# Patient Record
Sex: MALE | Race: BLACK OR AFRICAN AMERICAN | NOT HISPANIC OR LATINO | Employment: OTHER | URBAN - METROPOLITAN AREA
[De-identification: names, ages, dates, MRNs, and addresses within clinical notes are randomized per-mention and may not be internally consistent; named-entity substitution may affect disease eponyms.]

---

## 2017-01-03 DIAGNOSIS — I10 ESSENTIAL HYPERTENSION: ICD-10-CM

## 2017-01-03 RX ORDER — LISINOPRIL AND HYDROCHLOROTHIAZIDE 12.5; 2 MG/1; MG/1
1 TABLET ORAL DAILY
Qty: 90 TABLET | Refills: 1 | Status: SHIPPED | OUTPATIENT
Start: 2017-01-03 | End: 2017-07-08 | Stop reason: SDUPTHER

## 2017-01-03 NOTE — TELEPHONE ENCOUNTER
----- Message from Hayley Baudilio sent at 1/3/2017  9:24 AM CST -----  Contact: self [phone]  He is requesting a refill of lisinopril be sent to:    [id number] - [place] [address] AT [facility]  [address]  [place] [address]  Phone: [phone] Fax: [phone]    Thank you!

## 2017-01-31 DIAGNOSIS — R01.1 HEART MURMUR: ICD-10-CM

## 2017-01-31 DIAGNOSIS — I10 ESSENTIAL HYPERTENSION: ICD-10-CM

## 2017-01-31 RX ORDER — PROPRANOLOL HYDROCHLORIDE 40 MG/1
40 TABLET ORAL DAILY
Qty: 90 TABLET | Refills: 4 | Status: SHIPPED | OUTPATIENT
Start: 2017-01-31 | End: 2017-11-24 | Stop reason: ALTCHOICE

## 2017-01-31 NOTE — TELEPHONE ENCOUNTER
----- Message from Mojgan Beard sent at 1/31/2017 11:18 AM CST -----  propranolol (INDERAL) 40 MG tablet refill    [id number] - [place] - [address] AT [facility] & [facility]  [address]  [place] [address]  Phone: [phone] Fax: [phone]

## 2017-04-20 ENCOUNTER — HISTORICAL (OUTPATIENT)
Dept: ADMINISTRATIVE | Facility: HOSPITAL | Age: 72
End: 2017-04-20

## 2017-04-20 LAB
ALBUMIN SERPL-MCNC: 4.2 G/DL (ref 3.1–4.7)
ALP SERPL-CCNC: 57 IU/L (ref 40–104)
ALT (SGPT): 18 IU/L (ref 3–33)
AMYLASE SERPL-CCNC: 142 U/L (ref 28–100)
AST SERPL-CCNC: 20 IU/L (ref 10–40)
BASOPHILS NFR BLD: 0 K/UL (ref 0–0.2)
BASOPHILS NFR BLD: 0.1 %
BILIRUB SERPL-MCNC: 0.7 MG/DL (ref 0.3–1)
BUN SERPL-MCNC: 37 MG/DL (ref 8–20)
CALCIUM SERPL-MCNC: 11.2 MG/DL (ref 7.7–10.4)
CHLORIDE: 107 MMOL/L (ref 98–110)
CK SERPL-CCNC: 48 IU/L (ref 18–170)
CO2 SERPL-SCNC: 22.5 MMOL/L (ref 22.8–31.6)
CREATININE: 1.58 MG/DL (ref 0.6–1.4)
CRP SERPL-MCNC: 0.29 MG/DL (ref 0–1.4)
EOSINOPHIL NFR BLD: 0.1 K/UL (ref 0–0.7)
EOSINOPHIL NFR BLD: 0.9 %
ERYTHROCYTE [DISTWIDTH] IN BLOOD BY AUTOMATED COUNT: 17.1 % (ref 11.7–14.9)
GLUCOSE: 119 MG/DL (ref 70–99)
GRAN #: 6 K/UL (ref 1.4–6.5)
GRAN%: 68.7 %
HCT VFR BLD AUTO: 40.7 % (ref 39–55)
HGB BLD-MCNC: 12.7 G/DL (ref 14–16)
IMMATURE GRANS (ABS): 0 K/UL (ref 0–1)
IMMATURE GRANULOCYTES: 0.5 %
LYMPH #: 2 K/UL (ref 1.2–3.4)
LYMPH%: 23 %
MCH RBC QN AUTO: 27.4 PG (ref 25–35)
MCHC RBC AUTO-ENTMCNC: 31.2 G/DL (ref 31–36)
MCV RBC AUTO: 87.9 FL (ref 80–100)
MONO #: 0.6 K/UL (ref 0.1–0.6)
MONO%: 6.8 %
NUCLEATED RBCS: 0 %
PLATELET # BLD AUTO: 455 K/UL (ref 140–440)
PMV BLD AUTO: 10.6 FL (ref 8.8–12.7)
POTASSIUM SERPL-SCNC: 4.3 MMOL/L (ref 3.5–5)
PROT SERPL-MCNC: 8 G/DL (ref 6–8.2)
RBC # BLD AUTO: 4.63 M/UL (ref 4.3–5.9)
SODIUM: 142 MMOL/L (ref 134–144)
WBC # BLD AUTO: 8.7 K/UL (ref 5–10)

## 2017-04-21 LAB — RHEUMATOID FACT SERPL-ACNC: 633.9 IU/ML (ref 0–13.9)

## 2017-06-19 DIAGNOSIS — R52 PAIN: Primary | ICD-10-CM

## 2017-06-19 RX ORDER — TRAMADOL HYDROCHLORIDE 50 MG/1
TABLET ORAL
Qty: 120 TABLET | Refills: 5 | Status: SHIPPED | OUTPATIENT
Start: 2017-06-19 | End: 2018-01-26 | Stop reason: SDUPTHER

## 2017-07-07 ENCOUNTER — TELEPHONE (OUTPATIENT)
Dept: ENDOCRINOLOGY | Facility: CLINIC | Age: 72
End: 2017-07-07

## 2017-07-07 NOTE — TELEPHONE ENCOUNTER
Spoke with patient cancelled appointment for Dr Sampson.. He advised he would like to be seen because he has not been seen in awhile. I advised I could schedule with family practice as there is no order endocrine providers. Verbalized understanding

## 2017-07-08 DIAGNOSIS — I10 ESSENTIAL HYPERTENSION: ICD-10-CM

## 2017-07-10 RX ORDER — LISINOPRIL AND HYDROCHLOROTHIAZIDE 12.5; 2 MG/1; MG/1
TABLET ORAL
Qty: 90 TABLET | Refills: 0 | Status: SHIPPED | OUTPATIENT
Start: 2017-07-10 | End: 2017-11-24 | Stop reason: SDUPTHER

## 2017-07-14 ENCOUNTER — TELEPHONE (OUTPATIENT)
Dept: ENDOCRINOLOGY | Facility: CLINIC | Age: 72
End: 2017-07-14

## 2017-07-14 NOTE — TELEPHONE ENCOUNTER
----- Message from Idania Cerrato sent at 7/14/2017 10:50 AM CDT -----  Contact: pt  Pt received letter regarding needing appt in Aug, I was unable to schedule, due to system  Call pt back to schedule     Call back on # [phone]  thanks

## 2017-07-18 DIAGNOSIS — M19.90 CHRONIC INFLAMMATORY ARTHRITIS: Primary | ICD-10-CM

## 2017-07-18 RX ORDER — PREDNISONE 2.5 MG/1
TABLET ORAL
Qty: 180 TABLET | Refills: 1 | Status: SHIPPED | OUTPATIENT
Start: 2017-07-18 | End: 2018-02-21 | Stop reason: SDUPTHER

## 2017-07-19 ENCOUNTER — DOCUMENTATION ONLY (OUTPATIENT)
Dept: FAMILY MEDICINE | Facility: CLINIC | Age: 72
End: 2017-07-19

## 2017-07-19 NOTE — PROGRESS NOTES
Pre-Visit Chart Review  For Appointment Scheduled on 7/25/17    Health Maintenance Due   Topic Date Due    TETANUS VACCINE  08/28/1965    Zoster Vaccine  08/28/2007    Colonoscopy  11/03/2016

## 2017-07-25 ENCOUNTER — OFFICE VISIT (OUTPATIENT)
Dept: FAMILY MEDICINE | Facility: CLINIC | Age: 72
End: 2017-07-25
Payer: MEDICARE

## 2017-07-25 VITALS
OXYGEN SATURATION: 97 % | RESPIRATION RATE: 12 BRPM | SYSTOLIC BLOOD PRESSURE: 148 MMHG | HEART RATE: 86 BPM | WEIGHT: 141.13 LBS | BODY MASS INDEX: 21.39 KG/M2 | HEIGHT: 68 IN | DIASTOLIC BLOOD PRESSURE: 73 MMHG

## 2017-07-25 DIAGNOSIS — M06.9 RHEUMATOID ARTHRITIS INVOLVING MULTIPLE SITES, UNSPECIFIED RHEUMATOID FACTOR PRESENCE: ICD-10-CM

## 2017-07-25 DIAGNOSIS — M05.10 RHEUMATOID LUNG: ICD-10-CM

## 2017-07-25 DIAGNOSIS — F32.A DEPRESSION, UNSPECIFIED DEPRESSION TYPE: ICD-10-CM

## 2017-07-25 DIAGNOSIS — F41.9 ANXIETY: ICD-10-CM

## 2017-07-25 DIAGNOSIS — E07.9 THYROID DISEASE: ICD-10-CM

## 2017-07-25 DIAGNOSIS — I10 ESSENTIAL HYPERTENSION: Primary | ICD-10-CM

## 2017-07-25 DIAGNOSIS — E29.1 HYPOGONADISM IN MALE: ICD-10-CM

## 2017-07-25 DIAGNOSIS — E04.9 GOITER: ICD-10-CM

## 2017-07-25 PROCEDURE — 99499 UNLISTED E&M SERVICE: CPT | Mod: S$PBB,,, | Performed by: PHYSICIAN ASSISTANT

## 2017-07-25 PROCEDURE — 1159F MED LIST DOCD IN RCRD: CPT | Mod: S$GLB,,, | Performed by: PHYSICIAN ASSISTANT

## 2017-07-25 PROCEDURE — 99999 PR PBB SHADOW E&M-EST. PATIENT-LVL IV: CPT | Mod: PBBFAC,,, | Performed by: PHYSICIAN ASSISTANT

## 2017-07-25 PROCEDURE — 99214 OFFICE O/P EST MOD 30 MIN: CPT | Mod: S$GLB,,, | Performed by: PHYSICIAN ASSISTANT

## 2017-07-25 PROCEDURE — 1125F AMNT PAIN NOTED PAIN PRSNT: CPT | Mod: S$GLB,,, | Performed by: PHYSICIAN ASSISTANT

## 2017-07-25 RX ORDER — ESCITALOPRAM OXALATE 5 MG/1
5 TABLET ORAL DAILY
Qty: 30 TABLET | Refills: 11 | Status: SHIPPED | OUTPATIENT
Start: 2017-07-25 | End: 2017-08-28 | Stop reason: DRUGHIGH

## 2017-07-25 RX ORDER — DULOXETIN HYDROCHLORIDE 30 MG/1
30 CAPSULE, DELAYED RELEASE ORAL DAILY
Qty: 30 CAPSULE | Refills: 11 | Status: CANCELLED | OUTPATIENT
Start: 2017-07-25 | End: 2018-07-25

## 2017-07-25 NOTE — PROGRESS NOTES
Subjective:       Patient ID: Rajendra Castle is a 71 y.o. male.    Chief Complaint: Follow-up    Mr. Castle comes to clinic today for follow up. He reports he is concerned about recent increase in depression and anxiety symptoms. He reports that he has had 2 romantic relationships end in the last 4 years. He also reports he is thinking about the end of his life and trying to make preparations for this so his family does not have to worry about it. He denies SI or HI.  The patient is followed by Dr. Garcia for RA. He is followed by Dr. Song for rheumatoid lung. He is also followed by Dr. Sampson for thyroid disease.       Review of Systems   Constitutional: Positive for activity change. Negative for appetite change and fever.   HENT: Negative for postnasal drip, rhinorrhea and sinus pressure.    Eyes: Negative for visual disturbance.   Respiratory: Negative for cough and shortness of breath.    Cardiovascular: Negative for chest pain.   Gastrointestinal: Negative for abdominal distention and abdominal pain.   Genitourinary: Negative for difficulty urinating and dysuria.   Musculoskeletal: Negative for arthralgias and myalgias.   Neurological: Negative for headaches.   Hematological: Negative for adenopathy.   Psychiatric/Behavioral: Positive for dysphoric mood. The patient is nervous/anxious.        Objective:      Physical Exam   Constitutional: He is oriented to person, place, and time.   HENT:   Mouth/Throat: Oropharynx is clear and moist. No oropharyngeal exudate.   Eyes: Conjunctivae are normal. Pupils are equal, round, and reactive to light.   Cardiovascular: Normal rate and regular rhythm.    Pulmonary/Chest: Effort normal and breath sounds normal. He has no wheezes.   Abdominal: Soft. Bowel sounds are normal. He exhibits no distension. There is no tenderness.   Musculoskeletal: He exhibits no edema.   Lymphadenopathy:     He has no cervical adenopathy.   Neurological: He is alert and oriented to person, place,  and time.   Skin: No erythema.   Psychiatric: His behavior is normal.       Assessment:       1. Essential hypertension    2. Rheumatoid arthritis involving multiple sites, unspecified rheumatoid factor presence    3. Thyroid disease    4. Goiter    5. Hypogonadism in male    6. Rheumatoid lung    7. Anxiety    8. Depression, unspecified depression type        Plan:       Rajendra was seen today for follow-up.    Diagnoses and all orders for this visit:    Essential hypertension  Slightly elevated  Monitor and record  Follow up in 4 weeks or sooner if needed  Rheumatoid arthritis involving multiple sites, unspecified rheumatoid factor presence  Continue follow up with Dr. Garcia  Thyroid disease  -     US Soft Tissue Head Neck Thyroid; Future    Goiter  -     US Soft Tissue Head Neck Thyroid; Future    Hypogonadism in male  Follow up with Dr. Sampson  Rheumatoid lung  Continue follow up with Dr. Song  Anxiety  -     escitalopram oxalate (LEXAPRO) 5 MG Tab; Take 1 tablet (5 mg total) by mouth once daily.  Follow up in 4 weeks or sooner if needed  Patient given information on a counselor  Depression, unspecified depression type  -     escitalopram oxalate (LEXAPRO) 5 MG Tab; Take 1 tablet (5 mg total) by mouth once daily.  Follow up in 4 weeks or sooner if needed  Other orders  -     Cancel: duloxetine (CYMBALTA) 30 MG capsule; Take 1 capsule (30 mg total) by mouth once daily.    Patient readiness: acceptance and barriers:none    During the course of the visit the patient was educated and counseled about the following:     Hypertension:   Medication: no change.  Dietary sodium restriction.  Regular aerobic exercise.  Follow up: 4 weeks and as needed.    Goals: Hypertension: Reduce Blood Pressure    Did patient meet goals/outcomes: No    The following self management tools provided: blood pressure log    Patient Instructions (the written plan) was given to the patient/family.     Time spent with patient: 30  minutes

## 2017-08-22 ENCOUNTER — DOCUMENTATION ONLY (OUTPATIENT)
Dept: FAMILY MEDICINE | Facility: CLINIC | Age: 72
End: 2017-08-22

## 2017-08-22 NOTE — PROGRESS NOTES
Pre-Visit Chart Review  For Appointment Scheduled on 8/28/17    Health Maintenance Due   Topic Date Due    TETANUS VACCINE  08/28/1965    Zoster Vaccine  08/28/2007    Colonoscopy  11/03/2016    Influenza Vaccine  08/01/2017

## 2017-08-23 ENCOUNTER — OFFICE VISIT (OUTPATIENT)
Dept: RHEUMATOLOGY | Facility: CLINIC | Age: 72
End: 2017-08-23
Payer: MEDICARE

## 2017-08-23 ENCOUNTER — TELEPHONE (OUTPATIENT)
Dept: FAMILY MEDICINE | Facility: CLINIC | Age: 72
End: 2017-08-23

## 2017-08-23 VITALS
SYSTOLIC BLOOD PRESSURE: 120 MMHG | WEIGHT: 138.19 LBS | HEIGHT: 68 IN | DIASTOLIC BLOOD PRESSURE: 60 MMHG | BODY MASS INDEX: 20.94 KG/M2

## 2017-08-23 DIAGNOSIS — Z79.899 ENCOUNTER FOR LONG-TERM (CURRENT) USE OF OTHER MEDICATIONS: ICD-10-CM

## 2017-08-23 DIAGNOSIS — M19.90 CHRONIC INFLAMMATORY ARTHRITIS: Primary | ICD-10-CM

## 2017-08-23 DIAGNOSIS — N18.30 STAGE 3 CHRONIC KIDNEY DISEASE: ICD-10-CM

## 2017-08-23 PROBLEM — N18.9 CKD (CHRONIC KIDNEY DISEASE): Status: ACTIVE | Noted: 2017-08-23

## 2017-08-23 PROCEDURE — 3078F DIAST BP <80 MM HG: CPT | Mod: ,,, | Performed by: INTERNAL MEDICINE

## 2017-08-23 PROCEDURE — 3074F SYST BP LT 130 MM HG: CPT | Mod: ,,, | Performed by: INTERNAL MEDICINE

## 2017-08-23 PROCEDURE — 99213 OFFICE O/P EST LOW 20 MIN: CPT | Mod: ,,, | Performed by: INTERNAL MEDICINE

## 2017-08-23 PROCEDURE — 3008F BODY MASS INDEX DOCD: CPT | Mod: ,,, | Performed by: INTERNAL MEDICINE

## 2017-08-23 PROCEDURE — 1126F AMNT PAIN NOTED NONE PRSNT: CPT | Mod: ,,, | Performed by: INTERNAL MEDICINE

## 2017-08-23 PROCEDURE — 1159F MED LIST DOCD IN RCRD: CPT | Mod: ,,, | Performed by: INTERNAL MEDICINE

## 2017-08-23 NOTE — TELEPHONE ENCOUNTER
Received faxed request from [facility] requesting H&P and medication list. Also received signed release of information form. Requested information faxed to [phone].

## 2017-08-23 NOTE — PROGRESS NOTES
[facility] RHEUMATOLOGY           Follow-up visit      Subjective:       Patient ID:   NAME: Rajendra Castle : 1945     71 y.o. male    Referring Doc: No ref. provider found  Other Physicians:    Chief Complaint:  Rheumatoid Arthritis      HPI/Interval History:      The patient is doing just great. He has no joint pain, no joint swelling, no redness and no stiffness. He states he feels better now then he can remember feeling in many years.        ROS:   GEN: no fever, night sweats or weight loss  SKIN:  no rashes , erythema, bruising, or swelling, no Raynauds, no photosensitivity  HEENT: no HAs, no changes in vision, no mouth ulcers, no sicca symptoms, no scalp tenderness, jaw claudication.  CV: no CP, SOB, PND, DEY or orthopnea,no palpitations  PULM: no SOB, cough, hemoptysis, sputum or pleuritic pain  GI: no abdominal pain, nausea, vomiting, constipation, diarrhea, melanotic stools, BRBPR, or hematemesis.no dysphagia  : no hematuria, dysuria  NEURO:no paresthesias, headaches, visual disturbances, muscle weakness  MUSCULOSKELETAL:  No complaints of joint pain or swelling  PSYCH: No insomnia, no significant depression, no anxiety    Medications:    Current Outpatient Prescriptions:     alendronate (FOSAMAX) 70 MG tablet, Take 1 tablet (70 mg total) by mouth every 7 days., Disp: 90 tablet, Rfl: 3    aspirin (ECOTRIN) 81 MG EC tablet, Take 81 mg by mouth once daily.  , Disp: , Rfl:     escitalopram oxalate (LEXAPRO) 5 MG Tab, Take 1 tablet (5 mg total) by mouth once daily., Disp: 30 tablet, Rfl: 11    folic acid (FOLVITE) 1 MG tablet, Take 5 mg by mouth once daily., Disp: , Rfl:     isosorbide dinitrate (ISORDIL) 10 MG tablet, Take 1 tablet (10 mg total) by mouth 2 (two) times daily., Disp: 60 tablet, Rfl: 11    lisinopril-hydrochlorothiazide (PRINZIDE,ZESTORETIC) 20-12.5 mg per tablet, TAKE 1 TABLET BY MOUTH EVERY DAY, Disp: 90 tablet, Rfl: 0    methotrexate 2.5 MG Tab, Take 6 tablets by mouth every 7  "days., Disp: , Rfl:     predniSONE (DELTASONE) 2.5 MG tablet, TAKE 1 TABLET BY MOUTH TWICE DAILY, Disp: 180 tablet, Rfl: 1    tramadol (ULTRAM) 50 mg tablet, TAKE 2 TABLETS BY MOUTH TWICE DAILY AS NEEDED FOR PAIN, Disp: 120 tablet, Rfl: 5    pantoprazole (PROTONIX) 40 MG tablet, Take 1 tablet (40 mg total) by mouth once daily., Disp: 90 tablet, Rfl: 3    propranolol (INDERAL) 40 MG tablet, Take 1 tablet (40 mg total) by mouth once daily., Disp: 90 tablet, Rfl: 4    testosterone (ANDROGEL) 1 % (25 mg/2.5gram) GlPk, Place 25 mg onto the skin once daily., Disp: 90 packet, Rfl: 3    umeclidinium-vilanterol (ANORO ELLIPTA) 62.5-25 mcg/actuation DsDv, Inhale 1 puff into the lungs once daily., Disp: 1 each, Rfl: 11  FAMILY HISTORY: negative for Connective Tissue Disease        Review of patient's allergies indicates:  No Known Allergies          Objective:     Vitals:  Blood pressure 120/60, height 5' 8" (1.727 m), weight 62.7 kg (138 lb 3.2 oz).    Physical Examination:   GEN: wn/wd male in no apparent distress; AAOx3  SKIN: no rashes, no lesions, no sclerodactyly, no Raynaud's, no periungual erythema  HEAD: no alopecia, no scalp tenderness, no temporal artery tenderness or induration.  EYES: no pallor, no icterus, PERRLA  ENT:  no thrush, no mucosal dryness or ulcerations, adequate oral hygiene & dentition.  NECK: supple x 6, no masses, no thyromegaly, no lymphadenopathy.  CV:   S1 and S2 regular, no murmurs, gallop or rubs  CHEST: Normal respiratory effort;  normal breath sounds/no adventitious sounds. No signs of consolidation.  ABD: non-tender and non-distended; soft; normal bowel sounds; no rebound/guarding or tenderness. No hepatosplenomegaly.  Musculoskeletal:  No evidence of any active synovitis and no evidence of any deformity  EXTREM: no clubbing, cyanosis or edema. normal pulses.  NEURO: grossly intact; motor/sensory WNL; AAOx3; no tremors  PSYCH: normal mood, affect and behavior            Labs:   Lab " Results   Component Value Date    WBC 8.7 04/20/2017    HGB 12.7 (L) 04/20/2017    HCT 40.7 04/20/2017    MCV 87.9 04/20/2017     (H) 04/20/2017   CMP@  Sodium   Date Value Ref Range Status   04/20/2017 142 134 - 144 mmol/L      Potassium   Date Value Ref Range Status   04/20/2017 4.3 3.5 - 5.0 mmol/L      Chloride   Date Value Ref Range Status   04/20/2017 107 98 - 110 mmol/L      CO2   Date Value Ref Range Status   04/20/2017 22.5 (L) 22.8 - 31.6 mmol/L      Glucose   Date Value Ref Range Status   04/20/2017 119 (H) 70 - 99 mg/dL      BUN, Bld   Date Value Ref Range Status   04/20/2017 37 (H) 8 - 20 mg/dL      Creatinine   Date Value Ref Range Status   04/20/2017 1.58 (H) 0.60 - 1.40 mg/dL      Calcium   Date Value Ref Range Status   04/20/2017 11.2 (H) 7.7 - 10.4 mg/dL      Total Protein   Date Value Ref Range Status   04/20/2017 8.0 6.0 - 8.2 g/dL      Albumin   Date Value Ref Range Status   04/20/2017 4.2 3.1 - 4.7 g/dL      Total Bilirubin   Date Value Ref Range Status   04/20/2017 0.7 0.3 - 1.0 mg/dL      Alkaline Phosphatase   Date Value Ref Range Status   04/20/2017 57 40 - 104 IU/L      AST   Date Value Ref Range Status   04/20/2017 20 10 - 40 IU/L      ALT   Date Value Ref Range Status   09/27/2016 18 10 - 44 U/L Final     CPK   Date Value Ref Range Status   04/20/2017 48 18 - 170 IU/L      CRP   Date Value Ref Range Status   04/20/2017 0.29 0.00 - 1.40 mg/dL      Rheumatoid Factor   Date Value Ref Range Status   04/20/2017 633.9 (H) 0.0 - 13.9 IU/mL      Comment:     Performed at: [facility], [facility] [id number] [place], [place], [id number] Euceda MD, Phone:  [phone]     Uric Acid   Date Value Ref Range Status   09/20/2016 8.6 (H) 3.4 - 7.0 mg/dL Final         Radiology/Diagnostic Studies:    o x-rays are available    Assessment/Discussion/Plan:   71 y.o. male with serial positive rheumatoid arthritis-very well controlled with current regimen.  (2) CKD- being managed by his  primary care doctor, Dr. Aceves.        PLAN:  I will continue his medications without change. Blood testing was ordered and will be obtained at an outside facility. A copy of my note today will be sent to his primary provider.      RTC:  I will see the patient back in 4 months.      Electronically signed by Uriel Garcia MD

## 2017-08-25 LAB
ALBUMIN SERPL-MCNC: 3.8 G/DL (ref 3.6–5.1)
ALBUMIN/GLOB SERPL: 1.3 (CALC) (ref 1–2.5)
ALP SERPL-CCNC: 49 U/L (ref 40–115)
ALT SERPL-CCNC: 15 U/L (ref 9–46)
AST SERPL-CCNC: 19 U/L (ref 10–35)
BASOPHILS # BLD AUTO: 9 CELLS/UL (ref 0–200)
BASOPHILS NFR BLD AUTO: 0.1 %
BILIRUB SERPL-MCNC: 0.3 MG/DL (ref 0.2–1.2)
BUN SERPL-MCNC: 26 MG/DL (ref 7–25)
BUN/CREAT SERPL: 15 (CALC) (ref 6–22)
CALCIUM SERPL-MCNC: 10.2 MG/DL (ref 8.6–10.3)
CCP IGG SERPL-ACNC: >250 UNITS
CHLORIDE SERPL-SCNC: 108 MMOL/L (ref 98–110)
CO2 SERPL-SCNC: 22 MMOL/L (ref 20–31)
CREAT SERPL-MCNC: 1.69 MG/DL (ref 0.7–1.25)
CRP SERPL-MCNC: <0.1 MG/DL
EOSINOPHIL # BLD AUTO: 122 CELLS/UL (ref 15–500)
EOSINOPHIL NFR BLD AUTO: 1.4 %
ERYTHROCYTE [DISTWIDTH] IN BLOOD BY AUTOMATED COUNT: 15.8 % (ref 11–15)
GFR SERPL CREATININE-BSD FRML MDRD: 41 ML/MIN/1.73M2
GLOBULIN SER CALC-MCNC: 3 G/DL (CALC) (ref 1.9–3.7)
GLUCOSE SERPL-MCNC: 78 MG/DL (ref 65–99)
HCT VFR BLD AUTO: 37.7 % (ref 38.5–50)
HGB BLD-MCNC: 12.1 G/DL (ref 13.2–17.1)
LYMPHOCYTES # BLD AUTO: 2636 CELLS/UL (ref 850–3900)
LYMPHOCYTES NFR BLD AUTO: 30.3 %
MCH RBC QN AUTO: 27.8 PG (ref 27–33)
MCHC RBC AUTO-ENTMCNC: 32.1 G/DL (ref 32–36)
MCV RBC AUTO: 86.5 FL (ref 80–100)
MONOCYTES # BLD AUTO: 905 CELLS/UL (ref 200–950)
MONOCYTES NFR BLD AUTO: 10.4 %
NEUTROPHILS # BLD AUTO: 5029 CELLS/UL (ref 1500–7800)
NEUTROPHILS NFR BLD AUTO: 57.8 %
PLATELET # BLD AUTO: 385 THOUSAND/UL (ref 140–400)
PMV BLD REES-ECKER: 11.6 FL (ref 7.5–12.5)
POTASSIUM SERPL-SCNC: 5.2 MMOL/L (ref 3.5–5.3)
PROT SERPL-MCNC: 6.8 G/DL (ref 6.1–8.1)
RBC # BLD AUTO: 4.36 MILLION/UL (ref 4.2–5.8)
SODIUM SERPL-SCNC: 138 MMOL/L (ref 135–146)
WBC # BLD AUTO: 8.7 THOUSAND/UL (ref 3.8–10.8)

## 2017-08-28 ENCOUNTER — OFFICE VISIT (OUTPATIENT)
Dept: FAMILY MEDICINE | Facility: CLINIC | Age: 72
End: 2017-08-28
Payer: MEDICARE

## 2017-08-28 VITALS
HEIGHT: 68 IN | HEART RATE: 100 BPM | RESPIRATION RATE: 16 BRPM | WEIGHT: 140.44 LBS | BODY MASS INDEX: 21.28 KG/M2 | SYSTOLIC BLOOD PRESSURE: 140 MMHG | DIASTOLIC BLOOD PRESSURE: 68 MMHG

## 2017-08-28 DIAGNOSIS — Z12.11 COLON CANCER SCREENING: ICD-10-CM

## 2017-08-28 DIAGNOSIS — M06.9 RHEUMATOID ARTHRITIS INVOLVING MULTIPLE SITES, UNSPECIFIED RHEUMATOID FACTOR PRESENCE: ICD-10-CM

## 2017-08-28 DIAGNOSIS — I10 ESSENTIAL HYPERTENSION: Primary | ICD-10-CM

## 2017-08-28 DIAGNOSIS — K21.9 GASTROESOPHAGEAL REFLUX DISEASE WITHOUT ESOPHAGITIS: ICD-10-CM

## 2017-08-28 DIAGNOSIS — F32.A DEPRESSION, UNSPECIFIED DEPRESSION TYPE: ICD-10-CM

## 2017-08-28 PROCEDURE — 3008F BODY MASS INDEX DOCD: CPT | Mod: S$GLB,,, | Performed by: PHYSICIAN ASSISTANT

## 2017-08-28 PROCEDURE — 1126F AMNT PAIN NOTED NONE PRSNT: CPT | Mod: S$GLB,,, | Performed by: PHYSICIAN ASSISTANT

## 2017-08-28 PROCEDURE — 3078F DIAST BP <80 MM HG: CPT | Mod: S$GLB,,, | Performed by: PHYSICIAN ASSISTANT

## 2017-08-28 PROCEDURE — 99999 PR PBB SHADOW E&M-EST. PATIENT-LVL IV: CPT | Mod: PBBFAC,,, | Performed by: PHYSICIAN ASSISTANT

## 2017-08-28 PROCEDURE — 1159F MED LIST DOCD IN RCRD: CPT | Mod: S$GLB,,, | Performed by: PHYSICIAN ASSISTANT

## 2017-08-28 PROCEDURE — 99214 OFFICE O/P EST MOD 30 MIN: CPT | Mod: S$GLB,,, | Performed by: PHYSICIAN ASSISTANT

## 2017-08-28 PROCEDURE — 3077F SYST BP >= 140 MM HG: CPT | Mod: S$GLB,,, | Performed by: PHYSICIAN ASSISTANT

## 2017-08-28 PROCEDURE — 99499 UNLISTED E&M SERVICE: CPT | Mod: S$PBB,,, | Performed by: PHYSICIAN ASSISTANT

## 2017-08-28 RX ORDER — ESCITALOPRAM OXALATE 10 MG/1
10 TABLET ORAL DAILY
COMMUNITY
End: 2021-02-17 | Stop reason: DRUGHIGH

## 2017-08-28 NOTE — PROGRESS NOTES
Subjective:       Patient ID: Rajendra Castle is a 72 y.o. male.    Chief Complaint: Follow-up (4wk f/u)    Mr. Castle comes to clinic today for 1 month follow up. He reports that the lexapro is helping his depression and anxiety. The patient reports that he is seeing a psychiatrist through [facility] who has increased his lexparo to 10mg. The patient is also attending group counseling. He reports overall he is feeling much better. The patient is due for a colonoscopy; he refuses this at this time. He does continue to follow up with Dr. Garcia for rheumatology.       Review of Systems   Constitutional: Negative for activity change, appetite change and fever.   HENT: Negative for postnasal drip, rhinorrhea and sinus pressure.    Eyes: Negative for visual disturbance.   Respiratory: Negative for cough and shortness of breath.    Cardiovascular: Negative for chest pain.   Gastrointestinal: Negative for abdominal distention and abdominal pain.   Genitourinary: Negative for difficulty urinating and dysuria.   Musculoskeletal: Negative for arthralgias and myalgias.   Neurological: Negative for headaches.   Hematological: Negative for adenopathy.   Psychiatric/Behavioral: The patient is not nervous/anxious.        Objective:      Physical Exam   Constitutional: He is oriented to person, place, and time.   HENT:   Mouth/Throat: Oropharynx is clear and moist. No oropharyngeal exudate.   Eyes: Conjunctivae are normal. Pupils are equal, round, and reactive to light.   Cardiovascular: Normal rate and regular rhythm.    Pulmonary/Chest: Effort normal and breath sounds normal. He has no wheezes.   Abdominal: Soft. Bowel sounds are normal. He exhibits no distension. There is no tenderness.   Musculoskeletal: He exhibits no edema.   Lymphadenopathy:     He has no cervical adenopathy.   Neurological: He is alert and oriented to person, place, and time.   Skin: No erythema.   Psychiatric: His behavior is normal.       Assessment:       1.  Essential hypertension    2. Rheumatoid arthritis involving multiple sites, unspecified rheumatoid factor presence    3. Gastroesophageal reflux disease without esophagitis    4. Depression, unspecified depression type    5. Colon cancer screening        Plan:   Rajendra was seen today for follow-up.    Diagnoses and all orders for this visit:    Essential hypertension  Slightly elevated  Low salt diet  Continue current medication  Monitor and record blood pressure at home  Rheumatoid arthritis involving multiple sites, unspecified rheumatoid factor presence  Continue follow up with Dr. Garcia  Continue current medication  Gastroesophageal reflux disease without esophagitis  Avoid trigger foods  Stop eating 2-3 hours before bed  Depression, unspecified depression type  Continue lexapro  Continue counseling through [facility]  Colon cancer screening  -     Ambulatory referral to Gastroenterology

## 2017-09-11 ENCOUNTER — LAB VISIT (OUTPATIENT)
Dept: LAB | Facility: HOSPITAL | Age: 72
End: 2017-09-11
Attending: INTERNAL MEDICINE
Payer: MEDICARE

## 2017-09-11 ENCOUNTER — OFFICE VISIT (OUTPATIENT)
Dept: ENDOCRINOLOGY | Facility: CLINIC | Age: 72
End: 2017-09-11
Payer: MEDICARE

## 2017-09-11 VITALS
HEIGHT: 68 IN | BODY MASS INDEX: 20.87 KG/M2 | SYSTOLIC BLOOD PRESSURE: 112 MMHG | DIASTOLIC BLOOD PRESSURE: 60 MMHG | WEIGHT: 137.69 LBS | HEART RATE: 70 BPM

## 2017-09-11 DIAGNOSIS — M81.0 OSTEOPOROSIS, UNSPECIFIED OSTEOPOROSIS TYPE, UNSPECIFIED PATHOLOGICAL FRACTURE PRESENCE: ICD-10-CM

## 2017-09-11 DIAGNOSIS — E55.9 VITAMIN D INSUFFICIENCY: ICD-10-CM

## 2017-09-11 DIAGNOSIS — E04.2 NONTOXIC MULTINODULAR GOITER: ICD-10-CM

## 2017-09-11 DIAGNOSIS — E21.3 HYPERPARATHYROIDISM: ICD-10-CM

## 2017-09-11 DIAGNOSIS — E04.2 NONTOXIC MULTINODULAR GOITER: Primary | ICD-10-CM

## 2017-09-11 DIAGNOSIS — I10 ESSENTIAL HYPERTENSION: ICD-10-CM

## 2017-09-11 DIAGNOSIS — E83.52 HYPERCALCEMIA: ICD-10-CM

## 2017-09-11 LAB
25(OH)D3+25(OH)D2 SERPL-MCNC: 33 NG/ML
TSH SERPL DL<=0.005 MIU/L-ACNC: 0.93 UIU/ML

## 2017-09-11 PROCEDURE — 1126F AMNT PAIN NOTED NONE PRSNT: CPT | Mod: S$GLB,,, | Performed by: INTERNAL MEDICINE

## 2017-09-11 PROCEDURE — 3074F SYST BP LT 130 MM HG: CPT | Mod: S$GLB,,, | Performed by: INTERNAL MEDICINE

## 2017-09-11 PROCEDURE — 3078F DIAST BP <80 MM HG: CPT | Mod: S$GLB,,, | Performed by: INTERNAL MEDICINE

## 2017-09-11 PROCEDURE — 82306 VITAMIN D 25 HYDROXY: CPT

## 2017-09-11 PROCEDURE — 84443 ASSAY THYROID STIM HORMONE: CPT

## 2017-09-11 PROCEDURE — 99214 OFFICE O/P EST MOD 30 MIN: CPT | Mod: S$GLB,,, | Performed by: INTERNAL MEDICINE

## 2017-09-11 PROCEDURE — 99499 UNLISTED E&M SERVICE: CPT | Mod: S$PBB,,, | Performed by: INTERNAL MEDICINE

## 2017-09-11 PROCEDURE — 99999 PR PBB SHADOW E&M-EST. PATIENT-LVL III: CPT | Mod: PBBFAC,,, | Performed by: INTERNAL MEDICINE

## 2017-09-11 PROCEDURE — 3008F BODY MASS INDEX DOCD: CPT | Mod: S$GLB,,, | Performed by: INTERNAL MEDICINE

## 2017-09-11 PROCEDURE — 1159F MED LIST DOCD IN RCRD: CPT | Mod: S$GLB,,, | Performed by: INTERNAL MEDICINE

## 2017-09-11 PROCEDURE — 36415 COLL VENOUS BLD VENIPUNCTURE: CPT | Mod: PO

## 2017-09-11 RX ORDER — ERGOCALCIFEROL 1.25 MG/1
50000 CAPSULE ORAL
COMMUNITY
End: 2017-11-15 | Stop reason: SDUPTHER

## 2017-09-11 NOTE — PATIENT INSTRUCTIONS
Hypothyroidism       You have hypothyroidism. This means your thyroid gland is not making enough thyroid hormone. This hormone is vital to body growth and metabolism. If you dont make enough, many body processes slow down. This can cause symptoms throughout the body. Hypothyroidism can range from mild to severe. The most severe form is called myxedema.  There are a number of causes of hypothyroidism. A common cause is Hashimotos disease. This disease causes the bodys own immune system to attack the thyroid gland. When you have certain treatments, such as surgery to remove the thyroid gland, this can also cause hypothyroidism.  Symptoms of hypothyroidism can include:  · Fatigue  · Trouble concentrating or thinking clearly; forgetfulness  · Dry skin  · Hair loss  · Weight gain  · Low tolerance to cold  · Constipation  · Depression  · Personality changes  · Tingling or prickling of the hands or feet  · Heavy, absent, or irregular periods (women only)  Older adults may sometimes have other symptoms. These can include:  · Muscle aches and weakness  · Confusion  · Incontinence (unable to control urine or stool)  · Trouble moving around  · Falling  Treatment for hypothyroidism involves taking thyroid hormone pills daily. These pills replace the hormone your thyroid doesnt make. You will likely need to take a daily pill for the rest of your life. Tips for taking this medicine are given below.  Home care  Tips for taking your medicine  · Take your thyroid hormone pills as prescribed by your healthcare provider. This is most often 1 pill a day on an empty stomach. Use a pillbox labeled with the days of the week. This will help you remember to take your pill each day.  · Dont take products that contain iron and calcium or antacids within 4 hours of taking your thyroid hormone pills.  · Dont take other medicines with your thyroid hormone pill without checking with your provider first.  · Tell your provider if you have  any side effects from your medicines that bother you.  · Never change the dosage or stop taking your thyroid pills without talking to your provider first.  General care  · Always talk with your provider before trying other medicines or treatments for your thyroid problem.  · If you see other healthcare providers, be sure to let them know about your thyroid problem.  Follow-up care  See your healthcare provider for checkups as advised. You may need regular tests to check the level of thyroid hormone in your blood.  When to seek medical advice  Call your healthcare provider right away if any of these occur:  · New symptoms develop  · Symptoms return, continue, or worsen even after treatment  · Extreme fatigue  · Puffy hands, face, or feet  · Fast or irregular heartbeat  · Confusion  Call 911  Call 911 right away if any of these occur:  · Fainting  · Chest pain  · Shortness of breath or trouble breathing  Date Last Reviewed: 8/24/2015  © 2686-4720 [employer]. [address] [place] [address]. All rights reserved. This information is not intended as a substitute for professional medical care. Always follow your healthcare professional's instructions.        Common Thyroid Problems    The thyroid is a gland in the neck. It makes thyroid hormone. A hormone is a chemical messenger for the body. If there is a problem with the thyroid, the level of thyroid hormone may change. This can lead to symptoms.  Hypothyroidism  This is when the thyroid gland doesnt make enough hormone. The most common cause of hypothyroidism is Hashimoto thyroiditis. This occurs when the bodys immune system attacks the thyroid gland. Hypothyroidism may also occur if theres a severe deficiency of iodine in the body. The thyroid needs iodine to make hormone. Problems with the pituitary gland can lead to not enough production of thyroid hormone. Hypothyroidism will also occur if the thyroid gland is removed during  surgery.  Common symptoms include:  · Low energy and tiredness  · Depression  · Feeling cold  · Muscle pain  · Slowed thinking      · Constipation  · Heavier menstrual periods with prolonged bleeding   · Weight gain  · Dry and brittle skin, hair, nails  · Excessive sleepiness  Hyperthyroidism  This is when the thyroid gland makes too much hormone. The most common cause is Graves disease. This is due to the bodys immune system telling the thyroid to make too much hormone. Another cause is a small bump (nodule) in the thyroid gland. This can cause hyperthyroidism if the cells in the nodule make too much thyroid hormone and stop hormone production in the rest of the thyroid gland.  Common symptoms include:  · Shaking, nervousness, irritability  · Feeling hot  · A rapid, irregular heartbeat  · Muscle weakness, fatigue  · More frequent bowel movements  · [place], lighter menstrual periods  · Weight loss  · Hair loss  · Bulging eyes  Nodules  Nodules are lumps of tissue in the thyroid gland. Most often, the cause of nodules isnt known. But they may be more common in people whove had medical radiation to the head or neck. Sometimes nodules can be felt on the outside of the neck. Most of the time, cause no symptoms and dont affect the amount of thyroid hormone. Most nodules are not cancer. But sometimes a nodule may be cancer.  What is a goiter?  A goiter is the enlargement of the thyroid gland. When the gland enlarges, you may see or feel a swelling on your neck. A goiter can develop in someone with a normal thyroid, overactive thyroid, or underactive thyroid.   Date Last Reviewed: 11/1/2016  © 7391-6594 [employer]. [address] [place] [address], [place] [address]. All rights reserved. This information is not intended as a substitute for professional medical care. Always follow your healthcare professional's instructions.        Understanding the Parathyroid Glands  The parathyroid glands are 4 pea-sized glands  in the neck. They sit behind the thyroid gland. Their main job is to keep the level of calcium in the blood in a healthy range. This helps muscles and nerves work properly. And it also keeps bones strong. When there is a problem with the parathyroid glands, the blood calcium level may get too high. This has effects around the body.      How the parathyroid glands work  The parathyroid glands control the level of calcium and phosphorus in the blood by making parathyroid hormone (PTH). This is a chemical messenger that tells the body how to control calcium.  When blood calcium is low  When the blood calcium level is low, the glands make more PTH. This tells the body to increase the amount of calcium in the blood. To increase the blood calcium level, the body may absorb more calcium from food in the intestines. It may also take calcium from the bones.  When blood calcium is high  When the blood calcium level is high, the glands make less PTH. This tells the body to lower the amount of calcium in the blood. To lower the blood calcium level, less calcium is absorbed by the gut. Less calcium is taken from the bones. And more calcium is filtered out of the blood by the kidneys.  Date Last Reviewed: 11/1/2016  © 2209-1465 [employer]. [address] [place] [address], [place] [address]. All rights reserved. This information is not intended as a substitute for professional medical care. Always follow your healthcare professional's instructions.

## 2017-09-11 NOTE — PROGRESS NOTES
Subjective:      Patient ID: Rajendra Castle is a 72 y.o. male.    Chief Complaint:  Thyroid nodule    History of Present Illness    F/u of    New patient to me  Patient went to see his PCP's office ( saw PA) and found in paperwork that he has thyroid disease ( he was not sure what kind ) so he researched and bought a book - he thinks he has hyperthyroidism and hypothyroidism.    I had a long discussion with him and after our discussion I believe most of his symptoms are due to depression , he follows with a psychiatrist . HAs an appointment with him this month       Osteoporosis diagnosed in 2016 by    Was started in fosamax which he took until couple of months ago   On chronic prednisone for RA  No fractures   No nephrolithiasis   Never been treated with lithium or vit D            Review of Systems            Objective:   Physical Exam   Constitutional: No distress.   HENT:   Right Ear: External ear normal.   Left Ear: External ear normal.   Eyes: EOM are normal.   Neck: No thyromegaly present.   Skin: He is not diaphoretic.       Lab Review:     DXA scanning was performed over the left hip and lumbar spine.  Review of the images confirms satisfactory positioning and technique.    The L1 to L4 vertebral bone mineral density is equal to 1.222 g/cm squared with a T score of -0.8 and a Z score of 0.2.    The left femoral neck bone mineral density is equal to 0.610 g/cm squared with a T score of -3.0 and a Z score of -1.5.   Impression      Osteoporosis -- there is a 16% risk of a major osteoporotic fracture and a 5.9% risk of hip fracture in the next 10 years (FRAX).    Consider FDA approved medical therapies in postmenopausal women age 50 years and older, based on the following:    -- A hip or vertebral (clinical or morphometric) fracture  -- T score less than or equal to -2.5 at the femoral neck or spine after appropriate evaluation to exclude secondary causes.  -- Low bone mass -- also known as  osteopenia (T score between -1.0 and -2.5 at the femoral neck or spine) and a 10 year probability of hip fracture greater than or equal to 3% or a 10 year probability of major osteoporosis-related fracture greater than 20% based on the [place] adaptive WHO algorithm.  -- Clinician's judgment and/or patient preference is may indicate treatment for people with 10 year fracture probability is above or below these levels.  ______________________________________     Electronically signed by: RADHA GILLILAND MD  Date: 09/23/16  Time: 10:45        Sonographic evaluation of the thyroid gland was performed and compared to 05/20/16    Findings: The right thyroid lobe measures 5.1 x 2.2 x 2.0 cm.  The left measures 5.1 x 2.6 x 1.9 cm.  The thyroid isthmus measures 5 mm thickness.  Total thyroid volume is 20 mL.    A predominantly cystic nodule is again seen within the upper to mid pole of the right thyroid lobe measuring 1.6 x 0.8 x 0.7 cm showing no significant interval change.  A 5 mm solid-appearing nodule is seen within the lower pole of the right thyroid lobe.  2 mm cystic nodules noted within the midpole of the left lower lobe.   Impression      Upper normal size thyroid containing a few nodules none of which meet criteria for fine needle aspiration.  The largest shows no significant interval change.      Electronically signed by: Carlos Irwin MD  Date: 11/16/16  Time: 09:33          Assessment:     1. Nontoxic multinodular goiter  TSH    Vitamin D    CALCIUM, TIMED URINE    Creatinine, urine, timed   2. Osteoporosis, unspecified osteoporosis type, unspecified pathological fracture presence  TSH    Vitamin D    CALCIUM, TIMED URINE    Creatinine, urine, timed   3. Hypercalcemia  TSH    Vitamin D    CALCIUM, TIMED URINE    Creatinine, urine, timed   4. Hyperparathyroidism  TSH    Vitamin D    CALCIUM, TIMED URINE    Creatinine, urine, timed   5. Essential hypertension     6. Vitamin D insufficiency  TSH    Vitamin D     CALCIUM, TIMED URINE    Creatinine, urine, timed        # NTMG     I reviewed images independently right thyroid cyst could be parathyroid - unable to differentiate due to images limitations.   Check TSH today     # Hyperparathyroidism with hypercalcemia but most recent calcium is normal - he does have CKD but stable and also low phos   - check Vit D today   - discussed indications of surgery   - we will also get 24 hour urine calcium     # osteoporosis [multifactorial hyperparathyroidism , steroid use )    - discussed restarting fosamax vs started prolia   - we will restart fosamax    Plan:     Follow up:  - vit D , TSH  -  24 hour urine calcium

## 2017-09-13 ENCOUNTER — LAB VISIT (OUTPATIENT)
Dept: LAB | Facility: HOSPITAL | Age: 72
End: 2017-09-13
Attending: INTERNAL MEDICINE
Payer: MEDICARE

## 2017-09-13 DIAGNOSIS — E21.3 HYPERPARATHYROIDISM: ICD-10-CM

## 2017-09-13 DIAGNOSIS — E55.9 VITAMIN D INSUFFICIENCY: ICD-10-CM

## 2017-09-13 DIAGNOSIS — M81.0 OSTEOPOROSIS, UNSPECIFIED OSTEOPOROSIS TYPE, UNSPECIFIED PATHOLOGICAL FRACTURE PRESENCE: ICD-10-CM

## 2017-09-13 DIAGNOSIS — E83.52 HYPERCALCEMIA: ICD-10-CM

## 2017-09-13 DIAGNOSIS — E04.2 NONTOXIC MULTINODULAR GOITER: ICD-10-CM

## 2017-09-13 PROCEDURE — 82570 ASSAY OF URINE CREATININE: CPT

## 2017-09-13 PROCEDURE — 82340 ASSAY OF CALCIUM IN URINE: CPT

## 2017-09-14 LAB
CALCIUM 24H UR-MRATE: 1 MG/HR
CALCIUM UR-MCNC: 3.9 MG/DL
CALCIUM URINE (MG/SPEC): 29 MG/SPEC
CREAT 24H UR-MRATE: 47.5 MG/HR
CREAT UR-MCNC: 152 MG/DL
CREATININE, URINE (MG/SPEC): 1140 MG/SPEC
URINE COLLECTION DURATION: 24 HR
URINE COLLECTION DURATION: 24 HR
URINE VOLUME: 750 ML
URINE VOLUME: 750 ML

## 2017-09-23 RX ORDER — ISOSORBIDE DINITRATE 10 MG/1
TABLET ORAL
Qty: 60 TABLET | Refills: 0 | Status: SHIPPED | OUTPATIENT
Start: 2017-09-23 | End: 2017-10-28 | Stop reason: SDUPTHER

## 2017-10-04 ENCOUNTER — OFFICE VISIT (OUTPATIENT)
Dept: ENDOCRINOLOGY | Facility: CLINIC | Age: 72
End: 2017-10-04
Payer: MEDICARE

## 2017-10-04 VITALS
HEART RATE: 99 BPM | DIASTOLIC BLOOD PRESSURE: 78 MMHG | HEIGHT: 68 IN | SYSTOLIC BLOOD PRESSURE: 158 MMHG | WEIGHT: 140.44 LBS | BODY MASS INDEX: 21.28 KG/M2

## 2017-10-04 DIAGNOSIS — I10 ESSENTIAL HYPERTENSION: ICD-10-CM

## 2017-10-04 DIAGNOSIS — E21.3 HYPERPARATHYROIDISM: Primary | ICD-10-CM

## 2017-10-04 DIAGNOSIS — E55.9 VITAMIN D INSUFFICIENCY: ICD-10-CM

## 2017-10-04 DIAGNOSIS — M81.0 OSTEOPOROSIS, UNSPECIFIED OSTEOPOROSIS TYPE, UNSPECIFIED PATHOLOGICAL FRACTURE PRESENCE: ICD-10-CM

## 2017-10-04 DIAGNOSIS — E04.1 NODULAR THYROID DISEASE: ICD-10-CM

## 2017-10-04 PROCEDURE — 99999 PR PBB SHADOW E&M-EST. PATIENT-LVL IV: CPT | Mod: PBBFAC,,, | Performed by: INTERNAL MEDICINE

## 2017-10-04 PROCEDURE — 99214 OFFICE O/P EST MOD 30 MIN: CPT | Mod: S$GLB,,, | Performed by: INTERNAL MEDICINE

## 2017-10-04 PROCEDURE — 99499 UNLISTED E&M SERVICE: CPT | Mod: S$PBB,,, | Performed by: INTERNAL MEDICINE

## 2017-10-04 NOTE — PROGRESS NOTES
Subjective:      Patient ID: Rajendra Castle is a 72 y.o. male.    Chief Complaint:  Thyroid nodule    History of Present Illness    Comes for f/u   previously seen by        Thyroid cyst       Osteoporosis diagnosed in 2016 by    Was started in fosamax which he took until couple of months ago   On chronic prednisone for RA  No fractures   No nephrolithiasis   Never been treated with lithium or vit D    On HCTZ              Review of Systems   Psychiatric/Behavioral: Positive for dysphoric mood (follows with a psychiatrist).               Objective:   Physical Exam   Constitutional: No distress.   HENT:   Right Ear: External ear normal.   Left Ear: External ear normal.   Eyes: EOM are normal.   Neck: No thyromegaly present.   Skin: He is not diaphoretic.       Lab Review:     DXA scanning was performed over the left hip and lumbar spine.  Review of the images confirms satisfactory positioning and technique.    The L1 to L4 vertebral bone mineral density is equal to 1.222 g/cm squared with a T score of -0.8 and a Z score of 0.2.    The left femoral neck bone mineral density is equal to 0.610 g/cm squared with a T score of -3.0 and a Z score of -1.5.   Impression      Osteoporosis -- there is a 16% risk of a major osteoporotic fracture and a 5.9% risk of hip fracture in the next 10 years (FRAX).    Consider FDA approved medical therapies in postmenopausal women age 50 years and older, based on the following:    -- A hip or vertebral (clinical or morphometric) fracture  -- T score less than or equal to -2.5 at the femoral neck or spine after appropriate evaluation to exclude secondary causes.  -- Low bone mass -- also known as osteopenia (T score between -1.0 and -2.5 at the femoral neck or spine) and a 10 year probability of hip fracture greater than or equal to 3% or a 10 year probability of major osteoporosis-related fracture greater than 20% based on the [place] adaptive WHO algorithm.  -- Clinician's  judgment and/or patient preference is may indicate treatment for people with 10 year fracture probability is above or below these levels.  ______________________________________     Electronically signed by: RADHA GILLILAND MD  Date: 09/23/16  Time: 10:45        Sonographic evaluation of the thyroid gland was performed and compared to 05/20/16    Findings: The right thyroid lobe measures 5.1 x 2.2 x 2.0 cm.  The left measures 5.1 x 2.6 x 1.9 cm.  The thyroid isthmus measures 5 mm thickness.  Total thyroid volume is 20 mL.    A predominantly cystic nodule is again seen within the upper to mid pole of the right thyroid lobe measuring 1.6 x 0.8 x 0.7 cm showing no significant interval change.  A 5 mm solid-appearing nodule is seen within the lower pole of the right thyroid lobe.  2 mm cystic nodules noted within the midpole of the left lower lobe.   Impression      Upper normal size thyroid containing a few nodules none of which meet criteria for fine needle aspiration.  The largest shows no significant interval change.      Electronically signed by: Carlos Irwin MD  Date: 11/16/16  Time: 09:33          Assessment:     1. Hyperparathyroidism  Comprehensive metabolic panel    Vitamin D    TSH    US Soft Tissue Head Neck Thyroid    CANCELED: US Soft Tissue Head Neck Thyroid   2. Essential hypertension  Comprehensive metabolic panel    Vitamin D    TSH    US Soft Tissue Head Neck Thyroid    CANCELED: US Soft Tissue Head Neck Thyroid   3. Osteoporosis, unspecified osteoporosis type, unspecified pathological fracture presence  Comprehensive metabolic panel    Vitamin D    TSH    US Soft Tissue Head Neck Thyroid    CANCELED: US Soft Tissue Head Neck Thyroid   4. Vitamin D insufficiency  Comprehensive metabolic panel    Vitamin D    TSH    US Soft Tissue Head Neck Thyroid    CANCELED: US Soft Tissue Head Neck Thyroid   5. Nodular thyroid disease  Comprehensive metabolic panel    Vitamin D    TSH    US Soft Tissue Head Neck  Thyroid    CANCELED: US Soft Tissue Head Neck Thyroid        # NTMG     I reviewed images independently right thyroid cyst could be parathyroid - unable to differentiate due to images limitations.   TSH normal    # Hyperparathyroidism with hypercalcemia but most recent calcium is normal - he does have CKD but stable and also low phos   - vitamin D is appropriate   No nephrolithiasis   - if worsening of OP we will pursue parathyroidectomy after work up  Work up would be to stop HCTZ for 12 weeks then repeat work up    # osteoporosis [multifactorial hyperparathyroidism , steroid use )    - continue fosamax   - if worsening of OP we will pursue parathyroidectomy after work up  Work up would be to stop HCTZ for 12 weeks then repeat work up    # HTN controlled     Plan:     Follow up:    Labs and US thyroid before next visit   RTC in 6 months

## 2017-10-13 DIAGNOSIS — M81.0 OSTEOPOROSIS: ICD-10-CM

## 2017-10-13 RX ORDER — ALENDRONATE SODIUM 70 MG/1
TABLET ORAL
Qty: 12 TABLET | Refills: 3 | Status: SHIPPED | OUTPATIENT
Start: 2017-10-13 | End: 2017-11-24 | Stop reason: SDUPTHER

## 2017-10-17 DIAGNOSIS — M81.0 OSTEOPOROSIS: ICD-10-CM

## 2017-10-18 RX ORDER — ALENDRONATE SODIUM 70 MG/1
TABLET ORAL
Qty: 12 TABLET | Refills: 3 | Status: SHIPPED | OUTPATIENT
Start: 2017-10-18 | End: 2019-06-03

## 2017-10-28 RX ORDER — ISOSORBIDE DINITRATE 10 MG/1
TABLET ORAL
Qty: 60 TABLET | Refills: 0 | Status: SHIPPED | OUTPATIENT
Start: 2017-10-28 | End: 2017-12-13 | Stop reason: SDUPTHER

## 2017-10-29 RX ORDER — METHOTREXATE 2.5 MG/1
TABLET ORAL
Qty: 72 TABLET | Refills: 0 | Status: SHIPPED | OUTPATIENT
Start: 2017-10-29 | End: 2018-02-01 | Stop reason: SDUPTHER

## 2017-11-15 RX ORDER — ERGOCALCIFEROL 1.25 MG/1
CAPSULE ORAL
Qty: 12 CAPSULE | Refills: 0 | Status: SHIPPED | OUTPATIENT
Start: 2017-11-15 | End: 2018-03-05 | Stop reason: SDUPTHER

## 2017-11-24 ENCOUNTER — HOSPITAL ENCOUNTER (OUTPATIENT)
Dept: RESPIRATORY THERAPY | Facility: HOSPITAL | Age: 72
Discharge: HOME | End: 2017-11-24
Attending: FAMILY MEDICINE
Payer: MEDICARE

## 2017-11-24 ENCOUNTER — DOCUMENTATION ONLY (OUTPATIENT)
Dept: FAMILY MEDICINE | Facility: CLINIC | Age: 72
End: 2017-11-24

## 2017-11-24 ENCOUNTER — HOSPITAL ENCOUNTER (OUTPATIENT)
Dept: RADIOLOGY | Facility: HOSPITAL | Age: 72
Discharge: HOME | End: 2017-11-24
Attending: FAMILY MEDICINE
Payer: MEDICARE

## 2017-11-24 ENCOUNTER — OFFICE VISIT (OUTPATIENT)
Dept: FAMILY MEDICINE | Facility: CLINIC | Age: 72
End: 2017-11-24
Payer: MEDICARE

## 2017-11-24 VITALS
HEIGHT: 68 IN | TEMPERATURE: 98 F | WEIGHT: 141.13 LBS | BODY MASS INDEX: 21.39 KG/M2 | SYSTOLIC BLOOD PRESSURE: 130 MMHG | DIASTOLIC BLOOD PRESSURE: 60 MMHG | HEART RATE: 115 BPM

## 2017-11-24 DIAGNOSIS — R06.09 DYSPNEA ON EXERTION: Primary | Chronic | ICD-10-CM

## 2017-11-24 DIAGNOSIS — R06.09 DYSPNEA ON EXERTION: Chronic | ICD-10-CM

## 2017-11-24 DIAGNOSIS — M05.10 RHEUMATOID LUNG: ICD-10-CM

## 2017-11-24 DIAGNOSIS — D50.9 IRON DEFICIENCY ANEMIA, UNSPECIFIED IRON DEFICIENCY ANEMIA TYPE: ICD-10-CM

## 2017-11-24 DIAGNOSIS — Z12.11 COLON CANCER SCREENING: ICD-10-CM

## 2017-11-24 DIAGNOSIS — I10 ESSENTIAL HYPERTENSION: ICD-10-CM

## 2017-11-24 PROCEDURE — 94010 BREATHING CAPACITY TEST: CPT | Mod: 26,,, | Performed by: INTERNAL MEDICINE

## 2017-11-24 PROCEDURE — 99999 PR PBB SHADOW E&M-EST. PATIENT-LVL IV: CPT | Mod: PBBFAC,,, | Performed by: FAMILY MEDICINE

## 2017-11-24 PROCEDURE — 71250 CT THORAX DX C-: CPT | Mod: 26,,, | Performed by: RADIOLOGY

## 2017-11-24 PROCEDURE — 94729 DIFFUSING CAPACITY: CPT

## 2017-11-24 PROCEDURE — 94727 GAS DIL/WSHOT DETER LNG VOL: CPT | Mod: 26,,, | Performed by: INTERNAL MEDICINE

## 2017-11-24 PROCEDURE — 94729 DIFFUSING CAPACITY: CPT | Mod: 26,,, | Performed by: INTERNAL MEDICINE

## 2017-11-24 PROCEDURE — 94727 GAS DIL/WSHOT DETER LNG VOL: CPT

## 2017-11-24 PROCEDURE — 99499 UNLISTED E&M SERVICE: CPT | Mod: S$PBB,,, | Performed by: FAMILY MEDICINE

## 2017-11-24 PROCEDURE — 99214 OFFICE O/P EST MOD 30 MIN: CPT | Mod: S$GLB,,, | Performed by: FAMILY MEDICINE

## 2017-11-24 PROCEDURE — 71250 CT THORAX DX C-: CPT | Mod: TC

## 2017-11-24 RX ORDER — LISINOPRIL AND HYDROCHLOROTHIAZIDE 12.5; 2 MG/1; MG/1
1 TABLET ORAL DAILY
Qty: 90 TABLET | Refills: 4 | Status: SHIPPED | OUTPATIENT
Start: 2017-11-24 | End: 2018-01-12 | Stop reason: ALTCHOICE

## 2017-11-24 RX ORDER — CARVEDILOL 12.5 MG/1
12.5 TABLET ORAL 2 TIMES DAILY WITH MEALS
Qty: 60 TABLET | Refills: 11 | Status: SHIPPED | OUTPATIENT
Start: 2017-11-24 | End: 2018-03-16 | Stop reason: DRUGHIGH

## 2017-11-24 NOTE — PATIENT INSTRUCTIONS
Established High Blood Pressure    High blood pressure (hypertension) is a chronic disease. Often, healthcare providers dont know what causes it. But it can be caused by certain health conditions and medicines.  If you have high blood pressure, you may not have any symptoms. If you do have symptoms, they may include headache, dizziness, changes in your vision, chest pain, and shortness of breath. But even without symptoms, high blood pressure thats not treated raises your risk for heart attack and stroke. High blood pressure is a serious health risk and shouldnt be ignored.  A blood pressure reading is made up of two numbers: a higher number over a lower number. The top number is the systolic pressure. The bottom number is the diastolic pressure. A normal blood pressure is a systolic pressure of  less than 120 over a diastolic pressure of less than 80. You will see your blood pressure readings written together. For example, a person with a systolic pressure of 188 and a diastolic pressure of 78 will have 118/78 written in the medical record.  High blood pressure is when either the top number is 140 or higher, or the bottom number is 90 or higher. This must be the result when taking your blood pressure a number of times. The blood pressures between normal and high are called prehypertension.  Home care  If you have high blood pressure, you should do what is listed below to lower your blood pressure. If you are taking medicines for high blood pressure, these methods may reduce or end your need for medicines in the future.  · Begin a weight-loss program if you are overweight.  · Cut back on how much salt you get in your diet. Heres how to do this:  ¨ Dont eat foods that have a lot of salt. These include olives, pickles, smoked meats, and salted potato chips.  ¨ Dont add salt to your food at the table.  ¨ Use only small amounts of salt when cooking.  · Start an exercise program. Talk with your healthcare  provider about the type of exercise program that would be best for you. It doesn't have to be hard. Even brisk walking for 20 minutes 3 times a week is a good form of exercise.  · Dont take medicines that stimulate the heart. This includes many over-the-counter cold and sinus decongestant pills and sprays, as well as diet pills. Check the warnings about hypertension on the label. Before buying any over-the-counter medicines or supplements, always ask the pharmacist about the product's potential interaction with your high blood pressure and your high blood pressure medicines.  · Stimulants such as amphetamine or cocaine could be deadly for someone with high blood pressure. Never take these.  · Limit how much caffeine you get in your diet. Switch to caffeine-free products.  · Stop smoking. If you are a long-time smoker, this can be hard. Talk to your healthcare provider about medicines and nicotine replacement options to help you. Also, enroll in a stop-smoking program to make it more likely that you will quit for good.  · Learn how to handle stress. This is an important part of any program to lower blood pressure. Learn about relaxation methods like meditation, yoga, or biofeedback.  · If your provider prescribed medicines, take them exactly as directed. Missing doses may cause your blood pressure get out of control.  · If you miss a dose or doses, check with your healthcare provider or pharmacist about what to do.  · Consider buying an automatic blood pressure machine. Ask your provider for a recommendation. You can get one of these at most pharmacies.     The American Heart Association recommends the following guidelines for home blood pressure monitoring:  · Don't smoke or drink coffee for 30 minutes before taking your blood pressure.  · Go to the bathroom before the test.  · Relax for 5 minutes before taking the measurement.  · Sit with your back supported (don't sit on a couch or soft chair); keep your feet on  the floor uncrossed. Place your arm on a solid flat surface (like a table) with the upper part of the arm at heart level. Place the middle of the cuff directly above the eye of the elbow. Check the monitor's instruction manual for an illustration.  · Take multiple readings. When you measure, take 2 to 3 readings one minute apart and record all of the results.  · Take your blood pressure at the same time every day, or as your healthcare provider recommends.  · Record the date, time, and blood pressure reading.  · Take the record with you to your next medical appointment. If your blood pressure monitor has a built-in memory, simply take the monitor with you to your next appointment.  · Call your provider if you have several high readings. Don't be frightened by a single high blood pressure reading, but if you get several high readings, check in with your healthcare provider.  · Note: When blood pressure reaches a systolic (top number) of 180 or higher OR diastolic (bottom number) of 110 or higher, seek emergency medical treatment.  Follow-up care  You will need to see your healthcare provider regularly. This is to check your blood pressure and to make changes to your medicines. Make a follow-up appointment as directed. Bring the record of your home blood pressure readings to the appointment.  When to seek medical advice  Call your healthcare provider right away if any of these occur:  · Blood pressure reaches a systolic (upper number) of 180 or higher OR a diastolic (bottom number) of 110 or higher  · Chest pain or shortness of breath  · Severe headache  · Throbbing or rushing sound in the ears  · Nosebleed  · Sudden severe pain in your belly (abdomen)  · Extreme drowsiness, confusion, or fainting  · Dizziness or spinning sensation (vertigo)  · Weakness of an arm or leg or one side of the face  · You have problems speaking or seeing   Date Last Reviewed: 12/1/2016  © 1828-9238 [employer]. [address] [place] [address]  [place] [address]. All rights reserved. This information is not intended as a substitute for professional medical care. Always follow your healthcare professional's instructions.

## 2017-11-25 NOTE — PROGRESS NOTES
Subjective:       Patient ID: Rajendra Castle is a 72 y.o. male.    Chief Complaint: Hypertension    Hypertension   This is a chronic problem. The problem is unchanged. Associated symptoms include anxiety, chest pain, malaise/fatigue, neck pain and shortness of breath. Pertinent negatives include no headaches or palpitations.   Chest Pain    This is a chronic problem. The current episode started more than 1 year ago. The onset quality is undetermined. The problem occurs constantly. The problem has been waxing and waning. The pain is present in the substernal region. The pain is at a severity of 5/10. The pain is moderate. The quality of the pain is described as crushing, pressure, sharp and tightness. The pain radiates to the left neck and precordial region. Associated symptoms include exertional chest pressure, malaise/fatigue and shortness of breath. Pertinent negatives include no abdominal pain, back pain, claudication, cough, dizziness, headaches, hemoptysis, irregular heartbeat, leg pain or palpitations. The pain is aggravated by deep breathing. The treatment provided mild relief. Risk factors include sedentary lifestyle and male gender (chronic emphysema).   His past medical history is significant for hypertension.   His family medical history is significant for heart disease and hypertension. Prior diagnostic workup includes exercise treadmill test, echocardiogram and chest x-ray.     Review of Systems   Constitutional: Positive for malaise/fatigue. Negative for fatigue and unexpected weight change.   Respiratory: Positive for shortness of breath. Negative for cough, hemoptysis and chest tightness.    Cardiovascular: Positive for chest pain. Negative for palpitations, claudication and leg swelling.   Gastrointestinal: Negative for abdominal pain.   Musculoskeletal: Positive for neck pain. Negative for arthralgias and back pain.   Neurological: Negative for dizziness, syncope, light-headedness and headaches.        Patient Active Problem List   Diagnosis    Heart murmur    Hypertension    Arthritis    Rotator cuff tendinitis    Dyspnea on exertion    DDD (degenerative disc disease), cervical    Anemia, iron deficiency    Chronic GI bleeding    Arthritis of wrist, right    Trigger thumb of left hand    Arthritis of right wrist    Chest pain    Essential hypertension    Rheumatoid arthritis involving multiple sites    Marijuana smoker    Elevated troponin    Nausea and vomiting    Nontoxic multinodular goiter    Gastroesophageal reflux disease without esophagitis    Nodular thyroid disease    Prediabetes    Osteoporosis    Hypogonadism in male    Current chronic use of systemic steroids    Hypercalcemia    Vitamin D insufficiency    Hyperparathyroidism    Pulmonary emphysema    Rheumatoid lung    Lung nodule    CKD (chronic kidney disease)       Objective:      Physical Exam   Constitutional: He is oriented to person, place, and time. He appears well-developed and well-nourished. He appears cachectic. He appears ill.   Cardiovascular: Normal rate, regular rhythm and normal heart sounds.    Pulmonary/Chest: Effort normal. He has decreased breath sounds in the right lower field and the left lower field.   Musculoskeletal: He exhibits no edema.   Neurological: He is alert and oriented to person, place, and time.   Skin: Skin is warm and dry.   Psychiatric: He has a normal mood and affect.   Nursing note and vitals reviewed.      Lab Results   Component Value Date    WBC 8.7 08/23/2017    HGB 12.1 (L) 08/23/2017    HCT 37.7 (L) 08/23/2017     08/23/2017    CHOL 156 09/20/2016    TRIG 124 09/20/2016    HDL 39 (L) 09/20/2016    ALT 15 08/23/2017    AST 19 08/23/2017     08/23/2017    K 5.2 08/23/2017     08/23/2017    CREATININE 1.69 (H) 08/23/2017    BUN 26 (H) 08/23/2017    CO2 22 08/23/2017    TSH 0.930 09/11/2017    PSA 2.1 10/03/2013    INR 1.0 08/18/2016    HGBA1C 5.9  09/20/2016     The 10-year ASCVD risk score (Jenniferkiko FREEMAN Jr., et al., 2013) is: 19.5%    Values used to calculate the score:      Age: 72 years      Sex: Male      Is Non- : Yes      Diabetic: No      Tobacco smoker: No      Systolic Blood Pressure: 130 mmHg      Is BP treated: Yes      HDL Cholesterol: 39 mg/dL      Total Cholesterol: 156 mg/dL    Assessment:       1. Dyspnea on exertion    2. Rheumatoid lung    3. Iron deficiency anemia, unspecified iron deficiency anemia type    4. Essential hypertension    5. Colon cancer screening        Plan:       Dyspnea on exertion  -     CT Chest Without Contrast; Future; Expected date: 11/24/2017  -     Complete PFT with bronchodilator; Future  -     PULSE OXIMETRY WITH REST - PULM; Future  -     Ambulatory referral to Pulmonology  -     lisinopril-hydrochlorothiazide (PRINZIDE,ZESTORETIC) 20-12.5 mg per tablet; Take 1 tablet by mouth once daily.  Dispense: 90 tablet; Refill: 4  -     Complete PFT with bronchodilator; Future  -     PULSE OXIMETRY WITH REST - PULM; Future    Rheumatoid lung    Iron deficiency anemia, unspecified iron deficiency anemia type    Essential hypertension  -     lisinopril-hydrochlorothiazide (PRINZIDE,ZESTORETIC) 20-12.5 mg per tablet; Take 1 tablet by mouth once daily.  Dispense: 90 tablet; Refill: 4  -     carvedilol (COREG) 12.5 MG tablet; Take 1 tablet (12.5 mg total) by mouth 2 (two) times daily with meals.  Dispense: 60 tablet; Refill: 11    Colon cancer screening  -     Fecal Immunochemical Test (iFOBT); Future; Expected date: 11/24/2017      Patient readiness: eager and barriers:social stressors    During the course of the visit the patient was educated and counseled about the following:     Hypertension:   Regular aerobic exercise.  Check blood pressures daily and record.    Goals: Hypertension: Reduce Blood Pressure and Underweight: Increase calorie intake and BMI      Did patient meet goals/outcomes: Yes    The  following self management tools provided: blood pressure log  excercise log    Patient Instructions (the written plan) was given to the patient/family.     Time spent with patient: 30 minutes

## 2017-11-27 ENCOUNTER — LAB VISIT (OUTPATIENT)
Dept: LAB | Facility: HOSPITAL | Age: 72
End: 2017-11-27
Attending: FAMILY MEDICINE
Payer: MEDICARE

## 2017-11-27 DIAGNOSIS — Z12.11 COLON CANCER SCREENING: ICD-10-CM

## 2017-11-27 PROCEDURE — 82274 ASSAY TEST FOR BLOOD FECAL: CPT

## 2017-11-28 LAB — HEMOCCULT STL QL IA: NEGATIVE

## 2017-11-29 NOTE — PROCEDURES
Pulmonary Functions, including spirometry and lung volumes and diffusion, study was done nov 24, 2017.  Spirometry shows no obstruction and normal vital capacity.   FEV1 is 112% or 2.82 liters.  Lung volumes show  normal TLC.  Diffusion shows reduced to 57%  uncorrected for anemia if present..    Pulmonary functions show low dlco and rest wnl. Clinical correlation recommended.     Jonathan Song M.D.

## 2017-12-05 ENCOUNTER — TELEPHONE (OUTPATIENT)
Dept: FAMILY MEDICINE | Facility: CLINIC | Age: 72
End: 2017-12-05

## 2017-12-05 NOTE — TELEPHONE ENCOUNTER
----- Message from Irvin Aceves MD sent at 11/25/2017  4:07 PM CST -----  Chronic emphysema, no changes from previous Ct. Repeat Chest CT in 18 months due to 6 mm node.

## 2017-12-05 NOTE — TELEPHONE ENCOUNTER
Patient notified. Verbalized understanding. Patient requested to confirm date of next appointment with Dr. Aceves. Advised patient next appointment noted on 1-12-18.

## 2017-12-14 ENCOUNTER — OFFICE VISIT (OUTPATIENT)
Dept: RHEUMATOLOGY | Facility: CLINIC | Age: 72
End: 2017-12-14
Payer: MEDICARE

## 2017-12-14 VITALS
SYSTOLIC BLOOD PRESSURE: 124 MMHG | BODY MASS INDEX: 21.07 KG/M2 | WEIGHT: 139 LBS | DIASTOLIC BLOOD PRESSURE: 48 MMHG | HEIGHT: 68 IN

## 2017-12-14 DIAGNOSIS — D50.9 IRON DEFICIENCY ANEMIA, UNSPECIFIED IRON DEFICIENCY ANEMIA TYPE: ICD-10-CM

## 2017-12-14 DIAGNOSIS — M05.79 RHEUMATOID ARTHRITIS INVOLVING MULTIPLE SITES WITH POSITIVE RHEUMATOID FACTOR: Primary | ICD-10-CM

## 2017-12-14 DIAGNOSIS — N18.30 STAGE 3 CHRONIC KIDNEY DISEASE: ICD-10-CM

## 2017-12-14 DIAGNOSIS — Z79.899 ENCOUNTER FOR LONG-TERM (CURRENT) DRUG USE: ICD-10-CM

## 2017-12-14 DIAGNOSIS — R06.09 DYSPNEA ON EXERTION: Chronic | ICD-10-CM

## 2017-12-14 PROCEDURE — 99213 OFFICE O/P EST LOW 20 MIN: CPT | Mod: ,,, | Performed by: INTERNAL MEDICINE

## 2017-12-14 NOTE — PROGRESS NOTES
[facility] RHEUMATOLOGY           Follow-up visit      Subjective:       Patient ID:   NAME: Rajendra Castle : 1945     72 y.o. male    Referring Doc: No ref. provider found  Other Physicians:    Chief Complaint:  No chief complaint on file.      HPI/Interval History:   The patient is doing well. He has no complaints of joint pain or swelling.  He continues to have some shortness of breath. He was evaluated by Dr. Aceves and subsequently by Cardiology. That testing appears to have been normal. More recently  he has had pulmonary function tests in preparation for an evaluation by Pulmonary.      ROS:   GEN:    no fever, night sweats or weight loss  SKIN:   no rashes , erythema, bruising, or swelling, no Raynauds, no photosensitivity  HEENT: no HAs, no changes in vision, no mouth ulcers, no sicca symptoms, no scalp tenderness, jaw claudication.  CV:       no CP, + SOB/DEY no orthopnea, no palpitations  PULM:  no cough, hemoptysis, sputum or pleuritic pain  GI:      no abdominal pain, nausea, vomiting, constipation, diarrhea, melanotic stools, BRBPR, or hematemesis, no dysphagia, no GERD  :     no hematuria, dysuria  NEURO: no paresthesias, headaches, visual disturbances  MUSCULOSKELETAL:  No muscle or joint complaints  PSYCH:   No insomnia, no significant depression, no anxiety    Medications:    Current Outpatient Prescriptions:     alendronate (FOSAMAX) 70 MG tablet, TAKE 1 TABLET BY MOUTH EVERY 7 DAYS, Disp: 12 tablet, Rfl: 3    aspirin (ECOTRIN) 81 MG EC tablet, Take 81 mg by mouth once daily.  , Disp: , Rfl:     carvedilol (COREG) 12.5 MG tablet, Take 1 tablet (12.5 mg total) by mouth 2 (two) times daily with meals., Disp: 60 tablet, Rfl: 11    ergocalciferol (ERGOCALCIFEROL) 50,000 unit Cap, TAKE 1 TABLET BY MOUTH EVERY WEEK, Disp: 12 capsule, Rfl: 0    escitalopram oxalate (LEXAPRO) 10 MG tablet, Take 10 mg by mouth once daily., Disp: , Rfl:     folic acid (FOLVITE) 1 MG tablet, Take 5 mg by mouth  "once daily., Disp: , Rfl:     isosorbide dinitrate (ISORDIL) 10 MG tablet, TAKE 1 TABLET BY MOUTH TWICE DAILY, Disp: 60 tablet, Rfl: 0    lisinopril-hydrochlorothiazide (PRINZIDE,ZESTORETIC) 20-12.5 mg per tablet, Take 1 tablet by mouth once daily., Disp: 90 tablet, Rfl: 4    methotrexate 2.5 MG Tab, TAKE 6 TABLETS BY MOUTH EVERY WEEK, Disp: 72 tablet, Rfl: 0    predniSONE (DELTASONE) 2.5 MG tablet, TAKE 1 TABLET BY MOUTH TWICE DAILY, Disp: 180 tablet, Rfl: 1    tramadol (ULTRAM) 50 mg tablet, TAKE 2 TABLETS BY MOUTH TWICE DAILY AS NEEDED FOR PAIN, Disp: 120 tablet, Rfl: 5      FAMILY HISTORY: negative for Connective Tissue Disease        Review of patient's allergies indicates:  No Known Allergies          Objective:     Vitals:  Blood pressure (!) 124/48, height 5' 8" (1.727 m), weight 63 kg (139 lb).  RR=16  Physical Examination:   GEN: wn/wd male in no apparent distress  SKIN: no rashes, no lesions, no sclerodactyly, no Raynaud's, no periungual erythema  HEAD: no alopecia, no scalp tenderness, no temporal artery tenderness or induration.  EYES: no pallor, no icterus, PERRLA  ENT:  no thrush, no mucosal dryness or ulcerations, adequate oral hygiene & dentition.  NECK: supple x 6, no masses, no thyromegaly, no lymphadenopathy.  CV:   S1 and S2 regular, no murmurs, gallop or rubs  CHEST: Normal respiratory effort;  normal breath sounds/no adventitious sounds. No signs of consolidation.  ABD: non-tender and non-distended; soft; normal bowel sounds; no rebound/guarding or tenderness. No hepatosplenomegaly.  Musculoskeletal:  No evidence of active inflammatory arthritis or a progressive deformity  EXTREM: no clubbing, cyanosis or edema. normal pulses.  NEURO: grossly intact; motor/sensory WNL; AAOx3; no tremors  PSYCH:  normal mood, affect and behavior            Labs:   Lab Results   Component Value Date    WBC 8.7 08/23/2017    HGB 12.1 (L) 08/23/2017    HCT 37.7 (L) 08/23/2017    MCV 86.5 08/23/2017     " 08/23/2017   CMP@  Sodium   Date Value Ref Range Status   08/23/2017 138 135 - 146 mmol/L Final   04/20/2017 142 134 - 144 mmol/L      Potassium   Date Value Ref Range Status   08/23/2017 5.2 3.5 - 5.3 mmol/L Final     Chloride   Date Value Ref Range Status   08/23/2017 108 98 - 110 mmol/L Final   04/20/2017 107 98 - 110 mmol/L      CO2   Date Value Ref Range Status   08/23/2017 22 20 - 31 mmol/L Final     Glucose   Date Value Ref Range Status   08/23/2017 78 65 - 99 mg/dL Final     Comment:                   Fasting reference interval        04/20/2017 119 (H) 70 - 99 mg/dL      BUN, Bld   Date Value Ref Range Status   08/23/2017 26 (H) 7 - 25 mg/dL Final     Creatinine   Date Value Ref Range Status   08/23/2017 1.69 (H) 0.70 - 1.25 mg/dL Final     Comment:     For patients >49 years of age, the reference limit  for Creatinine is approximately 13% higher for people  identified as -American.        04/20/2017 1.58 (H) 0.60 - 1.40 mg/dL      Calcium   Date Value Ref Range Status   08/23/2017 10.2 8.6 - 10.3 mg/dL Final     Total Protein   Date Value Ref Range Status   08/23/2017 6.8 6.1 - 8.1 g/dL Final     Albumin   Date Value Ref Range Status   08/23/2017 3.8 3.6 - 5.1 g/dL Final   04/20/2017 4.2 3.1 - 4.7 g/dL      Total Bilirubin   Date Value Ref Range Status   08/23/2017 0.3 0.2 - 1.2 mg/dL Final     Alkaline Phosphatase   Date Value Ref Range Status   08/23/2017 49 40 - 115 U/L Final     AST   Date Value Ref Range Status   08/23/2017 19 10 - 35 U/L Final     ALT   Date Value Ref Range Status   08/23/2017 15 9 - 46 U/L Final     CPK   Date Value Ref Range Status   04/20/2017 48 18 - 170 IU/L      CRP   Date Value Ref Range Status   08/23/2017 <0.10 <0.80 mg/dL Final     Comment:     Please be advised that patients taking Carboxypenicillins  may exhibit falsely decreased C-Reactive Protein levels  due to an analytical interference in this assay.       Rheumatoid Factor   Date Value Ref Range Status    04/20/2017 633.9 (H) 0.0 - 13.9 IU/mL      Comment:     Performed at: [facility], [facility] [id number] [facility], [place], [place], [id number] Euceda MD, Phone:  [phone]     Uric Acid   Date Value Ref Range Status   09/20/2016 8.6 (H) 3.4 - 7.0 mg/dL Final         Radiology/Diagnostic Studies:    A CT scan performed last month shows chronic changes of emphysema this disease without significant interval change and without any suggestion of rheumatoid lung.    Assessment/Discussion/Plan:   72 y.o. male with rheumatoid arthritis-stable on current medication      PLAN:  I will continue his medications without change. Routine blood testing was obtained and will be shared with Dr. Aceves. I discussed the additional diagnosis of chronic kidney disease with the patient. Though I had mentioned this to him previously, he did not recall it. I will send a copy of this note to his primary provider and thereby request that he be evaluated by nephrology.      RTC:  I will see him back in 4 months.      Electronically signed by Uriel Garcia MD

## 2017-12-14 NOTE — PATIENT INSTRUCTIONS
Iron-Deficiency Anemia (Adult)  Red blood cells carry oxygen to the tissues of your body. Anemia is a condition in which you have too few red blood cells. You need iron to make red cells. Anemia makes you feel tired and run down. When anemia becomes severe, your skin becomes pale. You may feel short of breath after physical activity. Other symptoms include:  · Headaches  · Dizziness  · Leg cramps with physical activity  · Drowsiness  · Restless legs  Your anemia is caused by not having enough iron in your body. This may be because of:  · Loss of blood. This can be caused by heavy menstrual periods. It can also be caused by bleeding from the stomach or intestines.  · Poor diet. You may not be eating enough foods that contain iron.  · Inability to absorb iron from the foods you eat  · Pregnancy  If your blood count is low enough, your healthcare provider may prescribe an iron supplement. It usually takes about 2 to 3 months of treatment with iron supplements to correct anemia. Severe cases of anemia need a blood transfusion to quickly ease symptoms and deliver more oxygen to the cells.  Home care  Follow these guidelines when caring for yourself at home:  · Eat foods high in iron. This will boost the amount of iron stored in your body. It is a natural way to build up the number of blood cells. Good sources of iron include beef, liver, spinach and other dark green leafy vegetables, whole grains, beans, and nuts.  · Do not overexert yourself.  · Talk with your healthcare provider before traveling by air or traveling to high altitudes.  Follow-up care  Follow up with your healthcare provider in 2 months, or as advised. This is to have another red blood cell count to be sure your anemia has been fixed.  When to seek medical advice  Call your healthcare provider right away if any of these occur:  · Shortness of breath or chest pain  · Dizziness or fainting  · Vomiting blood or passing red or black-colored stool   Date  Last Reviewed: 2/25/2016  © 4867-9815 [employer]. [address] [place] [address], [place] [address]. All rights reserved. This information is not intended as a substitute for professional medical care. Always follow your healthcare professional's instructions.

## 2017-12-15 LAB
ALBUMIN SERPL-MCNC: 4.1 G/DL (ref 3.6–5.1)
ALBUMIN/GLOB SERPL: 1.3 (CALC) (ref 1–2.5)
ALP SERPL-CCNC: 50 U/L (ref 40–115)
ALT SERPL-CCNC: 15 U/L (ref 9–46)
AST SERPL-CCNC: 15 U/L (ref 10–35)
BASOPHILS # BLD AUTO: 33 CELLS/UL (ref 0–200)
BASOPHILS NFR BLD AUTO: 0.4 %
BILIRUB SERPL-MCNC: 0.5 MG/DL (ref 0.2–1.2)
BUN SERPL-MCNC: 34 MG/DL (ref 7–25)
BUN/CREAT SERPL: 22 (CALC) (ref 6–22)
CALCIUM SERPL-MCNC: 11.1 MG/DL (ref 8.6–10.3)
CHLORIDE SERPL-SCNC: 108 MMOL/L (ref 98–110)
CO2 SERPL-SCNC: 23 MMOL/L (ref 20–31)
CREAT SERPL-MCNC: 1.54 MG/DL (ref 0.7–1.18)
CRP SERPL-MCNC: 1.4 MG/L
EOSINOPHIL # BLD AUTO: 100 CELLS/UL (ref 15–500)
EOSINOPHIL NFR BLD AUTO: 1.2 %
ERYTHROCYTE [DISTWIDTH] IN BLOOD BY AUTOMATED COUNT: 13.6 % (ref 11–15)
GFR SERPL CREATININE-BSD FRML MDRD: 45 ML/MIN/1.73M2
GLOBULIN SER CALC-MCNC: 3.1 G/DL (CALC) (ref 1.9–3.7)
GLUCOSE SERPL-MCNC: 90 MG/DL (ref 65–99)
HCT VFR BLD AUTO: 38.7 % (ref 38.5–50)
HGB BLD-MCNC: 12.3 G/DL (ref 13.2–17.1)
LYMPHOCYTES # BLD AUTO: 1378 CELLS/UL (ref 850–3900)
LYMPHOCYTES NFR BLD AUTO: 16.6 %
MCH RBC QN AUTO: 28.4 PG (ref 27–33)
MCHC RBC AUTO-ENTMCNC: 31.8 G/DL (ref 32–36)
MCV RBC AUTO: 89.4 FL (ref 80–100)
MONOCYTES # BLD AUTO: 747 CELLS/UL (ref 200–950)
MONOCYTES NFR BLD AUTO: 9 %
NEUTROPHILS # BLD AUTO: 6042 CELLS/UL (ref 1500–7800)
NEUTROPHILS NFR BLD AUTO: 72.8 %
PLATELET # BLD AUTO: 392 THOUSAND/UL (ref 140–400)
PMV BLD REES-ECKER: 11 FL (ref 7.5–12.5)
POTASSIUM SERPL-SCNC: 4.5 MMOL/L (ref 3.5–5.3)
PROT SERPL-MCNC: 7.2 G/DL (ref 6.1–8.1)
RBC # BLD AUTO: 4.33 MILLION/UL (ref 4.2–5.8)
SODIUM SERPL-SCNC: 139 MMOL/L (ref 135–146)
WBC # BLD AUTO: 8.3 THOUSAND/UL (ref 3.8–10.8)

## 2017-12-15 RX ORDER — ISOSORBIDE DINITRATE 10 MG/1
TABLET ORAL
Qty: 60 TABLET | Refills: 6 | Status: SHIPPED | OUTPATIENT
Start: 2017-12-15 | End: 2018-03-16 | Stop reason: ALTCHOICE

## 2018-01-12 ENCOUNTER — OFFICE VISIT (OUTPATIENT)
Dept: FAMILY MEDICINE | Facility: CLINIC | Age: 73
End: 2018-01-12
Payer: MEDICARE

## 2018-01-12 ENCOUNTER — DOCUMENTATION ONLY (OUTPATIENT)
Dept: FAMILY MEDICINE | Facility: CLINIC | Age: 73
End: 2018-01-12

## 2018-01-12 VITALS
TEMPERATURE: 98 F | HEART RATE: 72 BPM | BODY MASS INDEX: 22.05 KG/M2 | WEIGHT: 145.5 LBS | DIASTOLIC BLOOD PRESSURE: 56 MMHG | HEIGHT: 68 IN | SYSTOLIC BLOOD PRESSURE: 101 MMHG

## 2018-01-12 DIAGNOSIS — R06.09 DYSPNEA ON EXERTION: Chronic | ICD-10-CM

## 2018-01-12 DIAGNOSIS — I10 ESSENTIAL HYPERTENSION: ICD-10-CM

## 2018-01-12 DIAGNOSIS — D50.1 IRON DEFICIENCY ANEMIA DUE TO SIDEROPENIC DYSPHAGIA: ICD-10-CM

## 2018-01-12 DIAGNOSIS — N18.2 STAGE 2 CHRONIC KIDNEY DISEASE: Primary | ICD-10-CM

## 2018-01-12 PROCEDURE — 99214 OFFICE O/P EST MOD 30 MIN: CPT | Mod: S$GLB,,, | Performed by: FAMILY MEDICINE

## 2018-01-12 PROCEDURE — 99999 PR PBB SHADOW E&M-EST. PATIENT-LVL IV: CPT | Mod: PBBFAC,,, | Performed by: FAMILY MEDICINE

## 2018-01-12 RX ORDER — LISINOPRIL 10 MG/1
10 TABLET ORAL DAILY
Qty: 90 TABLET | Refills: 3 | Status: SHIPPED | OUTPATIENT
Start: 2018-01-12 | End: 2018-03-16 | Stop reason: ALTCHOICE

## 2018-01-12 NOTE — PATIENT INSTRUCTIONS
Established High Blood Pressure    High blood pressure (hypertension) is a chronic disease. Often, healthcare providers dont know what causes it. But it can be caused by certain health conditions and medicines.  If you have high blood pressure, you may not have any symptoms. If you do have symptoms, they may include headache, dizziness, changes in your vision, chest pain, and shortness of breath. But even without symptoms, high blood pressure thats not treated raises your risk for heart attack and stroke. High blood pressure is a serious health risk and shouldnt be ignored.  A blood pressure reading is made up of two numbers: a higher number over a lower number. The top number is the systolic pressure. The bottom number is the diastolic pressure. A normal blood pressure is a systolic pressure of  less than 120 over a diastolic pressure of less than 80. You will see your blood pressure readings written together. For example, a person with a systolic pressure of 188 and a diastolic pressure of 78 will have 118/78 written in the medical record.  High blood pressure is when either the top number is 140 or higher, or the bottom number is 90 or higher. This must be the result when taking your blood pressure a number of times. The blood pressures between normal and high are called prehypertension.  Home care  If you have high blood pressure, you should do what is listed below to lower your blood pressure. If you are taking medicines for high blood pressure, these methods may reduce or end your need for medicines in the future.  · Begin a weight-loss program if you are overweight.  · Cut back on how much salt you get in your diet. Heres how to do this:  ¨ Dont eat foods that have a lot of salt. These include olives, pickles, smoked meats, and salted potato chips.  ¨ Dont add salt to your food at the table.  ¨ Use only small amounts of salt when cooking.  · Start an exercise program. Talk with your healthcare  provider about the type of exercise program that would be best for you. It doesn't have to be hard. Even brisk walking for 20 minutes 3 times a week is a good form of exercise.  · Dont take medicines that stimulate the heart. This includes many over-the-counter cold and sinus decongestant pills and sprays, as well as diet pills. Check the warnings about hypertension on the label. Before buying any over-the-counter medicines or supplements, always ask the pharmacist about the product's potential interaction with your high blood pressure and your high blood pressure medicines.  · Stimulants such as amphetamine or cocaine could be deadly for someone with high blood pressure. Never take these.  · Limit how much caffeine you get in your diet. Switch to caffeine-free products.  · Stop smoking. If you are a long-time smoker, this can be hard. Talk to your healthcare provider about medicines and nicotine replacement options to help you. Also, enroll in a stop-smoking program to make it more likely that you will quit for good.  · Learn how to handle stress. This is an important part of any program to lower blood pressure. Learn about relaxation methods like meditation, yoga, or biofeedback.  · If your provider prescribed medicines, take them exactly as directed. Missing doses may cause your blood pressure get out of control.  · If you miss a dose or doses, check with your healthcare provider or pharmacist about what to do.  · Consider buying an automatic blood pressure machine. Ask your provider for a recommendation. You can get one of these at most pharmacies.     The American Heart Association recommends the following guidelines for home blood pressure monitoring:  · Don't smoke or drink coffee for 30 minutes before taking your blood pressure.  · Go to the bathroom before the test.  · Relax for 5 minutes before taking the measurement.  · Sit with your back supported (don't sit on a couch or soft chair); keep your feet on  the floor uncrossed. Place your arm on a solid flat surface (like a table) with the upper part of the arm at heart level. Place the middle of the cuff directly above the eye of the elbow. Check the monitor's instruction manual for an illustration.  · Take multiple readings. When you measure, take 2 to 3 readings one minute apart and record all of the results.  · Take your blood pressure at the same time every day, or as your healthcare provider recommends.  · Record the date, time, and blood pressure reading.  · Take the record with you to your next medical appointment. If your blood pressure monitor has a built-in memory, simply take the monitor with you to your next appointment.  · Call your provider if you have several high readings. Don't be frightened by a single high blood pressure reading, but if you get several high readings, check in with your healthcare provider.  · Note: When blood pressure reaches a systolic (top number) of 180 or higher OR diastolic (bottom number) of 110 or higher, seek emergency medical treatment.  Follow-up care  You will need to see your healthcare provider regularly. This is to check your blood pressure and to make changes to your medicines. Make a follow-up appointment as directed. Bring the record of your home blood pressure readings to the appointment.  When to seek medical advice  Call your healthcare provider right away if any of these occur:  · Blood pressure reaches a systolic (upper number) of 180 or higher OR a diastolic (bottom number) of 110 or higher  · Chest pain or shortness of breath  · Severe headache  · Throbbing or rushing sound in the ears  · Nosebleed  · Sudden severe pain in your belly (abdomen)  · Extreme drowsiness, confusion, or fainting  · Dizziness or spinning sensation (vertigo)  · Weakness of an arm or leg or one side of the face  · You have problems speaking or seeing   Date Last Reviewed: 12/1/2016  © 3842-5149 [employer]. [address] [place] [address]  [place] [address]. All rights reserved. This information is not intended as a substitute for professional medical care. Always follow your healthcare professional's instructions.

## 2018-01-12 NOTE — PROGRESS NOTES
Pre-Visit Chart Review  For Appointment Scheduled on 01/12/2018    Health Maintenance Due   Topic Date Due    TETANUS VACCINE  08/28/1965    Zoster Vaccine  08/28/2007

## 2018-01-12 NOTE — PROGRESS NOTES
Subjective:       Patient ID: Rajendra Castle is a 72 y.o. male.    Chief Complaint: Hypertension and discuss weight    Asthma   He complains of cough and shortness of breath. There is no sputum production or wheezing. This is a chronic problem. The problem has been waxing and waning. Associated symptoms include dyspnea on exertion. Pertinent negatives include no appetite change, ear congestion, ear pain, fever, headaches, heartburn, malaise/fatigue, myalgias, nasal congestion, orthopnea, PND, sore throat, sweats or trouble swallowing. His past medical history is significant for asthma.     Review of Systems   Constitutional: Negative for appetite change, fever and malaise/fatigue.   HENT: Negative for ear pain, sore throat and trouble swallowing.    Respiratory: Positive for cough and shortness of breath. Negative for sputum production and wheezing.    Cardiovascular: Positive for dyspnea on exertion. Negative for PND.   Gastrointestinal: Negative for heartburn.   Musculoskeletal: Positive for arthralgias and back pain. Negative for myalgias.   Neurological: Negative for headaches.       Patient Active Problem List   Diagnosis    Heart murmur    Hypertension    Arthritis    Rotator cuff tendinitis    Dyspnea on exertion    DDD (degenerative disc disease), cervical    Anemia, iron deficiency    Chronic GI bleeding    Arthritis of wrist, right    Trigger thumb of left hand    Arthritis of right wrist    Chest pain    Essential hypertension    Rheumatoid arthritis involving multiple sites    Marijuana smoker    Elevated troponin    Nausea and vomiting    Nontoxic multinodular goiter    Gastroesophageal reflux disease without esophagitis    Nodular thyroid disease    Prediabetes    Osteoporosis    Hypogonadism in male    Current chronic use of systemic steroids    Hypercalcemia    Vitamin D insufficiency    Hyperparathyroidism    Pulmonary emphysema    Rheumatoid lung    Lung nodule    CKD  (chronic kidney disease)       Objective:      Physical Exam   Constitutional: Vital signs are normal. He appears well-developed and well-nourished. He appears ill.   HENT:   Right Ear: Tympanic membrane and ear canal normal.   Left Ear: Tympanic membrane and ear canal normal.   Nose: Mucosal edema and rhinorrhea present. Right sinus exhibits no maxillary sinus tenderness and no frontal sinus tenderness. Left sinus exhibits no maxillary sinus tenderness and no frontal sinus tenderness.   Mouth/Throat: Posterior oropharyngeal erythema present. No oropharyngeal exudate, posterior oropharyngeal edema or tonsillar abscesses.   Cardiovascular: Normal rate, regular rhythm and normal heart sounds.    Pulmonary/Chest: Effort normal. He has decreased breath sounds in the right lower field and the left lower field. He has no wheezes. He has no rhonchi.   Skin: Skin is warm and dry.   Psychiatric: He has a normal mood and affect.   Nursing note and vitals reviewed.      Lab Results   Component Value Date    WBC 8.7 08/23/2017    HGB 12.1 (L) 08/23/2017    HCT 37.7 (L) 08/23/2017     08/23/2017    CHOL 156 09/20/2016    TRIG 124 09/20/2016    HDL 39 (L) 09/20/2016    ALT 15 08/23/2017    AST 19 08/23/2017     08/23/2017    K 5.2 08/23/2017     08/23/2017    CREATININE 1.69 (H) 08/23/2017    BUN 26 (H) 08/23/2017    CO2 22 08/23/2017    TSH 0.930 09/11/2017    PSA 2.1 10/03/2013    INR 1.0 08/18/2016    HGBA1C 5.9 09/20/2016   He has a decreased kidney function, and mild hyperkalemia.  The 10-year ASCVD risk score (Jennifer PETE Jr., et al., 2013) is: 12.5%    Values used to calculate the score:      Age: 72 years      Sex: Male      Is Non- : Yes      Diabetic: No      Tobacco smoker: No      Systolic Blood Pressure: 101 mmHg      Is BP treated: Yes      HDL Cholesterol: 39 mg/dL      Total Cholesterol: 156 mg/dL    Assessment:       1. Stage 2 chronic kidney disease    2. Essential  hypertension    3. 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 Dyspnea on exertion        Plan:       Stage 2 chronic kidney disease  -     URINALYSIS; Future; Expected date: 01/12/2018  -     US Retroperitoneal Complete; Future; Expected date: 01/12/2018  -     lisinopril 10 MG tablet; Take 1 tablet (10 mg total) by mouth once daily.  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    Goals: Hypertension: Reduce Blood Pressure    Did patient meet goals/outcomes: Yes    The following self management tools provided: blood pressure log    Patient Instructions (the written plan) was given to the patient/family.     Time spent with patient: 30 minutes    Barriers to medications present (no )    Adverse reactions to current medications (yes)    Over the counter medications reviewed (Yes)        30 minutes spent counseling patient on diet, exercise, and weight loss.  This has been fully explained to the patient, who indicates understanding.

## 2018-01-16 ENCOUNTER — LAB VISIT (OUTPATIENT)
Dept: LAB | Facility: HOSPITAL | Age: 73
End: 2018-01-16
Attending: FAMILY MEDICINE
Payer: MEDICARE

## 2018-01-16 DIAGNOSIS — N18.2 STAGE 2 CHRONIC KIDNEY DISEASE: ICD-10-CM

## 2018-01-16 LAB
BILIRUB UR QL STRIP: NEGATIVE
CLARITY UR REFRACT.AUTO: CLEAR
COLOR UR AUTO: NORMAL
GLUCOSE UR QL STRIP: NEGATIVE
HGB UR QL STRIP: NEGATIVE
KETONES UR QL STRIP: NEGATIVE
LEUKOCYTE ESTERASE UR QL STRIP: NEGATIVE
NITRITE UR QL STRIP: NEGATIVE
PH UR STRIP: 5 [PH] (ref 5–8)
PROT UR QL STRIP: NEGATIVE
SP GR UR STRIP: 1.01 (ref 1–1.03)
URN SPEC COLLECT METH UR: NORMAL
UROBILINOGEN UR STRIP-ACNC: NEGATIVE EU/DL

## 2018-01-16 PROCEDURE — 81003 URINALYSIS AUTO W/O SCOPE: CPT

## 2018-01-18 ENCOUNTER — OFFICE VISIT (OUTPATIENT)
Dept: PULMONOLOGY | Facility: CLINIC | Age: 73
End: 2018-01-18
Payer: MEDICARE

## 2018-01-18 ENCOUNTER — HOSPITAL ENCOUNTER (OUTPATIENT)
Dept: RADIOLOGY | Facility: CLINIC | Age: 73
Discharge: HOME | End: 2018-01-18
Attending: FAMILY MEDICINE
Payer: MEDICARE

## 2018-01-18 VITALS
WEIGHT: 147.94 LBS | OXYGEN SATURATION: 97 % | HEIGHT: 68 IN | SYSTOLIC BLOOD PRESSURE: 139 MMHG | DIASTOLIC BLOOD PRESSURE: 65 MMHG | BODY MASS INDEX: 22.42 KG/M2 | HEART RATE: 83 BPM

## 2018-01-18 DIAGNOSIS — R91.1 LUNG NODULE: ICD-10-CM

## 2018-01-18 DIAGNOSIS — J43.8 OTHER EMPHYSEMA: Primary | ICD-10-CM

## 2018-01-18 DIAGNOSIS — R07.9 CHEST PAIN, UNSPECIFIED TYPE: ICD-10-CM

## 2018-01-18 DIAGNOSIS — N18.2 STAGE 2 CHRONIC KIDNEY DISEASE: ICD-10-CM

## 2018-01-18 PROCEDURE — 99214 OFFICE O/P EST MOD 30 MIN: CPT | Mod: S$GLB,,, | Performed by: INTERNAL MEDICINE

## 2018-01-18 PROCEDURE — 76770 US EXAM ABDO BACK WALL COMP: CPT | Mod: TC,PO

## 2018-01-18 PROCEDURE — 76770 US EXAM ABDO BACK WALL COMP: CPT | Mod: 26,,, | Performed by: RADIOLOGY

## 2018-01-18 PROCEDURE — 99499 UNLISTED E&M SERVICE: CPT | Mod: S$GLB,,, | Performed by: INTERNAL MEDICINE

## 2018-01-18 PROCEDURE — 99999 PR PBB SHADOW E&M-EST. PATIENT-LVL III: CPT | Mod: PBBFAC,,, | Performed by: INTERNAL MEDICINE

## 2018-01-18 NOTE — LETTER
January 18, 2018      Irvin Aceves MD  [address]  [place] [address]           [facility]  [address]  [place] [address]  Phone: [phone]  Fax: [phone]          Patient: Rajendra Castle   MR Number: [id number]   YOB: 1945   Date of Visit: 1/18/2018       Dear Dr. Irvin Aceves:    Thank you for referring Rajendra Castle to me for evaluation. Attached you will find relevant portions of my assessment and plan of care.    If you have questions, please do not hesitate to call me. I look forward to following Rajendra Castle along with you.    Sincerely,    Jonathan Song MD    Enclosure  CC:  No Recipients    If you would like to receive this communication electronically, please contact [email or web address] or [phone] to request more information on [facility] Link access.    For providers and/or their staff who would like to refer a patient to [facility], please contact us through our one-stop-shop provider referral line, [facility], at [phone].    If you feel you have received this communication in error or would no longer like to receive these types of communications, please e-mail [email or web address]

## 2018-01-18 NOTE — PATIENT INSTRUCTIONS
Lung nodules reviewed and nodules are not worse - looking better. No follow up needed.    Breathing good and breathing test very good.    Chest pain likely arthritic in nature.      You see endocrine doctor for thyroid abnormality -       Lungs are very good, follow up if new symptoms.  Cause chest pain not clear but ct chest looks fine.      Fatigue most common symptom of rheumatoid.

## 2018-01-18 NOTE — PROGRESS NOTES
"1/18/2018    Rajendra Castle  New Patient Consult    Chief Complaint   Patient presents with    Chest Pain       HPI:   1/18/2018- having intermittent non pleuritic chest pain lasting 3-4 hrs ongoing last 3-4 yrs daily post eval for heart, had ct chest Nov 2017 c/w 2016 - no impressive,  Has RA and with tramadol pain controlled - pain to 8-10 but with tramadol 4/10.   No bar - save when pt has pain. Gets more fatigue.  Sees Dr Garcia. No inhaler use.     10/16/2016 pt worked  and also as std  in [place],  Never smoker.  Developed ra 3 yrs ago and on mtx./prednisone 2.5 twice daily.  Having freq fatigue and diffuse pain, chr poor weight.      Has had heart checks as some of pain -all neg.  Sees Dr Garcia for rheum rx.      No clear tb, but may have had exposure.  Prior tb test was neg both skin and blood per pt.      No breathing problems but just pain and poor wt and fatigue.        The chief compliant  problem is new to me", rheum bad last yr  PFSH:  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    RA (rheumatoid arthritis)     Vitamin D insufficiency 9/30/2016         History reviewed. No pertinent surgical history.  Social History   Substance Use Topics    Smoking status: Former Smoker     Packs/day: 0.25     Years: 10.00     Quit date: 8/6/2001    Smokeless tobacco: Never Used    Alcohol use Yes      Comment: seldom     Family History   Problem Relation Age of Onset    Heart disease Mother     Stroke Father     Drug abuse Sister     Diabetes Maternal Uncle      No Known Allergies    Performance Status:The patient's activity level is housebound activities.  Lives alone.    Review of Systems:  a review of eleven systems covering constitutional, Eye, HEENT, Psych, Respiratory, Cardiac, GI, , Musculoskeletal, Endocrine, " "Dermatologic was negative except for pertinent findings as listed ABOVE and below:      Exam:Comprehensive exam done.   /65   Pulse 83   Ht 5' 8" (1.727 m)   Wt 67.1 kg (147 lb 14.9 oz)   SpO2 97%   BMI 22.49 kg/m²   Exam included Vitals as listed, and patient's appearance and affect and alertness and mood, oral exam for yeast and hygiene and pharynx lesions and Mallapatti (M) score, neck with inspection for jvd and masses and thyroid abnormalities and lymph nodes (supraclavicular and infraclavicular nodes and axillary also examined and noted if abn), chest exam included symmetry and effort and fremitus and percussion and auscultation, cardiac exam included rhythm and gallops and murmur and rubs and jvd and edema, abdominal exam for mass and hepatosplenomegaly and tenderness and hernias and bowel sounds, Musculoskeletal exam with muscle tone and posture and mobility/gait and  strength, and skin for rashes and cyanosis and pallor and turgor, extremity for clubbing.  Findings were normal except for pertinent findings listed below:  M4, no h/o severe snoring, no rales, no edema, good exam.  Rib joints good    Radiographs (ct chest and cxr) reviewed: view by direct vision   Ct 2017 November looks good (resolution not high) ---form aug 2016 better resolution Mild sub plerual reticular change, bronchiectasis, bilat nodules, copd -- report under states ild changes>      1.  No evidence for pulmonary embolism or other acute CT findings to explain this patient's symptoms.  2.  Moderate emphysema.  3.  Subcentimeter noncalcified pulmonary nodules in each lung measuring up to 5 mm in the left lower lobe.  Recommend followup CT examinations in 6-12 months and 18-24 months to confirm stability.    Epic notification system activated.  ______________________________________     Electronically signed by: Shreya Lane MD  Date: 08/25/16  Time: 18:04   Impression         Emphysematous changes and mild interstitial " changes of the chronic and old granulomatous disease.  Couple small pulmonary nodules largest measuring 6 mm.  Findings are not significant a change compared to prior study of 8/25/2016      Electronically signed by: RADHA GILLILAND MD  Date: 11/24/17  Time: 13:10          Labs reviewed from last CBC,CMP,ABG,Micro last 3 entries  on EPIC result review        PFT   Pulmonary Functions, including spirometry and lung volumes and diffusion, study was done nov 24, 2017.  Spirometry shows no obstruction and normal vital capacity.   FEV1 is 112% or 2.82 liters.  Lung volumes show  normal TLC.  Diffusion shows reduced to 57%  uncorrected for anemia if present..     Pulmonary functions show low dlco and rest wnl. Clinical correlation recommended.     Jonathan Song M.D.      Plan:  Clinical impression is resonably certain and repeated evaluation prn +/- follow up will be needed as below.    Rajendra was seen today for chest pain.    Diagnoses and all orders for this visit:    Other emphysema    Lung nodule    Chest pain, unspecified type        Follow-up if symptoms worsen or fail to improve.    Discussed with patient above for education the following:      Lung nodules reviewed and nodules are not worse - looking better. No follow up needed.    Breathing good and breathing test very good.    Chest pain likely arthritic in nature.      You see endocrine doctor for thyroid abnormality -       Lungs are very good, follow up if new symptoms.  Cause chest pain not clear but ct chest looks fine.      Fatigue most common symptom of rheumatoid.

## 2018-01-24 ENCOUNTER — OFFICE VISIT (OUTPATIENT)
Dept: NEPHROLOGY | Facility: CLINIC | Age: 73
End: 2018-01-24
Payer: MEDICARE

## 2018-01-24 VITALS
HEIGHT: 68 IN | SYSTOLIC BLOOD PRESSURE: 136 MMHG | HEART RATE: 72 BPM | WEIGHT: 148.56 LBS | DIASTOLIC BLOOD PRESSURE: 58 MMHG | BODY MASS INDEX: 22.52 KG/M2

## 2018-01-24 DIAGNOSIS — E21.3 HYPERPARATHYROIDISM: ICD-10-CM

## 2018-01-24 DIAGNOSIS — N18.30 CHRONIC KIDNEY DISEASE, STAGE III (MODERATE): Primary | ICD-10-CM

## 2018-01-24 DIAGNOSIS — I10 ESSENTIAL HYPERTENSION: ICD-10-CM

## 2018-01-24 PROCEDURE — 99999 PR PBB SHADOW E&M-EST. PATIENT-LVL II: CPT | Mod: PBBFAC,,, | Performed by: INTERNAL MEDICINE

## 2018-01-24 PROCEDURE — 99204 OFFICE O/P NEW MOD 45 MIN: CPT | Mod: S$GLB,,, | Performed by: INTERNAL MEDICINE

## 2018-01-24 NOTE — LETTER
January 24, 2018      Irvin Aceves MD  [address] [facility] [address]           [facility] [address] [facility] [address] [place] [address]  Phone: [phone]          Patient: Rajendra Castle   MR Number: [id number]   YOB: 1945   Date of Visit: 1/24/2018       Dear Dr. Irvin Aceves:    Thank you for referring Rajendra Castle to me for evaluation. Attached you will find relevant portions of my assessment and plan of care.    If you have questions, please do not hesitate to call me. I look forward to following Rajendra Castle along with you.    Sincerely,    Alfredito Ballard MD    Enclosure  CC:  No Recipients    If you would like to receive this communication electronically, please contact [email or web address] or [phone] to request more information on [employer] Link access.    For providers and/or their staff who would like to refer a patient to [facility], please contact us through our one-stop-shop provider referral line, Beatrice Plascencia, at [phone].    If you feel you have received this communication in error or would no longer like to receive these types of communications, please e-mail [email or web address]

## 2018-01-24 NOTE — PROGRESS NOTES
Subjective:       Patient ID: Rajendra Castle is a 72 y.o. Black or  male who presents for new patient evaluation for chronic renal failure.    Rajendra Castle is referred by Irvin Aceves MD to be evaluated for chronic renal failure.  He has a baseline of 1.4-1.6 sine 2006.  He is followed by Rheumatology for RA and takes tramadol for intermittent chest pain.  He had a negative cardiac evaluation two years ago.  He also takes methotrexate for his RA.  He had a cardiac echo in 2016 which showed diastolic dysfunction.  There have been no recent illnesses, hospitalizations or procedures.        Review of Systems   Constitutional: Negative for appetite change, chills and fever.   Eyes: Negative for visual disturbance.   Respiratory: Negative for cough and shortness of breath.    Cardiovascular: Positive for chest pain (intermittent). Negative for leg swelling.   Gastrointestinal: Negative for diarrhea, nausea and vomiting.   Genitourinary: Positive for frequency. Negative for difficulty urinating, dysuria and hematuria.   Musculoskeletal: Negative for myalgias.   Skin: Negative for rash.   Neurological: Negative for headaches.   Psychiatric/Behavioral: Negative for sleep disturbance.       The past medical, family and social histories were reviewed for this encounter.     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    RA (rheumatoid arthritis)     Vitamin D insufficiency 9/30/2016     No past surgical history on file.  Social History     Social History    Marital status: Single     Spouse name: N/A    Number of children: N/A    Years of education: N/A     Occupational History    Not on file.     Social History Main Topics    Smoking status: Former Smoker     Packs/day: 0.25     Years: 10.00     Quit date: 8/6/2001    Smokeless tobacco:  "Never Used    Alcohol use Yes      Comment: seldom    Drug use: Yes     Frequency: 8.0 times per week     Types: Marijuana    Sexual activity: Yes     Partners: Female     Other Topics Concern    Not on file     Social History Narrative    No narrative on file     Current Outpatient Prescriptions   Medication Sig    alendronate (FOSAMAX) 70 MG tablet TAKE 1 TABLET BY MOUTH EVERY 7 DAYS    aspirin (ECOTRIN) 81 MG EC tablet Take 81 mg by mouth once daily.      carvedilol (COREG) 12.5 MG tablet Take 1 tablet (12.5 mg total) by mouth 2 (two) times daily with meals.    ergocalciferol (ERGOCALCIFEROL) 50,000 unit Cap TAKE 1 TABLET BY MOUTH EVERY WEEK    escitalopram oxalate (LEXAPRO) 10 MG tablet Take 10 mg by mouth once daily.    folic acid (FOLVITE) 1 MG tablet Take 5 mg by mouth once daily.    isosorbide dinitrate (ISORDIL) 10 MG tablet TAKE 1 TABLET BY MOUTH TWICE DAILY    lisinopril 10 MG tablet Take 1 tablet (10 mg total) by mouth once daily.    methotrexate 2.5 MG Tab TAKE 6 TABLETS BY MOUTH EVERY WEEK    predniSONE (DELTASONE) 2.5 MG tablet TAKE 1 TABLET BY MOUTH TWICE DAILY    tramadol (ULTRAM) 50 mg tablet TAKE 2 TABLETS BY MOUTH TWICE DAILY AS NEEDED FOR PAIN     No current facility-administered medications for this visit.        BP (!) 136/58   Ht 5' 8" (1.727 m)   Wt 67.4 kg (148 lb 9.4 oz)   BMI 22.59 kg/m²     Objective:      Physical Exam   Constitutional: He is oriented to person, place, and time. He appears well-developed and well-nourished. No distress.   HENT:   Head: Normocephalic and atraumatic.   Eyes: Conjunctivae are normal.   Neck: Neck supple. No JVD present.   Cardiovascular: Normal rate, regular rhythm and normal heart sounds.  Exam reveals no gallop and no friction rub.    No murmur heard.  Pulmonary/Chest: Effort normal and breath sounds normal. No respiratory distress. He has no wheezes. He has no rales.   Abdominal: Soft. Bowel sounds are normal. He exhibits no " distension. There is no tenderness.   Musculoskeletal: He exhibits no edema.   Neurological: He is alert and oriented to person, place, and time.   Skin: Skin is warm and dry. No rash noted.   Psychiatric: He has a normal mood and affect.   Vitals reviewed.      Assessment:       1. Chronic kidney disease, stage III (moderate)    2. Essential hypertension    3. Hyperparathyroidism        Plan:   Return to clinic in 4 months.  Labs for next visit include rp, pth, ua, upc.  Baseline creatinine is 1.4-1.6 since 2006.  Renal US shows R 9.3 cm, L 9.4 cm.  Ideally, I would simplify his medication regimen by increasing the coreg to 25 mg BID and stopping the lisinopril and isordil.  He apparently does not have systolic heart failure.  I will follow his function.  I must conclude that his mildly small kidneys are due to hypertensive nephrosclerosis.  I will send this note to Dr. Aceves to get his opinion.

## 2018-01-24 NOTE — Clinical Note
Ideally, I would simplify his medication regimen by increasing the coreg to 25 mg BID and stopping the lisinopril and isordil.  He apparently does not have systolic heart failure.  I will follow his function.  I must conclude that his mildly small kidneys are due to hypertensive nephrosclerosis.

## 2018-01-26 DIAGNOSIS — R52 PAIN: ICD-10-CM

## 2018-01-29 RX ORDER — TRAMADOL HYDROCHLORIDE 50 MG/1
TABLET ORAL
Qty: 120 TABLET | Refills: 5 | Status: SHIPPED | OUTPATIENT
Start: 2018-01-29 | End: 2018-08-22 | Stop reason: SDUPTHER

## 2018-02-01 DIAGNOSIS — M19.90 CHRONIC INFLAMMATORY ARTHRITIS: Primary | ICD-10-CM

## 2018-02-01 RX ORDER — METHOTREXATE 2.5 MG/1
TABLET ORAL
Qty: 72 TABLET | Refills: 1 | Status: SHIPPED | OUTPATIENT
Start: 2018-02-01 | End: 2018-08-06 | Stop reason: SDUPTHER

## 2018-02-21 DIAGNOSIS — M19.90 CHRONIC INFLAMMATORY ARTHRITIS: ICD-10-CM

## 2018-02-21 RX ORDER — PREDNISONE 2.5 MG/1
TABLET ORAL
Qty: 180 TABLET | Refills: 0 | Status: SHIPPED | OUTPATIENT
Start: 2018-02-21 | End: 2018-06-02 | Stop reason: SDUPTHER

## 2018-03-05 RX ORDER — ERGOCALCIFEROL 1.25 MG/1
CAPSULE ORAL
Qty: 12 CAPSULE | Refills: 0 | Status: SHIPPED | OUTPATIENT
Start: 2018-03-05 | End: 2018-05-25 | Stop reason: SDUPTHER

## 2018-03-14 ENCOUNTER — HOSPITAL ENCOUNTER (OUTPATIENT)
Dept: RADIOLOGY | Facility: CLINIC | Age: 73
Discharge: HOME | End: 2018-03-14
Attending: INTERNAL MEDICINE
Payer: MEDICARE

## 2018-03-14 DIAGNOSIS — I10 ESSENTIAL HYPERTENSION: ICD-10-CM

## 2018-03-14 DIAGNOSIS — E55.9 VITAMIN D INSUFFICIENCY: ICD-10-CM

## 2018-03-14 DIAGNOSIS — E21.3 HYPERPARATHYROIDISM: ICD-10-CM

## 2018-03-14 DIAGNOSIS — E04.1 NODULAR THYROID DISEASE: ICD-10-CM

## 2018-03-14 DIAGNOSIS — M81.0 OSTEOPOROSIS, UNSPECIFIED OSTEOPOROSIS TYPE, UNSPECIFIED PATHOLOGICAL FRACTURE PRESENCE: ICD-10-CM

## 2018-03-14 PROCEDURE — 76536 US EXAM OF HEAD AND NECK: CPT | Mod: TC,PO

## 2018-03-14 PROCEDURE — 76536 US EXAM OF HEAD AND NECK: CPT | Mod: 26,,, | Performed by: RADIOLOGY

## 2018-03-16 ENCOUNTER — LAB VISIT (OUTPATIENT)
Dept: LAB | Facility: HOSPITAL | Age: 73
End: 2018-03-16
Attending: FAMILY MEDICINE
Payer: MEDICARE

## 2018-03-16 ENCOUNTER — OFFICE VISIT (OUTPATIENT)
Dept: FAMILY MEDICINE | Facility: CLINIC | Age: 73
End: 2018-03-16
Payer: MEDICARE

## 2018-03-16 VITALS
DIASTOLIC BLOOD PRESSURE: 70 MMHG | WEIGHT: 149.5 LBS | RESPIRATION RATE: 12 BRPM | HEIGHT: 68 IN | OXYGEN SATURATION: 95 % | SYSTOLIC BLOOD PRESSURE: 148 MMHG | BODY MASS INDEX: 22.66 KG/M2 | HEART RATE: 76 BPM

## 2018-03-16 DIAGNOSIS — R35.1 NOCTURIA: ICD-10-CM

## 2018-03-16 DIAGNOSIS — I10 ESSENTIAL HYPERTENSION: Primary | ICD-10-CM

## 2018-03-16 DIAGNOSIS — R35.1 BENIGN PROSTATIC HYPERPLASIA WITH NOCTURIA: ICD-10-CM

## 2018-03-16 DIAGNOSIS — M06.9 RHEUMATOID ARTHRITIS INVOLVING MULTIPLE SITES, UNSPECIFIED RHEUMATOID FACTOR PRESENCE: ICD-10-CM

## 2018-03-16 DIAGNOSIS — N40.1 BENIGN PROSTATIC HYPERPLASIA WITH NOCTURIA: ICD-10-CM

## 2018-03-16 DIAGNOSIS — N18.30 CKD (CHRONIC KIDNEY DISEASE) STAGE 3, GFR 30-59 ML/MIN: ICD-10-CM

## 2018-03-16 LAB
BILIRUB UR QL STRIP: NEGATIVE
CLARITY UR REFRACT.AUTO: CLEAR
COLOR UR AUTO: YELLOW
GLUCOSE UR QL STRIP: NEGATIVE
HGB UR QL STRIP: NEGATIVE
KETONES UR QL STRIP: NEGATIVE
LEUKOCYTE ESTERASE UR QL STRIP: NEGATIVE
MICROSCOPIC COMMENT: NORMAL
NITRITE UR QL STRIP: NEGATIVE
PH UR STRIP: 5 [PH] (ref 5–8)
PROT UR QL STRIP: NEGATIVE
RBC #/AREA URNS AUTO: 1 /HPF (ref 0–4)
SP GR UR STRIP: 1.02 (ref 1–1.03)
SQUAMOUS #/AREA URNS AUTO: 0 /HPF
URN SPEC COLLECT METH UR: NORMAL
UROBILINOGEN UR STRIP-ACNC: NEGATIVE EU/DL
WBC #/AREA URNS AUTO: 1 /HPF (ref 0–5)

## 2018-03-16 PROCEDURE — 3078F DIAST BP <80 MM HG: CPT | Mod: CPTII,S$GLB,, | Performed by: PHYSICIAN ASSISTANT

## 2018-03-16 PROCEDURE — 3077F SYST BP >= 140 MM HG: CPT | Mod: CPTII,S$GLB,, | Performed by: PHYSICIAN ASSISTANT

## 2018-03-16 PROCEDURE — 99999 PR PBB SHADOW E&M-EST. PATIENT-LVL V: CPT | Mod: PBBFAC,,, | Performed by: PHYSICIAN ASSISTANT

## 2018-03-16 PROCEDURE — 99214 OFFICE O/P EST MOD 30 MIN: CPT | Mod: S$GLB,,, | Performed by: PHYSICIAN ASSISTANT

## 2018-03-16 PROCEDURE — 81001 URINALYSIS AUTO W/SCOPE: CPT

## 2018-03-16 RX ORDER — TAMSULOSIN HYDROCHLORIDE 0.4 MG/1
0.4 CAPSULE ORAL DAILY
Qty: 30 CAPSULE | Refills: 11 | Status: SHIPPED | OUTPATIENT
Start: 2018-03-16 | End: 2018-04-02 | Stop reason: ALTCHOICE

## 2018-03-16 RX ORDER — CARVEDILOL 25 MG/1
25 TABLET ORAL 2 TIMES DAILY WITH MEALS
Qty: 60 TABLET | Refills: 11 | Status: SHIPPED | OUTPATIENT
Start: 2018-03-16 | End: 2019-04-02 | Stop reason: SDUPTHER

## 2018-03-16 NOTE — PROGRESS NOTES
Subjective:       Patient ID: Rajendra Castle is a 72 y.o. male.    Chief Complaint: Follow-up (2mth f/u)    Mr. Castle comes to clinic today for follow up. He is followed by Dr. Ballard, nephrologist, for CKD. He made recommendations on changing on medications. The patient stopped the lisinopril and isosorbide but he did not increase the coreg. The patient reports that the lexapro continues to work well for anxiety and depression. He reports that he recently joined a [employer] and has made friends there. The patient is also followed by Dr Garcia for RA. The patient does complain of nighttime urination. The patient reports that he urinates 3 times during the night. This has been going on for the last year.       Review of Systems   Constitutional: Negative for activity change, appetite change and fever.   HENT: Negative for postnasal drip, rhinorrhea and sinus pressure.    Eyes: Negative for visual disturbance.   Respiratory: Negative for cough and shortness of breath.    Cardiovascular: Negative for chest pain.   Gastrointestinal: Negative for abdominal distention and abdominal pain.   Genitourinary: Negative for difficulty urinating and dysuria.   Musculoskeletal: Positive for arthralgias and myalgias.   Neurological: Negative for headaches.   Hematological: Negative for adenopathy.   Psychiatric/Behavioral: The patient is not nervous/anxious.        Objective:      Physical Exam   Constitutional: He is oriented to person, place, and time.   HENT:   Mouth/Throat: Oropharynx is clear and moist. No oropharyngeal exudate.   Eyes: Conjunctivae are normal. Pupils are equal, round, and reactive to light.   Cardiovascular: Normal rate and regular rhythm.    Pulmonary/Chest: Effort normal and breath sounds normal. He has no wheezes.   Abdominal: Soft. Bowel sounds are normal. He exhibits no distension. There is no tenderness.   Musculoskeletal: He exhibits no edema.   Lymphadenopathy:     He has no cervical adenopathy.    Neurological: He is alert and oriented to person, place, and time.   Skin: No erythema.   Psychiatric: His behavior is normal.       Assessment:       1. Essential hypertension    2. Rheumatoid arthritis involving multiple sites, unspecified rheumatoid factor presence    3. Nocturia    4. Benign prostatic hyperplasia with nocturia    5. CKD (chronic kidney disease) stage 3, GFR 30-59 ml/min        Plan:     Rajendra was seen today for follow-up.    Diagnoses and all orders for this visit:    Essential hypertension  -     carvedilol (COREG) 25 MG tablet; Take 1 tablet (25 mg total) by mouth 2 (two) times daily with meals.  -     Basic metabolic panel; Future  Med list updated to reflect discontinued lisinopril and isosorbide  Coreg refilled for 25mg BID dose  Follow up in 4 weeks for BP check  Low salt diet  Rheumatoid arthritis involving multiple sites, unspecified rheumatoid factor presence  Continue follow up with Dr. Garcia  Nocturia  -     tamsulosin (FLOMAX) 0.4 mg Cp24; Take 1 capsule (0.4 mg total) by mouth once daily.  -     Ambulatory referral to Urology  -     Urinalysis; Future  -     PSA, Screening; Future  -     Basic metabolic panel; Future    Benign prostatic hyperplasia with nocturia  -     Ambulatory referral to Urology  -     Urinalysis; Future  -     PSA, Screening; Future  -     Basic metabolic panel; Future      CKD (chronic kidney disease) stage 3, GFR 30-59 ml/min  Continue follow up with Dr. Ballard      Patient readiness: acceptance and barriers:none    During the course of the visit the patient was educated and counseled about the following:     Hypertension:   Medication: see changes above.  Dietary sodium restriction.  Regular aerobic exercise.    Goals: Hypertension: Reduce Blood Pressure    Did patient meet goals/outcomes: No    The following self management tools provided: declined    Patient Instructions (the written plan) was given to the patient/family.     Time spent with patient:  30 minutes    Barriers to medications present (no )    Adverse reactions to current medications (no)    Over the counter medications reviewed (Yes)

## 2018-03-16 NOTE — PATIENT INSTRUCTIONS
Established High Blood Pressure    High blood pressure (hypertension) is a chronic disease. Often, healthcare providers dont know what causes it. But it can be caused by certain health conditions and medicines.  If you have high blood pressure, you may not have any symptoms. If you do have symptoms, they may include headache, dizziness, changes in your vision, chest pain, and shortness of breath. But even without symptoms, high blood pressure thats not treated raises your risk for heart attack and stroke. High blood pressure is a serious health risk and shouldnt be ignored.  A blood pressure reading is made up of two numbers: a higher number over a lower number. The top number is the systolic pressure. The bottom number is the diastolic pressure. A normal blood pressure is a systolic pressure of  less than 120 over a diastolic pressure of less than 80. You will see your blood pressure readings written together. For example, a person with a systolic pressure of 188 and a diastolic pressure of 78 will have 118/78 written in the medical record.  High blood pressure is when either the top number is 140 or higher, or the bottom number is 90 or higher. This must be the result when taking your blood pressure a number of times. The blood pressures between normal and high are called prehypertension.  Home care  If you have high blood pressure, you should do what is listed below to lower your blood pressure. If you are taking medicines for high blood pressure, these methods may reduce or end your need for medicines in the future.  · Begin a weight-loss program if you are overweight.  · Cut back on how much salt you get in your diet. Heres how to do this:  ¨ Dont eat foods that have a lot of salt. These include olives, pickles, smoked meats, and salted potato chips.  ¨ Dont add salt to your food at the table.  ¨ Use only small amounts of salt when cooking.  · Start an exercise program. Talk with your healthcare  provider about the type of exercise program that would be best for you. It doesn't have to be hard. Even brisk walking for 20 minutes 3 times a week is a good form of exercise.  · Dont take medicines that stimulate the heart. This includes many over-the-counter cold and sinus decongestant pills and sprays, as well as diet pills. Check the warnings about hypertension on the label. Before buying any over-the-counter medicines or supplements, always ask the pharmacist about the product's potential interaction with your high blood pressure and your high blood pressure medicines.  · Stimulants such as amphetamine or cocaine could be deadly for someone with high blood pressure. Never take these.  · Limit how much caffeine you get in your diet. Switch to caffeine-free products.  · Stop smoking. If you are a long-time smoker, this can be hard. Talk to your healthcare provider about medicines and nicotine replacement options to help you. Also, enroll in a stop-smoking program to make it more likely that you will quit for good.  · Learn how to handle stress. This is an important part of any program to lower blood pressure. Learn about relaxation methods like meditation, yoga, or biofeedback.  · If your provider prescribed medicines, take them exactly as directed. Missing doses may cause your blood pressure get out of control.  · If you miss a dose or doses, check with your healthcare provider or pharmacist about what to do.  · Consider buying an automatic blood pressure machine. Ask your provider for a recommendation. You can get one of these at most pharmacies.     The American Heart Association recommends the following guidelines for home blood pressure monitoring:  · Don't smoke or drink coffee for 30 minutes before taking your blood pressure.  · Go to the bathroom before the test.  · Relax for 5 minutes before taking the measurement.  · Sit with your back supported (don't sit on a couch or soft chair); keep your feet on  the floor uncrossed. Place your arm on a solid flat surface (like a table) with the upper part of the arm at heart level. Place the middle of the cuff directly above the eye of the elbow. Check the monitor's instruction manual for an illustration.  · Take multiple readings. When you measure, take 2 to 3 readings one minute apart and record all of the results.  · Take your blood pressure at the same time every day, or as your healthcare provider recommends.  · Record the date, time, and blood pressure reading.  · Take the record with you to your next medical appointment. If your blood pressure monitor has a built-in memory, simply take the monitor with you to your next appointment.  · Call your provider if you have several high readings. Don't be frightened by a single high blood pressure reading, but if you get several high readings, check in with your healthcare provider.  · Note: When blood pressure reaches a systolic (top number) of 180 or higher OR diastolic (bottom number) of 110 or higher, seek emergency medical treatment.  Follow-up care  You will need to see your healthcare provider regularly. This is to check your blood pressure and to make changes to your medicines. Make a follow-up appointment as directed. Bring the record of your home blood pressure readings to the appointment.  When to seek medical advice  Call your healthcare provider right away if any of these occur:  · Blood pressure reaches a systolic (upper number) of 180 or higher OR a diastolic (bottom number) of 110 or higher  · Chest pain or shortness of breath  · Severe headache  · Throbbing or rushing sound in the ears  · Nosebleed  · Sudden severe pain in your belly (abdomen)  · Extreme drowsiness, confusion, or fainting  · Dizziness or spinning sensation (vertigo)  · Weakness of an arm or leg or one side of the face  · You have problems speaking or seeing   Date Last Reviewed: 12/1/2016  © 5372-4038 [employer]. [address] [place] [address]  [place] [address]. All rights reserved. This information is not intended as a substitute for professional medical care. Always follow your healthcare professional's instructions.

## 2018-03-19 ENCOUNTER — TELEPHONE (OUTPATIENT)
Dept: ENDOCRINOLOGY | Facility: CLINIC | Age: 73
End: 2018-03-19

## 2018-03-19 DIAGNOSIS — E83.52 HYPERCALCEMIA: ICD-10-CM

## 2018-03-19 DIAGNOSIS — E21.3 HYPERPARATHYROIDISM: Primary | ICD-10-CM

## 2018-03-19 NOTE — TELEPHONE ENCOUNTER
Pt's CMP and TSH was hemolyzed before it got to the lab for results  Pt has cancelled follow up and does not have another one scheduled, Do you want to add this to his May labs or sooner? Please advise

## 2018-03-19 NOTE — TELEPHONE ENCOUNTER
----- Message from Morgan Watt sent at 3/19/2018  8:13 AM CDT -----  Regarding: Lab Client Services  Contact: [phone]  Hi my name is Morgan I work in the lab Client Services. We had a problem with some lab work on this patient. If someone from your office could call us at [phone] or ext [phone] that would be great. Anyone in my department can help. Thank You

## 2018-03-26 ENCOUNTER — LAB VISIT (OUTPATIENT)
Dept: LAB | Facility: HOSPITAL | Age: 73
End: 2018-03-26
Attending: PHYSICIAN ASSISTANT
Payer: MEDICARE

## 2018-03-26 ENCOUNTER — TELEPHONE (OUTPATIENT)
Dept: FAMILY MEDICINE | Facility: CLINIC | Age: 73
End: 2018-03-26

## 2018-03-26 DIAGNOSIS — E83.52 HYPERCALCEMIA: ICD-10-CM

## 2018-03-26 DIAGNOSIS — E21.3 HYPERPARATHYROIDISM: ICD-10-CM

## 2018-03-26 LAB
25(OH)D3+25(OH)D2 SERPL-MCNC: 38 NG/ML
ALBUMIN SERPL BCP-MCNC: 3.3 G/DL
ALP SERPL-CCNC: 48 U/L
ALT SERPL W/O P-5'-P-CCNC: 13 U/L
ANION GAP SERPL CALC-SCNC: 7 MMOL/L
AST SERPL-CCNC: 12 U/L
BILIRUB SERPL-MCNC: 0.3 MG/DL
BUN SERPL-MCNC: 16 MG/DL
CALCIUM SERPL-MCNC: 10.4 MG/DL
CALCIUM SERPL-MCNC: 10.4 MG/DL
CHLORIDE SERPL-SCNC: 107 MMOL/L
CO2 SERPL-SCNC: 24 MMOL/L
CREAT SERPL-MCNC: 1.4 MG/DL
EST. GFR  (AFRICAN AMERICAN): 58.4 ML/MIN/1.73 M^2
EST. GFR  (NON AFRICAN AMERICAN): 50.5 ML/MIN/1.73 M^2
GLUCOSE SERPL-MCNC: 128 MG/DL
POTASSIUM SERPL-SCNC: 4.8 MMOL/L
PROT SERPL-MCNC: 6.8 G/DL
PTH-INTACT SERPL-MCNC: 207 PG/ML
SODIUM SERPL-SCNC: 138 MMOL/L
TSH SERPL DL<=0.005 MIU/L-ACNC: 1.1 UIU/ML

## 2018-03-26 PROCEDURE — 82306 VITAMIN D 25 HYDROXY: CPT

## 2018-03-26 PROCEDURE — 83970 ASSAY OF PARATHORMONE: CPT

## 2018-03-26 PROCEDURE — 84443 ASSAY THYROID STIM HORMONE: CPT

## 2018-03-26 PROCEDURE — 80053 COMPREHEN METABOLIC PANEL: CPT

## 2018-03-26 PROCEDURE — 36415 COLL VENOUS BLD VENIPUNCTURE: CPT | Mod: PO

## 2018-03-26 NOTE — TELEPHONE ENCOUNTER
Called patient regarding appointment on Friday 4/13/18 no answer left detail voice message for patient to call back to rescheduled appointment.

## 2018-04-02 ENCOUNTER — OFFICE VISIT (OUTPATIENT)
Dept: UROLOGY | Facility: CLINIC | Age: 73
End: 2018-04-02
Payer: MEDICARE

## 2018-04-02 ENCOUNTER — APPOINTMENT (OUTPATIENT)
Dept: LAB | Facility: HOSPITAL | Age: 73
End: 2018-04-02
Attending: NURSE PRACTITIONER
Payer: MEDICARE

## 2018-04-02 VITALS
HEIGHT: 68 IN | WEIGHT: 151 LBS | DIASTOLIC BLOOD PRESSURE: 73 MMHG | HEART RATE: 109 BPM | TEMPERATURE: 98 F | SYSTOLIC BLOOD PRESSURE: 151 MMHG | RESPIRATION RATE: 18 BRPM | BODY MASS INDEX: 22.88 KG/M2

## 2018-04-02 DIAGNOSIS — R35.1 NOCTURIA: Primary | ICD-10-CM

## 2018-04-02 DIAGNOSIS — R35.1 BENIGN PROSTATIC HYPERPLASIA WITH NOCTURIA: ICD-10-CM

## 2018-04-02 DIAGNOSIS — N40.1 BENIGN PROSTATIC HYPERPLASIA WITH NOCTURIA: ICD-10-CM

## 2018-04-02 DIAGNOSIS — R97.20 ELEVATED PSA, LESS THAN 10 NG/ML: ICD-10-CM

## 2018-04-02 LAB
BACTERIA #/AREA URNS HPF: NORMAL /HPF
BILIRUB SERPL-MCNC: ABNORMAL MG/DL
BLOOD URINE, POC: ABNORMAL
COLOR, POC UA: YELLOW
GLUCOSE UR QL STRIP: ABNORMAL
HYALINE CASTS #/AREA URNS LPF: 1 /LPF
KETONES UR QL STRIP: ABNORMAL
LEUKOCYTE ESTERASE URINE, POC: ABNORMAL
MICROSCOPIC COMMENT: NORMAL
NITRITE, POC UA: ABNORMAL
PH, POC UA: 5
POC RESIDUAL URINE VOLUME: 21 ML (ref 0–100)
PROTEIN, POC: ABNORMAL
RBC #/AREA URNS HPF: 1 /HPF (ref 0–4)
SPECIFIC GRAVITY, POC UA: 1.02
SQUAMOUS #/AREA URNS HPF: 3 /HPF
UROBILINOGEN, POC UA: ABNORMAL
WBC #/AREA URNS HPF: 3 /HPF (ref 0–5)

## 2018-04-02 PROCEDURE — 51798 US URINE CAPACITY MEASURE: CPT | Mod: S$GLB,,, | Performed by: NURSE PRACTITIONER

## 2018-04-02 PROCEDURE — 3078F DIAST BP <80 MM HG: CPT | Mod: CPTII,S$GLB,, | Performed by: NURSE PRACTITIONER

## 2018-04-02 PROCEDURE — 81000 URINALYSIS NONAUTO W/SCOPE: CPT

## 2018-04-02 PROCEDURE — 99999 PR PBB SHADOW E&M-EST. PATIENT-LVL IV: CPT | Mod: PBBFAC,,, | Performed by: NURSE PRACTITIONER

## 2018-04-02 PROCEDURE — 3077F SYST BP >= 140 MM HG: CPT | Mod: CPTII,S$GLB,, | Performed by: NURSE PRACTITIONER

## 2018-04-02 PROCEDURE — 81001 URINALYSIS AUTO W/SCOPE: CPT | Mod: S$GLB,,, | Performed by: NURSE PRACTITIONER

## 2018-04-02 PROCEDURE — 99204 OFFICE O/P NEW MOD 45 MIN: CPT | Mod: 25,S$GLB,, | Performed by: NURSE PRACTITIONER

## 2018-04-02 RX ORDER — ALFUZOSIN HYDROCHLORIDE 10 MG/1
10 TABLET, EXTENDED RELEASE ORAL
Qty: 30 TABLET | Refills: 12 | Status: SHIPPED | OUTPATIENT
Start: 2018-04-02 | End: 2018-05-16 | Stop reason: SDUPTHER

## 2018-04-02 RX ORDER — ISOSORBIDE DINITRATE 10 MG/1
TABLET ORAL
Refills: 6 | COMMUNITY
Start: 2018-03-23 | End: 2018-04-10

## 2018-04-02 NOTE — PROGRESS NOTES
"[facility] Note  Staff: EDEL Mccarthy    Referring provider and please cc: HENNY Harrison  PCP: Irvin Aceves M.D.    Chief Complaint: BPH with nocturia    Subjective:        HPI: Rajendra Castle is a 72 y.o. male NEW PATIENT to Urology clinic today presents with increased nocturia and urinary hesitancy which has worsened within the last six months or greater.    Pt was initially seen by his PCP office for same issues and was recently started on Tamsulosin 0.4 mg one tablet daily by PCP on 03/16/2018.  Pt states today overall improvement in his LUTS since beginning Tamsulosin is only 20% at this time.  Nocturia is still 3x nightly.    MEDICAL HISTORY INCLUDES:  Rheumatoid arthritis-sees Dr. Garcia   Chronic kidney disease, Stage 3-monitored by Dr. Ballard (Nephrologist)  Anxiety, Depression  Hypertension    Urology Questions addressed to pt during ov today:  Urgency: None, urge incontinence? None  Pt feels that he empties his bladder with no problems.  NTF: 3x night, DTF: None  Dysuria: No  Gross Hematuria:No  Straining:No, Hesistancy:Yes - "sometimes", Intermittency:No, Weak stream:Yes - has improved on Flomax, otherwise the stream "varies" in weakness.  STDs in past: Yes - as a teenager, unknown name of STD  Vasectomy: None    Constipation issues: None  Caffeine Intake: cup of coffee daily, rest of day he drinks a lot of milk and juices.    Last PSA Screening:   Lab Results   Component Value Date    PSA 4.8 (H) 03/16/2018    PSA 2.1 10/03/2013    PSA 2.73 08/06/2012    PSADIAG 3.4 10/14/2015    PSADIAG 4.1 (H) 04/06/2015     REVIEW OF SYSTEMS:  Review of Systems   Constitutional: Negative for chills, diaphoresis, fever and weight loss.   HENT: Negative for congestion, hearing loss, nosebleeds and sore throat.    Eyes: Negative for blurred vision and pain.   Respiratory: Negative for cough and wheezing.    Cardiovascular: Negative for chest pain, palpitations and leg swelling.       "  HTN   Gastrointestinal: Negative for abdominal pain, heartburn, nausea and vomiting.   Genitourinary: Negative for dysuria, flank pain, frequency, hematuria and urgency.        Nocturia  +CKD, Stage 3   Musculoskeletal: Positive for myalgias. Negative for back pain, joint pain and neck pain.        +Rheumatoid Arthritis   Skin: Negative for itching and rash.   Neurological: Negative for dizziness, tremors, sensory change, seizures, loss of consciousness, weakness and headaches.   Endo/Heme/Allergies: Does not bruise/bleed easily.   Psychiatric/Behavioral: Positive for depression. Negative for suicidal ideas. The patient is nervous/anxious.      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 None    PSHx:  History reviewed. No pertinent surgical history.    Stents/Valves/Foreign Bodies: No  Cardiac Evaluation: Yes, for routine check on [facility] staff.    Screening Studies  Colonoscopy: Last test was ten years ago and showed normal findings per pt.    Fam Hx:   malignancies: No    kidney stones: No     Soc Hx:  No tobacco use  Lives in [facility]    Allergies:  Patient has no known allergies.    Medications: reviewed   Anticoagulation: Yes - Ecotrin 81 mg one tablet daily    Objective:     Vitals:    04/02/18 1345   BP: (!) 151/73   Pulse: 109   Resp: 18   Temp: 98.3 °F (36.8 °C)     General:WDWN in NAD  Eyes: PERRLA, normal conjunctiva  Respiratory: no increased work on breathing, clear to auscultation  Cardiovascular: regular rate and rhythm. No obvious extremity edema.  GI: palpation of masses. No tenderness. No hepatosplenomegaly to palpation.  Musculoskeletal: normal range of motion of bilateral upper extremities. Normal  muscle strength and tone.  Skin: no obvious rashes or lesions. No tightening of skin noted.  Neurologic: CN grossly normal. Normal sensation.   Psychiatric: awake, alert and oriented x 3. Mood and affect normal. Cooperative.    :  Inspection of anus and perineum normal  No scrotal rashes, cysts or lesions  Epididymis normal in size, no tenderness  Testes normal and size, equal size bilaterally, no masses  Urethral meatus normal without discharge  Penis is not circumcised, able to retract foreskin with no problems   SHIRLEY: +Enlarged prostate gland without masses, tenderness. SV not palpable. Normal sphincter tone. +Outer hemhorroids.  No bilateral inguinal hernias noted     PVR by bladder scan performed by me today: 21 mL*    LABS REVIEW:  UA today:  In reviewing past UA's/Micro he has hx of recurrent blood within his urine.  Color:Clear, Yellow  Spec. Grav.  1.020  PH  5.0  Trace of Blood    Cr:   Lab Results   Component Value Date    CREATININE 1.4 03/26/2018     Assessment:       1. Nocturia    2. Elevated PSA, less than 10 ng/ml    3. Benign prostatic hyperplasia with nocturia          Plan:   BPH with LUTS, micro hematuria:    1.  Discontinue the Flomax today.  2.  Uroxatral 10 mg one tablet daily prescribed to pt.  3.  UA Microscopic to be performed.  If shows 3 or more RBCs will do workup with CT Urogram and further testing if necessary.    F/u with me in 3-4 weeks for recheck of meds and repeat PSA level-total and free.    [facility]: Declined    Kaur López, FNP-C

## 2018-04-02 NOTE — PATIENT INSTRUCTIONS
BPH (Enlarged Prostate)  The prostate is a gland at the base of the bladder. As some men get older, the prostate may get bigger in size. This problem is called benign prostatic hyperplasia (BPH). BPH puts pressure on the urethra. This is the tube that carries urine from the bladder to the penis. It may interfere with the flow of urine. It may also keep the bladder from emptying fully.    Symptoms of BPH include trouble starting urination and feeling as though the bladder isnt emptying all the way. It also includes a weak urine stream, dribbling and leaking of urine, and frequent and urgent urination (especially at night). BPH can increase the risk of urinary infections. It can also block off urine flow completely. If this occurs, a thin tube (catheter) may be passed into the bladder to help drain urine.  If symptoms are mild, no treatment may be needed right now. If symptoms are more severe, treatment is likely needed. The goal of treatment is to improve urine flow and reduce symptoms. Treatments can include medicine and procedures. Your healthcare provider will discuss treatment options with you as needed.  Home care  The following guidelines will help you care for yourself at home:  · Urinate as soon as you feel the urge. Don't try to hold your urine.  · Don't limit your fluid intake during the day. Drink 6 to 8 glasses of water or liquids a day. This prevents bacteria from building up in the bladder.  · Avoid drinking fluids after dinner to help reduce urination during the night.  · Avoid medicines that can worsen your symptoms. These include certain cold and allergy medicines and antidepressants. Diuretics used for high blood pressure can also worsen symptoms. Talk to your doctor about the medicines you take. Other choices may work better for you.  Prostate cancer screening  BPH does not increase the risk of prostate cancer. But because prostate cancer is a common cancer in men, screening is sometimes  recommended. This may help detect the cancer in its early stages when treatment is most effective. Factors that can increase the risk of prostate cancer include being -American or having a father or brother who had prostate cancer. A high-fat diet may also increase the risk of prostate cancer. Talk to your healthcare provider to see whether you should be screened for prostate cancer.  Follow-up care  Follow up with your healthcare provider, or as advised  To learn more, go to:  · National Kidney & Urologic Diseases Information Clearinghouse  kidney.niddk.nih.gov, 460.753.2556  When to seek medical advice  Call your healthcare provider right away if any of these occur:  · Fever of 100.4°F (38.0°C) or higher, or as advised  · Unable to pass urine for 8 hours  · Increasing pressure or pain in your bladder (lower abdomen)  · Blood in the urine  · Increasing low back pain, not related to injury  · Symptoms of urinary infection (increased urge to urinate, burning when passing urine, foul-smelling urine)  Date Last Reviewed: 7/1/2016 © 2000-2017 [employer]. [address] [place] [address]. All rights reserved. This information is not intended as a substitute for professional medical care. Always follow your healthcare professional's instructions.        Prostate-Specific Antigen (PSA)  Does this test have other names?  PSA  What is this test?  This test measures the level of prostate-specific antigen (PSA) in your blood.  The cells of the prostate gland make the protein called PSA. Men normally have low levels of PSA. If your PSA levels start to rise, it could mean you have prostate cancer, benign prostate conditions, inflammation, or an infection.  PSA testing is controversial because the U.S. Preventative Services Task Force discourages men who don't have any symptoms of prostate cancer from being screened. The task force says that PSA test results can lead to treating small cancers that  would never become life-threatening.  But the American Cancer Society believes men should be told the risks and benefits of PSA testing and allowed to make their own decision about if and when to be screened.  The American Urologic Society says that PSA screening is most important between the ages of 55 and 69. If you have a brother or father with prostate cancer or you are , you should start testing at age 40.   Why do I need this test?  You may have this test if you are 50 or older and your healthcare provider wants to screen you for prostate cancer. Some providers recommend screening at age 40 or 45 if you have a family history of prostate cancer or other risk factors.  You may also have this test if you have already been diagnosed with prostate cancer, so that your healthcare provider can monitor your treatment and see whether your cancer has come back.  What other tests might I have along with this test?  Your healthcare provider may also do a digital rectal exam (SHIRLEY). A SHIRLEY is a physical exam of the prostate, not a lab test. For the exam, your provider will place a gloved finger in your rectum and feel the prostate to check for any bumps or abnormal areas. The FDA recommends that a SHIRLEY be done along with a PSA test.  What do my test results mean?  Many things may affect your lab test results. These include the method each lab uses to do the test. Even if your test results are different from the normal value, you may not have a problem. To learn what the results mean for you, talk with your healthcare provider.  Results are given in nanograms per milliliter ng/mL. Normal results are below 4.0 ng/mL. A rising PSA may mean that you have cancer. But the PSA results alone won't tell your healthcare provider whether it's cancer or a benign prostate condition. If your provider suspects cancer, he or she will likely suggest that you have a biopsy of the prostate to make the diagnosis.  How is this  test done?  The test requires a blood sample, which is drawn through a needle from a vein in your arm.  Does this test pose any risks?  Taking a blood sample with a needle carries risks that include bleeding, infection, bruising, or feeling dizzy. When the needle pricks your arm, you may feel a slight stinging sensation or pain. Afterward, the site may be slightly sore.  What might affect my test results?  An infection can affect your results, as can certain medicines. Riding a bicycle and ejaculation may also affect your results.  How do I get ready for this test?  You may need to abstain from sex and not ride a bicycle within 1 to 2 days of the test. In addition, be sure your healthcare provider knows about all medicines, herbs, vitamins, and supplements you are taking. This includes medicines that don't need a prescription and any illicit drugs you may use.     © 2900-5811 [employer]. [address] [place] [address], [place] [address]. All rights reserved. This information is not intended as a substitute for professional medical care. Always follow your healthcare professional's instructions.

## 2018-04-02 NOTE — LETTER
April 2, 2018      Opal Lynn PA-C  [address]  [facility] [address]           [place] [facility]  [address] [place] [address] [address]  [place] [address]  Phone: [phone]  Fax: [phone]          Patient: Rajendra Castle   MR Number: [id number]   YOB: 1945   Date of Visit: 4/2/2018       Dear Opal Lynn:    Thank you for referring Rajendra Castle to me for evaluation. Attached you will find relevant portions of my assessment and plan of care.    If you have questions, please do not hesitate to call me. I look forward to following Rajendra Castle along with you.    Sincerely,    Kaur López, [facility]    Enclosure  CC:  No Recipients    If you would like to receive this communication electronically, please contact [email or web address] or [phone] to request more information on [employer] Link access.    For providers and/or their staff who would like to refer a patient to [facility], please contact us through our one-stop-shop provider referral line, [facility] , at [phone].    If you feel you have received this communication in error or would no longer like to receive these types of communications, please e-mail [email or web address]

## 2018-04-10 ENCOUNTER — OFFICE VISIT (OUTPATIENT)
Dept: FAMILY MEDICINE | Facility: CLINIC | Age: 73
End: 2018-04-10
Payer: MEDICARE

## 2018-04-10 ENCOUNTER — TELEPHONE (OUTPATIENT)
Dept: ENDOCRINOLOGY | Facility: CLINIC | Age: 73
End: 2018-04-10

## 2018-04-10 VITALS
OXYGEN SATURATION: 95 % | BODY MASS INDEX: 23.08 KG/M2 | DIASTOLIC BLOOD PRESSURE: 60 MMHG | WEIGHT: 152.31 LBS | SYSTOLIC BLOOD PRESSURE: 140 MMHG | HEIGHT: 68 IN | HEART RATE: 98 BPM | RESPIRATION RATE: 14 BRPM

## 2018-04-10 DIAGNOSIS — M06.9 RHEUMATOID ARTHRITIS INVOLVING MULTIPLE SITES, UNSPECIFIED RHEUMATOID FACTOR PRESENCE: ICD-10-CM

## 2018-04-10 DIAGNOSIS — I10 ESSENTIAL HYPERTENSION: ICD-10-CM

## 2018-04-10 DIAGNOSIS — E21.3 HYPERPARATHYROIDISM: Primary | ICD-10-CM

## 2018-04-10 PROCEDURE — 99999 PR PBB SHADOW E&M-EST. PATIENT-LVL V: CPT | Mod: PBBFAC,,, | Performed by: PHYSICIAN ASSISTANT

## 2018-04-10 PROCEDURE — 3078F DIAST BP <80 MM HG: CPT | Mod: CPTII,S$GLB,, | Performed by: PHYSICIAN ASSISTANT

## 2018-04-10 PROCEDURE — 99214 OFFICE O/P EST MOD 30 MIN: CPT | Mod: S$GLB,,, | Performed by: PHYSICIAN ASSISTANT

## 2018-04-10 PROCEDURE — 3077F SYST BP >= 140 MM HG: CPT | Mod: CPTII,S$GLB,, | Performed by: PHYSICIAN ASSISTANT

## 2018-04-10 RX ORDER — AMLODIPINE BESYLATE 2.5 MG/1
2.5 TABLET ORAL DAILY
Qty: 90 TABLET | Refills: 0 | Status: SHIPPED | OUTPATIENT
Start: 2018-04-10 | End: 2018-07-11 | Stop reason: SDUPTHER

## 2018-04-10 NOTE — TELEPHONE ENCOUNTER
Dr. Lopez, pt had labs and US done in March but did not schedule f/u w/ you.  He is scheduled to see Dr. Murphy in July.  Please review results and advise.

## 2018-04-10 NOTE — TELEPHONE ENCOUNTER
----- Message from Virgie Ballard sent at 4/10/2018  3:33 PM CDT -----  Pt needs an appt for Hyperparathyroidism  Pt has seen the dr before but I could not book an appt thru epic  Please call him @ [phone]  Thanks !

## 2018-04-10 NOTE — PATIENT INSTRUCTIONS
Established High Blood Pressure    High blood pressure (hypertension) is a chronic disease. Often, healthcare providers dont know what causes it. But it can be caused by certain health conditions and medicines.  If you have high blood pressure, you may not have any symptoms. If you do have symptoms, they may include headache, dizziness, changes in your vision, chest pain, and shortness of breath. But even without symptoms, high blood pressure thats not treated raises your risk for heart attack and stroke. High blood pressure is a serious health risk and shouldnt be ignored.  A blood pressure reading is made up of two numbers: a higher number over a lower number. The top number is the systolic pressure. The bottom number is the diastolic pressure. A normal blood pressure is a systolic pressure of  less than 120 over a diastolic pressure of less than 80. You will see your blood pressure readings written together. For example, a person with a systolic pressure of 188 and a diastolic pressure of 78 will have 118/78 written in the medical record.  High blood pressure is when either the top number is 140 or higher, or the bottom number is 90 or higher. This must be the result when taking your blood pressure a number of times. The blood pressures between normal and high are called prehypertension.  Home care  If you have high blood pressure, you should do what is listed below to lower your blood pressure. If you are taking medicines for high blood pressure, these methods may reduce or end your need for medicines in the future.  · Begin a weight-loss program if you are overweight.  · Cut back on how much salt you get in your diet. Heres how to do this:  ¨ Dont eat foods that have a lot of salt. These include olives, pickles, smoked meats, and salted potato chips.  ¨ Dont add salt to your food at the table.  ¨ Use only small amounts of salt when cooking.  · Start an exercise program. Talk with your healthcare  provider about the type of exercise program that would be best for you. It doesn't have to be hard. Even brisk walking for 20 minutes 3 times a week is a good form of exercise.  · Dont take medicines that stimulate the heart. This includes many over-the-counter cold and sinus decongestant pills and sprays, as well as diet pills. Check the warnings about hypertension on the label. Before buying any over-the-counter medicines or supplements, always ask the pharmacist about the product's potential interaction with your high blood pressure and your high blood pressure medicines.  · Stimulants such as amphetamine or cocaine could be deadly for someone with high blood pressure. Never take these.  · Limit how much caffeine you get in your diet. Switch to caffeine-free products.  · Stop smoking. If you are a long-time smoker, this can be hard. Talk to your healthcare provider about medicines and nicotine replacement options to help you. Also, enroll in a stop-smoking program to make it more likely that you will quit for good.  · Learn how to handle stress. This is an important part of any program to lower blood pressure. Learn about relaxation methods like meditation, yoga, or biofeedback.  · If your provider prescribed medicines, take them exactly as directed. Missing doses may cause your blood pressure get out of control.  · If you miss a dose or doses, check with your healthcare provider or pharmacist about what to do.  · Consider buying an automatic blood pressure machine. Ask your provider for a recommendation. You can get one of these at most pharmacies.     The American Heart Association recommends the following guidelines for home blood pressure monitoring:  · Don't smoke or drink coffee for 30 minutes before taking your blood pressure.  · Go to the bathroom before the test.  · Relax for 5 minutes before taking the measurement.  · Sit with your back supported (don't sit on a couch or soft chair); keep your feet on  the floor uncrossed. Place your arm on a solid flat surface (like a table) with the upper part of the arm at heart level. Place the middle of the cuff directly above the eye of the elbow. Check the monitor's instruction manual for an illustration.  · Take multiple readings. When you measure, take 2 to 3 readings one minute apart and record all of the results.  · Take your blood pressure at the same time every day, or as your healthcare provider recommends.  · Record the date, time, and blood pressure reading.  · Take the record with you to your next medical appointment. If your blood pressure monitor has a built-in memory, simply take the monitor with you to your next appointment.  · Call your provider if you have several high readings. Don't be frightened by a single high blood pressure reading, but if you get several high readings, check in with your healthcare provider.  · Note: When blood pressure reaches a systolic (top number) of 180 or higher OR diastolic (bottom number) of 110 or higher, seek emergency medical treatment.  Follow-up care  You will need to see your healthcare provider regularly. This is to check your blood pressure and to make changes to your medicines. Make a follow-up appointment as directed. Bring the record of your home blood pressure readings to the appointment.  When to seek medical advice  Call your healthcare provider right away if any of these occur:  · Blood pressure reaches a systolic (upper number) of 180 or higher OR a diastolic (bottom number) of 110 or higher  · Chest pain or shortness of breath  · Severe headache  · Throbbing or rushing sound in the ears  · Nosebleed  · Sudden severe pain in your belly (abdomen)  · Extreme drowsiness, confusion, or fainting  · Dizziness or spinning sensation (vertigo)  · Weakness of an arm or leg or one side of the face  · You have problems speaking or seeing   Date Last Reviewed: 12/1/2016  © 9719-2433 [employer]. [address] [place] [address]  [place] [address]. All rights reserved. This information is not intended as a substitute for professional medical care. Always follow your healthcare professional's instructions.

## 2018-04-10 NOTE — PROGRESS NOTES
Subjective:       Patient ID: Rajendra Castle is a 72 y.o. male.    Chief Complaint: Follow-up (4wk f/u)    Mr. Castle comes to clinic today for follow up of HTN. The patient is taking coreg 25mg twice daily. The patient had labs completed that revealed a mildly elevated PSA; the patient is symptomatic of BPH. He has followed up with the urology NP. He has started uroxatral; he reports he thinks his symptoms have improved slightly. The patient also had elevated calcium and PTH in recent labs. The patient is followed by Dr. Lopez for hyperthyroidism.  The patient has no complaints today and reports overall he is feeling well.  The patient continues to follow up with Dr. Garcia, rheumatologist, and Dr. Ballard, nephrology.       Review of Systems   Constitutional: Negative for activity change, appetite change and fever.   HENT: Negative for postnasal drip, rhinorrhea and sinus pressure.    Eyes: Negative for visual disturbance.   Respiratory: Negative for cough and shortness of breath.    Cardiovascular: Negative for chest pain.   Gastrointestinal: Negative for abdominal distention and abdominal pain.   Genitourinary: Negative for difficulty urinating and dysuria.   Musculoskeletal: Negative for arthralgias and myalgias.   Neurological: Negative for headaches.   Hematological: Negative for adenopathy.   Psychiatric/Behavioral: The patient is not nervous/anxious.        Objective:      Physical Exam   Constitutional: He is oriented to person, place, and time.   HENT:   Mouth/Throat: Oropharynx is clear and moist. No oropharyngeal exudate.   Eyes: Conjunctivae are normal. Pupils are equal, round, and reactive to light.   Cardiovascular: Normal rate and regular rhythm.    Pulmonary/Chest: Effort normal and breath sounds normal. He has no wheezes.   Abdominal: Soft. Bowel sounds are normal. There is no tenderness.   Musculoskeletal: He exhibits no edema.   Lymphadenopathy:     He has no cervical adenopathy.   Neurological:  He is alert and oriented to person, place, and time.   Skin: No erythema.   Psychiatric: His behavior is normal.       Assessment:       1. Hyperparathyroidism    2. Rheumatoid arthritis involving multiple sites, unspecified rheumatoid factor presence    3. Essential hypertension        Plan:   Rajendra was seen today for follow-up.    Diagnoses and all orders for this visit:    Hyperparathyroidism  -     Ambulatory referral to Endocrinology    Rheumatoid arthritis involving multiple sites, unspecified rheumatoid factor presence  Continue follow up with Dr. Garcia  Essential hypertension  -     amLODIPine (NORVASC) 2.5 MG tablet; Take 1 tablet (2.5 mg total) by mouth once daily.  Continue coreg  Follow up as scheduled with Dr. Aceves for recheck of blood pressure.

## 2018-04-11 NOTE — TELEPHONE ENCOUNTER
I sent him letter   Please resend them if he did not get them     All the work up looks unchanged     Advise to continue current management with monitoring

## 2018-04-19 ENCOUNTER — OFFICE VISIT (OUTPATIENT)
Dept: RHEUMATOLOGY | Facility: CLINIC | Age: 73
End: 2018-04-19
Payer: MEDICARE

## 2018-04-19 VITALS
SYSTOLIC BLOOD PRESSURE: 144 MMHG | BODY MASS INDEX: 22.99 KG/M2 | WEIGHT: 151.19 LBS | DIASTOLIC BLOOD PRESSURE: 68 MMHG

## 2018-04-19 DIAGNOSIS — M05.79 RHEUMATOID ARTHRITIS INVOLVING MULTIPLE SITES WITH POSITIVE RHEUMATOID FACTOR: Primary | ICD-10-CM

## 2018-04-19 DIAGNOSIS — N18.30 STAGE 3 CHRONIC KIDNEY DISEASE: ICD-10-CM

## 2018-04-19 DIAGNOSIS — R06.09 DYSPNEA ON EXERTION: ICD-10-CM

## 2018-04-19 DIAGNOSIS — Z79.899 ENCOUNTER FOR LONG-TERM (CURRENT) DRUG USE: ICD-10-CM

## 2018-04-19 PROCEDURE — 99213 OFFICE O/P EST LOW 20 MIN: CPT | Mod: ,,, | Performed by: INTERNAL MEDICINE

## 2018-04-19 PROCEDURE — 3077F SYST BP >= 140 MM HG: CPT | Mod: ,,, | Performed by: INTERNAL MEDICINE

## 2018-04-19 PROCEDURE — 3078F DIAST BP <80 MM HG: CPT | Mod: ,,, | Performed by: INTERNAL MEDICINE

## 2018-04-19 RX ORDER — CARVEDILOL 12.5 MG/1
TABLET ORAL
Refills: 11 | COMMUNITY
Start: 2018-04-08 | End: 2018-05-30

## 2018-04-19 RX ORDER — ZOSTER VACCINE RECOMBINANT, ADJUVANTED 50 MCG/0.5
KIT INTRAMUSCULAR
Refills: 0 | COMMUNITY
Start: 2018-04-17 | End: 2018-07-06 | Stop reason: ALTCHOICE

## 2018-04-19 NOTE — PROGRESS NOTES
[facility] RHEUMATOLOGY           Follow-up visit      Subjective:       Patient ID:   NAME: Rajendra Castle : 1945     72 y.o. male    Referring Doc: No ref. provider found  Other Physicians:    Chief Complaint:  Rheumatoid Arthritis (Takes Vitamin D 50,000IU weekly, Methotrexate 2.5mg 6 Tabs Weekly, Prednisone 2.5mg BID, Tramadol 50mg 2 tabs BID PRN)      HPI/Interval History:   The patient is doing very well. He has no complaint of red, hot, and/or swollen joints.          ROS:   GEN:    no fever, night sweats or weight loss  SKIN:   no rashes , erythema, bruising, or swelling, no Raynauds, no photosensitivity  HEENT: no HAs, no changes in vision, no mouth ulcers, no sicca symptoms, no scalp tenderness, jaw claudication.  CV:      no CP, SOB, PND, DEY or orthopnea,no palpitations  PULM: no SOB, cough, hemoptysis, sputum or pleuritic pain  GI:      no abdominal pain, nausea, vomiting, constipation, diarrhea, melanotic stools, BRBPR, or hematemesis, no dysphagia, no GERD  :     no hematuria, dysuria  NEURO: no paresthesias, headaches, visual disturbances  MUSCULOSKELETAL:  No complaints of muscle or joint pain  PSYCH:   No insomnia, no significant depression, no anxiety    Medications:    Current Outpatient Prescriptions:     alendronate (FOSAMAX) 70 MG tablet, TAKE 1 TABLET BY MOUTH EVERY 7 DAYS, Disp: 12 tablet, Rfl: 3    alfuzosin (UROXATRAL) 10 mg Tb24, Take 1 tablet (10 mg total) by mouth after dinner., Disp: 30 tablet, Rfl: 12    amLODIPine (NORVASC) 2.5 MG tablet, Take 1 tablet (2.5 mg total) by mouth once daily., Disp: 90 tablet, Rfl: 0    aspirin (ECOTRIN) 81 MG EC tablet, Take 81 mg by mouth once daily.  , Disp: , Rfl:     carvedilol (COREG) 12.5 MG tablet, , Disp: , Rfl: 11    carvedilol (COREG) 25 MG tablet, Take 1 tablet (25 mg total) by mouth 2 (two) times daily with meals., Disp: 60 tablet, Rfl: 11    ergocalciferol (ERGOCALCIFEROL) 50,000 unit Cap, TAKE 1 TABLET BY MOUTH EVERY WEEK,  Disp: 12 capsule, Rfl: 0    escitalopram oxalate (LEXAPRO) 10 MG tablet, Take 10 mg by mouth once daily., Disp: , Rfl:     folic acid (FOLVITE) 1 MG tablet, Take 5 mg by mouth once daily., Disp: , Rfl:     methotrexate 2.5 MG Tab, TAKE 6 TABLETS BY MOUTH EVERY WEEK, Disp: 72 tablet, Rfl: 1    predniSONE (DELTASONE) 2.5 MG tablet, TAKE 1 TABLET BY MOUTH TWICE DAILY, Disp: 180 tablet, Rfl: 0    SHINGRIX, PF, 50 mcg/0.5 mL injection, INJECT UTD, Disp: , Rfl: 0    traMADol (ULTRAM) 50 mg tablet, TAKE 2 TABLETS BY MOUTH TWICE DAILY AS NEEDED FOR PAIN, Disp: 120 tablet, Rfl: 5      FAMILY HISTORY: negative for Connective Tissue Disease        Review of patient's allergies indicates:  No Known Allergies          Objective:     Vitals:  Blood pressure (!) 144/68, weight 68.6 kg (151 lb 3.2 oz).    Physical Examination:   GEN: wn/wd male in no apparent distress  SKIN: no rashes, no lesions, no sclerodactyly, no Raynaud's, no periungual erythema  HEAD: no alopecia, no scalp tenderness, no temporal artery tenderness or induration.  EYES: no pallor, no icterus, PERRLA  ENT:  no thrush, no mucosal dryness or ulcerations, adequate oral hygiene & dentition.  NECK: supple x 6, no masses, no thyromegaly, no lymphadenopathy.  CV:   S1 and S2 regular, no murmurs, gallop or rubs  CHEST: Normal respiratory effort;  normal breath sounds/no adventitious sounds. No signs of consolidation.  ABD: non-tender and non-distended; soft; normal bowel sounds; no rebound/guarding or tenderness. No hepatosplenomegaly.  Musculoskeletal:  No evidence of active inflammatory arthritis or of any progressive deformity  EXTREM: no clubbing, cyanosis or edema. normal pulses.  NEURO: grossly intact; motor/sensory WNL; AAOx3; no tremors  PSYCH:  normal mood, affect and behavior            Labs:   Lab Results   Component Value Date    WBC 8.3 12/14/2017    HGB 12.3 (L) 12/14/2017    HCT 38.7 12/14/2017    MCV 89.4 12/14/2017     12/14/2017    CMP@  Sodium   Date Value Ref Range Status   03/26/2018 138 136 - 145 mmol/L Final   04/20/2017 142 134 - 144 mmol/L      Potassium   Date Value Ref Range Status   03/26/2018 4.8 3.5 - 5.1 mmol/L Final     Chloride   Date Value Ref Range Status   03/26/2018 107 95 - 110 mmol/L Final   04/20/2017 107 98 - 110 mmol/L      CO2   Date Value Ref Range Status   03/26/2018 24 23 - 29 mmol/L Final     Glucose   Date Value Ref Range Status   03/26/2018 128 (H) 70 - 110 mg/dL Final   04/20/2017 119 (H) 70 - 99 mg/dL      BUN, Bld   Date Value Ref Range Status   03/26/2018 16 8 - 23 mg/dL Final     Creatinine   Date Value Ref Range Status   03/26/2018 1.4 0.5 - 1.4 mg/dL Final   04/20/2017 1.58 (H) 0.60 - 1.40 mg/dL      Calcium   Date Value Ref Range Status   03/26/2018 10.4 8.7 - 10.5 mg/dL Final   03/26/2018 10.4 8.7 - 10.5 mg/dL Final     Total Protein   Date Value Ref Range Status   03/26/2018 6.8 6.0 - 8.4 g/dL Final     Albumin   Date Value Ref Range Status   03/26/2018 3.3 (L) 3.5 - 5.2 g/dL Final   04/20/2017 4.2 3.1 - 4.7 g/dL      Total Bilirubin   Date Value Ref Range Status   03/26/2018 0.3 0.1 - 1.0 mg/dL Final     Comment:     For infants and newborns, interpretation of results should be based  on gestational age, weight and in agreement with clinical  observations.  Premature Infant recommended reference ranges:  Up to 24 hours.............<8.0 mg/dL  Up to 48 hours............<12.0 mg/dL  3-5 days..................<15.0 mg/dL  6-29 days.................<15.0 mg/dL       Alkaline Phosphatase   Date Value Ref Range Status   03/26/2018 48 (L) 55 - 135 U/L Final     AST   Date Value Ref Range Status   03/26/2018 12 10 - 40 U/L Final     ALT   Date Value Ref Range Status   03/26/2018 13 10 - 44 U/L Final     CPK   Date Value Ref Range Status   04/20/2017 48 18 - 170 IU/L      CRP   Date Value Ref Range Status   12/14/2017 1.4 <8.0 mg/L Final     Rheumatoid Factor   Date Value Ref Range Status   04/20/2017 633.9  (H) 0.0 - 13.9 IU/mL      Comment:     Performed at: [facility], [facility] [id number] [facility], [place], [place], [id number] Euceda MD, Phone:  [phone]     Uric Acid   Date Value Ref Range Status   09/20/2016 8.6 (H) 3.4 - 7.0 mg/dL Final         Radiology/Diagnostic Studies:    none    Assessment/Discussion/Plan:   72 y.o. male with seropositive rheumatoid arthritis-good control on methotrexate 15 mg weekly plus prednisone 2.5 mg twice daily      PLAN:  I will continue his medication without change. Routine blood testing was ordered. I have reminded him that he must not take any kind of NSAIDs, over-the-counter or otherwise, with the exception of his 81 mg aspirin.      RTC:  I will see him back in 4 months.      Electronically signed by Uriel Garcia MD

## 2018-04-25 ENCOUNTER — OFFICE VISIT (OUTPATIENT)
Dept: UROLOGY | Facility: CLINIC | Age: 73
End: 2018-04-25
Payer: MEDICARE

## 2018-04-25 ENCOUNTER — LAB VISIT (OUTPATIENT)
Dept: LAB | Facility: HOSPITAL | Age: 73
End: 2018-04-25
Attending: NURSE PRACTITIONER
Payer: MEDICARE

## 2018-04-25 VITALS
SYSTOLIC BLOOD PRESSURE: 156 MMHG | HEIGHT: 68 IN | TEMPERATURE: 98 F | WEIGHT: 152.13 LBS | HEART RATE: 87 BPM | DIASTOLIC BLOOD PRESSURE: 76 MMHG | BODY MASS INDEX: 23.05 KG/M2

## 2018-04-25 DIAGNOSIS — N40.1 BENIGN PROSTATIC HYPERPLASIA WITH NOCTURIA: Primary | ICD-10-CM

## 2018-04-25 DIAGNOSIS — N40.0 BPH WITH ELEVATED PSA: ICD-10-CM

## 2018-04-25 DIAGNOSIS — R35.1 BENIGN PROSTATIC HYPERPLASIA WITH NOCTURIA: Primary | ICD-10-CM

## 2018-04-25 DIAGNOSIS — R97.20 BPH WITH ELEVATED PSA: ICD-10-CM

## 2018-04-25 DIAGNOSIS — R35.1 BENIGN PROSTATIC HYPERPLASIA WITH NOCTURIA: ICD-10-CM

## 2018-04-25 DIAGNOSIS — N40.1 BENIGN PROSTATIC HYPERPLASIA WITH NOCTURIA: ICD-10-CM

## 2018-04-25 DIAGNOSIS — R39.12 WEAK URINE STREAM: ICD-10-CM

## 2018-04-25 LAB
BACTERIA #/AREA URNS HPF: NORMAL /HPF
BILIRUB SERPL-MCNC: NEGATIVE MG/DL
BLOOD URINE, POC: NORMAL
COLOR, POC UA: NORMAL
GLUCOSE UR QL STRIP: NORMAL
HYALINE CASTS #/AREA URNS LPF: 0 /LPF
KETONES UR QL STRIP: NEGATIVE
LEUKOCYTE ESTERASE URINE, POC: NEGATIVE
MICROSCOPIC COMMENT: NORMAL
NITRITE, POC UA: NEGATIVE
PH, POC UA: 6
POC RESIDUAL URINE VOLUME: 15 ML (ref 0–100)
PROTEIN, POC: NEGATIVE
RBC #/AREA URNS HPF: 0 /HPF (ref 0–4)
SPECIFIC GRAVITY, POC UA: 1.01
SQUAMOUS #/AREA URNS HPF: 0 /HPF
UROBILINOGEN, POC UA: NORMAL
WBC #/AREA URNS HPF: 1 /HPF (ref 0–5)

## 2018-04-25 PROCEDURE — 87086 URINE CULTURE/COLONY COUNT: CPT

## 2018-04-25 PROCEDURE — 3078F DIAST BP <80 MM HG: CPT | Mod: CPTII,S$GLB,, | Performed by: NURSE PRACTITIONER

## 2018-04-25 PROCEDURE — 81001 URINALYSIS AUTO W/SCOPE: CPT | Mod: S$GLB,,, | Performed by: NURSE PRACTITIONER

## 2018-04-25 PROCEDURE — 3077F SYST BP >= 140 MM HG: CPT | Mod: CPTII,S$GLB,, | Performed by: NURSE PRACTITIONER

## 2018-04-25 PROCEDURE — 51798 US URINE CAPACITY MEASURE: CPT | Mod: S$GLB,,, | Performed by: NURSE PRACTITIONER

## 2018-04-25 PROCEDURE — 36415 COLL VENOUS BLD VENIPUNCTURE: CPT | Mod: PO

## 2018-04-25 PROCEDURE — 81000 URINALYSIS NONAUTO W/SCOPE: CPT

## 2018-04-25 PROCEDURE — 99213 OFFICE O/P EST LOW 20 MIN: CPT | Mod: 25,S$GLB,, | Performed by: NURSE PRACTITIONER

## 2018-04-25 PROCEDURE — 84154 ASSAY OF PSA FREE: CPT

## 2018-04-25 PROCEDURE — 99999 PR PBB SHADOW E&M-EST. PATIENT-LVL V: CPT | Mod: PBBFAC,,, | Performed by: NURSE PRACTITIONER

## 2018-04-25 RX ORDER — TAMSULOSIN HYDROCHLORIDE 0.4 MG/1
CAPSULE ORAL
Refills: 11 | COMMUNITY
Start: 2018-04-20 | End: 2018-04-25 | Stop reason: ALTCHOICE

## 2018-04-25 NOTE — PATIENT INSTRUCTIONS
BPH (Enlarged Prostate)  The prostate is a gland at the base of the bladder. As some men get older, the prostate may get bigger in size. This problem is called benign prostatic hyperplasia (BPH). BPH puts pressure on the urethra. This is the tube that carries urine from the bladder to the penis. It may interfere with the flow of urine. It may also keep the bladder from emptying fully.    Symptoms of BPH include trouble starting urination and feeling as though the bladder isnt emptying all the way. It also includes a weak urine stream, dribbling and leaking of urine, and frequent and urgent urination (especially at night). BPH can increase the risk of urinary infections. It can also block off urine flow completely. If this occurs, a thin tube (catheter) may be passed into the bladder to help drain urine.  If symptoms are mild, no treatment may be needed right now. If symptoms are more severe, treatment is likely needed. The goal of treatment is to improve urine flow and reduce symptoms. Treatments can include medicine and procedures. Your healthcare provider will discuss treatment options with you as needed.  Home care  The following guidelines will help you care for yourself at home:  · Urinate as soon as you feel the urge. Don't try to hold your urine.  · Don't limit your fluid intake during the day. Drink 6 to 8 glasses of water or liquids a day. This prevents bacteria from building up in the bladder.  · Avoid drinking fluids after dinner to help reduce urination during the night.  · Avoid medicines that can worsen your symptoms. These include certain cold and allergy medicines and antidepressants. Diuretics used for high blood pressure can also worsen symptoms. Talk to your doctor about the medicines you take. Other choices may work better for you.  Prostate cancer screening  BPH does not increase the risk of prostate cancer. But because prostate cancer is a common cancer in men, screening is sometimes  recommended. This may help detect the cancer in its early stages when treatment is most effective. Factors that can increase the risk of prostate cancer include being -American or having a father or brother who had prostate cancer. A high-fat diet may also increase the risk of prostate cancer. Talk to your healthcare provider to see whether you should be screened for prostate cancer.  Follow-up care  Follow up with your healthcare provider, or as advised  To learn more, go to:  · National Kidney & Urologic Diseases Information Clearinghouse  kidney.niddk.nih.gov, 868.805.5034  When to seek medical advice  Call your healthcare provider right away if any of these occur:  · Fever of 100.4°F (38.0°C) or higher, or as advised  · Unable to pass urine for 8 hours  · Increasing pressure or pain in your bladder (lower abdomen)  · Blood in the urine  · Increasing low back pain, not related to injury  · Symptoms of urinary infection (increased urge to urinate, burning when passing urine, foul-smelling urine)  Date Last Reviewed: 7/1/2016  © 1450-3780 [employer]. [address] [place] [address]. All rights reserved. This information is not intended as a substitute for professional medical care. Always follow your healthcare professional's instructions.        Prostate Problems and Related Urinary Symptoms    Many men have problems with the prostate at some time in their lives. The prostate gland is part of the male reproductive system. Its located just below the bladder. The prostate surrounds the urethra (the tube that carries urine and semen out of the body). The main function of the prostate gland is to add fluid to the semen. When problems occur in the prostate, the bladder and urethra are often affected as well. The most common prostate problems are described below.  BPH  BPH (benign prostatic hyperplasia) develops when changing hormone levels cause the prostate to grow larger. This often  starts around age 50. Excess tissue can block the urethra, making it harder for urine to flow. The enlarged prostate can also press on the bladder, so you may need to urinate more often. Other symptoms include straining during urination, a weak urine stream, urinating more at night, incontinence, dribbling at the end of urination, and feeling that the bladder isnt emptying all the way. Note that BPH is not cancer and does not cause cancer.  How BPH affects the bladder  Pushing to urinate through a narrowed urethra can cause the bladder walls to thicken or stretch out of shape. A stretched bladder may have problems emptying all the way. If urine stays in the bladder longer than it should, you may develop an infection or bladder stones. Also, the kidneys cant drain properly into a bladder that doesnt empty completely. This can lead to kidney failure. Pressure from urine buildup can also cause leaking of urine (called overflow incontinence).  Other prostate problems  · Prostatitis is an infection or inflammation that causes the prostate to become painful and swollen. The swelling narrows the urethra and can block the bladder neck. Prostatitis can cause a burning sensation during urination. You may also feel pressure or pain in the genital area. In some cases, prostatitis can cause fever and chills, and can make you very sick.  · Cancer occurs when abnormal cells form a tumor (a lump of cells that grow uncontrolled). Some tumors can be felt during a physical exam, others cant. Prostate cancer often causes no symptoms at all, especially in its early stages. Prostate symptoms are more likely to be caused by a problem that is NOT cancer.   Date Last Reviewed: 1/1/2017 © 2000-2017 [employer]. [address] [place] [address], [place] [address]. All rights reserved. This information is not intended as a substitute for professional medical care. Always follow your healthcare professional's  instructions.        Alfuzosin extended-release tablets  What is this medicine?  ALFUZOSIN (al FYOO breana sin) is used to treat benign prostatic hyperplasia (BPH) in men. This is a condition that causes you to have an enlarged prostate. This medicine is not for use in women.  How should I use this medicine?  Take this medicine by mouth with a glass of water. Follow the directions on the prescription label. Take this medicine after the same meal every day. This medicine should be taken just after eating food. Do not take on an empty stomach. Swallow whole. Do not cut, crush or chew this medicine. Take your doses at regular intervals. Do not take your medicine more often than directed. Do not stop taking except on the advice of your doctor or health care professional.  Talk to your pediatrician regarding the use of this medicine in children. Special care may be needed.  What side effects may I notice from receiving this medicine?  Side effects that you should report to your doctor or health care professional as soon as possible:  · allergic reactions like skin rash, itching or hives, swelling of the face, lips, or tongue  · breathing problems  · fast, irregular heartbeat  · feeling faint or lightheaded, falls  · prolonged or painful erection  · swelling of ankles or legs  · unusually weak or tired  · yellowing of eyes or skin  Side effects that usually do not require medical attention (report to your doctor or health care professional if they continue or are bothersome):  · constipation or diarrhea  · difficulty sleeping  · headache  · nausea or upset stomach  What may interact with this medicine?  Do not take this medicine with any of the following medications:  · certain medicines for fungal infections like fluconazole, itraconazole, ketoconazole, posaconazole, voriconazole  · cisapride  · dofetilide  · dronedarone  · droperidol  · levomethadyl  · other medicines for prostate  problems  · pimozide  · ritonavir  · thioridazine  · ziprasidone  This medicine may also interact with the following medications:  · cimetidine  · certain medicines for chest pain or blood pressure  · other medicines that prolong the QT interval (cause an abnormal heart rhythm)  · sildenafil  · tadalafil  · vardenafil  What if I miss a dose?  If you miss a dose, take it as soon as you can. If it is almost time for your next dose, take only that dose. Do not take double or extra doses.  Where should I keep my medicine?  Keep out of the reach of children.  Store at room temperature between 15 and 30 degrees C (59 and 86 degrees F). Protect from light and moisture. Throw away any unused medicine after the expiration date.  What should I tell my health care provider before I take this medicine?  They need to know if you have any of the following conditions:  · kidney or liver disease  · low blood pressure  · an unusual or allergic reaction to alfuzosin, other medicines, foods, dyes, or preservatives  What should I watch for while using this medicine?  Visit your doctor or health care professional for regular checks on your progress. Check your blood pressure regularly. Ask your doctor or health care professional what your blood pressure should be and when you should contact him or her.  Drowsiness and dizziness are more likely to occur after the first dose, after an increase in dose, or during hot weather or exercise. These effects can decrease once your body adjusts to this medicine. Do not drive, use machinery, or do anything that needs mental alertness until you know how this drug affects you. Do not stand or sit up quickly, especially if you are an older patient. This reduces the risk of dizzy or fainting spells. Alcohol can make you more drowsy and dizzy. Avoid alcoholic drinks.  Contact your doctor or health care professional right away if you have an erection that lasts longer than 4 hours or if it becomes  painful. This may be a sign of a serious problem and must be treated right away to prevent permanent damage.  NOTE:This sheet is a summary. It may not cover all possible information. If you have questions about this medicine, talk to your doctor, pharmacist, or health care provider. Copyright© 2017 [employer]

## 2018-04-25 NOTE — PROGRESS NOTES
"[facility] Note  Staff: NOEL MccarthyC    PCP: Dr. Irvin Aceves    Chief Complaint: Follow-up: BPH with LUTS, elevated PSA level.    Subjective:        HPI: Rajendra Castle is a 72 y.o. male presents today for routine recheck.  Pt was seen by me as a new patient on 04/02/18 for increased nocturia symptoms and urinary hesitancy for the past six months.  During last ov, we discontinued his Flomax, (was recently started on Tamsulosin 0.4 mg one tablet daily by PCP on 03/16/2018 but the Flomax was not improving his LUTS)  and prescribed the pt Uroxatral 10 mg one tablet daily to see if he would have any improvement in his symptoms.      TODAY, pt states that he is unsure whether he stopped the Tamsulosin and started the Uroxatral or is currently taking both at this time.  Pt does state he believes he did get the Uroxatral filled after last office visit.  Pt is still having same symptoms as last ov, so highly unlikely if he even started the new medication-Uroxatral!    MEDICAL HISTORY INCLUDES:  Rheumatoid arthritis-sees Dr. Garcia   *Chronic kidney disease, Stage 3-monitored by Dr. Ballard (Nephrologist)  Anxiety, Depression  Hypertension     Urology Questions addressed to pt during FIRST OV (04/02/18):  Urgency: None, urge incontinence? None  Pt feels that he empties his bladder with no problems.  NTF: 3x night while on Flomax daily w/ no improvements,   DTF: None  Dysuria: No  Gross Hematuria:No  Straining:No, Hesistancy:Yes - "sometimes", Intermittency:No, Weak stream:Yes - has improved on Flomax, otherwise the stream "varies" in weakness.  STDs in past: Yes - as a teenager, unknown name of STD  Vasectomy: None     Constipation issues: None  Caffeine Intake: cup of coffee daily, rest of day he drinks a lot of milk and juices.    Last PSA Screening:   Lab Results   Component Value Date    PSA 4.8 (H) 03/16/2018    PSA 2.1 10/03/2013    PSA 2.73 08/06/2012    PSADIAG 3.4 10/14/2015    " PSADIAG 4.1 (H) 04/06/2015     REVIEW OF SYSTEMS:  Review of Systems   Constitutional: Negative for chills, diaphoresis, fever and weight loss.   HENT: Negative for congestion, hearing loss, nosebleeds and sore throat.    Eyes: Negative for blurred vision and pain.   Respiratory: Negative for cough and wheezing.    Cardiovascular: Negative for chest pain, palpitations and leg swelling.        Hypertension   Gastrointestinal: Negative for abdominal pain, heartburn, nausea and vomiting.   Genitourinary: Positive for frequency and urgency. Negative for dysuria, flank pain and hematuria.        Nocturia 3 or greater nightly.  Weak urine stream.     Musculoskeletal: Negative for back pain, joint pain, myalgias and neck pain.   Skin: Negative for itching and rash.   Neurological: Negative for dizziness, tremors, sensory change, seizures, loss of consciousness, weakness and headaches.   Endo/Heme/Allergies: Does not bruise/bleed easily.   Psychiatric/Behavioral: Negative for depression and suicidal ideas. The patient is not nervous/anxious.      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 None    PSHx:  History reviewed. No pertinent surgical history.    Fam Hx:   malignancies: No    kidney stones: No     Allergies:  Patient has no known allergies.    Medications: reviewed   Anticoagulation: Yes - ASA 81 mg one tablet daily    Objective:     Vitals:    04/25/18 1357   BP: (!) 156/76   Pulse: 87   Temp: 98.4 °F (36.9 °C)     General:WDWN in NAD  Eyes: PERRLA, normal conjunctiva  Respiratory: no increased work on breathing, clear to auscultation  Cardiovascular: regular rate and rhythm. No obvious extremity edema.  GI: palpation of  "masses. No tenderness. No hepatosplenomegaly to palpation.  Musculoskeletal: normal range of motion of bilateral upper extremities. Normal muscle strength and tone.  Skin: no obvious rashes or lesions. No tightening of skin noted.  Neurologic: CN grossly normal. Normal sensation.   Psychiatric: awake, alert and oriented x 3. Mood and affect normal. Cooperative.     Exam: PERFORMED AT INITIAL OV WITH ME 04/02/18.  PVR by bladder scan performed by MA today: 15 mL*    LABS REVIEW:  UA today:  Trace of Blood in urine dip today.    Cr:   Lab Results   Component Value Date    CREATININE 1.4 03/26/2018       Assessment:       1. Benign prostatic hyperplasia with nocturia    2. BPH with elevated PSA    3. Weak urine stream          Plan:   BPH with LUTS and elevated PSA level:    1.  Micro UA and culture to be performed.  2.  PSA level total and free to be drawn.    *I have spent at least 30-45 minutes with this patient today and more than half of that time was spent on educating pt on medication compliance, knowing what meds he is currently taking and what he is taking them for.  AVS was given to pt after visit today along with WRITTEN INSTRUCTIONS by me on what medications to discontinue and what meds to keep taking on a daily basis that are UROLOGY related.  Pt verbalized understanding after review in office.  Pt will go home and review all of his current medications for accuracy and efficacy.    Information and brochures sent home with the patient on BPH, PSA lab tests, reasoning behind testing and evaluating for enlarged prostate and prostate cancer.  Prostate Ultrasound/biopies, cystoscopy pamphlets given to pt today with all questions answered.    F/u with Urologist for further evaluation of symptoms once we review repeat PSA level with therapeutic failure of "2" medications at this time if they have been taken as previously instructed.  Pt may eventually need TRUS/CYSTO if symptoms do not improve.  Pt verbalized " understanding.    [facility]: Sonal López, MARILEE-C

## 2018-04-26 LAB
BACTERIA UR CULT: NO GROWTH
PROSTATE SPECIFIC ANTIGEN, TOTAL: 2.9 NG/ML
PSA FREE MFR SERPL: 11.72 %
PSA FREE SERPL-MCNC: 0.34 NG/ML

## 2018-05-16 ENCOUNTER — OFFICE VISIT (OUTPATIENT)
Dept: UROLOGY | Facility: CLINIC | Age: 73
End: 2018-05-16
Payer: MEDICARE

## 2018-05-16 ENCOUNTER — OFFICE VISIT (OUTPATIENT)
Dept: FAMILY MEDICINE | Facility: CLINIC | Age: 73
End: 2018-05-16
Payer: MEDICARE

## 2018-05-16 VITALS
SYSTOLIC BLOOD PRESSURE: 146 MMHG | RESPIRATION RATE: 18 BRPM | BODY MASS INDEX: 23.05 KG/M2 | HEART RATE: 80 BPM | HEIGHT: 68 IN | WEIGHT: 152.13 LBS | DIASTOLIC BLOOD PRESSURE: 72 MMHG

## 2018-05-16 VITALS
BODY MASS INDEX: 23.05 KG/M2 | HEIGHT: 68 IN | OXYGEN SATURATION: 96 % | DIASTOLIC BLOOD PRESSURE: 71 MMHG | HEART RATE: 77 BPM | RESPIRATION RATE: 12 BRPM | SYSTOLIC BLOOD PRESSURE: 127 MMHG | TEMPERATURE: 98 F | WEIGHT: 152.13 LBS

## 2018-05-16 DIAGNOSIS — N40.1 BPH ASSOCIATED WITH NOCTURIA: Primary | ICD-10-CM

## 2018-05-16 DIAGNOSIS — N18.30 STAGE 3 CHRONIC KIDNEY DISEASE: ICD-10-CM

## 2018-05-16 DIAGNOSIS — M06.9 RHEUMATOID ARTHRITIS INVOLVING MULTIPLE SITES, UNSPECIFIED RHEUMATOID FACTOR PRESENCE: ICD-10-CM

## 2018-05-16 DIAGNOSIS — R35.1 BPH ASSOCIATED WITH NOCTURIA: Primary | ICD-10-CM

## 2018-05-16 DIAGNOSIS — R97.20 ELEVATED PSA: ICD-10-CM

## 2018-05-16 DIAGNOSIS — E21.3 HYPERPARATHYROIDISM: ICD-10-CM

## 2018-05-16 DIAGNOSIS — I10 ESSENTIAL HYPERTENSION: Primary | ICD-10-CM

## 2018-05-16 PROCEDURE — 3078F DIAST BP <80 MM HG: CPT | Mod: CPTII,S$GLB,, | Performed by: UROLOGY

## 2018-05-16 PROCEDURE — 99214 OFFICE O/P EST MOD 30 MIN: CPT | Mod: S$GLB,,, | Performed by: UROLOGY

## 2018-05-16 PROCEDURE — 99999 PR PBB SHADOW E&M-EST. PATIENT-LVL IV: CPT | Mod: PBBFAC,,, | Performed by: PHYSICIAN ASSISTANT

## 2018-05-16 PROCEDURE — 99999 PR PBB SHADOW E&M-EST. PATIENT-LVL III: CPT | Mod: PBBFAC,,, | Performed by: UROLOGY

## 2018-05-16 PROCEDURE — 99213 OFFICE O/P EST LOW 20 MIN: CPT | Mod: S$GLB,,, | Performed by: PHYSICIAN ASSISTANT

## 2018-05-16 PROCEDURE — 3078F DIAST BP <80 MM HG: CPT | Mod: CPTII,S$GLB,, | Performed by: PHYSICIAN ASSISTANT

## 2018-05-16 PROCEDURE — 3074F SYST BP LT 130 MM HG: CPT | Mod: CPTII,S$GLB,, | Performed by: UROLOGY

## 2018-05-16 PROCEDURE — 3074F SYST BP LT 130 MM HG: CPT | Mod: CPTII,S$GLB,, | Performed by: PHYSICIAN ASSISTANT

## 2018-05-16 RX ORDER — ALFUZOSIN HYDROCHLORIDE 10 MG/1
10 TABLET, EXTENDED RELEASE ORAL
Qty: 30 TABLET | Refills: 11 | Status: SHIPPED | OUTPATIENT
Start: 2018-05-16 | End: 2019-08-20 | Stop reason: SDUPTHER

## 2018-05-16 NOTE — PROGRESS NOTES
"[facility] Urology Progress Note    Rajendra Castle is a 72 y.o. male who presents for evaluation of LUTS and elevated psa    He was initially seen by NP Maribel on 4/2/18 for increased nocturia x3 and urinary hesitancy for six months, not improved by flomax which he had been started on by PCP 3/16/2018, so it was discontinued and he was started on uroxatral 10mg daily.   Last seen by YAZ López on 4/25/18 and was unsure whether he stopped the Tamsulosin and started the Uroxatral or was taking both. Still having same symptoms. PVR 15cc. Trc blood Udip with 0 rbc/hpf on micro.    PSA history as follows:  1.8 on 10/3/06  2.73 on 08/06/2012  2.1 on 10/03/2013  4.1 on 04/06/2015  3.4 on 10/14/2015  4.8 on 03/16/2018  2.9 (11.7% free) on 04/25/2018    He has been compliant with his IV Zosyn and his nocturia has decreased to 1 time per night on average.  He did keep a voiding diary for the last 3 days in for example woke up on Monday and voided without any urgency and felt MT. He drinks 1-2 glasses of water each morning.  He then voided again at 10:15 a.m., 5:40 p.m. with mild urgency, 10:15 p.m. without any urgency.  He did drink water at night before bed.    No family history of prostate cancer or genitourinary malignancy    Urinalysis dipstick is negative  PVR is 18 cc  AUA symptom score:  5/2 (1:  Frequency, intermittency, urgency, weak stream, sleeping) medications helped 5/10      ROS: A comprehensive 10 system review was performed and is negative except as noted above in HPI    PHYSICAL EXAM:    Vitals:    05/16/18 0949   BP: (!) 146/72   Pulse: 80   Resp: 18     Body mass index is 23.13 kg/m². Weight: 69 kg (152 lb 1.9 oz) Height: 5' 8" (172.7 cm)       General: Alert, cooperative, no distress, appears stated age   Head: Normocephalic, without obvious abnormality, atraumatic   Eyes: PERRL, conjunctiva/corneas clear   Lungs: Respirations unlabored   Heart: Warm and well perfused   Abdomen: soft nt nd  : normal phallus " without plaque or lesion, bilaterally descended normal testes, SHIRLEY:  30-35 g smooth benign  Extremities: Extremities normal, atraumatic, no cyanosis or edema   Skin: Skin color, texture, turgor normal, no rashes or lesions   Psych: Appropriate   Neurologic: Non-focal     ASSESSMENT   1. BPH associated with nocturia  alfuzosin (UROXATRAL) 10 mg Tb24   2. Elevated PSA  PSA, total and free       Plan    Currently his lower urinary tract symptoms are well controlled on alfuzosin.  He does have slight residual nocturia, now on average 1 time per night.  He does recount on his voiding diary, multiple glasses of water or beverages in the evening before bedtime.    I did  him on discontinuing fluids 2 hr before bedtime to help control or eliminate his nocturia in addition to his medical management.  In regards to his PSA, he has had recent fluctuation within age adjusted normal range.  His most recent PSA is quite normal for his age, though the free PSA percentage is low.  I had a long discussion with the patient regarding the natural history of cancer in men as well as when diagnostics are indicated. We also discussed differential for elevated psa which also includes benign enlargement and prostatitis.  We did discuss that an elevated PSA is considered a PSA greater than 4 because statistically 20% of people in this value range are found to have prostate cancer, however we also discussed a bit about PSA velocity and trends and age specific psa elevations. Given his PSA trend above and age 72, I did advise at this time continuing to monitor and repeating a free and total PSA in 6 months.  He can return at that time as well to re-evaluate lower urinary tract symptoms and perform a uroflow/PVR to assess for any obstructive urination pattern  Patient agreeable treatment plan, all questions answered.

## 2018-05-16 NOTE — PROGRESS NOTES
Subjective:       Patient ID: Rajendra Castle is a 72 y.o. male.    Chief Complaint: Follow-up (1mth f/u)    Mr. Castle comes to clinic today for follow up of HTN. His blood pressure is well controlled. He is feeling well. He recently had an appointment with Dr. Castellano; no changes to his medications were made. The patient is also followed by Dr. Ballard, nephrologist, and Dr. Murphy, endocrinologist.  He is also followed by rheumatologist, Dr. Garcia.       Review of Systems   Constitutional: Negative for activity change, appetite change and fever.   HENT: Negative for postnasal drip, rhinorrhea and sinus pressure.    Eyes: Negative for visual disturbance.   Respiratory: Negative for cough and shortness of breath.    Cardiovascular: Negative for chest pain.   Gastrointestinal: Negative for abdominal distention and abdominal pain.   Genitourinary: Negative for difficulty urinating and dysuria.   Musculoskeletal: Negative for arthralgias and myalgias.   Neurological: Negative for headaches.   Hematological: Negative for adenopathy.   Psychiatric/Behavioral: The patient is not nervous/anxious.        Objective:      Physical Exam   Constitutional: He is oriented to person, place, and time.   HENT:   Mouth/Throat: Oropharynx is clear and moist. No oropharyngeal exudate.   Eyes: Conjunctivae are normal. Pupils are equal, round, and reactive to light.   Cardiovascular: Normal rate and regular rhythm.    Pulmonary/Chest: Effort normal and breath sounds normal. He has no wheezes.   Abdominal: Soft. Bowel sounds are normal. There is no tenderness.   Musculoskeletal: He exhibits no edema.   Lymphadenopathy:     He has no cervical adenopathy.   Neurological: He is alert and oriented to person, place, and time.   Skin: No erythema.   Psychiatric: His behavior is normal.       Assessment:       1. Essential hypertension    2. Stage 3 chronic kidney disease    3. Rheumatoid arthritis involving multiple sites, unspecified rheumatoid  factor presence    4. Hyperparathyroidism        Plan:   Rajendra was seen today for follow-up.    Diagnoses and all orders for this visit:    Essential hypertension  Controlled  Low salt diet  Continue current medication  Stage 3 chronic kidney disease  Stable  Follow up with Dr Ballard  Rheumatoid arthritis involving multiple sites, unspecified rheumatoid factor presence  Stable  Follow up with Dr. Garcia  Hyperparathyroidism  Follow up with Dr. Murphy.

## 2018-05-22 DIAGNOSIS — M19.90 CHRONIC INFLAMMATORY ARTHRITIS: ICD-10-CM

## 2018-05-22 RX ORDER — FOLIC ACID 1 MG/1
1 TABLET ORAL DAILY
Qty: 90 TABLET | Refills: 3 | Status: SHIPPED | OUTPATIENT
Start: 2018-05-22 | End: 2019-05-18 | Stop reason: SDUPTHER

## 2018-05-23 ENCOUNTER — LAB VISIT (OUTPATIENT)
Dept: LAB | Facility: HOSPITAL | Age: 73
End: 2018-05-23
Attending: INTERNAL MEDICINE
Payer: MEDICARE

## 2018-05-23 DIAGNOSIS — N18.30 CHRONIC KIDNEY DISEASE, STAGE III (MODERATE): ICD-10-CM

## 2018-05-23 DIAGNOSIS — E21.3 HYPERPARATHYROIDISM: ICD-10-CM

## 2018-05-23 LAB
ALBUMIN SERPL BCP-MCNC: 3.4 G/DL
ANION GAP SERPL CALC-SCNC: 5 MMOL/L
BUN SERPL-MCNC: 17 MG/DL
CALCIUM SERPL-MCNC: 10.5 MG/DL
CHLORIDE SERPL-SCNC: 111 MMOL/L
CO2 SERPL-SCNC: 24 MMOL/L
CREAT SERPL-MCNC: 1.3 MG/DL
EST. GFR  (AFRICAN AMERICAN): >60 ML/MIN/1.73 M^2
EST. GFR  (NON AFRICAN AMERICAN): 55.3 ML/MIN/1.73 M^2
GLUCOSE SERPL-MCNC: 91 MG/DL
PHOSPHATE SERPL-MCNC: 2.3 MG/DL
POTASSIUM SERPL-SCNC: 4.5 MMOL/L
PTH-INTACT SERPL-MCNC: 337 PG/ML
SODIUM SERPL-SCNC: 140 MMOL/L

## 2018-05-23 PROCEDURE — 80069 RENAL FUNCTION PANEL: CPT

## 2018-05-23 PROCEDURE — 83970 ASSAY OF PARATHORMONE: CPT

## 2018-05-23 PROCEDURE — 36415 COLL VENOUS BLD VENIPUNCTURE: CPT | Mod: PO

## 2018-05-25 RX ORDER — ERGOCALCIFEROL 1.25 MG/1
CAPSULE ORAL
Qty: 12 CAPSULE | Refills: 0 | Status: SHIPPED | OUTPATIENT
Start: 2018-05-25 | End: 2018-08-15 | Stop reason: SDUPTHER

## 2018-05-30 ENCOUNTER — OFFICE VISIT (OUTPATIENT)
Dept: NEPHROLOGY | Facility: CLINIC | Age: 73
End: 2018-05-30
Payer: MEDICARE

## 2018-05-30 VITALS
DIASTOLIC BLOOD PRESSURE: 50 MMHG | BODY MASS INDEX: 23.52 KG/M2 | SYSTOLIC BLOOD PRESSURE: 138 MMHG | HEART RATE: 76 BPM | OXYGEN SATURATION: 98 % | WEIGHT: 154.69 LBS

## 2018-05-30 DIAGNOSIS — N18.30 CHRONIC KIDNEY DISEASE, STAGE III (MODERATE): Primary | ICD-10-CM

## 2018-05-30 DIAGNOSIS — I10 ESSENTIAL HYPERTENSION: ICD-10-CM

## 2018-05-30 DIAGNOSIS — N25.81 SECONDARY RENAL HYPERPARATHYROIDISM: ICD-10-CM

## 2018-05-30 PROCEDURE — 99499 UNLISTED E&M SERVICE: CPT | Mod: S$GLB,,, | Performed by: INTERNAL MEDICINE

## 2018-05-30 PROCEDURE — 99999 PR PBB SHADOW E&M-EST. PATIENT-LVL II: CPT | Mod: PBBFAC,,, | Performed by: INTERNAL MEDICINE

## 2018-05-30 PROCEDURE — 99214 OFFICE O/P EST MOD 30 MIN: CPT | Mod: S$GLB,,, | Performed by: INTERNAL MEDICINE

## 2018-05-30 PROCEDURE — 3078F DIAST BP <80 MM HG: CPT | Mod: CPTII,S$GLB,, | Performed by: INTERNAL MEDICINE

## 2018-05-30 PROCEDURE — 3075F SYST BP GE 130 - 139MM HG: CPT | Mod: CPTII,S$GLB,, | Performed by: INTERNAL MEDICINE

## 2018-05-30 NOTE — PROGRESS NOTES
Subjective:       Patient ID: Rajendra Castle is a 72 y.o. Black or  male who presents for return patient evaluation for chronic renal failure.    He has no uremic or urinary symptoms and is in his usual state of health.  There have been no recent illnesses, hospitalizations or procedures.        Review of Systems   Constitutional: Negative for appetite change, chills and fever.   Eyes: Negative for visual disturbance.   Respiratory: Negative for cough and shortness of breath.    Cardiovascular: Negative for chest pain and leg swelling.   Gastrointestinal: Negative for diarrhea, nausea and vomiting.   Genitourinary: Negative for difficulty urinating, dysuria, frequency and hematuria.   Musculoskeletal: Negative for myalgias.   Skin: Negative for rash.   Neurological: Negative for headaches.   Psychiatric/Behavioral: Negative for sleep disturbance.       The past medical, family and social histories were reviewed for this encounter.     BP (!) 138/50   Pulse 76   Wt 70.2 kg (154 lb 11.2 oz)   SpO2 98%   BMI 23.52 kg/m²     Objective:      Physical Exam   Constitutional: He is oriented to person, place, and time. He appears well-developed and well-nourished. No distress.   HENT:   Head: Normocephalic and atraumatic.   Eyes: Conjunctivae are normal.   Neck: Neck supple. No JVD present.   Cardiovascular: Normal rate, regular rhythm and normal heart sounds.  Exam reveals no gallop and no friction rub.    No murmur heard.  Pulmonary/Chest: Effort normal and breath sounds normal. No respiratory distress. He has no wheezes. He has no rales.   Abdominal: Soft. Bowel sounds are normal. He exhibits no distension. There is no tenderness.   Musculoskeletal: He exhibits no edema.   Neurological: He is alert and oriented to person, place, and time.   Skin: Skin is warm and dry. No rash noted.   Psychiatric: He has a normal mood and affect.   Vitals reviewed.      Assessment:       1. Chronic kidney disease, stage  III (moderate)    2. Essential hypertension    3. Secondary renal hyperparathyroidism        Plan:   Return to clinic in 6 months.  Labs for next visit include rp, pth.  Baseline creatinine is 1.4-1.6 since 2006.  PTH is 337 with a calcium of 10.5.  This prevents us from using vitamin D analogs.  Renal US shows R 9.3 cm, L 9.4 cm.  Ideally, I would simplify his medication regimen by increasing the coreg to 25 mg BID and stopping the lisinopril and isordil.  He apparently does not have systolic heart failure.  I will follow his function.  I must conclude that his mildly small kidneys are due to hypertensive nephrosclerosis.  I will send this note to Dr. Aceves to get his opinion.

## 2018-06-01 DIAGNOSIS — M19.90 CHRONIC INFLAMMATORY ARTHRITIS: ICD-10-CM

## 2018-06-02 RX ORDER — PREDNISONE 2.5 MG/1
TABLET ORAL
Qty: 180 TABLET | Refills: 0 | Status: SHIPPED | OUTPATIENT
Start: 2018-06-02 | End: 2018-08-14 | Stop reason: SDUPTHER

## 2018-07-06 ENCOUNTER — OFFICE VISIT (OUTPATIENT)
Dept: ENDOCRINOLOGY | Facility: CLINIC | Age: 73
End: 2018-07-06
Payer: MEDICARE

## 2018-07-06 VITALS
WEIGHT: 152.75 LBS | HEART RATE: 91 BPM | SYSTOLIC BLOOD PRESSURE: 150 MMHG | HEIGHT: 68 IN | DIASTOLIC BLOOD PRESSURE: 64 MMHG | BODY MASS INDEX: 23.15 KG/M2

## 2018-07-06 DIAGNOSIS — M81.0 OSTEOPOROSIS, UNSPECIFIED OSTEOPOROSIS TYPE, UNSPECIFIED PATHOLOGICAL FRACTURE PRESENCE: Primary | ICD-10-CM

## 2018-07-06 DIAGNOSIS — E83.52 HYPERCALCEMIA: ICD-10-CM

## 2018-07-06 DIAGNOSIS — R79.89 ELEVATED PARATHYROID HORMONE: ICD-10-CM

## 2018-07-06 DIAGNOSIS — E04.2 MULTINODULAR GOITER: ICD-10-CM

## 2018-07-06 PROCEDURE — 99214 OFFICE O/P EST MOD 30 MIN: CPT | Mod: S$GLB,,, | Performed by: INTERNAL MEDICINE

## 2018-07-06 PROCEDURE — 99999 PR PBB SHADOW E&M-EST. PATIENT-LVL IV: CPT | Mod: PBBFAC,,, | Performed by: INTERNAL MEDICINE

## 2018-07-06 PROCEDURE — 3077F SYST BP >= 140 MM HG: CPT | Mod: CPTII,S$GLB,, | Performed by: INTERNAL MEDICINE

## 2018-07-06 PROCEDURE — 3078F DIAST BP <80 MM HG: CPT | Mod: CPTII,S$GLB,, | Performed by: INTERNAL MEDICINE

## 2018-07-06 NOTE — LETTER
July 6, 2018      Opal Lynn PA-C  [address] [facility] [address]           [facility]  [address] [facility] [address] [place] [address]  Phone: [phone]  Fax: [phone]          Patient: Rajendra Castle   MR Number: [id number]   YOB: 1945   Date of Visit: 7/6/2018       Dear Opal Lynn:    Thank you for referring Rajendra Castle to me for evaluation. Attached you will find relevant portions of my assessment and plan of care.    If you have questions, please do not hesitate to call me. I look forward to following Rajendra Castle along with you.    Sincerely,    Simon Murphy,     Enclosure  CC:  No Recipients    If you would like to receive this communication electronically, please contact [email or web address] or [phone] to request more information on [employer] access.    For providers and/or their staff who would like to refer a patient to [facility], please contact us through our one-stop-shop provider referral line, Beatrice Plascencia, at [phone].    If you feel you have received this communication in error or would no longer like to receive these types of communications, please e-mail [email or web address]

## 2018-07-11 DIAGNOSIS — I10 ESSENTIAL HYPERTENSION: ICD-10-CM

## 2018-07-11 RX ORDER — AMLODIPINE BESYLATE 2.5 MG/1
TABLET ORAL
Qty: 90 TABLET | Refills: 0 | Status: SHIPPED | OUTPATIENT
Start: 2018-07-11 | End: 2018-10-07 | Stop reason: SDUPTHER

## 2018-08-06 DIAGNOSIS — M19.90 CHRONIC INFLAMMATORY ARTHRITIS: ICD-10-CM

## 2018-08-06 RX ORDER — METHOTREXATE 2.5 MG/1
TABLET ORAL
Qty: 72 TABLET | Refills: 0 | Status: SHIPPED | OUTPATIENT
Start: 2018-08-06 | End: 2018-10-29 | Stop reason: SDUPTHER

## 2018-08-10 ENCOUNTER — OFFICE VISIT (OUTPATIENT)
Dept: FAMILY MEDICINE | Facility: CLINIC | Age: 73
End: 2018-08-10
Payer: MEDICARE

## 2018-08-10 VITALS
SYSTOLIC BLOOD PRESSURE: 128 MMHG | HEART RATE: 69 BPM | BODY MASS INDEX: 23.39 KG/M2 | HEIGHT: 68 IN | DIASTOLIC BLOOD PRESSURE: 70 MMHG | WEIGHT: 154.31 LBS | TEMPERATURE: 98 F

## 2018-08-10 DIAGNOSIS — D49.1 NEOPLASM OF LUNG: ICD-10-CM

## 2018-08-10 DIAGNOSIS — E29.1 HYPOGONADISM IN MALE: ICD-10-CM

## 2018-08-10 DIAGNOSIS — N18.30 CKD (CHRONIC KIDNEY DISEASE) STAGE 3, GFR 30-59 ML/MIN: ICD-10-CM

## 2018-08-10 DIAGNOSIS — I10 ESSENTIAL HYPERTENSION: ICD-10-CM

## 2018-08-10 DIAGNOSIS — N40.0 BENIGN PROSTATIC HYPERPLASIA, UNSPECIFIED WHETHER LOWER URINARY TRACT SYMPTOMS PRESENT: ICD-10-CM

## 2018-08-10 DIAGNOSIS — Z12.9 SCREENING FOR CANCER: ICD-10-CM

## 2018-08-10 DIAGNOSIS — R91.1 LUNG NODULE: ICD-10-CM

## 2018-08-10 DIAGNOSIS — M81.0 OSTEOPOROSIS, UNSPECIFIED OSTEOPOROSIS TYPE, UNSPECIFIED PATHOLOGICAL FRACTURE PRESENCE: Primary | ICD-10-CM

## 2018-08-10 PROCEDURE — 3074F SYST BP LT 130 MM HG: CPT | Mod: CPTII,S$GLB,, | Performed by: FAMILY MEDICINE

## 2018-08-10 PROCEDURE — 99999 PR PBB SHADOW E&M-EST. PATIENT-LVL III: CPT | Mod: PBBFAC,,, | Performed by: FAMILY MEDICINE

## 2018-08-10 PROCEDURE — 99214 OFFICE O/P EST MOD 30 MIN: CPT | Mod: S$GLB,,, | Performed by: FAMILY MEDICINE

## 2018-08-10 PROCEDURE — 3078F DIAST BP <80 MM HG: CPT | Mod: CPTII,S$GLB,, | Performed by: FAMILY MEDICINE

## 2018-08-10 RX ORDER — FINASTERIDE 5 MG/1
5 TABLET, FILM COATED ORAL DAILY
Qty: 30 TABLET | Refills: 11 | Status: SHIPPED | OUTPATIENT
Start: 2018-08-10 | End: 2019-02-06 | Stop reason: RX

## 2018-08-10 NOTE — PATIENT INSTRUCTIONS
Finasteride (Proscar) tablets  What is this medicine?  FINASTERIDE (fi RICCI christiano aisha) is used to treat benign prostatic hyperplasia (BPH) in men. This is a condition that causes you to have an enlarged prostate. This medicine helps to control your symptoms, decrease urinary retention, and reduces your risk of needing surgery. When used in combination with certain other medicines, this drug can slow down the progression of your disease.  How should I use this medicine?  Take this medicine by mouth with a glass of water. Follow the directions on the prescription label. You can take this medicine with or without food. Take your doses at regular intervals. Do not take your medicine more often than directed. Do not stop taking except on the advice of your doctor or health care professional.  Talk to your pediatrician regarding the use of this medicine in children. Special care may be needed.  What side effects may I notice from receiving this medicine?  Side effects that you should report to your doctor or health care professional as soon as possible:  · any signs of an allergic reaction like rash, itching, hives or swelling of the lips or face  · changes in breast like lumps, pain or fluids leaking from the nipple  · pain in the testicles  Side effects that usually do not require medical attention (report to your doctor or health care professional if they continue or are bothersome):  · sexual difficulties like decreased sexual desire or ability to get an erection  · small amount of semen released during sex  What may interact with this medicine?  · saw palmetto or other dietary supplements  What if I miss a dose?  If you miss a dose, take it as soon as you can. If it is almost time for your next dose, take only that dose. Do not take double or extra doses.  Where should I keep my medicine?  Keep out of the reach of children.  Store at room temperature below 30 degrees C (86 degrees F). Protect from light. Keep  container tightly closed. Throw away any unused medicine after the expiration date.  What should I tell my health care provider before I take this medicine?  They need to know if you have any of these conditions:  · liver disease  · an unusual or allergic reaction to finasteride, other medicines, foods, dyes, or preservatives  · pregnant or trying to get pregnant  · breast-feeding  What should I watch for while using this medicine?  Do not donate blood while you are taking this medicine. This will prevent giving this medicine to a pregnant female through a blood transfusion. Ask your doctor or health care professional when it is safe to donate blood after you stop taking this medicine.  Women who are pregnant or may get pregnant must not handle broken or crushed finasteride tablets. The active ingredient could harm the unborn baby. If a pregnant woman comes into contact with broken or crushed tablets she should check with her doctor or health care professional. Exposure to whole tablets is not expected to cause harm as long as they are not swallowed.  Contact your doctor or health care professional if your symptoms do not start to get better. You may need to take this medicine for 6 to 12 months to get the best results.  This medicine can interfere with PSA laboratory tests for prostate cancer. If you are scheduled to have a lab test for prostate cancer, tell your doctor or health care professional that you are taking this medicine.  This medicine may increase your risk of getting some cancers, like breast cancer. Talk with your doctor.  NOTE:This sheet is a summary. It may not cover all possible information. If you have questions about this medicine, talk to your doctor, pharmacist, or health care provider. Copyright© 2017 [employer]

## 2018-08-14 DIAGNOSIS — M19.90 CHRONIC INFLAMMATORY ARTHRITIS: ICD-10-CM

## 2018-08-14 RX ORDER — PREDNISONE 2.5 MG/1
TABLET ORAL
Qty: 180 TABLET | Refills: 0 | Status: SHIPPED | OUTPATIENT
Start: 2018-08-14 | End: 2018-12-06 | Stop reason: SDUPTHER

## 2018-08-15 RX ORDER — ERGOCALCIFEROL 1.25 MG/1
CAPSULE ORAL
Qty: 12 CAPSULE | Refills: 0 | Status: SHIPPED | OUTPATIENT
Start: 2018-08-15 | End: 2018-11-07 | Stop reason: SDUPTHER

## 2018-08-22 DIAGNOSIS — R52 PAIN: ICD-10-CM

## 2018-08-22 RX ORDER — TRAMADOL HYDROCHLORIDE 50 MG/1
TABLET ORAL
Qty: 120 TABLET | Refills: 5 | Status: SHIPPED | OUTPATIENT
Start: 2018-08-22 | End: 2019-03-05 | Stop reason: SDUPTHER

## 2018-08-23 ENCOUNTER — OFFICE VISIT (OUTPATIENT)
Dept: RHEUMATOLOGY | Facility: CLINIC | Age: 73
End: 2018-08-23
Payer: MEDICARE

## 2018-08-23 VITALS
DIASTOLIC BLOOD PRESSURE: 72 MMHG | SYSTOLIC BLOOD PRESSURE: 128 MMHG | WEIGHT: 157.13 LBS | BODY MASS INDEX: 23.89 KG/M2

## 2018-08-23 DIAGNOSIS — M05.79 RHEUMATOID ARTHRITIS INVOLVING MULTIPLE SITES WITH POSITIVE RHEUMATOID FACTOR: Primary | ICD-10-CM

## 2018-08-23 DIAGNOSIS — N18.30 STAGE 3 CHRONIC KIDNEY DISEASE: ICD-10-CM

## 2018-08-23 DIAGNOSIS — Z79.899 ENCOUNTER FOR LONG-TERM (CURRENT) DRUG USE: ICD-10-CM

## 2018-08-23 PROCEDURE — 3074F SYST BP LT 130 MM HG: CPT | Mod: ,,, | Performed by: INTERNAL MEDICINE

## 2018-08-23 PROCEDURE — 99213 OFFICE O/P EST LOW 20 MIN: CPT | Mod: ,,, | Performed by: INTERNAL MEDICINE

## 2018-08-23 PROCEDURE — 3078F DIAST BP <80 MM HG: CPT | Mod: ,,, | Performed by: INTERNAL MEDICINE

## 2018-08-23 NOTE — PROGRESS NOTES
[facility] RHEUMATOLOGY           Follow-up visit    Notes dictated via Dragon to EPIC. Please forgive any unintentional errors.  Subjective:       Patient ID:   NAME: Rajendra Castle : 1945     72 y.o. male    Referring Doc: No ref. provider found  Other Physicians:    Chief Complaint:  Rheumatoid Arthritis (Takes Vitamin D 50,000 units weekly, Folic Acid 1mg QD, Methotrexate 2.5mg 6 tablets weekly, Prednisone 2.5mg BID, Tramadol 50mg BID PRN)      HPI/Interval History:   The patient is doing extremely well. He has no complaints of any red, hot, and/or swollen joints.          ROS:   GEN:    no fever, night sweats or weight loss  SKIN:   no rashes , erythema, bruising, or swelling, no Raynauds, no photosensitivity  HEENT: no changes in vision, no mouth ulcers, no sicca symptoms, no scalp tenderness, no jaw claudication.  CV:      no CP, PND, DEY or orthopnea, no palpitations  PULM: no SOB, cough, hemoptysis, sputum or pleuritic pain  GI:       no abdominal pain, nausea, vomiting, constipation, diarrhea, melanotic stools, BRBPR, or hematemesis, no dysphagia, no GERD  :     no hematuria, dysuria  NEURO: no paresthesias, headaches, acute visual disturbances  MUSCULOSKELETAL:  No muscle or joint complaints  PSYCH:   No insomnia, no significant anxiety or depression    Medications:    Current Outpatient Medications:     alendronate (FOSAMAX) 70 MG tablet, TAKE 1 TABLET BY MOUTH EVERY 7 DAYS, Disp: 12 tablet, Rfl: 3    alfuzosin (UROXATRAL) 10 mg Tb24, Take 1 tablet (10 mg total) by mouth after dinner., Disp: 30 tablet, Rfl: 11    amLODIPine (NORVASC) 2.5 MG tablet, TAKE 1 TABLET(2.5 MG) BY MOUTH EVERY DAY, Disp: 90 tablet, Rfl: 0    aspirin (ECOTRIN) 81 MG EC tablet, Take 81 mg by mouth once daily.  , Disp: , Rfl:     carvedilol (COREG) 25 MG tablet, Take 1 tablet (25 mg total) by mouth 2 (two) times daily with meals., Disp: 60 tablet, Rfl: 11    ergocalciferol (ERGOCALCIFEROL) 50,000 unit Cap, TAKE 1  TABLET BY MOUTH EVERY WEEK, Disp: 12 capsule, Rfl: 0    escitalopram oxalate (LEXAPRO) 10 MG tablet, Take 10 mg by mouth once daily., Disp: , Rfl:     finasteride (PROSCAR) 5 mg tablet, Take 1 tablet (5 mg total) by mouth once daily., Disp: 30 tablet, Rfl: 11    folic acid (FOLVITE) 1 MG tablet, Take 1 tablet (1 mg total) by mouth once daily., Disp: 90 tablet, Rfl: 3    methotrexate 2.5 MG Tab, TAKE 6 TABLETS BY MOUTH EVERY WEEK, Disp: 72 tablet, Rfl: 0    predniSONE (DELTASONE) 2.5 MG tablet, TAKE 1 TABLET BY MOUTH TWICE DAILY, Disp: 180 tablet, Rfl: 0    traMADol (ULTRAM) 50 mg tablet, TAKE 2 TABLETS BY MOUTH TWICE DAILY AS NEEDED FOR PAIN, Disp: 120 tablet, Rfl: 5      FAMILY HISTORY: negative for Connective Tissue Disease        Review of patient's allergies indicates:  No Known Allergies          Objective:     Vitals:  Blood pressure 128/72, weight 71.3 kg (157 lb 1.6 oz).    Physical Examination:   GEN: wn/wd male in no apparent distress  SKIN: no rashes, no lesions, no sclerodactyly, no Raynaud's, no periungual erythema  HEAD: no alopecia, no scalp tenderness, no temporal artery tenderness or induration.  EYES: no pallor, no icterus, PERRLA  ENT:  no thrush, no mucosal dryness or ulcerations, adequate oral hygiene & dentition.  NECK: supple x 6, no masses, no thyromegaly, no lymphadenopathy.  CV:   S1 and S2 regular, no murmurs, gallop or rubs  CHEST: Normal respiratory effort;  normal breath sounds/no adventitious sounds. No signs of consolidation.  ABD: non-tender and non-distended; soft; normal bowel sounds; no rebound/guarding or tenderness. No hepatosplenomegaly.  Musculoskeletal:  No evidence of active inflammatory disease or of any progressive deformity  EXTREM: no clubbing, cyanosis or edema. normal pulses.  NEURO: grossly intact; motor/sensory WNL; no tremors  PSYCH:  normal mood, affect and behavior            Labs:   Lab Results   Component Value Date    WBC 8.3 12/14/2017    HGB 12.3 (L)  12/14/2017    HCT 38.7 12/14/2017    MCV 89.4 12/14/2017     12/14/2017   CMP@  Sodium   Date Value Ref Range Status   05/23/2018 140 136 - 145 mmol/L Final   04/20/2017 142 134 - 144 mmol/L      Potassium   Date Value Ref Range Status   05/23/2018 4.5 3.5 - 5.1 mmol/L Final     Chloride   Date Value Ref Range Status   05/23/2018 111 (H) 95 - 110 mmol/L Final   04/20/2017 107 98 - 110 mmol/L      CO2   Date Value Ref Range Status   05/23/2018 24 23 - 29 mmol/L Final     Glucose   Date Value Ref Range Status   05/23/2018 91 70 - 110 mg/dL Final   04/20/2017 119 (H) 70 - 99 mg/dL      BUN, Bld   Date Value Ref Range Status   05/23/2018 17 8 - 23 mg/dL Final     Creatinine   Date Value Ref Range Status   05/23/2018 1.3 0.5 - 1.4 mg/dL Final   04/20/2017 1.58 (H) 0.60 - 1.40 mg/dL      Calcium   Date Value Ref Range Status   05/23/2018 10.5 8.7 - 10.5 mg/dL Final     Total Protein   Date Value Ref Range Status   03/26/2018 6.8 6.0 - 8.4 g/dL Final     Albumin   Date Value Ref Range Status   05/23/2018 3.4 (L) 3.5 - 5.2 g/dL Final   04/20/2017 4.2 3.1 - 4.7 g/dL      Total Bilirubin   Date Value Ref Range Status   03/26/2018 0.3 0.1 - 1.0 mg/dL Final     Comment:     For infants and newborns, interpretation of results should be based  on gestational age, weight and in agreement with clinical  observations.  Premature Infant recommended reference ranges:  Up to 24 hours.............<8.0 mg/dL  Up to 48 hours............<12.0 mg/dL  3-5 days..................<15.0 mg/dL  6-29 days.................<15.0 mg/dL       Alkaline Phosphatase   Date Value Ref Range Status   03/26/2018 48 (L) 55 - 135 U/L Final     AST   Date Value Ref Range Status   03/26/2018 12 10 - 40 U/L Final     ALT   Date Value Ref Range Status   03/26/2018 13 10 - 44 U/L Final     CPK   Date Value Ref Range Status   04/20/2017 48 18 - 170 IU/L      CRP   Date Value Ref Range Status   12/14/2017 1.4 <8.0 mg/L Final     Rheumatoid Factor   Date Value  Ref Range Status   04/20/2017 633.9 (H) 0.0 - 13.9 IU/mL      Comment:     Performed at: [facility], [facility] [address], [place], [place], [id number] Euceda MD, Phone:  [phone]     Uric Acid   Date Value Ref Range Status   09/20/2016 8.6 (H) 3.4 - 7.0 mg/dL Final         Radiology/Diagnostic Studies:    none    Assessment/Discussion/Plan:   72 y.o. male with rheumatoid arthritis-stable on methotrexate 15 mg weekly and prednisone 2.5 mg twice daily      PLAN:  I will continue his medication without change. He is following up with his primary provider, Dr. Aceves, concerning his chronic renal insufficiency.      RTC:  I will see him back in 4 months.      Electronically signed by Uriel Garcia MD

## 2018-08-27 NOTE — PROGRESS NOTES
Subjective:       Patient ID: Rajendra Castle is a 72 y.o. male.    Chief Complaint: Hypertension    Hypertension for more than 10 years.  His hypertension has been controlled..  He has history of chronic smoker.  Has limited exercise capacity but denies dyspnea upon exertion orthopnea no hemoptysis no ankle swelling nor heart murmurs.  Ex-smoker patient has being abusing tobacco since age of 19.  He has seasonal wheezing changes.  Osteoporosis under vitamin-D supplementation.  Yes for activity no weight-bearing exercises.  He has been taking Fosamax for more than 3 years.  He has chronic depression partial results with Lexapro.  He denies delusions nor hallucinations nor suicidal ideation.  Benign prostate hypertrophy for over 5 years partially treated with Proscar and Uroxatral.       Review of Systems   Constitutional: Negative for fatigue and unexpected weight change.   Respiratory: Negative for chest tightness and shortness of breath.    Cardiovascular: Negative for chest pain, palpitations and leg swelling.   Gastrointestinal: Negative for abdominal pain.   Musculoskeletal: Negative for arthralgias.   Neurological: Negative for dizziness, syncope, light-headedness and headaches.       Patient Active Problem List   Diagnosis    Heart murmur    Hypertension    Arthritis    Rotator cuff tendinitis    Dyspnea on exertion    DDD (degenerative disc disease), cervical    Anemia, iron deficiency    Chronic GI bleeding    Arthritis of wrist, right    Trigger thumb of left hand    Arthritis of right wrist    Chest pain    Essential hypertension    Rheumatoid arthritis involving multiple sites    Elevated troponin    Nausea and vomiting    Nontoxic multinodular goiter    Gastroesophageal reflux disease without esophagitis    Nodular thyroid disease    Prediabetes    Osteoporosis    Hypogonadism in male    Current chronic use of systemic steroids    Vitamin D insufficiency    Hyperparathyroidism     Pulmonary emphysema    Rheumatoid lung    Lung nodule    CKD (chronic kidney disease)       Objective:      Physical Exam   Constitutional: He is oriented to person, place, and time. He appears well-developed and well-nourished.   Cardiovascular: Normal rate, regular rhythm and normal heart sounds.   Pulmonary/Chest: Effort normal and breath sounds normal.   Musculoskeletal: He exhibits no edema.   Neurological: He is alert and oriented to person, place, and time.   Skin: Skin is warm and dry.   Psychiatric: He has a normal mood and affect.   Nursing note and vitals reviewed.      Lab Results   Component Value Date    WBC 8.3 12/14/2017    HGB 12.3 (L) 12/14/2017    HCT 38.7 12/14/2017     12/14/2017    CHOL 156 09/20/2016    TRIG 124 09/20/2016    HDL 39 (L) 09/20/2016    ALT 13 03/26/2018    AST 12 03/26/2018     05/23/2018    K 4.5 05/23/2018     (H) 05/23/2018    CREATININE 1.3 05/23/2018    BUN 17 05/23/2018    CO2 24 05/23/2018    TSH 1.098 03/26/2018    PSA 4.8 (H) 03/16/2018    INR 1.0 08/18/2016    HGBA1C 5.9 09/20/2016     The 10-year ASCVD risk score (Jenniferkiko FREEMAN Jr., et al., 2013) is: 19%    Values used to calculate the score:      Age: 72 years      Sex: Male      Is Non- : Yes      Diabetic: No      Tobacco smoker: No      Systolic Blood Pressure: 128 mmHg      Is BP treated: Yes      HDL Cholesterol: 39 mg/dL      Total Cholesterol: 156 mg/dL    Assessment:       1. Osteoporosis, unspecified osteoporosis type, unspecified pathological fracture presence    2. Benign prostatic hyperplasia, unspecified whether lower urinary tract symptoms present    3. CKD (chronic kidney disease) stage 3, GFR 30-59 ml/min    4. Screening for cancer    5. Neoplasm of lung    6. Lung nodule    7. Essential hypertension    8. Hypogonadism in male        Plan:       Osteoporosis, unspecified osteoporosis type, unspecified pathological fracture presence    Benign prostatic  hyperplasia, unspecified whether lower urinary tract symptoms present  -     finasteride (PROSCAR) 5 mg tablet; Take 1 tablet (5 mg total) by mouth once daily.  Dispense: 30 tablet; Refill: 11    CKD (chronic kidney disease) stage 3, GFR 30-59 ml/min    Screening for cancer  -     CT Chest Without Contrast; Future; Expected date: 08/10/2018    Neoplasm of lung  -     CT Chest Without Contrast; Future; Expected date: 08/10/2018    Lung nodule    Essential hypertension    Hypogonadism in male          30 -minute visit. 20 minutes spent counseling patient on diet, exercise, and weight loss.  This has been fully explained to the patient, who indicates understanding.

## 2018-10-07 DIAGNOSIS — I10 ESSENTIAL HYPERTENSION: ICD-10-CM

## 2018-10-08 RX ORDER — AMLODIPINE BESYLATE 2.5 MG/1
TABLET ORAL
Qty: 90 TABLET | Refills: 0 | Status: SHIPPED | OUTPATIENT
Start: 2018-10-08 | End: 2019-01-01 | Stop reason: SDUPTHER

## 2018-10-29 DIAGNOSIS — M19.90 CHRONIC INFLAMMATORY ARTHRITIS: ICD-10-CM

## 2018-10-29 RX ORDER — METHOTREXATE 2.5 MG/1
TABLET ORAL
Qty: 72 TABLET | Refills: 1 | Status: SHIPPED | OUTPATIENT
Start: 2018-10-29 | End: 2019-04-25 | Stop reason: SDUPTHER

## 2018-11-07 RX ORDER — ERGOCALCIFEROL 1.25 MG/1
CAPSULE ORAL
Qty: 12 CAPSULE | Refills: 1 | Status: SHIPPED | OUTPATIENT
Start: 2018-11-07 | End: 2018-12-03 | Stop reason: SINTOL

## 2018-11-09 ENCOUNTER — LAB VISIT (OUTPATIENT)
Dept: LAB | Facility: HOSPITAL | Age: 73
End: 2018-11-09
Attending: UROLOGY
Payer: MEDICARE

## 2018-11-09 DIAGNOSIS — R97.20 ELEVATED PSA: ICD-10-CM

## 2018-11-09 LAB
PROSTATE SPECIFIC ANTIGEN, TOTAL: 2.4 NG/ML
PSA FREE MFR SERPL: 9.17 %
PSA FREE SERPL-MCNC: 0.22 NG/ML

## 2018-11-09 PROCEDURE — 84154 ASSAY OF PSA FREE: CPT

## 2018-11-09 PROCEDURE — 36415 COLL VENOUS BLD VENIPUNCTURE: CPT

## 2018-11-16 ENCOUNTER — OFFICE VISIT (OUTPATIENT)
Dept: UROLOGY | Facility: CLINIC | Age: 73
End: 2018-11-16
Payer: MEDICARE

## 2018-11-16 VITALS
HEIGHT: 68 IN | DIASTOLIC BLOOD PRESSURE: 72 MMHG | SYSTOLIC BLOOD PRESSURE: 141 MMHG | BODY MASS INDEX: 24.43 KG/M2 | RESPIRATION RATE: 18 BRPM | HEART RATE: 82 BPM | WEIGHT: 161.19 LBS | TEMPERATURE: 99 F

## 2018-11-16 DIAGNOSIS — N40.1 BPH WITH OBSTRUCTION/LOWER URINARY TRACT SYMPTOMS: ICD-10-CM

## 2018-11-16 DIAGNOSIS — N13.8 BPH WITH OBSTRUCTION/LOWER URINARY TRACT SYMPTOMS: ICD-10-CM

## 2018-11-16 DIAGNOSIS — R97.20 ELEVATED PSA: Primary | ICD-10-CM

## 2018-11-16 LAB — POC RESIDUAL URINE VOLUME: 53 ML (ref 0–100)

## 2018-11-16 PROCEDURE — 99214 OFFICE O/P EST MOD 30 MIN: CPT | Mod: 25,S$GLB,, | Performed by: UROLOGY

## 2018-11-16 PROCEDURE — 1101F PT FALLS ASSESS-DOCD LE1/YR: CPT | Mod: CPTII,S$GLB,, | Performed by: UROLOGY

## 2018-11-16 PROCEDURE — 99999 PR PBB SHADOW E&M-EST. PATIENT-LVL IV: CPT | Mod: PBBFAC,,, | Performed by: UROLOGY

## 2018-11-16 PROCEDURE — 51798 US URINE CAPACITY MEASURE: CPT | Mod: S$GLB,,, | Performed by: UROLOGY

## 2018-11-16 PROCEDURE — 3078F DIAST BP <80 MM HG: CPT | Mod: CPTII,S$GLB,, | Performed by: UROLOGY

## 2018-11-16 PROCEDURE — 3077F SYST BP >= 140 MM HG: CPT | Mod: CPTII,S$GLB,, | Performed by: UROLOGY

## 2018-11-16 RX ORDER — GENTAMICIN SULFATE 40 MG/ML
80 INJECTION, SOLUTION INTRAMUSCULAR; INTRAVENOUS ONCE
Status: CANCELLED | OUTPATIENT
Start: 2018-12-18

## 2018-11-16 RX ORDER — LIDOCAINE HYDROCHLORIDE 10 MG/ML
10 INJECTION, SOLUTION EPIDURAL; INFILTRATION; INTRACAUDAL; PERINEURAL ONCE
Status: CANCELLED | OUTPATIENT
Start: 2018-11-16 | End: 2018-11-16

## 2018-11-16 RX ORDER — CIPROFLOXACIN 500 MG/1
500 TABLET ORAL 2 TIMES DAILY
Qty: 6 TABLET | Refills: 0 | Status: SHIPPED | OUTPATIENT
Start: 2018-11-16 | End: 2019-01-25

## 2018-11-16 RX ORDER — LIDOCAINE HYDROCHLORIDE 20 MG/ML
JELLY TOPICAL ONCE
Status: CANCELLED | OUTPATIENT
Start: 2018-11-16 | End: 2018-11-16

## 2018-11-16 NOTE — PROGRESS NOTES
"[facility] Urology Progress Note    Rajendra Castle is a 73 y.o. male who presents for evaluation of LUTS and elevated psa     He was initially seen by YAZ López on April 2018 for increased nocturia x3 and urinary hesitancy for six months, not improved by flomax which he had been started on by PCP 3/16/2018, so it was discontinued and he was started on uroxatral 10mg daily which decd his NTF to 1x on average. Was drinking up until bedtime and had occasional urgency.   No famHx CaP. PVR 15-18cc. AUA SS: 5/2 (1:  Frequency, intermittency, urgency, weak stream, sleeping) medications helped 5/10  SHIRLEY:  30-35 g smooth benign    PSA history:  1.8 on 10/3/06;   2.73 on 08/06/2012;   2.1 on 10/03/2013;   4.1 on 04/06/2015;   3.4 on 10/14/2015;   4.8 on 03/16/2018;   2.9 (11.7% free) on 04/25/2018     He returns today noting  Dr Aceves added finasteride 8/10/18.   PSA 11/9/18 is 2.4 (9.17% free)  He did not come with a full bladder for uroflow.  Voided prior to arrival.  Bladder scan demonstrated 53 cc  AUA symptom score:  11/4 mostly dissatisfied - for:  Frequency; 3:  Emptying; 2:  Sleeping; 1:  Intermittency, urgency  Medications helped 3-4/10  No hematuria or dysuria      ROS: A comprehensive 10 system review was performed and is negative except as noted above in HPI    PHYSICAL EXAM:    Vitals:    11/16/18 1120   BP: (!) 141/72   Pulse: 82   Resp: 18   Temp: 98.6 °F (37 °C)     Body mass index is 24.5 kg/m². Weight: 73.1 kg (161 lb 2.5 oz) Height: 5' 8" (172.7 cm)       General: Alert, cooperative, no distress, appears stated age   Head: Normocephalic, without obvious abnormality, atraumatic   Eyes: PERRL, conjunctiva/corneas clear   Lungs: Respirations unlabored   Heart: Warm and well perfused   Abdomen: soft NT ND  Extremities: Extremities normal, atraumatic, no cyanosis or edema   Skin: Skin color, texture, turgor normal, no rashes or lesions   Psych: Appropriate   Neurologic: Non-focal       Recent Results (from the past 336 " hour(s))   PSA, total and free    Collection Time: 11/09/18  9:00 AM   Result Value Ref Range    PSA Total 2.4 0.00 - 4.00 ng/mL    PSA, Free 0.22 0.01 - 1.50 ng/mL    PSA, Free Pct 9.17 Not established %   POCT Bladder Scan    Collection Time: 11/16/18 11:51 AM   Result Value Ref Range    POC Residual Urine Volume 53 0 - 100 mL       ASSESSMENT   1. Elevated PSA  Case Request Operating Room: BIOPSY, PROSTATE, RECTAL APPROACH, WITH US GUIDANCE, CYSTOSCOPY    Place in Outpatient   2. BPH associated with nocturia  POCT Bladder Scan       Plan    PSA on finasteride is equivalent to 4.8, consistent with elevation seen earlier this year, and free psa remains low now <10%. Concerning for underlying prostate cancer. As well has peristent LUTS on dual medical therapy.  I had a long discussion with the patient regarding the natural history of cancer in men as well as when diagnostics are indicated. We also discussed differential for elevated psa which also includes benign enlargement and prostatitis.  We did discuss that an elevated PSA is considered a PSA greater than 4 because statistically 20% of people in this value range are found to have prostate cancer, however we also discussed a bit about PSA velocity and trends and age specific psa elevations, as well as significance of free psa.   Given continued psa elevation with low free psa, as well as consideration of bph interventions in future, I therefore recommended prostate biopsy to evaluate for underlying malignancy.  We discussed biopsy and in detail, including 1% risk of infectious complications including sepsis but that it is an otherwise safe diagnostic procedure with expected hematuria hematospermia after.      I went over the details of a transrectal ultrasound-guided biopsy of the prostate, and described the technique in detail.   The patient will be given local injection anesthetic to block the prostate so as to minimize any pain. 12-14 biopsy specimens will be  taken. These will be sent for histopathology analysis.   Complications include bleeding, fever and chills. He was also instructed to watch for any signs of fever. If he does have any fever or chills after, he was advised to come to the emergency room right away for intravenous antibiotics and possible admission to the hospital. He is to refrain from any strenuous activity including sexual activity for the next 72 hours after biopsy. He was also advised that he may have blood while urinating, during bowel movements as well as during ejaculations. He was given a prebiopsy/postbiopsy instruction sheet was reminding him to avoid aspirin and blood thinners for 7 days prior, take the Rxed antibiotics the day before, day of, day after biopsy, and perform a fleet enema at home morning of biopsy. All questions he had were answered in detail.     Given his refractory LUTS on dual medical therapy, also recommended concurrent cystoscopic evaluation.  Diagnostic flexible cystourethroscopy with local instillation of lidocaine jelly per urethra was described in detail.  Discuss that this will allow us to delineate level of obstruction and further anatomy of prostate, especially should his biopsy be negative and can discuss further options for BPH interventions which he is interested in.     Cysto/TRUS/bx scheduled at [facility] on 12/18/18

## 2018-11-23 ENCOUNTER — LAB VISIT (OUTPATIENT)
Dept: LAB | Facility: HOSPITAL | Age: 73
End: 2018-11-23
Attending: INTERNAL MEDICINE
Payer: MEDICARE

## 2018-11-23 DIAGNOSIS — N18.30 CHRONIC KIDNEY DISEASE, STAGE III (MODERATE): ICD-10-CM

## 2018-11-23 DIAGNOSIS — N25.81 SECONDARY RENAL HYPERPARATHYROIDISM: ICD-10-CM

## 2018-11-23 LAB
ALBUMIN SERPL BCP-MCNC: 3.4 G/DL
ANION GAP SERPL CALC-SCNC: 10 MMOL/L
BUN SERPL-MCNC: 24 MG/DL
CALCIUM SERPL-MCNC: 11 MG/DL
CHLORIDE SERPL-SCNC: 111 MMOL/L
CO2 SERPL-SCNC: 18 MMOL/L
CREAT SERPL-MCNC: 1.6 MG/DL
EST. GFR  (AFRICAN AMERICAN): 49.4 ML/MIN/1.73 M^2
EST. GFR  (NON AFRICAN AMERICAN): 42.7 ML/MIN/1.73 M^2
GLUCOSE SERPL-MCNC: 154 MG/DL
PHOSPHATE SERPL-MCNC: 2.2 MG/DL
POTASSIUM SERPL-SCNC: 4.1 MMOL/L
PTH-INTACT SERPL-MCNC: 196 PG/ML
SODIUM SERPL-SCNC: 139 MMOL/L

## 2018-11-23 PROCEDURE — 36415 COLL VENOUS BLD VENIPUNCTURE: CPT | Mod: PO

## 2018-11-23 PROCEDURE — 83970 ASSAY OF PARATHORMONE: CPT

## 2018-11-23 PROCEDURE — 80069 RENAL FUNCTION PANEL: CPT

## 2018-12-03 ENCOUNTER — OFFICE VISIT (OUTPATIENT)
Dept: NEPHROLOGY | Facility: CLINIC | Age: 73
End: 2018-12-03
Payer: MEDICARE

## 2018-12-03 VITALS
WEIGHT: 168.19 LBS | SYSTOLIC BLOOD PRESSURE: 146 MMHG | DIASTOLIC BLOOD PRESSURE: 64 MMHG | HEIGHT: 68 IN | HEART RATE: 76 BPM | BODY MASS INDEX: 25.49 KG/M2 | OXYGEN SATURATION: 96 %

## 2018-12-03 DIAGNOSIS — N18.30 CHRONIC KIDNEY DISEASE, STAGE III (MODERATE): Primary | ICD-10-CM

## 2018-12-03 DIAGNOSIS — N25.81 SECONDARY RENAL HYPERPARATHYROIDISM: ICD-10-CM

## 2018-12-03 DIAGNOSIS — I10 ESSENTIAL HYPERTENSION: ICD-10-CM

## 2018-12-03 PROCEDURE — 3077F SYST BP >= 140 MM HG: CPT | Mod: CPTII,S$GLB,, | Performed by: INTERNAL MEDICINE

## 2018-12-03 PROCEDURE — 99499 UNLISTED E&M SERVICE: CPT | Mod: S$GLB,,, | Performed by: INTERNAL MEDICINE

## 2018-12-03 PROCEDURE — 1101F PT FALLS ASSESS-DOCD LE1/YR: CPT | Mod: CPTII,S$GLB,, | Performed by: INTERNAL MEDICINE

## 2018-12-03 PROCEDURE — 99214 OFFICE O/P EST MOD 30 MIN: CPT | Mod: S$GLB,,, | Performed by: INTERNAL MEDICINE

## 2018-12-03 PROCEDURE — 3078F DIAST BP <80 MM HG: CPT | Mod: CPTII,S$GLB,, | Performed by: INTERNAL MEDICINE

## 2018-12-03 PROCEDURE — 99999 PR PBB SHADOW E&M-EST. PATIENT-LVL III: CPT | Mod: PBBFAC,,, | Performed by: INTERNAL MEDICINE

## 2018-12-03 NOTE — PROGRESS NOTES
"Subjective:       Patient ID: Rajendra Castle is a 73 y.o. Black or  male who presents for return patient evaluation for chronic renal failure.    He has no uremic symptoms and is in his usual state of health.  There have been no recent illnesses, hospitalizations or procedures.  He has frequency and is scheduled to have a procedure with Dr. Pina soon.      Review of Systems   Constitutional: Negative for appetite change, chills and fever.   Eyes: Negative for visual disturbance.   Respiratory: Negative for cough and shortness of breath.    Cardiovascular: Negative for chest pain and leg swelling.   Gastrointestinal: Negative for diarrhea, nausea and vomiting.   Genitourinary: Positive for frequency (2-3 nocturia). Negative for difficulty urinating, dysuria and hematuria.   Musculoskeletal: Negative for myalgias.   Skin: Negative for rash.   Neurological: Negative for headaches.   Psychiatric/Behavioral: Negative for sleep disturbance.       The past medical, family and social histories were reviewed for this encounter.     BP (!) 146/64 (BP Location: Left arm, Patient Position: Sitting)   Pulse 76   Ht 5' 8" (1.727 m)   Wt 76.3 kg (168 lb 3.4 oz)   SpO2 96%   BMI 25.58 kg/m²     Objective:      Physical Exam   Constitutional: He is oriented to person, place, and time. He appears well-developed and well-nourished. No distress.   HENT:   Head: Normocephalic and atraumatic.   Eyes: Conjunctivae are normal.   Neck: Neck supple. No JVD present.   Cardiovascular: Normal rate, regular rhythm and normal heart sounds. Exam reveals no gallop and no friction rub.   No murmur heard.  Pulmonary/Chest: Effort normal and breath sounds normal. No respiratory distress. He has no wheezes. He has no rales.   Abdominal: Soft. Bowel sounds are normal. He exhibits no distension. There is no tenderness.   Musculoskeletal: He exhibits no edema.   Neurological: He is alert and oriented to person, place, and time. "   Skin: Skin is warm and dry. No rash noted.   Psychiatric: He has a normal mood and affect.   Vitals reviewed.      Assessment:       1. Chronic kidney disease, stage III (moderate)    2. Essential hypertension    3. Secondary renal hyperparathyroidism        Plan:   Return to clinic in 6 months.  Labs for next visit include rp, pth.  Baseline creatinine is 1.4-1.6 since 2006.  PTH is 196 with a calcium of 11.0.  This prevents us from using vitamin D analogs.  Renal US shows R 9.3 cm, L 9.4 cm.  Stop ergocalciferol.  He sees Dr. Murphy as well.

## 2018-12-06 DIAGNOSIS — M19.90 CHRONIC INFLAMMATORY ARTHRITIS: ICD-10-CM

## 2018-12-06 RX ORDER — PREDNISONE 2.5 MG/1
TABLET ORAL
Qty: 180 TABLET | Refills: 1 | Status: SHIPPED | OUTPATIENT
Start: 2018-12-06 | End: 2019-06-06 | Stop reason: SDUPTHER

## 2018-12-12 ENCOUNTER — OFFICE VISIT (OUTPATIENT)
Dept: RHEUMATOLOGY | Facility: CLINIC | Age: 73
End: 2018-12-12
Payer: MEDICARE

## 2018-12-12 VITALS — WEIGHT: 163 LBS | SYSTOLIC BLOOD PRESSURE: 109 MMHG | DIASTOLIC BLOOD PRESSURE: 71 MMHG | BODY MASS INDEX: 24.78 KG/M2

## 2018-12-12 DIAGNOSIS — Z79.899 ENCOUNTER FOR LONG-TERM (CURRENT) DRUG USE: ICD-10-CM

## 2018-12-12 DIAGNOSIS — M05.79 RHEUMATOID ARTHRITIS INVOLVING MULTIPLE SITES WITH POSITIVE RHEUMATOID FACTOR: Primary | ICD-10-CM

## 2018-12-12 DIAGNOSIS — N18.30 STAGE 3 CHRONIC KIDNEY DISEASE: ICD-10-CM

## 2018-12-12 PROCEDURE — 3074F SYST BP LT 130 MM HG: CPT | Mod: ,,, | Performed by: INTERNAL MEDICINE

## 2018-12-12 PROCEDURE — 3078F DIAST BP <80 MM HG: CPT | Mod: ,,, | Performed by: INTERNAL MEDICINE

## 2018-12-12 PROCEDURE — 99213 OFFICE O/P EST LOW 20 MIN: CPT | Mod: ,,, | Performed by: INTERNAL MEDICINE

## 2018-12-12 PROCEDURE — 1101F PT FALLS ASSESS-DOCD LE1/YR: CPT | Mod: ,,, | Performed by: INTERNAL MEDICINE

## 2018-12-12 NOTE — PROGRESS NOTES
[facility] RHEUMATOLOGY           Follow-up visit    Notes dictated via Dragon to EPIC. Please forgive any unintentional errors.  Subjective:       Patient ID:   NAME: Rajendra Castle : 1945     73 y.o. male    Referring Doc: No ref. provider found  Other Physicians:    Chief Complaint:  Rheumatoid Arthritis      HPI/Interval History:   The patient is doing well. Has no complaints of any red, hot, and/or swollen joints. He has no significant morning stiffness. His energy level is excellent.          ROS:   GEN:    no fever, night sweats or weight loss  SKIN:   no rashes , erythema, bruising, or swelling, no Raynauds, no photosensitivity  HEENT: no changes in vision, no mouth ulcers, no sicca symptoms, no scalp tenderness, no jaw claudication.  CV:      no CP, PND, DEY or orthopnea, no palpitations  PULM: no SOB, cough, hemoptysis, sputum or pleuritic pain  GI:       no abdominal pain, nausea, vomiting, constipation, diarrhea, melanotic stools, BRBPR, or hematemesis, no dysphagia, no GERD  :     no hematuria, dysuria  NEURO: no paresthesias, headaches, acute visual disturbances  MUSCULOSKELETAL:  No muscle or joint complaints  PSYCH:   No insomnia, no significant anxiety or depression    Medications:    Current Outpatient Medications:     alendronate (FOSAMAX) 70 MG tablet, TAKE 1 TABLET BY MOUTH EVERY 7 DAYS, Disp: 12 tablet, Rfl: 3    alfuzosin (UROXATRAL) 10 mg Tb24, Take 1 tablet (10 mg total) by mouth after dinner., Disp: 30 tablet, Rfl: 11    amLODIPine (NORVASC) 2.5 MG tablet, TAKE 1 TABLET(2.5 MG) BY MOUTH EVERY DAY, Disp: 90 tablet, Rfl: 0    aspirin (ECOTRIN) 81 MG EC tablet, Take 81 mg by mouth once daily.  , Disp: , Rfl:     carvedilol (COREG) 25 MG tablet, Take 1 tablet (25 mg total) by mouth 2 (two) times daily with meals., Disp: 60 tablet, Rfl: 11    escitalopram oxalate (LEXAPRO) 10 MG tablet, Take 10 mg by mouth once daily., Disp: , Rfl:     finasteride (PROSCAR) 5 mg tablet, Take 1  tablet (5 mg total) by mouth once daily., Disp: 30 tablet, Rfl: 11    folic acid (FOLVITE) 1 MG tablet, Take 1 tablet (1 mg total) by mouth once daily., Disp: 90 tablet, Rfl: 3    methotrexate 2.5 MG Tab, TAKE 6 TABLETS BY MOUTH EVERY WEEK, Disp: 72 tablet, Rfl: 1    predniSONE (DELTASONE) 2.5 MG tablet, TAKE 1 TABLET BY MOUTH TWICE DAILY, Disp: 180 tablet, Rfl: 1    traMADol (ULTRAM) 50 mg tablet, TAKE 2 TABLETS BY MOUTH TWICE DAILY AS NEEDED FOR PAIN, Disp: 120 tablet, Rfl: 5    ciprofloxacin HCl (CIPRO) 500 MG tablet, Take 1 tablet (500 mg total) by mouth 2 (two) times daily. (Patient taking differently: Take 500 mg by mouth 2 (two) times daily. Will take for Prostate Procedure), Disp: 6 tablet, Rfl: 0      FAMILY HISTORY: negative for Connective Tissue Disease        Review of patient's allergies indicates:  No Known Allergies          Objective:     Vitals:  Blood pressure 109/71, weight 73.9 kg (163 lb).    Physical Examination:   GEN: wn/wd male in no apparent distress  SKIN: no rashes, no lesions, no sclerodactyly, no Raynaud's, no periungual erythema  HEAD: no alopecia, no scalp tenderness, no temporal artery tenderness or induration.  EYES: no pallor, no icterus, PERRLA  ENT:  no thrush, no mucosal dryness or ulcerations, adequate oral hygiene & dentition.  NECK: supple x 6, no masses, no thyromegaly, no lymphadenopathy.  CV:   S1 and S2 regular, no murmurs, gallop or rubs  CHEST: Normal respiratory effort;  normal breath sounds/no adventitious sounds. No signs of consolidation.  ABD: non-tender and non-distended; soft; normal bowel sounds; no rebound/guarding or tenderness. No hepatosplenomegaly.  Musculoskeletal:  No evidence of active inflammatory arthritis or any progressive deformity  EXTREM: no clubbing, cyanosis or edema. normal pulses.  NEURO: grossly intact; motor/sensory WNL; no tremors  PSYCH:  normal mood, affect and behavior            Labs:   Lab Results   Component Value Date    WBC 8.3  12/14/2017    HGB 12.3 (L) 12/14/2017    HCT 38.7 12/14/2017    MCV 89.4 12/14/2017     12/14/2017   CMP@  Sodium   Date Value Ref Range Status   11/23/2018 139 136 - 145 mmol/L Final   04/20/2017 142 134 - 144 mmol/L      Potassium   Date Value Ref Range Status   11/23/2018 4.1 3.5 - 5.1 mmol/L Final     Chloride   Date Value Ref Range Status   11/23/2018 111 (H) 95 - 110 mmol/L Final   04/20/2017 107 98 - 110 mmol/L      CO2   Date Value Ref Range Status   11/23/2018 18 (L) 23 - 29 mmol/L Final     Glucose   Date Value Ref Range Status   11/23/2018 154 (H) 70 - 110 mg/dL Final   04/20/2017 119 (H) 70 - 99 mg/dL      BUN, Bld   Date Value Ref Range Status   11/23/2018 24 (H) 8 - 23 mg/dL Final     Creatinine   Date Value Ref Range Status   11/23/2018 1.6 (H) 0.5 - 1.4 mg/dL Final   04/20/2017 1.58 (H) 0.60 - 1.40 mg/dL      Calcium   Date Value Ref Range Status   11/23/2018 11.0 (H) 8.7 - 10.5 mg/dL Final     Total Protein   Date Value Ref Range Status   03/26/2018 6.8 6.0 - 8.4 g/dL Final     Albumin   Date Value Ref Range Status   11/23/2018 3.4 (L) 3.5 - 5.2 g/dL Final   04/20/2017 4.2 3.1 - 4.7 g/dL      Total Bilirubin   Date Value Ref Range Status   03/26/2018 0.3 0.1 - 1.0 mg/dL Final     Comment:     For infants and newborns, interpretation of results should be based  on gestational age, weight and in agreement with clinical  observations.  Premature Infant recommended reference ranges:  Up to 24 hours.............<8.0 mg/dL  Up to 48 hours............<12.0 mg/dL  3-5 days..................<15.0 mg/dL  6-29 days.................<15.0 mg/dL       Alkaline Phosphatase   Date Value Ref Range Status   03/26/2018 48 (L) 55 - 135 U/L Final     AST   Date Value Ref Range Status   03/26/2018 12 10 - 40 U/L Final     ALT   Date Value Ref Range Status   03/26/2018 13 10 - 44 U/L Final     CPK   Date Value Ref Range Status   04/20/2017 48 18 - 170 IU/L      CRP   Date Value Ref Range Status   12/14/2017 1.4  <8.0 mg/L Final     Rheumatoid Factor   Date Value Ref Range Status   04/20/2017 633.9 (H) 0.0 - 13.9 IU/mL      Comment:     Performed at: [facility], [facility] [id number] [place], [place], [id number] Euceda MD, Phone:  [phone]     Uric Acid   Date Value Ref Range Status   09/20/2016 8.6 (H) 3.4 - 7.0 mg/dL Final         Radiology/Diagnostic Studies:    none    Assessment/Discussion/Plan:   73 y.o. male with seropositive rheumatoid arthritis-good response to methotrexate 15 mg weekly plus prednisone 2.5 mg twice daily      PLAN:  I will continue his medication without change. Routine blood testing was ordered. It will be obtained at an outpatient facility.      RTC: I will see him back in 4 months.      Electronically signed by Uriel Garcia MD

## 2018-12-13 NOTE — DISCHARGE INSTRUCTIONS
After your prostate biopsy    Avoid sexual activity,lifting, strenuous physical activity or exertion for 3 days     No riding mowers, tractors, bicycles, motorcycles for 2-3 weeks    You may experience blood in your urine or stool for up to 2 weeks and in your semen for up to 6 weeks.  This is a normal side effect of the procedure and will resolve.    Drink plenty of water    Take antibiotics as prescribed    If you experience any of the following conditions, please return immediately to the clinic (during office hrs) or the Emergency Room if after hours:       Fever       Inabiltiy to urinate       Severe bleeding    You may resume aspirin, anti inflammatory and blood thinners in 3 days    Results take at minimum 10 business days.  A 2 week follow up will be scheduled to review pathology reports in the clinic    During office hours, please call  and ask to speak with the nurse if you have any questions.  If after hours, call the [facility] On Call # to be connectied t o the doctor on call         After the procedure    · Drink plenty of fluids.  · You may have burning or light bleeding when you urinate--this is normal.  · Medications may be prescribed to ease any discomfort or prevent infection. Take these as directed.  · Call your doctor if you have heavy bleeding or blood clots, burning that lasts more than a day, a fever over 100°F  (38° C), or trouble urinating.

## 2018-12-18 ENCOUNTER — HOSPITAL ENCOUNTER (OUTPATIENT)
Facility: AMBULARY SURGERY CENTER | Age: 73
Discharge: HOME | End: 2018-12-18
Attending: UROLOGY | Admitting: UROLOGY
Payer: MEDICARE

## 2018-12-18 DIAGNOSIS — R97.20 ELEVATED PSA: ICD-10-CM

## 2018-12-18 PROCEDURE — 76872 US TRANSRECTAL: CPT | Mod: 26,,, | Performed by: UROLOGY

## 2018-12-18 PROCEDURE — 55700 PR BIOPSY OF PROSTATE,NEEDLE/PUNCH: CPT | Mod: ,,, | Performed by: UROLOGY

## 2018-12-18 PROCEDURE — 52000 CYSTOURETHROSCOPY: CPT | Mod: 59,,, | Performed by: UROLOGY

## 2018-12-18 PROCEDURE — 88305 TISSUE EXAM BY PATHOLOGIST: CPT | Mod: 26,,, | Performed by: PATHOLOGY

## 2018-12-18 PROCEDURE — 76872 US TRANSRECTAL: CPT | Performed by: UROLOGY

## 2018-12-18 PROCEDURE — 88305 TISSUE EXAM BY PATHOLOGIST: CPT | Performed by: PATHOLOGY

## 2018-12-18 PROCEDURE — 55700 HC PROSTATE NEEDLE BIOPSY: CPT | Performed by: UROLOGY

## 2018-12-18 PROCEDURE — 52000 CYSTOURETHROSCOPY: CPT | Performed by: UROLOGY

## 2018-12-18 PROCEDURE — 76942 ECHO GUIDE FOR BIOPSY: CPT | Mod: 26,59,, | Performed by: UROLOGY

## 2018-12-18 RX ORDER — GENTAMICIN SULFATE 40 MG/ML
INJECTION, SOLUTION INTRAMUSCULAR; INTRAVENOUS
Status: COMPLETED
Start: 2018-12-18 | End: 2018-12-18

## 2018-12-18 RX ORDER — WATER 1 ML/ML
IRRIGANT IRRIGATION
Status: DISCONTINUED | OUTPATIENT
Start: 2018-12-18 | End: 2018-12-18 | Stop reason: HOSPADM

## 2018-12-18 RX ORDER — LIDOCAINE HYDROCHLORIDE 20 MG/ML
JELLY TOPICAL ONCE
Status: DISCONTINUED | OUTPATIENT
Start: 2018-12-18 | End: 2018-12-18 | Stop reason: HOSPADM

## 2018-12-18 RX ORDER — GENTAMICIN SULFATE 40 MG/ML
80 INJECTION, SOLUTION INTRAMUSCULAR; INTRAVENOUS ONCE
Status: COMPLETED | OUTPATIENT
Start: 2018-12-18 | End: 2018-12-18

## 2018-12-18 RX ORDER — LIDOCAINE HYDROCHLORIDE 10 MG/ML
10 INJECTION, SOLUTION EPIDURAL; INFILTRATION; INTRACAUDAL; PERINEURAL ONCE
Status: COMPLETED | OUTPATIENT
Start: 2018-12-18 | End: 2018-12-18

## 2018-12-18 RX ADMIN — GENTAMICIN SULFATE 80 MG: 40 INJECTION, SOLUTION INTRAMUSCULAR; INTRAVENOUS at 09:12

## 2018-12-18 NOTE — H&P
"[facility] Urology Progress Note     Rajendra Castle is a 73 y.o. male who presents for evaluation of LUTS and elevated psa     He was initially seen by YAZ López on April 2018 for increased nocturia x3 and urinary hesitancy for six months, not improved by flomax which he had been started on by PCP 3/16/2018, so it was discontinued and he was started on uroxatral 10mg daily which decd his NTF to 1x on average. Was drinking up until bedtime and had occasional urgency.   No famHx CaP. PVR 15-18cc. AUA SS: 5/2 (1:  Frequency, intermittency, urgency, weak stream, sleeping) medications helped 5/10  SHIRLEY:  30-35 g smooth benign     PSA history:  1.8 on 10/3/06;   2.73 on 08/06/2012;   2.1 on 10/03/2013;   4.1 on 04/06/2015;   3.4 on 10/14/2015;   4.8 on 03/16/2018;   2.9 (11.7% free) on 04/25/2018     He returns today noting  Dr Aceves added finasteride 8/10/18.   PSA 11/9/18 is 2.4 (9.17% free)  He did not come with a full bladder for uroflow.  Voided prior to arrival.  Bladder scan demonstrated 53 cc  AUA symptom score:  11/4 mostly dissatisfied - for:  Frequency; 3:  Emptying; 2:  Sleeping; 1:  Intermittency, urgency  Medications helped 3-4/10  No hematuria or dysuria        ROS: A comprehensive 10 system review was performed and is negative except as noted above in HPI     PHYSICAL EXAM:         Vitals:     11/16/18 1120   BP: (!) 141/72   Pulse: 82   Resp: 18   Temp: 98.6 °F (37 °C)      Body mass index is 24.5 kg/m². Weight: 73.1 kg (161 lb 2.5 oz) Height: 5' 8" (172.7 cm)         General: Alert, cooperative, no distress, appears stated age   Head: Normocephalic, without obvious abnormality, atraumatic   Eyes: PERRL, conjunctiva/corneas clear   Lungs: Respirations unlabored   Heart: Warm and well perfused   Abdomen: soft NT ND  Extremities: Extremities normal, atraumatic, no cyanosis or edema   Skin: Skin color, texture, turgor normal, no rashes or lesions   Psych: Appropriate   Neurologic: Non-focal         Recent Results     "     Recent Results (from the past 336 hour(s))   PSA, total and free     Collection Time: 11/09/18  9:00 AM   Result Value Ref Range     PSA Total 2.4 0.00 - 4.00 ng/mL     PSA, Free 0.22 0.01 - 1.50 ng/mL     PSA, Free Pct 9.17 Not established %   POCT Bladder Scan     Collection Time: 11/16/18 11:51 AM   Result Value Ref Range     POC Residual Urine Volume 53 0 - 100 mL            ASSESSMENT   1. Elevated PSA  Case Request Operating Room: BIOPSY, PROSTATE, RECTAL APPROACH, WITH US GUIDANCE, CYSTOSCOPY     Place in Outpatient   2. BPH associated with nocturia  POCT Bladder Scan         Plan    PSA on finasteride is equivalent to 4.8, consistent with elevation seen earlier this year, and free psa remains low now <10%. Concerning for underlying prostate cancer. As well has peristent LUTS on dual medical therapy.  I had a long discussion with the patient regarding the natural history of cancer in men as well as when diagnostics are indicated. We also discussed differential for elevated psa which also includes benign enlargement and prostatitis.  We did discuss that an elevated PSA is considered a PSA greater than 4 because statistically 20% of people in this value range are found to have prostate cancer, however we also discussed a bit about PSA velocity and trends and age specific psa elevations, as well as significance of free psa.   Given continued psa elevation with low free psa, as well as consideration of bph interventions in future, I therefore recommended prostate biopsy to evaluate for underlying malignancy.  We discussed biopsy and in detail, including 1% risk of infectious complications including sepsis but that it is an otherwise safe diagnostic procedure with expected hematuria hematospermia after.      I went over the details of a transrectal ultrasound-guided biopsy of the prostate, and described the technique in detail.   The patient will be given local injection anesthetic to block the prostate so as  to minimize any pain. 12-14 biopsy specimens will be taken. These will be sent for histopathology analysis.   Complications include bleeding, fever and chills. He was also instructed to watch for any signs of fever. If he does have any fever or chills after, he was advised to come to the emergency room right away for intravenous antibiotics and possible admission to the hospital. He is to refrain from any strenuous activity including sexual activity for the next 72 hours after biopsy. He was also advised that he may have blood while urinating, during bowel movements as well as during ejaculations. He was given a prebiopsy/postbiopsy instruction sheet was reminding him to avoid aspirin and blood thinners for 7 days prior, take the Rxed antibiotics the day before, day of, day after biopsy, and perform a fleet enema at home morning of biopsy. All questions he had were answered in detail.      Given his refractory LUTS on dual medical therapy, also recommended concurrent cystoscopic evaluation.  Diagnostic flexible cystourethroscopy with local instillation of lidocaine jelly per urethra was described in detail.  Discuss that this will allow us to delineate level of obstruction and further anatomy of prostate, especially should his biopsy be negative and can discuss further options for BPH interventions which he is interested in.     Cysto/TRUS/bx scheduled at [facility] on 12/18/18      Pt is acceptable candidate for procedure at [facility]

## 2018-12-18 NOTE — OP NOTE
[facility] Urology Operative/Brief Discharge Note     Date: 12/18/18     Staff Surgeon: Garrett Pina MD     Pre-Op Diagnosis:   1. Elevated psa  2. BPH with obstruction/LUTS     Post-Op Diagnosis: same     Procedure(s) Performed:   1. Transrectal ultrasound guided prostate needle biopsy  2. Cystoscopy (flexible)     INDICATION FOR PROCEDURE:   Elevated/rising psa when adjust for finasteride use with low free psa, and moderate LUTS with AUA SS 11/4 despite dual medical therapy with Flomax and finasteride     ANESTHESIA: Local periprostatic block; 10 cc 1% lidocaine, and urojet 2% xylocaine per urethra     PSA: 2.4 (9% free) on finasteride (equivalent to 4.8)     TRUS VOLUME:  20.4cm3  (W 40.7mm, H 22.6mm, L 42.4mm)     EBL: Minimal     SPECIMEN:   14 Prostate Biopsy Cores: right and left base, apex, and mid - medial and lateral of each - with addition of bilateral transition zones.     ULTRASOUND FINDINGS: no distinct abnormalities, small scattered hypoechoities, normal SVs, no median lobe     CYSTO FINDINGS: significant bilateral lateral lobe hypertrophy with severe obstruction with central near kissing lobes, no median lobe, mild early trabeculations, normal bladder    CONFIRMED PATIENT TOOK ANTIBIOTICS: Yes     CONFIRMED PATIENT NOT TAKING ASPIRIN OR ANTICOAGULANTS: Yes     CONFIRMED PATIENT USED ENEMA: Yes     PROCEDURE IN DETAIL:  After informed consent, the patient was prepped and drapped in standard  cystoscopic fashion and 2% xylocaine jelly was instilled into the urethra. 80mg gentamicin injected IM.     First, a flexible cystoscope was passed into the bladder via the urethra.   Anterior urethra appeared normal without any lesions or narrowings. The prostatic urethra demonstrated signs of lateral lobe obstruction as above     The bladder was then systematically inspected. The ureteral orifices were in the orthotopic position on the trigone with clear efflux bilaterally. The bladder mucosa, including the  lateral walls, posterior wall, and dome were normal in appearance and free of any lesions or tumors. Did have mild early trabeculations.  On retroflexion, no median lobe and mild erythema near bladder neck.  Flexible cystoscope then removed.      Patient voided into urinal, and was then turned to the left lateral position and TRUS probe inserted into rectum. Ultrasound measurements taken as above and ultrasound of prostate performed with findings as above. Volume 20.4cc. Transverse and Saggital image captured. Approximately 10cc of 1% lidocaine injected bilaterally in antonio-prostatic block fashion, as well as at the apex.   14 core biopies taken in a sextant fashion standard 12 core template, with additional core of right and left transition zone. Patient tolerated well without complication.     CONDITION: Stable     DISHARGE:  Status post uncomplicated outpatient procedure as above.   Disposition: Home.  He will follow up in 2 weeks for biopsy results.    Resume regular diet  FU 2 weeks for biopsy results  Return to ER if temp >101, uncontrollable urethral or rectal bleeding, or inability to urinate/urinary retention  No sex/ejaculation x3 days, no riding mowers/tractors/bikes x2-4 weeks  Drink plenty of water may see blood in urine     Will await biopsy pathology, which if benign could consider minimally invasive BPH interventions

## 2018-12-19 VITALS
DIASTOLIC BLOOD PRESSURE: 97 MMHG | BODY MASS INDEX: 24.71 KG/M2 | TEMPERATURE: 98 F | WEIGHT: 163 LBS | HEART RATE: 86 BPM | RESPIRATION RATE: 20 BRPM | OXYGEN SATURATION: 96 % | SYSTOLIC BLOOD PRESSURE: 149 MMHG | HEIGHT: 68 IN

## 2018-12-28 LAB
ALBUMIN SERPL-MCNC: 3.7 G/DL (ref 3.6–5.1)
ALBUMIN/GLOB SERPL: 1.1 (CALC) (ref 1–2.5)
ALP SERPL-CCNC: 66 U/L (ref 40–115)
ALT SERPL-CCNC: 12 U/L (ref 9–46)
AST SERPL-CCNC: 13 U/L (ref 10–35)
BASOPHILS # BLD AUTO: 28 CELLS/UL (ref 0–200)
BASOPHILS NFR BLD AUTO: 0.4 %
BILIRUB SERPL-MCNC: 0.6 MG/DL (ref 0.2–1.2)
BUN SERPL-MCNC: 17 MG/DL (ref 7–25)
BUN/CREAT SERPL: 12 (CALC) (ref 6–22)
CALCIUM SERPL-MCNC: 10.6 MG/DL (ref 8.6–10.3)
CHLORIDE SERPL-SCNC: 110 MMOL/L (ref 98–110)
CO2 SERPL-SCNC: 23 MMOL/L (ref 20–32)
CREAT SERPL-MCNC: 1.39 MG/DL (ref 0.7–1.18)
CRP SERPL-MCNC: 0.9 MG/L
EOSINOPHIL # BLD AUTO: 71 CELLS/UL (ref 15–500)
EOSINOPHIL NFR BLD AUTO: 1 %
ERYTHROCYTE [DISTWIDTH] IN BLOOD BY AUTOMATED COUNT: 13.9 % (ref 11–15)
GFR SERPL CREATININE-BSD FRML MDRD: 51 ML/MIN/1.73M2
GLOBULIN SER CALC-MCNC: 3.3 G/DL (CALC) (ref 1.9–3.7)
GLUCOSE SERPL-MCNC: 91 MG/DL (ref 65–99)
HCT VFR BLD AUTO: 40.3 % (ref 38.5–50)
HGB BLD-MCNC: 13.1 G/DL (ref 13.2–17.1)
LYMPHOCYTES # BLD AUTO: 1924 CELLS/UL (ref 850–3900)
LYMPHOCYTES NFR BLD AUTO: 27.1 %
MCH RBC QN AUTO: 28.9 PG (ref 27–33)
MCHC RBC AUTO-ENTMCNC: 32.5 G/DL (ref 32–36)
MCV RBC AUTO: 89 FL (ref 80–100)
MONOCYTES # BLD AUTO: 589 CELLS/UL (ref 200–950)
MONOCYTES NFR BLD AUTO: 8.3 %
NEUTROPHILS # BLD AUTO: 4487 CELLS/UL (ref 1500–7800)
NEUTROPHILS NFR BLD AUTO: 63.2 %
PLATELET # BLD AUTO: 394 THOUSAND/UL (ref 140–400)
PMV BLD REES-ECKER: 11.2 FL (ref 7.5–12.5)
POTASSIUM SERPL-SCNC: 4.5 MMOL/L (ref 3.5–5.3)
PROT SERPL-MCNC: 7 G/DL (ref 6.1–8.1)
RBC # BLD AUTO: 4.53 MILLION/UL (ref 4.2–5.8)
SODIUM SERPL-SCNC: 139 MMOL/L (ref 135–146)
WBC # BLD AUTO: 7.1 THOUSAND/UL (ref 3.8–10.8)

## 2018-12-28 NOTE — PROGRESS NOTES
[facility] Urology Progress Note    Rajendra Castle is a 73 y.o. male who presents for follow up of elevated psa s/p prostate biopsy 12/18/18    He was initially seen by YAZ López on April 2018 for increased nocturia x3 and urinary hesitancy for six months, not improved by flomax which he had been started on by PCP 3/16/2018, so it was discontinued and he was started on uroxatral 10mg daily which decd his NTF to 1x on average. Was drinking up until bedtime and had occasional urgency.   No famHx CaP. PVR 15-18cc. AUA SS: 5/2 (1:  Frequency, intermittency, urgency, weak stream, sleeping) medications helped 5/10  SHIRLEY:  30-35 g smooth benign  PSA history:  1.8 on 10/3/06;   2.73 on 08/06/2012;   2.1 on 10/03/2013;   4.1 on 04/06/2015;   3.4 on 10/14/2015;   4.8 on 03/16/2018;   2.9 (11.7% free) on 04/25/2018     Dr Aceves added finasteride 8/10/18. PSA 11/9/18 is 2.4 (9.17% free) on finasteride - so equivalent of 4.8.   Bladder scan demonstrated 53 cc. AUA symptom score:  11/4 mostly dissatisfied - (4:  Frequency; 3:  Emptying; 2:  Sleeping; 1:  Intermittency, urgency) Medications helped 3-4/10    For elevated psa and low free psa, and persistent moderate LUTS on medical management, performed prostate biopsy and cystoscopy on 12/18/18  PSA: 2.4 (9% free) on finasteride (equivalent to 4.8)  SHIRLEY: 30-35 g smooth benign  TRUS VOLUME:  20.4cm3 (W 40.7mm, H 22.6mm, L 42.4mm)  SPECIMEN: 14 Prostate Biopsy Cores: right and left base, apex, and mid - medial and lateral of each - with addition of bilateral transition zones.  ULTRASOUND FINDINGS: no distinct abnormalities, small scattered hypoechoities, normal SVs, no median lobe  CYSTO FINDINGS: significant bilateral lateral lobe hypertrophy with severe obstruction with central near kissing lobes, no median lobe, mild early trabeculations, normal bladder  PATHOLOGY: 4 cores L sided G3+4=7 prostate cancer  BIOPSIES OF 8. LEFT BASE LATERAL, 9. LEFT BASE MEDIAL, 11. LEFT MID MEDIAL, AND 12.  "LEFT APEX LATERAL: ADENOCARCINOMA TOTAL GLOBAL CAROLINA SCORE 7 (3+ 4)  Microscopic Examination  8. LB lat: 25% involvement by carcinoma. Predominantly [place] grade 3 with 10% grade 4.  9. LB med: 5% involvement. Carolina grade 3 predominant with 20% grade 4.  11. LM med: Under 5% involvement. Carolina grade 3.  12. LA lat: 10% involvement. Carolina grade 3.    He did well after the biopsy without any significant bleeding or discomfort.  No fevers chills  He has only periodically erections.  If the opportunity presents itself he may have a decent erection.  He does not seek sex.  He would rather give up erections than have cancer.  LUTS as above  No establish cardiology care though has had past workups during hospital admissions with a stress and echo in 2014 and a repeat workup in 2016 for chest pain admit.  He    ROS: A comprehensive 10 system review was performed and is negative except as noted above in HPI    PHYSICAL EXAM:    Vitals:    12/31/18 1232   BP: 129/75   Pulse: 90   Resp: 18   Temp: 99.1 °F (37.3 °C)     Body mass index is 24.77 kg/m². Weight: 73.9 kg (162 lb 14.7 oz) Height: 5' 8" (172.7 cm)       General: Alert, cooperative, no distress, appears stated age   Head: Normocephalic, without obvious abnormality, atraumatic   Eyes: PERRL, conjunctiva/corneas clear   Lungs: Respirations unlabored   Heart: Warm and well perfused   Abdomen: soft NT ND  Extremities: Extremities normal, atraumatic, no cyanosis or edema   Skin: Skin color, texture, turgor normal, no rashes or lesions   Psych: Appropriate   Neurologic: Non-focal         ASSESSMENT   1. Prostate cancer  Diet NPO    Case Request Operating Room: ROBOTIC PROSTATECTOMY, LYMPHADENECTOMY, pelvic    Admit to Inpatient    Reason for No Pharmacological VTE Prophylaxis    Place KRISTY hose    Place sequential compression device    Basic metabolic panel    CBC auto differential    Type And Screen Preop    EKG 12-lead    X-Ray Chest PA And Lateral     Protime-INR "     Plan    I sat with the patient and explained in detail his diagnosis of prostate cancer, including explanation of robbie grading system, and went over all the treatment options for management of his prostate cancer. The treatment options include detailed discussions of surveillance, radiation treatment including external beam as well as brachytherapy, and surgical extirpative therapy namely robotic prostatectomy. We did discuss that given his pathology of intermediate risk robbie 7 CaP that he is not a candidate for active surveillance. Therefore risks and benefits of radiation and surgery were discussed in detail and I answered many questions about various aspects of both options, of which I answered radiation related questions to the best of my ability.     We discussed radical prostatectomy. The procedure in general including hospital stay and postoperative process were discussed in detail. Risks of surgery were discussed including but not limited to up-front urinary incontinence and erectile dysfunction which we will work to overcome with kegel excercises and any number of ED therapies, versus side effects of radiation which are often minimal and irritative upfront with more troubling complications such as stricture and radiation cystitis occurring late. We also briefly discussed psa monitoring post-treatment and had brief discussion about psa recurrence, noting radiation could be used as salvage therapy after surgery but not often vice versa. We discussed concurrent pelvic lymphadenectomy with surgery, and that sometimes lymph nodes are included in radiation fields dependent on risk. Also discussed concurrent use of ADT with radiation and its side effects such as fatigue, hot flashes, ED.      I did use the MSKCC nomogram, noting it was based on surgical outcomes and final pathology review, but used to assess risk and profile his disease process. Demonstrated 99% disease specific survival and 89% 5-year /  81% 10-year progression-free survival after prostatectomy, with 48% risk of JULIEN with 2% risk of LN involvement, 2% risk of SVI. He and his wife had several good questions which were all answered in detail. I also provided a copy of the [facility] patient guide to Localized Prostate Cancer as well as NCCN prostate cancer patient guideline booklet. At this time I encouraged he to read through the materials provided and make notes with any questions. I offered to make a referral to a radiation-oncologist to discuss radiation option further, and he deferred at this time. I did elaborate on the urologist's involvement in radiation treatment such as starting ADT and placing fiducial markers/Space[employer] about 6 weeks later before starting XRT. The need for monitoring his PSA postoperatively was also discussed with the patient regardless of treatment modality selected.      We did briefly discuss minimally invasive/robotic prostatectomy, and a pamphlet on robotic prostatectomy was provided. Specifics to surgical procedure, hospital stay, and recovery were discussed. I encouraged him to read through all provided materials and make a list of questions.  After extensive discussion he did indicate that he is most likely inclined to proceed with surgical management and would like to tentatively plan this.      Robotic prostatectomy scheduled in the OR for 2/20/19  I will see him back on 2/11/19 at 1145am to preop, discuss in further detail, answer questions, sign consents. He will have PAT appointment to follow our visit that day.  Given history of chest pain despite reported negative workups, and age >70, I advised of need to establish with cardiologist for preop eval/workup/clearance and referral sent to [facility] Cardiology group.  Will also CC PCP Dr Aceves for medical preop optimization of chronic medical problems and medical clearance for surgery.     All questions he had were answered.   45 minutes spent in face to  face encounter with patient and wife, over half in counseling

## 2018-12-31 ENCOUNTER — OFFICE VISIT (OUTPATIENT)
Dept: UROLOGY | Facility: CLINIC | Age: 73
End: 2018-12-31
Payer: MEDICARE

## 2018-12-31 VITALS
WEIGHT: 162.94 LBS | TEMPERATURE: 99 F | DIASTOLIC BLOOD PRESSURE: 75 MMHG | SYSTOLIC BLOOD PRESSURE: 129 MMHG | BODY MASS INDEX: 24.7 KG/M2 | RESPIRATION RATE: 18 BRPM | HEIGHT: 68 IN | HEART RATE: 90 BPM

## 2018-12-31 DIAGNOSIS — R58 BLEEDING: ICD-10-CM

## 2018-12-31 DIAGNOSIS — C61 PROSTATE CANCER: Primary | ICD-10-CM

## 2018-12-31 PROCEDURE — 3078F PR MOST RECENT DIASTOLIC BLOOD PRESSURE < 80 MM HG: ICD-10-PCS | Mod: CPTII,S$GLB,, | Performed by: UROLOGY

## 2018-12-31 PROCEDURE — 99215 PR OFFICE/OUTPT VISIT, EST, LEVL V, 40-54 MIN: ICD-10-PCS | Mod: S$GLB,,, | Performed by: UROLOGY

## 2018-12-31 PROCEDURE — 99215 OFFICE O/P EST HI 40 MIN: CPT | Mod: S$GLB,,, | Performed by: UROLOGY

## 2018-12-31 PROCEDURE — 3074F PR MOST RECENT SYSTOLIC BLOOD PRESSURE < 130 MM HG: ICD-10-PCS | Mod: CPTII,S$GLB,, | Performed by: UROLOGY

## 2018-12-31 PROCEDURE — 99999 PR PBB SHADOW E&M-EST. PATIENT-LVL V: ICD-10-PCS | Mod: PBBFAC,,, | Performed by: UROLOGY

## 2018-12-31 PROCEDURE — 1101F PT FALLS ASSESS-DOCD LE1/YR: CPT | Mod: CPTII,S$GLB,, | Performed by: UROLOGY

## 2018-12-31 PROCEDURE — 1101F PR PT FALLS ASSESS DOC 0-1 FALLS W/OUT INJ PAST YR: ICD-10-PCS | Mod: CPTII,S$GLB,, | Performed by: UROLOGY

## 2018-12-31 PROCEDURE — 3074F SYST BP LT 130 MM HG: CPT | Mod: CPTII,S$GLB,, | Performed by: UROLOGY

## 2018-12-31 PROCEDURE — 99999 PR PBB SHADOW E&M-EST. PATIENT-LVL V: CPT | Mod: PBBFAC,,, | Performed by: UROLOGY

## 2018-12-31 PROCEDURE — 3078F DIAST BP <80 MM HG: CPT | Mod: CPTII,S$GLB,, | Performed by: UROLOGY

## 2018-12-31 PROCEDURE — 99499 RISK ADDL DX/OHS AUDIT: ICD-10-PCS | Mod: S$GLB,,, | Performed by: UROLOGY

## 2018-12-31 PROCEDURE — 99499 UNLISTED E&M SERVICE: CPT | Mod: S$GLB,,, | Performed by: UROLOGY

## 2018-12-31 NOTE — Clinical Note
"Please print note and send to [facility] cardiology group noting:Attached are clinical notes and copies of previous cardiac testing as context for preop clearance for which you already should have received referral to establish care and provide cardiac preop eval/clearance for robotic prostatectomy on 2/20/19."** as well as note and coversheet above, send facesheet and:- 8/18/16 echo and ekg, 8/19/16 stress test and 2/21/14 stress echo (make sure ekg is both report and physical ekg tracing)"

## 2019-01-01 DIAGNOSIS — I10 ESSENTIAL HYPERTENSION: ICD-10-CM

## 2019-01-02 RX ORDER — AMLODIPINE BESYLATE 2.5 MG/1
TABLET ORAL
Qty: 90 TABLET | Refills: 0 | Status: SHIPPED | OUTPATIENT
Start: 2019-01-02 | End: 2019-04-02 | Stop reason: SDUPTHER

## 2019-01-11 ENCOUNTER — TELEPHONE (OUTPATIENT)
Dept: UROLOGY | Facility: CLINIC | Age: 74
End: 2019-01-11

## 2019-01-11 NOTE — TELEPHONE ENCOUNTER
Kvng Anton at Dr Estevez(cardio) office regarding f/u fax on pt she states they received packet and pt is scheduled for 2/4. Hasnt been able to contact pt to inform him of the appt.

## 2019-01-18 ENCOUNTER — HOSPITAL ENCOUNTER (OUTPATIENT)
Dept: RADIOLOGY | Facility: CLINIC | Age: 74
Discharge: HOME | End: 2019-01-18
Attending: INTERNAL MEDICINE
Payer: MEDICARE

## 2019-01-18 DIAGNOSIS — E04.2 MULTINODULAR GOITER: ICD-10-CM

## 2019-01-18 DIAGNOSIS — R79.89 ELEVATED PARATHYROID HORMONE: ICD-10-CM

## 2019-01-18 DIAGNOSIS — M81.0 OSTEOPOROSIS, UNSPECIFIED OSTEOPOROSIS TYPE, UNSPECIFIED PATHOLOGICAL FRACTURE PRESENCE: ICD-10-CM

## 2019-01-18 PROCEDURE — 76536 US EXAM OF HEAD AND NECK: CPT | Mod: 26,,, | Performed by: RADIOLOGY

## 2019-01-18 PROCEDURE — 76536 US EXAM OF HEAD AND NECK: CPT | Mod: TC,PO

## 2019-01-18 PROCEDURE — 76536 US SOFT TISSUE HEAD NECK THYROID: ICD-10-PCS | Mod: 26,,, | Performed by: RADIOLOGY

## 2019-01-18 PROCEDURE — 77080 DXA BONE DENSITY AXIAL: CPT | Mod: TC,PO

## 2019-01-18 PROCEDURE — 77080 DEXA BONE DENSITY SPINE HIP: ICD-10-PCS | Mod: 26,,, | Performed by: RADIOLOGY

## 2019-01-18 PROCEDURE — 77080 DXA BONE DENSITY AXIAL: CPT | Mod: 26,,, | Performed by: RADIOLOGY

## 2019-01-23 ENCOUNTER — PATIENT OUTREACH (OUTPATIENT)
Dept: ADMINISTRATIVE | Facility: HOSPITAL | Age: 74
End: 2019-01-23

## 2019-01-23 DIAGNOSIS — Z12.11 COLON CANCER SCREENING: Primary | ICD-10-CM

## 2019-01-23 NOTE — LETTER
"January 23, 2019    Rajendra Castle  [address] [facility] [address]             [facility]  [address]  [facility] [address]  Phone: [phone] Dear Calvin [facility] is committed to your overall health and would like to ensure that you are up to date on your recommended test and/or procedures.   Irvin Aceves MD  has found that your chart shows you may be due for the following:    COLORECTAL CANCER SCREENING    If you have already had your screening, or you have made an appointment for your screening, congratulations!  You're on the road to good health. If you haven't signed up for a colorectal screening please accept this invitation to be screened.      According to the American Cancer Society, colon cancer is the third most common cancer for people in the [place].  A simple screening test "Fit Kit" - done in your own home - can help find colon cancer at an early stage when it can be treated, even before any signs or symptoms develop.THIS IS A YEARLY TEST    Testing for blood in your stool (feces or bowel movement) is the first step. If you have an upcoming visit with your Primary Care Physician you can  a Fit Kit during your visit or you can  a Fit Kit at your Primary Care Clinic prior to your visit.    The Fit Kit includes:    · Instructions on how to perform the test  · (1) Sheet of tissue paper  · (1) Small Absorption pad  · (1) Bottle to hold the sample and a small probe to help you take the sample  · (1) Small plastic bio-hazard bag  · (1) Postage-paid return envelope    Please do your test (the instructions will tell you how) and then return your sample in the postage-paid return envelope within 24 hours of collection.     If your test results are negative, you won't need testing again for another year.  If results show you need more testing, we will call you with next steps.    Regular colorectal cancer screening is one of the most powerful weapons for preventing " "colon cancer.  The website https://www.cancer.org/cancer/colon-rectal-cancer.html can answer many of your questions about this cancer and its prevention.  Just search for "colorectal cancer".  If you have any questions please call [facility] [phone] for assistance.    If you have had any of the above done at another facility, please let us know so that we may obtain copies from that facility.  If you have a copy of these records, please provide a copy for us to scan into your chart.  You are welcome to request that the report be faxed to us at  ([phone]).     Otherwise, please schedule these appointments at your earliest convenience by calling [phone] or going to [email or web address].    If you have an upcoming scheduled appointment for the item above, please disregard this letter.    Sincerely,  Your [facility]  MD Michelle Briscoe LPN Clinical Care Coordinator  [facility] [address] [place] [address]  Phone [phone]  Fax [phone]         "

## 2019-01-25 ENCOUNTER — OFFICE VISIT (OUTPATIENT)
Dept: ENDOCRINOLOGY | Facility: CLINIC | Age: 74
End: 2019-01-25
Payer: MEDICARE

## 2019-01-25 VITALS
WEIGHT: 166 LBS | DIASTOLIC BLOOD PRESSURE: 66 MMHG | SYSTOLIC BLOOD PRESSURE: 140 MMHG | BODY MASS INDEX: 25.16 KG/M2 | HEIGHT: 68 IN | HEART RATE: 79 BPM

## 2019-01-25 DIAGNOSIS — E04.2 MULTINODULAR GOITER: ICD-10-CM

## 2019-01-25 DIAGNOSIS — M81.0 OSTEOPOROSIS, UNSPECIFIED OSTEOPOROSIS TYPE, UNSPECIFIED PATHOLOGICAL FRACTURE PRESENCE: ICD-10-CM

## 2019-01-25 DIAGNOSIS — E21.0 PRIMARY HYPERPARATHYROIDISM: Primary | ICD-10-CM

## 2019-01-25 PROCEDURE — 1101F PT FALLS ASSESS-DOCD LE1/YR: CPT | Mod: CPTII,S$GLB,, | Performed by: INTERNAL MEDICINE

## 2019-01-25 PROCEDURE — 99499 RISK ADDL DX/OHS AUDIT: ICD-10-PCS | Mod: S$GLB,,, | Performed by: INTERNAL MEDICINE

## 2019-01-25 PROCEDURE — 99999 PR PBB SHADOW E&M-EST. PATIENT-LVL III: CPT | Mod: PBBFAC,,, | Performed by: INTERNAL MEDICINE

## 2019-01-25 PROCEDURE — 3077F PR MOST RECENT SYSTOLIC BLOOD PRESSURE >= 140 MM HG: ICD-10-PCS | Mod: CPTII,S$GLB,, | Performed by: INTERNAL MEDICINE

## 2019-01-25 PROCEDURE — 99214 PR OFFICE/OUTPT VISIT, EST, LEVL IV, 30-39 MIN: ICD-10-PCS | Mod: S$GLB,,, | Performed by: INTERNAL MEDICINE

## 2019-01-25 PROCEDURE — 3078F DIAST BP <80 MM HG: CPT | Mod: CPTII,S$GLB,, | Performed by: INTERNAL MEDICINE

## 2019-01-25 PROCEDURE — 99499 UNLISTED E&M SERVICE: CPT | Mod: S$GLB,,, | Performed by: INTERNAL MEDICINE

## 2019-01-25 PROCEDURE — 1101F PR PT FALLS ASSESS DOC 0-1 FALLS W/OUT INJ PAST YR: ICD-10-PCS | Mod: CPTII,S$GLB,, | Performed by: INTERNAL MEDICINE

## 2019-01-25 PROCEDURE — 3078F PR MOST RECENT DIASTOLIC BLOOD PRESSURE < 80 MM HG: ICD-10-PCS | Mod: CPTII,S$GLB,, | Performed by: INTERNAL MEDICINE

## 2019-01-25 PROCEDURE — 99999 PR PBB SHADOW E&M-EST. PATIENT-LVL III: ICD-10-PCS | Mod: PBBFAC,,, | Performed by: INTERNAL MEDICINE

## 2019-01-25 PROCEDURE — 99214 OFFICE O/P EST MOD 30 MIN: CPT | Mod: S$GLB,,, | Performed by: INTERNAL MEDICINE

## 2019-01-25 PROCEDURE — 3077F SYST BP >= 140 MM HG: CPT | Mod: CPTII,S$GLB,, | Performed by: INTERNAL MEDICINE

## 2019-01-25 RX ORDER — CINACALCET 30 MG/1
30 TABLET, FILM COATED ORAL
Qty: 30 TABLET | Refills: 11 | Status: SHIPPED | OUTPATIENT
Start: 2019-01-25 | End: 2019-02-06

## 2019-01-25 NOTE — PROGRESS NOTES
CHIEF COMPLAINT: Elevated PTH/Osteoporosis/thyroid nodules.   73 year old being seen as a f/u. Was seeing Dr. Lopez and Dr. Sampson before that. On Fosamax. No fractures. No kidney stones. No HCTZ. Tolerating fosamax. No N/V or abd discomfort. Has not missed any doses of fosamax. No fractures. No kidney stones.         PAST MEDICAL HISTORY/PAST SURGICAL HISTORY:  Reviewed in [facility]    SOCIAL HISTORY: No T/A    FAMILY HISTORY:  No osteoporosis. No Hip fractures. + CHF. + Dm 2.     MEDICATIONS/ALLERGIES: The patient's MedCard has been updated and reviewed.      ROS:   Constitutional: No recent significant weight change  Eyes: No recent visual changes  ENT: No dysphagia  Cardiovascular: Denies current anginal symptoms  Respiratory: Denies current respiratory difficulty  Gastrointestinal: Denies recent bowel disturbances  GenitoUrinary - No polyuria.   Skin: No new skin rash  Neurologic: No focal neurologic complaints  Remainder ROS negative        PE:    GENERAL: Well developed, well nourished.  NECK: Supple, trachea midline, right nodule palpable.   CHEST: Resp even and unlabored, CTA bilateral.  CARDIAC: RRR, S1, S2 heard, no murmurs, rubs, S3, or S4      Results for JONATHAN BHAKTA (MRN [id number]) as of 1/25/2019 15:36   Ref. Range 1/18/2019 10:15   Sodium Latest Ref Range: 136 - 145 mmol/L 141   Potassium Latest Ref Range: 3.5 - 5.1 mmol/L 4.4   Chloride Latest Ref Range: 95 - 110 mmol/L 113 (H)   CO2 Latest Ref Range: 23 - 29 mmol/L 20 (L)   Anion Gap Latest Ref Range: 8 - 16 mmol/L 8   BUN, Bld Latest Ref Range: 8 - 23 mg/dL 15   Creatinine Latest Ref Range: 0.5 - 1.4 mg/dL 1.4   eGFR if non African American Latest Ref Range: >60 mL/min/1.73 m^2 50.2 (A)   eGFR if African American Latest Ref Range: >60 mL/min/1.73 m^2 58.0 (A)   Glucose Latest Ref Range: 70 - 110 mg/dL 94   Calcium Latest Ref Range: 8.7 - 10.5 mg/dL 11.0 (H)   Phosphorus Latest Ref Range: 2.7 - 4.5 mg/dL 2.4 (L)   Alkaline Phosphatase Latest Ref Range:  Problem: SLP Goal  Goal: SLP Goal  Goals expected to be met by 1/7/19  1. Pt will tolerate puree diet with no overt s/s of aspiration.  2. Pt will participate in advanced diet trials with no overt s/s of aspiration to determine safest/least restrictive PO diet           Patient seen for continued swallow assessment.     Shaun Reyna, CF-SLP  Speech-Language Pathology  Pager: [phone]          55 - 135 U/L 63   Total Protein Latest Ref Range: 6.0 - 8.4 g/dL 6.8   Albumin Latest Ref Range: 3.5 - 5.2 g/dL 3.2 (L)   Total Bilirubin Latest Ref Range: 0.1 - 1.0 mg/dL 0.5   AST Latest Ref Range: 10 - 40 U/L 14   ALT Latest Ref Range: 10 - 44 U/L 13   TSH Latest Ref Range: 0.400 - 4.000 uIU/mL 1.037   PTH Latest Ref Range: 9.0 - 77.0 pg/mL 368.0 (H)     Corrected Ca 11.6    DEXA BONE DENSITY SPINE HIP    CLINICAL HISTORY:  Age-related osteoporosis without current pathological fracture    TECHNIQUE:  DXA scanning was performed over the left hip and lumbar spine.  Review of the images confirms satisfactory positioning and technique.    COMPARISON:  9/23/2016    FINDINGS:  The L1 to L4 vertebral bone mineral density is equal to 1.043 g/cm squared with a T score of -1.4.  There has been 5% decrease relative to the prior study but with not similar scan types or analysis methods.    The left femoral neck bone mineral density is equal to 0.584 g/cm squared with a T score of -3.1.  There has been  4.3% decrease relative to the prior study but with not similar scan types or analysis methods.    The total hip bone mineral density is equal to 0.833 g/cm squared with a T score of -2.0.    There is a 5.8% risk of a major osteoporotic fracture and a 2.2% risk of hip fracture in the next 10 years (FRAX).      Impression       Osteoporosis     US SOFT TISSUE HEAD NECK THYROID    CLINICAL HISTORY:  Age-related osteoporosis without current pathological fracture    TECHNIQUE:  Ultrasound of the thyroid and cervical lymph nodes was performed.    COMPARISON:  Thyroid ultrasound dated 03/14/2018, 11/16/2016, 05/20/2016    FINDINGS:  The thyroid gland remains enlarged.  The right lobe measures 4.7 x 2.9 x 2.3 cm with an estimated volume of 16.2 mL.  The thyroid isthmus measures 5 mm in thickness.  The left lobe measures 5.0 x 1.9 x 2.2 cm with an estimated volume of 11.4 mL.  Total thyroid volume is 27.6 mL with no significant  change.    There is a stable 6 mm solid nodule in the lower pole of the right lobe.  There is a 1.9 x 1.1 x 0.9 cm hypoechoic nodule posteriorly in the medial mid to upper pole of the right lobe.  This nodule was present previously.  On the prior studies, this was mostly cystic but there are internal echoes suggesting possible proteinaceous fluid or hypoechoic solid nodule.  This is not significantly changed in size but echogenicity has slightly changed.  There are no calcifications.    There is a stable small 2 mm cystic nodule in the midpole of the left lobe.      Impression       1. The thyroid gland remains enlarged.  There is a 1.9 x 1.1 x 0.9 cm hypoechoic nodule in the posterior aspect of the medial upper to midpole of the right lobe.  This was present previously.  On earlier studies, this nodule appeared to represent a cystic nodule.  Currently, this represents a hypoechoic nodule which could represent proteinaceous fluid within a cyst or possible solid nodule without definite change in size and without calcifications.  This nodule does meet criteria for FNA or biopsy based on size criteria but it is stable.  This report was flagged in Epic as abnormal.           ASSESSMENT/PLAN:  1. Elevated PTH- Most likely primary hyperparathyroidism. Will repeat 24 hr urine Ca/Cr as last Ca was low. However less likely to be FHH as he has had normal calciums in the past. Discussed surgical indications. Does not want surgery and would prefer to treat medically. Will start sensipar 30 mg daily, but will need to do urine 1st. Discussed s/e.     2. Osteoporosis- on fosomax and tolerating well. He did have some loss but will monitor for now.     3. Multinodular Goiter- [facility] US does not show significant change will repeat in 1 yr      FOLLOWUP  24 hour urine Ca/Cr  4 weeks- CMP, PTH, Phos  F/U 6 months- CMP, PTH, Phos. Vit D

## 2019-01-28 ENCOUNTER — DOCUMENTATION ONLY (OUTPATIENT)
Dept: ADMINISTRATIVE | Facility: HOSPITAL | Age: 74
End: 2019-01-28

## 2019-01-28 ENCOUNTER — LAB VISIT (OUTPATIENT)
Dept: LAB | Facility: HOSPITAL | Age: 74
End: 2019-01-28
Attending: INTERNAL MEDICINE
Payer: MEDICARE

## 2019-01-28 DIAGNOSIS — E21.0 PRIMARY HYPERPARATHYROIDISM: ICD-10-CM

## 2019-01-28 PROCEDURE — 82340 ASSAY OF CALCIUM IN URINE: CPT

## 2019-01-28 PROCEDURE — 82570 ASSAY OF URINE CREATININE: CPT

## 2019-01-29 LAB
CALCIUM 24H UR-MRATE: 5 MG/HR
CALCIUM UR-MCNC: 10.4 MG/DL
CALCIUM URINE (MG/SPEC): 125 MG/SPEC
CREAT 24H UR-MRATE: 51 MG/HR
CREAT UR-MCNC: 102 MG/DL
CREATININE, URINE (MG/SPEC): 1224 MG/SPEC
URINE COLLECTION DURATION: 24 HR
URINE COLLECTION DURATION: 24 HR
URINE VOLUME: 1200 ML
URINE VOLUME: 1200 ML

## 2019-01-30 ENCOUNTER — HOSPITAL ENCOUNTER (OUTPATIENT)
Dept: RADIOLOGY | Facility: HOSPITAL | Age: 74
Discharge: HOME | End: 2019-01-30
Attending: FAMILY MEDICINE
Payer: MEDICARE

## 2019-01-30 ENCOUNTER — LAB VISIT (OUTPATIENT)
Dept: LAB | Facility: HOSPITAL | Age: 74
End: 2019-01-30
Attending: FAMILY MEDICINE
Payer: MEDICARE

## 2019-01-30 DIAGNOSIS — D49.1 NEOPLASM OF LUNG: ICD-10-CM

## 2019-01-30 DIAGNOSIS — Z12.9 SCREENING FOR CANCER: ICD-10-CM

## 2019-01-30 DIAGNOSIS — Z12.11 COLON CANCER SCREENING: ICD-10-CM

## 2019-01-30 PROCEDURE — 71250 CT THORAX DX C-: CPT | Mod: TC

## 2019-01-30 PROCEDURE — 71250 CT CHEST WITHOUT CONTRAST: ICD-10-PCS | Mod: 26,,, | Performed by: RADIOLOGY

## 2019-01-30 PROCEDURE — 82274 ASSAY TEST FOR BLOOD FECAL: CPT

## 2019-01-30 PROCEDURE — 71250 CT THORAX DX C-: CPT | Mod: 26,,, | Performed by: RADIOLOGY

## 2019-01-31 ENCOUNTER — TELEPHONE (OUTPATIENT)
Dept: FAMILY MEDICINE | Facility: CLINIC | Age: 74
End: 2019-01-31

## 2019-01-31 LAB — HEMOCCULT STL QL IA: NEGATIVE

## 2019-01-31 NOTE — TELEPHONE ENCOUNTER
Called Pt regarding results, understanding verbalized, future appt  Confirmed, no further questions/concerns

## 2019-02-01 DIAGNOSIS — M81.0 OSTEOPOROSIS, UNSPECIFIED OSTEOPOROSIS TYPE, UNSPECIFIED PATHOLOGICAL FRACTURE PRESENCE: Primary | ICD-10-CM

## 2019-02-01 DIAGNOSIS — R79.89 ELEVATED PTHRP LEVEL: ICD-10-CM

## 2019-02-06 ENCOUNTER — TELEPHONE (OUTPATIENT)
Dept: UROLOGY | Facility: CLINIC | Age: 74
End: 2019-02-06

## 2019-02-06 ENCOUNTER — OFFICE VISIT (OUTPATIENT)
Dept: FAMILY MEDICINE | Facility: CLINIC | Age: 74
End: 2019-02-06
Payer: MEDICARE

## 2019-02-06 ENCOUNTER — TELEPHONE (OUTPATIENT)
Dept: ENDOCRINOLOGY | Facility: CLINIC | Age: 74
End: 2019-02-06

## 2019-02-06 VITALS
HEART RATE: 75 BPM | SYSTOLIC BLOOD PRESSURE: 127 MMHG | DIASTOLIC BLOOD PRESSURE: 72 MMHG | WEIGHT: 162 LBS | TEMPERATURE: 98 F | HEIGHT: 68 IN | BODY MASS INDEX: 24.55 KG/M2

## 2019-02-06 DIAGNOSIS — E21.3 HYPERPARATHYROIDISM: Primary | ICD-10-CM

## 2019-02-06 DIAGNOSIS — M05.79 RHEUMATOID ARTHRITIS INVOLVING MULTIPLE SITES WITH POSITIVE RHEUMATOID FACTOR: ICD-10-CM

## 2019-02-06 DIAGNOSIS — C61 PROSTATE CANCER: ICD-10-CM

## 2019-02-06 DIAGNOSIS — N40.0 BENIGN PROSTATIC HYPERPLASIA, UNSPECIFIED WHETHER LOWER URINARY TRACT SYMPTOMS PRESENT: Primary | ICD-10-CM

## 2019-02-06 DIAGNOSIS — E21.3 HYPERPARATHYROIDISM: ICD-10-CM

## 2019-02-06 DIAGNOSIS — Z79.52 CURRENT CHRONIC USE OF SYSTEMIC STEROIDS: ICD-10-CM

## 2019-02-06 DIAGNOSIS — J43.8 OTHER EMPHYSEMA: ICD-10-CM

## 2019-02-06 DIAGNOSIS — R06.09 DYSPNEA ON EXERTION: Chronic | ICD-10-CM

## 2019-02-06 DIAGNOSIS — F12.20 CANNABIS DEPENDENCE, UNCOMPLICATED: ICD-10-CM

## 2019-02-06 DIAGNOSIS — I10 ESSENTIAL HYPERTENSION: ICD-10-CM

## 2019-02-06 DIAGNOSIS — M05.10 RHEUMATOID LUNG: ICD-10-CM

## 2019-02-06 DIAGNOSIS — M50.30 DDD (DEGENERATIVE DISC DISEASE), CERVICAL: ICD-10-CM

## 2019-02-06 PROCEDURE — 1101F PR PT FALLS ASSESS DOC 0-1 FALLS W/OUT INJ PAST YR: ICD-10-PCS | Mod: CPTII,S$GLB,, | Performed by: FAMILY MEDICINE

## 2019-02-06 PROCEDURE — 99999 PR PBB SHADOW E&M-EST. PATIENT-LVL III: CPT | Mod: PBBFAC,,, | Performed by: FAMILY MEDICINE

## 2019-02-06 PROCEDURE — 3074F SYST BP LT 130 MM HG: CPT | Mod: CPTII,S$GLB,, | Performed by: FAMILY MEDICINE

## 2019-02-06 PROCEDURE — 1101F PT FALLS ASSESS-DOCD LE1/YR: CPT | Mod: CPTII,S$GLB,, | Performed by: FAMILY MEDICINE

## 2019-02-06 PROCEDURE — 99214 OFFICE O/P EST MOD 30 MIN: CPT | Mod: S$GLB,,, | Performed by: FAMILY MEDICINE

## 2019-02-06 PROCEDURE — 99999 PR PBB SHADOW E&M-EST. PATIENT-LVL III: ICD-10-PCS | Mod: PBBFAC,,, | Performed by: FAMILY MEDICINE

## 2019-02-06 PROCEDURE — 3078F PR MOST RECENT DIASTOLIC BLOOD PRESSURE < 80 MM HG: ICD-10-PCS | Mod: CPTII,S$GLB,, | Performed by: FAMILY MEDICINE

## 2019-02-06 PROCEDURE — 99214 PR OFFICE/OUTPT VISIT, EST, LEVL IV, 30-39 MIN: ICD-10-PCS | Mod: S$GLB,,, | Performed by: FAMILY MEDICINE

## 2019-02-06 PROCEDURE — 3074F PR MOST RECENT SYSTOLIC BLOOD PRESSURE < 130 MM HG: ICD-10-PCS | Mod: CPTII,S$GLB,, | Performed by: FAMILY MEDICINE

## 2019-02-06 PROCEDURE — 99499 RISK ADDL DX/OHS AUDIT: ICD-10-PCS | Mod: S$GLB,,, | Performed by: FAMILY MEDICINE

## 2019-02-06 PROCEDURE — 99499 UNLISTED E&M SERVICE: CPT | Mod: S$GLB,,, | Performed by: FAMILY MEDICINE

## 2019-02-06 PROCEDURE — 3078F DIAST BP <80 MM HG: CPT | Mod: CPTII,S$GLB,, | Performed by: FAMILY MEDICINE

## 2019-02-06 RX ORDER — DUTASTERIDE 0.5 MG/1
0.5 CAPSULE, LIQUID FILLED ORAL DAILY
Qty: 30 CAPSULE | Refills: 11 | Status: SHIPPED | OUTPATIENT
Start: 2019-02-06 | End: 2019-06-03

## 2019-02-06 NOTE — TELEPHONE ENCOUNTER
Patient is having second thoughts about having the prostatectomy.  He is now considering radiation.  Will keep appt 2/11 to discuss.

## 2019-02-06 NOTE — TELEPHONE ENCOUNTER
Due to cost, or s/e? Does he want to come in to discuss. That is the only thing that we have to lower calcium outside of surgery

## 2019-02-06 NOTE — TELEPHONE ENCOUNTER
----- Message from Rivera Rey sent at 2/6/2019 10:54 AM CST -----  Contact: Patient  Type: Needs Medical Advice    Who Called:  Patient  Best Call Back Number: [phone]  Additional Information: Patient has questions and concerns about procedure scheduled on 2/20/19. Robotically assisted prostatectomy. Please advise

## 2019-02-06 NOTE — PROGRESS NOTES
Subjective:       Patient ID: Rajendra Castle is a 73 y.o. male.    Chief Complaint: Annual Exam and Hypertension  HT with controlled BP. He has CKD III, Hyperparathyroid, LISA, and prostate cancer [place] 7. No edema, good apetite, good compliance. He has been taking Vit D even though he was instructed to stop it due to Ca higher than 11.No heart palpitations.  HPI  Review of Systems   Constitutional: Negative for fatigue and unexpected weight change.   Respiratory: Positive for shortness of breath. Negative for chest tightness.    Cardiovascular: Negative for chest pain, palpitations and leg swelling.   Gastrointestinal: Negative for abdominal pain.   Musculoskeletal: Positive for arthralgias, back pain, neck pain and neck stiffness.   Neurological: Negative for dizziness, syncope, light-headedness and headaches.       Patient Active Problem List   Diagnosis    Heart murmur    Hypertension    Arthritis    Rotator cuff tendinitis    Dyspnea on exertion    DDD (degenerative disc disease), cervical    Anemia, iron deficiency    Chronic GI bleeding    Arthritis of wrist, right    Trigger thumb of left hand    Arthritis of right wrist    Chest pain    Essential hypertension    Rheumatoid arthritis involving multiple sites    Elevated troponin    Nausea and vomiting    Nontoxic multinodular goiter    Gastroesophageal reflux disease without esophagitis    Nodular thyroid disease    Prediabetes    Osteoporosis    Hypogonadism in male    Current chronic use of systemic steroids    Vitamin D insufficiency    Hyperparathyroidism    Pulmonary emphysema    Rheumatoid lung    Lung nodule    CKD (chronic kidney disease)    Elevated PSA       Objective:      Physical Exam   Constitutional: He is oriented to person, place, and time. He appears well-developed.   Neck: Trachea normal. Normal carotid pulses and no hepatojugular reflux present. Muscular tenderness present. Decreased range of motion present.  No thyroid mass present.   Cardiovascular: Normal rate, regular rhythm and normal heart sounds.   Pulmonary/Chest: Effort normal. He has decreased breath sounds in the right lower field and the left lower field.   Musculoskeletal: He exhibits no edema.   Neurological: He is alert and oriented to person, place, and time.   Skin: Skin is warm and dry.   Psychiatric: He has a normal mood and affect.   Nursing note and vitals reviewed.      Lab Results   Component Value Date    WBC 7.1 12/27/2018    HGB 13.1 (L) 12/27/2018    HCT 40.3 12/27/2018     12/27/2018    CHOL 156 09/20/2016    TRIG 124 09/20/2016    HDL 39 (L) 09/20/2016    ALT 13 01/18/2019    AST 14 01/18/2019     01/18/2019    K 4.4 01/18/2019     (H) 01/18/2019    CREATININE 1.4 01/18/2019    BUN 15 01/18/2019    CO2 20 (L) 01/18/2019    TSH 1.037 01/18/2019    PSA 4.8 (H) 03/16/2018    INR 1.0 08/18/2016    HGBA1C 5.9 09/20/2016     The 10-year ASCVD risk score (Morse PETE Jr., et al., 2013) is: 19.4%    Values used to calculate the score:      Age: 73 years      Sex: Male      Is Non- : Yes      Diabetic: No      Tobacco smoker: No      Systolic Blood Pressure: 127 mmHg      Is BP treated: Yes      HDL Cholesterol: 39 mg/dL      Total Cholesterol: 156 mg/dL  Undecided about therapy for prostate cancer.   Assessment:       1. Benign prostatic hyperplasia, unspecified whether lower urinary tract symptoms present    2. Prostate cancer    3. Rheumatoid arthritis involving multiple sites with positive rheumatoid factor    4. tobaco dependence, uncomplicated    5. Other emphysema    6. Hyperparathyroidism    7. Dyspnea on exertion    8. Current chronic use of systemic steroids    9. Rheumatoid lung    10. Essential hypertension    11. DDD (degenerative disc disease), cervical        Plan:       Benign prostatic hyperplasia, unspecified whether lower urinary tract symptoms present  -     dutasteride (AVODART) 0.5 mg  capsule; Take 1 capsule (0.5 mg total) by mouth once daily.  Dispense: 30 capsule; Refill: 11    He lost his refill for Finasteride.  Patient readiness: acceptance and barriers:readiness and social stressors    During the course of the visit the patient was educated and counseled about the following:   He has a mild risk for cardiac event. He has emphysema but good exercise tolerance. He has stable CKD but should have his calcium level below 10 before surgery. He needs further counseling on prostate surgery. [employer] information.  Hypertension:   Screening for causes of secondary hypertension: GFR (chronic kidney disease) and PTH (parathyroid disease).  Dietary sodium restriction.  Regular aerobic exercise.  Check blood pressures daily and record.  Follow up: 4 months and as needed.    Goals: Hypertension: Reduce Blood Pressure    Did patient meet goals/outcomes: Yes    The following self management tools provided: blood pressure log  excercise log    Patient Instructions (the written plan) was given to the patient/family.     Time spent with patient: 30 minutes    Barriers to medications present (no )    Adverse reactions to current medications yes    Over the counter medications reviewed (Yes)         40 minutes spent counseling patient on diet, exercise, and weight loss.  This has been fully explained to the patient, who indicates understanding.

## 2019-02-06 NOTE — TELEPHONE ENCOUNTER
Spoke w/ pt.  States he never filled the sensipar because he did not want to start it yet.  States Dr. Aceves instructed him today to stop the weekly Vitamin D because it could be causing the abnormal Ca level.  Please advise.

## 2019-02-06 NOTE — TELEPHONE ENCOUNTER
----- Message from Ruby Briscoe sent at 2/6/2019  3:54 PM CST -----  Contact: Patient   Type:  Patient Returning Call    Who Called:  Patient  Who Left Message for Patient:  Nurse  Does the patient know what this is regarding?:  no  Best Call Back Number:    Additional Information:  Call to pod. No answer. Please advise.

## 2019-02-06 NOTE — Clinical Note
Please reschedule surgery after calcium is less than 10. Also patient needs further counseling regarding surgery.

## 2019-02-06 NOTE — PATIENT INSTRUCTIONS

## 2019-02-09 NOTE — PROGRESS NOTES
[facility] Urology Progress Note    Rajendra Castle is a 73 y.o. male who presents For prostate cancer follow up    He was initially seen by YAZ López on April 2018 for increased nocturia x3 and urinary hesitancy for six months, not improved by flomax which he had been started on by PCP 3/16/2018, so it was discontinued and he was started on uroxatral 10mg daily which decd his NTF to 1x on average. Was drinking up until bedtime and had occasional urgency.   No famHx CaP. PVR 15-18cc. AUA SS: 5/2 (1:  Frequency, intermittency, urgency, weak stream, sleeping) medications helped 5/10. SHIRLEY:  30-35 g smooth benign  PSA history: 1.8 on 10/3/06;   2.73 on 08/06/2012;   2.1 on 10/03/2013;   4.1 on 04/06/2015;   3.4 on 10/14/2015;   4.8 on 03/16/2018;   2.9 (11.7% free) on 04/25/2018  Dr Aceves added finasteride 8/10/18. PSA 11/9/18 is 2.4 (9.17% free) on finasteride - so equivalent of 4.8.   Bladder scan demonstrated 53 cc. AUA symptom score:  11/4 mostly dissatisfied - (4:  Frequency; 3:  Emptying; 2:  Sleeping; 1:  Intermittency, urgency) Medications helped 3-4/10     For elevated psa and low free psa, and persistent moderate LUTS on medical management, performed prostate biopsy and cystoscopy on 12/18/18  PSA: 2.4 (9% free) on finasteride (equivalent to 4.8)  SHIRLEY: 30-35 g smooth benign  TRUS VOLUME:  20.4cm3 (W 40.7mm, H 22.6mm, L 42.4mm)  SPECIMEN: 14 Prostate Biopsy Cores: right and left base, apex, and mid - medial and lateral of each - with addition of bilateral transition zones.  ULTRASOUND FINDINGS: no distinct abnormalities, small scattered hypoechoities, normal SVs, no median lobe  CYSTO FINDINGS: significant bilateral lateral lobe hypertrophy with severe obstruction with central near kissing lobes, no median lobe, mild early trabeculations, normal bladder  PATHOLOGY: 4 cores L sided G3+4=7 prostate cancer  LB lat 25% (10% pattern 4); LB med 5% (20% pattern 4); LM med <5%, LA lat 10%  He only periodically has erections.   "If the opportunity presents itself he may have a decent erection.  He does not seek sex.  He would rather give up erections than have cancer.  LUTS as above. No established cardiology care though has had past workups during hospital admissions with a stress and echo in 2014 and a repeat workup in 2016 for chest pain admit.     I last saw him 12/31/18 and discussed treatment options in detail and he deferred radon referral in favor of moving forward with prostatectomy, Referral placed to [facility] cardiology and he was sent for medical clearance as well with Dr Aceves.    2/6/19 Dr Aceves:  He has CKD III, Hyperparathyroid, LISA, and prostate cancer [place] 7. No edema, good apetite, good compliance. He has been taking Vit D even though he was instructed to stop it due to Ca higher than 11. Has been off his sensipar Rxed by endocrinology. SOB, arthralgias. Finasteride refilled.  He has emphysema but good exercise tolerance. He has stable CKD but should have his calcium level below 10 before surgery. He needs further counseling on prostate surgery.    Visit today was intended to be preop visit for prostatectomy. He does present today to reconsider prostatectomy and would like to discuss radiation.  Uroxatral has helped LUTS some, no hesitancy or intermittency, and now feels like he empties his bladder. Does have to urinate if 2-3 hours have gone by.  AUA SS largely unchanged: 12/4 (3: freq urg sleep; 2 weak stream; 1 intermittency) - meds helped 3/10  Saw cardiology at [facility] - Echo tomorrow, stress test thursday  No hematuria, dysuria      ROS: A comprehensive 10 system review was performed and is negative except as noted above in HPI    PHYSICAL EXAM:    Vitals:    02/11/19 1141   BP: (!) 146/78   Pulse: 81   Resp: 18     Body mass index is 24.14 kg/m². Weight: 72 kg (158 lb 11.7 oz) Height: 5' 8" (172.7 cm)       General: Alert, cooperative, no distress, appears stated age   Head: Normocephalic, without obvious abnormality, " atraumatic   Eyes: PERRL, conjunctiva/corneas clear   Lungs: Respirations unlabored   Heart: Warm and well perfused   Abdomen: soft nt nd   Extremities: Extremities normal, atraumatic, no cyanosis or edema   Skin: Skin color, texture, turgor normal, no rashes or lesions   Psych: Appropriate   Neurologic: Non-focal       Recent Results (from the past 336 hour(s))   POCT URINE DIPSTICK WITHOUT MICROSCOPE    Collection Time: 02/11/19 12:01 PM   Result Value Ref Range    Color, UA yellow     Spec Grav UA 1.025     pH, UA 5     WBC, UA neg     Nitrite, UA neg     Protein neg     Glucose, UA neg     Ketones, UA neg     Urobilinogen, UA neg     Bilirubin neg     Blood, UA trace        ASSESSMENT   1. Prostate cancer  POCT URINE DIPSTICK WITHOUT MICROSCOPE       Plan    Did have another extensive risk benefit discussion about surgery versus radiation.  At this time he is not cleared to proceed with surgery given his significant hypercalcemia.  He does seem to remain noncompliant with recommendations and has continued his vitamin D despite recommendations to discontinue.  He did recently agree to start Sensipar as per Dr. Murphy's recommendations.  At this time, his pending prostatectomy will be canceled, and I advised that he have consultation with Radiation Oncology and referral was placed in appointment was set up so that he can discuss proceeding with radiotherapy further.    We reviewed that to proceed with radiotherapy, he will need concurrent androgen deprivation therapy, for at least 6-12 months, but more standard up to 2 years.    We did discuss initiating ADT first with 2 weeks oral casodex, followed by Trelstar 22.5mg injection which is a 6 month depot injection.  Reviewed common side effects.  Six weeks after initiating ADT, he will have fiducial markers placed. We did discuss transrectal ultrasound guided marker placement, vs transperineal placement which could be done in conjunction with transperineal placement  of SpaceOar hydrogel into the periprostatic space between prostate base and rectum to decrease side effects/increase QOL from radiotherapy.    As well, given his moderate LUTS despite meds and long history of BPH before prostate cancer diagnosis, and cystoscopic evidence of severe lateral lobe obstruction without median lobe, also discussed the potential to use Urolift to both relieve obstruction and serve as fiducial markers. Could add SpaceOAR once healed from Urolift.  He has had mild improvement in his lower urinary tract symptoms and starting alpha-blocker, though still has persistent moderate symptoms, including urgency and frequency, and these irritative symptoms are likely to be exacerbated by radiotherapy.  Discussed conservative measures to control urgency and frequency including avoiding bladder irritants, timed voiding, not postponing voiding, and bowel regimen (as distended bowel has extrinsic compressive effect on bladder. Discussed bladder irritants include coffe (even decaf), tea, alcohol, soda, spicy foods, acidic juices (orange, tomato), vinegar, and artificial sweeteners.    Referral placed to radiation oncology, and he will discuss all the above with them, and make interdisciplinary treatment plan to proceed with XRT+ADT.  Will follow up with him after his appointment, especially given his LUTS which will play into treatment decision.    Also advised further discussion with Dr Murphy and Dr Aceves about management of his HPTH and Hypercalcemia

## 2019-02-11 ENCOUNTER — OFFICE VISIT (OUTPATIENT)
Dept: UROLOGY | Facility: CLINIC | Age: 74
End: 2019-02-11
Payer: MEDICARE

## 2019-02-11 VITALS
BODY MASS INDEX: 24.06 KG/M2 | DIASTOLIC BLOOD PRESSURE: 78 MMHG | HEART RATE: 81 BPM | WEIGHT: 158.75 LBS | RESPIRATION RATE: 18 BRPM | HEIGHT: 68 IN | SYSTOLIC BLOOD PRESSURE: 146 MMHG

## 2019-02-11 DIAGNOSIS — C61 PROSTATE CANCER: Primary | ICD-10-CM

## 2019-02-11 LAB
BILIRUB SERPL-MCNC: ABNORMAL MG/DL
BLOOD URINE, POC: ABNORMAL
COLOR, POC UA: YELLOW
GLUCOSE UR QL STRIP: ABNORMAL
KETONES UR QL STRIP: ABNORMAL
LEUKOCYTE ESTERASE URINE, POC: ABNORMAL
NITRITE, POC UA: ABNORMAL
PH, POC UA: 5
PROTEIN, POC: ABNORMAL
SPECIFIC GRAVITY, POC UA: 1.02
UROBILINOGEN, POC UA: ABNORMAL

## 2019-02-11 PROCEDURE — 99999 PR PBB SHADOW E&M-EST. PATIENT-LVL III: ICD-10-PCS | Mod: PBBFAC,,, | Performed by: UROLOGY

## 2019-02-11 PROCEDURE — 99499 RISK ADDL DX/OHS AUDIT: ICD-10-PCS | Mod: S$GLB,,, | Performed by: UROLOGY

## 2019-02-11 PROCEDURE — 99499 UNLISTED E&M SERVICE: CPT | Mod: S$GLB,,, | Performed by: UROLOGY

## 2019-02-11 PROCEDURE — 99214 OFFICE O/P EST MOD 30 MIN: CPT | Mod: 25,S$GLB,, | Performed by: UROLOGY

## 2019-02-11 PROCEDURE — 3078F PR MOST RECENT DIASTOLIC BLOOD PRESSURE < 80 MM HG: ICD-10-PCS | Mod: CPTII,S$GLB,, | Performed by: UROLOGY

## 2019-02-11 PROCEDURE — 81002 URINALYSIS NONAUTO W/O SCOPE: CPT | Mod: S$GLB,,, | Performed by: UROLOGY

## 2019-02-11 PROCEDURE — 3078F DIAST BP <80 MM HG: CPT | Mod: CPTII,S$GLB,, | Performed by: UROLOGY

## 2019-02-11 PROCEDURE — 99214 PR OFFICE/OUTPT VISIT, EST, LEVL IV, 30-39 MIN: ICD-10-PCS | Mod: 25,S$GLB,, | Performed by: UROLOGY

## 2019-02-11 PROCEDURE — 1101F PR PT FALLS ASSESS DOC 0-1 FALLS W/OUT INJ PAST YR: ICD-10-PCS | Mod: CPTII,S$GLB,, | Performed by: UROLOGY

## 2019-02-11 PROCEDURE — 99999 PR PBB SHADOW E&M-EST. PATIENT-LVL III: CPT | Mod: PBBFAC,,, | Performed by: UROLOGY

## 2019-02-11 PROCEDURE — 81002 POCT URINE DIPSTICK WITHOUT MICROSCOPE: ICD-10-PCS | Mod: S$GLB,,, | Performed by: UROLOGY

## 2019-02-11 PROCEDURE — 3077F SYST BP >= 140 MM HG: CPT | Mod: CPTII,S$GLB,, | Performed by: UROLOGY

## 2019-02-11 PROCEDURE — 3077F PR MOST RECENT SYSTOLIC BLOOD PRESSURE >= 140 MM HG: ICD-10-PCS | Mod: CPTII,S$GLB,, | Performed by: UROLOGY

## 2019-02-11 PROCEDURE — 1101F PT FALLS ASSESS-DOCD LE1/YR: CPT | Mod: CPTII,S$GLB,, | Performed by: UROLOGY

## 2019-02-11 RX ORDER — CINACALCET 30 MG/1
30 TABLET, FILM COATED ORAL
Qty: 30 TABLET | Refills: 3 | Status: SHIPPED | OUTPATIENT
Start: 2019-02-11 | End: 2019-06-03

## 2019-02-11 NOTE — TELEPHONE ENCOUNTER
Spoke w/ pt, he states  told him if'd he stop the Vit D, his Ca would come down.  He does not want to take the sensipar.  States he wants  to speak with  regarding his recommendations.  Will route message to .

## 2019-02-20 ENCOUNTER — OFFICE VISIT (OUTPATIENT)
Dept: RADIATION ONCOLOGY | Facility: CLINIC | Age: 74
End: 2019-02-20
Payer: MEDICARE

## 2019-02-20 ENCOUNTER — DOCUMENTATION ONLY (OUTPATIENT)
Dept: RADIATION ONCOLOGY | Facility: CLINIC | Age: 74
End: 2019-02-20

## 2019-02-20 VITALS
DIASTOLIC BLOOD PRESSURE: 67 MMHG | TEMPERATURE: 99 F | HEART RATE: 91 BPM | HEIGHT: 68 IN | SYSTOLIC BLOOD PRESSURE: 144 MMHG | BODY MASS INDEX: 25.33 KG/M2 | WEIGHT: 167.13 LBS

## 2019-02-20 DIAGNOSIS — C61 PROSTATE CANCER: Primary | ICD-10-CM

## 2019-02-20 PROCEDURE — 3078F DIAST BP <80 MM HG: CPT | Mod: ,,, | Performed by: RADIOLOGY

## 2019-02-20 PROCEDURE — 1101F PR PT FALLS ASSESS DOC 0-1 FALLS W/OUT INJ PAST YR: ICD-10-PCS | Mod: ,,, | Performed by: RADIOLOGY

## 2019-02-20 PROCEDURE — 3077F SYST BP >= 140 MM HG: CPT | Mod: ,,, | Performed by: RADIOLOGY

## 2019-02-20 PROCEDURE — 99205 OFFICE O/P NEW HI 60 MIN: CPT | Mod: ,,, | Performed by: RADIOLOGY

## 2019-02-20 PROCEDURE — 1101F PT FALLS ASSESS-DOCD LE1/YR: CPT | Mod: ,,, | Performed by: RADIOLOGY

## 2019-02-20 PROCEDURE — 3078F PR MOST RECENT DIASTOLIC BLOOD PRESSURE < 80 MM HG: ICD-10-PCS | Mod: ,,, | Performed by: RADIOLOGY

## 2019-02-20 PROCEDURE — 99205 PR OFFICE/OUTPT VISIT, NEW, LEVL V, 60-74 MIN: ICD-10-PCS | Mod: ,,, | Performed by: RADIOLOGY

## 2019-02-20 PROCEDURE — 3077F PR MOST RECENT SYSTOLIC BLOOD PRESSURE >= 140 MM HG: ICD-10-PCS | Mod: ,,, | Performed by: RADIOLOGY

## 2019-02-20 NOTE — PROGRESS NOTES
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 Dr. Pina performed TRUS bx revealing GS 3+4 @ L base lat, 25%; L base medial, 5%; L mid medial, <5%; L apex lat, 10%. 4/14 cores +. Initially considered for RP but unable to get surgical clearance 2/2 hyperCa; PMHx: CKD III. Takes MTX for RA.    Presents to discuss RT. Dr. Pina recommended concurrent ADT; clear SHIRLEY noted.    PSA  10/06 1.8  8/12 2.7  10/13 2.1  4/15 4.1  10/15 3.4  3/18 4.8  4/18 2.9  11/18 2.4 (on finasteride; 4.8)    AUA 15  QOL 5  IIEF N/a    Patient denies dysuria, hematuria, diarrhea, bright red blood per rectum. He does endorse weak urinary stream and nocturia ×4. He is on Uroxatral and Avodart. He denies recent rheumatoid flares, or painful disfigured joints.    Review of Systems   Constitutional: Negative for appetite change, chills, fever and unexpected weight change.   HENT:   Negative for lump/mass, mouth sores, sore throat, trouble swallowing and voice change.    Eyes: Negative for eye problems and icterus.   Respiratory: Negative for chest tightness, cough, hemoptysis and shortness of breath.    Cardiovascular: Negative for chest pain and leg swelling.   Gastrointestinal: Negative for abdominal distention, abdominal pain, blood in stool, constipation, diarrhea, nausea and vomiting.   Genitourinary: Positive for difficulty urinating, frequency and nocturia. Negative for bladder incontinence, dysuria and hematuria.    Musculoskeletal: Negative for back pain, gait problem, neck pain and neck stiffness.   Neurological: Negative for extremity weakness, gait problem, headaches, numbness and seizures.   Hematological: Negative for adenopathy.     Past  Medical History:   Diagnosis Date    Arthritis     DDD (degenerative disc disease), cervical     Degenerative disc disease     Heart murmur     Hypertension     Nontoxic multinodular goiter 2016    RA (rheumatoid arthritis)     Vitamin D insufficiency 2016     Past Surgical History:   Procedure Laterality Date    BIOPSY, PROSTATE, RECTAL APPROACH, WITH US GUIDANCE N/A 2018    Performed by [facility] at [facility] OR    CYSTOSCOPY N/A 2018    Performed by Garrett Pina MD at [facility] OR     Social History     Socioeconomic History    Marital status: Single     Spouse name: None    Number of children: None    Years of education: None    Highest education level: None   Social Needs    Financial resource strain: None    Food insecurity - worry: None    Food insecurity - inability: None    Transportation needs - medical: None    Transportation needs - non-medical: None   Occupational History    None   Tobacco Use    Smoking status: Former Smoker     Packs/day: 0.25     Years: 10.00     Pack years: 2.50     Last attempt to quit: 2001     Years since quittin.5    Smokeless tobacco: Never Used   Substance and Sexual Activity    Alcohol use: Yes     Comment: seldom    Drug use: Yes     Frequency: 8.0 times per week     Types: Marijuana    Sexual activity: Yes     Partners: Female   Other Topics Concern    None   Social History Narrative    None     Family History   Problem Relation Age of Onset    Heart disease Mother     Stroke Father     Drug abuse Sister     Diabetes Maternal Uncle        PRIOR HISTORY OF CHEMOTHERAPY OR RADIOTHERAPY: Please see HPI for patients prior oncologic history.    Medication List with Changes/Refills   Current Medications    ALENDRONATE (FOSAMAX) 70 MG TABLET    TAKE 1 TABLET BY MOUTH EVERY 7 DAYS    ALFUZOSIN (UROXATRAL) 10 MG TB24    Take 1 tablet (10 mg total) by mouth after dinner.    AMLODIPINE (NORVASC) 2.5 MG TABLET     "TAKE 1 TABLET(2.5 MG) BY MOUTH EVERY DAY    ASPIRIN (ECOTRIN) 81 MG EC TABLET    Take 81 mg by mouth once daily.      CARVEDILOL (COREG) 25 MG TABLET    Take 1 tablet (25 mg total) by mouth 2 (two) times daily with meals.    CINACALCET (SENSIPAR) 30 MG TAB    Take 1 tablet (30 mg total) by mouth daily with breakfast.    DUTASTERIDE (AVODART) 0.5 MG CAPSULE    Take 1 capsule (0.5 mg total) by mouth once daily.    ESCITALOPRAM OXALATE (LEXAPRO) 10 MG TABLET    Take 10 mg by mouth once daily.    FOLIC ACID (FOLVITE) 1 MG TABLET    Take 1 tablet (1 mg total) by mouth once daily.    METHOTREXATE 2.5 MG TAB    TAKE 6 TABLETS BY MOUTH EVERY WEEK    PREDNISONE (DELTASONE) 2.5 MG TABLET    TAKE 1 TABLET BY MOUTH TWICE DAILY    TRAMADOL (ULTRAM) 50 MG TABLET    TAKE 2 TABLETS BY MOUTH TWICE DAILY AS NEEDED FOR PAIN     Review of patient's allergies indicates:  No Known Allergies    QUALITY OF LIFE: 90%- Able to Carry on Normal Activity: Minor Symptoms of Disease    Vitals:    02/20/19 1448   BP: (!) 144/67   Pulse: 91   Temp: 98.9 °F (37.2 °C)   TempSrc: Oral   Weight: 75.8 kg (167 lb 1.6 oz)   Height: 5' 8" (1.727 m)   PainSc: 0-No pain     Body mass index is 25.41 kg/m².    PHYSICAL EXAM:   GENERAL: alert; in no apparent distress.   HEAD: normocephalic, atraumatic.  EYES: pupils are equal, round, reactive to light and accommodation. Sclera anicteric. Conjunctiva not injected.   NOSE/THROAT: no nasal erythema or rhinorrhea. Oropharynx pink, without erythema, ulcerations or thrush.   NECK: no cervical motion rigidity; supple with no masses.  CHEST: Patient is speaking comfortably on room air with normal work of breathing without using accessory muscles of respiration.  ABDOMEN: soft, nontender, nondistended. Bowel sounds present.   MUSCULOSKELETAL: no tenderness to palpation along the spine or scapulae. Normal range of motion. No obvious signs of RA at bilateral upper extremities  NEUROLOGIC: cranial nerves II-XII intact " bilaterally. Strength 5/5 in bilateral upper and lower extremities. No sensory deficits appreciated. Normal gait.  EXTREMITIES: no clubbing, cyanosis, edema.  SKIN: no erythema, rashes, ulcerations noted.   SHIRLEY: deferred    REVIEW OF IMAGING/PATHOLOGY/LABS: Please see HPI. All images reviewed personally by dictating physician.     ASSESSMENT: 73 y.o. male with intermediate risk prostate adenocarcinoma, [id number] GS 3+4 iPSA 2.4 (on finasteride; 4.8 actual).    PLAN:  Rajendra Castle presented with elevated PSA as well as lower urinary tract symptoms with most recent PSA 2.4 on finasteride, adjusted to 4.8 which led him to transrectal ultrasound and biopsy that revealed low volume Uriah 3+4 disease confined to the left gland with no comment of extraprostatic extension or perineural invasion. He was planned for radical prostatectomy but has had difficulty obtaining surgical clearance secondary to hypercalcemia and CKD. Patient reports he is no longer interested in proceeding with surgery and wishes to discuss definitive radiotherapy. To further clarify his intermediate risk status I employed the [facility] nomogram with results as below:    15yr PCSS 99%  10yr bPFS 81%  OC  51%  EPE  48%  LN+  2%  SVI  2%    The predictive model above bears out his intermediate risk status with low likelihood of lymphatic or seminal vesicle involvement. Today I explained that given his Carolina 7 disease I would recommend proceeding with definitive therapy and that prostatectomy and radiotherapy with short-term concurrent hormone deprivation have demonstrated similar efficacy. I also compared and contrasted the process of surgery versus daily radiation treatments with hormone deprivation, explaining that radiotherapy can be used to salvage surgery whereas vice versa is difficult. Patient also expressed concern regarding his poor urinary function and I clearly stated that radiotherapy is unlikely to correct this and he will  discuss further with Dr. Pina regarding surgical procedures such as Urolift. I recommended definitive radiotherapy to the prostate and proximal SV using IMRT and image guidance following gold fiducial and SpaceOar placement, to a dose of 7740 cGy. I explained that we do not provide interstitial brachytherapy at this institution and also discussed the immature data regarding stereotactic body radiation therapy. The patient asked several informed questions which I did my best to address and he would like to proceed with treatment.     I will carbon copy Dr. Pina to begin short-term androgen deprivation therapy and coordinate placement of fiducials.     I will also carbon copy his rheumatologist Dr. Garcia and request that methotrexate be held during radiotherapy treatment as it is a radiation sensitizer. I explained this requirement to the patient today who expressed understanding and agreed.     I carefully explained the process of simulation and treatment delivery with weekly physician visits.     We discussed the risks and benefits of the above treatment and have gone over in detail the acute and late toxicities of radiation therapy to the prostate. The patient expressed  understanding and has signed a consent form which is included in the patients chart.     The patient has our contact information and understands that they are free to contact us at any time with questions or concerns regarding radiation therapy.    DISPOSITION: RTC FOR CT SIM after fiducials    I have personally seen and evaluated this patient. Greater than 50% of this time was spent discussing coordination of care and/or counseling.    PHYSICIAN: Alec Eng Jr, MD    Thank you for the opportunity to meet and consult with Rajendra Castle.   Please feel free to contact me to discuss the above recommendation further.

## 2019-02-20 NOTE — PROGRESS NOTES
Rajendra Castle  [id number]  1945 2/20/2019  No referring provider defined for this encounter.    DIAGNOSIS: Cancer Staging  Prostate cancer  Staging form: Prostate, AJCC 8th Edition  - Clinical: Stage IIB (cT1c, cN0, cM0, PSA: 2.4, Grade Group: 2) - Signed by Alec Eng Jr., MD on 2/20/2019      TREATMENT SITE(S): prostate/prox SV    INTENT: CURATIVE    TREATMENT SETTING: RT ALONE+ADT     MODALITY: PHOTON    TECHNIQUE:  INTENSITY MODULATED RADIOTHERAPY (IMRT)    IMRT MEDICAL NECESSITY: Target volume irregular shape/proximate to critical structures, Narrow margins needed to protect adjacent structures, High precision necessary, Conventional planning maneuvers insufficient, Concave target volume with critical tissues in concavity and Dose escalation exceeds conventional treatment planning    I have personally performed treatment planning for the patient, reviewing relevant history/physical and imaging. I have defined GTV, CTV, PTV and organs at risk.     In order to accomplish this plan, I am ordering:  SIMULATION: CT SIM FOR PLACEMENT OF TREATMENT FIELDS    CONTRAST: none    TO ACCOMPLISH REPRODUCIBLE POSITION: VACLOC and FULL BLADDER    DEVICES FOR BEAM SHAPING: CUSTOMIZED MLC    CUSTOMIZED BOLUS: none    IMAGING: daily CBCT (refusing fiducials)    I have ordered a weekly physics check.    SPECIAL PHYSICS CONSULT: NO  REASON: N/A    SPECIAL TREATMENT CIRCUMSTANCE: YES (on methotrexate)  Concurrent or recent administration of chemotherapeutic agents which are known potent radiosensitizers and thus will require vigilant monitoring for exaggerated radiation toxicities.    LABS: PSA LAST WEEK OF RT    ANTICIPATED PRESCRIPTION TO ISOCENTER: 7020cGy/39frx to prostate/proxSV + 720cGy/4frx to prostate (total 7740cGY/43frx)    ENERGY: 6X    TREATMENT: DAILY    PHYSICIAN: Alec Eng Jr, MD

## 2019-02-21 ENCOUNTER — SOCIAL WORK (OUTPATIENT)
Dept: HEMATOLOGY/ONCOLOGY | Facility: CLINIC | Age: 74
End: 2019-02-21

## 2019-02-21 NOTE — PROGRESS NOTES
On 2/20/19, I met with patient to complete new patient orientation and his distress screening.  He indicated a distress rating of 6.  He reported that he is being treated by his PCP for depression and anxiety.  I offered counseling services at which time he declined.  I provided him with my contact information in the event he changes his mind.  I provided him with a community events flyer encouraged him to attend a mindful meditation and yoga class provided by [facility] as it has been shown to reduce anxiety and depression.

## 2019-02-22 ENCOUNTER — LAB VISIT (OUTPATIENT)
Dept: LAB | Facility: HOSPITAL | Age: 74
End: 2019-02-22
Attending: INTERNAL MEDICINE
Payer: MEDICARE

## 2019-02-22 DIAGNOSIS — E21.0 PRIMARY HYPERPARATHYROIDISM: ICD-10-CM

## 2019-02-22 LAB
ALBUMIN SERPL BCP-MCNC: 3.2 G/DL
ALP SERPL-CCNC: 63 U/L
ALT SERPL W/O P-5'-P-CCNC: 15 U/L
ANION GAP SERPL CALC-SCNC: 6 MMOL/L
AST SERPL-CCNC: 14 U/L
BILIRUB SERPL-MCNC: 0.5 MG/DL
BUN SERPL-MCNC: 19 MG/DL
CALCIUM SERPL-MCNC: 9.9 MG/DL
CHLORIDE SERPL-SCNC: 112 MMOL/L
CO2 SERPL-SCNC: 22 MMOL/L
CREAT SERPL-MCNC: 1.3 MG/DL
EST. GFR  (AFRICAN AMERICAN): >60 ML/MIN/1.73 M^2
EST. GFR  (NON AFRICAN AMERICAN): 54.9 ML/MIN/1.73 M^2
GLUCOSE SERPL-MCNC: 82 MG/DL
PHOSPHATE SERPL-MCNC: 2.5 MG/DL
POTASSIUM SERPL-SCNC: 4 MMOL/L
PROT SERPL-MCNC: 6.6 G/DL
PTH-INTACT SERPL-MCNC: 355 PG/ML
SODIUM SERPL-SCNC: 140 MMOL/L

## 2019-02-22 PROCEDURE — 80053 COMPREHEN METABOLIC PANEL: CPT

## 2019-02-22 PROCEDURE — 83970 ASSAY OF PARATHORMONE: CPT

## 2019-02-22 PROCEDURE — 84100 ASSAY OF PHOSPHORUS: CPT

## 2019-02-22 PROCEDURE — 36415 COLL VENOUS BLD VENIPUNCTURE: CPT | Mod: PO

## 2019-02-22 NOTE — PATIENT INSTRUCTIONS
Radiation to the male pelvis and skin care instruction sheet given along with POORNIMA Booklet.  Bowel and bladder prep instructions given. Patient verbalized understanding.

## 2019-02-26 ENCOUNTER — TELEPHONE (OUTPATIENT)
Dept: ENDOCRINOLOGY | Facility: CLINIC | Age: 74
End: 2019-02-26

## 2019-02-28 ENCOUNTER — TELEPHONE (OUTPATIENT)
Dept: UROLOGY | Facility: CLINIC | Age: 74
End: 2019-02-28

## 2019-02-28 DIAGNOSIS — C61 PROSTATE CANCER: Primary | ICD-10-CM

## 2019-02-28 RX ORDER — BICALUTAMIDE 50 MG/1
50 TABLET, FILM COATED ORAL DAILY
Qty: 14 TABLET | Refills: 0 | Status: SHIPPED | OUTPATIENT
Start: 2019-02-28 | End: 2019-03-19 | Stop reason: ALTCHOICE

## 2019-03-01 NOTE — TELEPHONE ENCOUNTER
Received note from Dr Eng that patient would like to proceed with radiation for his prostate ca.    Will need 2 weeks of casodex, followed by visit for Trelstar at which time can discuss/plan fiducial marker/spaceoar placement.    Casodex ordered and sent to pharmacy.  Please book in, and get trelstar vs lupron authd for visit.

## 2019-03-04 DIAGNOSIS — C61 PROSTATE CANCER: Primary | ICD-10-CM

## 2019-03-04 NOTE — TELEPHONE ENCOUNTER
No problem.  Please contact his rheumatologist directly for recommendations and planning, specifically regarding need to hold methotrxate using LHRH agents for androgen deprivation therapy for prostate cancer during radiotherapy.  As well, if there would be other rheumatologic options for treatment which would allow him to proceed safely.  Would also want to know any recommendations regarding such agents and the need to hold or risks on healing should he proceed with surgical management

## 2019-03-05 DIAGNOSIS — R52 PAIN: ICD-10-CM

## 2019-03-06 RX ORDER — TRAMADOL HYDROCHLORIDE 50 MG/1
TABLET ORAL
Qty: 120 TABLET | Refills: 5 | Status: SHIPPED | OUTPATIENT
Start: 2019-03-06 | End: 2019-04-25 | Stop reason: SDUPTHER

## 2019-03-07 NOTE — TELEPHONE ENCOUNTER
Patient notified that he should stop methotrexate now. Patient has questions regarding stopping MTX and his symptom control. Patient scheduled for 3/11/19 at 2:00 with Dr. Garcia.

## 2019-03-07 NOTE — TELEPHONE ENCOUNTER
Darlene,  Notify the patient that he should stop methotrexate now. If he has questions, let him schedule an appointment to see me next week.   Thanks

## 2019-03-11 ENCOUNTER — OFFICE VISIT (OUTPATIENT)
Dept: RHEUMATOLOGY | Facility: CLINIC | Age: 74
End: 2019-03-11
Payer: MEDICARE

## 2019-03-11 VITALS — BODY MASS INDEX: 25 KG/M2 | SYSTOLIC BLOOD PRESSURE: 150 MMHG | DIASTOLIC BLOOD PRESSURE: 80 MMHG | WEIGHT: 164.38 LBS

## 2019-03-11 DIAGNOSIS — C61 PROSTATE CANCER: ICD-10-CM

## 2019-03-11 DIAGNOSIS — M06.9 RHEUMATOID ARTHRITIS INVOLVING MULTIPLE SITES, UNSPECIFIED RHEUMATOID FACTOR PRESENCE: Primary | ICD-10-CM

## 2019-03-11 PROCEDURE — 99213 PR OFFICE/OUTPT VISIT, EST, LEVL III, 20-29 MIN: ICD-10-PCS | Mod: ,,, | Performed by: INTERNAL MEDICINE

## 2019-03-11 PROCEDURE — 3079F PR MOST RECENT DIASTOLIC BLOOD PRESSURE 80-89 MM HG: ICD-10-PCS | Mod: ,,, | Performed by: INTERNAL MEDICINE

## 2019-03-11 PROCEDURE — 3079F DIAST BP 80-89 MM HG: CPT | Mod: ,,, | Performed by: INTERNAL MEDICINE

## 2019-03-11 PROCEDURE — 1101F PR PT FALLS ASSESS DOC 0-1 FALLS W/OUT INJ PAST YR: ICD-10-PCS | Mod: ,,, | Performed by: INTERNAL MEDICINE

## 2019-03-11 PROCEDURE — 3077F SYST BP >= 140 MM HG: CPT | Mod: ,,, | Performed by: INTERNAL MEDICINE

## 2019-03-11 PROCEDURE — 1101F PT FALLS ASSESS-DOCD LE1/YR: CPT | Mod: ,,, | Performed by: INTERNAL MEDICINE

## 2019-03-11 PROCEDURE — 99213 OFFICE O/P EST LOW 20 MIN: CPT | Mod: ,,, | Performed by: INTERNAL MEDICINE

## 2019-03-11 PROCEDURE — 3077F PR MOST RECENT SYSTOLIC BLOOD PRESSURE >= 140 MM HG: ICD-10-PCS | Mod: ,,, | Performed by: INTERNAL MEDICINE

## 2019-03-11 NOTE — PROGRESS NOTES
"    The patient is here today to discuss his upcoming treatment for prostate carcinoma.     He has been told by his urologist that his treatment plan will most likely require radiation therapy. It is the custom that most radiation oncologists to avoid treating patients on drugs such as methotrexate due to more unpredictable outcomes in both damage and healing. I have informed the patient that I am in complete agreement with withdrawing methotrexate at least until he has finished radiation therapy.     The patient made it clear that he does not wish to stop methotrexate regardless of potential outcomes including not being treated as aggressively as judged ideal for the prostate cancer. He tells me that treatment is not required because he has "been told that he is healed." Confirmation of that prior conviction came to him yesterday via a televangelist. He believes it is wise to monitor the situation but not to get too aggressive with a disease he believes it is always slow to progress.    I informed him that all such decisions were his to make and I will stand by him regardless of the decision. He clearly understands that I do not share his marisabel and that my recommendation is we temporarily suspend methotrexate and years use prednisone to control the activity of his rheumatoid during that period.     I will send a copy of this note to his urologist. I have provided him with some printed literature on prostate cancer and its treatment.    I will see the patient back for his routine appointment.        "

## 2019-03-11 NOTE — PATIENT INSTRUCTIONS
What Is Prostate Cancer?    Cancer is when cells in the body change and grow out of control. Cancer cells can form lumps of tissue called tumors. Cancer that starts in the prostate is called prostate cancer. It can grow and spread beyond the prostate. Cancer that spreads is harder to treat.  Understanding the prostate  The prostate is a gland in men about the size and shape of a walnut. It surrounds the upper part of the urethra. This is the tube that carries urine from the bladder. The prostate makes some of the fluid thats part of semen. During orgasm, semen leaves the body through the urethra.  When prostate cancer forms  As a man ages, the cells of his prostate may change to form tumors or other growths. The types of growths include:  · Noncancerous growths. As a man ages, the prostate may grow larger. This is called benign prostatic hyperplasia (BPH). With BPH, extra prostate tissue often squeezes the urethra, causing symptoms such as trouble urinating. But BPH is not cancer and does not lead to cancer.  · Atypical cells. Sometimes prostate cells dont look like normal (typical) prostate cells. One type of abnormal growth is called prostatic intraepithelial neoplasia or PIN. Although PIN cells are not cancer cells, they may be a sign that cancer is likely to form.  · Cancer. When abnormal prostate cells grow out of control and start to invade other tissues, they are called cancer cells. These cells may or may not lead to symptoms. Some tumors can be felt during a physical exam, and some cant. Prostate cancer may grow into nearby organs or spread to nearby lymph nodes. Lymph nodes are small organs around the body that are part of the immune system. In some cases, the cancer spreads to bones or organs in distant parts of the body. This is called metastasis.  Diagnosing prostate cancer  Prostate cancer may not cause symptoms at first. Urinary problems are often not a sign of cancer, but of another condition,  such as BPH. To find out if you have prostate cancer, your healthcare provider must examine you and order tests. The tests help confirm a diagnosis of cancer. They also help give more information about a cancerous tumor. Tests might include:  · Prostate specific antigen (PSA) testing. PSA is a chemical made by prostate tissue. The amount of PSA in the blood (PSA level) is tested to check a mans risk for prostate cancer. In general, a high or rising PSA level may mean an increased cancer risk. A PSA test by itself cannot show if a man has prostate cancer. PSA testing is also used to check the success of cancer treatments.  · Core needle biopsy. This test is done to determine if a man has prostate cancer. A hollow needle is used to take small pieces of tissue from the prostate. This helps give more information about the cells. Before the test, pain medicine may be given to prevent pain. During the test, a small probe is inserted into the rectum. The probe sends an image of the prostate to a video monitor. With this image as a guide, the healthcare provider uses a thin, hollow needle to remove tiny tissue samples from the prostate. These are sent to a lab where they are looked at for cancer cells.  Date Last Reviewed: 5/1/2017  © 8740-3135 [employer]. [address] [place] [address], [place] [address]. All rights reserved. This information is not intended as a substitute for professional medical care. Always follow your healthcare professional's instructions.

## 2019-03-12 ENCOUNTER — LAB VISIT (OUTPATIENT)
Dept: LAB | Facility: HOSPITAL | Age: 74
End: 2019-03-12
Attending: UROLOGY
Payer: MEDICARE

## 2019-03-12 DIAGNOSIS — R58 BLEEDING: ICD-10-CM

## 2019-03-12 DIAGNOSIS — C61 PROSTATE CANCER: ICD-10-CM

## 2019-03-12 LAB
ANION GAP SERPL CALC-SCNC: 8 MMOL/L
BASOPHILS # BLD AUTO: 0.02 K/UL
BASOPHILS NFR BLD: 0.3 %
BUN SERPL-MCNC: 12 MG/DL
CALCIUM SERPL-MCNC: 10.8 MG/DL
CHLORIDE SERPL-SCNC: 110 MMOL/L
CO2 SERPL-SCNC: 22 MMOL/L
CREAT SERPL-MCNC: 1.4 MG/DL
DIFFERENTIAL METHOD: ABNORMAL
EOSINOPHIL # BLD AUTO: 0.1 K/UL
EOSINOPHIL NFR BLD: 1.4 %
ERYTHROCYTE [DISTWIDTH] IN BLOOD BY AUTOMATED COUNT: 15.9 %
EST. GFR  (AFRICAN AMERICAN): 58 ML/MIN/1.73 M^2
EST. GFR  (NON AFRICAN AMERICAN): 50.2 ML/MIN/1.73 M^2
GLUCOSE SERPL-MCNC: 95 MG/DL
HCT VFR BLD AUTO: 43.3 %
HGB BLD-MCNC: 13 G/DL
IMM GRANULOCYTES # BLD AUTO: 0.05 K/UL
IMM GRANULOCYTES NFR BLD AUTO: 0.8 %
INR PPP: 1
LYMPHOCYTES # BLD AUTO: 2.1 K/UL
LYMPHOCYTES NFR BLD: 32.5 %
MCH RBC QN AUTO: 28.6 PG
MCHC RBC AUTO-ENTMCNC: 30 G/DL
MCV RBC AUTO: 95 FL
MONOCYTES # BLD AUTO: 0.6 K/UL
MONOCYTES NFR BLD: 8.7 %
NEUTROPHILS # BLD AUTO: 3.6 K/UL
NEUTROPHILS NFR BLD: 56.3 %
NRBC BLD-RTO: 0 /100 WBC
PLATELET # BLD AUTO: 343 K/UL
PMV BLD AUTO: 11.5 FL
POTASSIUM SERPL-SCNC: 4.7 MMOL/L
PROTHROMBIN TIME: 10.2 SEC
RBC # BLD AUTO: 4.55 M/UL
SODIUM SERPL-SCNC: 140 MMOL/L
WBC # BLD AUTO: 6.43 K/UL

## 2019-03-12 PROCEDURE — 85025 COMPLETE CBC W/AUTO DIFF WBC: CPT

## 2019-03-12 PROCEDURE — 80048 BASIC METABOLIC PNL TOTAL CA: CPT

## 2019-03-12 PROCEDURE — 85610 PROTHROMBIN TIME: CPT

## 2019-03-12 PROCEDURE — 36415 COLL VENOUS BLD VENIPUNCTURE: CPT | Mod: PO

## 2019-03-13 ENCOUNTER — TELEPHONE (OUTPATIENT)
Dept: UROLOGY | Facility: CLINIC | Age: 74
End: 2019-03-13

## 2019-03-13 NOTE — TELEPHONE ENCOUNTER
"----- Message from Garrett Pina MD sent at 3/11/2019  8:18 PM CDT -----      ----- Message -----  From: Uriel Garcia MD  Sent: 3/11/2019   3:47 PM  To: Garrett Pina MD    Progress Notes     Uriel Garcia MD at 3/11/2019  2:00 PM     Status: Signed            The patient is here today to discuss his upcoming treatment for prostate carcinoma.      He has been told by his urologist that his treatment plan will most likely require radiation therapy. It is the custom that most radiation oncologists to avoid treating patients on drugs such as methotrexate due to more unpredictable outcomes in both damage and healing. I have informed the patient that I am in complete agreement with withdrawing methotrexate at least until he has finished radiation therapy.      The patient made it clear that he does not wish to stop methotrexate regardless of potential outcomes including not being treated as aggressively as judged ideal for the prostate cancer. He tells me that treatment is not required because he has "been told that he is healed." Confirmation of that prior conviction came to him yesterday via a televangelist. He believes it is wise to monitor the situation but not to get too aggressive with a disease he believes it is always slow to progress.     I informed him that all such decisions were his to make and I will stand by him regardless of the decision. He clearly understands that I do not share his marisabel and that my recommendation is we temporarily suspend methotrexate and years use prednisone to control the activity of his rheumatoid during that period.      I will send a copy of this note to his urologist. I have provided him with some printed literature on prostate cancer and its treatment.     I will see the patient back for his routine appointment.              Vital Signs - Last Recorded     BP   150/80 Abnormal       Wt   74.6 kg (164 lb 6.4 oz)    BMI   25.00 kg/m²        Medications at End " of Encounter     alendronate (FOSAMAX) 70 MG tablet  (Taking) TAKE 1 TABLET BY MOUTH EVERY 7 DAYS   Number of times this order has been changed since signin    Order Audit [place]    alfuzosin (UROXATRAL) 10 mg Tb24  (Taking) Take 1 tablet (10 mg total) by mouth after dinner.   Number of times this order has been changed since signing: 10    Order Audit [place]    amLODIPine (NORVASC) 2.5 MG tablet  (Taking) TAKE 1 TABLET(2.5 MG) BY MOUTH EVERY DAY   Number of times this order has been changed since signin    Order Audit [place]    aspirin (ECOTRIN) 81 MG EC tablet  (Taking) Take 81 mg by mouth once daily.     bicalutamide (CASODEX) 50 MG Tab  (Taking) Take 1 tablet (50 mg total) by mouth once daily.   Number of times this order has been changed since signin    Order Audit [place]    carvedilol (COREG) 25 MG tablet  (Taking) Take 1 tablet (25 mg total) by mouth 2 (two) times daily with meals.   Number of times this order has been changed since signin    Order Audit [place]    cinacalcet (SENSIPAR) 30 MG Tab  (Taking) Take 1 tablet (30 mg total) by mouth daily with breakfast.   Number of times this order has been changed since signin    Order Audit [place]    dutasteride (AVODART) 0.5 mg capsule  (Taking) Take 1 capsule (0.5 mg total) by mouth once daily.   Number of times this order has been changed since signin    Order Audit [place]    escitalopram oxalate (LEXAPRO) 10 MG tablet  (Taking) Take 10 mg by mouth once daily.   folic acid (FOLVITE) 1 MG tablet  (Taking) Take 1 tablet (1 mg total) by mouth once daily.   Number of times this order has been changed since signin    Order Audit [place]    methotrexate 2.5 MG Tab  (Taking) TAKE 6 TABLETS BY MOUTH EVERY WEEK   Number of times this order has been changed since signin    Order Audit [place]    predniSONE (DELTASONE) 2.5 MG tablet  (Taking) TAKE 1 TABLET BY MOUTH TWICE DAILY   Number of times this order has been changed since signin    Order  Audit [place]    traMADol (ULTRAM) 50 mg tablet  (Taking) TAKE 2 TABLETS BY MOUTH TWICE DAILY AS NEEDED FOR PAIN   Number of times this order has been changed since signin    Order Audit [place]        Visit Diagnoses and Associated Orders     Rheumatoid arthritis involving multiple sites, unspecified rheumatoid factor presence  - Primary   ICD-10-CM: M06.9  ICD-9-CM: 714.0       Prostate cancer    ICD-10-CM: C61  ICD-9-CM: 185              Problem List   as of 3/11/2019      Noted - Resolved   Anemia, iron deficiency 10/3/2013 - Present   Arthritis Unknown - Present   Arthritis of right wrist 2015 - Present   Arthritis of wrist, right 10/1/2014 - Present   Chronic GI bleeding 10/3/2013 - Present   CKD (chronic kidney disease) 2017 - Present   Current chronic use of systemic steroids 2016 - Present   DDD (degenerative disc disease), cervical Unknown - Present   Dyspnea on exertion 2012 - Present   Elevated PSA 2018 - Present   Elevated troponin 2016 - Present   Essential hypertension 2016 - Present   Gastroesophageal reflux disease without esophagitis 2016 - Present   Heart murmur Unknown - Present   Hyperparathyroidism 2016 - Present   Hypertension Unknown - Present   Hypogonadism in male 2016 - Present   Lung nodule 10/24/2016 - Present   Nausea and vomiting 2016 - Present   Nodular thyroid disease 2016 - Present   Nontoxic multinodular goiter 2016 - Present   Osteoporosis 2016 - Present   Prediabetes 2016 - Present   Prostate cancer 2019 - Present   Pulmonary emphysema 10/24/2016 - Present   Rheumatoid arthritis involving multiple sites 2016 - Present   Rheumatoid lung 10/24/2016 - Present   Rotator cuff tendinitis 8/10/2012 - Present   Trigger thumb of left hand 2014 - Present   Vitamin D insufficiency 2016 - Present

## 2019-03-19 ENCOUNTER — OFFICE VISIT (OUTPATIENT)
Dept: UROLOGY | Facility: CLINIC | Age: 74
End: 2019-03-19
Payer: MEDICARE

## 2019-03-19 VITALS
BODY MASS INDEX: 24.92 KG/M2 | SYSTOLIC BLOOD PRESSURE: 141 MMHG | HEART RATE: 87 BPM | WEIGHT: 164.44 LBS | DIASTOLIC BLOOD PRESSURE: 85 MMHG | HEIGHT: 68 IN | RESPIRATION RATE: 18 BRPM

## 2019-03-19 DIAGNOSIS — C61 PROSTATE CANCER: Primary | ICD-10-CM

## 2019-03-19 LAB
BILIRUB SERPL-MCNC: ABNORMAL MG/DL
BLOOD URINE, POC: ABNORMAL
COLOR, POC UA: YELLOW
GLUCOSE UR QL STRIP: ABNORMAL
KETONES UR QL STRIP: ABNORMAL
LEUKOCYTE ESTERASE URINE, POC: ABNORMAL
NITRITE, POC UA: ABNORMAL
PH, POC UA: 6.5
PROTEIN, POC: ABNORMAL
SPECIFIC GRAVITY, POC UA: 1.02
UROBILINOGEN, POC UA: 0.2

## 2019-03-19 PROCEDURE — 3079F DIAST BP 80-89 MM HG: CPT | Mod: CPTII,S$GLB,, | Performed by: UROLOGY

## 2019-03-19 PROCEDURE — 99999 PR PBB SHADOW E&M-EST. PATIENT-LVL IV: CPT | Mod: PBBFAC,,, | Performed by: UROLOGY

## 2019-03-19 PROCEDURE — 99999 PR PBB SHADOW E&M-EST. PATIENT-LVL IV: ICD-10-PCS | Mod: PBBFAC,,, | Performed by: UROLOGY

## 2019-03-19 PROCEDURE — 3079F PR MOST RECENT DIASTOLIC BLOOD PRESSURE 80-89 MM HG: ICD-10-PCS | Mod: CPTII,S$GLB,, | Performed by: UROLOGY

## 2019-03-19 PROCEDURE — 99214 PR OFFICE/OUTPT VISIT, EST, LEVL IV, 30-39 MIN: ICD-10-PCS | Mod: 25,S$GLB,, | Performed by: UROLOGY

## 2019-03-19 PROCEDURE — 96402 CHEMO HORMON ANTINEOPL SQ/IM: CPT | Mod: 59,S$GLB,, | Performed by: UROLOGY

## 2019-03-19 PROCEDURE — 81002 POCT URINE DIPSTICK WITHOUT MICROSCOPE: ICD-10-PCS | Mod: S$GLB,,, | Performed by: UROLOGY

## 2019-03-19 PROCEDURE — 1101F PR PT FALLS ASSESS DOC 0-1 FALLS W/OUT INJ PAST YR: ICD-10-PCS | Mod: CPTII,S$GLB,, | Performed by: UROLOGY

## 2019-03-19 PROCEDURE — 3077F SYST BP >= 140 MM HG: CPT | Mod: CPTII,S$GLB,, | Performed by: UROLOGY

## 2019-03-19 PROCEDURE — 3077F PR MOST RECENT SYSTOLIC BLOOD PRESSURE >= 140 MM HG: ICD-10-PCS | Mod: CPTII,S$GLB,, | Performed by: UROLOGY

## 2019-03-19 PROCEDURE — 1101F PT FALLS ASSESS-DOCD LE1/YR: CPT | Mod: CPTII,S$GLB,, | Performed by: UROLOGY

## 2019-03-19 PROCEDURE — 81002 URINALYSIS NONAUTO W/O SCOPE: CPT | Mod: S$GLB,,, | Performed by: UROLOGY

## 2019-03-19 PROCEDURE — 96402 PR CHEMOTHER HORMON ANTINEOPL SUB-Q/IM: ICD-10-PCS | Mod: 59,S$GLB,, | Performed by: UROLOGY

## 2019-03-19 PROCEDURE — 99214 OFFICE O/P EST MOD 30 MIN: CPT | Mod: 25,S$GLB,, | Performed by: UROLOGY

## 2019-03-19 RX ORDER — CIPROFLOXACIN 500 MG/1
500 TABLET ORAL 2 TIMES DAILY
Qty: 6 TABLET | Refills: 0 | Status: SHIPPED | OUTPATIENT
Start: 2019-03-19 | End: 2019-06-03 | Stop reason: ALTCHOICE

## 2019-03-19 NOTE — Clinical Note
Please see note - will proceed with adt+xrtGot Teresita 5/9Needs further rheum medical mgmt. He is now open to MTX alternatives

## 2019-03-20 ENCOUNTER — TELEPHONE (OUTPATIENT)
Dept: RHEUMATOLOGY | Facility: CLINIC | Age: 74
End: 2019-03-20

## 2019-03-20 NOTE — TELEPHONE ENCOUNTER
Mr. Castle asks if there is another medication he can take to replace the MTX while he is having radiation treatments for prostate cancer?

## 2019-03-20 NOTE — TELEPHONE ENCOUNTER
I would use prednisone until he is able to restart methotrexate.   Any other medication than I might otherwise want to use would take too long to become effective.

## 2019-03-31 NOTE — PROGRESS NOTES
[facility] Urology Progress Note    Rajendra Castle is a 73 y.o. male who presents for prostate cancer follow up    He was initially seen by YAZ López on April 2018 for increased nocturia x3 and urinary hesitancy for six months, not improved by flomax which he had been started on by PCP 3/16/2018, so it was discontinued and he was started on uroxatral 10mg daily which decd his NTF to 1x on average. Was drinking up until bedtime and had occasional urgency.   No famHx CaP. PVR 15-18cc. AUA SS: 5/2 (1:  Frequency, intermittency, urgency, weak stream, sleeping) medications helped 5/10. SHIRLEY:  30-35 g smooth benign  PSA history: 1.8 on 10/3/06;   2.73 on 08/06/2012;   2.1 on 10/03/2013;   4.1 on 04/06/2015;   3.4 on 10/14/2015;   4.8 on 03/16/2018;   2.9 (11.7% free) on 04/25/2018  Dr Aceves added finasteride 8/10/18. PSA 11/9/18 is 2.4 (9.17% free) on finasteride - so equivalent of 4.8.   Bladder scan demonstrated 53 cc. AUA symptom score:  11/4 mostly dissatisfied - (4:  Frequency; 3:  Emptying; 2:  Sleeping; 1:  Intermittency, urgency) Medications helped 3-4/10     For elevated psa and low free psa, and persistent moderate LUTS on medical management, performed prostate biopsy and cystoscopy on 12/18/18  PSA: 2.4 (9% free) on finasteride (equivalent to 4.8). SHIRLEY: 30-35 g smooth benign  TRUS VOLUME:  20.4cm3 (W 40.7mm, H 22.6mm, L 42.4mm)  CYSTO FINDINGS: significant bilateral lateral lobe hypertrophy with severe obstruction with central near kissing lobes, no median lobe, mild early trabeculations, normal bladder  PATHOLOGY: 4 cores L sided G3+4=7 prostate cancer: LB lat 25% (10% pattern 4); LB med 5% (20% pattern 4); LM med <5%, LA lat 10%  He only periodically has erections.  If the opportunity presents itself he may have a decent erection.  He does not seek sex.  He would rather give up erections than have cancer.  LUTS as above. No established cardiology care though has had past workups during hospital admissions with a  stress and echo in 2014 and a repeat workup in 2016 for chest pain admit.     Initially planning for robotic prostatectomy as per his choice after discussing options, then he reconsidered for radiation. Also noted in preop workup to have a elevated calcium and not cleared to proceed until that was resolved.He has CKD III, Hyperparathyroid, LISA. No edema, good apetite, good compliance. He has been taking Vit D even though he was instructed to stop it due to Ca higher than 11. Has been off his sensipar Rxed by endocrinology. SOB, arthralgias. He has emphysema but good exercise tolerance. He has stable CKD but should have his calcium level below 10 before surgery. He needs further counseling on prostate surgery.     At last visit 2/11/19, he discussed proceeding with radiotherapy instead of surgery.  He had seen cardiology at [facility] and had an echo and stress test pending  Uroxatral has helped LUTS some, no hesitancy or intermittency, and now feels like he empties his bladder. Does have to urinate if 2-3 hours have gone by.  AUA SS largely unchanged: 12/4 (3: freq urg sleep; 2 weak stream; 1 intermittency) - meds helped 3/10  Discussed protocols involved with radiation and urologist involvement.  As well given his significant LUTS also discuss the possibility of using Urolift to both relieve obstruction and service markers for his radiotherapy.  In the interim he has seen Radiation Oncology who suggested that to proceed with androgen deprivation therapy and radiation therapy that he should discontinue his methotrexate, and when doctor Jose advised him to do so he refused and stated that his prostate cancer was under control from raghav intervention by televangelist.    A he does however returns today for further discussion noting that he took his 2 weeks of Casodex.  He is still compliant with Sensipar to decrease his calcium.  He did stop aspirin.  Urinalysis dipstick is negative except for trace protein.  " Endorses similar urinary symptoms to above with mild relief with Uroxatral.  No hematuria or dysuria      ROS: A comprehensive 10 system review was performed and is negative except as noted above in HPI    PHYSICAL EXAM:    Vitals:    03/19/19 0915   BP: (!) 141/85   Pulse: 87   Resp: 18     Body mass index is 25.01 kg/m². Weight: 74.6 kg (164 lb 7.4 oz) Height: 5' 8" (172.7 cm)       General: Alert, cooperative, no distress, appears stated age   Head: Normocephalic, without obvious abnormality, atraumatic   Eyes: PERRL, conjunctiva/corneas clear   Lungs: Respirations unlabored   Heart: Warm and well perfused   Abdomen:soft NT ND  Extremities: Extremities normal, atraumatic, no cyanosis or edema   Skin: Skin color, texture, turgor normal, no rashes or lesions   Psych: Appropriate   Neurologic: Non-focal       Recent Results (from the past 336 hour(s))   POCT URINE DIPSTICK WITHOUT MICROSCOPE    Collection Time: 03/19/19  9:23 AM   Result Value Ref Range    Color, UA yellow     Spec Grav UA 1.020     pH, UA 6.5     WBC, UA neg     Nitrite, UA neg     Protein trace     Glucose, UA neg     Ketones, UA neg     Urobilinogen, UA 0.2     Bilirubin neg     Blood, UA neg        ASSESSMENT   1. Prostate cancer  POCT URINE DIPSTICK WITHOUT MICROSCOPE    Case Request Operating Room: ULTRASOUND, PROSTATE, RECTAL APPROACH, WITH TRANSPERINEAL GOLD FIDUCIAL MARKER INSERTION AND TRANSPERINEAL PLACEMENT OF PERIPROSTATIC SPACING GEL (SpaceOar)       Plan    A extensive visit and answered many questions and had significant discussions about proceeding or not proceeding with therapy.  Given his high volume intermediate risk pathology again discussed recommendations for intervention for his prostate cancer.  Given his rheumatologic issues and medications which potentially be a healing problem, as well as his other chronic medical problems and hypercalcemia, and his preference, no longer interested in radical prostatectomy.  He did " complete his Casodex at that he could start ADT for radiation which he is still unsure of.  We again reviewed risks and benefits of radiation and the urologist involvement.  Also had extensive discussion about proceeding with intervention for his lower urinary tract symptoms from obstructing prostate prior to radiotherapy such as using Urolift in advance which could also service markers, versus proceeding with standard fiducial marker and spaceoar placement, and managing medically and we visiting post radiation of which Urolift could also be used in that setting.    After much discussion he elected to proceed with ADT plus radiotherapy.  He is still concerned with his rheumatologic management and medical management.  We did discuss he has a few weeks to figure this out before starting radiotherapy and I will cc Dr. Garcia again in encouraged him to follow up with him and have further discussions.    He has completed 2 weeks of Casodex and therefore Lupron 45 mg intramuscular injection was prepared Mix and injected by me today into his right gluteus muscle to initiate ADT.    He would like to proceed with fiducial marker placement in conjunction with transperineal placement of antonio prostatic spacing gel, SpaceOar, and all risks and benefits of these procedures were discussed in detail with the patient and appropriate informed consent was obtained.  Markers/spaceOar in OR on 5/9/19  PAT 2 weeks prior.  Cardiac clearance needs to be completed - still has stress test pending  Patient was given biopsy prep sheet as prep instructions will be same for space oar with cipro (Rxed), enema, hold asa etc  After markers/spaceOar, will have CT simulation for radiation 1 week later.  He will follow up with me a few months after radiation is complete, approximately 6 months from today, with PSA and testosterone just prior.  He will be due for next ADT injection of Lupron at that time

## 2019-04-01 ENCOUNTER — TELEPHONE (OUTPATIENT)
Dept: RHEUMATOLOGY | Facility: CLINIC | Age: 74
End: 2019-04-01

## 2019-04-01 NOTE — TELEPHONE ENCOUNTER
----- Message from Uriel Garcia MD sent at 4/1/2019  1:08 PM CDT -----  I will instruct the patient to stop methotrexate today. I will restarted 3 weeks after radiation is finished.  Thanks  ----- Message -----  From: Garrett Pina MD  Sent: 4/1/2019  12:33 PM  To: Uriel Garcia MD, Jose Can MD, #    Thank you  Radiation would begin about 3 weeks after that scheduled appt, so just depends on how long you thinks its going to take to change the plan  But fine by me.  CCed phu munoz and dominick on this so they're in the loop as well.  To me, sounds like as long as he is not on it during radiation will be ok, but ill let them weigh in on timeline    ----- Message -----  From: Uriel Garcia MD  Sent: 4/1/2019   9:27 AM  To: Garrett Pina MD    Right, I'm going to manage him with prednisone alone unless the disease becomes too active for comfort. In that case, I will add a mild DMARD such as hydroxychloroquine. Let me know if you need me to intervene sooner than his pending appointment.   Thanks  ----- Message -----  From: Garrett Pina MD  Sent: 3/31/2019   5:51 PM  To: Uriel Garcia MD    Please see note - will proceed with adt+xrt  Got lupron  Markers 5/9  Needs further rheum medical mgmt. He is now open to MTX alternatives

## 2019-04-01 NOTE — TELEPHONE ENCOUNTER
Instructed Mr. Castle to stop methotrexate today. Can restart methotrexate 3 weeks after radiation is finished.  Patient verbalizes understanding of instructions. He states he stopped his methotrexate 2 weeks ago.

## 2019-04-02 DIAGNOSIS — I10 ESSENTIAL HYPERTENSION: ICD-10-CM

## 2019-04-02 RX ORDER — CARVEDILOL 25 MG/1
TABLET ORAL
Qty: 60 TABLET | Refills: 0 | Status: SHIPPED | OUTPATIENT
Start: 2019-04-02 | End: 2019-05-21 | Stop reason: SDUPTHER

## 2019-04-02 RX ORDER — AMLODIPINE BESYLATE 2.5 MG/1
TABLET ORAL
Qty: 90 TABLET | Refills: 0 | Status: SHIPPED | OUTPATIENT
Start: 2019-04-02 | End: 2019-06-27 | Stop reason: SDUPTHER

## 2019-04-22 ENCOUNTER — TELEPHONE (OUTPATIENT)
Dept: UROLOGY | Facility: CLINIC | Age: 74
End: 2019-04-22

## 2019-04-22 NOTE — TELEPHONE ENCOUNTER
Canceled with OR    Have extensively counseled patient on treatment options  He has been back and forth about all    Will CC rad/onc and Dr Garcia.    So he is not lost to follow up, at least schedule him for office visit with lupron 45mg injection and get auth for it 6 mos from his last office visit to keep his ADT going.    If in interim calls back to reschedule, will do so, but will still need ADT renewed at 6 months

## 2019-04-22 NOTE — TELEPHONE ENCOUNTER
Returned call and spoke with patient. He states that he is crippled and is having lots of pain form his rheumatoid pain from his neck and now radiating to his knees. He has cancelled his pre-admit appt and now wants to cancel the procedure on 5/9/19. He needs to get better control of his pain and then he will contact us to follow up. Message to be given to the MD, patient verbally understood.

## 2019-04-22 NOTE — TELEPHONE ENCOUNTER
----- Message from Sydnee Faria sent at 4/22/2019 10:44 AM CDT -----  Contact: pt  Calling in regards to to please give him a call back about preop and procedure for radiation and please advise . [phone] (home)

## 2019-04-23 DIAGNOSIS — C61 PROSTATE CANCER: Primary | ICD-10-CM

## 2019-04-25 ENCOUNTER — OFFICE VISIT (OUTPATIENT)
Dept: RHEUMATOLOGY | Facility: CLINIC | Age: 74
End: 2019-04-25
Payer: MEDICARE

## 2019-04-25 VITALS
SYSTOLIC BLOOD PRESSURE: 121 MMHG | BODY MASS INDEX: 25.42 KG/M2 | DIASTOLIC BLOOD PRESSURE: 72 MMHG | WEIGHT: 167.19 LBS

## 2019-04-25 DIAGNOSIS — M05.79 RHEUMATOID ARTHRITIS INVOLVING MULTIPLE SITES WITH POSITIVE RHEUMATOID FACTOR: Primary | ICD-10-CM

## 2019-04-25 DIAGNOSIS — R52 PAIN: ICD-10-CM

## 2019-04-25 DIAGNOSIS — M19.90 CHRONIC INFLAMMATORY ARTHRITIS: ICD-10-CM

## 2019-04-25 DIAGNOSIS — N18.30 STAGE 3 CHRONIC KIDNEY DISEASE: ICD-10-CM

## 2019-04-25 DIAGNOSIS — Z79.899 ENCOUNTER FOR LONG-TERM (CURRENT) USE OF MEDICATIONS: ICD-10-CM

## 2019-04-25 DIAGNOSIS — C61 PROSTATE CANCER: Primary | ICD-10-CM

## 2019-04-25 LAB
ALBUMIN SERPL-MCNC: 3.3 G/DL (ref 3.1–4.7)
ALP SERPL-CCNC: 63 IU/L (ref 40–104)
ALT (SGPT): 16 IU/L (ref 3–33)
AST SERPL-CCNC: 20 IU/L (ref 10–40)
BASOPHILS NFR BLD: 0 K/UL (ref 0–0.2)
BASOPHILS NFR BLD: 0.3 %
BILIRUB SERPL-MCNC: 0.8 MG/DL (ref 0.3–1)
BUN SERPL-MCNC: 19 MG/DL (ref 8–20)
CALCIUM SERPL-MCNC: 10 MG/DL (ref 7.7–10.4)
CHLORIDE: 110 MMOL/L (ref 98–110)
CO2 SERPL-SCNC: 23.2 MMOL/L (ref 22.8–31.6)
CREATININE: 1.38 MG/DL (ref 0.6–1.4)
CRP SERPL-MCNC: 0.62 MG/DL (ref 0–1.4)
EOSINOPHIL NFR BLD: 0.1 K/UL (ref 0–0.7)
EOSINOPHIL NFR BLD: 1.6 %
ERYTHROCYTE [DISTWIDTH] IN BLOOD BY AUTOMATED COUNT: 14.2 % (ref 11.7–14.9)
GLUCOSE: 95 MG/DL (ref 70–99)
GRAN #: 4.6 K/UL (ref 1.4–6.5)
GRAN%: 63.2 %
HCT VFR BLD AUTO: 41.9 % (ref 39–55)
HGB BLD-MCNC: 12.7 G/DL (ref 14–16)
IMMATURE GRANS (ABS): 0.1 K/UL (ref 0–1)
IMMATURE GRANULOCYTES: 0.7 %
LYMPH #: 2 K/UL (ref 1.2–3.4)
LYMPH%: 26.7 %
MCH RBC QN AUTO: 28.3 PG (ref 25–35)
MCHC RBC AUTO-ENTMCNC: 30.3 G/DL (ref 31–36)
MCV RBC AUTO: 93.5 FL (ref 80–100)
MONO #: 0.6 K/UL (ref 0.1–0.6)
MONO%: 7.5 %
NUCLEATED RBCS: 0 %
PLATELET # BLD AUTO: 355 K/UL (ref 140–440)
PMV BLD AUTO: 10.9 FL (ref 8.8–12.7)
POTASSIUM SERPL-SCNC: 4.1 MMOL/L (ref 3.5–5)
PROT SERPL-MCNC: 7 G/DL (ref 6–8.2)
RBC # BLD AUTO: 4.48 M/UL (ref 4.3–5.9)
SODIUM: 141 MMOL/L (ref 134–144)
WBC # BLD AUTO: 7.3 K/UL (ref 5–10)

## 2019-04-25 PROCEDURE — 99213 PR OFFICE/OUTPT VISIT, EST, LEVL III, 20-29 MIN: ICD-10-PCS | Mod: ,,, | Performed by: INTERNAL MEDICINE

## 2019-04-25 PROCEDURE — 3074F PR MOST RECENT SYSTOLIC BLOOD PRESSURE < 130 MM HG: ICD-10-PCS | Mod: ,,, | Performed by: INTERNAL MEDICINE

## 2019-04-25 PROCEDURE — 1101F PR PT FALLS ASSESS DOC 0-1 FALLS W/OUT INJ PAST YR: ICD-10-PCS | Mod: ,,, | Performed by: INTERNAL MEDICINE

## 2019-04-25 PROCEDURE — 3074F SYST BP LT 130 MM HG: CPT | Mod: ,,, | Performed by: INTERNAL MEDICINE

## 2019-04-25 PROCEDURE — 3078F DIAST BP <80 MM HG: CPT | Mod: ,,, | Performed by: INTERNAL MEDICINE

## 2019-04-25 PROCEDURE — 1101F PT FALLS ASSESS-DOCD LE1/YR: CPT | Mod: ,,, | Performed by: INTERNAL MEDICINE

## 2019-04-25 PROCEDURE — 99213 OFFICE O/P EST LOW 20 MIN: CPT | Mod: ,,, | Performed by: INTERNAL MEDICINE

## 2019-04-25 PROCEDURE — 3078F PR MOST RECENT DIASTOLIC BLOOD PRESSURE < 80 MM HG: ICD-10-PCS | Mod: ,,, | Performed by: INTERNAL MEDICINE

## 2019-04-25 RX ORDER — TRAMADOL HYDROCHLORIDE 50 MG/1
TABLET ORAL
Qty: 120 TABLET | Refills: 5 | Status: SHIPPED | OUTPATIENT
Start: 2019-04-25 | End: 2020-03-31

## 2019-04-25 RX ORDER — METHOTREXATE 2.5 MG/1
TABLET ORAL
Qty: 72 TABLET | Refills: 1 | Status: SHIPPED | OUTPATIENT
Start: 2019-04-25 | End: 2019-10-17 | Stop reason: SDUPTHER

## 2019-04-25 NOTE — PROGRESS NOTES
[facility] RHEUMATOLOGY           Follow-up visit    Notes dictated via Dragon to EPIC. Please forgive any unintended errors.  Subjective:       Patient ID:   NAME: Rajendra Castle : 1945     73 y.o. male    Referring Doc: No ref. provider found  Other Physicians:    Chief Complaint:  Rheumatoid Arthritis (needs refill MTX and tramadol)      HPI/Interval History:   The patient has restarted methotrexate. He is no 3 weeks into it and feels dramatically better. The stiffness and swelling in the hands has disappeared.  He has decided not to pursue urologic surgery for the prostate carcinoma.          ROS:   GEN:    no fever, night sweats or weight loss  SKIN:   no rashes , erythema, bruising, or swelling, no Raynauds, no photosensitivity  HEENT: no changes in vision, no mouth ulcers, no sicca symptoms, no scalp tenderness, no jaw claudication.  CV:      no CP, PND, DEY or orthopnea, no palpitations  PULM: no SOB, cough, hemoptysis, sputum or pleuritic pain  GI:       no GERD, no dysphagia, no abdominal pain, nausea, vomiting, constipation, diarrhea, melanotic stools, BRBPR, or hematemesis  :      no hematuria, dysuria  NEURO: no paresthesias, headaches, acute visual disturbances  MUSCULOSKELETAL:  No muscle or joint complaints  PSYCH:   No insomnia, no increased anxiety or depression    Medications:    Current Outpatient Medications:     alendronate (FOSAMAX) 70 MG tablet, TAKE 1 TABLET BY MOUTH EVERY 7 DAYS, Disp: 12 tablet, Rfl: 3    amLODIPine (NORVASC) 2.5 MG tablet, TAKE 1 TABLET(2.5 MG) BY MOUTH EVERY DAY, Disp: 90 tablet, Rfl: 0    aspirin (ECOTRIN) 81 MG EC tablet, Take 81 mg by mouth once daily.  , Disp: , Rfl:     carvedilol (COREG) 25 MG tablet, TAKE 1 TABLET(25 MG) BY MOUTH TWICE DAILY WITH MEALS, Disp: 60 tablet, Rfl: 0    cinacalcet (SENSIPAR) 30 MG Tab, Take 1 tablet (30 mg total) by mouth daily with breakfast., Disp: 30 tablet, Rfl: 3    ciprofloxacin HCl (CIPRO) 500 MG tablet, Take 1  tablet (500 mg total) by mouth 2 (two) times daily., Disp: 6 tablet, Rfl: 0    dutasteride (AVODART) 0.5 mg capsule, Take 1 capsule (0.5 mg total) by mouth once daily., Disp: 30 capsule, Rfl: 11    escitalopram oxalate (LEXAPRO) 10 MG tablet, Take 10 mg by mouth once daily., Disp: , Rfl:     folic acid (FOLVITE) 1 MG tablet, Take 1 tablet (1 mg total) by mouth once daily., Disp: 90 tablet, Rfl: 3    methotrexate 2.5 MG Tab, TAKE 6 TABLETS BY MOUTH EVERY WEEK, Disp: 72 tablet, Rfl: 1    predniSONE (DELTASONE) 2.5 MG tablet, TAKE 1 TABLET BY MOUTH TWICE DAILY, Disp: 180 tablet, Rfl: 1    traMADol (ULTRAM) 50 mg tablet, TAKE 2 TABLETS BY MOUTH TWICE DAILY AS NEEDED FOR PAIN, Disp: 120 tablet, Rfl: 5    alfuzosin (UROXATRAL) 10 mg Tb24, Take 1 tablet (10 mg total) by mouth after dinner., Disp: 30 tablet, Rfl: 11      FAMILY HISTORY: negative for Connective Tissue Disease        Review of patient's allergies indicates:  No Known Allergies          Objective:     Vitals:  Blood pressure 121/72, weight 75.8 kg (167 lb 3.2 oz).    Physical Examination:   GEN: wn/wd male in no apparent distress  SKIN: no rashes, no sclerodactyly, no Raynaud's, no periungual erythema, no digital tip ulcerations, no nailbed pitting  HEAD: no alopecia, no scalp tenderness, no temporal artery tenderness or induration.  EYES: no pallor, no icterus, PERRLA  ENT:  no thrush, no mucosal dryness or ulcerations, adequate oral hygiene & dentition.  NECK: supple x 6, no masses, no thyromegaly, no lymphadenopathy.  CV:   S1 and S2 regular, no murmurs, gallop or rubs  CHEST: Normal respiratory effort;  normal breath sounds/no adventitious sounds. No signs of consolidation.  ABD: non-tender and non-distended; soft; normal bowel sounds; no rebound/guarding or tenderness. No hepatosplenomegaly.  Musculoskeletal:  No evidence of any active inflammatory arthritis. No progressive deformity  EXTREM: no clubbing, cyanosis or edema. normal pulses.  NEURO:   grossly intact; motor/sensory WNL; no tremors  PSYCH:  normal mood, affect and behavior            Labs:   Lab Results   Component Value Date    WBC 7.3 04/25/2019    HGB 12.7 (L) 04/25/2019    HCT 41.9 04/25/2019    MCV 93.5 04/25/2019     04/25/2019   CMP@  Sodium   Date Value Ref Range Status   04/25/2019 141 134 - 144 mmol/L      Potassium   Date Value Ref Range Status   04/25/2019 4.1 3.5 - 5.0 mmol/L      Chloride   Date Value Ref Range Status   04/25/2019 110 98 - 110 mmol/L      CO2   Date Value Ref Range Status   04/25/2019 23.2 22.8 - 31.6 mmol/L      Glucose   Date Value Ref Range Status   04/25/2019 95 70 - 99 mg/dL      BUN, Bld   Date Value Ref Range Status   04/25/2019 19 8 - 20 mg/dL      Creatinine   Date Value Ref Range Status   04/25/2019 1.38 0.60 - 1.40 mg/dL      Calcium   Date Value Ref Range Status   04/25/2019 10.0 7.7 - 10.4 mg/dL      Total Protein   Date Value Ref Range Status   04/25/2019 7.0 6.0 - 8.2 g/dL      Albumin   Date Value Ref Range Status   04/25/2019 3.3 3.1 - 4.7 g/dL      Total Bilirubin   Date Value Ref Range Status   04/25/2019 0.8 0.3 - 1.0 mg/dL      Alkaline Phosphatase   Date Value Ref Range Status   04/25/2019 63 40 - 104 IU/L      AST   Date Value Ref Range Status   04/25/2019 20 10 - 40 IU/L      ALT   Date Value Ref Range Status   02/22/2019 15 10 - 44 U/L Final     CPK   Date Value Ref Range Status   04/20/2017 48 18 - 170 IU/L      CRP   Date Value Ref Range Status   04/25/2019 0.62 0.00 - 1.40 mg/dL      Rheumatoid Factor   Date Value Ref Range Status   04/20/2017 633.9 (H) 0.0 - 13.9 IU/mL      Comment:     Performed at: [facility], [facility] [address], [id number] [place]uceda MD, Phone:  [phone]     Uric Acid   Date Value Ref Range Status   09/20/2016 8.6 (H) 3.4 - 7.0 mg/dL Final         Radiology/Diagnostic Studies:    None    Assessment/Discussion/Plan:   73 y.o. male with seropositive rheumatoid arthritis-back under  control on methotrexate 15 mg weekly and prednisone 2.5 mg twice daily  2) PROSTATE CANCER- surgical intervention declined by patient    PLAN:  I will continue his medications unchanged. I have ordered appropriate blood testing.  I have reminded him that his decision to forego surgery is something that he may wish to reconsider and that should he change his mind, he should not hesitate in the least to call his urologist and/or me.    RTC:  I will see him back in 3-4 months      Electronically signed by Uriel Garcia MD

## 2019-05-18 RX ORDER — FOLIC ACID 1 MG/1
TABLET ORAL
Qty: 90 TABLET | Refills: 3 | Status: SHIPPED | OUTPATIENT
Start: 2019-05-18 | End: 2020-05-18

## 2019-05-21 DIAGNOSIS — I10 ESSENTIAL HYPERTENSION: ICD-10-CM

## 2019-05-21 RX ORDER — CARVEDILOL 25 MG/1
TABLET ORAL
Qty: 60 TABLET | Refills: 0 | Status: SHIPPED | OUTPATIENT
Start: 2019-05-21 | End: 2019-06-23 | Stop reason: SDUPTHER

## 2019-05-22 ENCOUNTER — OFFICE VISIT (OUTPATIENT)
Dept: RADIATION ONCOLOGY | Facility: CLINIC | Age: 74
End: 2019-05-22
Payer: MEDICARE

## 2019-05-22 DIAGNOSIS — C61 PROSTATE CANCER: Primary | ICD-10-CM

## 2019-05-22 PROCEDURE — 99215 OFFICE O/P EST HI 40 MIN: CPT | Mod: ,,, | Performed by: RADIOLOGY

## 2019-05-22 PROCEDURE — 99215 PR OFFICE/OUTPT VISIT, EST, LEVL V, 40-54 MIN: ICD-10-PCS | Mod: ,,, | Performed by: RADIOLOGY

## 2019-05-22 NOTE — PROGRESS NOTES
Rajendra Castle  [id number]  1945 5/22/2019  [facility]ina [facility]  [address] [place] [address] [address]  [place] [address]    DIAGNOSIS: Cancer Staging  Prostate cancer  Staging form: Prostate, AJCC 8th Edition  - Clinical: Stage IIB (cT1c, cN0, cM0, PSA: 2.4, Grade Group: 2) - Signed by Alec Eng Jr., MD on 2/20/2019    REASON FOR VISIT: Routine scheduled follow-up.    HISTORY OF PRESENT ILLNESS:   73M p/w elevated PSA as below; + LUTS. Dr. Pina performed TRUS bx revealing GS 3+4 @ L base lat, 25%; L base medial, 5%; L mid medial, <5%; L apex lat, 10%. 4/14 cores +. Initially considered for RP but unable to get surgical clearance 2/2 hyperCa; PMHx: CKD III. Takes MTX for RA.    PSA  10/06 1.8  8/12 2.7  10/13 2.1  4/15 4.1  10/15 3.4  3/18 4.8  4/18 2.9  11/18 2.4 (on finasteride; 4.8)    INTERVAL HISTORY:   Dr. Pina recommended concurrent ST-ADT; clear SHIRLEY noted. Met with the patient in February 2019 and discussed options of surgery versus radiotherapy with concurrent short-term hormone deprivation. I informed patient that taking methotrexate during treatment would exacerbate his side effects that radiotherapy was unlikely to improve his urinary function. He elected to discuss further with Dr. Pina and ultimately decided to forego surgery but did receive ADT. He has resumed methotrexate. His brother is currently receiving radiotherapy for prostate cancer    AUA 12  QOL 4  IIEF 2    Review of Systems   Constitutional: Negative for appetite change, chills, fever and unexpected weight change.   HENT:   Negative for lump/mass, mouth sores, sore throat, trouble swallowing and voice change.    Eyes: Negative for eye problems and icterus.   Respiratory: Negative for chest tightness, cough, hemoptysis and shortness of breath.    Cardiovascular: Negative for chest pain and leg swelling.   Gastrointestinal: Negative for abdominal distention, abdominal pain, blood in stool, constipation, diarrhea, nausea and  vomiting.   Genitourinary: Positive for nocturia. Negative for bladder incontinence, difficulty urinating, dysuria, frequency and hematuria.    Musculoskeletal: Positive for arthralgias. Negative for back pain, gait problem, neck pain and neck stiffness.   Neurological: Negative for extremity weakness, gait problem, headaches, numbness and seizures.   Hematological: Negative for adenopathy.     Past Medical History:   Diagnosis Date    Arthritis     DDD (degenerative disc disease), cervical     Degenerative disc disease     Heart murmur     Hypertension     Nontoxic multinodular goiter 2016    RA (rheumatoid arthritis)     Vitamin D insufficiency 2016     Past Surgical History:   Procedure Laterality Date    BIOPSY, PROSTATE, RECTAL APPROACH, WITH US GUIDANCE N/A 2018    [facility]    CYSTOSCOPY N/A 2018    Performed by [facility] at [facility] OR     Social History     Socioeconomic History    Marital status: Single     Spouse name: Not on file    Number of children: Not on file    Years of education: Not on file    Highest education level: Not on file   Occupational History    Not on file   Social Needs    Financial resource strain: Not on file    Food insecurity:     Worry: Not on file     Inability: Not on file    Transportation needs:     Medical: Not on file     Non-medical: Not on file   Tobacco Use    Smoking status: Former Smoker     Packs/day: 0.25     Years: 10.00     Pack years: 2.50     Last attempt to quit: 2001     Years since quittin.8    Smokeless tobacco: Never Used   Substance and Sexual Activity    Alcohol use: Yes     Comment: seldom    Drug use: Yes     Frequency: 8.0 times per week     Types: Marijuana    Sexual activity: Yes     Partners: Female   Lifestyle    Physical activity:     Days per week: Not on file     Minutes per session: Not on file    Stress: Not on file   Relationships    Social  connections:     Talks on phone: Not on file     Gets together: Not on file     Attends Yarsanism service: Not on file     Active member of club or organization: Not on file     Attends meetings of clubs or organizations: Not on file     Relationship status: Not on file   Other Topics Concern    Not on file   Social History Narrative    Not on file     Family History   Problem Relation Age of Onset    Heart disease Mother     Stroke Father     Drug abuse Sister     Diabetes Maternal Uncle      Medication List with Changes/Refills   Current Medications    ALENDRONATE (FOSAMAX) 70 MG TABLET    TAKE 1 TABLET BY MOUTH EVERY 7 DAYS    ALFUZOSIN (UROXATRAL) 10 MG TB24    Take 1 tablet (10 mg total) by mouth after dinner.    AMLODIPINE (NORVASC) 2.5 MG TABLET    TAKE 1 TABLET(2.5 MG) BY MOUTH EVERY DAY    ASPIRIN (ECOTRIN) 81 MG EC TABLET    Take 81 mg by mouth once daily.      CARVEDILOL (COREG) 25 MG TABLET    TAKE 1 TABLET(25 MG) BY MOUTH TWICE DAILY WITH MEALS    CINACALCET (SENSIPAR) 30 MG TAB    Take 1 tablet (30 mg total) by mouth daily with breakfast.    CIPROFLOXACIN HCL (CIPRO) 500 MG TABLET    Take 1 tablet (500 mg total) by mouth 2 (two) times daily.    DUTASTERIDE (AVODART) 0.5 MG CAPSULE    Take 1 capsule (0.5 mg total) by mouth once daily.    ESCITALOPRAM OXALATE (LEXAPRO) 10 MG TABLET    Take 10 mg by mouth once daily.    FOLIC ACID (FOLVITE) 1 MG TABLET    TAKE 1 TABLET(1 MG) BY MOUTH EVERY DAY    METHOTREXATE 2.5 MG TAB    TAKE 6 TABLETS BY MOUTH EVERY WEEK    PREDNISONE (DELTASONE) 2.5 MG TABLET    TAKE 1 TABLET BY MOUTH TWICE DAILY    TRAMADOL (ULTRAM) 50 MG TABLET    TAKE 2 TABLETS BY MOUTH TWICE DAILY AS NEEDED FOR PAIN     Review of patient's allergies indicates:  No Known Allergies    QUALITY OF LIFE: 90%- Able to Carry on Normal Activity: Minor Symptoms of Disease    There were no vitals filed for this visit.  There is no height or weight on file to calculate BMI.    PHYSICAL EXAM:   GENERAL:  alert; in no apparent distress.   HEAD: normocephalic, atraumatic.  EYES: pupils are equal, round, reactive to light and accommodation. Sclera anicteric. Conjunctiva not injected.   NOSE/THROAT: no nasal erythema or rhinorrhea. Oropharynx pink, without erythema, ulcerations or thrush.   NECK: no cervical motion rigidity; supple with no masses.  CHEST: Patient is speaking comfortably on room air with normal work of breathing without using accessory muscles of respiration.  ABDOMEN: soft, nontender, nondistended. Bowel sounds present.   MUSCULOSKELETAL: no tenderness to palpation along the spine or scapulae. Normal range of motion.  NEUROLOGIC: cranial nerves II-XII intact bilaterally. Strength 5/5 in bilateral upper and lower extremities. No sensory deficits appreciated.  Normal gait.  EXTREMITIES: no clubbing, cyanosis, edema.  SKIN: no erythema, rashes, ulcerations noted.     ANCILLARY DATA: as above    ASSESSMENT: 73 y.o. male with stage Intermediate risk prostate adenocarcinoma, [id number] GS 3+4 iPSA 2.4 (on finasteride; 4.8 actual); refusing surgery, fiducial/SpaceOar placement and wishing to continue methotrexate.  PLAN:   Rajendra Castle initially presented with intermediate risk disease and we discussed prostatectomy versus radiotherapy at which time I informed him he would likely see worsening side effect profile on concurrent methotrexate. He explored surgery with Dr. Pina but ultimately declined. He has resumed methotrexate after temporarily discontinuing this which resulted in significant arthralgias hindering his quality of life. He presents again to discuss radiotherapy. Today I informed him that I am willing to proceed with definitive radiotherapy reiterated that he is likely to see worsening of side effect profile due to concurrent methotrexate. He expressed understanding. He reports that he has received and androgen deprivation depot from Dr. Pina. I therefore recommending 7740 cGy using IMRT with  daily bowel and bladder preparation which were discussed today. Of note patient is refusing fiducials/SpaceOar placement and therefore we will image with daily cone beam CT. After an extensive discussion today the patient would like to proceed with treatment.    I carefully explained the process of simulation and treatment delivery with weekly physician visits.     We discussed the risks and benefits of the above treatment and have gone over in detail the acute and late toxicities of radiation therapy to the prostate. The patient expressed  understanding and has signed a consent form which is included in the patients chart.     The patient has our contact information and understands that they are free to contact us at any time with questions or concerns regarding radiation therapy.    DISPOSITION: RTC FOR CT SIM    All questions answered and contact information provided. Patient understands free to call us anytime with any questions or concerns regarding radiation therapy.    I have personally seen and evaluated this patient. Greater than 50% of this time was spent discussing coordination of care and/or counseling.    PHYSICIAN: Alec Eng Jr, MD

## 2019-05-27 ENCOUNTER — LAB VISIT (OUTPATIENT)
Dept: LAB | Facility: HOSPITAL | Age: 74
End: 2019-05-27
Attending: INTERNAL MEDICINE
Payer: MEDICARE

## 2019-05-27 DIAGNOSIS — N18.30 CHRONIC KIDNEY DISEASE, STAGE III (MODERATE): ICD-10-CM

## 2019-05-27 LAB
ALBUMIN SERPL BCP-MCNC: 3.4 G/DL (ref 3.5–5.2)
ANION GAP SERPL CALC-SCNC: 9 MMOL/L (ref 8–16)
BUN SERPL-MCNC: 12 MG/DL (ref 8–23)
CALCIUM SERPL-MCNC: 11 MG/DL (ref 8.7–10.5)
CHLORIDE SERPL-SCNC: 111 MMOL/L (ref 95–110)
CO2 SERPL-SCNC: 20 MMOL/L (ref 23–29)
CREAT SERPL-MCNC: 1.4 MG/DL (ref 0.5–1.4)
EST. GFR  (AFRICAN AMERICAN): 58 ML/MIN/1.73 M^2
EST. GFR  (NON AFRICAN AMERICAN): 50.2 ML/MIN/1.73 M^2
GLUCOSE SERPL-MCNC: 78 MG/DL (ref 70–110)
PHOSPHATE SERPL-MCNC: 2.3 MG/DL (ref 2.7–4.5)
POTASSIUM SERPL-SCNC: 4.4 MMOL/L (ref 3.5–5.1)
PTH-INTACT SERPL-MCNC: 321 PG/ML (ref 9–77)
SODIUM SERPL-SCNC: 140 MMOL/L (ref 136–145)

## 2019-05-27 PROCEDURE — 80069 RENAL FUNCTION PANEL: CPT

## 2019-05-27 PROCEDURE — 36415 COLL VENOUS BLD VENIPUNCTURE: CPT | Mod: PO

## 2019-05-27 PROCEDURE — 83970 ASSAY OF PARATHORMONE: CPT

## 2019-06-03 ENCOUNTER — OFFICE VISIT (OUTPATIENT)
Dept: NEPHROLOGY | Facility: CLINIC | Age: 74
End: 2019-06-03
Payer: MEDICARE

## 2019-06-03 VITALS
HEART RATE: 81 BPM | BODY MASS INDEX: 24.63 KG/M2 | SYSTOLIC BLOOD PRESSURE: 136 MMHG | OXYGEN SATURATION: 98 % | DIASTOLIC BLOOD PRESSURE: 68 MMHG | HEIGHT: 68 IN | WEIGHT: 162.5 LBS

## 2019-06-03 DIAGNOSIS — N18.30 CHRONIC KIDNEY DISEASE, STAGE III (MODERATE): Primary | ICD-10-CM

## 2019-06-03 DIAGNOSIS — N25.81 SECONDARY RENAL HYPERPARATHYROIDISM: ICD-10-CM

## 2019-06-03 DIAGNOSIS — I10 ESSENTIAL HYPERTENSION: ICD-10-CM

## 2019-06-03 PROCEDURE — 3078F PR MOST RECENT DIASTOLIC BLOOD PRESSURE < 80 MM HG: ICD-10-PCS | Mod: CPTII,S$GLB,, | Performed by: INTERNAL MEDICINE

## 2019-06-03 PROCEDURE — 99499 UNLISTED E&M SERVICE: CPT | Mod: S$GLB,,, | Performed by: INTERNAL MEDICINE

## 2019-06-03 PROCEDURE — 99214 OFFICE O/P EST MOD 30 MIN: CPT | Mod: S$GLB,,, | Performed by: INTERNAL MEDICINE

## 2019-06-03 PROCEDURE — 3075F SYST BP GE 130 - 139MM HG: CPT | Mod: CPTII,S$GLB,, | Performed by: INTERNAL MEDICINE

## 2019-06-03 PROCEDURE — 99214 PR OFFICE/OUTPT VISIT, EST, LEVL IV, 30-39 MIN: ICD-10-PCS | Mod: S$GLB,,, | Performed by: INTERNAL MEDICINE

## 2019-06-03 PROCEDURE — 99499 RISK ADDL DX/OHS AUDIT: ICD-10-PCS | Mod: S$GLB,,, | Performed by: INTERNAL MEDICINE

## 2019-06-03 PROCEDURE — 1101F PR PT FALLS ASSESS DOC 0-1 FALLS W/OUT INJ PAST YR: ICD-10-PCS | Mod: CPTII,S$GLB,, | Performed by: INTERNAL MEDICINE

## 2019-06-03 PROCEDURE — 1101F PT FALLS ASSESS-DOCD LE1/YR: CPT | Mod: CPTII,S$GLB,, | Performed by: INTERNAL MEDICINE

## 2019-06-03 PROCEDURE — 3078F DIAST BP <80 MM HG: CPT | Mod: CPTII,S$GLB,, | Performed by: INTERNAL MEDICINE

## 2019-06-03 PROCEDURE — 99999 PR PBB SHADOW E&M-EST. PATIENT-LVL III: CPT | Mod: PBBFAC,,, | Performed by: INTERNAL MEDICINE

## 2019-06-03 PROCEDURE — 3075F PR MOST RECENT SYSTOLIC BLOOD PRESS GE 130-139MM HG: ICD-10-PCS | Mod: CPTII,S$GLB,, | Performed by: INTERNAL MEDICINE

## 2019-06-03 PROCEDURE — 99999 PR PBB SHADOW E&M-EST. PATIENT-LVL III: ICD-10-PCS | Mod: PBBFAC,,, | Performed by: INTERNAL MEDICINE

## 2019-06-03 NOTE — PROGRESS NOTES
"Subjective:       Patient ID: Rajendra Castle is a 73 y.o. Black or  male who presents for return patient evaluation for chronic renal failure.    He has no uremic symptoms and is in his usual state of health.  There have been no recent illnesses, hospitalizations or procedures.  He is getting ready to start XRT for his prostate cancer.      Review of Systems   Constitutional: Positive for fatigue (with exertion). Negative for appetite change, chills and fever.   Eyes: Negative for visual disturbance.   Respiratory: Negative for cough and shortness of breath.    Cardiovascular: Negative for chest pain and leg swelling.   Gastrointestinal: Negative for diarrhea, nausea and vomiting.   Genitourinary: Negative for difficulty urinating, dysuria and hematuria.   Musculoskeletal: Negative for myalgias.   Skin: Negative for rash.   Neurological: Negative for headaches.   Psychiatric/Behavioral: Negative for sleep disturbance.       The past medical, family and social histories were reviewed for this encounter.     /68   Pulse 81   Ht 5' 8" (1.727 m)   Wt 73.7 kg (162 lb 7.7 oz)   SpO2 98%   BMI 24.70 kg/m²     Objective:      Physical Exam   Constitutional: He is oriented to person, place, and time. He appears well-developed and well-nourished. No distress.   HENT:   Head: Normocephalic and atraumatic.   Eyes: Conjunctivae are normal.   Neck: Neck supple. No JVD present.   Cardiovascular: Normal rate, regular rhythm and normal heart sounds. Exam reveals no gallop and no friction rub.   No murmur heard.  Pulmonary/Chest: Effort normal and breath sounds normal. No respiratory distress. He has no wheezes. He has no rales.   Abdominal: Soft. Bowel sounds are normal. He exhibits no distension. There is no tenderness.   Musculoskeletal: He exhibits no edema.   Neurological: He is alert and oriented to person, place, and time.   Skin: Skin is warm and dry. No rash noted.   Psychiatric: He has a normal mood " and affect.   Vitals reviewed.      Assessment:       1. Chronic kidney disease, stage III (moderate)    2. Essential hypertension    3. Secondary renal hyperparathyroidism        Plan:   Return to clinic in 6 months.  Labs for next visit include rp, pth.  Baseline creatinine is 1.4-1.6 since 2006.  PTH is 321with a calcium of 11.0.  This prevents us from using vitamin D analogs.  Renal US shows R 9.3 cm, L 9.4 cm.

## 2019-06-06 DIAGNOSIS — M19.90 CHRONIC INFLAMMATORY ARTHRITIS: ICD-10-CM

## 2019-06-06 RX ORDER — PREDNISONE 2.5 MG/1
TABLET ORAL
Qty: 180 TABLET | Refills: 1 | Status: SHIPPED | OUTPATIENT
Start: 2019-06-06 | End: 2019-12-16 | Stop reason: SDUPTHER

## 2019-06-20 ENCOUNTER — OFFICE VISIT (OUTPATIENT)
Dept: FAMILY MEDICINE | Facility: CLINIC | Age: 74
End: 2019-06-20
Payer: MEDICARE

## 2019-06-20 VITALS
HEIGHT: 68 IN | HEART RATE: 80 BPM | TEMPERATURE: 99 F | BODY MASS INDEX: 25.09 KG/M2 | DIASTOLIC BLOOD PRESSURE: 82 MMHG | SYSTOLIC BLOOD PRESSURE: 130 MMHG | WEIGHT: 165.56 LBS | OXYGEN SATURATION: 95 %

## 2019-06-20 DIAGNOSIS — I10 ESSENTIAL HYPERTENSION: Primary | ICD-10-CM

## 2019-06-20 DIAGNOSIS — N18.2 STAGE 2 CHRONIC KIDNEY DISEASE: ICD-10-CM

## 2019-06-20 DIAGNOSIS — M05.79 RHEUMATOID ARTHRITIS INVOLVING MULTIPLE SITES WITH POSITIVE RHEUMATOID FACTOR: ICD-10-CM

## 2019-06-20 DIAGNOSIS — E66.3 OVERWEIGHT (BMI 25.0-29.9): ICD-10-CM

## 2019-06-20 DIAGNOSIS — E07.9 THYROID DISEASE: ICD-10-CM

## 2019-06-20 DIAGNOSIS — C61 PROSTATE CANCER: ICD-10-CM

## 2019-06-20 PROCEDURE — 3075F SYST BP GE 130 - 139MM HG: CPT | Mod: CPTII,S$GLB,, | Performed by: NURSE PRACTITIONER

## 2019-06-20 PROCEDURE — 99214 OFFICE O/P EST MOD 30 MIN: CPT | Mod: S$GLB,,, | Performed by: NURSE PRACTITIONER

## 2019-06-20 PROCEDURE — 3075F PR MOST RECENT SYSTOLIC BLOOD PRESS GE 130-139MM HG: ICD-10-PCS | Mod: CPTII,S$GLB,, | Performed by: NURSE PRACTITIONER

## 2019-06-20 PROCEDURE — 99499 UNLISTED E&M SERVICE: CPT | Mod: S$GLB,,, | Performed by: NURSE PRACTITIONER

## 2019-06-20 PROCEDURE — 1101F PR PT FALLS ASSESS DOC 0-1 FALLS W/OUT INJ PAST YR: ICD-10-PCS | Mod: CPTII,S$GLB,, | Performed by: NURSE PRACTITIONER

## 2019-06-20 PROCEDURE — 3079F DIAST BP 80-89 MM HG: CPT | Mod: CPTII,S$GLB,, | Performed by: NURSE PRACTITIONER

## 2019-06-20 PROCEDURE — 99999 PR PBB SHADOW E&M-EST. PATIENT-LVL IV: CPT | Mod: PBBFAC,,, | Performed by: NURSE PRACTITIONER

## 2019-06-20 PROCEDURE — 99499 RISK ADDL DX/OHS AUDIT: ICD-10-PCS | Mod: S$GLB,,, | Performed by: NURSE PRACTITIONER

## 2019-06-20 PROCEDURE — 99214 PR OFFICE/OUTPT VISIT, EST, LEVL IV, 30-39 MIN: ICD-10-PCS | Mod: S$GLB,,, | Performed by: NURSE PRACTITIONER

## 2019-06-20 PROCEDURE — 1101F PT FALLS ASSESS-DOCD LE1/YR: CPT | Mod: CPTII,S$GLB,, | Performed by: NURSE PRACTITIONER

## 2019-06-20 PROCEDURE — 3079F PR MOST RECENT DIASTOLIC BLOOD PRESSURE 80-89 MM HG: ICD-10-PCS | Mod: CPTII,S$GLB,, | Performed by: NURSE PRACTITIONER

## 2019-06-20 PROCEDURE — 99999 PR PBB SHADOW E&M-EST. PATIENT-LVL IV: ICD-10-PCS | Mod: PBBFAC,,, | Performed by: NURSE PRACTITIONER

## 2019-06-20 NOTE — PROGRESS NOTES
Subjective:       Patient ID: Rajendra Castle is a 73 y.o. male.    Chief Complaint: follow up hypertension    Patient presents today for six month follow up on chronic disease. He has HTN, CKD III, Hyperparathyroid, LISA, and prostate cancer [place] 7. Patient has radiation M-F , he has had 10 treatments has 29 more to go.  Patient has no complaints of distress or discomfort.    Hypertension   This is a chronic problem. The current episode started more than 1 year ago. The problem is controlled. Pertinent negatives include no anxiety, chest pain, headaches, palpitations, peripheral edema or shortness of breath. There are no associated agents to hypertension.       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    RA (rheumatoid arthritis)     Vitamin D insufficiency 9/30/2016       Review of patient's allergies indicates:  No Known Allergies      Current Outpatient Medications:     alfuzosin (UROXATRAL) 10 mg Tb24, Take 1 tablet (10 mg total) by mouth after dinner., Disp: 30 tablet, Rfl: 11    amLODIPine (NORVASC) 2.5 MG tablet, TAKE 1 TABLET(2.5 MG) BY MOUTH EVERY DAY, Disp: 90 tablet, Rfl: 0    aspirin (ECOTRIN) 81 MG EC tablet, Take 81 mg by mouth once daily.  , Disp: , Rfl:     carvedilol (COREG) 25 MG tablet, TAKE 1 TABLET(25 MG) BY MOUTH TWICE DAILY WITH MEALS, Disp: 60 tablet, Rfl: 0    escitalopram oxalate (LEXAPRO) 10 MG tablet, Take 10 mg by mouth once daily., Disp: , Rfl:     folic acid (FOLVITE) 1 MG tablet, TAKE 1 TABLET(1 MG) BY MOUTH EVERY DAY, Disp: 90 tablet, Rfl: 3    methotrexate 2.5 MG Tab, TAKE 6 TABLETS BY MOUTH EVERY WEEK, Disp: 72 tablet, Rfl: 1    predniSONE (DELTASONE) 2.5 MG tablet, TAKE 1 TABLET BY MOUTH TWICE DAILY, Disp: 180 tablet, Rfl: 1    traMADol (ULTRAM) 50 mg tablet, TAKE 2 TABLETS BY MOUTH TWICE DAILY AS  "NEEDED FOR PAIN, Disp: 120 tablet, Rfl: 5    Review of Systems   Constitutional: Negative for unexpected weight change.   HENT: Negative for trouble swallowing.    Eyes: Negative for visual disturbance.   Respiratory: Negative for shortness of breath.    Cardiovascular: Negative for chest pain, palpitations and leg swelling.   Gastrointestinal: Negative for blood in stool.   Genitourinary: Negative for hematuria.   Skin: Negative for rash.   Allergic/Immunologic: Negative for immunocompromised state.   Neurological: Negative for headaches.   Hematological: Does not bruise/bleed easily.   Psychiatric/Behavioral: Negative for agitation. The patient is not nervous/anxious.        Objective:      /82 (BP Location: Right arm, Patient Position: Sitting, BP Method: Medium (Manual))   Pulse 80   Temp 98.6 °F (37 °C) (Oral)   Ht 5' 8" (1.727 m)   Wt 75.1 kg (165 lb 9.1 oz)   SpO2 95%   BMI 25.17 kg/m²   Physical Exam   Constitutional: He is oriented to person, place, and time. He appears well-developed and well-nourished.   Eyes: Pupils are equal, round, and reactive to light. Conjunctivae and EOM are normal.   Neck: Normal range of motion. Neck supple.   Cardiovascular: Normal rate, regular rhythm, normal heart sounds and intact distal pulses.   Pulmonary/Chest: Effort normal and breath sounds normal.   Abdominal: Soft. Bowel sounds are normal.   Musculoskeletal: Normal range of motion.   Neurological: He is alert and oriented to person, place, and time.   Skin: Skin is warm and dry.   Psychiatric: He has a normal mood and affect. His behavior is normal. Judgment and thought content normal.       Assessment:       1. Essential hypertension    2. Rheumatoid arthritis involving multiple sites with positive rheumatoid factor    3. Thyroid disease    4. Stage 2 chronic kidney disease    5. Prostate cancer    6. Overweight (BMI 25.0-29.9)        Plan:       Essential hypertension  Controlled, continue medication  BP " "Readings from Last 3 Encounters:   06/20/19 130/82   06/03/19 136/68   04/25/19 121/72     Rheumatoid arthritis involving multiple sites with positive rheumatoid factor   Stable,followed by Rheumatology.Garyrish  Thyroid disease  Stable, Followed by Endrocrine-Dr. Murphy  Prostate cancer   Stable, Followed, Urology, Rad Onc-  Stage 2 chronic kidney disease  Stable, Followed by nephrology-  Overweight (BMI 25.0-29.9)  Counseled patient on his ideal body weight, health consequences of being obese and current recommendations including weekly exercise and a heart healthy diet.  Current BMI is:Estimated body mass index is 25.17 kg/m² as calculated from the following:    Height as of this encounter: 5' 8" (1.727 m).    Weight as of this encounter: 75.1 kg (165 lb 9.1 oz)..  Patient is aware that ideal BMI < 25 or Weight in (lb) to have BMI = 25: 164.1.            Patient readiness: acceptance and barriers:none    During the course of the visit the patient was educated and counseled about the following:     Hypertension:   Dietary sodium restriction.  Regular aerobic exercise.  Obesity:   General weight loss/lifestyle modification strategies discussed (elicit support from others; identify saboteurs; non-food rewards, etc).  Regular aerobic exercise program discussed.  2  Goals: Hypertension: Reduce Blood Pressure and Obesity: Reduce calorie intake and BMI    Did patient meet goals/outcomes: No    The following self management tools provided: declined    Patient Instructions (the written plan) was given to the patient/family.     Time spent with patient: 30 minutes    Barriers to medications present (no )    Adverse reactions to current medications (no)    Over the counter medications reviewed (Yes)        "

## 2019-06-23 DIAGNOSIS — I10 ESSENTIAL HYPERTENSION: ICD-10-CM

## 2019-06-24 RX ORDER — CARVEDILOL 25 MG/1
TABLET ORAL
Qty: 60 TABLET | Refills: 0 | Status: SHIPPED | OUTPATIENT
Start: 2019-06-24 | End: 2019-07-24 | Stop reason: SDUPTHER

## 2019-06-27 DIAGNOSIS — I10 ESSENTIAL HYPERTENSION: ICD-10-CM

## 2019-06-27 RX ORDER — AMLODIPINE BESYLATE 2.5 MG/1
TABLET ORAL
Qty: 90 TABLET | Refills: 0 | Status: SHIPPED | OUTPATIENT
Start: 2019-06-27 | End: 2019-09-23 | Stop reason: SDUPTHER

## 2019-07-24 DIAGNOSIS — I10 ESSENTIAL HYPERTENSION: ICD-10-CM

## 2019-07-24 RX ORDER — CARVEDILOL 25 MG/1
TABLET ORAL
Qty: 60 TABLET | Refills: 0 | Status: SHIPPED | OUTPATIENT
Start: 2019-07-24 | End: 2019-07-24 | Stop reason: SDUPTHER

## 2019-07-25 RX ORDER — CARVEDILOL 25 MG/1
TABLET ORAL
Qty: 180 TABLET | Refills: 0 | Status: SHIPPED | OUTPATIENT
Start: 2019-07-25 | End: 2020-11-11 | Stop reason: SDUPTHER

## 2019-07-29 ENCOUNTER — LAB VISIT (OUTPATIENT)
Dept: LAB | Facility: HOSPITAL | Age: 74
End: 2019-07-29
Attending: INTERNAL MEDICINE
Payer: MEDICARE

## 2019-07-29 DIAGNOSIS — R79.89 ELEVATED PTHRP LEVEL: ICD-10-CM

## 2019-07-29 DIAGNOSIS — M81.0 OSTEOPOROSIS, UNSPECIFIED OSTEOPOROSIS TYPE, UNSPECIFIED PATHOLOGICAL FRACTURE PRESENCE: ICD-10-CM

## 2019-07-29 LAB
ALBUMIN SERPL BCP-MCNC: 3.3 G/DL (ref 3.5–5.2)
ALP SERPL-CCNC: 82 U/L (ref 55–135)
ALT SERPL W/O P-5'-P-CCNC: 20 U/L (ref 10–44)
ANION GAP SERPL CALC-SCNC: 10 MMOL/L (ref 8–16)
AST SERPL-CCNC: 16 U/L (ref 10–40)
BILIRUB SERPL-MCNC: 0.3 MG/DL (ref 0.1–1)
BUN SERPL-MCNC: 19 MG/DL (ref 8–23)
CALCIUM SERPL-MCNC: 10.8 MG/DL (ref 8.7–10.5)
CHLORIDE SERPL-SCNC: 109 MMOL/L (ref 95–110)
CO2 SERPL-SCNC: 21 MMOL/L (ref 23–29)
CREAT SERPL-MCNC: 1.4 MG/DL (ref 0.5–1.4)
EST. GFR  (AFRICAN AMERICAN): 58 ML/MIN/1.73 M^2
EST. GFR  (NON AFRICAN AMERICAN): 50.2 ML/MIN/1.73 M^2
GLUCOSE SERPL-MCNC: 88 MG/DL (ref 70–110)
PHOSPHATE SERPL-MCNC: 2.8 MG/DL (ref 2.7–4.5)
POTASSIUM SERPL-SCNC: 4.8 MMOL/L (ref 3.5–5.1)
PROT SERPL-MCNC: 7.2 G/DL (ref 6–8.4)
PTH-INTACT SERPL-MCNC: 268 PG/ML (ref 9–77)
SODIUM SERPL-SCNC: 140 MMOL/L (ref 136–145)

## 2019-07-29 PROCEDURE — 80053 COMPREHEN METABOLIC PANEL: CPT

## 2019-07-29 PROCEDURE — 83970 ASSAY OF PARATHORMONE: CPT

## 2019-07-29 PROCEDURE — 84100 ASSAY OF PHOSPHORUS: CPT

## 2019-07-29 PROCEDURE — 36415 COLL VENOUS BLD VENIPUNCTURE: CPT | Mod: PO

## 2019-08-05 DIAGNOSIS — C61 MALIGNANT NEOPLASM OF PROSTATE: Primary | ICD-10-CM

## 2019-08-12 ENCOUNTER — OFFICE VISIT (OUTPATIENT)
Dept: RHEUMATOLOGY | Facility: CLINIC | Age: 74
End: 2019-08-12
Payer: MEDICARE

## 2019-08-12 ENCOUNTER — LAB VISIT (OUTPATIENT)
Dept: LAB | Facility: HOSPITAL | Age: 74
End: 2019-08-12
Attending: INTERNAL MEDICINE
Payer: MEDICARE

## 2019-08-12 VITALS
WEIGHT: 159.69 LBS | SYSTOLIC BLOOD PRESSURE: 125 MMHG | BODY MASS INDEX: 24.28 KG/M2 | DIASTOLIC BLOOD PRESSURE: 74 MMHG

## 2019-08-12 DIAGNOSIS — Z79.899 ENCOUNTER FOR LONG-TERM (CURRENT) USE OF MEDICATIONS: ICD-10-CM

## 2019-08-12 DIAGNOSIS — M19.90 CHRONIC INFLAMMATORY ARTHRITIS: ICD-10-CM

## 2019-08-12 DIAGNOSIS — N18.30 STAGE 3 CHRONIC KIDNEY DISEASE: ICD-10-CM

## 2019-08-12 DIAGNOSIS — C61 PROSTATE CANCER: ICD-10-CM

## 2019-08-12 DIAGNOSIS — M19.90 CHRONIC INFLAMMATORY ARTHRITIS: Primary | ICD-10-CM

## 2019-08-12 DIAGNOSIS — C61 MALIGNANT NEOPLASM OF PROSTATE: ICD-10-CM

## 2019-08-12 LAB
BASOPHILS # BLD AUTO: 0.02 K/UL (ref 0–0.2)
BASOPHILS NFR BLD: 0.3 % (ref 0–1.9)
DIFFERENTIAL METHOD: ABNORMAL
EOSINOPHIL # BLD AUTO: 0.1 K/UL (ref 0–0.5)
EOSINOPHIL NFR BLD: 0.8 % (ref 0–8)
ERYTHROCYTE [DISTWIDTH] IN BLOOD BY AUTOMATED COUNT: 15.8 % (ref 11.5–14.5)
HCT VFR BLD AUTO: 42.9 % (ref 40–54)
HGB BLD-MCNC: 12.7 G/DL (ref 14–18)
IMM GRANULOCYTES # BLD AUTO: 0.02 K/UL (ref 0–0.04)
IMM GRANULOCYTES NFR BLD AUTO: 0.3 % (ref 0–0.5)
LYMPHOCYTES # BLD AUTO: 0.8 K/UL (ref 1–4.8)
LYMPHOCYTES NFR BLD: 13.7 % (ref 18–48)
MCH RBC QN AUTO: 28.9 PG (ref 27–31)
MCHC RBC AUTO-ENTMCNC: 29.6 G/DL (ref 32–36)
MCV RBC AUTO: 98 FL (ref 82–98)
MONOCYTES # BLD AUTO: 0.5 K/UL (ref 0.3–1)
MONOCYTES NFR BLD: 7.5 % (ref 4–15)
NEUTROPHILS # BLD AUTO: 4.7 K/UL (ref 1.8–7.7)
NEUTROPHILS NFR BLD: 77.4 % (ref 38–73)
NRBC BLD-RTO: 0 /100 WBC
PLATELET # BLD AUTO: 349 K/UL (ref 150–350)
PMV BLD AUTO: 11.3 FL (ref 9.2–12.9)
RBC # BLD AUTO: 4.4 M/UL (ref 4.6–6.2)
WBC # BLD AUTO: 6.04 K/UL (ref 3.9–12.7)

## 2019-08-12 PROCEDURE — 1101F PT FALLS ASSESS-DOCD LE1/YR: CPT | Mod: S$GLB,,, | Performed by: INTERNAL MEDICINE

## 2019-08-12 PROCEDURE — 3078F DIAST BP <80 MM HG: CPT | Mod: S$GLB,,, | Performed by: INTERNAL MEDICINE

## 2019-08-12 PROCEDURE — 36415 COLL VENOUS BLD VENIPUNCTURE: CPT | Mod: PO

## 2019-08-12 PROCEDURE — 3074F SYST BP LT 130 MM HG: CPT | Mod: S$GLB,,, | Performed by: INTERNAL MEDICINE

## 2019-08-12 PROCEDURE — 3074F PR MOST RECENT SYSTOLIC BLOOD PRESSURE < 130 MM HG: ICD-10-PCS | Mod: S$GLB,,, | Performed by: INTERNAL MEDICINE

## 2019-08-12 PROCEDURE — 3078F PR MOST RECENT DIASTOLIC BLOOD PRESSURE < 80 MM HG: ICD-10-PCS | Mod: S$GLB,,, | Performed by: INTERNAL MEDICINE

## 2019-08-12 PROCEDURE — 85025 COMPLETE CBC W/AUTO DIFF WBC: CPT

## 2019-08-12 PROCEDURE — 99213 OFFICE O/P EST LOW 20 MIN: CPT | Mod: S$GLB,,, | Performed by: INTERNAL MEDICINE

## 2019-08-12 PROCEDURE — 1101F PR PT FALLS ASSESS DOC 0-1 FALLS W/OUT INJ PAST YR: ICD-10-PCS | Mod: S$GLB,,, | Performed by: INTERNAL MEDICINE

## 2019-08-12 PROCEDURE — 80053 COMPREHEN METABOLIC PANEL: CPT

## 2019-08-12 PROCEDURE — 99213 PR OFFICE/OUTPT VISIT, EST, LEVL III, 20-29 MIN: ICD-10-PCS | Mod: S$GLB,,, | Performed by: INTERNAL MEDICINE

## 2019-08-12 PROCEDURE — 84153 ASSAY OF PSA TOTAL: CPT

## 2019-08-12 PROCEDURE — 86140 C-REACTIVE PROTEIN: CPT

## 2019-08-12 RX ORDER — DICLOFENAC SODIUM 30 MG/G
GEL TOPICAL 3 TIMES DAILY
Qty: 100 G | Refills: 5 | Status: SHIPPED | OUTPATIENT
Start: 2019-08-12 | End: 2019-09-17

## 2019-08-12 NOTE — PROGRESS NOTES
[facility] RHEUMATOLOGY           Follow-up visit    Notes dictated via Dragon to EPIC. Please forgive any unintended errors.  Subjective:       Patient ID:   NAME: Rajendra Castle : 1945     73 y.o. male    Referring Doc: No ref. provider found  Other Physicians:    Chief Complaint:  Rheumatoid Arthritis      HPI/Interval History:   Patient has been feeling well. His arthritis has not been all bothersome. He has just finished his chemotherapy for his prostate cancer diagnosis.    ROS:   GEN:    no fever, night sweats or weight loss  SKIN:   no rashes, erythema, bruising, or swelling, no Raynauds, no photosensitivity  HEENT: no changes in vision, no mouth ulcers, no sicca symptoms, no scalp tenderness, no jaw claudication.  CV:      no CP, PND, DEY, orthopnea, no palpitations  PULM: no SOB, cough, hemoptysis, sputum or pleuritic pain  GI:       no GERD, no dysphagia, no hematemesis, no abdominal pain, nausea, vomiting, constipation, diarrhea, melanotic stools, BRBPR  :      no hematuria, dysuria  NEURO: no paresthesias, headaches, acute visual disturbances  MUSCULOSKELETAL:  No complaints of muscle or joint pain  PSYCH:   No insomnia, no increased anxiety, no increased depression    Past Medical/Surgical History:  Past Medical History:   Diagnosis Date    Arthritis     DDD (degenerative disc disease), cervical     Degenerative disc disease     Heart murmur     Hypertension     Nontoxic multinodular goiter 2016    RA (rheumatoid arthritis)     Vitamin D insufficiency 2016     Past Surgical History:   Procedure Laterality Date    BIOPSY, PROSTATE, RECTAL APPROACH, WITH US GUIDANCE N/A 2018    Per[facility] by [facility] at [facility]    CYSTOSCOPY N/A 2018    Performed by Garrett Pina MD at [facility]       Allergies:  Review of patient's allergies indicates:  No Known Allergies    Social/Family History:  Social History     Socioeconomic History    Marital status: Single      Spouse name: Not on file    Number of children: Not on file    Years of education: Not on file    Highest education level: Not on file   Occupational History    Not on file   Social Needs    Financial resource strain: Not on file    Food insecurity:     Worry: Not on file     Inability: Not on file    Transportation needs:     Medical: Not on file     Non-medical: Not on file   Tobacco Use    Smoking status: Former Smoker     Packs/day: 0.25     Years: 10.00     Pack years: 2.50     Last attempt to quit: 2001     Years since quittin.0    Smokeless tobacco: Never Used   Substance and Sexual Activity    Alcohol use: Yes     Comment: seldom    Drug use: Yes     Frequency: 8.0 times per week     Types: Marijuana    Sexual activity: Yes     Partners: Female   Lifestyle    Physical activity:     Days per week: Not on file     Minutes per session: Not on file    Stress: Not on file   Relationships    Social connections:     Talks on phone: Not on file     Gets together: Not on file     Attends Holiness service: Not on file     Active member of club or organization: Not on file     Attends meetings of clubs or organizations: Not on file     Relationship status: Not on file   Other Topics Concern    Not on file   Social History Narrative    Not on file     Family History   Problem Relation Age of Onset    Heart disease Mother     Stroke Father     Drug abuse Sister     Diabetes Maternal Uncle      FAMILY HISTORY: negative for Connective Tissue Disease      Medications:    Current Outpatient Medications:     alfuzosin (UROXATRAL) 10 mg Tb24, Take 1 tablet (10 mg total) by mouth after dinner., Disp: 30 tablet, Rfl: 11    amLODIPine (NORVASC) 2.5 MG tablet, TAKE 1 TABLET(2.5 MG) BY MOUTH EVERY DAY, Disp: 90 tablet, Rfl: 0    aspirin (ECOTRIN) 81 MG EC tablet, Take 81 mg by mouth once daily.  , Disp: , Rfl:     carvedilol (COREG) 25 MG tablet, TAKE 1 TABLET(25 MG) BY MOUTH TWICE DAILY WITH  MEALS, Disp: 180 tablet, Rfl: 0    escitalopram oxalate (LEXAPRO) 10 MG tablet, Take 10 mg by mouth once daily., Disp: , Rfl:     folic acid (FOLVITE) 1 MG tablet, TAKE 1 TABLET(1 MG) BY MOUTH EVERY DAY, Disp: 90 tablet, Rfl: 3    methotrexate 2.5 MG Tab, TAKE 6 TABLETS BY MOUTH EVERY WEEK, Disp: 72 tablet, Rfl: 1    predniSONE (DELTASONE) 2.5 MG tablet, TAKE 1 TABLET BY MOUTH TWICE DAILY, Disp: 180 tablet, Rfl: 1    traMADol (ULTRAM) 50 mg tablet, TAKE 2 TABLETS BY MOUTH TWICE DAILY AS NEEDED FOR PAIN, Disp: 120 tablet, Rfl: 5    diclofenac sodium (SOLARAZE) 3 % gel, Apply topically 3 (three) times daily., Disp: 100 g, Rfl: 5      Objective:     Vitals:  Blood pressure 125/74, weight 72.4 kg (159 lb 11.2 oz).    Physical Examination:   GEN: wn/wd male in no apparent distress  SKIN: no rashes, no sclerodactyly, no Raynaud's, no periungual erythema, no digital tip ulcerations, no nailbed pitting  HEAD: no alopecia, no scalp tenderness, no temporal artery tenderness or induration.  EYES: no pallor, no icterus, PERRLA  ENT:  no thrush, no mucosal dryness or ulcerations, adequate oral hygiene & dentition.  NECK: supple x 6, no masses, no thyromegaly, no lymphadenopathy.  CV:   S1 and S2 regular, no murmurs, gallop or rubs  CHEST: Normal respiratory effort;  normal breath sounds/no adventitious sounds. No signs of consolidation.  ABD: non-tender and non-distended; soft; normal bowel sounds; no rebound/guarding or tenderness. No hepatosplenomegaly.  Musculoskeletal:  No evidence of active inflammatory arthritis. No progressive deformity  EXTREM: no clubbing, cyanosis or edema. normal pulses.  NEURO:  grossly intact; motor/sensory WNL; no tremors  PSYCH:  normal mood, affect and behavior    Labs:   Lab Results   Component Value Date    WBC 7.3 04/25/2019    HGB 12.7 (L) 04/25/2019    HCT 41.9 04/25/2019    MCV 93.5 04/25/2019     04/25/2019   CMP@  Sodium   Date Value Ref Range Status   07/29/2019 140 136 -  145 mmol/L Final   04/25/2019 141 134 - 144 mmol/L      Potassium   Date Value Ref Range Status   07/29/2019 4.8 3.5 - 5.1 mmol/L Final     Chloride   Date Value Ref Range Status   07/29/2019 109 95 - 110 mmol/L Final   04/25/2019 110 98 - 110 mmol/L      CO2   Date Value Ref Range Status   07/29/2019 21 (L) 23 - 29 mmol/L Final     Glucose   Date Value Ref Range Status   07/29/2019 88 70 - 110 mg/dL Final   04/25/2019 95 70 - 99 mg/dL      BUN, Bld   Date Value Ref Range Status   07/29/2019 19 8 - 23 mg/dL Final     Creatinine   Date Value Ref Range Status   07/29/2019 1.4 0.5 - 1.4 mg/dL Final   04/25/2019 1.38 0.60 - 1.40 mg/dL      Calcium   Date Value Ref Range Status   07/29/2019 10.8 (H) 8.7 - 10.5 mg/dL Final     Total Protein   Date Value Ref Range Status   07/29/2019 7.2 6.0 - 8.4 g/dL Final     Albumin   Date Value Ref Range Status   07/29/2019 3.3 (L) 3.5 - 5.2 g/dL Final   04/25/2019 3.3 3.1 - 4.7 g/dL      Total Bilirubin   Date Value Ref Range Status   07/29/2019 0.3 0.1 - 1.0 mg/dL Final     Comment:     For infants and newborns, interpretation of results should be based  on gestational age, weight and in agreement with clinical  observations.  Premature Infant recommended reference ranges:  Up to 24 hours.............<8.0 mg/dL  Up to 48 hours............<12.0 mg/dL  3-5 days..................<15.0 mg/dL  6-29 days.................<15.0 mg/dL       Alkaline Phosphatase   Date Value Ref Range Status   07/29/2019 82 55 - 135 U/L Final     AST   Date Value Ref Range Status   07/29/2019 16 10 - 40 U/L Final     ALT   Date Value Ref Range Status   07/29/2019 20 10 - 44 U/L Final     CPK   Date Value Ref Range Status   04/20/2017 48 18 - 170 IU/L      CRP   Date Value Ref Range Status   04/25/2019 0.62 0.00 - 1.40 mg/dL      Rheumatoid Factor   Date Value Ref Range Status   04/20/2017 633.9 (H) 0.0 - 13.9 IU/mL      Comment:     Performed at: [facility], [facility] [id number] [facility], [place],  [id number] Euceda MD, Phone:  [phone]     Uric Acid   Date Value Ref Range Status   09/20/2016 8.6 (H) 3.4 - 7.0 mg/dL Final       Radiology/Diagnostic Studies:    None    Assessment/Discussion/Plan:   73 y.o. male with seropositive rheumatoid arthritis-good control on methotrexate 15 mg weekly and prednisone 2.5 mg twice daily    PLAN:  I will continue his medication without change. Routine blood testing was ordered.    RTC:  I will see him back in 4 months.    Electronically signed by Uriel Garcia MD

## 2019-08-13 LAB
ALBUMIN SERPL BCP-MCNC: 3.6 G/DL (ref 3.5–5.2)
ALP SERPL-CCNC: 85 U/L (ref 55–135)
ALT SERPL W/O P-5'-P-CCNC: 23 U/L (ref 10–44)
ANION GAP SERPL CALC-SCNC: 12 MMOL/L (ref 8–16)
AST SERPL-CCNC: 23 U/L (ref 10–40)
BILIRUB SERPL-MCNC: 0.5 MG/DL (ref 0.1–1)
BUN SERPL-MCNC: 13 MG/DL (ref 8–23)
CALCIUM SERPL-MCNC: 12 MG/DL (ref 8.7–10.5)
CHLORIDE SERPL-SCNC: 110 MMOL/L (ref 95–110)
CO2 SERPL-SCNC: 18 MMOL/L (ref 23–29)
COMPLEXED PSA SERPL-MCNC: 0.86 NG/ML (ref 0–4)
CREAT SERPL-MCNC: 1.4 MG/DL (ref 0.5–1.4)
CRP SERPL-MCNC: 4.5 MG/L (ref 0–8.2)
EST. GFR  (AFRICAN AMERICAN): 58 ML/MIN/1.73 M^2
EST. GFR  (NON AFRICAN AMERICAN): 50.2 ML/MIN/1.73 M^2
GLUCOSE SERPL-MCNC: 86 MG/DL (ref 70–110)
POTASSIUM SERPL-SCNC: 4.8 MMOL/L (ref 3.5–5.1)
PROT SERPL-MCNC: 7.8 G/DL (ref 6–8.4)
SODIUM SERPL-SCNC: 140 MMOL/L (ref 136–145)

## 2019-08-15 ENCOUNTER — TELEPHONE (OUTPATIENT)
Dept: ENDOCRINOLOGY | Facility: CLINIC | Age: 74
End: 2019-08-15

## 2019-08-15 NOTE — TELEPHONE ENCOUNTER
----- Message from Ariela Lizama sent at 8/15/2019 10:47 AM CDT -----  Contact: Rajendra jalloh  Type:  Sooner Apoointment Request    Caller is requesting a sooner appointment.  Caller declined first available appointment listed below.  Caller will not accept being placed on the waitlist and is requesting a message be sent to doctor.    Name of Caller:  Rajendra  When is the first available appointment?  N/a computer will not allow me to schedule  Symptoms:  Check up  Best Call Back Number:  [phone]  Additional Information:  Pls call pt regarding an appt. He was a no show at last appt.

## 2019-08-15 NOTE — TELEPHONE ENCOUNTER
Spoke with pt, advised Dr Murphy was booked through February, advised to call back September 1 for an appt in March, voiced understanding

## 2019-08-20 DIAGNOSIS — N40.1 BPH ASSOCIATED WITH NOCTURIA: ICD-10-CM

## 2019-08-20 DIAGNOSIS — R35.1 BPH ASSOCIATED WITH NOCTURIA: ICD-10-CM

## 2019-08-20 RX ORDER — CINACALCET 30 MG/1
30 TABLET, FILM COATED ORAL
Qty: 30 TABLET | Refills: 6 | Status: SHIPPED | OUTPATIENT
Start: 2019-08-20 | End: 2020-02-12

## 2019-08-20 NOTE — TELEPHONE ENCOUNTER
Please refill sensipar.  Allergies and pharmacy verified.     Also I need to send patient education to patient.

## 2019-08-21 ENCOUNTER — TELEPHONE (OUTPATIENT)
Dept: RHEUMATOLOGY | Facility: CLINIC | Age: 74
End: 2019-08-21

## 2019-08-26 ENCOUNTER — TELEPHONE (OUTPATIENT)
Dept: NEPHROLOGY | Facility: CLINIC | Age: 74
End: 2019-08-26

## 2019-08-26 RX ORDER — ALFUZOSIN HYDROCHLORIDE 10 MG/1
TABLET, EXTENDED RELEASE ORAL
Qty: 30 TABLET | Refills: 0 | Status: SHIPPED | OUTPATIENT
Start: 2019-08-26 | End: 2019-09-23 | Stop reason: SDUPTHER

## 2019-08-26 NOTE — TELEPHONE ENCOUNTER
Pt informed. Pt states is he currently taking any medications that could be making his calcium higher.

## 2019-08-26 NOTE — TELEPHONE ENCOUNTER
His calcium was high thus we will not give him vitamin D at this time as it will make his calcium go higher.

## 2019-08-26 NOTE — TELEPHONE ENCOUNTER
Pt states he got some CKD readings in the mail. Pt states he didn't know he had CKD also pt wants to know if he should start back taking vitamin D. He states on of his doctors took him off.

## 2019-09-11 ENCOUNTER — OFFICE VISIT (OUTPATIENT)
Dept: RADIATION ONCOLOGY | Facility: CLINIC | Age: 74
End: 2019-09-11
Payer: MEDICARE

## 2019-09-11 VITALS — WEIGHT: 160.19 LBS | BODY MASS INDEX: 24.36 KG/M2

## 2019-09-11 DIAGNOSIS — C61 PROSTATE CANCER: Primary | ICD-10-CM

## 2019-09-11 NOTE — PROGRESS NOTES
Rajendra Castle  [id number]  1945 9/11/2019  Garrett Pina Md  [address] [place] [address] [address]  [place] [address]    DIAGNOSIS: Cancer Staging  Prostate cancer  Staging form: Prostate, AJCC 8th Edition  - Clinical: Stage IIB (cT1c, cN0, cM0, PSA: 2.4, Grade Group: 2) - Signed by Alec Eng Jr., MD on 2/20/2019    REASON FOR VISIT: Routine scheduled follow-up.    HISTORY OF PRESENT ILLNESS:   73M p/w elevated PSA as below; + LUTS. Dr. Pina performed TRUS bx revealing GS 3+4 @ L base lat, 25%; L base medial, 5%; L mid medial, <5%; L apex lat, 10%. 4/14 cores +. Initially considered for RP but unable to get surgical clearance 2/2 hyperCa; PMHx: CKD III. Takes MTX for RA.    PSA  10/06 1.8  8/12 2.7  10/13 2.1  4/15 4.1  10/15 3.4  3/18 4.8  4/18 2.9  11/18 2.4 (on finasteride; 4.8)    Dr. Pina recommended concurrent ST-ADT; clear SHIRLEY noted. Met with the patient in February 2019 and discussed options of surgery versus radiotherapy with concurrent short-term hormone deprivation. I informed patient that taking methotrexate during treatment would exacerbate his side effects that radiotherapy was unlikely to improve his urinary function. He elected to discuss further with Dr. Pina and ultimately decided to forego surgery but did receive ADT.     He chose to resume methotrexate and did complete radiotherapy to 7740 cGy ending August 2019.  Treatment was well tolerated.    INTERVAL HISTORY:   Patient reports continuing Methotrexate and that arthralgias are well controlled.  He denies hot flashes the did receive hormone deprivation and has updated appointment Dr. Pina next week.    Today he denies dysuria, hematuria, diarrhea or bright red blood per rectum.  He has very mild residual fatigue and does have nocturia x2.    AUA 11 (12)  QOL 4 (4)  IIEF 1 (2)    Review of Systems   Constitutional: Positive for fatigue. Negative for appetite change, chills, fever and unexpected weight change.   HENT:   Negative  for lump/mass, mouth sores, sore throat, trouble swallowing and voice change.    Eyes: Negative for eye problems and icterus.   Respiratory: Negative for chest tightness, cough, hemoptysis and shortness of breath.    Cardiovascular: Negative for chest pain and leg swelling.   Gastrointestinal: Negative for abdominal distention, abdominal pain, blood in stool, constipation, diarrhea, nausea and vomiting.   Genitourinary: Positive for frequency and nocturia. Negative for bladder incontinence, difficulty urinating, dysuria and hematuria.    Musculoskeletal: Negative for back pain, gait problem, neck pain and neck stiffness.   Neurological: Negative for extremity weakness, gait problem, headaches, numbness and seizures.   Hematological: Negative for adenopathy.     Past Medical History:   Diagnosis Date    Arthritis     DDD (degenerative disc disease), cervical     Degenerative disc disease     Heart murmur     Hypertension     Nontoxic multinodular goiter 2016    RA (rheumatoid arthritis)     Vitamin D insufficiency 2016     Past Surgical History:   Procedure Laterality Date    BIOPSY, PROSTATE, RECTAL APPROACH, WITH US GUIDANCE N/A 2018    [facility]    CYSTOSCOPY N/A 2018    Performed by [facility] Pina [facility] at [facility]     Social History     Socioeconomic History    Marital status: Single     Spouse name: Not on file    Number of children: Not on file    Years of education: Not on file    Highest education level: Not on file   Occupational History    Not on file   Social Needs    Financial resource strain: Not on file    Food insecurity:     Worry: Not on file     Inability: Not on file    Transportation needs:     Medical: Not on file     Non-medical: Not on file   Tobacco Use    Smoking status: Former Smoker     Packs/day: 0.25     Years: 10.00     Pack years: 2.50     Last attempt to quit: 2001     Years since quittin.1     Smokeless tobacco: Never Used   Substance and Sexual Activity    Alcohol use: Yes     Comment: seldom    Drug use: Yes     Frequency: 8.0 times per week     Types: Marijuana    Sexual activity: Yes     Partners: Female   Lifestyle    Physical activity:     Days per week: Not on file     Minutes per session: Not on file    Stress: Not on file   Relationships    Social connections:     Talks on phone: Not on file     Gets together: Not on file     Attends Yarsani service: Not on file     Active member of club or organization: Not on file     Attends meetings of clubs or organizations: Not on file     Relationship status: Not on file   Other Topics Concern    Not on file   Social History Narrative    Not on file     Family History   Problem Relation Age of Onset    Heart disease Mother     Stroke Father     Drug abuse Sister     Diabetes Maternal Uncle      Medication List with Changes/Refills   Current Medications    ALFUZOSIN (UROXATRAL) 10 MG TB24    TAKE 1 TABLET(10 MG) BY MOUTH AFTER DINNER    AMLODIPINE (NORVASC) 2.5 MG TABLET    TAKE 1 TABLET(2.5 MG) BY MOUTH EVERY DAY    ASPIRIN (ECOTRIN) 81 MG EC TABLET    Take 81 mg by mouth once daily.      CARVEDILOL (COREG) 25 MG TABLET    TAKE 1 TABLET(25 MG) BY MOUTH TWICE DAILY WITH MEALS    CINACALCET (SENSIPAR) 30 MG TAB    Take 1 tablet (30 mg total) by mouth daily with breakfast.    DICLOFENAC SODIUM (SOLARAZE) 3 % GEL    Apply topically 3 (three) times daily.    ESCITALOPRAM OXALATE (LEXAPRO) 10 MG TABLET    Take 10 mg by mouth once daily.    FOLIC ACID (FOLVITE) 1 MG TABLET    TAKE 1 TABLET(1 MG) BY MOUTH EVERY DAY    METHOTREXATE 2.5 MG TAB    TAKE 6 TABLETS BY MOUTH EVERY WEEK    PREDNISONE (DELTASONE) 2.5 MG TABLET    TAKE 1 TABLET BY MOUTH TWICE DAILY    TRAMADOL (ULTRAM) 50 MG TABLET    TAKE 2 TABLETS BY MOUTH TWICE DAILY AS NEEDED FOR PAIN     Review of patient's allergies indicates:  No Known Allergies    QUALITY OF LIFE: 90%- Able to Carry on  Normal Activity: Minor Symptoms of Disease    Vitals:    09/11/19 1030   Weight: 72.7 kg (160 lb 3.2 oz)     Body mass index is 24.36 kg/m².    PHYSICAL EXAM:   GENERAL: alert; in no apparent distress.   HEAD: normocephalic, atraumatic.  EYES: pupils are equal, round, reactive to light and accommodation. Sclera anicteric. Conjunctiva not injected.   NOSE/THROAT: no nasal erythema or rhinorrhea. Oropharynx pink, without erythema, ulcerations or thrush.   NECK: no cervical motion rigidity; supple with no masses.  CHEST: Patient is speaking comfortably on room air with normal work of breathing without using accessory muscles of respiration.  ABDOMEN: soft, nontender, nondistended. Bowel sounds present.   MUSCULOSKELETAL: no tenderness to palpation along the spine or scapulae. Normal range of motion.  NEUROLOGIC: cranial nerves II-XII intact bilaterally. Strength 5/5 in bilateral upper and lower extremities. No sensory deficits appreciated. Normal gait.  EXTREMITIES: no clubbing, cyanosis, edema.  SKIN: no erythema, rashes, ulcerations noted.     ANCILLARY DATA:   PSA  8/19  0.86    ASSESSMENT: 74 y.o. 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  PLAN:   Rajendra Castle did well throughout radiotherapy and per AUA symptomatology scores above has returned to baseline.  I assured him that fatigue and nocturia would likely improve in the coming weeks.  He demonstrated no exaggerated side effects from concurrent Methotrexate treatment which he takes rheumatoid arthritis and refused to hold during radiation.  I am very pleased with this tolerance and PSA response I have recommended he continue to follow Dr. Pina for consideration of further ADT and uroflift.  Recommend Q 3-6 month PSAs. Return to clinic in 6 months    All questions answered and contact information  provided. Patient understands free to call us anytime with any questions or concerns regarding radiation therapy.    I have personally seen and evaluated this patient. Greater than 50% of this time was spent discussing coordination of care and/or counseling.    PHYSICIAN: Alec Eng Jr, MD

## 2019-09-13 ENCOUNTER — TELEPHONE (OUTPATIENT)
Dept: ENDOCRINOLOGY | Facility: CLINIC | Age: 74
End: 2019-09-13

## 2019-09-13 NOTE — TELEPHONE ENCOUNTER
Attempted to contact pt. LVM informing Dr. Murphy is booked through March and to call October 1st for an April appt. Also advised that once scheduled for an April appt can be added to the wait list if anything sooner arises. Advised to call the clinic back with any questions or concerns.

## 2019-09-13 NOTE — TELEPHONE ENCOUNTER
----- Message from Abbi Santoro sent at 9/13/2019  9:53 AM CDT -----  Type: Needs appointment    Who Called:  Patient  Best Call Back Number: [phone]  Additional Information: Patient calling to schedule follow up appointment with doctor/please call patient back to schedule or advise.

## 2019-09-17 DIAGNOSIS — M05.79 SEROPOSITIVE RHEUMATOID ARTHRITIS OF MULTIPLE JOINTS: Primary | ICD-10-CM

## 2019-09-17 RX ORDER — DICLOFENAC SODIUM 10 MG/G
4 GEL TOPICAL 3 TIMES DAILY
COMMUNITY
End: 2019-09-17

## 2019-09-17 RX ORDER — DICLOFENAC SODIUM 10 MG/G
4 GEL TOPICAL 3 TIMES DAILY
Qty: 100 G | Refills: 5 | Status: SHIPPED | OUTPATIENT
Start: 2019-09-17 | End: 2020-04-08

## 2019-09-20 ENCOUNTER — OFFICE VISIT (OUTPATIENT)
Dept: UROLOGY | Facility: CLINIC | Age: 74
End: 2019-09-20
Payer: MEDICARE

## 2019-09-20 VITALS
HEIGHT: 68 IN | DIASTOLIC BLOOD PRESSURE: 83 MMHG | SYSTOLIC BLOOD PRESSURE: 166 MMHG | HEART RATE: 81 BPM | WEIGHT: 168.75 LBS | BODY MASS INDEX: 25.57 KG/M2 | RESPIRATION RATE: 18 BRPM

## 2019-09-20 DIAGNOSIS — C61 PROSTATE CANCER: Primary | ICD-10-CM

## 2019-09-20 LAB
BILIRUB SERPL-MCNC: NORMAL MG/DL
BLOOD URINE, POC: NORMAL
COLOR, POC UA: YELLOW
GLUCOSE UR QL STRIP: NORMAL
KETONES UR QL STRIP: NORMAL
LEUKOCYTE ESTERASE URINE, POC: NORMAL
NITRITE, POC UA: NORMAL
PH, POC UA: 6
PROTEIN, POC: NORMAL
SPECIFIC GRAVITY, POC UA: 1.02
UROBILINOGEN, POC UA: 0.2

## 2019-09-20 PROCEDURE — 99214 OFFICE O/P EST MOD 30 MIN: CPT | Mod: 25,S$GLB,, | Performed by: UROLOGY

## 2019-09-20 PROCEDURE — 3077F SYST BP >= 140 MM HG: CPT | Mod: CPTII,S$GLB,, | Performed by: UROLOGY

## 2019-09-20 PROCEDURE — 99499 UNLISTED E&M SERVICE: CPT | Mod: S$GLB,,, | Performed by: UROLOGY

## 2019-09-20 PROCEDURE — 96402 CHEMO HORMON ANTINEOPL SQ/IM: CPT | Mod: 59,S$GLB,, | Performed by: UROLOGY

## 2019-09-20 PROCEDURE — 3077F PR MOST RECENT SYSTOLIC BLOOD PRESSURE >= 140 MM HG: ICD-10-PCS | Mod: CPTII,S$GLB,, | Performed by: UROLOGY

## 2019-09-20 PROCEDURE — 99499 RISK ADDL DX/OHS AUDIT: ICD-10-PCS | Mod: S$GLB,,, | Performed by: UROLOGY

## 2019-09-20 PROCEDURE — 99999 PR PBB SHADOW E&M-EST. PATIENT-LVL III: CPT | Mod: PBBFAC,,, | Performed by: UROLOGY

## 2019-09-20 PROCEDURE — 1101F PR PT FALLS ASSESS DOC 0-1 FALLS W/OUT INJ PAST YR: ICD-10-PCS | Mod: CPTII,S$GLB,, | Performed by: UROLOGY

## 2019-09-20 PROCEDURE — 81002 URINALYSIS NONAUTO W/O SCOPE: CPT | Mod: S$GLB,,, | Performed by: UROLOGY

## 2019-09-20 PROCEDURE — 99999 PR PBB SHADOW E&M-EST. PATIENT-LVL III: ICD-10-PCS | Mod: PBBFAC,,, | Performed by: UROLOGY

## 2019-09-20 PROCEDURE — 81002 POCT URINE DIPSTICK WITHOUT MICROSCOPE: ICD-10-PCS | Mod: S$GLB,,, | Performed by: UROLOGY

## 2019-09-20 PROCEDURE — 3079F PR MOST RECENT DIASTOLIC BLOOD PRESSURE 80-89 MM HG: ICD-10-PCS | Mod: CPTII,S$GLB,, | Performed by: UROLOGY

## 2019-09-20 PROCEDURE — 96402 PR CHEMOTHER HORMON ANTINEOPL SUB-Q/IM: ICD-10-PCS | Mod: 59,S$GLB,, | Performed by: UROLOGY

## 2019-09-20 PROCEDURE — 99214 PR OFFICE/OUTPT VISIT, EST, LEVL IV, 30-39 MIN: ICD-10-PCS | Mod: 25,S$GLB,, | Performed by: UROLOGY

## 2019-09-20 PROCEDURE — 1101F PT FALLS ASSESS-DOCD LE1/YR: CPT | Mod: CPTII,S$GLB,, | Performed by: UROLOGY

## 2019-09-20 PROCEDURE — 3079F DIAST BP 80-89 MM HG: CPT | Mod: CPTII,S$GLB,, | Performed by: UROLOGY

## 2019-09-23 DIAGNOSIS — R35.1 BPH ASSOCIATED WITH NOCTURIA: ICD-10-CM

## 2019-09-23 DIAGNOSIS — I10 ESSENTIAL HYPERTENSION: ICD-10-CM

## 2019-09-23 DIAGNOSIS — N40.1 BPH ASSOCIATED WITH NOCTURIA: ICD-10-CM

## 2019-09-24 RX ORDER — ALFUZOSIN HYDROCHLORIDE 10 MG/1
TABLET, EXTENDED RELEASE ORAL
Qty: 30 TABLET | Refills: 0 | Status: SHIPPED | OUTPATIENT
Start: 2019-09-24 | End: 2019-10-20 | Stop reason: SDUPTHER

## 2019-09-25 RX ORDER — AMLODIPINE BESYLATE 2.5 MG/1
TABLET ORAL
Qty: 90 TABLET | Refills: 0 | Status: SHIPPED | OUTPATIENT
Start: 2019-09-25 | End: 2020-01-22 | Stop reason: SDUPTHER

## 2019-10-17 DIAGNOSIS — M19.90 CHRONIC INFLAMMATORY ARTHRITIS: ICD-10-CM

## 2019-10-17 RX ORDER — METHOTREXATE 2.5 MG/1
TABLET ORAL
Qty: 72 TABLET | Refills: 0 | Status: SHIPPED | OUTPATIENT
Start: 2019-10-17 | End: 2020-01-06

## 2019-10-20 DIAGNOSIS — R35.1 BPH ASSOCIATED WITH NOCTURIA: ICD-10-CM

## 2019-10-20 DIAGNOSIS — N40.1 BPH ASSOCIATED WITH NOCTURIA: ICD-10-CM

## 2019-10-20 RX ORDER — ALFUZOSIN HYDROCHLORIDE 10 MG/1
TABLET, EXTENDED RELEASE ORAL
Qty: 30 TABLET | Refills: 0 | Status: SHIPPED | OUTPATIENT
Start: 2019-10-20 | End: 2019-12-09 | Stop reason: SDUPTHER

## 2019-10-24 ENCOUNTER — OFFICE VISIT (OUTPATIENT)
Dept: ENDOCRINOLOGY | Facility: CLINIC | Age: 74
End: 2019-10-24
Payer: MEDICARE

## 2019-10-24 VITALS
HEIGHT: 68 IN | SYSTOLIC BLOOD PRESSURE: 160 MMHG | HEART RATE: 101 BPM | DIASTOLIC BLOOD PRESSURE: 60 MMHG | WEIGHT: 165.88 LBS | BODY MASS INDEX: 25.14 KG/M2 | TEMPERATURE: 98 F

## 2019-10-24 DIAGNOSIS — E04.2 MULTINODULAR GOITER: ICD-10-CM

## 2019-10-24 DIAGNOSIS — I10 ESSENTIAL HYPERTENSION: ICD-10-CM

## 2019-10-24 DIAGNOSIS — M81.0 OSTEOPOROSIS, UNSPECIFIED OSTEOPOROSIS TYPE, UNSPECIFIED PATHOLOGICAL FRACTURE PRESENCE: Primary | ICD-10-CM

## 2019-10-24 DIAGNOSIS — E04.1 NODULAR THYROID DISEASE: ICD-10-CM

## 2019-10-24 DIAGNOSIS — E21.3 HYPERPARATHYROIDISM: ICD-10-CM

## 2019-10-24 PROCEDURE — 99999 PR PBB SHADOW E&M-EST. PATIENT-LVL III: CPT | Mod: PBBFAC,,, | Performed by: INTERNAL MEDICINE

## 2019-10-24 PROCEDURE — 99214 OFFICE O/P EST MOD 30 MIN: CPT | Mod: S$GLB,,, | Performed by: INTERNAL MEDICINE

## 2019-10-24 PROCEDURE — 99999 PR PBB SHADOW E&M-EST. PATIENT-LVL III: ICD-10-PCS | Mod: PBBFAC,,, | Performed by: INTERNAL MEDICINE

## 2019-10-24 PROCEDURE — 99214 PR OFFICE/OUTPT VISIT, EST, LEVL IV, 30-39 MIN: ICD-10-PCS | Mod: S$GLB,,, | Performed by: INTERNAL MEDICINE

## 2019-10-24 RX ORDER — ALENDRONATE SODIUM 70 MG/1
70 TABLET ORAL
Qty: 12 TABLET | Refills: 3 | Status: SHIPPED | OUTPATIENT
Start: 2019-10-24 | End: 2020-09-28 | Stop reason: SINTOL

## 2019-10-24 NOTE — PROGRESS NOTES
Subjective:      Chief Complaint: Hyperparathyroidism    HPI: Rajendra Castle is a 74 y.o. male who is here for a follow-up evaluation for osteoporosis, hyperparathyroidism, and thyroid nodules. Last seen 1/2019, though this is his first visit with me.    Osteoporosis:   Last DXA 1/2019.    was on Fosamax, stopped 6/2019. In epic medication history, started around 2016. So would have had 3 years worth of treatment.   No falls/fractures.    Not smoking.    Is on prednisone 2.5 mg BID (also methotrexate), does have hx RA.    Hyperparathyroidism:   most likely primary. Didn't want surgery.   Was on sensipar with improvement in calcium, stopped, last Ca and PTH elevated. Restarted sensipar lately. Denies most symptoms.    Thyroid nodules:   Last US 1/2019. Nodules haven't changed in size though a cystic nodule started to appear more hypoechoic. No compressive symptoms. Last TSH normal 1/2019. No FH thyroid disease.    HTN: BP elevated today, reports that last week BP was normal. At home, BP in 130s.    Today, pt reports feeling okay overall. Recent treatment for prostate cancer (radiation). No acute complaints today.    Reviewed past medical, family, social history and updated as appropriate.    Review of Systems   Constitutional: Negative for unexpected weight change.   HENT: Negative for trouble swallowing.    Eyes: Negative for visual disturbance.   Respiratory: Negative for shortness of breath.    Cardiovascular: Negative for palpitations.   Gastrointestinal: Negative for constipation and diarrhea.   Endocrine: Negative for cold intolerance and heat intolerance.   Genitourinary: Negative for frequency.   Musculoskeletal: Positive for neck pain (shoulder/neck).   Neurological: Positive for light-headedness (sometimes when getting up in the morning).     Objective:     Vitals:    10/24/19 1323   BP: (!) 160/60   Pulse: 101   Temp: 97.6 °F (36.4 °C)     BP Readings from Last 5 Encounters:   10/24/19 (!) 160/60   09/20/19  (!) 166/83   08/12/19 125/74   06/20/19 130/82   06/03/19 136/68     Physical Exam   Constitutional: No distress.   Neck: No thyromegaly (nodular thyroid) present.   Cardiovascular: Normal heart sounds.   Pulmonary/Chest: Effort normal.   Musculoskeletal: He exhibits no edema.   Vitals reviewed.      Wt Readings from Last 10 Encounters:   10/24/19 1323 75.3 kg (165 lb 14.3 oz)   09/20/19 0936 76.5 kg (168 lb 12.2 oz)   09/11/19 1030 72.7 kg (160 lb 3.2 oz)   08/12/19 1119 72.4 kg (159 lb 11.2 oz)   06/20/19 1028 75.1 kg (165 lb 9.1 oz)   06/03/19 1054 73.7 kg (162 lb 7.7 oz)   04/25/19 1122 75.8 kg (167 lb 3.2 oz)   03/19/19 0915 74.6 kg (164 lb 7.4 oz)   03/11/19 1342 74.6 kg (164 lb 6.4 oz)   02/20/19 1448 75.8 kg (167 lb 1.6 oz)       Lab Results   Component Value Date    HGBA1C 5.9 09/20/2016     Lab Results   Component Value Date    CHOL 156 09/20/2016    HDL 39 (L) 09/20/2016    LDLCALC 92.2 09/20/2016    TRIG 124 09/20/2016    CHOLHDL 25.0 09/20/2016     Lab Results   Component Value Date     08/12/2019    K 4.8 08/12/2019     08/12/2019    CO2 18 (L) 08/12/2019    GLU 86 08/12/2019    BUN 13 08/12/2019    CREATININE 1.4 08/12/2019    CALCIUM 12.0 (H) 08/12/2019    PROT 7.8 08/12/2019    ALBUMIN 3.6 08/12/2019    BILITOT 0.5 08/12/2019    ALKPHOS 85 08/12/2019    AST 23 08/12/2019    ALT 23 08/12/2019    ANIONGAP 12 08/12/2019    ESTGFRAFRICA 58.0 (A) 08/12/2019    EGFRNONAA 50.2 (A) 08/12/2019    TSH 1.037 01/18/2019      Lab Results   Component Value Date    MICALBCREAT 4.7 09/20/2016       Assessment/Plan:     Hyperparathyroidism  Suspect primary hyperparathyroidism   - PTH elevation with high calcium   - though does have CKD which could contribute somewhat, GFR isn't low enough to cause the entire PTH increase   - has osteoporosis already   - discussed with him the recommendation for surgery given osteoporosis, CKD, and sometimes hypercalcemia is high enough to qualify   - pt not sure about  surgery. Recommend he think about it. Discussed risks vs benefits, how he may be more of a surgical candidate now than he would be in 5-10+ years or so   - For now, avoid dehydration, excessive calcium supplementation and meds (HCTZ) that can worsen hypercalcemia. Continue sensipar      Osteoporosis  Has been on alendronate for a few years (looks like since 2016). But stopped earlier this year, unclear reason (no mention found in notes around the time it was stopped, could've just been prescription ran out of refills/, pt doesn't remember)   - restart. Complete 5 years of therapy then stop   - encourage pt to consider surgery for hyperparathyroidism as above   - encourage pt to try not to fall    Hypertension  bp elevated today   - pt reports last week, was around 130, and at home when he checks his BP it's in the 130s and not elevated like today   - encourage pt to continue to take his HTN meds regularly, monitor BP at home from time to time, and f/u with PCP. If still elevated BP, would consider dose increases    Nodular thyroid disease  Multiple thyroid nodules on last US   - was stable from prior   - some mostly cystic in the posterior part of the thyroid. Wonder if they might be parathyroid adenoma (for one or more)   - repeat US around 2020, also request evaluation for parathyroid adenoma in the order   - consider FNA if needed/changing nodules        Follow up in about 6 months (around 2020).

## 2019-10-24 NOTE — ASSESSMENT & PLAN NOTE
Has been on alendronate for a few years (looks like since 2016). But stopped earlier this year, unclear reason (no mention found in notes around the time it was stopped, could've just been prescription ran out of refills/, pt doesn't remember)   - restart. Complete 5 years of therapy then stop   - encourage pt to consider surgery for hyperparathyroidism as above   - encourage pt to try not to fall

## 2019-10-24 NOTE — ASSESSMENT & PLAN NOTE
Suspect primary hyperparathyroidism   - PTH elevation with high calcium   - though does have CKD which could contribute somewhat, GFR isn't low enough to cause the entire PTH increase   - has osteoporosis already   - discussed with him the recommendation for surgery given osteoporosis, CKD, and sometimes hypercalcemia is high enough to qualify   - pt not sure about surgery. Recommend he think about it. Discussed risks vs benefits, how he may be more of a surgical candidate now than he would be in 5-10+ years or so   - For now, avoid dehydration, excessive calcium supplementation and meds (HCTZ) that can worsen hypercalcemia. Continue sensipar

## 2019-10-24 NOTE — ASSESSMENT & PLAN NOTE
Multiple thyroid nodules on last US   - was stable from prior   - some mostly cystic in the posterior part of the thyroid. Wonder if they might be parathyroid adenoma (for one or more)   - repeat US around 1/2020, also request evaluation for parathyroid adenoma in the order   - consider FNA if needed/changing nodules

## 2019-10-24 NOTE — ASSESSMENT & PLAN NOTE
bp elevated today   - pt reports last week, was around 130, and at home when he checks his BP it's in the 130s and not elevated like today   - encourage pt to continue to take his HTN meds regularly, monitor BP at home from time to time, and f/u with PCP. If still elevated BP, would consider dose increases

## 2019-10-24 NOTE — PATIENT INSTRUCTIONS
For the osteoporosis, the bones, restart fosamax (alendronate) once a week. Best taken in the morning on an empty stomach with a large glass of water. Don't lay down for half an hour after taking it.    For the thyroid, repeat ultrasound around Jan 2020 to ensure nodules are stable. Also will get new labs around then.    For the parathyroid, continue sensipar (cinacalcet). Think about parathyroid surgery, would help to treat this problem and benefit the calcium as well as the bones.      Understanding Primary Hyperparathyroidism    The parathyroid glands are 4 tiny glands in the neck. They make parathyroid hormone (PTH). PTH controls the amount of calcium and phosphorus in your blood. Primary hyperparathyroidism is when there is too much PTH in your blood. It occurs when one or more of the glands are too active.  The job of PTH is to tell the body how to control calcium. Too much PTH means the body increases the amount of calcium in the blood. This leads to a problem called hypercalcemia. This is when the amount of calcium in the blood is too high. Hypercalcemia can cause serious health problems.  Causes  Hyperparathyroidism can occur when a parathyroid gland becomes enlarged. It can also occur as a complication of another health conditions, such as kidney failure or rickets. In these conditions, calcium is usually not high. This is called secondary hyperparathyroidism.  Whos at risk  The risk factors for this condition include:  · Being a woman (its less common in men)  · Being older (its more likely to occur with age)  · Having parents or siblings with the condition or other endocrine tumors  · Having certain kidney problems  · Taking certain medicines  · Having had radiation treatment in the head or neck  Symptoms  Symptoms of the condition can include:  · Muscle weakness  · Depression  · Tiredness  · Confusion and memory loss  · Poor memory  · Nausea and vomiting  · Pain in the stomach area (abdomen)  · Hard  stools (constipation)  · Stomach ulcers  · Need to urinate often  · Kidney stones  · Joint or bone pain  · Bone disease (osteopenia or osteoporosis), an increase in bone fractures  · High blood pressure  · Increased thirst  Treatment  If primary hyperparathyroidism is not treated, it can get worse over time. Treatments include:  · Surgery. This may be done to remove any enlarged parathyroid glands. This lets the amount of calcium in the blood go back to normal. You may need to take vitamin D and calcium supplements before the surgery. This will reduce the risk of low calcium after the surgery.   · Medicine. This lowers the amount of parathyroid hormone made by the overactive glands.   You and your healthcare provider can discuss your treatment options. Make sure to ask any questions you have.  Date Last Reviewed: 11/1/2016  © 7249-2418 [employer]. [address] [place] [address], [place] [address]. All rights reserved. This information is not intended as a substitute for professional medical care. Always follow your healthcare professional's instructions.

## 2019-11-29 ENCOUNTER — LAB VISIT (OUTPATIENT)
Dept: LAB | Facility: HOSPITAL | Age: 74
End: 2019-11-29
Attending: INTERNAL MEDICINE
Payer: MEDICARE

## 2019-11-29 DIAGNOSIS — N25.81 SECONDARY RENAL HYPERPARATHYROIDISM: ICD-10-CM

## 2019-11-29 DIAGNOSIS — N18.30 CHRONIC KIDNEY DISEASE, STAGE III (MODERATE): ICD-10-CM

## 2019-11-29 LAB
ALBUMIN SERPL BCP-MCNC: 3.3 G/DL (ref 3.5–5.2)
ANION GAP SERPL CALC-SCNC: 6 MMOL/L (ref 8–16)
BUN SERPL-MCNC: 24 MG/DL (ref 8–23)
CALCIUM SERPL-MCNC: 10.8 MG/DL (ref 8.7–10.5)
CHLORIDE SERPL-SCNC: 116 MMOL/L (ref 95–110)
CO2 SERPL-SCNC: 21 MMOL/L (ref 23–29)
CREAT SERPL-MCNC: 1.4 MG/DL (ref 0.5–1.4)
EST. GFR  (AFRICAN AMERICAN): 57.6 ML/MIN/1.73 M^2
EST. GFR  (NON AFRICAN AMERICAN): 49.8 ML/MIN/1.73 M^2
GLUCOSE SERPL-MCNC: 97 MG/DL (ref 70–110)
PHOSPHATE SERPL-MCNC: 2.8 MG/DL (ref 2.7–4.5)
POTASSIUM SERPL-SCNC: 5.1 MMOL/L (ref 3.5–5.1)
PTH-INTACT SERPL-MCNC: 426 PG/ML (ref 9–77)
SODIUM SERPL-SCNC: 143 MMOL/L (ref 136–145)

## 2019-11-29 PROCEDURE — 36415 COLL VENOUS BLD VENIPUNCTURE: CPT | Mod: PO

## 2019-11-29 PROCEDURE — 80069 RENAL FUNCTION PANEL: CPT

## 2019-11-29 PROCEDURE — 83970 ASSAY OF PARATHORMONE: CPT

## 2019-12-02 ENCOUNTER — TELEPHONE (OUTPATIENT)
Dept: PHARMACY | Facility: AMBULARY SURGERY CENTER | Age: 74
End: 2019-12-02

## 2019-12-02 ENCOUNTER — OFFICE VISIT (OUTPATIENT)
Dept: NEPHROLOGY | Facility: CLINIC | Age: 74
End: 2019-12-02
Payer: MEDICARE

## 2019-12-02 VITALS
HEART RATE: 112 BPM | SYSTOLIC BLOOD PRESSURE: 138 MMHG | HEIGHT: 68 IN | WEIGHT: 169.31 LBS | OXYGEN SATURATION: 94 % | DIASTOLIC BLOOD PRESSURE: 64 MMHG | BODY MASS INDEX: 25.66 KG/M2

## 2019-12-02 DIAGNOSIS — N18.30 CHRONIC KIDNEY DISEASE, STAGE III (MODERATE): Primary | ICD-10-CM

## 2019-12-02 DIAGNOSIS — I10 ESSENTIAL HYPERTENSION: ICD-10-CM

## 2019-12-02 DIAGNOSIS — N25.81 SECONDARY RENAL HYPERPARATHYROIDISM: ICD-10-CM

## 2019-12-02 DIAGNOSIS — E21.3 HYPERPARATHYROIDISM: ICD-10-CM

## 2019-12-02 PROCEDURE — 3075F SYST BP GE 130 - 139MM HG: CPT | Mod: CPTII,S$GLB,, | Performed by: INTERNAL MEDICINE

## 2019-12-02 PROCEDURE — 99499 UNLISTED E&M SERVICE: CPT | Mod: S$GLB,,, | Performed by: INTERNAL MEDICINE

## 2019-12-02 PROCEDURE — 1159F MED LIST DOCD IN RCRD: CPT | Mod: S$GLB,,, | Performed by: INTERNAL MEDICINE

## 2019-12-02 PROCEDURE — 1159F PR MEDICATION LIST DOCUMENTED IN MEDICAL RECORD: ICD-10-PCS | Mod: S$GLB,,, | Performed by: INTERNAL MEDICINE

## 2019-12-02 PROCEDURE — 3078F PR MOST RECENT DIASTOLIC BLOOD PRESSURE < 80 MM HG: ICD-10-PCS | Mod: CPTII,S$GLB,, | Performed by: INTERNAL MEDICINE

## 2019-12-02 PROCEDURE — 1101F PR PT FALLS ASSESS DOC 0-1 FALLS W/OUT INJ PAST YR: ICD-10-PCS | Mod: CPTII,S$GLB,, | Performed by: INTERNAL MEDICINE

## 2019-12-02 PROCEDURE — 1101F PT FALLS ASSESS-DOCD LE1/YR: CPT | Mod: CPTII,S$GLB,, | Performed by: INTERNAL MEDICINE

## 2019-12-02 PROCEDURE — 3075F PR MOST RECENT SYSTOLIC BLOOD PRESS GE 130-139MM HG: ICD-10-PCS | Mod: CPTII,S$GLB,, | Performed by: INTERNAL MEDICINE

## 2019-12-02 PROCEDURE — 99214 PR OFFICE/OUTPT VISIT, EST, LEVL IV, 30-39 MIN: ICD-10-PCS | Mod: S$GLB,,, | Performed by: INTERNAL MEDICINE

## 2019-12-02 PROCEDURE — 3078F DIAST BP <80 MM HG: CPT | Mod: CPTII,S$GLB,, | Performed by: INTERNAL MEDICINE

## 2019-12-02 PROCEDURE — 99499 RISK ADDL DX/OHS AUDIT: ICD-10-PCS | Mod: S$GLB,,, | Performed by: INTERNAL MEDICINE

## 2019-12-02 PROCEDURE — 99999 PR PBB SHADOW E&M-EST. PATIENT-LVL III: CPT | Mod: PBBFAC,,, | Performed by: INTERNAL MEDICINE

## 2019-12-02 PROCEDURE — 99214 OFFICE O/P EST MOD 30 MIN: CPT | Mod: S$GLB,,, | Performed by: INTERNAL MEDICINE

## 2019-12-02 PROCEDURE — 99999 PR PBB SHADOW E&M-EST. PATIENT-LVL III: ICD-10-PCS | Mod: PBBFAC,,, | Performed by: INTERNAL MEDICINE

## 2019-12-02 NOTE — PROGRESS NOTES
"Subjective:       Patient ID: Rajendra Castle is a 74 y.o. Black or  male who presents for return patient evaluation for chronic renal failure.    He has no uremic symptoms and is in his usual state of health.  There have been no recent illnesses, hospitalizations or procedures.  He has completed his XRT for his prostate cancer.      Review of Systems   Constitutional: Positive for fatigue (with exertion). Negative for appetite change, chills and fever.   Eyes: Negative for visual disturbance.   Respiratory: Negative for cough and shortness of breath.    Cardiovascular: Negative for chest pain and leg swelling.   Gastrointestinal: Negative for diarrhea, nausea and vomiting.   Genitourinary: Negative for difficulty urinating, dysuria and hematuria.   Musculoskeletal: Negative for myalgias.   Skin: Negative for rash.   Neurological: Positive for dizziness (mild in am). Negative for headaches.   Psychiatric/Behavioral: Negative for sleep disturbance.       The past medical, family and social histories were reviewed for this encounter.     /64 (BP Location: Left arm, Patient Position: Sitting)   Pulse (!) 112   Ht 5' 8" (1.727 m)   Wt 76.8 kg (169 lb 5 oz)   SpO2 (!) 94%   BMI 25.74 kg/m²     Objective:      Physical Exam   Constitutional: He is oriented to person, place, and time. He appears well-developed and well-nourished. No distress.   HENT:   Head: Normocephalic and atraumatic.   Eyes: Conjunctivae are normal.   Neck: Neck supple. No JVD present.   Cardiovascular: Normal rate, regular rhythm and normal heart sounds. Exam reveals no gallop and no friction rub.   No murmur heard.  Pulmonary/Chest: Effort normal and breath sounds normal. No respiratory distress. He has no wheezes. He has no rales.   Abdominal: Soft. Bowel sounds are normal. He exhibits no distension. There is no tenderness.   Musculoskeletal: He exhibits no edema.   Neurological: He is alert and oriented to person, place, and " time.   Skin: Skin is warm and dry. No rash noted.   Psychiatric: He has a normal mood and affect.   Vitals reviewed.      Assessment:       1. Chronic kidney disease, stage III (moderate)    2. Essential hypertension    3. Secondary renal hyperparathyroidism    4. Hyperparathyroidism        Plan:   Return to clinic in 6 months.  Labs for next visit include rp, pth, upc.  Baseline creatinine is 1.4-1.6 since 2006.  PTH is 426 with a calcium of 10.8.  He is on sensipar and is followed by Dr. Murphy for this.  Renal US shows R 9.3 cm, L 9.4 cm.  See if we can get him to see Cherelle for patient assistance for his sensipar.

## 2019-12-09 DIAGNOSIS — N40.1 BPH ASSOCIATED WITH NOCTURIA: ICD-10-CM

## 2019-12-09 DIAGNOSIS — R35.1 BPH ASSOCIATED WITH NOCTURIA: ICD-10-CM

## 2019-12-09 RX ORDER — ALFUZOSIN HYDROCHLORIDE 10 MG/1
TABLET, EXTENDED RELEASE ORAL
Qty: 30 TABLET | Refills: 0 | Status: SHIPPED | OUTPATIENT
Start: 2019-12-09 | End: 2020-01-06

## 2019-12-12 ENCOUNTER — OFFICE VISIT (OUTPATIENT)
Dept: RHEUMATOLOGY | Facility: CLINIC | Age: 74
End: 2019-12-12
Payer: MEDICARE

## 2019-12-12 VITALS
SYSTOLIC BLOOD PRESSURE: 142 MMHG | DIASTOLIC BLOOD PRESSURE: 79 MMHG | BODY MASS INDEX: 25.65 KG/M2 | WEIGHT: 168.69 LBS

## 2019-12-12 DIAGNOSIS — Z79.899 ENCOUNTER FOR LONG-TERM (CURRENT) USE OF MEDICATIONS: ICD-10-CM

## 2019-12-12 DIAGNOSIS — M05.79 RHEUMATOID ARTHRITIS INVOLVING MULTIPLE SITES WITH POSITIVE RHEUMATOID FACTOR: Primary | ICD-10-CM

## 2019-12-12 DIAGNOSIS — C61 PROSTATE CANCER: ICD-10-CM

## 2019-12-12 PROCEDURE — 99213 OFFICE O/P EST LOW 20 MIN: CPT | Mod: S$GLB,,, | Performed by: INTERNAL MEDICINE

## 2019-12-12 PROCEDURE — 3077F PR MOST RECENT SYSTOLIC BLOOD PRESSURE >= 140 MM HG: ICD-10-PCS | Mod: S$GLB,,, | Performed by: INTERNAL MEDICINE

## 2019-12-12 PROCEDURE — 1159F PR MEDICATION LIST DOCUMENTED IN MEDICAL RECORD: ICD-10-PCS | Mod: S$GLB,,, | Performed by: INTERNAL MEDICINE

## 2019-12-12 PROCEDURE — 1101F PT FALLS ASSESS-DOCD LE1/YR: CPT | Mod: S$GLB,,, | Performed by: INTERNAL MEDICINE

## 2019-12-12 PROCEDURE — 1101F PR PT FALLS ASSESS DOC 0-1 FALLS W/OUT INJ PAST YR: ICD-10-PCS | Mod: S$GLB,,, | Performed by: INTERNAL MEDICINE

## 2019-12-12 PROCEDURE — 3078F PR MOST RECENT DIASTOLIC BLOOD PRESSURE < 80 MM HG: ICD-10-PCS | Mod: S$GLB,,, | Performed by: INTERNAL MEDICINE

## 2019-12-12 PROCEDURE — 3078F DIAST BP <80 MM HG: CPT | Mod: S$GLB,,, | Performed by: INTERNAL MEDICINE

## 2019-12-12 PROCEDURE — 99213 PR OFFICE/OUTPT VISIT, EST, LEVL III, 20-29 MIN: ICD-10-PCS | Mod: S$GLB,,, | Performed by: INTERNAL MEDICINE

## 2019-12-12 PROCEDURE — 1159F MED LIST DOCD IN RCRD: CPT | Mod: S$GLB,,, | Performed by: INTERNAL MEDICINE

## 2019-12-12 PROCEDURE — 3077F SYST BP >= 140 MM HG: CPT | Mod: S$GLB,,, | Performed by: INTERNAL MEDICINE

## 2019-12-12 NOTE — PROGRESS NOTES
[facility] RHEUMATOLOGY           Follow-up visit    Notes dictated to M*Modal. Please forgive any unintended errors.  Subjective:       Patient ID:   NAME: Rajendra Castle : 1945     74 y.o. male    Referring Doc: No ref. provider found  Other Physicians:    Chief Complaint:  Rheumatoid Arthritis      HPI/Interval History:  The patient is doing well.  He has no complaints of muscle or joint pain.    ROS:   GEN:    no fever, night sweats or weight loss  SKIN:   no rashes, erythema, bruising, or swelling, no Raynauds, no photosensitivity  HEENT: no changes in vision, no mouth ulcers, no sicca symptoms, no scalp tenderness, no jaw claudication.  CV:      no CP, PND, DEY, orthopnea, no palpitations  PULM: no SOB, cough, hemoptysis, sputum or pleuritic pain  GI:       no GERD, no dysphagia, no hematemesis, no abdominal pain, nausea, vomiting, constipation, diarrhea, melanotic stools, BRBPR  :      no hematuria, dysuria  NEURO: no paresthesias, headaches, acute visual disturbances  MUSCULOSKELETAL:  No red, hot, and/or swollen joints  PSYCH:   No increased insomnia, no increased anxiety, no increased depression    Past Medical/Surgical History:  Past Medical History:   Diagnosis Date    Arthritis     DDD (degenerative disc disease), cervical     Degenerative disc disease     Heart murmur     Hypertension     Nontoxic multinodular goiter 2016    RA (rheumatoid arthritis)     Vitamin D insufficiency 2016     Past Surgical History:   Procedure Laterality Date    CYSTOSCOPY N/A 2018    Procedure: CYSTOSCOPY;  Surgeon: Garrett Pina MD;  Location: [facility] OR;  Service: Urology;  Laterality: N/A;    TRANSRECTAL BIOPSY OF PROSTATE WITH ULTRASOUND GUIDANCE N/A 2018    Procedure: BIOPSY, PROSTATE, RECTAL APPROACH, WITH US GUIDANCE;  Surgeon: Garrett Pina MD;  Location: [facility] OR;  Service: Urology;  Laterality: N/A;       Allergies:  Review of patient's allergies indicates:  No Known  Allergies    Social/Family History:  Social History     Socioeconomic History    Marital status: Single     Spouse name: Not on file    Number of children: Not on file    Years of education: Not on file    Highest education level: Not on file   Occupational History    Not on file   Social Needs    Financial resource strain: Not on file    Food insecurity:     Worry: Not on file     Inability: Not on file    Transportation needs:     Medical: Not on file     Non-medical: Not on file   Tobacco Use    Smoking status: Former Smoker     Packs/day: 0.25     Years: 10.00     Pack years: 2.50     Last attempt to quit: 2001     Years since quittin.3    Smokeless tobacco: Never Used   Substance and Sexual Activity    Alcohol use: Yes     Comment: seldom    Drug use: Yes     Frequency: 8.0 times per week     Types: Marijuana    Sexual activity: Yes     Partners: Female   Lifestyle    Physical activity:     Days per week: Not on file     Minutes per session: Not on file    Stress: Not on file   Relationships    Social connections:     Talks on phone: Not on file     Gets together: Not on file     Attends [employer] service: Not on file     Active member of club or organization: Not on file     Attends meetings of clubs or organizations: Not on file     Relationship status: Not on file   Other Topics Concern    Not on file   Social History Narrative    Not on file     Family History   Problem Relation Age of Onset    Heart disease Mother     Stroke Father     Drug abuse Sister     Diabetes Maternal Uncle      FAMILY HISTORY: negative for Connective Tissue Disease      Medications:    Current Outpatient Medications:     alendronate (FOSAMAX) 70 MG tablet, Take 1 tablet (70 mg total) by mouth every 7 days. Take with a full glass of water & remain upright for at least 30 minutes, Disp: 12 tablet, Rfl: 3    alfuzosin (UROXATRAL) 10 mg Tb24, TAKE 1 TABLET(10 MG) BY MOUTH AFTER DINNER, Disp: 30 tablet,  Rfl: 0    amLODIPine (NORVASC) 2.5 MG tablet, TAKE 1 TABLET(2.5 MG) BY MOUTH EVERY DAY, Disp: 90 tablet, Rfl: 0    aspirin (ECOTRIN) 81 MG EC tablet, Take 81 mg by mouth once daily.  , Disp: , Rfl:     carvedilol (COREG) 25 MG tablet, TAKE 1 TABLET(25 MG) BY MOUTH TWICE DAILY WITH MEALS, Disp: 180 tablet, Rfl: 0    cinacalcet (SENSIPAR) 30 MG Tab, Take 1 tablet (30 mg total) by mouth daily with breakfast., Disp: 30 tablet, Rfl: 6    diclofenac sodium (VOLTAREN) 1 % Gel, Apply 4 g topically 3 (three) times daily., Disp: 100 g, Rfl: 5    escitalopram oxalate (LEXAPRO) 10 MG tablet, Take 10 mg by mouth once daily., Disp: , Rfl:     folic acid (FOLVITE) 1 MG tablet, TAKE 1 TABLET(1 MG) BY MOUTH EVERY DAY, Disp: 90 tablet, Rfl: 3    methotrexate 2.5 MG Tab, TAKE 6 TABLETS BY MOUTH EVERY WEEK, Disp: 72 tablet, Rfl: 0    predniSONE (DELTASONE) 2.5 MG tablet, TAKE 1 TABLET BY MOUTH TWICE DAILY, Disp: 180 tablet, Rfl: 1    traMADol (ULTRAM) 50 mg tablet, TAKE 2 TABLETS BY MOUTH TWICE DAILY AS NEEDED FOR PAIN, Disp: 120 tablet, Rfl: 5      Objective:     Vitals:  Blood pressure (!) 142/79, weight 76.5 kg (168 lb 11.2 oz).    Physical Examination:   GEN: wn/wd male in no apparent distress  SKIN: no rashes, no sclerodactyly, no Raynaud's, no periungual erythema, no digital tip ulcerations, no nailbed pitting  HEAD: no alopecia, no scalp tenderness, no temporal artery tenderness or induration.  EYES: no pallor, no icterus, PERRLA  ENT:  no thrush, no mucosal dryness or ulcerations, adequate oral hygiene & dentition.  NECK: supple x 6, no masses, no thyromegaly, no lymphadenopathy.  CV:   S1 and S2 regular, no murmurs, gallop or rubs  CHEST: Normal respiratory effort;  normal breath sounds/no adventitious sounds. No signs of consolidation.  ABD: non-tender and non-distended; soft; normal bowel sounds; no rebound/guarding or tenderness. No hepatosplenomegaly.  Musculoskeletal:  No evidence of active arthritis.  No  progressive deformity  EXTREM: no clubbing, cyanosis or edema. normal pulses.  NEURO:  grossly intact; motor/sensory WNL; no tremors  PSYCH:  normal mood, affect and behavior    Labs:   Lab Results   Component Value Date    WBC 6.04 08/12/2019    HGB 12.7 (L) 08/12/2019    HCT 42.9 08/12/2019    MCV 98 08/12/2019     08/12/2019   CMP@  Sodium   Date Value Ref Range Status   11/29/2019 143 136 - 145 mmol/L Final   04/25/2019 141 134 - 144 mmol/L      Potassium   Date Value Ref Range Status   11/29/2019 5.1 3.5 - 5.1 mmol/L Final     Chloride   Date Value Ref Range Status   11/29/2019 116 (H) 95 - 110 mmol/L Final   04/25/2019 110 98 - 110 mmol/L      CO2   Date Value Ref Range Status   11/29/2019 21 (L) 23 - 29 mmol/L Final     Glucose   Date Value Ref Range Status   11/29/2019 97 70 - 110 mg/dL Final   04/25/2019 95 70 - 99 mg/dL      BUN, Bld   Date Value Ref Range Status   11/29/2019 24 (H) 8 - 23 mg/dL Final     Creatinine   Date Value Ref Range Status   11/29/2019 1.4 0.5 - 1.4 mg/dL Final   04/25/2019 1.38 0.60 - 1.40 mg/dL      Calcium   Date Value Ref Range Status   11/29/2019 10.8 (H) 8.7 - 10.5 mg/dL Final     Total Protein   Date Value Ref Range Status   08/12/2019 7.8 6.0 - 8.4 g/dL Final     Albumin   Date Value Ref Range Status   11/29/2019 3.3 (L) 3.5 - 5.2 g/dL Final   04/25/2019 3.3 3.1 - 4.7 g/dL      Total Bilirubin   Date Value Ref Range Status   08/12/2019 0.5 0.1 - 1.0 mg/dL Final     Comment:     For infants and newborns, interpretation of results should be based  on gestational age, weight and in agreement with clinical  observations.  Premature Infant recommended reference ranges:  Up to 24 hours.............<8.0 mg/dL  Up to 48 hours............<12.0 mg/dL  3-5 days..................<15.0 mg/dL  6-29 days.................<15.0 mg/dL       Alkaline Phosphatase   Date Value Ref Range Status   08/12/2019 85 55 - 135 U/L Final     AST   Date Value Ref Range Status   08/12/2019 23 10 - 40  U/L Final     ALT   Date Value Ref Range Status   08/12/2019 23 10 - 44 U/L Final     CPK   Date Value Ref Range Status   04/20/2017 48 18 - 170 IU/L      CRP   Date Value Ref Range Status   08/12/2019 4.5 0.0 - 8.2 mg/L Final     Rheumatoid Factor   Date Value Ref Range Status   04/20/2017 633.9 (H) 0.0 - 13.9 IU/mL      Comment:     Performed at: [facility], [facility] [address], [id number] Euceda MD, Phone:  [phone]     Uric Acid   Date Value Ref Range Status   09/20/2016 8.6 (H) 3.4 - 7.0 mg/dL Final       Radiology/Diagnostic Studies:    None    Assessment/Discussion/Plan:   74 y.o. male with seropositive rheumatoid arthritis-well controlled on methotrexate 15 mg weekly and prednisone 2.5 mg twice daily  2) prostate carcinoma, apparently responding extremely well to treatment by Urology    PLAN:  I will continue his medications without change.  Blood testing was ordered and will be performed with his next regular blood draw.    RTC:  I will see him back in 4 months    Electronically signed by Uriel Garcia MD

## 2019-12-16 DIAGNOSIS — M19.90 CHRONIC INFLAMMATORY ARTHRITIS: ICD-10-CM

## 2019-12-16 RX ORDER — PREDNISONE 2.5 MG/1
TABLET ORAL
Qty: 180 TABLET | Refills: 1 | Status: SHIPPED | OUTPATIENT
Start: 2019-12-16 | End: 2020-07-10 | Stop reason: SDUPTHER

## 2019-12-18 ENCOUNTER — OFFICE VISIT (OUTPATIENT)
Dept: FAMILY MEDICINE | Facility: CLINIC | Age: 74
End: 2019-12-18
Payer: MEDICARE

## 2019-12-18 VITALS
SYSTOLIC BLOOD PRESSURE: 130 MMHG | TEMPERATURE: 98 F | DIASTOLIC BLOOD PRESSURE: 60 MMHG | OXYGEN SATURATION: 95 % | BODY MASS INDEX: 25.39 KG/M2 | WEIGHT: 167.56 LBS | HEART RATE: 106 BPM | HEIGHT: 68 IN

## 2019-12-18 DIAGNOSIS — E66.3 OVERWEIGHT (BMI 25.0-29.9): ICD-10-CM

## 2019-12-18 DIAGNOSIS — E07.9 THYROID DISEASE: ICD-10-CM

## 2019-12-18 DIAGNOSIS — M05.79 RHEUMATOID ARTHRITIS INVOLVING MULTIPLE SITES WITH POSITIVE RHEUMATOID FACTOR: ICD-10-CM

## 2019-12-18 DIAGNOSIS — N18.30 CKD (CHRONIC KIDNEY DISEASE) STAGE 3, GFR 30-59 ML/MIN: ICD-10-CM

## 2019-12-18 DIAGNOSIS — N40.0 BENIGN PROSTATIC HYPERPLASIA, UNSPECIFIED WHETHER LOWER URINARY TRACT SYMPTOMS PRESENT: ICD-10-CM

## 2019-12-18 DIAGNOSIS — I10 ESSENTIAL HYPERTENSION: Primary | ICD-10-CM

## 2019-12-18 PROCEDURE — 99999 PR PBB SHADOW E&M-EST. PATIENT-LVL III: CPT | Mod: PBBFAC,,, | Performed by: NURSE PRACTITIONER

## 2019-12-18 PROCEDURE — 99999 PR PBB SHADOW E&M-EST. PATIENT-LVL III: ICD-10-PCS | Mod: PBBFAC,,, | Performed by: NURSE PRACTITIONER

## 2019-12-18 PROCEDURE — 99214 OFFICE O/P EST MOD 30 MIN: CPT | Mod: S$GLB,,, | Performed by: NURSE PRACTITIONER

## 2019-12-18 PROCEDURE — 99214 PR OFFICE/OUTPT VISIT, EST, LEVL IV, 30-39 MIN: ICD-10-PCS | Mod: S$GLB,,, | Performed by: NURSE PRACTITIONER

## 2019-12-18 NOTE — PATIENT INSTRUCTIONS
Established High Blood Pressure    High blood pressure (hypertension) is a chronic disease. Often, healthcare providers dont know what causes it. But it can be caused by certain health conditions and medicines.  If you have high blood pressure, you may not have any symptoms. If you do have symptoms, they may include headache, dizziness, changes in your vision, chest pain, and shortness of breath. But even without symptoms, high blood pressure thats not treated raises your risk for heart attack and stroke. High blood pressure is a serious health risk and shouldnt be ignored.  A blood pressure reading is made up of two numbers: a higher number over a lower number. The top number is the systolic pressure. The bottom number is the diastolic pressure. A normal blood pressure is a systolic pressure of  less than 120 over a diastolic pressure of less than 80. You will see your blood pressure readings written together. For example, a person with a systolic pressure of 188 and a diastolic pressure of 78 will have 118/78 written in the medical record.  High blood pressure is when either the top number is 140 or higher, or the bottom number is 90 or higher. This must be the result when taking your blood pressure a number of times. The blood pressures between normal and high are called prehypertension.  Home care  If you have high blood pressure, you should do what is listed below to lower your blood pressure. If you are taking medicines for high blood pressure, these methods may reduce or end your need for medicines in the future.  · Begin a weight-loss program if you are overweight.  · Cut back on how much salt you get in your diet. Heres how to do this:  ¨ Dont eat foods that have a lot of salt. These include olives, pickles, smoked meats, and salted potato chips.  ¨ Dont add salt to your food at the table.  ¨ Use only small amounts of salt when cooking.  · Start an exercise program. Talk with your healthcare  provider about the type of exercise program that would be best for you. It doesn't have to be hard. Even brisk walking for 20 minutes 3 times a week is a good form of exercise.  · Dont take medicines that stimulate the heart. This includes many over-the-counter cold and sinus decongestant pills and sprays, as well as diet pills. Check the warnings about hypertension on the label. Before buying any over-the-counter medicines or supplements, always ask the pharmacist about the product's potential interaction with your high blood pressure and your high blood pressure medicines.  · Stimulants such as amphetamine or cocaine could be deadly for someone with high blood pressure. Never take these.  · Limit how much caffeine you get in your diet. Switch to caffeine-free products.  · Stop smoking. If you are a long-time smoker, this can be hard. Talk to your healthcare provider about medicines and nicotine replacement options to help you. Also, enroll in a stop-smoking program to make it more likely that you will quit for good.  · Learn how to handle stress. This is an important part of any program to lower blood pressure. Learn about relaxation methods like meditation, yoga, or biofeedback.  · If your provider prescribed medicines, take them exactly as directed. Missing doses may cause your blood pressure get out of control.  · If you miss a dose or doses, check with your healthcare provider or pharmacist about what to do.  · Consider buying an automatic blood pressure machine. Ask your provider for a recommendation. You can get one of these at most pharmacies.     The American Heart Association recommends the following guidelines for home blood pressure monitoring:  · Don't smoke or drink coffee for 30 minutes before taking your blood pressure.  · Go to the bathroom before the test.  · Relax for 5 minutes before taking the measurement.  · Sit with your back supported (don't sit on a couch or soft chair); keep your feet on  the floor uncrossed. Place your arm on a solid flat surface (like a table) with the upper part of the arm at heart level. Place the middle of the cuff directly above the eye of the elbow. Check the monitor's instruction manual for an illustration.  · Take multiple readings. When you measure, take 2 to 3 readings one minute apart and record all of the results.  · Take your blood pressure at the same time every day, or as your healthcare provider recommends.  · Record the date, time, and blood pressure reading.  · Take the record with you to your next medical appointment. If your blood pressure monitor has a built-in memory, simply take the monitor with you to your next appointment.  · Call your provider if you have several high readings. Don't be frightened by a single high blood pressure reading, but if you get several high readings, check in with your healthcare provider.  · Note: When blood pressure reaches a systolic (top number) of 180 or higher OR diastolic (bottom number) of 110 or higher, seek emergency medical treatment.  Follow-up care  You will need to see your healthcare provider regularly. This is to check your blood pressure and to make changes to your medicines. Make a follow-up appointment as directed. Bring the record of your home blood pressure readings to the appointment.  When to seek medical advice  Call your healthcare provider right away if any of these occur:  · Blood pressure reaches a systolic (upper number) of 180 or higher OR a diastolic (bottom number) of 110 or higher  · Chest pain or shortness of breath  · Severe headache  · Throbbing or rushing sound in the ears  · Nosebleed  · Sudden severe pain in your belly (abdomen)  · Extreme drowsiness, confusion, or fainting  · Dizziness or spinning sensation (vertigo)  · Weakness of an arm or leg or one side of the face  · You have problems speaking or seeing   Date Last Reviewed: 12/1/2016  © 0035-6389 [employer]. [address] [place] [address]  [place] [address]. All rights reserved. This information is not intended as a substitute for professional medical care. Always follow your healthcare professional's instructions.

## 2019-12-18 NOTE — PROGRESS NOTES
Subjective:       Patient ID: Rajendra Castle is a 74 y.o. male.    Chief Complaint: Hypertension    Hypertension   This is a chronic problem. The current episode started more than 1 year ago. The problem is unchanged. Pertinent negatives include no blurred vision, chest pain, headaches, palpitations, peripheral edema or shortness of breath. There are no associated agents to hypertension. Risk factors for coronary artery disease include male gender. Past treatments include ACE inhibitors. The current treatment provides moderate improvement. There are no compliance problems.        Patient complains of dizzness only in the mornings. I informed him to record blood pressure with episodes and seen readings to me.    The ASCVD Risk score ([place] PETE Spence., et al., 2013) failed to calculate for the following reasons:    Cannot find a previous HDL lab    Cannot find a previous total cholesterol lab  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    RA (rheumatoid arthritis)     Vitamin D insufficiency 9/30/2016       Review of patient's allergies indicates:  No Known Allergies      Current Outpatient Medications:     alendronate (FOSAMAX) 70 MG tablet, Take 1 tablet (70 mg total) by mouth every 7 days. Take with a full glass of water & remain upright for at least 30 minutes, Disp: 12 tablet, Rfl: 3    alfuzosin (UROXATRAL) 10 mg Tb24, TAKE 1 TABLET(10 MG) BY MOUTH AFTER DINNER, Disp: 30 tablet, Rfl: 0    amLODIPine (NORVASC) 2.5 MG tablet, TAKE 1 TABLET(2.5 MG) BY MOUTH EVERY DAY, Disp: 90 tablet, Rfl: 0    aspirin (ECOTRIN) 81 MG EC tablet, Take 81 mg by mouth once daily.  , Disp: , Rfl:     carvedilol (COREG) 25 MG tablet, TAKE 1 TABLET(25 MG) BY MOUTH TWICE DAILY WITH MEALS, Disp: 180 tablet, Rfl: 0    cinacalcet (SENSIPAR) 30 MG Tab, Take 1 tablet (30 mg  "total) by mouth daily with breakfast., Disp: 30 tablet, Rfl: 6    diclofenac sodium (VOLTAREN) 1 % Gel, Apply 4 g topically 3 (three) times daily., Disp: 100 g, Rfl: 5    escitalopram oxalate (LEXAPRO) 10 MG tablet, Take 10 mg by mouth once daily., Disp: , Rfl:     folic acid (FOLVITE) 1 MG tablet, TAKE 1 TABLET(1 MG) BY MOUTH EVERY DAY, Disp: 90 tablet, Rfl: 3    methotrexate 2.5 MG Tab, TAKE 6 TABLETS BY MOUTH EVERY WEEK, Disp: 72 tablet, Rfl: 0    predniSONE (DELTASONE) 2.5 MG tablet, TAKE 1 TABLET BY MOUTH TWICE DAILY, Disp: 180 tablet, Rfl: 1    traMADol (ULTRAM) 50 mg tablet, TAKE 2 TABLETS BY MOUTH TWICE DAILY AS NEEDED FOR PAIN, Disp: 120 tablet, Rfl: 5    Review of Systems   Constitutional: Negative for unexpected weight change.   HENT: Negative for trouble swallowing.    Eyes: Negative for blurred vision and visual disturbance.   Respiratory: Negative for shortness of breath.    Cardiovascular: Negative for chest pain, palpitations and leg swelling.   Gastrointestinal: Negative for blood in stool.   Genitourinary: Negative for hematuria.   Skin: Negative for rash.   Allergic/Immunologic: Negative for immunocompromised state.   Neurological: Negative for headaches.   Hematological: Does not bruise/bleed easily.   Psychiatric/Behavioral: Negative for agitation. The patient is not nervous/anxious.        Objective:      /60 (BP Location: Left arm, Patient Position: Sitting, BP Method: Large (Manual))   Pulse 106   Temp 98.4 °F (36.9 °C) (Oral)   Ht 5' 8" (1.727 m)   Wt 76 kg (167 lb 8.8 oz)   SpO2 95%   BMI 25.48 kg/m²   Physical Exam   Constitutional: He is oriented to person, place, and time. He appears well-developed and well-nourished.   HENT:   Left Ear: A middle ear effusion is present.   Eyes: Pupils are equal, round, and reactive to light. Conjunctivae and EOM are normal.   Neck: Normal range of motion. Neck supple.   Cardiovascular: Normal rate, regular rhythm, normal heart sounds " "and intact distal pulses.   Pulmonary/Chest: Effort normal and breath sounds normal.   Abdominal: Soft. Bowel sounds are normal.   Musculoskeletal: Normal range of motion.   Neurological: He is alert and oriented to person, place, and time.   Skin: Skin is warm and dry.   Psychiatric: He has a normal mood and affect. His behavior is normal. Judgment and thought content normal.       Assessment:       1. Essential hypertension    2. Rheumatoid arthritis involving multiple sites with positive rheumatoid factor    3. Thyroid disease    4. CKD (chronic kidney disease) stage 3, GFR 30-59 ml/min    5. Benign prostatic hyperplasia, unspecified whether lower urinary tract symptoms present        Plan:       Essential hypertension  Controlled, continue medication  Well controlled.  Patient to continue to adhere to current home medication regimen and plan of care. Encouraged patient to increase his efforts to decrease his BMI to goal of <25.  Must try to exercise minimum of 150 minutes per week.  Adhere to low carb, potassium rich, low concentrated sweet diet    BP Readings from Last 3 Encounters:   12/18/19 130/60   12/12/19 (!) 142/79   12/02/19 138/64     Rheumatoid arthritis involving multiple sites with positive rheumatoid factor  Stable followed by Rheumatology  Thyroid disease  Stable, followed by Endocrine-  CKD (chronic kidney disease) stage 3, GFR 30-59 ml/min  Stable, followed by Dr. Ballard  Benign prostatic hyperplasia, unspecified whether lower urinary tract symptoms present  Stable, followed by Urology-      Overweight (BMI 25.0-29.9)  Counseled patient on his ideal body weight, health consequences of being obese and current recommendations including weekly exercise and a heart healthy diet.  Current BMI is:Estimated body mass index is 25.48 kg/m² as calculated from the following:    Height as of this encounter: 5' 8" (1.727 m).    Weight as of this encounter: 76 kg (167 lb 8.8 oz)..  Patient " is aware that ideal BMI < 25 or Weight in (lb) to have BMI = 25: 164.1.        Patient readiness: acceptance and barriers:none    During the course of the visit the patient was educated and counseled about the following:     Hypertension:   Dietary sodium restriction.  Regular aerobic exercise.    Goals: Hypertension: Reduce Blood Pressure    Did patient meet goals/outcomes: No    The following self management tools provided: declined    Patient Instructions (the written plan) was given to the patient/family.     Time spent with patient: 30 minutes    Barriers to medications present (no )    Adverse reactions to current medications (no)    Over the counter medications reviewed (Yes)

## 2019-12-20 ENCOUNTER — LAB VISIT (OUTPATIENT)
Dept: LAB | Facility: HOSPITAL | Age: 74
End: 2019-12-20
Attending: UROLOGY
Payer: MEDICARE

## 2019-12-20 DIAGNOSIS — C61 PROSTATE CANCER: ICD-10-CM

## 2019-12-20 LAB — COMPLEXED PSA SERPL-MCNC: 0.3 NG/ML (ref 0–4)

## 2019-12-20 PROCEDURE — 36415 COLL VENOUS BLD VENIPUNCTURE: CPT

## 2019-12-20 PROCEDURE — 84153 ASSAY OF PSA TOTAL: CPT

## 2020-01-06 DIAGNOSIS — M19.90 CHRONIC INFLAMMATORY ARTHRITIS: ICD-10-CM

## 2020-01-06 DIAGNOSIS — N40.1 BPH ASSOCIATED WITH NOCTURIA: ICD-10-CM

## 2020-01-06 DIAGNOSIS — R35.1 BPH ASSOCIATED WITH NOCTURIA: ICD-10-CM

## 2020-01-06 RX ORDER — METHOTREXATE 2.5 MG/1
TABLET ORAL
Qty: 72 TABLET | Refills: 1 | Status: SHIPPED | OUTPATIENT
Start: 2020-01-06 | End: 2020-07-10 | Stop reason: SDUPTHER

## 2020-01-06 RX ORDER — ALFUZOSIN HYDROCHLORIDE 10 MG/1
TABLET, EXTENDED RELEASE ORAL
Qty: 30 TABLET | Refills: 0 | Status: SHIPPED | OUTPATIENT
Start: 2020-01-06 | End: 2020-02-10 | Stop reason: SDUPTHER

## 2020-01-18 NOTE — TELEPHONE ENCOUNTER
He said the Rad/Onc doctor is supposed to talk to his Rheumatologist about the Methotrexate.  He was told he would have to stop that medication.  He is concerned that his RA symptoms will return.  He says those symptoms are crippling.  He says he heard of another type of treatment for PCA that he would like to discuss with MD.  He does not plan to start any medication until the plan is worked out with his RA medication as well.   He will come in 3/19 for appt to discuss plan.       Clear

## 2020-01-22 DIAGNOSIS — I10 ESSENTIAL HYPERTENSION: ICD-10-CM

## 2020-01-29 DIAGNOSIS — I10 ESSENTIAL HYPERTENSION: ICD-10-CM

## 2020-01-29 RX ORDER — AMLODIPINE BESYLATE 2.5 MG/1
TABLET ORAL
Qty: 90 TABLET | Refills: 0 | OUTPATIENT
Start: 2020-01-29

## 2020-01-29 RX ORDER — AMLODIPINE BESYLATE 2.5 MG/1
TABLET ORAL
Qty: 90 TABLET | Refills: 3 | Status: SHIPPED | OUTPATIENT
Start: 2020-01-29 | End: 2020-10-07

## 2020-01-29 NOTE — TELEPHONE ENCOUNTER
----- Message from Hayley Trammellza sent at 1/29/2020  9:34 AM CST -----  Type:  RX Refill Request    Who Called:  Rajendra  Refill or New Rx:  refill  RX Name and Strength:  amLODIPine (NORVASC) 2.5 MG tablet  How is the patient currently taking it? (ex. 1XDay):  One daily  Is this a 30 day or 90 day RX:  90  Preferred Pharmacy with phone number:    [id number] - [place], [place] - [address] AT [facility] & [facility]  [address]  [place] [address]  Phone: [phone] Fax: [phone]  Local or Mail Order:  local  Ordering Provider:  Dr Yola Rojas Call Back Number:  [phone]  Additional Information:  Thank you!

## 2020-02-03 ENCOUNTER — OFFICE VISIT (OUTPATIENT)
Dept: FAMILY MEDICINE | Facility: CLINIC | Age: 75
End: 2020-02-03
Payer: MEDICARE

## 2020-02-03 VITALS
BODY MASS INDEX: 25.39 KG/M2 | DIASTOLIC BLOOD PRESSURE: 70 MMHG | HEART RATE: 89 BPM | WEIGHT: 167.56 LBS | OXYGEN SATURATION: 96 % | SYSTOLIC BLOOD PRESSURE: 120 MMHG | HEIGHT: 68 IN | TEMPERATURE: 99 F

## 2020-02-03 DIAGNOSIS — N18.30 CKD (CHRONIC KIDNEY DISEASE) STAGE 3, GFR 30-59 ML/MIN: Primary | ICD-10-CM

## 2020-02-03 DIAGNOSIS — R93.3 ABNORMAL FINDINGS ON DIAGNOSTIC IMAGING OF OTHER PARTS OF DIGESTIVE TRACT: ICD-10-CM

## 2020-02-03 DIAGNOSIS — E21.3 HYPERPARATHYROIDISM: ICD-10-CM

## 2020-02-03 PROCEDURE — 99499 RISK ADDL DX/OHS AUDIT: ICD-10-PCS | Mod: S$GLB,,, | Performed by: FAMILY MEDICINE

## 2020-02-03 PROCEDURE — 3078F DIAST BP <80 MM HG: CPT | Mod: CPTII,S$GLB,, | Performed by: FAMILY MEDICINE

## 2020-02-03 PROCEDURE — 3074F SYST BP LT 130 MM HG: CPT | Mod: CPTII,S$GLB,, | Performed by: FAMILY MEDICINE

## 2020-02-03 PROCEDURE — 1126F AMNT PAIN NOTED NONE PRSNT: CPT | Mod: S$GLB,,, | Performed by: FAMILY MEDICINE

## 2020-02-03 PROCEDURE — 1159F PR MEDICATION LIST DOCUMENTED IN MEDICAL RECORD: ICD-10-PCS | Mod: S$GLB,,, | Performed by: FAMILY MEDICINE

## 2020-02-03 PROCEDURE — 3074F PR MOST RECENT SYSTOLIC BLOOD PRESSURE < 130 MM HG: ICD-10-PCS | Mod: CPTII,S$GLB,, | Performed by: FAMILY MEDICINE

## 2020-02-03 PROCEDURE — 1159F MED LIST DOCD IN RCRD: CPT | Mod: S$GLB,,, | Performed by: FAMILY MEDICINE

## 2020-02-03 PROCEDURE — 1101F PT FALLS ASSESS-DOCD LE1/YR: CPT | Mod: CPTII,S$GLB,, | Performed by: FAMILY MEDICINE

## 2020-02-03 PROCEDURE — 99999 PR PBB SHADOW E&M-EST. PATIENT-LVL III: CPT | Mod: PBBFAC,,, | Performed by: FAMILY MEDICINE

## 2020-02-03 PROCEDURE — 99213 OFFICE O/P EST LOW 20 MIN: CPT | Mod: S$GLB,,, | Performed by: FAMILY MEDICINE

## 2020-02-03 PROCEDURE — 3078F PR MOST RECENT DIASTOLIC BLOOD PRESSURE < 80 MM HG: ICD-10-PCS | Mod: CPTII,S$GLB,, | Performed by: FAMILY MEDICINE

## 2020-02-03 PROCEDURE — 99999 PR PBB SHADOW E&M-EST. PATIENT-LVL III: ICD-10-PCS | Mod: PBBFAC,,, | Performed by: FAMILY MEDICINE

## 2020-02-03 PROCEDURE — 1101F PR PT FALLS ASSESS DOC 0-1 FALLS W/OUT INJ PAST YR: ICD-10-PCS | Mod: CPTII,S$GLB,, | Performed by: FAMILY MEDICINE

## 2020-02-03 PROCEDURE — 1126F PR PAIN SEVERITY QUANTIFIED, NO PAIN PRESENT: ICD-10-PCS | Mod: S$GLB,,, | Performed by: FAMILY MEDICINE

## 2020-02-03 PROCEDURE — 99213 PR OFFICE/OUTPT VISIT, EST, LEVL III, 20-29 MIN: ICD-10-PCS | Mod: S$GLB,,, | Performed by: FAMILY MEDICINE

## 2020-02-03 PROCEDURE — 99499 UNLISTED E&M SERVICE: CPT | Mod: S$GLB,,, | Performed by: FAMILY MEDICINE

## 2020-02-03 NOTE — PATIENT INSTRUCTIONS

## 2020-02-05 ENCOUNTER — HOSPITAL ENCOUNTER (OUTPATIENT)
Dept: RADIOLOGY | Facility: CLINIC | Age: 75
Discharge: HOME | End: 2020-02-05
Attending: INTERNAL MEDICINE
Payer: MEDICARE

## 2020-02-05 DIAGNOSIS — E04.2 MULTINODULAR GOITER: ICD-10-CM

## 2020-02-05 DIAGNOSIS — E21.3 HYPERPARATHYROIDISM: ICD-10-CM

## 2020-02-05 PROCEDURE — 76536 US EXAM OF HEAD AND NECK: CPT | Mod: TC,PO

## 2020-02-05 PROCEDURE — 76536 US EXAM OF HEAD AND NECK: CPT | Mod: 26,,, | Performed by: RADIOLOGY

## 2020-02-05 PROCEDURE — 76536 US SOFT TISSUE HEAD NECK THYROID: ICD-10-PCS | Mod: 26,,, | Performed by: RADIOLOGY

## 2020-02-07 NOTE — PROGRESS NOTES
Subjective:       Patient ID: Rajendra Castle is a 74 y.o. male.    Chief Complaint: No chief complaint on file.    Hyperparathyroidism: Patient presents with hyperparathyroidism.  The patient has had complaints of elevation of the serum calcium and parathormone and depression. Onset of symptoms was several years ago, gradually improving since that time. He has not had previous history of head or neck radiation. He does not have familial history of endocrine malignancies.      Depression   Visit Type: follow-up  Patient presents with the following symptoms: anhedonia, decreased concentration, dry mouth, fatigue, malaise, muscle tension and nausea.  Patient is not experiencing: choking sensation, compulsions, feelings of worthlessness, hypersomnia, hyperventilation, insomnia, obsessions, palpitations, psychomotor agitation and shortness of breath.  Frequency of symptoms: occasionally   Severity: moderate   Sleep quality: fair  Side effects:  Insomnia    Review of Systems   Constitutional: Negative for fatigue and unexpected weight change.   Respiratory: Negative for choking, chest tightness and shortness of breath.    Cardiovascular: Negative for palpitations and leg swelling.   Musculoskeletal: Negative for arthralgias.   Neurological: Negative for dizziness, syncope and light-headedness.   Psychiatric/Behavioral: Positive for decreased concentration and depression. The patient does not have insomnia.        Patient Active Problem List   Diagnosis    Heart murmur    Hypertension    Arthritis    Rotator cuff tendinitis    Dyspnea on exertion    DDD (degenerative disc disease), cervical    Anemia, iron deficiency    Chronic GI bleeding    Arthritis of wrist, right    Trigger thumb of left hand    Arthritis of right wrist    Essential hypertension    Rheumatoid arthritis involving multiple sites    Elevated troponin    Nausea and vomiting    Nontoxic multinodular goiter    Gastroesophageal reflux disease  without esophagitis    Nodular thyroid disease    Prediabetes    Osteoporosis    Hypogonadism in male    Current chronic use of systemic steroids    Vitamin D insufficiency    Hyperparathyroidism    Pulmonary emphysema    Rheumatoid lung    Lung nodule    CKD (chronic kidney disease)    Elevated PSA    Prostate cancer       Objective:      Physical Exam   Constitutional: He is oriented to person, place, and time. He appears well-developed. No distress.   HENT:   Head: Normocephalic and atraumatic.   Right Ear: External ear normal.   Left Ear: External ear normal.   Nose: Nose normal.   Mouth/Throat: Oropharynx is clear and moist. No oropharyngeal exudate.   Eyes: Pupils are equal, round, and reactive to light. Conjunctivae and EOM are normal. Right eye exhibits no discharge. Left eye exhibits no discharge. No scleral icterus.   Neck: Normal range of motion. Neck supple. No JVD present. No tracheal deviation present. No thyromegaly present.   Cardiovascular: Normal rate, normal heart sounds and intact distal pulses.   No murmur heard.  Pulmonary/Chest: Effort normal and breath sounds normal. No respiratory distress. He has no wheezes. He has no rales.   Abdominal: Soft. Bowel sounds are normal. He exhibits no distension and no mass. There is no tenderness. There is no rebound and no guarding.   Musculoskeletal: He exhibits no edema or tenderness.   Lymphadenopathy:     He has no cervical adenopathy.   Neurological: He is alert and oriented to person, place, and time. No cranial nerve deficit. Coordination normal.   Skin: Skin is warm and dry. No rash noted. He is not diaphoretic. No erythema. No pallor.   Psychiatric: He has a normal mood and affect. His behavior is normal. Judgment and thought content normal.   Vitals reviewed.      Lab Results   Component Value Date    WBC 6.04 08/12/2019    HGB 12.7 (L) 08/12/2019    HCT 42.9 08/12/2019     08/12/2019    CHOL 157 02/05/2020    TRIG 65  02/05/2020    HDL 44 02/05/2020    ALT 23 02/05/2020    AST 19 02/05/2020     02/05/2020    K 4.6 02/05/2020     (H) 02/05/2020    CREATININE 1.4 02/05/2020    BUN 17 02/05/2020    CO2 21 (L) 02/05/2020    TSH 1.098 02/05/2020    PSA 4.8 (H) 03/16/2018    INR 1.0 03/12/2019    HGBA1C 5.9 09/20/2016     The 10-year ASCVD risk score (Jennifer FREEMAN Jr., et al., 2013) is: 17.5%    Values used to calculate the score:      Age: 74 years      Sex: Male      Is Non- : Yes      Diabetic: No      Tobacco smoker: No      Systolic Blood Pressure: 120 mmHg      Is BP treated: Yes      HDL Cholesterol: 44 mg/dL      Total Cholesterol: 157 mg/dL  Chronic hypercalcemia , no heart palpitations, controlled metabolic acidosis, no uricemia . CKD 3.   Assessment:       1. CKD (chronic kidney disease) stage 3, GFR 30-59 ml/min    2. Hyperparathyroidism    3. BMI 28.0-28.9,adult    4. Abnormal findings on diagnostic imaging of other parts of digestive tract         Plan:       CKD (chronic kidney disease) stage 3, GFR 30-59 ml/min    Hyperparathyroidism    BMI 28.0-28.9,adult  -     Lipid panel; Future; Expected date: 02/03/2020    Abnormal findings on diagnostic imaging of other parts of digestive tract   -     Lipid panel; Future; Expected date: 02/03/2020    Continue follow up w/endocrinology.  Patient readiness: eager and barriers:readiness    During the course of the visit the patient was educated and counseled about the following:     Hypertension:   Dietary sodium restriction.    Goals: Hypertension: Reduce Blood Pressure    Did patient meet goals/outcomes: Yes    The following self management tools provided: blood pressure log  excercise log    Patient Instructions (the written plan) was given to the patient/family.     Time spent with patient: 30 minutes    Barriers to medications present (no )    Adverse reactions to current medications (yes)    Over the counter medications reviewed  (Yes)        30-minute visit. 20 minutes spent counseling patient on diet, exercise, and weight loss.  This has been fully explained to the patient, who indicates understanding.    Discussed health maintenance guidelines appropriate for age.

## 2020-02-10 ENCOUNTER — TELEPHONE (OUTPATIENT)
Dept: FAMILY MEDICINE | Facility: CLINIC | Age: 75
End: 2020-02-10

## 2020-02-10 DIAGNOSIS — R35.1 BPH ASSOCIATED WITH NOCTURIA: ICD-10-CM

## 2020-02-10 DIAGNOSIS — N40.1 BPH ASSOCIATED WITH NOCTURIA: ICD-10-CM

## 2020-02-10 RX ORDER — ALFUZOSIN HYDROCHLORIDE 10 MG/1
TABLET, EXTENDED RELEASE ORAL
Qty: 30 TABLET | Refills: 6 | Status: SHIPPED | OUTPATIENT
Start: 2020-02-10 | End: 2021-11-29 | Stop reason: SDUPTHER

## 2020-02-10 NOTE — TELEPHONE ENCOUNTER
----- Message from Julisa Barrera sent at 2/10/2020 10:39 AM CST -----  Contact: self  Type:  Patient Returning Call    Who Called:  Patient   Who Left Message for Patient:  Justina   Does the patient know what this is regarding?:  results  Best Call Back Number:  [phone] (home)   Additional Information:  na

## 2020-02-12 ENCOUNTER — TELEPHONE (OUTPATIENT)
Dept: ENDOCRINOLOGY | Facility: CLINIC | Age: 75
End: 2020-02-12

## 2020-02-12 DIAGNOSIS — E21.3 HYPERPARATHYROIDISM: ICD-10-CM

## 2020-02-12 DIAGNOSIS — E04.2 MULTINODULAR GOITER: Primary | ICD-10-CM

## 2020-02-12 RX ORDER — CINACALCET 30 MG/1
30 TABLET, FILM COATED ORAL 2 TIMES DAILY WITH MEALS
Qty: 60 TABLET | Refills: 11 | Status: SHIPPED | OUTPATIENT
Start: 2020-02-12 | End: 2020-12-07

## 2020-02-12 NOTE — LETTER
February 12, 2020    Rajendra Castle  [address] [place] [id number]             [place] - Endo/Diabetes  Endocrinology  [address]  [facility] [address]  Phone: [phone]   Dear Mr. Rajendra Castle:    Below are the results from your recent visit:    Resulted Orders   PTH, intact   Result Value Ref Range    PTH, Intact 222.0 (H) 9.0 - 77.0 pg/mL   TSH   Result Value Ref Range    TSH 1.098 0.400 - 4.000 uIU/mL   Comprehensive metabolic panel   Result Value Ref Range    Sodium 143 136 - 145 mmol/L    Potassium 4.6 3.5 - 5.1 mmol/L    Chloride 113 (H) 95 - 110 mmol/L    CO2 21 (L) 23 - 29 mmol/L    Glucose 86 70 - 110 mg/dL    BUN, Bld 17 8 - 23 mg/dL    Creatinine 1.4 0.5 - 1.4 mg/dL    Calcium 10.8 (H) 8.7 - 10.5 mg/dL    Total Protein 7.4 6.0 - 8.4 g/dL    Albumin 3.4 (L) 3.5 - 5.2 g/dL    Total Bilirubin 0.3 0.1 - 1.0 mg/dL      Comment:      For infants and newborns, interpretation of results should be based  on gestational age, weight and in agreement with clinical  observations.  Premature Infant recommended reference ranges:  Up to 24 hours.............<8.0 mg/dL  Up to 48 hours............<12.0 mg/dL  3-5 days..................<15.0 mg/dL  6-29 days.................<15.0 mg/dL      Alkaline Phosphatase 81 55 - 135 U/L    AST 19 10 - 40 U/L    ALT 23 10 - 44 U/L    Anion Gap 9 8 - 16 mmol/L    eGFR if African American 57.6 (A) >60 mL/min/1.73 m^2    eGFR if non  49.8 (A) >60 mL/min/1.73 m^2      Comment:      Calculation used to obtain the estimated glomerular filtration  rate (eGFR) is the CKD-EPI equation.        Thyroid ultrasound:   Right sided hypoechoic nodule 2.2x1.4x1.0 cm in size, increased from prior ultrasound. Other nodules less than 1 cm noted.    Interpretations:  1. PTH is high but improving, calcium is still high. Recommend going up to 60 mg of Sensipar (can take 30 mg twice a day). Recheck blood test after 4 weeks. Still think about parathyroid surgery which is the main  treatment/cure for hyperparathyroidism (the PTH and calcium).  2. Your thyroid nodule is getting larger, needs a biopsy (fine needle aspiration). Somebody should be in touch to help set that up when it works for you.  3. Thyroid function (TSH) is normal. Kidney function (Creatinine, GFR) is stable. Liver function (AST, ALT) is normal. So that's good.    Feel free to contact my office and let me know if you have any questions or concerns.    Sincerely,        Jean Carlos Hoffman MD

## 2020-02-12 NOTE — TELEPHONE ENCOUNTER
Labs reviewed:    TSH normal.    PTH high but improving. calcium high.   10.8 Ca, alb 3.4, so true calcium is higher (11.28)  On Sensipar 30 mg. Recommended pt consider surgery, he didn't want it at this time.  Cr stable at 1.4, GFR in upper 50s still.   -> Will increase Sensipar to 30 mg twice a day.    Repeat labs in 4 weeks.      US 2/2020:   Right side hypoechoic nodule 2.2x1.4x1 cm, 3.08 cm^3 (increase from 1.9x1.1x0.9, 1.881 cm^3). 63% increase in volume. As well, was changing in characteristics from cystic a few years ago to now looking hypoechoic. Recommend FNA.  Hypoechoic on right 0.5x0.4x0.4  Left 0.2 cm cyst   No mention of parathyroid adenoma.    DXA with osteoporosis since 1/2019.   -> already on fosamax. Will continue.    Sending letter with results, plan, etc.    If patient calls/is wondering, please tell him the plan from the letter:  Interpretations:  1. PTH is high but improving, calcium is still high. Recommend going up to 60 mg of Sensipar (can take 30 mg twice a day). Recheck blood test after 4 weeks. Still think about parathyroid surgery which is the main treatment/cure for hyperparathyroidism (the PTH and calcium).  2. Your thyroid nodule is getting larger, needs a biopsy (fine needle aspiration). Somebody should be in touch to help set that up when it works for you.  3. Thyroid function (TSH) is normal. Kidney function (Creatinine, GFR) is stable. Liver function (AST, ALT) is normal. So that's good.

## 2020-02-14 ENCOUNTER — TELEPHONE (OUTPATIENT)
Dept: ENDOCRINOLOGY | Facility: CLINIC | Age: 75
End: 2020-02-14

## 2020-02-14 ENCOUNTER — TELEPHONE (OUTPATIENT)
Dept: RADIOLOGY | Facility: HOSPITAL | Age: 75
End: 2020-02-14

## 2020-02-14 NOTE — TELEPHONE ENCOUNTER
Spoke with patient reviewed results.. Verbalized understanding.. Patient advised he would call back to set up FNA with hospital

## 2020-02-14 NOTE — TELEPHONE ENCOUNTER
----- Message from Amna Richards sent at 2/14/2020  9:14 AM CST -----  Contact: Patient  Type: Needs Medical Advice    Who Called:  patient  Best Call Back Number: [phone] (home)   Additional Information: the patient is returning call to nurse and he would like a  Call back today please

## 2020-02-14 NOTE — TELEPHONE ENCOUNTER
Left message to call office. Biopsy was sent to the work Q and patient was called before office could reach patient.

## 2020-02-14 NOTE — TELEPHONE ENCOUNTER
----- Message from Domi Pond MA sent at 2/13/2020  4:34 PM CST -----  Contact: Pt      ----- Message -----  From: Greta Carter  Sent: 2/13/2020  11:31 AM CST  To: Yasmin GUERRERO Staff    Type: Needs Medical Advice    Who Called:  Pt  Best Call Back Number: [phone]  Additional Information:Pt is requesting a call from a Nurse. Pt states he received a call from [place] in scheduling regarding biopsy scheduled for a nodule on thyroid and would like advise on why he never heard this from office. Please call pt [phone] and advise.

## 2020-02-24 ENCOUNTER — HOSPITAL ENCOUNTER (OUTPATIENT)
Dept: RADIOLOGY | Facility: HOSPITAL | Age: 75
Discharge: HOME | End: 2020-02-24
Attending: INTERNAL MEDICINE
Payer: MEDICARE

## 2020-02-24 DIAGNOSIS — E04.2 MULTINODULAR GOITER: ICD-10-CM

## 2020-02-24 PROCEDURE — 88173 PR  INTERPRETATION OF FNA SMEAR: ICD-10-PCS | Mod: 26,,, | Performed by: PATHOLOGY

## 2020-02-24 PROCEDURE — 10005 US FINE NEEDLE ASPIRATION THYROID, FIRST LESION: ICD-10-PCS | Mod: ,,, | Performed by: RADIOLOGY

## 2020-02-24 PROCEDURE — 10005 FNA BX W/US GDN 1ST LES: CPT

## 2020-02-24 PROCEDURE — 25000003 PHARM REV CODE 250: Performed by: RADIOLOGY

## 2020-02-24 PROCEDURE — 88173 CYTOPATH EVAL FNA REPORT: CPT | Performed by: PATHOLOGY

## 2020-02-24 PROCEDURE — 10005 FNA BX W/US GDN 1ST LES: CPT | Mod: ,,, | Performed by: RADIOLOGY

## 2020-02-24 PROCEDURE — 88173 CYTOPATH EVAL FNA REPORT: CPT | Mod: 26,,, | Performed by: PATHOLOGY

## 2020-02-24 RX ORDER — LIDOCAINE HYDROCHLORIDE 10 MG/ML
30 INJECTION, SOLUTION EPIDURAL; INFILTRATION; INTRACAUDAL; PERINEURAL ONCE
Status: COMPLETED | OUTPATIENT
Start: 2020-02-24 | End: 2020-02-24

## 2020-02-24 RX ADMIN — LIDOCAINE HYDROCHLORIDE 10 MG: 10 INJECTION, SOLUTION EPIDURAL; INFILTRATION; INTRACAUDAL; PERINEURAL at 11:02

## 2020-02-27 LAB — FINAL PATHOLOGIC DIAGNOSIS: ABNORMAL

## 2020-03-06 ENCOUNTER — TELEPHONE (OUTPATIENT)
Dept: ENDOCRINOLOGY | Facility: CLINIC | Age: 75
End: 2020-03-06

## 2020-03-06 NOTE — TELEPHONE ENCOUNTER
FNA unsatisfactory.    2.2 cm in size, hypoechoic. Grew since last US.   Other nodules small, <1cm.    Options include repeat FNA after a few months, US in 6 months, lobectomy.    Recommend repeat FNA in 3 months due to hypoechoic, fairly large.    Called pt, no answer. Will send letter. At worse, due for f/u next month so can discuss then (since wouldn't need FNA repeat or US until later this year).    Would also see if they do any molecular markers there.

## 2020-03-06 NOTE — LETTER
March 6, 2020    Rajendra Castle  [address] [place] [address]  [place] [address]           [place] - Endo/Diabetes  Endocrinology  [address]  [place] [address]  Phone: [phone]   Dear Mr. Rajendra Castle:    Below are the results from your recent visit:    Resulted Orders   Cytology-FNA Non-Radiology Clinician Performed w/o on site   Result Value Ref Range    Final Pathologic Diagnosis (A)      RIGHT UPPER THYROID, FINE NEEDLE ASPIRATE:       Diagnostic category ([facility]):  Unsatisfactory; markedly  hypocellular specimen with only rare poorly preserved epithelial cells and  blood.  The specimen is insufficient for evaluation.        Comment:      Interpreted by: Ernestine Brito MD, Signed on 02/27/2020 at 10:11       They weren't able to get enough cells on the thyroid biopsy. So they didn't see any cancer/bad cells, but not enough normal cells to be sure what's going on. The options for cases like this are:   1. Repeat biopsy in a few months. 2. Repeat ultrasound after 6 months instead of the usual 12 months. 3. Remove that part of the thyroid.  Due to the size, how the nodule looks, I recommend a repeat biopsy attempt after a few months (usually wait about 3 months). The odds of getting non-diagnostic results twice in a row are very low.   Let me know if you have any questions. We can also further discuss at your appointment next month.    Sincerely,      Jean Carlos Hoffman MD     
No

## 2020-03-11 ENCOUNTER — OFFICE VISIT (OUTPATIENT)
Dept: RADIATION ONCOLOGY | Facility: CLINIC | Age: 75
End: 2020-03-11
Payer: MEDICARE

## 2020-03-11 VITALS
WEIGHT: 166.31 LBS | TEMPERATURE: 99 F | HEART RATE: 77 BPM | BODY MASS INDEX: 25.29 KG/M2 | OXYGEN SATURATION: 97 % | DIASTOLIC BLOOD PRESSURE: 70 MMHG | SYSTOLIC BLOOD PRESSURE: 138 MMHG | RESPIRATION RATE: 18 BRPM

## 2020-03-11 DIAGNOSIS — C61 PROSTATE CANCER: Primary | ICD-10-CM

## 2020-03-11 PROCEDURE — 99213 PR OFFICE/OUTPT VISIT, EST, LEVL III, 20-29 MIN: ICD-10-PCS | Mod: S$GLB,,, | Performed by: RADIOLOGY

## 2020-03-11 PROCEDURE — 1159F PR MEDICATION LIST DOCUMENTED IN MEDICAL RECORD: ICD-10-PCS | Mod: S$GLB,,, | Performed by: RADIOLOGY

## 2020-03-11 PROCEDURE — 1101F PT FALLS ASSESS-DOCD LE1/YR: CPT | Mod: S$GLB,,, | Performed by: RADIOLOGY

## 2020-03-11 PROCEDURE — 1159F MED LIST DOCD IN RCRD: CPT | Mod: S$GLB,,, | Performed by: RADIOLOGY

## 2020-03-11 PROCEDURE — 3078F DIAST BP <80 MM HG: CPT | Mod: S$GLB,,, | Performed by: RADIOLOGY

## 2020-03-11 PROCEDURE — 3078F PR MOST RECENT DIASTOLIC BLOOD PRESSURE < 80 MM HG: ICD-10-PCS | Mod: S$GLB,,, | Performed by: RADIOLOGY

## 2020-03-11 PROCEDURE — 1126F PR PAIN SEVERITY QUANTIFIED, NO PAIN PRESENT: ICD-10-PCS | Mod: S$GLB,,, | Performed by: RADIOLOGY

## 2020-03-11 PROCEDURE — 3075F PR MOST RECENT SYSTOLIC BLOOD PRESS GE 130-139MM HG: ICD-10-PCS | Mod: S$GLB,,, | Performed by: RADIOLOGY

## 2020-03-11 PROCEDURE — 3075F SYST BP GE 130 - 139MM HG: CPT | Mod: S$GLB,,, | Performed by: RADIOLOGY

## 2020-03-11 PROCEDURE — 1101F PR PT FALLS ASSESS DOC 0-1 FALLS W/OUT INJ PAST YR: ICD-10-PCS | Mod: S$GLB,,, | Performed by: RADIOLOGY

## 2020-03-11 PROCEDURE — 1126F AMNT PAIN NOTED NONE PRSNT: CPT | Mod: S$GLB,,, | Performed by: RADIOLOGY

## 2020-03-11 PROCEDURE — 99213 OFFICE O/P EST LOW 20 MIN: CPT | Mod: S$GLB,,, | Performed by: RADIOLOGY

## 2020-03-11 NOTE — PROGRESS NOTES
Rajendra Castle  [id number]  1945  3/11/2020  No referring provider defined for this encounter.    DIAGNOSIS: Cancer Staging  Prostate cancer  Staging form: Prostate, AJCC 8th Edition  - Clinical: Stage IIB (cT1c, cN0, cM0, PSA: 2.4, Grade Group: 2) - Signed by Alec Eng Jr., MD on 2/20/2019    REASON FOR VISIT: Routine scheduled follow-up.    HISTORY OF PRESENT ILLNESS:   73M p/w elevated PSA as below; + LUTS. Dr. Pina performed TRUS bx revealing GS 3+4 @ L base lat, 25%; L base medial, 5%; L mid medial, <5%; L apex lat, 10%. 4/14 cores +. Initially considered for RP but unable to get surgical clearance 2/2 hyperCa; PMHx: CKD III. Takes MTX for RA.    PSA  10/06 1.8  8/12 2.7  10/13 2.1  4/15 4.1  10/15 3.4  3/18 4.8  4/18 2.9  11/18 2.4 (on finasteride; 4.8)    Dr. Pina recommended concurrent ST-ADT; clear SHIRLEY noted. Met with the patient in February 2019 and discussed options of surgery versus radiotherapy with concurrent short-term hormone deprivation. I informed patient that taking methotrexate during treatment would exacerbate his side effects that radiotherapy was unlikely to improve his urinary function. He elected to discuss further with Dr. Pina and ultimately decided to forego surgery but did receive ADT in March 2019.     He chose to resume methotrexate and did complete radiotherapy to 7740 cGy ending August 2019.  He refused fiducial or spaceoar placement.  Treatment was well tolerated.    INTERVAL HISTORY:   Patient reports continuing Methotrexate and that arthralgias are well controlled.      Today he denies dysuria, hematuria, diarrhea or bright red blood per rectum.  His main complaint is nocturia times 3-4.  He is willing to discuss Urolift with Dr. Pina that may correct this.    AUA 11 (12)  QOL 5 (4)  IIEF 1 (2)    Review of Systems   Constitutional: Negative for appetite change, chills, fever and unexpected weight change.   HENT:   Negative for lump/mass, mouth sores, sore throat,  trouble swallowing and voice change.    Eyes: Negative for eye problems and icterus.   Respiratory: Negative for chest tightness, cough, hemoptysis and shortness of breath.    Cardiovascular: Negative for chest pain and leg swelling.   Gastrointestinal: Negative for abdominal distention, abdominal pain, blood in stool, constipation, diarrhea, nausea and vomiting.   Genitourinary: Positive for nocturia. Negative for bladder incontinence, difficulty urinating, dysuria and hematuria.    Musculoskeletal: Negative for back pain, gait problem, neck pain and neck stiffness.   Neurological: Negative for extremity weakness, gait problem, headaches, numbness and seizures.   Hematological: Negative for adenopathy.     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    RA (rheumatoid arthritis)     Vitamin D insufficiency 9/30/2016     Past Surgical History:   Procedure Laterality Date    CYSTOSCOPY N/A 12/18/2018    Procedure: CYSTOSCOPY;  Surgeon: Garrett Pina MD;  Location: [facility] OR;  Service: Urology;  Laterality: N/A;    TRANSRECTAL BIOPSY OF PROSTATE WITH ULTRASOUND GUIDANCE N/A 12/18/2018    Procedure: BIOPSY, PROSTATE, RECTAL APPROACH, WITH US GUIDANCE;  Surgeon: Garrett Pina MD;  Location: [facility] OR;  Service: Urology;  Laterality: N/A;     Social History     Socioeconomic History    Marital status: Single     Spouse name: Not on file    Number of children: Not on file    Years of education: Not on file    Highest education level: Not on file   Occupational History    Not on file   Social Needs    Financial resource strain: Not on file    Food insecurity:     Worry: Not on file     Inability: Not on file    Transportation needs:     Medical: Not on file     Non-medical: Not on file   Tobacco Use    Smoking status: Former Smoker      Packs/day: 0.25     Years: 10.00     Pack years: 2.50     Last attempt to quit: 2001     Years since quittin.6    Smokeless tobacco: Never Used   Substance and Sexual Activity    Alcohol use: Yes     Comment: seldom    Drug use: Yes     Frequency: 8.0 times per week     Types: Marijuana    Sexual activity: Yes     Partners: Female   Lifestyle    Physical activity:     Days per week: Not on file     Minutes per session: Not on file    Stress: Not on file   Relationships    Social connections:     Talks on phone: Not on file     Gets together: Not on file     Attends Orthodoxy service: Not on file     Active member of club or organization: Not on file     Attends meetings of clubs or organizations: Not on file     Relationship status: Not on file   Other Topics Concern    Not on file   Social History Narrative    Not on file     Family History   Problem Relation Age of Onset    Heart disease Mother     Stroke Father     Drug abuse Sister     Diabetes Maternal Uncle      Medication List with Changes/Refills   Current Medications    ALENDRONATE (FOSAMAX) 70 MG TABLET    Take 1 tablet (70 mg total) by mouth every 7 days. Take with a full glass of water & remain upright for at least 30 minutes    ALFUZOSIN (UROXATRAL) 10 MG TB24    TAKE 1 TABLET(10 MG) BY MOUTH AFTER DINNER    AMLODIPINE (NORVASC) 2.5 MG TABLET    TAKE 1 TABLET(2.5 MG) BY MOUTH EVERY DAY    ASPIRIN (ECOTRIN) 81 MG EC TABLET    Take 81 mg by mouth once daily.      CARVEDILOL (COREG) 25 MG TABLET    TAKE 1 TABLET(25 MG) BY MOUTH TWICE DAILY WITH MEALS    CINACALCET (SENSIPAR) 30 MG TAB    Take 1 tablet (30 mg total) by mouth 2 (two) times daily with meals.    DICLOFENAC SODIUM (VOLTAREN) 1 % GEL    Apply 4 g topically 3 (three) times daily.    ESCITALOPRAM OXALATE (LEXAPRO) 10 MG TABLET    Take 10 mg by mouth once daily.    FOLIC ACID (FOLVITE) 1 MG TABLET    TAKE 1 TABLET(1 MG) BY MOUTH EVERY DAY    METHOTREXATE 2.5 MG TAB    TAKE 6  TABLETS BY MOUTH EVERY WEEK    PREDNISONE (DELTASONE) 2.5 MG TABLET    TAKE 1 TABLET BY MOUTH TWICE DAILY    TRAMADOL (ULTRAM) 50 MG TABLET    TAKE 2 TABLETS BY MOUTH TWICE DAILY AS NEEDED FOR PAIN     Review of patient's allergies indicates:  No Known Allergies    QUALITY OF LIFE: 90%- Able to Carry on Normal Activity: Minor Symptoms of Disease    Vitals:    03/11/20 1058   BP: 138/70   Pulse: 77   Resp: 18   Temp: 98.5 °F (36.9 °C)   TempSrc: Oral   SpO2: 97%   Weight: 75.4 kg (166 lb 4.8 oz)   PainSc: 0-No pain     Body mass index is 25.29 kg/m².    PHYSICAL EXAM:   GENERAL: alert; in no apparent distress.   HEAD: normocephalic, atraumatic.  EYES: pupils are equal, round, reactive to light and accommodation. Sclera anicteric. Conjunctiva not injected.   NOSE/THROAT: no nasal erythema or rhinorrhea. Oropharynx pink, without erythema, ulcerations or thrush.   NECK: no cervical motion rigidity; supple with no masses.  CHEST: Patient is speaking comfortably on room air with normal work of breathing without using accessory muscles of respiration.  ABDOMEN: soft, nontender, nondistended. Bowel sounds present.   MUSCULOSKELETAL: no tenderness to palpation along the spine or scapulae. Normal range of motion.  NEUROLOGIC: cranial nerves II-XII intact bilaterally. Strength 5/5 in bilateral upper and lower extremities. No sensory deficits appreciated. Normal gait.  EXTREMITIES: no clubbing, cyanosis, edema.  SKIN: no erythema, rashes, ulcerations noted.     ANCILLARY DATA:   PSA  12/19 0.30  8/19  0.86    ASSESSMENT: 74 y.o. 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  PLAN:   Rajendra Castle did well throughout radiotherapy and per AUA symptomatology scores above has returned to baseline.  He demonstrated no exaggerated side effects from concurrent Methotrexate treatment  which he takes rheumatoid arthritis and refused to hold during radiation.  I am very pleased with this tolerance and PSA response; I have recommended he continue to follow Dr. Pina for consideration of uroflift.  Current alexander 0.3 setting [place] definition of failure at 2.3.  Recommend Q 3-6 month PSAs. Return to clinic in 6 months.     All questions answered and contact information provided. Patient understands free to call us anytime with any questions or concerns regarding radiation therapy.    I have personally seen and evaluated this patient. Greater than 50% of this time was spent discussing coordination of care and/or counseling.    PHYSICIAN: Alec Eng Jr, MD

## 2020-03-24 ENCOUNTER — CLINICAL SUPPORT (OUTPATIENT)
Dept: UROLOGY | Facility: CLINIC | Age: 75
End: 2020-03-24
Payer: MEDICARE

## 2020-03-24 VITALS — HEART RATE: 75 BPM | DIASTOLIC BLOOD PRESSURE: 61 MMHG | TEMPERATURE: 97 F | SYSTOLIC BLOOD PRESSURE: 117 MMHG

## 2020-03-24 DIAGNOSIS — C61 PROSTATE CANCER: Primary | ICD-10-CM

## 2020-03-24 DIAGNOSIS — C61 PROSTATE CANCER: ICD-10-CM

## 2020-03-24 PROCEDURE — 99499 NO LOS: ICD-10-PCS | Mod: S$GLB,,, | Performed by: UROLOGY

## 2020-03-24 PROCEDURE — 99499 UNLISTED E&M SERVICE: CPT | Mod: S$GLB,,, | Performed by: UROLOGY

## 2020-03-24 PROCEDURE — 96402 CHEMO HORMON ANTINEOPL SQ/IM: CPT | Mod: S$GLB,,, | Performed by: UROLOGY

## 2020-03-24 PROCEDURE — 99999 PR PBB SHADOW E&M-EST. PATIENT-LVL III: CPT | Mod: PBBFAC,,,

## 2020-03-24 PROCEDURE — 99999 PR PBB SHADOW E&M-EST. PATIENT-LVL III: ICD-10-PCS | Mod: PBBFAC,,,

## 2020-03-24 PROCEDURE — 96402 PR CHEMOTHER HORMON ANTINEOPL SUB-Q/IM: ICD-10-PCS | Mod: S$GLB,,, | Performed by: UROLOGY

## 2020-03-24 NOTE — PROGRESS NOTES
I prepared mixed and injected 6 months depot injection of Lupron 45mg, IM into the right upper quadrant. I aspirated to ensure I was not in a blood vessel before injecting. The patient tolerated the injection well.

## 2020-03-27 ENCOUNTER — TELEPHONE (OUTPATIENT)
Dept: UROLOGY | Facility: CLINIC | Age: 75
End: 2020-03-27

## 2020-03-27 NOTE — TELEPHONE ENCOUNTER
Spoke w pt informed office visits are being converted over to Virtual visits and offered to get him set up for [employer] after a lengthy converstaion with help logging on pt founf out that he he did not know his apple Id password to download the appt pt was instructed on how to reset password but voiced he will call back after reset.for scheduling.

## 2020-03-31 DIAGNOSIS — R52 PAIN: ICD-10-CM

## 2020-03-31 RX ORDER — TRAMADOL HYDROCHLORIDE 50 MG/1
TABLET ORAL
Qty: 120 TABLET | Refills: 5 | Status: SHIPPED | OUTPATIENT
Start: 2020-03-31 | End: 2020-10-05 | Stop reason: SDUPTHER

## 2020-04-07 ENCOUNTER — PATIENT OUTREACH (OUTPATIENT)
Dept: ADMINISTRATIVE | Facility: OTHER | Age: 75
End: 2020-04-07

## 2020-04-07 DIAGNOSIS — Z12.11 ENCOUNTER FOR FIT (FECAL IMMUNOCHEMICAL TEST) SCREENING: Primary | ICD-10-CM

## 2020-04-08 ENCOUNTER — TELEPHONE (OUTPATIENT)
Dept: UROLOGY | Facility: CLINIC | Age: 75
End: 2020-04-08

## 2020-04-09 ENCOUNTER — OFFICE VISIT (OUTPATIENT)
Dept: UROLOGY | Facility: CLINIC | Age: 75
End: 2020-04-09
Payer: MEDICARE

## 2020-04-09 ENCOUNTER — OFFICE VISIT (OUTPATIENT)
Dept: RHEUMATOLOGY | Facility: CLINIC | Age: 75
End: 2020-04-09
Payer: MEDICARE

## 2020-04-09 DIAGNOSIS — C61 PROSTATE CANCER: Primary | ICD-10-CM

## 2020-04-09 DIAGNOSIS — N13.8 BPH WITH OBSTRUCTION/LOWER URINARY TRACT SYMPTOMS: ICD-10-CM

## 2020-04-09 DIAGNOSIS — N40.1 BPH WITH OBSTRUCTION/LOWER URINARY TRACT SYMPTOMS: ICD-10-CM

## 2020-04-09 DIAGNOSIS — M05.79 RHEUMATOID ARTHRITIS INVOLVING MULTIPLE SITES WITH POSITIVE RHEUMATOID FACTOR: Primary | ICD-10-CM

## 2020-04-09 DIAGNOSIS — Z79.899 ENCOUNTER FOR LONG-TERM (CURRENT) USE OF OTHER MEDICATIONS: ICD-10-CM

## 2020-04-09 PROCEDURE — 1159F PR MEDICATION LIST DOCUMENTED IN MEDICAL RECORD: ICD-10-PCS | Mod: 95,,, | Performed by: INTERNAL MEDICINE

## 2020-04-09 PROCEDURE — 99443 PR PHYSICIAN TELEPHONE EVALUATION 21-30 MIN: ICD-10-PCS | Mod: 95,,, | Performed by: UROLOGY

## 2020-04-09 PROCEDURE — 1159F MED LIST DOCD IN RCRD: CPT | Mod: 95,,, | Performed by: INTERNAL MEDICINE

## 2020-04-09 PROCEDURE — 99441 PR PHYSICIAN TELEPHONE EVALUATION 5-10 MIN: ICD-10-PCS | Mod: 95,,, | Performed by: INTERNAL MEDICINE

## 2020-04-09 PROCEDURE — 99441 PR PHYSICIAN TELEPHONE EVALUATION 5-10 MIN: CPT | Mod: 95,,, | Performed by: INTERNAL MEDICINE

## 2020-04-09 PROCEDURE — 99443 PR PHYSICIAN TELEPHONE EVALUATION 21-30 MIN: CPT | Mod: 95,,, | Performed by: UROLOGY

## 2020-04-09 PROCEDURE — 1101F PT FALLS ASSESS-DOCD LE1/YR: CPT | Mod: 95,,, | Performed by: INTERNAL MEDICINE

## 2020-04-09 PROCEDURE — 1101F PR PT FALLS ASSESS DOC 0-1 FALLS W/OUT INJ PAST YR: ICD-10-PCS | Mod: 95,,, | Performed by: INTERNAL MEDICINE

## 2020-04-09 NOTE — PROGRESS NOTES
Subjective:        The chief complaint leading to consultation is: RA  The patient location is:  Home  Visit type: Virtual visit with synchronous audio/video or audio only  This was a phone conversation in lieu of in-person visit due to the coronavirus emergency. Patient acknowledged and agreed to the telephone encounter.     HPI    Past Surgical History:   Procedure Laterality Date    CYSTOSCOPY N/A 12/18/2018    Procedure: CYSTOSCOPY;  Surgeon: Garrett Pina MD;  Location: [facility] OR;  Service: Urology;  Laterality: N/A;    TRANSRECTAL BIOPSY OF PROSTATE WITH ULTRASOUND GUIDANCE N/A 12/18/2018    Procedure: BIOPSY, PROSTATE, RECTAL APPROACH, WITH US GUIDANCE;  Surgeon: Garrett Pina MD;  Location: [facility] OR;  Service: Urology;  Laterality: N/A;     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    RA (rheumatoid arthritis)     Vitamin D insufficiency 9/30/2016     Family History   Problem Relation Age of Onset    Heart disease Mother     Stroke Father     Drug abuse Sister     Diabetes Maternal Uncle           Social History:   Marital Status: Single  Alcohol History:  reports that he drinks alcohol.  Tobacco History:  reports that he quit smoking about 18 years ago. He has a 2.50 pack-year smoking history. He has never used smokeless tobacco.  Drug History:  reports that he has current or past drug history. Drug: Marijuana. Frequency: 8.00 times per week.    Review of patient's allergies indicates:  No Known Allergies      Current Outpatient Medications   Medication Sig Dispense Refill    alendronate (FOSAMAX) 70 MG tablet Take 1 tablet (70 mg total) by mouth every 7 days. Take with a full glass of water & remain upright for at least 30 minutes 12 tablet 3    alfuzosin (UROXATRAL) 10 mg Tb24 TAKE 1 TABLET(10 MG) BY MOUTH AFTER DINNER  30 tablet 6    amLODIPine (NORVASC) 2.5 MG tablet TAKE 1 TABLET(2.5 MG) BY MOUTH EVERY DAY 90 tablet 3    aspirin (ECOTRIN) 81 MG EC tablet Take 81 mg by mouth once daily.        carvedilol (COREG) 25 MG tablet TAKE 1 TABLET(25 MG) BY MOUTH TWICE DAILY WITH MEALS 180 tablet 0    cinacalcet (SENSIPAR) 30 MG Tab Take 1 tablet (30 mg total) by mouth 2 (two) times daily with meals. 60 tablet 11    escitalopram oxalate (LEXAPRO) 10 MG tablet Take 10 mg by mouth once daily.      folic acid (FOLVITE) 1 MG tablet TAKE 1 TABLET(1 MG) BY MOUTH EVERY DAY 90 tablet 3    methotrexate 2.5 MG Tab TAKE 6 TABLETS BY MOUTH EVERY WEEK 72 tablet 1    predniSONE (DELTASONE) 2.5 MG tablet TAKE 1 TABLET BY MOUTH TWICE DAILY 180 tablet 1    traMADoL (ULTRAM) 50 mg tablet TAKE 2 TABLETS BY MOUTH TWICE DAILY AS NEEDED FOR PAIN 120 tablet 5     No current facility-administered medications for this visit.        Review of Systems          Objective:        Physical Exam:   Physical Exam           Assessment:         No diagnosis found.  Plan:   There are no diagnoses linked to this encounter.  No follow-ups on file.    Total time spent with patient: 10 minutes     Each patient to whom he or she provides medical services by telemedicine is:  (1) informed of the relationship between the physician and patient and the respective role of any other health care provider with respect to management of the patient; and (2) notified that he or she may decline to receive medical services by telemedicine and may withdraw from such care at any time.    This note was created using [employer] voice recognition software that occasionally misinterprets phrases or words.

## 2020-04-09 NOTE — PROGRESS NOTES
The patient was reached at his home by telephone. This was necessitated by the current COVID-19 pandemic.    The patient states he is feeling well. He has had no fever, chills, cough, or shortness of breath. He is observing the quarantine. He has no friends or acquaintances who have been infected.    He states further that he has absolutely no problems with arthritis. He denies any red, heart, and/or swollen joints. He has no morning stiffness.    I will continue his medications: methotrexate 15 mg weekly and prednisone 5 mg daily. Blood tests will be ordered and he will have them done as soon as the quarantine is lifted. I will see him back in four months.    Uriel Garcia M.D.

## 2020-04-09 NOTE — PROGRESS NOTES
[facility] Urology Progress Note (virtual audio only visit)    Rajendra Castle is a 74 y.o. male who presents for prostate cancer and bph/LUTS follow up    He was initially seen by YAZ López on April 2018 for bothersome LUTS and was started on Uroxatral which decd NTF 3 to 1x  PSA history: 1.8 on 10/3/06;   2.73 on 08/06/2012;   2.1 on 10/03/2013;   4.1 on 04/06/2015;   3.4 on 10/14/2015;   4.8 on 03/16/2018;   2.9 (11.7% free) on 04/25/2018  Dr Aceves added finasteride 8/10/18. PSA 11/9/18 is 2.4 (9.17% free) on finasteride - so equivalent of 4.8.   AUA symptom score:  11/4 mostly dissatisfied - (4:  Frequency; 3:  Emptying; 2:  Sleeping; 1:  Intermittency, urgency) Medications helped 3-4/10    Prostate biopsy and cystoscopy on 12/18/18. SHIRLEY: 30-35 g smooth benign. TRUS VOLUME:  20.4cm3  CYSTO FINDINGS: significant bilateral lateral lobe hypertrophy with severe obstruction with central near kissing lobes, no median lobe, mild early trabeculations, normal bladder  PATHOLOGY: 4 cores L sided G3+4=7 prostate cancer: LB lat 25% (10% pattern 4); LB med 5% (20% pattern 4); LM med <5%, LA lat 10%  He only periodically has erections.  If the opportunity presents itself he may have a decent erection.  He does not seek sex.    He has CKD III, Hyperparathyroid, RA (on MTX), hypercalcemia, emphysema  On Uroxatral AUA SS largely unchanged: 12/4 (3: freq urg sleep; 2 weak stream; 1 intermittency) - meds helped 3/10  Initially considered surgery then elected XRT. Discussed urolift as fiducial markers and for LUTS resolution. Advised to DC methotrexate during XRT, but initially and stated that his prostate cancer was under control from raghav intervention by televangelist.     He later returned 3/19/19 noting compliance with sensipar for his hyperCa, and elected to proceed with ADT/XRT, noting improvement on uroxatral.  Lupron 45mg administered. He did decline fiducial markers/spaceoar  He chose to resume/continue methotrexate and did  complete radiotherapy to 7740 cGy ending August 2019.  Treatment was well tolerated and no incd side effect with concurrent MTX    9/11/19 fu with Dr Eng: arthralgias are well controlled.  He denies hot flashes. LUTS at baseline aua ss 11/4. PSA 8/12/19 0.86  9/20/19: AUA SS: 14/4 mostly dissat (4: frequency; 3: sleeping; 2: urgency/weak stream; 1: emptying, intermittency, straining) - meds helping 3/10  Stopping fluid intake in evening, still getting up a few times at night. This is most bothersome. Rare ntfx1 this is unusual. On meds, stream and emptying better but still bothered  Lupron 45mg administered    3/24/20 - lupron 45 mg administered (injection 3 of 4)  He did see Dr Eng on 3/11/20 noting AUA SS 11/5, unhappy including bothersome nocturia and he recommended proceeding with intervention/urolift  PSA 12/20/19 is 0.3 (new alexander)  No repeat labs since  Still gets up at least 3x at night  Solid stream during day and largely feels empty  Water, OJ, milk day and night  Not always consistent with stopping fluid intake before bed  Got raffi for his sensipar so has been compliant and Ca in feb 2020 was 10.8, stable, down from 12 previously  No hematuria/dysuria. No new back or bone pain    ROS: A comprehensive 10 system review was performed and is negative except as noted above in HPI    ASSESSMENT   1. Prostate cancer     2. BPH with obstruction/lower urinary tract symptoms         Plan    He is doing well with multimodal treatment of his prostate cancer with androgen deprivation therapy and external beam radiation.  He tolerated external beam radiation well and the side effects are continuing to wear off.  He is overall doing well on androgen deprivation therapy with mild side effects to be expected. Has had good psa response, with new alexander on record.  Lupron administered last week, and has last injection due Sept 2020.     He was known to have BPH with obstruction prior to CaP treament with  cystoscopically visualized obstructing lateral lobes prior to his radiation therapy.  He has been maintained on Uroxatral which has helped his urinary symptoms some but he still has moderate lower urinary tract symptoms which persist and bothersome nocturia.  He deferred pre-XRT urolift for dual relief of obstruction and fiducial markers (and then deferred fiducial markers/spaceoar given noncompliance with sensipar and hypercalcemia precluding operative intervention, now resolved). He has been followed on medical therapy and remains frustrated and would like to proceed with Urolift for relief of obstruction.  All risks and benefits of the procedure, as well as procedure in detail, and postprocedural expectations and management including overnight [place], were discussed with the patient in a manner of verbal informed consent.  Will obtain written consent day of procedure.  We did discuss the hold on elective procedures at this time given the corona virus limitations, and he will be placed on a pending list for this.  In the interim, he will continue his Uroxatral and be more regimented about discontinuing fluid intake 2 hr before bed.    Urolift T BD pending scheduling when elective surgery resumes.  In the interim, will continue to follow PSA closely incomplete his androgen deprivation.  He will get PSA diagnostic in 3 months, then PSA diagnostic and testosterone value at 6 months prior to his last Lupron injection.    All questions patient had were answered in detail and he is agreeable to treatment plan.    The patient location is: [place] 2/2 covid outbreak  The chief complaint leading to consultation is: prostate cancer f/u  Visit type: Virtual visit Audio Only  Total time spent with patient: 25 mins  Each patient to whom he or she provides medical services by telemedicine is:  (1) informed of the relationship between the physician and patient and the respective role of any other health care provider with respect to  management of the patient; and (2) notified that he or she may decline to receive medical services by telemedicine and may withdraw from such care at any time.     Patient verbally consented to treatment via phone, according to the clinic consent to treat policies outlined by the registrar

## 2020-05-05 ENCOUNTER — PATIENT MESSAGE (OUTPATIENT)
Dept: ADMINISTRATIVE | Facility: HOSPITAL | Age: 75
End: 2020-05-05

## 2020-05-18 RX ORDER — FOLIC ACID 1 MG/1
TABLET ORAL
Qty: 90 TABLET | Refills: 3 | Status: SHIPPED | OUTPATIENT
Start: 2020-05-18 | End: 2022-03-07

## 2020-05-25 ENCOUNTER — LAB VISIT (OUTPATIENT)
Dept: LAB | Facility: HOSPITAL | Age: 75
End: 2020-05-25
Attending: INTERNAL MEDICINE
Payer: MEDICARE

## 2020-05-25 DIAGNOSIS — N18.30 CHRONIC KIDNEY DISEASE, STAGE III (MODERATE): ICD-10-CM

## 2020-05-25 DIAGNOSIS — N25.81 SECONDARY RENAL HYPERPARATHYROIDISM: ICD-10-CM

## 2020-05-25 DIAGNOSIS — E21.3 HYPERPARATHYROIDISM: ICD-10-CM

## 2020-05-25 LAB
ALBUMIN SERPL BCP-MCNC: 3.1 G/DL (ref 3.5–5.2)
ANION GAP SERPL CALC-SCNC: 7 MMOL/L (ref 8–16)
BUN SERPL-MCNC: 19 MG/DL (ref 8–23)
CALCIUM SERPL-MCNC: 9.4 MG/DL (ref 8.7–10.5)
CHLORIDE SERPL-SCNC: 116 MMOL/L (ref 95–110)
CO2 SERPL-SCNC: 23 MMOL/L (ref 23–29)
CREAT SERPL-MCNC: 1.4 MG/DL (ref 0.5–1.4)
EST. GFR  (AFRICAN AMERICAN): 57.6 ML/MIN/1.73 M^2
EST. GFR  (NON AFRICAN AMERICAN): 49.8 ML/MIN/1.73 M^2
GLUCOSE SERPL-MCNC: 102 MG/DL (ref 70–110)
PHOSPHATE SERPL-MCNC: 2.6 MG/DL (ref 2.7–4.5)
POTASSIUM SERPL-SCNC: 4.6 MMOL/L (ref 3.5–5.1)
PTH-INTACT SERPL-MCNC: 503 PG/ML (ref 9–77)
SODIUM SERPL-SCNC: 146 MMOL/L (ref 136–145)

## 2020-05-25 PROCEDURE — 80069 RENAL FUNCTION PANEL: CPT

## 2020-05-25 PROCEDURE — 83970 ASSAY OF PARATHORMONE: CPT

## 2020-05-25 PROCEDURE — 36415 COLL VENOUS BLD VENIPUNCTURE: CPT | Mod: PO

## 2020-05-27 ENCOUNTER — PATIENT OUTREACH (OUTPATIENT)
Dept: ADMINISTRATIVE | Facility: OTHER | Age: 75
End: 2020-05-27

## 2020-06-01 ENCOUNTER — OFFICE VISIT (OUTPATIENT)
Dept: NEPHROLOGY | Facility: CLINIC | Age: 75
End: 2020-06-01
Payer: MEDICARE

## 2020-06-01 VITALS
DIASTOLIC BLOOD PRESSURE: 70 MMHG | HEART RATE: 79 BPM | HEIGHT: 68 IN | BODY MASS INDEX: 24.89 KG/M2 | WEIGHT: 164.25 LBS | SYSTOLIC BLOOD PRESSURE: 150 MMHG | OXYGEN SATURATION: 97 %

## 2020-06-01 DIAGNOSIS — I10 ESSENTIAL HYPERTENSION: ICD-10-CM

## 2020-06-01 DIAGNOSIS — N18.30 CHRONIC KIDNEY DISEASE, STAGE III (MODERATE): Primary | ICD-10-CM

## 2020-06-01 DIAGNOSIS — N25.81 SECONDARY RENAL HYPERPARATHYROIDISM: ICD-10-CM

## 2020-06-01 PROCEDURE — 99499 UNLISTED E&M SERVICE: CPT | Mod: S$GLB,,, | Performed by: INTERNAL MEDICINE

## 2020-06-01 PROCEDURE — 99499 RISK ADDL DX/OHS AUDIT: ICD-10-PCS | Mod: S$GLB,,, | Performed by: INTERNAL MEDICINE

## 2020-06-01 PROCEDURE — 3078F PR MOST RECENT DIASTOLIC BLOOD PRESSURE < 80 MM HG: ICD-10-PCS | Mod: CPTII,S$GLB,, | Performed by: INTERNAL MEDICINE

## 2020-06-01 PROCEDURE — 1101F PT FALLS ASSESS-DOCD LE1/YR: CPT | Mod: CPTII,S$GLB,, | Performed by: INTERNAL MEDICINE

## 2020-06-01 PROCEDURE — 3077F PR MOST RECENT SYSTOLIC BLOOD PRESSURE >= 140 MM HG: ICD-10-PCS | Mod: CPTII,S$GLB,, | Performed by: INTERNAL MEDICINE

## 2020-06-01 PROCEDURE — 1101F PR PT FALLS ASSESS DOC 0-1 FALLS W/OUT INJ PAST YR: ICD-10-PCS | Mod: CPTII,S$GLB,, | Performed by: INTERNAL MEDICINE

## 2020-06-01 PROCEDURE — 3077F SYST BP >= 140 MM HG: CPT | Mod: CPTII,S$GLB,, | Performed by: INTERNAL MEDICINE

## 2020-06-01 PROCEDURE — 99214 OFFICE O/P EST MOD 30 MIN: CPT | Mod: S$GLB,,, | Performed by: INTERNAL MEDICINE

## 2020-06-01 PROCEDURE — 99999 PR PBB SHADOW E&M-EST. PATIENT-LVL III: CPT | Mod: PBBFAC,,, | Performed by: INTERNAL MEDICINE

## 2020-06-01 PROCEDURE — 99214 PR OFFICE/OUTPT VISIT, EST, LEVL IV, 30-39 MIN: ICD-10-PCS | Mod: S$GLB,,, | Performed by: INTERNAL MEDICINE

## 2020-06-01 PROCEDURE — 1159F PR MEDICATION LIST DOCUMENTED IN MEDICAL RECORD: ICD-10-PCS | Mod: S$GLB,,, | Performed by: INTERNAL MEDICINE

## 2020-06-01 PROCEDURE — 99999 PR PBB SHADOW E&M-EST. PATIENT-LVL III: ICD-10-PCS | Mod: PBBFAC,,, | Performed by: INTERNAL MEDICINE

## 2020-06-01 PROCEDURE — 3078F DIAST BP <80 MM HG: CPT | Mod: CPTII,S$GLB,, | Performed by: INTERNAL MEDICINE

## 2020-06-01 PROCEDURE — 1159F MED LIST DOCD IN RCRD: CPT | Mod: S$GLB,,, | Performed by: INTERNAL MEDICINE

## 2020-06-01 NOTE — PROGRESS NOTES
"Subjective:       Patient ID: Rajendra Castle is a 74 y.o. Black or  male who presents for return patient evaluation for chronic renal failure.    He has no uremic symptoms and is in his usual state of health.  There have been no recent illnesses, hospitalizations or procedures.  He has completed his XRT for his prostate cancer.  He has frequency at night.      Review of Systems   Constitutional: Negative for appetite change, chills, fatigue and fever.   Eyes: Negative for visual disturbance.   Respiratory: Negative for cough and shortness of breath.    Cardiovascular: Negative for chest pain and leg swelling.   Gastrointestinal: Negative for diarrhea, nausea and vomiting.   Genitourinary: Positive for frequency. Negative for difficulty urinating, dysuria and hematuria.   Musculoskeletal: Negative for myalgias.   Skin: Negative for rash.   Neurological: Negative for dizziness and headaches.   Psychiatric/Behavioral: Negative for sleep disturbance.       The past medical, family and social histories were reviewed for this encounter.     BP (!) 150/70 (BP Location: Right arm, Patient Position: Sitting)   Pulse 79   Ht 5' 8" (1.727 m)   Wt 74.5 kg (164 lb 3.9 oz)   SpO2 97%   BMI 24.97 kg/m²     Objective:      Physical Exam   Constitutional: He is oriented to person, place, and time. He appears well-developed and well-nourished. No distress.   HENT:   Head: Normocephalic and atraumatic.   Eyes: Conjunctivae are normal.   Neck: Neck supple. No JVD present.   Cardiovascular: Normal rate, regular rhythm and normal heart sounds. Exam reveals no gallop and no friction rub.   No murmur heard.  Pulmonary/Chest: Effort normal and breath sounds normal. No respiratory distress. He has no wheezes. He has no rales.   Abdominal: Soft. Bowel sounds are normal. He exhibits no distension. There is no tenderness.   Musculoskeletal: He exhibits no edema.   Neurological: He is alert and oriented to person, place, and " time.   Skin: Skin is warm and dry. No rash noted.   Psychiatric: He has a normal mood and affect.   Vitals reviewed.      Assessment:       1. Chronic kidney disease, stage III (moderate)    2. Essential hypertension    3. Secondary renal hyperparathyroidism        Plan:   Return to clinic in 7 months.  Labs for next visit include rp, pth, upc.  Baseline creatinine is 1.4-1.6 since 2006.  PTH is 503 with a calcium of 9.4.  He is on sensipar and is followed by Dr. Murphy for this.  Renal US shows R 9.3 cm, L 9.4 cm.  Continue current medications as prescribed and reviewed.   Blood pressure is controlled on the current regimen.

## 2020-06-24 ENCOUNTER — LAB VISIT (OUTPATIENT)
Dept: LAB | Facility: HOSPITAL | Age: 75
End: 2020-06-24
Attending: UROLOGY
Payer: MEDICARE

## 2020-06-24 DIAGNOSIS — C61 PROSTATE CANCER: ICD-10-CM

## 2020-06-24 PROCEDURE — 36415 COLL VENOUS BLD VENIPUNCTURE: CPT | Mod: PO

## 2020-06-24 PROCEDURE — 84154 ASSAY OF PSA FREE: CPT

## 2020-06-25 ENCOUNTER — PATIENT OUTREACH (OUTPATIENT)
Dept: ADMINISTRATIVE | Facility: OTHER | Age: 75
End: 2020-06-25

## 2020-06-25 LAB
PROSTATE SPECIFIC ANTIGEN, TOTAL: 0.11 NG/ML (ref 0–4)
PSA FREE MFR SERPL: 9.09 %
PSA FREE SERPL-MCNC: 0.01 NG/ML (ref 0.01–1.5)

## 2020-06-25 NOTE — PROGRESS NOTES
Requested updates within Care Everywhere.  Patient's chart was reviewed for overdue JIA topics.

## 2020-07-23 ENCOUNTER — PATIENT OUTREACH (OUTPATIENT)
Dept: ADMINISTRATIVE | Facility: HOSPITAL | Age: 75
End: 2020-07-23

## 2020-07-23 NOTE — LETTER
"July 23, 2020    Rajnedra Castle  [address] [place] [id number]             [facility]  [address]  [facility] [address]  Phone: [phone] [facility] is committed to your overall health.  To help you get the most out of each of your visits, we will review your information to make sure you are up to date on all of your recommended tests and/or procedures.      Dr. Irvin Aceves MD has found that your chart shows you may be due for a:    COLORECTAL CANCER SCREENING    Our records show you are due for colon cancer screening.  If you have already had your screening, or you have made an appointment for your screening, congratulations!  You're on the road to good health. If you haven't signed up for a colorectal screening please accept this invitation to be screened.      According to the American Cancer Society, colon cancer is the third most common cancer for people in the [place].  A simple screening test "Fit Kit" - done in your own home - can help find colon cancer at an early stage when it can be treated, even before any signs or symptoms develop. THIS IS A YEARLY TEST.    Testing for blood in your stool (feces or bowel movement) is the first step. If you have an upcoming visit with your Primary Care Physician you can  a Fit Kit during your visit or you can  a Fit Kit at your Primary Care Clinic prior to your visit.    The Fit Kit includes:    · Instructions on how to perform the test  · (1) Sheet of tissue paper  · (1) Small Absorption pad  · (1) Bottle to hold the sample and a small probe to help you take the sample  · (1) Small plastic bio-hazard bag  · (1) Postage-paid return envelope    Please do your test (the instructions will tell you how) and then return your sample in the postage-paid return envelope within 24 hours of collection.     If your test results are negative, you won't need testing again for another year.  If results show you need more testing, we will " "call you with next steps.    Regular colorectal cancer screening is one of the most powerful weapons for preventing colon cancer.  The website https://www.cancer.org/cancer/colon-rectal-cancer.html can answer many of your questions about this cancer and its prevention.  Just search for "colorectal cancer".         If you have had any of the above done at another facility, please bring the records or information with you so that your record at [facility] will be complete.  If you would like to schedule any of these, please contact the clinic at [phone].    If you are currently taking medication, please bring it with you to your appointment for review.    Also, if you have any type of Advanced Directives, please bring them with you to your office visit so we may scan them into your chart.    Thank You,    Your [facility],  MD Kaur Scott LPN Clinical Care Coordinator  [facility]  [address] [place] [address] [place] [address]  Phone [phone]  Fax [phone]       "

## 2020-07-23 NOTE — PROGRESS NOTES
Chart review completed 2020.  Care Everywhere updates requested and reviewed.  Immunizations reconciled. Media reports reviewed.  Duplicate HM overrides and  orders removed.  Overdue HM topic chart audit and/or requested.  Overdue lab testing linked to upcoming lab appointments if applies.    LETTER MAILED    Health Maintenance Due   Topic Date Due    Colorectal Cancer Screening  2019

## 2020-07-28 ENCOUNTER — PATIENT OUTREACH (OUTPATIENT)
Dept: ADMINISTRATIVE | Facility: OTHER | Age: 75
End: 2020-07-28

## 2020-07-28 NOTE — PROGRESS NOTES
Chart was reviewed for overdue Proactive [facility]counters ([facility])  topics  Updates were requested from care everywhere

## 2020-08-03 DIAGNOSIS — C61 PROSTATE CANCER: Primary | ICD-10-CM

## 2020-08-19 ENCOUNTER — OFFICE VISIT (OUTPATIENT)
Dept: RHEUMATOLOGY | Facility: CLINIC | Age: 75
End: 2020-08-19
Payer: MEDICARE

## 2020-08-19 VITALS
DIASTOLIC BLOOD PRESSURE: 86 MMHG | WEIGHT: 145.88 LBS | TEMPERATURE: 98 F | BODY MASS INDEX: 22.18 KG/M2 | SYSTOLIC BLOOD PRESSURE: 164 MMHG

## 2020-08-19 DIAGNOSIS — M19.90 CHRONIC INFLAMMATORY ARTHRITIS: Primary | ICD-10-CM

## 2020-08-19 DIAGNOSIS — Z79.899 ENCOUNTER FOR LONG-TERM (CURRENT) USE OF OTHER MEDICATIONS: ICD-10-CM

## 2020-08-19 PROCEDURE — 1125F AMNT PAIN NOTED PAIN PRSNT: CPT | Mod: S$GLB,,, | Performed by: INTERNAL MEDICINE

## 2020-08-19 PROCEDURE — 1159F PR MEDICATION LIST DOCUMENTED IN MEDICAL RECORD: ICD-10-PCS | Mod: S$GLB,,, | Performed by: INTERNAL MEDICINE

## 2020-08-19 PROCEDURE — 3008F BODY MASS INDEX DOCD: CPT | Mod: S$GLB,,, | Performed by: INTERNAL MEDICINE

## 2020-08-19 PROCEDURE — 1159F MED LIST DOCD IN RCRD: CPT | Mod: S$GLB,,, | Performed by: INTERNAL MEDICINE

## 2020-08-19 PROCEDURE — 99213 PR OFFICE/OUTPT VISIT, EST, LEVL III, 20-29 MIN: ICD-10-PCS | Mod: S$GLB,,, | Performed by: INTERNAL MEDICINE

## 2020-08-19 PROCEDURE — 3077F SYST BP >= 140 MM HG: CPT | Mod: S$GLB,,, | Performed by: INTERNAL MEDICINE

## 2020-08-19 PROCEDURE — 1125F PR PAIN SEVERITY QUANTIFIED, PAIN PRESENT: ICD-10-PCS | Mod: S$GLB,,, | Performed by: INTERNAL MEDICINE

## 2020-08-19 PROCEDURE — 3079F PR MOST RECENT DIASTOLIC BLOOD PRESSURE 80-89 MM HG: ICD-10-PCS | Mod: S$GLB,,, | Performed by: INTERNAL MEDICINE

## 2020-08-19 PROCEDURE — 1101F PR PT FALLS ASSESS DOC 0-1 FALLS W/OUT INJ PAST YR: ICD-10-PCS | Mod: S$GLB,,, | Performed by: INTERNAL MEDICINE

## 2020-08-19 PROCEDURE — 99213 OFFICE O/P EST LOW 20 MIN: CPT | Mod: S$GLB,,, | Performed by: INTERNAL MEDICINE

## 2020-08-19 PROCEDURE — 3008F PR BODY MASS INDEX (BMI) DOCUMENTED: ICD-10-PCS | Mod: S$GLB,,, | Performed by: INTERNAL MEDICINE

## 2020-08-19 PROCEDURE — 1101F PT FALLS ASSESS-DOCD LE1/YR: CPT | Mod: S$GLB,,, | Performed by: INTERNAL MEDICINE

## 2020-08-19 PROCEDURE — 3079F DIAST BP 80-89 MM HG: CPT | Mod: S$GLB,,, | Performed by: INTERNAL MEDICINE

## 2020-08-19 PROCEDURE — 3077F PR MOST RECENT SYSTOLIC BLOOD PRESSURE >= 140 MM HG: ICD-10-PCS | Mod: S$GLB,,, | Performed by: INTERNAL MEDICINE

## 2020-08-19 RX ORDER — PREDNISONE 2.5 MG/1
2.5 TABLET ORAL 2 TIMES DAILY
Qty: 180 TABLET | Refills: 1 | Status: SHIPPED | OUTPATIENT
Start: 2020-08-19 | End: 2021-12-03

## 2020-08-19 NOTE — PROGRESS NOTES
[facility] RHEUMATOLOGY           Follow-up visit    Notes dictated to M*Modal. Please forgive any unintended errors.  Subjective:       Patient ID:   NAME: Rajendra Castle : 1945     74 y.o. male    Referring Doc: No ref. provider found  Other Physicians:    Chief Complaint:  Rheumatoid Arthritis (Needs refill on Prednisone) and Chronic inflammatory arthritis      HPI/Interval History:  The patient is doing well.  Has had no red, hot, and/or swollen joints.  He has minimal gelling but no true morning stiffness.    ROS:   GEN:    no fever, night sweats or weight loss  SKIN:   no rashes, bruising, or swelling, no Raynauds, no photosensitivity  HEENT: no changes in vision, no mouth ulcers, no sicca symptoms, no scalp tenderness, no jaw claudication.  CV:      no CP, PND, DEY, orthopnea, no palpitations  PULM: no SOB, cough, hemoptysis, sputum or pleuritic pain  GI:        no dysphagia, no GERD, no hematemesis, no abdominal pain, nausea, vomiting, constipation, diarrhea, melanotic stools, BRBPR  :      no hematuria, dysuria  NEURO: no paresthesias, headaches, acute visual disturbances  MUSCULOSKELETAL:  As above   PSYCH:   No increased insomnia, no increased anxiety, no increased depression    Past Medical/Surgical History:  Past Medical History:   Diagnosis Date    Arthritis     DDD (degenerative disc disease), cervical     Degenerative disc disease     Heart murmur     Hypertension     Nontoxic multinodular goiter 2016    RA (rheumatoid arthritis)     Vitamin D insufficiency 2016     Past Surgical History:   Procedure Laterality Date    CYSTOSCOPY N/A 2018    Procedure: CYSTOSCOPY;  Surgeon: Garrett Pina MD;  Location: [facility] OR;  Service: Urology;  Laterality: N/A;    TRANSRECTAL BIOPSY OF PROSTATE WITH ULTRASOUND GUIDANCE N/A 2018    Procedure: BIOPSY, PROSTATE, RECTAL APPROACH, WITH US GUIDANCE;  Surgeon: Garrett Pina MD;  Location: [facility] OR;  Service: Urology;   Laterality: N/A;       Allergies:  Review of patient's allergies indicates:  No Known Allergies    Social/Family History:  Social History     Socioeconomic History    Marital status: Single     Spouse name: Not on file    Number of children: Not on file    Years of education: Not on file    Highest education level: Not on file   Occupational History    Not on file   Social Needs    Financial resource strain: Not on file    Food insecurity     Worry: Not on file     Inability: Not on file    Transportation needs     Medical: Not on file     Non-medical: Not on file   Tobacco Use    Smoking status: Former Smoker     Packs/day: 0.25     Years: 10.00     Pack years: 2.50     Quit date: 2001     Years since quittin.0    Smokeless tobacco: Never Used   Substance and Sexual Activity    Alcohol use: Yes     Comment: seldom    Drug use: Yes     Frequency: 8.0 times per week     Types: Marijuana    Sexual activity: Yes     Partners: Female   Lifestyle    Physical activity     Days per week: Not on file     Minutes per session: Not on file    Stress: Not on file   Relationships    Social connections     Talks on phone: Not on file     Gets together: Not on file     Attends Buddhism service: Not on file     Active member of club or organization: Not on file     Attends meetings of clubs or organizations: Not on file     Relationship status: Not on file   Other Topics Concern    Not on file   Social History Narrative    Not on file     Family History   Problem Relation Age of Onset    Heart disease Mother     Stroke Father     Drug abuse Sister     Diabetes Maternal Uncle      FAMILY HISTORY: negative for Connective Tissue Disease      Medications:    Current Outpatient Medications:     alendronate (FOSAMAX) 70 MG tablet, Take 1 tablet (70 mg total) by mouth every 7 days. Take with a full glass of water & remain upright for at least 30 minutes, Disp: 12 tablet, Rfl: 3    alfuzosin (UROXATRAL) 10  mg Tb24, TAKE 1 TABLET(10 MG) BY MOUTH AFTER DINNER, Disp: 30 tablet, Rfl: 6    amLODIPine (NORVASC) 2.5 MG tablet, TAKE 1 TABLET(2.5 MG) BY MOUTH EVERY DAY, Disp: 90 tablet, Rfl: 3    aspirin (ECOTRIN) 81 MG EC tablet, Take 81 mg by mouth once daily.  , Disp: , Rfl:     carvedilol (COREG) 25 MG tablet, TAKE 1 TABLET(25 MG) BY MOUTH TWICE DAILY WITH MEALS, Disp: 180 tablet, Rfl: 0    cinacalcet (SENSIPAR) 30 MG Tab, Take 1 tablet (30 mg total) by mouth 2 (two) times daily with meals., Disp: 60 tablet, Rfl: 11    escitalopram oxalate (LEXAPRO) 10 MG tablet, Take 10 mg by mouth once daily., Disp: , Rfl:     folic acid (FOLVITE) 1 MG tablet, TAKE 1 TABLET(1 MG) BY MOUTH EVERY DAY, Disp: 90 tablet, Rfl: 3    methotrexate 2.5 MG Tab, TAKE 6 TABLETS BY MOUTH EVERY WEEK, Disp: 72 tablet, Rfl: 1    predniSONE (DELTASONE) 2.5 MG tablet, Take 1 tablet (2.5 mg total) by mouth 2 (two) times daily., Disp: 180 tablet, Rfl: 1    traMADoL (ULTRAM) 50 mg tablet, TAKE 2 TABLETS BY MOUTH TWICE DAILY AS NEEDED FOR PAIN, Disp: 120 tablet, Rfl: 5    Current Facility-Administered Medications:     [START ON 9/25/2020] leuprolide (6 month) injection 45 mg, 45 mg, Intramuscular, 1 time in Clinic/HOD, Garrett Pina MD      Objective:     Vitals:  Blood pressure (!) 164/86, temperature 98.3 °F (36.8 °C), weight 66.2 kg (145 lb 14.4 oz).    Physical Examination:   GEN: wn/wd male in no apparent distress  SKIN: no rashes, no sclerodactyly, no Raynaud's, no periungual erythema, no digital tip ulcerations, no nailbed pitting  HEAD: no alopecia, no scalp tenderness, no temporal artery tenderness or induration.  EYES: no pallor, no icterus, PERRLA  ENT:  no thrush, no mucosal dryness or ulcerations, adequate oral hygiene & dentition.  NECK: supple x 6, no masses, no thyromegaly, no lymphadenopathy.  CV:   S1 and S2 regular, no murmurs, gallop or rubs  CHEST: Normal respiratory effort;  normal breath sounds/no adventitious sounds. No  signs of consolidation.  ABD: non-tender and non-distended; soft; normal bowel sounds; no rebound/guarding or tenderness. No hepatosplenomegaly.  Musculoskeletal:  No evidence of active synovitis.  No progressive deformity  EXTREM: no clubbing, cyanosis or edema. normal pulses.  NEURO:  grossly intact; motor/sensory WNL; no tremors  PSYCH:  normal mood, affect and behavior    Labs:   Lab Results   Component Value Date    WBC 6.04 08/12/2019    HGB 12.7 (L) 08/12/2019    HCT 42.9 08/12/2019    MCV 98 08/12/2019     08/12/2019   CMP@  Sodium   Date Value Ref Range Status   05/25/2020 146 (H) 136 - 145 mmol/L Final   04/25/2019 141 134 - 144 mmol/L      Potassium   Date Value Ref Range Status   05/25/2020 4.6 3.5 - 5.1 mmol/L Final     Chloride   Date Value Ref Range Status   05/25/2020 116 (H) 95 - 110 mmol/L Final   04/25/2019 110 98 - 110 mmol/L      CO2   Date Value Ref Range Status   05/25/2020 23 23 - 29 mmol/L Final     Glucose   Date Value Ref Range Status   05/25/2020 102 70 - 110 mg/dL Final   04/25/2019 95 70 - 99 mg/dL      BUN, Bld   Date Value Ref Range Status   05/25/2020 19 8 - 23 mg/dL Final     Creatinine   Date Value Ref Range Status   05/25/2020 1.4 0.5 - 1.4 mg/dL Final   04/25/2019 1.38 0.60 - 1.40 mg/dL      Calcium   Date Value Ref Range Status   05/25/2020 9.4 8.7 - 10.5 mg/dL Final     Total Protein   Date Value Ref Range Status   02/05/2020 7.4 6.0 - 8.4 g/dL Final     Albumin   Date Value Ref Range Status   05/25/2020 3.1 (L) 3.5 - 5.2 g/dL Final   04/25/2019 3.3 3.1 - 4.7 g/dL      Total Bilirubin   Date Value Ref Range Status   02/05/2020 0.3 0.1 - 1.0 mg/dL Final     Comment:     For infants and newborns, interpretation of results should be based  on gestational age, weight and in agreement with clinical  observations.  Premature Infant recommended reference ranges:  Up to 24 hours.............<8.0 mg/dL  Up to 48 hours............<12.0 mg/dL  3-5 days..................<15.0  mg/dL  6-29 days.................<15.0 mg/dL       Alkaline Phosphatase   Date Value Ref Range Status   02/05/2020 81 55 - 135 U/L Final     AST   Date Value Ref Range Status   02/05/2020 19 10 - 40 U/L Final     ALT   Date Value Ref Range Status   02/05/2020 23 10 - 44 U/L Final     CPK   Date Value Ref Range Status   04/20/2017 48 18 - 170 IU/L      CRP   Date Value Ref Range Status   08/12/2019 4.5 0.0 - 8.2 mg/L Final     Rheumatoid Factor   Date Value Ref Range Status   04/20/2017 633.9 (H) 0.0 - 13.9 IU/mL      Comment:     Performed at: [facility], [facility] [address], [id number] Euceda MD, Phone:  [phone]     Uric Acid   Date Value Ref Range Status   09/20/2016 8.6 (H) 3.4 - 7.0 mg/dL Final       Radiology/Diagnostic Studies:    None    Assessment/Discussion/Plan:   74 y.o. male with seropositive rheumatoid arthritis-good control on methotrexate 15 mg weekly and prednisone 2.5 mg twice daily    PLAN:  I will continue his medication unchanged.  Routine blood testing was ordered.    RTC:  I will see him back in 4-6 months.    Electronically signed by Uriel Garcia MD

## 2020-09-02 ENCOUNTER — HOSPITAL ENCOUNTER (EMERGENCY)
Facility: HOSPITAL | Age: 75
Discharge: HOME | End: 2020-09-02
Attending: EMERGENCY MEDICINE
Payer: MEDICARE

## 2020-09-02 VITALS
OXYGEN SATURATION: 98 % | DIASTOLIC BLOOD PRESSURE: 70 MMHG | RESPIRATION RATE: 20 BRPM | BODY MASS INDEX: 21.86 KG/M2 | HEART RATE: 86 BPM | TEMPERATURE: 99 F | WEIGHT: 143.75 LBS | SYSTOLIC BLOOD PRESSURE: 162 MMHG

## 2020-09-02 DIAGNOSIS — R11.2 NAUSEA & VOMITING: ICD-10-CM

## 2020-09-02 DIAGNOSIS — E83.52 HYPERCALCEMIA: Primary | ICD-10-CM

## 2020-09-02 DIAGNOSIS — R53.1 WEAKNESS: ICD-10-CM

## 2020-09-02 DIAGNOSIS — E21.3 HYPERPARATHYROIDISM: ICD-10-CM

## 2020-09-02 LAB
ALBUMIN SERPL BCP-MCNC: 3.3 G/DL (ref 3.5–5.2)
ALP SERPL-CCNC: 71 U/L (ref 55–135)
ALT SERPL W/O P-5'-P-CCNC: 15 U/L (ref 10–44)
ANION GAP SERPL CALC-SCNC: 12 MMOL/L (ref 8–16)
AST SERPL-CCNC: 15 U/L (ref 10–40)
BASOPHILS # BLD AUTO: 0.01 K/UL (ref 0–0.2)
BASOPHILS NFR BLD: 0.1 % (ref 0–1.9)
BILIRUB SERPL-MCNC: 0.3 MG/DL (ref 0.1–1)
BILIRUB UR QL STRIP: NEGATIVE
BUN SERPL-MCNC: 19 MG/DL (ref 8–23)
CALCIUM SERPL-MCNC: 11.8 MG/DL (ref 8.7–10.5)
CHLORIDE SERPL-SCNC: 110 MMOL/L (ref 95–110)
CLARITY UR: CLEAR
CO2 SERPL-SCNC: 21 MMOL/L (ref 23–29)
COLOR UR: YELLOW
CREAT SERPL-MCNC: 1.3 MG/DL (ref 0.5–1.4)
DIFFERENTIAL METHOD: ABNORMAL
EOSINOPHIL # BLD AUTO: 0 K/UL (ref 0–0.5)
EOSINOPHIL NFR BLD: 0.3 % (ref 0–8)
ERYTHROCYTE [DISTWIDTH] IN BLOOD BY AUTOMATED COUNT: 14.4 % (ref 11.5–14.5)
EST. GFR  (AFRICAN AMERICAN): >60 ML/MIN/1.73 M^2
EST. GFR  (NON AFRICAN AMERICAN): 54 ML/MIN/1.73 M^2
GLUCOSE SERPL-MCNC: 130 MG/DL (ref 70–110)
GLUCOSE UR QL STRIP: NEGATIVE
HCT VFR BLD AUTO: 40.1 % (ref 40–54)
HGB BLD-MCNC: 12.1 G/DL (ref 14–18)
HGB UR QL STRIP: NEGATIVE
IMM GRANULOCYTES # BLD AUTO: 0.03 K/UL (ref 0–0.04)
IMM GRANULOCYTES NFR BLD AUTO: 0.4 % (ref 0–0.5)
KETONES UR QL STRIP: NEGATIVE
LEUKOCYTE ESTERASE UR QL STRIP: NEGATIVE
LYMPHOCYTES # BLD AUTO: 0.5 K/UL (ref 1–4.8)
LYMPHOCYTES NFR BLD: 6.5 % (ref 18–48)
MCH RBC QN AUTO: 27.9 PG (ref 27–31)
MCHC RBC AUTO-ENTMCNC: 30.2 G/DL (ref 32–36)
MCV RBC AUTO: 92 FL (ref 82–98)
MONOCYTES # BLD AUTO: 0.5 K/UL (ref 0.3–1)
MONOCYTES NFR BLD: 6.5 % (ref 4–15)
NEUTROPHILS # BLD AUTO: 6.4 K/UL (ref 1.8–7.7)
NEUTROPHILS NFR BLD: 86.2 % (ref 38–73)
NITRITE UR QL STRIP: NEGATIVE
NRBC BLD-RTO: 0 /100 WBC
PH UR STRIP: 6 [PH] (ref 5–8)
PLATELET # BLD AUTO: 363 K/UL (ref 150–350)
PMV BLD AUTO: 10.8 FL (ref 9.2–12.9)
POTASSIUM SERPL-SCNC: 4.9 MMOL/L (ref 3.5–5.1)
PROT SERPL-MCNC: 7.1 G/DL (ref 6–8.4)
PROT UR QL STRIP: NEGATIVE
PTH-INTACT SERPL-MCNC: 257.7 PG/ML (ref 9–77)
RBC # BLD AUTO: 4.34 M/UL (ref 4.6–6.2)
SARS-COV-2 RDRP RESP QL NAA+PROBE: NEGATIVE
SODIUM SERPL-SCNC: 143 MMOL/L (ref 136–145)
SP GR UR STRIP: 1.01 (ref 1–1.03)
TSH SERPL DL<=0.005 MIU/L-ACNC: 0.42 UIU/ML (ref 0.4–4)
URN SPEC COLLECT METH UR: NORMAL
UROBILINOGEN UR STRIP-ACNC: NEGATIVE EU/DL
WBC # BLD AUTO: 7.38 K/UL (ref 3.9–12.7)

## 2020-09-02 PROCEDURE — 99285 EMERGENCY DEPT VISIT HI MDM: CPT | Mod: 25

## 2020-09-02 PROCEDURE — 36415 COLL VENOUS BLD VENIPUNCTURE: CPT

## 2020-09-02 PROCEDURE — 80053 COMPREHEN METABOLIC PANEL: CPT

## 2020-09-02 PROCEDURE — 93010 EKG 12-LEAD: ICD-10-PCS | Mod: ,,, | Performed by: INTERNAL MEDICINE

## 2020-09-02 PROCEDURE — 93010 ELECTROCARDIOGRAM REPORT: CPT | Mod: ,,, | Performed by: INTERNAL MEDICINE

## 2020-09-02 PROCEDURE — 93005 ELECTROCARDIOGRAM TRACING: CPT

## 2020-09-02 PROCEDURE — 84443 ASSAY THYROID STIM HORMONE: CPT

## 2020-09-02 PROCEDURE — 81003 URINALYSIS AUTO W/O SCOPE: CPT

## 2020-09-02 PROCEDURE — 83970 ASSAY OF PARATHORMONE: CPT

## 2020-09-02 PROCEDURE — 85025 COMPLETE CBC W/AUTO DIFF WBC: CPT

## 2020-09-02 PROCEDURE — U0002 COVID-19 LAB TEST NON-CDC: HCPCS

## 2020-09-02 RX ORDER — ONDANSETRON HYDROCHLORIDE 8 MG/1
8 TABLET, FILM COATED ORAL EVERY 12 HOURS PRN
Qty: 8 TABLET | Refills: 1 | Status: SHIPPED | OUTPATIENT
Start: 2020-09-02 | End: 2020-10-08 | Stop reason: SDUPTHER

## 2020-09-02 RX ORDER — PANTOPRAZOLE SODIUM 40 MG/1
40 TABLET, DELAYED RELEASE ORAL DAILY
Qty: 30 TABLET | Refills: 3 | Status: SHIPPED | OUTPATIENT
Start: 2020-09-02 | End: 2020-11-10

## 2020-09-02 NOTE — ED NOTES
Constitutional: He appears well-developed and well-nourished.   HENT:   Head: Normocephalic and atraumatic.   Eyes: Conjunctivae are normal.   Neck: Normal range of motion. Neck supple.   Cardiovascular: Regular rhythm. Tachycardia present.  Exam reveals no gallop and no friction rub.    Murmur heard.   Systolic murmur is present with a grade of 1/6.  Pulmonary/Chest: Breath sounds normal. No respiratory distress. He has no wheezes. He has no rhonchi. He has no rales.   Abdominal: Soft. He exhibits no distension. There is no hepatosplenomegaly or hepatomegaly. There is no abdominal tenderness.   Musculoskeletal: Normal range of motion.   Lymphadenopathy:   No thyroid megaly.   Neurological: He is alert and oriented to person, place, and time.   Skin: Skin is warm and dry.

## 2020-09-02 NOTE — ED PROVIDER NOTES
"Encounter Date: 9/2/2020    SCRIBE #1 NOTE: I, April Mohan, am scribing for, and in the presence of, Gene Ortiz MD.       History     Chief Complaint   Patient presents with    Weakness     worsening symptoms over last two weeks. Cant eat. states "Im having rapid weight loss"    Vomiting     Time seen by provider: 10:56 AM on 09/02/2020    Chief Complaint: Weakness and Vomiting    Rajendra Castle is a 75 y.o. male who presents to the ED with an onset of weakness and vomiting. The patient complains of decrease appetite, nausea, vomiting, and weakness. The patient reports having decrease appetite and when he does eat, he vomits. Patient reports recent weight loss of 24 pounds in the last 2 weeks. He states that all he wants to do lately is sleep. He also states he fell ~6-7 days ago and landed on his head and back. He denies any associated pain or dizziness with the fall. The patient denies smoking or alcohol use. The patient denies fever, blood in stool, diarrhea or any other symptoms at this time. PMHx of heart murmur, CKD, prostate cancer, and HTN. PSHx of transrectal biopsy of prostate with US guidance and cytoscopy.    The history is provided by the patient.     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    RA (rheumatoid arthritis)     Vitamin D insufficiency 9/30/2016     Past Surgical History:   Procedure Laterality Date    CYSTOSCOPY N/A 12/18/2018    Procedure: CYSTOSCOPY;  Surgeon: Garrett Pina MD;  [facility];  Service: Urology;  Laterality: N/A;    TRANSRECTAL BIOPSY OF PROSTATE WITH ULTRASOUND GUIDANCE N/A 12/18/2018    Procedure: BIOPSY, PROSTATE, RECTAL APPROACH, WITH US GUIDANCE;  Surgeon: Garrett Pina MD;  [facility];  Service: Urology;  " Laterality: N/A;     Family History   Problem Relation Age of Onset    Heart disease Mother     Stroke Father     Drug abuse Sister     Diabetes Maternal Uncle      Social History     Tobacco Use    Smoking status: Former Smoker     Packs/day: 0.25     Years: 10.00     Pack years: 2.50     Quit date: 2001     Years since quittin.0    Smokeless tobacco: Never Used   Substance Use Topics    Alcohol use: Yes     Comment: seldom    Drug use: Yes     Frequency: 8.0 times per week     Types: Marijuana     Review of Systems   Constitutional: Positive for appetite change and unexpected weight change. Negative for activity change, chills, fatigue and fever.   Eyes: Negative for visual disturbance.   Respiratory: Negative for apnea and shortness of breath.    Cardiovascular: Negative for chest pain and palpitations.   Gastrointestinal: Positive for nausea and vomiting. Negative for abdominal distention, abdominal pain, blood in stool, constipation and diarrhea.   Genitourinary: Negative for difficulty urinating.   Musculoskeletal: Negative for back pain and neck pain.   Skin: Negative for pallor and rash.   Neurological: Positive for weakness. Negative for dizziness, syncope, light-headedness and headaches.   Hematological: Does not bruise/bleed easily.   Psychiatric/Behavioral: Negative for agitation.       Physical Exam     Initial Vitals [20 1046]   BP Pulse Resp Temp SpO2   (!) 165/69 102 20 98.8 °F (37.1 °C) 96 %      MAP       --         Physical Exam    Nursing note and vitals reviewed.  Constitutional: He appears well-developed and well-nourished.   HENT:   Head: Normocephalic and atraumatic.   Eyes: Conjunctivae are normal.   Neck: Normal range of motion. Neck supple.   Cardiovascular: Regular rhythm. Tachycardia present.  Exam reveals no gallop and no friction rub.    Murmur heard.   Systolic murmur is present with a grade of 1/6.  Pulmonary/Chest: Breath sounds normal. No respiratory  distress. He has no wheezes. He has no rhonchi. He has no rales.   Abdominal: Soft. He exhibits no distension. There is no hepatosplenomegaly or hepatomegaly. There is no abdominal tenderness.   Musculoskeletal: Normal range of motion.   Lymphadenopathy:   No thyroid megaly.   Neurological: He is alert and oriented to person, place, and time.   Skin: Skin is warm and dry.   Psychiatric: He has a normal mood and affect.         ED Course   Procedures  Labs Reviewed   CBC W/ AUTO DIFFERENTIAL   COMPREHENSIVE METABOLIC PANEL   SARS-COV-2 RNA AMPLIFICATION, QUAL   TSH   URINALYSIS, REFLEX TO URINE CULTURE     EKG Readings: (Independently Interpreted)   Initial Reading: No STEMI. Heart Rate: 96 bpm. Conduction: 1st Degree AV Block. Other Findings: Prolonged QT Interval. Clinical Impression: AV Block - 1st Degree   Sinus rhythm with 1st degree AV block. Left ventricular hypertrophy with repolarization abnormality. Prolonged QT. Anterior lateral T wave inversion concerning for ischemia.       Imaging Results          CT Chest Abdomen Pelvis Without Contrast (XPD) (Final result)  Result time 09/02/20 14:13:58   Procedure changed from CT Abdomen Pelvis  Without Contrast     Final result by Ras Llanos MD (09/02/20 14:13:58)                 Impression:      1. No acute findings in the abdomen.  2. Unchanged pulmonary nodules favored benign given length of stability.  COPD.  3. Unchanged small pericardial effusion.  Coronary artery disease.  4. Mild severity T6 compression fracture, age indeterminate but new since prior.  5. Left adrenal adenoma.      Electronically signed by: Ras Llanos  Date:    09/02/2020  Time:    14:13             Narrative:    EXAMINATION:  CT CHEST ABDOMEN PELVIS WITHOUT CONTRAST(XPD)    CLINICAL HISTORY:  Nausea/vomiting; Nausea with vomiting, unspecified    TECHNIQUE:  Low dose axial images, sagittal and coronal reformations were obtained from the thoracic inlet to the pubic symphysis .   Oral contrast was not administered.    COMPARISON:  01/30/2019    FINDINGS:  Several right lung pulmonary nodules with reference 3 mm in the upper lobe series 4, image 141 and reference in the left lower lobe 5 mm series 4, image 341.  Centrilobular emphysema.  Minimal scarring in both lung bases.  No filling defect in the central airways.  No pleural effusion or pneumothorax. Heart size normal.  Small volume pericardial fluid.  Coronary artery disease.  No mediastinal adenopathy.  Decompressed gallbladder.    Prior splenic granulomatous disease.  Mild pancreas atrophy.  1.3 cm left adrenal nodule Hounsfield unit 2.  Remaining solid abdominal organs are grossly unremarkable on this noncontrast exam.    There is no enteric contrast which limits bowel assessment.  No dilated bowel loops.  Normal appendix.  Mild degree of stool in the right colon.    Atherosclerosis.  Prostate size within normal limits.  Unremarkable urinary bladder.  Bilateral fat containing inguinal hernias.    There is a superior endplate T6 compression fracture with 20% height loss.  Degenerative disc disease mild at L4-5 and moderate at L5-S1.  Mild degenerative change of both hip joints.  Chondrocalcinosis.                               X-Ray Chest AP Portable (Final result)  Result time 09/02/20 11:33:45    Final result by Ras Llanos MD (09/02/20 11:33:45)                 Impression:      No acute cardiopulmonary abnormality.  COPD.      Electronically signed by: Ras Llanos  Date:    09/02/2020  Time:    11:33             Narrative:    EXAMINATION:  XR CHEST AP PORTABLE    CLINICAL HISTORY:  Weakness    TECHNIQUE:  Single frontal view of the chest was performed.    COMPARISON:  08/18/2016    FINDINGS:  Increased lucency in the lung apices.  No airspace disease.Normal cardiomediastinal silhouette.Normal pulmonary vascular distribution.No pleural effusion or pneumothorax.No acute osseous abnormality.                                  Medical Decision Making:   History:   Old Medical Records: I decided to obtain old medical records.  Independently Interpreted Test(s):   I have ordered and independently interpreted EKG Reading(s) - see prior notes  Clinical Tests:   Lab Tests: Ordered and Reviewed  Radiological Study: Ordered and Reviewed  Medical Tests: Ordered and Reviewed  ED Management:  75-year-old male presents with a 2 week history of anorexia, weight loss with associated nausea vomiting.  He has known hyperparathyroidism and is found to have a calcium of 11.8.  Hypercalcemia is the most likely etiology; however, he will be treated empirically for gastritis.  CT of the chest and abdomen demonstrates stable findings in the chest with no acute abdominal findings except for an adenoma.  He is referred to surgery for consideration of a parathyroidectomy.  He is also encouraged to follow up with primary care.       APC / Resident Notes:   I, Dr. Gene Ortiz III, personally performed the services described in this documentation. All medical record entries made by the scribe were at my direction and in my presence.  I have reviewed the chart and agree that the record reflects my personal performance and is accurate and complete       Scribe Attestation:   Scribe #1: I performed the above scribed service and the documentation accurately describes the services I performed. I attest to the accuracy of the note.                          Clinical Impression:       ICD-10-CM ICD-9-CM   1. Weakness  R53.1 780.79   2. Weakness  R53.1 780.79                                Gene Ortiz III, MD  09/02/20 [id number]

## 2020-09-05 ENCOUNTER — NURSE TRIAGE (OUTPATIENT)
Dept: ADMINISTRATIVE | Facility: CLINIC | Age: 75
End: 2020-09-05

## 2020-09-05 NOTE — TELEPHONE ENCOUNTER
Reason for Disposition   [1] Caller requesting NON-URGENT health information AND [2] PCP's office is the best resource    Additional Information   Negative: [1] Caller is not with the adult (patient) AND [2] reporting urgent symptoms   Negative: Lab result questions   Negative: Medication questions   Negative: Caller can't be reached by phone   Negative: Caller has already spoken to PCP or another triager   Negative: RN needs further essential information from caller in order to complete triage   Negative: Requesting regular office appointment    Protocols used: INFORMATION ONLY CALL - NO TRIAGE-A-    Patient called he was seen in the ED on 9/2 for weakness, vomiting and weight loss.  He was told that for his problem there is either medication treatment or surgery. Patient feels the medication he has been taking since 8/30 is not working and wishes to start the process for the surgery.  Please call him on Tuesday.

## 2020-09-08 ENCOUNTER — OFFICE VISIT (OUTPATIENT)
Dept: SURGERY | Facility: CLINIC | Age: 75
End: 2020-09-08
Payer: MEDICARE

## 2020-09-08 ENCOUNTER — PATIENT OUTREACH (OUTPATIENT)
Dept: ADMINISTRATIVE | Facility: OTHER | Age: 75
End: 2020-09-08

## 2020-09-08 ENCOUNTER — TELEPHONE (OUTPATIENT)
Dept: UROLOGY | Facility: CLINIC | Age: 75
End: 2020-09-08

## 2020-09-08 VITALS
BODY MASS INDEX: 21.62 KG/M2 | DIASTOLIC BLOOD PRESSURE: 65 MMHG | HEART RATE: 77 BPM | WEIGHT: 142.19 LBS | SYSTOLIC BLOOD PRESSURE: 142 MMHG

## 2020-09-08 DIAGNOSIS — E21.5 PARATHYROID DISEASE: Primary | ICD-10-CM

## 2020-09-08 PROCEDURE — 99499 RISK ADDL DX/OHS AUDIT: ICD-10-PCS | Mod: S$GLB,,, | Performed by: SURGERY

## 2020-09-08 PROCEDURE — 99203 PR OFFICE/OUTPT VISIT, NEW, LEVL III, 30-44 MIN: ICD-10-PCS | Mod: S$GLB,,, | Performed by: SURGERY

## 2020-09-08 PROCEDURE — 3008F PR BODY MASS INDEX (BMI) DOCUMENTED: ICD-10-PCS | Mod: CPTII,S$GLB,, | Performed by: SURGERY

## 2020-09-08 PROCEDURE — 1101F PT FALLS ASSESS-DOCD LE1/YR: CPT | Mod: CPTII,S$GLB,, | Performed by: SURGERY

## 2020-09-08 PROCEDURE — 1101F PR PT FALLS ASSESS DOC 0-1 FALLS W/OUT INJ PAST YR: ICD-10-PCS | Mod: CPTII,S$GLB,, | Performed by: SURGERY

## 2020-09-08 PROCEDURE — 99999 PR PBB SHADOW E&M-EST. PATIENT-LVL IV: CPT | Mod: PBBFAC,,, | Performed by: SURGERY

## 2020-09-08 PROCEDURE — 1159F PR MEDICATION LIST DOCUMENTED IN MEDICAL RECORD: ICD-10-PCS | Mod: S$GLB,,, | Performed by: SURGERY

## 2020-09-08 PROCEDURE — 3078F PR MOST RECENT DIASTOLIC BLOOD PRESSURE < 80 MM HG: ICD-10-PCS | Mod: CPTII,S$GLB,, | Performed by: SURGERY

## 2020-09-08 PROCEDURE — 1126F PR PAIN SEVERITY QUANTIFIED, NO PAIN PRESENT: ICD-10-PCS | Mod: S$GLB,,, | Performed by: SURGERY

## 2020-09-08 PROCEDURE — 99999 PR PBB SHADOW E&M-EST. PATIENT-LVL IV: ICD-10-PCS | Mod: PBBFAC,,, | Performed by: SURGERY

## 2020-09-08 PROCEDURE — 3078F DIAST BP <80 MM HG: CPT | Mod: CPTII,S$GLB,, | Performed by: SURGERY

## 2020-09-08 PROCEDURE — 3077F SYST BP >= 140 MM HG: CPT | Mod: CPTII,S$GLB,, | Performed by: SURGERY

## 2020-09-08 PROCEDURE — 3077F PR MOST RECENT SYSTOLIC BLOOD PRESSURE >= 140 MM HG: ICD-10-PCS | Mod: CPTII,S$GLB,, | Performed by: SURGERY

## 2020-09-08 PROCEDURE — 99203 OFFICE O/P NEW LOW 30 MIN: CPT | Mod: S$GLB,,, | Performed by: SURGERY

## 2020-09-08 PROCEDURE — 1126F AMNT PAIN NOTED NONE PRSNT: CPT | Mod: S$GLB,,, | Performed by: SURGERY

## 2020-09-08 PROCEDURE — 99499 UNLISTED E&M SERVICE: CPT | Mod: S$GLB,,, | Performed by: SURGERY

## 2020-09-08 PROCEDURE — 1159F MED LIST DOCD IN RCRD: CPT | Mod: S$GLB,,, | Performed by: SURGERY

## 2020-09-08 PROCEDURE — 3008F BODY MASS INDEX DOCD: CPT | Mod: CPTII,S$GLB,, | Performed by: SURGERY

## 2020-09-08 NOTE — TELEPHONE ENCOUNTER
----- Message from Rosa Seaman RN sent at 9/5/2020  1:08 PM CDT -----  Regarding: New appointment slip  Patient called regarding surgery for another doctor but he had a question about his appt for Lupron and mentioned the date of 9/25.  I informed the patient,that appt was cancelled and rescheduled for 10/2.  Please call patient confirming the correct date for him and he would like a new appt slipped mailed out to him.     Thank you,  Rosa

## 2020-09-08 NOTE — PROGRESS NOTES
Chart was reviewed for overdue Proactive [facility] Encounters ([facility])  topics  Updates were requested from care everywhere  Health Maintenance was unable to be updated  LINKS immunization registry triggered

## 2020-09-08 NOTE — TELEPHONE ENCOUNTER
Mailed appt slip.  Called to confirm appt with Dr. Pina for OV and Lupron injection.  Patient reports that he is having trouble eating.  Has diarrhea and nausea.  Was seen in ED 9/2 and was referred to surgery for parathyroid.  Has not been scheduled yet.  Asking me to get him scheduled soon.    First available appt is today.   Scheduled.

## 2020-09-16 ENCOUNTER — TELEPHONE (OUTPATIENT)
Dept: SURGERY | Facility: CLINIC | Age: 75
End: 2020-09-16

## 2020-09-16 NOTE — TELEPHONE ENCOUNTER
----- Message from Jon Rendon sent at 9/16/2020  9:55 AM CDT -----  Regarding: Procedure Appt  Contact: patient  Patient called in and stated he would like a call back to see about scheduling his procedure with Dr. Law.    Patient call back number is [phone]

## 2020-09-16 NOTE — TELEPHONE ENCOUNTER
Advised pt that referral was placed to ent and transferred pt to scheduling desk to schedule appointment

## 2020-09-17 ENCOUNTER — OFFICE VISIT (OUTPATIENT)
Dept: OTOLARYNGOLOGY | Facility: CLINIC | Age: 75
End: 2020-09-17
Payer: MEDICARE

## 2020-09-17 VITALS
DIASTOLIC BLOOD PRESSURE: 76 MMHG | OXYGEN SATURATION: 95 % | WEIGHT: 140 LBS | HEART RATE: 108 BPM | SYSTOLIC BLOOD PRESSURE: 145 MMHG | BODY MASS INDEX: 21.22 KG/M2 | HEIGHT: 68 IN

## 2020-09-17 DIAGNOSIS — E04.2 MULTIPLE THYROID NODULES: ICD-10-CM

## 2020-09-17 DIAGNOSIS — E21.3 HYPERPARATHYROIDISM: Primary | ICD-10-CM

## 2020-09-17 DIAGNOSIS — R63.4 UNINTENTIONAL WEIGHT LOSS: ICD-10-CM

## 2020-09-17 PROCEDURE — 3078F PR MOST RECENT DIASTOLIC BLOOD PRESSURE < 80 MM HG: ICD-10-PCS | Mod: CPTII,S$GLB,, | Performed by: OTOLARYNGOLOGY

## 2020-09-17 PROCEDURE — 1159F MED LIST DOCD IN RCRD: CPT | Mod: S$GLB,,, | Performed by: OTOLARYNGOLOGY

## 2020-09-17 PROCEDURE — 3008F BODY MASS INDEX DOCD: CPT | Mod: CPTII,S$GLB,, | Performed by: OTOLARYNGOLOGY

## 2020-09-17 PROCEDURE — 1100F PTFALLS ASSESS-DOCD GE2>/YR: CPT | Mod: CPTII,S$GLB,, | Performed by: OTOLARYNGOLOGY

## 2020-09-17 PROCEDURE — 3008F PR BODY MASS INDEX (BMI) DOCUMENTED: ICD-10-PCS | Mod: CPTII,S$GLB,, | Performed by: OTOLARYNGOLOGY

## 2020-09-17 PROCEDURE — 3078F DIAST BP <80 MM HG: CPT | Mod: CPTII,S$GLB,, | Performed by: OTOLARYNGOLOGY

## 2020-09-17 PROCEDURE — 99204 PR OFFICE/OUTPT VISIT, NEW, LEVL IV, 45-59 MIN: ICD-10-PCS | Mod: S$GLB,,, | Performed by: OTOLARYNGOLOGY

## 2020-09-17 PROCEDURE — 3288F PR FALLS RISK ASSESSMENT DOCUMENTED: ICD-10-PCS | Mod: CPTII,S$GLB,, | Performed by: OTOLARYNGOLOGY

## 2020-09-17 PROCEDURE — 3288F FALL RISK ASSESSMENT DOCD: CPT | Mod: CPTII,S$GLB,, | Performed by: OTOLARYNGOLOGY

## 2020-09-17 PROCEDURE — 1100F PR PT FALLS ASSESS DOC 2+ FALLS/FALL W/INJURY/YR: ICD-10-PCS | Mod: CPTII,S$GLB,, | Performed by: OTOLARYNGOLOGY

## 2020-09-17 PROCEDURE — 1159F PR MEDICATION LIST DOCUMENTED IN MEDICAL RECORD: ICD-10-PCS | Mod: S$GLB,,, | Performed by: OTOLARYNGOLOGY

## 2020-09-17 PROCEDURE — 99204 OFFICE O/P NEW MOD 45 MIN: CPT | Mod: S$GLB,,, | Performed by: OTOLARYNGOLOGY

## 2020-09-17 PROCEDURE — 3077F SYST BP >= 140 MM HG: CPT | Mod: CPTII,S$GLB,, | Performed by: OTOLARYNGOLOGY

## 2020-09-17 PROCEDURE — 3077F PR MOST RECENT SYSTOLIC BLOOD PRESSURE >= 140 MM HG: ICD-10-PCS | Mod: CPTII,S$GLB,, | Performed by: OTOLARYNGOLOGY

## 2020-09-17 PROCEDURE — 1125F PR PAIN SEVERITY QUANTIFIED, PAIN PRESENT: ICD-10-PCS | Mod: S$GLB,,, | Performed by: OTOLARYNGOLOGY

## 2020-09-17 PROCEDURE — 1125F AMNT PAIN NOTED PAIN PRSNT: CPT | Mod: S$GLB,,, | Performed by: OTOLARYNGOLOGY

## 2020-09-17 NOTE — LETTER
September 17, 2020      Carlos Law MD  [address] [facility]  [address]  [place] [address]           [place] [facility]  [address] [facility], [address]  [place] [address]  Phone: [phone]  Fax: [phone]          Patient: Rajendra Castle   MR Number: [id number]   YOB: 1945   Date of Visit: 9/17/2020       Dear Dr. Carlos Law:    Thank you for referring Rajendra Castle to me for evaluation. Attached you will find relevant portions of my assessment and plan of care.    If you have questions, please do not hesitate to call me. I look forward to following Rajendra Castle along with you.    Sincerely,    Latonia Clayton MD    Enclosure  CC:  No Recipients    If you would like to receive this communication electronically, please contact [email or web address] or [phone] to request more information on [employer] Link access.    For providers and/or their staff who would like to refer a patient to [facility], please contact us through our one-stop-shop provider referral line, [facility], at [phone].    If you feel you have received this communication in error or would no longer like to receive these types of communications, please e-mail [email or web address]

## 2020-09-17 NOTE — PROGRESS NOTES
Subjective:       Patient ID: Rajendra Castle is a 75 y.o. male.    Chief Complaint: Establish Care (parathyroid disease)    HPI   Rajendra Castle is a 74 yo man presenting for evaluation of hyperparathyroidism and hypercalcemia. Most recent labs (9/2): .7, calcium 11.8. He has had hyperparathyroidism for at least 5 years. He had elected to treat this medically with sensipar until now. He has osteoporosis based on bone scan done 7/2018. Was on fosamax. He complains of chronic fatigue. He also has a history of multiple thyroid nodules. His last US of the thyroid was 10/2019. A right thyroid nodule had grown and was FNA'd in 2/2020 - FNA was non-diagnostic due to inadequate specimen. Unfortunately the patient has not followed up with his endocrinologist, Dr. Hoffman, in almost 1 year.  The patient also reports a 20 lb weight loss in the last 6 weeks. He reports that he does not have an appetite, but is trying to force himself to eat. Denies dysphagia  He has a history of prostate cancer, chronic kidney disease, HTN and RA - he is on methotrexate and prednisone for this.    Review of Systems   Constitutional: Positive for activity change, appetite change and unexpected weight change. Negative for fatigue and fever.   HENT: Negative for nasal congestion, dental problem, ear discharge, ear pain, facial swelling, hearing loss, nosebleeds, postnasal drip, rhinorrhea, sinus pressure/congestion, sneezing, sore throat, trouble swallowing and voice change.    Eyes: Negative for photophobia, pain and itching.   Respiratory: Negative for apnea, cough, shortness of breath, wheezing and stridor.    Cardiovascular: Negative for chest pain and palpitations.   Gastrointestinal: Negative for abdominal pain, diarrhea, nausea and vomiting.   Endocrine: Negative for cold intolerance and heat intolerance.   Genitourinary: Negative for bladder incontinence and dysuria.   Musculoskeletal: Negative for arthralgias, myalgias, neck pain and  neck stiffness.   Integumentary:  Negative for pallor, rash and mole/lesion.   Allergic/Immunologic: Negative for food allergies.   Neurological: Negative for dizziness, vertigo, seizures, syncope and headaches.   Psychiatric/Behavioral: Negative for behavioral problems and confusion.         Objective:      Physical Exam  Constitutional:       General: He is awake.      Appearance: Normal appearance. He is well-developed and underweight. He is ill-appearing.   HENT:      Head: Normocephalic and atraumatic.      Right Ear: Tympanic membrane, ear canal and external ear normal.      Left Ear: Tympanic membrane, ear canal and external ear normal.      Nose: Nose normal. No septal deviation.      Mouth/Throat:      Pharynx: Uvula midline.   Eyes:      General: Lids are normal. Vision grossly intact.      Conjunctiva/sclera: Conjunctivae normal.   Neck:      Musculoskeletal: Full passive range of motion without pain, normal range of motion and neck supple.      Thyroid: No thyroid mass or thyromegaly.      Trachea: Trachea normal.   Pulmonary:      Effort: Pulmonary effort is normal. No tachypnea or respiratory distress.   Lymphadenopathy:      Cervical: No cervical adenopathy.   Skin:     General: Skin is warm and dry.   Neurological:      Mental Status: He is alert and oriented to person, place, and time.   Psychiatric:         Mood and Affect: Mood and affect normal.         Assessment:       1. Hyperparathyroidism    2. Multiple thyroid nodules        Plan:       74 yo man presenting with hyperparathyroidism and thyroid nodules.    -sestamibi parathyroid scan to localize if adenoma  -repeat thyroid ultrasound - may need repeat FNA  -recommend follow up with Endocrinologist Dr. Hoffman  -recommend follow up with PCP in regards to sudden weight loss

## 2020-09-17 NOTE — Clinical Note
Hello, this patient came to see me today in regards to his hyperparathyroidism. I noticed that he hasn't followed up with endocrinology in almost 1 year - I've ordered a US of his thyroid to follow up the nodules as well as a sestamibi scan. I recommended that he follow up with you, Dr. Hoffman, sooner rather than later (before we plan for surgery).  Also, he reports a 20 lb weight loss in the last 6 weeks. I verified this via his chart - he weight 164 lb June 1, and today was 139. This is definitely concerning to me especially if we are considering surgical intervention. For this I recommended he follow up with Dr. Aceves, PCP. He has follow up scheduled with me in 3 weeks.

## 2020-09-18 ENCOUNTER — TELEPHONE (OUTPATIENT)
Dept: ENDOCRINOLOGY | Facility: CLINIC | Age: 75
End: 2020-09-18

## 2020-09-18 NOTE — PROGRESS NOTES
Subjective:       Patient ID: Rajendra Castle is a 75 y.o. male.    Chief Complaint: No chief complaint on file.      HPI 75-year-old male presents with a complaint of hyperparathyroidism.  He has been followed by Endocrine in the past.  This was managed medically.  He has an elevated PTH and calcium level.  He has bilateral thyroid nodules.  FNA was performed on the right thyroid nodule which was unsatisfactory.    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    RA (rheumatoid arthritis)     Vitamin D insufficiency 9/30/2016     Past Surgical History:   Procedure Laterality Date    CYSTOSCOPY N/A 12/18/2018    Procedure: CYSTOSCOPY;  Surgeon: Garrett Pina MD;  Location: [facility] OR;  Service: Urology;  Laterality: N/A;    TRANSRECTAL BIOPSY OF PROSTATE WITH ULTRASOUND GUIDANCE N/A 12/18/2018    Procedure: BIOPSY, PROSTATE, RECTAL APPROACH, WITH US GUIDANCE;  Surgeon: Garrett Pina MD;  Location: [facility] OR;  Service: Urology;  Laterality: N/A;         Current Outpatient Medications:     alendronate (FOSAMAX) 70 MG tablet, Take 1 tablet (70 mg total) by mouth every 7 days. Take with a full glass of water & remain upright for at least 30 minutes, Disp: 12 tablet, Rfl: 3    alfuzosin (UROXATRAL) 10 mg Tb24, TAKE 1 TABLET(10 MG) BY MOUTH AFTER DINNER, Disp: 30 tablet, Rfl: 6    amLODIPine (NORVASC) 2.5 MG tablet, TAKE 1 TABLET(2.5 MG) BY MOUTH EVERY DAY, Disp: 90 tablet, Rfl: 3    aspirin (ECOTRIN) 81 MG EC tablet, Take 81 mg by mouth once daily.  , Disp: , Rfl:     carvedilol (COREG) 25 MG tablet, TAKE 1 TABLET(25 MG) BY MOUTH TWICE DAILY WITH MEALS, Disp: 180 tablet, Rfl: 0    cinacalcet (SENSIPAR) 30 MG Tab, Take 1 tablet (30 mg total) by mouth 2 (two) times daily with meals., Disp: 60 tablet, Rfl: 11    escitalopram oxalate (LEXAPRO) 10 MG  tablet, Take 10 mg by mouth once daily., Disp: , Rfl:     folic acid (FOLVITE) 1 MG tablet, TAKE 1 TABLET(1 MG) BY MOUTH EVERY DAY, Disp: 90 tablet, Rfl: 3    methotrexate 2.5 MG Tab, TAKE 6 TABLETS BY MOUTH EVERY WEEK, Disp: 72 tablet, Rfl: 1    ondansetron (ZOFRAN) 8 MG tablet, Take 1 tablet (8 mg total) by mouth every 12 (twelve) hours as needed for Nausea., Disp: 8 tablet, Rfl: 1    pantoprazole (PROTONIX) 40 MG tablet, Take 1 tablet (40 mg total) by mouth once daily., Disp: 30 tablet, Rfl: 3    predniSONE (DELTASONE) 2.5 MG tablet, Take 1 tablet (2.5 mg total) by mouth 2 (two) times daily., Disp: 180 tablet, Rfl: 1    traMADoL (ULTRAM) 50 mg tablet, TAKE 2 TABLETS BY MOUTH TWICE DAILY AS NEEDED FOR PAIN, Disp: 120 tablet, Rfl: 5    Current Facility-Administered Medications:     [START ON 2020] leuprolide (6 month) injection 45 mg, 45 mg, Intramuscular, 1 time in Clinic/HOD, Garrett Pina MD    Review of patient's allergies indicates:  No Known Allergies    Family History   Problem Relation Age of Onset    Heart disease Mother     Stroke Father     Drug abuse Sister     Diabetes Maternal Uncle      Social History     Socioeconomic History    Marital status: Single     Spouse name: Not on file    Number of children: Not on file    Years of education: Not on file    Highest education level: Not on file   Occupational History    Not on file   Social Needs    Financial resource strain: Not on file    Food insecurity     Worry: Not on file     Inability: Not on file    Transportation needs     Medical: Not on file     Non-medical: Not on file   Tobacco Use    Smoking status: Former Smoker     Packs/day: 0.25     Years: 10.00     Pack years: 2.50     Quit date: 2001     Years since quittin.1    Smokeless tobacco: Never Used   Substance and Sexual Activity    Alcohol use: Yes     Comment: seldom    Drug use: Yes     Frequency: 8.0 times per week     Types: Marijuana    Sexual  activity: Yes     Partners: Female   Lifestyle    Physical activity     Days per week: Not on file     Minutes per session: Not on file    Stress: Not on file   Relationships    Social connections     Talks on phone: Not on file     Gets together: Not on file     Attends Christian service: Not on file     Active member of club or organization: Not on file     Attends meetings of clubs or organizations: Not on file     Relationship status: Not on file   Other Topics Concern    Not on file   Social History Narrative    Not on file       Review of Systems   Constitutional: Positive for unexpected weight change.   Respiratory: Negative.    Cardiovascular: Negative.    Gastrointestinal: Negative.      Objective:     Physical Exam  HENT:      Head:      Comments: No palpable dominant masses.  Cardiovascular:      Rate and Rhythm: Normal rate and regular rhythm.   Pulmonary:      Effort: Pulmonary effort is normal.      Breath sounds: Normal breath sounds.       Assessment:     Encounter Diagnosis   Name Primary?    Parathyroid disease Yes       Plan:      1.  Hyperparathyroidism.  2.  Referred to ENT for evaluation.

## 2020-09-18 NOTE — TELEPHONE ENCOUNTER
----- Message from Lauro Becker MA sent at 9/18/2020  9:38 AM CDT -----  Regarding: Appointment  This patient needs a appointment with Dr. Simon.

## 2020-09-21 ENCOUNTER — HOSPITAL ENCOUNTER (OUTPATIENT)
Dept: RADIOLOGY | Facility: HOSPITAL | Age: 75
Discharge: HOME | End: 2020-09-21
Attending: OTOLARYNGOLOGY
Payer: MEDICARE

## 2020-09-21 DIAGNOSIS — E21.3 HYPERPARATHYROIDISM: ICD-10-CM

## 2020-09-21 PROCEDURE — 78071 PARATHYRD PLANAR W/WO SUBTRJ: CPT | Mod: 26,,, | Performed by: RADIOLOGY

## 2020-09-21 PROCEDURE — 78071 NM PARATHYROID SCAN WITH SPECT ROUTINE: ICD-10-PCS | Mod: 26,,, | Performed by: RADIOLOGY

## 2020-09-21 PROCEDURE — A9500 TC99M SESTAMIBI: HCPCS

## 2020-09-23 ENCOUNTER — HOSPITAL ENCOUNTER (OUTPATIENT)
Dept: RADIOLOGY | Facility: HOSPITAL | Age: 75
Discharge: HOME | End: 2020-09-23
Attending: OTOLARYNGOLOGY
Payer: MEDICARE

## 2020-09-23 DIAGNOSIS — E04.2 MULTIPLE THYROID NODULES: ICD-10-CM

## 2020-09-23 PROCEDURE — 76536 US EXAM OF HEAD AND NECK: CPT | Mod: TC,PO

## 2020-09-28 ENCOUNTER — HOSPITAL ENCOUNTER (OUTPATIENT)
Facility: HOSPITAL | Age: 75
Discharge: HOME | End: 2020-09-30
Attending: EMERGENCY MEDICINE | Admitting: HOSPITALIST
Payer: MEDICARE

## 2020-09-28 ENCOUNTER — NURSE TRIAGE (OUTPATIENT)
Dept: ADMINISTRATIVE | Facility: CLINIC | Age: 75
End: 2020-09-28

## 2020-09-28 DIAGNOSIS — I25.10 ASCVD (ARTERIOSCLEROTIC CARDIOVASCULAR DISEASE): ICD-10-CM

## 2020-09-28 DIAGNOSIS — R79.89 ELEVATED TROPONIN: ICD-10-CM

## 2020-09-28 DIAGNOSIS — R07.9 CHEST PAIN: ICD-10-CM

## 2020-09-28 DIAGNOSIS — R13.19 ESOPHAGEAL DYSPHAGIA: Primary | ICD-10-CM

## 2020-09-28 DIAGNOSIS — K22.2 ESOPHAGEAL STRICTURE: Primary | ICD-10-CM

## 2020-09-28 DIAGNOSIS — E83.52 HYPERCALCEMIA: ICD-10-CM

## 2020-09-28 DIAGNOSIS — R07.9 CHEST PAIN IN ADULT: ICD-10-CM

## 2020-09-28 LAB
ALBUMIN SERPL BCP-MCNC: 3.3 G/DL (ref 3.5–5.2)
ALP SERPL-CCNC: 115 U/L (ref 55–135)
ALT SERPL W/O P-5'-P-CCNC: 16 U/L (ref 10–44)
ANION GAP SERPL CALC-SCNC: 9 MMOL/L (ref 8–16)
AST SERPL-CCNC: 17 U/L (ref 10–40)
BASOPHILS # BLD AUTO: 0.01 K/UL (ref 0–0.2)
BASOPHILS NFR BLD: 0.2 % (ref 0–1.9)
BILIRUB SERPL-MCNC: 0.3 MG/DL (ref 0.1–1)
BUN SERPL-MCNC: 13 MG/DL (ref 8–23)
CALCIUM SERPL-MCNC: 11.6 MG/DL (ref 8.7–10.5)
CHLORIDE SERPL-SCNC: 112 MMOL/L (ref 95–110)
CO2 SERPL-SCNC: 20 MMOL/L (ref 23–29)
CREAT SERPL-MCNC: 1.2 MG/DL (ref 0.5–1.4)
DIFFERENTIAL METHOD: ABNORMAL
EOSINOPHIL # BLD AUTO: 0 K/UL (ref 0–0.5)
EOSINOPHIL NFR BLD: 0.5 % (ref 0–8)
ERYTHROCYTE [DISTWIDTH] IN BLOOD BY AUTOMATED COUNT: 14.9 % (ref 11.5–14.5)
EST. GFR  (AFRICAN AMERICAN): >60 ML/MIN/1.73 M^2
EST. GFR  (NON AFRICAN AMERICAN): 60 ML/MIN/1.73 M^2
GLUCOSE SERPL-MCNC: 96 MG/DL (ref 70–110)
HCT VFR BLD AUTO: 37.4 % (ref 40–54)
HGB BLD-MCNC: 11.5 G/DL (ref 14–18)
IMM GRANULOCYTES # BLD AUTO: 0.03 K/UL (ref 0–0.04)
IMM GRANULOCYTES NFR BLD AUTO: 0.5 % (ref 0–0.5)
LYMPHOCYTES # BLD AUTO: 1 K/UL (ref 1–4.8)
LYMPHOCYTES NFR BLD: 17.6 % (ref 18–48)
MCH RBC QN AUTO: 27.9 PG (ref 27–31)
MCHC RBC AUTO-ENTMCNC: 30.7 G/DL (ref 32–36)
MCV RBC AUTO: 91 FL (ref 82–98)
MONOCYTES # BLD AUTO: 0.8 K/UL (ref 0.3–1)
MONOCYTES NFR BLD: 13.6 % (ref 4–15)
NEUTROPHILS # BLD AUTO: 3.7 K/UL (ref 1.8–7.7)
NEUTROPHILS NFR BLD: 67.6 % (ref 38–73)
NRBC BLD-RTO: 0 /100 WBC
PLATELET # BLD AUTO: 271 K/UL (ref 150–350)
PMV BLD AUTO: 10.7 FL (ref 9.2–12.9)
POTASSIUM SERPL-SCNC: 4.5 MMOL/L (ref 3.5–5.1)
PROT SERPL-MCNC: 6.8 G/DL (ref 6–8.4)
RBC # BLD AUTO: 4.12 M/UL (ref 4.6–6.2)
SARS-COV-2 RDRP RESP QL NAA+PROBE: NEGATIVE
SODIUM SERPL-SCNC: 141 MMOL/L (ref 136–145)
TROPONIN I SERPL DL<=0.01 NG/ML-MCNC: 0.18 NG/ML (ref 0–0.03)
WBC # BLD AUTO: 5.51 K/UL (ref 3.9–12.7)

## 2020-09-28 PROCEDURE — 93010 EKG 12-LEAD: ICD-10-PCS | Mod: ,,, | Performed by: SPECIALIST

## 2020-09-28 PROCEDURE — 25000003 PHARM REV CODE 250: Performed by: EMERGENCY MEDICINE

## 2020-09-28 PROCEDURE — 93005 ELECTROCARDIOGRAM TRACING: CPT

## 2020-09-28 PROCEDURE — 85025 COMPLETE CBC W/AUTO DIFF WBC: CPT

## 2020-09-28 PROCEDURE — 36415 COLL VENOUS BLD VENIPUNCTURE: CPT

## 2020-09-28 PROCEDURE — 93010 ELECTROCARDIOGRAM REPORT: CPT | Mod: ,,, | Performed by: SPECIALIST

## 2020-09-28 PROCEDURE — 99285 EMERGENCY DEPT VISIT HI MDM: CPT | Mod: 25

## 2020-09-28 PROCEDURE — 84484 ASSAY OF TROPONIN QUANT: CPT

## 2020-09-28 PROCEDURE — G0378 HOSPITAL OBSERVATION PER HR: HCPCS

## 2020-09-28 PROCEDURE — U0002 COVID-19 LAB TEST NON-CDC: HCPCS

## 2020-09-28 PROCEDURE — 80053 COMPREHEN METABOLIC PANEL: CPT

## 2020-09-28 RX ORDER — ASPIRIN 325 MG
325 TABLET ORAL
Status: COMPLETED | OUTPATIENT
Start: 2020-09-28 | End: 2020-09-28

## 2020-09-28 RX ADMIN — ASPIRIN 325 MG ORAL TABLET 325 MG: 325 PILL ORAL at 11:09

## 2020-09-28 NOTE — TELEPHONE ENCOUNTER
Patient w/ dysphagia since at least 2 weeks. Severe weight loss and solid dysphagia and poor energy,and fatigue. He needs endoscopy ASAP and dilation. He has a high risk of aspiration.Mr Castle has chronic starvation.  This has been fully explained to the patient, who indicates understanding.

## 2020-09-28 NOTE — TELEPHONE ENCOUNTER
Pt stated he has not been able to eat for a month. Stated he has sob, and chest pain. Pt advised to call 911 for EMS. Pt stated he is able to call and will call now.  Reason for Disposition   Chest pain   Nursing judgment, per information in Reference    Additional Information   Negative: [1] Severe difficulty swallowing (e.g., drooling or spitting) AND [2] started suddenly after taking a medicine or allergic food   Negative: Wheezing, stridor, hoarseness, or difficulty breathing   Negative: [1] Swollen tongue AND [2] sudden onset   Negative: Sounds like a life-threatening emergency to the triager   Negative: [1] Breathing stopped AND [2] hasn't returned   Negative: Choking on something   Negative: Severe difficulty breathing (e.g., struggling for each breath, speaks in single words)   Negative: Bluish (or gray) lips or face now   Negative: Difficult to awaken or acting confused (e.g., disoriented, slurred speech)   Negative: Passed out (i.e., lost consciousness, collapsed and was not responding)   Negative: Wheezing started suddenly after medicine, an allergic food or bee sting   Negative: Stridor   Negative: Slow, shallow and weak breathing   Negative: Sounds like a life-threatening emergency to the triager   Negative: Severe difficulty breathing (e.g., struggling for each breath, speaks in single words)   Negative: Difficult to awaken or acting confused (e.g., disoriented, slurred speech)    Protocols used: BREATHING DIFFICULTY-A-AH, ST NO GUIDELINE AVAILABLE - SICK ADULT CALL-A-AH, SWALLOWING DIFFICULTY-A-AH, CHEST PAIN-A-AH

## 2020-09-28 NOTE — TELEPHONE ENCOUNTER
Per , patient hung up. Pt called c/o vomiting, inability to swallow, frequent falls and weakness. Attempted to call patient x2. Voice message left.    Reason for Disposition   No answer.  First attempt to contact caller.  Follow-up call scheduled within 15 minutes.   Message left on unidentified voice mail. Phone number verified.    Protocols used: NO CONTACT OR DUPLICATE CONTACT CALL-A-OH

## 2020-09-29 ENCOUNTER — ANESTHESIA EVENT (OUTPATIENT)
Dept: ENDOSCOPY | Facility: HOSPITAL | Age: 75
End: 2020-09-29
Payer: MEDICARE

## 2020-09-29 ENCOUNTER — ANESTHESIA (OUTPATIENT)
Dept: ENDOSCOPY | Facility: HOSPITAL | Age: 75
End: 2020-09-29
Payer: MEDICARE

## 2020-09-29 PROBLEM — E44.0 MALNUTRITION OF MODERATE DEGREE: Status: ACTIVE | Noted: 2020-09-29

## 2020-09-29 PROBLEM — E43 SEVERE MALNUTRITION: Status: ACTIVE | Noted: 2020-09-29

## 2020-09-29 PROBLEM — R13.10 DYSPHAGIA: Status: ACTIVE | Noted: 2020-09-29

## 2020-09-29 LAB
ANION GAP SERPL CALC-SCNC: 10 MMOL/L (ref 8–16)
BASOPHILS # BLD AUTO: 0.01 K/UL (ref 0–0.2)
BASOPHILS NFR BLD: 0.2 % (ref 0–1.9)
BILIRUB UR QL STRIP: NEGATIVE
BUN SERPL-MCNC: 11 MG/DL (ref 8–23)
CALCIUM SERPL-MCNC: 11 MG/DL (ref 8.7–10.5)
CHLORIDE SERPL-SCNC: 112 MMOL/L (ref 95–110)
CLARITY UR: CLEAR
CO2 SERPL-SCNC: 18 MMOL/L (ref 23–29)
COLOR UR: YELLOW
CREAT SERPL-MCNC: 1.1 MG/DL (ref 0.5–1.4)
DIFFERENTIAL METHOD: ABNORMAL
EOSINOPHIL # BLD AUTO: 0.1 K/UL (ref 0–0.5)
EOSINOPHIL NFR BLD: 0.9 % (ref 0–8)
ERYTHROCYTE [DISTWIDTH] IN BLOOD BY AUTOMATED COUNT: 15 % (ref 11.5–14.5)
EST. GFR  (AFRICAN AMERICAN): >60 ML/MIN/1.73 M^2
EST. GFR  (NON AFRICAN AMERICAN): >60 ML/MIN/1.73 M^2
FERRITIN SERPL-MCNC: 184 NG/ML (ref 20–300)
FOLATE SERPL-MCNC: 15.8 NG/ML (ref 4–24)
GLUCOSE SERPL-MCNC: 84 MG/DL (ref 70–110)
GLUCOSE UR QL STRIP: NEGATIVE
HCT VFR BLD AUTO: 35.5 % (ref 40–54)
HGB BLD-MCNC: 11 G/DL (ref 14–18)
HGB UR QL STRIP: NEGATIVE
IMM GRANULOCYTES # BLD AUTO: 0.04 K/UL (ref 0–0.04)
IMM GRANULOCYTES NFR BLD AUTO: 0.8 % (ref 0–0.5)
KETONES UR QL STRIP: NEGATIVE
LEUKOCYTE ESTERASE UR QL STRIP: NEGATIVE
LYMPHOCYTES # BLD AUTO: 0.9 K/UL (ref 1–4.8)
LYMPHOCYTES NFR BLD: 17.6 % (ref 18–48)
MAGNESIUM SERPL-MCNC: 2.3 MG/DL (ref 1.6–2.6)
MCH RBC QN AUTO: 27.9 PG (ref 27–31)
MCHC RBC AUTO-ENTMCNC: 31 G/DL (ref 32–36)
MCV RBC AUTO: 90 FL (ref 82–98)
MONOCYTES # BLD AUTO: 0.8 K/UL (ref 0.3–1)
MONOCYTES NFR BLD: 14.8 % (ref 4–15)
NEUTROPHILS # BLD AUTO: 3.5 K/UL (ref 1.8–7.7)
NEUTROPHILS NFR BLD: 65.7 % (ref 38–73)
NITRITE UR QL STRIP: NEGATIVE
NRBC BLD-RTO: 0 /100 WBC
PH UR STRIP: 6 [PH] (ref 5–8)
PHOSPHATE SERPL-MCNC: 2 MG/DL (ref 2.7–4.5)
PLATELET # BLD AUTO: 267 K/UL (ref 150–350)
PMV BLD AUTO: 11.2 FL (ref 9.2–12.9)
POCT GLUCOSE: 89 MG/DL (ref 70–110)
POTASSIUM SERPL-SCNC: 4 MMOL/L (ref 3.5–5.1)
PROT UR QL STRIP: NEGATIVE
RBC # BLD AUTO: 3.94 M/UL (ref 4.6–6.2)
SODIUM SERPL-SCNC: 140 MMOL/L (ref 136–145)
SP GR UR STRIP: 1.02 (ref 1–1.03)
TROPONIN I SERPL DL<=0.01 NG/ML-MCNC: 0.13 NG/ML (ref 0–0.03)
TROPONIN I SERPL DL<=0.01 NG/ML-MCNC: 0.18 NG/ML (ref 0–0.03)
TSH SERPL DL<=0.005 MIU/L-ACNC: 0.69 UIU/ML (ref 0.4–4)
URN SPEC COLLECT METH UR: NORMAL
UROBILINOGEN UR STRIP-ACNC: NEGATIVE EU/DL
VIT B12 SERPL-MCNC: 295 PG/ML (ref 210–950)
WBC # BLD AUTO: 5.27 K/UL (ref 3.9–12.7)

## 2020-09-29 PROCEDURE — 36415 COLL VENOUS BLD VENIPUNCTURE: CPT

## 2020-09-29 PROCEDURE — 99900035 HC TECH TIME PER 15 MIN (STAT)

## 2020-09-29 PROCEDURE — 63600175 PHARM REV CODE 636 W HCPCS: Performed by: NURSE PRACTITIONER

## 2020-09-29 PROCEDURE — D9220A PRA ANESTHESIA: Mod: ANES,,, | Performed by: ANESTHESIOLOGY

## 2020-09-29 PROCEDURE — 99204 OFFICE O/P NEW MOD 45 MIN: CPT | Mod: 25,,, | Performed by: INTERNAL MEDICINE

## 2020-09-29 PROCEDURE — 84100 ASSAY OF PHOSPHORUS: CPT

## 2020-09-29 PROCEDURE — 82746 ASSAY OF FOLIC ACID SERUM: CPT

## 2020-09-29 PROCEDURE — 43239 PR EGD, FLEX, W/BIOPSY, SGL/MULTI: ICD-10-PCS | Mod: 59,,, | Performed by: INTERNAL MEDICINE

## 2020-09-29 PROCEDURE — 88305 TISSUE EXAM BY PATHOLOGIST: CPT | Mod: 26,,, | Performed by: PATHOLOGY

## 2020-09-29 PROCEDURE — 82607 VITAMIN B-12: CPT

## 2020-09-29 PROCEDURE — 43248 EGD GUIDE WIRE INSERTION: CPT | Mod: ,,, | Performed by: INTERNAL MEDICINE

## 2020-09-29 PROCEDURE — G0378 HOSPITAL OBSERVATION PER HR: HCPCS

## 2020-09-29 PROCEDURE — 25000003 PHARM REV CODE 250: Performed by: INTERNAL MEDICINE

## 2020-09-29 PROCEDURE — 84484 ASSAY OF TROPONIN QUANT: CPT | Mod: 91

## 2020-09-29 PROCEDURE — D9220A PRA ANESTHESIA: ICD-10-PCS | Mod: ANES,,, | Performed by: ANESTHESIOLOGY

## 2020-09-29 PROCEDURE — C1769 GUIDE WIRE: HCPCS | Performed by: INTERNAL MEDICINE

## 2020-09-29 PROCEDURE — 83540 ASSAY OF IRON: CPT

## 2020-09-29 PROCEDURE — 96374 THER/PROPH/DIAG INJ IV PUSH: CPT

## 2020-09-29 PROCEDURE — 37000008 HC ANESTHESIA 1ST 15 MINUTES: Performed by: INTERNAL MEDICINE

## 2020-09-29 PROCEDURE — 43248 PR EGD, FLEX, W/DILATION OVER GUIDEWIRE: ICD-10-PCS | Mod: ,,, | Performed by: INTERNAL MEDICINE

## 2020-09-29 PROCEDURE — 27201012 HC FORCEPS, HOT/COLD, DISP: Performed by: INTERNAL MEDICINE

## 2020-09-29 PROCEDURE — 81003 URINALYSIS AUTO W/O SCOPE: CPT

## 2020-09-29 PROCEDURE — 37000009 HC ANESTHESIA EA ADD 15 MINS: Performed by: INTERNAL MEDICINE

## 2020-09-29 PROCEDURE — 88305 TISSUE EXAM BY PATHOLOGIST: ICD-10-PCS | Mod: 26,,, | Performed by: PATHOLOGY

## 2020-09-29 PROCEDURE — 43239 EGD BIOPSY SINGLE/MULTIPLE: CPT | Mod: 59,,, | Performed by: INTERNAL MEDICINE

## 2020-09-29 PROCEDURE — 85025 COMPLETE CBC W/AUTO DIFF WBC: CPT

## 2020-09-29 PROCEDURE — D9220A PRA ANESTHESIA: ICD-10-PCS | Mod: CRNA,,, | Performed by: NURSE ANESTHETIST, CERTIFIED REGISTERED

## 2020-09-29 PROCEDURE — 82728 ASSAY OF FERRITIN: CPT

## 2020-09-29 PROCEDURE — 96372 THER/PROPH/DIAG INJ SC/IM: CPT | Mod: 59

## 2020-09-29 PROCEDURE — 25000003 PHARM REV CODE 250: Performed by: NURSE PRACTITIONER

## 2020-09-29 PROCEDURE — 97802 MEDICAL NUTRITION INDIV IN: CPT

## 2020-09-29 PROCEDURE — 43239 EGD BIOPSY SINGLE/MULTIPLE: CPT | Performed by: INTERNAL MEDICINE

## 2020-09-29 PROCEDURE — 43248 EGD GUIDE WIRE INSERTION: CPT | Performed by: INTERNAL MEDICINE

## 2020-09-29 PROCEDURE — 84443 ASSAY THYROID STIM HORMONE: CPT

## 2020-09-29 PROCEDURE — 88305 TISSUE EXAM BY PATHOLOGIST: CPT | Performed by: PATHOLOGY

## 2020-09-29 PROCEDURE — 99204 PR OFFICE/OUTPT VISIT, NEW, LEVL IV, 45-59 MIN: ICD-10-PCS | Mod: 25,,, | Performed by: INTERNAL MEDICINE

## 2020-09-29 PROCEDURE — 94761 N-INVAS EAR/PLS OXIMETRY MLT: CPT

## 2020-09-29 PROCEDURE — 63600175 PHARM REV CODE 636 W HCPCS: Performed by: NURSE ANESTHETIST, CERTIFIED REGISTERED

## 2020-09-29 PROCEDURE — 25000003 PHARM REV CODE 250: Performed by: NURSE ANESTHETIST, CERTIFIED REGISTERED

## 2020-09-29 PROCEDURE — D9220A PRA ANESTHESIA: Mod: CRNA,,, | Performed by: NURSE ANESTHETIST, CERTIFIED REGISTERED

## 2020-09-29 PROCEDURE — 80048 BASIC METABOLIC PNL TOTAL CA: CPT

## 2020-09-29 PROCEDURE — 83735 ASSAY OF MAGNESIUM: CPT

## 2020-09-29 PROCEDURE — 25000003 PHARM REV CODE 250: Performed by: SPECIALIST

## 2020-09-29 RX ORDER — ASPIRIN 81 MG/1
81 TABLET ORAL DAILY
Status: DISCONTINUED | OUTPATIENT
Start: 2020-09-29 | End: 2020-09-30 | Stop reason: HOSPADM

## 2020-09-29 RX ORDER — SODIUM,POTASSIUM PHOSPHATES 280-250MG
2 POWDER IN PACKET (EA) ORAL
Status: DISCONTINUED | OUTPATIENT
Start: 2020-09-29 | End: 2020-09-30 | Stop reason: HOSPADM

## 2020-09-29 RX ORDER — IPRATROPIUM BROMIDE AND ALBUTEROL SULFATE 2.5; .5 MG/3ML; MG/3ML
3 SOLUTION RESPIRATORY (INHALATION) EVERY 6 HOURS PRN
Status: DISCONTINUED | OUTPATIENT
Start: 2020-09-29 | End: 2020-09-30 | Stop reason: HOSPADM

## 2020-09-29 RX ORDER — AMOXICILLIN 250 MG
1 CAPSULE ORAL 2 TIMES DAILY
Status: DISCONTINUED | OUTPATIENT
Start: 2020-09-29 | End: 2020-09-30 | Stop reason: HOSPADM

## 2020-09-29 RX ORDER — ACETAMINOPHEN 325 MG/1
650 TABLET ORAL EVERY 4 HOURS PRN
Status: DISCONTINUED | OUTPATIENT
Start: 2020-09-29 | End: 2020-09-30 | Stop reason: HOSPADM

## 2020-09-29 RX ORDER — CARVEDILOL 25 MG/1
25 TABLET ORAL 2 TIMES DAILY WITH MEALS
Status: DISCONTINUED | OUTPATIENT
Start: 2020-09-29 | End: 2020-09-30 | Stop reason: HOSPADM

## 2020-09-29 RX ORDER — AMLODIPINE BESYLATE 2.5 MG/1
2.5 TABLET ORAL DAILY
Status: DISCONTINUED | OUTPATIENT
Start: 2020-09-29 | End: 2020-09-30 | Stop reason: HOSPADM

## 2020-09-29 RX ORDER — LANOLIN ALCOHOL/MO/W.PET/CERES
800 CREAM (GRAM) TOPICAL
Status: DISCONTINUED | OUTPATIENT
Start: 2020-09-29 | End: 2020-09-30 | Stop reason: HOSPADM

## 2020-09-29 RX ORDER — CLONIDINE HYDROCHLORIDE 0.1 MG/1
0.1 TABLET ORAL 2 TIMES DAILY
Status: DISCONTINUED | OUTPATIENT
Start: 2020-09-29 | End: 2020-09-30 | Stop reason: HOSPADM

## 2020-09-29 RX ORDER — IBUPROFEN 200 MG
24 TABLET ORAL
Status: DISCONTINUED | OUTPATIENT
Start: 2020-09-29 | End: 2020-09-30 | Stop reason: HOSPADM

## 2020-09-29 RX ORDER — SODIUM CHLORIDE 9 MG/ML
INJECTION, SOLUTION INTRAVENOUS CONTINUOUS
Status: DISCONTINUED | OUTPATIENT
Start: 2020-09-29 | End: 2020-09-30 | Stop reason: HOSPADM

## 2020-09-29 RX ORDER — SODIUM CHLORIDE 0.9 % (FLUSH) 0.9 %
10 SYRINGE (ML) INJECTION
Status: DISCONTINUED | OUTPATIENT
Start: 2020-09-29 | End: 2020-09-30 | Stop reason: HOSPADM

## 2020-09-29 RX ORDER — LIDOCAINE HCL/PF 100 MG/5ML
SYRINGE (ML) INTRAVENOUS
Status: DISCONTINUED | OUTPATIENT
Start: 2020-09-29 | End: 2020-09-29

## 2020-09-29 RX ORDER — POTASSIUM CHLORIDE 1.5 G/1.58G
60 POWDER, FOR SOLUTION ORAL
Status: DISCONTINUED | OUTPATIENT
Start: 2020-09-29 | End: 2020-09-30 | Stop reason: HOSPADM

## 2020-09-29 RX ORDER — PROPOFOL 10 MG/ML
VIAL (ML) INTRAVENOUS
Status: DISCONTINUED | OUTPATIENT
Start: 2020-09-29 | End: 2020-09-29

## 2020-09-29 RX ORDER — FOLIC ACID 1 MG/1
1 TABLET ORAL DAILY
Status: DISCONTINUED | OUTPATIENT
Start: 2020-09-29 | End: 2020-09-30 | Stop reason: HOSPADM

## 2020-09-29 RX ORDER — PANTOPRAZOLE SODIUM 40 MG/1
40 TABLET, DELAYED RELEASE ORAL DAILY
Status: DISCONTINUED | OUTPATIENT
Start: 2020-09-29 | End: 2020-09-30 | Stop reason: HOSPADM

## 2020-09-29 RX ORDER — ONDANSETRON 2 MG/ML
4 INJECTION INTRAMUSCULAR; INTRAVENOUS EVERY 6 HOURS PRN
Status: DISCONTINUED | OUTPATIENT
Start: 2020-09-29 | End: 2020-09-30 | Stop reason: HOSPADM

## 2020-09-29 RX ORDER — IBUPROFEN 200 MG
16 TABLET ORAL
Status: DISCONTINUED | OUTPATIENT
Start: 2020-09-29 | End: 2020-09-30 | Stop reason: HOSPADM

## 2020-09-29 RX ORDER — POTASSIUM CHLORIDE 1.5 G/1.58G
40 POWDER, FOR SOLUTION ORAL
Status: DISCONTINUED | OUTPATIENT
Start: 2020-09-29 | End: 2020-09-30 | Stop reason: HOSPADM

## 2020-09-29 RX ORDER — GLUCAGON 1 MG
1 KIT INJECTION
Status: DISCONTINUED | OUTPATIENT
Start: 2020-09-29 | End: 2020-09-30 | Stop reason: HOSPADM

## 2020-09-29 RX ORDER — CINACALCET 30 MG/1
30 TABLET, FILM COATED ORAL 2 TIMES DAILY WITH MEALS
Status: DISCONTINUED | OUTPATIENT
Start: 2020-09-29 | End: 2020-09-30 | Stop reason: HOSPADM

## 2020-09-29 RX ORDER — PREDNISONE 2.5 MG/1
2.5 TABLET ORAL 2 TIMES DAILY
Status: DISCONTINUED | OUTPATIENT
Start: 2020-09-29 | End: 2020-09-30 | Stop reason: HOSPADM

## 2020-09-29 RX ORDER — ENOXAPARIN SODIUM 100 MG/ML
40 INJECTION SUBCUTANEOUS EVERY 24 HOURS
Status: DISCONTINUED | OUTPATIENT
Start: 2020-09-29 | End: 2020-09-30 | Stop reason: HOSPADM

## 2020-09-29 RX ORDER — TALC
6 POWDER (GRAM) TOPICAL NIGHTLY PRN
Status: DISCONTINUED | OUTPATIENT
Start: 2020-09-29 | End: 2020-09-30 | Stop reason: HOSPADM

## 2020-09-29 RX ORDER — ALFUZOSIN HYDROCHLORIDE 10 MG/1
10 TABLET, EXTENDED RELEASE ORAL
Status: DISCONTINUED | OUTPATIENT
Start: 2020-09-29 | End: 2020-09-30 | Stop reason: HOSPADM

## 2020-09-29 RX ORDER — ESCITALOPRAM OXALATE 10 MG/1
10 TABLET ORAL DAILY
Status: DISCONTINUED | OUTPATIENT
Start: 2020-09-29 | End: 2020-09-30 | Stop reason: HOSPADM

## 2020-09-29 RX ADMIN — CARVEDILOL 25 MG: 25 TABLET, FILM COATED ORAL at 05:09

## 2020-09-29 RX ADMIN — ENOXAPARIN SODIUM 40 MG: 40 INJECTION SUBCUTANEOUS at 05:09

## 2020-09-29 RX ADMIN — CLONIDINE HYDROCHLORIDE 0.1 MG: 0.1 TABLET ORAL at 05:09

## 2020-09-29 RX ADMIN — PROPOFOL 40 MG: 10 INJECTION, EMULSION INTRAVENOUS at 11:09

## 2020-09-29 RX ADMIN — CINACALCET 30 MG: 30 TABLET, FILM COATED ORAL at 05:09

## 2020-09-29 RX ADMIN — LIDOCAINE HYDROCHLORIDE 75 MG: 20 INJECTION INTRAVENOUS at 11:09

## 2020-09-29 RX ADMIN — PROPOFOL 80 MG: 10 INJECTION, EMULSION INTRAVENOUS at 11:09

## 2020-09-29 RX ADMIN — SODIUM CHLORIDE: 0.9 INJECTION, SOLUTION INTRAVENOUS at 10:09

## 2020-09-29 RX ADMIN — CLONIDINE HYDROCHLORIDE 0.1 MG: 0.1 TABLET ORAL at 08:09

## 2020-09-29 RX ADMIN — ONDANSETRON 4 MG: 2 INJECTION INTRAMUSCULAR; INTRAVENOUS at 08:09

## 2020-09-29 NOTE — NURSING
Instructed nothing to eat or drink due to possible testing to be ordered. Patient verbalized understanding.

## 2020-09-29 NOTE — PLAN OF CARE
Intervention: collaboration with care providers     Recommendation:   1) SLP conult   2) Advance PO diet to regular diet, texture per SLP to promote PO intakes  -no salt packets on trays  3) Add boost plus BID ( or boost pudding if on thickened liquids)   4) Weigh pt weekly, NFPE at f/u    Goals: 1) PO diet advanced in < 3 days  Nutrition Goal Status: new  Communication of RD Recs: reviewed with physician(POC, sticky note)

## 2020-09-29 NOTE — PLAN OF CARE
"Alert, oriented, repeats same thing over and over states"I believe ya'll just gonna let me die" patient assured we are trying to help him  "

## 2020-09-29 NOTE — CHAPLAIN
"Spent about 10 min w/pt, he was resting; SW suggested he could use a visit; he c/o being hungry, thirsty - "haven't eaten in 3 days"; concerned about his niece,nephew and brother "when he passes"; thinks he's going to die soon- "I'm 75 and my body is breaking down"; confirmed he's [employer], prayed with him and he thanked me for prayer and the visit; nurse waned to come in to attach nodes; told him my name again and said to let nurse know if/when he'd like me to come back.Lord, in your mercy.  "

## 2020-09-29 NOTE — ASSESSMENT & PLAN NOTE
Contributing Nutrition Diagnosis  Severe chronic condition related malnutrition    Related to (etiology):   Swallowing difficulty    Signs and Symptoms (as evidenced by):   1) > 10% wt loss x 6 months ( the last few weeks per pt, not supported by chart review  2) PO intakes < 75% x 3-4 weeks    Interventions:  Above    Nutrition Diagnosis Status:   new

## 2020-09-29 NOTE — PROGRESS NOTES
Report from ICU nurse Herb RODRIGUEZ RN.  Patient now in room 224.  Updated patient on plan on care.

## 2020-09-29 NOTE — ED NOTES
Assumed care:  Rajendra Castle is awake, alert and oriented x 3, skin warm and dry, in NAD.  Patient CO upper abd pain with N&V.  States that he has not kept anything down in 3 days.    Patient identifiers for Rajendra Castle checked and correct.  LOC:  Rajendra Castle is awake, alert, and aware of environment with an appropriate affect. He is oriented x 3 and speaking appropriately.  APPEARANCE:  He is resting comfortably and in no acute distress. He is clean and well groomed, patient's clothing is properly fastened.  SKIN:  The skin is warm and dry. He has normal skin turgor and moist mucus membranes. Skin is intact; no bruising or breakdown noted.  MUSCULOSKELETAL:  He is moving all extremities well, no obvious deformities noted. Pulses intact.   RESPIRATORY:  Airway is open and patent. Respirations are spontaneous and non-labored with normal effort and rate.  CARDIAC:  He has a normal rate and rhythm. No peripheral edema noted. Capillary refill < 3 seconds.  ABDOMEN:  No distention noted.  Soft and non-tender upon palpation.  abd pain, N&V  NEUROLOGICAL:  PERRL. Facial expression is symmetrical. Hand grasps are equal bilaterally. Normal sensation in all extremities when touched with finger.  Allergies reported:  Review of patient's allergies indicates:  No Known Allergies  OTHER NOTES:

## 2020-09-29 NOTE — SUBJECTIVE & OBJECTIVE
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    RA (rheumatoid arthritis)     Vitamin D insufficiency 9/30/2016       Past Surgical History:   Procedure Laterality Date    CYSTOSCOPY N/A 12/18/2018    Procedure: CYSTOSCOPY;  Surgeon: Garrett Pina MD;  Location: [facility] OR;  Service: Urology;  Laterality: N/A;    TRANSRECTAL BIOPSY OF PROSTATE WITH ULTRASOUND GUIDANCE N/A 12/18/2018    Procedure: BIOPSY, PROSTATE, RECTAL APPROACH, WITH US GUIDANCE;  Surgeon: Garrett Pina MD;  Location: [facility] OR;  Service: Urology;  Laterality: N/A;       Review of patient's allergies indicates:  No Known Allergies    No current facility-administered medications on file prior to encounter.      Current Outpatient Medications on File Prior to Encounter   Medication Sig    alfuzosin (UROXATRAL) 10 mg Tb24 TAKE 1 TABLET(10 MG) BY MOUTH AFTER DINNER    amLODIPine (NORVASC) 2.5 MG tablet TAKE 1 TABLET(2.5 MG) BY MOUTH EVERY DAY    aspirin (ECOTRIN) 81 MG EC tablet Take 81 mg by mouth once daily.      carvedilol (COREG) 25 MG tablet TAKE 1 TABLET(25 MG) BY MOUTH TWICE DAILY WITH MEALS    cinacalcet (SENSIPAR) 30 MG Tab Take 1 tablet (30 mg total) by mouth 2 (two) times daily with meals.    escitalopram oxalate (LEXAPRO) 10 MG tablet Take 10 mg by mouth once daily.    folic acid (FOLVITE) 1 MG tablet TAKE 1 TABLET(1 MG) BY MOUTH EVERY DAY    methotrexate 2.5 MG Tab TAKE 6 TABLETS BY MOUTH EVERY WEEK    ondansetron (ZOFRAN) 8 MG tablet Take 1 tablet (8 mg total) by mouth every 12 (twelve) hours as needed for Nausea.    pantoprazole (PROTONIX) 40 MG tablet Take 1 tablet (40 mg total) by mouth once daily.    predniSONE (DELTASONE) 2.5 MG tablet Take 1 tablet (2.5 mg total) by mouth 2 (two) times daily.    traMADoL (ULTRAM) 50 mg tablet TAKE 2 TABLETS BY  MOUTH TWICE DAILY AS NEEDED FOR PAIN    [DISCONTINUED] methotrexate 2.5 MG Tab TAKE 6 TABLETS BY MOUTH EVERY WEEK    [DISCONTINUED] predniSONE (DELTASONE) 2.5 MG tablet TAKE 1 TABLET BY MOUTH TWICE DAILY     Family History     Problem Relation (Age of Onset)    Diabetes Maternal Uncle    Drug abuse Sister    Heart disease Mother    Stroke Father        Tobacco Use    Smoking status: Former Smoker     Packs/day: 0.25     Years: 10.00     Pack years: 2.50     Quit date: 2001     Years since quittin.1    Smokeless tobacco: Never Used   Substance and Sexual Activity    Alcohol use: Yes     Comment: seldom    Drug use: Yes     Frequency: 8.0 times per week     Types: Marijuana    Sexual activity: Yes     Partners: Female     Review of Systems   Constitutional: Positive for appetite change, fatigue and unexpected weight change. Negative for chills and fever.   HENT: Negative for congestion and sore throat.    Eyes: Negative for photophobia and visual disturbance.   Respiratory: Negative for cough and shortness of breath.    Cardiovascular: Positive for chest pain. Negative for palpitations.   Gastrointestinal: Negative for abdominal pain, diarrhea, nausea and vomiting.        Difficulty swallowing   Genitourinary: Negative for dysuria and frequency.   Musculoskeletal: Positive for arthralgias and myalgias.   Skin: Negative for rash and wound.   Neurological: Positive for weakness (Generalized). Negative for speech difficulty.   Hematological: Negative for adenopathy.   Psychiatric/Behavioral: Negative for agitation and confusion.   All other systems reviewed and are negative.    Objective:     Vital Signs (Most Recent):  Temp: 99.4 °F (37.4 °C) (20)  Pulse: 79 (20)  Resp: 17 (20 1839)  BP: (!) 167/75 (20)  SpO2: 100 % (20) Vital Signs (24h Range):  Temp:  [98 °F (36.7 °C)-99.4 °F (37.4 °C)] 99.4 °F (37.4 °C)  Pulse:  [] 79  Resp:  [17] 17  SpO2:  [95  %-100 %] 100 %  BP: (133-184)/(56-84) 167/75     Weight: 62.9 kg (138 lb 10.7 oz)  Body mass index is 21.08 kg/m².    Physical Exam  Constitutional:       Comments: Frail-appearing   HENT:      Head: Normocephalic and atraumatic.      Mouth/Throat:      Mouth: Mucous membranes are dry.   Eyes:      Conjunctiva/sclera: Conjunctivae normal.      Pupils: Pupils are equal, round, and reactive to light.   Cardiovascular:      Rate and Rhythm: Normal rate and regular rhythm.      Pulses: Normal pulses.      Heart sounds: Murmur present.   Pulmonary:      Effort: Pulmonary effort is normal.      Breath sounds: Normal breath sounds.   Abdominal:      General: There is no distension.      Palpations: Abdomen is soft.   Genitourinary:     Comments: Deferred  Musculoskeletal: Normal range of motion.         General: Tenderness (Multiple joints.) present.   Skin:     General: Skin is warm and dry.   Neurological:      General: No focal deficit present.      Mental Status: He is alert and oriented to person, place, and time.   Psychiatric:         Mood and Affect: Mood is depressed.         Behavior: Behavior normal.         Thought Content: Thought content normal.         Judgment: Judgment normal.           CRANIAL NERVES     CN III, IV, VI   Pupils are equal, round, and reactive to light.       Significant Labs:   CBC:   Recent Labs   Lab 09/28/20 2038 09/29/20  0359   WBC 5.51 5.27   HGB 11.5* 11.0*   HCT 37.4* 35.5*    267     CMP:   Recent Labs   Lab 09/28/20 2038 09/29/20 0359    140   K 4.5 4.0   * 112*   CO2 20* 18*   GLU 96 84   BUN 13 11   CREATININE 1.2 1.1   CALCIUM 11.6* 11.0*   PROT 6.8  --    ALBUMIN 3.3*  --    BILITOT 0.3  --    ALKPHOS 115  --    AST 17  --    ALT 16  --    ANIONGAP 9 10   EGFRNONAA 60 >60     Troponin:   Recent Labs   Lab 09/28/20 2038   TROPONINI 0.182*       Significant Imaging: CXR:  No radiographic evidence of acute cardiopulmonary disease.

## 2020-09-29 NOTE — PLAN OF CARE
"SW completed assessment w/ pt at bedside.  Pt lives alone, reports his niece Collette checks on him daily. Pt asked SW to contact Collette as his phone does not have battery charge and he cannot access her phone #.  Pt denies home health or use of DME, is able to transport himself to appointments.  10:21  JUAN call to niece Collette Martel--she reports she has been the only one to provide support for pt including bringing meals to pt daily, securing housecleaning to come every two weeks.  She desribed the house as "deplorable", smells bad.  Pt has an adult daughter, Madhavi Castle who lives in [place] [phone].  Niece requesting for PHN to be contacted for a home NP assessment, if possible.  She reports pt is not taking his meds as prescribed, unsure if pt is confused, describes pt as being "emotional".  She has tried to have pt sign documents in order to have niece as an authorized person to assist w/ scheduling and following up with medical appointments and obtain info regarding his care.    Pt would benefit from home health at d/c.     09/29/20 1019   Discharge Assessment   Assessment Type Discharge Planning Assessment   Confirmed/corrected address and phone number on facesheet? Yes   Assessment information obtained from? Patient   Prior to hospitilization cognitive status: Alert/Oriented   Prior to hospitalization functional status: Independent   Current cognitive status: Alert/Oriented   Current Functional Status: Independent   Facility Arrived From: home   Lives With alone   Able to Return to Prior Arrangements yes   Is patient able to care for self after discharge? Yes   Who are your caregiver(s) and their phone number(s)? niece:  Collette Martel [phone]   Patient's perception of discharge disposition home or selfcare   Readmission Within the Last 30 Days no previous admission in last 30 days   Patient currently being followed by outpatient case management? No   Patient currently receives any other " outside agency services? No   Equipment Currently Used at Home none   Part D Coverage Yes   Do you have any problems affording any of your prescribed medications? No   Is the patient taking medications as prescribed? yes   Does the patient have transportation home? Yes   Transportation Anticipated family or friend will provide   Dialysis Name and Scheduled days NA   Does the patient receive services at the [facility]mad[facility]? No   Discharge Plan A Home   Discharge Plan B Home   DME Needed Upon Discharge  none   Patient/Family in Agreement with Plan yes   Does the patient have transportation to healthcare appointments? Yes

## 2020-09-29 NOTE — H&P
[facility] Hospital Medicine  History & Physical    Patient Name: Rajendra Castle  MRN: [id number]  Admission Date: 9/28/2020  Attending Physician: Rosalie Bolden MD   Primary Care Provider: Irvin Aceves MD         Patient information was obtained from patient and ER records.     Subjective:     Principal Problem:Chest pain    Chief Complaint:   Chief Complaint   Patient presents with    Chest Pain     with SOB and generalized weakness for 2 weeks        HPI: Rajendra Castle is a 75-year-old male with a past medical history significant for Rheumatoid arthritis, hypertension, and degenerative disc disease who presented to the emergency department with complaints of  generalized body aches, multiple joint pains, chest pain, and difficulty swallowing.  Patient states that he has lost 20-30 lb over the past 6 weeks.  He has a poor appetite and when he does eat he has difficulty swallowing.  Denies fever, shortness of breath, diaphoresis, abdominal pain, nausea, vomiting, or diarrhea.  ED workup is significant for elevated troponin with no ischemic changes noted on his EKG.  Patient currently denies chest pain.  He will be placed in observation for continued monitoring and treatment.    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    RA (rheumatoid arthritis)     Vitamin D insufficiency 9/30/2016       Past Surgical History:   Procedure Laterality Date    CYSTOSCOPY N/A 12/18/2018    Procedure: CYSTOSCOPY;  Surgeon: Garrett Pina MD;  Location: [facility] OR;  Service: Urology;  Laterality: N/A;    TRANSRECTAL BIOPSY OF PROSTATE WITH ULTRASOUND GUIDANCE N/A 12/18/2018    Procedure: BIOPSY, PROSTATE, RECTAL APPROACH, WITH US GUIDANCE;  Surgeon: Garrett Pina MD;  Location: [facility] OR;  Service: Urology;  Laterality: N/A;       Review of  patient's allergies indicates:  No Known Allergies    No current facility-administered medications on file prior to encounter.      Current Outpatient Medications on File Prior to Encounter   Medication Sig    alfuzosin (UROXATRAL) 10 mg Tb24 TAKE 1 TABLET(10 MG) BY MOUTH AFTER DINNER    amLODIPine (NORVASC) 2.5 MG tablet TAKE 1 TABLET(2.5 MG) BY MOUTH EVERY DAY    aspirin (ECOTRIN) 81 MG EC tablet Take 81 mg by mouth once daily.      carvedilol (COREG) 25 MG tablet TAKE 1 TABLET(25 MG) BY MOUTH TWICE DAILY WITH MEALS    cinacalcet (SENSIPAR) 30 MG Tab Take 1 tablet (30 mg total) by mouth 2 (two) times daily with meals.    escitalopram oxalate (LEXAPRO) 10 MG tablet Take 10 mg by mouth once daily.    folic acid (FOLVITE) 1 MG tablet TAKE 1 TABLET(1 MG) BY MOUTH EVERY DAY    methotrexate 2.5 MG Tab TAKE 6 TABLETS BY MOUTH EVERY WEEK    ondansetron (ZOFRAN) 8 MG tablet Take 1 tablet (8 mg total) by mouth every 12 (twelve) hours as needed for Nausea.    pantoprazole (PROTONIX) 40 MG tablet Take 1 tablet (40 mg total) by mouth once daily.    predniSONE (DELTASONE) 2.5 MG tablet Take 1 tablet (2.5 mg total) by mouth 2 (two) times daily.    traMADoL (ULTRAM) 50 mg tablet TAKE 2 TABLETS BY MOUTH TWICE DAILY AS NEEDED FOR PAIN    [DISCONTINUED] methotrexate 2.5 MG Tab TAKE 6 TABLETS BY MOUTH EVERY WEEK    [DISCONTINUED] predniSONE (DELTASONE) 2.5 MG tablet TAKE 1 TABLET BY MOUTH TWICE DAILY     Family History     Problem Relation (Age of Onset)    Diabetes Maternal Uncle    Drug abuse Sister    Heart disease Mother    Stroke Father        Tobacco Use    Smoking status: Former Smoker     Packs/day: 0.25     Years: 10.00     Pack years: 2.50     Quit date: 2001     Years since quittin.1    Smokeless tobacco: Never Used   Substance and Sexual Activity    Alcohol use: Yes     Comment: seldom    Drug use: Yes     Frequency: 8.0 times per week     Types: Marijuana    Sexual activity: Yes     Partners:  Female     Review of Systems   Constitutional: Positive for appetite change, fatigue and unexpected weight change. Negative for chills and fever.   HENT: Negative for congestion and sore throat.    Eyes: Negative for photophobia and visual disturbance.   Respiratory: Negative for cough and shortness of breath.    Cardiovascular: Positive for chest pain. Negative for palpitations.   Gastrointestinal: Negative for abdominal pain, diarrhea, nausea and vomiting.        Difficulty swallowing   Genitourinary: Negative for dysuria and frequency.   Musculoskeletal: Positive for arthralgias and myalgias.   Skin: Negative for rash and wound.   Neurological: Positive for weakness (Generalized). Negative for speech difficulty.   Hematological: Negative for adenopathy.   Psychiatric/Behavioral: Negative for agitation and confusion.   All other systems reviewed and are negative.    Objective:     Vital Signs (Most Recent):  Temp: 99.4 °F (37.4 °C) (09/29/20 0242)  Pulse: 79 (09/29/20 0242)  Resp: 17 (09/28/20 1839)  BP: (!) 167/75 (09/29/20 0242)  SpO2: 100 % (09/29/20 0242) Vital Signs (24h Range):  Temp:  [98 °F (36.7 °C)-99.4 °F (37.4 °C)] 99.4 °F (37.4 °C)  Pulse:  [] 79  Resp:  [17] 17  SpO2:  [95 %-100 %] 100 %  BP: (133-184)/(56-84) 167/75     Weight: 62.9 kg (138 lb 10.7 oz)  Body mass index is 21.08 kg/m².    Physical Exam  Constitutional:       Comments: Frail-appearing   HENT:      Head: Normocephalic and atraumatic.      Mouth/Throat:      Mouth: Mucous membranes are dry.   Eyes:      Conjunctiva/sclera: Conjunctivae normal.      Pupils: Pupils are equal, round, and reactive to light.   Cardiovascular:      Rate and Rhythm: Normal rate and regular rhythm.      Pulses: Normal pulses.      Heart sounds: Murmur present.   Pulmonary:      Effort: Pulmonary effort is normal.      Breath sounds: Normal breath sounds.   Abdominal:      General: There is no distension.      Palpations: Abdomen is soft.    Genitourinary:     Comments: Deferred  Musculoskeletal: Normal range of motion.         General: Tenderness (Multiple joints.) present.   Skin:     General: Skin is warm and dry.   Neurological:      General: No focal deficit present.      Mental Status: He is alert and oriented to person, place, and time.   Psychiatric:         Mood and Affect: Mood is depressed.         Behavior: Behavior normal.         Thought Content: Thought content normal.         Judgment: Judgment normal.           CRANIAL NERVES     CN III, IV, VI   Pupils are equal, round, and reactive to light.       Significant Labs:   CBC:   Recent Labs   Lab 09/28/20 2038 09/29/20 0359   WBC 5.51 5.27   HGB 11.5* 11.0*   HCT 37.4* 35.5*    267     CMP:   Recent Labs   Lab 09/28/20 2038 09/29/20 0359    140   K 4.5 4.0   * 112*   CO2 20* 18*   GLU 96 84   BUN 13 11   CREATININE 1.2 1.1   CALCIUM 11.6* 11.0*   PROT 6.8  --    ALBUMIN 3.3*  --    BILITOT 0.3  --    ALKPHOS 115  --    AST 17  --    ALT 16  --    ANIONGAP 9 10   EGFRNONAA 60 >60     Troponin:   Recent Labs   Lab 09/28/20 2038   TROPONINI 0.182*       Significant Imaging: CXR:  No radiographic evidence of acute cardiopulmonary disease.    Assessment/Plan:     * Chest pain  EKG prn Chest Pain  Troponin x 3-initial troponin 0.182  CBC, CMP, Mg, Phos daily  Fasting Lipid panel in am  TSH level  CXR-WNL  DELMY score-3  Consult Cardiology   Supplemental O2 via nasal cannula to keep O2 Sats >92%  Continue ASA       Dysphagia  Gi consult      Malnutrition of moderate degree  Patient with recent unexpected weight loss of 20-30 lb.  Consult Nutrition.  Body mass index is 21.08 kg/m².        CKD (chronic kidney disease)  Creatine stable for now. BMP reviewed- noted Estimated Creatinine Clearance: 53.2 mL/min (based on SCr of 1.1 mg/dL). according to latest data. Monitor UOP and serial BMP and adjust therapy as needed. Renally dose  meds.            Hyperparathyroidism  Chronic; continue chronic cinacalcet      Gastroesophageal reflux disease without esophagitis  Chronic; continue chronic PPI.      Rheumatoid arthritis involving multiple sites  Chronic; continue chronic medications.      Essential hypertension  Chronic, stable.  Latest blood pressure and vitals reviewed-   Temp:  [98 °F (36.7 °C)-99.4 °F (37.4 °C)]   Pulse:  []   Resp:  [17]   BP: (133-184)/(56-84)   SpO2:  [95 %-100 %] .   Home meds for hypertension were reviewed and noted below. Hospital anti-hypertensive changes were made as shown below.  Hypertension Medications             amLODIPine (NORVASC) 2.5 MG tablet TAKE 1 TABLET(2.5 MG) BY MOUTH EVERY DAY    carvedilol (COREG) 25 MG tablet TAKE 1 TABLET(25 MG) BY MOUTH TWICE DAILY WITH MEALS      Hospital Medications             amLODIPine tablet 2.5 mg 2.5 mg, Oral, Daily    carvediloL tablet 25 mg 25 mg, Oral, 2 times daily with meals        Will utilize p.r.n. blood pressure medication only if patient's blood pressure greater than  180/110 and he develops symptoms such as worsening chest pain or shortness of breath.          VTE Risk Mitigation (From admission, onward)         Ordered     enoxaparin injection 40 mg  Every 24 hours      09/29/20 0238     IP VTE HIGH RISK PATIENT  Once      09/29/20 0238     Place sequential compression device  Until discontinued      09/29/20 0238                   MARILEE Lazar  Department of Hospital Medicine   [facility]

## 2020-09-29 NOTE — ASSESSMENT & PLAN NOTE
EKG prn Chest Pain  Troponin x 3-initial troponin 0.182  CBC, CMP, Mg, Phos daily  Fasting Lipid panel in am  TSH level  CXR-WNL  DELMY score-3  Consult Cardiology   Supplemental O2 via nasal cannula to keep O2 Sats >92%  Continue ASA

## 2020-09-29 NOTE — ASSESSMENT & PLAN NOTE
Chronic, stable.  Latest blood pressure and vitals reviewed-   Temp:  [98 °F (36.7 °C)-99.4 °F (37.4 °C)]   Pulse:  []   Resp:  [17]   BP: (133-184)/(56-84)   SpO2:  [95 %-100 %] .   Home meds for hypertension were reviewed and noted below. Hospital anti-hypertensive changes were made as shown below.  Hypertension Medications             amLODIPine (NORVASC) 2.5 MG tablet TAKE 1 TABLET(2.5 MG) BY MOUTH EVERY DAY    carvedilol (COREG) 25 MG tablet TAKE 1 TABLET(25 MG) BY MOUTH TWICE DAILY WITH MEALS      Hospital Medications             amLODIPine tablet 2.5 mg 2.5 mg, Oral, Daily    carvediloL tablet 25 mg 25 mg, Oral, 2 times daily with meals        Will utilize p.r.n. blood pressure medication only if patient's blood pressure greater than  180/110 and he develops symptoms such as worsening chest pain or shortness of breath.

## 2020-09-29 NOTE — PLAN OF CARE
EGD:  -Normal esophagus; biopsied to evaluate for EoE  -Dilated with 54 [place] Savary  -Atrophic gastric mucosa and erythematous antral mucosa; biopsied for h.pylori  -Hematin throughout stomach  -Normal examined duodenum     Recommendations:  -Advance diet as tolerates  -Await pathology results  -Follow up in clinic to discuss scheduling outpatient colonoscopy  -Will discuss barium esophagram at that time  -GI to sign off, please call if any questions    Gunner Espinosa MD  [facility]  [address] [place] [address]  Office: [phone]  Fax: [phone]

## 2020-09-29 NOTE — PLAN OF CARE
Vss, awake and alert, denies pain, ambulates easily. IV saline locked. Transferred back to floor via wheelchair.

## 2020-09-29 NOTE — HPI
Rajendra Castle is a 75-year-old male with a past medical history significant for Rheumatoid arthritis, hypertension, and degenerative disc disease who presented to the emergency department with complaints of  generalized body aches, multiple joint pains, chest pain, and difficulty swallowing.  Patient states that he has lost 20-30 lb over the past 6 weeks.  He has a poor appetite and when he does eat he has difficulty swallowing.  Denies fever, shortness of breath, diaphoresis, abdominal pain, nausea, vomiting, or diarrhea.  ED workup is significant for elevated troponin with no ischemic changes noted on his EKG.  Patient currently denies chest pain.  He will be placed in observation for continued monitoring and treatment.

## 2020-09-29 NOTE — ANESTHESIA POSTPROCEDURE EVALUATION
Anesthesia Post Evaluation    Patient: Rajendra Castle    Procedure(s) Performed: Procedure(s) (LRB):  EGD (ESOPHAGOGASTRODUODENOSCOPY) (N/A)    Final Anesthesia Type: general    Patient location during evaluation: PACU  Patient participation: Yes- Able to Participate  Level of consciousness: awake and alert and oriented  Post-procedure vital signs: reviewed and stable  Pain management: adequate  Airway patency: patent    PONV status at discharge: No PONV  Anesthetic complications: no      Cardiovascular status: blood pressure returned to baseline and stable  Respiratory status: unassisted and spontaneous ventilation  Hydration status: euvolemic  Follow-up not needed.          Vitals Value Taken Time   /86 09/29/20 1200   Temp 36.5 °C (97.7 °F) 09/29/20 1129   Pulse 88 09/29/20 1200   Resp 16 09/29/20 1200   SpO2 97 % 09/29/20 1200         Event Time   Out of Recovery 09/29/2020 12:06:48         Pain/Afua Score: Afua Score: 10 (9/29/2020 12:00 PM)

## 2020-09-29 NOTE — ASSESSMENT & PLAN NOTE
Creatine stable for now. BMP reviewed- noted Estimated Creatinine Clearance: 53.2 mL/min (based on SCr of 1.1 mg/dL). according to latest data. Monitor UOP and serial BMP and adjust therapy as needed. Renally dose meds.

## 2020-09-29 NOTE — CONSULTS
"  [facility]  Adult Nutrition  Consult Note    SUMMARY   Intervention: collaboration with care providers     Recommendation:   1) SLP conult   2) Advance PO diet to regular diet, texture per SLP to promote PO intakes  -no salt packets on trays  3) Add boost plus BID ( or boost pudding if on thickened liquids)   4) Weigh pt weekly, NFPE at f/u    Goals: 1) PO diet advanced in < 3 days  Nutrition Goal Status: new  Communication of RD Recs: reviewed with physician(POC, sticky note)    Reason for Assessment    Reason For Assessment: consult  Diagnosis: (chest pain)  Relevant Medical History: RA, HTN, difficulty swallowing ( last few years), CKD, GERD, VitamiN D deficiency  Interdisciplinary Rounds: attended    General Information Comments: 76 y/o male admitted with chest pain as well as poor appetite r/t trouble swallowing and 20-30 lb wt loss in the last few weeks. Pt down for EGD, NFPE to be done at f/u. NPO x 2 days, complaining that he is hungry per RN.    Nutrition Discharge Planning: To be determined- Cardiac, texture per SLP + boost plus as needed    Nutrition Risk Screen    Nutrition Risk Screen: dysphagia or difficulty swallowing    Nutrition/Diet History    Patient Reported Diet/Restrictions/Preferences: soft  Spiritual, Cultural Beliefs, Baptism Practices, Values that Affect Care: no  Food Allergies: NKFA  Factors Affecting Nutritional Intake: difficulty/impaired swallowing, NPO    Anthropometrics    Temp: 97.7 °F (36.5 °C)  Height Method: Measured(office 20)  Height: 5' 8"  Height (inches): 68 in  Weight Method: Bed Scale  Weight: 62.6 kg (138 lb 0.1 oz)  Weight (lb): 138.01 lb  Ideal Body Weight (IBW), Male: 154 lb  % Ideal Body Weight, Male (lb): 89.62 %  BMI (Calculated): 21  BMI Grade: 18.5-24.9 - normal(underweight for age)  Usual Body Weight (UBW), k.4 kg(3/11/20)  % Usual Body Weight: 83.2  % Weight Change From Usual Weight: -16.98 %   "     Lab/Procedures/Meds    Pertinent Labs Reviewed: reviewed  BMP  Lab Results   Component Value Date     09/29/2020    K 4.0 09/29/2020     (H) 09/29/2020    CO2 18 (L) 09/29/2020    BUN 11 09/29/2020    CREATININE 1.1 09/29/2020    CALCIUM 11.0 (H) 09/29/2020    ANIONGAP 10 09/29/2020    ESTGFRAFRICA >60 09/29/2020    EGFRNONAA >60 09/29/2020     Lab Results   Component Value Date    ALBUMIN 3.3 (L) 09/28/2020     Lab Results   Component Value Date    CALCIUM 11.0 (H) 09/29/2020    PHOS 2.0 (L) 09/29/2020     Lab Results   Component Value Date    IRON 43 (L) 10/14/2015    TIBC 311 10/14/2015       Pertinent Medications Reviewed: reviewed  Pertinent Medications Comments: cinacalcet, folic acid, prednisone, senna, zofran, KCl, KNaPhos    Estimated/Assessed Needs    Weight Used For Calorie Calculations: 62.6 kg (138 lb 0.1 oz)  Energy Calorie Requirements (kcal): 30-35 kcal/kg ( CKD/ wt gain) = 7796-5703 kcal  Energy Need Method: Kcal/kg  Protein Requirements: 1.0-1.2 g protein/kg ( CKD vs. age) = 62-75 g protein  Weight Used For Protein Calculations: 62.6 kg (138 lb 0.1 oz)  Fluid Requirements (mL): 1875 ml or per MD  Estimated Fluid Requirement Method: RDA Method  CHO Requirement: N/A      Nutrition Prescription Ordered    Current Diet Order: NPO x 2 days    Evaluation of Received Nutrient/Fluid Intake    Energy Calories Required: not meeting needs  Protein Required: not meeting needs  Fluid Required: not meeting needs  Tolerance: other (see comments)(NPO)  % Intake of Estimated Energy Needs: 0%  % Meal Intake: 0%    Nutrition Risk    Level of Risk/Frequency of Follow-up: moderate 2 x weekly    Assessment and Plan  Suspect  Severe malnutrition  Contributing Nutrition Diagnosis  Severe chronic condition related malnutrition    Related to (etiology):   Swallowing difficulty    Signs and Symptoms (as evidenced by):   1) > 10% wt loss x 6 months ( the last few weeks per pt, not supported by chart review  2)  PO intakes < 75% x 3-4 weeks    Interventions:  Above    Nutrition Diagnosis Status:   new           Monitor and Evaluation    Food and Nutrient Intake: energy intake, food and beverage intake  Food and Nutrient Adminstration: diet order  Anthropometric Measurements: weight  Biochemical Data, Medical Tests and Procedures: electrolyte and renal panel, gastrointestinal profile  Nutrition-Focused Physical Findings: overall appearance, extremities, muscles and bones     Malnutrition Assessment     Skin (Micronutrient): (Navin = 19)  Teeth (Micronutrient): (decay)   Micronutrient Evaluation: suspected deficiency  Micronutrient Evaluation Comments: check vitamin D, Phos, iron   Weight Loss (Malnutrition): greater than 10% in 6 months  Energy Intake (Malnutrition): less than 75% for greater than 7 days           Edema (Fluid Accumulation): 0-->no edema present             Nutrition Follow-Up    RD Follow-up?: Yes

## 2020-09-29 NOTE — ANESTHESIA PREPROCEDURE EVALUATION
09/29/2020  Rajendra Castle is a 75 y.o., male.    Anesthesia Evaluation    I have reviewed the Patient Summary Reports.    I have reviewed the Nursing Notes.    I have reviewed the Medications.     Review of Systems  Anesthesia Hx:  No problems with previous Anesthesia    Social:  Former Smoker    Hematology/Oncology:         -- Anemia: --  Cancer in past history (prostate CA):    Cardiovascular:   Hypertension, well controlled Valvular problems/Murmurs (murmur)    Pulmonary:   COPD (Emphysema), moderate Shortness of breath Rheumatoid Lung  Lung Nodule   Renal/:   Chronic Renal Disease, CRI    Hepatic/GI:   GERD    Musculoskeletal:   Arthritis (RA)     Neurological:  Neurology Normal    Endocrine:  Endocrine Normal        Physical Exam  General:  Well nourished    Airway/Jaw/Neck:  Airway Findings: Mouth Opening: Normal Tongue: Normal  General Airway Assessment: Adult  Oropharynx Findings:  Mallampati: II  Jaw/Neck Findings:  Neck ROM: Normal ROM     Eyes/Ears/Nose:  Eyes/Ears/Nose Findings:    Dental:  Dental Findings:   Chest/Lungs:  Chest/Lungs Findings: Normal Respiratory Rate     Heart/Vascular:  Heart Findings: Rate: Normal  Rhythm: Regular Rhythm        Mental Status:  Mental Status Findings:  Cooperative, Alert and Oriented         Anesthesia Plan  Type of Anesthesia, risks & benefits discussed:  Anesthesia Type:  general  Patient's Preference:   Intra-op Monitoring Plan: standard ASA monitors  Intra-op Monitoring Plan Comments:   Post Op Pain Control Plan: multimodal analgesia  Post Op Pain Control Plan Comments:   Induction:   IV  Beta Blocker:  Patient is on a Beta-Blocker and has received one dose within the past 24 hours (No further documentation required).       Informed Consent: Patient understands risks and agrees with Anesthesia plan.  Questions answered. Anesthesia consent signed with  patient.  ASA Score: 3     Day of Surgery Review of History & Physical:            Ready For Surgery From Anesthesia Perspective.

## 2020-09-29 NOTE — NURSING
Patient attempted to take one bite of chicken and coughed and choked and spit up the bite. patient reports he has been eating and drinking only soups and liquids at home. Cannot swallow solids

## 2020-09-29 NOTE — ED PROVIDER NOTES
Encounter Date: 9/28/2020       History     Chief Complaint   Patient presents with    Chest Pain     with SOB and generalized weakness for 2 weeks     76 yo man with a past medical history of hyperparathyroidism and hypercalcemia. Most recent labs (9/2): .7, calcium 11.8. He has had hyperparathyroidism for at least 5 years. He had elected to treat this medically with sensipar until now.  Patient called his primary care physician today stating that he was having difficulty eating very poor appetite any reports he has had a 20-30 lb weight loss over the past 6-8 weeks.  He reports that he does not have an appetite, but is trying to force himself to eat.  He has to significantly chew his food in order to swallow.  His primary care physician tried to get in touch with the patient today to get him settled for endoscopy and dilatation.  Patient denies any chest pain or shortness of breath to me.  He has a history of prostate cancer, chronic kidney disease, HTN and RA - he is on methotrexate and prednisone for this.        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    RA (rheumatoid arthritis)     Vitamin D insufficiency 9/30/2016     Past Surgical History:   Procedure Laterality Date    CYSTOSCOPY N/A 12/18/2018    Procedure: CYSTOSCOPY;  Surgeon: Garrett Pina MD;  Location: [facility] OR;  Service: Urology;  Laterality: N/A;    TRANSRECTAL BIOPSY OF PROSTATE WITH ULTRASOUND GUIDANCE N/A 12/18/2018    Procedure: BIOPSY, PROSTATE, RECTAL APPROACH, WITH US GUIDANCE;  Surgeon: Garrett Pina MD;  Location: [facility] OR;  Service: Urology;  Laterality: N/A;     Family History   Problem Relation Age of Onset    Heart disease Mother     Stroke Father     Drug abuse Sister     Diabetes  Maternal Uncle      Social History     Tobacco Use    Smoking status: Former Smoker     Packs/day: 0.25     Years: 10.00     Pack years: 2.50     Quit date: 2001     Years since quittin.1    Smokeless tobacco: Never Used   Substance Use Topics    Alcohol use: Yes     Comment: seldom    Drug use: Yes     Frequency: 8.0 times per week     Types: Marijuana     Review of Systems   Constitutional: Positive for appetite change, fatigue and unexpected weight change. Negative for fever.   HENT: Negative for congestion, ear pain, rhinorrhea and sore throat.    Eyes: Negative for pain and visual disturbance.   Respiratory: Negative for cough, choking and shortness of breath.    Cardiovascular: Negative for chest pain.   Gastrointestinal: Negative for abdominal pain, diarrhea, nausea and vomiting.   Genitourinary: Negative for difficulty urinating, flank pain and frequency.   Musculoskeletal: Positive for arthralgias and myalgias. Negative for back pain.   Skin: Negative for color change and rash.   Neurological: Positive for weakness (Generalized). Negative for seizures, syncope, light-headedness, numbness and headaches.   Psychiatric/Behavioral: Negative for agitation and confusion.   All other systems reviewed and are negative.      Physical Exam     Initial Vitals   BP Pulse Resp Temp SpO2   20 1953 20 1839 20 1839 20 1839 20 183   (!) 184/82 99 17 98 °F (36.7 °C) 100 %      MAP       --                Physical Exam    Nursing note and vitals reviewed.  Constitutional: He appears well-developed. No distress.   Pleasant, slightly foul-smelling, slightly unkempt elderly male in no acute distress   HENT:   Head: Normocephalic and atraumatic.   Mouth/Throat: Oropharynx is clear and moist.   Dry mucous membranes   Eyes: EOM are normal. Pupils are equal, round, and reactive to light.   Neck: Normal range of motion. Neck supple. Thyromegaly present.   Cardiovascular: Normal rate,  regular rhythm, normal heart sounds and intact distal pulses.   Pulmonary/Chest: Breath sounds normal. He has no wheezes. He has no rhonchi. He has no rales.   Abdominal: Soft. Bowel sounds are normal. There is no abdominal tenderness.   Musculoskeletal: Normal range of motion.   Neurological: He is alert and oriented to person, place, and time. He has normal strength. No sensory deficit. GCS score is 15. GCS eye subscore is 4. GCS verbal subscore is 5. GCS motor subscore is 6.   Skin: Skin is warm and dry. Capillary refill takes less than 2 seconds.   Psychiatric: He has a normal mood and affect.         ED Course   Procedures  Labs Reviewed   CBC W/ AUTO DIFFERENTIAL - Abnormal; Notable for the following components:       Result Value    RBC 4.12 (*)     Hemoglobin 11.5 (*)     Hematocrit 37.4 (*)     Mean Corpuscular Hemoglobin Conc 30.7 (*)     RDW 14.9 (*)     Lymph% 17.6 (*)     All other components within normal limits   COMPREHENSIVE METABOLIC PANEL - Abnormal; Notable for the following components:    Chloride 112 (*)     CO2 20 (*)     Calcium 11.6 (*)     Albumin 3.3 (*)     All other components within normal limits   TROPONIN I - Abnormal; Notable for the following components:    Troponin I 0.182 (*)     All other components within normal limits   SARS-COV-2 RNA AMPLIFICATION, QUAL          Imaging Results          X-Ray Chest AP Portable (Final result)  Result time 09/28/20 20:29:02    Final result by Sp Sanchez MD (09/28/20 20:29:02)                 Impression:      No radiographic evidence of acute cardiopulmonary disease.      Electronically signed by: Sp Sanchez  Date:    09/28/2020  Time:    20:29             Narrative:    EXAMINATION:  XR CHEST AP PORTABLE    CLINICAL HISTORY:  Chest Pain;    TECHNIQUE:  Single frontal view of the chest was performed.    COMPARISON:  Chest radiograph performed 09/02/2020    FINDINGS:  EKG leads overlie the chest.  The cardiomediastinal silhouette  appears to be within normal limits.  Minor platelike opacities the lung bases favored to represent atelectasis and/or scar.  No focal airspace consolidation is suggested.  No definite pneumothorax or pleural effusion.  No acute findings are suggested in the visualized upper abdomen.  Osseous and soft tissue structures without acute change.                                 Medical Decision Making:   ED Management:  Patient has known hyperparathyroidism and hypercalcemia likely causing most of his symptoms however he also had chest pain 3 days ago and now has an elevated troponin and needs further evaluation.  He has no current symptoms of chest patent.  He will need to be admitted for further evaluation by Cardiology.  He has no significant hypoxia dyspnea to suggest pulmonary embolism.  I doubt this is a dissection or aneurysm.  Will give fluids for his hypercalcemia.                             Clinical Impression:       ICD-10-CM ICD-9-CM   1. Elevated troponin  R79.89 790.6   2. Chest pain  R07.9 786.50   3. Hypercalcemia  E83.52 275.42                          ED Disposition Condition    Observation                             Joe Pringle MD  09/29/20 0432

## 2020-09-29 NOTE — NURSING
To room 224, in WC, RA, Telemetry unit on, placed in room, nurse in room 224, in bed, call light in reach

## 2020-09-29 NOTE — PLAN OF CARE
09/29/20 1010   HERRERA Message   Medicare Outpatient and Observation Notification regarding financial responsibility Given to patient/caregiver;Explained to patient/caregiver;Signed/date by patient/caregiver   Date HERRERA was signed 09/29/20   Time HERRERA was signed 1010

## 2020-09-29 NOTE — TRANSFER OF CARE
"Anesthesia Transfer of Care Note    Patient: Rajendra Castle    Procedure(s) Performed: Procedure(s) (LRB):  EGD (ESOPHAGOGASTRODUODENOSCOPY) (N/A)    Patient location: PACU    Anesthesia Type: general    Transport from OR: Transported from OR on room air with adequate spontaneous ventilation    Post pain: adequate analgesia    Post assessment: no apparent anesthetic complications and tolerated procedure well    Post vital signs: stable    Level of consciousness: awake, alert and oriented    Nausea/Vomiting: no nausea/vomiting    Complications: none    Transfer of care protocol was followed      Last vitals:   Visit Vitals  BP (!) 164/75   Pulse 82   Temp 36.5 °C (97.7 °F)   Resp 16   Ht 5' 8" (1.727 m)   Wt 62.6 kg (138 lb)   SpO2 100%   BMI 20.98 kg/m²     "

## 2020-09-29 NOTE — RESPIRATORY THERAPY
09/29/20 0917   Patient Assessment/Suction   Level of Consciousness (AVPU) alert   Respiratory Effort Normal;Unlabored   Expansion/Accessory Muscles/Retractions expansion symmetric   All Lung Fields Breath Sounds clear   Rhythm/Pattern, Respiratory no shortness of breath reported;pattern regular   PRE-TX-O2   O2 Device (Oxygen Therapy) room air   SpO2 96 %   Pulse Oximetry Type Intermittent   $ Pulse Oximetry - Multiple Charge Pulse Oximetry - Multiple   Pulse 84   Resp 18   Aerosol Therapy   $ Aerosol Therapy Charges PRN treatment not required   Respiratory Treatment Status (SVN) PRN treatment not required

## 2020-09-29 NOTE — CONSULTS
CC: dysphagia    HPI 75 y.o. male who has had several years of intermittent solid food dysphagia that is associated with food getting stuck but not associated with regurgitation or vomiting. He has reflux as well. He reports a weight loss of 20-30 lbs over several weeks. He does report poor appetite. When he eats food he has difficulty swallowing. He had mildly elevated troponin without ischemic changes noted on EKG. No current chest pain. No nausea or vomiting. He was last seen in clinic by Dr. Ralph in . No procedures documented prior. Old records states he had a colonoscopy around  and did not want repeat.     Medical records reviewed. Additional history supplemented by nursing.     Past Medical History:   Diagnosis Date    Arthritis     DDD (degenerative disc disease), cervical     Degenerative disc disease     Heart murmur     Hypertension     Nontoxic multinodular goiter 2016    RA (rheumatoid arthritis)     Vitamin D insufficiency 2016     Past Surgical History:   Procedure Laterality Date    CYSTOSCOPY N/A 2018    Procedure: CYSTOSCOPY;  Surgeon: Garrett Pina MD;  Location: [facility] OR;  Service: Urology;  Laterality: N/A;    TRANSRECTAL BIOPSY OF PROSTATE WITH ULTRASOUND GUIDANCE N/A 2018    Procedure: BIOPSY, PROSTATE, RECTAL APPROACH, WITH US GUIDANCE;  Surgeon: Garrett Pina MD;  Location: [facility] OR;  Service: Urology;  Laterality: N/A;       Social History  Social History     Tobacco Use    Smoking status: Former Smoker     Packs/day: 0.25     Years: 10.00     Pack years: 2.50     Quit date: 2001     Years since quittin.1    Smokeless tobacco: Never Used   Substance Use Topics    Alcohol use: Yes     Comment: seldom    Drug use: Yes     Frequency: 8.0 times per week     Types: Marijuana         Family History   Problem Relation Age of Onset    Heart disease Mother     Stroke Father     Drug abuse Sister     Diabetes Maternal Uncle   "    Review of Systems  General ROS: negative for chills, fever or weight loss  Psychological ROS: negative for hallucination, depression or suicidal ideation  Ophthalmic ROS: negative for blurry vision, photophobia or eye pain  ENT ROS: negative for epistaxis, sore throat or rhinorrhea  Respiratory ROS: no cough, shortness of breath, or wheezing  Cardiovascular ROS: intermittent chest pain, no dyspnea on exertion  Gastrointestinal ROS: +dysphagia, no abdominal pain, change in bowel habits, or black/ bloody stools  Genito-Urinary ROS: no dysuria, trouble voiding, or hematuria  Musculoskeletal ROS: negative for gait disturbance or muscular weakness  Neurological ROS: no syncope or seizures; no ataxia  Dermatological ROS: negative for pruritis, rash and jaundice    Physical Examination  BP (!) 185/86   Pulse 88   Temp 97.7 °F (36.5 °C)   Resp 16   Ht 5' 8" (1.727 m)   Wt 62.6 kg (138 lb)   SpO2 97%   BMI 20.98 kg/m²   General appearance: alert, cooperative, no distress  HENT: Normocephalic, atraumatic, neck symmetrical, no nasal discharge   Eyes: conjunctivae/corneas clear, PERRL, EOM's intact  Lungs: clear to auscultation bilaterally, symmetric chest wall expansion bilaterally  Heart: regular rate and rhythm without rub; no displacement of the PMI   Abdomen: soft, non-tender; bowel sounds normoactive; no organomegaly  Extremities: extremities symmetric; no clubbing, cyanosis, or edema  Integument: Skin color, texture, turgor normal; no rashes; hair distrubution normal  Neurologic: Alert and oriented X 3, normal strength, normal coordination and gait  Psychiatric: no pressured speech; normal affect; no evidence of impaired cognition     Labs:  Lab Results   Component Value Date    WBC 5.27 09/29/2020    HGB 11.0 (L) 09/29/2020    HCT 35.5 (L) 09/29/2020    MCV 90 09/29/2020     09/29/2020     CMP  Sodium   Date Value Ref Range Status   09/29/2020 140 136 - 145 mmol/L Final   04/25/2019 141 134 - 144 " mmol/L      Potassium   Date Value Ref Range Status   09/29/2020 4.0 3.5 - 5.1 mmol/L Final     Chloride   Date Value Ref Range Status   09/29/2020 112 (H) 95 - 110 mmol/L Final   04/25/2019 110 98 - 110 mmol/L      CO2   Date Value Ref Range Status   09/29/2020 18 (L) 23 - 29 mmol/L Final     Glucose   Date Value Ref Range Status   09/29/2020 84 70 - 110 mg/dL Final   04/25/2019 95 70 - 99 mg/dL      BUN, Bld   Date Value Ref Range Status   09/29/2020 11 8 - 23 mg/dL Final     Creatinine   Date Value Ref Range Status   09/29/2020 1.1 0.5 - 1.4 mg/dL Final   04/25/2019 1.38 0.60 - 1.40 mg/dL      Calcium   Date Value Ref Range Status   09/29/2020 11.0 (H) 8.7 - 10.5 mg/dL Final     Total Protein   Date Value Ref Range Status   09/28/2020 6.8 6.0 - 8.4 g/dL Final     Albumin   Date Value Ref Range Status   09/28/2020 3.3 (L) 3.5 - 5.2 g/dL Final   04/25/2019 3.3 3.1 - 4.7 g/dL      Total Bilirubin   Date Value Ref Range Status   09/28/2020 0.3 0.1 - 1.0 mg/dL Final     Comment:     For infants and newborns, interpretation of results should be based  on gestational age, weight and in agreement with clinical  observations.  Premature Infant recommended reference ranges:  Up to 24 hours.............<8.0 mg/dL  Up to 48 hours............<12.0 mg/dL  3-5 days..................<15.0 mg/dL  6-29 days.................<15.0 mg/dL       Alkaline Phosphatase   Date Value Ref Range Status   09/28/2020 115 55 - 135 U/L Final     AST   Date Value Ref Range Status   09/28/2020 17 10 - 40 U/L Final     ALT   Date Value Ref Range Status   09/28/2020 16 10 - 44 U/L Final     Anion Gap   Date Value Ref Range Status   09/29/2020 10 8 - 16 mmol/L Final     eGFR if    Date Value Ref Range Status   09/29/2020 >60 >60 mL/min/1.73 m^2 Final     eGFR if non    Date Value Ref Range Status   09/29/2020 >60 >60 mL/min/1.73 m^2 Final     Comment:     Calculation used to obtain the estimated glomerular  filtration  rate (eGFR) is the CKD-EPI equation.          Imaging:  CXR:No radiographic evidence of acute cardiopulmonary disease.     Independently reviewed    Assessment:   1. Solid food dysphagia  2. Normocytic anemia    Plan:   -EGD today  -NPO, IVFs  -Protonix 40 mg IV BID  -Further recommendations to follow endoscopy  -Will need to be scheduled for outpatient colonoscopy due to history of anemia   -Would check iron studies, ferritin, vitamin b12 for evaluation of anemia     Gunner Espinosa MD  [facility]  [address] [place] [address]  Office: [phone]  Fax: [phone]

## 2020-09-29 NOTE — H&P
History & Physical - Short Stay  Gastroenterology    SUBJECTIVE:     Procedure: EGD    Chief Complaint/Indication for Procedure: Dysphagia    Facility-Administered Medications Prior to Admission   Medication    leuprolide (6 month) injection 45 mg     PTA Medications   Medication Sig    alfuzosin (UROXATRAL) 10 mg Tb24 TAKE 1 TABLET(10 MG) BY MOUTH AFTER DINNER    amLODIPine (NORVASC) 2.5 MG tablet TAKE 1 TABLET(2.5 MG) BY MOUTH EVERY DAY    aspirin (ECOTRIN) 81 MG EC tablet Take 81 mg by mouth once daily.      carvedilol (COREG) 25 MG tablet TAKE 1 TABLET(25 MG) BY MOUTH TWICE DAILY WITH MEALS    cinacalcet (SENSIPAR) 30 MG Tab Take 1 tablet (30 mg total) by mouth 2 (two) times daily with meals.    escitalopram oxalate (LEXAPRO) 10 MG tablet Take 10 mg by mouth once daily.    folic acid (FOLVITE) 1 MG tablet TAKE 1 TABLET(1 MG) BY MOUTH EVERY DAY    methotrexate 2.5 MG Tab TAKE 6 TABLETS BY MOUTH EVERY WEEK    ondansetron (ZOFRAN) 8 MG tablet Take 1 tablet (8 mg total) by mouth every 12 (twelve) hours as needed for Nausea.    pantoprazole (PROTONIX) 40 MG tablet Take 1 tablet (40 mg total) by mouth once daily.    predniSONE (DELTASONE) 2.5 MG tablet Take 1 tablet (2.5 mg total) by mouth 2 (two) times daily.    traMADoL (ULTRAM) 50 mg tablet TAKE 2 TABLETS BY MOUTH TWICE DAILY AS NEEDED FOR PAIN       Review of patient's allergies indicates:  No Known Allergies     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    RA (rheumatoid arthritis)     Vitamin D insufficiency 9/30/2016     Past Surgical History:   Procedure Laterality Date    CYSTOSCOPY N/A 12/18/2018    Procedure: CYSTOSCOPY;  Surgeon: Garrett Pina MD;  Location: [facility] OR;  Service: Urology;  Laterality: N/A;    TRANSRECTAL BIOPSY OF PROSTATE WITH ULTRASOUND GUIDANCE N/A  2018    Procedure: BIOPSY, PROSTATE, RECTAL APPROACH, WITH US GUIDANCE;  Surgeon: Garrett Pina MD;  Location: [facility] OR;  Service: Urology;  Laterality: N/A;     Family History   Problem Relation Age of Onset    Heart disease Mother     Stroke Father     Drug abuse Sister     Diabetes Maternal Uncle      Social History     Tobacco Use    Smoking status: Former Smoker     Packs/day: 0.25     Years: 10.00     Pack years: 2.50     Quit date: 2001     Years since quittin.1    Smokeless tobacco: Never Used   Substance Use Topics    Alcohol use: Yes     Comment: seldom    Drug use: Yes     Frequency: 8.0 times per week     Types: Marijuana         OBJECTIVE:     Vital Signs (Most Recent)  Temp: 97.7 °F (36.5 °C) (20 1048)  Pulse: 82 (20 1048)  Resp: 16 (20 1048)  BP: (!) 164/75 (20 1048)  SpO2: 100 % (20 1048)    Physical Exam:                                                       GENERAL:  Comfortable, in no acute distress.                                 HEENT EXAM:  Nonicteric.  No adenopathy.  Oropharynx is clear.               NECK:  Supple.                                                               LUNGS:  Clear.                                                               CARDIAC:  Regular rate and rhythm.  S1, S2.  No murmur.                      ABDOMEN:  Soft, positive bowel sounds, nontender.  No hepatosplenomegaly or masses.  No rebound or guarding.                                             EXTREMITIES:  No edema.     MENTAL STATUS:  Normal, alert and oriented.      ASSESSMENT/PLAN:     Assessment: Dysphagia    Plan: EGD    Anesthesia Plan: General    ASA ndGndrndanddndend:nd nd2nd MALLAMPATI SCORE:  II (hard and soft palate, upper portion of tonsils anduvula visible)

## 2020-09-29 NOTE — ASSESSMENT & PLAN NOTE
Patient with recent unexpected weight loss of 20-30 lb.  Consult Nutrition.  Body mass index is 21.08 kg/m².

## 2020-09-30 ENCOUNTER — TELEPHONE (OUTPATIENT)
Dept: FAMILY MEDICINE | Facility: CLINIC | Age: 75
End: 2020-09-30

## 2020-09-30 VITALS
HEIGHT: 68 IN | DIASTOLIC BLOOD PRESSURE: 76 MMHG | TEMPERATURE: 99 F | SYSTOLIC BLOOD PRESSURE: 130 MMHG | BODY MASS INDEX: 20.92 KG/M2 | OXYGEN SATURATION: 97 % | HEART RATE: 60 BPM | RESPIRATION RATE: 20 BRPM | WEIGHT: 138 LBS

## 2020-09-30 LAB
ANION GAP SERPL CALC-SCNC: 8 MMOL/L (ref 8–16)
AORTIC ROOT ANNULUS: 2.95 CM
AORTIC VALVE CUSP SEPERATION: 2.04 CM
AV INDEX (PROSTH): 0.99
AV MEAN GRADIENT: 4 MMHG
AV PEAK GRADIENT: 10 MMHG
AV VALVE AREA: 3.23 CM2
AV VELOCITY RATIO: 0.98
BASOPHILS # BLD AUTO: 0.01 K/UL (ref 0–0.2)
BASOPHILS NFR BLD: 0.1 % (ref 0–1.9)
BSA FOR ECHO PROCEDURE: 1.73 M2
BUN SERPL-MCNC: 9 MG/DL (ref 8–23)
CALCIUM SERPL-MCNC: 10.4 MG/DL (ref 8.7–10.5)
CHLORIDE SERPL-SCNC: 111 MMOL/L (ref 95–110)
CHOLEST SERPL-MCNC: 125 MG/DL (ref 120–199)
CHOLEST/HDLC SERPL: 4 {RATIO} (ref 2–5)
CO2 SERPL-SCNC: 19 MMOL/L (ref 23–29)
CREAT SERPL-MCNC: 1.2 MG/DL (ref 0.5–1.4)
CV ECHO LV RWT: 0.53 CM
CV STRESS BASE HR: 71 BPM
DIASTOLIC BLOOD PRESSURE: 68 MMHG
DIFFERENTIAL METHOD: ABNORMAL
DOP CALC AO PEAK VEL: 1.58 M/S
DOP CALC AO VTI: 26.14 CM
DOP CALC LVOT AREA: 3.3 CM2
DOP CALC LVOT DIAMETER: 2.04 CM
DOP CALC LVOT PEAK VEL: 1.55 M/S
DOP CALC LVOT STROKE VOLUME: 84.35 CM3
DOP CALCLVOT PEAK VEL VTI: 25.82 CM
E WAVE DECELERATION TIME: 204.19 MSEC
E/A RATIO: 0.71
E/E' RATIO: 11.67 M/S
ECHO LV POSTERIOR WALL: 1.19 CM (ref 0.6–1.1)
EOSINOPHIL # BLD AUTO: 0 K/UL (ref 0–0.5)
EOSINOPHIL NFR BLD: 0.4 % (ref 0–8)
ERYTHROCYTE [DISTWIDTH] IN BLOOD BY AUTOMATED COUNT: 15.2 % (ref 11.5–14.5)
EST. GFR  (AFRICAN AMERICAN): >60 ML/MIN/1.73 M^2
EST. GFR  (NON AFRICAN AMERICAN): 60 ML/MIN/1.73 M^2
FRACTIONAL SHORTENING: 26 % (ref 28–44)
GLUCOSE SERPL-MCNC: 92 MG/DL (ref 70–110)
HCT VFR BLD AUTO: 33.9 % (ref 40–54)
HDLC SERPL-MCNC: 31 MG/DL (ref 40–75)
HDLC SERPL: 24.8 % (ref 20–50)
HGB BLD-MCNC: 10.5 G/DL (ref 14–18)
IMM GRANULOCYTES # BLD AUTO: 0.05 K/UL (ref 0–0.04)
IMM GRANULOCYTES NFR BLD AUTO: 0.6 % (ref 0–0.5)
INTERVENTRICULAR SEPTUM: 1.16 CM (ref 0.6–1.1)
IRON SERPL-MCNC: 28 UG/DL (ref 45–160)
LDLC SERPL CALC-MCNC: 76.8 MG/DL (ref 63–159)
LEFT ATRIUM SIZE: 2.83 CM
LEFT INTERNAL DIMENSION IN SYSTOLE: 3.33 CM (ref 2.1–4)
LEFT VENTRICLE DIASTOLIC VOLUME INDEX: 52.68 ML/M2
LEFT VENTRICLE DIASTOLIC VOLUME: 91.97 ML
LEFT VENTRICLE MASS INDEX: 110 G/M2
LEFT VENTRICLE SYSTOLIC VOLUME INDEX: 25.8 ML/M2
LEFT VENTRICLE SYSTOLIC VOLUME: 45.1 ML
LEFT VENTRICULAR INTERNAL DIMENSION IN DIASTOLE: 4.49 CM (ref 3.5–6)
LEFT VENTRICULAR MASS: 191.54 G
LV LATERAL E/E' RATIO: 8.75 M/S
LV SEPTAL E/E' RATIO: 17.5 M/S
LYMPHOCYTES # BLD AUTO: 1 K/UL (ref 1–4.8)
LYMPHOCYTES NFR BLD: 13.2 % (ref 18–48)
MAGNESIUM SERPL-MCNC: 2.2 MG/DL (ref 1.6–2.6)
MCH RBC QN AUTO: 27.9 PG (ref 27–31)
MCHC RBC AUTO-ENTMCNC: 31 G/DL (ref 32–36)
MCV RBC AUTO: 90 FL (ref 82–98)
MONOCYTES # BLD AUTO: 0.8 K/UL (ref 0.3–1)
MONOCYTES NFR BLD: 10.1 % (ref 4–15)
MV A" WAVE DURATION": 123 MSEC
MV PEAK A VEL: 0.98 M/S
MV PEAK E VEL: 0.7 M/S
MV STENOSIS PRESSURE HALF TIME: 53.93 MS
MV VALVE AREA P 1/2 METHOD: 4.08 CM2
NEUTROPHILS # BLD AUTO: 5.8 K/UL (ref 1.8–7.7)
NEUTROPHILS NFR BLD: 75.6 % (ref 38–73)
NONHDLC SERPL-MCNC: 94 MG/DL
NRBC BLD-RTO: 0 /100 WBC
OHS CV CPX 85 PERCENT MAX PREDICTED HEART RATE MALE: 125
OHS CV CPX MAX PREDICTED HEART RATE: 147
OHS CV CPX PATIENT IS FEMALE: 0
OHS CV CPX PATIENT IS MALE: 1
OHS CV CPX PEAK DIASTOLIC BLOOD PRESSURE: 46 MMHG
OHS CV CPX PEAK HEAR RATE: 90 BPM
OHS CV CPX PEAK RATE PRESSURE PRODUCT: NORMAL
OHS CV CPX PEAK SYSTOLIC BLOOD PRESSURE: 148 MMHG
OHS CV CPX PERCENT MAX PREDICTED HEART RATE ACHIEVED: 61
OHS CV CPX RATE PRESSURE PRODUCT PRESENTING: 9798
PHOSPHATE SERPL-MCNC: 2.6 MG/DL (ref 2.7–4.5)
PISA MRMAX VEL: 0.06 M/S
PISA RADIUS: 0.44 CM
PISA TR MAX VEL: 2.56 M/S
PISA TR VN NYQUIST: 0.01 M/S
PLATELET # BLD AUTO: 249 K/UL (ref 150–350)
PMV BLD AUTO: 10.6 FL (ref 9.2–12.9)
POTASSIUM SERPL-SCNC: 3.9 MMOL/L (ref 3.5–5.1)
RA PRESSURE: 3 MMHG
RBC # BLD AUTO: 3.76 M/UL (ref 4.6–6.2)
SATURATED IRON: 11 % (ref 20–50)
SODIUM SERPL-SCNC: 138 MMOL/L (ref 136–145)
SYSTOLIC BLOOD PRESSURE: 138 MMHG
TDI LATERAL: 0.08 M/S
TDI SEPTAL: 0.04 M/S
TDI: 0.06 M/S
TOTAL IRON BINDING CAPACITY: 249 UG/DL (ref 250–450)
TR MAX PG: 26 MMHG
TRANSFERRIN SERPL-MCNC: 168 MG/DL (ref 200–375)
TRIGL SERPL-MCNC: 86 MG/DL (ref 30–150)
TV REST PULMONARY ARTERY PRESSURE: 29 MMHG
WBC # BLD AUTO: 7.7 K/UL (ref 3.9–12.7)

## 2020-09-30 PROCEDURE — 99219 PR INITIAL OBSERVATION CARE,LEVL II: CPT | Mod: 25,,, | Performed by: SPECIALIST

## 2020-09-30 PROCEDURE — 63600175 PHARM REV CODE 636 W HCPCS: Performed by: NURSE PRACTITIONER

## 2020-09-30 PROCEDURE — 36415 COLL VENOUS BLD VENIPUNCTURE: CPT

## 2020-09-30 PROCEDURE — 63600175 PHARM REV CODE 636 W HCPCS: Performed by: SPECIALIST

## 2020-09-30 PROCEDURE — 25000003 PHARM REV CODE 250: Performed by: SPECIALIST

## 2020-09-30 PROCEDURE — 99219 PR INITIAL OBSERVATION CARE,LEVL II: ICD-10-PCS | Mod: 25,,, | Performed by: SPECIALIST

## 2020-09-30 PROCEDURE — 80048 BASIC METABOLIC PNL TOTAL CA: CPT

## 2020-09-30 PROCEDURE — 99900035 HC TECH TIME PER 15 MIN (STAT)

## 2020-09-30 PROCEDURE — 84100 ASSAY OF PHOSPHORUS: CPT

## 2020-09-30 PROCEDURE — 83735 ASSAY OF MAGNESIUM: CPT

## 2020-09-30 PROCEDURE — 85025 COMPLETE CBC W/AUTO DIFF WBC: CPT

## 2020-09-30 PROCEDURE — 92610 EVALUATE SWALLOWING FUNCTION: CPT

## 2020-09-30 PROCEDURE — 25000003 PHARM REV CODE 250: Performed by: NURSE PRACTITIONER

## 2020-09-30 PROCEDURE — 94761 N-INVAS EAR/PLS OXIMETRY MLT: CPT

## 2020-09-30 PROCEDURE — G0378 HOSPITAL OBSERVATION PER HR: HCPCS

## 2020-09-30 PROCEDURE — 80061 LIPID PANEL: CPT

## 2020-09-30 RX ORDER — REGADENOSON 0.08 MG/ML
0.4 INJECTION, SOLUTION INTRAVENOUS ONCE
Status: COMPLETED | OUTPATIENT
Start: 2020-09-30 | End: 2020-09-30

## 2020-09-30 RX ADMIN — CARVEDILOL 25 MG: 25 TABLET, FILM COATED ORAL at 10:09

## 2020-09-30 RX ADMIN — DOCUSATE SODIUM - SENNOSIDES 1 TABLET: 50; 8.6 TABLET, FILM COATED ORAL at 10:09

## 2020-09-30 RX ADMIN — REGADENOSON 0.4 MG: 0.08 INJECTION, SOLUTION INTRAVENOUS at 09:09

## 2020-09-30 RX ADMIN — CLONIDINE HYDROCHLORIDE 0.1 MG: 0.1 TABLET ORAL at 10:09

## 2020-09-30 RX ADMIN — PANTOPRAZOLE SODIUM 40 MG: 40 TABLET, DELAYED RELEASE ORAL at 10:09

## 2020-09-30 RX ADMIN — AMLODIPINE BESYLATE 2.5 MG: 2.5 TABLET ORAL at 10:09

## 2020-09-30 RX ADMIN — PREDNISONE 2.5 MG: 2.5 TABLET ORAL at 10:09

## 2020-09-30 RX ADMIN — ASPIRIN 81 MG: 81 TABLET, COATED ORAL at 10:09

## 2020-09-30 RX ADMIN — ESCITALOPRAM OXALATE 10 MG: 10 TABLET ORAL at 10:09

## 2020-09-30 RX ADMIN — FOLIC ACID 1 MG: 1 TABLET ORAL at 10:09

## 2020-09-30 NOTE — TELEPHONE ENCOUNTER
----- Message from Beverley Fahad sent at 9/30/2020 10:34 AM CDT -----  Regarding: Sooner Appt  Contact: opal  Type:  Sooner Apoointment Request    Caller is requesting a sooner appointment.  Caller declined first available appointment listed below.  Caller will not accept being placed on the waitlist and is requesting a message be sent to doctor.    Name of Caller: Emily with [facility]  When is the first available appointment?  10/21  Best Call Back Number:  [phone]  Additional Information: please contact pt for appt follow up needs to be 1 week

## 2020-09-30 NOTE — PLAN OF CARE
Problem: SLP Goal  Goal: SLP Goal  Description:   1) Pt will consume dental soft textures (IDDSI 6) and thin liquids with functional oral phase and no overt s/s penetration/aspiration  Outcome: Ongoing, Progressing    Clinical swallow evaluation completed. Pt consumed all PO trials with no overt s/s penetration/aspiration. Prolonged/excessive mastication noted. REC mechanical soft textures (IDDSI 5) with thin liquids. Will follow to monitor tolerance and advance as tolerated.      [Follow-Up: _____] : a [unfilled] follow-up visit [Patient] : patient [Mother] : mother

## 2020-09-30 NOTE — CONSULTS
[facility]  Cardiology  Consult Note    Patient Name: Rajendra Castle  MRN: [id number]  Admission Date: 9/28/2020  Hospital Length of Stay: 0 days  Code Status: Full Code   Attending Provider: Rosalie Bolden MD   Consulting Provider: Garrett Caldwell MD  Primary Care Physician: Irvin Aceves MD  Principal Problem:Chest pain    Patient information was obtained from patient, past medical records and ER records.     Consults  Subjective:     Chief Complaint:  Vomiting and 20 lb weight loss     HPI:  Patient states he has been having weight loss for the past several weeks of up to 20 lb and that he has been vomiting  Denies any diarrhea  Has a history of rheumatoid arthritis and is on prednisone and methotrexate chronically  He also is on Leuprolide androgen antagonist presumably for prostate cancer    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    RA (rheumatoid arthritis)     Vitamin D insufficiency 9/30/2016       Past Surgical History:   Procedure Laterality Date    CYSTOSCOPY N/A 12/18/2018    Procedure: CYSTOSCOPY;  Surgeon: Garrett Pina MD;  Location: [facility] OR;  Service: Urology;  Laterality: N/A;    TRANSRECTAL BIOPSY OF PROSTATE WITH ULTRASOUND GUIDANCE N/A 12/18/2018    Procedure: BIOPSY, PROSTATE, RECTAL APPROACH, WITH US GUIDANCE;  Surgeon: Garrett Pina MD;  Location: [facility] OR;  Service: Urology;  Laterality: N/A;       Review of patient's allergies indicates:  No Known Allergies    No current facility-administered medications on file prior to encounter.      Current Outpatient Medications on File Prior to Encounter   Medication Sig    alfuzosin (UROXATRAL) 10 mg Tb24 TAKE 1 TABLET(10 MG) BY MOUTH AFTER DINNER    amLODIPine (NORVASC) 2.5 MG tablet TAKE 1 TABLET(2.5 MG) BY MOUTH EVERY DAY    aspirin (ECOTRIN) 81  MG EC tablet Take 81 mg by mouth once daily.      carvedilol (COREG) 25 MG tablet TAKE 1 TABLET(25 MG) BY MOUTH TWICE DAILY WITH MEALS    cinacalcet (SENSIPAR) 30 MG Tab Take 1 tablet (30 mg total) by mouth 2 (two) times daily with meals.    escitalopram oxalate (LEXAPRO) 10 MG tablet Take 10 mg by mouth once daily.    folic acid (FOLVITE) 1 MG tablet TAKE 1 TABLET(1 MG) BY MOUTH EVERY DAY    methotrexate 2.5 MG Tab TAKE 6 TABLETS BY MOUTH EVERY WEEK    ondansetron (ZOFRAN) 8 MG tablet Take 1 tablet (8 mg total) by mouth every 12 (twelve) hours as needed for Nausea.    pantoprazole (PROTONIX) 40 MG tablet Take 1 tablet (40 mg total) by mouth once daily.    predniSONE (DELTASONE) 2.5 MG tablet Take 1 tablet (2.5 mg total) by mouth 2 (two) times daily.    traMADoL (ULTRAM) 50 mg tablet TAKE 2 TABLETS BY MOUTH TWICE DAILY AS NEEDED FOR PAIN    [DISCONTINUED] methotrexate 2.5 MG Tab TAKE 6 TABLETS BY MOUTH EVERY WEEK    [DISCONTINUED] predniSONE (DELTASONE) 2.5 MG tablet TAKE 1 TABLET BY MOUTH TWICE DAILY     Family History     Problem Relation (Age of Onset)    Diabetes Maternal Uncle    Drug abuse Sister    Heart disease Mother    Stroke Father        Tobacco Use    Smoking status: Former Smoker     Packs/day: 0.25     Years: 10.00     Pack years: 2.50     Quit date: 2001     Years since quittin.1    Smokeless tobacco: Never Used   Substance and Sexual Activity    Alcohol use: Yes     Comment: seldom    Drug use: Yes     Frequency: 8.0 times per week     Types: Marijuana    Sexual activity: Yes     Partners: Female     Review of Systems   Constitution: Positive for malaise/fatigue and weight loss.   HENT: Negative.    Eyes: Negative.    Cardiovascular:        Has cp associated with pain all over    Respiratory: Positive for shortness of breath.         Smoke thc  Denies other drugs    Musculoskeletal: Positive for arthritis, muscle weakness and myalgias.        Rhematoid    Gastrointestinal:  Positive for abdominal pain, heartburn, nausea and vomiting.   Genitourinary: Negative.    Neurological: Negative.    Psychiatric/Behavioral: Positive for altered mental status.     Objective:     Vital Signs (Most Recent):  Temp: 97.6 °F (36.4 °C) (09/30/20 0740)  Pulse: 77 (09/30/20 0740)  Resp: 16 (09/30/20 0740)  BP: 121/63 (09/30/20 0740)  SpO2: 97 % (09/30/20 0740) Vital Signs (24h Range):  Temp:  [97.6 °F (36.4 °C)-99.4 °F (37.4 °C)] 97.6 °F (36.4 °C)  Pulse:  [71-89] 77  Resp:  [16-18] 16  SpO2:  [93 %-100 %] 97 %  BP: (120-185)/(55-86) 121/63     Weight: 62.6 kg (138 lb 0.1 oz)  Body mass index is 20.98 kg/m².    SpO2: 97 %  O2 Device (Oxygen Therapy): room air      Intake/Output Summary (Last 24 hours) at 9/30/2020 0911  Last data filed at 9/30/2020 0000  Gross per 24 hour   Intake 370 ml   Output --   Net 370 ml       Lines/Drains/Airways     Peripheral Intravenous Line                 Peripheral IV - Single Lumen 09/29/20 20 G Left Antecubital 1 day                Physical Exam 130/70    p 80  herent no  Bruits   Lungs clear cor reg  No m  avd soft   Extremities  No edema good fem pulses     Significant Labs:   CMP   Recent Labs   Lab 09/28/20 2038 09/29/20  0359 09/30/20  0610    140 138   K 4.5 4.0 3.9   * 112* 111*   CO2 20* 18* 19*   GLU 96 84 92   BUN 13 11 9   CREATININE 1.2 1.1 1.2   CALCIUM 11.6* 11.0* 10.4   PROT 6.8  --   --    ALBUMIN 3.3*  --   --    BILITOT 0.3  --   --    ALKPHOS 115  --   --    AST 17  --   --    ALT 16  --   --    ANIONGAP 9 10 8   ESTGFRAFRICA >60 >60 >60   EGFRNONAA 60 >60 60   , CBC   Recent Labs   Lab 09/28/20 2038 09/29/20  0359 09/30/20  0611   WBC 5.51 5.27 7.70   HGB 11.5* 11.0* 10.5*   HCT 37.4* 35.5* 33.9*    267 249    and Troponin   Recent Labs   Lab 09/28/20 2038 09/29/20  0359 09/29/20  1405   TROPONINI 0.182* 0.179* 0.126*       Significant Imaging:ecg  lvh     Assessment and Plan:   1) lvh witout severe as or hi bp - r/o  hypertrohic  cm  And cad-  Echo + est   2) vomiting wt loss with pt on MTX + steroids + antiandrogen-   Gi jo done   ? Drugs  And compliance with meds or me3d side effects   Active Diagnoses:    Diagnosis Date Noted POA    PRINCIPAL PROBLEM:  Chest pain [R07.9] 09/28/2020 Yes    Dysphagia [R13.10] 09/29/2020 Yes    Severe malnutrition [E43] 09/29/2020 Yes    CKD (chronic kidney disease) [N18.9] 08/23/2017 Yes    Hyperparathyroidism [E21.3] 09/30/2016 Yes    Gastroesophageal reflux disease without esophagitis [K21.9] 09/20/2016 Yes    Rheumatoid arthritis involving multiple sites [M06.9] 08/18/2016 Yes    Essential hypertension [I10] 08/18/2016 Yes      Problems Resolved During this Admission:     I personally reviewed chart and imaging studies , performed a detailed review of systems ,examined patient, and discussed with the patient her illness and treatment including diet . This consult was prolonged and required approximately 50% time for review and 50% time examining patient and writing /dictating notes and orders     VTE Risk Mitigation (From admission, onward)         Ordered     enoxaparin injection 40 mg  Every 24 hours      09/29/20 0238     IP VTE HIGH RISK PATIENT  Once      09/29/20 0238     Place sequential compression device  Until discontinued      09/29/20 0238                Thank you for your consult.     Garrett Caldwell MD  Cardiology   [facility]

## 2020-09-30 NOTE — NURSING
"Attempting to provide dc instructions.  Pt refusing to go home- stating "I am sick as hell"   Dr. Bolden notified- in to speak with patient.  "

## 2020-09-30 NOTE — NURSING
Discharge instructions given to patient.  Instructed on med regimen and f/u appoints.  He v/u.  Iv removed per policy, cath intact.  Discharging to home.  Pt has a ride lined up and will call when they arrive.

## 2020-09-30 NOTE — PT/OT/SLP EVAL
Speech Language Pathology Evaluation  Bedside Swallow    Patient Name:  Rajendra Castle   MRN:  [id number]  Admitting Diagnosis: Chest pain    Recommendations:                 General Recommendations:  Monitor tolerance and upgrade as tolerated. GI following  Diet recommendations:  Mechanical soft(IDDSI 5), Thin   Aspiration Precautions: Alternating bites/sips, Small bites/sips and Standard aspiration precautions   General Precautions: Standard, aspiration, fall  Communication strategies:  none    History:     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    RA (rheumatoid arthritis)     Vitamin D insufficiency 9/30/2016       Past Surgical History:   Procedure Laterality Date    CYSTOSCOPY N/A 12/18/2018    Procedure: CYSTOSCOPY;  Surgeon: Garrett Pina MD;  Location: [facility];  Service: Urology;  Laterality: N/A;    ESOPHAGOGASTRODUODENOSCOPY N/A 9/29/2020    Procedure: EGD (ESOPHAGOGASTRODUODENOSCOPY);  Surgeon: Gunner Espinosa MD;  Location: [facility];  Service: Endoscopy;  Laterality: N/A;    TRANSRECTAL BIOPSY OF PROSTATE WITH ULTRASOUND GUIDANCE N/A 12/18/2018    Procedure: BIOPSY, PROSTATE, RECTAL APPROACH, WITH US GUIDANCE;  Surgeon: Garrett Pina MD;  Location: [facility];  Service: Urology;  Laterality: N/A;       Social History: Patient lives alone.    Prior Intubation HX:  None this admit    Modified Barium Swallow: None in Epic    Imaging:  NM Myocardial Perfusion Spect Multi Pharmacologic   Final Result      1.  Scintigraphically negative for ischemia or infarct.   2. Normal left ventricular systolic function.         Electronically signed by: Carlos Irwin MD   Date:    09/30/2020   Time:    10:33      X-Ray Chest AP Portable   Final Result      No radiographic evidence of acute cardiopulmonary disease.         Electronically  "signed by: Sp Sanchez   Date:    09/28/2020   Time:    20:29      NM Myocardial Perfusion Spect Multi Pharmacologic    (Results Pending)        Prior diet: Pt reports liquid diet x1 month 2' solid food dysphagia.    Occupation/hobbies/homemaking: Masters degree. Retired teacher.    Subjective     "I'm hungry, I'm thirsty." (Untouched lunch at bedside)  "Anything solid I have trouble swallowing. Feels like my throat is constricting."    Objective:   Pt seen for clinical swallow evaluation. He is awake, ornery and at times uncooperative. C/o solid food dysphagia x1 month with 25# weight loss. S/P EGD yesterday, 9/29/2020, which revealed "No endoscopic abnormality was evident in the esophagus to explain the patient's complaint of dysphagia. It was decided, however, to proceed with dilation of the entire esophagus."    Oral Musculature Evaluation  · Oral Musculature: WFL  · Dentition: scattered dentition  · Secretion Management: adequate  · Mucosal Quality: dry  · Mandibular Strength and Mobility: WFL  · Oral Labial Strength and Mobility: WFL  · Lingual Strength and Mobility: WFL  · Buccal Strength and Mobility: WFL  · Volitional Cough: adequate  · Volitional Swallow: able to palpate laryngeal rise  · Voice Prior to PO Intake: clear    Bedside Swallow Eval:   Consistencies Assessed:  · Thin liquids --via cup, straw, rapid continuous straw and cup sips  · Puree --applesauce  · Mixed consistencies --diced peaches  · Soft solids --noodles with ground turkey/gravy  · Solids --salad, garlic bread    Oral Phase:   · Excessive mastication with noodles/ground turkey   · Pt reports he has to excessively chew or else he will vomit    Pharyngeal Phase:   · coughing/choking   · C/o globus sensation (sternum) with bread    Compensatory Strategies  · Encouraged more timely oral phase without excessive mastication. Pt consumed salad and diced peaches without difficulty. No vomiting noted.     Assessment:   Clinical swallow " evaluation completed. S/p EGD with dilation 9/29/2020. Pt consumed all PO trials with no overt s/s penetration/aspiration. Prolonged/excessive mastication noted. No vomiting noted despite pt reporting he vomits after eating solid foods. REC mechanical soft textures (IDDSI 5) with thin liquids. Will follow to monitor tolerance and advance as tolerated.      Goals:   Multidisciplinary Problems     SLP Goals        Problem: SLP Goal    Goal Priority Disciplines Outcome   SLP Goal     SLP Ongoing, Progressing   Description:   1) Pt will consume dental soft textures (IDDSI 6) and thin liquids with functional oral phase and no overt s/s penetration/aspiration                   Plan:     · Patient to be seen:  3 x/week   · Plan of Care expires:  10/07/20  · Plan of Care reviewed with:  patient   · SLP Follow-Up:  Yes       Discharge recommendations:      Barriers to Discharge:  None    Time Tracking:     SLP Treatment Date:   09/30/20  Speech Start Time:  1330  Speech Stop Time:  1401     Speech Total Time (min):  31 min    Billable Minutes: Eval Swallow and Oral Function 31 and Total Time 31    Ivett Sanon CCC-SLP  09/30/2020

## 2020-09-30 NOTE — PLAN OF CARE
Per Tom with [facility]-the pt is assigned to [facility]  . I sent to [facility] via [facility] and booked the discharge destination . AVS updated. Opal Rothman, LORENEW     09/30/20 1145   Post-Acute Status   Post-Acute Authorization Home Health   Home Health Status Set-up Complete

## 2020-09-30 NOTE — DISCHARGE INSTRUCTIONS
Thank you for choosing [facility] for your medical care. The primary doctor who is taking care of you at the time of your discharge is Rosalie Bolden MD.     You were admitted to the hospital with Chest pain.     Please note your discharge instructions, including diet/activity restrictions, follow-up appointments, and medication changes.  If you have any questions about your medical issues, prescriptions, or any other questions, please feel free to contact the [facility] Medicine Dept at [phone] and we will help.    If you are previously with Home health, outpatient PT/OT or under a therapy program, you are cleared to return to those programs.    Please direct all long term medication refills and follow up to your primary care provider, Irvin Aceves MD. Thank you again for letting us take care of your health care needs.    Please note the following discharge instructions per your discharging physician-  Follow up with your PCP and GI  Home health aide, nurse, and speech therapy has been ordered

## 2020-09-30 NOTE — RESPIRATORY THERAPY
09/30/20 1000   Patient Assessment/Suction   Level of Consciousness (AVPU) alert   Respiratory Effort Normal;Unlabored   Expansion/Accessory Muscles/Retractions expansion symmetric   All Lung Fields Breath Sounds clear   Cough Frequency no cough   PRE-TX-O2   O2 Device (Oxygen Therapy) room air   SpO2 98 %   Pulse Oximetry Type Intermittent   $ Pulse Oximetry - Multiple Charge Pulse Oximetry - Multiple   Pulse 76   Resp 16   Aerosol Therapy   $ Aerosol Therapy Charges PRN treatment not required   Respiratory Treatment Status (SVN) PRN treatment not required

## 2020-09-30 NOTE — PLAN OF CARE
The pt has f/u appts made and is discharging with [facility] services. The pt is clear for discharge from case management. Opal Rothman, DIOGO     09/30/20 1146   Final Note   Assessment Type Final Discharge Note   Anticipated Discharge Disposition Home-Health

## 2020-09-30 NOTE — PROGRESS NOTES
Patient requested something to eat.  I assisted patient to sit up straight in the bed.  Observed patient eating 1 vanilla pudding cup, slowly & small bites.  Occasional sips of water between bites.  No complaints of N/V or discomfort.  Patient requested 2 vanilla pudding.  Patient tolerated 1/2 of cup and said he was done.

## 2020-09-30 NOTE — PLAN OF CARE
I sent the pts HH orders and HH packet to [facility] to assign a HH provider. I called [facility] to make the pt a follow up appt with Dr. Aceves- message sent to providers office to call pt with appt date and time  . I made the pt an appt with Dr. Espinosa for 10/19/2020 at 1:30 pm. AVS updated. CM following. Opal Rothman, LORENE    Date Status/Notes Created By   · 9/30/2020 10:14:41 AM New: The pt is discharging home today and needs HH assigned. Thanks !  Opal Rothman  ·  · 09/30/20 1014   · Post-Acute Status   · Post-Acute Authorization · Home Health   · Home Health Status · Referrals Sent   ·      09/30/20 1014   Post-Acute Status   Post-Acute Authorization Home Health   Home Health Status Referrals Sent

## 2020-10-01 ENCOUNTER — TELEPHONE (OUTPATIENT)
Dept: GASTROENTEROLOGY | Facility: CLINIC | Age: 75
End: 2020-10-01

## 2020-10-01 ENCOUNTER — NURSE TRIAGE (OUTPATIENT)
Dept: ADMINISTRATIVE | Facility: CLINIC | Age: 75
End: 2020-10-01

## 2020-10-01 NOTE — HOSPITAL COURSE
"Patient was observed by the hospital medicine service for difficulty swallowing and chest pain.  For his difficulty swallowing GI was consulted and patient underwent endoscopy with GI which showed: "Normal esophagus; biopsied to evaluate for EoE, Dilated with 54 [place], Atrophic gastric mucosa and erythematous antral mucosa; biopsied for h.pylori, Hematin throughout stomach, Normal examined duodenum".  Patient was cleared for discharge a GI standpoint and is to follow with include discuss scheduling outpatient colonoscopy and barium esophagram as well as discuss pathology results.  Speech language pathology was consulted and evaluated swallow and recommended mechanical soft diet.  Home health SLP was ordered on discharge.  In regards to his chest pain has troponins were trended.  Seen by Cardiology and stress test and TTE were ordered.  Results below.  He was cleared for discharge by Cardiology.  Patient denies chest pain at time of discharge.  He did express frustration with his discharge as he expressed he was still "sick and dying".  I explained to him the reasons he was admitted to the hospital and the workup we had done and the follow-up plan for the problems.  He expressed understanding but remained frustrated.  "

## 2020-10-01 NOTE — TELEPHONE ENCOUNTER
Spoke w/ pt to verify pt was still coming to appt. Pt states he was in the car on the way and should arrive before 2:45pm.

## 2020-10-01 NOTE — DISCHARGE SUMMARY
"[facility]  Discharge Summary      Patient Name: Rajendra Castle  MRN: [id number]  Admission Date: 9/28/2020  Hospital Length of Stay: 0 days  Discharge Date and Time: 9/30/2020  6:20 PM  Attending Physician: No att. providers found   Discharging Provider: Rosalie Bolden MD  Primary Care Provider: Irvin Aceves MD      HPI:   Rajendra Castle is a 75-year-old male with a past medical history significant for Rheumatoid arthritis, hypertension, and degenerative disc disease who presented to the emergency department with complaints of  generalized body aches, multiple joint pains, chest pain, and difficulty swallowing.  Patient states that he has lost 20-30 lb over the past 6 weeks.  He has a poor appetite and when he does eat he has difficulty swallowing.  Denies fever, shortness of breath, diaphoresis, abdominal pain, nausea, vomiting, or diarrhea.  ED workup is significant for elevated troponin with no ischemic changes noted on his EKG.  Patient currently denies chest pain.  He will be placed in observation for continued monitoring and treatment.    Procedure(s) (LRB):  EGD (ESOPHAGOGASTRODUODENOSCOPY) (N/A)      Hospital Course:   Patient was observed by the hospital medicine service for difficulty swallowing and chest pain.  For his difficulty swallowing GI was consulted and patient underwent endoscopy with GI which showed: "Normal esophagus; biopsied to evaluate for EoE, Dilated with 54 Cypriot Savary, Atrophic gastric mucosa and erythematous antral mucosa; biopsied for h.pylori, Hematin throughout stomach, Normal examined duodenum".  Patient was cleared for discharge a GI standpoint and is to follow with include discuss scheduling outpatient colonoscopy and barium esophagram as well as discuss pathology results.  Speech language pathology was consulted and evaluated swallow and recommended mechanical soft diet.  Home health SLP was ordered on discharge.  In regards to his chest pain " "has troponins were trended.  Seen by Cardiology and stress test and TTE were ordered.  Results below.  He was cleared for discharge by Cardiology.  Patient denies chest pain at time of discharge.  He did express frustration with his discharge as he expressed he was still "sick and dying".  I explained to him the reasons he was admitted to the hospital and the workup we had done and the follow-up plan for the problems.  He expressed understanding but remained frustrated.     Consults:   Consults (From admission, onward)        Status Ordering Provider     Inpatient consult to Gastroenterology  Once     Provider:  (Not yet assigned)    Completed CORA GAMBOA     Inpatient consult to Registered Dietitian/Nutritionist  Once     Provider:  (Not yet assigned)    Completed CORA GAMBOA          No new Assessment & Plan notes have been filed under this hospital service since the last note was generated.  Service: Hospital Medicine    Final Active Diagnoses:    Diagnosis Date Noted POA    PRINCIPAL PROBLEM:  Chest pain [R07.9] 09/28/2020 Yes    Dysphagia [R13.10] 09/29/2020 Yes    Severe malnutrition [E43] 09/29/2020 Yes    CKD (chronic kidney disease) [N18.9] 08/23/2017 Yes    Hyperparathyroidism [E21.3] 09/30/2016 Yes    Gastroesophageal reflux disease without esophagitis [K21.9] 09/20/2016 Yes    Rheumatoid arthritis involving multiple sites [M06.9] 08/18/2016 Yes    Essential hypertension [I10] 08/18/2016 Yes      Problems Resolved During this Admission:       Discharged Condition: good    Disposition: Home-Health Care [facility]    Follow Up:  Follow-up Information     Irvin Aceves MD In 1 week.    Specialty: Family Medicine  Why: Sent message to nurse to call pt with appt date and time   Contact information:  [address] [facility]  [place] [address]  [phone]                 Patient Instructions:      Ambulatory referral/consult to Home Health   Standing Status: Future   Referral Priority: Routine " Referral Type: Home Health   Referral Reason: Specialty Services Required   Requested Specialty: Home Health Services   Number of Visits Requested: 1     Notify your health care provider if you experience any of the following:  persistent nausea and vomiting or diarrhea     Notify your health care provider if you experience any of the following:  severe uncontrolled pain     Notify your health care provider if you experience any of the following:  difficulty breathing or increased cough     Notify your health care provider if you experience any of the following:  persistent dizziness, light-headedness, or visual disturbances     Notify your health care provider if you experience any of the following:  increased confusion or weakness     Activity as tolerated       Significant Diagnostic Studies: Labs:   BMP:   Recent Labs   Lab 09/30/20  0610   GLU 92      K 3.9   *   CO2 19*   BUN 9   CREATININE 1.2   CALCIUM 10.4   MG 2.2   , CBC   Recent Labs   Lab 09/30/20  0611   WBC 7.70   HGB 10.5*   HCT 33.9*       and Troponin   Recent Labs   Lab 09/28/20  2038 09/29/20  0359 09/29/20  1405   TROPONINI 0.182* 0.179* 0.126*     Radiology Results (last 7 days)    Procedure Component Value Units Date/Time   NM Myocardial Perfusion Spect Multi Pharmacologic [[id number]] Resulted: 09/30/20 1033   Order Status: Completed Updated: 09/30/20 1036   Narrative:     EXAMINATION:   NM MYOCARDIAL PERFUSION SPECT MULTI PHARM     CLINICAL HISTORY:   Chest pain/anginal equiv, 10yr CHD risk > 20%, EST candidate;     TECHNIQUE:   SPECT images in short, vertical and horizontal long axis were acquired 30 minutes after the injection of 12.1 mCi of Tc-99m tetrofosmin at rest and 31.1 mCi during a Lexiscan cardiac stress.  The clinical stress and ECG portion of the study is to be read separately.     COMPARISON:   None.     FINDINGS:   The quality of the study is good.     Stress SPECT images demonstrate homogenous distribution  of the tracer throughout the left ventricle. On the resting images, there is matched homogenous distribution of the tracer throughout the left ventricle.     The gated post-stress images reveal normal wall motion and normal systolic wall thickening with an estimated LVEF of 60 %. The LV cavity is not dilated with an end-diastolic volume of 67 ml and an end-systolic volume of 27 ml.    Impression:       1.  Scintigraphically negative for ischemia or infarct.   2. Normal left ventricular systolic function.       Electronically signed by: Carlos Irwin MD   Date: 09/30/2020   Time: 10:33   X-Ray Chest AP Portable [[id number]] Resulted: 09/28/20 2029   Order Status: Completed Updated: 09/28/20 2031   Narrative:     EXAMINATION:   XR CHEST AP PORTABLE     CLINICAL HISTORY:   Chest Pain;     TECHNIQUE:   Single frontal view of the chest was performed.     COMPARISON:   Chest radiograph performed 09/02/2020     FINDINGS:   EKG leads overlie the chest.  The cardiomediastinal silhouette appears to be within normal limits.  Minor platelike opacities the lung bases favored to represent atelectasis and/or scar.  No focal airspace consolidation is suggested.  No definite pneumothorax or pleural effusion.  No acute findings are suggested in the visualized upper abdomen.  Osseous and soft tissue structures without acute change.    Impression:       No radiographic evidence of acute cardiopulmonary disease.       Electronically signed by: Sp Sanchez   Date: 09/28/2020   Time: 20:29     Upper GI endoscopy  Order: [id number]  Status:  Final result   Visible to patient:  Yes (Patient Portal) Next appt:  10/02/2020 at 09:30 AM in Urology (Garrett Pina MD)     Narrative  Performed by: [facility]   Patient Name: Rajendra Castle   Procedure Date: 9/29/2020 11:13 AM   MRN: [id number]   Account Number: [id number]   YOB: 1945   Admit Type: Outpatient   Age: 75   Gender: Male   Attending MD: Gunner  MAYURI Espinosa MD   Procedure:           Upper GI endoscopy   Indications:         Dysphagia   Providers:           Gunner Espinosa MD, Tawnya Morales RN, Swati Kenny, Technician (Technician)   Referring MD:        Gunner Espinosa MD (Referring MD)   Complications:       No immediate complications.   Medicines:           General Anesthesia   Procedure:           Pre-Anesthesia Assessment:                        - Prior to the procedure, a History and Physical was                        performed, and patient medications, allergies and                        sensitivities were reviewed. The patient's tolerance                        of previous anesthesia was reviewed.                        - The risks and benefits of the procedure and the                        sedation options and risks were discussed with the                        patient. All questions were answered and informed                        consent was obtained.                        - Patient identification and proposed procedure were                        verified prior to the procedure by the physician.                        The procedure was verified in the pre-procedure area.                        - Pre-procedure physical examination revealed no                        contraindications to sedation.                        - ASA Grade Assessment: III - A patient with severe                        systemic disease.                        - After reviewing the risks and benefits, the                        patient was deemed in satisfactory condition to                        undergo the procedure.                        - General anesthesia under the supervision of a CRNA                        was determined to be medically necessary for this                        procedure based on review of the patient's medical                        history, medications, and prior anesthesia history.                         After obtaining informed consent, the endoscope was                        passed under direct vision. Throughout the                        procedure, the patient's blood pressure, pulse, and                        oxygen saturations were monitored continuously. The                        Olympus scope GIF- ([id number]) was introduced                        through the mouth, and advanced to the second part                        of duodenum. The upper GI endoscopy was accomplished                        without difficulty. The patient tolerated the                        procedure well.   Findings:        No endoscopic abnormality was evident in the esophagus to explain        the patient's complaint of dysphagia. It was decided, however, to        proceed with dilation of the entire esophagus. A guidewire was        placed and the scope was withdrawn. Dilation was performed with a        Savary dilator with no resistance at 54 Fr. Biopsies were obtained        from the proximal and middle esophagus with cold forceps for        histology of suspected eosinophilic esophagitis.        Localized moderately erythematous mucosa was found in the gastric        antrum. Biopsies were taken with a cold forceps for Helicobacter        pylori testing. Verification of patient identification for the        specimen was done. Estimated blood loss was minimal.        Diffuse atrophic mucosa was found in the gastric body.        Hematin (altered blood/coffee-ground-like material) was found in the        entire examined stomach.        Several non-bleeding superficial duodenal ulcers with no stigmata of        bleeding were found in the duodenal bulb. The largest lesion was 3        mm in largest dimension.   Impression:          - No endoscopic esophageal abnormality to explain                        patient's dysphagia. Esophagus dilated with 54                        Macedonian savary and biopsied to evaluate for EoE.                         - Erythematous mucosa in the antrum. Biopsied.                        - Gastric mucosal atrophy.                        - Hematin (altered blood/coffee-ground-like                        material) in the entire stomach.                        - Normal duodenal bulb, first portion of the                        duodenum and second portion of the duodenum.   Recommendation:      - Advance diet as tolerated.                        - Continue present medications.                        - Await pathology results.                        - Return patient to hospital lam for ongoing care.                        - Consider barium esophagram as next step in                        evaluation of dysphagia, which can be performed as                        outpatient                        - Needs colonoscopy scheduled as an outpatient for                        screening purposes   Procedure Code(s):        --- Professional ---        12979, Esophagogastroduodenoscopy, flexible, transoral; with        insertion of guide wire followed by passage of dilator(s) through        esophagus over guide wire        49077, 59, Esophagogastroduodenoscopy, flexible, transoral; with        biopsy, single or multiple   Diagnosis Code(s):        --- Professional ---        R13.10, Dysphagia, unspecified        K31.89, Other diseases of stomach and duodenum        K92.2, Gastrointestinal hemorrhage, unspecified   CPT copyright 2019 [employer]. All rights reserved.   The codes documented in this report are preliminary and upon  review   may be revised to meet current compliance requirements.   Gunner Espinosa MD   9/29/2020 11:38:40 AM   This report has been verified and signed electronically.   Number of Addenda: 0   Note Initiated On: 9/29/2020 11:13 AM   Estimated Blood Loss:        Estimated blood loss: none.        This report has been verified and signed electronically.      Specimen Collected:  09/29/20 11:13 Last Resulted: 09/29/20 11:39            TTE 9/30       · The left ventricle is normal in size with normal systolic function. The estimated ejection fraction is 65%.  · There is moderate left ventricular concentric hypertrophy.  · Normal left ventricular diastolic function.  · Normal right ventricular systolic function.  · Moderate aortic regurgitation.  · Moderate mitral regurgitation.  · No pulmonary hypertension  · Mild tricuspid regurgitation.  · Normal central venous pressure (3 mmHg).  · The estimated PA systolic pressure is 29 mmHg.  · Trivial posterior pericardial effusion. Effusion is fluid.     Left ventricle hypertrophy  Moderate aortic regurgitation mitral regurgitant  Mean left atrial pressure is normal  No pulmonary hypertension       Nuclear Stress Test 9/30  Conclusion         The EKG portion of this study is abnormal but not diagnostic.    The patient reported no chest pain during the stress test.    There were no arrhythmias during stress.    ECG Stress Nuclear portion of this study will be reported separately.            Pending Diagnostic Studies:     Procedure Component Value Units Date/Time    Specimen to Pathology, Surgery Gastrointestinal tract [[id number]] Collected: 09/29/20 1128    Order Status: Sent Lab Status: In process Updated: 09/29/20 1222         Medications:  Reconciled Home Medications:      Medication List      CONTINUE taking these medications    alfuzosin 10 mg Tb24  Commonly known as: UROXATRAL  TAKE 1 TABLET(10 MG) BY MOUTH AFTER DINNER     amLODIPine 2.5 MG tablet  Commonly known as: NORVASC  TAKE 1 TABLET(2.5 MG) BY MOUTH EVERY DAY     aspirin 81 MG EC tablet  Commonly known as: ECOTRIN  Take 81 mg by mouth once daily.     carvediloL 25 MG tablet  Commonly known as: COREG  TAKE 1 TABLET(25 MG) BY MOUTH TWICE DAILY WITH MEALS     cinacalcet 30 MG Tab  Commonly known as: SENSIPAR  Take 1 tablet (30 mg total) by mouth 2 (two) times daily with meals.      escitalopram oxalate 10 MG tablet  Commonly known as: LEXAPRO  Take 10 mg by mouth once daily.     folic acid 1 MG tablet  Commonly known as: FOLVITE  TAKE 1 TABLET(1 MG) BY MOUTH EVERY DAY     methotrexate 2.5 MG Tab  TAKE 6 TABLETS BY MOUTH EVERY WEEK     ondansetron 8 MG tablet  Commonly known as: ZOFRAN  Take 1 tablet (8 mg total) by mouth every 12 (twelve) hours as needed for Nausea.     pantoprazole 40 MG tablet  Commonly known as: PROTONIX  Take 1 tablet (40 mg total) by mouth once daily.     predniSONE 2.5 MG tablet  Commonly known as: DELTASONE  Take 1 tablet (2.5 mg total) by mouth 2 (two) times daily.     traMADoL 50 mg tablet  Commonly known as: ULTRAM  TAKE 2 TABLETS BY MOUTH TWICE DAILY AS NEEDED FOR PAIN            Indwelling Lines/Drains at time of discharge:   Lines/Drains/Airways     None                 Time spent on the discharge of patient: 50 minutes  Patient was seen and examined on the date of discharge and determined to be suitable for discharge.    Plan of care discussed with Cardiology on day of discharge       Rosalie Bolden MD  Department of Hospital Medicine  [facility]

## 2020-10-01 NOTE — TELEPHONE ENCOUNTER
----- Message from Princess BRAN Ortiz sent at 10/1/2020  2:15 PM CDT -----  Contact: pt  Type: Needs Medical Advice  Who Called pt  Best Call Back Number: [phone] (home)     Additional Information: patient is not feeling well, hes feeling weak but is on the way to his appt due to being in the ED

## 2020-10-01 NOTE — TELEPHONE ENCOUNTER
"Patient reports he is unable to eat solid foods for the last two weeks. Patient reports he is having chest pains. The patient was advised per protocol and patient wants an earlier appt with g/i specialist. Patient was transferred to scheduling.     Reason for Disposition   [1] SEVERE vomiting (e.g., 6 or more times/day) AND [2] present > 8 hours   [1] Chest pain(s) lasting a few seconds AND [2] persists > 3 days   [1] MILD vomiting with diarrhea AND [2] present > 5 days    Additional Information   Negative: [1] Vomiting AND [2] contains red blood or black ("coffee ground") material  (Exception: few red streaks in vomit that only happened once)   Negative: Severe pain in one eye   Negative: Recent head injury (within last 3 days)   Negative: Recent abdominal injury (within last 3 days)   Negative: [1] Insulin-dependent diabetes (Type I) AND [2] glucose > 400 mg/dl (22 mmol/l)   Negative: Vomiting occurs only while coughing   Negative: [1] Pregnant < 20 Weeks AND [2] nausea/vomiting began in early pregnancy (i.e., 4-8 weeks pregnant)   Negative: Chest pain   Negative: [1] SEVERE headache AND [2] similar to prior migraines   Negative: Shock suspected (e.g., cold/pale/clammy skin, too weak to stand, low BP, rapid pulse)   Negative: Difficult to awaken or acting confused  (e.g., disoriented, slurred speech)   Negative: Sounds like a life-threatening emergency to the triager   Negative: Severe difficulty breathing (e.g., struggling for each breath, speaks in single words)   Negative: Difficult to awaken or acting confused (e.g., disoriented, slurred speech)   Negative: Shock suspected (e.g., cold/pale/clammy skin, too weak to stand, low BP, rapid pulse)   Negative: [1] Chest pain lasts > 5 minutes AND [2] age > 45   Negative: [1] Chest pain lasts > 5 minutes AND [2] age > 30 AND [3] one or more cardiac risk factors (e.g., diabetes, high blood pressure, high cholesterol, smoker, or strong family history " "of heart disease)   Negative: [1] Chest pain lasts > 5 minutes AND [2] history of heart disease (i.e., angina, heart attack, heart failure, bypass surgery, takes nitroglycerin)   Negative: [1] Chest pain lasts > 5 minutes AND [2] described as crushing, pressure-like, or heavy   Negative: Passed out (i.e., lost consciousness, collapsed and was not responding)   Negative: Heart beating < 50 beats per minute OR > 140 beats per minute   Negative: Visible sweat on face or sweat dripping down face   Negative: Sounds like a life-threatening emergency to the triager   Negative: Followed a chest injury   Negative: SEVERE chest pain   Negative: [1] Chest pain (or "angina") comes and goes AND [2] is happening more often (increasing in frequency) or getting worse (increasing in severity)   Negative: Pain also in shoulder(s) or arm(s) or jaw   Negative: Difficulty breathing   Negative: Dizziness or lightheadedness   Negative: Coughing up blood   Negative: Cocaine use within last 3 days   Negative: Major surgery in the past month   Negative: Hip or leg fracture (broken bone) in past month (or had cast on leg or ankle in past month)   Negative: Illness requiring prolonged bedrest in past month (e.g., immobilization, long hospital stay)   Negative: Long-distance travel in past month (e.g., car, bus, train, plane; with trip lasting 6 or more hours)   Negative: History of prior "blood clot" in leg or lungs (i.e., deep vein thrombosis, pulmonary embolism)   Negative: History of inherited increased risk of blood clots (e.g., Factor 5 Leiden, Anti-thrombin 3, Protein C or Protein S deficiency, Prothrombin mutation)   Negative: [1] Chest pain lasts > 5 minutes AND [2] occurred in past 3 days (72 hours)   Negative: Taking a deep breath makes pain worse   Negative: Patient sounds very sick or weak to the triager   Negative: Cancer treatment in the past two months (or has cancer now)   Negative: [1] Chest pain lasts " "> 5 minutes AND [2] occurred > 3 days ago (72 hours) AND [3] NO chest pain or cardiac symptoms now   Negative: [1] Chest pain lasting < 5 minutes AND [2] NO chest pain or cardiac symptoms (e.g., breathing difficulty, sweating) now   Negative: Fever > 100.4 F (38.0 C)   Negative: Rash in same area as pain (may be described as "small blisters")   Negative: [1] Patient says chest pain feels exactly the same as previously diagnosed "heartburn" AND [2] describes burning in chest AND [3] accompanying sour taste in mouth   Negative: [1] Chest pain(s) lasting a few seconds from coughing AND [2] persists > 3 days   Negative: [1] Chest pain from known angina comes and goes AND [2] is NOT happening more often (increasing in frequency) or getting worse (increasing in severity)   Negative: [1] Chest pain lasting < 5 minutes AND [2] completely gone after taking nitroglycerin   Negative: [1] Chest pain lasting < 5 minutes AND [2] has not taken prescribed nitroglycerin   Negative: Shock suspected (e.g., cold/pale/clammy skin, too weak to stand, low BP, rapid pulse)   Negative: Difficult to awaken or acting confused (e.g., disoriented, slurred speech)   Negative: Sounds like a life-threatening emergency to the triager   Negative: Vomiting occurs only while coughing   Negative: [1] Pregnant < 20 Weeks AND [2] nausea/vomiting began in early pregnancy (i.e., 4-8 weeks pregnant)   Negative: Chest pain   Negative: Headache is main symptom   Negative: Vomiting (or Nausea) in a cancer patient who is currently (or recently) receiving chemotherapy or radiation therapy, or cancer patient who has metastatic or end-stage cancer and is receiving palliative care   Negative: [1] Vomiting AND [2] contains red blood or black ("coffee ground") material  (Exception: few red streaks in vomit that only happened once)   Negative: Severe pain in one eye   Negative: Recent head injury (within last 3 days)   Negative: Recent abdominal " injury (within last 3 days)   Negative: [1] Insulin-dependent diabetes (Type I) AND [2] glucose > 400 mg/dl (22 mmol/l)   Negative: [1] SEVERE vomiting (e.g., 6 or more times/day) AND [2] present > 8 hours   Negative: [1] MODERATE vomiting (e.g., 3 - 5 times/day) AND [2] age > 60   Negative: Severe headache (e.g., excruciating) (Exception: similar to previous migraines)   Negative: High-risk adult (e.g., diabetes mellitus, brain tumor, V-P shunt, hernia)   Negative: [1] Drinking very little AND [2] dehydration suspected (e.g., no urine > 12 hours, very dry mouth, very lightheaded)   Negative: Patient sounds very sick or weak to the triager   Negative: [1] Vomiting AND [2] abdomen looks much more swollen than usual   Negative: [1] Vomiting AND [2] contains bile (green color)   Negative: [1] Constant abdominal pain AND [2] present > 2 hours   Negative: [1] Fever > 103 F (39.4 C) AND [2] not able to get the fever down using Fever Care Advice   Negative: [1] Fever > 101 F (38.3 C) AND [2] age > 60   Negative: [1] Fever > 100.0 F (37.8 C) AND [2] bedridden (e.g., nursing home patient, CVA, chronic illness, recovering from surgery)   Negative: [1] Fever > 100.0 F (37.8 C) AND [2] weak immune system (e.g., HIV positive, cancer chemo, splenectomy, organ transplant, chronic steroids)   Negative: Taking any of the following medications: digoxin (Lanoxin), lithium, theophylline, phenytoin (Dilantin)   Negative: [1] MILD or MODERATE vomiting AND [2] present > 48 hours (2 days) (Exception: mild vomiting with associated diarrhea)   Negative: Fever present > 3 days (72 hours)   Negative: Vomiting a prescription medication    Protocols used: ST VOMITING-A-AH, CHEST PAIN-A-AH, VOMITING-A-AH

## 2020-10-02 ENCOUNTER — LAB VISIT (OUTPATIENT)
Dept: LAB | Facility: HOSPITAL | Age: 75
End: 2020-10-02
Attending: UROLOGY
Payer: MEDICARE

## 2020-10-02 ENCOUNTER — TELEPHONE (OUTPATIENT)
Dept: MEDSURG UNIT | Facility: HOSPITAL | Age: 75
End: 2020-10-02

## 2020-10-02 ENCOUNTER — OFFICE VISIT (OUTPATIENT)
Dept: UROLOGY | Facility: CLINIC | Age: 75
End: 2020-10-02
Payer: MEDICARE

## 2020-10-02 VITALS
WEIGHT: 138.88 LBS | BODY MASS INDEX: 21.05 KG/M2 | HEART RATE: 78 BPM | HEIGHT: 68 IN | DIASTOLIC BLOOD PRESSURE: 73 MMHG | SYSTOLIC BLOOD PRESSURE: 124 MMHG

## 2020-10-02 DIAGNOSIS — R31.29 MICROHEMATURIA: ICD-10-CM

## 2020-10-02 DIAGNOSIS — C61 PROSTATE CANCER: ICD-10-CM

## 2020-10-02 DIAGNOSIS — C61 PROSTATE CANCER: Primary | ICD-10-CM

## 2020-10-02 LAB
BACTERIA #/AREA URNS HPF: ABNORMAL /HPF
BILIRUB SERPL-MCNC: ABNORMAL MG/DL
BLOOD URINE, POC: ABNORMAL
CAOX CRY URNS QL MICRO: ABNORMAL
CLARITY, POC UA: CLEAR
COLOR, POC UA: YELLOW
COMPLEXED PSA SERPL-MCNC: 0.07 NG/ML (ref 0–4)
FINAL PATHOLOGIC DIAGNOSIS: NORMAL
GLUCOSE UR QL STRIP: ABNORMAL
GROSS: NORMAL
KETONES UR QL STRIP: ABNORMAL
LEUKOCYTE ESTERASE URINE, POC: ABNORMAL
Lab: NORMAL
MICROSCOPIC COMMENT: ABNORMAL
NITRITE, POC UA: ABNORMAL
PH, POC UA: 6
POC RESIDUAL URINE VOLUME: 0 ML (ref 0–100)
PROTEIN, POC: ABNORMAL
RBC #/AREA URNS HPF: 3 /HPF (ref 0–4)
SPECIFIC GRAVITY, POC UA: 1.03
SQUAMOUS #/AREA URNS HPF: 2 /HPF
UROBILINOGEN, POC UA: 0.2
WBC #/AREA URNS HPF: 6 /HPF (ref 0–5)

## 2020-10-02 PROCEDURE — 99999 PR PBB SHADOW E&M-EST. PATIENT-LVL IV: ICD-10-PCS | Mod: PBBFAC,,, | Performed by: UROLOGY

## 2020-10-02 PROCEDURE — 81002 URINALYSIS NONAUTO W/O SCOPE: CPT | Mod: S$GLB,,, | Performed by: UROLOGY

## 2020-10-02 PROCEDURE — 3008F PR BODY MASS INDEX (BMI) DOCUMENTED: ICD-10-PCS | Mod: CPTII,S$GLB,, | Performed by: UROLOGY

## 2020-10-02 PROCEDURE — 99214 PR OFFICE/OUTPT VISIT, EST, LEVL IV, 30-39 MIN: ICD-10-PCS | Mod: 25,S$GLB,, | Performed by: UROLOGY

## 2020-10-02 PROCEDURE — 99499 UNLISTED E&M SERVICE: CPT | Mod: S$GLB,,, | Performed by: UROLOGY

## 2020-10-02 PROCEDURE — 96402 CHEMO HORMON ANTINEOPL SQ/IM: CPT | Mod: S$GLB,,, | Performed by: UROLOGY

## 2020-10-02 PROCEDURE — 87086 URINE CULTURE/COLONY COUNT: CPT

## 2020-10-02 PROCEDURE — 99214 OFFICE O/P EST MOD 30 MIN: CPT | Mod: 25,S$GLB,, | Performed by: UROLOGY

## 2020-10-02 PROCEDURE — 1126F PR PAIN SEVERITY QUANTIFIED, NO PAIN PRESENT: ICD-10-PCS | Mod: S$GLB,,, | Performed by: UROLOGY

## 2020-10-02 PROCEDURE — 99999 PR PBB SHADOW E&M-EST. PATIENT-LVL IV: CPT | Mod: PBBFAC,,, | Performed by: UROLOGY

## 2020-10-02 PROCEDURE — 36415 COLL VENOUS BLD VENIPUNCTURE: CPT

## 2020-10-02 PROCEDURE — 88112 CYTOPATH CELL ENHANCE TECH: CPT | Performed by: PATHOLOGY

## 2020-10-02 PROCEDURE — 1126F AMNT PAIN NOTED NONE PRSNT: CPT | Mod: S$GLB,,, | Performed by: UROLOGY

## 2020-10-02 PROCEDURE — 3074F PR MOST RECENT SYSTOLIC BLOOD PRESSURE < 130 MM HG: ICD-10-PCS | Mod: CPTII,S$GLB,, | Performed by: UROLOGY

## 2020-10-02 PROCEDURE — 81002 POCT URINE DIPSTICK WITHOUT MICROSCOPE: ICD-10-PCS | Mod: S$GLB,,, | Performed by: UROLOGY

## 2020-10-02 PROCEDURE — 88112 CYTOPATH CELL ENHANCE TECH: CPT | Mod: 26,,, | Performed by: PATHOLOGY

## 2020-10-02 PROCEDURE — 3074F SYST BP LT 130 MM HG: CPT | Mod: CPTII,S$GLB,, | Performed by: UROLOGY

## 2020-10-02 PROCEDURE — 96402 PR CHEMOTHER HORMON ANTINEOPL SUB-Q/IM: ICD-10-PCS | Mod: S$GLB,,, | Performed by: UROLOGY

## 2020-10-02 PROCEDURE — 51798 POCT BLADDER SCAN: ICD-10-PCS | Mod: S$GLB,,, | Performed by: UROLOGY

## 2020-10-02 PROCEDURE — 84153 ASSAY OF PSA TOTAL: CPT

## 2020-10-02 PROCEDURE — 3078F DIAST BP <80 MM HG: CPT | Mod: CPTII,S$GLB,, | Performed by: UROLOGY

## 2020-10-02 PROCEDURE — 3008F BODY MASS INDEX DOCD: CPT | Mod: CPTII,S$GLB,, | Performed by: UROLOGY

## 2020-10-02 PROCEDURE — 81000 URINALYSIS NONAUTO W/SCOPE: CPT

## 2020-10-02 PROCEDURE — 88112 PR  CYTOPATH, CELL ENHANCE TECH: ICD-10-PCS | Mod: 26,,, | Performed by: PATHOLOGY

## 2020-10-02 PROCEDURE — 1159F PR MEDICATION LIST DOCUMENTED IN MEDICAL RECORD: ICD-10-PCS | Mod: S$GLB,,, | Performed by: UROLOGY

## 2020-10-02 PROCEDURE — 1101F PT FALLS ASSESS-DOCD LE1/YR: CPT | Mod: CPTII,S$GLB,, | Performed by: UROLOGY

## 2020-10-02 PROCEDURE — 51798 US URINE CAPACITY MEASURE: CPT | Mod: S$GLB,,, | Performed by: UROLOGY

## 2020-10-02 PROCEDURE — 1159F MED LIST DOCD IN RCRD: CPT | Mod: S$GLB,,, | Performed by: UROLOGY

## 2020-10-02 PROCEDURE — 99499 RISK ADDL DX/OHS AUDIT: ICD-10-PCS | Mod: S$GLB,,, | Performed by: UROLOGY

## 2020-10-02 PROCEDURE — 1101F PR PT FALLS ASSESS DOC 0-1 FALLS W/OUT INJ PAST YR: ICD-10-PCS | Mod: CPTII,S$GLB,, | Performed by: UROLOGY

## 2020-10-02 PROCEDURE — 3078F PR MOST RECENT DIASTOLIC BLOOD PRESSURE < 80 MM HG: ICD-10-PCS | Mod: CPTII,S$GLB,, | Performed by: UROLOGY

## 2020-10-02 NOTE — Clinical Note
"Wrap up was as follow: "Psa today.Psa and T in 6 mos 1 week prior to nurse visit for lupron again at that time"  This was my mistake, I meant after his 6 month labs he is to return to clinic.   Advise pt no more lupron needed, and cancel NV and replace with 6 month MD visit. Can also review plan/discussion as given his clinical status, will follow up on resolution of his issues with gi/ent/primary care before considering urolift. Can discuss again at next visit (or soooner if he has recovered)  "

## 2020-10-02 NOTE — PROGRESS NOTES
[facility]rology Progress Note    Rajendra Castle is a 75 y.o. male who presents for prostate cancer follow up    He was initially seen by YAZ López on April 2018 for bothersome LUTS and was started on Uroxatral which decd NTF 3 to 1x.   PSA trends reviewed and noted to be rising with jumped from 2-4 from 2013 to 2015, and then up to 4.8 in 2018, and though did come down later that year his free PSA percentage was low, and finasteride was added by primary care in August of 2018, with PSA in November 2018 of 2.4 finasteride, equivalent to 4.8, with less than 10% free PSA.  He had an AUA symptom score of 11/4 at that time, and underwent cystoscopy and prostate biopsy in December 2018 with 20.4 g prostate with significant bilateral lateral lobe hypertrophy with severe obstruction with central near kissing lobes, no median lobe, mild early trabeculations, normal bladder  PATHOLOGY: 4 cores L sided G3+4=7 prostate cancer: LB lat 25% (10% pattern 4); LB med 5% (20% pattern 4); LM med <5%, LA lat 10%    He only periodically had erections, had CKD III, Hyperparathyroid, RA (on MTX), hypercalcemia, emphysema. LUTS largely unchanged on uroxatral AUA SS 12/4.   Initially considered surgery then elected XRT. Discussed urolift as fiducial markers and for LUTS resolution.   Advised to DC methotrexate during XRT, but initially and stated that his prostate cancer was under control from raghav intervention by televangelist.  He later returned 3/19/19 noting compliance with sensipar for his hyperCa, and elected to proceed with ADT/XRT, noting improvement on uroxatral.  Lupron 45mg administered. He did decline fiducial markers/spaceoar  He chose to resume/continue methotrexate and did complete radiotherapy to 7740 cGy ending August 2019.  T    9/20/19: Arthralgias well controlled. No hot flashes. LUTS at baseline aua ss 11/4. PSA 8/12/19 0.86. Lupron renewed  3/24/20 - lupron 45 mg administered (injection 3 of 4).   VV April 2020: Still  bothered by LUTS with AUA SS 11/5 and discussed urolift, though on hold due to covid. PSA 12/20/19 is 0.3  Still gets up at least 3x at night, Solid stream during day and largely feels empty, Water, OJ, milk day and night  Not always consistent with stopping fluid intake before bed. Got raffi for his sensipar so has been compliant and Ca in feb 2020 was 10.8, stable, down from 12 previously  No hematuria/dysuria. No new back or bone pain.    He returns today for routine follow-up, and planned final ADT Lupron injection, 4/4, and to discuss further management of his lower tract  AUA symptom score:  12/5, and happy (5:  Nocturia; for:  Emptying; 3:  Frequency).  He give a 0 for intermittency, urgency, weak stream, straining.  Postvoid residual by bladder scan is 0 cc.  Urinalysis dipstick is negative except for trace blood, trace protein.  He felt very ill and noted discharge from hospital yesterday.  He reports he cannot eat has lost 30-40 lb.  He denies any new back or bone pain or gross hematuria.  He is frustrated that he has no answers as to why he feels so bad and can not eat and continues to lose weight and feels weak.    Chart review indicates he presented to the ER earlier this week complaining of generalized body aches, multiple joint pains, chest pain, and difficulty swallowing.  Patient states that he has lost 20-30 lb over the past 6 weeks.  He has a poor appetite and when he does eat he has difficulty swallowing.  He also had abnormal troponin in the emergency department with no EKG changes.  He had extensive workup by Cardiology and GI including upper endoscopy with esophageal dilation and biopsies, as well as cardiac workup which was negative.  He has pending follow-up with primary care provider next week.    PSA 0.11 in June 2020    ROS: A comprehensive 10 system review was performed and is negative except as noted above in HPI    PHYSICAL EXAM:    Vitals:    10/02/20 0932   BP: 124/73   Pulse: 78  "    Body mass index is 21.12 kg/m². Weight: 63 kg (138 lb 14.2 oz) Height: 5' 8" (172.7 cm)       General: Alert, cooperative, appears frail, disheviled   Head: Normocephalic, without obvious abnormality, atraumatic   Eyes: PERRL, conjunctiva/corneas clear   Lungs: Respirations unlabored   Heart: Warm and well perfused   Abdomen: soft NT ND  Extremities: Extremities normal, atraumatic, no cyanosis or edema   Skin: Skin color, texture, turgor normal, no rashes or lesions   Psych: Appropriate   Neurologic: Non-focal       POCT URINE DIPSTICK WITHOUT MICROSCOPE    Collection Time: 10/02/20 10:10 AM   Result Value Ref Range    Color, UA Yellow     Spec Grav UA 1.030     pH, UA 6     WBC, UA tr     Nitrite, UA neg     Protein neg     Glucose, UA neg     Ketones, UA neg     Urobilinogen, UA 0.2     Bilirubin neg     Blood, UA tr     Clarity, UA Clear    POCT Bladder Scan    Collection Time: 10/02/20 10:10 AM   Result Value Ref Range    POC Residual Urine Volume 0 0 - 100 mL       ASSESSMENT   1. Prostate cancer  POCT URINE DIPSTICK WITHOUT MICROSCOPE    POCT Bladder Scan    Prostate Specific Antigen, Diagnostic    Prostate Specific Antigen, Diagnostic    Testosterone    Prior authorization Order   2. Microhematuria  Cytology, Urine    Urine culture    Urinalysis Microscopic       Plan    As far as his prostate cancer, he has responded well to treatment with androgen deprivation therapy in combination with external beam radiation therapy  Lupron 45 mg administered IM today, see attached nursing note, and this will complete 2 years of ADT.  His PSA continue to trend down and was 0.1 as of June 2020 and he has not had repeat labs.  He unfortunately is experiencing other significant health problems such as his inability to eat or swallow and weight loss, of which GI workup was started in the hospital, there were also some concerns of cardiac issues.      He has follow-up with primary care, and GI next week, and ENT the " following week.  We did discuss at this time he focus on these issues before considering any urologic intervention or procedures, as he still is interested in Urolift for relief of his prostatic obstruction.  Right now he is largely not complaining of the obstructive symptoms except for the nocturia and a sensation of incomplete emptying though he was found to empty well on bladder scan today so will keep him on the pending list for Urolift as he focuses on his other health issues currently.      He is due for PSA, and advised that he get this checked today before further androgen deprivation effect sets in to assess and make sure his PSA is continuing to respond.  We discussed the very low likelihood is he has been on active treatment plan, that any prostate cancer progression could be at play with his symptomatology, and as long as PSA continues to trend down or remained stable, this would verify this.  To workup any other urologic source, especially in the setting of post radiation and trace blood in his urine dipstick, I will send a urine cytology to rule out any concerns for urothelial malignancy and also send a urinalysis microscopic exam.  If greater than 5 red blood cells per high-power field are present, can further workup with imaging and cystoscopy.    At this time will plan repeat PSA and testosterone approximately 6 months and follow-up at that time.  In the interim, will follow-up the workup and management of his other medical comorbidities, and if it is reasonable to proceed with urologic intervention of lower tract obstruction before his follow-up, can plan to do so.  Otherwise can see back at that time and make plans.    25 min spent in encounter, over half in counseling.

## 2020-10-02 NOTE — PROGRESS NOTES
Lupron injection administer to right upper outer glut. Tolerated well. No bleeding or bruising noted to injection site.

## 2020-10-03 PROCEDURE — G0180 PR HOME HEALTH MD CERTIFICATION: ICD-10-PCS | Mod: ,,, | Performed by: FAMILY MEDICINE

## 2020-10-03 PROCEDURE — G0180 MD CERTIFICATION HHA PATIENT: HCPCS | Mod: ,,, | Performed by: FAMILY MEDICINE

## 2020-10-04 LAB
BACTERIA UR CULT: NORMAL
BACTERIA UR CULT: NORMAL

## 2020-10-05 ENCOUNTER — TELEPHONE (OUTPATIENT)
Dept: OTOLARYNGOLOGY | Facility: CLINIC | Age: 75
End: 2020-10-05

## 2020-10-05 ENCOUNTER — TELEPHONE (OUTPATIENT)
Dept: FAMILY MEDICINE | Facility: CLINIC | Age: 75
End: 2020-10-05

## 2020-10-05 ENCOUNTER — NURSE TRIAGE (OUTPATIENT)
Dept: ADMINISTRATIVE | Facility: CLINIC | Age: 75
End: 2020-10-05

## 2020-10-05 ENCOUNTER — TELEPHONE (OUTPATIENT)
Dept: UROLOGY | Facility: CLINIC | Age: 75
End: 2020-10-05

## 2020-10-05 DIAGNOSIS — R52 PAIN: ICD-10-CM

## 2020-10-05 DIAGNOSIS — M05.79 RHEUMATOID ARTHRITIS INVOLVING MULTIPLE SITES WITH POSITIVE RHEUMATOID FACTOR: Primary | ICD-10-CM

## 2020-10-05 RX ORDER — TRAMADOL HYDROCHLORIDE 50 MG/1
TABLET ORAL
Qty: 120 TABLET | Refills: 5 | Status: SHIPPED | OUTPATIENT
Start: 2020-10-05 | End: 2021-04-06 | Stop reason: SDUPTHER

## 2020-10-05 NOTE — TELEPHONE ENCOUNTER
Patient was erroneously scheduled for another parathyroid nuclear medicine scan yesterday. This was already performed 9/21, and thus the second scan was cancelled. The patient called obviously very upset. He had no recollection of having either the first scan, or ever coming to my clinic 9/17. The patient, very upset, demanded that he be seen sooner than his scheduled follow up on 10/15 so that we can schedule his surgery. I moved appointment to 10/12 and not sooner, as I want to make sure he sees his primary care physician and GI doctor this week after recent hospital admission. I am concerned with both his mental and physical state in regards to surgery. He has had significant weight loss in the past two to three months. His memory loss is concerning. I repeated to him that this surgery is an elective one and not an emergency. I hope to coordinate his care with his PCP, Dr. Aceves, and involve either family or friends to help facilitate getting his surgery done both safely and effectively.    ----- Message from Lauro Becker MA sent at 10/5/2020  9:53 AM CDT -----  Regarding: FW: requesting call back  Contact: patient  Patient does not remember getting scan or even coming to this office for a appointment. Also wants results if he had it done already.  ----- Message -----  From: Cassie Amanda  Sent: 10/5/2020   9:42 AM CDT  To: [facility] Staff  Subject: requesting call back                             Type:     Who Called: Patient    Best Call Back Number: [phone]    Additional Information:   Patient requesting call back in regards to parathyroid scan being cancelled & other issues with appt confusion.

## 2020-10-05 NOTE — TELEPHONE ENCOUNTER
Call placed to Mr. Castle in regards to being seen sooner.  Wants to see him on Monday the 12th and not any sooner. L/M

## 2020-10-05 NOTE — TELEPHONE ENCOUNTER
Call placed to [facility] in regards to Mr. Castle not needing scan. No further issues. They will contact Mr. Castle to cancel.

## 2020-10-05 NOTE — TELEPHONE ENCOUNTER
Does he needs  counseling due to over whelmed, stress out, burn out, and extreme weight loss. The GI scope shows duodenal stress ulcers that could have been due to severe stress. He will needs supplements his meals, and increase protein weight before any surgery.  I will suggest counseling first.A high fiber diet with plenty of fluids (up to 8 glasses of water daily) is suggested to relieve these symptoms.  Metamucil, 1 tablespoon once or twice daily can be used to keep bowels regular if needed. Am not able to speed any surgery. If he has persistent vomiting, nausea, and unable to keep hiss nutrition,then the surgery will be delayed.

## 2020-10-05 NOTE — TELEPHONE ENCOUNTER
Spoke w pt regarding f/u of appt from 10/2 pt qwas informed no longer need Lupron inject nurse visit will be canceled in 6 mths and will be scheduled as a f/u with Dr Pina to be sure labs are done prior pt voiced he is feeling a little bit better but they found the source of his problem and voiced the he will have his parathyroid removed.

## 2020-10-05 NOTE — TELEPHONE ENCOUNTER
----- Message from Jon BRIAN Frifabian sent at 10/5/2020 10:25 AM CDT -----  Contact: patient  Patient called in and stated he needs Dr. Aceves to help expedite his parathyroid surgery?  Patient stated he has an appointment on Monday 10/12/20 with Dr. Latonia Clayton.    Patient call back number is [phone]

## 2020-10-05 NOTE — TELEPHONE ENCOUNTER
"Pt contacted for SX tracker escalation - Pt is very angry c/o trying to schedule a surgery and having difficulty with doctors approving his surgery. Allowed time for pt to voice all complaints and frustrations and offered empathetic Queues of understanding frustrations as pt needed. Pt request to speak with "department in charge of all doctors." NT nurse was able to deescalate pt and offer assistance, Patient relations [phone] number given and readily accepted. Pt does  Deny any new or worsening of symptoms such as fever or SOB, pt able to speak in full sentences without noted SOB or cough. Info only  protocol used and encounter closed.    Reason for Disposition   [1] Follow-up call to recent contact AND [2] information only call, no triage required    Protocols used: INFORMATION ONLY CALL - NO TRIAGE-A-      "

## 2020-10-06 ENCOUNTER — TELEPHONE (OUTPATIENT)
Dept: OTOLARYNGOLOGY | Facility: CLINIC | Age: 75
End: 2020-10-06

## 2020-10-06 ENCOUNTER — TELEPHONE (OUTPATIENT)
Dept: GASTROENTEROLOGY | Facility: CLINIC | Age: 75
End: 2020-10-06

## 2020-10-06 LAB — FINAL PATHOLOGIC DIAGNOSIS: NORMAL

## 2020-10-06 NOTE — TELEPHONE ENCOUNTER
Call placed to patient Mr. Castle in regards to surgery. Patient will be schedule in the next surgery appointment per Isidro. Explained to patient she does surgery on Tuesday's so his appointment on Monday will not delay the next available surgery appointment. No further issues noted.

## 2020-10-06 NOTE — TELEPHONE ENCOUNTER
Spoke w/ pt to clarify appointment notes and symptoms. Pt states he thought this appointment was to get his thyroid removed at this visit. Pt decided to cancel appointment.

## 2020-10-06 NOTE — TELEPHONE ENCOUNTER
----- Message from Ivette Stover sent at 10/5/2020 11:17 AM CDT -----  Pt is calling to speak. with the nurse and would like the nurse to give him a call back about rescheduling his appt

## 2020-10-07 ENCOUNTER — TELEPHONE (OUTPATIENT)
Dept: UROLOGY | Facility: CLINIC | Age: 75
End: 2020-10-07

## 2020-10-07 ENCOUNTER — TELEPHONE (OUTPATIENT)
Dept: OTOLARYNGOLOGY | Facility: CLINIC | Age: 75
End: 2020-10-07

## 2020-10-07 ENCOUNTER — OFFICE VISIT (OUTPATIENT)
Dept: FAMILY MEDICINE | Facility: CLINIC | Age: 75
End: 2020-10-07
Payer: MEDICARE

## 2020-10-07 ENCOUNTER — TELEPHONE (OUTPATIENT)
Dept: GASTROENTEROLOGY | Facility: CLINIC | Age: 75
End: 2020-10-07

## 2020-10-07 VITALS
OXYGEN SATURATION: 96 % | HEART RATE: 87 BPM | RESPIRATION RATE: 18 BRPM | DIASTOLIC BLOOD PRESSURE: 64 MMHG | HEIGHT: 68 IN | TEMPERATURE: 98 F | WEIGHT: 142.19 LBS | BODY MASS INDEX: 21.55 KG/M2 | SYSTOLIC BLOOD PRESSURE: 150 MMHG

## 2020-10-07 DIAGNOSIS — R63.4 ABNORMAL WEIGHT LOSS: Primary | ICD-10-CM

## 2020-10-07 DIAGNOSIS — E04.2 MULTIPLE THYROID NODULES: Primary | ICD-10-CM

## 2020-10-07 DIAGNOSIS — D63.8 ANEMIA, CHRONIC DISEASE: ICD-10-CM

## 2020-10-07 PROCEDURE — 3008F PR BODY MASS INDEX (BMI) DOCUMENTED: ICD-10-PCS | Mod: CPTII,S$GLB,, | Performed by: FAMILY MEDICINE

## 2020-10-07 PROCEDURE — 1125F PR PAIN SEVERITY QUANTIFIED, PAIN PRESENT: ICD-10-PCS | Mod: S$GLB,,, | Performed by: FAMILY MEDICINE

## 2020-10-07 PROCEDURE — 1159F MED LIST DOCD IN RCRD: CPT | Mod: S$GLB,,, | Performed by: FAMILY MEDICINE

## 2020-10-07 PROCEDURE — 3077F PR MOST RECENT SYSTOLIC BLOOD PRESSURE >= 140 MM HG: ICD-10-PCS | Mod: CPTII,S$GLB,, | Performed by: FAMILY MEDICINE

## 2020-10-07 PROCEDURE — 1125F AMNT PAIN NOTED PAIN PRSNT: CPT | Mod: S$GLB,,, | Performed by: FAMILY MEDICINE

## 2020-10-07 PROCEDURE — 1101F PT FALLS ASSESS-DOCD LE1/YR: CPT | Mod: CPTII,S$GLB,, | Performed by: FAMILY MEDICINE

## 2020-10-07 PROCEDURE — 99999 PR PBB SHADOW E&M-EST. PATIENT-LVL V: ICD-10-PCS | Mod: PBBFAC,,, | Performed by: FAMILY MEDICINE

## 2020-10-07 PROCEDURE — 3077F SYST BP >= 140 MM HG: CPT | Mod: CPTII,S$GLB,, | Performed by: FAMILY MEDICINE

## 2020-10-07 PROCEDURE — 3078F DIAST BP <80 MM HG: CPT | Mod: CPTII,S$GLB,, | Performed by: FAMILY MEDICINE

## 2020-10-07 PROCEDURE — 1101F PR PT FALLS ASSESS DOC 0-1 FALLS W/OUT INJ PAST YR: ICD-10-PCS | Mod: CPTII,S$GLB,, | Performed by: FAMILY MEDICINE

## 2020-10-07 PROCEDURE — 1159F PR MEDICATION LIST DOCUMENTED IN MEDICAL RECORD: ICD-10-PCS | Mod: S$GLB,,, | Performed by: FAMILY MEDICINE

## 2020-10-07 PROCEDURE — 99999 PR PBB SHADOW E&M-EST. PATIENT-LVL V: CPT | Mod: PBBFAC,,, | Performed by: FAMILY MEDICINE

## 2020-10-07 PROCEDURE — 3008F BODY MASS INDEX DOCD: CPT | Mod: CPTII,S$GLB,, | Performed by: FAMILY MEDICINE

## 2020-10-07 PROCEDURE — 99214 PR OFFICE/OUTPT VISIT, EST, LEVL IV, 30-39 MIN: ICD-10-PCS | Mod: S$GLB,,, | Performed by: FAMILY MEDICINE

## 2020-10-07 PROCEDURE — 99214 OFFICE O/P EST MOD 30 MIN: CPT | Mod: S$GLB,,, | Performed by: FAMILY MEDICINE

## 2020-10-07 PROCEDURE — 3078F PR MOST RECENT DIASTOLIC BLOOD PRESSURE < 80 MM HG: ICD-10-PCS | Mod: CPTII,S$GLB,, | Performed by: FAMILY MEDICINE

## 2020-10-07 RX ORDER — IRON POLYSACCHARIDE COMPLEX 150 MG
150 CAPSULE ORAL
Qty: 60 CAPSULE | Refills: 3 | Status: SHIPPED | OUTPATIENT
Start: 2020-10-07 | End: 2021-09-28

## 2020-10-07 RX ORDER — AMLODIPINE BESYLATE 5 MG/1
5 TABLET ORAL DAILY
Qty: 30 TABLET | Refills: 11 | Status: SHIPPED | OUTPATIENT
Start: 2020-10-07 | End: 2021-11-29 | Stop reason: ALTCHOICE

## 2020-10-07 RX ORDER — CYPROHEPTADINE HYDROCHLORIDE 4 MG/1
4 TABLET ORAL 3 TIMES DAILY PRN
Qty: 45 TABLET | Refills: 3 | Status: SHIPPED | OUTPATIENT
Start: 2020-10-07 | End: 2020-12-07

## 2020-10-07 NOTE — PROGRESS NOTES
Subjective:   Rajendra Castle is a 75 y.o. male with hypertension.  Current Outpatient Medications   Medication Sig Dispense Refill    alfuzosin (UROXATRAL) 10 mg Tb24 TAKE 1 TABLET(10 MG) BY MOUTH AFTER DINNER 30 tablet 6    aspirin (ECOTRIN) 81 MG EC tablet Take 81 mg by mouth once daily.        carvedilol (COREG) 25 MG tablet TAKE 1 TABLET(25 MG) BY MOUTH TWICE DAILY WITH MEALS 180 tablet 0    cinacalcet (SENSIPAR) 30 MG Tab Take 1 tablet (30 mg total) by mouth 2 (two) times daily with meals. 60 tablet 11    escitalopram oxalate (LEXAPRO) 10 MG tablet Take 10 mg by mouth once daily.      folic acid (FOLVITE) 1 MG tablet TAKE 1 TABLET(1 MG) BY MOUTH EVERY DAY 90 tablet 3    methotrexate 2.5 MG Tab TAKE 6 TABLETS BY MOUTH EVERY WEEK (Patient taking differently: Take 15 mg by mouth every 7 days. TAKE 6 TABLETS BY MOUTH EVERY WEEK - Monday) 72 tablet 1    pantoprazole (PROTONIX) 40 MG tablet Take 1 tablet (40 mg total) by mouth once daily. 30 tablet 3    predniSONE (DELTASONE) 2.5 MG tablet Take 1 tablet (2.5 mg total) by mouth 2 (two) times daily. 180 tablet 1    traMADoL (ULTRAM) 50 mg tablet Take 1-2 tablets twice daily as needed for pain 120 tablet 5    alendronate (FOSAMAX) 70 MG tablet Take 70 mg by mouth every 7 days. Mon      amLODIPine (NORVASC) 5 MG tablet Take 1 tablet (5 mg total) by mouth once daily. 30 tablet 11    cyproheptadine (PERIACTIN) 4 mg tablet Take 1 tablet (4 mg total) by mouth 3 (three) times daily as needed. 45 tablet 3    iron polysaccharides (NIFEREX) 150 mg iron Cap Take 1 capsule (150 mg total) by mouth 2 times daily 2 hours after meal. 60 capsule 3     Current Facility-Administered Medications   Medication Dose Route Frequency Provider Last Rate Last Dose    sodium chloride 0.9% flush 10 mL  10 mL Intravenous PRN Latonia Clayton MD          Hypertension ROS: not taking medications regularly as instructed, no medication side effects noted, patient does not perform  "home BP monitoring, no TIA's, no chest pain on exertion, notes stable dyspnea on exertion, no change and no swelling of ankles.   New concerns:   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    RA (rheumatoid arthritis)     Vitamin D insufficiency 9/30/2016     The ASCVD Risk score (Jennifer FREEMAN Jr., et al., 2013) failed to calculate for the following reasons:    The valid total cholesterol range is 130 to 320 mg/dL  .     Objective:   BP (!) 150/64 (BP Location: Right arm, Patient Position: Sitting, BP Method: Large (Manual))   Pulse 87   Temp 97.6 °F (36.4 °C) (Temporal)   Resp 18   Ht 5' 8" (1.727 m)   Wt 64.5 kg (142 lb 3.2 oz)   SpO2 96%   BMI 21.62 kg/m²    Appearance alert, well appearing, and in no distress, oriented to person, place, and time, thin, anxious and in mild to moderate distress.  General exam BP noted to be moderately elevated today in office, S1, S2 normal, no gallop, no murmur, chest clear, no JVD, no HSM, no edema, fundi - poorly visualized, CVS exam  - normal rate, regular rhythm, normal S1, S2, no murmurs, rubs, clicks or gallops, normal rate and regular rhythm, S1 and S2 normal, no murmurs noted, no gallops noted, .   Lab review: no lab studies available for review at time of visit.     Assessment:    Hypertension poorly controlled and needs improvement.   Abnormal weight loss  -     cyproheptadine (PERIACTIN) 4 mg tablet; Take 1 tablet (4 mg total) by mouth 3 (three) times daily as needed.  Dispense: 45 tablet; Refill: 3  -     amLODIPine (NORVASC) 5 MG tablet; Take 1 tablet (5 mg total) by mouth once daily.  Dispense: 30 tablet; Refill: 11  -     CBC auto differential; Future; Expected date: 10/07/2020  -     Iron and TIBC; Future; Expected date: 10/07/2020  -     Ferritin; Future; Expected date: 10/07/2020  -     Basic " Metabolic Panel; Future; Expected date: 10/07/2020    Anemia, chronic disease  -     CBC auto differential; Future; Expected date: 10/07/2020  -     Iron and TIBC; Future; Expected date: 10/07/2020  -     Ferritin; Future; Expected date: 10/07/2020  -     Basic Metabolic Panel; Future; Expected date: 10/07/2020  -     iron polysaccharides (NIFEREX) 150 mg iron Cap; Take 1 capsule (150 mg total) by mouth 2 times daily 2 hours after meal.  Dispense: 60 capsule; Refill: 3        Plan:   Lab results and schedule of future lab studies reviewed with patient.  Reviewed diet, exercise and weight control.  Cardiovascular risk and specific lipid/LDL goals reviewed.  Reviewed potential future medication changes and side effects.  Use of aspirin to prevent MI and TIA's discussed.  Herbal and alternative therapies discussed with patient.  Follow up: 4 months and as needed..

## 2020-10-07 NOTE — TELEPHONE ENCOUNTER
Spoke with patient and he states he is concerned about the nodule on his parathyroid being cancerous at this time. He is scheduling with Dr Paul White for surgery to remove the nodule.

## 2020-10-07 NOTE — TELEPHONE ENCOUNTER
----- Message from Kaitlynn Lazar sent at 10/7/2020 12:20 PM CDT -----  Pt called to speak with the office stating that it's very urgent pt didn't tell me what it was will discuss when call is returned [phone]

## 2020-10-07 NOTE — PATIENT INSTRUCTIONS

## 2020-10-07 NOTE — TELEPHONE ENCOUNTER
Contacted this patient this morning pertaining to verbal requests given by Dr. Clayton--patient has had recent weight loss, which could be caused from an overactive gland & increase of calcium--Dr. Clayton would like for patient to keep his appt today 10/7 with Dr. Aceves, keep his upcoming appt with Dr. Hoffman on 10/15, & get a nodule biopsy as soon as possible--if all these things are completed, Dr. Clayton at that point will be able to do his Thyroid surgery as her next available date for surgery is 10/27--patient was emotional & sts that he was told that his condition was life threatening & he needed to get the surgery immediately--after speaking again with Dr. Clayton, we are unsure as to who told the patient this but this is absolutely not the case--I assured patient that Dr. Clayton and staff have the patient's best interest at heart & that he needs not worry but we will take good care of him--patient voiced that he would like to get in with Dr. Hoffman sooner than next Thursday--I told patient that I will contact that office to see if we can move the appt up--patient sts that he is continuously loosing weight & he has not appetite--I asked patient if he was taking an appetite enhancer & he said no but wanted to know if he could take that--I stated that I would speak with Dr. Clayton regarding that & get back with him--after again speaking with Dr. Clayton, she would like for his PCP to Rx that if possible but if he doesn't, she will as she feels that he needs it to increase appetite--I have contacted Lisbeth Vargas MA to discuss this with her prior to patient appt, Lisbeth took down information & said she would discuss with Dr. Aceves, both of us voiced understanding--I have reached out to Dr. Hoffman's office & messaged Kristine Foreman MA but haven't gotten a response yet--regarding patient's appetite, I asked him what he had to eat today & he said he has only had a Premier Protein (Chocolate)  & Water, patient sts he has to force himself to eat solid foods--In regards to patient's biopsy, I have reached out to Kristine Orellana at [facility] Scheduling & she has scheduled his Biopsy for 10/22 @ 2pm, Kristine reached out to the patient as well to confirm this--patient voiced understanding & appt for Biopsy was scheduled--I will contact patient again to let him know all this has been done & I am still working on getting him a sooner appt with Dr. Hoffman--patient voiced understanding & was very appreciative of our efforts in a timely manner--no further action needed at this time

## 2020-10-07 NOTE — TELEPHONE ENCOUNTER
Spoke w pt wanted to know if there was a medication that can be prescribed for weight gain or if Dr Pina can prescribe a medication. I informed pt that any medication prescribed for weight gain will have to be prescribed by his primary care pt voiced he sees Dr Aceves today I encouraged pt that he will need to discuss that at his visit today at 3 pm with Dr Aceves pt voiced ok and understanding.

## 2020-10-08 ENCOUNTER — TELEPHONE (OUTPATIENT)
Dept: OTOLARYNGOLOGY | Facility: CLINIC | Age: 75
End: 2020-10-08

## 2020-10-08 ENCOUNTER — LAB VISIT (OUTPATIENT)
Dept: LAB | Facility: HOSPITAL | Age: 75
End: 2020-10-08
Attending: FAMILY MEDICINE
Payer: MEDICARE

## 2020-10-08 ENCOUNTER — HOSPITAL ENCOUNTER (EMERGENCY)
Facility: HOSPITAL | Age: 75
Discharge: HOME | End: 2020-10-08
Attending: EMERGENCY MEDICINE
Payer: MEDICARE

## 2020-10-08 VITALS
WEIGHT: 142 LBS | BODY MASS INDEX: 21.59 KG/M2 | RESPIRATION RATE: 20 BRPM | DIASTOLIC BLOOD PRESSURE: 64 MMHG | TEMPERATURE: 99 F | SYSTOLIC BLOOD PRESSURE: 133 MMHG | OXYGEN SATURATION: 95 % | HEART RATE: 101 BPM

## 2020-10-08 DIAGNOSIS — R53.1 WEAKNESS: Primary | ICD-10-CM

## 2020-10-08 DIAGNOSIS — R63.0 DECREASED APPETITE: ICD-10-CM

## 2020-10-08 DIAGNOSIS — R63.4 ABNORMAL WEIGHT LOSS: ICD-10-CM

## 2020-10-08 DIAGNOSIS — D63.8 ANEMIA, CHRONIC DISEASE: ICD-10-CM

## 2020-10-08 LAB
ALBUMIN SERPL BCP-MCNC: 3.6 G/DL (ref 3.5–5.2)
ALP SERPL-CCNC: 104 U/L (ref 55–135)
ALT SERPL W/O P-5'-P-CCNC: 19 U/L (ref 10–44)
ANION GAP SERPL CALC-SCNC: 12 MMOL/L (ref 8–16)
ANION GAP SERPL CALC-SCNC: 9 MMOL/L (ref 8–16)
AST SERPL-CCNC: 23 U/L (ref 10–40)
BASOPHILS # BLD AUTO: 0.01 K/UL (ref 0–0.2)
BASOPHILS # BLD AUTO: 0.02 K/UL (ref 0–0.2)
BASOPHILS NFR BLD: 0.1 % (ref 0–1.9)
BASOPHILS NFR BLD: 0.2 % (ref 0–1.9)
BILIRUB SERPL-MCNC: 0.3 MG/DL (ref 0.1–1)
BILIRUB UR QL STRIP: NEGATIVE
BNP SERPL-MCNC: 352 PG/ML (ref 0–99)
BUN SERPL-MCNC: 20 MG/DL (ref 8–23)
BUN SERPL-MCNC: 21 MG/DL (ref 8–23)
CALCIUM SERPL-MCNC: 11 MG/DL (ref 8.7–10.5)
CALCIUM SERPL-MCNC: 11 MG/DL (ref 8.7–10.5)
CHLORIDE SERPL-SCNC: 106 MMOL/L (ref 95–110)
CHLORIDE SERPL-SCNC: 106 MMOL/L (ref 95–110)
CLARITY UR: CLEAR
CO2 SERPL-SCNC: 20 MMOL/L (ref 23–29)
CO2 SERPL-SCNC: 21 MMOL/L (ref 23–29)
COLOR UR: YELLOW
CREAT SERPL-MCNC: 1.2 MG/DL (ref 0.5–1.4)
CREAT SERPL-MCNC: 1.4 MG/DL (ref 0.5–1.4)
DIFFERENTIAL METHOD: ABNORMAL
DIFFERENTIAL METHOD: ABNORMAL
EOSINOPHIL # BLD AUTO: 0 K/UL (ref 0–0.5)
EOSINOPHIL # BLD AUTO: 0 K/UL (ref 0–0.5)
EOSINOPHIL NFR BLD: 0.2 % (ref 0–8)
EOSINOPHIL NFR BLD: 0.6 % (ref 0–8)
ERYTHROCYTE [DISTWIDTH] IN BLOOD BY AUTOMATED COUNT: 15.4 % (ref 11.5–14.5)
ERYTHROCYTE [DISTWIDTH] IN BLOOD BY AUTOMATED COUNT: 16.1 % (ref 11.5–14.5)
EST. GFR  (AFRICAN AMERICAN): 57.2 ML/MIN/1.73 M^2
EST. GFR  (AFRICAN AMERICAN): >60 ML/MIN/1.73 M^2
EST. GFR  (NON AFRICAN AMERICAN): 49.5 ML/MIN/1.73 M^2
EST. GFR  (NON AFRICAN AMERICAN): 60 ML/MIN/1.73 M^2
FERRITIN SERPL-MCNC: 214 NG/ML (ref 20–300)
GLUCOSE SERPL-MCNC: 159 MG/DL (ref 70–110)
GLUCOSE SERPL-MCNC: 96 MG/DL (ref 70–110)
GLUCOSE UR QL STRIP: NEGATIVE
HCT VFR BLD AUTO: 39.2 % (ref 40–54)
HCT VFR BLD AUTO: 41.5 % (ref 40–54)
HGB BLD-MCNC: 12.5 G/DL (ref 14–18)
HGB BLD-MCNC: 12.7 G/DL (ref 14–18)
HGB UR QL STRIP: NEGATIVE
IMM GRANULOCYTES # BLD AUTO: 0.04 K/UL (ref 0–0.04)
IMM GRANULOCYTES # BLD AUTO: 0.04 K/UL (ref 0–0.04)
IMM GRANULOCYTES NFR BLD AUTO: 0.4 % (ref 0–0.5)
IMM GRANULOCYTES NFR BLD AUTO: 0.6 % (ref 0–0.5)
IRON SERPL-MCNC: 51 UG/DL (ref 45–160)
KETONES UR QL STRIP: NEGATIVE
LEUKOCYTE ESTERASE UR QL STRIP: NEGATIVE
LYMPHOCYTES # BLD AUTO: 0.5 K/UL (ref 1–4.8)
LYMPHOCYTES # BLD AUTO: 0.7 K/UL (ref 1–4.8)
LYMPHOCYTES NFR BLD: 10.3 % (ref 18–48)
LYMPHOCYTES NFR BLD: 5.1 % (ref 18–48)
MAGNESIUM SERPL-MCNC: 2.2 MG/DL (ref 1.6–2.6)
MCH RBC QN AUTO: 27.8 PG (ref 27–31)
MCH RBC QN AUTO: 28.8 PG (ref 27–31)
MCHC RBC AUTO-ENTMCNC: 30.1 G/DL (ref 32–36)
MCHC RBC AUTO-ENTMCNC: 32.4 G/DL (ref 32–36)
MCV RBC AUTO: 89 FL (ref 82–98)
MCV RBC AUTO: 92 FL (ref 82–98)
MONOCYTES # BLD AUTO: 0.5 K/UL (ref 0.3–1)
MONOCYTES # BLD AUTO: 0.9 K/UL (ref 0.3–1)
MONOCYTES NFR BLD: 7.6 % (ref 4–15)
MONOCYTES NFR BLD: 9 % (ref 4–15)
NEUTROPHILS # BLD AUTO: 5.8 K/UL (ref 1.8–7.7)
NEUTROPHILS # BLD AUTO: 8.2 K/UL (ref 1.8–7.7)
NEUTROPHILS NFR BLD: 80.8 % (ref 38–73)
NEUTROPHILS NFR BLD: 85.1 % (ref 38–73)
NITRITE UR QL STRIP: NEGATIVE
NRBC BLD-RTO: 0 /100 WBC
NRBC BLD-RTO: 0 /100 WBC
PH UR STRIP: 6 [PH] (ref 5–8)
PLATELET # BLD AUTO: 432 K/UL (ref 150–350)
PLATELET # BLD AUTO: 509 K/UL (ref 150–350)
PMV BLD AUTO: 10.4 FL (ref 9.2–12.9)
PMV BLD AUTO: 11.7 FL (ref 9.2–12.9)
POTASSIUM SERPL-SCNC: 4.1 MMOL/L (ref 3.5–5.1)
POTASSIUM SERPL-SCNC: 4.5 MMOL/L (ref 3.5–5.1)
PROT SERPL-MCNC: 7.5 G/DL (ref 6–8.4)
PROT UR QL STRIP: NEGATIVE
RBC # BLD AUTO: 4.41 M/UL (ref 4.6–6.2)
RBC # BLD AUTO: 4.49 M/UL (ref 4.6–6.2)
SATURATED IRON: 20 % (ref 20–50)
SODIUM SERPL-SCNC: 135 MMOL/L (ref 136–145)
SODIUM SERPL-SCNC: 139 MMOL/L (ref 136–145)
SP GR UR STRIP: 1.02 (ref 1–1.03)
TOTAL IRON BINDING CAPACITY: 250 UG/DL (ref 250–450)
TRANSFERRIN SERPL-MCNC: 169 MG/DL (ref 200–375)
TROPONIN I SERPL DL<=0.01 NG/ML-MCNC: 0.02 NG/ML (ref 0–0.03)
URN SPEC COLLECT METH UR: NORMAL
UROBILINOGEN UR STRIP-ACNC: NEGATIVE EU/DL
WBC # BLD AUTO: 7.12 K/UL (ref 3.9–12.7)
WBC # BLD AUTO: 9.68 K/UL (ref 3.9–12.7)

## 2020-10-08 PROCEDURE — 93005 ELECTROCARDIOGRAM TRACING: CPT

## 2020-10-08 PROCEDURE — 25000003 PHARM REV CODE 250: Performed by: EMERGENCY MEDICINE

## 2020-10-08 PROCEDURE — 82728 ASSAY OF FERRITIN: CPT

## 2020-10-08 PROCEDURE — 36415 COLL VENOUS BLD VENIPUNCTURE: CPT | Mod: PO

## 2020-10-08 PROCEDURE — 96360 HYDRATION IV INFUSION INIT: CPT

## 2020-10-08 PROCEDURE — 83540 ASSAY OF IRON: CPT

## 2020-10-08 PROCEDURE — 93010 EKG 12-LEAD: ICD-10-PCS | Mod: ,,, | Performed by: INTERNAL MEDICINE

## 2020-10-08 PROCEDURE — 84484 ASSAY OF TROPONIN QUANT: CPT

## 2020-10-08 PROCEDURE — 83735 ASSAY OF MAGNESIUM: CPT

## 2020-10-08 PROCEDURE — 83880 ASSAY OF NATRIURETIC PEPTIDE: CPT

## 2020-10-08 PROCEDURE — 85025 COMPLETE CBC W/AUTO DIFF WBC: CPT | Mod: 91

## 2020-10-08 PROCEDURE — 81003 URINALYSIS AUTO W/O SCOPE: CPT

## 2020-10-08 PROCEDURE — 36415 COLL VENOUS BLD VENIPUNCTURE: CPT

## 2020-10-08 PROCEDURE — 80048 BASIC METABOLIC PNL TOTAL CA: CPT

## 2020-10-08 PROCEDURE — 93010 ELECTROCARDIOGRAM REPORT: CPT | Mod: ,,, | Performed by: INTERNAL MEDICINE

## 2020-10-08 PROCEDURE — 99285 EMERGENCY DEPT VISIT HI MDM: CPT | Mod: 25

## 2020-10-08 PROCEDURE — 25500020 PHARM REV CODE 255

## 2020-10-08 PROCEDURE — 85025 COMPLETE CBC W/AUTO DIFF WBC: CPT

## 2020-10-08 PROCEDURE — 96361 HYDRATE IV INFUSION ADD-ON: CPT

## 2020-10-08 PROCEDURE — 80053 COMPREHEN METABOLIC PANEL: CPT

## 2020-10-08 RX ORDER — ONDANSETRON HYDROCHLORIDE 8 MG/1
8 TABLET, FILM COATED ORAL EVERY 12 HOURS PRN
Qty: 8 TABLET | Refills: 1 | Status: SHIPPED | OUTPATIENT
Start: 2020-10-08 | End: 2020-10-15

## 2020-10-08 RX ORDER — ALENDRONATE SODIUM 70 MG/1
70 TABLET ORAL
COMMUNITY
End: 2021-12-09 | Stop reason: ALTCHOICE

## 2020-10-08 RX ADMIN — IOHEXOL 75 ML: 350 INJECTION, SOLUTION INTRAVENOUS at 04:10

## 2020-10-08 RX ADMIN — SODIUM CHLORIDE 1000 ML: 0.9 INJECTION, SOLUTION INTRAVENOUS at 02:10

## 2020-10-08 NOTE — TELEPHONE ENCOUNTER
Contacted patient for follow-up of our discussion yesterday--patient saw Dr. Aceves yesterday & started taking the Periactin to enhance his appetite--patient sts he still feels fatigued but he is going for labs & an appt today with Dr. Hoffman--patient sts he will be here on Monday to see Dr. Clayton & he was very appreciative of everything thus far that we have done to help him--patient's biopsy has been tentatively scheduled for 10/2 @2P--let him know that we would schedule his surgery with Dr. Clayton on Monday when he comes for his appt--patient voiced understanding & had no further questions or concerns at this time

## 2020-10-09 ENCOUNTER — TELEPHONE (OUTPATIENT)
Dept: ENDOCRINOLOGY | Facility: CLINIC | Age: 75
End: 2020-10-09

## 2020-10-09 ENCOUNTER — TELEPHONE (OUTPATIENT)
Dept: FAMILY MEDICINE | Facility: CLINIC | Age: 75
End: 2020-10-09

## 2020-10-09 ENCOUNTER — PES CALL (OUTPATIENT)
Dept: ADMINISTRATIVE | Facility: CLINIC | Age: 75
End: 2020-10-09

## 2020-10-09 ENCOUNTER — TELEPHONE (OUTPATIENT)
Dept: OTOLARYNGOLOGY | Facility: CLINIC | Age: 75
End: 2020-10-09

## 2020-10-09 NOTE — ED PROVIDER NOTES
Encounter Date: 10/8/2020       History     Chief Complaint   Patient presents with    Weakness     30# weight loss on a month. Pt reports parathyroid issue     This patient is a 75-year-old male with a past history of arthritis, degenerative disc disease, heart murmur, hypertension, nontoxic multinodular goiter, RA, vitamin-D insufficiency presenting with complaints of general weakness and weight loss over 20 pounds over the past few months.  He reports he does not have an appetite and nothing tastes good to me.  He reports feeling nauseous upon eating.  He reports he was recently admitted and received IV hydration, additional workup related to heart problems.  He reports he was assessed by Gastroenterology who did do an EGD but he is unsure of the results.  He currently denies chest pain, fever, cough, focal abdominal pain, change in urination, diarrhea.  He reports it is easier for him to drink liquids and eat soft foods and states that more solid foods are less palatable.  He reports he was started on medication to improve his appetite yesterday by his primary care doctor but reports he has only taken a few tablets without significant improvement within the last 24 hr.        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    RA (rheumatoid arthritis)     Vitamin D insufficiency 9/30/2016     Past Surgical History:   Procedure Laterality Date    CYSTOSCOPY N/A 12/18/2018    Procedure: CYSTOSCOPY;  Surgeon: Garrett Pina MD;  Location: [facility];  Service: Urology;  Laterality: N/A;    ESOPHAGOGASTRODUODENOSCOPY N/A 9/29/2020    Dr. Espinosa; empiric dilation; erythematous mucosa in antrum; gastric mucosal atrophy; hematin in entire stomach; bx results pending    TRANSRECTAL  BIOPSY OF PROSTATE WITH ULTRASOUND GUIDANCE N/A 2018    Procedure: BIOPSY, PROSTATE, RECTAL APPROACH, WITH US GUIDANCE;  Surgeon: Garrett Pina MD;  Location: [facility] OR;  Service: Urology;  Laterality: N/A;     Family History   Problem Relation Age of Onset    Heart disease Mother     Stroke Father     Drug abuse Sister     Diabetes Maternal Uncle      Social History     Tobacco Use    Smoking status: Former Smoker     Packs/day: 0.25     Years: 10.00     Pack years: 2.50     Quit date: 2001     Years since quittin.1    Smokeless tobacco: Never Used   Substance Use Topics    Alcohol use: Yes     Comment: seldom    Drug use: Yes     Frequency: 8.0 times per week     Types: Marijuana     Review of Systems   Constitutional: Negative for fever.   HENT: Negative for congestion.    Respiratory: Negative for shortness of breath.    Cardiovascular: Negative for chest pain.   Gastrointestinal: Positive for nausea.   Endocrine: Negative for polyuria.   Genitourinary: Negative for dysuria.   Musculoskeletal: Negative for back pain.   Skin: Negative for rash.   Neurological: Negative for headaches.   Psychiatric/Behavioral: Negative for confusion.       Physical Exam     Initial Vitals [10/08/20 1243]   BP Pulse Resp Temp SpO2   133/64 (!) 123 20 99.2 °F (37.3 °C) (!) 94 %      MAP       --         Physical Exam    Nursing note and vitals reviewed.  Constitutional: He is not diaphoretic. No distress.   HENT:   Head: Normocephalic and atraumatic.   Eyes: Conjunctivae and EOM are normal.   Neck: Normal range of motion. Neck supple.   Cardiovascular: Intact distal pulses.   Tachycardic and regular   Pulmonary/Chest: Breath sounds normal. No respiratory distress. He has no wheezes.   Abdominal: Bowel sounds are normal. He exhibits no distension. There is no abdominal tenderness.   Musculoskeletal: Normal range of motion. No edema.   Neurological: He is alert and oriented to person, place, and time. He has  normal strength. No sensory deficit. GCS score is 15. GCS eye subscore is 4. GCS verbal subscore is 5. GCS motor subscore is 6.   Skin: Skin is warm and dry. Capillary refill takes less than 2 seconds.   Psychiatric: He has a normal mood and affect. His behavior is normal. Thought content normal.         ED Course   Procedures  Labs Reviewed   CBC W/ AUTO DIFFERENTIAL - Abnormal; Notable for the following components:       Result Value    RBC 4.41 (*)     Hemoglobin 12.7 (*)     Hematocrit 39.2 (*)     RDW 15.4 (*)     Platelets 432 (*)     Gran # (ANC) 8.2 (*)     Lymph # 0.5 (*)     Gran% 85.1 (*)     Lymph% 5.1 (*)     All other components within normal limits   COMPREHENSIVE METABOLIC PANEL - Abnormal; Notable for the following components:    Sodium 135 (*)     CO2 20 (*)     Calcium 11.0 (*)     All other components within normal limits   B-TYPE NATRIURETIC PEPTIDE - Abnormal; Notable for the following components:     (*)     All other components within normal limits   URINALYSIS, REFLEX TO URINE CULTURE    Narrative:     Specimen Source->Urine   MAGNESIUM   TROPONIN I        ECG Results          EKG 12-lead (In process)  Result time 10/09/20 07:51:24    In process by Interface, Lab In [facility] (10/09/20 07:51:24)                 Narrative:    Test Reason : R53.1,    Vent. Rate : 107 BPM     Atrial Rate : 107 BPM     P-R Int : 226 ms          QRS Dur : 086 ms      QT Int : 316 ms       P-R-T Axes : 066 047 195 degrees     QTc Int : 421 ms    Sinus tachycardia with 1st degree A-V block  LVH with repolarization abnormality  Abnormal ECG  When compared with ECG of 28-SEP-2020 19:25,  ST now depressed in Anterior leads  QT has shortened    Referred By: [email or web address]   SELF           Confirmed By:                             Imaging Results          CTA Chest Non-Coronary (PE Study) (Final result)  Result time 10/08/20 17:01:11    Final result by Kianna Junior MD (10/08/20 17:01:11)                 Impression:       There is no evidence pulmonary embolus.  There is a compression fracture of the T6 vertebral body more pronounced than what was seen on September 2, 2020.      Electronically signed by: Kianna Junior MD  Date:    10/08/2020  Time:    17:01             Narrative:    EXAMINATION:  CTA CHEST NON CORONARY    CLINICAL HISTORY:  PE suspected, high pretest prob;    TECHNIQUE:  Low dose axial images, sagittal and coronal reformations were obtained from the thoracic inlet to the lung bases following the IV administration of 100 mL of Omnipaque 350.  Contrast timing was optimized to evaluate the pulmonary arteries.  MIP images were performed.    COMPARISON:  None    FINDINGS:  There is pulmonary emphysema.  The the there is scarring versus atelectasis at bilateral lung bases.  There is no evidence pulmonary mass lesion.  There is no evidence axillary lymphadenopathy.  There is a small pericardial effusion.  There is no evidence of pulmonary arterial filling defect.  There is compression deformity of the T6 vertebral body more pronounced from what was seen on September 2, 2008.  There is a presumed bone island at the superior aspect of the T8 vertebral body.                                 Medical Decision Making:   ED Management:  This patient was emergently received and assessed upon arrival.  Initial vital signs significant for tachycardia with significant improvement with IV hydration here.  Screening labs and imaging, including troponin and CT of the chest are found to be largely unremarkable for acute life-threatening pathology.  A review of the patient's recent inpatient admission and Gastroenterology consultation indicates he does have evidence of ongoing gastritis and was placed on Protonix approximately 1 month ago.  I spoke with his pharmacist to indicates that prescription was filled in that he does have an additional 2 months.  The patient is unsure if he is taking the Protonix daily.  He is strongly  encouraged to take it daily.  He is also encouraged to take the appetite inducing medication by his primary 3 times a day as recommended as well.  I do not think the symptoms are associated with evidence of end-organ thyroid dysfunction.  He is strongly encouraged to slowly advance bland soft diet, small amounts frequently through the day.  He is asked further follow-up with his gastroenterologist regarding further recommendations, as well as primary care doctor.  He is asked return to the ER immediately for any new, concerning, or worsening symptoms.  Patient was agreeable with this plan for follow-up and was discharged in stable condition.                             Clinical Impression:       ICD-10-CM ICD-9-CM   1. Weakness  R53.1 780.79   2. Decreased appetite  R63.0 783.0                      Disposition:   Disposition: Discharged  Condition: Stable     ED Disposition Condition    Discharge Stable        ED Prescriptions     Medication Sig Dispense Start Date End Date Auth. Provider    ondansetron (ZOFRAN) 8 MG tablet Take 1 tablet (8 mg total) by mouth every 12 (twelve) hours as needed for Nausea. 8 tablet 10/8/2020 10/15/2020 Jordan Sutton MD        Follow-up Information     Follow up With Specialties Details Why Contact Info    Irvin Aceves MD Family Medicine Schedule an appointment as soon as possible for a visit   [address] [facility] [address]  [phone]                                         Jordan Sutton MD  10/09/20 9144

## 2020-10-09 NOTE — TELEPHONE ENCOUNTER
----- Message from Nano Cerrato sent at 10/9/2020 11:36 AM CDT -----  Regarding: advise  Contact: JONATHAN BHAKTA [[id number]]  Patient is requesting a call back from the nurse stated he missed 10/8/2020 appt, due to being at ER. Patient requesting a call.    Please call the patient upon request at phone number [phone].

## 2020-10-09 NOTE — TELEPHONE ENCOUNTER
Spoke with Tamiko with Dr. Miller/ ENT , she will reach out to patient and confirm appointment with Dr. Hoffman for 10/13/2020

## 2020-10-09 NOTE — TELEPHONE ENCOUNTER
Contacted patient regarding his visit to ED yesterday per Dr. Clayton--patient sts that he is still weak, fatigued, & has no appetite. Patient sts that he is taking Periactin 3 times a day but that he still doesn't feel like his appetite has improved--I confirmed upcoming appts he has with Dr. Clayton on 10/12, Dr. Hoffman on 10/13, & biopsy on 10/22--patient knows he needs surgery but is fearful that he will be too weak for the procedure & he won't be able to have it done--advised patient that he was able to bring a family member to his appt with Dr. Clayton as they will be discussing the upcoming biopsy & surgery with Dr. Clayton--patient voiced understanding but sts that he is frustrated because he feels that no one wants or is willing to help him--I assured patient that we would take care of him & that I understand his current frustration--no further action is needed at this time

## 2020-10-09 NOTE — TELEPHONE ENCOUNTER
----- Message from Nano Cerrato sent at 10/9/2020 11:32 AM CDT -----  Regarding: Sooner Appointment Request  Contact: JONATHAN BHAKTA [[id number]]  Type:  Sooner Appointment Request    Caller is requesting a sooner appointment.  Caller  is requesting a message be sent to doctor.    Name of Caller:JONATHAN BHAKTA [[id number]]  When is the first available appointment?  03/22/2021  Symptoms: er follow up, went to er 10/8/2020  Would the patient rather a call back or a response via [facility]? Call back   Best Call Back Number: [phone]  Additional Information: prefer to see only Dr. Aceves

## 2020-10-12 ENCOUNTER — OFFICE VISIT (OUTPATIENT)
Dept: OTOLARYNGOLOGY | Facility: CLINIC | Age: 75
End: 2020-10-12
Payer: MEDICARE

## 2020-10-12 VITALS
SYSTOLIC BLOOD PRESSURE: 144 MMHG | WEIGHT: 139.31 LBS | HEART RATE: 114 BPM | DIASTOLIC BLOOD PRESSURE: 70 MMHG | BODY MASS INDEX: 21.11 KG/M2 | HEIGHT: 68 IN | OXYGEN SATURATION: 92 %

## 2020-10-12 DIAGNOSIS — E21.3 HYPERPARATHYROIDISM: Primary | ICD-10-CM

## 2020-10-12 DIAGNOSIS — D35.1 PARATHYROID ADENOMA: ICD-10-CM

## 2020-10-12 DIAGNOSIS — Z03.818 ENCNTR FOR OBS FOR SUSP EXPSR TO OTH BIOLG AGENTS RULED OUT: ICD-10-CM

## 2020-10-12 PROCEDURE — 1159F MED LIST DOCD IN RCRD: CPT | Mod: S$GLB,,, | Performed by: OTOLARYNGOLOGY

## 2020-10-12 PROCEDURE — 99214 OFFICE O/P EST MOD 30 MIN: CPT | Mod: 57,S$GLB,, | Performed by: OTOLARYNGOLOGY

## 2020-10-12 PROCEDURE — 3077F SYST BP >= 140 MM HG: CPT | Mod: CPTII,S$GLB,, | Performed by: OTOLARYNGOLOGY

## 2020-10-12 PROCEDURE — 3078F PR MOST RECENT DIASTOLIC BLOOD PRESSURE < 80 MM HG: ICD-10-PCS | Mod: CPTII,S$GLB,, | Performed by: OTOLARYNGOLOGY

## 2020-10-12 PROCEDURE — 99214 PR OFFICE/OUTPT VISIT, EST, LEVL IV, 30-39 MIN: ICD-10-PCS | Mod: 57,S$GLB,, | Performed by: OTOLARYNGOLOGY

## 2020-10-12 PROCEDURE — 3077F PR MOST RECENT SYSTOLIC BLOOD PRESSURE >= 140 MM HG: ICD-10-PCS | Mod: CPTII,S$GLB,, | Performed by: OTOLARYNGOLOGY

## 2020-10-12 PROCEDURE — 3008F PR BODY MASS INDEX (BMI) DOCUMENTED: ICD-10-PCS | Mod: CPTII,S$GLB,, | Performed by: OTOLARYNGOLOGY

## 2020-10-12 PROCEDURE — 3078F DIAST BP <80 MM HG: CPT | Mod: CPTII,S$GLB,, | Performed by: OTOLARYNGOLOGY

## 2020-10-12 PROCEDURE — 1159F PR MEDICATION LIST DOCUMENTED IN MEDICAL RECORD: ICD-10-PCS | Mod: S$GLB,,, | Performed by: OTOLARYNGOLOGY

## 2020-10-12 PROCEDURE — 1101F PR PT FALLS ASSESS DOC 0-1 FALLS W/OUT INJ PAST YR: ICD-10-PCS | Mod: CPTII,S$GLB,, | Performed by: OTOLARYNGOLOGY

## 2020-10-12 PROCEDURE — 1126F AMNT PAIN NOTED NONE PRSNT: CPT | Mod: S$GLB,,, | Performed by: OTOLARYNGOLOGY

## 2020-10-12 PROCEDURE — 1126F PR PAIN SEVERITY QUANTIFIED, NO PAIN PRESENT: ICD-10-PCS | Mod: S$GLB,,, | Performed by: OTOLARYNGOLOGY

## 2020-10-12 PROCEDURE — 3008F BODY MASS INDEX DOCD: CPT | Mod: CPTII,S$GLB,, | Performed by: OTOLARYNGOLOGY

## 2020-10-12 PROCEDURE — 1101F PT FALLS ASSESS-DOCD LE1/YR: CPT | Mod: CPTII,S$GLB,, | Performed by: OTOLARYNGOLOGY

## 2020-10-12 NOTE — PROGRESS NOTES
Subjective:       Patient ID: Rajendra Castle is a 75 y.o. male.    Chief Complaint: Follow-up (parathyroid)    Follow-up  Pertinent negatives include no abdominal pain, arthralgias, chest pain, congestion, coughing, fatigue, fever, headaches, myalgias, nausea, neck pain, rash, sore throat, vertigo or vomiting.      Rajendra Castle is a 74 yo man presenting for evaluation of hyperparathyroidism and hypercalcemia. Most recent labs (9/2): .7, calcium 11.8. He has had hyperparathyroidism for at least 5 years. He had elected to treat this medically with sensipar until now. He has osteoporosis based on bone scan done 7/2018. Was on fosamax. He complains of chronic fatigue. He also has a history of multiple thyroid nodules. His last US of the thyroid was 10/2019. A right thyroid nodule had grown and was FNA'd in 2/2020 - FNA was non-diagnostic due to inadequate specimen. Unfortunately the patient has not followed up with his endocrinologist, Dr. Hoffman, in almost 1 year.  The patient also reports a 20 lb weight loss in the last 6 weeks. He reports that he does not have an appetite, but is trying to force himself to eat. Denies dysphagia  He has a history of prostate cancer, chronic kidney disease, HTN and RA - he is on methotrexate and prednisone for this.    10/12/20: Mr. Castle presents today for follow up. Since our last visit he has had a sestamibi scan demonstrating likely right parathyroid adenoma. He also had an ultrasound of the thyroid gland, right thyroid nodule more prominent than at last ultrasound. Patient was admitted to the hospital for abdominal pain, nausea/vomiting, anorexia, and elevated troponins. EGD was performed with no evidence of esophageal stricture but did have evidence of gastritis. Patient called the office very agitated at one point, had no recollection of coming to this clinic or having Sestamibi scan done. He continues to feel tired and have limited appetite. He was prescribed periactin by  his PCP, whom he saw last week. Has appointment with Dr. Ruelas, endocrinologist, tomorrow. He is very eager to have parathyroidectomy    Review of Systems   Constitutional: Positive for activity change, appetite change and unexpected weight change. Negative for fatigue and fever.   HENT: Negative for nasal congestion, dental problem, ear discharge, ear pain, facial swelling, hearing loss, nosebleeds, postnasal drip, rhinorrhea, sinus pressure/congestion, sneezing, sore throat, trouble swallowing and voice change.    Eyes: Negative for photophobia, pain and itching.   Respiratory: Negative for apnea, cough, shortness of breath, wheezing and stridor.    Cardiovascular: Negative for chest pain and palpitations.   Gastrointestinal: Negative for abdominal pain, diarrhea, nausea and vomiting.   Endocrine: Negative for cold intolerance and heat intolerance.   Genitourinary: Negative for bladder incontinence and dysuria.   Musculoskeletal: Negative for arthralgias, myalgias, neck pain and neck stiffness.   Integumentary:  Negative for pallor, rash and mole/lesion.   Allergic/Immunologic: Negative for food allergies.   Neurological: Negative for dizziness, vertigo, seizures, syncope and headaches.   Psychiatric/Behavioral: Negative for behavioral problems and confusion.         Objective:      Physical Exam  Constitutional:       General: He is awake.      Appearance: Normal appearance. He is well-developed and underweight. He is ill-appearing.   HENT:      Head: Normocephalic and atraumatic.      Right Ear: Tympanic membrane, ear canal and external ear normal.      Left Ear: Tympanic membrane, ear canal and external ear normal.      Nose: Nose normal. No septal deviation.      Mouth/Throat:      Pharynx: Uvula midline.   Eyes:      General: Lids are normal. Vision grossly intact.      Conjunctiva/sclera: Conjunctivae normal.   Neck:      Musculoskeletal: Full passive range of motion without pain, normal range of motion and  neck supple.      Thyroid: No thyroid mass or thyromegaly.      Trachea: Trachea normal.   Pulmonary:      Effort: Pulmonary effort is normal. No tachypnea or respiratory distress.   Lymphadenopathy:      Cervical: No cervical adenopathy.   Skin:     General: Skin is warm and dry.   Neurological:      Mental Status: He is alert and oriented to person, place, and time.   Psychiatric:         Mood and Affect: Mood and affect normal.         Assessment:       1. Hyperparathyroidism    2. Encntr for obs for susp expsr to oth biolg agents ruled out    3. Parathyroid adenoma        Plan:       76 yo man presenting with hyperparathyroidism and thyroid nodules. US-guided FNA of thyroid nodule is schedule for 10/22.    I had a very long discussion with the patient in regards to his condition and the treatment. I explained to him that he likely has overgrowth of one of the parathyroid glands and it is overproducing parathyroid hormone, which elevates the body's calcium. Hypercalcemia can cause mental fogginess, fatigue, anorexia, depression, kidney stones, osteopenia/osteoporosis. I explained to him that according to his chart he has had evidence of elevated calcium and parathyroid hormone levels for 5 years. However, I believe that removal of the parathyroid adenoma is the best treatment at this point. I explained to him the risks of surgery, including but not limited to scarring, bleeding, infection, damage to the recurrent laryngeal nerve resulting in hoarseness (temporary or permanent), damage to the trachea or esophagus, hematoma, seroma, inability to locate parathyroid adenoma, temporary hypocalcemia after surgery requiring calcium replacement. I explained to him that I cannot guarantee that his symptoms of fatigue and anorexia will completely resolve with this surgery, though I am hopeful that it will help.   I also discussed with the patient the need to involve a friend or family member in his care since his memory  lapses have proven inhibitory to his care. He said that we may contact his niece to discuss his care.  We will tentatively plan for surgery on 10/27. This will either be at [facility] or [facility] - will place orders in the next day or two.   --preop covid test ordered  --hold ASA 81 mg 5 days prior to surgery

## 2020-10-13 ENCOUNTER — PATIENT OUTREACH (OUTPATIENT)
Dept: ADMINISTRATIVE | Facility: OTHER | Age: 75
End: 2020-10-13

## 2020-10-13 ENCOUNTER — OFFICE VISIT (OUTPATIENT)
Dept: ENDOCRINOLOGY | Facility: CLINIC | Age: 75
End: 2020-10-13
Payer: MEDICARE

## 2020-10-13 VITALS
SYSTOLIC BLOOD PRESSURE: 100 MMHG | BODY MASS INDEX: 21.31 KG/M2 | HEIGHT: 68 IN | TEMPERATURE: 98 F | HEART RATE: 132 BPM | DIASTOLIC BLOOD PRESSURE: 54 MMHG | WEIGHT: 140.63 LBS

## 2020-10-13 DIAGNOSIS — E21.3 HYPERPARATHYROIDISM: ICD-10-CM

## 2020-10-13 DIAGNOSIS — R00.0 TACHYCARDIA: ICD-10-CM

## 2020-10-13 DIAGNOSIS — I10 ESSENTIAL HYPERTENSION: ICD-10-CM

## 2020-10-13 DIAGNOSIS — E04.1 NODULAR THYROID DISEASE: ICD-10-CM

## 2020-10-13 DIAGNOSIS — M81.8 OTHER OSTEOPOROSIS WITHOUT CURRENT PATHOLOGICAL FRACTURE: ICD-10-CM

## 2020-10-13 DIAGNOSIS — E04.2 MULTINODULAR GOITER: Primary | ICD-10-CM

## 2020-10-13 PROCEDURE — 99999 PR PBB SHADOW E&M-EST. PATIENT-LVL V: CPT | Mod: PBBFAC,,, | Performed by: INTERNAL MEDICINE

## 2020-10-13 PROCEDURE — 99999 PR PBB SHADOW E&M-EST. PATIENT-LVL V: ICD-10-PCS | Mod: PBBFAC,,, | Performed by: INTERNAL MEDICINE

## 2020-10-13 PROCEDURE — 99214 PR OFFICE/OUTPT VISIT, EST, LEVL IV, 30-39 MIN: ICD-10-PCS | Mod: S$GLB,,, | Performed by: INTERNAL MEDICINE

## 2020-10-13 PROCEDURE — 99214 OFFICE O/P EST MOD 30 MIN: CPT | Mod: S$GLB,,, | Performed by: INTERNAL MEDICINE

## 2020-10-13 RX ORDER — SODIUM CHLORIDE 0.9 % (FLUSH) 0.9 %
10 SYRINGE (ML) INJECTION
Status: DISCONTINUED | OUTPATIENT
Start: 2020-10-13 | End: 2020-11-10

## 2020-10-13 RX ORDER — LIDOCAINE HYDROCHLORIDE 10 MG/ML
1 INJECTION, SOLUTION EPIDURAL; INFILTRATION; INTRACAUDAL; PERINEURAL ONCE
Status: CANCELLED | OUTPATIENT
Start: 2020-10-13 | End: 2020-10-13

## 2020-10-13 NOTE — ASSESSMENT & PLAN NOTE
Pt HR elevated today   - reports anxiety   - regular. No CP   - encourage pt to f/u with PCP   - discussed ER precautions. ER for chest pain, SOB, fevers, etc. Pt expressed understanding.

## 2020-10-13 NOTE — PROGRESS NOTES
Subjective:      Chief Complaint: Hyperparathyroidism    HPI: Rajendra Castle is a 75 y.o. male who is here for a follow-up evaluation for osteoporosis, hyperparathyroidism, thyroid nodules, HTN.   Last seen 10/24/2019.    Osteoporosis:   Last DXA 1/2019. -3.1 left fem neck, -1.4 spine.   was on Fosamax, stopped 6/2019. In [facility] medication history, started around 2016. So would have had 3 years worth of treatment.   Restarted 10/2019.   No falls/fractures.    Not smoking.    Is on prednisone 2.5 mg BID (also methotrexate), does have hx RA.     Hyperparathyroidism:   most likely primary. No kidney stones. Has osteoporosis. Calcium elevation into 12, but more often 10s-11s. Recommended surgery, pt didn't want surgery. Now pt interested in surgery, seeing ENT.   Was on sensipar with improvement in calcium, stopped, Calcium increased and restarted. Currently still on sensipar.     NM scan 9/2020: Potential right sided parathyroid adenoma.    Last labs:    Lab Results   Component Value Date    CALCIUM 11.0 (H) 10/08/2020    ALBUMIN 3.6 10/08/2020    CREATININE 1.2 10/08/2020    [id number] 33 01/28/2019    .7 (H) 09/02/2020     Thyroid nodules:    Last US 9/2020. 2.2 cm right side nodule. Other small nodule.  No FH thyroid disease.    Had recommended FNA, 2/2020 was non-diagnostic so recommended repeat. not done (delayed with COVID).    HTN: BP normal/lower side today.     Today, pt reports feeling poorly. Fatigue, low exercise tolerance. Worse over the last few weeks. Low appetite, diarrhea, weight loss. Temperature problems.    Reviewed past medical, family, social history and updated as appropriate.    Review of Systems   Constitutional: Positive for appetite change and fatigue. Negative for unexpected weight change.   HENT: Negative for trouble swallowing.    Eyes: Negative for visual disturbance.   Respiratory: Negative for shortness of breath.    Cardiovascular: Positive for palpitations.   Gastrointestinal:  Positive for diarrhea. Negative for constipation.   Endocrine: Negative for cold intolerance and heat intolerance.        Hot and cold   Genitourinary: Negative for frequency.   Musculoskeletal: Positive for neck pain (shoulder/neck).   Neurological: Positive for light-headedness (sometimes when getting up in the morning).   Psychiatric/Behavioral:        Anxiety     Objective:     Vitals:    10/13/20 1306   BP: (!) 100/54   Pulse: (!) 132   Temp: 98 °F (36.7 °C)     BP Readings from Last 5 Encounters:   10/13/20 (!) 100/54   10/12/20 (!) 144/70   10/08/20 133/64   10/07/20 (!) 150/64   10/02/20 124/73     Physical Exam  Vitals signs reviewed.   Constitutional:       General: He is not in acute distress.  Neck:      Thyroid: Thyromegaly (nodule) present.   Cardiovascular:      Rate and Rhythm: Tachycardia present.      Heart sounds: Normal heart sounds.   Pulmonary:      Effort: Pulmonary effort is normal.         Wt Readings from Last 10 Encounters:   10/13/20 1306 63.8 kg (140 lb 10.5 oz)   10/12/20 0748 63.2 kg (139 lb 5.3 oz)   10/08/20 1243 64.4 kg (142 lb)   10/07/20 1453 64.5 kg (142 lb 3.2 oz)   10/02/20 0932 63 kg (138 lb 14.2 oz)   09/29/20 1434 62.6 kg (138 lb 0.1 oz)   09/29/20 1048 62.6 kg (138 lb)   09/29/20 0242 62.9 kg (138 lb 10.7 oz)   09/28/20 1839 63 kg (139 lb)   09/17/20 1302 63.5 kg (139 lb 15.9 oz)   09/08/20 0944 64.5 kg (142 lb 3.2 oz)   09/02/20 1046 65.2 kg (143 lb 11.8 oz)   08/19/20 1047 66.2 kg (145 lb 14.4 oz)       Lab Results   Component Value Date    HGBA1C 5.9 09/20/2016     Lab Results   Component Value Date    CHOL 125 09/30/2020    HDL 31 (L) 09/30/2020    LDLCALC 76.8 09/30/2020    TRIG 86 09/30/2020    CHOLHDL 24.8 09/30/2020     Lab Results   Component Value Date     (L) 10/08/2020    K 4.1 10/08/2020     10/08/2020    CO2 20 (L) 10/08/2020    GLU 96 10/08/2020    BUN 20 10/08/2020    CREATININE 1.2 10/08/2020    CALCIUM 11.0 (H) 10/08/2020    PROT 7.5 10/08/2020     ALBUMIN 3.6 10/08/2020    BILITOT 0.3 10/08/2020    ALKPHOS 104 10/08/2020    AST 23 10/08/2020    ALT 19 10/08/2020    ANIONGAP 9 10/08/2020    ESTGFRAFRICA >60 10/08/2020    EGFRNONAA 60 10/08/2020    TSH 0.687 09/29/2020        Assessment/Plan:     Hyperparathyroidism  Suspect primary hyperparathyroidism   - PTH elevation with high calcium   - complicated by osteoporosis, CKD, sometimes having very high calcium. All indications for surgery.   - recommended that in the past, pt wasn't interested, now seeing ENT and looks like set for surgery at the end of the month   - do still recommend thyroid evaluation first - had FNA non-diagnostic, needed repeat, scheduled for later this month already   - for now, continue sensipar. Avoid dehydration, excess calcium supplementation, and medications like HCTZ that would increase calcium further      Osteoporosis   - no falls/fractures  Continue alendronate   - hyperparathyroid evaluation/treatment as above   - encourage pt to try not to fall   - repeat DXA 1 year after surgery      Nodular thyroid disease  MNG.    - no compressive symptoms   - FNA non-diagnostic of largest nodule   - recommended repeat, but then there was a pandemic, not done, now scheduled for FNA later this month encourage pt to keep that appointment   - if non-diagnostic again could consider lobectomy (nodule on the right and parathyroid adenoma appears to be on the right from NM scan)    Hypertension  bp controlled today   - continue meds, monitor BP, keep f/u with PCP    Tachycardia  Pt HR elevated today   - reports anxiety   - regular. No CP   - encourage pt to f/u with PCP   - discussed ER precautions. ER for chest pain, SOB, fevers, etc. Pt expressed understanding.        Follow up in about 4 months (around 2/13/2021) for lab review, further monitoring.      Jean Carlos Hoffman MD  Endocrinology

## 2020-10-13 NOTE — ASSESSMENT & PLAN NOTE
Suspect primary hyperparathyroidism   - PTH elevation with high calcium   - complicated by osteoporosis, CKD, sometimes having very high calcium. All indications for surgery.   - recommended that in the past, pt wasn't interested, now seeing ENT and looks like set for surgery at the end of the month   - do still recommend thyroid evaluation first - had FNA non-diagnostic, needed repeat, scheduled for later this month already   - for now, continue sensipar. Avoid dehydration, excess calcium supplementation, and medications like HCTZ that would increase calcium further

## 2020-10-13 NOTE — ASSESSMENT & PLAN NOTE
MNG.    - no compressive symptoms   - FNA non-diagnostic of largest nodule   - recommended repeat, but then there was a pandemic, not done, now scheduled for FNA later this month encourage pt to keep that appointment   - if non-diagnostic again could consider lobectomy (nodule on the right and parathyroid adenoma appears to be on the right from NM scan)

## 2020-10-13 NOTE — ADDENDUM NOTE
Addended by: MARIA FERNANDA CALLAWAY on: 10/13/2020 01:13 PM     Modules accepted: Orders, SmartSet

## 2020-10-13 NOTE — PATIENT INSTRUCTIONS
For the thyroid, agree with fna (biopsy) coming up.    For the parathyroid, surgery is the way to cure it.    Continue sensipar (cinacalcet) until the surgery.    For the bones continue fosamax (alendronate) once a week.      Understanding Primary Hyperparathyroidism    The parathyroid glands are 4 tiny glands in the neck. They make parathyroid hormone (PTH). PTH controls the amount of calcium and phosphorus in your blood. Primary hyperparathyroidism is when there is too much PTH in your blood. It occurs when one or more of the glands are too active.  The job of PTH is to tell the body how to control calcium. Too much PTH means the body increases the amount of calcium in the blood. This leads to a problem called hypercalcemia. This is when the amount of calcium in the blood is too high. Hypercalcemia can cause serious health problems.  Causes  Hyperparathyroidism can occur when a parathyroid gland becomes enlarged. It can also occur as a complication of another health conditions, such as kidney failure or rickets. In these conditions, calcium is usually not high. This is called secondary hyperparathyroidism.  Whos at risk  The risk factors for this condition include:  · Being a woman (its less common in men)  · Being older (its more likely to occur with age)  · Having parents or siblings with the condition or other endocrine tumors  · Having certain kidney problems  · Taking certain medicines  · Having had radiation treatment in the head or neck  Symptoms  Symptoms of the condition can include:  · Muscle weakness  · Depression  · Tiredness  · Confusion and memory loss  · Poor memory  · Nausea and vomiting  · Pain in the stomach area (abdomen)  · Hard stools (constipation)  · Stomach ulcers  · Need to urinate often  · Kidney stones  · Joint or bone pain  · Bone disease (osteopenia or osteoporosis), an increase in bone fractures  · High blood pressure  · Increased thirst  Treatment  If primary hyperparathyroidism  is not treated, it can get worse over time. Treatments include:  · Surgery. This may be done to remove any enlarged parathyroid glands. This lets the amount of calcium in the blood go back to normal. You may need to take vitamin D and calcium supplements before the surgery. This will reduce the risk of low calcium after the surgery.   · Medicine. This lowers the amount of parathyroid hormone made by the overactive glands.   You and your healthcare provider can discuss your treatment options. Make sure to ask any questions you have.  Date Last Reviewed: 11/1/2016  © 4470-8218 [employer]. [address] [place] [address]. All rights reserved. This information is not intended as a substitute for professional medical care. Always follow your healthcare professional's instructions.

## 2020-10-13 NOTE — LETTER
October 13, 2020        Latonia Clayton MD  [address]  [address]  [place] [address]             [place] [facility] Endo/Diabetes  [address]  [place] [address]  Phone: [phone]   Patient: Rajendra Castle   MR Number: [id number]   YOB: 1945   Date of Visit: 10/13/2020       Dear Dr. Clayton:    I saw your patient, Rajendra Castle, today for evaluation. Attached you will find relevant portions of my assessment and plan of care.    Sounds like he is now open to a parathyroid surgery (indications being osteoporosis, CKD, and episodes of calcium up to 12). Haven't a lot of symptoms lately, anxiety, a few other things that I'm not really sure I can blame all of them on the parathyroid so I did encourage him to keep up with PCP follow-up. Glad you've been able to get him to go for repeat FNA for his nodule, usually like those sorted out before going in for a surgery in case he needs a thyroid lobectomy or something in addition to the parathyroid. He also had pretty high tachycardia today, looks like his HR has been elevated the last few times he was in clinic but not this high; he would benefit from PCP check before surgery.    If you have questions, please do not hesitate to call me. I look forward to following Rajendra Castle along with you.    Sincerely,      Jean Carlos Hoffman MD            CC  Irvin Aceves MD    [facility]

## 2020-10-14 ENCOUNTER — TELEPHONE (OUTPATIENT)
Dept: OTOLARYNGOLOGY | Facility: CLINIC | Age: 75
End: 2020-10-14

## 2020-10-19 ENCOUNTER — DOCUMENT SCAN (OUTPATIENT)
Dept: HOME HEALTH SERVICES | Facility: HOSPITAL | Age: 75
End: 2020-10-19
Payer: MEDICARE

## 2020-10-22 ENCOUNTER — DOCUMENT SCAN (OUTPATIENT)
Dept: HOME HEALTH SERVICES | Facility: HOSPITAL | Age: 75
End: 2020-10-22
Payer: MEDICARE

## 2020-10-22 ENCOUNTER — HOSPITAL ENCOUNTER (OUTPATIENT)
Dept: RADIOLOGY | Facility: HOSPITAL | Age: 75
Discharge: HOME | End: 2020-10-22
Attending: OTOLARYNGOLOGY
Payer: MEDICARE

## 2020-10-22 DIAGNOSIS — E04.2 MULTIPLE THYROID NODULES: ICD-10-CM

## 2020-10-22 PROCEDURE — 10005 FNA BX W/US GDN 1ST LES: CPT

## 2020-10-22 PROCEDURE — 10005 FNA BX W/US GDN 1ST LES: CPT | Mod: ,,, | Performed by: RADIOLOGY

## 2020-10-22 PROCEDURE — 88173 CYTOPATH EVAL FNA REPORT: CPT | Mod: 26,,, | Performed by: STUDENT IN AN ORGANIZED HEALTH CARE EDUCATION/TRAINING PROGRAM

## 2020-10-22 PROCEDURE — 88173 PR  INTERPRETATION OF FNA SMEAR: ICD-10-PCS | Mod: 26,,, | Performed by: STUDENT IN AN ORGANIZED HEALTH CARE EDUCATION/TRAINING PROGRAM

## 2020-10-22 PROCEDURE — 88173 CYTOPATH EVAL FNA REPORT: CPT | Performed by: STUDENT IN AN ORGANIZED HEALTH CARE EDUCATION/TRAINING PROGRAM

## 2020-10-22 PROCEDURE — 10005 US FINE NEEDLE ASPIRATION THYROID, FIRST LESION: ICD-10-PCS | Mod: ,,, | Performed by: RADIOLOGY

## 2020-10-22 NOTE — H&P
[facility]  History & Physical - Short Stay  Interventional Radiology    SUBJECTIVE:     Chief Complaint/Reason for Admission: thyroid nodule, midpole, right, posterior    History of Present Illness:  Rajendra Castle is a 75 y.o. male with a history of thyroid nodule, right.      OBJECTIVE:       Physical Exam:  Awake, alert and oriented  In no acute distress  Pe, eomi  No labored breathing  Moves extremities spontaneously      ASSESSMENT/PLAN:     Thyroid nodule, right.    Patient will undergo thyroid biopsy.    Sedation Plan: lidocaine

## 2020-10-22 NOTE — OP NOTE
Interventional Radiology Procedure Note    Procedure: right, midpole thryoid bx    Pre procedure diagnosis: midpole, right thyroid lesion    Post procedure diagnosis: same    : MD Sandi    Specimens removed: 2 25 gauge passes    Estimated Blood Loss: 0 ml     Complications: none     Findings: midpole right thryoid nodule

## 2020-10-22 NOTE — DISCHARGE SUMMARY
Radiology Discharge Summary      Admit date: 10/22/2020  1:45 PM  Discharge date: October 22, 2020    Instructions Given to patient: YesVerbal    Diet: Regular    Activity:NO Restrictions    Medications on discharge (List): Refer to Discharge Medication List    Hospital Course: uneventul    Description of Condition on Discharge: Chief Complaint Resolved    Discharge Disposition: Home    Discharge Diagnosis: midpole right thyroid nodule biopsy

## 2020-10-24 ENCOUNTER — LAB VISIT (OUTPATIENT)
Dept: PRIMARY CARE CLINIC | Facility: CLINIC | Age: 75
End: 2020-10-24
Payer: MEDICARE

## 2020-10-24 DIAGNOSIS — Z03.818 ENCNTR FOR OBS FOR SUSP EXPSR TO OTH BIOLG AGENTS RULED OUT: ICD-10-CM

## 2020-10-24 PROCEDURE — U0003 INFECTIOUS AGENT DETECTION BY NUCLEIC ACID (DNA OR RNA); SEVERE ACUTE RESPIRATORY SYNDROME CORONAVIRUS 2 (SARS-COV-2) (CORONAVIRUS DISEASE [COVID-19]), AMPLIFIED PROBE TECHNIQUE, MAKING USE OF HIGH THROUGHPUT TECHNOLOGIES AS DESCRIBED BY CMS-2020-01-R: HCPCS

## 2020-10-25 LAB — SARS-COV-2 RNA RESP QL NAA+PROBE: NOT DETECTED

## 2020-10-26 ENCOUNTER — ANESTHESIA EVENT (OUTPATIENT)
Dept: SURGERY | Facility: HOSPITAL | Age: 75
End: 2020-10-26
Payer: MEDICARE

## 2020-10-26 ENCOUNTER — TELEPHONE (OUTPATIENT)
Dept: OTOLARYNGOLOGY | Facility: CLINIC | Age: 75
End: 2020-10-26

## 2020-10-26 LAB
COMMENT: ABNORMAL
FINAL PATHOLOGIC DIAGNOSIS: ABNORMAL

## 2020-10-26 NOTE — TELEPHONE ENCOUNTER
----- Message from Beverley Vásquez sent at 10/26/2020  4:27 PM CDT -----  Regarding: Returning call to office  Contact: patient  Type:  Patient Returning Call    Who Called:  patient  Who Left Message for Patient:  not sure  Does the patient know what this is regarding?:  tomorrow's procedure  Best Call Back Number:  [phone] (home)     Additional Information:

## 2020-10-26 NOTE — TELEPHONE ENCOUNTER
Spoke with patient, states no one has called him to let him know what time to be at hospital for his procedure tomorrow.  Called main OR and confirmed time and informed patient to be there at 5am.

## 2020-10-26 NOTE — TELEPHONE ENCOUNTER
----- Message from Nano Cerrato sent at 10/26/2020  7:53 AM CDT -----  Regarding: advice  Contact: JONATHAN BHAKTA [[id number]]  Patient is requesting a call back from the nurse concerning upcoming procedure on 10/27/2020.  Please call the patient upon request at phone number [phone].

## 2020-10-26 NOTE — ANESTHESIA PREPROCEDURE EVALUATION
[facility]  Anesthesia Pre-Operative Evaluation         Patient Name: Rajendra Castle  YOB: 1945  MRN: [id number]    SUBJECTIVE:     Pre-operative evaluation for Procedure(s) (LRB):  PARATHYROIDECTOMY (Right)     10/26/2020    Rajendra Castle is a 75 y.o. male w/ a significant PMHx of hyperparathyroidism, HTN, rheumatoid arthritis, COPD, CKD who was recently seen by his endocrinologist on 10/13/20 for evaluation of elevated Ca2+ 12 and osteoporosis.   PTH also elevated to 257.  Patient currently takes sensipar for elevated calcium but has noted an increase in levels despite this.   NM scan 9/2020: Potential right sided parathyroid adenoma.  FNA on 10/22/20 noted Follicular Lesion of Undetermined Significance (FLUS).     Patient now presents for the above procedure(s).      LDA:        Peripheral IV - Single Lumen 09/29/20 20 G Left Antecubital (Active)   Site Assessment Clean;Dry;Intact;No redness;No swelling 09/30/20 0800   Line Status Saline locked 09/30/20 0800   Dressing Status Clean;Dry;Intact 09/30/20 0800   Dressing Intervention Integrity maintained 09/29/20 0710   Number of days: 27       Prev airway: None documented.    Drips: None documented.      Patient Active Problem List   Diagnosis    Heart murmur    Hypertension    Arthritis    Rotator cuff tendinitis    Dyspnea on exertion    DDD (degenerative disc disease), cervical    Anemia, iron deficiency    Chronic GI bleeding    Arthritis of wrist, right    Trigger thumb of left hand    Arthritis of right wrist    Essential hypertension    Rheumatoid arthritis involving multiple sites    Elevated troponin    Nausea and vomiting    Nontoxic multinodular goiter    Gastroesophageal reflux disease without esophagitis    Nodular thyroid disease    Prediabetes    Osteoporosis    Hypogonadism in male    Current chronic use of systemic steroids    Vitamin D insufficiency    Hyperparathyroidism    Pulmonary emphysema     Rheumatoid lung    Lung nodule    CKD (chronic kidney disease)    Elevated PSA    Prostate cancer    Chest pain    Dysphagia    Severe malnutrition    Tachycardia       Review of patient's allergies indicates:  No Known Allergies    Current Inpatient Medications:      Current Facility-Administered Medications on File Prior to Encounter   Medication Dose Route Frequency Provider Last Rate Last Dose    sodium chloride 0.9% flush 10 mL  10 mL Intravenous PRN Latonia Clayton MD         Current Outpatient Medications on File Prior to Encounter   Medication Sig Dispense Refill    alendronate (FOSAMAX) 70 MG tablet Take 70 mg by mouth every 7 days. Mon      alfuzosin (UROXATRAL) 10 mg Tb24 TAKE 1 TABLET(10 MG) BY MOUTH AFTER DINNER 30 tablet 6    amLODIPine (NORVASC) 5 MG tablet Take 1 tablet (5 mg total) by mouth once daily. 30 tablet 11    aspirin (ECOTRIN) 81 MG EC tablet Take 81 mg by mouth once daily.        carvedilol (COREG) 25 MG tablet TAKE 1 TABLET(25 MG) BY MOUTH TWICE DAILY WITH MEALS 180 tablet 0    cinacalcet (SENSIPAR) 30 MG Tab Take 1 tablet (30 mg total) by mouth 2 (two) times daily with meals. 60 tablet 11    cyproheptadine (PERIACTIN) 4 mg tablet Take 1 tablet (4 mg total) by mouth 3 (three) times daily as needed. 45 tablet 3    escitalopram oxalate (LEXAPRO) 10 MG tablet Take 10 mg by mouth once daily.      folic acid (FOLVITE) 1 MG tablet TAKE 1 TABLET(1 MG) BY MOUTH EVERY DAY 90 tablet 3    iron polysaccharides (NIFEREX) 150 mg iron Cap Take 1 capsule (150 mg total) by mouth 2 times daily 2 hours after meal. 60 capsule 3    methotrexate 2.5 MG Tab TAKE 6 TABLETS BY MOUTH EVERY WEEK (Patient taking differently: Take 15 mg by mouth every 7 days. TAKE 6 TABLETS BY MOUTH EVERY WEEK - Monday) 72 tablet 1    pantoprazole (PROTONIX) 40 MG tablet Take 1 tablet (40 mg total) by mouth once daily. 30 tablet 3    predniSONE (DELTASONE) 2.5 MG tablet Take 1 tablet (2.5 mg total) by  mouth 2 (two) times daily. 180 tablet 1    traMADoL (ULTRAM) 50 mg tablet Take 1-2 tablets twice daily as needed for pain 120 tablet 5    [DISCONTINUED] methotrexate 2.5 MG Tab TAKE 6 TABLETS BY MOUTH EVERY WEEK 72 tablet 1    [DISCONTINUED] predniSONE (DELTASONE) 2.5 MG tablet TAKE 1 TABLET BY MOUTH TWICE DAILY 180 tablet 1       Past Surgical History:   Procedure Laterality Date    CYSTOSCOPY N/A 2018    Procedure: CYSTOSCOPY;  Surgeon: Garrett Pina MD;  Location: [facility] OR;  Service: Urology;  Laterality: N/A;    ESOPHAGOGASTRODUODENOSCOPY N/A 2020    Dr. Espinosa; empiric dilation; erythematous mucosa in antrum; gastric mucosal atrophy; hematin in entire stomach; bx results pending    TRANSRECTAL BIOPSY OF PROSTATE WITH ULTRASOUND GUIDANCE N/A 2018    Procedure: BIOPSY, PROSTATE, RECTAL APPROACH, WITH US GUIDANCE;  Surgeon: Garrett Pina MD;  Location: [facility] OR;  Service: Urology;  Laterality: N/A;       Social History     Socioeconomic History    Marital status: Single     Spouse name: Not on file    Number of children: Not on file    Years of education: Not on file    Highest education level: Not on file   Occupational History    Not on file   Social Needs    Financial resource strain: Not on file    Food insecurity     Worry: Not on file     Inability: Not on file    Transportation needs     Medical: Not on file     Non-medical: Not on file   Tobacco Use    Smoking status: Former Smoker     Packs/day: 0.25     Years: 10.00     Pack years: 2.50     Quit date: 2001     Years since quittin.2    Smokeless tobacco: Never Used   Substance and Sexual Activity    Alcohol use: Yes     Comment: seldom    Drug use: Yes     Frequency: 8.0 times per week     Types: Marijuana    Sexual activity: Yes     Partners: Female   Lifestyle    Physical activity     Days per week: Not on file     Minutes per session: Not on file    Stress: Not on file   Relationships     Social connections     Talks on phone: Not on file     Gets together: Not on file     Attends Confucianism service: Not on file     Active member of club or organization: Not on file     Attends meetings of clubs or organizations: Not on file     Relationship status: Not on file   Other Topics Concern    Not on file   Social History Narrative    Not on file       OBJECTIVE:     Vital Signs Range (Last 24H):         Significant Labs:  Lab Results   Component Value Date    WBC 9.68 10/08/2020    HGB 12.7 (L) 10/08/2020    HCT 39.2 (L) 10/08/2020     (H) 10/08/2020    CHOL 125 09/30/2020    TRIG 86 09/30/2020    HDL 31 (L) 09/30/2020    ALT 19 10/08/2020    AST 23 10/08/2020     (L) 10/08/2020    K 4.1 10/08/2020     10/08/2020    CREATININE 1.2 10/08/2020    BUN 20 10/08/2020    CO2 20 (L) 10/08/2020    TSH 0.687 09/29/2020    PSA 4.8 (H) 03/16/2018    INR 1.0 03/12/2019    HGBA1C 5.9 09/20/2016       Diagnostic Studies: No relevant studies.    EKG:   Results for orders placed or performed during the hospital encounter of 10/08/20   EKG 12-lead    Collection Time: 10/08/20  1:36 PM    Narrative    Test Reason : R53.1,    Vent. Rate : 107 BPM     Atrial Rate : 107 BPM     P-R Int : 226 ms          QRS Dur : 086 ms      QT Int : 316 ms       P-R-T Axes : 066 047 195 degrees     QTc Int : 421 ms    Sinus tachycardia with 1st degree A-V block  LVH with repolarization abnormality  Abnormal ECG  When compared with ECG of 28-SEP-2020 19:25,  ST now depressed in Anterior leads  QT has shortened  Confirmed by Bello Puga MD ([id number]) on 10/11/2020 9:39:25 PM    Referred By: [email or web address]   SELF           Confirmed By:Bello Puga MD       ECHOCARDIOGRAM:  TTE:  Results for orders placed or performed during the hospital encounter of 09/28/20   Echo Color Flow Doppler? Yes   Result Value Ref Range    BSA 1.73 m2    TDI SEPTAL 0.04 m/s    LV LATERAL E/E' RATIO 8.75 m/s    LV SEPTAL E/E' RATIO 17.50 m/s    AORTIC  "VALVE CUSP SEPERATION 2.04 cm    TDI LATERAL 0.08 m/s    LVIDd 4.49 3.5 - 6.0 cm    IVS 1.16 (A) 0.6 - 1.1 cm    Posterior Wall 1.19 (A) 0.6 - 1.1 cm    Ao root annulus 2.95 cm    LVIDs 3.33 2.1 - 4.0 cm    FS 26 28 - 44 %    LV mass 191.54 g    LA size 2.83 cm    Left Ventricle Relative Wall Thickness 0.53 cm    AV mean gradient 4 mmHg    AV valve area 3.23 cm2    AV Velocity Ratio 0.98     AV index (prosthetic) 0.99     MV valve area p 1/2 method 4.08 cm2    E/A ratio 0.71     Mean e' 0.06 m/s    E wave decelartion time 204.19 msec    MV "A" wave duration 123.00 msec    LVOT diameter 2.04 cm    LVOT area 3.3 cm2    LVOT peak florentin 1.55 m/s    LVOT peak VTI 25.82 cm    Ao peak florentin 1.58 m/s    Ao VTI 26.14 cm    Radius 0.44 cm    Mr max florentin 0.06 m/s    LVOT stroke volume 84.35 cm3    AV peak gradient 10 mmHg    E/E' ratio 11.67 m/s    MV Peak E Florentin 0.70 m/s    PISA TR VN Nyquist 0.01 m/s    TR Max Florentin 2.56 m/s    MV stenosis pressure 1/2 time 53.93 ms    MV Peak A Florentin 0.98 m/s    LV Systolic Volume 45.10 mL    LV Systolic Volume Index 25.8 mL/m2    LV Diastolic Volume 91.97 mL    LV Diastolic Volume Index 52.68 mL/m2    LV Mass Index 110 g/m2    Triscuspid Valve Regurgitation Peak Gradient 26 mmHg    Right Atrial Pressure (from IVC) 3 mmHg    TV rest pulmonary artery pressure 29 mmHg    Narrative    · The left ventricle is normal in size with normal systolic function. The   estimated ejection fraction is 65%.  · There is moderate left ventricular concentric hypertrophy.  · Normal left ventricular diastolic function.  · Normal right ventricular systolic function.  · Moderate aortic regurgitation.  · Moderate mitral regurgitation.  · No pulmonary hypertension  · Mild tricuspid regurgitation.  · Normal central venous pressure (3 mmHg).  · The estimated PA systolic pressure is 29 mmHg.  · Trivial posterior pericardial effusion. Effusion is fluid.     Left ventricle hypertrophy  Moderate aortic regurgitation mitral " regurgitant  Mean left atrial pressure is normal  No pulmonary hypertension         ASSESSMENT/PLAN:       Anesthesia Evaluation    I have reviewed the Patient Summary Reports.    I have reviewed the Nursing Notes.    I have reviewed the Medications.     Review of Systems  Anesthesia Hx:  No problems with previous Anesthesia  History of prior surgery of interest to airway management or planning: Previous anesthesia: MAC  EGD 9/2020 with MAC.  Denies Family Hx of Anesthesia complications.   Denies Personal Hx of Anesthesia complications.   Social:  Former Smoker    Hematology/Oncology:         -- Anemia: --  Cancer in past history (prostate CA):  Oncology Comments: Prostate ca     Cardiovascular:   Hypertension Valvular problems/Murmurs (murmur)    Pulmonary:   COPD (Emphysema), moderate Shortness of breath Rheumatoid Lung  Lung Nodule   Renal/:   Chronic Renal Disease, CRI    Hepatic/GI:   GERD    Musculoskeletal:   Arthritis (RA)   Spine Disorders: cervical Disc disease and Degenerative disease    Neurological:  Neurology Normal    Endocrine:   hyperparathyroidism       Physical Exam  General:  Well nourished    Airway/Jaw/Neck:  Airway Findings: Mouth Opening: Normal Tongue: Normal  General Airway Assessment: Adult, Possible difficult mask airway  Mallampati: I  TM Distance: Normal, at least 6 cm     Eyes/Ears/Nose:  Eyes/Ears/Nose Findings:    Dental:  Dental Findings:    Chest/Lungs:  Chest/Lungs Findings: Clear to auscultation, Normal Respiratory Rate     Heart/Vascular:  Heart Findings: Rhythm: Regular Rhythm  Heart murmur: negative       Mental Status:  Mental Status Findings:  Cooperative, Alert and Oriented         Anesthesia Plan  Type of Anesthesia, risks & benefits discussed:  Anesthesia Type:  general  Patient's Preference:   Intra-op Monitoring Plan: standard ASA monitors and arterial line  Intra-op Monitoring Plan Comments:   Post Op Pain Control Plan: multimodal analgesia, IV/PO Opioids PRN and per  primary service following discharge from PACU  Post Op Pain Control Plan Comments:   Induction:   IV  Beta Blocker:  Patient is not currently on a Beta-Blocker (No further documentation required).       Informed Consent: Patient understands risks and agrees with Anesthesia plan.  Questions answered. Anesthesia consent signed with patient.  ASA Score: 3     Day of Surgery Review of History & Physical: I have interviewed and examined the patient. I have reviewed the patient's H&P dated:    H&P update referred to the provider.         Ready For Surgery From Anesthesia Perspective.

## 2020-10-27 ENCOUNTER — HOSPITAL ENCOUNTER (OUTPATIENT)
Facility: HOSPITAL | Age: 75
Discharge: HOME | End: 2020-10-28
Attending: OTOLARYNGOLOGY | Admitting: OTOLARYNGOLOGY
Payer: MEDICARE

## 2020-10-27 ENCOUNTER — PATIENT OUTREACH (OUTPATIENT)
Dept: ADMINISTRATIVE | Facility: HOSPITAL | Age: 75
End: 2020-10-27

## 2020-10-27 ENCOUNTER — ANESTHESIA (OUTPATIENT)
Dept: SURGERY | Facility: HOSPITAL | Age: 75
End: 2020-10-27
Payer: MEDICARE

## 2020-10-27 DIAGNOSIS — E21.3 HYPERPARATHYROIDISM: ICD-10-CM

## 2020-10-27 DIAGNOSIS — D35.1 PARATHYROID ADENOMA: ICD-10-CM

## 2020-10-27 LAB
CA-I BLDV-SCNC: 1.27 MMOL/L (ref 1.06–1.42)
PTH-INTACT SERPL-MCNC: 245 PG/ML (ref 9–77)
PTH-INTACT SERPL-MCNC: 62 PG/ML (ref 9–77)

## 2020-10-27 PROCEDURE — 88332 PR  PATH CONSULT IN SURG,W ADDN FRZ SEC: ICD-10-PCS | Mod: 26,,, | Performed by: STUDENT IN AN ORGANIZED HEALTH CARE EDUCATION/TRAINING PROGRAM

## 2020-10-27 PROCEDURE — 27201037 HC PRESSURE MONITORING SET UP

## 2020-10-27 PROCEDURE — 37000008 HC ANESTHESIA 1ST 15 MINUTES: Performed by: OTOLARYNGOLOGY

## 2020-10-27 PROCEDURE — 88331 PATH CONSLTJ SURG 1 BLK 1SPC: CPT | Mod: 26,,, | Performed by: STUDENT IN AN ORGANIZED HEALTH CARE EDUCATION/TRAINING PROGRAM

## 2020-10-27 PROCEDURE — 37000009 HC ANESTHESIA EA ADD 15 MINS: Performed by: OTOLARYNGOLOGY

## 2020-10-27 PROCEDURE — 88332 PATH CONSLTJ SURG EA ADD BLK: CPT | Mod: 26,,, | Performed by: STUDENT IN AN ORGANIZED HEALTH CARE EDUCATION/TRAINING PROGRAM

## 2020-10-27 PROCEDURE — 71000033 HC RECOVERY, INTIAL HOUR: Performed by: OTOLARYNGOLOGY

## 2020-10-27 PROCEDURE — 83970 ASSAY OF PARATHORMONE: CPT | Mod: 91

## 2020-10-27 PROCEDURE — 88332 PATH CONSLTJ SURG EA ADD BLK: CPT | Performed by: STUDENT IN AN ORGANIZED HEALTH CARE EDUCATION/TRAINING PROGRAM

## 2020-10-27 PROCEDURE — 36500 PR VENOUS SAMPLING BY CATHETER, W/ORGAN BLOOD SAMPLE: ICD-10-PCS | Mod: 51,,, | Performed by: OTOLARYNGOLOGY

## 2020-10-27 PROCEDURE — 60500 EXPLORE PARATHYROID GLANDS: CPT | Mod: ,,, | Performed by: OTOLARYNGOLOGY

## 2020-10-27 PROCEDURE — 36500 INSERTION OF CATHETER VEIN: CPT | Mod: 51,,, | Performed by: OTOLARYNGOLOGY

## 2020-10-27 PROCEDURE — 36000707: Performed by: OTOLARYNGOLOGY

## 2020-10-27 PROCEDURE — 63600175 PHARM REV CODE 636 W HCPCS: Performed by: STUDENT IN AN ORGANIZED HEALTH CARE EDUCATION/TRAINING PROGRAM

## 2020-10-27 PROCEDURE — 36620 INSERTION CATHETER ARTERY: CPT | Mod: 59,,, | Performed by: ANESTHESIOLOGY

## 2020-10-27 PROCEDURE — 88307 PR  SURG PATH,LEVEL V: ICD-10-PCS | Mod: 26,,, | Performed by: STUDENT IN AN ORGANIZED HEALTH CARE EDUCATION/TRAINING PROGRAM

## 2020-10-27 PROCEDURE — 25000003 PHARM REV CODE 250: Performed by: STUDENT IN AN ORGANIZED HEALTH CARE EDUCATION/TRAINING PROGRAM

## 2020-10-27 PROCEDURE — D9220A PRA ANESTHESIA: Mod: ,,, | Performed by: ANESTHESIOLOGY

## 2020-10-27 PROCEDURE — 82330 ASSAY OF CALCIUM: CPT

## 2020-10-27 PROCEDURE — 88331 PATH CONSLTJ SURG 1 BLK 1SPC: CPT | Performed by: STUDENT IN AN ORGANIZED HEALTH CARE EDUCATION/TRAINING PROGRAM

## 2020-10-27 PROCEDURE — C1729 CATH, DRAINAGE: HCPCS | Performed by: OTOLARYNGOLOGY

## 2020-10-27 PROCEDURE — 36000706: Performed by: OTOLARYNGOLOGY

## 2020-10-27 PROCEDURE — 60500 PR EXPLORE PARATHYROID GLANDS: ICD-10-PCS | Mod: ,,, | Performed by: OTOLARYNGOLOGY

## 2020-10-27 PROCEDURE — 27201423 OPTIME MED/SURG SUP & DEVICES STERILE SUPPLY: Performed by: OTOLARYNGOLOGY

## 2020-10-27 PROCEDURE — 88331 PR  PATH CONSULT IN SURG,W FRZ SEC: ICD-10-PCS | Mod: 26,,, | Performed by: STUDENT IN AN ORGANIZED HEALTH CARE EDUCATION/TRAINING PROGRAM

## 2020-10-27 PROCEDURE — 88307 TISSUE EXAM BY PATHOLOGIST: CPT | Performed by: STUDENT IN AN ORGANIZED HEALTH CARE EDUCATION/TRAINING PROGRAM

## 2020-10-27 PROCEDURE — 71000015 HC POSTOP RECOV 1ST HR: Performed by: OTOLARYNGOLOGY

## 2020-10-27 PROCEDURE — 25000003 PHARM REV CODE 250: Performed by: OTOLARYNGOLOGY

## 2020-10-27 PROCEDURE — 94761 N-INVAS EAR/PLS OXIMETRY MLT: CPT

## 2020-10-27 PROCEDURE — 36620 ARTERIAL: ICD-10-PCS | Mod: 59,,, | Performed by: ANESTHESIOLOGY

## 2020-10-27 PROCEDURE — D9220A PRA ANESTHESIA: ICD-10-PCS | Mod: ,,, | Performed by: ANESTHESIOLOGY

## 2020-10-27 PROCEDURE — 36415 COLL VENOUS BLD VENIPUNCTURE: CPT

## 2020-10-27 PROCEDURE — 88307 TISSUE EXAM BY PATHOLOGIST: CPT | Mod: 26,,, | Performed by: STUDENT IN AN ORGANIZED HEALTH CARE EDUCATION/TRAINING PROGRAM

## 2020-10-27 RX ORDER — SUCCINYLCHOLINE CHLORIDE 20 MG/ML
INJECTION INTRAMUSCULAR; INTRAVENOUS
Status: DISCONTINUED | OUTPATIENT
Start: 2020-10-27 | End: 2020-10-27

## 2020-10-27 RX ORDER — ALFUZOSIN HYDROCHLORIDE 10 MG/1
10 TABLET, EXTENDED RELEASE ORAL
Status: DISCONTINUED | OUTPATIENT
Start: 2020-10-27 | End: 2020-10-28 | Stop reason: HOSPADM

## 2020-10-27 RX ORDER — ZOLPIDEM TARTRATE 5 MG/1
5 TABLET ORAL NIGHTLY PRN
Status: DISCONTINUED | OUTPATIENT
Start: 2020-10-27 | End: 2020-10-28 | Stop reason: HOSPADM

## 2020-10-27 RX ORDER — FENTANYL CITRATE 50 UG/ML
25 INJECTION, SOLUTION INTRAMUSCULAR; INTRAVENOUS EVERY 5 MIN PRN
Status: DISCONTINUED | OUTPATIENT
Start: 2020-10-27 | End: 2020-10-27 | Stop reason: HOSPADM

## 2020-10-27 RX ORDER — SODIUM CHLORIDE 9 MG/ML
INJECTION, SOLUTION INTRAVENOUS CONTINUOUS PRN
Status: DISCONTINUED | OUTPATIENT
Start: 2020-10-27 | End: 2020-10-27

## 2020-10-27 RX ORDER — PHENYLEPHRINE HCL IN 0.9% NACL 1 MG/10 ML
SYRINGE (ML) INTRAVENOUS
Status: DISCONTINUED | OUTPATIENT
Start: 2020-10-27 | End: 2020-10-27

## 2020-10-27 RX ORDER — ROCURONIUM BROMIDE 10 MG/ML
INJECTION, SOLUTION INTRAVENOUS
Status: DISCONTINUED | OUTPATIENT
Start: 2020-10-27 | End: 2020-10-27

## 2020-10-27 RX ORDER — LIDOCAINE HYDROCHLORIDE 10 MG/ML
1 INJECTION, SOLUTION EPIDURAL; INFILTRATION; INTRACAUDAL; PERINEURAL ONCE
Status: COMPLETED | OUTPATIENT
Start: 2020-10-27 | End: 2020-10-27

## 2020-10-27 RX ORDER — LIDOCAINE HYDROCHLORIDE AND EPINEPHRINE 10; 10 MG/ML; UG/ML
INJECTION, SOLUTION INFILTRATION; PERINEURAL
Status: DISCONTINUED | OUTPATIENT
Start: 2020-10-27 | End: 2020-10-27 | Stop reason: HOSPADM

## 2020-10-27 RX ORDER — ONDANSETRON 2 MG/ML
INJECTION INTRAMUSCULAR; INTRAVENOUS
Status: DISCONTINUED | OUTPATIENT
Start: 2020-10-27 | End: 2020-10-27

## 2020-10-27 RX ORDER — LIDOCAINE HYDROCHLORIDE 20 MG/ML
INJECTION INTRAVENOUS
Status: DISCONTINUED | OUTPATIENT
Start: 2020-10-27 | End: 2020-10-27

## 2020-10-27 RX ORDER — PANTOPRAZOLE SODIUM 40 MG/1
40 TABLET, DELAYED RELEASE ORAL DAILY
Status: DISCONTINUED | OUTPATIENT
Start: 2020-10-27 | End: 2020-10-28 | Stop reason: HOSPADM

## 2020-10-27 RX ORDER — CEFAZOLIN SODIUM 1 G/3ML
2 INJECTION, POWDER, FOR SOLUTION INTRAMUSCULAR; INTRAVENOUS
Status: DISCONTINUED | OUTPATIENT
Start: 2020-10-27 | End: 2020-10-27

## 2020-10-27 RX ORDER — HYDROCODONE BITARTRATE AND ACETAMINOPHEN 5; 325 MG/1; MG/1
1 TABLET ORAL EVERY 4 HOURS PRN
Status: DISCONTINUED | OUTPATIENT
Start: 2020-10-27 | End: 2020-10-28 | Stop reason: HOSPADM

## 2020-10-27 RX ORDER — POLYETHYLENE GLYCOL 3350 17 G/17G
17 POWDER, FOR SOLUTION ORAL DAILY
Status: DISCONTINUED | OUTPATIENT
Start: 2020-10-27 | End: 2020-10-28 | Stop reason: HOSPADM

## 2020-10-27 RX ORDER — CARVEDILOL 25 MG/1
25 TABLET ORAL 2 TIMES DAILY WITH MEALS
Status: DISCONTINUED | OUTPATIENT
Start: 2020-10-27 | End: 2020-10-28 | Stop reason: HOSPADM

## 2020-10-27 RX ORDER — PROPOFOL 10 MG/ML
VIAL (ML) INTRAVENOUS
Status: DISCONTINUED | OUTPATIENT
Start: 2020-10-27 | End: 2020-10-27

## 2020-10-27 RX ORDER — AMLODIPINE BESYLATE 5 MG/1
5 TABLET ORAL DAILY
Status: DISCONTINUED | OUTPATIENT
Start: 2020-10-27 | End: 2020-10-28 | Stop reason: HOSPADM

## 2020-10-27 RX ORDER — DOCUSATE SODIUM 100 MG/1
100 CAPSULE, LIQUID FILLED ORAL 2 TIMES DAILY
Status: DISCONTINUED | OUTPATIENT
Start: 2020-10-27 | End: 2020-10-28 | Stop reason: HOSPADM

## 2020-10-27 RX ORDER — ESCITALOPRAM OXALATE 10 MG/1
10 TABLET ORAL DAILY
Status: DISCONTINUED | OUTPATIENT
Start: 2020-10-27 | End: 2020-10-28 | Stop reason: HOSPADM

## 2020-10-27 RX ORDER — DEXAMETHASONE SODIUM PHOSPHATE 4 MG/ML
INJECTION, SOLUTION INTRA-ARTICULAR; INTRALESIONAL; INTRAMUSCULAR; INTRAVENOUS; SOFT TISSUE
Status: DISCONTINUED | OUTPATIENT
Start: 2020-10-27 | End: 2020-10-27

## 2020-10-27 RX ORDER — OXYCODONE AND ACETAMINOPHEN 5; 325 MG/1; MG/1
1 TABLET ORAL EVERY 6 HOURS PRN
Qty: 15 TABLET | Refills: 0 | Status: SHIPPED | OUTPATIENT
Start: 2020-10-27 | End: 2020-11-10 | Stop reason: ALTCHOICE

## 2020-10-27 RX ORDER — KETAMINE HCL IN 0.9 % NACL 50 MG/5 ML
SYRINGE (ML) INTRAVENOUS
Status: DISCONTINUED | OUTPATIENT
Start: 2020-10-27 | End: 2020-10-27

## 2020-10-27 RX ORDER — ACETAMINOPHEN 500 MG
1000 TABLET ORAL ONCE
Status: COMPLETED | OUTPATIENT
Start: 2020-10-27 | End: 2020-10-27

## 2020-10-27 RX ORDER — SODIUM CHLORIDE 9 MG/ML
INJECTION, SOLUTION INTRAVENOUS CONTINUOUS
Status: DISCONTINUED | OUTPATIENT
Start: 2020-10-27 | End: 2020-10-28 | Stop reason: HOSPADM

## 2020-10-27 RX ORDER — ONDANSETRON 8 MG/1
8 TABLET, ORALLY DISINTEGRATING ORAL EVERY 8 HOURS PRN
Status: DISCONTINUED | OUTPATIENT
Start: 2020-10-27 | End: 2020-10-28 | Stop reason: HOSPADM

## 2020-10-27 RX ORDER — ONDANSETRON 4 MG/1
8 TABLET, ORALLY DISINTEGRATING ORAL EVERY 12 HOURS PRN
Qty: 15 TABLET | Refills: 0 | Status: SHIPPED | OUTPATIENT
Start: 2020-10-27 | End: 2020-10-28 | Stop reason: SDUPTHER

## 2020-10-27 RX ORDER — SODIUM CHLORIDE 0.9 % (FLUSH) 0.9 %
10 SYRINGE (ML) INJECTION
Status: DISCONTINUED | OUTPATIENT
Start: 2020-10-27 | End: 2020-10-27 | Stop reason: HOSPADM

## 2020-10-27 RX ORDER — CEFAZOLIN SODIUM 1 G/3ML
2 INJECTION, POWDER, FOR SOLUTION INTRAMUSCULAR; INTRAVENOUS
Status: COMPLETED | OUTPATIENT
Start: 2020-10-27 | End: 2020-10-28

## 2020-10-27 RX ORDER — HYDROCODONE BITARTRATE AND ACETAMINOPHEN 5; 325 MG/1; MG/1
TABLET ORAL
Status: DISPENSED
Start: 2020-10-27 | End: 2020-10-27

## 2020-10-27 RX ORDER — FENTANYL CITRATE 50 UG/ML
INJECTION, SOLUTION INTRAMUSCULAR; INTRAVENOUS
Status: DISCONTINUED | OUTPATIENT
Start: 2020-10-27 | End: 2020-10-27

## 2020-10-27 RX ORDER — ONDANSETRON 2 MG/ML
4 INJECTION INTRAMUSCULAR; INTRAVENOUS DAILY PRN
Status: DISCONTINUED | OUTPATIENT
Start: 2020-10-27 | End: 2020-10-27 | Stop reason: HOSPADM

## 2020-10-27 RX ADMIN — Medication 100 MCG: at 08:10

## 2020-10-27 RX ADMIN — Medication 30 MG: at 07:10

## 2020-10-27 RX ADMIN — Medication 200 MCG: at 07:10

## 2020-10-27 RX ADMIN — HYDROCODONE BITARTRATE AND ACETAMINOPHEN 1 TABLET: 5; 325 TABLET ORAL at 10:10

## 2020-10-27 RX ADMIN — Medication 100 MCG: at 07:10

## 2020-10-27 RX ADMIN — SODIUM CHLORIDE: 0.9 INJECTION, SOLUTION INTRAVENOUS at 10:10

## 2020-10-27 RX ADMIN — FENTANYL CITRATE 100 MCG: 50 INJECTION, SOLUTION INTRAMUSCULAR; INTRAVENOUS at 07:10

## 2020-10-27 RX ADMIN — CEFAZOLIN 2 G: 1 INJECTION, POWDER, FOR SOLUTION INTRAMUSCULAR; INTRAVENOUS at 04:10

## 2020-10-27 RX ADMIN — ALFUZOSIN HYDROCHLORIDE 10 MG: 10 TABLET, EXTENDED RELEASE ORAL at 11:10

## 2020-10-27 RX ADMIN — LIDOCAINE HYDROCHLORIDE 100 MG: 20 INJECTION, SOLUTION INTRAVENOUS at 07:10

## 2020-10-27 RX ADMIN — SODIUM CHLORIDE: 0.9 INJECTION, SOLUTION INTRAVENOUS at 07:10

## 2020-10-27 RX ADMIN — SODIUM CHLORIDE: 0.9 INJECTION, SOLUTION INTRAVENOUS at 08:10

## 2020-10-27 RX ADMIN — DEXTROSE 2 G: 50 INJECTION, SOLUTION INTRAVENOUS at 07:10

## 2020-10-27 RX ADMIN — DEXAMETHASONE SODIUM PHOSPHATE 8 MG: 4 INJECTION, SOLUTION INTRAMUSCULAR; INTRAVENOUS at 07:10

## 2020-10-27 RX ADMIN — PROPOFOL 20 MG: 10 INJECTION, EMULSION INTRAVENOUS at 09:10

## 2020-10-27 RX ADMIN — SUCCINYLCHOLINE CHLORIDE 160 MG: 20 INJECTION, SOLUTION INTRAMUSCULAR; INTRAVENOUS at 07:10

## 2020-10-27 RX ADMIN — AMLODIPINE BESYLATE 5 MG: 5 TABLET ORAL at 11:10

## 2020-10-27 RX ADMIN — PROPOFOL 150 MG: 10 INJECTION, EMULSION INTRAVENOUS at 07:10

## 2020-10-27 RX ADMIN — ESCITALOPRAM OXALATE 10 MG: 10 TABLET ORAL at 11:10

## 2020-10-27 RX ADMIN — CARVEDILOL 25 MG: 25 TABLET, FILM COATED ORAL at 11:10

## 2020-10-27 RX ADMIN — ACETAMINOPHEN 1000 MG: 500 TABLET ORAL at 06:10

## 2020-10-27 RX ADMIN — ONDANSETRON 4 MG: 2 INJECTION, SOLUTION INTRAMUSCULAR; INTRAVENOUS at 09:10

## 2020-10-27 RX ADMIN — LIDOCAINE HYDROCHLORIDE 2 MG: 10 INJECTION, SOLUTION EPIDURAL; INFILTRATION; INTRACAUDAL; PERINEURAL at 06:10

## 2020-10-27 RX ADMIN — PANTOPRAZOLE SODIUM 40 MG: 40 TABLET, DELAYED RELEASE ORAL at 11:10

## 2020-10-27 RX ADMIN — ROCURONIUM BROMIDE 5 MG: 10 INJECTION, SOLUTION INTRAVENOUS at 07:10

## 2020-10-27 NOTE — LETTER
October 27, 2020    Rajendra Hutchinsn  [address] [address]             [facility]  [address]  [facility] [facility] [place] [address]  Phone: [phone] [facility] is committed to your overall health.  To help you get the most out of each of your visits, we will review your information to make sure you are up to date on all of your recommended tests and/or procedures.      Dr. Irvin Aceves MD has found that your chart shows you may be due for a:    COLORECTAL CANCER SCREENING  FLU VACCINE     If you have had any of the above done at another facility, please bring the records or information with you so that your record at [facility] will be complete.  If you would like to schedule any of these, please contact the clinic at [phone].    If you are currently taking medication, please bring it with you to your appointment for review.    Also, if you have any type of Advanced Directives, please bring them with you to your office visit so we may scan them into your chart.    Thank You,    Your [facility],  MD Kaur Scott LPN Clinical Care Coordinator  [facility]  [address] [address]  Phone [phone]  Fax: [phone]

## 2020-10-27 NOTE — BRIEF OP NOTE
[facility]  Brief Operative Note    SUMMARY     Surgery Date: 10/27/2020     Surgeon(s) and Role:     * Latonia Clayton MD - Primary     * Reynold Ibrahim MD - Resident - Assisting        Pre-op Diagnosis:  Hyperparathyroidism [E21.3]  Parathyroid adenoma [D35.1]    Post-op Diagnosis:  Post-Op Diagnosis Codes:     * Hyperparathyroidism [E21.3]     * Parathyroid adenoma [D35.1]    Procedure(s) (LRB):  PARATHYROIDECTOMY (N/A)    Anesthesia: General    Description of Procedure: Right superior parathyroid adenoma excision.     Description of the findings of the procedure: Intraop PTH drop to normal limit. Path consistent with hypercellular parathyroid worrisome for parathyroid adenoma.     Estimated Blood Loss: Less than 10cc    Estimated Blood Loss has been documented.         Specimens:   Specimen (12h ago, onward)    None          [id number]

## 2020-10-27 NOTE — INTERVAL H&P NOTE
The patient has been examined and the H&P has been reviewed:    I concur with the findings and no changes have occurred since H&P was written.    Surgery risks, benefits and alternative options discussed and understood by patient/family.          Active Hospital Problems    Diagnosis  POA    Hyperparathyroidism [E21.3]  Yes      Resolved Hospital Problems   No resolved problems to display.

## 2020-10-27 NOTE — OP NOTE
"Otolaryngology- Head & Neck Surgery  Operative Report    Name: Rajendra Castle  Medical Record Number: [id number]  YOB: 1945  Date of procedure: 10/27/2020      Attending Surgeon(s):   Latonia Clayton MD    Assistant Surgeon(s):   Reynold Ibrahim MD (Resident physician)    PreOperative Diagnosis:   1. Hyperparathyroidism  2. Hypertension  3. Rheumatoid Arthritis  4. COPD  5. CDD  6. Osteoporosis  7. Concern for right sided parathyroid adenoma  8. Thyroid nodule, follicular lesion of undetermined significance.     Post Operative Diagnosis and Findings:  1. Hyperparathyroidism  2. Hypertension  3. Rheumatoid Arthritis  4. COPD  5. CDD  6. Osteoporosis  7. Right sided parathyroid adenoma  8. Thyroid nodule, follicular lesion of undetermined significance.     Procedure   1. Parathyroidectomy (resection of right superior parathyroid adenoma  2. Use of intraoperative recurrent laryngeal nerve monitoring    Findings:   · Large hyperpigmented right superior parathyroid. Pre-Op , post op PTH 62. Intraoperative frozen consistent with "hypercellular parathyroid tissue concerning for adenoma."    Complications: None.   Blood Loss: Less than 10cc  Specimen: to pathology      Indications:   Rajendra Castle is a patient we had seen in consult for hyperparathyroidism. His labs and imaging were concerning for possibly a right parathyroid adenoma. He was informed of likely overgrowth of one of the parathyroid glands.  He was explained that this causes an overproduction of parathyroid hormone, which elevates the body's calcium. Hypercalcemia can cause mental fogginess, fatigue, anorexia, depression, kidney stones, osteopenia/osteoporosis. We explained to him that according to his chart he has had evidence of elevated calcium and parathyroid hormone levels for 5 years. However, we believe that removal of the parathyroid adenoma is the best treatment at this point. We explained to him the risks of surgery, including but not " limited to scarring, bleeding, infection, damage to the recurrent laryngeal nerve resulting in hoarseness (temporary or permanent), damage to the trachea or esophagus, hematoma, seroma, inability to locate parathyroid adenoma, temporary hypocalcemia after surgery requiring calcium replacement. We explained to him that we cannot guarantee that his symptoms of fatigue and anorexia will completely resolve with this surgery, though we are hopeful that it will help.     Risks, benefits, and alternatives to parathyroid surgery were discussed with the patient and family today. Specific risks discussed included recurrent laryngeal nerve injury with subsequent hoarse voice or airway obstruction, hypoparathyroidism and hypocalcemia, missed parathyroid gland, need for revision parathyroid surgery, bleeding, hematoma, airway or esophageal injury, scarring, and the potential need for further intervention depending on histopathology of the surgical specimen.  The family understood the risks and asked that I proceed with surgery.     Operative Detail:   The patient was brought to the operating room and placed in supine position. General endotracheal anesthesia was started with a NIMS endotracheal tube.  A monitoring tube for laryngeal nerve monitoring was used. Universal protocol undertaken. The standard surgical pause was undertaken and the Surgical Safety Checklist was reviewed and executed.  A shoulder roll was placeed for gentle cervical extension.  The endotracheal tube location was confirmed with an optical laryngoscope to be in the appropriate location for recurrent laryngeal nerve monitoring. The nerve monitor was powered on and found to be functioning properly. Nerve monitoring was carried out for the remainder of the procedure.  An age and weight appropriate dose Ancef was given. An arterial line was placed and a baseline PTH was obtained; the value was elevated to 245.     The neck was then inspected and a preexisting  horizontal skin crease approximately 1 cm inferior to the cricoid cartilage was chosen for incision. A 4 cm incision was planned and injected with 0.5% lidocaine with 1:200,000 epinephrine. The neck was then prepped and draped in the standard sterile fashion. The incision was made sharply and carried through the subcutaneous layers and platysma muscle. Subplatysmal flaps were elevated superiorly and inferiorly and these were held in retraction for the remainder of the case. The strap muscles were then divided in the midline and retracted laterally. The right thyroid gland was identified and noted to be of normal vascularity. Fascial attachments of the strap muscles to the thyroid gland were divided on the right. We proceeded to dissect the superior pole of the right thyroid gland.  The superior pole vessels were identified and divided with a surgical clip and suture ligation.    The superior pole was dissected from remaining fascial attachments and gently retracted inferiorly and medially.  Dissection then proceeded inferiorly along the lateral and deep surface of the thyroid gland.  At this point, we were able to identify a large hyperpigmented (black/melanotic in nature) mass concerning for the possible parathyroid adenoma. This was dissected smoothly from the adjacent thyroid gland. Care was taken to identify and preserve the carotid artery and internal jugular vein, and superior laryngeal nerve.  This specimen was sent to pathology for assessment. After 10 minutes, a PTH was drawn.The posterior aspect of the gland was then further dissected.  The recurrent laryngeal nerve was identified. We also identified a cuff of thyroid tissue that extended out from the remaining thyroid. This was of similar appearance of vascularity than the remaining gland.  We suspect this was the tubercle of Zuckerkandel. At this point, the post op excision PTH had resulted and was noted to be normal at 62. Simultaneously, we received a  message from pathology at our specimen was consistent with hypercellular parathyroid tissue consistent with a parathyroid adenoma. At this point, we stopped doing further dissection. The identified nerve was stimulated and a response was recorded on the Eagl[employer] machine.     The wound was irrigated and meticulous hemostasis achieved. A 10 Fr suction drain was placed and layered closure with absorbable suture (vicryl deep, monocryl subcuticular, and dermabond) was completed.     The patient was then awoken from anesthesia, extubated in the operative suite, and transferred to the PACU in stable condition.     Dr. Latonia Clayton was present and scrubbed for the entirety of the surgical procedure.

## 2020-10-27 NOTE — ANESTHESIA POSTPROCEDURE EVALUATION
Anesthesia Post Evaluation    Patient: Rajendra Castle    Procedure(s) Performed: Procedure(s) (LRB):  PARATHYROIDECTOMY (Right)    Final Anesthesia Type: general    Patient location during evaluation: PACU  Patient participation: Yes- Able to Participate  Level of consciousness: awake and alert  Post-procedure vital signs: reviewed and stable  Pain management: adequate  Airway patency: patent    PONV status at discharge: No PONV  Anesthetic complications: no      Cardiovascular status: blood pressure returned to baseline  Respiratory status: unassisted  Hydration status: euvolemic  Follow-up not needed.          Vitals Value Taken Time   /77 10/27/20 1806   Temp 36.9 °C (98.4 °F) 10/27/20 1133   Pulse 91 10/27/20 1808   Resp 22 10/27/20 1808   SpO2 95 % 10/27/20 1808   Vitals shown include unvalidated device data.      Event Time   Out of Recovery 10:15:00         Pain/Afua Score: Pain Rating Prior to Med Admin: 6 (10/27/2020 10:00 AM)  Pain Rating Post Med Admin: 4 (10/27/2020 10:30 AM)  Afua Score: 10 (10/27/2020 10:30 AM)

## 2020-10-27 NOTE — PLAN OF CARE
Vital signs stable. Afebrile. Alert, oriented and following commands. Pain controlled with PRN meds. Denies nausea. Incision remains well approximated with dermabond. BRUNO intact to bulb suction.  IVF per MD order. Postop labs drawn and sent. POC reviewed with pt and understanding verbalized.

## 2020-10-27 NOTE — TRANSFER OF CARE
"Anesthesia Transfer of Care Note    Patient: Rajendra Castle    Procedure(s) Performed: Procedure(s) (LRB):  PARATHYROIDECTOMY (N/A)    Patient location: PACU    Anesthesia Type: general    Transport from OR: Transported from OR on 6-10 L/min O2 by face mask with adequate spontaneous ventilation    Post pain: adequate analgesia    Post assessment: no apparent anesthetic complications    Post vital signs: stable    Level of consciousness: awake    Nausea/Vomiting: no nausea/vomiting    Complications: none    Transfer of care protocol was followed      Last vitals:   Visit Vitals  /70 (BP Location: Left arm, Patient Position: Lying)   Pulse 91   Temp (P) 36.2 °C (97.2 °F) (Temporal)   Resp 16   Ht 5' 8" (1.727 m)   Wt 63.5 kg (140 lb)   SpO2 (P) 100%   BMI 21.29 kg/m²     "

## 2020-10-27 NOTE — PLAN OF CARE
10/27/20 1129   Discharge Assessment   Assessment Type Discharge Planning Assessment   Confirmed/corrected address and phone number on facesheet? Yes   Assessment information obtained from? Patient   Expected Length of Stay (days) 1   Discharge Plan A Home with family   Discharge Plan B Home with family   DME Needed Upon Discharge  none   Patient/Family in Agreement with Plan yes

## 2020-10-27 NOTE — ANESTHESIA PROCEDURE NOTES
Intubation  Performed by: Jd Martinez MD  Authorized by: Raphael Hope MD     Intubation:     Induction:  Intravenous    Intubated:  Postinduction    Mask Ventilation:  Easy mask    Attempts:  1    Attempted By:  Resident anesthesiologist    Method of Intubation:  Video laryngoscopy    Blade:  Glidescope 3    Laryngeal View Grade: Grade I - full view of chords      Difficult Airway Encountered?: No      Complications:  None    Airway Device:  Other (see comments) (NIMs tube)    Airway Device Size:  8.0    Style/Cuff Inflation:  Cuffed (inflated to minimal occlusive pressure)    Tube secured:  22    Secured at:  The lips    Placement Verified By:  Capnometry    Complicating Factors:  None    Findings Post-Intubation:  BS equal bilateral

## 2020-10-27 NOTE — ANESTHESIA PROCEDURE NOTES
Arterial    Diagnosis: hyperparathyroidism    Patient location during procedure: done in OR  Procedure start time: 10/27/2020 7:15 AM  Timeout: 10/27/2020 7:15 AM  Procedure end time: 10/27/2020 7:20 AM    Staffing  Authorizing Provider: Raphael Hope MD  Performing Provider: Jd Martinez MD    Anesthesiologist was present at the time of the procedure.    Preanesthetic Checklist  Completed: patient identified, site marked, surgical consent, pre-op evaluation, timeout performed, IV checked, risks and benefits discussed, monitors and equipment checked and anesthesia consent givenArterial  Skin Prep: chlorhexidine gluconate  Local Infiltration: none  Orientation: left  Location: radial  Catheter Size: 20 G  Catheter placement by Anatomical landmarks and Ultrasound guidance. Heme positive aspiration all ports.  Vessel Caliber: medium, patent, compressibility normal  Vascular Doppler:  not done  Needle advanced into vessel with real time Ultrasound guidance.Insertion Attempts: 3  Assessment  Dressing: secured with tape and tegaderm, secured with tape and tegaderm  Patient: Tolerated poorly

## 2020-10-27 NOTE — NURSING TRANSFER
Nursing Transfer Note      10/27/2020     Transfer to 527     Transfer via stretcher    Transfer with iv pole and belonging bag    Transported by pct     Medicines sent: n/a    Chart send with patient: yes    Notified: family via text

## 2020-10-28 VITALS
WEIGHT: 140 LBS | TEMPERATURE: 99 F | DIASTOLIC BLOOD PRESSURE: 68 MMHG | BODY MASS INDEX: 21.22 KG/M2 | RESPIRATION RATE: 18 BRPM | SYSTOLIC BLOOD PRESSURE: 138 MMHG | HEART RATE: 93 BPM | HEIGHT: 68 IN | OXYGEN SATURATION: 98 %

## 2020-10-28 PROCEDURE — 90471 IMMUNIZATION ADMIN: CPT | Performed by: OTOLARYNGOLOGY

## 2020-10-28 PROCEDURE — 90472 IMMUNIZATION ADMIN EACH ADD: CPT | Performed by: OTOLARYNGOLOGY

## 2020-10-28 PROCEDURE — 63600175 PHARM REV CODE 636 W HCPCS: Performed by: OTOLARYNGOLOGY

## 2020-10-28 PROCEDURE — 90694 VACC AIIV4 NO PRSRV 0.5ML IM: CPT | Performed by: OTOLARYNGOLOGY

## 2020-10-28 PROCEDURE — G0008 ADMIN INFLUENZA VIRUS VAC: HCPCS | Performed by: OTOLARYNGOLOGY

## 2020-10-28 PROCEDURE — 25000003 PHARM REV CODE 250: Performed by: STUDENT IN AN ORGANIZED HEALTH CARE EDUCATION/TRAINING PROGRAM

## 2020-10-28 PROCEDURE — 63600175 PHARM REV CODE 636 W HCPCS: Performed by: STUDENT IN AN ORGANIZED HEALTH CARE EDUCATION/TRAINING PROGRAM

## 2020-10-28 RX ORDER — CEPHALEXIN 250 MG/1
250 CAPSULE ORAL EVERY 8 HOURS
Qty: 15 CAPSULE | Refills: 0 | Status: SHIPPED | OUTPATIENT
Start: 2020-10-28 | End: 2020-10-28 | Stop reason: SDUPTHER

## 2020-10-28 RX ORDER — CALCIUM CARBONATE 200(500)MG
TABLET,CHEWABLE ORAL
Qty: 84 TABLET | Refills: 0 | Status: SHIPPED | OUTPATIENT
Start: 2020-10-28 | End: 2020-10-28 | Stop reason: HOSPADM

## 2020-10-28 RX ORDER — CEPHALEXIN 250 MG/1
250 CAPSULE ORAL EVERY 8 HOURS
Qty: 15 CAPSULE | Refills: 0 | Status: SHIPPED | OUTPATIENT
Start: 2020-10-28 | End: 2020-11-02

## 2020-10-28 RX ORDER — ONDANSETRON 4 MG/1
8 TABLET, ORALLY DISINTEGRATING ORAL EVERY 12 HOURS PRN
Qty: 15 TABLET | Refills: 0 | Status: SHIPPED | OUTPATIENT
Start: 2020-10-28 | End: 2020-11-10 | Stop reason: ALTCHOICE

## 2020-10-28 RX ORDER — CALCITRIOL 0.25 UG/1
0.25 CAPSULE ORAL DAILY
Qty: 21 CAPSULE | Refills: 0 | Status: SHIPPED | OUTPATIENT
Start: 2020-10-28 | End: 2020-10-28 | Stop reason: HOSPADM

## 2020-10-28 RX ADMIN — PANTOPRAZOLE SODIUM 40 MG: 40 TABLET, DELAYED RELEASE ORAL at 09:10

## 2020-10-28 RX ADMIN — CEFAZOLIN 2 G: 1 INJECTION, POWDER, FOR SOLUTION INTRAMUSCULAR; INTRAVENOUS at 12:10

## 2020-10-28 RX ADMIN — ALFUZOSIN HYDROCHLORIDE 10 MG: 10 TABLET, EXTENDED RELEASE ORAL at 09:10

## 2020-10-28 RX ADMIN — ESCITALOPRAM OXALATE 10 MG: 10 TABLET ORAL at 09:10

## 2020-10-28 RX ADMIN — DOCUSATE SODIUM 100 MG: 100 CAPSULE, LIQUID FILLED ORAL at 09:10

## 2020-10-28 RX ADMIN — POLYETHYLENE GLYCOL 3350 17 G: 17 POWDER, FOR SOLUTION ORAL at 09:10

## 2020-10-28 RX ADMIN — CARVEDILOL 25 MG: 25 TABLET, FILM COATED ORAL at 09:10

## 2020-10-28 RX ADMIN — AMLODIPINE BESYLATE 5 MG: 5 TABLET ORAL at 09:10

## 2020-10-28 RX ADMIN — A/SINGAPORE/GP1908/2015 IVR-180 (AN A/MICHIGAN/45/2015 (H1N1)PDM09-LIKE VIRUS, A/HONG KONG/4801/2014, NYMC X-263B (H3N2) (AN A/HONG KONG/4801/2014-LIKE VIRUS), AND B/BRISBANE/60/2008, WILD TYPE (A B/BRISBANE/60/2008-LIKE VIRUS) 0.5 ML: 15; 15; 15 INJECTION, SUSPENSION INTRAMUSCULAR at 11:10

## 2020-10-28 NOTE — PROGRESS NOTES
[facility]  Otorhinolaryngology-Head & Neck Surgery  Progress Note    Subjective:     Post-Op Info:  Procedure(s) (LRB):  PARATHYROIDECTOMY (Right)   1 Day Post-Op  Hospital Day: 2     Interval History: NAEON.     Medications:  Continuous Infusions:   sodium chloride 0.9% 75 mL/hr at 10/27/20 1012     Scheduled Meds:   alfuzosin  10 mg Oral Daily with breakfast    amLODIPine  5 mg Oral Daily    carvediloL  25 mg Oral BID WM    docusate sodium  100 mg Oral BID    escitalopram oxalate  10 mg Oral Daily    pantoprazole  40 mg Oral Daily    polyethylene glycol  17 g Oral Daily     PRN Meds:HYDROcodone-acetaminophen, ondansetron, promethazine (PHENERGAN) IVPB, zolpidem     Review of patient's allergies indicates:  No Known Allergies  Objective:     Vital Signs (24h Range):  Temp:  [97.9 °F (36.6 °C)-99 °F (37.2 °C)] 99 °F (37.2 °C)  Pulse:  [86-95] 94  Resp:  [12-18] 18  SpO2:  [95 %-100 %] 97 %  BP: (117-158)/(68-80) 122/73       Lines/Drains/Airways     Drain                 Closed/Suction Drain 10/27/20 0930 Anterior;Right Neck Bulb 10 Fr. less than 1 day          Arterial Line            Arterial Line 10/27/20 0715 Left Radial 1 day          Peripheral Intravenous Line                 Peripheral IV - Single Lumen 09/29/20 20 G Left Antecubital 29 days         Peripheral IV - Single Lumen 10/27/20 0618 20 G Right Forearm 1 day                Physical Exam    NAD  Breathing comfortably  Neck soft, incision closed with dermabond  Drain in place, 70cc output  Moving extremities  No cramping or tetani  Abd soft    Significant Labs:  Recent Lab Results       10/27/20  0958   10/27/20  0844        PTH Intraoperative   62.0  Comment:  CALLED HUSSAIN DUONG[C]     Ionized Calcium 1.27             Significant Diagnostics:  I have reviewed all pertinent imaging results/findings within the past 24 hours.    Assessment/Plan:     * Hyperparathyroidism  74yo with hyperparathyroidism now POD1 s/p right  superior parathyroid adenoma resection.     -Resume home meds  -Pain and nausea control PRN-  -Calcium and rocaltrol  -Maintain drain  -Dispo: Anticipate discharge home today        Reynold Ibrahim MD  Otorhinolaryngology-Head & Neck Surgery  [facility]

## 2020-10-28 NOTE — NURSING
Pt in bed resting. Pt denies c/o pain or n/v. D/c orders and prescriptions given to pt. Pt refused to have meds filled at [facility]. Instructed on BRUNO drain care. Pt demonstrated stripping and emptying drain. Pt verbalized understanding.  Removed PIV and pt tolerated well. Pt will be transported in w/c by Pct.

## 2020-10-28 NOTE — PLAN OF CARE
Problem: Adult Inpatient Plan of Care  Goal: Plan of Care Review  Outcome: Ongoing, Progressing     Problem: Adult Inpatient Plan of Care  Goal: Patient-Specific Goal (Individualization)  Outcome: Ongoing, Progressing     Problem: Adult Inpatient Plan of Care  Goal: Optimal Comfort and Wellbeing  Outcome: Ongoing, Progressing     Pt oriented to room and unit. AAOX4 and VS stable. No falls or injuries noted on this shift. Safety measures in place.  Bed in lowest position with siderails up x2. Call bell within reach; pt instructed to call for assistance.  Pt updated with POC.  Will continue to monitor.

## 2020-10-28 NOTE — SUBJECTIVE & OBJECTIVE
Interval History: NAEON.     Medications:  Continuous Infusions:   sodium chloride 0.9% 75 mL/hr at 10/27/20 1012     Scheduled Meds:   alfuzosin  10 mg Oral Daily with breakfast    amLODIPine  5 mg Oral Daily    carvediloL  25 mg Oral BID WM    docusate sodium  100 mg Oral BID    escitalopram oxalate  10 mg Oral Daily    pantoprazole  40 mg Oral Daily    polyethylene glycol  17 g Oral Daily     PRN Meds:HYDROcodone-acetaminophen, ondansetron, promethazine (PHENERGAN) IVPB, zolpidem     Review of patient's allergies indicates:  No Known Allergies  Objective:     Vital Signs (24h Range):  Temp:  [97.9 °F (36.6 °C)-99 °F (37.2 °C)] 99 °F (37.2 °C)  Pulse:  [86-95] 94  Resp:  [12-18] 18  SpO2:  [95 %-100 %] 97 %  BP: (117-158)/(68-80) 122/73       Lines/Drains/Airways     Drain                 Closed/Suction Drain 10/27/20 0930 Anterior;Right Neck Bulb 10 Fr. less than 1 day          Arterial Line            Arterial Line 10/27/20 0715 Left Radial 1 day          Peripheral Intravenous Line                 Peripheral IV - Single Lumen 09/29/20 20 G Left Antecubital 29 days         Peripheral IV - Single Lumen 10/27/20 0618 20 G Right Forearm 1 day                Physical Exam    NAD  Breathing comfortably  Neck soft, incision closed with dermabond  Drain in place, 70cc output  Moving extremities  No cramping or tetani  Abd soft    Significant Labs:  Recent Lab Results       10/27/20  0958   10/27/20  0844        PTH Intraoperative   62.0  Comment:  CALLED HUSSAIN DUONG[C]     Ionized Calcium 1.27             Significant Diagnostics:  I have reviewed all pertinent imaging results/findings within the past 24 hours.

## 2020-10-28 NOTE — ASSESSMENT & PLAN NOTE
76yo with hyperparathyroidism now POD1 s/p right superior parathyroid adenoma resection.     -Resume home meds  -Pain and nausea control PRN-  -Calcium and rocaltrol  -Maintain drain  -Dispo: Anticipate discharge home today

## 2020-10-29 NOTE — DISCHARGE SUMMARY
[facility]  Discharge Summary      Admit Date: 10/27/2020    Discharge Date and Time: 10/28/2020 12:26 PM    Attending Physician: Latonia White    Reason for Admission: Parathyroidectomy    Procedures Performed: Procedure(s) (LRB):  PARATHYROIDECTOMY (Right)    Hospital Course (synopsis of major diagnoses, care, treatment, and services provided during the course of the hospital stay): The patient tolerated the procedure well without complications. The patient was adequately recovered in the post operative unit. He was observed on the floor. POD1 discharged Prior to discharge, the patient had adequate pain control and was tolerating PO without difficulty. The patient was discharged home in good condition with the below medications and instructions.       Consults: none    Significant Diagnostic Studies: See results section    Final Diagnoses:    Principal Problem: Hyperparathyroidism   Secondary Diagnoses:   Active Hospital Problems    Diagnosis  POA    *Hyperparathyroidism [E21.3]  Yes      Resolved Hospital Problems   No resolved problems to display.       Discharged Condition: good    Disposition: Home or Self Care    Follow Up/Patient Instructions:     Medications:  Reconciled Home Medications:      Medication List      START taking these medications    cephALEXin 250 MG capsule  Commonly known as: KEFLEX  Take 1 capsule (250 mg total) by mouth every 8 (eight) hours. for 5 days     ondansetron 4 MG Tbdl  Commonly known as: ZOFRAN-ODT  Dissolve 2 tablets (8 mg total) by mouth every 12 (twelve) hours as needed.     oxyCODONE-acetaminophen 5-325 mg per tablet  Commonly known as: PERCOCET  Take 1 tablet by mouth every 6 (six) hours as needed.        CHANGE how you take these medications    methotrexate 2.5 MG Tab  TAKE 6 TABLETS BY MOUTH EVERY WEEK  What changed: See the new instructions.        CONTINUE taking these medications    alendronate 70 MG tablet  Commonly known as: FOSAMAX  Take  70 mg by mouth every 7 days. Mon     alfuzosin 10 mg Tb24  Commonly known as: UROXATRAL  TAKE 1 TABLET(10 MG) BY MOUTH AFTER DINNER     amLODIPine 5 MG tablet  Commonly known as: NORVASC  Take 1 tablet (5 mg total) by mouth once daily.     aspirin 81 MG EC tablet  Commonly known as: ECOTRIN  Take 81 mg by mouth once daily.     carvediloL 25 MG tablet  Commonly known as: COREG  TAKE 1 TABLET(25 MG) BY MOUTH TWICE DAILY WITH MEALS     cinacalcet 30 MG Tab  Commonly known as: SENSIPAR  Take 1 tablet (30 mg total) by mouth 2 (two) times daily with meals.     cyproheptadine 4 mg tablet  Commonly known as: PERIACTIN  Take 1 tablet (4 mg total) by mouth 3 (three) times daily as needed.     escitalopram oxalate 10 MG tablet  Commonly known as: LEXAPRO  Take 10 mg by mouth once daily.     folic acid 1 MG tablet  Commonly known as: FOLVITE  TAKE 1 TABLET(1 MG) BY MOUTH EVERY DAY     iron polysaccharides 150 mg iron Cap  Commonly known as: NIFEREX  Take 1 capsule (150 mg total) by mouth 2 times daily 2 hours after meal.     pantoprazole 40 MG tablet  Commonly known as: PROTONIX  Take 1 tablet (40 mg total) by mouth once daily.     predniSONE 2.5 MG tablet  Commonly known as: DELTASONE  Take 1 tablet (2.5 mg total) by mouth 2 (two) times daily.     traMADoL 50 mg tablet  Commonly known as: ULTRAM  Take 1-2 tablets twice daily as needed for pain          Discharge Procedure Orders   Diet Adult Regular     Notify your health care provider if you experience any of the following:  temperature >100.4     Notify your health care provider if you experience any of the following:  persistent nausea and vomiting or diarrhea     Notify your health care provider if you experience any of the following:  severe uncontrolled pain     Notify your health care provider if you experience any of the following:  redness, tenderness, or signs of infection (pain, swelling, redness, odor or green/yellow discharge around incision site)     Notify your  "health care provider if you experience any of the following:  difficulty breathing or increased cough     Notify your health care provider if you experience any of the following:  severe persistent headache     Notify your health care provider if you experience any of the following:  worsening rash     Notify your health care provider if you experience any of the following:  persistent dizziness, light-headedness, or visual disturbances     Notify your health care provider if you experience any of the following:  increased confusion or weakness     Leave dressing on - Keep it clean, dry, and intact until clinic visit     Activity as tolerated   Order Comments: Recovering after Endocrine Surgery    Type of Procedure: Parathyroidectomy    Postoperative clinic appointment date: October 30th for drain removal    Activity:  You may resume normal daily activities upon discharge. This includes walking and climbing stairs. Avoid lifting greater than 10 pounds for next 10 days.  Avoid heavy lifting and vigorous exercise for approximately 2 weeks.    Showering:  You may resume normal showering and bathing with the plastic dressing in place after the drain has been removed.  After the drain is removed in clinic, your incision does not require special care and it may get wet after 48 hours.     Incision:  Your neck incision is closed with absorbable sutures under the skin.  You will not need to have any stitches removed.  There may be Dermabond covering the incision. This functions such as "skin super glue." Please let this be. This will slowly start to peel off over the next couple week. Water may get on this. Avoid direct sunlight on the wound to limit hyperpigmentation of the incision.    Drain: You may go home with a drain. If a drain is present, please empty and measure the output every 8 hours. The nurse will teach you how to do this. When the drainage is below 30cc (or 1 ounce) per day, the drain may come out. We will " plan to see you this upcoming Friday for drain removal (October 30th, 2020). Please avoid showering or letting water get into the drain opening until cleared by us.     Pain:  After surgery you may experience pain at the incision, generalized neck pain, or a sore throat.  You will be given a prescription for pain relief.  Most patients will still have discomfort 2-3 days after surgery.  Many times, over the counter pain medications, such as Extra Strength Tylenol, are effective.    Driving:  Use your judgment in resuming to drive.  Avoid driving if you are still experiencing neck pain and are using prescription pain medications.      Return to Work:  Recovery after surgery depends on the individual.  Most patients can expect to return to work approximately 1-2 weeks after surgery.    Diet:  You may resume your normal diet after surgery without any restrictions.    Constipation:  Anesthesia and pain medication can cause a decrease in bowel motility.  If you experience constipation postoperatively, you may take over the counter laxatives such as Milk of Magnesia.    Calcium Supplements:  Calcium tablets are recommended after your surgery. You should take 1 gram of calcium three times daily for first week, THEN 1 gram of Calcium twice a day (1 gram in the morning, 1 gram in the evening) for the next week week, THEN 1 gram of Calcium per day for the third week week. Additionally, please take rocaltrol supplementation daily. Most often this is to promote bone health and prevent low blood calcium during recovery.  Numbness and tingling in your fingertips or around your mouth may be experienced when calcium levels are low.  If tingling or numbness occurs, double up on the Calcium; if you experience cramping in your hands or legs, double up on the Calcium and call you doctor.  Extra Strength Tums may be substituted for calcium tablets.    Medications:  You may resume taking medication as instructed by your MD at discharge  from the hospital. Please take antibiotics until drain is removed in clinic. Pain and nausea meds as needed.     Questions/Concerns:  If you have any questions or concerns please call your doctor's office or after hours call [phone] and ask for the ENT/Head & Neck Surgery Resident on call     Follow-up Information     Latonia Clayton MD On 10/30/2020.    Specialty: Otolaryngology  Why: Drain removal  Contact information:  [address] [facility]  [address] [place] [id number] [phone]

## 2020-10-30 ENCOUNTER — OFFICE VISIT (OUTPATIENT)
Dept: OTOLARYNGOLOGY | Facility: CLINIC | Age: 75
End: 2020-10-30
Payer: MEDICARE

## 2020-10-30 VITALS
BODY MASS INDEX: 22.82 KG/M2 | SYSTOLIC BLOOD PRESSURE: 133 MMHG | DIASTOLIC BLOOD PRESSURE: 72 MMHG | HEART RATE: 86 BPM | HEIGHT: 68 IN | WEIGHT: 150.56 LBS

## 2020-10-30 DIAGNOSIS — Z98.890 S/P PARATHYROIDECTOMY: ICD-10-CM

## 2020-10-30 DIAGNOSIS — E21.3 HYPERPARATHYROIDISM: Primary | ICD-10-CM

## 2020-10-30 DIAGNOSIS — Z90.89 S/P PARATHYROIDECTOMY: ICD-10-CM

## 2020-10-30 PROCEDURE — 99024 POSTOP FOLLOW-UP VISIT: CPT | Mod: S$GLB,,, | Performed by: OTOLARYNGOLOGY

## 2020-10-30 PROCEDURE — 99024 PR POST-OP FOLLOW-UP VISIT: ICD-10-PCS | Mod: S$GLB,,, | Performed by: OTOLARYNGOLOGY

## 2020-10-30 NOTE — PROGRESS NOTES
Rajendra Castle presents today for follow up s/p right parathyroidectomy 10/27/20.   He is doing well. Pain controlled. Has been taking care of drain. Output tapering off.    Neck incision healing well, dermabond in place. No evidence of hematoma or seroma  BRUNO drain in place, not charged.  Serosanguinous drainage in bulb  BRUNO removed.    Doing well s/p right parathyroidectomy, frozen section demonstrated parathyroid adenoma. Will follow up final path  Ok to shower tomorrow  No straining or heaving lifting  Follow up in 2 weeks for wound check

## 2020-11-04 LAB
COMMENT: NORMAL
FINAL PATHOLOGIC DIAGNOSIS: NORMAL
FROZEN SECTION DIAGNOSIS: NORMAL
FROZEN SECTION FOOTNOTE: NORMAL
GROSS: NORMAL
Lab: NORMAL

## 2020-11-10 ENCOUNTER — OFFICE VISIT (OUTPATIENT)
Dept: FAMILY MEDICINE | Facility: CLINIC | Age: 75
End: 2020-11-10
Payer: MEDICARE

## 2020-11-10 VITALS
OXYGEN SATURATION: 95 % | SYSTOLIC BLOOD PRESSURE: 142 MMHG | DIASTOLIC BLOOD PRESSURE: 76 MMHG | WEIGHT: 143.94 LBS | HEIGHT: 68 IN | HEART RATE: 89 BPM | BODY MASS INDEX: 21.81 KG/M2 | RESPIRATION RATE: 18 BRPM | TEMPERATURE: 97 F

## 2020-11-10 DIAGNOSIS — I10 ESSENTIAL HYPERTENSION: ICD-10-CM

## 2020-11-10 PROCEDURE — 99999 PR PBB SHADOW E&M-EST. PATIENT-LVL V: CPT | Mod: PBBFAC,,, | Performed by: FAMILY MEDICINE

## 2020-11-10 PROCEDURE — 3288F FALL RISK ASSESSMENT DOCD: CPT | Mod: CPTII,S$GLB,, | Performed by: FAMILY MEDICINE

## 2020-11-10 PROCEDURE — 99213 OFFICE O/P EST LOW 20 MIN: CPT | Mod: S$GLB,,, | Performed by: FAMILY MEDICINE

## 2020-11-10 PROCEDURE — 1100F PTFALLS ASSESS-DOCD GE2>/YR: CPT | Mod: CPTII,S$GLB,, | Performed by: FAMILY MEDICINE

## 2020-11-10 PROCEDURE — 3008F PR BODY MASS INDEX (BMI) DOCUMENTED: ICD-10-PCS | Mod: CPTII,S$GLB,, | Performed by: FAMILY MEDICINE

## 2020-11-10 PROCEDURE — 99213 PR OFFICE/OUTPT VISIT, EST, LEVL III, 20-29 MIN: ICD-10-PCS | Mod: S$GLB,,, | Performed by: FAMILY MEDICINE

## 2020-11-10 PROCEDURE — 99999 PR PBB SHADOW E&M-EST. PATIENT-LVL V: ICD-10-PCS | Mod: PBBFAC,,, | Performed by: FAMILY MEDICINE

## 2020-11-10 PROCEDURE — 1126F AMNT PAIN NOTED NONE PRSNT: CPT | Mod: S$GLB,,, | Performed by: FAMILY MEDICINE

## 2020-11-10 PROCEDURE — 3077F PR MOST RECENT SYSTOLIC BLOOD PRESSURE >= 140 MM HG: ICD-10-PCS | Mod: CPTII,S$GLB,, | Performed by: FAMILY MEDICINE

## 2020-11-10 PROCEDURE — 1100F PR PT FALLS ASSESS DOC 2+ FALLS/FALL W/INJURY/YR: ICD-10-PCS | Mod: CPTII,S$GLB,, | Performed by: FAMILY MEDICINE

## 2020-11-10 PROCEDURE — 3077F SYST BP >= 140 MM HG: CPT | Mod: CPTII,S$GLB,, | Performed by: FAMILY MEDICINE

## 2020-11-10 PROCEDURE — 3008F BODY MASS INDEX DOCD: CPT | Mod: CPTII,S$GLB,, | Performed by: FAMILY MEDICINE

## 2020-11-10 PROCEDURE — 1159F PR MEDICATION LIST DOCUMENTED IN MEDICAL RECORD: ICD-10-PCS | Mod: S$GLB,,, | Performed by: FAMILY MEDICINE

## 2020-11-10 PROCEDURE — 1159F MED LIST DOCD IN RCRD: CPT | Mod: S$GLB,,, | Performed by: FAMILY MEDICINE

## 2020-11-10 PROCEDURE — 3078F DIAST BP <80 MM HG: CPT | Mod: CPTII,S$GLB,, | Performed by: FAMILY MEDICINE

## 2020-11-10 PROCEDURE — 1126F PR PAIN SEVERITY QUANTIFIED, NO PAIN PRESENT: ICD-10-PCS | Mod: S$GLB,,, | Performed by: FAMILY MEDICINE

## 2020-11-10 PROCEDURE — 3288F PR FALLS RISK ASSESSMENT DOCUMENTED: ICD-10-PCS | Mod: CPTII,S$GLB,, | Performed by: FAMILY MEDICINE

## 2020-11-10 PROCEDURE — 3078F PR MOST RECENT DIASTOLIC BLOOD PRESSURE < 80 MM HG: ICD-10-PCS | Mod: CPTII,S$GLB,, | Performed by: FAMILY MEDICINE

## 2020-11-10 NOTE — PROGRESS NOTES
Subjective:       Patient ID: Rajendra Castle is a 75 y.o. male.    Chief Complaint: Follow-up    This patient is new to me.  Mr Drew presents to the clinic today for a follow up exam. Patient states he would like to go over his medication list with me today. Patient recently had his parathyroid removed and has a follow up with Dr Clayton later this week. Patient states he is healing well from this and has no complaints. Patient states he has now gotten his appetite back after surgery and is eating well.  Patient states he is feeling well and almost did not come in today.  Patient educated on plan of care, verbalized understanding.     Review of Systems   Constitutional: Negative for activity change, appetite change, chills, diaphoresis and fever.   HENT: Negative for congestion, ear pain, postnasal drip, sinus pressure, sneezing and sore throat.    Eyes: Negative for pain, discharge, redness and itching.   Respiratory: Negative for apnea, cough, chest tightness, shortness of breath and wheezing.    Cardiovascular: Negative for chest pain and leg swelling.   Gastrointestinal: Negative for abdominal distention, abdominal pain, constipation, diarrhea, nausea and vomiting.   Genitourinary: Negative for difficulty urinating, dysuria, flank pain and frequency.   Skin: Negative for color change, rash and wound.   Neurological: Negative for dizziness.       Patient Active Problem List   Diagnosis    Heart murmur    Hypertension    Arthritis    Rotator cuff tendinitis    Dyspnea on exertion    DDD (degenerative disc disease), cervical    Anemia, iron deficiency    Chronic GI bleeding    Arthritis of wrist, right    Trigger thumb of left hand    Arthritis of right wrist    Essential hypertension    Rheumatoid arthritis involving multiple sites    Elevated troponin    Nausea and vomiting    Nontoxic multinodular goiter    Gastroesophageal reflux disease without esophagitis    Nodular thyroid disease     Prediabetes    Osteoporosis    Hypogonadism in male    Current chronic use of systemic steroids    Vitamin D insufficiency    Hyperparathyroidism    Pulmonary emphysema    Rheumatoid lung    Lung nodule    CKD (chronic kidney disease)    Elevated PSA    Prostate cancer    Chest pain    Dysphagia    Severe malnutrition    Tachycardia       Objective:      Physical Exam  Vitals signs reviewed.   Constitutional:       General: He is not in acute distress.     Appearance: Normal appearance. He is well-developed.   HENT:      Head: Normocephalic.      Nose: Nose normal.   Eyes:      Conjunctiva/sclera: Conjunctivae normal.      Pupils: Pupils are equal, round, and reactive to light.   Neck:      Musculoskeletal: Normal range of motion and neck supple.   Cardiovascular:      Rate and Rhythm: Normal rate and regular rhythm.      Heart sounds: Normal heart sounds.   Pulmonary:      Effort: Pulmonary effort is normal. No respiratory distress.      Breath sounds: Normal breath sounds.   Abdominal:      General: Bowel sounds are normal. There is no distension.      Palpations: Abdomen is soft.      Tenderness: There is no abdominal tenderness.   Skin:     General: Skin is warm and dry.      Findings: No rash.   Neurological:      Mental Status: He is alert and oriented to person, place, and time.   Psychiatric:         Behavior: Behavior normal.         Lab Results   Component Value Date    WBC 9.68 10/08/2020    HGB 12.7 (L) 10/08/2020    HCT 39.2 (L) 10/08/2020     (H) 10/08/2020    CHOL 125 09/30/2020    TRIG 86 09/30/2020    HDL 31 (L) 09/30/2020    ALT 19 10/08/2020    AST 23 10/08/2020     (L) 10/08/2020    K 4.1 10/08/2020     10/08/2020    CREATININE 1.2 10/08/2020    BUN 20 10/08/2020    CO2 20 (L) 10/08/2020    TSH 0.687 09/29/2020    PSA 4.8 (H) 03/16/2018    INR 1.0 03/12/2019    HGBA1C 5.9 09/20/2016     The ASCVD Risk score (Oran PETE Jr., et al., 2013) failed to calculate for the  following reasons:    The valid total cholesterol range is 130 to 320 mg/dL    Assessment:       1. BMI 21.0-21.9, adult    2. Essential hypertension        Plan:       Rajendra was seen today for follow-up.    Diagnoses and all orders for this visit:    BMI 21.0-21.9, adult  Comments:  today 21.89  Patient reports his appetite has returned and is felling better.  Continue medications as prescribed.  Follow up with PCP and specialties as scheduled.    Essential hypertension  Stable  Continue medications as prescribed.  Follow up with PCP      Follow up in about 6 months (around 5/10/2021).

## 2020-11-11 ENCOUNTER — TELEPHONE (OUTPATIENT)
Dept: FAMILY MEDICINE | Facility: CLINIC | Age: 75
End: 2020-11-11

## 2020-11-11 DIAGNOSIS — M05.79 RHEUMATOID ARTHRITIS INVOLVING MULTIPLE SITES WITH POSITIVE RHEUMATOID FACTOR: ICD-10-CM

## 2020-11-11 DIAGNOSIS — I10 ESSENTIAL HYPERTENSION: ICD-10-CM

## 2020-11-11 DIAGNOSIS — R52 PAIN: ICD-10-CM

## 2020-11-11 RX ORDER — TRAMADOL HYDROCHLORIDE 50 MG/1
TABLET ORAL
Qty: 120 TABLET | Refills: 5 | OUTPATIENT
Start: 2020-11-11

## 2020-11-11 RX ORDER — CARVEDILOL 25 MG/1
25 TABLET ORAL 2 TIMES DAILY WITH MEALS
Qty: 180 TABLET | Refills: 3 | Status: SHIPPED | OUTPATIENT
Start: 2020-11-11 | End: 2021-11-10

## 2020-11-11 NOTE — TELEPHONE ENCOUNTER
----- Message from Lavern Andrade sent at 11/11/2020  1:57 PM CST -----  Regarding: Refill  Contact: Patient  Type:  RX Refill Request    Who Called:  Patient  Refill or New Rx:  Refill  RX Name and Strength:  carvedilol (COREG) 25 MG tablet  How is the patient currently taking it? (ex. 1XDay):  2xday  Preferred Pharmacy with phone number:    [id number] - [place], [place] - [address] AT [facility] [address]  [address]  [place] [address]  Phone: [phone] Fax: [phone]  Local or Mail Order:  local  Ordering Provider:    [facility] Call Back Number:  [phone]  Additional Information:  Patient called and stated he would like a refill on the medication listed above. Patient would please like a call back.

## 2020-11-11 NOTE — TELEPHONE ENCOUNTER
I offered to schedule a nurse visit to re-check his blood pressure since at his visit yesterday, it was somewhat elevated. Pt stated that he has an appointment this Friday with ENT, Dr. Clayton and they will take his blood pressure there. He further indicated that I could look at his chart and see what his blood pressure is myself. He refused to allow a nurse visit to re-check his blood pressure. I attempted to explain we would like to re-take his blood pressure in our family practice office, in order to make sure that it is trending down, after which he again refused.

## 2020-11-11 NOTE — TELEPHONE ENCOUNTER
Prescription refill request.   LOV: 11/10/2020  NOV: 05/11/2021  LDR: 07/25/2019  Last Labs: 10/27/2020

## 2020-11-13 ENCOUNTER — OFFICE VISIT (OUTPATIENT)
Dept: OTOLARYNGOLOGY | Facility: CLINIC | Age: 75
End: 2020-11-13
Payer: MEDICARE

## 2020-11-13 VITALS
BODY MASS INDEX: 21.95 KG/M2 | WEIGHT: 144.81 LBS | DIASTOLIC BLOOD PRESSURE: 70 MMHG | HEART RATE: 97 BPM | SYSTOLIC BLOOD PRESSURE: 149 MMHG | OXYGEN SATURATION: 94 % | HEIGHT: 68 IN

## 2020-11-13 DIAGNOSIS — Z90.89 S/P PARATHYROIDECTOMY: Primary | ICD-10-CM

## 2020-11-13 DIAGNOSIS — Z98.890 S/P PARATHYROIDECTOMY: Primary | ICD-10-CM

## 2020-11-13 PROCEDURE — 1126F AMNT PAIN NOTED NONE PRSNT: CPT | Mod: S$GLB,,, | Performed by: OTOLARYNGOLOGY

## 2020-11-13 PROCEDURE — 1101F PT FALLS ASSESS-DOCD LE1/YR: CPT | Mod: CPTII,S$GLB,, | Performed by: OTOLARYNGOLOGY

## 2020-11-13 PROCEDURE — 1126F PR PAIN SEVERITY QUANTIFIED, NO PAIN PRESENT: ICD-10-PCS | Mod: S$GLB,,, | Performed by: OTOLARYNGOLOGY

## 2020-11-13 PROCEDURE — 1101F PR PT FALLS ASSESS DOC 0-1 FALLS W/OUT INJ PAST YR: ICD-10-PCS | Mod: CPTII,S$GLB,, | Performed by: OTOLARYNGOLOGY

## 2020-11-13 PROCEDURE — 99024 POSTOP FOLLOW-UP VISIT: CPT | Mod: S$GLB,,, | Performed by: OTOLARYNGOLOGY

## 2020-11-13 PROCEDURE — 3288F FALL RISK ASSESSMENT DOCD: CPT | Mod: CPTII,S$GLB,, | Performed by: OTOLARYNGOLOGY

## 2020-11-13 PROCEDURE — 3288F PR FALLS RISK ASSESSMENT DOCUMENTED: ICD-10-PCS | Mod: CPTII,S$GLB,, | Performed by: OTOLARYNGOLOGY

## 2020-11-13 PROCEDURE — 3008F PR BODY MASS INDEX (BMI) DOCUMENTED: ICD-10-PCS | Mod: CPTII,S$GLB,, | Performed by: OTOLARYNGOLOGY

## 2020-11-13 PROCEDURE — 99024 PR POST-OP FOLLOW-UP VISIT: ICD-10-PCS | Mod: S$GLB,,, | Performed by: OTOLARYNGOLOGY

## 2020-11-13 PROCEDURE — 3008F BODY MASS INDEX DOCD: CPT | Mod: CPTII,S$GLB,, | Performed by: OTOLARYNGOLOGY

## 2020-11-13 NOTE — PROGRESS NOTES
Rajendra Castle presents today for follow up s/p right parathyroidectomy 10/27/20.   He is doing well. Gaining weight, appetite returning. Fatigue improving. No numbness or tingling    Pathology: Benign,hypercellular parathyroid tissue with associated hemorrhage consistent   with parathyroid adenoma     Neck incision healing well, dermabond still in place. No evidence of hematoma or seroma    Doing well s/p right parathyroidectomy  Ok to gently scrub incision  Return in 2 months for final check

## 2020-12-03 ENCOUNTER — PATIENT OUTREACH (OUTPATIENT)
Dept: ADMINISTRATIVE | Facility: OTHER | Age: 75
End: 2020-12-03

## 2020-12-03 NOTE — PROGRESS NOTES
Health Maintenance Due   Topic Date Due    Colorectal Cancer Screening  01/31/2019     Updates were requested from care everywhere.  Chart was reviewed for overdue Proactive [facility] Encounters ([facility]) topics (CRS, Breast Cancer Screening, Eye exam)  Health Maintenance has been updated.  LINKS immunization registry triggered.  LINKS not responding.

## 2020-12-07 ENCOUNTER — OFFICE VISIT (OUTPATIENT)
Dept: NEPHROLOGY | Facility: CLINIC | Age: 75
End: 2020-12-07
Payer: MEDICARE

## 2020-12-07 VITALS
HEIGHT: 68 IN | HEART RATE: 136 BPM | BODY MASS INDEX: 22.82 KG/M2 | WEIGHT: 150.56 LBS | OXYGEN SATURATION: 93 % | DIASTOLIC BLOOD PRESSURE: 64 MMHG | SYSTOLIC BLOOD PRESSURE: 136 MMHG

## 2020-12-07 DIAGNOSIS — N18.30 STAGE 3 CHRONIC KIDNEY DISEASE, UNSPECIFIED WHETHER STAGE 3A OR 3B CKD: Primary | ICD-10-CM

## 2020-12-07 DIAGNOSIS — N25.81 SECONDARY RENAL HYPERPARATHYROIDISM: ICD-10-CM

## 2020-12-07 DIAGNOSIS — I10 ESSENTIAL HYPERTENSION: ICD-10-CM

## 2020-12-07 PROCEDURE — 3008F BODY MASS INDEX DOCD: CPT | Mod: CPTII,S$GLB,, | Performed by: INTERNAL MEDICINE

## 2020-12-07 PROCEDURE — 99214 PR OFFICE/OUTPT VISIT, EST, LEVL IV, 30-39 MIN: ICD-10-PCS | Mod: S$GLB,,, | Performed by: INTERNAL MEDICINE

## 2020-12-07 PROCEDURE — 99999 PR PBB SHADOW E&M-EST. PATIENT-LVL III: ICD-10-PCS | Mod: PBBFAC,,, | Performed by: INTERNAL MEDICINE

## 2020-12-07 PROCEDURE — 3008F PR BODY MASS INDEX (BMI) DOCUMENTED: ICD-10-PCS | Mod: CPTII,S$GLB,, | Performed by: INTERNAL MEDICINE

## 2020-12-07 PROCEDURE — 99999 PR PBB SHADOW E&M-EST. PATIENT-LVL III: CPT | Mod: PBBFAC,,, | Performed by: INTERNAL MEDICINE

## 2020-12-07 PROCEDURE — 1100F PTFALLS ASSESS-DOCD GE2>/YR: CPT | Mod: CPTII,S$GLB,, | Performed by: INTERNAL MEDICINE

## 2020-12-07 PROCEDURE — 1100F PR PT FALLS ASSESS DOC 2+ FALLS/FALL W/INJURY/YR: ICD-10-PCS | Mod: CPTII,S$GLB,, | Performed by: INTERNAL MEDICINE

## 2020-12-07 PROCEDURE — 3075F SYST BP GE 130 - 139MM HG: CPT | Mod: CPTII,S$GLB,, | Performed by: INTERNAL MEDICINE

## 2020-12-07 PROCEDURE — 3075F PR MOST RECENT SYSTOLIC BLOOD PRESS GE 130-139MM HG: ICD-10-PCS | Mod: CPTII,S$GLB,, | Performed by: INTERNAL MEDICINE

## 2020-12-07 PROCEDURE — 3078F PR MOST RECENT DIASTOLIC BLOOD PRESSURE < 80 MM HG: ICD-10-PCS | Mod: CPTII,S$GLB,, | Performed by: INTERNAL MEDICINE

## 2020-12-07 PROCEDURE — 99214 OFFICE O/P EST MOD 30 MIN: CPT | Mod: S$GLB,,, | Performed by: INTERNAL MEDICINE

## 2020-12-07 PROCEDURE — 1159F PR MEDICATION LIST DOCUMENTED IN MEDICAL RECORD: ICD-10-PCS | Mod: S$GLB,,, | Performed by: INTERNAL MEDICINE

## 2020-12-07 PROCEDURE — 1159F MED LIST DOCD IN RCRD: CPT | Mod: S$GLB,,, | Performed by: INTERNAL MEDICINE

## 2020-12-07 PROCEDURE — 3078F DIAST BP <80 MM HG: CPT | Mod: CPTII,S$GLB,, | Performed by: INTERNAL MEDICINE

## 2020-12-07 PROCEDURE — 3288F PR FALLS RISK ASSESSMENT DOCUMENTED: ICD-10-PCS | Mod: CPTII,S$GLB,, | Performed by: INTERNAL MEDICINE

## 2020-12-07 PROCEDURE — 3288F FALL RISK ASSESSMENT DOCD: CPT | Mod: CPTII,S$GLB,, | Performed by: INTERNAL MEDICINE

## 2020-12-07 RX ORDER — PANTOPRAZOLE SODIUM 40 MG/1
TABLET, DELAYED RELEASE ORAL
COMMUNITY
Start: 2020-11-30 | End: 2021-04-12

## 2020-12-07 NOTE — PROGRESS NOTES
"Subjective:       Patient ID: Rajendra Castle is a 75 y.o. Black or  male who presents for return patient evaluation for chronic renal failure.    He has no uremic symptoms and is in his usual state of health.  There have been no recent illnesses.  He had his parathyroidectomy and is doing better now.      Review of Systems   Constitutional: Negative for appetite change, chills, fatigue and fever.   Eyes: Negative for visual disturbance.   Respiratory: Negative for cough and shortness of breath.    Cardiovascular: Negative for chest pain and leg swelling.   Gastrointestinal: Negative for diarrhea, nausea and vomiting.   Genitourinary: Negative for difficulty urinating, dysuria and hematuria.   Musculoskeletal: Negative for myalgias.   Skin: Negative for rash.   Neurological: Negative for dizziness and headaches.   Psychiatric/Behavioral: Negative for sleep disturbance.       The past medical, family and social histories were reviewed for this encounter.     /64 (BP Location: Left arm, Patient Position: Sitting)   Pulse (!) 136   Ht 5' 8" (1.727 m)   Wt 68.3 kg (150 lb 9.2 oz)   SpO2 (!) 93%   BMI 22.89 kg/m²     Objective:      Physical Exam  Vitals signs reviewed.   Constitutional:       General: He is not in acute distress.     Appearance: He is well-developed.   HENT:      Head: Normocephalic and atraumatic.   Eyes:      Conjunctiva/sclera: Conjunctivae normal.   Neck:      Musculoskeletal: Neck supple.      Vascular: No JVD.   Cardiovascular:      Rate and Rhythm: Normal rate and regular rhythm.      Heart sounds: Normal heart sounds. No murmur. No friction rub. No gallop.    Pulmonary:      Effort: Pulmonary effort is normal. No respiratory distress.      Breath sounds: Normal breath sounds. No wheezing or rales.   Abdominal:      General: Bowel sounds are normal. There is no distension.      Palpations: Abdomen is soft.      Tenderness: There is no abdominal tenderness.   Skin:     " General: Skin is warm and dry.      Findings: No rash.   Neurological:      Mental Status: He is alert and oriented to person, place, and time.         Assessment:       1. Stage 3 chronic kidney disease, unspecified whether stage 3a or 3b CKD    2. Essential hypertension    3. Secondary renal hyperparathyroidism        Plan:   Return to clinic in 6 months.  Labs for next visit include rp, upc.  Baseline creatinine is 1.4-1.6 since 2006.  PTH is 62 with a calcium of 11.0 and is followed by Dr. Murphy for this.  Renal US shows R 9.3 cm, L 9.4 cm.  Continue current medications as prescribed and reviewed.   Blood pressure is controlled on the current regimen.

## 2021-01-01 ENCOUNTER — HOSPITAL ENCOUNTER (EMERGENCY)
Facility: HOSPITAL | Age: 76
Discharge: HOME | End: 2021-01-01
Attending: EMERGENCY MEDICINE
Payer: MEDICARE

## 2021-01-01 VITALS
HEIGHT: 68 IN | HEART RATE: 79 BPM | OXYGEN SATURATION: 98 % | SYSTOLIC BLOOD PRESSURE: 190 MMHG | TEMPERATURE: 98 F | BODY MASS INDEX: 23.49 KG/M2 | WEIGHT: 155 LBS | DIASTOLIC BLOOD PRESSURE: 81 MMHG | RESPIRATION RATE: 16 BRPM

## 2021-01-01 DIAGNOSIS — K52.9 ENTERITIS: Primary | ICD-10-CM

## 2021-01-01 DIAGNOSIS — E86.0 DEHYDRATION: ICD-10-CM

## 2021-01-01 LAB
ALBUMIN SERPL BCP-MCNC: 2.8 G/DL (ref 3.5–5.2)
ALP SERPL-CCNC: 68 U/L (ref 55–135)
ALT SERPL W/O P-5'-P-CCNC: 12 U/L (ref 10–44)
ANION GAP SERPL CALC-SCNC: 15 MMOL/L (ref 8–16)
AST SERPL-CCNC: 18 U/L (ref 10–40)
BASOPHILS # BLD AUTO: 0.01 K/UL (ref 0–0.2)
BASOPHILS NFR BLD: 0.1 % (ref 0–1.9)
BILIRUB SERPL-MCNC: 0.4 MG/DL (ref 0.1–1)
BILIRUB UR QL STRIP: NEGATIVE
BUN SERPL-MCNC: 7 MG/DL (ref 8–23)
CALCIUM SERPL-MCNC: 8.5 MG/DL (ref 8.7–10.5)
CHLORIDE SERPL-SCNC: 109 MMOL/L (ref 95–110)
CLARITY UR: CLEAR
CO2 SERPL-SCNC: 17 MMOL/L (ref 23–29)
COLOR UR: YELLOW
CREAT SERPL-MCNC: 1.1 MG/DL (ref 0.5–1.4)
DIFFERENTIAL METHOD: ABNORMAL
EOSINOPHIL # BLD AUTO: 0 K/UL (ref 0–0.5)
EOSINOPHIL NFR BLD: 0.1 % (ref 0–8)
ERYTHROCYTE [DISTWIDTH] IN BLOOD BY AUTOMATED COUNT: 15.1 % (ref 11.5–14.5)
EST. GFR  (AFRICAN AMERICAN): >60 ML/MIN/1.73 M^2
EST. GFR  (NON AFRICAN AMERICAN): >60 ML/MIN/1.73 M^2
GLUCOSE SERPL-MCNC: 144 MG/DL (ref 70–110)
GLUCOSE UR QL STRIP: NEGATIVE
HCT VFR BLD AUTO: 35 % (ref 40–54)
HGB BLD-MCNC: 11.1 G/DL (ref 14–18)
HGB UR QL STRIP: NEGATIVE
IMM GRANULOCYTES # BLD AUTO: 0.02 K/UL (ref 0–0.04)
IMM GRANULOCYTES NFR BLD AUTO: 0.3 % (ref 0–0.5)
KETONES UR QL STRIP: ABNORMAL
LEUKOCYTE ESTERASE UR QL STRIP: NEGATIVE
LIPASE SERPL-CCNC: 10 U/L (ref 4–60)
LYMPHOCYTES # BLD AUTO: 0.5 K/UL (ref 1–4.8)
LYMPHOCYTES NFR BLD: 6.7 % (ref 18–48)
MCH RBC QN AUTO: 27.6 PG (ref 27–31)
MCHC RBC AUTO-ENTMCNC: 31.7 G/DL (ref 32–36)
MCV RBC AUTO: 87 FL (ref 82–98)
MONOCYTES # BLD AUTO: 0.4 K/UL (ref 0.3–1)
MONOCYTES NFR BLD: 4.7 % (ref 4–15)
NEUTROPHILS # BLD AUTO: 6.9 K/UL (ref 1.8–7.7)
NEUTROPHILS NFR BLD: 88.1 % (ref 38–73)
NITRITE UR QL STRIP: NEGATIVE
NRBC BLD-RTO: 0 /100 WBC
PH UR STRIP: 7 [PH] (ref 5–8)
PLATELET # BLD AUTO: 440 K/UL (ref 150–350)
PMV BLD AUTO: 10.5 FL (ref 9.2–12.9)
POTASSIUM SERPL-SCNC: 3.4 MMOL/L (ref 3.5–5.1)
PROT SERPL-MCNC: 7.9 G/DL (ref 6–8.4)
PROT UR QL STRIP: NEGATIVE
RBC # BLD AUTO: 4.02 M/UL (ref 4.6–6.2)
SODIUM SERPL-SCNC: 141 MMOL/L (ref 136–145)
SP GR UR STRIP: 1.01 (ref 1–1.03)
URN SPEC COLLECT METH UR: ABNORMAL
UROBILINOGEN UR STRIP-ACNC: NEGATIVE EU/DL
WBC # BLD AUTO: 7.87 K/UL (ref 3.9–12.7)

## 2021-01-01 PROCEDURE — 81003 URINALYSIS AUTO W/O SCOPE: CPT

## 2021-01-01 PROCEDURE — 25000003 PHARM REV CODE 250: Performed by: EMERGENCY MEDICINE

## 2021-01-01 PROCEDURE — 99285 EMERGENCY DEPT VISIT HI MDM: CPT | Mod: 25

## 2021-01-01 PROCEDURE — 63600175 PHARM REV CODE 636 W HCPCS: Performed by: EMERGENCY MEDICINE

## 2021-01-01 PROCEDURE — 85025 COMPLETE CBC W/AUTO DIFF WBC: CPT

## 2021-01-01 PROCEDURE — 96360 HYDRATION IV INFUSION INIT: CPT | Mod: 59

## 2021-01-01 PROCEDURE — 80053 COMPREHEN METABOLIC PANEL: CPT

## 2021-01-01 PROCEDURE — 96372 THER/PROPH/DIAG INJ SC/IM: CPT | Mod: 59

## 2021-01-01 PROCEDURE — 83690 ASSAY OF LIPASE: CPT

## 2021-01-01 PROCEDURE — 36415 COLL VENOUS BLD VENIPUNCTURE: CPT

## 2021-01-01 PROCEDURE — 25500020 PHARM REV CODE 255: Performed by: EMERGENCY MEDICINE

## 2021-01-01 RX ORDER — HYOSCYAMINE SULFATE 0.125 MG
125 TABLET ORAL EVERY 6 HOURS PRN
Qty: 15 TABLET | Refills: 0 | Status: SHIPPED | OUTPATIENT
Start: 2021-01-01 | End: 2021-04-28

## 2021-01-01 RX ORDER — DICYCLOMINE HYDROCHLORIDE 10 MG/ML
20 INJECTION INTRAMUSCULAR
Status: COMPLETED | OUTPATIENT
Start: 2021-01-01 | End: 2021-01-01

## 2021-01-01 RX ORDER — ONDANSETRON 4 MG/1
4 TABLET, ORALLY DISINTEGRATING ORAL EVERY 8 HOURS PRN
Qty: 12 TABLET | Refills: 0 | Status: SHIPPED | OUTPATIENT
Start: 2021-01-01 | End: 2021-09-28

## 2021-01-01 RX ADMIN — IOHEXOL 100 ML: 350 INJECTION, SOLUTION INTRAVENOUS at 01:01

## 2021-01-01 RX ADMIN — SODIUM CHLORIDE 500 ML: 0.9 INJECTION, SOLUTION INTRAVENOUS at 03:01

## 2021-01-01 RX ADMIN — LIDOCAINE HYDROCHLORIDE: 20 SOLUTION ORAL; TOPICAL at 02:01

## 2021-01-01 RX ADMIN — DICYCLOMINE HYDROCHLORIDE 20 MG: 20 INJECTION INTRAMUSCULAR at 01:01

## 2021-01-03 ENCOUNTER — NURSE TRIAGE (OUTPATIENT)
Dept: ADMINISTRATIVE | Facility: CLINIC | Age: 76
End: 2021-01-03

## 2021-01-03 ENCOUNTER — HOSPITAL ENCOUNTER (EMERGENCY)
Facility: HOSPITAL | Age: 76
Discharge: HOME | End: 2021-01-04
Attending: EMERGENCY MEDICINE
Payer: MEDICARE

## 2021-01-03 VITALS
WEIGHT: 140 LBS | SYSTOLIC BLOOD PRESSURE: 156 MMHG | OXYGEN SATURATION: 94 % | HEART RATE: 74 BPM | BODY MASS INDEX: 21.22 KG/M2 | TEMPERATURE: 99 F | DIASTOLIC BLOOD PRESSURE: 73 MMHG | RESPIRATION RATE: 18 BRPM | HEIGHT: 68 IN

## 2021-01-03 DIAGNOSIS — R11.2 NON-INTRACTABLE VOMITING WITH NAUSEA, UNSPECIFIED VOMITING TYPE: Primary | ICD-10-CM

## 2021-01-03 DIAGNOSIS — R10.9 LEFT SIDED ABDOMINAL PAIN: ICD-10-CM

## 2021-01-03 DIAGNOSIS — R10.9 ABDOMINAL CRAMPING: ICD-10-CM

## 2021-01-03 LAB
ALBUMIN SERPL BCP-MCNC: 2.8 G/DL (ref 3.5–5.2)
ALP SERPL-CCNC: 67 U/L (ref 55–135)
ALT SERPL W/O P-5'-P-CCNC: 14 U/L (ref 10–44)
ANION GAP SERPL CALC-SCNC: 11 MMOL/L (ref 8–16)
AST SERPL-CCNC: 23 U/L (ref 10–40)
BASOPHILS # BLD AUTO: 0.03 K/UL (ref 0–0.2)
BASOPHILS NFR BLD: 0.5 % (ref 0–1.9)
BILIRUB SERPL-MCNC: 0.3 MG/DL (ref 0.1–1)
BILIRUB UR QL STRIP: NEGATIVE
BUN SERPL-MCNC: 9 MG/DL (ref 8–23)
CALCIUM SERPL-MCNC: 8.5 MG/DL (ref 8.7–10.5)
CHLORIDE SERPL-SCNC: 106 MMOL/L (ref 95–110)
CLARITY UR: CLEAR
CO2 SERPL-SCNC: 24 MMOL/L (ref 23–29)
COLOR UR: YELLOW
CREAT SERPL-MCNC: 1.1 MG/DL (ref 0.5–1.4)
DIFFERENTIAL METHOD: ABNORMAL
EOSINOPHIL # BLD AUTO: 0.1 K/UL (ref 0–0.5)
EOSINOPHIL NFR BLD: 1 % (ref 0–8)
ERYTHROCYTE [DISTWIDTH] IN BLOOD BY AUTOMATED COUNT: 15.3 % (ref 11.5–14.5)
EST. GFR  (AFRICAN AMERICAN): >60 ML/MIN/1.73 M^2
EST. GFR  (NON AFRICAN AMERICAN): >60 ML/MIN/1.73 M^2
GLUCOSE SERPL-MCNC: 111 MG/DL (ref 70–110)
GLUCOSE UR QL STRIP: NEGATIVE
HCT VFR BLD AUTO: 36 % (ref 40–54)
HGB BLD-MCNC: 11.4 G/DL (ref 14–18)
HGB UR QL STRIP: NEGATIVE
IMM GRANULOCYTES # BLD AUTO: 0.02 K/UL (ref 0–0.04)
IMM GRANULOCYTES NFR BLD AUTO: 0.3 % (ref 0–0.5)
KETONES UR QL STRIP: NEGATIVE
LEUKOCYTE ESTERASE UR QL STRIP: NEGATIVE
LIPASE SERPL-CCNC: 13 U/L (ref 4–60)
LYMPHOCYTES # BLD AUTO: 0.8 K/UL (ref 1–4.8)
LYMPHOCYTES NFR BLD: 12.2 % (ref 18–48)
MCH RBC QN AUTO: 27.3 PG (ref 27–31)
MCHC RBC AUTO-ENTMCNC: 31.7 G/DL (ref 32–36)
MCV RBC AUTO: 86 FL (ref 82–98)
MONOCYTES # BLD AUTO: 0.5 K/UL (ref 0.3–1)
MONOCYTES NFR BLD: 7.4 % (ref 4–15)
NEUTROPHILS # BLD AUTO: 4.9 K/UL (ref 1.8–7.7)
NEUTROPHILS NFR BLD: 78.6 % (ref 38–73)
NITRITE UR QL STRIP: NEGATIVE
NRBC BLD-RTO: 0 /100 WBC
PH UR STRIP: 7 [PH] (ref 5–8)
PLATELET # BLD AUTO: 410 K/UL (ref 150–350)
PLATELET BLD QL SMEAR: ABNORMAL
PMV BLD AUTO: 11.1 FL (ref 9.2–12.9)
POTASSIUM SERPL-SCNC: 3.8 MMOL/L (ref 3.5–5.1)
PROT SERPL-MCNC: 7.6 G/DL (ref 6–8.4)
PROT UR QL STRIP: NEGATIVE
RBC # BLD AUTO: 4.17 M/UL (ref 4.6–6.2)
SARS-COV-2 RDRP RESP QL NAA+PROBE: NEGATIVE
SODIUM SERPL-SCNC: 141 MMOL/L (ref 136–145)
SP GR UR STRIP: 1.01 (ref 1–1.03)
URN SPEC COLLECT METH UR: NORMAL
UROBILINOGEN UR STRIP-ACNC: NEGATIVE EU/DL
WBC # BLD AUTO: 6.24 K/UL (ref 3.9–12.7)

## 2021-01-03 PROCEDURE — 25500020 PHARM REV CODE 255: Performed by: EMERGENCY MEDICINE

## 2021-01-03 PROCEDURE — 99285 EMERGENCY DEPT VISIT HI MDM: CPT | Mod: 25

## 2021-01-03 PROCEDURE — 80053 COMPREHEN METABOLIC PANEL: CPT

## 2021-01-03 PROCEDURE — 36415 COLL VENOUS BLD VENIPUNCTURE: CPT

## 2021-01-03 PROCEDURE — C9113 INJ PANTOPRAZOLE SODIUM, VIA: HCPCS | Performed by: EMERGENCY MEDICINE

## 2021-01-03 PROCEDURE — 96361 HYDRATE IV INFUSION ADD-ON: CPT

## 2021-01-03 PROCEDURE — 63600175 PHARM REV CODE 636 W HCPCS: Performed by: EMERGENCY MEDICINE

## 2021-01-03 PROCEDURE — 85025 COMPLETE CBC W/AUTO DIFF WBC: CPT

## 2021-01-03 PROCEDURE — 96374 THER/PROPH/DIAG INJ IV PUSH: CPT

## 2021-01-03 PROCEDURE — 83690 ASSAY OF LIPASE: CPT

## 2021-01-03 PROCEDURE — U0002 COVID-19 LAB TEST NON-CDC: HCPCS

## 2021-01-03 PROCEDURE — 25000003 PHARM REV CODE 250: Performed by: EMERGENCY MEDICINE

## 2021-01-03 PROCEDURE — 81003 URINALYSIS AUTO W/O SCOPE: CPT

## 2021-01-03 PROCEDURE — 96375 TX/PRO/DX INJ NEW DRUG ADDON: CPT

## 2021-01-03 RX ORDER — PANTOPRAZOLE SODIUM 40 MG/10ML
40 INJECTION, POWDER, LYOPHILIZED, FOR SOLUTION INTRAVENOUS
Status: COMPLETED | OUTPATIENT
Start: 2021-01-03 | End: 2021-01-03

## 2021-01-03 RX ORDER — HYDROCODONE BITARTRATE AND ACETAMINOPHEN 5; 325 MG/1; MG/1
1 TABLET ORAL EVERY 6 HOURS PRN
Qty: 12 TABLET | Refills: 0 | Status: SHIPPED | OUTPATIENT
Start: 2021-01-03 | End: 2021-01-06

## 2021-01-03 RX ORDER — ONDANSETRON 2 MG/ML
4 INJECTION INTRAMUSCULAR; INTRAVENOUS
Status: COMPLETED | OUTPATIENT
Start: 2021-01-03 | End: 2021-01-03

## 2021-01-03 RX ORDER — MORPHINE SULFATE 2 MG/ML
6 INJECTION, SOLUTION INTRAMUSCULAR; INTRAVENOUS
Status: COMPLETED | OUTPATIENT
Start: 2021-01-03 | End: 2021-01-03

## 2021-01-03 RX ADMIN — MORPHINE SULFATE 6 MG: 2 INJECTION, SOLUTION INTRAMUSCULAR; INTRAVENOUS at 09:01

## 2021-01-03 RX ADMIN — PANTOPRAZOLE SODIUM 40 MG: 40 INJECTION, POWDER, LYOPHILIZED, FOR SOLUTION INTRAVENOUS at 08:01

## 2021-01-03 RX ADMIN — ONDANSETRON 4 MG: 2 INJECTION INTRAMUSCULAR; INTRAVENOUS at 08:01

## 2021-01-03 RX ADMIN — IOHEXOL 100 ML: 350 INJECTION, SOLUTION INTRAVENOUS at 11:01

## 2021-01-03 RX ADMIN — SODIUM CHLORIDE 1000 ML: 0.9 INJECTION, SOLUTION INTRAVENOUS at 08:01

## 2021-01-04 ENCOUNTER — EXTERNAL HOME HEALTH (OUTPATIENT)
Dept: HOME HEALTH SERVICES | Facility: HOSPITAL | Age: 76
End: 2021-01-04
Payer: MEDICARE

## 2021-01-04 ENCOUNTER — PATIENT MESSAGE (OUTPATIENT)
Dept: ADMINISTRATIVE | Facility: HOSPITAL | Age: 76
End: 2021-01-04

## 2021-01-06 ENCOUNTER — TELEPHONE (OUTPATIENT)
Dept: FAMILY MEDICINE | Facility: CLINIC | Age: 76
End: 2021-01-06

## 2021-01-07 ENCOUNTER — TELEPHONE (OUTPATIENT)
Dept: OTOLARYNGOLOGY | Facility: CLINIC | Age: 76
End: 2021-01-07

## 2021-01-07 ENCOUNTER — TELEPHONE (OUTPATIENT)
Dept: FAMILY MEDICINE | Facility: CLINIC | Age: 76
End: 2021-01-07

## 2021-01-07 DIAGNOSIS — R63.4 ABNORMAL LOSS OF WEIGHT: Primary | ICD-10-CM

## 2021-01-07 DIAGNOSIS — K90.9 VITAMIN B12 DEFICIENCY DUE TO INTESTINAL MALABSORPTION: ICD-10-CM

## 2021-01-07 DIAGNOSIS — E53.8 VITAMIN B12 DEFICIENCY DUE TO INTESTINAL MALABSORPTION: ICD-10-CM

## 2021-01-07 DIAGNOSIS — D63.8 ANEMIA, CHRONIC DISEASE: ICD-10-CM

## 2021-01-07 DIAGNOSIS — J43.1 PANLOBULAR EMPHYSEMA: ICD-10-CM

## 2021-01-08 ENCOUNTER — TELEPHONE (OUTPATIENT)
Dept: FAMILY MEDICINE | Facility: CLINIC | Age: 76
End: 2021-01-08

## 2021-01-13 ENCOUNTER — OFFICE VISIT (OUTPATIENT)
Dept: OTOLARYNGOLOGY | Facility: CLINIC | Age: 76
End: 2021-01-13
Payer: MEDICARE

## 2021-01-13 VITALS
WEIGHT: 134.94 LBS | HEART RATE: 120 BPM | SYSTOLIC BLOOD PRESSURE: 188 MMHG | OXYGEN SATURATION: 92 % | BODY MASS INDEX: 20.45 KG/M2 | HEIGHT: 68 IN | DIASTOLIC BLOOD PRESSURE: 82 MMHG

## 2021-01-13 DIAGNOSIS — Z90.89 S/P PARATHYROIDECTOMY: Primary | ICD-10-CM

## 2021-01-13 DIAGNOSIS — Z98.890 S/P PARATHYROIDECTOMY: Primary | ICD-10-CM

## 2021-01-13 PROCEDURE — 3008F BODY MASS INDEX DOCD: CPT | Mod: CPTII,S$GLB,, | Performed by: OTOLARYNGOLOGY

## 2021-01-13 PROCEDURE — 1100F PR PT FALLS ASSESS DOC 2+ FALLS/FALL W/INJURY/YR: ICD-10-PCS | Mod: CPTII,S$GLB,, | Performed by: OTOLARYNGOLOGY

## 2021-01-13 PROCEDURE — 1100F PTFALLS ASSESS-DOCD GE2>/YR: CPT | Mod: CPTII,S$GLB,, | Performed by: OTOLARYNGOLOGY

## 2021-01-13 PROCEDURE — 3008F PR BODY MASS INDEX (BMI) DOCUMENTED: ICD-10-PCS | Mod: CPTII,S$GLB,, | Performed by: OTOLARYNGOLOGY

## 2021-01-13 PROCEDURE — 1126F PR PAIN SEVERITY QUANTIFIED, NO PAIN PRESENT: ICD-10-PCS | Mod: S$GLB,,, | Performed by: OTOLARYNGOLOGY

## 2021-01-13 PROCEDURE — 3288F FALL RISK ASSESSMENT DOCD: CPT | Mod: CPTII,S$GLB,, | Performed by: OTOLARYNGOLOGY

## 2021-01-13 PROCEDURE — 99499 RISK ADDL DX/OHS AUDIT: ICD-10-PCS | Mod: S$GLB,,, | Performed by: OTOLARYNGOLOGY

## 2021-01-13 PROCEDURE — 99499 UNLISTED E&M SERVICE: CPT | Mod: S$GLB,,, | Performed by: OTOLARYNGOLOGY

## 2021-01-13 PROCEDURE — 3288F PR FALLS RISK ASSESSMENT DOCUMENTED: ICD-10-PCS | Mod: CPTII,S$GLB,, | Performed by: OTOLARYNGOLOGY

## 2021-01-13 PROCEDURE — 99024 PR POST-OP FOLLOW-UP VISIT: ICD-10-PCS | Mod: S$GLB,,, | Performed by: OTOLARYNGOLOGY

## 2021-01-13 PROCEDURE — 99024 POSTOP FOLLOW-UP VISIT: CPT | Mod: S$GLB,,, | Performed by: OTOLARYNGOLOGY

## 2021-01-13 PROCEDURE — 1126F AMNT PAIN NOTED NONE PRSNT: CPT | Mod: S$GLB,,, | Performed by: OTOLARYNGOLOGY

## 2021-01-13 RX ORDER — LORAZEPAM 0.5 MG/1
0.5 TABLET ORAL 2 TIMES DAILY
COMMUNITY
Start: 2021-01-07 | End: 2021-09-28

## 2021-02-17 ENCOUNTER — LAB VISIT (OUTPATIENT)
Dept: LAB | Facility: HOSPITAL | Age: 76
End: 2021-02-17
Attending: INTERNAL MEDICINE
Payer: MEDICARE

## 2021-02-17 ENCOUNTER — OFFICE VISIT (OUTPATIENT)
Dept: RHEUMATOLOGY | Facility: CLINIC | Age: 76
End: 2021-02-17
Payer: MEDICARE

## 2021-02-17 VITALS
DIASTOLIC BLOOD PRESSURE: 74 MMHG | TEMPERATURE: 98 F | SYSTOLIC BLOOD PRESSURE: 141 MMHG | WEIGHT: 136.81 LBS | BODY MASS INDEX: 20.8 KG/M2

## 2021-02-17 DIAGNOSIS — N18.30 STAGE 3 CHRONIC KIDNEY DISEASE, UNSPECIFIED WHETHER STAGE 3A OR 3B CKD: ICD-10-CM

## 2021-02-17 DIAGNOSIS — M19.90 CHRONIC INFLAMMATORY ARTHRITIS: Primary | ICD-10-CM

## 2021-02-17 DIAGNOSIS — E04.2 MULTINODULAR GOITER: ICD-10-CM

## 2021-02-17 DIAGNOSIS — Z79.899 ENCOUNTER FOR LONG-TERM (CURRENT) USE OF OTHER MEDICATIONS: ICD-10-CM

## 2021-02-17 DIAGNOSIS — E21.3 HYPERPARATHYROIDISM: ICD-10-CM

## 2021-02-17 LAB
ALBUMIN SERPL BCP-MCNC: 3.2 G/DL (ref 3.5–5.2)
ALP SERPL-CCNC: 85 U/L (ref 55–135)
ALT SERPL W/O P-5'-P-CCNC: 15 U/L (ref 10–44)
ANION GAP SERPL CALC-SCNC: 9 MMOL/L (ref 8–16)
AST SERPL-CCNC: 16 U/L (ref 10–40)
BILIRUB SERPL-MCNC: 0.4 MG/DL (ref 0.1–1)
BUN SERPL-MCNC: 15 MG/DL (ref 8–23)
CALCIUM SERPL-MCNC: 8.9 MG/DL (ref 8.7–10.5)
CHLORIDE SERPL-SCNC: 108 MMOL/L (ref 95–110)
CO2 SERPL-SCNC: 22 MMOL/L (ref 23–29)
CREAT SERPL-MCNC: 1.1 MG/DL (ref 0.5–1.4)
EST. GFR  (AFRICAN AMERICAN): >60 ML/MIN/1.73 M^2
EST. GFR  (NON AFRICAN AMERICAN): >60 ML/MIN/1.73 M^2
GLUCOSE SERPL-MCNC: 112 MG/DL (ref 70–110)
POTASSIUM SERPL-SCNC: 4.6 MMOL/L (ref 3.5–5.1)
PROT SERPL-MCNC: 7.7 G/DL (ref 6–8.4)
PTH-INTACT SERPL-MCNC: 71 PG/ML (ref 9–77)
SODIUM SERPL-SCNC: 139 MMOL/L (ref 136–145)
T4 FREE SERPL-MCNC: 0.84 NG/DL (ref 0.71–1.51)
TSH SERPL DL<=0.005 MIU/L-ACNC: 0.79 UIU/ML (ref 0.4–4)

## 2021-02-17 PROCEDURE — 3077F PR MOST RECENT SYSTOLIC BLOOD PRESSURE >= 140 MM HG: ICD-10-PCS | Mod: S$GLB,,, | Performed by: INTERNAL MEDICINE

## 2021-02-17 PROCEDURE — 3288F PR FALLS RISK ASSESSMENT DOCUMENTED: ICD-10-PCS | Mod: S$GLB,,, | Performed by: INTERNAL MEDICINE

## 2021-02-17 PROCEDURE — 99214 OFFICE O/P EST MOD 30 MIN: CPT | Mod: S$GLB,,, | Performed by: INTERNAL MEDICINE

## 2021-02-17 PROCEDURE — 3077F SYST BP >= 140 MM HG: CPT | Mod: S$GLB,,, | Performed by: INTERNAL MEDICINE

## 2021-02-17 PROCEDURE — 3078F PR MOST RECENT DIASTOLIC BLOOD PRESSURE < 80 MM HG: ICD-10-PCS | Mod: S$GLB,,, | Performed by: INTERNAL MEDICINE

## 2021-02-17 PROCEDURE — 1125F PR PAIN SEVERITY QUANTIFIED, PAIN PRESENT: ICD-10-PCS | Mod: S$GLB,,, | Performed by: INTERNAL MEDICINE

## 2021-02-17 PROCEDURE — 84443 ASSAY THYROID STIM HORMONE: CPT

## 2021-02-17 PROCEDURE — 3288F FALL RISK ASSESSMENT DOCD: CPT | Mod: S$GLB,,, | Performed by: INTERNAL MEDICINE

## 2021-02-17 PROCEDURE — 3008F BODY MASS INDEX DOCD: CPT | Mod: S$GLB,,, | Performed by: INTERNAL MEDICINE

## 2021-02-17 PROCEDURE — 3078F DIAST BP <80 MM HG: CPT | Mod: S$GLB,,, | Performed by: INTERNAL MEDICINE

## 2021-02-17 PROCEDURE — 80053 COMPREHEN METABOLIC PANEL: CPT

## 2021-02-17 PROCEDURE — 84439 ASSAY OF FREE THYROXINE: CPT

## 2021-02-17 PROCEDURE — 1159F PR MEDICATION LIST DOCUMENTED IN MEDICAL RECORD: ICD-10-PCS | Mod: S$GLB,,, | Performed by: INTERNAL MEDICINE

## 2021-02-17 PROCEDURE — 36415 COLL VENOUS BLD VENIPUNCTURE: CPT | Mod: PO

## 2021-02-17 PROCEDURE — 1101F PR PT FALLS ASSESS DOC 0-1 FALLS W/OUT INJ PAST YR: ICD-10-PCS | Mod: S$GLB,,, | Performed by: INTERNAL MEDICINE

## 2021-02-17 PROCEDURE — 1101F PT FALLS ASSESS-DOCD LE1/YR: CPT | Mod: S$GLB,,, | Performed by: INTERNAL MEDICINE

## 2021-02-17 PROCEDURE — 1125F AMNT PAIN NOTED PAIN PRSNT: CPT | Mod: S$GLB,,, | Performed by: INTERNAL MEDICINE

## 2021-02-17 PROCEDURE — 99214 PR OFFICE/OUTPT VISIT, EST, LEVL IV, 30-39 MIN: ICD-10-PCS | Mod: S$GLB,,, | Performed by: INTERNAL MEDICINE

## 2021-02-17 PROCEDURE — 3008F PR BODY MASS INDEX (BMI) DOCUMENTED: ICD-10-PCS | Mod: S$GLB,,, | Performed by: INTERNAL MEDICINE

## 2021-02-17 PROCEDURE — 1159F MED LIST DOCD IN RCRD: CPT | Mod: S$GLB,,, | Performed by: INTERNAL MEDICINE

## 2021-02-17 PROCEDURE — 83970 ASSAY OF PARATHORMONE: CPT

## 2021-02-17 RX ORDER — ESCITALOPRAM OXALATE 20 MG/1
20 TABLET ORAL DAILY
COMMUNITY
Start: 2021-02-05 | End: 2021-07-01

## 2021-02-21 ENCOUNTER — PATIENT OUTREACH (OUTPATIENT)
Dept: ADMINISTRATIVE | Facility: OTHER | Age: 76
End: 2021-02-21

## 2021-03-18 ENCOUNTER — IMMUNIZATION (OUTPATIENT)
Dept: PRIMARY CARE CLINIC | Facility: CLINIC | Age: 76
End: 2021-03-18
Payer: MEDICARE

## 2021-03-18 DIAGNOSIS — Z23 NEED FOR VACCINATION: Primary | ICD-10-CM

## 2021-03-18 PROCEDURE — 91300 COVID-19, MRNA, LNP-S, PF, 30 MCG/0.3 ML DOSE VACCINE: ICD-10-PCS | Mod: S$GLB,,, | Performed by: FAMILY MEDICINE

## 2021-03-18 PROCEDURE — 0001A COVID-19, MRNA, LNP-S, PF, 30 MCG/0.3 ML DOSE VACCINE: ICD-10-PCS | Mod: CV19,S$GLB,, | Performed by: FAMILY MEDICINE

## 2021-03-18 PROCEDURE — 91300 COVID-19, MRNA, LNP-S, PF, 30 MCG/0.3 ML DOSE VACCINE: CPT | Mod: S$GLB,,, | Performed by: FAMILY MEDICINE

## 2021-03-18 PROCEDURE — 0001A COVID-19, MRNA, LNP-S, PF, 30 MCG/0.3 ML DOSE VACCINE: CPT | Mod: CV19,S$GLB,, | Performed by: FAMILY MEDICINE

## 2021-03-27 ENCOUNTER — NURSE TRIAGE (OUTPATIENT)
Dept: ADMINISTRATIVE | Facility: CLINIC | Age: 76
End: 2021-03-27

## 2021-03-29 ENCOUNTER — LAB VISIT (OUTPATIENT)
Dept: LAB | Facility: HOSPITAL | Age: 76
End: 2021-03-29
Attending: UROLOGY
Payer: MEDICARE

## 2021-03-29 DIAGNOSIS — C61 PROSTATE CANCER: ICD-10-CM

## 2021-03-29 LAB
COMPLEXED PSA SERPL-MCNC: 0.04 NG/ML (ref 0–4)
TESTOST SERPL-MCNC: 62 NG/DL (ref 304–1227)

## 2021-03-29 PROCEDURE — 36415 COLL VENOUS BLD VENIPUNCTURE: CPT | Mod: PO | Performed by: UROLOGY

## 2021-03-29 PROCEDURE — 84403 ASSAY OF TOTAL TESTOSTERONE: CPT | Performed by: UROLOGY

## 2021-03-29 PROCEDURE — 84153 ASSAY OF PSA TOTAL: CPT | Performed by: UROLOGY

## 2021-04-06 DIAGNOSIS — M05.79 RHEUMATOID ARTHRITIS INVOLVING MULTIPLE SITES WITH POSITIVE RHEUMATOID FACTOR: Primary | ICD-10-CM

## 2021-04-06 DIAGNOSIS — R52 PAIN: ICD-10-CM

## 2021-04-06 RX ORDER — TRAMADOL HYDROCHLORIDE 50 MG/1
TABLET ORAL
Qty: 120 TABLET | Refills: 5 | Status: SHIPPED | OUTPATIENT
Start: 2021-04-06 | End: 2021-09-28

## 2021-04-08 ENCOUNTER — IMMUNIZATION (OUTPATIENT)
Dept: PRIMARY CARE CLINIC | Facility: CLINIC | Age: 76
End: 2021-04-08
Payer: MEDICARE

## 2021-04-08 DIAGNOSIS — R63.4 ABNORMAL LOSS OF WEIGHT: Primary | ICD-10-CM

## 2021-04-08 DIAGNOSIS — Z23 NEED FOR VACCINATION: Primary | ICD-10-CM

## 2021-04-08 PROCEDURE — 0002A COVID-19, MRNA, LNP-S, PF, 30 MCG/0.3 ML DOSE VACCINE: CPT | Mod: CV19,S$GLB,, | Performed by: FAMILY MEDICINE

## 2021-04-08 PROCEDURE — 91300 COVID-19, MRNA, LNP-S, PF, 30 MCG/0.3 ML DOSE VACCINE: ICD-10-PCS | Mod: S$GLB,,, | Performed by: FAMILY MEDICINE

## 2021-04-08 PROCEDURE — 91300 COVID-19, MRNA, LNP-S, PF, 30 MCG/0.3 ML DOSE VACCINE: CPT | Mod: S$GLB,,, | Performed by: FAMILY MEDICINE

## 2021-04-08 PROCEDURE — 0002A COVID-19, MRNA, LNP-S, PF, 30 MCG/0.3 ML DOSE VACCINE: ICD-10-PCS | Mod: CV19,S$GLB,, | Performed by: FAMILY MEDICINE

## 2021-04-08 RX ORDER — CYPROHEPTADINE HYDROCHLORIDE 4 MG/1
4 TABLET ORAL 3 TIMES DAILY PRN
Qty: 45 TABLET | Refills: 2 | Status: SHIPPED | OUTPATIENT
Start: 2021-04-08 | End: 2021-09-28

## 2021-04-12 ENCOUNTER — OFFICE VISIT (OUTPATIENT)
Dept: FAMILY MEDICINE | Facility: CLINIC | Age: 76
End: 2021-04-12
Payer: MEDICARE

## 2021-04-12 ENCOUNTER — HOSPITAL ENCOUNTER (OUTPATIENT)
Dept: RADIOLOGY | Facility: CLINIC | Age: 76
Discharge: HOME | End: 2021-04-12
Attending: FAMILY MEDICINE
Payer: MEDICARE

## 2021-04-12 ENCOUNTER — PATIENT OUTREACH (OUTPATIENT)
Dept: ADMINISTRATIVE | Facility: OTHER | Age: 76
End: 2021-04-12

## 2021-04-12 VITALS
RESPIRATION RATE: 18 BRPM | BODY MASS INDEX: 20.38 KG/M2 | SYSTOLIC BLOOD PRESSURE: 128 MMHG | HEIGHT: 68 IN | TEMPERATURE: 98 F | OXYGEN SATURATION: 97 % | DIASTOLIC BLOOD PRESSURE: 60 MMHG | WEIGHT: 134.5 LBS | HEART RATE: 90 BPM

## 2021-04-12 DIAGNOSIS — R35.1 NOCTURIA: ICD-10-CM

## 2021-04-12 DIAGNOSIS — R13.19 ESOPHAGEAL DYSPHAGIA: ICD-10-CM

## 2021-04-12 DIAGNOSIS — E43 SEVERE MALNUTRITION: ICD-10-CM

## 2021-04-12 DIAGNOSIS — K21.9 GASTROESOPHAGEAL REFLUX DISEASE, UNSPECIFIED WHETHER ESOPHAGITIS PRESENT: ICD-10-CM

## 2021-04-12 DIAGNOSIS — Z98.890 S/P PARATHYROIDECTOMY: Primary | ICD-10-CM

## 2021-04-12 DIAGNOSIS — R20.2 INTERMITTENT TINGLING SENSATION OF RIGHT HAND AND FOOT: ICD-10-CM

## 2021-04-12 DIAGNOSIS — Z90.89 S/P PARATHYROIDECTOMY: Primary | ICD-10-CM

## 2021-04-12 DIAGNOSIS — R63.0 DECREASED APPETITE: ICD-10-CM

## 2021-04-12 PROCEDURE — 99999 PR PBB SHADOW E&M-EST. PATIENT-LVL V: ICD-10-PCS | Mod: PBBFAC,,, | Performed by: FAMILY MEDICINE

## 2021-04-12 PROCEDURE — 3008F BODY MASS INDEX DOCD: CPT | Mod: CPTII,S$GLB,, | Performed by: FAMILY MEDICINE

## 2021-04-12 PROCEDURE — 1159F PR MEDICATION LIST DOCUMENTED IN MEDICAL RECORD: ICD-10-PCS | Mod: S$GLB,,, | Performed by: FAMILY MEDICINE

## 2021-04-12 PROCEDURE — 99214 PR OFFICE/OUTPT VISIT, EST, LEVL IV, 30-39 MIN: ICD-10-PCS | Mod: S$GLB,,, | Performed by: FAMILY MEDICINE

## 2021-04-12 PROCEDURE — 3288F PR FALLS RISK ASSESSMENT DOCUMENTED: ICD-10-PCS | Mod: CPTII,S$GLB,, | Performed by: FAMILY MEDICINE

## 2021-04-12 PROCEDURE — 1100F PTFALLS ASSESS-DOCD GE2>/YR: CPT | Mod: CPTII,S$GLB,, | Performed by: FAMILY MEDICINE

## 2021-04-12 PROCEDURE — 99999 PR PBB SHADOW E&M-EST. PATIENT-LVL V: CPT | Mod: PBBFAC,,, | Performed by: FAMILY MEDICINE

## 2021-04-12 PROCEDURE — 99214 OFFICE O/P EST MOD 30 MIN: CPT | Mod: S$GLB,,, | Performed by: FAMILY MEDICINE

## 2021-04-12 PROCEDURE — 1100F PR PT FALLS ASSESS DOC 2+ FALLS/FALL W/INJURY/YR: ICD-10-PCS | Mod: CPTII,S$GLB,, | Performed by: FAMILY MEDICINE

## 2021-04-12 PROCEDURE — 73080 X-RAY EXAM OF ELBOW: CPT | Mod: 26,RT,S$GLB, | Performed by: RADIOLOGY

## 2021-04-12 PROCEDURE — 1126F PR PAIN SEVERITY QUANTIFIED, NO PAIN PRESENT: ICD-10-PCS | Mod: S$GLB,,, | Performed by: FAMILY MEDICINE

## 2021-04-12 PROCEDURE — 3288F FALL RISK ASSESSMENT DOCD: CPT | Mod: CPTII,S$GLB,, | Performed by: FAMILY MEDICINE

## 2021-04-12 PROCEDURE — 3008F PR BODY MASS INDEX (BMI) DOCUMENTED: ICD-10-PCS | Mod: CPTII,S$GLB,, | Performed by: FAMILY MEDICINE

## 2021-04-12 PROCEDURE — 73080 XR ELBOW COMPLETE 3 VIEW RIGHT: ICD-10-PCS | Mod: 26,RT,S$GLB, | Performed by: RADIOLOGY

## 2021-04-12 PROCEDURE — 73080 X-RAY EXAM OF ELBOW: CPT | Mod: TC,FY,PO,RT

## 2021-04-12 PROCEDURE — 1159F MED LIST DOCD IN RCRD: CPT | Mod: S$GLB,,, | Performed by: FAMILY MEDICINE

## 2021-04-12 PROCEDURE — 1126F AMNT PAIN NOTED NONE PRSNT: CPT | Mod: S$GLB,,, | Performed by: FAMILY MEDICINE

## 2021-04-12 RX ORDER — OMEPRAZOLE 20 MG/1
20 CAPSULE, DELAYED RELEASE ORAL DAILY
Qty: 30 CAPSULE | Refills: 11 | Status: SHIPPED | OUTPATIENT
Start: 2021-04-12 | End: 2021-04-28 | Stop reason: DRUGHIGH

## 2021-04-13 ENCOUNTER — TELEPHONE (OUTPATIENT)
Dept: PHYSICAL MEDICINE AND REHAB | Facility: CLINIC | Age: 76
End: 2021-04-13

## 2021-04-13 ENCOUNTER — OFFICE VISIT (OUTPATIENT)
Dept: UROLOGY | Facility: CLINIC | Age: 76
End: 2021-04-13
Payer: MEDICARE

## 2021-04-13 VITALS
BODY MASS INDEX: 20.47 KG/M2 | HEIGHT: 68 IN | SYSTOLIC BLOOD PRESSURE: 144 MMHG | DIASTOLIC BLOOD PRESSURE: 77 MMHG | WEIGHT: 135.06 LBS | HEART RATE: 88 BPM

## 2021-04-13 DIAGNOSIS — C61 PROSTATE CANCER: Primary | ICD-10-CM

## 2021-04-13 DIAGNOSIS — N40.1 BPH WITH OBSTRUCTION/LOWER URINARY TRACT SYMPTOMS: ICD-10-CM

## 2021-04-13 DIAGNOSIS — N13.8 BPH WITH OBSTRUCTION/LOWER URINARY TRACT SYMPTOMS: ICD-10-CM

## 2021-04-13 LAB
BILIRUB SERPL-MCNC: NORMAL MG/DL
BLOOD URINE, POC: NORMAL
CLARITY, POC UA: CLEAR
COLOR, POC UA: YELLOW
GLUCOSE UR QL STRIP: NORMAL
KETONES UR QL STRIP: NORMAL
LEUKOCYTE ESTERASE URINE, POC: NORMAL
NITRITE, POC UA: NORMAL
PH, POC UA: 6
POC RESIDUAL URINE VOLUME: 0 ML (ref 0–100)
PROTEIN, POC: NORMAL
SPECIFIC GRAVITY, POC UA: 1.02
UROBILINOGEN, POC UA: 0.2

## 2021-04-13 PROCEDURE — 99999 PR PBB SHADOW E&M-EST. PATIENT-LVL V: CPT | Mod: PBBFAC,,, | Performed by: UROLOGY

## 2021-04-13 PROCEDURE — 99499 UNLISTED E&M SERVICE: CPT | Mod: S$GLB,,, | Performed by: UROLOGY

## 2021-04-13 PROCEDURE — 1101F PT FALLS ASSESS-DOCD LE1/YR: CPT | Mod: CPTII,S$GLB,, | Performed by: UROLOGY

## 2021-04-13 PROCEDURE — 1101F PR PT FALLS ASSESS DOC 0-1 FALLS W/OUT INJ PAST YR: ICD-10-PCS | Mod: CPTII,S$GLB,, | Performed by: UROLOGY

## 2021-04-13 PROCEDURE — 1159F PR MEDICATION LIST DOCUMENTED IN MEDICAL RECORD: ICD-10-PCS | Mod: S$GLB,,, | Performed by: UROLOGY

## 2021-04-13 PROCEDURE — 51798 US URINE CAPACITY MEASURE: CPT | Mod: S$GLB,,, | Performed by: UROLOGY

## 2021-04-13 PROCEDURE — 99214 PR OFFICE/OUTPT VISIT, EST, LEVL IV, 30-39 MIN: ICD-10-PCS | Mod: 25,S$GLB,, | Performed by: UROLOGY

## 2021-04-13 PROCEDURE — 1126F AMNT PAIN NOTED NONE PRSNT: CPT | Mod: S$GLB,,, | Performed by: UROLOGY

## 2021-04-13 PROCEDURE — 81002 URINALYSIS NONAUTO W/O SCOPE: CPT | Mod: S$GLB,,, | Performed by: UROLOGY

## 2021-04-13 PROCEDURE — 3288F PR FALLS RISK ASSESSMENT DOCUMENTED: ICD-10-PCS | Mod: CPTII,S$GLB,, | Performed by: UROLOGY

## 2021-04-13 PROCEDURE — 3288F FALL RISK ASSESSMENT DOCD: CPT | Mod: CPTII,S$GLB,, | Performed by: UROLOGY

## 2021-04-13 PROCEDURE — 51798 POCT BLADDER SCAN: ICD-10-PCS | Mod: S$GLB,,, | Performed by: UROLOGY

## 2021-04-13 PROCEDURE — 99214 OFFICE O/P EST MOD 30 MIN: CPT | Mod: 25,S$GLB,, | Performed by: UROLOGY

## 2021-04-13 PROCEDURE — 99999 PR PBB SHADOW E&M-EST. PATIENT-LVL V: ICD-10-PCS | Mod: PBBFAC,,, | Performed by: UROLOGY

## 2021-04-13 PROCEDURE — 81002 POCT URINE DIPSTICK WITHOUT MICROSCOPE: ICD-10-PCS | Mod: S$GLB,,, | Performed by: UROLOGY

## 2021-04-13 PROCEDURE — 99499 RISK ADDL DX/OHS AUDIT: ICD-10-PCS | Mod: S$GLB,,, | Performed by: UROLOGY

## 2021-04-13 PROCEDURE — 1159F MED LIST DOCD IN RCRD: CPT | Mod: S$GLB,,, | Performed by: UROLOGY

## 2021-04-13 PROCEDURE — 3008F BODY MASS INDEX DOCD: CPT | Mod: CPTII,S$GLB,, | Performed by: UROLOGY

## 2021-04-13 PROCEDURE — 1126F PR PAIN SEVERITY QUANTIFIED, NO PAIN PRESENT: ICD-10-PCS | Mod: S$GLB,,, | Performed by: UROLOGY

## 2021-04-13 PROCEDURE — 3008F PR BODY MASS INDEX (BMI) DOCUMENTED: ICD-10-PCS | Mod: CPTII,S$GLB,, | Performed by: UROLOGY

## 2021-04-13 RX ORDER — LIDOCAINE HYDROCHLORIDE 20 MG/ML
JELLY TOPICAL ONCE
Status: CANCELLED | OUTPATIENT
Start: 2021-04-13 | End: 2021-04-13

## 2021-04-26 ENCOUNTER — OFFICE VISIT (OUTPATIENT)
Dept: PHYSICAL MEDICINE AND REHAB | Facility: CLINIC | Age: 76
End: 2021-04-26
Payer: MEDICARE

## 2021-04-26 VITALS — HEIGHT: 68 IN | WEIGHT: 135 LBS | RESPIRATION RATE: 24 BRPM | BODY MASS INDEX: 20.46 KG/M2

## 2021-04-26 DIAGNOSIS — G56.03 BILATERAL CARPAL TUNNEL SYNDROME: Primary | ICD-10-CM

## 2021-04-26 DIAGNOSIS — R20.2 INTERMITTENT TINGLING SENSATION OF RIGHT HAND AND FOOT: ICD-10-CM

## 2021-04-26 PROCEDURE — 3008F BODY MASS INDEX DOCD: CPT | Mod: CPTII,S$GLB,, | Performed by: PHYSICAL MEDICINE & REHABILITATION

## 2021-04-26 PROCEDURE — 1159F PR MEDICATION LIST DOCUMENTED IN MEDICAL RECORD: ICD-10-PCS | Mod: S$GLB,,, | Performed by: PHYSICAL MEDICINE & REHABILITATION

## 2021-04-26 PROCEDURE — 99999 PR PBB SHADOW E&M-EST. PATIENT-LVL III: ICD-10-PCS | Mod: PBBFAC,,, | Performed by: PHYSICAL MEDICINE & REHABILITATION

## 2021-04-26 PROCEDURE — 99999 PR PBB SHADOW E&M-EST. PATIENT-LVL III: CPT | Mod: PBBFAC,,, | Performed by: PHYSICAL MEDICINE & REHABILITATION

## 2021-04-26 PROCEDURE — 3288F PR FALLS RISK ASSESSMENT DOCUMENTED: ICD-10-PCS | Mod: CPTII,S$GLB,, | Performed by: PHYSICAL MEDICINE & REHABILITATION

## 2021-04-26 PROCEDURE — 1159F MED LIST DOCD IN RCRD: CPT | Mod: S$GLB,,, | Performed by: PHYSICAL MEDICINE & REHABILITATION

## 2021-04-26 PROCEDURE — 3008F PR BODY MASS INDEX (BMI) DOCUMENTED: ICD-10-PCS | Mod: CPTII,S$GLB,, | Performed by: PHYSICAL MEDICINE & REHABILITATION

## 2021-04-26 PROCEDURE — 99203 OFFICE O/P NEW LOW 30 MIN: CPT | Mod: S$GLB,,, | Performed by: PHYSICAL MEDICINE & REHABILITATION

## 2021-04-26 PROCEDURE — 3288F FALL RISK ASSESSMENT DOCD: CPT | Mod: CPTII,S$GLB,, | Performed by: PHYSICAL MEDICINE & REHABILITATION

## 2021-04-26 PROCEDURE — 1125F PR PAIN SEVERITY QUANTIFIED, PAIN PRESENT: ICD-10-PCS | Mod: S$GLB,,, | Performed by: PHYSICAL MEDICINE & REHABILITATION

## 2021-04-26 PROCEDURE — 99203 PR OFFICE/OUTPT VISIT, NEW, LEVL III, 30-44 MIN: ICD-10-PCS | Mod: S$GLB,,, | Performed by: PHYSICAL MEDICINE & REHABILITATION

## 2021-04-26 PROCEDURE — 1125F AMNT PAIN NOTED PAIN PRSNT: CPT | Mod: S$GLB,,, | Performed by: PHYSICAL MEDICINE & REHABILITATION

## 2021-04-26 PROCEDURE — 1101F PT FALLS ASSESS-DOCD LE1/YR: CPT | Mod: CPTII,S$GLB,, | Performed by: PHYSICAL MEDICINE & REHABILITATION

## 2021-04-26 PROCEDURE — 1101F PR PT FALLS ASSESS DOC 0-1 FALLS W/OUT INJ PAST YR: ICD-10-PCS | Mod: CPTII,S$GLB,, | Performed by: PHYSICAL MEDICINE & REHABILITATION

## 2021-04-28 ENCOUNTER — OFFICE VISIT (OUTPATIENT)
Dept: GASTROENTEROLOGY | Facility: CLINIC | Age: 76
End: 2021-04-28
Payer: MEDICARE

## 2021-04-28 VITALS — WEIGHT: 139.56 LBS | HEIGHT: 68 IN | RESPIRATION RATE: 19 BRPM | BODY MASS INDEX: 21.15 KG/M2

## 2021-04-28 DIAGNOSIS — Z01.818 PREOP TESTING: ICD-10-CM

## 2021-04-28 DIAGNOSIS — Z12.11 SCREENING FOR COLON CANCER: ICD-10-CM

## 2021-04-28 DIAGNOSIS — D64.9 NORMOCYTIC ANEMIA: ICD-10-CM

## 2021-04-28 DIAGNOSIS — R10.84 GENERALIZED ABDOMINAL PAIN: Primary | ICD-10-CM

## 2021-04-28 DIAGNOSIS — R13.14 PHARYNGOESOPHAGEAL DYSPHAGIA: ICD-10-CM

## 2021-04-28 DIAGNOSIS — R63.4 WEIGHT LOSS: ICD-10-CM

## 2021-04-28 DIAGNOSIS — R12 HEARTBURN: ICD-10-CM

## 2021-04-28 DIAGNOSIS — Z79.01 ANTICOAGULANT LONG-TERM USE: ICD-10-CM

## 2021-04-28 DIAGNOSIS — K21.9 GASTROESOPHAGEAL REFLUX DISEASE WITHOUT ESOPHAGITIS: Primary | ICD-10-CM

## 2021-04-28 PROCEDURE — 3008F PR BODY MASS INDEX (BMI) DOCUMENTED: ICD-10-PCS | Mod: CPTII,S$GLB,, | Performed by: NURSE PRACTITIONER

## 2021-04-28 PROCEDURE — 3288F PR FALLS RISK ASSESSMENT DOCUMENTED: ICD-10-PCS | Mod: CPTII,S$GLB,, | Performed by: NURSE PRACTITIONER

## 2021-04-28 PROCEDURE — 99499 UNLISTED E&M SERVICE: CPT | Mod: S$GLB,,, | Performed by: NURSE PRACTITIONER

## 2021-04-28 PROCEDURE — 1126F AMNT PAIN NOTED NONE PRSNT: CPT | Mod: S$GLB,,, | Performed by: NURSE PRACTITIONER

## 2021-04-28 PROCEDURE — 1100F PTFALLS ASSESS-DOCD GE2>/YR: CPT | Mod: CPTII,S$GLB,, | Performed by: NURSE PRACTITIONER

## 2021-04-28 PROCEDURE — 3008F BODY MASS INDEX DOCD: CPT | Mod: CPTII,S$GLB,, | Performed by: NURSE PRACTITIONER

## 2021-04-28 PROCEDURE — 1159F PR MEDICATION LIST DOCUMENTED IN MEDICAL RECORD: ICD-10-PCS | Mod: S$GLB,,, | Performed by: NURSE PRACTITIONER

## 2021-04-28 PROCEDURE — 99999 PR PBB SHADOW E&M-EST. PATIENT-LVL V: CPT | Mod: PBBFAC,,, | Performed by: NURSE PRACTITIONER

## 2021-04-28 PROCEDURE — 1100F PR PT FALLS ASSESS DOC 2+ FALLS/FALL W/INJURY/YR: ICD-10-PCS | Mod: CPTII,S$GLB,, | Performed by: NURSE PRACTITIONER

## 2021-04-28 PROCEDURE — 99214 OFFICE O/P EST MOD 30 MIN: CPT | Mod: S$GLB,,, | Performed by: NURSE PRACTITIONER

## 2021-04-28 PROCEDURE — 99499 RISK ADDL DX/OHS AUDIT: ICD-10-PCS | Mod: S$GLB,,, | Performed by: NURSE PRACTITIONER

## 2021-04-28 PROCEDURE — 1159F MED LIST DOCD IN RCRD: CPT | Mod: S$GLB,,, | Performed by: NURSE PRACTITIONER

## 2021-04-28 PROCEDURE — 1126F PR PAIN SEVERITY QUANTIFIED, NO PAIN PRESENT: ICD-10-PCS | Mod: S$GLB,,, | Performed by: NURSE PRACTITIONER

## 2021-04-28 PROCEDURE — 99999 PR PBB SHADOW E&M-EST. PATIENT-LVL V: ICD-10-PCS | Mod: PBBFAC,,, | Performed by: NURSE PRACTITIONER

## 2021-04-28 PROCEDURE — 99214 PR OFFICE/OUTPT VISIT, EST, LEVL IV, 30-39 MIN: ICD-10-PCS | Mod: S$GLB,,, | Performed by: NURSE PRACTITIONER

## 2021-04-28 PROCEDURE — 3288F FALL RISK ASSESSMENT DOCD: CPT | Mod: CPTII,S$GLB,, | Performed by: NURSE PRACTITIONER

## 2021-04-28 RX ORDER — TRAZODONE HYDROCHLORIDE 50 MG/1
25-50 TABLET ORAL NIGHTLY PRN
COMMUNITY
Start: 2021-04-20 | End: 2021-09-28

## 2021-04-28 RX ORDER — OMEPRAZOLE 40 MG/1
40 CAPSULE, DELAYED RELEASE ORAL
Qty: 30 CAPSULE | Refills: 1 | Status: SHIPPED | OUTPATIENT
Start: 2021-04-28 | End: 2021-09-28

## 2021-05-06 ENCOUNTER — TELEPHONE (OUTPATIENT)
Dept: GASTROENTEROLOGY | Facility: CLINIC | Age: 76
End: 2021-05-06

## 2021-05-10 ENCOUNTER — TELEPHONE (OUTPATIENT)
Dept: FAMILY MEDICINE | Facility: CLINIC | Age: 76
End: 2021-05-10

## 2021-05-11 ENCOUNTER — OFFICE VISIT (OUTPATIENT)
Dept: FAMILY MEDICINE | Facility: CLINIC | Age: 76
End: 2021-05-11
Payer: MEDICARE

## 2021-05-11 VITALS
SYSTOLIC BLOOD PRESSURE: 146 MMHG | OXYGEN SATURATION: 97 % | HEART RATE: 86 BPM | BODY MASS INDEX: 21.42 KG/M2 | WEIGHT: 141.31 LBS | RESPIRATION RATE: 12 BRPM | DIASTOLIC BLOOD PRESSURE: 60 MMHG | HEIGHT: 68 IN | TEMPERATURE: 98 F

## 2021-05-11 DIAGNOSIS — E53.8 VITAMIN B12 DEFICIENCY DUE TO INTESTINAL MALABSORPTION: ICD-10-CM

## 2021-05-11 DIAGNOSIS — K90.9 VITAMIN B12 DEFICIENCY DUE TO INTESTINAL MALABSORPTION: ICD-10-CM

## 2021-05-11 DIAGNOSIS — M75.01 ADHESIVE CAPSULITIS OF RIGHT SHOULDER: ICD-10-CM

## 2021-05-11 DIAGNOSIS — G56.02 CARPAL TUNNEL SYNDROME OF LEFT WRIST: ICD-10-CM

## 2021-05-11 DIAGNOSIS — D50.1 IRON DEFICIENCY ANEMIA DUE TO SIDEROPENIC DYSPHAGIA: Primary | ICD-10-CM

## 2021-05-11 PROCEDURE — 99999 PR PBB SHADOW E&M-EST. PATIENT-LVL V: ICD-10-PCS | Mod: PBBFAC,,, | Performed by: FAMILY MEDICINE

## 2021-05-11 PROCEDURE — 3288F PR FALLS RISK ASSESSMENT DOCUMENTED: ICD-10-PCS | Mod: CPTII,S$GLB,, | Performed by: FAMILY MEDICINE

## 2021-05-11 PROCEDURE — 1159F PR MEDICATION LIST DOCUMENTED IN MEDICAL RECORD: ICD-10-PCS | Mod: S$GLB,,, | Performed by: FAMILY MEDICINE

## 2021-05-11 PROCEDURE — 1159F MED LIST DOCD IN RCRD: CPT | Mod: S$GLB,,, | Performed by: FAMILY MEDICINE

## 2021-05-11 PROCEDURE — 1101F PT FALLS ASSESS-DOCD LE1/YR: CPT | Mod: CPTII,S$GLB,, | Performed by: FAMILY MEDICINE

## 2021-05-11 PROCEDURE — 3288F FALL RISK ASSESSMENT DOCD: CPT | Mod: CPTII,S$GLB,, | Performed by: FAMILY MEDICINE

## 2021-05-11 PROCEDURE — 1125F AMNT PAIN NOTED PAIN PRSNT: CPT | Mod: S$GLB,,, | Performed by: FAMILY MEDICINE

## 2021-05-11 PROCEDURE — 99214 PR OFFICE/OUTPT VISIT, EST, LEVL IV, 30-39 MIN: ICD-10-PCS | Mod: S$GLB,,, | Performed by: FAMILY MEDICINE

## 2021-05-11 PROCEDURE — 99214 OFFICE O/P EST MOD 30 MIN: CPT | Mod: S$GLB,,, | Performed by: FAMILY MEDICINE

## 2021-05-11 PROCEDURE — 99999 PR PBB SHADOW E&M-EST. PATIENT-LVL V: CPT | Mod: PBBFAC,,, | Performed by: FAMILY MEDICINE

## 2021-05-11 PROCEDURE — 3008F BODY MASS INDEX DOCD: CPT | Mod: CPTII,S$GLB,, | Performed by: FAMILY MEDICINE

## 2021-05-11 PROCEDURE — 1101F PR PT FALLS ASSESS DOC 0-1 FALLS W/OUT INJ PAST YR: ICD-10-PCS | Mod: CPTII,S$GLB,, | Performed by: FAMILY MEDICINE

## 2021-05-11 PROCEDURE — 3008F PR BODY MASS INDEX (BMI) DOCUMENTED: ICD-10-PCS | Mod: CPTII,S$GLB,, | Performed by: FAMILY MEDICINE

## 2021-05-11 PROCEDURE — 1125F PR PAIN SEVERITY QUANTIFIED, PAIN PRESENT: ICD-10-PCS | Mod: S$GLB,,, | Performed by: FAMILY MEDICINE

## 2021-05-11 RX ORDER — NORTRIPTYLINE HYDROCHLORIDE 25 MG/1
25 CAPSULE ORAL NIGHTLY
Qty: 30 CAPSULE | Refills: 11 | Status: SHIPPED | OUTPATIENT
Start: 2021-05-11 | End: 2021-07-01

## 2021-05-11 RX ORDER — FERROUS SULFATE 325(65) MG
325 TABLET ORAL
Qty: 30 TABLET | Refills: 11 | Status: SHIPPED | OUTPATIENT
Start: 2021-05-11 | End: 2021-09-28

## 2021-05-13 ENCOUNTER — PATIENT OUTREACH (OUTPATIENT)
Dept: ADMINISTRATIVE | Facility: OTHER | Age: 76
End: 2021-05-13

## 2021-05-13 ENCOUNTER — OFFICE VISIT (OUTPATIENT)
Dept: ORTHOPEDICS | Facility: CLINIC | Age: 76
End: 2021-05-13
Payer: MEDICARE

## 2021-05-13 ENCOUNTER — TELEPHONE (OUTPATIENT)
Dept: FAMILY MEDICINE | Facility: CLINIC | Age: 76
End: 2021-05-13

## 2021-05-13 ENCOUNTER — TELEPHONE (OUTPATIENT)
Dept: PREADMISSION TESTING | Facility: HOSPITAL | Age: 76
End: 2021-05-13

## 2021-05-13 ENCOUNTER — OFFICE VISIT (OUTPATIENT)
Dept: PHYSICAL MEDICINE AND REHAB | Facility: CLINIC | Age: 76
End: 2021-05-13
Payer: MEDICARE

## 2021-05-13 VITALS — BODY MASS INDEX: 20.46 KG/M2 | RESPIRATION RATE: 16 BRPM | HEIGHT: 68 IN | WEIGHT: 135 LBS

## 2021-05-13 DIAGNOSIS — G56.01 CARPAL TUNNEL SYNDROME OF RIGHT WRIST: ICD-10-CM

## 2021-05-13 DIAGNOSIS — G56.03 BILATERAL CARPAL TUNNEL SYNDROME: ICD-10-CM

## 2021-05-13 DIAGNOSIS — Z01.818 PRE-OP TESTING: ICD-10-CM

## 2021-05-13 DIAGNOSIS — G56.21 CUBITAL TUNNEL SYNDROME ON RIGHT: Primary | ICD-10-CM

## 2021-05-13 DIAGNOSIS — G56.01 RIGHT CARPAL TUNNEL SYNDROME: ICD-10-CM

## 2021-05-13 DIAGNOSIS — G56.21 CUBITAL TUNNEL SYNDROME ON RIGHT: ICD-10-CM

## 2021-05-13 DIAGNOSIS — Z01.818 PREOP TESTING: ICD-10-CM

## 2021-05-13 DIAGNOSIS — G56.01 RIGHT CARPAL TUNNEL SYNDROME: Primary | ICD-10-CM

## 2021-05-13 DIAGNOSIS — G56.00 CARPAL TUNNEL SYNDROME: ICD-10-CM

## 2021-05-13 DIAGNOSIS — G56.01 CARPAL TUNNEL SYNDROME OF RIGHT WRIST: Primary | ICD-10-CM

## 2021-05-13 PROCEDURE — 99999 PR PBB SHADOW E&M-EST. PATIENT-LVL I: ICD-10-PCS | Mod: PBBFAC,,, | Performed by: PHYSICAL MEDICINE & REHABILITATION

## 2021-05-13 PROCEDURE — 95909 NRV CNDJ TST 5-6 STUDIES: CPT | Mod: S$GLB,,, | Performed by: PHYSICAL MEDICINE & REHABILITATION

## 2021-05-13 PROCEDURE — 1159F PR MEDICATION LIST DOCUMENTED IN MEDICAL RECORD: ICD-10-PCS | Mod: S$GLB,,, | Performed by: ORTHOPAEDIC SURGERY

## 2021-05-13 PROCEDURE — 99999 PR PBB SHADOW E&M-EST. PATIENT-LVL IV: ICD-10-PCS | Mod: PBBFAC,,, | Performed by: ORTHOPAEDIC SURGERY

## 2021-05-13 PROCEDURE — 95909 PR NERVE CONDUCTION STUDY; 5-6 STUDIES: ICD-10-PCS | Mod: S$GLB,,, | Performed by: PHYSICAL MEDICINE & REHABILITATION

## 2021-05-13 PROCEDURE — 99204 OFFICE O/P NEW MOD 45 MIN: CPT | Mod: S$GLB,,, | Performed by: ORTHOPAEDIC SURGERY

## 2021-05-13 PROCEDURE — 1101F PT FALLS ASSESS-DOCD LE1/YR: CPT | Mod: CPTII,S$GLB,, | Performed by: ORTHOPAEDIC SURGERY

## 2021-05-13 PROCEDURE — 99999 PR PBB SHADOW E&M-EST. PATIENT-LVL IV: CPT | Mod: PBBFAC,,, | Performed by: ORTHOPAEDIC SURGERY

## 2021-05-13 PROCEDURE — 1101F PR PT FALLS ASSESS DOC 0-1 FALLS W/OUT INJ PAST YR: ICD-10-PCS | Mod: CPTII,S$GLB,, | Performed by: ORTHOPAEDIC SURGERY

## 2021-05-13 PROCEDURE — 99499 NO LOS: ICD-10-PCS | Mod: S$GLB,,, | Performed by: PHYSICAL MEDICINE & REHABILITATION

## 2021-05-13 PROCEDURE — 99999 PR PBB SHADOW E&M-EST. PATIENT-LVL I: CPT | Mod: PBBFAC,,, | Performed by: PHYSICAL MEDICINE & REHABILITATION

## 2021-05-13 PROCEDURE — 1125F AMNT PAIN NOTED PAIN PRSNT: CPT | Mod: S$GLB,,, | Performed by: ORTHOPAEDIC SURGERY

## 2021-05-13 PROCEDURE — 3008F PR BODY MASS INDEX (BMI) DOCUMENTED: ICD-10-PCS | Mod: CPTII,S$GLB,, | Performed by: ORTHOPAEDIC SURGERY

## 2021-05-13 PROCEDURE — 1159F MED LIST DOCD IN RCRD: CPT | Mod: S$GLB,,, | Performed by: ORTHOPAEDIC SURGERY

## 2021-05-13 PROCEDURE — 3288F PR FALLS RISK ASSESSMENT DOCUMENTED: ICD-10-PCS | Mod: CPTII,S$GLB,, | Performed by: ORTHOPAEDIC SURGERY

## 2021-05-13 PROCEDURE — 99204 PR OFFICE/OUTPT VISIT, NEW, LEVL IV, 45-59 MIN: ICD-10-PCS | Mod: S$GLB,,, | Performed by: ORTHOPAEDIC SURGERY

## 2021-05-13 PROCEDURE — 99499 UNLISTED E&M SERVICE: CPT | Mod: S$GLB,,, | Performed by: PHYSICAL MEDICINE & REHABILITATION

## 2021-05-13 PROCEDURE — 3288F FALL RISK ASSESSMENT DOCD: CPT | Mod: CPTII,S$GLB,, | Performed by: ORTHOPAEDIC SURGERY

## 2021-05-13 PROCEDURE — 3008F BODY MASS INDEX DOCD: CPT | Mod: CPTII,S$GLB,, | Performed by: ORTHOPAEDIC SURGERY

## 2021-05-13 PROCEDURE — 1125F PR PAIN SEVERITY QUANTIFIED, PAIN PRESENT: ICD-10-PCS | Mod: S$GLB,,, | Performed by: ORTHOPAEDIC SURGERY

## 2021-05-13 RX ORDER — MUPIROCIN 20 MG/G
OINTMENT TOPICAL
Status: CANCELLED | OUTPATIENT
Start: 2021-05-13

## 2021-05-13 RX ORDER — CEFAZOLIN SODIUM 2 G/50ML
2 SOLUTION INTRAVENOUS
Status: CANCELLED | OUTPATIENT
Start: 2021-05-13

## 2021-05-13 RX ORDER — GABAPENTIN 300 MG/1
300 CAPSULE ORAL 3 TIMES DAILY
Qty: 90 CAPSULE | Refills: 11 | Status: SHIPPED | OUTPATIENT
Start: 2021-05-13 | End: 2021-12-03

## 2021-05-14 ENCOUNTER — TELEPHONE (OUTPATIENT)
Dept: UROLOGY | Facility: CLINIC | Age: 76
End: 2021-05-14

## 2021-05-14 DIAGNOSIS — C61 PROSTATE CANCER: Primary | ICD-10-CM

## 2021-05-14 NOTE — TELEPHONE ENCOUNTER
Confirmed with pt that he would like to cancel his procedure and was informed per Dr Silvana taylor f/u in 6 mths with psa prior to voiced ok and understanding. Pt scheduled appt reminders mailed.

## 2021-05-14 NOTE — TELEPHONE ENCOUNTER
Received secure chat from dr will that patient wanted to cancel upcoming cystoscopy.  Canceled with [facility]  Please contact pt to confirm, cancel covid test, and instead set up routine reeval with NP in 6 mos with psa diagnostic prior

## 2021-05-18 ENCOUNTER — HOSPITAL ENCOUNTER (OUTPATIENT)
Dept: RADIOLOGY | Facility: HOSPITAL | Age: 76
Discharge: HOME | End: 2021-05-18
Attending: NURSE PRACTITIONER
Payer: MEDICARE

## 2021-05-18 ENCOUNTER — TELEPHONE (OUTPATIENT)
Dept: GASTROENTEROLOGY | Facility: CLINIC | Age: 76
End: 2021-05-18

## 2021-05-18 DIAGNOSIS — R13.14 PHARYNGOESOPHAGEAL DYSPHAGIA: ICD-10-CM

## 2021-05-18 DIAGNOSIS — K22.4 ESOPHAGEAL DYSMOTILITY: ICD-10-CM

## 2021-05-18 DIAGNOSIS — K21.9 GASTROESOPHAGEAL REFLUX DISEASE WITHOUT ESOPHAGITIS: ICD-10-CM

## 2021-05-18 DIAGNOSIS — T17.908A ASPIRATION INTO AIRWAY, INITIAL ENCOUNTER: ICD-10-CM

## 2021-05-18 DIAGNOSIS — R93.3 ABNORMAL ESOPHAGRAM: Primary | ICD-10-CM

## 2021-05-18 PROCEDURE — 74246 FL UPPER GI AIR CONTRAST WITH ESOPHAGRAM: ICD-10-PCS | Mod: 26,,, | Performed by: RADIOLOGY

## 2021-05-18 PROCEDURE — 25500020 PHARM REV CODE 255: Performed by: NURSE PRACTITIONER

## 2021-05-18 PROCEDURE — A9698 NON-RAD CONTRAST MATERIALNOC: HCPCS | Performed by: NURSE PRACTITIONER

## 2021-05-18 PROCEDURE — 74246 X-RAY XM UPR GI TRC 2CNTRST: CPT | Mod: 26,,, | Performed by: RADIOLOGY

## 2021-05-18 PROCEDURE — 74246 X-RAY XM UPR GI TRC 2CNTRST: CPT | Mod: TC,FY

## 2021-05-18 RX ADMIN — BARIUM SULFATE 60 ML: 980 POWDER, FOR SUSPENSION ORAL at 01:05

## 2021-05-18 RX ADMIN — BARIUM SULFATE 150 ML: 0.6 SUSPENSION ORAL at 01:05

## 2021-05-20 ENCOUNTER — TELEPHONE (OUTPATIENT)
Dept: ORTHOPEDICS | Facility: CLINIC | Age: 76
End: 2021-05-20

## 2021-05-20 DIAGNOSIS — N18.31 STAGE 3A CHRONIC KIDNEY DISEASE: Primary | ICD-10-CM

## 2021-05-20 DIAGNOSIS — I10 ESSENTIAL HYPERTENSION: ICD-10-CM

## 2021-05-25 ENCOUNTER — HOSPITAL ENCOUNTER (OUTPATIENT)
Dept: RADIOLOGY | Facility: HOSPITAL | Age: 76
Discharge: HOME | End: 2021-05-25
Attending: ORTHOPAEDIC SURGERY
Payer: MEDICARE

## 2021-05-25 ENCOUNTER — HOSPITAL ENCOUNTER (OUTPATIENT)
Dept: PREADMISSION TESTING | Facility: HOSPITAL | Age: 76
Discharge: HOME | End: 2021-05-25
Attending: ORTHOPAEDIC SURGERY
Payer: MEDICARE

## 2021-05-25 ENCOUNTER — LAB VISIT (OUTPATIENT)
Dept: PRIMARY CARE CLINIC | Facility: CLINIC | Age: 76
End: 2021-05-25
Payer: MEDICARE

## 2021-05-25 ENCOUNTER — TELEPHONE (OUTPATIENT)
Dept: ORTHOPEDICS | Facility: CLINIC | Age: 76
End: 2021-05-25

## 2021-05-25 VITALS — HEIGHT: 68 IN | WEIGHT: 140 LBS | BODY MASS INDEX: 21.22 KG/M2

## 2021-05-25 DIAGNOSIS — Z01.818 PRE-OP TESTING: ICD-10-CM

## 2021-05-25 DIAGNOSIS — G56.21 CUBITAL TUNNEL SYNDROME ON RIGHT: ICD-10-CM

## 2021-05-25 DIAGNOSIS — G56.01 RIGHT CARPAL TUNNEL SYNDROME: ICD-10-CM

## 2021-05-25 DIAGNOSIS — Z01.818 PREOP TESTING: ICD-10-CM

## 2021-05-25 LAB
ANION GAP SERPL CALC-SCNC: 10 MMOL/L (ref 8–16)
BASOPHILS # BLD AUTO: 0.01 K/UL (ref 0–0.2)
BASOPHILS NFR BLD: 0.2 % (ref 0–1.9)
BUN SERPL-MCNC: 13 MG/DL (ref 8–23)
CALCIUM SERPL-MCNC: 8.8 MG/DL (ref 8.7–10.5)
CHLORIDE SERPL-SCNC: 113 MMOL/L (ref 95–110)
CO2 SERPL-SCNC: 18 MMOL/L (ref 23–29)
CREAT SERPL-MCNC: 1.1 MG/DL (ref 0.5–1.4)
DIFFERENTIAL METHOD: ABNORMAL
EOSINOPHIL # BLD AUTO: 0.3 K/UL (ref 0–0.5)
EOSINOPHIL NFR BLD: 7.2 % (ref 0–8)
ERYTHROCYTE [DISTWIDTH] IN BLOOD BY AUTOMATED COUNT: 16.6 % (ref 11.5–14.5)
EST. GFR  (AFRICAN AMERICAN): >60 ML/MIN/1.73 M^2
EST. GFR  (NON AFRICAN AMERICAN): >60 ML/MIN/1.73 M^2
GLUCOSE SERPL-MCNC: 101 MG/DL (ref 70–110)
HCT VFR BLD AUTO: 38.8 % (ref 40–54)
HGB BLD-MCNC: 11.9 G/DL (ref 14–18)
IMM GRANULOCYTES # BLD AUTO: 0.01 K/UL (ref 0–0.04)
IMM GRANULOCYTES NFR BLD AUTO: 0.2 % (ref 0–0.5)
LYMPHOCYTES # BLD AUTO: 1 K/UL (ref 1–4.8)
LYMPHOCYTES NFR BLD: 21.3 % (ref 18–48)
MCH RBC QN AUTO: 27.7 PG (ref 27–31)
MCHC RBC AUTO-ENTMCNC: 30.7 G/DL (ref 32–36)
MCV RBC AUTO: 90 FL (ref 82–98)
MONOCYTES # BLD AUTO: 0.5 K/UL (ref 0.3–1)
MONOCYTES NFR BLD: 10.4 % (ref 4–15)
NEUTROPHILS # BLD AUTO: 2.8 K/UL (ref 1.8–7.7)
NEUTROPHILS NFR BLD: 60.7 % (ref 38–73)
NRBC BLD-RTO: 0 /100 WBC
PLATELET # BLD AUTO: 439 K/UL (ref 150–450)
PMV BLD AUTO: 10.5 FL (ref 9.2–12.9)
POTASSIUM SERPL-SCNC: 4 MMOL/L (ref 3.5–5.1)
RBC # BLD AUTO: 4.3 M/UL (ref 4.6–6.2)
SODIUM SERPL-SCNC: 141 MMOL/L (ref 136–145)
WBC # BLD AUTO: 4.6 K/UL (ref 3.9–12.7)

## 2021-05-25 PROCEDURE — 36415 COLL VENOUS BLD VENIPUNCTURE: CPT | Performed by: ORTHOPAEDIC SURGERY

## 2021-05-25 PROCEDURE — 71046 X-RAY EXAM CHEST 2 VIEWS: CPT | Mod: 26,,, | Performed by: RADIOLOGY

## 2021-05-25 PROCEDURE — 85025 COMPLETE CBC W/AUTO DIFF WBC: CPT | Performed by: ORTHOPAEDIC SURGERY

## 2021-05-25 PROCEDURE — 93005 ELECTROCARDIOGRAM TRACING: CPT

## 2021-05-25 PROCEDURE — 71046 XR CHEST PA AND LATERAL: ICD-10-PCS | Mod: 26,,, | Performed by: RADIOLOGY

## 2021-05-25 PROCEDURE — U0003 INFECTIOUS AGENT DETECTION BY NUCLEIC ACID (DNA OR RNA); SEVERE ACUTE RESPIRATORY SYNDROME CORONAVIRUS 2 (SARS-COV-2) (CORONAVIRUS DISEASE [COVID-19]), AMPLIFIED PROBE TECHNIQUE, MAKING USE OF HIGH THROUGHPUT TECHNOLOGIES AS DESCRIBED BY CMS-2020-01-R: HCPCS | Performed by: ORTHOPAEDIC SURGERY

## 2021-05-25 PROCEDURE — U0005 INFEC AGEN DETEC AMPLI PROBE: HCPCS | Performed by: ORTHOPAEDIC SURGERY

## 2021-05-25 PROCEDURE — 71046 X-RAY EXAM CHEST 2 VIEWS: CPT | Mod: TC,FY

## 2021-05-25 PROCEDURE — 80048 BASIC METABOLIC PNL TOTAL CA: CPT | Performed by: ORTHOPAEDIC SURGERY

## 2021-05-26 LAB — SARS-COV-2 RNA RESP QL NAA+PROBE: NOT DETECTED

## 2021-05-27 ENCOUNTER — ANESTHESIA EVENT (OUTPATIENT)
Dept: SURGERY | Facility: HOSPITAL | Age: 76
End: 2021-05-27
Payer: MEDICARE

## 2021-05-28 ENCOUNTER — HOSPITAL ENCOUNTER (OUTPATIENT)
Facility: HOSPITAL | Age: 76
Discharge: HOME | End: 2021-05-28
Attending: ORTHOPAEDIC SURGERY | Admitting: ORTHOPAEDIC SURGERY
Payer: MEDICARE

## 2021-05-28 ENCOUNTER — ANESTHESIA (OUTPATIENT)
Dept: SURGERY | Facility: HOSPITAL | Age: 76
End: 2021-05-28
Payer: MEDICARE

## 2021-05-28 DIAGNOSIS — Z01.818 PREOP TESTING: ICD-10-CM

## 2021-05-28 DIAGNOSIS — G56.00 CARPAL TUNNEL SYNDROME: ICD-10-CM

## 2021-05-28 DIAGNOSIS — G56.21 CUBITAL TUNNEL SYNDROME ON RIGHT: ICD-10-CM

## 2021-05-28 DIAGNOSIS — G56.01 RIGHT CARPAL TUNNEL SYNDROME: Primary | ICD-10-CM

## 2021-05-28 PROCEDURE — 99900103 DSU ONLY-NO CHARGE-INITIAL HR (STAT): Performed by: ORTHOPAEDIC SURGERY

## 2021-05-28 PROCEDURE — 37000008 HC ANESTHESIA 1ST 15 MINUTES: Performed by: ORTHOPAEDIC SURGERY

## 2021-05-28 PROCEDURE — 64721 PR REVISE MEDIAN N/CARPAL TUNNEL SURG: ICD-10-PCS | Mod: 51,RT,, | Performed by: ORTHOPAEDIC SURGERY

## 2021-05-28 PROCEDURE — D9220A PRA ANESTHESIA: ICD-10-PCS | Mod: ANES,,, | Performed by: ANESTHESIOLOGY

## 2021-05-28 PROCEDURE — 36000707: Performed by: ORTHOPAEDIC SURGERY

## 2021-05-28 PROCEDURE — 71000033 HC RECOVERY, INTIAL HOUR: Performed by: ORTHOPAEDIC SURGERY

## 2021-05-28 PROCEDURE — 37000009 HC ANESTHESIA EA ADD 15 MINS: Performed by: ORTHOPAEDIC SURGERY

## 2021-05-28 PROCEDURE — 25000003 PHARM REV CODE 250: Performed by: ANESTHESIOLOGY

## 2021-05-28 PROCEDURE — D9220A PRA ANESTHESIA: ICD-10-PCS | Mod: CRNA,,, | Performed by: NURSE ANESTHETIST, CERTIFIED REGISTERED

## 2021-05-28 PROCEDURE — 71000039 HC RECOVERY, EACH ADD'L HOUR: Performed by: ORTHOPAEDIC SURGERY

## 2021-05-28 PROCEDURE — 36000706: Performed by: ORTHOPAEDIC SURGERY

## 2021-05-28 PROCEDURE — D9220A PRA ANESTHESIA: Mod: CRNA,,, | Performed by: NURSE ANESTHETIST, CERTIFIED REGISTERED

## 2021-05-28 PROCEDURE — 64721 CARPAL TUNNEL SURGERY: CPT | Mod: 51,RT,, | Performed by: ORTHOPAEDIC SURGERY

## 2021-05-28 PROCEDURE — D9220A PRA ANESTHESIA: Mod: ANES,,, | Performed by: ANESTHESIOLOGY

## 2021-05-28 PROCEDURE — 63600175 PHARM REV CODE 636 W HCPCS: Performed by: ORTHOPAEDIC SURGERY

## 2021-05-28 PROCEDURE — 64718 PR REVISE ULNAR NERVE AT ELBOW: ICD-10-PCS | Mod: RT,,, | Performed by: ORTHOPAEDIC SURGERY

## 2021-05-28 PROCEDURE — 63600175 PHARM REV CODE 636 W HCPCS: Performed by: NURSE ANESTHETIST, CERTIFIED REGISTERED

## 2021-05-28 PROCEDURE — 25000003 PHARM REV CODE 250: Performed by: ORTHOPAEDIC SURGERY

## 2021-05-28 PROCEDURE — 64718 REVISE ULNAR NERVE AT ELBOW: CPT | Mod: RT,,, | Performed by: ORTHOPAEDIC SURGERY

## 2021-05-28 PROCEDURE — 25000003 PHARM REV CODE 250: Performed by: NURSE ANESTHETIST, CERTIFIED REGISTERED

## 2021-05-28 PROCEDURE — 71000015 HC POSTOP RECOV 1ST HR: Performed by: ORTHOPAEDIC SURGERY

## 2021-05-28 PROCEDURE — 99900104 DSU ONLY-NO CHARGE-EA ADD'L HR (STAT): Performed by: ORTHOPAEDIC SURGERY

## 2021-05-28 RX ORDER — LIDOCAINE HYDROCHLORIDE 10 MG/ML
1 INJECTION, SOLUTION EPIDURAL; INFILTRATION; INTRACAUDAL; PERINEURAL ONCE
Status: DISCONTINUED | OUTPATIENT
Start: 2021-05-28 | End: 2022-06-07

## 2021-05-28 RX ORDER — MUPIROCIN 20 MG/G
OINTMENT TOPICAL
Status: DISCONTINUED | OUTPATIENT
Start: 2021-05-28 | End: 2021-05-28 | Stop reason: HOSPADM

## 2021-05-28 RX ORDER — PHENYLEPHRINE HYDROCHLORIDE 10 MG/ML
INJECTION INTRAVENOUS
Status: DISCONTINUED | OUTPATIENT
Start: 2021-05-28 | End: 2021-05-28

## 2021-05-28 RX ORDER — PROPOFOL 10 MG/ML
VIAL (ML) INTRAVENOUS
Status: DISCONTINUED | OUTPATIENT
Start: 2021-05-28 | End: 2021-05-28

## 2021-05-28 RX ORDER — ONDANSETRON 2 MG/ML
4 INJECTION INTRAMUSCULAR; INTRAVENOUS ONCE AS NEEDED
Status: DISCONTINUED | OUTPATIENT
Start: 2021-05-28 | End: 2022-06-07

## 2021-05-28 RX ORDER — FENTANYL CITRATE 50 UG/ML
INJECTION, SOLUTION INTRAMUSCULAR; INTRAVENOUS
Status: DISCONTINUED | OUTPATIENT
Start: 2021-05-28 | End: 2021-05-28

## 2021-05-28 RX ORDER — LIDOCAINE HCL/PF 100 MG/5ML
SYRINGE (ML) INTRAVENOUS
Status: DISCONTINUED | OUTPATIENT
Start: 2021-05-28 | End: 2021-05-28

## 2021-05-28 RX ORDER — SODIUM CHLORIDE, SODIUM LACTATE, POTASSIUM CHLORIDE, CALCIUM CHLORIDE 600; 310; 30; 20 MG/100ML; MG/100ML; MG/100ML; MG/100ML
10 INJECTION, SOLUTION INTRAVENOUS CONTINUOUS
Status: DISCONTINUED | OUTPATIENT
Start: 2021-05-28 | End: 2022-06-07

## 2021-05-28 RX ORDER — FENTANYL CITRATE 50 UG/ML
25 INJECTION, SOLUTION INTRAMUSCULAR; INTRAVENOUS EVERY 5 MIN PRN
Status: DISCONTINUED | OUTPATIENT
Start: 2021-05-28 | End: 2022-06-07

## 2021-05-28 RX ORDER — ONDANSETRON HYDROCHLORIDE 2 MG/ML
INJECTION, SOLUTION INTRAMUSCULAR; INTRAVENOUS
Status: DISCONTINUED | OUTPATIENT
Start: 2021-05-28 | End: 2021-05-28

## 2021-05-28 RX ORDER — LORAZEPAM 2 MG/ML
0.25 INJECTION INTRAMUSCULAR ONCE AS NEEDED
Status: DISCONTINUED | OUTPATIENT
Start: 2021-05-28 | End: 2022-06-07

## 2021-05-28 RX ORDER — MEPERIDINE HYDROCHLORIDE 50 MG/ML
12.5 INJECTION INTRAMUSCULAR; INTRAVENOUS; SUBCUTANEOUS ONCE AS NEEDED
Status: ACTIVE | OUTPATIENT
Start: 2021-05-28 | End: 2021-05-29

## 2021-05-28 RX ORDER — HYDROMORPHONE HYDROCHLORIDE 2 MG/ML
0.2 INJECTION, SOLUTION INTRAMUSCULAR; INTRAVENOUS; SUBCUTANEOUS EVERY 5 MIN PRN
Status: DISCONTINUED | OUTPATIENT
Start: 2021-05-28 | End: 2022-06-07

## 2021-05-28 RX ORDER — PROCHLORPERAZINE EDISYLATE 5 MG/ML
5 INJECTION INTRAMUSCULAR; INTRAVENOUS EVERY 30 MIN PRN
Status: DISCONTINUED | OUTPATIENT
Start: 2021-05-28 | End: 2022-06-07

## 2021-05-28 RX ORDER — ACETAMINOPHEN 10 MG/ML
INJECTION, SOLUTION INTRAVENOUS
Status: DISCONTINUED | OUTPATIENT
Start: 2021-05-28 | End: 2021-05-28

## 2021-05-28 RX ORDER — BUPIVACAINE HYDROCHLORIDE 2.5 MG/ML
INJECTION, SOLUTION EPIDURAL; INFILTRATION; INTRACAUDAL
Status: DISCONTINUED | OUTPATIENT
Start: 2021-05-28 | End: 2021-05-28 | Stop reason: HOSPADM

## 2021-05-28 RX ORDER — CEFAZOLIN SODIUM 2 G/50ML
2 SOLUTION INTRAVENOUS
Status: COMPLETED | OUTPATIENT
Start: 2021-05-28 | End: 2021-05-28

## 2021-05-28 RX ORDER — SODIUM CHLORIDE, SODIUM LACTATE, POTASSIUM CHLORIDE, CALCIUM CHLORIDE 600; 310; 30; 20 MG/100ML; MG/100ML; MG/100ML; MG/100ML
500 INJECTION, SOLUTION INTRAVENOUS ONCE
Status: DISCONTINUED | OUTPATIENT
Start: 2021-05-28 | End: 2022-06-07

## 2021-05-28 RX ORDER — HYDROCODONE BITARTRATE AND ACETAMINOPHEN 7.5; 325 MG/1; MG/1
1 TABLET ORAL EVERY 6 HOURS PRN
Qty: 28 TABLET | Refills: 0 | Status: SHIPPED | OUTPATIENT
Start: 2021-05-28 | End: 2021-06-04

## 2021-05-28 RX ORDER — OXYCODONE HYDROCHLORIDE 5 MG/1
5 TABLET ORAL ONCE AS NEEDED
Status: COMPLETED | OUTPATIENT
Start: 2021-05-28 | End: 2021-05-28

## 2021-05-28 RX ORDER — SODIUM CHLORIDE 0.9 % (FLUSH) 0.9 %
3 SYRINGE (ML) INJECTION EVERY 8 HOURS
Status: DISCONTINUED | OUTPATIENT
Start: 2021-05-28 | End: 2022-06-07

## 2021-05-28 RX ORDER — KETOROLAC TROMETHAMINE 30 MG/ML
INJECTION, SOLUTION INTRAMUSCULAR; INTRAVENOUS
Status: DISCONTINUED | OUTPATIENT
Start: 2021-05-28 | End: 2021-05-28

## 2021-05-28 RX ADMIN — ONDANSETRON 4 MG: 2 INJECTION, SOLUTION INTRAMUSCULAR; INTRAVENOUS at 08:05

## 2021-05-28 RX ADMIN — FENTANYL CITRATE 100 MCG: 50 INJECTION, SOLUTION INTRAMUSCULAR; INTRAVENOUS at 07:05

## 2021-05-28 RX ADMIN — ACETAMINOPHEN 1000 MG: 10 INJECTION, SOLUTION INTRAVENOUS at 08:05

## 2021-05-28 RX ADMIN — CEFAZOLIN SODIUM 2 G: 2 SOLUTION INTRAVENOUS at 08:05

## 2021-05-28 RX ADMIN — MUPIROCIN: 20 OINTMENT TOPICAL at 07:05

## 2021-05-28 RX ADMIN — PROPOFOL 140 MG: 10 INJECTION, EMULSION INTRAVENOUS at 08:05

## 2021-05-28 RX ADMIN — SODIUM CHLORIDE, SODIUM GLUCONATE, SODIUM ACETATE, POTASSIUM CHLORIDE, MAGNESIUM CHLORIDE, SODIUM PHOSPHATE, DIBASIC, AND POTASSIUM PHOSPHATE: .53; .5; .37; .037; .03; .012; .00082 INJECTION, SOLUTION INTRAVENOUS at 07:05

## 2021-05-28 RX ADMIN — OXYCODONE 5 MG: 5 TABLET ORAL at 09:05

## 2021-05-28 RX ADMIN — PHENYLEPHRINE HYDROCHLORIDE 200 MCG: 10 INJECTION INTRAVENOUS at 08:05

## 2021-05-28 RX ADMIN — PROPOFOL 20 MG: 10 INJECTION, EMULSION INTRAVENOUS at 07:05

## 2021-05-28 RX ADMIN — LIDOCAINE HYDROCHLORIDE 100 MG: 20 INJECTION, SOLUTION INTRAVENOUS at 08:05

## 2021-05-28 RX ADMIN — KETOROLAC TROMETHAMINE 30 MG: 30 INJECTION, SOLUTION INTRAMUSCULAR; INTRAVENOUS at 08:05

## 2021-05-31 VITALS
HEIGHT: 68 IN | DIASTOLIC BLOOD PRESSURE: 84 MMHG | OXYGEN SATURATION: 98 % | RESPIRATION RATE: 16 BRPM | HEART RATE: 73 BPM | SYSTOLIC BLOOD PRESSURE: 161 MMHG | WEIGHT: 140 LBS | BODY MASS INDEX: 21.22 KG/M2 | TEMPERATURE: 97 F

## 2021-06-07 ENCOUNTER — TELEPHONE (OUTPATIENT)
Dept: ORTHOPEDICS | Facility: CLINIC | Age: 76
End: 2021-06-07

## 2021-06-07 ENCOUNTER — NURSE TRIAGE (OUTPATIENT)
Dept: ADMINISTRATIVE | Facility: CLINIC | Age: 76
End: 2021-06-07

## 2021-06-07 DIAGNOSIS — G56.01 RIGHT CARPAL TUNNEL SYNDROME: Primary | ICD-10-CM

## 2021-06-10 ENCOUNTER — OFFICE VISIT (OUTPATIENT)
Dept: ENDOCRINOLOGY | Facility: CLINIC | Age: 76
End: 2021-06-10
Payer: MEDICARE

## 2021-06-10 ENCOUNTER — LAB VISIT (OUTPATIENT)
Dept: LAB | Facility: HOSPITAL | Age: 76
End: 2021-06-10
Attending: INTERNAL MEDICINE
Payer: MEDICARE

## 2021-06-10 VITALS
RESPIRATION RATE: 18 BRPM | BODY MASS INDEX: 21.54 KG/M2 | TEMPERATURE: 99 F | HEART RATE: 95 BPM | WEIGHT: 141.63 LBS | OXYGEN SATURATION: 95 % | DIASTOLIC BLOOD PRESSURE: 51 MMHG | SYSTOLIC BLOOD PRESSURE: 130 MMHG

## 2021-06-10 DIAGNOSIS — N18.30 STAGE 3 CHRONIC KIDNEY DISEASE, UNSPECIFIED WHETHER STAGE 3A OR 3B CKD: ICD-10-CM

## 2021-06-10 DIAGNOSIS — E04.1 NODULAR THYROID DISEASE: ICD-10-CM

## 2021-06-10 DIAGNOSIS — I10 ESSENTIAL HYPERTENSION: ICD-10-CM

## 2021-06-10 DIAGNOSIS — E21.3 HYPERPARATHYROIDISM: ICD-10-CM

## 2021-06-10 DIAGNOSIS — M81.8 OTHER OSTEOPOROSIS WITHOUT CURRENT PATHOLOGICAL FRACTURE: ICD-10-CM

## 2021-06-10 DIAGNOSIS — E04.2 MULTINODULAR GOITER: Primary | ICD-10-CM

## 2021-06-10 LAB
ALBUMIN SERPL BCP-MCNC: 3 G/DL (ref 3.5–5.2)
ANION GAP SERPL CALC-SCNC: 11 MMOL/L (ref 8–16)
BUN SERPL-MCNC: 12 MG/DL (ref 8–23)
CALCIUM SERPL-MCNC: 8.9 MG/DL (ref 8.7–10.5)
CHLORIDE SERPL-SCNC: 110 MMOL/L (ref 95–110)
CO2 SERPL-SCNC: 19 MMOL/L (ref 23–29)
CREAT SERPL-MCNC: 1.3 MG/DL (ref 0.5–1.4)
EST. GFR  (AFRICAN AMERICAN): >60 ML/MIN/1.73 M^2
EST. GFR  (NON AFRICAN AMERICAN): 54.1 ML/MIN/1.73 M^2
GLUCOSE SERPL-MCNC: 89 MG/DL (ref 70–110)
PHOSPHATE SERPL-MCNC: 3.4 MG/DL (ref 2.7–4.5)
POTASSIUM SERPL-SCNC: 3.9 MMOL/L (ref 3.5–5.1)
SODIUM SERPL-SCNC: 140 MMOL/L (ref 136–145)

## 2021-06-10 PROCEDURE — 3008F PR BODY MASS INDEX (BMI) DOCUMENTED: ICD-10-PCS | Mod: CPTII,S$GLB,, | Performed by: INTERNAL MEDICINE

## 2021-06-10 PROCEDURE — 3008F BODY MASS INDEX DOCD: CPT | Mod: CPTII,S$GLB,, | Performed by: INTERNAL MEDICINE

## 2021-06-10 PROCEDURE — 36415 COLL VENOUS BLD VENIPUNCTURE: CPT | Mod: PO | Performed by: INTERNAL MEDICINE

## 2021-06-10 PROCEDURE — 1100F PR PT FALLS ASSESS DOC 2+ FALLS/FALL W/INJURY/YR: ICD-10-PCS | Mod: CPTII,S$GLB,, | Performed by: INTERNAL MEDICINE

## 2021-06-10 PROCEDURE — 99214 OFFICE O/P EST MOD 30 MIN: CPT | Mod: S$GLB,,, | Performed by: INTERNAL MEDICINE

## 2021-06-10 PROCEDURE — 1125F AMNT PAIN NOTED PAIN PRSNT: CPT | Mod: S$GLB,,, | Performed by: INTERNAL MEDICINE

## 2021-06-10 PROCEDURE — 80069 RENAL FUNCTION PANEL: CPT | Performed by: INTERNAL MEDICINE

## 2021-06-10 PROCEDURE — 1100F PTFALLS ASSESS-DOCD GE2>/YR: CPT | Mod: CPTII,S$GLB,, | Performed by: INTERNAL MEDICINE

## 2021-06-10 PROCEDURE — 99999 PR PBB SHADOW E&M-EST. PATIENT-LVL V: ICD-10-PCS | Mod: PBBFAC,,, | Performed by: INTERNAL MEDICINE

## 2021-06-10 PROCEDURE — 3288F FALL RISK ASSESSMENT DOCD: CPT | Mod: CPTII,S$GLB,, | Performed by: INTERNAL MEDICINE

## 2021-06-10 PROCEDURE — 1125F PR PAIN SEVERITY QUANTIFIED, PAIN PRESENT: ICD-10-PCS | Mod: S$GLB,,, | Performed by: INTERNAL MEDICINE

## 2021-06-10 PROCEDURE — 3288F PR FALLS RISK ASSESSMENT DOCUMENTED: ICD-10-PCS | Mod: CPTII,S$GLB,, | Performed by: INTERNAL MEDICINE

## 2021-06-10 PROCEDURE — 99999 PR PBB SHADOW E&M-EST. PATIENT-LVL V: CPT | Mod: PBBFAC,,, | Performed by: INTERNAL MEDICINE

## 2021-06-10 PROCEDURE — 99214 PR OFFICE/OUTPT VISIT, EST, LEVL IV, 30-39 MIN: ICD-10-PCS | Mod: S$GLB,,, | Performed by: INTERNAL MEDICINE

## 2021-06-14 ENCOUNTER — OFFICE VISIT (OUTPATIENT)
Dept: RHEUMATOLOGY | Facility: CLINIC | Age: 76
End: 2021-06-14
Payer: MEDICARE

## 2021-06-14 VITALS
HEIGHT: 68 IN | SYSTOLIC BLOOD PRESSURE: 155 MMHG | WEIGHT: 143 LBS | HEART RATE: 93 BPM | BODY MASS INDEX: 21.67 KG/M2 | DIASTOLIC BLOOD PRESSURE: 79 MMHG

## 2021-06-14 DIAGNOSIS — M19.90 CHRONIC INFLAMMATORY ARTHRITIS: Primary | ICD-10-CM

## 2021-06-14 DIAGNOSIS — N18.30 STAGE 3 CHRONIC KIDNEY DISEASE, UNSPECIFIED WHETHER STAGE 3A OR 3B CKD: ICD-10-CM

## 2021-06-14 DIAGNOSIS — Z79.899 ENCOUNTER FOR LONG-TERM (CURRENT) USE OF OTHER MEDICATIONS: ICD-10-CM

## 2021-06-14 PROCEDURE — 99213 PR OFFICE/OUTPT VISIT, EST, LEVL III, 20-29 MIN: ICD-10-PCS | Mod: S$GLB,,, | Performed by: INTERNAL MEDICINE

## 2021-06-14 PROCEDURE — 99213 OFFICE O/P EST LOW 20 MIN: CPT | Mod: S$GLB,,, | Performed by: INTERNAL MEDICINE

## 2021-06-14 PROCEDURE — 1159F MED LIST DOCD IN RCRD: CPT | Mod: S$GLB,,, | Performed by: INTERNAL MEDICINE

## 2021-06-14 PROCEDURE — 1159F PR MEDICATION LIST DOCUMENTED IN MEDICAL RECORD: ICD-10-PCS | Mod: S$GLB,,, | Performed by: INTERNAL MEDICINE

## 2021-06-15 ENCOUNTER — PATIENT OUTREACH (OUTPATIENT)
Dept: ADMINISTRATIVE | Facility: OTHER | Age: 76
End: 2021-06-15

## 2021-06-15 DIAGNOSIS — Z12.11 ENCOUNTER FOR FECAL IMMUNOCHEMICAL TEST SCREENING: Primary | ICD-10-CM

## 2021-06-17 ENCOUNTER — OFFICE VISIT (OUTPATIENT)
Dept: NEPHROLOGY | Facility: CLINIC | Age: 76
End: 2021-06-17
Payer: MEDICARE

## 2021-06-17 VITALS
SYSTOLIC BLOOD PRESSURE: 138 MMHG | WEIGHT: 141.75 LBS | HEART RATE: 83 BPM | HEIGHT: 68 IN | DIASTOLIC BLOOD PRESSURE: 70 MMHG | OXYGEN SATURATION: 98 % | BODY MASS INDEX: 21.48 KG/M2

## 2021-06-17 DIAGNOSIS — N18.30 STAGE 3 CHRONIC KIDNEY DISEASE, UNSPECIFIED WHETHER STAGE 3A OR 3B CKD: Primary | ICD-10-CM

## 2021-06-17 DIAGNOSIS — I10 ESSENTIAL HYPERTENSION: ICD-10-CM

## 2021-06-17 DIAGNOSIS — N25.81 SECONDARY RENAL HYPERPARATHYROIDISM: ICD-10-CM

## 2021-06-17 PROCEDURE — 3288F FALL RISK ASSESSMENT DOCD: CPT | Mod: CPTII,S$GLB,, | Performed by: INTERNAL MEDICINE

## 2021-06-17 PROCEDURE — 3008F PR BODY MASS INDEX (BMI) DOCUMENTED: ICD-10-PCS | Mod: CPTII,S$GLB,, | Performed by: INTERNAL MEDICINE

## 2021-06-17 PROCEDURE — 3008F BODY MASS INDEX DOCD: CPT | Mod: CPTII,S$GLB,, | Performed by: INTERNAL MEDICINE

## 2021-06-17 PROCEDURE — 99214 OFFICE O/P EST MOD 30 MIN: CPT | Mod: S$GLB,,, | Performed by: INTERNAL MEDICINE

## 2021-06-17 PROCEDURE — 1101F PT FALLS ASSESS-DOCD LE1/YR: CPT | Mod: CPTII,S$GLB,, | Performed by: INTERNAL MEDICINE

## 2021-06-17 PROCEDURE — 99999 PR PBB SHADOW E&M-EST. PATIENT-LVL IV: ICD-10-PCS | Mod: PBBFAC,,, | Performed by: INTERNAL MEDICINE

## 2021-06-17 PROCEDURE — 1159F PR MEDICATION LIST DOCUMENTED IN MEDICAL RECORD: ICD-10-PCS | Mod: S$GLB,,, | Performed by: INTERNAL MEDICINE

## 2021-06-17 PROCEDURE — 1159F MED LIST DOCD IN RCRD: CPT | Mod: S$GLB,,, | Performed by: INTERNAL MEDICINE

## 2021-06-17 PROCEDURE — 99499 UNLISTED E&M SERVICE: CPT | Mod: S$GLB,,, | Performed by: INTERNAL MEDICINE

## 2021-06-17 PROCEDURE — 99214 PR OFFICE/OUTPT VISIT, EST, LEVL IV, 30-39 MIN: ICD-10-PCS | Mod: S$GLB,,, | Performed by: INTERNAL MEDICINE

## 2021-06-17 PROCEDURE — 99499 RISK ADDL DX/OHS AUDIT: ICD-10-PCS | Mod: S$GLB,,, | Performed by: INTERNAL MEDICINE

## 2021-06-17 PROCEDURE — 99999 PR PBB SHADOW E&M-EST. PATIENT-LVL IV: CPT | Mod: PBBFAC,,, | Performed by: INTERNAL MEDICINE

## 2021-06-17 PROCEDURE — 3288F PR FALLS RISK ASSESSMENT DOCUMENTED: ICD-10-PCS | Mod: CPTII,S$GLB,, | Performed by: INTERNAL MEDICINE

## 2021-06-17 PROCEDURE — 1101F PR PT FALLS ASSESS DOC 0-1 FALLS W/OUT INJ PAST YR: ICD-10-PCS | Mod: CPTII,S$GLB,, | Performed by: INTERNAL MEDICINE

## 2021-06-21 ENCOUNTER — TELEPHONE (OUTPATIENT)
Dept: FAMILY MEDICINE | Facility: CLINIC | Age: 76
End: 2021-06-21

## 2021-06-21 DIAGNOSIS — N18.4 STAGE 4 CHRONIC KIDNEY DISEASE: ICD-10-CM

## 2021-06-21 DIAGNOSIS — R63.0 DECREASED APPETITE: Primary | ICD-10-CM

## 2021-06-21 DIAGNOSIS — E43 SEVERE MALNUTRITION: ICD-10-CM

## 2021-06-21 RX ORDER — LACTOSE-REDUCED FOOD
1 LIQUID (ML) ORAL
Qty: 90 CAN | Refills: 11 | Status: SHIPPED | OUTPATIENT
Start: 2021-06-21 | End: 2022-03-07

## 2021-06-21 RX ORDER — MEGESTROL ACETATE 40 MG/1
40 TABLET ORAL DAILY
Qty: 30 TABLET | Refills: 11 | Status: SHIPPED | OUTPATIENT
Start: 2021-06-21 | End: 2021-09-28

## 2021-06-22 ENCOUNTER — OFFICE VISIT (OUTPATIENT)
Dept: FAMILY MEDICINE | Facility: CLINIC | Age: 76
End: 2021-06-22
Payer: MEDICARE

## 2021-06-22 VITALS
OXYGEN SATURATION: 96 % | WEIGHT: 141.75 LBS | HEIGHT: 68 IN | TEMPERATURE: 99 F | DIASTOLIC BLOOD PRESSURE: 72 MMHG | SYSTOLIC BLOOD PRESSURE: 138 MMHG | HEART RATE: 95 BPM | BODY MASS INDEX: 21.48 KG/M2 | RESPIRATION RATE: 18 BRPM

## 2021-06-22 DIAGNOSIS — E43 SEVERE MALNUTRITION: ICD-10-CM

## 2021-06-22 DIAGNOSIS — R63.0 DECREASED APPETITE: Primary | ICD-10-CM

## 2021-06-22 DIAGNOSIS — N18.31 CHRONIC KIDNEY DISEASE, STAGE 3A: ICD-10-CM

## 2021-06-22 DIAGNOSIS — K21.9 GASTROESOPHAGEAL REFLUX DISEASE, UNSPECIFIED WHETHER ESOPHAGITIS PRESENT: ICD-10-CM

## 2021-06-22 DIAGNOSIS — E21.3 HYPERPARATHYROIDISM: ICD-10-CM

## 2021-06-22 DIAGNOSIS — M06.9 RHEUMATOID ARTHRITIS INVOLVING MULTIPLE SITES, UNSPECIFIED WHETHER RHEUMATOID FACTOR PRESENT: ICD-10-CM

## 2021-06-22 DIAGNOSIS — R13.19 ESOPHAGEAL DYSPHAGIA: ICD-10-CM

## 2021-06-22 PROCEDURE — 3008F BODY MASS INDEX DOCD: CPT | Mod: CPTII,S$GLB,, | Performed by: STUDENT IN AN ORGANIZED HEALTH CARE EDUCATION/TRAINING PROGRAM

## 2021-06-22 PROCEDURE — 1101F PR PT FALLS ASSESS DOC 0-1 FALLS W/OUT INJ PAST YR: ICD-10-PCS | Mod: CPTII,S$GLB,, | Performed by: STUDENT IN AN ORGANIZED HEALTH CARE EDUCATION/TRAINING PROGRAM

## 2021-06-22 PROCEDURE — 99999 PR PBB SHADOW E&M-EST. PATIENT-LVL V: ICD-10-PCS | Mod: PBBFAC,,, | Performed by: STUDENT IN AN ORGANIZED HEALTH CARE EDUCATION/TRAINING PROGRAM

## 2021-06-22 PROCEDURE — 1159F MED LIST DOCD IN RCRD: CPT | Mod: S$GLB,,, | Performed by: STUDENT IN AN ORGANIZED HEALTH CARE EDUCATION/TRAINING PROGRAM

## 2021-06-22 PROCEDURE — 1126F AMNT PAIN NOTED NONE PRSNT: CPT | Mod: S$GLB,,, | Performed by: STUDENT IN AN ORGANIZED HEALTH CARE EDUCATION/TRAINING PROGRAM

## 2021-06-22 PROCEDURE — 1101F PT FALLS ASSESS-DOCD LE1/YR: CPT | Mod: CPTII,S$GLB,, | Performed by: STUDENT IN AN ORGANIZED HEALTH CARE EDUCATION/TRAINING PROGRAM

## 2021-06-22 PROCEDURE — 1126F PR PAIN SEVERITY QUANTIFIED, NO PAIN PRESENT: ICD-10-PCS | Mod: S$GLB,,, | Performed by: STUDENT IN AN ORGANIZED HEALTH CARE EDUCATION/TRAINING PROGRAM

## 2021-06-22 PROCEDURE — 3288F FALL RISK ASSESSMENT DOCD: CPT | Mod: CPTII,S$GLB,, | Performed by: STUDENT IN AN ORGANIZED HEALTH CARE EDUCATION/TRAINING PROGRAM

## 2021-06-22 PROCEDURE — 3288F PR FALLS RISK ASSESSMENT DOCUMENTED: ICD-10-PCS | Mod: CPTII,S$GLB,, | Performed by: STUDENT IN AN ORGANIZED HEALTH CARE EDUCATION/TRAINING PROGRAM

## 2021-06-22 PROCEDURE — 99999 PR PBB SHADOW E&M-EST. PATIENT-LVL V: CPT | Mod: PBBFAC,,, | Performed by: STUDENT IN AN ORGANIZED HEALTH CARE EDUCATION/TRAINING PROGRAM

## 2021-06-22 PROCEDURE — 99499 RISK ADDL DX/OHS AUDIT: ICD-10-PCS | Mod: S$GLB,,, | Performed by: STUDENT IN AN ORGANIZED HEALTH CARE EDUCATION/TRAINING PROGRAM

## 2021-06-22 PROCEDURE — 99214 OFFICE O/P EST MOD 30 MIN: CPT | Mod: S$GLB,,, | Performed by: STUDENT IN AN ORGANIZED HEALTH CARE EDUCATION/TRAINING PROGRAM

## 2021-06-22 PROCEDURE — 99499 UNLISTED E&M SERVICE: CPT | Mod: S$GLB,,, | Performed by: STUDENT IN AN ORGANIZED HEALTH CARE EDUCATION/TRAINING PROGRAM

## 2021-06-22 PROCEDURE — 3008F PR BODY MASS INDEX (BMI) DOCUMENTED: ICD-10-PCS | Mod: CPTII,S$GLB,, | Performed by: STUDENT IN AN ORGANIZED HEALTH CARE EDUCATION/TRAINING PROGRAM

## 2021-06-22 PROCEDURE — 1159F PR MEDICATION LIST DOCUMENTED IN MEDICAL RECORD: ICD-10-PCS | Mod: S$GLB,,, | Performed by: STUDENT IN AN ORGANIZED HEALTH CARE EDUCATION/TRAINING PROGRAM

## 2021-06-22 PROCEDURE — 99214 PR OFFICE/OUTPT VISIT, EST, LEVL IV, 30-39 MIN: ICD-10-PCS | Mod: S$GLB,,, | Performed by: STUDENT IN AN ORGANIZED HEALTH CARE EDUCATION/TRAINING PROGRAM

## 2021-06-22 RX ORDER — MIRTAZAPINE 15 MG/1
15 TABLET, FILM COATED ORAL NIGHTLY
Qty: 30 TABLET | Refills: 0 | Status: SHIPPED | OUTPATIENT
Start: 2021-06-22 | End: 2021-07-01

## 2021-06-24 ENCOUNTER — HOSPITAL ENCOUNTER (OUTPATIENT)
Dept: RADIOLOGY | Facility: CLINIC | Age: 76
Discharge: HOME | End: 2021-06-24
Attending: INTERNAL MEDICINE
Payer: MEDICARE

## 2021-06-24 DIAGNOSIS — M81.8 OTHER OSTEOPOROSIS WITHOUT CURRENT PATHOLOGICAL FRACTURE: ICD-10-CM

## 2021-06-24 PROCEDURE — 77080 DXA BONE DENSITY AXIAL: CPT | Mod: TC,PO

## 2021-06-24 PROCEDURE — 77080 DXA BONE DENSITY AXIAL: CPT | Mod: 26,,, | Performed by: RADIOLOGY

## 2021-06-24 PROCEDURE — 77080 DEXA BONE DENSITY SPINE HIP: ICD-10-PCS | Mod: 26,,, | Performed by: RADIOLOGY

## 2021-06-29 ENCOUNTER — TELEPHONE (OUTPATIENT)
Dept: ENDOCRINOLOGY | Facility: CLINIC | Age: 76
End: 2021-06-29

## 2021-07-01 ENCOUNTER — OFFICE VISIT (OUTPATIENT)
Dept: FAMILY MEDICINE | Facility: CLINIC | Age: 76
End: 2021-07-01
Payer: MEDICARE

## 2021-07-01 ENCOUNTER — HOSPITAL ENCOUNTER (OUTPATIENT)
Dept: RADIOLOGY | Facility: HOSPITAL | Age: 76
Discharge: HOME | End: 2021-07-01
Attending: INTERNAL MEDICINE
Payer: MEDICARE

## 2021-07-01 VITALS
WEIGHT: 142.19 LBS | TEMPERATURE: 99 F | HEART RATE: 83 BPM | HEIGHT: 68 IN | OXYGEN SATURATION: 96 % | RESPIRATION RATE: 16 BRPM | BODY MASS INDEX: 21.55 KG/M2 | DIASTOLIC BLOOD PRESSURE: 52 MMHG | SYSTOLIC BLOOD PRESSURE: 132 MMHG

## 2021-07-01 DIAGNOSIS — M06.9 RHEUMATOID ARTHRITIS INVOLVING MULTIPLE SITES, UNSPECIFIED WHETHER RHEUMATOID FACTOR PRESENT: ICD-10-CM

## 2021-07-01 DIAGNOSIS — G56.01 RIGHT CARPAL TUNNEL SYNDROME: ICD-10-CM

## 2021-07-01 DIAGNOSIS — I10 ESSENTIAL HYPERTENSION: ICD-10-CM

## 2021-07-01 DIAGNOSIS — K21.9 GASTROESOPHAGEAL REFLUX DISEASE WITHOUT ESOPHAGITIS: ICD-10-CM

## 2021-07-01 DIAGNOSIS — R63.0 DECREASED APPETITE: Primary | ICD-10-CM

## 2021-07-01 DIAGNOSIS — E43 SEVERE MALNUTRITION: ICD-10-CM

## 2021-07-01 DIAGNOSIS — E04.2 MULTINODULAR GOITER: ICD-10-CM

## 2021-07-01 DIAGNOSIS — F41.1 GAD (GENERALIZED ANXIETY DISORDER): ICD-10-CM

## 2021-07-01 PROCEDURE — 99214 OFFICE O/P EST MOD 30 MIN: CPT | Mod: S$GLB,,, | Performed by: STUDENT IN AN ORGANIZED HEALTH CARE EDUCATION/TRAINING PROGRAM

## 2021-07-01 PROCEDURE — 3288F FALL RISK ASSESSMENT DOCD: CPT | Mod: CPTII,S$GLB,, | Performed by: STUDENT IN AN ORGANIZED HEALTH CARE EDUCATION/TRAINING PROGRAM

## 2021-07-01 PROCEDURE — 99499 UNLISTED E&M SERVICE: CPT | Mod: S$GLB,,, | Performed by: STUDENT IN AN ORGANIZED HEALTH CARE EDUCATION/TRAINING PROGRAM

## 2021-07-01 PROCEDURE — 76536 US EXAM OF HEAD AND NECK: CPT | Mod: 26,,, | Performed by: RADIOLOGY

## 2021-07-01 PROCEDURE — 1159F MED LIST DOCD IN RCRD: CPT | Mod: S$GLB,,, | Performed by: STUDENT IN AN ORGANIZED HEALTH CARE EDUCATION/TRAINING PROGRAM

## 2021-07-01 PROCEDURE — 3008F PR BODY MASS INDEX (BMI) DOCUMENTED: ICD-10-PCS | Mod: CPTII,S$GLB,, | Performed by: STUDENT IN AN ORGANIZED HEALTH CARE EDUCATION/TRAINING PROGRAM

## 2021-07-01 PROCEDURE — 3288F PR FALLS RISK ASSESSMENT DOCUMENTED: ICD-10-PCS | Mod: CPTII,S$GLB,, | Performed by: STUDENT IN AN ORGANIZED HEALTH CARE EDUCATION/TRAINING PROGRAM

## 2021-07-01 PROCEDURE — 1125F AMNT PAIN NOTED PAIN PRSNT: CPT | Mod: S$GLB,,, | Performed by: STUDENT IN AN ORGANIZED HEALTH CARE EDUCATION/TRAINING PROGRAM

## 2021-07-01 PROCEDURE — 76536 US SOFT TISSUE HEAD NECK THYROID: ICD-10-PCS | Mod: 26,,, | Performed by: RADIOLOGY

## 2021-07-01 PROCEDURE — 99214 PR OFFICE/OUTPT VISIT, EST, LEVL IV, 30-39 MIN: ICD-10-PCS | Mod: S$GLB,,, | Performed by: STUDENT IN AN ORGANIZED HEALTH CARE EDUCATION/TRAINING PROGRAM

## 2021-07-01 PROCEDURE — 99999 PR PBB SHADOW E&M-EST. PATIENT-LVL V: ICD-10-PCS | Mod: PBBFAC,,, | Performed by: STUDENT IN AN ORGANIZED HEALTH CARE EDUCATION/TRAINING PROGRAM

## 2021-07-01 PROCEDURE — 1125F PR PAIN SEVERITY QUANTIFIED, PAIN PRESENT: ICD-10-PCS | Mod: S$GLB,,, | Performed by: STUDENT IN AN ORGANIZED HEALTH CARE EDUCATION/TRAINING PROGRAM

## 2021-07-01 PROCEDURE — 1101F PR PT FALLS ASSESS DOC 0-1 FALLS W/OUT INJ PAST YR: ICD-10-PCS | Mod: CPTII,S$GLB,, | Performed by: STUDENT IN AN ORGANIZED HEALTH CARE EDUCATION/TRAINING PROGRAM

## 2021-07-01 PROCEDURE — 76536 US EXAM OF HEAD AND NECK: CPT | Mod: TC

## 2021-07-01 PROCEDURE — 1101F PT FALLS ASSESS-DOCD LE1/YR: CPT | Mod: CPTII,S$GLB,, | Performed by: STUDENT IN AN ORGANIZED HEALTH CARE EDUCATION/TRAINING PROGRAM

## 2021-07-01 PROCEDURE — 3008F BODY MASS INDEX DOCD: CPT | Mod: CPTII,S$GLB,, | Performed by: STUDENT IN AN ORGANIZED HEALTH CARE EDUCATION/TRAINING PROGRAM

## 2021-07-01 PROCEDURE — 99999 PR PBB SHADOW E&M-EST. PATIENT-LVL V: CPT | Mod: PBBFAC,,, | Performed by: STUDENT IN AN ORGANIZED HEALTH CARE EDUCATION/TRAINING PROGRAM

## 2021-07-01 PROCEDURE — 99499 RISK ADDL DX/OHS AUDIT: ICD-10-PCS | Mod: S$GLB,,, | Performed by: STUDENT IN AN ORGANIZED HEALTH CARE EDUCATION/TRAINING PROGRAM

## 2021-07-01 PROCEDURE — 1159F PR MEDICATION LIST DOCUMENTED IN MEDICAL RECORD: ICD-10-PCS | Mod: S$GLB,,, | Performed by: STUDENT IN AN ORGANIZED HEALTH CARE EDUCATION/TRAINING PROGRAM

## 2021-07-01 RX ORDER — PAROXETINE 10 MG/1
10 TABLET, FILM COATED ORAL EVERY MORNING
Qty: 30 TABLET | Refills: 0 | Status: SHIPPED | OUTPATIENT
Start: 2021-07-01 | End: 2021-09-28

## 2021-07-01 RX ORDER — MIRTAZAPINE 15 MG/1
15 TABLET, FILM COATED ORAL NIGHTLY
Qty: 90 TABLET | Refills: 0 | Status: SHIPPED | OUTPATIENT
Start: 2021-07-01 | End: 2021-09-28

## 2021-07-06 ENCOUNTER — OFFICE VISIT (OUTPATIENT)
Dept: ORTHOPEDICS | Facility: CLINIC | Age: 76
End: 2021-07-06
Payer: MEDICARE

## 2021-07-06 DIAGNOSIS — G56.01 RIGHT CARPAL TUNNEL SYNDROME: ICD-10-CM

## 2021-07-06 PROCEDURE — 99999 PR PBB SHADOW E&M-EST. PATIENT-LVL II: CPT | Mod: PBBFAC,,, | Performed by: ORTHOPAEDIC SURGERY

## 2021-07-06 PROCEDURE — 99024 POSTOP FOLLOW-UP VISIT: CPT | Mod: S$GLB,,, | Performed by: ORTHOPAEDIC SURGERY

## 2021-07-06 PROCEDURE — 99024 PR POST-OP FOLLOW-UP VISIT: ICD-10-PCS | Mod: S$GLB,,, | Performed by: ORTHOPAEDIC SURGERY

## 2021-07-06 PROCEDURE — 99999 PR PBB SHADOW E&M-EST. PATIENT-LVL II: ICD-10-PCS | Mod: PBBFAC,,, | Performed by: ORTHOPAEDIC SURGERY

## 2021-07-07 ENCOUNTER — PATIENT MESSAGE (OUTPATIENT)
Dept: ADMINISTRATIVE | Facility: HOSPITAL | Age: 76
End: 2021-07-07

## 2021-07-13 ENCOUNTER — LAB VISIT (OUTPATIENT)
Dept: LAB | Facility: HOSPITAL | Age: 76
End: 2021-07-13
Attending: UROLOGY
Payer: MEDICARE

## 2021-07-13 DIAGNOSIS — C61 PROSTATE CANCER: ICD-10-CM

## 2021-07-13 LAB — COMPLEXED PSA SERPL-MCNC: 0.08 NG/ML (ref 0–4)

## 2021-07-13 PROCEDURE — 36415 COLL VENOUS BLD VENIPUNCTURE: CPT | Performed by: UROLOGY

## 2021-07-13 PROCEDURE — 84403 ASSAY OF TOTAL TESTOSTERONE: CPT | Performed by: UROLOGY

## 2021-07-13 PROCEDURE — 84153 ASSAY OF PSA TOTAL: CPT | Performed by: UROLOGY

## 2021-07-14 ENCOUNTER — INFUSION (OUTPATIENT)
Dept: INFUSION THERAPY | Facility: HOSPITAL | Age: 76
End: 2021-07-14
Attending: INTERNAL MEDICINE
Payer: MEDICARE

## 2021-07-14 VITALS
BODY MASS INDEX: 21.64 KG/M2 | DIASTOLIC BLOOD PRESSURE: 78 MMHG | RESPIRATION RATE: 18 BRPM | OXYGEN SATURATION: 97 % | SYSTOLIC BLOOD PRESSURE: 151 MMHG | HEART RATE: 96 BPM | HEIGHT: 68 IN | WEIGHT: 142.81 LBS | TEMPERATURE: 98 F

## 2021-07-14 DIAGNOSIS — M81.0 AGE-RELATED OSTEOPOROSIS WITHOUT CURRENT PATHOLOGICAL FRACTURE: Primary | ICD-10-CM

## 2021-07-14 LAB — TESTOST SERPL-MCNC: 314 NG/DL (ref 304–1227)

## 2021-07-14 PROCEDURE — 96372 THER/PROPH/DIAG INJ SC/IM: CPT

## 2021-07-14 PROCEDURE — 63600175 PHARM REV CODE 636 W HCPCS: Mod: JG | Performed by: INTERNAL MEDICINE

## 2021-07-14 RX ADMIN — DENOSUMAB 60 MG: 60 INJECTION SUBCUTANEOUS at 09:07

## 2021-07-16 ENCOUNTER — TELEPHONE (OUTPATIENT)
Dept: ENDOCRINOLOGY | Facility: CLINIC | Age: 76
End: 2021-07-16

## 2021-07-23 DIAGNOSIS — E21.3 HYPERPARATHYROIDISM: Primary | ICD-10-CM

## 2021-07-23 DIAGNOSIS — E04.2 MULTINODULAR GOITER: ICD-10-CM

## 2021-09-20 ENCOUNTER — LAB VISIT (OUTPATIENT)
Dept: LAB | Facility: HOSPITAL | Age: 76
End: 2021-09-20
Attending: FAMILY MEDICINE
Payer: MEDICARE

## 2021-09-20 DIAGNOSIS — K90.9 VITAMIN B12 DEFICIENCY DUE TO INTESTINAL MALABSORPTION: ICD-10-CM

## 2021-09-20 DIAGNOSIS — D50.1 IRON DEFICIENCY ANEMIA DUE TO SIDEROPENIC DYSPHAGIA: ICD-10-CM

## 2021-09-20 DIAGNOSIS — E53.8 VITAMIN B12 DEFICIENCY DUE TO INTESTINAL MALABSORPTION: ICD-10-CM

## 2021-09-20 LAB
25(OH)D3+25(OH)D2 SERPL-MCNC: 30 NG/ML (ref 30–96)
FERRITIN SERPL-MCNC: 62 NG/ML (ref 20–300)
FOLATE SERPL-MCNC: >40 NG/ML (ref 4–24)
IRON SERPL-MCNC: 188 UG/DL (ref 45–160)
SATURATED IRON: 67 % (ref 20–50)
TOTAL IRON BINDING CAPACITY: 281 UG/DL (ref 250–450)
TRANSFERRIN SERPL-MCNC: 190 MG/DL (ref 200–375)
VIT B12 SERPL-MCNC: 321 PG/ML (ref 210–950)

## 2021-09-20 PROCEDURE — 84466 ASSAY OF TRANSFERRIN: CPT | Performed by: FAMILY MEDICINE

## 2021-09-20 PROCEDURE — 82306 VITAMIN D 25 HYDROXY: CPT | Performed by: FAMILY MEDICINE

## 2021-09-20 PROCEDURE — 84425 ASSAY OF VITAMIN B-1: CPT | Performed by: FAMILY MEDICINE

## 2021-09-20 PROCEDURE — 82746 ASSAY OF FOLIC ACID SERUM: CPT | Performed by: FAMILY MEDICINE

## 2021-09-20 PROCEDURE — 82607 VITAMIN B-12: CPT | Performed by: FAMILY MEDICINE

## 2021-09-20 PROCEDURE — 82728 ASSAY OF FERRITIN: CPT | Performed by: FAMILY MEDICINE

## 2021-09-27 LAB — VIT B1 BLD-MCNC: 94 UG/L (ref 38–122)

## 2021-09-28 ENCOUNTER — DOCUMENTATION ONLY (OUTPATIENT)
Dept: FAMILY MEDICINE | Facility: CLINIC | Age: 76
End: 2021-09-28

## 2021-09-28 ENCOUNTER — OFFICE VISIT (OUTPATIENT)
Dept: FAMILY MEDICINE | Facility: CLINIC | Age: 76
End: 2021-09-28
Payer: MEDICARE

## 2021-09-28 VITALS
DIASTOLIC BLOOD PRESSURE: 60 MMHG | WEIGHT: 147.94 LBS | HEIGHT: 68 IN | OXYGEN SATURATION: 97 % | RESPIRATION RATE: 16 BRPM | HEART RATE: 84 BPM | BODY MASS INDEX: 22.42 KG/M2 | SYSTOLIC BLOOD PRESSURE: 142 MMHG | TEMPERATURE: 99 F

## 2021-09-28 DIAGNOSIS — N52.9 ERECTILE DYSFUNCTION, UNSPECIFIED ERECTILE DYSFUNCTION TYPE: ICD-10-CM

## 2021-09-28 DIAGNOSIS — G47.00 INSOMNIA, UNSPECIFIED TYPE: ICD-10-CM

## 2021-09-28 DIAGNOSIS — N18.31 CHRONIC KIDNEY DISEASE, STAGE 3A: ICD-10-CM

## 2021-09-28 DIAGNOSIS — Z12.11 COLON CANCER SCREENING: ICD-10-CM

## 2021-09-28 DIAGNOSIS — M06.9 RHEUMATOID ARTHRITIS INVOLVING MULTIPLE SITES, UNSPECIFIED WHETHER RHEUMATOID FACTOR PRESENT: ICD-10-CM

## 2021-09-28 DIAGNOSIS — Z00.00 HEALTHCARE MAINTENANCE: Primary | ICD-10-CM

## 2021-09-28 DIAGNOSIS — K21.9 GASTROESOPHAGEAL REFLUX DISEASE WITHOUT ESOPHAGITIS: ICD-10-CM

## 2021-09-28 DIAGNOSIS — J43.1 PANLOBULAR EMPHYSEMA: ICD-10-CM

## 2021-09-28 DIAGNOSIS — C61 PROSTATE CANCER: ICD-10-CM

## 2021-09-28 DIAGNOSIS — I10 ESSENTIAL HYPERTENSION: ICD-10-CM

## 2021-09-28 PROCEDURE — 3077F SYST BP >= 140 MM HG: CPT | Mod: CPTII,S$GLB,, | Performed by: STUDENT IN AN ORGANIZED HEALTH CARE EDUCATION/TRAINING PROGRAM

## 2021-09-28 PROCEDURE — 1126F AMNT PAIN NOTED NONE PRSNT: CPT | Mod: CPTII,S$GLB,, | Performed by: STUDENT IN AN ORGANIZED HEALTH CARE EDUCATION/TRAINING PROGRAM

## 2021-09-28 PROCEDURE — 3078F PR MOST RECENT DIASTOLIC BLOOD PRESSURE < 80 MM HG: ICD-10-PCS | Mod: CPTII,S$GLB,, | Performed by: STUDENT IN AN ORGANIZED HEALTH CARE EDUCATION/TRAINING PROGRAM

## 2021-09-28 PROCEDURE — 99999 PR PBB SHADOW E&M-EST. PATIENT-LVL V: CPT | Mod: PBBFAC,,, | Performed by: STUDENT IN AN ORGANIZED HEALTH CARE EDUCATION/TRAINING PROGRAM

## 2021-09-28 PROCEDURE — 3008F BODY MASS INDEX DOCD: CPT | Mod: CPTII,S$GLB,, | Performed by: STUDENT IN AN ORGANIZED HEALTH CARE EDUCATION/TRAINING PROGRAM

## 2021-09-28 PROCEDURE — 3077F PR MOST RECENT SYSTOLIC BLOOD PRESSURE >= 140 MM HG: ICD-10-PCS | Mod: CPTII,S$GLB,, | Performed by: STUDENT IN AN ORGANIZED HEALTH CARE EDUCATION/TRAINING PROGRAM

## 2021-09-28 PROCEDURE — 3288F FALL RISK ASSESSMENT DOCD: CPT | Mod: CPTII,S$GLB,, | Performed by: STUDENT IN AN ORGANIZED HEALTH CARE EDUCATION/TRAINING PROGRAM

## 2021-09-28 PROCEDURE — 99499 RISK ADDL DX/OHS AUDIT: ICD-10-PCS | Mod: S$GLB,,, | Performed by: STUDENT IN AN ORGANIZED HEALTH CARE EDUCATION/TRAINING PROGRAM

## 2021-09-28 PROCEDURE — 3066F PR DOCUMENTATION OF TREATMENT FOR NEPHROPATHY: ICD-10-PCS | Mod: CPTII,S$GLB,, | Performed by: STUDENT IN AN ORGANIZED HEALTH CARE EDUCATION/TRAINING PROGRAM

## 2021-09-28 PROCEDURE — 1159F PR MEDICATION LIST DOCUMENTED IN MEDICAL RECORD: ICD-10-PCS | Mod: CPTII,S$GLB,, | Performed by: STUDENT IN AN ORGANIZED HEALTH CARE EDUCATION/TRAINING PROGRAM

## 2021-09-28 PROCEDURE — 1126F PR PAIN SEVERITY QUANTIFIED, NO PAIN PRESENT: ICD-10-PCS | Mod: CPTII,S$GLB,, | Performed by: STUDENT IN AN ORGANIZED HEALTH CARE EDUCATION/TRAINING PROGRAM

## 2021-09-28 PROCEDURE — 99999 PR PBB SHADOW E&M-EST. PATIENT-LVL V: ICD-10-PCS | Mod: PBBFAC,,, | Performed by: STUDENT IN AN ORGANIZED HEALTH CARE EDUCATION/TRAINING PROGRAM

## 2021-09-28 PROCEDURE — 1101F PT FALLS ASSESS-DOCD LE1/YR: CPT | Mod: CPTII,S$GLB,, | Performed by: STUDENT IN AN ORGANIZED HEALTH CARE EDUCATION/TRAINING PROGRAM

## 2021-09-28 PROCEDURE — 3066F NEPHROPATHY DOC TX: CPT | Mod: CPTII,S$GLB,, | Performed by: STUDENT IN AN ORGANIZED HEALTH CARE EDUCATION/TRAINING PROGRAM

## 2021-09-28 PROCEDURE — 3078F DIAST BP <80 MM HG: CPT | Mod: CPTII,S$GLB,, | Performed by: STUDENT IN AN ORGANIZED HEALTH CARE EDUCATION/TRAINING PROGRAM

## 2021-09-28 PROCEDURE — 3288F PR FALLS RISK ASSESSMENT DOCUMENTED: ICD-10-PCS | Mod: CPTII,S$GLB,, | Performed by: STUDENT IN AN ORGANIZED HEALTH CARE EDUCATION/TRAINING PROGRAM

## 2021-09-28 PROCEDURE — 99214 OFFICE O/P EST MOD 30 MIN: CPT | Mod: S$GLB,,, | Performed by: STUDENT IN AN ORGANIZED HEALTH CARE EDUCATION/TRAINING PROGRAM

## 2021-09-28 PROCEDURE — 99214 PR OFFICE/OUTPT VISIT, EST, LEVL IV, 30-39 MIN: ICD-10-PCS | Mod: S$GLB,,, | Performed by: STUDENT IN AN ORGANIZED HEALTH CARE EDUCATION/TRAINING PROGRAM

## 2021-09-28 PROCEDURE — 1101F PR PT FALLS ASSESS DOC 0-1 FALLS W/OUT INJ PAST YR: ICD-10-PCS | Mod: CPTII,S$GLB,, | Performed by: STUDENT IN AN ORGANIZED HEALTH CARE EDUCATION/TRAINING PROGRAM

## 2021-09-28 PROCEDURE — 1159F MED LIST DOCD IN RCRD: CPT | Mod: CPTII,S$GLB,, | Performed by: STUDENT IN AN ORGANIZED HEALTH CARE EDUCATION/TRAINING PROGRAM

## 2021-09-28 PROCEDURE — 3008F PR BODY MASS INDEX (BMI) DOCUMENTED: ICD-10-PCS | Mod: CPTII,S$GLB,, | Performed by: STUDENT IN AN ORGANIZED HEALTH CARE EDUCATION/TRAINING PROGRAM

## 2021-09-28 PROCEDURE — 99499 UNLISTED E&M SERVICE: CPT | Mod: S$GLB,,, | Performed by: STUDENT IN AN ORGANIZED HEALTH CARE EDUCATION/TRAINING PROGRAM

## 2021-09-28 RX ORDER — TRAZODONE HYDROCHLORIDE 100 MG/1
100 TABLET ORAL NIGHTLY PRN
Qty: 90 TABLET | Refills: 0 | Status: SHIPPED | OUTPATIENT
Start: 2021-09-28 | End: 2022-03-07

## 2021-09-28 RX ORDER — SILDENAFIL 100 MG/1
100 TABLET, FILM COATED ORAL DAILY PRN
Qty: 10 TABLET | Refills: 2 | Status: SHIPPED | OUTPATIENT
Start: 2021-09-28 | End: 2022-06-22

## 2021-09-28 RX ORDER — SILDENAFIL 100 MG/1
100 TABLET, FILM COATED ORAL DAILY PRN
Qty: 10 TABLET | Refills: 2 | Status: SHIPPED | OUTPATIENT
Start: 2021-09-28 | End: 2021-09-28 | Stop reason: SDUPTHER

## 2021-10-06 ENCOUNTER — CLINICAL SUPPORT (OUTPATIENT)
Dept: FAMILY MEDICINE | Facility: CLINIC | Age: 76
End: 2021-10-06
Payer: MEDICARE

## 2021-10-06 ENCOUNTER — TELEPHONE (OUTPATIENT)
Dept: FAMILY MEDICINE | Facility: CLINIC | Age: 76
End: 2021-10-06

## 2021-10-06 VITALS — HEART RATE: 78 BPM | SYSTOLIC BLOOD PRESSURE: 136 MMHG | DIASTOLIC BLOOD PRESSURE: 74 MMHG

## 2021-10-06 PROCEDURE — 99999 PR PBB SHADOW E&M-EST. PATIENT-LVL II: ICD-10-PCS | Mod: PBBFAC,,,

## 2021-10-06 PROCEDURE — 99999 PR PBB SHADOW E&M-EST. PATIENT-LVL II: CPT | Mod: PBBFAC,,,

## 2021-10-07 ENCOUNTER — PATIENT MESSAGE (OUTPATIENT)
Dept: ADMINISTRATIVE | Facility: HOSPITAL | Age: 76
End: 2021-10-07

## 2021-10-11 ENCOUNTER — IMMUNIZATION (OUTPATIENT)
Dept: PRIMARY CARE CLINIC | Facility: CLINIC | Age: 76
End: 2021-10-11
Payer: MEDICARE

## 2021-10-11 DIAGNOSIS — Z23 NEED FOR VACCINATION: Primary | ICD-10-CM

## 2021-10-11 PROCEDURE — 91300 COVID-19, MRNA, LNP-S, PF, 30 MCG/0.3 ML DOSE VACCINE: CPT | Mod: S$GLB,,, | Performed by: FAMILY MEDICINE

## 2021-10-11 PROCEDURE — 0003A COVID-19, MRNA, LNP-S, PF, 30 MCG/0.3 ML DOSE VACCINE: CPT | Mod: S$GLB,,, | Performed by: FAMILY MEDICINE

## 2021-10-11 PROCEDURE — 0003A COVID-19, MRNA, LNP-S, PF, 30 MCG/0.3 ML DOSE VACCINE: ICD-10-PCS | Mod: S$GLB,,, | Performed by: FAMILY MEDICINE

## 2021-10-11 PROCEDURE — 91300 COVID-19, MRNA, LNP-S, PF, 30 MCG/0.3 ML DOSE VACCINE: ICD-10-PCS | Mod: S$GLB,,, | Performed by: FAMILY MEDICINE

## 2021-10-14 ENCOUNTER — OFFICE VISIT (OUTPATIENT)
Dept: RHEUMATOLOGY | Facility: CLINIC | Age: 76
End: 2021-10-14
Payer: MEDICARE

## 2021-10-14 VITALS
BODY MASS INDEX: 22.29 KG/M2 | WEIGHT: 146.63 LBS | SYSTOLIC BLOOD PRESSURE: 138 MMHG | DIASTOLIC BLOOD PRESSURE: 72 MMHG

## 2021-10-14 DIAGNOSIS — Z79.899 ENCOUNTER FOR LONG-TERM (CURRENT) USE OF OTHER MEDICATIONS: ICD-10-CM

## 2021-10-14 DIAGNOSIS — N18.30 STAGE 3 CHRONIC KIDNEY DISEASE, UNSPECIFIED WHETHER STAGE 3A OR 3B CKD: ICD-10-CM

## 2021-10-14 DIAGNOSIS — M19.90 CHRONIC INFLAMMATORY ARTHRITIS: Primary | ICD-10-CM

## 2021-10-14 PROCEDURE — 99213 OFFICE O/P EST LOW 20 MIN: CPT | Mod: S$GLB,,, | Performed by: INTERNAL MEDICINE

## 2021-10-14 PROCEDURE — 1160F PR REVIEW ALL MEDS BY PRESCRIBER/CLIN PHARMACIST DOCUMENTED: ICD-10-PCS | Mod: S$GLB,,, | Performed by: INTERNAL MEDICINE

## 2021-10-14 PROCEDURE — 99213 PR OFFICE/OUTPT VISIT, EST, LEVL III, 20-29 MIN: ICD-10-PCS | Mod: S$GLB,,, | Performed by: INTERNAL MEDICINE

## 2021-10-14 PROCEDURE — 1159F PR MEDICATION LIST DOCUMENTED IN MEDICAL RECORD: ICD-10-PCS | Mod: S$GLB,,, | Performed by: INTERNAL MEDICINE

## 2021-10-14 PROCEDURE — 1125F AMNT PAIN NOTED PAIN PRSNT: CPT | Mod: S$GLB,,, | Performed by: INTERNAL MEDICINE

## 2021-10-14 PROCEDURE — 3075F SYST BP GE 130 - 139MM HG: CPT | Mod: S$GLB,,, | Performed by: INTERNAL MEDICINE

## 2021-10-14 PROCEDURE — 3008F BODY MASS INDEX DOCD: CPT | Mod: S$GLB,,, | Performed by: INTERNAL MEDICINE

## 2021-10-14 PROCEDURE — 3066F PR DOCUMENTATION OF TREATMENT FOR NEPHROPATHY: ICD-10-PCS | Mod: S$GLB,,, | Performed by: INTERNAL MEDICINE

## 2021-10-14 PROCEDURE — 3078F PR MOST RECENT DIASTOLIC BLOOD PRESSURE < 80 MM HG: ICD-10-PCS | Mod: S$GLB,,, | Performed by: INTERNAL MEDICINE

## 2021-10-14 PROCEDURE — 3078F DIAST BP <80 MM HG: CPT | Mod: S$GLB,,, | Performed by: INTERNAL MEDICINE

## 2021-10-14 PROCEDURE — 1160F RVW MEDS BY RX/DR IN RCRD: CPT | Mod: S$GLB,,, | Performed by: INTERNAL MEDICINE

## 2021-10-14 PROCEDURE — 3066F NEPHROPATHY DOC TX: CPT | Mod: S$GLB,,, | Performed by: INTERNAL MEDICINE

## 2021-10-14 PROCEDURE — 3288F FALL RISK ASSESSMENT DOCD: CPT | Mod: S$GLB,,, | Performed by: INTERNAL MEDICINE

## 2021-10-14 PROCEDURE — 3288F PR FALLS RISK ASSESSMENT DOCUMENTED: ICD-10-PCS | Mod: S$GLB,,, | Performed by: INTERNAL MEDICINE

## 2021-10-14 PROCEDURE — 3008F PR BODY MASS INDEX (BMI) DOCUMENTED: ICD-10-PCS | Mod: S$GLB,,, | Performed by: INTERNAL MEDICINE

## 2021-10-14 PROCEDURE — 1125F PR PAIN SEVERITY QUANTIFIED, PAIN PRESENT: ICD-10-PCS | Mod: S$GLB,,, | Performed by: INTERNAL MEDICINE

## 2021-10-14 PROCEDURE — 1159F MED LIST DOCD IN RCRD: CPT | Mod: S$GLB,,, | Performed by: INTERNAL MEDICINE

## 2021-10-14 PROCEDURE — 1101F PT FALLS ASSESS-DOCD LE1/YR: CPT | Mod: S$GLB,,, | Performed by: INTERNAL MEDICINE

## 2021-10-14 PROCEDURE — 3075F PR MOST RECENT SYSTOLIC BLOOD PRESS GE 130-139MM HG: ICD-10-PCS | Mod: S$GLB,,, | Performed by: INTERNAL MEDICINE

## 2021-10-14 PROCEDURE — 1101F PR PT FALLS ASSESS DOC 0-1 FALLS W/OUT INJ PAST YR: ICD-10-PCS | Mod: S$GLB,,, | Performed by: INTERNAL MEDICINE

## 2021-10-29 ENCOUNTER — LAB VISIT (OUTPATIENT)
Dept: LAB | Facility: HOSPITAL | Age: 76
End: 2021-10-29
Attending: UROLOGY
Payer: MEDICARE

## 2021-10-29 DIAGNOSIS — C61 PROSTATE CANCER: ICD-10-CM

## 2021-10-29 LAB — COMPLEXED PSA SERPL-MCNC: 0.11 NG/ML (ref 0–4)

## 2021-10-29 PROCEDURE — 84153 ASSAY OF PSA TOTAL: CPT | Performed by: UROLOGY

## 2021-10-29 PROCEDURE — 36415 COLL VENOUS BLD VENIPUNCTURE: CPT | Mod: PO | Performed by: UROLOGY

## 2021-11-12 ENCOUNTER — PES CALL (OUTPATIENT)
Dept: ADMINISTRATIVE | Facility: CLINIC | Age: 76
End: 2021-11-12
Payer: MEDICARE

## 2021-11-15 ENCOUNTER — PATIENT OUTREACH (OUTPATIENT)
Dept: ADMINISTRATIVE | Facility: OTHER | Age: 76
End: 2021-11-15
Payer: MEDICARE

## 2021-11-29 ENCOUNTER — OFFICE VISIT (OUTPATIENT)
Dept: UROLOGY | Facility: CLINIC | Age: 76
End: 2021-11-29
Payer: MEDICARE

## 2021-11-29 VITALS
DIASTOLIC BLOOD PRESSURE: 73 MMHG | WEIGHT: 142.19 LBS | SYSTOLIC BLOOD PRESSURE: 137 MMHG | RESPIRATION RATE: 18 BRPM | BODY MASS INDEX: 21.55 KG/M2 | HEIGHT: 68 IN | HEART RATE: 94 BPM

## 2021-11-29 DIAGNOSIS — N18.2 STAGE 2 CHRONIC KIDNEY DISEASE: ICD-10-CM

## 2021-11-29 DIAGNOSIS — C61 PROSTATE CANCER: ICD-10-CM

## 2021-11-29 DIAGNOSIS — N13.8 BPH WITH OBSTRUCTION/LOWER URINARY TRACT SYMPTOMS: Primary | ICD-10-CM

## 2021-11-29 DIAGNOSIS — R35.1 BPH ASSOCIATED WITH NOCTURIA: ICD-10-CM

## 2021-11-29 DIAGNOSIS — N40.1 BPH WITH OBSTRUCTION/LOWER URINARY TRACT SYMPTOMS: Primary | ICD-10-CM

## 2021-11-29 DIAGNOSIS — N40.1 BPH ASSOCIATED WITH NOCTURIA: ICD-10-CM

## 2021-11-29 LAB
BILIRUB SERPL-MCNC: ABNORMAL MG/DL
BLOOD URINE, POC: ABNORMAL
CLARITY, POC UA: CLEAR
COLOR, POC UA: YELLOW
GLUCOSE UR QL STRIP: ABNORMAL
KETONES UR QL STRIP: ABNORMAL
LEUKOCYTE ESTERASE URINE, POC: ABNORMAL
NITRITE, POC UA: ABNORMAL
PH, POC UA: 5.5
POC RESIDUAL URINE VOLUME: 0 ML (ref 0–100)
PROTEIN, POC: 30
SPECIFIC GRAVITY, POC UA: 1.02
UROBILINOGEN, POC UA: 0.2

## 2021-11-29 PROCEDURE — 51798 US URINE CAPACITY MEASURE: CPT | Mod: S$GLB,,, | Performed by: NURSE PRACTITIONER

## 2021-11-29 PROCEDURE — 99999 PR PBB SHADOW E&M-EST. PATIENT-LVL IV: ICD-10-PCS | Mod: PBBFAC,,, | Performed by: NURSE PRACTITIONER

## 2021-11-29 PROCEDURE — 99999 PR PBB SHADOW E&M-EST. PATIENT-LVL IV: CPT | Mod: PBBFAC,,, | Performed by: NURSE PRACTITIONER

## 2021-11-29 PROCEDURE — 81002 URINALYSIS NONAUTO W/O SCOPE: CPT | Mod: S$GLB,,, | Performed by: NURSE PRACTITIONER

## 2021-11-29 PROCEDURE — 99214 PR OFFICE/OUTPT VISIT, EST, LEVL IV, 30-39 MIN: ICD-10-PCS | Mod: S$GLB,,, | Performed by: NURSE PRACTITIONER

## 2021-11-29 PROCEDURE — 81002 POCT URINE DIPSTICK WITHOUT MICROSCOPE: ICD-10-PCS | Mod: S$GLB,,, | Performed by: NURSE PRACTITIONER

## 2021-11-29 PROCEDURE — 3066F NEPHROPATHY DOC TX: CPT | Mod: CPTII,S$GLB,, | Performed by: NURSE PRACTITIONER

## 2021-11-29 PROCEDURE — 99214 OFFICE O/P EST MOD 30 MIN: CPT | Mod: S$GLB,,, | Performed by: NURSE PRACTITIONER

## 2021-11-29 PROCEDURE — 99499 UNLISTED E&M SERVICE: CPT | Mod: S$GLB,,, | Performed by: NURSE PRACTITIONER

## 2021-11-29 PROCEDURE — 51798 PR MEAS,POST-VOID RES,US,NON-IMAGING: ICD-10-PCS | Mod: S$GLB,,, | Performed by: NURSE PRACTITIONER

## 2021-11-29 PROCEDURE — 99499 RISK ADDL DX/OHS AUDIT: ICD-10-PCS | Mod: S$GLB,,, | Performed by: NURSE PRACTITIONER

## 2021-11-29 PROCEDURE — 3066F PR DOCUMENTATION OF TREATMENT FOR NEPHROPATHY: ICD-10-PCS | Mod: CPTII,S$GLB,, | Performed by: NURSE PRACTITIONER

## 2021-11-29 RX ORDER — ALFUZOSIN HYDROCHLORIDE 10 MG/1
TABLET, EXTENDED RELEASE ORAL
Qty: 30 TABLET | Refills: 6 | Status: SHIPPED | OUTPATIENT
Start: 2021-11-29 | End: 2022-03-07

## 2021-12-01 ENCOUNTER — TELEPHONE (OUTPATIENT)
Dept: UROLOGY | Facility: CLINIC | Age: 76
End: 2021-12-01
Payer: MEDICARE

## 2021-12-01 DIAGNOSIS — N40.1 BPH WITH OBSTRUCTION/LOWER URINARY TRACT SYMPTOMS: Primary | ICD-10-CM

## 2021-12-01 DIAGNOSIS — N13.8 BPH WITH OBSTRUCTION/LOWER URINARY TRACT SYMPTOMS: Primary | ICD-10-CM

## 2021-12-02 ENCOUNTER — PATIENT OUTREACH (OUTPATIENT)
Dept: ADMINISTRATIVE | Facility: OTHER | Age: 76
End: 2021-12-02
Payer: MEDICARE

## 2021-12-02 ENCOUNTER — TELEPHONE (OUTPATIENT)
Dept: RADIATION ONCOLOGY | Facility: CLINIC | Age: 76
End: 2021-12-02
Payer: MEDICARE

## 2021-12-02 ENCOUNTER — LAB VISIT (OUTPATIENT)
Dept: LAB | Facility: HOSPITAL | Age: 76
End: 2021-12-02
Attending: INTERNAL MEDICINE
Payer: MEDICARE

## 2021-12-02 ENCOUNTER — PATIENT MESSAGE (OUTPATIENT)
Dept: ADMINISTRATIVE | Facility: OTHER | Age: 76
End: 2021-12-02
Payer: MEDICARE

## 2021-12-02 DIAGNOSIS — E21.3 HYPERPARATHYROIDISM: ICD-10-CM

## 2021-12-02 DIAGNOSIS — E04.2 MULTINODULAR GOITER: ICD-10-CM

## 2021-12-02 PROCEDURE — 84443 ASSAY THYROID STIM HORMONE: CPT | Performed by: INTERNAL MEDICINE

## 2021-12-02 PROCEDURE — 80053 COMPREHEN METABOLIC PANEL: CPT | Performed by: INTERNAL MEDICINE

## 2021-12-02 PROCEDURE — 83970 ASSAY OF PARATHORMONE: CPT | Performed by: INTERNAL MEDICINE

## 2021-12-02 PROCEDURE — 36415 COLL VENOUS BLD VENIPUNCTURE: CPT | Mod: PO | Performed by: INTERNAL MEDICINE

## 2021-12-03 ENCOUNTER — OFFICE VISIT (OUTPATIENT)
Dept: RHEUMATOLOGY | Facility: CLINIC | Age: 76
End: 2021-12-03
Payer: MEDICARE

## 2021-12-03 ENCOUNTER — HOSPITAL ENCOUNTER (OUTPATIENT)
Dept: RADIOLOGY | Facility: HOSPITAL | Age: 76
Discharge: HOME | End: 2021-12-03
Attending: INTERNAL MEDICINE
Payer: MEDICARE

## 2021-12-03 VITALS — BODY MASS INDEX: 22.35 KG/M2 | WEIGHT: 147.5 LBS | HEIGHT: 68 IN

## 2021-12-03 DIAGNOSIS — M05.79 RHEUMATOID ARTHRITIS INVOLVING MULTIPLE SITES WITH POSITIVE RHEUMATOID FACTOR: Primary | ICD-10-CM

## 2021-12-03 DIAGNOSIS — Z79.899 ENCOUNTER FOR LONG-TERM (CURRENT) USE OF MEDICATIONS: ICD-10-CM

## 2021-12-03 DIAGNOSIS — M05.79 RHEUMATOID ARTHRITIS INVOLVING MULTIPLE SITES WITH POSITIVE RHEUMATOID FACTOR: ICD-10-CM

## 2021-12-03 LAB
ALBUMIN SERPL BCP-MCNC: 3.2 G/DL (ref 3.5–5.2)
ALP SERPL-CCNC: 70 U/L (ref 55–135)
ALT SERPL W/O P-5'-P-CCNC: 10 U/L (ref 10–44)
ANION GAP SERPL CALC-SCNC: 12 MMOL/L (ref 8–16)
AST SERPL-CCNC: 17 U/L (ref 10–40)
BILIRUB SERPL-MCNC: 0.3 MG/DL (ref 0.1–1)
BUN SERPL-MCNC: 19 MG/DL (ref 8–23)
CALCIUM SERPL-MCNC: 8.8 MG/DL (ref 8.7–10.5)
CHLORIDE SERPL-SCNC: 112 MMOL/L (ref 95–110)
CO2 SERPL-SCNC: 17 MMOL/L (ref 23–29)
CREAT SERPL-MCNC: 1.4 MG/DL (ref 0.5–1.4)
EST. GFR  (AFRICAN AMERICAN): 56.8 ML/MIN/1.73 M^2
EST. GFR  (NON AFRICAN AMERICAN): 49.1 ML/MIN/1.73 M^2
GLUCOSE SERPL-MCNC: 79 MG/DL (ref 70–110)
POTASSIUM SERPL-SCNC: 4.5 MMOL/L (ref 3.5–5.1)
PROT SERPL-MCNC: 7.5 G/DL (ref 6–8.4)
PTH-INTACT SERPL-MCNC: 159.8 PG/ML (ref 9–77)
SODIUM SERPL-SCNC: 141 MMOL/L (ref 136–145)
TSH SERPL DL<=0.005 MIU/L-ACNC: 1.38 UIU/ML (ref 0.4–4)

## 2021-12-03 PROCEDURE — 99999 PR PBB SHADOW E&M-EST. PATIENT-LVL III: ICD-10-PCS | Mod: PBBFAC,,, | Performed by: INTERNAL MEDICINE

## 2021-12-03 PROCEDURE — 77077 JOINT SURVEY SINGLE VIEW: CPT | Mod: TC

## 2021-12-03 PROCEDURE — 77077 JOINT SURVEY SINGLE VIEW: CPT | Mod: 26,,, | Performed by: RADIOLOGY

## 2021-12-03 PROCEDURE — 3066F PR DOCUMENTATION OF TREATMENT FOR NEPHROPATHY: ICD-10-PCS | Mod: CPTII,S$GLB,, | Performed by: INTERNAL MEDICINE

## 2021-12-03 PROCEDURE — 99204 PR OFFICE/OUTPT VISIT, NEW, LEVL IV, 45-59 MIN: ICD-10-PCS | Mod: S$GLB,,, | Performed by: INTERNAL MEDICINE

## 2021-12-03 PROCEDURE — 99999 PR PBB SHADOW E&M-EST. PATIENT-LVL III: CPT | Mod: PBBFAC,,, | Performed by: INTERNAL MEDICINE

## 2021-12-03 PROCEDURE — 99204 OFFICE O/P NEW MOD 45 MIN: CPT | Mod: S$GLB,,, | Performed by: INTERNAL MEDICINE

## 2021-12-03 PROCEDURE — 77077 XR ARTHRITIS SURVEY: ICD-10-PCS | Mod: 26,,, | Performed by: RADIOLOGY

## 2021-12-03 PROCEDURE — 3066F NEPHROPATHY DOC TX: CPT | Mod: CPTII,S$GLB,, | Performed by: INTERNAL MEDICINE

## 2021-12-03 RX ORDER — TRAMADOL HYDROCHLORIDE 50 MG/1
50 TABLET ORAL EVERY 6 HOURS PRN
Qty: 120 TABLET | Refills: 0 | Status: SHIPPED | OUTPATIENT
Start: 2021-12-03 | End: 2022-02-22 | Stop reason: SDUPTHER

## 2021-12-03 ASSESSMENT — ROUTINE ASSESSMENT OF PATIENT INDEX DATA (RAPID3)
FATIGUE SCORE: 5
PAIN SCORE: 7
AM STIFFNESS SCORE: 1, YES
PSYCHOLOGICAL DISTRESS SCORE: 2.2
MDHAQ FUNCTION SCORE: 0
TOTAL RAPID3 SCORE: 4
PATIENT GLOBAL ASSESSMENT SCORE: 5

## 2021-12-09 ENCOUNTER — OFFICE VISIT (OUTPATIENT)
Dept: ENDOCRINOLOGY | Facility: CLINIC | Age: 76
End: 2021-12-09
Payer: MEDICARE

## 2021-12-09 VITALS
SYSTOLIC BLOOD PRESSURE: 154 MMHG | HEIGHT: 68 IN | OXYGEN SATURATION: 96 % | BODY MASS INDEX: 21.55 KG/M2 | DIASTOLIC BLOOD PRESSURE: 82 MMHG | WEIGHT: 142.19 LBS | TEMPERATURE: 98 F | HEART RATE: 99 BPM

## 2021-12-09 DIAGNOSIS — E04.1 NODULAR THYROID DISEASE: ICD-10-CM

## 2021-12-09 DIAGNOSIS — E21.3 HYPERPARATHYROIDISM: ICD-10-CM

## 2021-12-09 DIAGNOSIS — I10 PRIMARY HYPERTENSION: ICD-10-CM

## 2021-12-09 DIAGNOSIS — M81.8 OTHER OSTEOPOROSIS WITHOUT CURRENT PATHOLOGICAL FRACTURE: ICD-10-CM

## 2021-12-09 DIAGNOSIS — E04.2 MULTINODULAR GOITER: Primary | ICD-10-CM

## 2021-12-09 PROCEDURE — 3066F NEPHROPATHY DOC TX: CPT | Mod: CPTII,S$GLB,, | Performed by: INTERNAL MEDICINE

## 2021-12-09 PROCEDURE — 3066F PR DOCUMENTATION OF TREATMENT FOR NEPHROPATHY: ICD-10-PCS | Mod: CPTII,S$GLB,, | Performed by: INTERNAL MEDICINE

## 2021-12-09 PROCEDURE — 99214 PR OFFICE/OUTPT VISIT, EST, LEVL IV, 30-39 MIN: ICD-10-PCS | Mod: S$GLB,,, | Performed by: INTERNAL MEDICINE

## 2021-12-09 PROCEDURE — 99999 PR PBB SHADOW E&M-EST. PATIENT-LVL IV: ICD-10-PCS | Mod: PBBFAC,,, | Performed by: INTERNAL MEDICINE

## 2021-12-09 PROCEDURE — 99999 PR PBB SHADOW E&M-EST. PATIENT-LVL IV: CPT | Mod: PBBFAC,,, | Performed by: INTERNAL MEDICINE

## 2021-12-09 PROCEDURE — 99214 OFFICE O/P EST MOD 30 MIN: CPT | Mod: S$GLB,,, | Performed by: INTERNAL MEDICINE

## 2021-12-29 ENCOUNTER — TELEPHONE (OUTPATIENT)
Dept: RADIATION ONCOLOGY | Facility: CLINIC | Age: 76
End: 2021-12-29
Payer: MEDICARE

## 2021-12-29 NOTE — TELEPHONE ENCOUNTER
----- Message from Alec Eng Jr., MD sent at 12/1/2021  4:38 PM CST -----  Yessir, will do; poor compliance pt.    BB--  Please get back in for f/u. Thanks!  ----- Message -----  From: Garrett Pina MD  Sent: 12/1/2021   4:29 PM CST  To: Alec Eng Jr., MD    Can you review and help get him back in?

## 2022-01-18 DIAGNOSIS — N40.1 BPH WITH OBSTRUCTION/LOWER URINARY TRACT SYMPTOMS: Primary | ICD-10-CM

## 2022-01-18 DIAGNOSIS — N13.8 BPH WITH OBSTRUCTION/LOWER URINARY TRACT SYMPTOMS: Primary | ICD-10-CM

## 2022-01-27 ENCOUNTER — TELEPHONE (OUTPATIENT)
Dept: ENDOCRINOLOGY | Facility: CLINIC | Age: 77
End: 2022-01-27
Payer: MEDICARE

## 2022-01-27 DIAGNOSIS — M81.8 OTHER OSTEOPOROSIS WITHOUT CURRENT PATHOLOGICAL FRACTURE: Primary | ICD-10-CM

## 2022-01-27 NOTE — TELEPHONE ENCOUNTER
----- Message from Julia Parish RN sent at 1/27/2022  9:33 AM CST -----    ----- Message -----  From: Regina Lopez  Sent: 1/27/2022   9:12 AM CST  To: Yasmin GUERRERO Staff      Please confirm if the Prolia injection is still needed for Monday, 1/31.   Please see below.     Thank you,  Tim Tapia      ----- Message -----  From: Regina Lopez  Sent: 1/17/2022  11:01 AM CST  To: Yasmin GUERRERO Staff      Please contact patient with lab orders and appointment for Serum Calcium, needed prior to the next Prolia scheduled for 1/31/22.     We are only able to use calcium labs from within a month or no more than 6 weeks at the time of receiving Prolia.     He had to be rescheduled from 1/17 to 1/31 to allow 3 months from his dental extraction done in late October.       Thank you,    Tim Lopez  [facility]

## 2022-01-28 ENCOUNTER — LAB VISIT (OUTPATIENT)
Dept: LAB | Facility: HOSPITAL | Age: 77
End: 2022-01-28
Attending: INTERNAL MEDICINE
Payer: MEDICARE

## 2022-01-28 DIAGNOSIS — M81.8 OTHER OSTEOPOROSIS WITHOUT CURRENT PATHOLOGICAL FRACTURE: ICD-10-CM

## 2022-01-28 LAB
ANION GAP SERPL CALC-SCNC: 10 MMOL/L (ref 8–16)
BUN SERPL-MCNC: 10 MG/DL (ref 8–23)
CALCIUM SERPL-MCNC: 9.1 MG/DL (ref 8.7–10.5)
CHLORIDE SERPL-SCNC: 113 MMOL/L (ref 95–110)
CO2 SERPL-SCNC: 21 MMOL/L (ref 23–29)
CREAT SERPL-MCNC: 1.2 MG/DL (ref 0.5–1.4)
EST. GFR  (AFRICAN AMERICAN): >60 ML/MIN/1.73 M^2
EST. GFR  (NON AFRICAN AMERICAN): 59.2 ML/MIN/1.73 M^2
GLUCOSE SERPL-MCNC: 85 MG/DL (ref 70–110)
POTASSIUM SERPL-SCNC: 4.6 MMOL/L (ref 3.5–5.1)
SODIUM SERPL-SCNC: 144 MMOL/L (ref 136–145)

## 2022-01-28 PROCEDURE — 36415 COLL VENOUS BLD VENIPUNCTURE: CPT | Mod: PO | Performed by: INTERNAL MEDICINE

## 2022-01-28 PROCEDURE — 80048 BASIC METABOLIC PNL TOTAL CA: CPT | Performed by: INTERNAL MEDICINE

## 2022-02-10 ENCOUNTER — INFUSION (OUTPATIENT)
Dept: INFUSION THERAPY | Facility: HOSPITAL | Age: 77
End: 2022-02-10
Attending: INTERNAL MEDICINE
Payer: MEDICARE

## 2022-02-10 VITALS
HEART RATE: 101 BPM | TEMPERATURE: 97 F | BODY MASS INDEX: 21.29 KG/M2 | RESPIRATION RATE: 18 BRPM | SYSTOLIC BLOOD PRESSURE: 200 MMHG | WEIGHT: 140.5 LBS | HEIGHT: 68 IN | DIASTOLIC BLOOD PRESSURE: 91 MMHG

## 2022-02-10 DIAGNOSIS — M81.0 AGE-RELATED OSTEOPOROSIS WITHOUT CURRENT PATHOLOGICAL FRACTURE: Primary | ICD-10-CM

## 2022-02-10 PROCEDURE — 96372 THER/PROPH/DIAG INJ SC/IM: CPT

## 2022-02-10 PROCEDURE — 63600175 PHARM REV CODE 636 W HCPCS: Mod: JG | Performed by: INTERNAL MEDICINE

## 2022-02-10 RX ADMIN — DENOSUMAB 60 MG: 60 INJECTION SUBCUTANEOUS at 01:02

## 2022-02-10 NOTE — PLAN OF CARE
Problem: Fatigue  Goal: Improved Activity Tolerance  Outcome: Ongoing, Progressing  Intervention: Promote Improved Energy  Flowsheets (Taken 2/10/2022 1305)  Fatigue Management:   frequent rest breaks encouraged   fatigue-related activity identified   paced activity encouraged  Sleep/Rest Enhancement:   regular sleep/rest pattern promoted   relaxation techniques promoted

## 2022-02-16 ENCOUNTER — TELEPHONE (OUTPATIENT)
Dept: UROLOGY | Facility: CLINIC | Age: 77
End: 2022-02-16
Payer: MEDICARE

## 2022-02-16 NOTE — TELEPHONE ENCOUNTER
----- Message from Sushila Nava sent at 2/16/2022  1:41 PM CST -----  Regarding: Call Back  Who Called: pt         What is the reason for the call: states he is calling to find out more information in regards to procedure he has scheduled on 2/22. Please contact to further discuss.          Can patient be contacted on [employer]: No          Call back number: [phone]

## 2022-02-16 NOTE — TELEPHONE ENCOUNTER
Spoke with patient informed him surgery center will call day prior with arrival time. Also described to patient step by step instructions for cystoscopy. Patient verbally voiced understanding.

## 2022-02-18 ENCOUNTER — PES CALL (OUTPATIENT)
Dept: ADMINISTRATIVE | Facility: CLINIC | Age: 77
End: 2022-02-18
Payer: MEDICARE

## 2022-02-19 ENCOUNTER — LAB VISIT (OUTPATIENT)
Dept: PRIMARY CARE CLINIC | Facility: CLINIC | Age: 77
End: 2022-02-19
Payer: MEDICARE

## 2022-02-19 DIAGNOSIS — N13.8 BPH WITH OBSTRUCTION/LOWER URINARY TRACT SYMPTOMS: ICD-10-CM

## 2022-02-19 DIAGNOSIS — N40.1 BPH WITH OBSTRUCTION/LOWER URINARY TRACT SYMPTOMS: ICD-10-CM

## 2022-02-19 PROCEDURE — U0005 INFEC AGEN DETEC AMPLI PROBE: HCPCS | Performed by: UROLOGY

## 2022-02-19 PROCEDURE — U0003 INFECTIOUS AGENT DETECTION BY NUCLEIC ACID (DNA OR RNA); SEVERE ACUTE RESPIRATORY SYNDROME CORONAVIRUS 2 (SARS-COV-2) (CORONAVIRUS DISEASE [COVID-19]), AMPLIFIED PROBE TECHNIQUE, MAKING USE OF HIGH THROUGHPUT TECHNOLOGIES AS DESCRIBED BY CMS-2020-01-R: HCPCS | Performed by: UROLOGY

## 2022-02-21 ENCOUNTER — PATIENT MESSAGE (OUTPATIENT)
Dept: ADMINISTRATIVE | Facility: CLINIC | Age: 77
End: 2022-02-21
Payer: MEDICARE

## 2022-02-21 ENCOUNTER — TELEPHONE (OUTPATIENT)
Dept: FAMILY MEDICINE | Facility: CLINIC | Age: 77
End: 2022-02-21
Payer: MEDICARE

## 2022-02-21 ENCOUNTER — HOSPITAL ENCOUNTER (EMERGENCY)
Facility: HOSPITAL | Age: 77
Discharge: HOME | End: 2022-02-21
Attending: EMERGENCY MEDICINE
Payer: MEDICARE

## 2022-02-21 VITALS
HEIGHT: 69 IN | SYSTOLIC BLOOD PRESSURE: 179 MMHG | HEART RATE: 85 BPM | TEMPERATURE: 98 F | BODY MASS INDEX: 20.73 KG/M2 | WEIGHT: 140 LBS | DIASTOLIC BLOOD PRESSURE: 88 MMHG | OXYGEN SATURATION: 100 % | RESPIRATION RATE: 16 BRPM

## 2022-02-21 DIAGNOSIS — U07.1 COVID: Primary | ICD-10-CM

## 2022-02-21 DIAGNOSIS — R06.02 SOB (SHORTNESS OF BREATH): ICD-10-CM

## 2022-02-21 DIAGNOSIS — F41.1 ANXIETY REACTION: ICD-10-CM

## 2022-02-21 LAB
ALBUMIN SERPL BCP-MCNC: 3.4 G/DL (ref 3.5–5.2)
ALP SERPL-CCNC: 67 U/L (ref 55–135)
ALT SERPL W/O P-5'-P-CCNC: 14 U/L (ref 10–44)
ANION GAP SERPL CALC-SCNC: 7 MMOL/L (ref 8–16)
AST SERPL-CCNC: 17 U/L (ref 10–40)
BASOPHILS # BLD AUTO: 0.01 K/UL (ref 0–0.2)
BASOPHILS NFR BLD: 0.2 % (ref 0–1.9)
BILIRUB SERPL-MCNC: 0.5 MG/DL (ref 0.1–1)
BNP SERPL-MCNC: 324 PG/ML (ref 0–99)
BUN SERPL-MCNC: 14 MG/DL (ref 8–23)
CALCIUM SERPL-MCNC: 8.6 MG/DL (ref 8.7–10.5)
CHLORIDE SERPL-SCNC: 112 MMOL/L (ref 95–110)
CO2 SERPL-SCNC: 23 MMOL/L (ref 23–29)
CREAT SERPL-MCNC: 1.4 MG/DL (ref 0.5–1.4)
D DIMER PPP IA.FEU-MCNC: 0.79 MG/L FEU
DIFFERENTIAL METHOD: ABNORMAL
EOSINOPHIL # BLD AUTO: 0.1 K/UL (ref 0–0.5)
EOSINOPHIL NFR BLD: 1.7 % (ref 0–8)
ERYTHROCYTE [DISTWIDTH] IN BLOOD BY AUTOMATED COUNT: 15.7 % (ref 11.5–14.5)
EST. GFR  (AFRICAN AMERICAN): 57 ML/MIN/1.73 M^2
EST. GFR  (NON AFRICAN AMERICAN): 49 ML/MIN/1.73 M^2
GLUCOSE SERPL-MCNC: 94 MG/DL (ref 70–110)
HCT VFR BLD AUTO: 40.3 % (ref 40–54)
HGB BLD-MCNC: 12.5 G/DL (ref 14–18)
IMM GRANULOCYTES # BLD AUTO: 0.02 K/UL (ref 0–0.04)
IMM GRANULOCYTES NFR BLD AUTO: 0.4 % (ref 0–0.5)
LYMPHOCYTES # BLD AUTO: 1.2 K/UL (ref 1–4.8)
LYMPHOCYTES NFR BLD: 26.4 % (ref 18–48)
MCH RBC QN AUTO: 27.2 PG (ref 27–31)
MCHC RBC AUTO-ENTMCNC: 31 G/DL (ref 32–36)
MCV RBC AUTO: 88 FL (ref 82–98)
MONOCYTES # BLD AUTO: 0.7 K/UL (ref 0.3–1)
MONOCYTES NFR BLD: 14.1 % (ref 4–15)
NEUTROPHILS # BLD AUTO: 2.6 K/UL (ref 1.8–7.7)
NEUTROPHILS NFR BLD: 57.2 % (ref 38–73)
NRBC BLD-RTO: 0 /100 WBC
PLATELET # BLD AUTO: 342 K/UL (ref 150–450)
PMV BLD AUTO: 11.4 FL (ref 9.2–12.9)
POTASSIUM SERPL-SCNC: 5 MMOL/L (ref 3.5–5.1)
PROT SERPL-MCNC: 8.2 G/DL (ref 6–8.4)
RBC # BLD AUTO: 4.6 M/UL (ref 4.6–6.2)
SARS-COV-2 RNA RESP QL NAA+PROBE: DETECTED
SODIUM SERPL-SCNC: 142 MMOL/L (ref 136–145)
WBC # BLD AUTO: 4.62 K/UL (ref 3.9–12.7)

## 2022-02-21 PROCEDURE — 99285 EMERGENCY DEPT VISIT HI MDM: CPT | Mod: 25

## 2022-02-21 PROCEDURE — 93010 ELECTROCARDIOGRAM REPORT: CPT | Mod: ,,, | Performed by: GENERAL PRACTICE

## 2022-02-21 PROCEDURE — 93010 EKG 12-LEAD: ICD-10-PCS | Mod: ,,, | Performed by: GENERAL PRACTICE

## 2022-02-21 PROCEDURE — 25000003 PHARM REV CODE 250: Performed by: EMERGENCY MEDICINE

## 2022-02-21 PROCEDURE — 85025 COMPLETE CBC W/AUTO DIFF WBC: CPT | Performed by: EMERGENCY MEDICINE

## 2022-02-21 PROCEDURE — 25500020 PHARM REV CODE 255: Performed by: EMERGENCY MEDICINE

## 2022-02-21 PROCEDURE — 83880 ASSAY OF NATRIURETIC PEPTIDE: CPT | Performed by: EMERGENCY MEDICINE

## 2022-02-21 PROCEDURE — 85379 FIBRIN DEGRADATION QUANT: CPT | Performed by: EMERGENCY MEDICINE

## 2022-02-21 PROCEDURE — 36415 COLL VENOUS BLD VENIPUNCTURE: CPT | Performed by: EMERGENCY MEDICINE

## 2022-02-21 PROCEDURE — 80053 COMPREHEN METABOLIC PANEL: CPT | Performed by: EMERGENCY MEDICINE

## 2022-02-21 PROCEDURE — 93005 ELECTROCARDIOGRAM TRACING: CPT

## 2022-02-21 RX ORDER — CLONAZEPAM 1 MG/1
1 TABLET ORAL 3 TIMES DAILY PRN
Qty: 15 TABLET | Refills: 0 | Status: SHIPPED | OUTPATIENT
Start: 2022-02-21 | End: 2022-03-07

## 2022-02-21 RX ORDER — IBUPROFEN 600 MG/1
600 TABLET ORAL
Status: COMPLETED | OUTPATIENT
Start: 2022-02-21 | End: 2022-02-21

## 2022-02-21 RX ADMIN — IOHEXOL 75 ML: 350 INJECTION, SOLUTION INTRAVENOUS at 02:02

## 2022-02-21 RX ADMIN — IBUPROFEN 600 MG: 600 TABLET ORAL at 12:02

## 2022-02-21 NOTE — ED NOTES
Dr. Ortiz is at the bedside of patient. He has been updated on plan of care and results. No needs or questions at this time. Patient is aware that staff from the infusion center will call him for an appointment. Patient verbalized understanding.

## 2022-02-21 NOTE — TELEPHONE ENCOUNTER
----- Message from Earnestine Morales sent at 2/21/2022  4:34 PM CST -----  Contact: pt  Type: Needs Medical Advice    Who Called: pt  Best Call Back Number: [phone]    Inquiry/Question: pt tested positive for COVID on 02/19/2022, he went to [facility] and they told him to have his primary doctor to put in an order for the infusion. Please call pt and advise what to do.       Thank you~

## 2022-02-21 NOTE — ED NOTES
Upon discharge, patient is AAOx4, no cardiac or respiratory complications. Follow up care and  Medications have been reviewed with patient and has been instructed to return to the ER if needed. Patient verbalized understanding and ambulated to the lobby without difficulty. LOURDES PABLO.

## 2022-02-21 NOTE — ED NOTES
Patient has been updated on new plan of care. Patient is tearful. Calming techniques provided. Call light within reach, bed in lowest position, side rails up x1 and brakes locked on bed . No needs or questions at this time. Patient does not have any c/o pain.

## 2022-02-21 NOTE — ED PROVIDER NOTES
Encounter Date: 2/21/2022    SCRIBE #1 NOTE: I, Maribel Cheney, am scribing for, and in the presence of, Gene Ortiz III, MD.       History     Chief Complaint   Patient presents with    Shortness of Breath    Headache     COVID positive Saturday      Time seen by provider: 12:01 PM on 02/21/2022    Rajendra Castle is a 76 y.o. male who presents to the ED with an onset of SOB, HA, generalized body aches, and intermittent sinus congestion x 5-6 days. Patient has procedure scheduled tomorrow and had pre-procedural COVID-19 screening with positive result 2 days ago. He states he was referred to ED for monoclonal antibody infusion and requests treatment for symptoms he has been experiencing. He denies taking any medications for relief PTA.  The patient denies chest discomfort, leg swelling, or any other symptoms at this time. No previous Hx of heart or lung problems or blood clot in legs or lungs. PMHx of HTN, DDD, arthritis, RA, vitamin D insufficiency, prostate CA. PSHx of TURP and parathyroidectomy.    The history is provided by the patient.     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 OR;  Service: Orthopedics;  Laterality: Right;    COLONOSCOPY  prior to 2016    normal findings per patient report    CYSTOSCOPY N/A 12/18/2018    Procedure: CYSTOSCOPY;  Surgeon: Garrett Pina MD;  Location: [facility] OR;  Service: Urology;  Laterality: N/A;    ESOPHAGOGASTRODUODENOSCOPY N/A 9/29/2020    Dr. Espinosa; empiric  dilation; erythematous mucosa in antrum; gastric mucosal atrophy; hematin in entire stomach; biopsy: mid & distal esophagus WNL, stomach- WNL, negative for h pylori    PARATHYROIDECTOMY Right 10/27/2020    Procedure: PARATHYROIDECTOMY;  Surgeon: Latonia Clayton MD;  Location: [facility] OR [address] [place] [address];  Service: ENT;  Laterality: Right;    RELEASE OF ULNAR NERVE AT CUBITAL TUNNEL Right 2021    Procedure: RELEASE, ULNAR TUNNEL;  Surgeon: Jose Ryan II, MD;  Location: [facility] OR;  Service: Orthopedics;  Laterality: Right;    TRANSRECTAL BIOPSY OF PROSTATE WITH ULTRASOUND GUIDANCE N/A 2018    Procedure: BIOPSY, PROSTATE, RECTAL APPROACH, WITH US GUIDANCE;  Surgeon: Garrett Pina MD;  Location: [facility] OR;  Service: Urology;  Laterality: N/A;     Family History   Problem Relation Age of Onset    Heart disease Mother     Stroke Father     Drug abuse Sister     Diabetes Maternal Uncle     Colon cancer Neg Hx     Crohn's disease Neg Hx     Esophageal cancer Neg Hx     Stomach cancer Neg Hx     Ulcerative colitis Neg Hx      Social History     Tobacco Use    Smoking status: Former Smoker     Packs/day: 0.25     Years: 10.00     Pack years: 2.50     Quit date: 2001     Years since quittin.5    Smokeless tobacco: Never Used   Substance Use Topics    Alcohol use: Yes     Comment: seldom    Drug use: Yes     Frequency: 8.0 times per week     Types: Marijuana     Review of Systems   Constitutional: Negative for activity change, appetite change, fatigue and fever.   HENT: Positive for congestion. Negative for rhinorrhea and sore throat.    Eyes: Negative for visual disturbance.   Respiratory: Positive for shortness of breath. Negative for cough, chest tightness and wheezing.    Cardiovascular: Negative for chest pain and palpitations.   Gastrointestinal: Negative for diarrhea, nausea and vomiting.   Musculoskeletal: Positive for myalgias (generalized). Negative for arthralgias.   Skin:  Negative for rash.   Neurological: Positive for headaches. Negative for weakness and light-headedness.       Physical Exam     Initial Vitals [02/21/22 1057]   BP Pulse Resp Temp SpO2   (!) 179/81 106 20 98.9 °F (37.2 °C) 96 %      MAP       --         Physical Exam    Nursing note and vitals reviewed.  Constitutional: He appears well-developed and well-nourished.   HENT:   Head: Normocephalic and atraumatic.   Eyes: Conjunctivae are normal.   Neck: Neck supple.   Normal range of motion.  Cardiovascular: Normal rate, regular rhythm and normal heart sounds. Exam reveals no gallop and no friction rub.    No murmur heard.  Pulmonary/Chest: No respiratory distress. He has no wheezes. He has rhonchi. He has no rales.   Bibasilar rhonchi.   Abdominal: Abdomen is soft. He exhibits no distension. There is no abdominal tenderness.   Musculoskeletal:         General: Normal range of motion.      Cervical back: Normal range of motion and neck supple.      Comments: No swelling of legs.     Neurological: He is alert and oriented to person, place, and time.   Skin: Skin is warm and dry.   Psychiatric: He has a normal mood and affect.         ED Course   Procedures  Labs Reviewed   B-TYPE NATRIURETIC PEPTIDE - Abnormal; Notable for the following components:       Result Value     (*)     All other components within normal limits   CBC W/ AUTO DIFFERENTIAL - Abnormal; Notable for the following components:    Hemoglobin 12.5 (*)     MCHC 31.0 (*)     RDW 15.7 (*)     All other components within normal limits   COMPREHENSIVE METABOLIC PANEL - Abnormal; Notable for the following components:    Chloride 112 (*)     Calcium 8.6 (*)     Albumin 3.4 (*)     Anion Gap 7 (*)     eGFR if  57 (*)     eGFR if non  49 (*)     All other components within normal limits   D DIMER, QUANTITATIVE - Abnormal; Notable for the following components:    D-Dimer 0.79 (*)     All other components within normal limits      EKG Readings: (Independently Interpreted)   Heart Rate: 87.   Normal sinus rhythm at a ventricular rate of 87 bpm with normal axis. T wave inversion in Leads I, II, III, AVL, V2-V6. Consistent with previous EKG. No STEMI.        Imaging Results          CTA Chest Non-Coronary(PE Studies) (Final result)  Result time 02/21/22 14:35:40    Final result by Ghanshyam Curiel Jr., MD (02/21/22 14:35:40)                 Impression:      No CTA evidence of pulmonary embolus.  Small pericardial effusion.  Mild dependent atelectasis noted.  Multiple calcified granulomas of the spleen.      Electronically signed by: Ghanshyam Curiel MD  Date:    02/21/2022  Time:    14:35             Narrative:    EXAMINATION:  CTA CHEST NON CORONARY    CLINICAL HISTORY:  Pulmonary embolism (PE) suspected, positive D-dimer;    TECHNIQUE:  Low dose axial images, sagittal and coronal reformations were obtained from the thoracic inlet to the lung bases following the IV administration of 75 mL of Omnipaque 350.  Contrast timing was optimized to evaluate the pulmonary arteries.  MIP images were performed.    COMPARISON:  Chest x-ray of February 21, 2022    FINDINGS:  There is no CTA evidence of pulmonary embolus.  Opacification of the pulmonary arteries is excellent.  Aortic dissection or aneurysm is not seen.  The cardiac size is within normal limits.  Adenopathy or soft tissue masses are not seen.  There is a small pericardial effusion noted.    Within the lung fields there is mild dependent atelectasis.  An intrapulmonary mass or nodule is not identified by this modality.  A consolidated infiltrate is not seen.  There is no pneumothorax or pleural effusion noted.    In the upper abdomen there are multiple calcified granulomas of the spleen.                               X-Ray Chest AP Portable (Final result)  Result time 02/21/22 13:12:54    Final result by Ghanshyam Curiel Jr., MD (02/21/22 13:12:54)                 Impression:      No  acute abnormality.      Electronically signed by: Ghanshyam Curiel MD  Date:    02/21/2022  Time:    13:12             Narrative:    EXAMINATION:  XR CHEST AP PORTABLE    CLINICAL HISTORY:  Shortness of breath    TECHNIQUE:  Single frontal view of the chest was performed.    COMPARISON:  Chest of May 25, 2021    FINDINGS:  The lungs are clear, with normal appearance of pulmonary vasculature and no pleural effusion or pneumothorax.    The cardiac silhouette is normal in size. The hilar and mediastinal contours are unremarkable.    Bones are intact.                                 Medications   ibuprofen tablet 600 mg (600 mg Oral Given 2/21/22 1230)   iohexoL (OMNIPAQUE 350) injection 75 mL (75 mLs Intravenous Given 2/21/22 3765)     Medical Decision Making:   History:   Old Medical Records: I decided to obtain old medical records.  Independently Interpreted Test(s):   I have ordered and independently interpreted EKG Reading(s) - see prior notes  Clinical Tests:   Lab Tests: Reviewed and Ordered  Radiological Study: Reviewed and Ordered  Medical Tests: Ordered and Reviewed  ED Management:  76-year-old male presents with shortness of breath.  He developed COVID like symptoms 5-6 days ago and was formally diagnosed with COVID 2 days ago.  Chest x-ray fails to demonstrate any evidence of pneumonia.  He has an elevated D-dimer which although normal for age adjustment CT angiogram is obtained to evaluate for pulmonary embolism.  There is no evidence of pneumonia, CHF or pulmonary embolism.  The patient appears very anxious with anxiety most likely responsible for his shortness of breath.  He is referred to outpatient infusion center and is given clonazepam for anxiety.  He is encouraged to return for worsening symptoms.       APC / Resident Notes:   I, Dr. Gene Ortiz III, personally performed the services described in this documentation. All medical record entries made by the scribe were at my direction and in my  presence.  I have reviewed the chart and agree that the record reflects my personal performance and is accurate and complete     Scribe Attestation:   Scribe #1: I performed the above scribed service and the documentation accurately describes the services I performed. I attest to the accuracy of the note.                 Clinical Impression:   Final diagnoses:  [R06.02] SOB (shortness of breath)  [U07.1] COVID (Primary)  [F41.1] Anxiety reaction          ED Disposition Condition    Discharge Stable        ED Prescriptions     Medication Sig Dispense Start Date End Date Auth. Provider    clonazePAM (KLONOPIN) 1 MG tablet Take 1 tablet (1 mg total) by mouth 3 (three) times daily as needed for Anxiety. 15 tablet 2/21/2022 2/21/2023 Gene Ortiz III, MD        Follow-up Information     Follow up With Specialties Details Why Contact Info    Irvin Aceves MD Family Medicine In 2 days  [address] [facility] [address]  [phone]             Gene Ortiz III, MD  02/21/22 9165

## 2022-02-22 ENCOUNTER — TELEPHONE (OUTPATIENT)
Dept: FAMILY MEDICINE | Facility: CLINIC | Age: 77
End: 2022-02-22
Payer: MEDICARE

## 2022-02-22 ENCOUNTER — PES CALL (OUTPATIENT)
Dept: ADMINISTRATIVE | Facility: CLINIC | Age: 77
End: 2022-02-22
Payer: MEDICARE

## 2022-02-22 ENCOUNTER — NURSE TRIAGE (OUTPATIENT)
Dept: ADMINISTRATIVE | Facility: CLINIC | Age: 77
End: 2022-02-22
Payer: MEDICARE

## 2022-02-22 DIAGNOSIS — M05.79 RHEUMATOID ARTHRITIS INVOLVING MULTIPLE SITES WITH POSITIVE RHEUMATOID FACTOR: Primary | ICD-10-CM

## 2022-02-22 RX ORDER — TRAMADOL HYDROCHLORIDE 50 MG/1
50 TABLET ORAL EVERY 6 HOURS PRN
Qty: 120 TABLET | Refills: 5 | Status: ON HOLD | OUTPATIENT
Start: 2022-02-22 | End: 2022-06-22 | Stop reason: HOSPADM

## 2022-02-22 NOTE — TELEPHONE ENCOUNTER
----- Message from Lenin Bush sent at 2/22/2022  8:45 AM CST -----  Patient is requesting tramadol to be sent to [facility] on [address]

## 2022-02-22 NOTE — TELEPHONE ENCOUNTER
----- Message from Elsy Morales sent at 2/22/2022  8:13 AM CST -----  Regarding: advice  Contact: self  Type: Needs Medical Advice  Who Called:  self  Symptoms (please be specific):    How long has patient had these symptoms:    Pharmacy name and phone #:    Best Call Back Number: [phone]  Additional Information: Patient test positive for covid, pt requesting an order for UA infusion.

## 2022-02-22 NOTE — TELEPHONE ENCOUNTER
Pt called and he said that he cant do the technology for enrollment to covid surveillance and pt will call [facility] if has any issues. Pt denies any trouble at this time he is waiting for them to call him about the infusion   Reason for Disposition   Information only question and nurse able to answer    Protocols used: INFORMATION ONLY CALL - NO TRIAGE-A-[place]

## 2022-02-23 ENCOUNTER — INFUSION (OUTPATIENT)
Dept: INFECTIOUS DISEASES | Facility: HOSPITAL | Age: 77
End: 2022-02-23
Attending: EMERGENCY MEDICINE
Payer: MEDICARE

## 2022-02-23 VITALS
TEMPERATURE: 97 F | HEIGHT: 69 IN | WEIGHT: 142 LBS | BODY MASS INDEX: 21.03 KG/M2 | OXYGEN SATURATION: 96 % | DIASTOLIC BLOOD PRESSURE: 70 MMHG | SYSTOLIC BLOOD PRESSURE: 157 MMHG | HEART RATE: 80 BPM | RESPIRATION RATE: 16 BRPM

## 2022-02-23 DIAGNOSIS — D84.9 IMMUNOSUPPRESSED STATUS: ICD-10-CM

## 2022-02-23 DIAGNOSIS — U07.1 COVID: ICD-10-CM

## 2022-02-23 PROCEDURE — M0247 HC IV INFUSION, SOTROVIMAB, INCL POST ADMIN MONIT: HCPCS | Performed by: INTERNAL MEDICINE

## 2022-02-23 PROCEDURE — 25000003 PHARM REV CODE 250: Performed by: INTERNAL MEDICINE

## 2022-02-23 PROCEDURE — 63600175 PHARM REV CODE 636 W HCPCS: Performed by: INTERNAL MEDICINE

## 2022-02-23 RX ORDER — EPINEPHRINE 0.3 MG/.3ML
0.3 INJECTION SUBCUTANEOUS
Status: ACTIVE | OUTPATIENT
Start: 2022-02-23 | End: 2022-03-02

## 2022-02-23 RX ORDER — ALBUTEROL SULFATE 90 UG/1
2 AEROSOL, METERED RESPIRATORY (INHALATION)
Status: ACTIVE | OUTPATIENT
Start: 2022-02-23 | End: 2022-03-02

## 2022-02-23 RX ORDER — SODIUM CHLORIDE 0.9 % (FLUSH) 0.9 %
10 SYRINGE (ML) INJECTION
Status: ACTIVE | OUTPATIENT
Start: 2022-02-23 | End: 2022-03-02

## 2022-02-23 RX ORDER — DIPHENHYDRAMINE HYDROCHLORIDE 50 MG/ML
25 INJECTION INTRAMUSCULAR; INTRAVENOUS
Status: ACTIVE | OUTPATIENT
Start: 2022-02-23 | End: 2022-03-02

## 2022-02-23 RX ORDER — ONDANSETRON 4 MG/1
4 TABLET, ORALLY DISINTEGRATING ORAL
Status: ACTIVE | OUTPATIENT
Start: 2022-02-23 | End: 2022-03-02

## 2022-02-23 RX ORDER — ACETAMINOPHEN 325 MG/1
650 TABLET ORAL ONCE AS NEEDED
Status: DISCONTINUED | OUTPATIENT
Start: 2022-02-23 | End: 2022-06-07

## 2022-02-23 RX ADMIN — SODIUM CHLORIDE 500 MG: 9 INJECTION, SOLUTION INTRAVENOUS at 10:02

## 2022-02-23 NOTE — PROGRESS NOTES
Patient remains with no signs of complications noted. Patient received sotrovimab infusion with filtered tubing according to FDA recommendations [facility] without complications noted and left with mask in place. Drug fact sheet provided. Pt discharged home. Ambulatory. Remains AAox3. No distress noted. RR even and unlabored.

## 2022-02-23 NOTE — PROGRESS NOTES
Patient arrives for sotrovimab infusion. Ambulatory. Pt AAox3. No distress noted. RR even and unlabored.     Symptoms and onset date:  Headache, dizziness, joint pain, fatigue. Onset 2/16/22.      Tested COVID + on 2/19/22.

## 2022-02-23 NOTE — PROGRESS NOTES
Infusion complete. Pt tolerated infusion well. No S/S of infusion reaction noted at present time. One hour observation started.

## 2022-02-28 ENCOUNTER — LAB VISIT (OUTPATIENT)
Dept: LAB | Facility: HOSPITAL | Age: 77
End: 2022-02-28
Attending: NURSE PRACTITIONER
Payer: MEDICARE

## 2022-02-28 DIAGNOSIS — N40.1 BPH WITH OBSTRUCTION/LOWER URINARY TRACT SYMPTOMS: ICD-10-CM

## 2022-02-28 DIAGNOSIS — N40.1 BPH ASSOCIATED WITH NOCTURIA: ICD-10-CM

## 2022-02-28 DIAGNOSIS — N18.2 STAGE 2 CHRONIC KIDNEY DISEASE: ICD-10-CM

## 2022-02-28 DIAGNOSIS — R35.1 BPH ASSOCIATED WITH NOCTURIA: ICD-10-CM

## 2022-02-28 DIAGNOSIS — C61 PROSTATE CANCER: ICD-10-CM

## 2022-02-28 DIAGNOSIS — N13.8 BPH WITH OBSTRUCTION/LOWER URINARY TRACT SYMPTOMS: ICD-10-CM

## 2022-02-28 LAB — COMPLEXED PSA SERPL-MCNC: 0.14 NG/ML (ref 0–4)

## 2022-02-28 PROCEDURE — 36415 COLL VENOUS BLD VENIPUNCTURE: CPT | Performed by: NURSE PRACTITIONER

## 2022-02-28 PROCEDURE — 84153 ASSAY OF PSA TOTAL: CPT | Performed by: NURSE PRACTITIONER

## 2022-03-07 ENCOUNTER — HOSPITAL ENCOUNTER (OUTPATIENT)
Facility: HOSPITAL | Age: 77
Discharge: HOME | End: 2022-03-08
Attending: EMERGENCY MEDICINE | Admitting: INTERNAL MEDICINE
Payer: MEDICARE

## 2022-03-07 DIAGNOSIS — R07.9 CHEST PAIN: ICD-10-CM

## 2022-03-07 DIAGNOSIS — R79.89 ELEVATED TROPONIN: ICD-10-CM

## 2022-03-07 DIAGNOSIS — R07.9 CHEST PAIN IN ADULT: ICD-10-CM

## 2022-03-07 DIAGNOSIS — R94.31 ABNORMAL EKG: ICD-10-CM

## 2022-03-07 DIAGNOSIS — R94.31 EKG ABNORMALITIES: ICD-10-CM

## 2022-03-07 DIAGNOSIS — R55 NEAR SYNCOPE: Primary | ICD-10-CM

## 2022-03-07 LAB
ALBUMIN SERPL BCP-MCNC: 3.1 G/DL (ref 3.5–5.2)
ALP SERPL-CCNC: 73 U/L (ref 55–135)
ALT SERPL W/O P-5'-P-CCNC: 12 U/L (ref 10–44)
ANION GAP SERPL CALC-SCNC: 10 MMOL/L (ref 8–16)
AST SERPL-CCNC: 17 U/L (ref 10–40)
BACTERIA #/AREA URNS HPF: NORMAL /HPF
BASOPHILS # BLD AUTO: 0.01 K/UL (ref 0–0.2)
BASOPHILS NFR BLD: 0.2 % (ref 0–1.9)
BILIRUB SERPL-MCNC: 0.4 MG/DL (ref 0.1–1)
BILIRUB UR QL STRIP: NEGATIVE
BUN SERPL-MCNC: 12 MG/DL (ref 8–23)
CALCIUM SERPL-MCNC: 8.3 MG/DL (ref 8.7–10.5)
CHLORIDE SERPL-SCNC: 111 MMOL/L (ref 95–110)
CLARITY UR: CLEAR
CO2 SERPL-SCNC: 20 MMOL/L (ref 23–29)
COLOR UR: YELLOW
CREAT SERPL-MCNC: 1.2 MG/DL (ref 0.5–1.4)
DIFFERENTIAL METHOD: ABNORMAL
EOSINOPHIL # BLD AUTO: 0.1 K/UL (ref 0–0.5)
EOSINOPHIL NFR BLD: 2.5 % (ref 0–8)
ERYTHROCYTE [DISTWIDTH] IN BLOOD BY AUTOMATED COUNT: 16.1 % (ref 11.5–14.5)
EST. GFR  (AFRICAN AMERICAN): >60 ML/MIN/1.73 M^2
EST. GFR  (NON AFRICAN AMERICAN): 59 ML/MIN/1.73 M^2
GLUCOSE SERPL-MCNC: 106 MG/DL (ref 70–110)
GLUCOSE UR QL STRIP: NEGATIVE
HCT VFR BLD AUTO: 38 % (ref 40–54)
HGB BLD-MCNC: 12 G/DL (ref 14–18)
HGB UR QL STRIP: ABNORMAL
HYALINE CASTS #/AREA URNS LPF: 0 /LPF
IMM GRANULOCYTES # BLD AUTO: 0.02 K/UL (ref 0–0.04)
IMM GRANULOCYTES NFR BLD AUTO: 0.4 % (ref 0–0.5)
KETONES UR QL STRIP: NEGATIVE
LEUKOCYTE ESTERASE UR QL STRIP: ABNORMAL
LYMPHOCYTES # BLD AUTO: 0.9 K/UL (ref 1–4.8)
LYMPHOCYTES NFR BLD: 18.2 % (ref 18–48)
MCH RBC QN AUTO: 27.8 PG (ref 27–31)
MCHC RBC AUTO-ENTMCNC: 31.6 G/DL (ref 32–36)
MCV RBC AUTO: 88 FL (ref 82–98)
MICROSCOPIC COMMENT: NORMAL
MONOCYTES # BLD AUTO: 0.6 K/UL (ref 0.3–1)
MONOCYTES NFR BLD: 12.8 % (ref 4–15)
NEUTROPHILS # BLD AUTO: 3.2 K/UL (ref 1.8–7.7)
NEUTROPHILS NFR BLD: 65.9 % (ref 38–73)
NITRITE UR QL STRIP: NEGATIVE
NRBC BLD-RTO: 0 /100 WBC
PH UR STRIP: 6 [PH] (ref 5–8)
PLATELET # BLD AUTO: 350 K/UL (ref 150–450)
PMV BLD AUTO: 11.3 FL (ref 9.2–12.9)
POTASSIUM SERPL-SCNC: 4.4 MMOL/L (ref 3.5–5.1)
PROT SERPL-MCNC: 7.4 G/DL (ref 6–8.4)
PROT UR QL STRIP: ABNORMAL
RBC # BLD AUTO: 4.31 M/UL (ref 4.6–6.2)
RBC #/AREA URNS HPF: 2 /HPF (ref 0–4)
SODIUM SERPL-SCNC: 141 MMOL/L (ref 136–145)
SP GR UR STRIP: >=1.03 (ref 1–1.03)
SQUAMOUS #/AREA URNS HPF: 2 /HPF
TROPONIN I SERPL DL<=0.01 NG/ML-MCNC: 0.03 NG/ML (ref 0–0.03)
URN SPEC COLLECT METH UR: ABNORMAL
UROBILINOGEN UR STRIP-ACNC: 1 EU/DL
WBC # BLD AUTO: 4.83 K/UL (ref 3.9–12.7)
WBC #/AREA URNS HPF: 0 /HPF (ref 0–5)

## 2022-03-07 PROCEDURE — 81000 URINALYSIS NONAUTO W/SCOPE: CPT | Performed by: PHYSICIAN ASSISTANT

## 2022-03-07 PROCEDURE — 36415 COLL VENOUS BLD VENIPUNCTURE: CPT | Performed by: PHYSICIAN ASSISTANT

## 2022-03-07 PROCEDURE — 25000003 PHARM REV CODE 250: Performed by: EMERGENCY MEDICINE

## 2022-03-07 PROCEDURE — 93005 ELECTROCARDIOGRAM TRACING: CPT

## 2022-03-07 PROCEDURE — 99285 EMERGENCY DEPT VISIT HI MDM: CPT | Mod: 25

## 2022-03-07 PROCEDURE — G0378 HOSPITAL OBSERVATION PER HR: HCPCS

## 2022-03-07 PROCEDURE — 96372 THER/PROPH/DIAG INJ SC/IM: CPT | Mod: 59 | Performed by: NURSE PRACTITIONER

## 2022-03-07 PROCEDURE — 96360 HYDRATION IV INFUSION INIT: CPT

## 2022-03-07 PROCEDURE — 84484 ASSAY OF TROPONIN QUANT: CPT | Performed by: PHYSICIAN ASSISTANT

## 2022-03-07 PROCEDURE — 63600175 PHARM REV CODE 636 W HCPCS: Performed by: NURSE PRACTITIONER

## 2022-03-07 PROCEDURE — 85025 COMPLETE CBC W/AUTO DIFF WBC: CPT | Performed by: PHYSICIAN ASSISTANT

## 2022-03-07 PROCEDURE — 80053 COMPREHEN METABOLIC PANEL: CPT | Performed by: PHYSICIAN ASSISTANT

## 2022-03-07 RX ORDER — MULTIVITAMIN
1 TABLET ORAL DAILY
Status: ON HOLD | COMMUNITY
End: 2022-06-22 | Stop reason: HOSPADM

## 2022-03-07 RX ORDER — AMOXICILLIN 250 MG
1 CAPSULE ORAL 2 TIMES DAILY
Status: DISCONTINUED | OUTPATIENT
Start: 2022-03-07 | End: 2022-03-08 | Stop reason: HOSPADM

## 2022-03-07 RX ORDER — ENOXAPARIN SODIUM 100 MG/ML
40 INJECTION SUBCUTANEOUS EVERY 24 HOURS
Status: DISCONTINUED | OUTPATIENT
Start: 2022-03-07 | End: 2022-03-08 | Stop reason: HOSPADM

## 2022-03-07 RX ORDER — DENOSUMAB 60 MG/ML
60 INJECTION SUBCUTANEOUS
Status: ON HOLD | COMMUNITY
End: 2022-06-22 | Stop reason: HOSPADM

## 2022-03-07 RX ORDER — CARVEDILOL 25 MG/1
25 TABLET ORAL 2 TIMES DAILY WITH MEALS
Status: DISCONTINUED | OUTPATIENT
Start: 2022-03-07 | End: 2022-03-08

## 2022-03-07 RX ORDER — SIMETHICONE 80 MG
1 TABLET,CHEWABLE ORAL 4 TIMES DAILY PRN
Status: DISCONTINUED | OUTPATIENT
Start: 2022-03-07 | End: 2022-03-08 | Stop reason: HOSPADM

## 2022-03-07 RX ORDER — NALOXONE HCL 0.4 MG/ML
0.02 VIAL (ML) INJECTION
Status: DISCONTINUED | OUTPATIENT
Start: 2022-03-07 | End: 2022-03-08 | Stop reason: HOSPADM

## 2022-03-07 RX ORDER — ASPIRIN 81 MG/1
81 TABLET ORAL DAILY
Status: DISCONTINUED | OUTPATIENT
Start: 2022-03-08 | End: 2022-03-08 | Stop reason: HOSPADM

## 2022-03-07 RX ORDER — ACETAMINOPHEN 325 MG/1
650 TABLET ORAL EVERY 8 HOURS PRN
Status: DISCONTINUED | OUTPATIENT
Start: 2022-03-07 | End: 2022-03-08 | Stop reason: HOSPADM

## 2022-03-07 RX ORDER — TALC
6 POWDER (GRAM) TOPICAL NIGHTLY PRN
Status: DISCONTINUED | OUTPATIENT
Start: 2022-03-07 | End: 2022-03-08 | Stop reason: HOSPADM

## 2022-03-07 RX ORDER — SODIUM CHLORIDE 0.9 % (FLUSH) 0.9 %
10 SYRINGE (ML) INJECTION EVERY 8 HOURS PRN
Status: DISCONTINUED | OUTPATIENT
Start: 2022-03-07 | End: 2022-03-08 | Stop reason: HOSPADM

## 2022-03-07 RX ORDER — MAG HYDROX/ALUMINUM HYD/SIMETH 200-200-20
30 SUSPENSION, ORAL (FINAL DOSE FORM) ORAL 4 TIMES DAILY PRN
Status: DISCONTINUED | OUTPATIENT
Start: 2022-03-07 | End: 2022-03-08 | Stop reason: HOSPADM

## 2022-03-07 RX ORDER — PROCHLORPERAZINE EDISYLATE 5 MG/ML
5 INJECTION INTRAMUSCULAR; INTRAVENOUS EVERY 6 HOURS PRN
Status: DISCONTINUED | OUTPATIENT
Start: 2022-03-07 | End: 2022-03-08 | Stop reason: HOSPADM

## 2022-03-07 RX ORDER — ONDANSETRON 2 MG/ML
4 INJECTION INTRAMUSCULAR; INTRAVENOUS EVERY 6 HOURS PRN
Status: DISCONTINUED | OUTPATIENT
Start: 2022-03-07 | End: 2022-03-08 | Stop reason: HOSPADM

## 2022-03-07 RX ADMIN — SODIUM CHLORIDE 1000 ML: 0.9 INJECTION, SOLUTION INTRAVENOUS at 09:03

## 2022-03-07 RX ADMIN — ENOXAPARIN SODIUM 40 MG: 100 INJECTION SUBCUTANEOUS at 11:03

## 2022-03-07 NOTE — ED PROVIDER NOTES
Encounter Date: 3/7/2022       History     Chief Complaint   Patient presents with    Weakness     Started today     Dizziness     COVID positive  3 weeks  ago     HPI patient is 76-year-old man who presents emergency department for evaluation near syncopal episodes that occurred today.  Patient states his phone was not working and when he went to get up off the couch she felt lightheaded, generalized weakness and saw spots in his eyes.  He then went to the phone repair store and when leaving he again had an episode similar to the 1st which prompted visit to the ED.  he denies any chest pain.  He endorses some associated shortness of breath.  States he feels much improved at this time.  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    Prostate CA     RA (rheumatoid arthritis)     Vitamin D insufficiency 9/30/2016     Past Surgical History:   Procedure Laterality Date    CARPAL TUNNEL RELEASE Right 5/28/2021    Procedure: RELEASE, CARPAL TUNNEL;  Surgeon: Jose Ryan II, MD;  Location: [facility] OR;  Service: Orthopedics;  Laterality: Right;    COLONOSCOPY  prior to 2016    normal findings per patient report    CYSTOSCOPY N/A 12/18/2018    Procedure: CYSTOSCOPY;  Surgeon: Garrett Pina MD;  Location: [facility] OR;  Service: Urology;  Laterality: N/A;    ESOPHAGOGASTRODUODENOSCOPY N/A 9/29/2020    Dr. Espinosa; empiric dilation; erythematous mucosa in antrum; gastric mucosal atrophy; hematin in entire stomach; biopsy: mid & distal esophagus WNL, stomach- WNL, negative for h pylori    PARATHYROIDECTOMY Right 10/27/2020    Procedure: PARATHYROIDECTOMY;  Surgeon: Latonia Clayton MD;  Location: [facility] OR [address] [place] [address];  Service: ENT;  Laterality: Right;    RELEASE OF ULNAR NERVE AT  CUBITAL TUNNEL Right 2021    Procedure: RELEASE, ULNAR TUNNEL;  Surgeon: Jose Ryan II, MD;  Location: [facility] OR;  Service: Orthopedics;  Laterality: Right;    TRANSRECTAL BIOPSY OF PROSTATE WITH ULTRASOUND GUIDANCE N/A 2018    Procedure: BIOPSY, PROSTATE, RECTAL APPROACH, WITH US GUIDANCE;  Surgeon: Garrett Pina MD;  Location: [facility] OR;  Service: Urology;  Laterality: N/A;     Family History   Problem Relation Age of Onset    Heart disease Mother     Stroke Father     Drug abuse Sister     Diabetes Maternal Uncle     Colon cancer Neg Hx     Crohn's disease Neg Hx     Esophageal cancer Neg Hx     Stomach cancer Neg Hx     Ulcerative colitis Neg Hx      Social History     Tobacco Use    Smoking status: Former Smoker     Packs/day: 0.25     Years: 10.00     Pack years: 2.50     Quit date: 2001     Years since quittin.5    Smokeless tobacco: Never Used   Substance Use Topics    Alcohol use: Yes     Comment: seldom    Drug use: Yes     Frequency: 8.0 times per week     Types: Marijuana     Review of Systems   Constitutional: Negative for fever.   HENT: Negative for sore throat.    Respiratory: Positive for shortness of breath.    Cardiovascular: Negative for chest pain.   Gastrointestinal: Negative for nausea.   Genitourinary: Negative for dysuria.   Musculoskeletal: Negative for back pain.   Skin: Negative for rash.   Neurological: Positive for weakness and light-headedness. Negative for syncope.   Hematological: Does not bruise/bleed easily.       Physical Exam     Initial Vitals [22 1651]   BP Pulse Resp Temp SpO2   (!) 144/68 77 20 98.4 °F (36.9 °C) 98 %      MAP       --         Physical Exam    Constitutional: He appears well-developed and well-nourished.  Non-toxic appearance. No distress.   HENT:   Head: Normocephalic and atraumatic.   Eyes: EOM are normal. Pupils are equal, round, and reactive to light.   Neck: Neck supple. No JVD present.   Normal range of  motion.  Cardiovascular: Normal rate, regular rhythm, normal heart sounds and intact distal pulses. Exam reveals no gallop and no friction rub.    No murmur heard.  Pulmonary/Chest: Breath sounds normal. He has no wheezes. He has no rhonchi. He has no rales.   Abdominal: Abdomen is soft. Bowel sounds are normal. There is no abdominal tenderness. There is no rebound and no guarding.   Musculoskeletal:         General: Normal range of motion.      Cervical back: Normal range of motion and neck supple. No rigidity.     Neurological: He is alert and oriented to person, place, and time. He has normal strength and normal reflexes. No cranial nerve deficit or sensory deficit. He exhibits normal muscle tone. Coordination normal. GCS eye subscore is 4. GCS verbal subscore is 5. GCS motor subscore is 6.   Skin: Skin is warm and dry.   Psychiatric: He has a normal mood and affect. His speech is normal and behavior is normal. He is not actively hallucinating.         ED Course   Procedures  Labs Reviewed   CBC W/ AUTO DIFFERENTIAL - Abnormal; Notable for the following components:       Result Value    RBC 4.31 (*)     Hemoglobin 12.0 (*)     Hematocrit 38.0 (*)     MCHC 31.6 (*)     RDW 16.1 (*)     Lymph # 0.9 (*)     All other components within normal limits   COMPREHENSIVE METABOLIC PANEL - Abnormal; Notable for the following components:    Chloride 111 (*)     CO2 20 (*)     Calcium 8.3 (*)     Albumin 3.1 (*)     eGFR if non  59 (*)     All other components within normal limits   URINALYSIS, REFLEX TO URINE CULTURE - Abnormal; Notable for the following components:    Specific Gravity, UA >=1.030 (*)     Protein, UA 2+ (*)     Occult Blood UA Trace (*)     Leukocytes, UA Trace (*)     All other components within normal limits    Narrative:     Specimen Source->Urine   TROPONIN I - Abnormal; Notable for the following components:    Troponin I 0.028 (*)     All other components within normal limits    URINALYSIS MICROSCOPIC    Narrative:     Specimen Source->Urine   TROPONIN I     EKG Readings: (Independently Interpreted)   Initial Reading: No STEMI.   Sinus rhythm with first-degree AV block, 72 beats per minute.  T-wave inversions in 2 3 AVF, V3 through V6 and lead 1. These changes are present on previous EKG.  QTC is also prolonged at 503 milliseconds       Imaging Results          X-Ray Chest AP Portable (Final result)  Result time 03/07/22 17:27:52    Final result by Ras Llanos MD (03/07/22 17:27:52)                 Impression:      No new airspace disease.  Query COPD.      Electronically signed by: Ras Llanos  Date:    03/07/2022  Time:    17:27             Narrative:    EXAMINATION:  XR CHEST AP PORTABLE    CLINICAL HISTORY:  shortness of breath;    TECHNIQUE:  Single frontal view of the chest was performed.    COMPARISON:  02/21/2022    FINDINGS:  Increased lucency upper lung zones.  No new airspace disease.  Normal size heart.  No pleural effusion or pneumothorax.  Surgical clips base of the right neck.  No acute osseous abnormality.                                 Medications   aspirin EC tablet 81 mg (has no administration in time range)   carvediloL tablet 25 mg (has no administration in time range)   sodium chloride 0.9% flush 10 mL (has no administration in time range)   melatonin tablet 6 mg (has no administration in time range)   ondansetron injection 4 mg (has no administration in time range)   prochlorperazine injection Soln 5 mg (has no administration in time range)   senna-docusate 8.6-50 mg per tablet 1 tablet (has no administration in time range)   acetaminophen tablet 650 mg (has no administration in time range)   simethicone chewable tablet 80 mg (has no administration in time range)   aluminum-magnesium hydroxide-simethicone 200-200-20 mg/5 mL suspension 30 mL (has no administration in time range)   naloxone 0.4 mg/mL injection 0.02 mg (has no administration in time range)    enoxaparin injection 40 mg (has no administration in time range)   sodium chloride 0.9% bolus 1,000 mL (0 mLs Intravenous Stopped 3/7/22 7500)     Medical Decision Making:   History:   Old Medical Records: I decided to obtain old medical records.  Initial Assessment:   76-year-old man presents with 2 episodes of feeling generally weak, near syncopal with associated diaphoresis and shortness of breath today.  Differential diagnosis includes but is not limited to orthostatic syncope, dehydration, atypical ACS, anemia.  Evaluation emergency department significantly for mildly elevated troponin 0.028 and abnormal EKG with prolonged QT of 503 millisecond and T-wave inversions in the inferior and anterolateral leads which were present on previous old EKG.  No evidence of pneumothorax or pneumonia on chest x-ray.  Discussed Hospital Medicine agrees to observe the patient to trend serial troponins, possible stress test to rule out atypical ACS.                      Clinical Impression:   Final diagnoses:  [R55] Near syncope (Primary)  [R77.8] Elevated troponin          ED Disposition Condition    Observation               Matheus Tee MD  03/07/22 6527

## 2022-03-07 NOTE — FIRST PROVIDER EVALUATION
Emergency Department TeleTriage Encounter Note      CHIEF COMPLAINT    Chief Complaint   Patient presents with    Weakness     Started today     Dizziness     COVID positive  3 weeks  ago       VITAL SIGNS   Initial Vitals [03/07/22 1651]   BP Pulse Resp Temp SpO2   (!) 144/68 77 20 98.4 °F (36.9 °C) 98 %      MAP       --            ALLERGIES    Review of patient's allergies indicates:  No Known Allergies    PROVIDER TRIAGE NOTE  This is a teletriage evaluation of a 76 y.o. male presenting to the ED complaining of multiple complaints. Patient reports shortness of breath, generalized weakness, and dizziness with standing that started this morning. He denies chest pain.  Reports near syncopal episode just PTA which prompted him to come to the ED.    Initial orders will be placed and care will be transferred to an alternate provider when patient is roomed for a full evaluation. Any additional orders and the final disposition will be determined by that provider.           ORDERS  Labs Reviewed   CBC W/ AUTO DIFFERENTIAL   COMPREHENSIVE METABOLIC PANEL   URINALYSIS, REFLEX TO URINE CULTURE   TROPONIN I       ED Orders (720h ago, onward)    Start Ordered     Status Ordering Provider    03/07/22 1658 03/07/22 1657  Urinalysis, Reflex to Urine Culture Urine, Clean Catch  STAT         Ordered SHONA NAVA    03/07/22 1658 03/07/22 1657  EKG 12-lead  Once         Ordered SHONA NAVA    03/07/22 1658 03/07/22 1657  Orthostatic blood pressure  Once         Ordered SHONA NAVA    03/07/22 1658 03/07/22 1658  Troponin I  STAT         Ordered SHONA NAVA    03/07/22 1658 03/07/22 1658  X-Ray Chest AP Portable  1 time imaging         Ordered SHONA NAVA    03/07/22 1657 03/07/22 1657  Complete Blood Count (CBC)  STAT         Pending Collection SHONA NAVA    03/07/22 1657 03/07/22 1657  Comprehensive Metabolic Panel (CMP)  STAT         Pending Collection SHONA NAVA    03/07/22 1657 03/07/22 1657   Insert Saline lock IV  Once         Ordered SHONA NAVA            Virtual Visit Note: The provider triage portion of this emergency department evaluation and documentation was performed via [employer], a HIPAA-compliant telemedicine application, in concert with a tele-presenter in the room. A face to face patient evaluation with one of my colleagues will occur once the patient is placed in an emergency department room.      DISCLAIMER: This note was prepared with [employer] voice recognition transcription software. Garbled syntax, mangled pronouns, and other bizarre constructions may be attributed to that software system.

## 2022-03-08 VITALS
SYSTOLIC BLOOD PRESSURE: 151 MMHG | OXYGEN SATURATION: 98 % | TEMPERATURE: 98 F | BODY MASS INDEX: 21.67 KG/M2 | WEIGHT: 143 LBS | HEIGHT: 68 IN | RESPIRATION RATE: 21 BRPM | HEART RATE: 78 BPM | DIASTOLIC BLOOD PRESSURE: 76 MMHG

## 2022-03-08 LAB
CHOLEST SERPL-MCNC: 116 MG/DL (ref 120–199)
CHOLEST/HDLC SERPL: 4 {RATIO} (ref 2–5)
HDLC SERPL-MCNC: 29 MG/DL (ref 40–75)
HDLC SERPL: 25 % (ref 20–50)
LDLC SERPL CALC-MCNC: 73.8 MG/DL (ref 63–159)
NONHDLC SERPL-MCNC: 87 MG/DL
TRIGL SERPL-MCNC: 66 MG/DL (ref 30–150)
TROPONIN I SERPL DL<=0.01 NG/ML-MCNC: 0.03 NG/ML (ref 0–0.03)
TROPONIN I SERPL DL<=0.01 NG/ML-MCNC: 0.03 NG/ML (ref 0–0.03)

## 2022-03-08 PROCEDURE — 25000003 PHARM REV CODE 250: Performed by: NURSE PRACTITIONER

## 2022-03-08 PROCEDURE — 94761 N-INVAS EAR/PLS OXIMETRY MLT: CPT

## 2022-03-08 PROCEDURE — 84484 ASSAY OF TROPONIN QUANT: CPT | Performed by: NURSE PRACTITIONER

## 2022-03-08 PROCEDURE — 80061 LIPID PANEL: CPT | Performed by: NURSE PRACTITIONER

## 2022-03-08 PROCEDURE — 99214 PR OFFICE/OUTPT VISIT, EST, LEVL IV, 30-39 MIN: ICD-10-PCS | Mod: ,,, | Performed by: GENERAL PRACTICE

## 2022-03-08 PROCEDURE — 99214 OFFICE O/P EST MOD 30 MIN: CPT | Mod: ,,, | Performed by: GENERAL PRACTICE

## 2022-03-08 PROCEDURE — 84484 ASSAY OF TROPONIN QUANT: CPT | Mod: 91 | Performed by: NURSE PRACTITIONER

## 2022-03-08 PROCEDURE — 36415 COLL VENOUS BLD VENIPUNCTURE: CPT | Performed by: NURSE PRACTITIONER

## 2022-03-08 PROCEDURE — G0378 HOSPITAL OBSERVATION PER HR: HCPCS

## 2022-03-08 RX ORDER — METOPROLOL SUCCINATE 50 MG/1
50 TABLET, EXTENDED RELEASE ORAL DAILY
Status: DISCONTINUED | OUTPATIENT
Start: 2022-03-09 | End: 2022-03-08 | Stop reason: HOSPADM

## 2022-03-08 RX ORDER — CLONIDINE HYDROCHLORIDE 0.1 MG/1
0.1 TABLET ORAL 2 TIMES DAILY PRN
Qty: 30 TABLET | Refills: 1 | Status: SHIPPED | OUTPATIENT
Start: 2022-03-08 | End: 2022-06-22

## 2022-03-08 RX ORDER — METOPROLOL SUCCINATE 50 MG/1
100 TABLET, EXTENDED RELEASE ORAL DAILY
Status: DISCONTINUED | OUTPATIENT
Start: 2022-03-08 | End: 2022-03-08

## 2022-03-08 RX ORDER — AMLODIPINE BESYLATE 5 MG/1
10 TABLET ORAL DAILY
Status: DISCONTINUED | OUTPATIENT
Start: 2022-03-08 | End: 2022-03-08 | Stop reason: HOSPADM

## 2022-03-08 RX ADMIN — ASPIRIN 81 MG: 81 TABLET, COATED ORAL at 08:03

## 2022-03-08 RX ADMIN — ACETAMINOPHEN 650 MG: 325 TABLET ORAL at 04:03

## 2022-03-08 RX ADMIN — CARVEDILOL 25 MG: 25 TABLET, FILM COATED ORAL at 08:03

## 2022-03-08 NOTE — NURSING
"Patient refusing bed alarm, RN enforced ICU policy and [facility] policy for bed alarms. Patient is frustrated and states "I am not being treated fairly in this hospital since the time I walked in."    Manager and OC of the unit called to bedside, reinforced bed alarm policy. Patient continues to be frustrated and feeling like he is not being treated fairly. RN provided compassion and reassurance to patient.     0924:Patient requesting MD to bedside, "states no doctor has been by and he needs to be seen." RN educated patient on the doctors rounds, RN phoned Dr. Andrea with patient requests. MD to bedside as soon as he can.     1000:Patient moved to bedside chair, alarm set. Patient discussing his unfair treatment from administration in the ER as well as from the RNs in the emergency room. Bedside RN provided open listening and discussion of the patients concerns. Patient states "the staff here is not compassionate and he is ready to go." Patient also states "I am having to give you all the grief and complaints and its not fair to you." RN provided reassurance and comfort to the anxious patient.   "

## 2022-03-08 NOTE — CONSULTS
Former[facility]  Department of Cardiology  Consult Note      PATIENT NAME: Rajendra Castle    MRN: [id number]  TODAY'S DATE: 03/08/2022  ADMIT DATE: 3/7/2022                          CONSULT REQUESTED BY: Lobito Andrea MD    SUBJECTIVE     PRINCIPAL PROBLEM: Chest pain      REASON FOR CONSULT:  Chest pain dizziness      HPI:  The patient has a history of hypertension and poor complaints.  He takes carvedilol for his hypertension but forgets to take the evening dose.  He has not taking the carvedilol for couple days prior to admission.  When he noticed he had some dizziness, and shortness of breath  He has an occasional feeling funny anterior aspect his left precordium like a lack of a electricity.  It generally occurs at rest  He has no exertional dyspnea or chest pain.  He has a history of abnormal EKG and left ventricular hypertrophy changes on the EKG his EKG shows the same pattern only a little more pronounced.NUCLEAR STRESS NEGATIVE IN PAST  He is currently without any discomfort or dizziness   He does complain of anxiety.    Review of patient's allergies indicates:  No Known Allergies    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    Prostate CA     RA (rheumatoid arthritis)     Vitamin D insufficiency 9/30/2016     Past Surgical History:   Procedure Laterality Date    CARPAL TUNNEL RELEASE Right 5/28/2021    Procedure: RELEASE, CARPAL TUNNEL;  Surgeon: Jose Ryan II, MD;  Location: [facility] OR;  Service: Orthopedics;  Laterality: Right;    COLONOSCOPY  prior to 2016    normal findings per patient report    CYSTOSCOPY N/A 12/18/2018    Procedure: CYSTOSCOPY;  Surgeon: Garrett Pina MD;  Location: [facility] OR;  Service: Urology;  Laterality: N/A;    ESOPHAGOGASTRODUODENOSCOPY N/A 9/29/2020    Dr. Espinosa; empiric dilation;  erythematous mucosa in antrum; gastric mucosal atrophy; hematin in entire stomach; biopsy: mid & distal esophagus WNL, stomach- WNL, negative for h pylori    PARATHYROIDECTOMY Right 10/27/2020    Procedure: PARATHYROIDECTOMY;  Surgeon: Latonia Clayton MD;  Location: [facility] OR [address] [place] [address];  Service: ENT;  Laterality: Right;    RELEASE OF ULNAR NERVE AT CUBITAL TUNNEL Right 2021    Procedure: RELEASE, ULNAR TUNNEL;  Surgeon: Jose Ryan II, MD;  Location: [facility] OR;  Service: Orthopedics;  Laterality: Right;    TRANSRECTAL BIOPSY OF PROSTATE WITH ULTRASOUND GUIDANCE N/A 2018    Procedure: BIOPSY, PROSTATE, RECTAL APPROACH, WITH US GUIDANCE;  Surgeon: Garrett Pina MD;  Location: [facility] OR;  Service: Urology;  Laterality: N/A;     Social History     Tobacco Use    Smoking status: Former Smoker     Packs/day: 0.25     Years: 10.00     Pack years: 2.50     Quit date: 2001     Years since quittin.6    Smokeless tobacco: Never Used   Substance Use Topics    Alcohol use: Yes     Comment: seldom    Drug use: Yes     Frequency: 8.0 times per week     Types: Marijuana        REVIEW OF SYSTEMS  CONSTITUTIONAL: Negative for chills, fatigue and fever.   EYES: No double vision, No blurred vision  NEURO: No headaches, No dizziness  RESPIRATORY: Negative for cough, shortness of breath and wheezing.    CARDIOVASCULAR: Negative for chest pain. Negative for palpitations and leg swelling.   GI: Negative for abdominal pain, No melena, diarrhea, nausea and vomiting.   : Negative for dysuria and frequency, Negative for hematuria  SKIN: Negative for bruising, Negative for edema or discoloration noted.   ENDOCRINE: Negative for polyphagia, Negative for heat intolerance, Negative for cold intolerance  PSYCHIATRIC: Negative for depression, Negative for anxiety, Negative for memory loss  MUSCULOSKELETAL: Negative for neck pain, Negative for muscle weakness, Negative for back pain     OBJECTIVE     VITAL SIGNS  (Most Recent)  Temp: 98.2 °F (36.8 °C) (03/08/22 0400)  Pulse: 89 (03/08/22 0717)  Resp: (!) 53 (03/08/22 0717)  BP: (!) 163/72 (03/08/22 0700)  SpO2: 95 % (03/08/22 0717)    VENTILATION STATUS  Resp: (!) 53 (03/08/22 0717)  SpO2: 95 % (03/08/22 0717)       I & O (Last 24H):    Intake/Output Summary (Last 24 hours) at 3/8/2022 0955  Last data filed at 3/8/2022 0700  Gross per 24 hour   Intake --   Output 475 ml   Net -475 ml       WEIGHTS  Wt Readings from Last 3 Encounters:   03/08/22 0400 65 kg (143 lb 4.8 oz)   03/07/22 2355 65 kg (143 lb 4.8 oz)   03/07/22 1651 64.4 kg (142 lb)   02/23/22 1019 64.4 kg (142 lb)   02/21/22 1057 63.5 kg (140 lb)       PHYSICAL EXAM  GENERAL: well built, well nourished, well-developed in no apparent distress alert and oriented.   HEENT: Normocephalic. Pupils normal and conjunctivae normal.  Mucous membranes normal, no cyanosis or icterus, trachea central,no pallor or icterus is noted..   NECK: No JVD. No bruit..   THYROID: Thyroid not enlarged. No nodules present..   CARDIAC: Regular rate and rhythm. S1 is normal.S2 is normal.No gallops, clicks or murmurs noted at this time.  CHEST ANATOMY: normal.   LUNGS: Clear to auscultation. No wheezing or rhonchi..   ABDOMEN: Soft no masses or organomegaly.  No abdomen pulsations or bruits.  Normal bowel sounds. No pulsations and no masses felt, No guarding or rebound.   URINARY: No jackson catheter   EXTREMITIES: No cyanosis, clubbing or edema noted at this time., no calf tenderness bilaterally.   PERIPHERAL VASCULAR SYSTEM: Good palpable distal pulses.   CENTRAL NERVOUS SYSTEM: No focal motor or sensory deficits noted.   SKIN: Skin without lesions, moist, well perfused.   MUSCLE STRENGTH & TONE: No noteable weakness, atrophy or abnormal movement.     HOME MEDICATIONS:  Current Facility-Administered Medications on File Prior to Encounter   Medication Dose Route Frequency Provider Last Rate Last Admin    denosumab (PROLIA) injection 60 mg  60  mg Subcutaneous 1 time in Clinic/HOD Jean Carlos Hoffman MD        electrolyte-S (ISOLYTE)   Intravenous Continuous Dean Young MD   Stopped at 05/28/21 0930    fentaNYL 50 mcg/mL injection 25 mcg  25 mcg Intravenous Q5 Min PRN Dean Young MD        HYDROmorphone (PF) injection 0.2 mg  0.2 mg Intravenous Q5 Min PRN Dean Young MD        lactated ringers infusion  10 mL/hr Intravenous Continuous Dean Young MD        lactated ringers infusion  500 mL Intravenous Once Dean Young MD        LIDOcaine (PF) 10 mg/ml (1%) injection 10 mg  1 mL Intradermal Once Dean Young MD        lorazepam injection 0.25 mg  0.25 mg Intravenous Once PRN Dean Young MD        ondansetron injection 4 mg  4 mg Intravenous Once PRN Dean Young MD        prochlorperazine injection Soln 5 mg  5 mg Intravenous Q30 Min PRN Dean Young MD        sodium chloride 0.9% flush 3 mL  3 mL Intravenous Q8H Dean Young MD         Current Outpatient Medications on File Prior to Encounter   Medication Sig Dispense Refill    acetaminophen/diphenhydramine (TYLENOL PM ORAL) Take by mouth nightly as needed. 2 tabs      aspirin (ECOTRIN) 81 MG EC tablet Take 81 mg by mouth once daily.      carvediloL (COREG) 25 MG tablet TAKE 1 TABLET(25 MG) BY MOUTH TWICE DAILY WITH MEALS 180 tablet 3    cholecalciferol, vitamin D3, (VITAMIN D3) 100 mcg (4,000 unit) Cap Take by mouth.      denosumab (PROLIA) 60 mg/mL Syrg Inject 60 mg into the skin every 6 (six) months.      methotrexate 2.5 MG Tab TAKE 6 TABLETS BY MOUTH EVERY WEEK 72 tablet 1    multivitamin (ONE DAILY MULTIVITAMIN) per tablet Take 1 tablet by mouth once daily.      sildenafiL (VIAGRA) 100 MG tablet Take 1 tablet (100 mg total) by mouth daily as needed for Erectile Dysfunction. 10 tablet 2    traMADoL (ULTRAM) 50 mg tablet Take 1 tablet (50 mg total) by mouth every 6 (six) hours as needed for Pain. 120  tablet 5       SCHEDULED MEDS:   aspirin  81 mg Oral Daily    carvediloL  25 mg Oral BID WM    enoxaparin  40 mg Subcutaneous Daily    senna-docusate 8.6-50 mg  1 tablet Oral BID       CONTINUOUS INFUSIONS:    PRN MEDS:acetaminophen, aluminum-magnesium hydroxide-simethicone, melatonin, naloxone, ondansetron, prochlorperazine, simethicone, sodium chloride 0.9%    LABS AND DIAGNOSTICS     CBC LAST 3 DAYS  Recent Labs   Lab 03/07/22  1801   WBC 4.83   RBC 4.31*   HGB 12.0*   HCT 38.0*   MCV 88   MCH 27.8   MCHC 31.6*   RDW 16.1*      MPV 11.3   GRAN 65.9  3.2   LYMPH 18.2  0.9*   MONO 12.8  0.6   BASO 0.01   NRBC 0       COAGULATION LAST 3 DAYS  No results for input(s): LABPT, INR, APTT in the last 168 hours.    CHEMISTRY LAST 3 DAYS  Recent Labs   Lab 03/07/22  1801      K 4.4   *   CO2 20*   ANIONGAP 10   BUN 12   CREATININE 1.2      CALCIUM 8.3*   ALBUMIN 3.1*   PROT 7.4   ALKPHOS 73   ALT 12   AST 17   BILITOT 0.4       CARDIAC PROFILE LAST 3 DAYS  Recent Labs   Lab 03/07/22  1801 03/08/22  0028 03/08/22  0450   TROPONINI 0.028* 0.027* 0.032*       ENDOCRINE LAST 3 DAYS  No results for input(s): TSH, PROCAL in the last 168 hours.    LAST ARTERIAL BLOOD GAS  ABG  No results for input(s): PH, PO2, PCO2, HCO3, BE in the last 168 hours.    LAST 7 DAYS MICROBIOLOGY   Microbiology Results (last 7 days)     ** No results found for the last 168 hours. **          MOST RECENT IMAGING  X-Ray Chest AP Portable  Narrative: EXAMINATION:  XR CHEST AP PORTABLE    CLINICAL HISTORY:  shortness of breath;    TECHNIQUE:  Single frontal view of the chest was performed.    COMPARISON:  02/21/2022    FINDINGS:  Increased lucency upper lung zones.  No new airspace disease.  Normal size heart.  No pleural effusion or pneumothorax.  Surgical clips base of the right neck.  No acute osseous abnormality.  Impression: No new airspace disease.  Query COPD.    Electronically signed by: Ras  Llanos  Date:    03/07/2022  Time:    17:27      ECHOCARDIOGRAM RESULTS (last 5)  Results for orders placed during the hospital encounter of 09/28/20    Echo Color Flow Doppler? Yes    Interpretation Summary  · The left ventricle is normal in size with normal systolic function. The estimated ejection fraction is 65%.  · There is moderate left ventricular concentric hypertrophy.  · Normal left ventricular diastolic function.  · Normal right ventricular systolic function.  · Moderate aortic regurgitation.  · Moderate mitral regurgitation.  · No pulmonary hypertension  · Mild tricuspid regurgitation.  · Normal central venous pressure (3 mmHg).  · The estimated PA systolic pressure is 29 mmHg.  · Trivial posterior pericardial effusion. Effusion is fluid.    Left ventricle hypertrophy  Moderate aortic regurgitation mitral regurgitant  Mean left atrial pressure is normal  No pulmonary hypertension      CURRENT/PREVIOUS VISIT EKG  Results for orders placed or performed during the hospital encounter of 03/07/22   EKG 12-lead    Collection Time: 03/07/22  5:06 PM    Narrative    Test Reason : R55,    Vent. Rate : 072 BPM     Atrial Rate : 072 BPM     P-R Int : 364 ms          QRS Dur : 082 ms      QT Int : 460 ms       P-R-T Axes : 078 055 255 degrees     QTc Int : 503 ms    Sinus rhythm with 1st degree A-V block  Possible Left atrial enlargement  LVH with repolarization abnormality  Prolonged QT  Abnormal ECG  When compared with ECG of 21-FEB-2022 12:22,  No significant change was found    Referred By: [email or web address]   SELF           Confirmed By:            ASSESSMENT/PLAN:     Active Hospital Problems    Diagnosis    *Chest pain    Rheumatoid arthritis involving multiple sites    Anemia, iron deficiency    Hypertension       ASSESSMENT & PLAN:   Hypertension poorly controlled  Dizziness secondary to above  Abnormal EKG  Poor medical compliance  Increase QTC    RECOMMENDATIONS:  Hypertension control for compliance with  once a day medications  Echocardiogram secondary to abnormal EKG and LVH in the past    Lexiscan stress test recommended the patient wants to have done as outpatient.      Sp Phillips MD  [facility]  Department of Cardiology  Date of Service: 03/08/2022  9:56 AM

## 2022-03-08 NOTE — PLAN OF CARE
Plan of Care:    Aspiration precautions: patient in upright position during oral intake and medication administration    Bleeding precautions: patient receiving Lovenox; monitor for signs of bleeding and excessive bruising    Cardiac Monitoring: monitor and document vital signs per unit protocol; notify physician of significant changes in vital signs    Fall precautions: patient refused bed alarm; encouraged patient to call prior to getting out of bed    Safety precautions: Call light and personal belongings within reach, wheels locked and bed in lowest position

## 2022-03-08 NOTE — PHARMACY MED REC
"Admission Medication History     The home medication history was taken by Marii Powers CPhT.    Medication history obtained from Patient     You may go to "Admission" then "Reconcile Home Medications" tabs to review and/or act upon these items.      The home medication list has been updated by the Pharmacy department.    Please read ALL comments highlighted in yellow.    Please address this information as you see fit.     Feel free to contact us if you have any questions or require assistance.    The medications listed below were removed from the home medication list.  Please reorder if appropriate:  Patient reports no longer taking the following medication(s):   Alfuzosin 10mg   Clonazepam 1mg   Folic Acid 1mg   Nepro Carb Steady    Trazodome 100mg    Marii Powers CPhT.  [phone]      .          "

## 2022-03-08 NOTE — HPI
Rajendra Castle is a 76 y.o male with a PMHx of RA, HTN, and prostate cancer who presents with chest pain and near syncopal episode earlier today. Patient reports he feels his heartbeat slow down for a beat and he becomes weak and short of breath. Patient states this has happened in the past and symptoms occur at rest and with activity. Patient was evaluated in the ED and EKG did not show ST elevation or depression. Chest x-ray did not show acute changes. Patient's troponin was mildly elevated. Patient referred to hospital medicine and seen in the ED. Patient states chest pain occurs intermittently and denies at present. He denies N/V/D, present shortness of breath, and dizziness. Patient will be admitted to hospital medicine for cardiology consult and further management.

## 2022-03-08 NOTE — ASSESSMENT & PLAN NOTE
Chronic, controlled.  Latest blood pressure and vitals reviewed-   Temp:  [98.4 °F (36.9 °C)]   Pulse:  [70-83]   Resp:  [18-20]   BP: (129-171)/(59-80)   SpO2:  [97 %-99 %] .   Home meds for hypertension were reviewed and noted below.   Hypertension Medications             carvediloL (COREG) 25 MG tablet TAKE 1 TABLET(25 MG) BY MOUTH TWICE DAILY WITH MEALS          While in the hospital, will manage blood pressure as follows; Continue home antihypertensive regimen    Will utilize p.r.n. blood pressure medication only if patient's blood pressure greater than  180/110 and he develops symptoms such as worsening chest pain or shortness of breath.

## 2022-03-08 NOTE — SUBJECTIVE & OBJECTIVE
Past Medical History:   Diagnosis Date    Arthritis     DDD (degenerative disc disease), cervical     Degenerative disc disease     Heart murmur     Hypertension     Nontoxic multinodular goiter 9/20/2016    Prostate CA     RA (rheumatoid arthritis)     Vitamin D insufficiency 9/30/2016       Past Surgical History:   Procedure Laterality Date    CARPAL TUNNEL RELEASE Right 5/28/2021    Procedure: RELEASE, CARPAL TUNNEL;  Surgeon: Jose Ryan II, MD;  Location: [facility] OR;  Service: Orthopedics;  Laterality: Right;    COLONOSCOPY  prior to 2016    normal findings per patient report    CYSTOSCOPY N/A 12/18/2018    Procedure: CYSTOSCOPY;  Surgeon: Garrett Pina MD;  Location: [facility] OR;  Service: Urology;  Laterality: N/A;    ESOPHAGOGASTRODUODENOSCOPY N/A 9/29/2020    Dr. Espinosa; empiric dilation; erythematous mucosa in antrum; gastric mucosal atrophy; hematin in entire stomach; biopsy: mid & distal esophagus WNL, stomach- WNL, negative for h pylori    PARATHYROIDECTOMY Right 10/27/2020    Procedure: PARATHYROIDECTOMY;  Surgeon: Latonia Clayton MD;  Location: [address];  Service: ENT;  Laterality: Right;    RELEASE OF ULNAR NERVE AT CUBITAL TUNNEL Right 5/28/2021    Procedure: RELEASE, ULNAR TUNNEL;  Surgeon: Jose Ryan II, MD;  Location: [facility] OR;  Service: Orthopedics;  Laterality: Right;    TRANSRECTAL BIOPSY OF PROSTATE WITH ULTRASOUND GUIDANCE N/A 12/18/2018    Procedure: BIOPSY, PROSTATE, RECTAL APPROACH, WITH US GUIDANCE;  Surgeon: Garrett Pina MD;  Location: [facility];  Service: Urology;  Laterality: N/A;       Review of patient's allergies indicates:  No Known Allergies    Current Facility-Administered Medications on File Prior to Encounter   Medication    acetaminophen tablet 650 mg    denosumab (PROLIA) injection 60 mg    electrolyte-S (ISOLYTE)    fentaNYL 50 mcg/mL injection 25 mcg    HYDROmorphone (PF) injection 0.2 mg    lactated ringers infusion    lactated ringers  infusion    LIDOcaine (PF) 10 mg/ml (1%) injection 10 mg    lorazepam injection 0.25 mg    ondansetron injection 4 mg    prochlorperazine injection Soln 5 mg    sodium chloride 0.9% flush 3 mL     Current Outpatient Medications on File Prior to Encounter   Medication Sig    acetaminophen/diphenhydramine (TYLENOL PM ORAL) Take by mouth nightly as needed. 2 tabs    aspirin (ECOTRIN) 81 MG EC tablet Take 81 mg by mouth once daily.    carvediloL (COREG) 25 MG tablet TAKE 1 TABLET(25 MG) BY MOUTH TWICE DAILY WITH MEALS    cholecalciferol, vitamin D3, (VITAMIN D3) 100 mcg (4,000 unit) Cap Take by mouth.    denosumab (PROLIA) 60 mg/mL Syrg Inject 60 mg into the skin every 6 (six) months.    methotrexate 2.5 MG Tab TAKE 6 TABLETS BY MOUTH EVERY WEEK    multivitamin (ONE DAILY MULTIVITAMIN) per tablet Take 1 tablet by mouth once daily.    sildenafiL (VIAGRA) 100 MG tablet Take 1 tablet (100 mg total) by mouth daily as needed for Erectile Dysfunction.    traMADoL (ULTRAM) 50 mg tablet Take 1 tablet (50 mg total) by mouth every 6 (six) hours as needed for Pain.    [DISCONTINUED] alfuzosin (UROXATRAL) 10 mg Tb24 TAKE 1 TABLET(10 MG) BY MOUTH AFTER DINNER    [DISCONTINUED] clonazePAM (KLONOPIN) 1 MG tablet Take 1 tablet (1 mg total) by mouth 3 (three) times daily as needed for Anxiety.    [DISCONTINUED] folic acid (FOLVITE) 1 MG tablet TAKE 1 TABLET(1 MG) BY MOUTH EVERY DAY    [DISCONTINUED] nut.tx.imp.renal fxn,lac-reduc (NEPRO CARB STEADY) 0.08 gram-1.8 kcal/mL Liqd Take 1 Can by mouth 3 (three) times daily before meals.    [DISCONTINUED] traZODone (DESYREL) 100 MG tablet Take 1 tablet (100 mg total) by mouth nightly as needed for Insomnia.     Family History       Problem Relation (Age of Onset)    Diabetes Maternal Uncle    Drug abuse Sister    Heart disease Mother    Stroke Father          Tobacco Use    Smoking status: Former Smoker     Packs/day: 0.25     Years: 10.00     Pack years: 2.50     Quit date: 8/6/2001     Years  since quittin.5    Smokeless tobacco: Never Used   Substance and Sexual Activity    Alcohol use: Yes     Comment: seldom    Drug use: Yes     Frequency: 8.0 times per week     Types: Marijuana    Sexual activity: Yes     Partners: Female     Review of Systems   Constitutional:  Negative for activity change, appetite change, chills and fever.   HENT:  Negative for congestion, sore throat and trouble swallowing.    Eyes:  Negative for photophobia and visual disturbance.   Respiratory:  Positive for shortness of breath. Negative for cough and chest tightness.    Cardiovascular:  Positive for chest pain. Negative for palpitations and leg swelling.   Gastrointestinal:  Negative for abdominal pain, diarrhea and nausea.   Genitourinary:  Negative for dysuria, flank pain and hematuria.   Musculoskeletal:  Negative for back pain.   Neurological:  Positive for weakness. Negative for dizziness and headaches.   Psychiatric/Behavioral:  Negative for confusion.    Objective:     Vital Signs (Most Recent):  Temp: 98.4 °F (36.9 °C) (22 1651)  Pulse: 83 (22 223)  Resp: 20 (22 1851)  BP: (!) 171/80 (22)  SpO2: 98 % (22)   Vital Signs (24h Range):  Temp:  [98.4 °F (36.9 °C)] 98.4 °F (36.9 °C)  Pulse:  [70-83] 83  Resp:  [18-20] 20  SpO2:  [97 %-99 %] 98 %  BP: (129-171)/(59-80) 171/80     Weight: 64.4 kg (142 lb)  Body mass index is 21.28 kg/m².    Physical Exam  Vitals reviewed.   Constitutional:       Appearance: Normal appearance. He is underweight.   HENT:      Head: Normocephalic.      Mouth/Throat:      Mouth: Mucous membranes are moist.      Pharynx: Oropharynx is clear.   Eyes:      Pupils: Pupils are equal, round, and reactive to light.   Cardiovascular:      Rate and Rhythm: Normal rate and regular rhythm.      Pulses: Normal pulses.      Heart sounds: Normal heart sounds, S1 normal and S2 normal.   Pulmonary:      Effort: Pulmonary effort is normal.      Breath sounds: Normal  breath sounds.   Abdominal:      General: Bowel sounds are normal.      Palpations: Abdomen is soft.   Musculoskeletal:         General: Normal range of motion.      Cervical back: Normal range of motion.      Right lower leg: No edema.      Left lower leg: No edema.   Skin:     General: Skin is warm and dry.   Neurological:      Mental Status: He is alert and oriented to person, place, and time. Mental status is at baseline.   Psychiatric:         Mood and Affect: Mood is depressed. Affect is angry.         Speech: Speech normal.         Behavior: Behavior is cooperative.         CRANIAL NERVES     CN III, IV, VI   Pupils are equal, round, and reactive to light.     Significant Labs: All pertinent labs within the past 24 hours have been reviewed.  BMP:   Recent Labs   Lab 03/07/22  1801         K 4.4   *   CO2 20*   BUN 12   CREATININE 1.2   CALCIUM 8.3*     CBC:   Recent Labs   Lab 03/07/22  1801   WBC 4.83   HGB 12.0*   HCT 38.0*          Significant Imaging: I have reviewed all pertinent imaging results/findings within the past 24 hours.    Imaging Results              X-Ray Chest AP Portable (Final result)  Result time 03/07/22 17:27:52      Final result by Ras Llanos MD (03/07/22 17:27:52)                   Impression:      No new airspace disease.  Query COPD.      Electronically signed by: Ras Llanos  Date:    03/07/2022  Time:    17:27               Narrative:    EXAMINATION:  XR CHEST AP PORTABLE    CLINICAL HISTORY:  shortness of breath;    TECHNIQUE:  Single frontal view of the chest was performed.    COMPARISON:  02/21/2022    FINDINGS:  Increased lucency upper lung zones.  No new airspace disease.  Normal size heart.  No pleural effusion or pneumothorax.  Surgical clips base of the right neck.  No acute osseous abnormality.

## 2022-03-08 NOTE — PLAN OF CARE
[facility]  Initial Discharge Assessment       Primary Care Provider: Irvin Aceves MD    Admission Diagnosis: Elevated troponin [R77.8]  Chest pain [R07.9]  Near syncope [R55]    Admission Date: 3/7/2022  Expected Discharge Date:    Assessment completed with pt at bedside. Pt denied HH and DME. Pharmacy of choice is [facility]. Pt confirmed. Dr Anna Lilly as his PCP. Epic updated. Pt has [employer] insurance. PT stated that he drove himself and will provide own transport home. Pt removed Collette from emergency contact listing stating that she is unreliable and wants to add Brenton Londonoel and Kevyn Londonoel however he does not have their numbers as they are in his phone in his truck. Pts discharge plan is home. Pt requested that Dr. Andrea see him first as he is ready to eat. I updated Dr. Andrea via secure chat. As I was exiting the room Marimar, House supervisor was entering to speak to the pt. CM following.      Discharge Barriers Identified: None    Payor: [employer]ICARE / Plan: [employer] 65 / Product Type: Medicare Advantage /     Extended Emergency Contact Information  Primary Emergency Contact: Martel,Collette  Address: [facility]           NO ADDRESS AVAILABLE, [place] [id number] [place] [address] of [facility]  Home Phone: [phone]  Mobile Phone: [phone]  Relation: Relative  Preferred language: English   needed? No    Discharge Plan A: Home with family  Discharge Plan B: Home      [id number] - [place] - [address] [facility] [facility] [address]  [address]  [place] [address]  Phone: [phone] Fax: [phone]    [id number] - [place], [place] - [address]  [address]  [place] [address]  Phone: [phone] Fax: [phone]      Initial Assessment (most recent)     Adult Discharge Assessment - 03/08/22 1018        Discharge Assessment    Assessment Type Discharge Planning Assessment      Confirmed/corrected address, phone number and insurance Yes     Confirmed Demographics Correct on Facesheet     Source of Information patient     Does patient/caregiver understand observation status Yes     Lives With spouse     Facility Arrived From: home     Do you expect to return to your current living situation? Yes     Do you have help at home or someone to help you manage your care at home? Yes     Prior to hospitilization cognitive status: Alert/Oriented     Current cognitive status: Alert/Oriented     Walking or Climbing Stairs Difficulty none     Dressing/Bathing Difficulty none     Home Layout Able to live on 1st floor     Equipment Currently Used at Home none     Readmission within 30 days? No     Patient currently being followed by outpatient case management? No     Do you currently have service(s) that help you manage your care at home? No     Do you take prescription medications? Yes     Do you have prescription coverage? Yes     Do you have any problems affording any of your prescribed medications? No     Is the patient taking medications as prescribed? yes     How do you get to doctors appointments? car, drives self     Are you on dialysis? No     Do you take coumadin? No     Discharge Plan A Home with family     Discharge Plan B Home     DME Needed Upon Discharge  none     Discharge Plan discussed with: Patient     Discharge Barriers Identified None

## 2022-03-08 NOTE — H&P
[facility] Hospital Medicine  History & Physical    Patient Name: Rajendra Castle  MRN: [id number]  Patient Class: OP- Observation  Admission Date: 3/7/2022  Attending Physician: Ilene Lopez MD   Primary Care Provider: Irvin Aceves MD         Patient information was obtained from patient, past medical records and ER records.     Subjective:     Principal Problem:Chest pain    Chief Complaint:   Chief Complaint   Patient presents with    Weakness     Started today     Dizziness     COVID positive  3 weeks  ago        HPI: Rajendra Castle is a 76 y.o male with a PMHx of RA, HTN, and prostate cancer who presents with chest pain and near syncopal episode earlier today. Patient reports he feels his heartbeat slow down for a beat and he becomes weak and short of breath. Patient states this has happened in the past and symptoms occur at rest and with activity. Patient was evaluated in the ED and EKG did not show ST elevation or depression. Chest x-ray did not show acute changes. Patient's troponin was mildly elevated. Patient referred to hospital medicine and seen in the ED. Patient states chest pain occurs intermittently and denies at present. He denies N/V/D, present shortness of breath, and dizziness. Patient will be admitted to hospital medicine for cardiology consult and further management.         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    Prostate CA     RA (rheumatoid arthritis)     Vitamin D insufficiency 9/30/2016       Past Surgical History:   Procedure Laterality Date    CARPAL TUNNEL RELEASE Right 5/28/2021    Procedure: RELEASE, CARPAL TUNNEL;  Surgeon: Jose Ryan II, MD;  Location[facility];  Service: Orthopedics;  Laterality: Right;    COLONOSCOPY  prior to 2016    normal findings per patient report     CYSTOSCOPY N/A 12/18/2018    Procedure: CYSTOSCOPY;  Surgeon: Garrett Pina MD;  Location[facility];  Service: Urology;  Laterality: N/A;    ESOPHAGOGASTRODUODENOSCOPY N/A 9/29/2020    Dr. Espinosa; empiric dilation; erythematous mucosa in antrum; gastric mucosal atrophy; hematin in entire stomach; biopsy: mid & distal esophagus WNL, stomach- WNL, negative for h pylori    PARATHYROIDECTOMY Right 10/27/2020    Procedure: PARATHYROIDECTOMY;  Surgeon: Latonia Clayton MD;  [facility];  Service: ENT;  Laterality: Right;    RELEASE OF ULNAR NERVE AT CUBITAL TUNNEL Right 5/28/2021    Procedure: RELEASE, ULNAR TUNNEL;  Surgeon: Jose Ryan II, MD;  Location: [facility] OR;  Service: Orthopedics;  Laterality: Right;    TRANSRECTAL BIOPSY OF PROSTATE WITH ULTRASOUND GUIDANCE N/A 12/18/2018    Procedure: BIOPSY, PROSTATE, RECTAL APPROACH, WITH US GUIDANCE;  Surgeon: Garrett Pina MD;  Location: [facility] OR;  Service: Urology;  Laterality: N/A;       Review of patient's allergies indicates:  No Known Allergies    Current Facility-Administered Medications on File Prior to Encounter   Medication    acetaminophen tablet 650 mg    denosumab (PROLIA) injection 60 mg    electrolyte-S (ISOLYTE)    fentaNYL 50 mcg/mL injection 25 mcg    HYDROmorphone (PF) injection 0.2 mg    lactated ringers infusion    lactated ringers infusion    LIDOcaine (PF) 10 mg/ml (1%) injection 10 mg    lorazepam injection 0.25 mg    ondansetron injection 4 mg    prochlorperazine injection Soln 5 mg    sodium chloride 0.9% flush 3 mL     Current Outpatient Medications on File Prior to Encounter   Medication Sig    acetaminophen/diphenhydramine (TYLENOL PM ORAL) Take by mouth nightly as needed. 2 tabs    aspirin (ECOTRIN) 81 MG EC tablet Take 81 mg by mouth once daily.    carvediloL (COREG) 25 MG tablet TAKE 1 TABLET(25 MG) BY MOUTH TWICE DAILY WITH MEALS    cholecalciferol, vitamin D3, (VITAMIN D3) 100 mcg (4,000  unit) Cap Take by mouth.    denosumab (PROLIA) 60 mg/mL Syrg Inject 60 mg into the skin every 6 (six) months.    methotrexate 2.5 MG Tab TAKE 6 TABLETS BY MOUTH EVERY WEEK    multivitamin (ONE DAILY MULTIVITAMIN) per tablet Take 1 tablet by mouth once daily.    sildenafiL (VIAGRA) 100 MG tablet Take 1 tablet (100 mg total) by mouth daily as needed for Erectile Dysfunction.    traMADoL (ULTRAM) 50 mg tablet Take 1 tablet (50 mg total) by mouth every 6 (six) hours as needed for Pain.    [DISCONTINUED] alfuzosin (UROXATRAL) 10 mg Tb24 TAKE 1 TABLET(10 MG) BY MOUTH AFTER DINNER    [DISCONTINUED] clonazePAM (KLONOPIN) 1 MG tablet Take 1 tablet (1 mg total) by mouth 3 (three) times daily as needed for Anxiety.    [DISCONTINUED] folic acid (FOLVITE) 1 MG tablet TAKE 1 TABLET(1 MG) BY MOUTH EVERY DAY    [DISCONTINUED] nut.tx.imp.renal fxn,lac-reduc (NEPRO CARB STEADY) 0.08 gram-1.8 kcal/mL Liqd Take 1 Can by mouth 3 (three) times daily before meals.    [DISCONTINUED] traZODone (DESYREL) 100 MG tablet Take 1 tablet (100 mg total) by mouth nightly as needed for Insomnia.     Family History       Problem Relation (Age of Onset)    Diabetes Maternal Uncle    Drug abuse Sister    Heart disease Mother    Stroke Father          Tobacco Use    Smoking status: Former Smoker     Packs/day: 0.25     Years: 10.00     Pack years: 2.50     Quit date: 2001     Years since quittin.5    Smokeless tobacco: Never Used   Substance and Sexual Activity    Alcohol use: Yes     Comment: seldom    Drug use: Yes     Frequency: 8.0 times per week     Types: Marijuana    Sexual activity: Yes     Partners: Female     Review of Systems   Constitutional:  Negative for activity change, appetite change, chills and fever.   HENT:  Negative for congestion, sore throat and trouble swallowing.    Eyes:  Negative for photophobia and visual disturbance.   Respiratory:  Positive for shortness of breath. Negative for cough and chest  tightness.    Cardiovascular:  Positive for chest pain. Negative for palpitations and leg swelling.   Gastrointestinal:  Negative for abdominal pain, diarrhea and nausea.   Genitourinary:  Negative for dysuria, flank pain and hematuria.   Musculoskeletal:  Negative for back pain.   Neurological:  Positive for weakness. Negative for dizziness and headaches.   Psychiatric/Behavioral:  Negative for confusion.    Objective:     Vital Signs (Most Recent):  Temp: 98.4 °F (36.9 °C) (03/07/22 1651)  Pulse: 83 (03/07/22 2236)  Resp: 20 (03/07/22 1851)  BP: (!) 171/80 (03/07/22 2236)  SpO2: 98 % (03/07/22 2236)   Vital Signs (24h Range):  Temp:  [98.4 °F (36.9 °C)] 98.4 °F (36.9 °C)  Pulse:  [70-83] 83  Resp:  [18-20] 20  SpO2:  [97 %-99 %] 98 %  BP: (129-171)/(59-80) 171/80     Weight: 64.4 kg (142 lb)  Body mass index is 21.28 kg/m².    Physical Exam  Vitals reviewed.   Constitutional:       Appearance: Normal appearance. He is underweight.   HENT:      Head: Normocephalic.      Mouth/Throat:      Mouth: Mucous membranes are moist.      Pharynx: Oropharynx is clear.   Eyes:      Pupils: Pupils are equal, round, and reactive to light.   Cardiovascular:      Rate and Rhythm: Normal rate and regular rhythm.      Pulses: Normal pulses.      Heart sounds: Normal heart sounds, S1 normal and S2 normal.   Pulmonary:      Effort: Pulmonary effort is normal.      Breath sounds: Normal breath sounds.   Abdominal:      General: Bowel sounds are normal.      Palpations: Abdomen is soft.   Musculoskeletal:         General: Normal range of motion.      Cervical back: Normal range of motion.      Right lower leg: No edema.      Left lower leg: No edema.   Skin:     General: Skin is warm and dry.   Neurological:      Mental Status: He is alert and oriented to person, place, and time. Mental status is at baseline.   Psychiatric:         Mood and Affect: Mood is depressed. Affect is angry.         Speech: Speech normal.         Behavior:  Behavior is cooperative.         CRANIAL NERVES     CN III, IV, VI   Pupils are equal, round, and reactive to light.     Significant Labs: All pertinent labs within the past 24 hours have been reviewed.  BMP:   Recent Labs   Lab 03/07/22  1801         K 4.4   *   CO2 20*   BUN 12   CREATININE 1.2   CALCIUM 8.3*     CBC:   Recent Labs   Lab 03/07/22  1801   WBC 4.83   HGB 12.0*   HCT 38.0*          Significant Imaging: I have reviewed all pertinent imaging results/findings within the past 24 hours.    Imaging Results              X-Ray Chest AP Portable (Final result)  Result time 03/07/22 17:27:52      Final result by Ras Llanos MD (03/07/22 17:27:52)                   Impression:      No new airspace disease.  Query COPD.      Electronically signed by: Ras Llanos  Date:    03/07/2022  Time:    17:27               Narrative:    EXAMINATION:  XR CHEST AP PORTABLE    CLINICAL HISTORY:  shortness of breath;    TECHNIQUE:  Single frontal view of the chest was performed.    COMPARISON:  02/21/2022    FINDINGS:  Increased lucency upper lung zones.  No new airspace disease.  Normal size heart.  No pleural effusion or pneumothorax.  Surgical clips base of the right neck.  No acute osseous abnormality.                                        Assessment/Plan:     * Chest pain  Chest pain upon admission with associated SOB, weakness and near syncope    Trend troponin  Cardiology consult  Echo pending   Lipid panel pending  Telemetry       Rheumatoid arthritis involving multiple sites  Chronic, stable    Monitor for acute changes      Anemia, iron deficiency  Patient's anemia is currently controlled. Has not received any PRBCs to date.. Etiology likely d/t iron deficiency  Current CBC reviewed-   Lab Results   Component Value Date    HGB 12.0 (L) 03/07/2022    HCT 38.0 (L) 03/07/2022     Monitor serial CBC and transfuse if patient becomes hemodynamically unstable, symptomatic or H/H drops  below 7/21.         Hypertension  Chronic, controlled.  Latest blood pressure and vitals reviewed-   Temp:  [98.4 °F (36.9 °C)]   Pulse:  [70-83]   Resp:  [18-20]   BP: (129-171)/(59-80)   SpO2:  [97 %-99 %] .   Home meds for hypertension were reviewed and noted below.   Hypertension Medications             carvediloL (COREG) 25 MG tablet TAKE 1 TABLET(25 MG) BY MOUTH TWICE DAILY WITH MEALS          While in the hospital, will manage blood pressure as follows; Continue home antihypertensive regimen    Will utilize p.r.n. blood pressure medication only if patient's blood pressure greater than  180/110 and he develops symptoms such as worsening chest pain or shortness of breath.        VTE Risk Mitigation (From admission, onward)         Ordered     enoxaparin injection 40 mg  Daily         03/07/22 2215     IP VTE HIGH RISK PATIENT  Once         03/07/22 2215     Place sequential compression device  Until discontinued         03/07/22 2215              Critical care time spent on the evaluation and treatment of severe organ dysfunction, review of pertinent labs and imaging studies, discussions with consulting providers and discussions with patient/family: 55 minutes.     Shanta Gibbs NP  Department of Hospital Medicine   [facility]

## 2022-03-08 NOTE — PLAN OF CARE
I was asked to visit this patient in regards to his refusal of safety measures while in the icu.I spoke with this gentleman and explained our process here in the icu of bed alarms and calling for the nurse to assist getting out of the beds. I explained he is connected to many wire and can be tangled in them easily. Also request him not to clime over the bed rails to exit the bed since it sits higher than normal beds. He was very assertive with me ,talked over me and did not want to listen to anything I was explaining to him. He is refusing to use call light and follow any of the safety measures we have in place. I put the bed alarm on and told him this has to be set so we can be here to assist you, it is our warning to come in and help you quickly to keep you from falling. He stated he felt like he was not being treated the same as any other patient by having this in place.I explained this is done for all patients in the icu for their safety. He also stated he will not comply. The curtain is open and the bed alarm is on and call bell in the bed with the patient.side rails up x3  When I left the room. I told the patients nurse our conversation.

## 2022-03-08 NOTE — NURSING
"Shift Summary:   2340: Patient noted to be verbally aggressive and confrontational to ER staff upon arrival to ICU. Patient verbalized multiple times how he was treated/mistreated in the ER. Unit procedures explained to patient, bed alarm set, and call light provided. Bed alarm activated by patient lifting hips off bed to reposition self. Patient exclaimed that he was going to move around in the bed and demanded me to enable the bed alarm.     0100: Patient called out because of a "humming" noise and asked "do you hear that". I responded, "hear what?". Patient noted to be frustrated and stated "so you don't hear that humming?" A phlebotomist entered the room around the same time that he was inquiring if I heard the humming sound and proceeded to ask the phlebotomist if she heard the humming sound. The phlebotomist responded, "I hear a fan". Patient exclaimed that he wanted the noise stopped and demanded to speak to someone who cared. Charge Nurse called to the bedside; patient exclaimed that he could not sleep because of the noise and because the bed was vibrating. Patient previously offered melatonin for sleep; patient refused. Offered to unplug the bed to stop the vibrating feeling from the bed; patient refused. Patient demanded that the Charge Nurse call someone from the engineering department come to his room and stop the humming sound. Patient persistently raising voice and pointing finger in my direction; inquired if patient wanted to leave. Patient verbalized yes that he wanted to leave; patient refused to sign AMA paperwork.     0131: House Supervisor called to patient's bedside. Patient voiced concerns, issues, and complaints with Prosper Root Supervisor.     0155: Patient noted to be tearful and apologizing for his behavior. Patient reports that he has anger issues, denies being combative. Patient noted to punch the mattress and become increasing frustrated because he could not remember someone's name. "     0440: Patient voiced complaints of a headache 9/10; tylenol administered.     0510: Patient resting quietly with eyes closed. Inquired if patient's headache was better; patient raised voiced at me when reassessing pain.      Patient encouraged to use call light prior to getting out of bed; patient noted to be noncompliant with unit procedures and safety measures.     Patient noted to repeat how he treated in the ER to everyone providing care; phlebotomist, Charge Nurse, House Supervisor, and oncoming nurse.

## 2022-03-08 NOTE — PLAN OF CARE
Patient is clear for discharge from case management standpoint AFTER MD sees the patient.         03/08/22 1204   Final Note   Assessment Type Final Discharge Note   Anticipated Discharge Disposition Home   What phone number can be called within the next 1-3 days to see how you are doing after discharge? [phone]   Hospital Resources/Appts/Education Provided Appointments scheduled by Navigator/Coordinator   Post-Acute Status   Discharge Delays None known at this time

## 2022-03-08 NOTE — ED NOTES
"Pt venting in the halls up to ICU. Pt stating, "I hope someone punches that nurse in his face". Pt is still not redirectable.   "

## 2022-03-08 NOTE — ED NOTES
Pt being verbally aggressive with this nurse stating the staff here are giving him the run around and he is ready to leave if he doesn't get answers within the next 5 minutes. Attempted to redirect Pt while updating him on his POC. Pt IV was removed by this nurse at this time. MD notified. Security outside the room.

## 2022-03-08 NOTE — ASSESSMENT & PLAN NOTE
Chest pain upon admission with associated SOB, weakness and near syncope    Trend troponin  Cardiology consult  Echo pending   Lipid panel pending  Telemetry

## 2022-03-08 NOTE — ASSESSMENT & PLAN NOTE
Patient's anemia is currently controlled. Has not received any PRBCs to date.. Etiology likely d/t iron deficiency  Current CBC reviewed-   Lab Results   Component Value Date    HGB 12.0 (L) 03/07/2022    HCT 38.0 (L) 03/07/2022     Monitor serial CBC and transfuse if patient becomes hemodynamically unstable, symptomatic or H/H drops below 7/21.

## 2022-03-08 NOTE — HOSPITAL COURSE
Patient was monitored for almost 24 hours on telemetry.  Seen by cardiologist.  All troponin levels were low.  He had no further chest discomfort.  His BP was elevated but it was not considered to be in emergent territory.  We kept him on his usual dose of Coreg.  The hospitalist looked back at previous BP's in clinic visits and visits in the infusion center, and they were similarly elevated, even going as high as 200 mmHg one time.  Cardiologist suggested a stress test of the heart, but pt was eager to be released home and to do the test in outpatient.  Given there were no further indications for hospital care, pt was discharged home.    Physical Exam  Vitals reviewed.   Constitutional:       Appearance: Normal appearance. He is underweight.   HENT:      Head: Normocephalic.      Mouth/Throat:      Mouth: Mucous membranes are moist.      Pharynx: Oropharynx is clear.   Eyes:      Pupils: Pupils are equal, round, and reactive to light.   Cardiovascular:      Rate and Rhythm: Normal rate and regular rhythm.      Pulses: Normal pulses.      Heart sounds: Normal heart sounds, S1 normal and S2 normal.   Pulmonary:      Effort: Pulmonary effort is normal.      Breath sounds: Normal breath sounds.   Abdominal:      General: Bowel sounds are normal.      Palpations: Abdomen is soft.

## 2022-03-08 NOTE — DISCHARGE SUMMARY
[facility] Medicine  Discharge Summary      Patient Name: Rajendra Castle  MRN: [id number]  Patient Class: OP- Observation  Admission Date: 3/7/2022  Hospital Length of Stay: 0 days  Discharge Date and Time: 3/8/2022 12:59 PM  Attending Physician: No att. providers found   Discharging Provider: Lobito Andrea MD  Primary Care Provider: Irvin Aceves MD      HPI:   Rajendra Castle is a 76 y.o male with a PMHx of RA, HTN, and prostate cancer who presents with chest pain and near syncopal episode earlier today. Patient reports he feels his heartbeat slow down for a beat and he becomes weak and short of breath. Patient states this has happened in the past and symptoms occur at rest and with activity. Patient was evaluated in the ED and EKG did not show ST elevation or depression. Chest x-ray did not show acute changes. Patient's troponin was mildly elevated. Patient referred to hospital medicine and seen in the ED. Patient states chest pain occurs intermittently and denies at present. He denies N/V/D, present shortness of breath, and dizziness. Patient will be admitted to hospital medicine for cardiology consult and further management.         * No surgery found *      Hospital Course:   Patient was monitored for almost 24 hours on telemetry.  Seen by cardiologist.  All troponin levels were low.  He had no further chest discomfort.  His BP was elevated but it was not considered to be in emergent territory.  We kept him on his usual dose of Coreg.  The hospitalist looked back at previous BP's in clinic visits and visits in the infusion center, and they were similarly elevated, even going as high as 200 mmHg one time.  Cardiologist suggested a stress test of the heart, but pt was eager to be released home and to do the test in outpatient.  Given there were no further indications for hospital care, pt was discharged home.    Physical Exam  Vitals reviewed.   Constitutional:       Appearance: Normal  appearance. He is underweight.   HENT:      Head: Normocephalic.      Mouth/Throat:      Mouth: Mucous membranes are moist.      Pharynx: Oropharynx is clear.   Eyes:      Pupils: Pupils are equal, round, and reactive to light.   Cardiovascular:      Rate and Rhythm: Normal rate and regular rhythm.      Pulses: Normal pulses.      Heart sounds: Normal heart sounds, S1 normal and S2 normal.   Pulmonary:      Effort: Pulmonary effort is normal.      Breath sounds: Normal breath sounds.   Abdominal:      General: Bowel sounds are normal.      Palpations: Abdomen is soft.        Goals of Care Treatment Preferences:  Code Status: Full Code      Consults:   Consults (From admission, onward)        Status Ordering Provider     Inpatient consult to Cardiology  Once        Provider:  Sp Phillips MD    Completed JORGE FAYE     Inpatient consult to Social Work/Case Management  Once        Provider:  (Not yet assigned)    JORGE Arreguin          No new Assessment & Plan notes have been filed under this hospital service since the last note was generated.  Service: Hospital Medicine    Final Active Diagnoses:    Diagnosis Date Noted POA    PRINCIPAL PROBLEM:  Chest pain [R07.9] 09/28/2020 Yes    Rheumatoid arthritis involving multiple sites [M06.9] 08/18/2016 Yes    Anemia, iron deficiency [D50.9] 10/03/2013 Yes    Hypertension [I10]  Yes      Problems Resolved During this Admission:       Discharged Condition: stable    Disposition: Home or Self Care    Follow Up:    Patient Instructions:      Diet Cardiac     Activity as tolerated       Significant Diagnostic Studies:   BMP  Lab Results   Component Value Date     03/07/2022    K 4.4 03/07/2022     (H) 03/07/2022    CO2 20 (L) 03/07/2022    BUN 12 03/07/2022    CREATININE 1.2 03/07/2022    CALCIUM 8.3 (L) 03/07/2022    ANIONGAP 10 03/07/2022    ESTGFRAFRICA >60 03/07/2022    EGFRNONAA 59 (A) 03/07/2022     Lab Results   Component Value  Date    WBC 4.83 03/07/2022    HGB 12.0 (L) 03/07/2022    HCT 38.0 (L) 03/07/2022    MCV 88 03/07/2022     03/07/2022       Echocardiogram report is pending.     Medications:  Reconciled Home Medications:      Medication List      START taking these medications    cloNIDine 0.1 MG tablet  Commonly known as: CATAPRES  Take 1 tablet (0.1 mg total) by mouth 2 (two) times daily as needed (only if blood pressure top number is over 200).        CONTINUE taking these medications    aspirin 81 MG EC tablet  Commonly known as: ECOTRIN  Take 81 mg by mouth once daily.     carvediloL 25 MG tablet  Commonly known as: COREG  TAKE 1 TABLET(25 MG) BY MOUTH TWICE DAILY WITH MEALS     methotrexate 2.5 MG Tab  TAKE 6 TABLETS BY MOUTH EVERY WEEK     ONE DAILY MULTIVITAMIN per tablet  Generic drug: multivitamin  Take 1 tablet by mouth once daily.     PROLIA 60 mg/mL Syrg  Generic drug: denosumab  Inject 60 mg into the skin every 6 (six) months.     sildenafiL 100 MG tablet  Commonly known as: VIAGRA  Take 1 tablet (100 mg total) by mouth daily as needed for Erectile Dysfunction.     traMADoL 50 mg tablet  Commonly known as: ULTRAM  Take 1 tablet (50 mg total) by mouth every 6 (six) hours as needed for Pain.     TYLENOL PM ORAL  Take by mouth nightly as needed. 2 tabs     VITAMIN D3 100 mcg (4,000 unit) Cap  Generic drug: cholecalciferol (vitamin D3)  Take by mouth.            Indwelling Lines/Drains at time of discharge:   Lines/Drains/Airways     None                 Time spent on the discharge of patient: 23 minutes      Lobito Andrea MD  Department of Hospital Medicine  [facility]

## 2022-03-08 NOTE — EICU
77 yo male ex smoker w/ PMHx HTN here w/ syncope like events and fall at home.  HD stable.     Troponin x 1 low positive.   ECG is +ve for LVH, prolonged QTc and first degree AV block.     On aspirin and beta blocker. BP optimization.   Lovenox 40mg SC noted.     Would continue to f/up troponin and ECG overnight x 3 sets total to r/o NSTEMI.   If this continues to trend higher, would consider full dose anticoagulation.     ECHO and cardio evalve in am.   If un revealing, would need neuro work up also.     COVID +ve 2 wks ago.     CXR:  - No new airspace disease.  Query COPD.    Metabolic causes like hypothyroidism (always normal TSH including in 12/21), hypoglycemia can be ruled out too.      Please call for further assistance.

## 2022-03-08 NOTE — ED NOTES
Patient came to the ED with complaint of weakness and dizziness. Patient had COVID 3 weeks ago and he seems to not fully recovered since. Patient states that he was about pass out when he was trying to pick something up from off the ground.

## 2022-03-09 ENCOUNTER — TELEPHONE (OUTPATIENT)
Dept: MEDSURG UNIT | Facility: HOSPITAL | Age: 77
End: 2022-03-09
Payer: MEDICARE

## 2022-03-09 LAB
AORTIC ROOT ANNULUS: 3.26 CM
AORTIC VALVE CUSP SEPERATION: 2.16 CM
AV INDEX (PROSTH): 0.78
AV MEAN GRADIENT: 3 MMHG
AV PEAK GRADIENT: 6 MMHG
AV VALVE AREA: 2.34 CM2
AV VELOCITY RATIO: 0.87
BSA FOR ECHO PROCEDURE: 1.76 M2
CV ECHO LV RWT: 0.76 CM
DOP CALC AO PEAK VEL: 1.19 M/S
DOP CALC AO VTI: 21.72 CM
DOP CALC LVOT AREA: 3 CM2
DOP CALC LVOT DIAMETER: 1.96 CM
DOP CALC LVOT PEAK VEL: 1.03 M/S
DOP CALC LVOT STROKE VOLUME: 50.9 CM3
DOP CALC MV VTI: 22.55 CM
DOP CALCLVOT PEAK VEL VTI: 16.88 CM
E WAVE DECELERATION TIME: 132 MSEC
E/A RATIO: 0.97
E/E' RATIO: 8.42 M/S
ECHO LV POSTERIOR WALL: 1.54 CM (ref 0.6–1.1)
EJECTION FRACTION: 70 %
FRACTIONAL SHORTENING: 37 % (ref 28–44)
INTERVENTRICULAR SEPTUM: 1.35 CM (ref 0.6–1.1)
LA MAJOR: 6.18 CM
LA WIDTH: 5.01 CM
LEFT ATRIUM SIZE: 4.53 CM
LEFT INTERNAL DIMENSION IN SYSTOLE: 2.53 CM (ref 2.1–4)
LEFT VENTRICLE DIASTOLIC VOLUME INDEX: 40.6 ML/M2
LEFT VENTRICLE DIASTOLIC VOLUME: 71.86 ML
LEFT VENTRICLE MASS INDEX: 126 G/M2
LEFT VENTRICLE SYSTOLIC VOLUME INDEX: 13 ML/M2
LEFT VENTRICLE SYSTOLIC VOLUME: 23.02 ML
LEFT VENTRICULAR INTERNAL DIMENSION IN DIASTOLE: 4.04 CM (ref 3.5–6)
LEFT VENTRICULAR MASS: 222.64 G
LV LATERAL E/E' RATIO: 8.42 M/S
LV SEPTAL E/E' RATIO: 8.42 M/S
MV MEAN GRADIENT: 1 MMHG
MV PEAK A VEL: 1.04 M/S
MV PEAK E VEL: 1.01 M/S
MV PEAK GRADIENT: 4 MMHG
MV STENOSIS PRESSURE HALF TIME: 56.57 MS
MV VALVE AREA BY CONTINUITY EQUATION: 2.26 CM2
MV VALVE AREA P 1/2 METHOD: 3.89 CM2
PISA MRMAX VEL: 0.06 M/S
PISA TR MAX VEL: 2.5 M/S
PV PEAK VELOCITY: 0.98 CM/S
RA MAJOR: 4.68 CM
RA PRESSURE: 3 MMHG
RA WIDTH: 4.14 CM
TDI LATERAL: 0.12 M/S
TDI SEPTAL: 0.12 M/S
TDI: 0.12 M/S
TR MAX PG: 25 MMHG
TRICUSPID ANNULAR PLANE SYSTOLIC EXCURSION: 1.47 CM
TV REST PULMONARY ARTERY PRESSURE: 28 MMHG

## 2022-04-08 ENCOUNTER — TELEPHONE (OUTPATIENT)
Dept: FAMILY MEDICINE | Facility: CLINIC | Age: 77
End: 2022-04-08
Payer: MEDICARE

## 2022-04-25 ENCOUNTER — OFFICE VISIT (OUTPATIENT)
Dept: FAMILY MEDICINE | Facility: CLINIC | Age: 77
End: 2022-04-25
Payer: MEDICARE

## 2022-04-25 VITALS
HEIGHT: 68 IN | WEIGHT: 139.13 LBS | DIASTOLIC BLOOD PRESSURE: 74 MMHG | HEART RATE: 67 BPM | SYSTOLIC BLOOD PRESSURE: 134 MMHG | TEMPERATURE: 98 F | OXYGEN SATURATION: 98 % | BODY MASS INDEX: 21.09 KG/M2

## 2022-04-25 DIAGNOSIS — I25.111 ATHEROSCLEROSIS OF NATIVE CORONARY ARTERY OF NATIVE HEART WITH ANGINA PECTORIS WITH DOCUMENTED SPASM: ICD-10-CM

## 2022-04-25 DIAGNOSIS — R63.4 WEIGHT LOSS, ABNORMAL: ICD-10-CM

## 2022-04-25 DIAGNOSIS — E21.3 HYPERPARATHYROIDISM: ICD-10-CM

## 2022-04-25 DIAGNOSIS — J43.1 PANLOBULAR EMPHYSEMA: ICD-10-CM

## 2022-04-25 DIAGNOSIS — Z12.11 SCREEN FOR COLON CANCER: ICD-10-CM

## 2022-04-25 DIAGNOSIS — Z00.00 ENCOUNTER FOR PREVENTIVE HEALTH EXAMINATION: Primary | ICD-10-CM

## 2022-04-25 DIAGNOSIS — C61 PROSTATE CANCER: ICD-10-CM

## 2022-04-25 DIAGNOSIS — N18.30 STAGE 3 CHRONIC KIDNEY DISEASE, UNSPECIFIED WHETHER STAGE 3A OR 3B CKD: ICD-10-CM

## 2022-04-25 PROCEDURE — 1125F PR PAIN SEVERITY QUANTIFIED, PAIN PRESENT: ICD-10-PCS | Mod: CPTII,S$GLB,, | Performed by: NURSE PRACTITIONER

## 2022-04-25 PROCEDURE — 3075F PR MOST RECENT SYSTOLIC BLOOD PRESS GE 130-139MM HG: ICD-10-PCS | Mod: CPTII,S$GLB,, | Performed by: NURSE PRACTITIONER

## 2022-04-25 PROCEDURE — 99999 PR PBB SHADOW E&M-EST. PATIENT-LVL IV: ICD-10-PCS | Mod: PBBFAC,,, | Performed by: NURSE PRACTITIONER

## 2022-04-25 PROCEDURE — 99999 PR PBB SHADOW E&M-EST. PATIENT-LVL IV: CPT | Mod: PBBFAC,,, | Performed by: NURSE PRACTITIONER

## 2022-04-25 PROCEDURE — 1159F PR MEDICATION LIST DOCUMENTED IN MEDICAL RECORD: ICD-10-PCS | Mod: CPTII,S$GLB,, | Performed by: NURSE PRACTITIONER

## 2022-04-25 PROCEDURE — 1160F RVW MEDS BY RX/DR IN RCRD: CPT | Mod: CPTII,S$GLB,, | Performed by: NURSE PRACTITIONER

## 2022-04-25 PROCEDURE — 1159F MED LIST DOCD IN RCRD: CPT | Mod: CPTII,S$GLB,, | Performed by: NURSE PRACTITIONER

## 2022-04-25 PROCEDURE — 3288F PR FALLS RISK ASSESSMENT DOCUMENTED: ICD-10-PCS | Mod: CPTII,S$GLB,, | Performed by: NURSE PRACTITIONER

## 2022-04-25 PROCEDURE — 1101F PR PT FALLS ASSESS DOC 0-1 FALLS W/OUT INJ PAST YR: ICD-10-PCS | Mod: CPTII,S$GLB,, | Performed by: NURSE PRACTITIONER

## 2022-04-25 PROCEDURE — 3008F PR BODY MASS INDEX (BMI) DOCUMENTED: ICD-10-PCS | Mod: CPTII,S$GLB,, | Performed by: NURSE PRACTITIONER

## 2022-04-25 PROCEDURE — 1160F PR REVIEW ALL MEDS BY PRESCRIBER/CLIN PHARMACIST DOCUMENTED: ICD-10-PCS | Mod: CPTII,S$GLB,, | Performed by: NURSE PRACTITIONER

## 2022-04-25 PROCEDURE — G0439 PR MEDICARE ANNUAL WELLNESS SUBSEQUENT VISIT: ICD-10-PCS | Mod: S$GLB,,, | Performed by: NURSE PRACTITIONER

## 2022-04-25 PROCEDURE — 3078F DIAST BP <80 MM HG: CPT | Mod: CPTII,S$GLB,, | Performed by: NURSE PRACTITIONER

## 2022-04-25 PROCEDURE — 1125F AMNT PAIN NOTED PAIN PRSNT: CPT | Mod: CPTII,S$GLB,, | Performed by: NURSE PRACTITIONER

## 2022-04-25 PROCEDURE — G0439 PPPS, SUBSEQ VISIT: HCPCS | Mod: S$GLB,,, | Performed by: NURSE PRACTITIONER

## 2022-04-25 PROCEDURE — 3078F PR MOST RECENT DIASTOLIC BLOOD PRESSURE < 80 MM HG: ICD-10-PCS | Mod: CPTII,S$GLB,, | Performed by: NURSE PRACTITIONER

## 2022-04-25 PROCEDURE — 3075F SYST BP GE 130 - 139MM HG: CPT | Mod: CPTII,S$GLB,, | Performed by: NURSE PRACTITIONER

## 2022-04-25 PROCEDURE — 1101F PT FALLS ASSESS-DOCD LE1/YR: CPT | Mod: CPTII,S$GLB,, | Performed by: NURSE PRACTITIONER

## 2022-04-25 PROCEDURE — 99499 RISK ADDL DX/OHS AUDIT: ICD-10-PCS | Mod: S$GLB,,, | Performed by: NURSE PRACTITIONER

## 2022-04-25 PROCEDURE — 3288F FALL RISK ASSESSMENT DOCD: CPT | Mod: CPTII,S$GLB,, | Performed by: NURSE PRACTITIONER

## 2022-04-25 PROCEDURE — 3008F BODY MASS INDEX DOCD: CPT | Mod: CPTII,S$GLB,, | Performed by: NURSE PRACTITIONER

## 2022-04-25 PROCEDURE — 99499 UNLISTED E&M SERVICE: CPT | Mod: S$GLB,,, | Performed by: NURSE PRACTITIONER

## 2022-04-25 NOTE — PATIENT INSTRUCTIONS
Counseling and Referral of Other Preventative  (Italic type indicates deductible and co-insurance are waived)    Patient Name: Rajendra Castle  Today's Date: 4/25/2022    Health Maintenance       Date Due Completion Date    Colorectal Cancer Screening 01/31/2019 1/30/2019    Influenza Vaccine (1) 09/01/2021 10/28/2020    Lipid Panel 03/08/2027 3/8/2022    TETANUS VACCINE 05/16/2028 5/16/2018        Orders Placed This Encounter   Procedures    Fecal Immunochemical Test (iFOBT)     The following information is provided to all patients.  This information is to help you find resources for any of the problems found today that may be affecting your health:                Living healthy guide: www.[facility].[place].gov      Understanding Diabetes: www.diabetes.org      Eating healthy: www.cdc.gov/healthyweight      CDC home safety checklist: www.cdc.gov/steadi/patient.html      Agency on Aging: www.goea.[place].[facility]      Alcoholics anonymous (AA): www.aa.org      Physical Activity: www.maicol.nih.gov/xw3ozti      Tobacco use: www.quitwithusla.org

## 2022-04-25 NOTE — PROGRESS NOTES
"  Rajendra Castle presented for a  Medicare AWV and comprehensive Health Risk Assessment today. The following components were reviewed and updated:    · Medical history  · Family History  · Social history  · Allergies and Current Medications  · Health Risk Assessment  · Health Maintenance  · Care Team         ** See Completed Assessments for Annual Wellness Visit within the encounter summary.**         The following assessments were completed:  · Living Situation  · CAGE  · Depression Screening  · Timed Get Up and Go  · Whisper Test  · Cognitive Function Screening  · Nutrition Screening  · ADL Screening  · PAQ Screening            Vitals:    04/25/22 1344   BP: 134/74   Pulse: 67   Temp: 98.3 °F (36.8 °C)   SpO2: 98%   Weight: 63.1 kg (139 lb 1.8 oz)   Height: 5' 8" (1.727 m)     Body mass index is 21.15 kg/m².  Physical Exam  Constitutional:       Appearance: He is well-developed.   HENT:      Head: Normocephalic and atraumatic.      Right Ear: Hearing normal.      Left Ear: Hearing normal.      Nose: Nose normal.   Eyes:      General: Lids are normal.      Conjunctiva/sclera: Conjunctivae normal.      Pupils: Pupils are equal, round, and reactive to light.   Cardiovascular:      Rate and Rhythm: Normal rate.   Pulmonary:      Effort: Pulmonary effort is normal.   Abdominal:      Palpations: Abdomen is soft.   Musculoskeletal:         General: Normal range of motion.      Cervical back: Normal range of motion and neck supple.   Skin:     General: Skin is warm and dry.   Neurological:      Mental Status: He is alert and oriented to person, place, and time.               Diagnoses and health risks identified today and associated recommendations/orders:    1. Encounter for preventive health examination  Discussed health maintenance guidelines appropriate for age.    Patient very resistant to participate in appt -   Did not remember his words because " I didn't want to"       2. Screen for colon cancer  [employer] was given " to patient on 4/25/2022 2:31 PM         - Fecal Immunochemical Test (iFOBT); Future    3. Weight loss, abnormal  Mild, continue to monitor    4. Atherosclerosis of native coronary artery of native heart with angina pectoris with documented spasm  Stable, no active chest pain currently  Patient had recent hospital admission (3/2022)- encouraged to follow up with cardiology - Dr. Lamb - as he refused in patient stress test       5. Prostate cancer  Stable, continue care and monitoring per urology     6. Hyperparathyroidism  Increasing again since removal  Continue care and monitoring per endocrinology     7. Panlobular emphysema  Stable, continue to monitor    8. Stage 3 chronic kidney disease, unspecified whether stage 3a or 3b CKD  Stable, continue to monitor  Followed by pcp      Provided Rajendra with a 5-10 year written screening schedule and personal prevention plan. Recommendations were developed using the USPSTF age appropriate recommendations. Education, counseling, and referrals were provided as needed. After Visit Summary printed and given to patient which includes a list of additional screenings\tests needed.    Follow up for One year for Annual Wellness Visit.    Justina Juan NP    I offered to discuss advanced care planning, including how to pick a person who would make decisions for you if you were unable to make them for yourself, called a health care power of , and what kind of decisions you might make such as use of life sustaining treatments such as ventilators and tube feeding when faced with a life limiting illness recorded on a living will that they will need to know. (How you want to be cared for as you near the end of your natural life)     X  Patient is unwilling to engage in a discussion regarding advance directives at this time.

## 2022-05-22 ENCOUNTER — HOSPITAL ENCOUNTER (OUTPATIENT)
Facility: HOSPITAL | Age: 77
Discharge: HOME | End: 2022-05-23
Attending: EMERGENCY MEDICINE | Admitting: HOSPITALIST
Payer: MEDICARE

## 2022-05-22 DIAGNOSIS — I20.89 OTHER FORMS OF ANGINA PECTORIS: ICD-10-CM

## 2022-05-22 DIAGNOSIS — R07.9 CHEST PAIN: Primary | ICD-10-CM

## 2022-05-22 DIAGNOSIS — R42 DIZZINESS: ICD-10-CM

## 2022-05-22 DIAGNOSIS — K21.9 GASTROESOPHAGEAL REFLUX DISEASE WITHOUT ESOPHAGITIS: ICD-10-CM

## 2022-05-22 DIAGNOSIS — R55 NEAR SYNCOPE: ICD-10-CM

## 2022-05-22 LAB
ALBUMIN SERPL BCP-MCNC: 3.3 G/DL (ref 3.5–5.2)
ALP SERPL-CCNC: 74 U/L (ref 55–135)
ALT SERPL W/O P-5'-P-CCNC: 17 U/L (ref 10–44)
ANION GAP SERPL CALC-SCNC: 12 MMOL/L (ref 8–16)
AST SERPL-CCNC: 25 U/L (ref 10–40)
BASOPHILS # BLD AUTO: 0.01 K/UL (ref 0–0.2)
BASOPHILS NFR BLD: 0.1 % (ref 0–1.9)
BILIRUB SERPL-MCNC: 0.7 MG/DL (ref 0.1–1)
BNP SERPL-MCNC: 919 PG/ML (ref 0–99)
BUN SERPL-MCNC: 23 MG/DL (ref 8–23)
CALCIUM SERPL-MCNC: 9.4 MG/DL (ref 8.7–10.5)
CHLORIDE SERPL-SCNC: 109 MMOL/L (ref 95–110)
CO2 SERPL-SCNC: 19 MMOL/L (ref 23–29)
CREAT SERPL-MCNC: 1.2 MG/DL (ref 0.5–1.4)
DIFFERENTIAL METHOD: ABNORMAL
EOSINOPHIL # BLD AUTO: 0 K/UL (ref 0–0.5)
EOSINOPHIL NFR BLD: 0.3 % (ref 0–8)
ERYTHROCYTE [DISTWIDTH] IN BLOOD BY AUTOMATED COUNT: 17.6 % (ref 11.5–14.5)
EST. GFR  (AFRICAN AMERICAN): >60 ML/MIN/1.73 M^2
EST. GFR  (NON AFRICAN AMERICAN): 59 ML/MIN/1.73 M^2
GLUCOSE SERPL-MCNC: 111 MG/DL (ref 70–110)
HCT VFR BLD AUTO: 41.3 % (ref 40–54)
HGB BLD-MCNC: 13.4 G/DL (ref 14–18)
IMM GRANULOCYTES # BLD AUTO: 0.03 K/UL (ref 0–0.04)
IMM GRANULOCYTES NFR BLD AUTO: 0.3 % (ref 0–0.5)
INFLUENZA A, MOLECULAR: NEGATIVE
INFLUENZA B, MOLECULAR: NEGATIVE
LIPASE SERPL-CCNC: 16 U/L (ref 4–60)
LYMPHOCYTES # BLD AUTO: 0.7 K/UL (ref 1–4.8)
LYMPHOCYTES NFR BLD: 7.3 % (ref 18–48)
MCH RBC QN AUTO: 28 PG (ref 27–31)
MCHC RBC AUTO-ENTMCNC: 32.4 G/DL (ref 32–36)
MCV RBC AUTO: 86 FL (ref 82–98)
MONOCYTES # BLD AUTO: 0.7 K/UL (ref 0.3–1)
MONOCYTES NFR BLD: 7.8 % (ref 4–15)
NEUTROPHILS # BLD AUTO: 7.6 K/UL (ref 1.8–7.7)
NEUTROPHILS NFR BLD: 84.2 % (ref 38–73)
NRBC BLD-RTO: 0 /100 WBC
PLATELET # BLD AUTO: 359 K/UL (ref 150–450)
PMV BLD AUTO: 11.3 FL (ref 9.2–12.9)
POTASSIUM SERPL-SCNC: 4.8 MMOL/L (ref 3.5–5.1)
PROT SERPL-MCNC: 8.1 G/DL (ref 6–8.4)
RBC # BLD AUTO: 4.78 M/UL (ref 4.6–6.2)
SARS-COV-2 RDRP RESP QL NAA+PROBE: NEGATIVE
SODIUM SERPL-SCNC: 140 MMOL/L (ref 136–145)
SPECIMEN SOURCE: NORMAL
TROPONIN I SERPL DL<=0.01 NG/ML-MCNC: 0.03 NG/ML (ref 0–0.03)
TROPONIN I SERPL DL<=0.01 NG/ML-MCNC: 0.03 NG/ML (ref 0–0.03)
TSH SERPL DL<=0.005 MIU/L-ACNC: 0.74 UIU/ML (ref 0.4–4)
WBC # BLD AUTO: 8.99 K/UL (ref 3.9–12.7)

## 2022-05-22 PROCEDURE — U0002 COVID-19 LAB TEST NON-CDC: HCPCS | Performed by: EMERGENCY MEDICINE

## 2022-05-22 PROCEDURE — 63600175 PHARM REV CODE 636 W HCPCS: Performed by: NURSE PRACTITIONER

## 2022-05-22 PROCEDURE — 85025 COMPLETE CBC W/AUTO DIFF WBC: CPT | Performed by: EMERGENCY MEDICINE

## 2022-05-22 PROCEDURE — C9113 INJ PANTOPRAZOLE SODIUM, VIA: HCPCS | Performed by: NURSE PRACTITIONER

## 2022-05-22 PROCEDURE — 25000003 PHARM REV CODE 250: Performed by: NURSE PRACTITIONER

## 2022-05-22 PROCEDURE — 36415 COLL VENOUS BLD VENIPUNCTURE: CPT | Performed by: NURSE PRACTITIONER

## 2022-05-22 PROCEDURE — G0378 HOSPITAL OBSERVATION PER HR: HCPCS

## 2022-05-22 PROCEDURE — 96376 TX/PRO/DX INJ SAME DRUG ADON: CPT

## 2022-05-22 PROCEDURE — 93010 EKG 12-LEAD: ICD-10-PCS | Mod: ,,, | Performed by: INTERNAL MEDICINE

## 2022-05-22 PROCEDURE — 96374 THER/PROPH/DIAG INJ IV PUSH: CPT

## 2022-05-22 PROCEDURE — 96375 TX/PRO/DX INJ NEW DRUG ADDON: CPT

## 2022-05-22 PROCEDURE — 84484 ASSAY OF TROPONIN QUANT: CPT | Performed by: EMERGENCY MEDICINE

## 2022-05-22 PROCEDURE — 84443 ASSAY THYROID STIM HORMONE: CPT | Performed by: NURSE PRACTITIONER

## 2022-05-22 PROCEDURE — 80053 COMPREHEN METABOLIC PANEL: CPT | Performed by: EMERGENCY MEDICINE

## 2022-05-22 PROCEDURE — 83036 HEMOGLOBIN GLYCOSYLATED A1C: CPT | Performed by: NURSE PRACTITIONER

## 2022-05-22 PROCEDURE — 84484 ASSAY OF TROPONIN QUANT: CPT | Mod: 91 | Performed by: NURSE PRACTITIONER

## 2022-05-22 PROCEDURE — 93010 ELECTROCARDIOGRAM REPORT: CPT | Mod: ,,, | Performed by: INTERNAL MEDICINE

## 2022-05-22 PROCEDURE — 99285 EMERGENCY DEPT VISIT HI MDM: CPT | Mod: 25

## 2022-05-22 PROCEDURE — 83690 ASSAY OF LIPASE: CPT | Performed by: NURSE PRACTITIONER

## 2022-05-22 PROCEDURE — 87502 INFLUENZA DNA AMP PROBE: CPT | Performed by: EMERGENCY MEDICINE

## 2022-05-22 PROCEDURE — 93005 ELECTROCARDIOGRAM TRACING: CPT

## 2022-05-22 PROCEDURE — 25000003 PHARM REV CODE 250: Performed by: EMERGENCY MEDICINE

## 2022-05-22 PROCEDURE — 83880 ASSAY OF NATRIURETIC PEPTIDE: CPT | Performed by: EMERGENCY MEDICINE

## 2022-05-22 RX ORDER — SODIUM,POTASSIUM PHOSPHATES 280-250MG
2 POWDER IN PACKET (EA) ORAL
Status: DISCONTINUED | OUTPATIENT
Start: 2022-05-22 | End: 2022-05-23 | Stop reason: HOSPADM

## 2022-05-22 RX ORDER — ONDANSETRON 2 MG/ML
4 INJECTION INTRAMUSCULAR; INTRAVENOUS ONCE
Status: COMPLETED | OUTPATIENT
Start: 2022-05-22 | End: 2022-05-22

## 2022-05-22 RX ORDER — CARVEDILOL 25 MG/1
25 TABLET ORAL 2 TIMES DAILY WITH MEALS
Status: DISCONTINUED | OUTPATIENT
Start: 2022-05-23 | End: 2022-05-22 | Stop reason: SDUPTHER

## 2022-05-22 RX ORDER — PANTOPRAZOLE SODIUM 40 MG/1
40 TABLET, DELAYED RELEASE ORAL DAILY
Status: DISCONTINUED | OUTPATIENT
Start: 2022-05-23 | End: 2022-05-23

## 2022-05-22 RX ORDER — TALC
9 POWDER (GRAM) TOPICAL NIGHTLY PRN
Status: DISCONTINUED | OUTPATIENT
Start: 2022-05-22 | End: 2022-05-23 | Stop reason: HOSPADM

## 2022-05-22 RX ORDER — GLUCAGON 1 MG
1 KIT INJECTION
Status: DISCONTINUED | OUTPATIENT
Start: 2022-05-22 | End: 2022-05-23 | Stop reason: HOSPADM

## 2022-05-22 RX ORDER — NALOXONE HCL 0.4 MG/ML
0.02 VIAL (ML) INJECTION
Status: DISCONTINUED | OUTPATIENT
Start: 2022-05-22 | End: 2022-05-23 | Stop reason: HOSPADM

## 2022-05-22 RX ORDER — HYDRALAZINE HYDROCHLORIDE 20 MG/ML
10 INJECTION INTRAMUSCULAR; INTRAVENOUS EVERY 6 HOURS PRN
Status: DISCONTINUED | OUTPATIENT
Start: 2022-05-22 | End: 2022-05-23 | Stop reason: HOSPADM

## 2022-05-22 RX ORDER — ACETAMINOPHEN 325 MG/1
650 TABLET ORAL EVERY 6 HOURS PRN
Status: DISCONTINUED | OUTPATIENT
Start: 2022-05-22 | End: 2022-05-23 | Stop reason: HOSPADM

## 2022-05-22 RX ORDER — AMOXICILLIN 250 MG
1 CAPSULE ORAL 2 TIMES DAILY
Status: DISCONTINUED | OUTPATIENT
Start: 2022-05-22 | End: 2022-05-23 | Stop reason: HOSPADM

## 2022-05-22 RX ORDER — ACETAMINOPHEN 325 MG/1
650 TABLET ORAL EVERY 8 HOURS PRN
Status: DISCONTINUED | OUTPATIENT
Start: 2022-05-22 | End: 2022-05-23 | Stop reason: HOSPADM

## 2022-05-22 RX ORDER — LANOLIN ALCOHOL/MO/W.PET/CERES
800 CREAM (GRAM) TOPICAL
Status: DISCONTINUED | OUTPATIENT
Start: 2022-05-22 | End: 2022-05-23 | Stop reason: HOSPADM

## 2022-05-22 RX ORDER — TALC
6 POWDER (GRAM) TOPICAL NIGHTLY PRN
Status: DISCONTINUED | OUTPATIENT
Start: 2022-05-22 | End: 2022-05-22 | Stop reason: SDUPTHER

## 2022-05-22 RX ORDER — MAG HYDROX/ALUMINUM HYD/SIMETH 200-200-20
60 SUSPENSION, ORAL (FINAL DOSE FORM) ORAL 4 TIMES DAILY PRN
Status: DISCONTINUED | OUTPATIENT
Start: 2022-05-22 | End: 2022-05-23 | Stop reason: SDUPTHER

## 2022-05-22 RX ORDER — ONDANSETRON 2 MG/ML
4 INJECTION INTRAMUSCULAR; INTRAVENOUS EVERY 8 HOURS PRN
Status: DISCONTINUED | OUTPATIENT
Start: 2022-05-22 | End: 2022-05-23 | Stop reason: HOSPADM

## 2022-05-22 RX ORDER — MAG HYDROX/ALUMINUM HYD/SIMETH 200-200-20
30 SUSPENSION, ORAL (FINAL DOSE FORM) ORAL 4 TIMES DAILY PRN
Status: DISCONTINUED | OUTPATIENT
Start: 2022-05-22 | End: 2022-05-22

## 2022-05-22 RX ORDER — PANTOPRAZOLE SODIUM 40 MG/10ML
40 INJECTION, POWDER, LYOPHILIZED, FOR SOLUTION INTRAVENOUS ONCE
Status: COMPLETED | OUTPATIENT
Start: 2022-05-22 | End: 2022-05-22

## 2022-05-22 RX ORDER — SODIUM CHLORIDE 0.9 % (FLUSH) 0.9 %
10 SYRINGE (ML) INJECTION EVERY 12 HOURS PRN
Status: DISCONTINUED | OUTPATIENT
Start: 2022-05-22 | End: 2022-05-23 | Stop reason: HOSPADM

## 2022-05-22 RX ORDER — CLONIDINE HYDROCHLORIDE 0.1 MG/1
0.1 TABLET ORAL 2 TIMES DAILY PRN
Status: DISCONTINUED | OUTPATIENT
Start: 2022-05-22 | End: 2022-05-23 | Stop reason: HOSPADM

## 2022-05-22 RX ORDER — IBUPROFEN 200 MG
24 TABLET ORAL
Status: DISCONTINUED | OUTPATIENT
Start: 2022-05-22 | End: 2022-05-23 | Stop reason: HOSPADM

## 2022-05-22 RX ORDER — IBUPROFEN 200 MG
16 TABLET ORAL
Status: DISCONTINUED | OUTPATIENT
Start: 2022-05-22 | End: 2022-05-23 | Stop reason: HOSPADM

## 2022-05-22 RX ORDER — CARVEDILOL 25 MG/1
25 TABLET ORAL 2 TIMES DAILY
Status: DISCONTINUED | OUTPATIENT
Start: 2022-05-22 | End: 2022-05-22

## 2022-05-22 RX ORDER — ASPIRIN 81 MG/1
81 TABLET ORAL DAILY
Status: DISCONTINUED | OUTPATIENT
Start: 2022-05-23 | End: 2022-05-23 | Stop reason: HOSPADM

## 2022-05-22 RX ORDER — MAG HYDROX/ALUMINUM HYD/SIMETH 200-200-20
30 SUSPENSION, ORAL (FINAL DOSE FORM) ORAL EVERY 6 HOURS PRN
Status: DISCONTINUED | OUTPATIENT
Start: 2022-05-22 | End: 2022-05-22

## 2022-05-22 RX ORDER — IPRATROPIUM BROMIDE AND ALBUTEROL SULFATE 2.5; .5 MG/3ML; MG/3ML
3 SOLUTION RESPIRATORY (INHALATION) EVERY 4 HOURS PRN
Status: DISCONTINUED | OUTPATIENT
Start: 2022-05-22 | End: 2022-05-23 | Stop reason: HOSPADM

## 2022-05-22 RX ORDER — SIMETHICONE 80 MG
1 TABLET,CHEWABLE ORAL 4 TIMES DAILY PRN
Status: DISCONTINUED | OUTPATIENT
Start: 2022-05-22 | End: 2022-05-23 | Stop reason: HOSPADM

## 2022-05-22 RX ORDER — HYDRALAZINE HYDROCHLORIDE 20 MG/ML
10 INJECTION INTRAMUSCULAR; INTRAVENOUS ONCE
Status: DISCONTINUED | OUTPATIENT
Start: 2022-05-22 | End: 2022-05-22

## 2022-05-22 RX ORDER — CLONIDINE HYDROCHLORIDE 0.1 MG/1
0.1 TABLET ORAL
Status: COMPLETED | OUTPATIENT
Start: 2022-05-22 | End: 2022-05-22

## 2022-05-22 RX ORDER — CARVEDILOL 25 MG/1
25 TABLET ORAL 2 TIMES DAILY
Status: DISCONTINUED | OUTPATIENT
Start: 2022-05-22 | End: 2022-05-23 | Stop reason: HOSPADM

## 2022-05-22 RX ADMIN — MELATONIN TAB 3 MG 9 MG: 3 TAB at 09:05

## 2022-05-22 RX ADMIN — CARVEDILOL 25 MG: 25 TABLET, FILM COATED ORAL at 02:05

## 2022-05-22 RX ADMIN — ONDANSETRON 4 MG: 2 INJECTION INTRAMUSCULAR; INTRAVENOUS at 10:05

## 2022-05-22 RX ADMIN — HYDRALAZINE HYDROCHLORIDE 10 MG: 20 INJECTION, SOLUTION INTRAMUSCULAR; INTRAVENOUS at 06:05

## 2022-05-22 RX ADMIN — PANTOPRAZOLE SODIUM 40 MG: 40 INJECTION, POWDER, FOR SOLUTION INTRAVENOUS at 04:05

## 2022-05-22 RX ADMIN — NITROGLYCERIN 1 INCH: 20 OINTMENT TOPICAL at 09:05

## 2022-05-22 RX ADMIN — CARVEDILOL 25 MG: 25 TABLET, FILM COATED ORAL at 09:05

## 2022-05-22 RX ADMIN — ALUMINUM HYDROXIDE, MAGNESIUM HYDROXIDE, AND SIMETHICONE 60 ML: 200; 200; 20 SUSPENSION ORAL at 10:05

## 2022-05-22 RX ADMIN — ALUMINUM HYDROXIDE, MAGNESIUM HYDROXIDE, AND SIMETHICONE 30 ML: 200; 200; 20 SUSPENSION ORAL at 09:05

## 2022-05-22 RX ADMIN — ONDANSETRON 4 MG: 2 INJECTION INTRAMUSCULAR; INTRAVENOUS at 04:05

## 2022-05-22 RX ADMIN — NITROGLYCERIN 1 INCH: 20 OINTMENT TOPICAL at 04:05

## 2022-05-22 RX ADMIN — ACETAMINOPHEN 650 MG: 325 TABLET ORAL at 09:05

## 2022-05-22 RX ADMIN — CLONIDINE HYDROCHLORIDE 0.1 MG: 0.1 TABLET ORAL at 02:05

## 2022-05-22 NOTE — ED PROVIDER NOTES
Encounter Date: 5/22/2022    SCRIBE #1 NOTE: I, Archana Goel, am scribing for, and in the presence of, Neal Gandhi MD.       History     Chief Complaint   Patient presents with    Fatigue     Decreased appetite, dizziness x 1 week, intermittent chest pain with SOB; N/V this AM      Time seen by provider: 12:00 PM on 05/22/2022    Rajendra Castle is a 76 y.o. male who presents to the ED with an onset of generalized fatigue, intermittent chest pain, SOB, decreased appetite, and dizziness with exertion for the past week. He developed nausea and vomiting this morning. Patient states this feels similar to when he had COVID-19 six weeks ago. Reports being fully vaccinated. He denies LOC, cough, abd pain, diarrhea, fever, rhinorrhea, dysuria, hematuria, or weakness. PMHx of protstate cancer, HTN, and rheumatoid arthritis. Patient reports ongoing frequent urination at night and is established with Dr. Pina, urologist for this.    The history is provided by the patient.     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    Prostate CA     RA (rheumatoid arthritis)     Vitamin D insufficiency 9/30/2016     Past Surgical History:   Procedure Laterality Date    CARPAL TUNNEL RELEASE Right 5/28/2021    Procedure: RELEASE, CARPAL TUNNEL;  Surgeon: Jose Ryan II, MD;  Location: [facility] OR;  Service: Orthopedics;  Laterality: Right;    COLONOSCOPY  prior to 2016    normal findings per patient report    CYSTOSCOPY N/A 12/18/2018    Procedure: CYSTOSCOPY;  Surgeon: Garrett Pina MD;  Location: [facility] OR;  Service: Urology;  Laterality: N/A;    ESOPHAGOGASTRODUODENOSCOPY N/A 9/29/2020    Dr. Espinosa; empiric dilation; erythematous mucosa in antrum; gastric mucosal atrophy; hematin in entire  stomach; biopsy: mid & distal esophagus WNL, stomach- WNL, negative for h pylori    PARATHYROIDECTOMY Right 10/27/2020    Procedure: PARATHYROIDECTOMY;  Surgeon: Latonia Clayton MD;  Location: [facility] OR [address] [place] [address];  Service: ENT;  Laterality: Right;    RELEASE OF ULNAR NERVE AT CUBITAL TUNNEL Right 2021    Procedure: RELEASE, ULNAR TUNNEL;  Surgeon: Jose Ryan II, MD;  Location: [facility] OR;  Service: Orthopedics;  Laterality: Right;    TRANSRECTAL BIOPSY OF PROSTATE WITH ULTRASOUND GUIDANCE N/A 2018    Procedure: BIOPSY, PROSTATE, RECTAL APPROACH, WITH US GUIDANCE;  Surgeon: Garrett Pina MD;  Location: [facility] OR;  Service: Urology;  Laterality: N/A;     Family History   Problem Relation Age of Onset    Heart disease Mother     Stroke Father     Drug abuse Sister     No Known Problems Daughter     No Known Problems Daughter     No Known Problems Son     Diabetes Maternal Uncle     Colon cancer Neg Hx     Crohn's disease Neg Hx     Esophageal cancer Neg Hx     Stomach cancer Neg Hx     Ulcerative colitis Neg Hx      Social History     Tobacco Use    Smoking status: Former Smoker     Packs/day: 0.25     Years: 10.00     Pack years: 2.50     Quit date: 2001     Years since quittin.8    Smokeless tobacco: Never Used   Substance Use Topics    Alcohol use: Yes     Comment: seldom    Drug use: Yes     Frequency: 8.0 times per week     Types: Marijuana     Review of Systems   Constitutional: Positive for appetite change and fatigue. Negative for activity change, diaphoresis and fever.   HENT: Negative for drooling, rhinorrhea, sore throat and trouble swallowing.    Eyes: Negative for pain and visual disturbance.   Respiratory: Positive for shortness of breath. Negative for cough and stridor.    Cardiovascular: Positive for chest pain. Negative for leg swelling.   Gastrointestinal: Positive for nausea and vomiting. Negative for abdominal distention, abdominal pain and  constipation.   Genitourinary: Positive for urgency. Negative for dysuria, hematuria and penile discharge.   Musculoskeletal: Negative for gait problem.   Skin: Negative for rash.   Neurological: Positive for dizziness. Negative for seizures, facial asymmetry, weakness and headaches.   Psychiatric/Behavioral: Negative for hallucinations and suicidal ideas.       Physical Exam     Initial Vitals [05/22/22 1125]   BP Pulse Resp Temp SpO2   (!) 178/76 87 20 98.6 °F (37 °C) 97 %      MAP       --         Physical Exam    Nursing note and vitals reviewed.  Constitutional: He appears well-developed. No distress.   Elderly appearing.   HENT:   Head: Normocephalic and atraumatic.   Nose: Nose normal.   Eyes: EOM are normal.   Neck: Neck supple. No tracheal deviation present. No JVD present.   Cardiovascular: Normal rate, regular rhythm, normal heart sounds and intact distal pulses. Exam reveals no gallop and no friction rub.    No murmur heard.  Pulmonary/Chest: Breath sounds normal. No respiratory distress. He has no wheezes. He has no rhonchi. He has no rales.   Abdominal: Abdomen is soft. Bowel sounds are normal. He exhibits no distension. There is no abdominal tenderness. There is no rebound and no guarding.   Musculoskeletal:         General: Normal range of motion.      Cervical back: Neck supple.     Neurological: He is alert and oriented to person, place, and time. He has normal strength. No cranial nerve deficit or sensory deficit.   Cranial nerves II-XII grossly intact. Finger-to-nose normal. Tone normal. Sensation intact to light touch. No drift. No disdiadochokinesia. 5/5 BUE and BLE strength. Normal gait. Negative Romberg. Speech and cognition is normal. No focal neurologic deficit.   Skin: Skin is warm and dry. Capillary refill takes less than 2 seconds. No rash noted.   Psychiatric: He has a normal mood and affect.         ED Course   Procedures  Labs Reviewed   CBC W/ AUTO DIFFERENTIAL - Abnormal; Notable  for the following components:       Result Value    Hemoglobin 13.4 (*)     RDW 17.6 (*)     Lymph # 0.7 (*)     Gran % 84.2 (*)     Lymph % 7.3 (*)     All other components within normal limits   COMPREHENSIVE METABOLIC PANEL - Abnormal; Notable for the following components:    CO2 19 (*)     Glucose 111 (*)     Albumin 3.3 (*)     eGFR if non  59 (*)     All other components within normal limits   INFLUENZA A & B BY MOLECULAR   TROPONIN I   SARS-COV-2 RNA AMPLIFICATION, QUAL   TSH   B-TYPE NATRIURETIC PEPTIDE   LIPASE     EKG Readings: (Independently Interpreted)   Normal sinus rhythm with 1st degree AV block. Minimal voltage criteria for LVH, may be normal variant. ST and T wave abnormality, consider inferior ischemia or anterolateral ischemia. Prolonged QT interval.       Imaging Results          CT Head Without Contrast (Final result)  Result time 05/22/22 13:00:08    Final result by Carlos Monroe MD (05/22/22 13:00:08)                 Impression:      1. Cortical atrophy with periventricular deep white matter change consistent with chronic small vessel ischemic disease.      Electronically signed by: Carlos Monroe  Date:    05/22/2022  Time:    13:00             Narrative:    EXAMINATION:  CT HEAD WITHOUT CONTRAST    CLINICAL HISTORY:  Syncope, recurrent;    TECHNIQUE:  Low dose axial images were obtained through the head.  Coronal and sagittal reformations were also performed. Contrast was not administered.    COMPARISON:  MRI 10/04/2016.    FINDINGS:  There is no acute hemorrhage or infarction.  There is cortical atrophy.  There are periventricular deep white matter changes consistent with chronic small vessel ischemic disease.    No extra-axial fluid collections.  Ventricles are normal in size, shape and configuration.  The basal cisterns are patent.    The imaged paranasal sinuses and ethmoid air cells are well aerated.    The mastoid air cells and middle ears are normally  pneumatized.                               X-Ray Chest AP Portable (Final result)  Result time 05/22/22 12:15:34    Final result by Carlos Monroe MD (05/22/22 12:15:34)                 Impression:      No acute abnormality.      Electronically signed by: Carlos Monroe  Date:    05/22/2022  Time:    12:15             Narrative:    EXAMINATION:  XR CHEST AP PORTABLE    CLINICAL HISTORY:  Chest Pain;.    TECHNIQUE:  Single frontal portable view of the chest was performed.    COMPARISON:  03/07/2022.    FINDINGS:  Support devices: None    The lungs are clear, with normal appearance of pulmonary vasculature and no pleural effusion or pneumothorax.    The cardiac silhouette is normal in size. The hilar and mediastinal contours are unremarkable.    Bones are intact.                                 Medications   carvediloL tablet 25 mg (25 mg Oral Given 5/22/22 1446)   nitroGLYCERIN 2% TD oint ointment 1 inch (1 inch Topical (Top) Given 5/22/22 1600)   hydrALAZINE injection 10 mg (has no administration in time range)   pantoprazole EC tablet 40 mg (has no administration in time range)   cloNIDine tablet 0.1 mg (0.1 mg Oral Given 5/22/22 1446)   pantoprazole injection 40 mg (40 mg Intravenous Given 5/22/22 1601)   ondansetron injection 4 mg (4 mg Intravenous Given 5/22/22 1601)     Medical Decision Making:   History:   Old Medical Records: I decided to obtain old medical records.  Clinical Tests:   Lab Tests: Ordered and Reviewed  Radiological Study: Ordered and Reviewed  Medical Tests: Reviewed and Ordered          Scribe Attestation:   Scribe #1: I performed the above scribed service and the documentation accurately describes the services I performed. I attest to the accuracy of the note.        ED Course as of 05/22/22 1625   Sun May 22, 2022   1228 Impression:     No acute abnormality.        Electronically signed by: Carlos Monroe  Date:                                            05/22/2022  Time:                                            12:15 [BD]   3817 CT Head Without Contrast [BD]   1358 Impression:     1. Cortical atrophy with periventricular deep white matter change consistent with chronic small vessel ischemic disease.        Electronically signed by: Carlos Monroe  Date:                                            05/22/2022  Time:                                           13:00 [BD]   5911 76-year-old male past medical history of hypertension, anemia and arthritis presents today with multiple nonspecific complaints including dizziness, near syncope and chest pain.  Afebrile in the ED.  Hypertensive otherwise reassuring vital signs.  Labs showed initial troponin of 0.025 which is improved from prior.  Otherwise unremarkable.  EKG showed a ST changes but no significant change compared to EKG on 03/08/2022.  Discussed with Hospital Medicine, Dr. Cai, who accepted patient for further management. [BD]      ED Course User Index  [BD] Neal Gandhi MD             Attending Attestation:     Physician Attestation for Scribe:    I, Dr. Neal Gandhi, personally performed the services described in this documentation.   All medical record entries made by the scribe were at my direction and in my presence.   I have reviewed the chart and agree that the record is accurate and complete.   Neal Gandhi MD  4:25 PM 05/22/2022     DISCLAIMER: This note was prepared with [employer] voice recognition transcription software. Garbled syntax, mangled pronouns, and other bizarre constructions may be attributed to that software system.    Clinical Impression:   Final diagnoses:  [R07.9] Chest pain  [R55] Near syncope (Primary)  [R42] Dizziness          ED Disposition Condition    Observation               Neal Gandhi MD  05/22/22 0921

## 2022-05-22 NOTE — SUBJECTIVE & OBJECTIVE
Past Medical History:   Diagnosis Date    Arthritis     DDD (degenerative disc disease), cervical     Degenerative disc disease     Heart murmur     Hypertension     Nontoxic multinodular goiter 9/20/2016    Prostate CA     RA (rheumatoid arthritis)     Vitamin D insufficiency 9/30/2016       Past Surgical History:   Procedure Laterality Date    CARPAL TUNNEL RELEASE Right 5/28/2021    Procedure: RELEASE, CARPAL TUNNEL;  Surgeon: Jose Ryan II, MD;  Location: [facility] OR;  Service: Orthopedics;  Laterality: Right;    COLONOSCOPY  prior to 2016    normal findings per patient report    CYSTOSCOPY N/A 12/18/2018    Procedure: CYSTOSCOPY;  Surgeon: Garrett Pina MD;  Location: [facility] OR;  Service: Urology;  Laterality: N/A;    ESOPHAGOGASTRODUODENOSCOPY N/A 9/29/2020    Dr. Espinosa; empiric dilation; erythematous mucosa in antrum; gastric mucosal atrophy; hematin in entire stomach; biopsy: mid & distal esophagus WNL, stomach- WNL, negative for h pylori    PARATHYROIDECTOMY Right 10/27/2020    Procedure: PARATHYROIDECTOMY;  Surgeon: Latonia Clayton MD;  Location: [address];  Service: ENT;  Laterality: Right;    RELEASE OF ULNAR NERVE AT CUBITAL TUNNEL Right 5/28/2021    Procedure: RELEASE, ULNAR TUNNEL;  Surgeon: Jose Ryan II, MD;  Location: [facility] OR;  Service: Orthopedics;  Laterality: Right;    TRANSRECTAL BIOPSY OF PROSTATE WITH ULTRASOUND GUIDANCE N/A 12/18/2018    Procedure: BIOPSY, PROSTATE, RECTAL APPROACH, WITH US GUIDANCE;  Surgeon: Garrett Pina MD;  Location: [facility];  Service: Urology;  Laterality: N/A;       Review of patient's allergies indicates:  No Known Allergies    Current Facility-Administered Medications on File Prior to Encounter   Medication    acetaminophen tablet 650 mg    denosumab (PROLIA) injection 60 mg    electrolyte-S (ISOLYTE)    fentaNYL 50 mcg/mL injection 25 mcg    HYDROmorphone (PF) injection 0.2 mg    lactated ringers infusion    lactated ringers  infusion    LIDOcaine (PF) 10 mg/ml (1%) injection 10 mg    lorazepam injection 0.25 mg    ondansetron injection 4 mg    prochlorperazine injection Soln 5 mg    sodium chloride 0.9% flush 3 mL     Current Outpatient Medications on File Prior to Encounter   Medication Sig    acetaminophen/diphenhydramine (TYLENOL PM ORAL) Take by mouth nightly as needed. 2 tabs    aspirin (ECOTRIN) 81 MG EC tablet Take 81 mg by mouth once daily.    carvediloL (COREG) 25 MG tablet TAKE 1 TABLET(25 MG) BY MOUTH TWICE DAILY WITH MEALS    cholecalciferol, vitamin D3, (VITAMIN D3) 100 mcg (4,000 unit) Cap Take by mouth.    cloNIDine (CATAPRES) 0.1 MG tablet Take 1 tablet (0.1 mg total) by mouth 2 (two) times daily as needed (only if blood pressure top number is over 200).    denosumab (PROLIA) 60 mg/mL Syrg Inject 60 mg into the skin every 6 (six) months.    methotrexate 2.5 MG Tab TAKE 6 TABLETS BY MOUTH EVERY WEEK    multivitamin (THERAGRAN) per tablet Take 1 tablet by mouth once daily.    sildenafiL (VIAGRA) 100 MG tablet Take 1 tablet (100 mg total) by mouth daily as needed for Erectile Dysfunction.    traMADoL (ULTRAM) 50 mg tablet Take 1 tablet (50 mg total) by mouth every 6 (six) hours as needed for Pain.     Family History       Problem Relation (Age of Onset)    Diabetes Maternal Uncle    Drug abuse Sister    Heart disease Mother    No Known Problems Daughter, Daughter, Son    Stroke Father          Tobacco Use    Smoking status: Former Smoker     Packs/day: 0.25     Years: 10.00     Pack years: 2.50     Quit date: 2001     Years since quittin.8    Smokeless tobacco: Never Used   Substance and Sexual Activity    Alcohol use: Yes     Comment: seldom    Drug use: Yes     Frequency: 8.0 times per week     Types: Marijuana    Sexual activity: Yes     Partners: Female     Review of Systems   Constitutional:  Positive for appetite change. Negative for chills and fever.   HENT:  Negative for congestion and sore throat.     Respiratory:  Positive for shortness of breath. Negative for cough.    Cardiovascular:  Positive for chest pain. Negative for palpitations.   Gastrointestinal:  Positive for nausea and vomiting. Negative for abdominal pain and blood in stool.   Endocrine: Negative for polydipsia and polyuria.   Genitourinary:  Negative for dysuria and hematuria.   Musculoskeletal:  Negative for neck pain and neck stiffness.   Skin:  Negative for pallor and rash.   Neurological:  Positive for dizziness. Negative for syncope.   Hematological:  Negative for adenopathy.   Psychiatric/Behavioral:  Negative for agitation and confusion.    All other systems reviewed and are negative.  Objective:     Vital Signs (Most Recent):  Temp: 98.6 °F (37 °C) (05/22/22 1125)  Pulse: 71 (05/22/22 1417)  Resp: 20 (05/22/22 1417)  BP: (!) 224/96 (05/22/22 1417)  SpO2: 98 % (05/22/22 1417)   Vital Signs (24h Range):  Temp:  [98.6 °F (37 °C)] 98.6 °F (37 °C)  Pulse:  [70-87] 71  Resp:  [20] 20  SpO2:  [97 %-100 %] 98 %  BP: (170-228)/() 224/96     Weight: 63.5 kg (140 lb)  Body mass index is 21.29 kg/m².    Physical Exam  Constitutional:       General: He is not in acute distress.     Appearance: He is well-developed.   HENT:      Head: Normocephalic and atraumatic.   Eyes:      Conjunctiva/sclera: Conjunctivae normal.      Pupils: Pupils are equal, round, and reactive to light.   Cardiovascular:      Rate and Rhythm: Normal rate and regular rhythm.      Pulses: Normal pulses.      Heart sounds: Murmur heard.   Pulmonary:      Effort: Pulmonary effort is normal. No respiratory distress.      Breath sounds: Normal breath sounds.   Abdominal:      General: Bowel sounds are normal. There is no distension.      Palpations: Abdomen is soft.      Tenderness: There is no abdominal tenderness.      Comments: Actively vomiting   Musculoskeletal:         General: Normal range of motion.      Cervical back: Normal range of motion and neck supple.   Skin:      General: Skin is warm and dry.   Neurological:      General: No focal deficit present.      Mental Status: He is alert and oriented to person, place, and time.   Psychiatric:         Attention and Perception: He is inattentive.         Cognition and Memory: Cognition normal.         CRANIAL NERVES     CN III, IV, VI   Pupils are equal, round, and reactive to light.     Significant Labs: All pertinent labs within the past 24 hours have been reviewed.  CBC:   Recent Labs   Lab 05/22/22  1220   WBC 8.99   HGB 13.4*   HCT 41.3        CMP:   Recent Labs   Lab 05/22/22  1220      K 4.8      CO2 19*   *   BUN 23   CREATININE 1.2   CALCIUM 9.4   PROT 8.1   ALBUMIN 3.3*   BILITOT 0.7   ALKPHOS 74   AST 25   ALT 17   ANIONGAP 12   EGFRNONAA 59*       Troponin:   Recent Labs   Lab 05/22/22  1220   TROPONINI 0.025       Significant Imaging: I have reviewed all pertinent imaging results/findings within the past 24 hours.  CXR:  No acute abnormality.    CT head:  1. Cortical atrophy with periventricular deep white matter change consistent with chronic small vessel ischemic disease.    EKG:  SR with 1st degree AV block; no STEMI

## 2022-05-22 NOTE — ASSESSMENT & PLAN NOTE
Patient's anemia is currently controlled. S/p 0 units of PRBCs.  Current CBC reviewed-   Lab Results   Component Value Date    HGB 13.4 (L) 05/22/2022    HCT 41.3 05/22/2022    MCV 86 05/22/2022     05/22/2022     Monitor serial CBC and transfuse if patient becomes hemodynamically unstable, symptomatic or H/H drops below 7/21.

## 2022-05-22 NOTE — ASSESSMENT & PLAN NOTE
Likely 2/2 noncompliance.  Resume chronic antihypertensives.  NTG paste to chest wall q8h.  Cardiology consult.  Monitor tele  Hydralazine prn SBP >180

## 2022-05-22 NOTE — H&P
"[facility] Medicine  History & Physical    Patient Name: Rajendra Castle  MRN: [id number]  Patient Class: OP- Observation  Admission Date: 5/22/2022  Attending Physician: Morgan Cai MD  Primary Care Provider: Sp Lilly MD         Patient information was obtained from patient, past medical records and ER records.     Subjective:     Principal Problem:Chest pain    Chief Complaint:   Chief Complaint   Patient presents with    Fatigue     Decreased appetite, dizziness x 1 week, intermittent chest pain with SOB; N/V this AM         HPI: Rajendra Castle is a 77 y/o M with a past medical history significant for prostate cancer, HTN, RA, and DDD who presented to the ED for further evaluation of intermittent dizziness and chest pain which began about one week ago.  This morning he developed nausea and vomiting.  He reports associated SOB.  During my interview he reports that he has not eaten and he feels that the nausea is related to the "acid build up in my stomach."  Describes vomitus as "acid."  He is eating chocolate pudding as we are talking and requests water during the interview.  Upon review of past medical records, patient was admitted in March of this year for chest pain.  A stress test was recommended; however, pt requested discharge prior to the test and was to follow up with his cardiologist for an outpatient stress test.  He admits that this has not been done.  He is quite hypertensive while in the ED, but did not take his antihypertensives this morning.  His antihypertensives were recently administered since arrival to the ED.  Pt was provided a meal during my interview, and after the first bite he proceeded to vomit.  He would not let nursing take his meal away, stating he is vomiting due to "acid build up." Unable to obtain further history due to the condition of the patient.  He will be placed in observation for continued monitoring and treatment.        Past Medical " History:   Diagnosis Date    Arthritis     DDD (degenerative disc disease), cervical     Degenerative disc disease     Heart murmur     Hypertension     Nontoxic multinodular goiter 9/20/2016    Prostate CA     RA (rheumatoid arthritis)     Vitamin D insufficiency 9/30/2016       Past Surgical History:   Procedure Laterality Date    CARPAL TUNNEL RELEASE Right 5/28/2021    Procedure: RELEASE, CARPAL TUNNEL;  Surgeon: Jose Ryan II, MD;  Location: [facility] OR;  Service: Orthopedics;  Laterality: Right;    COLONOSCOPY  prior to 2016    normal findings per patient report    CYSTOSCOPY N/A 12/18/2018    Procedure: CYSTOSCOPY;  Surgeon: Garrett Pina MD;  Location: [facility] OR;  Service: Urology;  Laterality: N/A;    ESOPHAGOGASTRODUODENOSCOPY N/A 9/29/2020    Dr. Espinosa; empiric dilation; erythematous mucosa in antrum; gastric mucosal atrophy; hematin in entire stomach; biopsy: mid & distal esophagus WNL, stomach- WNL, negative for h pylori    PARATHYROIDECTOMY Right 10/27/2020    Procedure: PARATHYROIDECTOMY;  Surgeon: Latonia Clayton MD;  Location: [address];  Service: ENT;  Laterality: Right;    RELEASE OF ULNAR NERVE AT CUBITAL TUNNEL Right 5/28/2021    Procedure: RELEASE, ULNAR TUNNEL;  Surgeon: Jose Ryan II, MD;  Location: [facility] OR;  Service: Orthopedics;  Laterality: Right;    TRANSRECTAL BIOPSY OF PROSTATE WITH ULTRASOUND GUIDANCE N/A 12/18/2018    Procedure: BIOPSY, PROSTATE, RECTAL APPROACH, WITH US GUIDANCE;  Surgeon: Garrett Pina MD;  Location: [facility];  Service: Urology;  Laterality: N/A;       Review of patient's allergies indicates:  No Known Allergies    Current Facility-Administered Medications on File Prior to Encounter   Medication    acetaminophen tablet 650 mg    denosumab (PROLIA) injection 60 mg    electrolyte-S (ISOLYTE)    fentaNYL 50 mcg/mL injection 25 mcg    HYDROmorphone (PF) injection 0.2 mg    lactated ringers infusion    lactated  ringers infusion    LIDOcaine (PF) 10 mg/ml (1%) injection 10 mg    lorazepam injection 0.25 mg    ondansetron injection 4 mg    prochlorperazine injection Soln 5 mg    sodium chloride 0.9% flush 3 mL     Current Outpatient Medications on File Prior to Encounter   Medication Sig    acetaminophen/diphenhydramine (TYLENOL PM ORAL) Take by mouth nightly as needed. 2 tabs    aspirin (ECOTRIN) 81 MG EC tablet Take 81 mg by mouth once daily.    carvediloL (COREG) 25 MG tablet TAKE 1 TABLET(25 MG) BY MOUTH TWICE DAILY WITH MEALS    cholecalciferol, vitamin D3, (VITAMIN D3) 100 mcg (4,000 unit) Cap Take by mouth.    cloNIDine (CATAPRES) 0.1 MG tablet Take 1 tablet (0.1 mg total) by mouth 2 (two) times daily as needed (only if blood pressure top number is over 200).    denosumab (PROLIA) 60 mg/mL Syrg Inject 60 mg into the skin every 6 (six) months.    methotrexate 2.5 MG Tab TAKE 6 TABLETS BY MOUTH EVERY WEEK    multivitamin (THERAGRAN) per tablet Take 1 tablet by mouth once daily.    sildenafiL (VIAGRA) 100 MG tablet Take 1 tablet (100 mg total) by mouth daily as needed for Erectile Dysfunction.    traMADoL (ULTRAM) 50 mg tablet Take 1 tablet (50 mg total) by mouth every 6 (six) hours as needed for Pain.     Family History       Problem Relation (Age of Onset)    Diabetes Maternal Uncle    Drug abuse Sister    Heart disease Mother    No Known Problems Daughter, Daughter, Son    Stroke Father          Tobacco Use    Smoking status: Former Smoker     Packs/day: 0.25     Years: 10.00     Pack years: 2.50     Quit date: 2001     Years since quittin.8    Smokeless tobacco: Never Used   Substance and Sexual Activity    Alcohol use: Yes     Comment: seldom    Drug use: Yes     Frequency: 8.0 times per week     Types: Marijuana    Sexual activity: Yes     Partners: Female     Review of Systems   Constitutional:  Positive for appetite change. Negative for chills and fever.   HENT:  Negative for  congestion and sore throat.    Respiratory:  Positive for shortness of breath. Negative for cough.    Cardiovascular:  Positive for chest pain. Negative for palpitations.   Gastrointestinal:  Positive for nausea and vomiting. Negative for abdominal pain and blood in stool.   Endocrine: Negative for polydipsia and polyuria.   Genitourinary:  Negative for dysuria and hematuria.   Musculoskeletal:  Negative for neck pain and neck stiffness.   Skin:  Negative for pallor and rash.   Neurological:  Positive for dizziness. Negative for syncope.   Hematological:  Negative for adenopathy.   Psychiatric/Behavioral:  Negative for agitation and confusion.    All other systems reviewed and are negative.  Objective:     Vital Signs (Most Recent):  Temp: 98.6 °F (37 °C) (05/22/22 1125)  Pulse: 71 (05/22/22 1417)  Resp: 20 (05/22/22 1417)  BP: (!) 224/96 (05/22/22 1417)  SpO2: 98 % (05/22/22 1417)   Vital Signs (24h Range):  Temp:  [98.6 °F (37 °C)] 98.6 °F (37 °C)  Pulse:  [70-87] 71  Resp:  [20] 20  SpO2:  [97 %-100 %] 98 %  BP: (170-228)/() 224/96     Weight: 63.5 kg (140 lb)  Body mass index is 21.29 kg/m².    Physical Exam  Constitutional:       General: He is not in acute distress.     Appearance: He is well-developed.   HENT:      Head: Normocephalic and atraumatic.   Eyes:      Conjunctiva/sclera: Conjunctivae normal.      Pupils: Pupils are equal, round, and reactive to light.   Cardiovascular:      Rate and Rhythm: Normal rate and regular rhythm.      Pulses: Normal pulses.      Heart sounds: Murmur heard.   Pulmonary:      Effort: Pulmonary effort is normal. No respiratory distress.      Breath sounds: Normal breath sounds.   Abdominal:      General: Bowel sounds are normal. There is no distension.      Palpations: Abdomen is soft.      Tenderness: There is no abdominal tenderness.      Comments: Actively vomiting   Musculoskeletal:         General: Normal range of motion.      Cervical back: Normal range of motion  and neck supple.   Skin:     General: Skin is warm and dry.   Neurological:      General: No focal deficit present.      Mental Status: He is alert and oriented to person, place, and time.   Psychiatric:         Attention and Perception: He is inattentive.         Cognition and Memory: Cognition normal.         CRANIAL NERVES     CN III, IV, VI   Pupils are equal, round, and reactive to light.     Significant Labs: All pertinent labs within the past 24 hours have been reviewed.  CBC:   Recent Labs   Lab 05/22/22  1220   WBC 8.99   HGB 13.4*   HCT 41.3        CMP:   Recent Labs   Lab 05/22/22  1220      K 4.8      CO2 19*   *   BUN 23   CREATININE 1.2   CALCIUM 9.4   PROT 8.1   ALBUMIN 3.3*   BILITOT 0.7   ALKPHOS 74   AST 25   ALT 17   ANIONGAP 12   EGFRNONAA 59*       Troponin:   Recent Labs   Lab 05/22/22  1220   TROPONINI 0.025       Significant Imaging: I have reviewed all pertinent imaging results/findings within the past 24 hours.  CXR:  No acute abnormality.    CT head:  1. Cortical atrophy with periventricular deep white matter change consistent with chronic small vessel ischemic disease.    EKG:  SR with 1st degree AV block; no STEMI      Assessment/Plan:     * Chest pain  EKG prn Chest Pain  Troponin x 3-initial troponin WNL  CBC, CMP, Mg, Phos daily  Fasting Lipid panel in am  TSH level  CXR-no acute abnormality  IV fluids-held due to hypertension  DELMY score-4  Consult Cardiology - follow recommendations  Supplemental O2 via nasal cannula to keep O2 Sats >92%  Continue beta blocker  Nitroglycerin paste  continue ASA       Stage 3 chronic kidney disease, unspecified whether stage 3a or 3b CKD  Creatine stable for now. BMP reviewed- noted Estimated Creatinine Clearance: 48.5 mL/min (based on SCr of 1.2 mg/dL). according to latest data. Monitor UOP and serial BMP and adjust therapy as needed. Renally dose meds.            Gastroesophageal reflux disease without esophagitis  PPI  ordered      Nausea and vomiting  Etiology unclear.    IV antiemetics as needed.  BP uncontrolled; consider IVFH if N/V remains uncontrolled.  No abdominal tenderness; defer imaging studies for now.  Check lipase.      Anemia, iron deficiency  Patient's anemia is currently controlled. S/p 0 units of PRBCs.  Current CBC reviewed-   Lab Results   Component Value Date    HGB 13.4 (L) 05/22/2022    HCT 41.3 05/22/2022    MCV 86 05/22/2022     05/22/2022     Monitor serial CBC and transfuse if patient becomes hemodynamically unstable, symptomatic or H/H drops below 7/21.       Uncontrolled hypertension  Likely 2/2 noncompliance.  Resume chronic antihypertensives.  NTG paste to chest wall q8h.  Cardiology consult.  Monitor tele  Hydralazine prn SBP >180        VTE Risk Mitigation (From admission, onward)    MARILEE Hall  Department of Hospital Medicine   [facility] - Emergency Dept

## 2022-05-22 NOTE — ASSESSMENT & PLAN NOTE
Creatine stable for now. BMP reviewed- noted Estimated Creatinine Clearance: 48.5 mL/min (based on SCr of 1.2 mg/dL). according to latest data. Monitor UOP and serial BMP and adjust therapy as needed. Renally dose meds.

## 2022-05-22 NOTE — ED NOTES
Assumed care:  Rajendra Castle is awake, alert and oriented x 3, skin warm and dry, in NAD.  Patient CO fatigue x a few days with N&V.    Patient identifiers for Rajendra Castle checked and correct.  LOC:  Rajendra Castle is awake, alert, and aware of environment with an appropriate affect. He is oriented x 3 and speaking appropriately.  APPEARANCE:  He is resting comfortably and in no acute distress. He is clean and well groomed, patient's clothing is properly fastened.  SKIN:  The skin is warm and dry. He has normal skin turgor and moist mucus membranes. Skin is intact; no bruising or breakdown noted.  MUSCULOSKELETAL:  He is moving all extremities well, no obvious deformities noted. Pulses intact.   RESPIRATORY:  Airway is open and patent. Respirations are spontaneous and non-labored with normal effort and rate.  CARDIAC:  He has a normal rate and rhythm. No peripheral edema noted. Capillary refill < 3 seconds.  ABDOMEN:  No distention noted.  Soft and non-tender upon palpation.  N&V  NEUROLOGICAL:  PERRL. Facial expression is symmetrical. Hand grasps are equal bilaterally. Normal sensation in all extremities when touched with finger.  Allergies reported:  Review of patient's allergies indicates:  No Known Allergies  OTHER NOTES:

## 2022-05-22 NOTE — ASSESSMENT & PLAN NOTE
Etiology unclear.    IV antiemetics as needed.  BP uncontrolled; consider IVFH if N/V remains uncontrolled.  No abdominal tenderness; defer imaging studies for now.  Check lipase.

## 2022-05-22 NOTE — HPI
"Rajendra Castle is a 75 y/o M with a past medical history significant for prostate cancer, HTN, RA, and DDD who presented to the ED for further evaluation of intermittent dizziness and chest pain which began about one week ago.  This morning he developed nausea and vomiting.  He reports associated SOB.  During my interview he reports that he has not eaten and he feels that the nausea is related to the "acid build up in my stomach."  Describes vomitus as "acid."  He is eating chocolate pudding as we are talking and requests water during the interview.  Upon review of past medical records, patient was admitted in March of this year for chest pain.  A stress test was recommended; however, pt requested discharge prior to the test and was to follow up with his cardiologist for an outpatient stress test.  He admits that this has not been done.  He is quite hypertensive while in the ED, but did not take his antihypertensives this morning.  His antihypertensives were recently administered since arrival to the ED.  Pt was provided a meal during my interview, and after the first bite he proceeded to vomit.  He would not let nursing take his meal away, stating he is vomiting due to "acid build up." Unable to obtain further history due to the condition of the patient.  He will be placed in observation for continued monitoring and treatment.    "

## 2022-05-23 ENCOUNTER — TELEPHONE (OUTPATIENT)
Dept: FAMILY MEDICINE | Facility: CLINIC | Age: 77
End: 2022-05-23
Payer: MEDICARE

## 2022-05-23 VITALS
DIASTOLIC BLOOD PRESSURE: 83 MMHG | RESPIRATION RATE: 18 BRPM | SYSTOLIC BLOOD PRESSURE: 143 MMHG | HEIGHT: 68 IN | BODY MASS INDEX: 20.78 KG/M2 | WEIGHT: 137.13 LBS | HEART RATE: 96 BPM | OXYGEN SATURATION: 92 % | TEMPERATURE: 98 F

## 2022-05-23 LAB
ANION GAP SERPL CALC-SCNC: 10 MMOL/L (ref 8–16)
BASOPHILS # BLD AUTO: 0.01 K/UL (ref 0–0.2)
BASOPHILS NFR BLD: 0.1 % (ref 0–1.9)
BILIRUB UR QL STRIP: NEGATIVE
BUN SERPL-MCNC: 19 MG/DL (ref 8–23)
CALCIUM SERPL-MCNC: 8.7 MG/DL (ref 8.7–10.5)
CHLORIDE SERPL-SCNC: 107 MMOL/L (ref 95–110)
CLARITY UR: CLEAR
CO2 SERPL-SCNC: 23 MMOL/L (ref 23–29)
COLOR UR: YELLOW
CREAT SERPL-MCNC: 1.3 MG/DL (ref 0.5–1.4)
CV PHARM DOSE: 0.4 MG
CV STRESS BASE HR: 76 BPM
DIASTOLIC BLOOD PRESSURE: 67 MMHG
DIFFERENTIAL METHOD: ABNORMAL
EOSINOPHIL # BLD AUTO: 0 K/UL (ref 0–0.5)
EOSINOPHIL NFR BLD: 0 % (ref 0–8)
ERYTHROCYTE [DISTWIDTH] IN BLOOD BY AUTOMATED COUNT: 17.4 % (ref 11.5–14.5)
EST. GFR  (AFRICAN AMERICAN): >60 ML/MIN/1.73 M^2
EST. GFR  (NON AFRICAN AMERICAN): 54 ML/MIN/1.73 M^2
ESTIMATED AVG GLUCOSE: 114 MG/DL (ref 68–131)
GLUCOSE SERPL-MCNC: 93 MG/DL (ref 70–110)
GLUCOSE UR QL STRIP: NEGATIVE
HBA1C MFR BLD: 5.6 % (ref 4–5.6)
HCT VFR BLD AUTO: 37 % (ref 40–54)
HGB BLD-MCNC: 11.8 G/DL (ref 14–18)
HGB UR QL STRIP: NEGATIVE
IMM GRANULOCYTES # BLD AUTO: 0.03 K/UL (ref 0–0.04)
IMM GRANULOCYTES NFR BLD AUTO: 0.3 % (ref 0–0.5)
KETONES UR QL STRIP: NEGATIVE
LEUKOCYTE ESTERASE UR QL STRIP: NEGATIVE
LYMPHOCYTES # BLD AUTO: 1.5 K/UL (ref 1–4.8)
LYMPHOCYTES NFR BLD: 17.8 % (ref 18–48)
MAGNESIUM SERPL-MCNC: 2.1 MG/DL (ref 1.6–2.6)
MCH RBC QN AUTO: 27.7 PG (ref 27–31)
MCHC RBC AUTO-ENTMCNC: 31.9 G/DL (ref 32–36)
MCV RBC AUTO: 87 FL (ref 82–98)
MONOCYTES # BLD AUTO: 1.1 K/UL (ref 0.3–1)
MONOCYTES NFR BLD: 12.6 % (ref 4–15)
NEUTROPHILS # BLD AUTO: 6 K/UL (ref 1.8–7.7)
NEUTROPHILS NFR BLD: 69.2 % (ref 38–73)
NITRITE UR QL STRIP: NEGATIVE
NRBC BLD-RTO: 0 /100 WBC
OHS CV CPX 85 PERCENT MAX PREDICTED HEART RATE MALE: 124
OHS CV CPX MAX PREDICTED HEART RATE: 146
OHS CV CPX PATIENT IS FEMALE: 0
OHS CV CPX PATIENT IS MALE: 1
OHS CV CPX PEAK DIASTOLIC BLOOD PRESSURE: 72 MMHG
OHS CV CPX PEAK HEAR RATE: 98 BPM
OHS CV CPX PEAK RATE PRESSURE PRODUCT: NORMAL
OHS CV CPX PEAK SYSTOLIC BLOOD PRESSURE: 181 MMHG
OHS CV CPX PERCENT MAX PREDICTED HEART RATE ACHIEVED: 67
OHS CV CPX RATE PRESSURE PRODUCT PRESENTING: NORMAL
PH UR STRIP: 6 [PH] (ref 5–8)
PHOSPHATE SERPL-MCNC: 3.3 MG/DL (ref 2.7–4.5)
PLATELET # BLD AUTO: 347 K/UL (ref 150–450)
PMV BLD AUTO: 11.4 FL (ref 9.2–12.9)
POTASSIUM SERPL-SCNC: 4.3 MMOL/L (ref 3.5–5.1)
PROT UR QL STRIP: NEGATIVE
RBC # BLD AUTO: 4.26 M/UL (ref 4.6–6.2)
SODIUM SERPL-SCNC: 140 MMOL/L (ref 136–145)
SP GR UR STRIP: 1.01 (ref 1–1.03)
SYSTOLIC BLOOD PRESSURE: 168 MMHG
TROPONIN I SERPL DL<=0.01 NG/ML-MCNC: 0.04 NG/ML (ref 0–0.03)
TROPONIN I SERPL DL<=0.01 NG/ML-MCNC: 0.04 NG/ML (ref 0–0.03)
URN SPEC COLLECT METH UR: NORMAL
UROBILINOGEN UR STRIP-ACNC: NEGATIVE EU/DL
WBC # BLD AUTO: 8.66 K/UL (ref 3.9–12.7)

## 2022-05-23 PROCEDURE — 94761 N-INVAS EAR/PLS OXIMETRY MLT: CPT

## 2022-05-23 PROCEDURE — 84100 ASSAY OF PHOSPHORUS: CPT | Performed by: NURSE PRACTITIONER

## 2022-05-23 PROCEDURE — 25000003 PHARM REV CODE 250: Performed by: NURSE PRACTITIONER

## 2022-05-23 PROCEDURE — 25000003 PHARM REV CODE 250: Performed by: INTERNAL MEDICINE

## 2022-05-23 PROCEDURE — 81003 URINALYSIS AUTO W/O SCOPE: CPT | Performed by: NURSE PRACTITIONER

## 2022-05-23 PROCEDURE — 36415 COLL VENOUS BLD VENIPUNCTURE: CPT | Performed by: NURSE PRACTITIONER

## 2022-05-23 PROCEDURE — 99215 PR OFFICE/OUTPT VISIT, EST, LEVL V, 40-54 MIN: ICD-10-PCS | Mod: 25,,, | Performed by: INTERNAL MEDICINE

## 2022-05-23 PROCEDURE — 94760 N-INVAS EAR/PLS OXIMETRY 1: CPT

## 2022-05-23 PROCEDURE — 85025 COMPLETE CBC W/AUTO DIFF WBC: CPT | Performed by: NURSE PRACTITIONER

## 2022-05-23 PROCEDURE — 25000003 PHARM REV CODE 250: Performed by: EMERGENCY MEDICINE

## 2022-05-23 PROCEDURE — G0378 HOSPITAL OBSERVATION PER HR: HCPCS

## 2022-05-23 PROCEDURE — 83735 ASSAY OF MAGNESIUM: CPT | Performed by: NURSE PRACTITIONER

## 2022-05-23 PROCEDURE — 99900035 HC TECH TIME PER 15 MIN (STAT)

## 2022-05-23 PROCEDURE — 99215 OFFICE O/P EST HI 40 MIN: CPT | Mod: 25,,, | Performed by: INTERNAL MEDICINE

## 2022-05-23 PROCEDURE — 63600175 PHARM REV CODE 636 W HCPCS: Performed by: HOSPITALIST

## 2022-05-23 PROCEDURE — 80048 BASIC METABOLIC PNL TOTAL CA: CPT | Performed by: NURSE PRACTITIONER

## 2022-05-23 PROCEDURE — 84484 ASSAY OF TROPONIN QUANT: CPT | Performed by: NURSE PRACTITIONER

## 2022-05-23 RX ORDER — MECLIZINE HCL 12.5 MG 12.5 MG/1
12.5 TABLET ORAL 2 TIMES DAILY PRN
Qty: 30 TABLET | Refills: 0 | OUTPATIENT
Start: 2022-05-23 | End: 2022-06-02

## 2022-05-23 RX ORDER — PANTOPRAZOLE SODIUM 40 MG/1
40 TABLET, DELAYED RELEASE ORAL 2 TIMES DAILY
Qty: 60 TABLET | Refills: 0 | Status: SHIPPED | OUTPATIENT
Start: 2022-05-23 | End: 2022-06-05

## 2022-05-23 RX ORDER — MECLIZINE HCL 12.5 MG 12.5 MG/1
12.5 TABLET ORAL 3 TIMES DAILY PRN
Status: DISCONTINUED | OUTPATIENT
Start: 2022-05-23 | End: 2022-05-23 | Stop reason: HOSPADM

## 2022-05-23 RX ORDER — PANTOPRAZOLE SODIUM 40 MG/1
40 TABLET, DELAYED RELEASE ORAL 2 TIMES DAILY
Status: DISCONTINUED | OUTPATIENT
Start: 2022-05-23 | End: 2022-05-23 | Stop reason: HOSPADM

## 2022-05-23 RX ORDER — AMLODIPINE BESYLATE 2.5 MG/1
2.5 TABLET ORAL DAILY
Qty: 30 TABLET | Refills: 0 | Status: SHIPPED | OUTPATIENT
Start: 2022-05-24 | End: 2022-06-05

## 2022-05-23 RX ORDER — REGADENOSON 0.08 MG/ML
0.4 INJECTION, SOLUTION INTRAVENOUS ONCE
Status: COMPLETED | OUTPATIENT
Start: 2022-05-23 | End: 2022-05-23

## 2022-05-23 RX ORDER — MAG HYDROX/ALUMINUM HYD/SIMETH 200-200-20
30 SUSPENSION, ORAL (FINAL DOSE FORM) ORAL EVERY 6 HOURS PRN
Status: DISCONTINUED | OUTPATIENT
Start: 2022-05-23 | End: 2022-05-23 | Stop reason: HOSPADM

## 2022-05-23 RX ORDER — AMLODIPINE BESYLATE 2.5 MG/1
2.5 TABLET ORAL DAILY
Status: DISCONTINUED | OUTPATIENT
Start: 2022-05-23 | End: 2022-05-23 | Stop reason: HOSPADM

## 2022-05-23 RX ADMIN — THERA TABS 1 TABLET: TAB at 10:05

## 2022-05-23 RX ADMIN — REGADENOSON 0.4 MG: 0.08 INJECTION, SOLUTION INTRAVENOUS at 09:05

## 2022-05-23 RX ADMIN — AMLODIPINE BESYLATE 2.5 MG: 2.5 TABLET ORAL at 10:05

## 2022-05-23 RX ADMIN — Medication 81 MG: at 10:05

## 2022-05-23 RX ADMIN — PANTOPRAZOLE SODIUM 40 MG: 40 TABLET, DELAYED RELEASE ORAL at 10:05

## 2022-05-23 RX ADMIN — CARVEDILOL 25 MG: 25 TABLET, FILM COATED ORAL at 10:05

## 2022-05-23 RX ADMIN — DOCUSATE SODIUM AND SENNOSIDES 1 TABLET: 8.6; 5 TABLET, FILM COATED ORAL at 10:05

## 2022-05-23 RX ADMIN — ALUMINUM HYDROXIDE, MAGNESIUM HYDROXIDE, AND SIMETHICONE 60 ML: 200; 200; 20 SUSPENSION ORAL at 03:05

## 2022-05-23 NOTE — PROGRESS NOTES
[facility]  Department of Cardiology  Consult Note      PATIENT NAME: Rajendra Castle    MRN: [id number]  TODAY'S DATE: 05/23/2022  ADMIT DATE: 5/22/2022                          CONSULT REQUESTED BY: Morgan Cai MD    SUBJECTIVE     PRINCIPAL PROBLEM: Chest pain      REASON FOR CONSULT:  Severe dizziness  Intermittent heartburn and chest pain      HPI:  Patient is a 76-year-old gentleman with history of arterial hypertension prostate cancer and rheumatoid arthritis had been having intermittent episodes of dizziness for the past week or so.  Especially when he gets up in the morning or changes his posture he has dizziness lasting a few minutes and a gradually subsides.  This is been per is getting worse and is worried about this.  He had COVID infection earlier this year in March.  Patient has also been a experiencing recurrent episodes of heartburn and has been taking Maalox/Mylanta on a regular basis and yesterday he had a bout of profound dizziness and associated with some chest burning he sought evaluation for the same in the emergency room.  After his admission had recurrence of heartburn requiring mi anti with relief of symptoms post in the afternoon as well as in the evening.  This morning he patient denies having any chest discomfort arm neck or jaw pain no exertional fatigue tiredness is noted and no significant palpitations subtle reported.    Patient was noted to have a blood pressure 224/96 mmHg upon arrival to the emergency room.  And he was treated with clonidine.  Patient maintained some Coreg 25 mg p.o. b.i.d. and uses clonidine on p.r.n. basis for systolic blood pressure over 170.       Per admit physicians notes    HPI: Rajendra Castle is a 75 y/o M with a past medical history significant for prostate cancer, HTN, RA, and DDD who presented to the ED for further evaluation of intermittent dizziness and chest pain which began about one week ago.  This morning he developed nausea and  "vomiting.  He reports associated SOB.  During my interview he reports that he has not eaten and he feels that the nausea is related to the "acid build up in my stomach."  Describes vomitus as "acid."  He is eating chocolate pudding as we are talking and requests water during the interview.  Upon review of past medical records, patient was admitted in March of this year for chest pain.  A stress test was recommended; however, pt requested discharge prior to the test and was to follow up with his cardiologist for an outpatient stress test.  He admits that this has not been done.  He is quite hypertensive while in the ED, but did not take his antihypertensives this morning.  His antihypertensives were recently administered since arrival to the ED.  Pt was provided a meal during my interview, and after the first bite he proceeded to vomit.  He would not let nursing take his meal away, stating he is vomiting due to "acid build up." Unable to obtain further history due to the condition of the patient.  He will be placed in observation for continued monitoring and treatment.               Review of patient's allergies indicates:  No Known Allergies    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    Prostate CA     RA (rheumatoid arthritis)     Vitamin D insufficiency 9/30/2016     Past Surgical History:   Procedure Laterality Date    CARPAL TUNNEL RELEASE Right 5/28/2021    Procedure: RELEASE, CARPAL TUNNEL;  Surgeon: Jose Ryan II, MD;  Location: [facility] OR;  Service: Orthopedics;  Laterality: Right;    COLONOSCOPY  prior to 2016    normal findings per patient report    CYSTOSCOPY N/A 12/18/2018    Procedure: CYSTOSCOPY;  Surgeon: Garrett Pina MD;  Location: [facility] OR;  Service: Urology;  Laterality: N/A;    ESOPHAGOGASTRODUODENOSCOPY N/A " 2020    Dr. Espinosa; empiric dilation; erythematous mucosa in antrum; gastric mucosal atrophy; hematin in entire stomach; biopsy: mid & distal esophagus WNL, stomach- WNL, negative for h pylori    PARATHYROIDECTOMY Right 10/27/2020    Procedure: PARATHYROIDECTOMY;  Surgeon: Latonia Clayton MD;  [facility];  Service: ENT;  Laterality: Right;    RELEASE OF ULNAR NERVE AT CUBITAL TUNNEL Right 2021    Procedure: RELEASE, ULNAR TUNNEL;  Surgeon: Jose Ryan II, MD;  Location: [facility] OR;  Service: Orthopedics;  Laterality: Right;    TRANSRECTAL BIOPSY OF PROSTATE WITH ULTRASOUND GUIDANCE N/A 2018    Procedure: BIOPSY, PROSTATE, RECTAL APPROACH, WITH US GUIDANCE;  Surgeon: Garrett Pina MD;  Location: [facility] OR;  Service: Urology;  Laterality: N/A;     Social History     Tobacco Use    Smoking status: Former Smoker     Packs/day: 0.25     Years: 10.00     Pack years: 2.50     Quit date: 2001     Years since quittin.8    Smokeless tobacco: Never Used   Substance Use Topics    Alcohol use: Yes     Comment: seldom    Drug use: Yes     Frequency: 8.0 times per week     Types: Marijuana        REVIEW OF SYSTEMS  CONSTITUTIONAL: Negative for chills, fatigue and fever.   EYES: No double vision, No blurred vision  NEURO: No headaches, recurrent episodes of dizziness and especially worse with change of posture and early in the morning.  RESPIRATORY: Negative for cough, shortness of breath and wheezing.    CARDIOVASCULAR:  Reports burning in his chest Negative for palpitations and leg swelling.   GI:  Persistent dyspeptic symptoms without vomiting melena or hematochezia   : Negative for dysuria and frequency, Negative for hematuria  SKIN: Negative for bruising, Negative for edema or discoloration noted.   ENDOCRINE: Negative for polyphagia, Negative for heat intolerance, Negative for cold intolerance  PSYCHIATRIC: Negative for depression, Negative for anxiety, Negative  for memory loss  MUSCULOSKELETAL: Negative for neck pain, Negative for muscle weakness, Negative for back pain     OBJECTIVE     VITAL SIGNS (Most Recent)  Temp: 99.6 °F (37.6 °C) (05/23/22 0742)  Pulse: 70 (05/23/22 0918)  Resp: 18 (05/23/22 0813)  BP: (!) 168/67 (05/23/22 0918)  SpO2: 96 % (05/23/22 0813)    VENTILATION STATUS  Resp: 18 (05/23/22 0813)  SpO2: 96 % (05/23/22 0813)       I & O (Last 24H):    Intake/Output Summary (Last 24 hours) at 5/23/2022 0939  Last data filed at 5/22/2022 1810  Gross per 24 hour   Intake 300 ml   Output 200 ml   Net 100 ml       WEIGHTS  Wt Readings from Last 1 Encounters:   05/22/22 2000 62.2 kg (137 lb 2 oz)   05/22/22 1956 62.2 kg (137 lb 2 oz)   05/22/22 1125 63.5 kg (140 lb)       PHYSICAL EXAM  GENERAL: well built, well nourished, well-developed in no apparent distress alert and oriented.   HEENT: Normocephalic. Pupils normal and conjunctivae normal.  Mucous membranes normal, no cyanosis or icterus, trachea central,no pallor or icterus is noted..  Partially edentulous  NECK: No JVD. No bruit..   THYROID: Thyroid not enlarged. No nodules present..   CARDIAC: Regular rate and rhythm. S1 is normal.S2 is normal.No gallops, soft systolic murmurs present.  CHEST ANATOMY: normal.   LUNGS: Clear to auscultation. No wheezing or rhonchi..   ABDOMEN: Soft no masses or organomegaly.  No abdomen pulsations or bruits.  Normal bowel sounds. No pulsations and no masses felt, No guarding or rebound.   URINARY: No jackson catheter   EXTREMITIES: No cyanosis, clubbing or edema noted at this time., no calf tenderness bilaterally.   PERIPHERAL VASCULAR SYSTEM: Good palpable distal pulses.   CENTRAL NERVOUS SYSTEM: No focal motor or sensory deficits noted.   SKIN: Skin without lesions, moist, well perfused.   MUSCLE STRENGTH & TONE: No noteable weakness, atrophy or abnormal movement.     HOME MEDICATIONS:  Current Facility-Administered Medications on File Prior to Encounter   Medication Dose  Route Frequency Provider Last Rate Last Admin    denosumab (PROLIA) injection 60 mg  60 mg Subcutaneous 1 time in Clinic/HOD Jean Carlos Hoffman MD        electrolyte-S (ISOLYTE)   Intravenous Continuous Dean Young MD   Stopped at 05/28/21 0930    fentaNYL 50 mcg/mL injection 25 mcg  25 mcg Intravenous Q5 Min PRN Dean Young MD        HYDROmorphone (PF) injection 0.2 mg  0.2 mg Intravenous Q5 Min PRN Dean Young MD        lactated ringers infusion  10 mL/hr Intravenous Continuous Dean Young MD        lactated ringers infusion  500 mL Intravenous Once Dean Young MD        LIDOcaine (PF) 10 mg/ml (1%) injection 10 mg  1 mL Intradermal Once Dean Young MD        lorazepam injection 0.25 mg  0.25 mg Intravenous Once PRN Dean Young MD        ondansetron injection 4 mg  4 mg Intravenous Once PRN Dean Young MD        prochlorperazine injection Soln 5 mg  5 mg Intravenous Q30 Min PRN Dean Young MD        sodium chloride 0.9% flush 3 mL  3 mL Intravenous Q8H Dean Young MD         Current Outpatient Medications on File Prior to Encounter   Medication Sig Dispense Refill    acetaminophen/diphenhydramine (TYLENOL PM ORAL) Take by mouth nightly as needed. 2 tabs      aspirin (ECOTRIN) 81 MG EC tablet Take 81 mg by mouth once daily.      carvediloL (COREG) 25 MG tablet TAKE 1 TABLET(25 MG) BY MOUTH TWICE DAILY WITH MEALS 180 tablet 3    cholecalciferol, vitamin D3, (VITAMIN D3) 100 mcg (4,000 unit) Cap Take by mouth.      cloNIDine (CATAPRES) 0.1 MG tablet Take 1 tablet (0.1 mg total) by mouth 2 (two) times daily as needed (only if blood pressure top number is over 200). 30 tablet 1    denosumab (PROLIA) 60 mg/mL Syrg Inject 60 mg into the skin every 6 (six) months.      methotrexate 2.5 MG Tab TAKE 6 TABLETS BY MOUTH EVERY WEEK 72 tablet 1    multivitamin (THERAGRAN) per tablet Take 1 tablet by mouth once daily.       sildenafiL (VIAGRA) 100 MG tablet Take 1 tablet (100 mg total) by mouth daily as needed for Erectile Dysfunction. 10 tablet 2    traMADoL (ULTRAM) 50 mg tablet Take 1 tablet (50 mg total) by mouth every 6 (six) hours as needed for Pain. 120 tablet 5       SCHEDULED MEDS:   aspirin  81 mg Oral Daily    carvediloL  25 mg Oral BID    multivitamin  1 tablet Oral Daily    nitroGLYCERIN 2% TD oint  1 inch Topical (Top) Q8H    pantoprazole  40 mg Oral Daily    senna-docusate 8.6-50 mg  1 tablet Oral BID       CONTINUOUS INFUSIONS:    PRN MEDS:acetaminophen, acetaminophen, albuterol-ipratropium, aluminum-magnesium hydroxide-simethicone, cloNIDine, dextrose 10%, dextrose 10%, dextrose 50%, dextrose 50%, glucagon (human recombinant), glucose, glucose, hydrALAZINE, magnesium oxide, magnesium oxide, melatonin, naloxone, ondansetron, potassium bicarbonate, potassium bicarbonate, potassium bicarbonate, potassium, sodium phosphates, potassium, sodium phosphates, potassium, sodium phosphates, simethicone, sodium chloride 0.9%    LABS AND DIAGNOSTICS     CBC LAST 3 DAYS  Recent Labs   Lab 05/22/22  1220 05/23/22  0445   WBC 8.99 8.66   RBC 4.78 4.26*   HGB 13.4* 11.8*   HCT 41.3 37.0*   MCV 86 87   MCH 28.0 27.7   MCHC 32.4 31.9*   RDW 17.6* 17.4*    347   MPV 11.3 11.4   GRAN 84.2*  7.6 69.2  6.0   LYMPH 7.3*  0.7* 17.8*  1.5   MONO 7.8  0.7 12.6  1.1*   BASO 0.01 0.01   NRBC 0 0       COAGULATION LAST 3 DAYS  No results for input(s): LABPT, INR, APTT in the last 168 hours.    CHEMISTRY LAST 3 DAYS  Recent Labs   Lab 05/22/22  1220 05/23/22  0445    140   K 4.8 4.3    107   CO2 19* 23   ANIONGAP 12 10   BUN 23 19   CREATININE 1.2 1.3   * 93   CALCIUM 9.4 8.7   MG  --  2.1   ALBUMIN 3.3*  --    PROT 8.1  --    ALKPHOS 74  --    ALT 17  --    AST 25  --    BILITOT 0.7  --        CARDIAC PROFILE LAST 3 DAYS  Recent Labs   Lab 05/22/22  1220 05/22/22  1755 05/22/22  2316 05/23/22  0759   *   --   --   --    TROPONINI 0.025 0.025 0.041* 0.044*       ENDOCRINE LAST 3 DAYS  Recent Labs   Lab 05/22/22  1220   TSH 0.739       LAST ARTERIAL BLOOD GAS  ABG  No results for input(s): PH, PO2, PCO2, HCO3, BE in the last 168 hours.    LAST 7 DAYS MICROBIOLOGY   Microbiology Results (last 7 days)     Procedure Component Value Units Date/Time    Influenza A & B by Molecular [[id number]] Collected: 05/22/22 1207    Order Status: Completed Specimen: Nasopharyngeal Swab Updated: 05/22/22 1302     Influenza A, Molecular Negative     Influenza B, Molecular Negative     Flu A & B Source Nasal swab          MOST RECENT IMAGING  CT Head Without Contrast  Narrative: EXAMINATION:  CT HEAD WITHOUT CONTRAST    CLINICAL HISTORY:  Syncope, recurrent;    TECHNIQUE:  Low dose axial images were obtained through the head.  Coronal and sagittal reformations were also performed. Contrast was not administered.    COMPARISON:  MRI 10/04/2016.    FINDINGS:  There is no acute hemorrhage or infarction.  There is cortical atrophy.  There are periventricular deep white matter changes consistent with chronic small vessel ischemic disease.    No extra-axial fluid collections.  Ventricles are normal in size, shape and configuration.  The basal cisterns are patent.    The imaged paranasal sinuses and ethmoid air cells are well aerated.    The mastoid air cells and middle ears are normally pneumatized.  Impression: 1. Cortical atrophy with periventricular deep white matter change consistent with chronic small vessel ischemic disease.    Electronically signed by: Carlos Monroe  Date:    05/22/2022  Time:    13:00  X-Ray Chest AP Portable  Narrative: EXAMINATION:  XR CHEST AP PORTABLE    CLINICAL HISTORY:  Chest Pain;.    TECHNIQUE:  Single frontal portable view of the chest was performed.    COMPARISON:  03/07/2022.    FINDINGS:  Support devices: None    The lungs are clear, with normal appearance of pulmonary vasculature and no pleural effusion or  pneumothorax.    The cardiac silhouette is normal in size. The hilar and mediastinal contours are unremarkable.    Bones are intact.  Impression: No acute abnormality.    Electronically signed by: Carlos Monroe  Date:    05/22/2022  Time:    12:15      ECHOCARDIOGRAM RESULTS (last 5)  Results for orders placed during the hospital encounter of 03/07/22    Echo    Interpretation Summary  · The left ventricle is normal in size with moderate concentric hypertrophy and normal systolic function.  · The estimated PA systolic pressure is 28 mmHg.  · Indeterminate left ventricular diastolic function.  · Normal right ventricular size with mildly reduced right ventricular systolic function.  · Normal central venous pressure (3 mmHg).  · The estimated ejection fraction is 70%.  · Moderate left atrial enlargement.  · Mild mitral regurgitation.  · Mild tricuspid regurgitation.  · Mild-to-moderate aortic regurgitation.  · Trivial circumferential pericardial effusion. Effusion is fibrinous.  · Mild right atrial enlargement.      Results for orders placed during the hospital encounter of 09/28/20    Echo Color Flow Doppler? Yes    Interpretation Summary  · The left ventricle is normal in size with normal systolic function. The estimated ejection fraction is 65%.  · There is moderate left ventricular concentric hypertrophy.  · Normal left ventricular diastolic function.  · Normal right ventricular systolic function.  · Moderate aortic regurgitation.  · Moderate mitral regurgitation.  · No pulmonary hypertension  · Mild tricuspid regurgitation.  · Normal central venous pressure (3 mmHg).  · The estimated PA systolic pressure is 29 mmHg.  · Trivial posterior pericardial effusion. Effusion is fluid.    Left ventricle hypertrophy  Moderate aortic regurgitation mitral regurgitant  Mean left atrial pressure is normal  No pulmonary hypertension      CURRENT/PREVIOUS VISIT EKG  Results for orders placed or performed during the hospital  encounter of 05/22/22   EKG 12-lead    Collection Time: 05/22/22 11:36 AM    Narrative    Test Reason : R07.9,    Vent. Rate : 068 BPM     Atrial Rate : 068 BPM     P-R Int : 272 ms          QRS Dur : 086 ms      QT Int : 454 ms       P-R-T Axes : 070 059 264 degrees     QTc Int : 482 ms    Sinus rhythm with 1st degree A-V block  Minimal voltage criteria for LVH, may be normal variant  ST and Marked T wave abnormality, consider inferior ischemia  ST and Marked T wave abnormality, consider anterolateral ischemia  Prolonged QT  Abnormal ECG  When compared with ECG of 08-MAR-2022 07:44,  No significant change was found    Referred By: [email or web address]   SELF           Confirmed By:            ASSESSMENT/PLAN:     Active Hospital Problems    Diagnosis    *Chest pain    Stage 3 chronic kidney disease, unspecified whether stage 3a or 3b CKD    Gastroesophageal reflux disease without esophagitis    Nausea and vomiting    Anemia, iron deficiency    Uncontrolled hypertension       ASSESSMENT & PLAN:   Recurrent episodes of dizziness unclear etiology  Accelerated hypertension on admit  Myocardial strain associated with hypertensive presentation  Atypical chest pain  Abnormal EKG with moderately significant left from hypertrophy with repolarization changes  Chronic kidney disease stage 3  GERD  Suspect associated peptic ulcer disease  Mild chronic anemia      RECOMMENDATIONS:  Patient had recent echocardiogram that demonstrated moderate significant left from hypertrophy.  Agree with the plans to obtain myocardial perfusion imaging study to evaluate for extent of coronary insufficiency .  Patient needs more aggressive blood pressure control.  In addition to carvedilol 25 mg p.o. b.i.d. recommend to add amlodipine at 2.5 mg once a day and this could be increased as necessary.  The goal is to keep his blood pressure 130 or less at all times  Also agree with PPI agents may consider increasing it to twice a day regimen for a week  and then once a day thereafter if symptoms persist encouraged to have GI evaluation.  Regarding vertigo may consider starting on meclizine at 12.5 mg p.o. b.i.d. for a week or so until his symptoms resolved.  This does not appear to be arrhythmia related.    Thank you for the consultation      Jone Puga MD  [facility]  Department of Cardiology  Date of Service: 05/23/2022  9:39 AM    Addendum:    Narrative & Impression  EXAMINATION:  NM MYOCARDIAL PERFUSION SPECT MULTI PHARM     CLINICAL HISTORY:  Chest pain/anginal equiv, high CAD risk, not treadmill candidate;     TECHNIQUE:  SPECT images in short, vertical and horizontal long axis were acquired 30 minutes after the injection of 12.2 mCi of Tc-99m tetrofosmin at rest and 31.9 mCi during a cardiac stress. The clinical stress and ECG portion of the study is to be read separately.     COMPARISON:  None.     FINDINGS:  The quality of the study is good.     Stress SPECT images demonstrate homogenous distribution of the tracer throughout the left ventricle. On the resting images, there is matched homogenous distribution of the tracer throughout the left ventricle.     The gated post-stress images reveal normal wall motion and normal with an estimated LVEF of 54 %. The LV cavity is not dilated with an end-diastolic volume of 81 ml and an end-systolic volume of 37 ml.     Impression:     1.  Scintigraphically negative for ischemia or infarct.  2. the global left ventricular systolic function is within normal limits with an LV ejection fraction of 54 % and no evidence of LV dilatation. Wall motion is normal.        Electronically signed by: Ghanshyam Curiel MD  Date:                                            05/23/2022  Time:                                           10:46             Exam Ended: 05/23/22 10:19           .  Patient can be discharged with outpatient follow-up for adequate blood pressure control  Encouraged patient to maintain on  low-salt diet in addition to present medical regimen

## 2022-05-23 NOTE — DISCHARGE INSTRUCTIONS
Pt stating she wants to go home today. Spoke with her regarding her diagnosis and NPO status and IV antibiotics. She states she has had this before and treated it at home with a bland diet. Asked for her to wait and see physicians today before she made a decision. She already called her  and told him to come get her. She is also requiring 2L oxygen. Was 84% on room air. Educated on hypoxia and need to monitor her oxygenation. Notified physician.    Thank you for choosing [facility] for your medical care. The primary doctor who is taking care of you at the time of your discharge is Morgan Cai MD.     You were admitted to the hospital with Chest pain.     Please note your discharge instructions, including diet/activity restrictions, follow-up appointments, and medication changes.  If you have any questions about your medical issues, prescriptions, or any other questions, please feel free to contact the [facility] Medicine Dept at [phone] and we will help.    If you are previously with Home health, outpatient PT/OT or under a therapy program, you are cleared to return to those programs.    Please direct all long term medication refills and follow up to your primary care provider, Sp Lilly MD. Thank you again for letting us take care of your health care needs.    Please note the following discharge instructions per your discharging physician-  MARGARET Corral

## 2022-05-23 NOTE — TELEPHONE ENCOUNTER
----- Message from Shari Carrion sent at 5/23/2022  1:09 PM CDT -----  Contact: [phone]  Type:  Sooner Appointment Request    Caller is requesting a sooner appointment.  Caller declined first available appointment listed below.  Caller will not accept being placed on the waitlist and is requesting a message be sent to doctor.    Name of Caller:  Pt   When is the first available appointment?  NA  Symptoms:  Hospital FU   Best Call Back Number:  [phone]    Additional Information: Pls call back and advise Discharge is 5/23.,

## 2022-05-23 NOTE — CARE UPDATE
05/23/22 0813   Patient Assessment/Suction   Level of Consciousness (AVPU) alert   Respiratory Effort Normal;Unlabored   All Lung Fields Breath Sounds clear   Rhythm/Pattern, Respiratory unlabored   PRE-TX-O2   O2 Device (Oxygen Therapy) room air   SpO2 96 %   Pulse Oximetry Type Intermittent   $ Pulse Oximetry - Multiple Charge Pulse Oximetry - Multiple   Pulse 79   Resp 18   Aerosol Therapy   $ Aerosol Therapy Charges PRN treatment not required   Respiratory Treatment Status (SVN) PRN treatment not required

## 2022-05-23 NOTE — PLAN OF CARE
[facility]  Initial Discharge Assessment       Primary Care Provider: Sp Lilly MD    Admission Diagnosis: Dizziness [R42]  Chest pain [R07.9]  Near syncope [R55]    Admission Date: 5/22/2022  Expected Discharge Date:     Discharge Barriers Identified: None    Payor: [employer]ARE / Plan: [employer]ICES 65 / Product Type: Medicare Advantage /     Extended Emergency Contact Information  Primary Emergency Contact: Martel,Collette  Address: [facility]           [place] [address] [facility]  Home Phone: [phone]  Mobile Phone: [phone]  Relation: Relative  Preferred language: English   needed? No    Discharge Plan A: Home  Discharge Plan B: Home with family      [id number] - [place], [place] - [address] & [facility] [address]  [address]  [place] [address]  Phone: [phone] Fax: [phone]    SW met with patient at bedside to complete discharge planning assessment.  Patient alert and oriented xs 4.  Patient verified all demographic information on facesheet is correct.  Patient verified PCP is Dr. Lilly.  Patient verified primary health insurance is [employer].  Patient with NO home health or DME.  Patient with NO POA or Living Will.  Patient not on dialysis or medication coumadin.  Patient with no 30 day admission.  Patient with no financial issues at this time.  Patient family will provide transportation upon discharge from facility.  Patient independent with ADLs, live alone, drives self.      Initial Assessment (most recent)     Adult Discharge Assessment - 05/23/22 1145        Discharge Assessment    Assessment Type Discharge Planning Assessment     Confirmed/corrected address, phone number and insurance Yes     Confirmed Demographics Correct on Facesheet     Source of Information patient     Does patient/caregiver understand observation status Yes     Communicated  DEVONTE with patient/caregiver Yes     Lives With alone     Facility Arrived From: home     Do you expect to return to your current living situation? Yes     Do you have help at home or someone to help you manage your care at home? Yes     Who are your caregiver(s) and their phone number(s)? self     Prior to hospitilization cognitive status: Alert/Oriented     Current cognitive status: Alert/Oriented     Walking or Climbing Stairs Difficulty none     Dressing/Bathing Difficulty none     Equipment Currently Used at Home none     Readmission within 30 days? No     Patient currently being followed by outpatient case management? No     Do you currently have service(s) that help you manage your care at home? No     Do you take prescription medications? Yes     Do you have prescription coverage? Yes     Do you have any problems affording any of your prescribed medications? No     Is the patient taking medications as prescribed? yes     Who is going to help you get home at discharge? self     How do you get to doctors appointments? car, drives self     Are you on dialysis? No     Do you take coumadin? No     Discharge Plan A Home     Discharge Plan B Home with family     DME Needed Upon Discharge  none     Discharge Plan discussed with: Patient     Discharge Barriers Identified None

## 2022-05-23 NOTE — PLAN OF CARE
Pt cleared for discharge from Case Management    SW scheduled f/u appt for pt with Dr. Lamb, Cardiology for 5/26/22 at 3:20pm and Pj Lua NP, Gastro for 6/9/22 at 3pm.  Unable to schedule a hospital dc f/u appt within 7 days w/ PCP or [facility] but Lavern, the  will send message to Dr. Burger and someone will call pt with appt time and date.     05/23/22 1309   Final Note   Assessment Type Final Discharge Note   Anticipated Discharge Disposition Home   What phone number can be called within the next 1-3 days to see how you are doing after discharge?   ([phone])   Hospital Resources/Appts/Education Provided Appointments scheduled and added to AVS

## 2022-05-23 NOTE — PLAN OF CARE
05/23/22 1145   HERRERA Message   Medicare Outpatient and Observation Notification regarding financial responsibility Explained to patient/caregiver;Signed/date by patient/caregiver   Date HERRERA was signed 05/23/22   Time HERRERA was signed 1143

## 2022-05-24 NOTE — HOSPITAL COURSE
Rajendra Castle was monitored closely during his hospitalization.  He was placed on telemetry.  He was treated for nausea and vomiting with antiemetics and he was placed on a PPI.  Cardiology specialty was consulted.  His troponins were trended.  He underwent a NM stress test which was negative for ischemia.  His blood pressure was noted to be uncontrolled.  Amlodipine 2.5 mg daily was added to his regimen.  Due to the patients reported GI symptoms, an ambulatory referral to gastroenterology was placed.  He was cleared for discharge from the standpoint of cardiology.  He was placed on protonix twice daily at the recommendation of cardiology.  GI follow up was discussed with the patient and he verbalized understanding.  He will follow up with Dr. Lamb who is his regular cardiologist.  Return instructions were discussed at length.  He was discharged home in good condition.

## 2022-05-24 NOTE — DISCHARGE SUMMARY
"[facility] Medicine  Discharge Summary      Patient Name: Rajendra Castle  MRN: [id number]  Patient Class: OP- Observation  Admission Date: 5/22/2022  Hospital Length of Stay: 0 days  Discharge Date and Time: 5/23/2022  1:36 PM  Attending Physician: No att. providers found   Discharging Provider: MARILEE Lazar  Primary Care Provider: Sp Lilly MD      HPI:   Rajendra Castle is a 77 y/o M with a past medical history significant for prostate cancer, HTN, RA, and DDD who presented to the ED for further evaluation of intermittent dizziness and chest pain which began about one week ago.  This morning he developed nausea and vomiting.  He reports associated SOB.  During my interview he reports that he has not eaten and he feels that the nausea is related to the "acid build up in my stomach."  Describes vomitus as "acid."  He is eating chocolate pudding as we are talking and requests water during the interview.  Upon review of past medical records, patient was admitted in March of this year for chest pain.  A stress test was recommended; however, pt requested discharge prior to the test and was to follow up with his cardiologist for an outpatient stress test.  He admits that this has not been done.  He is quite hypertensive while in the ED, but did not take his antihypertensives this morning.  His antihypertensives were recently administered since arrival to the ED.  Pt was provided a meal during my interview, and after the first bite he proceeded to vomit.  He would not let nursing take his meal away, stating he is vomiting due to "acid build up." Unable to obtain further history due to the condition of the patient.  He will be placed in observation for continued monitoring and treatment.        * No surgery found *      Hospital Course:   Rajendra Castle was monitored closely during his hospitalization.  He was placed on telemetry.  He was treated for nausea and vomiting with " antiemetics and he was placed on a PPI.  Cardiology specialty was consulted.  His troponins were trended.  He underwent a NM stress test which was negative for ischemia.  His blood pressure was noted to be uncontrolled.  Amlodipine 2.5 mg daily was added to his regimen.  Due to the patients reported GI symptoms, an ambulatory referral to gastroenterology was placed.  He was cleared for discharge from the standpoint of cardiology.  He was placed on protonix twice daily at the recommendation of cardiology.  GI follow up was discussed with the patient and he verbalized understanding.  He will follow up with Dr. Lamb who is his regular cardiologist.  Return instructions were discussed at length.  He was discharged home in good condition.       Goals of Care Treatment Preferences:  Code Status: Full Code      Consults:     No new Assessment & Plan notes have been filed under this hospital service since the last note was generated.  Service: Hospital Medicine    Final Active Diagnoses:    Diagnosis Date Noted POA    PRINCIPAL PROBLEM:  Chest pain [R07.9] 09/28/2020 Yes    Stage 3 chronic kidney disease, unspecified whether stage 3a or 3b CKD [N18.30]  Yes    Gastroesophageal reflux disease without esophagitis [K21.9] 09/20/2016 Yes    Nausea and vomiting [R11.2] 08/18/2016 Yes    Anemia, iron deficiency [D50.9] 10/03/2013 Yes    Uncontrolled hypertension [I10]  Yes      Problems Resolved During this Admission:       Discharged Condition: good    Disposition: Home or Self Care    Follow Up:   Follow-up Information     Sp Lilly MD Follow up in 1 week(s).    Specialty: Family Medicine  Contact information:  [address] [place] [address]  [phone]             Sebastien Lamb MD Follow up.    Specialties: Cardiology, Interventional Cardiology  Why: APPOINTMENT:  May 26, 2022 at 3:20pm  Contact information:  [address] [place] [address]  [phone]             MARILEE Nunn  Follow up.    Specialty: Gastroenterology  Why: APPOINTMENT:  June 9, 2022 at 3:00pm  Contact information:  [address] [place] [address]  [phone]                       Patient Instructions:      Ambulatory referral/consult to Gastroenterology   Standing Status: Future   Referral Priority: Routine Referral Type: Consultation   Referral Reason: Specialty Services Required   Requested Specialty: Gastroenterology   Number of Visits Requested: 1     Diet Cardiac     Notify your health care provider if you experience any of the following:  temperature >100.4     Notify your health care provider if you experience any of the following:  persistent nausea and vomiting or diarrhea     Notify your health care provider if you experience any of the following:  severe uncontrolled pain     Notify your health care provider if you experience any of the following:  difficulty breathing or increased cough     Notify your health care provider if you experience any of the following:  severe persistent headache     Notify your health care provider if you experience any of the following:  persistent dizziness, light-headedness, or visual disturbances     Notify your health care provider if you experience any of the following:  increased confusion or weakness     Activity as tolerated       Significant Diagnostic Studies: Labs: All labs within the past 24 hours have been reviewed    Pending Diagnostic Studies:     None         Medications:  Reconciled Home Medications:      Medication List      START taking these medications    amLODIPine 2.5 MG tablet  Commonly known as: NORVASC  Take 1 tablet (2.5 mg total) by mouth once daily.     meclizine 12.5 mg tablet  Commonly known as: ANTIVERT  Take 1 tablet (12.5 mg total) by mouth 2 (two) times daily as needed for Dizziness.     pantoprazole 40 MG tablet  Commonly known as: PROTONIX  Take 1 tablet (40 mg total) by mouth 2 (two) times daily.        CONTINUE taking these  medications    aspirin 81 MG EC tablet  Commonly known as: ECOTRIN  Take 81 mg by mouth once daily.     carvediloL 25 MG tablet  Commonly known as: COREG  TAKE 1 TABLET(25 MG) BY MOUTH TWICE DAILY WITH MEALS     cloNIDine 0.1 MG tablet  Commonly known as: CATAPRES  Take 1 tablet (0.1 mg total) by mouth 2 (two) times daily as needed (only if blood pressure top number is over 200).     methotrexate 2.5 MG Tab  TAKE 6 TABLETS BY MOUTH EVERY WEEK     multivitamin per tablet  Commonly known as: THERAGRAN  Take 1 tablet by mouth once daily.     PROLIA 60 mg/mL Syrg  Generic drug: denosumab  Inject 60 mg into the skin every 6 (six) months.     sildenafiL 100 MG tablet  Commonly known as: VIAGRA  Take 1 tablet (100 mg total) by mouth daily as needed for Erectile Dysfunction.     traMADoL 50 mg tablet  Commonly known as: ULTRAM  Take 1 tablet (50 mg total) by mouth every 6 (six) hours as needed for Pain.     TYLENOL PM ORAL  Take by mouth nightly as needed. 2 tabs     VITAMIN D3 100 mcg (4,000 unit) Cap capsule  Generic drug: cholecalciferol (vitamin D3)  Take by mouth.            Indwelling Lines/Drains at time of discharge:   Lines/Drains/Airways     None                 Time spent on the discharge of patient: 32 minutes         MARILEE Lazar  Department of Hospital Medicine  [facility]

## 2022-05-25 ENCOUNTER — TELEPHONE (OUTPATIENT)
Dept: MEDSURG UNIT | Facility: HOSPITAL | Age: 77
End: 2022-05-25
Payer: MEDICARE

## 2022-05-27 ENCOUNTER — OFFICE VISIT (OUTPATIENT)
Dept: FAMILY MEDICINE | Facility: CLINIC | Age: 77
End: 2022-05-27
Payer: MEDICARE

## 2022-05-27 ENCOUNTER — HOSPITAL ENCOUNTER (EMERGENCY)
Facility: HOSPITAL | Age: 77
Discharge: HOME | End: 2022-05-27
Attending: EMERGENCY MEDICINE
Payer: MEDICARE

## 2022-05-27 VITALS
SYSTOLIC BLOOD PRESSURE: 138 MMHG | OXYGEN SATURATION: 97 % | BODY MASS INDEX: 20.78 KG/M2 | HEART RATE: 80 BPM | DIASTOLIC BLOOD PRESSURE: 62 MMHG | TEMPERATURE: 98 F | WEIGHT: 137.13 LBS | HEIGHT: 68 IN | RESPIRATION RATE: 14 BRPM

## 2022-05-27 VITALS
SYSTOLIC BLOOD PRESSURE: 156 MMHG | DIASTOLIC BLOOD PRESSURE: 77 MMHG | OXYGEN SATURATION: 98 % | BODY MASS INDEX: 20.92 KG/M2 | RESPIRATION RATE: 16 BRPM | HEART RATE: 79 BPM | WEIGHT: 138 LBS | TEMPERATURE: 98 F | HEIGHT: 68 IN

## 2022-05-27 DIAGNOSIS — I10 ESSENTIAL HYPERTENSION: ICD-10-CM

## 2022-05-27 DIAGNOSIS — K21.9 GASTROESOPHAGEAL REFLUX DISEASE, UNSPECIFIED WHETHER ESOPHAGITIS PRESENT: ICD-10-CM

## 2022-05-27 DIAGNOSIS — R10.13 EPIGASTRIC ABDOMINAL PAIN: ICD-10-CM

## 2022-05-27 DIAGNOSIS — R55 NEAR SYNCOPE: Primary | ICD-10-CM

## 2022-05-27 DIAGNOSIS — R55 SYNCOPE AND COLLAPSE: ICD-10-CM

## 2022-05-27 DIAGNOSIS — D50.1 IRON DEFICIENCY ANEMIA DUE TO SIDEROPENIC DYSPHAGIA: ICD-10-CM

## 2022-05-27 DIAGNOSIS — K92.2 ACUTE UPPER GI BLEED: Primary | ICD-10-CM

## 2022-05-27 DIAGNOSIS — R19.5 DARK STOOLS: ICD-10-CM

## 2022-05-27 LAB
ALBUMIN SERPL BCP-MCNC: 3.6 G/DL (ref 3.5–5.2)
ALP SERPL-CCNC: 65 U/L (ref 55–135)
ALT SERPL W/O P-5'-P-CCNC: 14 U/L (ref 10–44)
ANION GAP SERPL CALC-SCNC: 9 MMOL/L (ref 8–16)
APTT PPP: 29.7 SEC (ref 23.3–35.1)
AST SERPL-CCNC: 16 U/L (ref 10–40)
BASOPHILS # BLD AUTO: 0.02 K/UL (ref 0–0.2)
BASOPHILS NFR BLD: 0.2 % (ref 0–1.9)
BILIRUB SERPL-MCNC: 0.4 MG/DL (ref 0.1–1)
BUN SERPL-MCNC: 21 MG/DL (ref 8–23)
CALCIUM SERPL-MCNC: 8.8 MG/DL (ref 8.7–10.5)
CHLORIDE SERPL-SCNC: 106 MMOL/L (ref 95–110)
CO2 SERPL-SCNC: 22 MMOL/L (ref 23–29)
CREAT SERPL-MCNC: 1.3 MG/DL (ref 0.5–1.4)
DIFFERENTIAL METHOD: ABNORMAL
EOSINOPHIL # BLD AUTO: 0.2 K/UL (ref 0–0.5)
EOSINOPHIL NFR BLD: 2.5 % (ref 0–8)
ERYTHROCYTE [DISTWIDTH] IN BLOOD BY AUTOMATED COUNT: 18 % (ref 11.5–14.5)
EST. GFR  (AFRICAN AMERICAN): >60 ML/MIN/1.73 M^2
EST. GFR  (NON AFRICAN AMERICAN): 53.8 ML/MIN/1.73 M^2
GLUCOSE SERPL-MCNC: 79 MG/DL (ref 70–110)
HCT VFR BLD AUTO: 41.8 % (ref 40–54)
HGB BLD-MCNC: 13.2 G/DL (ref 14–18)
IMM GRANULOCYTES # BLD AUTO: 0.04 K/UL (ref 0–0.04)
IMM GRANULOCYTES NFR BLD AUTO: 0.5 % (ref 0–0.5)
INR PPP: 1.2
LIPASE SERPL-CCNC: 48 U/L (ref 4–60)
LYMPHOCYTES # BLD AUTO: 1.3 K/UL (ref 1–4.8)
LYMPHOCYTES NFR BLD: 16.5 % (ref 18–48)
MAGNESIUM SERPL-MCNC: 2.2 MG/DL (ref 1.6–2.6)
MCH RBC QN AUTO: 27.7 PG (ref 27–31)
MCHC RBC AUTO-ENTMCNC: 31.6 G/DL (ref 32–36)
MCV RBC AUTO: 88 FL (ref 82–98)
MONOCYTES # BLD AUTO: 1 K/UL (ref 0.3–1)
MONOCYTES NFR BLD: 12.6 % (ref 4–15)
NEUTROPHILS # BLD AUTO: 5.4 K/UL (ref 1.8–7.7)
NEUTROPHILS NFR BLD: 67.7 % (ref 38–73)
NRBC BLD-RTO: 0 /100 WBC
OB PNL STL: NEGATIVE
PLATELET # BLD AUTO: 407 K/UL (ref 150–450)
PMV BLD AUTO: 11.6 FL (ref 9.2–12.9)
POTASSIUM SERPL-SCNC: 4.4 MMOL/L (ref 3.5–5.1)
PROT SERPL-MCNC: 7.9 G/DL (ref 6–8.4)
PROTHROMBIN TIME: 14.7 SEC (ref 11.4–13.7)
RBC # BLD AUTO: 4.77 M/UL (ref 4.6–6.2)
SARS-COV-2 RDRP RESP QL NAA+PROBE: NEGATIVE
SODIUM SERPL-SCNC: 137 MMOL/L (ref 136–145)
TROPONIN I SERPL DL<=0.01 NG/ML-MCNC: <0.03 NG/ML
TROPONIN I SERPL DL<=0.01 NG/ML-MCNC: <0.03 NG/ML
WBC # BLD AUTO: 8.04 K/UL (ref 3.9–12.7)

## 2022-05-27 PROCEDURE — 83690 ASSAY OF LIPASE: CPT | Performed by: EMERGENCY MEDICINE

## 2022-05-27 PROCEDURE — 3078F DIAST BP <80 MM HG: CPT | Mod: CPTII,S$GLB,,

## 2022-05-27 PROCEDURE — U0002 COVID-19 LAB TEST NON-CDC: HCPCS | Performed by: EMERGENCY MEDICINE

## 2022-05-27 PROCEDURE — 3008F PR BODY MASS INDEX (BMI) DOCUMENTED: ICD-10-PCS | Mod: CPTII,S$GLB,,

## 2022-05-27 PROCEDURE — 84484 ASSAY OF TROPONIN QUANT: CPT | Mod: 91 | Performed by: EMERGENCY MEDICINE

## 2022-05-27 PROCEDURE — 1159F PR MEDICATION LIST DOCUMENTED IN MEDICAL RECORD: ICD-10-PCS | Mod: CPTII,S$GLB,,

## 2022-05-27 PROCEDURE — 85730 THROMBOPLASTIN TIME PARTIAL: CPT | Performed by: EMERGENCY MEDICINE

## 2022-05-27 PROCEDURE — 1101F PT FALLS ASSESS-DOCD LE1/YR: CPT | Mod: CPTII,S$GLB,,

## 2022-05-27 PROCEDURE — 1159F MED LIST DOCD IN RCRD: CPT | Mod: CPTII,S$GLB,,

## 2022-05-27 PROCEDURE — 85610 PROTHROMBIN TIME: CPT | Performed by: EMERGENCY MEDICINE

## 2022-05-27 PROCEDURE — 3044F PR MOST RECENT HEMOGLOBIN A1C LEVEL <7.0%: ICD-10-PCS | Mod: CPTII,S$GLB,,

## 2022-05-27 PROCEDURE — 85025 COMPLETE CBC W/AUTO DIFF WBC: CPT | Performed by: EMERGENCY MEDICINE

## 2022-05-27 PROCEDURE — 3008F BODY MASS INDEX DOCD: CPT | Mod: CPTII,S$GLB,,

## 2022-05-27 PROCEDURE — 1125F PR PAIN SEVERITY QUANTIFIED, PAIN PRESENT: ICD-10-PCS | Mod: CPTII,S$GLB,,

## 2022-05-27 PROCEDURE — 99999 PR PBB SHADOW E&M-EST. PATIENT-LVL V: CPT | Mod: PBBFAC,,,

## 2022-05-27 PROCEDURE — 99214 OFFICE O/P EST MOD 30 MIN: CPT | Mod: S$GLB,,,

## 2022-05-27 PROCEDURE — 3288F FALL RISK ASSESSMENT DOCD: CPT | Mod: CPTII,S$GLB,,

## 2022-05-27 PROCEDURE — 93010 EKG 12-LEAD: ICD-10-PCS | Mod: ,,, | Performed by: SPECIALIST

## 2022-05-27 PROCEDURE — 99285 EMERGENCY DEPT VISIT HI MDM: CPT | Mod: 25

## 2022-05-27 PROCEDURE — 93005 ELECTROCARDIOGRAM TRACING: CPT | Performed by: SPECIALIST

## 2022-05-27 PROCEDURE — 3044F HG A1C LEVEL LT 7.0%: CPT | Mod: CPTII,S$GLB,,

## 2022-05-27 PROCEDURE — 3075F SYST BP GE 130 - 139MM HG: CPT | Mod: CPTII,S$GLB,,

## 2022-05-27 PROCEDURE — 1160F PR REVIEW ALL MEDS BY PRESCRIBER/CLIN PHARMACIST DOCUMENTED: ICD-10-PCS | Mod: CPTII,S$GLB,,

## 2022-05-27 PROCEDURE — 83735 ASSAY OF MAGNESIUM: CPT | Performed by: EMERGENCY MEDICINE

## 2022-05-27 PROCEDURE — 99999 PR PBB SHADOW E&M-EST. PATIENT-LVL V: ICD-10-PCS | Mod: PBBFAC,,,

## 2022-05-27 PROCEDURE — 3078F PR MOST RECENT DIASTOLIC BLOOD PRESSURE < 80 MM HG: ICD-10-PCS | Mod: CPTII,S$GLB,,

## 2022-05-27 PROCEDURE — 3075F PR MOST RECENT SYSTOLIC BLOOD PRESS GE 130-139MM HG: ICD-10-PCS | Mod: CPTII,S$GLB,,

## 2022-05-27 PROCEDURE — 1160F RVW MEDS BY RX/DR IN RCRD: CPT | Mod: CPTII,S$GLB,,

## 2022-05-27 PROCEDURE — 3288F PR FALLS RISK ASSESSMENT DOCUMENTED: ICD-10-PCS | Mod: CPTII,S$GLB,,

## 2022-05-27 PROCEDURE — 80053 COMPREHEN METABOLIC PANEL: CPT | Performed by: EMERGENCY MEDICINE

## 2022-05-27 PROCEDURE — 93010 ELECTROCARDIOGRAM REPORT: CPT | Mod: ,,, | Performed by: SPECIALIST

## 2022-05-27 PROCEDURE — 1125F AMNT PAIN NOTED PAIN PRSNT: CPT | Mod: CPTII,S$GLB,,

## 2022-05-27 PROCEDURE — 99214 PR OFFICE/OUTPT VISIT, EST, LEVL IV, 30-39 MIN: ICD-10-PCS | Mod: S$GLB,,,

## 2022-05-27 PROCEDURE — 1101F PR PT FALLS ASSESS DOC 0-1 FALLS W/OUT INJ PAST YR: ICD-10-PCS | Mod: CPTII,S$GLB,,

## 2022-05-27 PROCEDURE — 82272 OCCULT BLD FECES 1-3 TESTS: CPT | Performed by: EMERGENCY MEDICINE

## 2022-05-27 NOTE — PROGRESS NOTES
"Subjective:       Patient ID: Rajendra Castle is a 76 y.o. male.    Chief Complaint: Hospital Follow Up (05/22-05/23 for dizziness and chest pain)    Rajendra Castle is a 75 yo M pt w/ MHx listed below that presents to the clinic for hospital follow up. His presentation and hospital course can be seen below. He had near syncope and collapse while in the clinic lobby today and a rapid response was called. Pt's labs stable on check, glucose 87. Provided water and able to provide HPI. Patient states that he had an extensive workup at his recent hospital stay but since discharge has had black tarry stool and worsening weakness. Continues to have issues w/ nausea and vomiting. Patient states that in the past, he had similar symptoms to the ones he is experiencing now and was found to have a gastric ulcer. Patient did not have EGD performed during recent hospital stay. Was started on protonix bid and recommended outpatient GI follow up. Patient states that he drove himself to the clinic and he feels stable enough to drive himself to the ED. He refuses EMS services at this time.     HPI:   Rajendra Castle is a 75 y/o M with a past medical history significant for prostate cancer, HTN, RA, and DDD who presented to the ED for further evaluation of intermittent dizziness and chest pain which began about one week ago.  This morning he developed nausea and vomiting.  He reports associated SOB.  During my interview he reports that he has not eaten and he feels that the nausea is related to the "acid build up in my stomach."  Describes vomitus as "acid."  He is eating chocolate pudding as we are talking and requests water during the interview.  Upon review of past medical records, patient was admitted in March of this year for chest pain.  A stress test was recommended; however, pt requested discharge prior to the test and was to follow up with his cardiologist for an outpatient stress test.  He admits that this has not been done.  He is " "quite hypertensive while in the ED, but did not take his antihypertensives this morning.  His antihypertensives were recently administered since arrival to the ED.  Pt was provided a meal during my interview, and after the first bite he proceeded to vomit.  He would not let nursing take his meal away, stating he is vomiting due to "acid build up." Unable to obtain further history due to the condition of the patient.  He will be placed in observation for continued monitoring and treatment.           * No surgery found *       Hospital Course:   Rajendra Castle was monitored closely during his hospitalization.  He was placed on telemetry.  He was treated for nausea and vomiting with antiemetics and he was placed on a PPI.  Cardiology specialty was consulted.  His troponins were trended.  He underwent a NM stress test which was negative for ischemia.  His blood pressure was noted to be uncontrolled.  Amlodipine 2.5 mg daily was added to his regimen.  Due to the patients reported GI symptoms, an ambulatory referral to gastroenterology was placed.  He was cleared for discharge from the standpoint of cardiology.  He was placed on protonix twice daily at the recommendation of cardiology.  GI follow up was discussed with the patient and he verbalized understanding.  He will follow up with Dr. Lamb who is his regular cardiologist.  Return instructions were discussed at length.  He was discharged home in good condition.    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    Prostate CA     RA (rheumatoid arthritis)     Vitamin D insufficiency 9/30/2016       Review of patient's allergies indicates:  No Known Allergies      Current Outpatient Medications:     acetaminophen/diphenhydramine (TYLENOL PM ORAL), Take by mouth nightly as needed. 2 tabs, Disp: , Rfl:    "  amLODIPine (NORVASC) 2.5 MG tablet, Take 1 tablet (2.5 mg total) by mouth once daily., Disp: 30 tablet, Rfl: 0    aspirin (ECOTRIN) 81 MG EC tablet, Take 81 mg by mouth once daily., Disp: , Rfl:     carvediloL (COREG) 25 MG tablet, TAKE 1 TABLET(25 MG) BY MOUTH TWICE DAILY WITH MEALS, Disp: 180 tablet, Rfl: 3    cholecalciferol, vitamin D3, (VITAMIN D3) 100 mcg (4,000 unit) Cap, Take by mouth., Disp: , Rfl:     cloNIDine (CATAPRES) 0.1 MG tablet, Take 1 tablet (0.1 mg total) by mouth 2 (two) times daily as needed (only if blood pressure top number is over 200)., Disp: 30 tablet, Rfl: 1    denosumab (PROLIA) 60 mg/mL Syrg, Inject 60 mg into the skin every 6 (six) months., Disp: , Rfl:     meclizine (ANTIVERT) 12.5 mg tablet, Take 1 tablet (12.5 mg total) by mouth 2 (two) times daily as needed for Dizziness., Disp: 30 tablet, Rfl: 0    methotrexate 2.5 MG Tab, TAKE 6 TABLETS BY MOUTH EVERY WEEK, Disp: 72 tablet, Rfl: 1    multivitamin (THERAGRAN) per tablet, Take 1 tablet by mouth once daily., Disp: , Rfl:     pantoprazole (PROTONIX) 40 MG tablet, Take 1 tablet (40 mg total) by mouth 2 (two) times daily., Disp: 60 tablet, Rfl: 0    traMADoL (ULTRAM) 50 mg tablet, Take 1 tablet (50 mg total) by mouth every 6 (six) hours as needed for Pain., Disp: 120 tablet, Rfl: 5    sildenafiL (VIAGRA) 100 MG tablet, Take 1 tablet (100 mg total) by mouth daily as needed for Erectile Dysfunction., Disp: 10 tablet, Rfl: 2    Current Facility-Administered Medications:     acetaminophen tablet 650 mg, 650 mg, Oral, Once PRN, Timo Chow MD    Facility-Administered Medications Ordered in Other Visits:     denosumab (PROLIA) injection 60 mg, 60 mg, Subcutaneous, 1 time in Clinic/HOD, Jean Carlos Hoffman MD    electrolyte-S (ISOLYTE), , Intravenous, Continuous, Dean Young MD, Stopped at 05/28/21 0930    fentaNYL 50 mcg/mL injection 25 mcg, 25 mcg, Intravenous, Q5 Min PRN, Dean Young MD     HYDROmorphone (PF) injection 0.2 mg, 0.2 mg, Intravenous, Q5 Min PRN, Dean Young MD    lactated ringers infusion, 10 mL/hr, Intravenous, Continuous, Dean Young MD    lactated ringers infusion, 500 mL, Intravenous, Once, Dean Young MD    LIDOcaine (PF) 10 mg/ml (1%) injection 10 mg, 1 mL, Intradermal, Once, Dean Young MD    lorazepam injection 0.25 mg, 0.25 mg, Intravenous, Once PRN, Dean Young MD    ondansetron injection 4 mg, 4 mg, Intravenous, Once PRN, Dean Young MD    prochlorperazine injection Soln 5 mg, 5 mg, Intravenous, Q30 Min PRN, Dean Young MD    sodium chloride 0.9% flush 3 mL, 3 mL, Intravenous, Q8H, Dean Young MD    Review of Systems   Constitutional: Negative for activity change, appetite change, chills, diaphoresis, fatigue, fever and unexpected weight change.   HENT: Negative for congestion, ear pain, postnasal drip, rhinorrhea, sinus pressure, sneezing, sore throat and trouble swallowing.    Eyes: Negative for pain, itching and visual disturbance.   Respiratory: Negative for cough, chest tightness, shortness of breath and wheezing.    Cardiovascular: Positive for chest pain. Negative for palpitations and leg swelling.   Gastrointestinal: Positive for abdominal pain, blood in stool, nausea and vomiting. Negative for abdominal distention, constipation and diarrhea.   Endocrine: Negative for cold intolerance and heat intolerance.   Genitourinary: Negative for difficulty urinating, dysuria, frequency, hematuria and urgency.   Musculoskeletal: Negative for arthralgias, back pain, myalgias and neck pain.   Skin: Negative for color change, pallor, rash and wound.   Neurological: Positive for dizziness, syncope and weakness. Negative for numbness and headaches.   Hematological: Negative for adenopathy.   Psychiatric/Behavioral: Negative for behavioral problems. The patient is not nervous/anxious.        Objective:     "  /62 (BP Location: Right arm, Patient Position: Sitting, BP Method: Large (Manual))   Pulse 80   Temp 98.3 °F (36.8 °C) (Oral)   Resp 14   Ht 5' 8" (1.727 m)   Wt 62.2 kg (137 lb 2 oz)   SpO2 97%   BMI 20.85 kg/m²   Physical Exam  Vitals reviewed: Strong odor from patient.   Constitutional:       General: He is not in acute distress.     Appearance: Normal appearance. He is normal weight. He is not ill-appearing, toxic-appearing or diaphoretic.   HENT:      Head: Normocephalic.      Right Ear: External ear normal.      Left Ear: External ear normal.      Nose: Nose normal. No congestion or rhinorrhea.      Mouth/Throat:      Mouth: Mucous membranes are moist.      Pharynx: Oropharynx is clear.      Comments: Missing dentition    Eyes:      General: No scleral icterus.        Right eye: No discharge.         Left eye: No discharge.      Extraocular Movements: Extraocular movements intact.      Conjunctiva/sclera: Conjunctivae normal.   Cardiovascular:      Rate and Rhythm: Normal rate and regular rhythm.      Pulses: Normal pulses.      Heart sounds: Normal heart sounds. No murmur heard.    No friction rub. No gallop.   Pulmonary:      Effort: Pulmonary effort is normal. No respiratory distress.      Breath sounds: Normal breath sounds. No wheezing, rhonchi or rales.   Chest:      Chest wall: No tenderness.   Abdominal:      General: There is no distension.      Palpations: Abdomen is soft. There is no mass.      Tenderness: There is abdominal tenderness. There is no guarding.   Musculoskeletal:         General: No swelling, tenderness, deformity or signs of injury. Normal range of motion.      Cervical back: Normal range of motion.      Right lower leg: No edema.      Left lower leg: No edema.   Skin:     General: Skin is warm and dry.      Capillary Refill: Capillary refill takes less than 2 seconds.      Coloration: Skin is not jaundiced.      Findings: No bruising, erythema, lesion or rash. "   Neurological:      Mental Status: He is alert and oriented to person, place, and time.      Gait: Gait normal.      Comments: Appropriate speech and gait     Psychiatric:         Mood and Affect: Mood normal.         Behavior: Behavior normal.         Thought Content: Thought content normal.         Judgment: Judgment normal.         Assessment:       1. Acute upper GI bleed    2. Syncope and collapse    3. Essential hypertension    4. Gastroesophageal reflux disease, unspecified whether esophagitis present    5. 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    Syncope and collapse  -     Refer to Emergency Dept.    Essential hypertension         -   Stable. Continue meds as prescribed.    Gastroesophageal reflux disease, unspecified whether esophagitis present         -   Unstable.     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 Likely unstable.         Pt instructed to pull over and call 911 if he feels faint while driving. Refuses EMS services at this time. Pt to report to ED immediately. Report provided to ED charge nurse.        Jon Archer PA-C  Family Medicine Physician Assistant       I spent a total of 30 minutes on the day of the visit.This includes face to face time and non-face to face time preparing to see the patient (eg, review of tests), obtaining and/or reviewing separately obtained history, documenting clinical information in the electronic or other health record, independently interpreting results and communicating results to the patient/family/caregiver, or care coordinator.      We have addressed [4] Moderate: 1 or more chronic illnesses with exacerbation, progression, or side effects of treatment / 2 or more stable chronic illnesses / 1 undiagnosed new problem with uncertain prognosis / 1 acute illness with systemic symptoms / 1 acute complicated injury  The  complexity of the data reviewed and analyzed for this visit was [2] Minimal or None  The risk of complications and/or morbidity or mortality are [4] Moderate risk (I.e. prescription drug management / decision regarding minor surgery with identified pt or procedure risk factors / decision regarding elective major surgery without identified pt or procedure risk factors / diagnosis or treatment significantly limited by social determinants of health)   The level of Medical Decision Making for this visit is [4] Moderate

## 2022-05-27 NOTE — PATIENT INSTRUCTIONS
Ortiz Drew,     If you are due for any health screening(s) below please notify me so we can arrange them to be ordered and scheduled to maintain your health. Most healthy patients complete it. Don't lose out on improving your health.     Health Maintenance   Topic Date Due    High Dose Statin  Never done    Lipid Panel  03/08/2027    TETANUS VACCINE  05/16/2028    Hepatitis C Screening  Completed    Abdominal Aortic Aneurysm Screening  Completed          Colon Cancer Screening    Of cancers affecting both men and women, colorectal cancer is the third leading cancer killer in the [place]. But it doesnt have to be. Screening can prevent colorectal cancer or find it at an early stage when treatment often leads to a cure.    A colonoscopy is the preferred test for detecting colon cancer. It is needed only once every 10 years if results are negative. While sedated, a flexible, lighted tube with a tiny camera is inserted into the rectum and advanced through the colon to look for cancers. An alternative screening test that is used at home and returned to the lab may also be used. It detects hidden blood in bowel movements which could indicate cancer in the colon. If results are positive, you will need a colonoscopy to determine if the blood is a sign of cancer. This type of follow up (diagnostic) colonoscopy usually requires additional copays as required by your insurance provider. Please contact your PCP if you have any questions.    Although you are still overdue for this important screening, due to the COVID-19 pandemic, we recommend scheduling it in the near future.

## 2022-05-27 NOTE — ED PROVIDER NOTES
Encounter Date: 5/27/2022       History     Chief Complaint   Patient presents with    Abdominal Pain     ABD pain, pt states he has a bleeding ulcer, dark tarry stool x couple weeks worsening over the past 3 days, nausea, vomiting, headache     This is a 76-year-old male with past medical history of hypertension, rheumatoid arthritis, DDD, who presents emergency department with near syncope, dark stool.  Patient went to his primary care provider today where he had an episode of near-syncope  in the waiting room.  He did disclose that he has been having dark stool over the past several days, however has been drinking Pepto-Bismol..  About 1 episode a day for the last 3-4 days.  He is concerned that he has been weak and lightheaded.  He feels that he may have a bleeding ulcer as he had 1 when he was 23 years old any feels similar.  He denies any alcohol use to excess.  Occasionally drinks alcohol.  Does not use any NSAIDs.  Says he was tested for hepatitis any does not think he has it.  He says he is also have some epigastric pain.  He had some vomiting during his recent hospitalization at [facility] nor sure.  He was evaluated from a dizziness and chest pain standpoint ruled out sent home with ambulatory referral to GI and has an appointment early next week.  He is also placed on Protonix.  Patient referred by his primary care provider here to the ER for further evaluation to rule out GI bleed.        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    Prostate CA     RA (rheumatoid arthritis)     Vitamin D insufficiency 9/30/2016     Past Surgical History:   Procedure Laterality Date    CARPAL TUNNEL RELEASE Right 5/28/2021    Procedure: RELEASE, CARPAL TUNNEL;  Surgeon: Jose Ryan II,  MD;  Location: [facility] OR;  Service: Orthopedics;  Laterality: Right;    COLONOSCOPY  prior to 2016    normal findings per patient report    CYSTOSCOPY N/A 2018    Procedure: CYSTOSCOPY;  Surgeon: Garrett Pina MD;  Location: [facility];  Service: Urology;  Laterality: N/A;    ESOPHAGOGASTRODUODENOSCOPY N/A 2020    Dr. Espinosa; empiric dilation; erythematous mucosa in antrum; gastric mucosal atrophy; hematin in entire stomach; biopsy: mid & distal esophagus WNL, stomach- WNL, negative for h pylori    PARATHYROIDECTOMY Right 10/27/2020    Procedure: PARATHYROIDECTOMY;  Surgeon: Latonia Clayton MD;  Location: [facility] OR [address] [place] [address];  Service: ENT;  Laterality: Right;    RELEASE OF ULNAR NERVE AT CUBITAL TUNNEL Right 2021    Procedure: RELEASE, ULNAR TUNNEL;  Surgeon: Jose Ryan II, MD;  Location: [facility];  Service: Orthopedics;  Laterality: Right;    TRANSRECTAL BIOPSY OF PROSTATE WITH ULTRASOUND GUIDANCE N/A 2018    Procedure: BIOPSY, PROSTATE, RECTAL APPROACH, WITH US GUIDANCE;  Surgeon: Garrett Pina MD;  Location: [facility];  Service: Urology;  Laterality: N/A;     Family History   Problem Relation Age of Onset    Heart disease Mother     Stroke Father     Drug abuse Sister     No Known Problems Daughter     No Known Problems Daughter     No Known Problems Son     Diabetes Maternal Uncle     Colon cancer Neg Hx     Crohn's disease Neg Hx     Esophageal cancer Neg Hx     Stomach cancer Neg Hx     Ulcerative colitis Neg Hx      Social History     Tobacco Use    Smoking status: Former Smoker     Packs/day: 0.25     Years: 10.00     Pack years: 2.50     Quit date: 2001     Years since quittin.8    Smokeless tobacco: Never Used   Substance Use Topics    Alcohol use: Yes     Comment: seldom    Drug use: Yes     Frequency: 8.0 times per week     Types: Marijuana     Review of Systems   Constitutional: Negative for chills and fever.   HENT: Negative for sore  throat.    Respiratory: Negative for chest tightness and shortness of breath.    Cardiovascular: Negative for chest pain, palpitations and leg swelling.   Gastrointestinal: Positive for blood in stool. Negative for nausea and vomiting.   Genitourinary: Negative for dysuria.   Musculoskeletal: Negative for back pain and neck pain.   Skin: Negative for rash.   Neurological: Positive for syncope and light-headedness. Negative for weakness and headaches.   Hematological: Does not bruise/bleed easily.   All other systems reviewed and are negative.      Physical Exam     Initial Vitals [05/27/22 1451]   BP Pulse Resp Temp SpO2   (!) 170/78 74 16 98.2 °F (36.8 °C) 98 %      MAP       --         Physical Exam    Nursing note and vitals reviewed.  Constitutional: He appears well-developed and well-nourished. He is not diaphoretic. No distress.   HENT:   Head: Normocephalic and atraumatic.   Mouth/Throat: Oropharynx is clear and moist. No oropharyngeal exudate.   Eyes: Conjunctivae and EOM are normal. Pupils are equal, round, and reactive to light.   Neck: Neck supple. No tracheal deviation present.   Cardiovascular: Normal rate, regular rhythm, normal heart sounds and intact distal pulses.   No murmur heard.  Pulmonary/Chest: Breath sounds normal. No stridor. No respiratory distress. He has no wheezes. He has no rhonchi. He has no rales.   Abdominal: Abdomen is soft. Bowel sounds are normal. He exhibits no distension. There is no abdominal tenderness. There is no rebound and no guarding.   Genitourinary:    Genitourinary Comments: Dark formed stool present.     Musculoskeletal:         General: No tenderness or edema. Normal range of motion.      Cervical back: Neck supple.     Neurological: He is alert and oriented to person, place, and time. He has normal strength. No cranial nerve deficit or sensory deficit. GCS score is 15. GCS eye subscore is 4. GCS verbal subscore is 5. GCS motor subscore is 6.   Skin: Skin is warm and  dry. Capillary refill takes less than 2 seconds. No rash noted. No erythema. No pallor.   Psychiatric: He has a normal mood and affect. His behavior is normal. Thought content normal.         ED Course   Procedures  Labs Reviewed   CBC W/ AUTO DIFFERENTIAL - Abnormal; Notable for the following components:       Result Value    Hemoglobin 13.2 (*)     MCHC 31.6 (*)     RDW 18.0 (*)     Lymph % 16.5 (*)     All other components within normal limits   COMPREHENSIVE METABOLIC PANEL - Abnormal; Notable for the following components:    CO2 22 (*)     eGFR if non  53.8 (*)     All other components within normal limits   PROTIME-INR - Abnormal; Notable for the following components:    PT 14.7 (*)     All other components within normal limits   OCCULT BLOOD X 1, STOOL   MAGNESIUM   TROPONIN I   APTT   LIPASE   SARS-COV-2 RNA AMPLIFICATION, QUAL   TROPONIN I        ECG Results          EKG 12-lead (In process)  Result time 05/27/22 15:59:19    In process by Interface, Lab In [facility] (05/27/22 15:59:19)                 Narrative:    Test Reason : R55,    Vent. Rate : 074 BPM     Atrial Rate : 074 BPM     P-R Int : 330 ms          QRS Dur : 090 ms      QT Int : 446 ms       P-R-T Axes : 068 017 212 degrees     QTc Int : 495 ms    Sinus rhythm with 1st degree A-V block  Possible Left atrial enlargement  LVH with repolarization abnormality  Prolonged QT  Abnormal ECG  When compared with ECG of 22-MAY-2022 18:58,  OR interval has increased    Referred By: [email or web address]   SELF           Confirmed By:                              CT Abdomen Pelvis  Without Contrast   Final Result      X-Ray Chest 1 View   Final Result        Results for orders placed or performed during the hospital encounter of 05/27/22   CBC auto differential   Result Value Ref Range    WBC 8.04 3.90 - 12.70 K/uL    RBC 4.77 4.60 - 6.20 M/uL    Hemoglobin 13.2 (L) 14.0 - 18.0 g/dL    Hematocrit 41.8 40.0 - 54.0 %    MCV 88 82 - 98 fL    MCH 27.7 27.0 -  31.0 pg    MCHC 31.6 (L) 32.0 - 36.0 g/dL    RDW 18.0 (H) 11.5 - 14.5 %    Platelets 407 150 - 450 K/uL    MPV 11.6 9.2 - 12.9 fL    Immature Granulocytes 0.5 0.0 - 0.5 %    Gran # (ANC) 5.4 1.8 - 7.7 K/uL    Immature Grans (Abs) 0.04 0.00 - 0.04 K/uL    Lymph # 1.3 1.0 - 4.8 K/uL    Mono # 1.0 0.3 - 1.0 K/uL    Eos # 0.2 0.0 - 0.5 K/uL    Baso # 0.02 0.00 - 0.20 K/uL    nRBC 0 0 /100 WBC    Gran % 67.7 38.0 - 73.0 %    Lymph % 16.5 (L) 18.0 - 48.0 %    Mono % 12.6 4.0 - 15.0 %    Eosinophil % 2.5 0.0 - 8.0 %    Basophil % 0.2 0.0 - 1.9 %    Differential Method Automated    Comprehensive metabolic panel   Result Value Ref Range    Sodium 137 136 - 145 mmol/L    Potassium 4.4 3.5 - 5.1 mmol/L    Chloride 106 95 - 110 mmol/L    CO2 22 (L) 23 - 29 mmol/L    Glucose 79 70 - 110 mg/dL    BUN 21 8 - 23 mg/dL    Creatinine 1.3 0.5 - 1.4 mg/dL    Calcium 8.8 8.7 - 10.5 mg/dL    Total Protein 7.9 6.0 - 8.4 g/dL    Albumin 3.6 3.5 - 5.2 g/dL    Total Bilirubin 0.4 0.1 - 1.0 mg/dL    Alkaline Phosphatase 65 55 - 135 U/L    AST 16 10 - 40 U/L    ALT 14 10 - 44 U/L    Anion Gap 9 8 - 16 mmol/L    eGFR if African American >60.0 >60 mL/min/1.73 m^2    eGFR if non  53.8 (A) >60 mL/min/1.73 m^2   Occult blood x 1, stool    Specimen: Stool   Result Value Ref Range    Occult Blood Negative Negative   Magnesium   Result Value Ref Range    Magnesium 2.2 1.6 - 2.6 mg/dL   Protime-INR   Result Value Ref Range    PT 14.7 (H) 11.4 - 13.7 sec    INR 1.2    Troponin I   Result Value Ref Range    Troponin I <0.030 <=0.040 ng/mL   APTT   Result Value Ref Range    aPTT 29.7 23.3 - 35.1 sec   Lipase   Result Value Ref Range    Lipase 48 4 - 60 U/L   COVID-19 Rapid Screening   Result Value Ref Range    SARS-CoV-2 RNA, Amplification, Qual Negative Negative   Troponin I   Result Value Ref Range    Troponin I <0.030 <=0.040 ng/mL         Imaging Results          CT Abdomen Pelvis  Without Contrast (Final result)  Result time 05/27/22  16:38:50    Final result by Jonelle Moon MD (05/27/22 16:38:50)                 Narrative:    CMS MANDATED QUALITY DATA - CT RADIATION - 436    All CT scans at this facility utilize dose modulation, iterative reconstruction, and/or weight based dosing when appropriate to reduce radiation dose to as low as reasonably achievable.    HISTORY: Epigastric pain    FINDINGS: Noncontrast axial images were obtained. No IV contrast was given due to contrast shortened.    CT ABDOMEN: There are emphysematous changes within the lung bases. There is a small pericardial effusion.    The liver is normal in size and attenuation. There is no hepatic mass or biliary duct dilatation.  There are splenic granulomas.  The pancreas is unremarkable  Gallbladder and adrenal glands are normal.    Kidneys are symmetric in size without hydronephrosis or calculi.    There are no thick-walled or dilated bowel loops. There is no mesenteric or retroperitoneal adenopathy. The aorta is normal in caliber. The abdominal musculature is normal.    There are moderate degenerative changes at L4-5 and L5-S1 resulting in central canal stenosis and foraminal narrowing.    CT PELVIS: The bladder and prostate gland are normal. There is no pelvic adenopathy. There are degenerative changes of both hips.    IMPRESSION: No acute abdominal or pelvic process    Moderate degenerative disc disease at L4-5 and L5-S1 with mild degenerative changes of both hips    Electronically signed by:  Jonelle Moon MD  5/27/2022 4:38 PM CDT Workstation: [id number]                             X-Ray Chest 1 View (Final result)  Result time 05/27/22 15:33:58    Final result by Raul Dickerson MD (05/27/22 15:33:58)                 Narrative:    HISTORY: Epigastric abdominal pain.    FINDINGS: Portable chest radiograph at 1529 hours compared to prior exams shows the cardiomediastinal silhouette and pulmonary vasculature are within normal limits.    The lungs are normally  expanded, with no consolidation, large pleural effusion, or evidence of pulmonary edema. No confluent infiltrates or pneumothorax. There are no significant osseous abnormalities.    IMPRESSION: No evidence of active cardiopulmonary disease.    Electronically signed by:  Raul Dickerson MD  5/27/2022 3:33 PM CDT Workstation: [id number]                               Medications - No data to display  Medical Decision Making:   ED Management:  This is a well-appearing 76-year-old male presents emergency department with near syncope, epigastric pain and concern for dark stools possible GI bleed.  On examination patient has actually forearm normal stool but is dark.  Does not appear to be tolerate.  It is heme-negative.  He has mild epigastric tenderness on evaluation.  CT scan of the abdomen and pelvis was obtained does not show any acute abnormality.  Patient has labs in the emergency department and his blood count is actually reason by 2 points when compared to several days ago.  He is not tachycardic, he is not orthostatic.  Blood pressure is normal.  He is not hypotensive.  I do not believe he has acute GI bleeding.  His dark stool may be related to his Pepto-Bismol.  When asked about his near syncopal episode patient denied this.  He said he did not pass out and wants this removed from his record.  Patient says that he has follow-up next week with Gastroenterology.  I encouraged him that if his symptoms change or worsen , he should return to the emergency department for further evaluation of symptoms.    A dictation software program was used for this note.  Please expect some simple typographical  errors in this note.                  ED Course as of 05/27/22 2222   Fri May 27, 2022   1558 EKG 3:52 p.m. normal sinus rhythm rate of 74, 1st degree AV block.  Left ventricular hypertrophy, prolonged QT interval.  Diffuse T-wave abnormality noted inferiorly anteriorly laterally.  Abnormal EKG but unchanged when compared to  prior EKG done 1 week ago. [JR]      ED Course User Index  [JR] Juarez Lamb DO             Clinical Impression:   Final diagnoses:  [R55] Near syncope (Primary)  [R10.13] Epigastric abdominal pain  [R19.5] Dark stools          ED Disposition Condition    Discharge Stable        ED Prescriptions     None        Follow-up Information     Follow up With Specialties Details Why Contact Info Additional Information    Sp Lilly MD Family Medicine In 3 days  [address] [address]  [phone]       [facility] - Emergency Dept Emergency Medicine  If symptoms worsen [address] [address]  [phone] 1st floor    Beth Jordan MD Gastroenterology In 3 days  [address] [facility] [address]  [phone]              Juarez Lamb DO  05/27/22 2220

## 2022-05-27 NOTE — DISCHARGE INSTRUCTIONS
RETURN TO EMERGENCY DEPARTMENT WITHOUT FAIL, IF YOUR SYMPTOMS WORSEN, IF YOU GET NEW OR DIFFERENT SYMPTOMS, IF YOU ARE UNABLE TO FOLLOW UP AS DIRECTED, OR IF YOU HAVE ANY CONCERNS OR WORRIES.    Continue taking Protonix.  Follow-up with Gastroenterology on an outpatient basis.

## 2022-05-30 ENCOUNTER — OFFICE VISIT (OUTPATIENT)
Dept: UROLOGY | Facility: CLINIC | Age: 77
End: 2022-05-30
Payer: MEDICARE

## 2022-05-30 VITALS
HEART RATE: 104 BPM | BODY MASS INDEX: 20.92 KG/M2 | DIASTOLIC BLOOD PRESSURE: 80 MMHG | HEIGHT: 68 IN | SYSTOLIC BLOOD PRESSURE: 147 MMHG | WEIGHT: 138 LBS | RESPIRATION RATE: 18 BRPM

## 2022-05-30 DIAGNOSIS — N13.8 BPH WITH OBSTRUCTION/LOWER URINARY TRACT SYMPTOMS: Primary | ICD-10-CM

## 2022-05-30 DIAGNOSIS — C61 PROSTATE CANCER: ICD-10-CM

## 2022-05-30 DIAGNOSIS — N40.1 BPH WITH OBSTRUCTION/LOWER URINARY TRACT SYMPTOMS: Primary | ICD-10-CM

## 2022-05-30 LAB — POC RESIDUAL URINE VOLUME: 0 ML (ref 0–100)

## 2022-05-30 PROCEDURE — 99215 OFFICE O/P EST HI 40 MIN: CPT | Mod: S$GLB,,, | Performed by: UROLOGY

## 2022-05-30 PROCEDURE — 1101F PT FALLS ASSESS-DOCD LE1/YR: CPT | Mod: CPTII,S$GLB,, | Performed by: UROLOGY

## 2022-05-30 PROCEDURE — 3008F PR BODY MASS INDEX (BMI) DOCUMENTED: ICD-10-PCS | Mod: CPTII,S$GLB,, | Performed by: UROLOGY

## 2022-05-30 PROCEDURE — 3079F PR MOST RECENT DIASTOLIC BLOOD PRESSURE 80-89 MM HG: ICD-10-PCS | Mod: CPTII,S$GLB,, | Performed by: UROLOGY

## 2022-05-30 PROCEDURE — 3288F PR FALLS RISK ASSESSMENT DOCUMENTED: ICD-10-PCS | Mod: CPTII,S$GLB,, | Performed by: UROLOGY

## 2022-05-30 PROCEDURE — 1125F AMNT PAIN NOTED PAIN PRSNT: CPT | Mod: CPTII,S$GLB,, | Performed by: UROLOGY

## 2022-05-30 PROCEDURE — 99999 PR PBB SHADOW E&M-EST. PATIENT-LVL V: CPT | Mod: PBBFAC,,, | Performed by: UROLOGY

## 2022-05-30 PROCEDURE — 1160F RVW MEDS BY RX/DR IN RCRD: CPT | Mod: CPTII,S$GLB,, | Performed by: UROLOGY

## 2022-05-30 PROCEDURE — 1159F PR MEDICATION LIST DOCUMENTED IN MEDICAL RECORD: ICD-10-PCS | Mod: CPTII,S$GLB,, | Performed by: UROLOGY

## 2022-05-30 PROCEDURE — 1160F PR REVIEW ALL MEDS BY PRESCRIBER/CLIN PHARMACIST DOCUMENTED: ICD-10-PCS | Mod: CPTII,S$GLB,, | Performed by: UROLOGY

## 2022-05-30 PROCEDURE — 3288F FALL RISK ASSESSMENT DOCD: CPT | Mod: CPTII,S$GLB,, | Performed by: UROLOGY

## 2022-05-30 PROCEDURE — 1101F PR PT FALLS ASSESS DOC 0-1 FALLS W/OUT INJ PAST YR: ICD-10-PCS | Mod: CPTII,S$GLB,, | Performed by: UROLOGY

## 2022-05-30 PROCEDURE — 51798 US URINE CAPACITY MEASURE: CPT | Mod: S$GLB,,, | Performed by: UROLOGY

## 2022-05-30 PROCEDURE — 3077F PR MOST RECENT SYSTOLIC BLOOD PRESSURE >= 140 MM HG: ICD-10-PCS | Mod: CPTII,S$GLB,, | Performed by: UROLOGY

## 2022-05-30 PROCEDURE — 1125F PR PAIN SEVERITY QUANTIFIED, PAIN PRESENT: ICD-10-PCS | Mod: CPTII,S$GLB,, | Performed by: UROLOGY

## 2022-05-30 PROCEDURE — 99215 PR OFFICE/OUTPT VISIT, EST, LEVL V, 40-54 MIN: ICD-10-PCS | Mod: S$GLB,,, | Performed by: UROLOGY

## 2022-05-30 PROCEDURE — 99999 PR PBB SHADOW E&M-EST. PATIENT-LVL V: ICD-10-PCS | Mod: PBBFAC,,, | Performed by: UROLOGY

## 2022-05-30 PROCEDURE — 3008F BODY MASS INDEX DOCD: CPT | Mod: CPTII,S$GLB,, | Performed by: UROLOGY

## 2022-05-30 PROCEDURE — 3079F DIAST BP 80-89 MM HG: CPT | Mod: CPTII,S$GLB,, | Performed by: UROLOGY

## 2022-05-30 PROCEDURE — 3044F HG A1C LEVEL LT 7.0%: CPT | Mod: CPTII,S$GLB,, | Performed by: UROLOGY

## 2022-05-30 PROCEDURE — 99499 UNLISTED E&M SERVICE: CPT | Mod: S$GLB,,, | Performed by: UROLOGY

## 2022-05-30 PROCEDURE — 51798 POCT BLADDER SCAN: ICD-10-PCS | Mod: S$GLB,,, | Performed by: UROLOGY

## 2022-05-30 PROCEDURE — 3044F PR MOST RECENT HEMOGLOBIN A1C LEVEL <7.0%: ICD-10-PCS | Mod: CPTII,S$GLB,, | Performed by: UROLOGY

## 2022-05-30 PROCEDURE — 1159F MED LIST DOCD IN RCRD: CPT | Mod: CPTII,S$GLB,, | Performed by: UROLOGY

## 2022-05-30 PROCEDURE — 99499 RISK ADDL DX/OHS AUDIT: ICD-10-PCS | Mod: S$GLB,,, | Performed by: UROLOGY

## 2022-05-30 PROCEDURE — 3077F SYST BP >= 140 MM HG: CPT | Mod: CPTII,S$GLB,, | Performed by: UROLOGY

## 2022-05-30 RX ORDER — ALFUZOSIN HYDROCHLORIDE 10 MG/1
10 TABLET, EXTENDED RELEASE ORAL
Qty: 30 TABLET | Refills: 11 | Status: ON HOLD | OUTPATIENT
Start: 2022-05-30 | End: 2023-01-22 | Stop reason: HOSPADM

## 2022-05-30 RX ORDER — TEMAZEPAM 30 MG/1
30 CAPSULE ORAL NIGHTLY PRN
Status: ON HOLD | COMMUNITY
Start: 2022-05-26 | End: 2022-06-22 | Stop reason: HOSPADM

## 2022-05-30 NOTE — Clinical Note
Forgot to include booking him a psa lab visit in his plan - can you sset that up in the next 2 weeks please and when you call him to set it up, also remind of need for radon follow up bc never did. I CCed them. Thanks

## 2022-05-30 NOTE — PROGRESS NOTES
[facility] Urology Progress Note     Rajendra Castle is a 76 y.o. male who presents for follow up of prostate cancer and bph/luts component     He was initially seen by YAZ López on April 2018 for bothersome LUTS and was started on Uroxatral which decd NTF 3 to 1x.  Was also on finasteride and noting to have rising psa with low free psa and underwent cystoscopy and prostate biopsy in December 2018 with 20.4 g prostate with significant bilateral lateral lobe hypertrophy with severe obstruction with central near kissing lobes, no median lobe, mild early trabeculations, normal bladder  PATHOLOGY: 4 cores L sided G3+4=7 prostate cancer: LB lat 25% (10% pattern 4); LB med 5% (20% pattern 4); LM med <5%, LA lat 10%  He only periodically had erections, had CKD III, Hyperparathyroid, RA (on MTX), hypercalcemia, emphysema. LUTS largely unchanged on uroxatral AUA SS 12/4.   Initially considered surgery then elected XRT. Discussed urolift as fiducial markers and for LUTS resolution though deferred to LUTS management after treatment    Advised to DC methotrexate during XRT, but initially and stated that his prostate cancer was under control from raghav intervention by televangelist.  He later returned 3/19/19 noting compliance with sensipar for his hyperCa, and elected to proceed with ADT/XRT, noting improvement on uroxatral.  Lupron 45mg administered. He did decline fiducial markers/spaceoar. He chose to resume/continue methotrexate and did complete radiotherapy to 7740 cGy ending August 2019.    Continued Q6 mos ADT x total 2 yrs (last oct 2020, at which time  AUA symptom score:  12/5, unhappy (5:  Nocturia; for:  Emptying; 3:  Frequency) and PVR 0cc)   At that time He felt very ill and noted discharge from hospital day prior, couldnt eat and lost 30-40 lb losing weight weak  Ultimately found to be related to his hyperparathyroidism and underwent parathyroidectomy     I last saw him April 2021 noting PSA 3/29/21 is 0.04, trending  down from 0.07, down from 0.3  In interim had parathyroidectomy.  Was having abdominal pain and indigestion and saw the GI nurse practitioner in 21.  AUA symptom score 7-8/6 (2:  Urgency; 1:  Frequency, intermittency, weak stream; 2-3:  Nocturia). DTF only 2-3 normal. Cuts off fluid 2 hr before bed.    Does drink water and chocolate Ensure in the afternoon and evening hours. Cardiology:  Dr. Guzman.    Cysto planned 21 for re-evaluation of obstruction rule out any radiation cystitis component before proceeding with intervention obstructing prostate  He followed up with PCP, Dr. Aceves, few weeks prior and asked to message us to cancel his cystoscopy  PSA 0.11 in 2021 treated to slight rebound since discontinuing ADT, and did not follow-up with radiation oncology despite multiple calls from their department  21: AUA SS : 16/4 ( ICBE:2; Frequency:3; Intermittency:1; Urgency:2; Weak urine stream:2; Strainin; Nocturia:4) PVR 0cc. Uroxatral renewed. Again expressed interest in urolift  Booked for cystoscopy 22 but canceled due to covid. PSA 0.14 in 2022    ER visit 22 for SOB and covid concerns, Ab infusion, ER 3/7/22 weak/dizzy with near syncope and bradycardia and admitted on tele for 24 hours observed  Admitted -2422 for abdominal pain nausea vomiting, followed by ER visit 22 for abdominal pain and tarry stool determined to be from Pepto-Bismol    He returns today noting  Has not been right since his parathyroid hormone surgery  Feels tired all the time like he has chronic fatigue.  Concern for long COVID.  ER visits as above for dizziness and chest pain noting that they checked his heart and lungs, and his dark stools concerning for Pepto-Bismol, but still has concern for too much acid in his stomach and vomits up acid has been treated for reflux and his GI evaluation pending on 22.  His pain and acid issues only better with Pepto-Bismol.  Still bothered by  "his urinary symptoms.  Thinks he has been off of medication for a while, and just ordered some Super Beta prostate.  Not sure if he is on alfuzosin or not but does not think so and asked for refill  He drinks milk, grape juice, and sugar.  Only has a glass of water in the morning.        Focused Physical Exam:    Vitals:    05/30/22 1101   BP: (!) 147/80   Pulse: 104   Resp: 18     Body mass index is 20.98 kg/m². Weight: 62.6 kg (138 lb) Height: 5' 8" (172.7 cm)     Abdomen: Soft, non-tender, nondistended, no CVA tenderness  General:  Thin, frail, cachectic      Recent Results (from the past 336 hour(s))   COVID-19 Rapid Screening    Collection Time: 05/22/22 12:07 PM   Result Value Ref Range    SARS-CoV-2 RNA, Amplification, Qual Negative Negative   Influenza A & B by Molecular    Collection Time: 05/22/22 12:07 PM    Specimen: Nasopharyngeal Swab   Result Value Ref Range    Influenza A, Molecular Negative Negative    Influenza B, Molecular Negative Negative    Flu A & B Source Nasal swab    CBC auto differential    Collection Time: 05/22/22 12:20 PM   Result Value Ref Range    WBC 8.99 3.90 - 12.70 K/uL    RBC 4.78 4.60 - 6.20 M/uL    Hemoglobin 13.4 (L) 14.0 - 18.0 g/dL    Hematocrit 41.3 40.0 - 54.0 %    MCV 86 82 - 98 fL    MCH 28.0 27.0 - 31.0 pg    MCHC 32.4 32.0 - 36.0 g/dL    RDW 17.6 (H) 11.5 - 14.5 %    Platelets 359 150 - 450 K/uL    MPV 11.3 9.2 - 12.9 fL    Immature Granulocytes 0.3 0.0 - 0.5 %    Gran # (ANC) 7.6 1.8 - 7.7 K/uL    Immature Grans (Abs) 0.03 0.00 - 0.04 K/uL    Lymph # 0.7 (L) 1.0 - 4.8 K/uL    Mono # 0.7 0.3 - 1.0 K/uL    Eos # 0.0 0.0 - 0.5 K/uL    Baso # 0.01 0.00 - 0.20 K/uL    nRBC 0 0 /100 WBC    Gran % 84.2 (H) 38.0 - 73.0 %    Lymph % 7.3 (L) 18.0 - 48.0 %    Mono % 7.8 4.0 - 15.0 %    Eosinophil % 0.3 0.0 - 8.0 %    Basophil % 0.1 0.0 - 1.9 %    Differential Method Automated    Comprehensive metabolic panel    Collection Time: 05/22/22 12:20 PM   Result Value Ref Range    " Sodium 140 136 - 145 mmol/L    Potassium 4.8 3.5 - 5.1 mmol/L    Chloride 109 95 - 110 mmol/L    CO2 19 (L) 23 - 29 mmol/L    Glucose 111 (H) 70 - 110 mg/dL    BUN 23 8 - 23 mg/dL    Creatinine 1.2 0.5 - 1.4 mg/dL    Calcium 9.4 8.7 - 10.5 mg/dL    Total Protein 8.1 6.0 - 8.4 g/dL    Albumin 3.3 (L) 3.5 - 5.2 g/dL    Total Bilirubin 0.7 0.1 - 1.0 mg/dL    Alkaline Phosphatase 74 55 - 135 U/L    AST 25 10 - 40 U/L    ALT 17 10 - 44 U/L    Anion Gap 12 8 - 16 mmol/L    eGFR if African American >60 >60 mL/min/1.73 m^2    eGFR if non African American 59 (A) >60 mL/min/1.73 m^2   Troponin I #1    Collection Time: 05/22/22 12:20 PM   Result Value Ref Range    Troponin I 0.025 0.000 - 0.026 ng/mL   BNP    Collection Time: 05/22/22 12:20 PM   Result Value Ref Range     (H) 0 - 99 pg/mL   TSH    Collection Time: 05/22/22 12:20 PM   Result Value Ref Range    TSH 0.739 0.400 - 4.000 uIU/mL   Lipase    Collection Time: 05/22/22 12:20 PM   Result Value Ref Range    Lipase 16 4 - 60 U/L   Hemoglobin A1C    Collection Time: 05/22/22 12:20 PM   Result Value Ref Range    Hemoglobin A1C 5.6 4.0 - 5.6 %    Estimated Avg Glucose 114 68 - 131 mg/dL   Troponin I    Collection Time: 05/22/22  5:55 PM   Result Value Ref Range    Troponin I 0.025 0.000 - 0.026 ng/mL   Troponin I    Collection Time: 05/22/22 11:16 PM   Result Value Ref Range    Troponin I 0.041 (H) 0.000 - 0.026 ng/mL   Urinalysis, Reflex to Urine Culture Urine, Clean Catch    Collection Time: 05/23/22  3:45 AM    Specimen: Urine   Result Value Ref Range    Specimen UA Urine, Clean Catch     Color, UA Yellow Yellow, Straw, Noni    Appearance, UA Clear Clear    pH, UA 6.0 5.0 - 8.0    Specific Gravity, UA 1.015 1.005 - 1.030    Protein, UA Negative Negative    Glucose, UA Negative Negative    Ketones, UA Negative Negative    Bilirubin (UA) Negative Negative    Occult Blood UA Negative Negative    Nitrite, UA Negative Negative    Urobilinogen, UA Negative <2.0 EU/dL     Leukocytes, UA Negative Negative   Basic Metabolic Panel (BMP)    Collection Time: 05/23/22  4:45 AM   Result Value Ref Range    Sodium 140 136 - 145 mmol/L    Potassium 4.3 3.5 - 5.1 mmol/L    Chloride 107 95 - 110 mmol/L    CO2 23 23 - 29 mmol/L    Glucose 93 70 - 110 mg/dL    BUN 19 8 - 23 mg/dL    Creatinine 1.3 0.5 - 1.4 mg/dL    Calcium 8.7 8.7 - 10.5 mg/dL    Anion Gap 10 8 - 16 mmol/L    eGFR if African American >60 >60 mL/min/1.73 m^2    eGFR if non African American 54 (A) >60 mL/min/1.73 m^2   Magnesium    Collection Time: 05/23/22  4:45 AM   Result Value Ref Range    Magnesium 2.1 1.6 - 2.6 mg/dL   Phosphorus    Collection Time: 05/23/22  4:45 AM   Result Value Ref Range    Phosphorus 3.3 2.7 - 4.5 mg/dL   CBC with Automated Differential    Collection Time: 05/23/22  4:45 AM   Result Value Ref Range    WBC 8.66 3.90 - 12.70 K/uL    RBC 4.26 (L) 4.60 - 6.20 M/uL    Hemoglobin 11.8 (L) 14.0 - 18.0 g/dL    Hematocrit 37.0 (L) 40.0 - 54.0 %    MCV 87 82 - 98 fL    MCH 27.7 27.0 - 31.0 pg    MCHC 31.9 (L) 32.0 - 36.0 g/dL    RDW 17.4 (H) 11.5 - 14.5 %    Platelets 347 150 - 450 K/uL    MPV 11.4 9.2 - 12.9 fL    Immature Granulocytes 0.3 0.0 - 0.5 %    Gran # (ANC) 6.0 1.8 - 7.7 K/uL    Immature Grans (Abs) 0.03 0.00 - 0.04 K/uL    Lymph # 1.5 1.0 - 4.8 K/uL    Mono # 1.1 (H) 0.3 - 1.0 K/uL    Eos # 0.0 0.0 - 0.5 K/uL    Baso # 0.01 0.00 - 0.20 K/uL    nRBC 0 0 /100 WBC    Gran % 69.2 38.0 - 73.0 %    Lymph % 17.8 (L) 18.0 - 48.0 %    Mono % 12.6 4.0 - 15.0 %    Eosinophil % 0.0 0.0 - 8.0 %    Basophil % 0.1 0.0 - 1.9 %    Differential Method Automated    Troponin I    Collection Time: 05/23/22  7:59 AM   Result Value Ref Range    Troponin I 0.044 (H) 0.000 - 0.026 ng/mL   Nuclear Stress Test    Collection Time: 05/23/22  9:31 AM   Result Value Ref Range    85% Max Predicted      Max Predicted      [facility] CV CPX PATIENT IS MALE 1.0     [facility] CV CPX PATIENT IS FEMALE 0.0     HR at rest 76 bpm    Systolic  blood pressure 168 mmHg    Diastolic blood pressure 67 mmHg    RPP 12,768     Peak HR 98 bpm    Peak Systolic  mmHg    Peak Diatolic BP 72 mmHg    Peak RPP 17,738     % Max HR Achieved 67     dose 0.4 mg   Occult blood x 1, stool    Collection Time: 05/27/22  1:00 AM    Specimen: Stool   Result Value Ref Range    Occult Blood Negative Negative   CBC auto differential    Collection Time: 05/27/22  3:40 PM   Result Value Ref Range    WBC 8.04 3.90 - 12.70 K/uL    RBC 4.77 4.60 - 6.20 M/uL    Hemoglobin 13.2 (L) 14.0 - 18.0 g/dL    Hematocrit 41.8 40.0 - 54.0 %    MCV 88 82 - 98 fL    MCH 27.7 27.0 - 31.0 pg    MCHC 31.6 (L) 32.0 - 36.0 g/dL    RDW 18.0 (H) 11.5 - 14.5 %    Platelets 407 150 - 450 K/uL    MPV 11.6 9.2 - 12.9 fL    Immature Granulocytes 0.5 0.0 - 0.5 %    Gran # (ANC) 5.4 1.8 - 7.7 K/uL    Immature Grans (Abs) 0.04 0.00 - 0.04 K/uL    Lymph # 1.3 1.0 - 4.8 K/uL    Mono # 1.0 0.3 - 1.0 K/uL    Eos # 0.2 0.0 - 0.5 K/uL    Baso # 0.02 0.00 - 0.20 K/uL    nRBC 0 0 /100 WBC    Gran % 67.7 38.0 - 73.0 %    Lymph % 16.5 (L) 18.0 - 48.0 %    Mono % 12.6 4.0 - 15.0 %    Eosinophil % 2.5 0.0 - 8.0 %    Basophil % 0.2 0.0 - 1.9 %    Differential Method Automated    Comprehensive metabolic panel    Collection Time: 05/27/22  3:40 PM   Result Value Ref Range    Sodium 137 136 - 145 mmol/L    Potassium 4.4 3.5 - 5.1 mmol/L    Chloride 106 95 - 110 mmol/L    CO2 22 (L) 23 - 29 mmol/L    Glucose 79 70 - 110 mg/dL    BUN 21 8 - 23 mg/dL    Creatinine 1.3 0.5 - 1.4 mg/dL    Calcium 8.8 8.7 - 10.5 mg/dL    Total Protein 7.9 6.0 - 8.4 g/dL    Albumin 3.6 3.5 - 5.2 g/dL    Total Bilirubin 0.4 0.1 - 1.0 mg/dL    Alkaline Phosphatase 65 55 - 135 U/L    AST 16 10 - 40 U/L    ALT 14 10 - 44 U/L    Anion Gap 9 8 - 16 mmol/L    eGFR if African American >60.0 >60 mL/min/1.73 m^2    eGFR if non  53.8 (A) >60 mL/min/1.73 m^2   Magnesium    Collection Time: 05/27/22  3:40 PM   Result Value Ref Range    Magnesium  2.2 1.6 - 2.6 mg/dL   Protime-INR    Collection Time: 05/27/22  3:40 PM   Result Value Ref Range    PT 14.7 (H) 11.4 - 13.7 sec    INR 1.2    Troponin I    Collection Time: 05/27/22  3:40 PM   Result Value Ref Range    Troponin I <0.030 <=0.040 ng/mL   APTT    Collection Time: 05/27/22  3:40 PM   Result Value Ref Range    aPTT 29.7 23.3 - 35.1 sec   Lipase    Collection Time: 05/27/22  3:40 PM   Result Value Ref Range    Lipase 48 4 - 60 U/L   COVID-19 Rapid Screening    Collection Time: 05/27/22  4:44 PM   Result Value Ref Range    SARS-CoV-2 RNA, Amplification, Qual Negative Negative   Troponin I    Collection Time: 05/27/22  5:13 PM   Result Value Ref Range    Troponin I <0.030 <=0.040 ng/mL   POCT Bladder Scan    Collection Time: 05/30/22 11:12 AM   Result Value Ref Range    POC Residual Urine Volume 0 0 - 100 mL       ASSESSMENT   1. BPH with obstruction/lower urinary tract symptoms  POCT Bladder Scan    Case Request Operating Room: CYSTOSCOPY, ULTRASOUND, RECTAL APPROACH    Place in Outpatient   2. Prostate cancer  Prostate Specific Antigen, Diagnostic       Plan    Again extensively reviewed his history of BPH/LUTS component, present prior to and after treatment for prostate cancer with ADT and external beam radiation.  In regards to his prostate cancer, he did have good response to treatment and completed 2 years of androgen deprivation therapy, in the absence of which his PSA had demonstrated slight interval increases, and is due for PSA.  He never followed up with Radiation Oncology, despite multiple telephone calls from their office.  Advise that he do so, after next PSA.    In regards to his bothersome LUTS, has intermittently noted his interest in minimally invasive BPH interventions such as UroLift, and again reviewed the utility post radiotherapy for prostate obstruction, however as his LEs lower tract evaluation was years ago advise of need to repeat lower tract evaluation with cystoscopy at minimum,  and now given interval of time passage recommended transrectal ultrasound, to evaluate for candid to see and help guide further interventions for recommendation, as well as rule out any contributing radiation cystitis component.    This has been delayed multiple times due to both chronic health issues, and his cancellations, and most recently due to his COVID, of which he feels that he has long COVID.  He was very ill prior to parathyroidectomy, and my last point of evaluation was doing well.  Today he looks unwell, and has had multiple hospital admissions and ER visits since COVID in February 2022 and has pending GI evaluations, primary care evaluations etc..  Advised of the need to resolve underlying medical issues and will need to be cleared for general anesthesia depending on findings of lower tract evaluation.  Right now, his biggest complaint are his GI complaints and he will have GI follow-up on 6/9/22 as scheduled.    Cystoscopy and transrectal ultrasound rescheduled on 7/12/22.    Will be scheduled for PSA prior, and will cc Radiation Oncology to plan follow-up after PSA.  Reviewed conservative recommendations for urgency frequency in the interim, as well as restarted alpha blocker, Uroxatral.  Advised to limit great juice given the irritating nature of sugary juices, and significantly increased water intake.    He did transition primary care from Dr. Aceves to Dr. Lilly and has only seen PA, with PCP visit to establish care in September.  Given his multiple chronic health issues and recent ER visits and hospitalizations, and possible need for surgical intervention for his lower urinary tract, I will cc primary care as well to consider earlier appointment    Total time spent in/on encounter today, including face to face time with patient, counseling, medical record review, interpretation of tests/results, , and treatment plan coordination: 50 minutes

## 2022-05-30 NOTE — PATIENT INSTRUCTIONS
Restart uroxatral (alfuzosin) daily  Increase water intake and decrease grape juice  Scope and prostate ultrasound 7/12/22 at [facility]    Discussed conservative measures to control urgency and frequency including   1. Avoiding/minimizing bladder irritants (see below), especially in afternoon and evening hours    Discussed bladder irritants include coffe (even decaf), tea, alcohol, soda, spicy foods, acidic juices (orange, tomato), vinegar, and artificial sweeteners/sugary beverages.    2. timed voiding - empty on a schedule (approx ~2-3 hours) in spite of need to urinate, to get ahead of urge    3. dont postponing voiding - dont hold it on purpose     4. bowel regimen as distended bowel has extrinsic compressive effect on bladder.   - any or all of the following in any combination, titrate to soft daily bowel movement without pushing or straining  - colace/stool softener capsule - once to twice daily  - miralax - 1 capful daily to start, can increase to 2x daily (or decrease to 1/2 cap daily)  - increase dietary fibery  - fiber supplements, such as metamucil  - prunes, prune juice    5. INCREASE water intake    6. Stop fluids 2 hours before bed, and urinate just before bed

## 2022-05-31 ENCOUNTER — PATIENT MESSAGE (OUTPATIENT)
Dept: ADMINISTRATIVE | Facility: HOSPITAL | Age: 77
End: 2022-05-31
Payer: MEDICARE

## 2022-06-01 ENCOUNTER — TELEPHONE (OUTPATIENT)
Dept: FAMILY MEDICINE | Facility: CLINIC | Age: 77
End: 2022-06-01
Payer: MEDICARE

## 2022-06-01 NOTE — TELEPHONE ENCOUNTER
Called and spoke with pt. Pt states he had covid a few months ago. Since having covid pt has had extreme fatigue, dizziness, SOB and flu like symptoms that wont go away. Scheduled pt to see one of our providers 6/2/22.

## 2022-06-01 NOTE — TELEPHONE ENCOUNTER
"----- Message from Brendan Sandhu sent at 6/1/2022 12:08 PM CDT -----  Contact: Self  Type: Needs Medical Advice  Who Called:  Patient  Best Call Back Number: [phone]   Additional Information: Called to speak with office regarding lingering COVID symptoms. Has questions regarding "covid-lung" or "lung-covid" mentioned to him        "

## 2022-06-01 NOTE — TELEPHONE ENCOUNTER
----- Message from Minnie Rodríguez sent at 6/1/2022 12:54 PM CDT -----  Contact: patient  Type:  Patient Returning Call    Who Called:  patient  Who Left Message for Patient:  Angely  Does the patient know what this is regarding?:    Best Call Back Number:  [phone] (home)   Additional Information:

## 2022-06-02 ENCOUNTER — HOSPITAL ENCOUNTER (EMERGENCY)
Facility: HOSPITAL | Age: 77
Discharge: HOME | End: 2022-06-02
Attending: EMERGENCY MEDICINE
Payer: MEDICARE

## 2022-06-02 VITALS
SYSTOLIC BLOOD PRESSURE: 153 MMHG | HEART RATE: 80 BPM | TEMPERATURE: 98 F | DIASTOLIC BLOOD PRESSURE: 75 MMHG | OXYGEN SATURATION: 98 % | WEIGHT: 138 LBS | BODY MASS INDEX: 20.98 KG/M2 | RESPIRATION RATE: 18 BRPM

## 2022-06-02 DIAGNOSIS — E86.0 DEHYDRATION: ICD-10-CM

## 2022-06-02 DIAGNOSIS — R07.9 CHEST PAIN: ICD-10-CM

## 2022-06-02 DIAGNOSIS — U09.9 LONG COVID: Primary | ICD-10-CM

## 2022-06-02 LAB
ALBUMIN SERPL BCP-MCNC: 3.4 G/DL (ref 3.5–5.2)
ALP SERPL-CCNC: 55 U/L (ref 55–135)
ALT SERPL W/O P-5'-P-CCNC: 18 U/L (ref 10–44)
ANION GAP SERPL CALC-SCNC: 11 MMOL/L (ref 8–16)
AST SERPL-CCNC: 20 U/L (ref 10–40)
BASOPHILS # BLD AUTO: 0.02 K/UL (ref 0–0.2)
BASOPHILS NFR BLD: 0.3 % (ref 0–1.9)
BILIRUB SERPL-MCNC: 0.5 MG/DL (ref 0.1–1)
BNP SERPL-MCNC: 286 PG/ML (ref 0–99)
BNP SERPL-MCNC: 286 PG/ML (ref 0–99)
BUN SERPL-MCNC: 30 MG/DL (ref 8–23)
CALCIUM SERPL-MCNC: 8.9 MG/DL (ref 8.7–10.5)
CHLORIDE SERPL-SCNC: 109 MMOL/L (ref 95–110)
CO2 SERPL-SCNC: 18 MMOL/L (ref 23–29)
CREAT SERPL-MCNC: 1.4 MG/DL (ref 0.5–1.4)
DIFFERENTIAL METHOD: ABNORMAL
EOSINOPHIL # BLD AUTO: 0.1 K/UL (ref 0–0.5)
EOSINOPHIL NFR BLD: 2 % (ref 0–8)
ERYTHROCYTE [DISTWIDTH] IN BLOOD BY AUTOMATED COUNT: 17.5 % (ref 11.5–14.5)
EST. GFR  (AFRICAN AMERICAN): 56.8 ML/MIN/1.73 M^2
EST. GFR  (NON AFRICAN AMERICAN): 49.1 ML/MIN/1.73 M^2
GLUCOSE SERPL-MCNC: 178 MG/DL (ref 70–110)
HCT VFR BLD AUTO: 42.4 % (ref 40–54)
HGB BLD-MCNC: 13.8 G/DL (ref 14–18)
IMM GRANULOCYTES # BLD AUTO: 0.02 K/UL (ref 0–0.04)
IMM GRANULOCYTES NFR BLD AUTO: 0.3 % (ref 0–0.5)
INR PPP: 1.2
LYMPHOCYTES # BLD AUTO: 0.9 K/UL (ref 1–4.8)
LYMPHOCYTES NFR BLD: 15.1 % (ref 18–48)
MCH RBC QN AUTO: 27.9 PG (ref 27–31)
MCHC RBC AUTO-ENTMCNC: 32.5 G/DL (ref 32–36)
MCV RBC AUTO: 86 FL (ref 82–98)
MONOCYTES # BLD AUTO: 0.4 K/UL (ref 0.3–1)
MONOCYTES NFR BLD: 6.5 % (ref 4–15)
NEUTROPHILS # BLD AUTO: 4.6 K/UL (ref 1.8–7.7)
NEUTROPHILS NFR BLD: 75.8 % (ref 38–73)
NRBC BLD-RTO: 0 /100 WBC
PLATELET # BLD AUTO: 371 K/UL (ref 150–450)
PMV BLD AUTO: 10.9 FL (ref 9.2–12.9)
POTASSIUM SERPL-SCNC: 4.6 MMOL/L (ref 3.5–5.1)
PROT SERPL-MCNC: 7.6 G/DL (ref 6–8.4)
PROTHROMBIN TIME: 14.3 SEC (ref 11.4–13.7)
RBC # BLD AUTO: 4.95 M/UL (ref 4.6–6.2)
SODIUM SERPL-SCNC: 138 MMOL/L (ref 136–145)
TROPONIN I SERPL DL<=0.01 NG/ML-MCNC: 0.04 NG/ML
WBC # BLD AUTO: 6.02 K/UL (ref 3.9–12.7)

## 2022-06-02 PROCEDURE — 84484 ASSAY OF TROPONIN QUANT: CPT | Performed by: NURSE PRACTITIONER

## 2022-06-02 PROCEDURE — 93005 ELECTROCARDIOGRAM TRACING: CPT | Performed by: INTERNAL MEDICINE

## 2022-06-02 PROCEDURE — 93010 EKG 12-LEAD: ICD-10-PCS | Mod: ,,, | Performed by: INTERNAL MEDICINE

## 2022-06-02 PROCEDURE — 93010 ELECTROCARDIOGRAM REPORT: CPT | Mod: ,,, | Performed by: INTERNAL MEDICINE

## 2022-06-02 PROCEDURE — 96360 HYDRATION IV INFUSION INIT: CPT

## 2022-06-02 PROCEDURE — 85610 PROTHROMBIN TIME: CPT | Performed by: NURSE PRACTITIONER

## 2022-06-02 PROCEDURE — 80053 COMPREHEN METABOLIC PANEL: CPT | Performed by: NURSE PRACTITIONER

## 2022-06-02 PROCEDURE — 85025 COMPLETE CBC W/AUTO DIFF WBC: CPT | Performed by: NURSE PRACTITIONER

## 2022-06-02 PROCEDURE — 99284 EMERGENCY DEPT VISIT MOD MDM: CPT | Mod: 25

## 2022-06-02 PROCEDURE — 25000003 PHARM REV CODE 250: Performed by: EMERGENCY MEDICINE

## 2022-06-02 PROCEDURE — 83880 ASSAY OF NATRIURETIC PEPTIDE: CPT | Performed by: NURSE PRACTITIONER

## 2022-06-02 RX ORDER — ASPIRIN 325 MG
325 TABLET ORAL
Status: COMPLETED | OUTPATIENT
Start: 2022-06-02 | End: 2022-06-02

## 2022-06-02 RX ADMIN — ASPIRIN 325 MG ORAL TABLET 325 MG: 325 PILL ORAL at 12:06

## 2022-06-02 RX ADMIN — SODIUM CHLORIDE 1000 ML: 0.9 INJECTION, SOLUTION INTRAVENOUS at 01:06

## 2022-06-02 NOTE — ED TRIAGE NOTES
Pt states he has no acute change in his breathing or energy.  Pt said he thought he was supposed to get better after covid and feels like he has no energy and cannot catch his breath.  Pt said he has to stop chewing his food because he fatigues.

## 2022-06-02 NOTE — ED PROVIDER NOTES
Encounter Date: 6/2/2022       History     Chief Complaint   Patient presents with    Shortness of Breath    Fatigue       76-year-old male with past medical history of hypertension, rheumatoid arthritis, DDD, presents to the ER due to several months of shortness of breath, fatigue, dizziness, decreased energy level, generalized body aches, insomnia.  he reports he had COVID in February and he has never felt back to normal.  He has been seen in several emergency departments for similar complaints just recently was seen for near-syncope and dark stool.  Patient taking Pepto-Bismol.  He reports no dark stools at this time no blood in stool.  No nausea or vomiting and no abdominal pain.    He is been recently told he may have GERD and  was placed on Protonix.He denies any chest pain but reports he has been still having a mild cough.  He feels that because of the insomnia and nocturia he is not sleeping well and this may be contributing to his daytime fatigue.  He has been taking meclizine and melatonin without improvement.                  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    Prostate CA     RA (rheumatoid arthritis)     Vitamin D insufficiency 9/30/2016     Past Surgical History:   Procedure Laterality Date    CARPAL TUNNEL RELEASE Right 5/28/2021    Procedure: RELEASE, CARPAL TUNNEL;  Surgeon: Jose Ryan II, MD;  Location: [facility] OR;  Service: Orthopedics;  Laterality: Right;    COLONOSCOPY  prior to 2016    normal findings per patient report    CYSTOSCOPY N/A 12/18/2018    Procedure: CYSTOSCOPY;  Surgeon: Garrett Pina MD;  Location: [facility] OR;  Service: Urology;  Laterality: N/A;    ESOPHAGOGASTRODUODENOSCOPY N/A 9/29/2020    Dr. Espinosa; empiric dilation;  erythematous mucosa in antrum; gastric mucosal atrophy; hematin in entire stomach; biopsy: mid & distal esophagus WNL, stomach- WNL, negative for h pylori    PARATHYROIDECTOMY Right 10/27/2020    Procedure: PARATHYROIDECTOMY;  Surgeon: Latonia Clayton MD;  Location: [facility] OR [address] [place] [address];  Service: ENT;  Laterality: Right;    RELEASE OF ULNAR NERVE AT CUBITAL TUNNEL Right 2021    Procedure: RELEASE, ULNAR TUNNEL;  Surgeon: Jose Ryan II, MD;  Location: [facility] OR;  Service: Orthopedics;  Laterality: Right;    TRANSRECTAL BIOPSY OF PROSTATE WITH ULTRASOUND GUIDANCE N/A 2018    Procedure: BIOPSY, PROSTATE, RECTAL APPROACH, WITH US GUIDANCE;  Surgeon: Garrett Pina MD;  Location: [facility] OR;  Service: Urology;  Laterality: N/A;     Family History   Problem Relation Age of Onset    Heart disease Mother     Stroke Father     Drug abuse Sister     No Known Problems Daughter     No Known Problems Daughter     No Known Problems Son     Diabetes Maternal Uncle     Colon cancer Neg Hx     Crohn's disease Neg Hx     Esophageal cancer Neg Hx     Stomach cancer Neg Hx     Ulcerative colitis Neg Hx      Social History     Tobacco Use    Smoking status: Former Smoker     Packs/day: 0.25     Years: 10.00     Pack years: 2.50     Quit date: 2001     Years since quittin.8    Smokeless tobacco: Never Used   Substance Use Topics    Alcohol use: Yes     Comment: seldom    Drug use: Yes     Frequency: 8.0 times per week     Types: Marijuana     Review of Systems   Constitutional: Positive for activity change and fatigue. Negative for appetite change, chills, diaphoresis and fever.   HENT: Negative for congestion, postnasal drip and rhinorrhea.    Respiratory: Positive for cough and shortness of breath. Negative for chest tightness and wheezing.    Cardiovascular: Negative for chest pain and palpitations.   Gastrointestinal: Negative for abdominal pain, constipation, diarrhea, nausea and  vomiting.   Genitourinary: Negative for dysuria, frequency and urgency.   Musculoskeletal: Positive for myalgias. Negative for neck pain and neck stiffness.   Neurological: Positive for dizziness and weakness. Negative for syncope, light-headedness, numbness and headaches.   Psychiatric/Behavioral: Positive for sleep disturbance.   All other systems reviewed and are negative.      Physical Exam     Initial Vitals [06/02/22 1133]   BP Pulse Resp Temp SpO2   (!) 140/79 91 (!) 32 98.3 °F (36.8 °C) 97 %      MAP       --         Physical Exam    Nursing note and vitals reviewed.  Constitutional: He appears well-developed and well-nourished. He is not diaphoretic. No distress.   HENT:   Head: Normocephalic and atraumatic.   Right Ear: External ear normal.   Left Ear: External ear normal.   Nose: Nose normal.   Mouth/Throat: Oropharynx is clear and moist.   Eyes: Conjunctivae and EOM are normal. Pupils are equal, round, and reactive to light.   Neck: Neck supple. No tracheal deviation present.   Normal range of motion.  Cardiovascular: Normal rate, regular rhythm, normal heart sounds and intact distal pulses. Exam reveals no gallop and no friction rub.    No murmur heard.  Blood pressure 140/79   Pulmonary/Chest: Breath sounds normal. No stridor. No respiratory distress. He has no wheezes. He has no rhonchi. He has no rales. He exhibits no tenderness.   Respirations 32 sats 97% on room air   Abdominal: Abdomen is soft. Bowel sounds are normal. He exhibits no distension and no mass. There is no abdominal tenderness. There is no rebound and no guarding.   Musculoskeletal:         General: No edema. Normal range of motion.      Cervical back: Normal range of motion and neck supple.     Neurological: He is alert and oriented to person, place, and time. He has normal strength. No cranial nerve deficit or sensory deficit.   Skin: Skin is warm and dry. No rash noted. No erythema. No pallor.   Psychiatric: He has a normal mood  and affect. His behavior is normal. Judgment and thought content normal.         ED Course   Procedures  Labs Reviewed   B-TYPE NATRIURETIC PEPTIDE - Abnormal; Notable for the following components:       Result Value     (*)     All other components within normal limits   PROTIME-INR - Abnormal; Notable for the following components:    PT 14.3 (*)     All other components within normal limits   COMPREHENSIVE METABOLIC PANEL - Abnormal; Notable for the following components:    CO2 18 (*)     Glucose 178 (*)     BUN 30 (*)     Albumin 3.4 (*)     eGFR if  56.8 (*)     eGFR if non  49.1 (*)     All other components within normal limits   CBC W/ AUTO DIFFERENTIAL - Abnormal; Notable for the following components:    Hemoglobin 13.8 (*)     RDW 17.5 (*)     Lymph # 0.9 (*)     Gran % 75.8 (*)     Lymph % 15.1 (*)     All other components within normal limits   B-TYPE NATRIURETIC PEPTIDE - Abnormal; Notable for the following components:     (*)     All other components within normal limits   TROPONIN I     EKG Readings: (Independently Interpreted)   Initial Reading: No STEMI. Rhythm: Normal Sinus Rhythm. Heart Rate: 88. Conduction: 1st Degree AV Block. Other Findings: Prolonged QT Interval.   No change from prior on 5/27/22    LVH    Patient hands deep T-wave inversions throughout that is unchanged from multiple prior EKGs     ECG Results          EKG 12-lead (In process)  Result time 06/02/22 12:11:32    In process by Interface, Lab In [facility] (06/02/22 12:11:32)                 Narrative:    Test Reason : R07.9,    Vent. Rate : 088 BPM     Atrial Rate : 088 BPM     P-R Int : 320 ms          QRS Dur : 084 ms      QT Int : 408 ms       P-R-T Axes : 056 033 197 degrees     QTc Int : 493 ms    Sinus rhythm with 1st degree A-V block  Biatrial enlargement  LVH with repolarization abnormality  Prolonged QT  Abnormal ECG  When compared with ECG of 27-MAY-2022 15:52,  No significant  change was found    Referred By: [email or web address]   SELF           Confirmed By:                             Imaging Results          X-Ray Chest AP Portable (Final result)  Result time 06/02/22 12:06:40    Final result by Jarad López MD (06/02/22 12:06:40)                 Narrative:    Reason: fatigue    FINDINGS:    PA and lateral chest with comparison chest x-ray May 27, 2022 show normal cardiomediastinal silhouette.  Lungs are clear. Pulmonary vasculature is normal. No acute osseous abnormality.    IMPRESSION:    No acute cardiopulmonary abnormality.    Electronically signed by:  Jarad López DO  6/2/2022 12:06 PM CDT Workstation: [id number]                            X-Rays:   Independently Interpreted Readings:   Chest X-Ray: Normal heart size.  No infiltrates.  No acute abnormalities.     Medications   aspirin tablet 325 mg (325 mg Oral Given 6/2/22 1212)   sodium chloride 0.9% bolus 1,000 mL (0 mLs Intravenous Stopped 6/2/22 1453)     Medical Decision Making:   History:   Old Medical Records: I decided to obtain old medical records.  Old Records Summarized: records from another hospital.  Clinical Tests:   Lab Tests: Ordered and Reviewed       <> Summary of Lab:   Hemoglobin 13.8  Bicarb 18 glucose 178 BUN 30  PT 14.3  Troponin negative  Radiological Study: Ordered and Reviewed  Medical Tests: Ordered and Reviewed  ED Management:    76-year-old male with past medical history of hypertension, rheumatoid arthritis, DDD, presents to the ER due to several months of shortness of breath, fatigue, dizziness, decreased energy level, generalized body aches, insomnia.  he reports he had COVID in February and he has never felt back to normal.  He has been seen in several emergency departments for similar complaints just recently was seen for near-syncope and dark stool.  Patient taking Pepto-Bismol.  He reports no dark stools at this time no blood in stool.  No nausea or vomiting and no abdominal pain.    He is  been recently told he may have GERD and  was placed on Protonix.He denies any chest pain but reports he has been still having a mild cough.  He feels that because of the insomnia and nocturia he is not sleeping well and this may be contributing to his daytime fatigue.  He has been taking meclizine and melatonin without improvement.  On physical exam vital signs are normal patient is calm and in no distress in bed.  Normal cardiac and lung exam soft nontender abdomen no calf tenderness.  He reports symptoms have been ongoing for months I have reviewed several recent visits and compare his lab work from today to his recent visits with no acute changes.  His EKG shows no acute ischemia chest x-ray was clear.  Troponin was negative.  I discussed with him that his symptoms are consistent with on COVID.  He reports he has also been told that he may have chronic fatigue syndrome.  Of also informed him that since he is not sleeping well this could be causing some of his symptoms he is going to stop taking meclizine and melatonin will try Benadryl instead which is also been found to be helpful for long COVID.  He will follow-up with his primary care physician.  Michelle Adams M.D.  8:09 PM 6/3/2022                        Clinical Impression:   Final diagnoses:  [R07.9] Chest pain  [E86.0] Dehydration  [U09.9] Long COVID (Primary)          ED Disposition Condition    Discharge Stable        ED Prescriptions     None        Follow-up Information     Follow up With Specialties Details Why Contact Info Additional Information    Sp Lilly MD Family Medicine Schedule an appointment as soon as possible for a visit   [address] [facility] [address]  [phone]       [facility] - Emergency Dept Emergency Medicine Go to  If symptoms worsen [address] [facility] [address]  [phone] 1st floor           Michelle Adams MD  06/03/22 2009

## 2022-06-02 NOTE — ED NOTES
Resumed pt care. 76 YOM c/o dizzy, weak, SOB and urine frequency X 1 month. Pt stated +COVID X month symptoms never improved. O2 98% on room air. pt speaking in complete sentences. -chest pain. Denies any other complaints.     Adult Physical Assessment  LOC: Patient is awake, alert, oriented and speaking appropriately at this time.  APPEARANCE: Patient resting comfortably and appears to be in no acute distress at this time. Patient is clean and well groomed, patient's clothing is properly fastened.  SKIN:The skin is warm and dry, color consistent with ethnicity, patient has normal skin turgor and moist mucus membranes, skin intact, no breakdown or brusing noted.  MUSCULOSKELETAL: Patient moving all extremities well, no obvious swelling or deformities noted.  RESPIRATORY: Airway is open and patent, respirations are spontaneous, patient has a normal effort and rate, no accessory muscle use noted.  CARDIAC: Patient has a normal rate and rhythm, no periphreal edema noted in any extremity, capillary refill < 3 seconds in all extremities.  ABDOMEN: Soft and non tender to palpation, no abdominal distention noted.  NEUROLOGIC: Eyes open spontaneously, behavior appropriate to situation, follows commands, facial expression symmetrical, bilateral hand grasp equal and even, purposeful motor response noted, normal sensation in all extremities when touched with a finger.      VSS. ED workup in progress. Call light within pt reach. Safety measures in place: side rails up X2. Will continue to monitor.

## 2022-06-06 ENCOUNTER — TELEPHONE (OUTPATIENT)
Dept: UROLOGY | Facility: CLINIC | Age: 77
End: 2022-06-06
Payer: MEDICARE

## 2022-06-06 ENCOUNTER — TELEPHONE (OUTPATIENT)
Dept: FAMILY MEDICINE | Facility: CLINIC | Age: 77
End: 2022-06-06
Payer: MEDICARE

## 2022-06-06 NOTE — TELEPHONE ENCOUNTER
Called patient because Jon wanted you see patient sooner than September,  scheduled appt with you on 6/29/22 (that was the sooner I can get him in) patient wants to talk to you about the last ER visit were he was Dx with Long covid, he said the fatigue is bad   The hospital told him to schedule appt with PCP only.   He wants to know what he can do, also he was very demanding to talk to you, please advise. Thank you Claudia

## 2022-06-06 NOTE — TELEPHONE ENCOUNTER
----- Message from Shari Carrion sent at 6/6/2022  2:46 PM CDT -----  Contact: [phone]  Type: Needs Medical Advice  Who Called: Pt    Best Call Back Number: [phone]    Additional Information: Pt calling about a referral that was supposed to be sent to Oncology. Pls call back and advise Pt states that this is an urgent matter.

## 2022-06-06 NOTE — TELEPHONE ENCOUNTER
Spoke hernando Butler with Dr Eng's office and will call pt syays have tried recently and pt has canceled appt will try calling pt and scheduling him again.  Pt informed vu

## 2022-06-06 NOTE — TELEPHONE ENCOUNTER
----- Message from Jon Archer PA-C sent at 6/6/2022  1:17 PM CDT -----  Regarding: Earlier Appt  Any chance that we can get this patient in sooner to establish with Dr. Lilly. As of right now he is scheduled for September. Has had multiple hospital admissions and ED visits recently. Saw me the other day and was not doing well so sent to ED.

## 2022-06-06 NOTE — TELEPHONE ENCOUNTER
----- Message from Garrett Pina MD sent at 6/4/2022  8:18 AM CDT -----  Forgot to include booking him a psa lab visit in his plan - can you sset that up in the next 2 weeks please and when you call him to set it up, also remind of need for [facility] follow up bc never did. I CCed them. Thanks

## 2022-06-06 NOTE — TELEPHONE ENCOUNTER
----- Message from Shari Carrion sent at 6/6/2022  2:49 PM CDT -----  Contact: [phone]  Type:  Patient Returning Call    Who Called:  Pt  Who Left Message for Patient:  Angely   Does the patient know what this is regarding?: Yes   Best Call Back Number:  [phone]    Additional Information:  Pls fernando back and advise   Pt states his an urgent matter.

## 2022-06-06 NOTE — TELEPHONE ENCOUNTER
----- Message from Minnie Rodríguez sent at 6/6/2022  2:21 PM CDT -----  Contact: patient  Type:  Patient Returning Call    Who Called:  patient  Who Left Message for Patient:  ramya  Does the patient know what this is regarding?:    Best Call Back Number:  [phone] (home)   Additional Information:  patient has hospital f/u scheduled- can he be seen any sooner?

## 2022-06-06 NOTE — TELEPHONE ENCOUNTER
Spoke w pt and scheduled psa in 2 wks pt was informed of date,time and location  Pt was also reminded for f/u with rad oncology and provided number to providers office as well pt voiced ok

## 2022-06-07 ENCOUNTER — HOSPITAL ENCOUNTER (EMERGENCY)
Facility: HOSPITAL | Age: 77
Discharge: PSYCHIATRIC OR SUBSTANCE USE FACILITY | End: 2022-06-07
Attending: EMERGENCY MEDICINE
Payer: MEDICARE

## 2022-06-07 ENCOUNTER — OFFICE VISIT (OUTPATIENT)
Dept: FAMILY MEDICINE | Facility: CLINIC | Age: 77
End: 2022-06-07
Payer: MEDICARE

## 2022-06-07 VITALS
SYSTOLIC BLOOD PRESSURE: 123 MMHG | WEIGHT: 140 LBS | BODY MASS INDEX: 21.22 KG/M2 | OXYGEN SATURATION: 96 % | HEART RATE: 81 BPM | DIASTOLIC BLOOD PRESSURE: 59 MMHG | TEMPERATURE: 99 F | HEIGHT: 68 IN | RESPIRATION RATE: 18 BRPM

## 2022-06-07 VITALS
HEART RATE: 80 BPM | BODY MASS INDEX: 21.09 KG/M2 | SYSTOLIC BLOOD PRESSURE: 110 MMHG | TEMPERATURE: 99 F | HEIGHT: 68 IN | WEIGHT: 139.13 LBS | DIASTOLIC BLOOD PRESSURE: 60 MMHG | OXYGEN SATURATION: 97 %

## 2022-06-07 DIAGNOSIS — U09.9 COVID-19 LONG HAULER MANIFESTING CHRONIC FATIGUE: Primary | ICD-10-CM

## 2022-06-07 DIAGNOSIS — G93.32 COVID-19 LONG HAULER MANIFESTING CHRONIC FATIGUE: Primary | ICD-10-CM

## 2022-06-07 DIAGNOSIS — F32.A DEPRESSION, UNSPECIFIED DEPRESSION TYPE: Primary | ICD-10-CM

## 2022-06-07 DIAGNOSIS — R45.851 SUICIDAL IDEATIONS: ICD-10-CM

## 2022-06-07 DIAGNOSIS — Z00.8 MEDICAL CLEARANCE FOR PSYCHIATRIC ADMISSION: ICD-10-CM

## 2022-06-07 DIAGNOSIS — F41.0 PANIC ATTACK: ICD-10-CM

## 2022-06-07 LAB
ALBUMIN SERPL BCP-MCNC: 3.2 G/DL (ref 3.5–5.2)
ALP SERPL-CCNC: 64 U/L (ref 55–135)
ALT SERPL W/O P-5'-P-CCNC: 17 U/L (ref 10–44)
AMPHET+METHAMPHET UR QL: NEGATIVE
ANION GAP SERPL CALC-SCNC: 9 MMOL/L (ref 8–16)
APAP SERPL-MCNC: <3 UG/ML (ref 10–20)
AST SERPL-CCNC: 22 U/L (ref 10–40)
BARBITURATES UR QL SCN>200 NG/ML: NEGATIVE
BASOPHILS # BLD AUTO: 0.02 K/UL (ref 0–0.2)
BASOPHILS NFR BLD: 0.3 % (ref 0–1.9)
BENZODIAZ UR QL SCN>200 NG/ML: ABNORMAL
BILIRUB SERPL-MCNC: 0.4 MG/DL (ref 0.1–1)
BILIRUB UR QL STRIP: NEGATIVE
BUN SERPL-MCNC: 23 MG/DL (ref 8–23)
BZE UR QL SCN: NEGATIVE
CALCIUM SERPL-MCNC: 9 MG/DL (ref 8.7–10.5)
CANNABINOIDS UR QL SCN: ABNORMAL
CHLORIDE SERPL-SCNC: 111 MMOL/L (ref 95–110)
CLARITY UR: CLEAR
CO2 SERPL-SCNC: 18 MMOL/L (ref 23–29)
COLOR UR: YELLOW
CREAT SERPL-MCNC: 1.3 MG/DL (ref 0.5–1.4)
CREAT UR-MCNC: 128.2 MG/DL (ref 23–375)
DIFFERENTIAL METHOD: ABNORMAL
EOSINOPHIL # BLD AUTO: 0.1 K/UL (ref 0–0.5)
EOSINOPHIL NFR BLD: 2.1 % (ref 0–8)
ERYTHROCYTE [DISTWIDTH] IN BLOOD BY AUTOMATED COUNT: 17.9 % (ref 11.5–14.5)
EST. GFR  (AFRICAN AMERICAN): >60 ML/MIN/1.73 M^2
EST. GFR  (NON AFRICAN AMERICAN): 54 ML/MIN/1.73 M^2
ETHANOL SERPL-MCNC: <10 MG/DL
GLUCOSE SERPL-MCNC: 93 MG/DL (ref 70–110)
GLUCOSE UR QL STRIP: NEGATIVE
HCT VFR BLD AUTO: 41.7 % (ref 40–54)
HGB BLD-MCNC: 13.7 G/DL (ref 14–18)
HGB UR QL STRIP: NEGATIVE
IMM GRANULOCYTES # BLD AUTO: 0.02 K/UL (ref 0–0.04)
IMM GRANULOCYTES NFR BLD AUTO: 0.3 % (ref 0–0.5)
KETONES UR QL STRIP: NEGATIVE
LEUKOCYTE ESTERASE UR QL STRIP: NEGATIVE
LYMPHOCYTES # BLD AUTO: 0.9 K/UL (ref 1–4.8)
LYMPHOCYTES NFR BLD: 14.4 % (ref 18–48)
MCH RBC QN AUTO: 28.8 PG (ref 27–31)
MCHC RBC AUTO-ENTMCNC: 32.9 G/DL (ref 32–36)
MCV RBC AUTO: 88 FL (ref 82–98)
METHADONE UR QL SCN>300 NG/ML: NEGATIVE
MONOCYTES # BLD AUTO: 0.6 K/UL (ref 0.3–1)
MONOCYTES NFR BLD: 9.6 % (ref 4–15)
NEUTROPHILS # BLD AUTO: 4.6 K/UL (ref 1.8–7.7)
NEUTROPHILS NFR BLD: 73.3 % (ref 38–73)
NITRITE UR QL STRIP: NEGATIVE
NRBC BLD-RTO: 0 /100 WBC
OPIATES UR QL SCN: NEGATIVE
PCP UR QL SCN>25 NG/ML: NEGATIVE
PH UR STRIP: 7 [PH] (ref 5–8)
PLATELET # BLD AUTO: 335 K/UL (ref 150–450)
PMV BLD AUTO: 11.2 FL (ref 9.2–12.9)
POTASSIUM SERPL-SCNC: 4.7 MMOL/L (ref 3.5–5.1)
PROT SERPL-MCNC: 7.4 G/DL (ref 6–8.4)
PROT UR QL STRIP: NEGATIVE
RBC # BLD AUTO: 4.76 M/UL (ref 4.6–6.2)
SARS-COV-2 RDRP RESP QL NAA+PROBE: NEGATIVE
SODIUM SERPL-SCNC: 138 MMOL/L (ref 136–145)
SP GR UR STRIP: 1.01 (ref 1–1.03)
TOXICOLOGY INFORMATION: ABNORMAL
TSH SERPL DL<=0.005 MIU/L-ACNC: 0.67 UIU/ML (ref 0.4–4)
URN SPEC COLLECT METH UR: NORMAL
UROBILINOGEN UR STRIP-ACNC: NEGATIVE EU/DL
WBC # BLD AUTO: 6.23 K/UL (ref 3.9–12.7)

## 2022-06-07 PROCEDURE — 80143 DRUG ASSAY ACETAMINOPHEN: CPT | Performed by: EMERGENCY MEDICINE

## 2022-06-07 PROCEDURE — 82077 ASSAY SPEC XCP UR&BREATH IA: CPT | Performed by: EMERGENCY MEDICINE

## 2022-06-07 PROCEDURE — U0002 COVID-19 LAB TEST NON-CDC: HCPCS | Performed by: EMERGENCY MEDICINE

## 2022-06-07 PROCEDURE — G0425 PR INPT TELEHEALTH CONSULT 30M: ICD-10-PCS | Mod: 95,,, | Performed by: PSYCHIATRY & NEUROLOGY

## 2022-06-07 PROCEDURE — 3078F PR MOST RECENT DIASTOLIC BLOOD PRESSURE < 80 MM HG: ICD-10-PCS | Mod: CPTII,S$GLB,, | Performed by: PHYSICIAN ASSISTANT

## 2022-06-07 PROCEDURE — 3008F PR BODY MASS INDEX (BMI) DOCUMENTED: ICD-10-PCS | Mod: CPTII,S$GLB,, | Performed by: PHYSICIAN ASSISTANT

## 2022-06-07 PROCEDURE — 3008F BODY MASS INDEX DOCD: CPT | Mod: CPTII,S$GLB,, | Performed by: PHYSICIAN ASSISTANT

## 2022-06-07 PROCEDURE — 63600175 PHARM REV CODE 636 W HCPCS: Performed by: EMERGENCY MEDICINE

## 2022-06-07 PROCEDURE — 99214 PR OFFICE/OUTPT VISIT, EST, LEVL IV, 30-39 MIN: ICD-10-PCS | Mod: S$GLB,,, | Performed by: PHYSICIAN ASSISTANT

## 2022-06-07 PROCEDURE — 85025 COMPLETE CBC W/AUTO DIFF WBC: CPT | Performed by: EMERGENCY MEDICINE

## 2022-06-07 PROCEDURE — 1159F PR MEDICATION LIST DOCUMENTED IN MEDICAL RECORD: ICD-10-PCS | Mod: CPTII,S$GLB,, | Performed by: PHYSICIAN ASSISTANT

## 2022-06-07 PROCEDURE — 3044F HG A1C LEVEL LT 7.0%: CPT | Mod: CPTII,S$GLB,, | Performed by: PHYSICIAN ASSISTANT

## 2022-06-07 PROCEDURE — 3288F FALL RISK ASSESSMENT DOCD: CPT | Mod: CPTII,S$GLB,, | Performed by: PHYSICIAN ASSISTANT

## 2022-06-07 PROCEDURE — 1101F PT FALLS ASSESS-DOCD LE1/YR: CPT | Mod: CPTII,S$GLB,, | Performed by: PHYSICIAN ASSISTANT

## 2022-06-07 PROCEDURE — 80307 DRUG TEST PRSMV CHEM ANLYZR: CPT | Performed by: EMERGENCY MEDICINE

## 2022-06-07 PROCEDURE — G0425 INPT/ED TELECONSULT30: HCPCS | Mod: 95,,, | Performed by: PSYCHIATRY & NEUROLOGY

## 2022-06-07 PROCEDURE — 3078F DIAST BP <80 MM HG: CPT | Mod: CPTII,S$GLB,, | Performed by: PHYSICIAN ASSISTANT

## 2022-06-07 PROCEDURE — 80053 COMPREHEN METABOLIC PANEL: CPT | Performed by: EMERGENCY MEDICINE

## 2022-06-07 PROCEDURE — 36415 COLL VENOUS BLD VENIPUNCTURE: CPT | Performed by: EMERGENCY MEDICINE

## 2022-06-07 PROCEDURE — 99285 EMERGENCY DEPT VISIT HI MDM: CPT | Mod: 25

## 2022-06-07 PROCEDURE — 99999 PR PBB SHADOW E&M-EST. PATIENT-LVL III: ICD-10-PCS | Mod: PBBFAC,,, | Performed by: PHYSICIAN ASSISTANT

## 2022-06-07 PROCEDURE — 1126F PR PAIN SEVERITY QUANTIFIED, NO PAIN PRESENT: ICD-10-PCS | Mod: CPTII,S$GLB,, | Performed by: PHYSICIAN ASSISTANT

## 2022-06-07 PROCEDURE — 1101F PR PT FALLS ASSESS DOC 0-1 FALLS W/OUT INJ PAST YR: ICD-10-PCS | Mod: CPTII,S$GLB,, | Performed by: PHYSICIAN ASSISTANT

## 2022-06-07 PROCEDURE — 3074F SYST BP LT 130 MM HG: CPT | Mod: CPTII,S$GLB,, | Performed by: PHYSICIAN ASSISTANT

## 2022-06-07 PROCEDURE — 3288F PR FALLS RISK ASSESSMENT DOCUMENTED: ICD-10-PCS | Mod: CPTII,S$GLB,, | Performed by: PHYSICIAN ASSISTANT

## 2022-06-07 PROCEDURE — 84443 ASSAY THYROID STIM HORMONE: CPT | Performed by: EMERGENCY MEDICINE

## 2022-06-07 PROCEDURE — 3044F PR MOST RECENT HEMOGLOBIN A1C LEVEL <7.0%: ICD-10-PCS | Mod: CPTII,S$GLB,, | Performed by: PHYSICIAN ASSISTANT

## 2022-06-07 PROCEDURE — 81003 URINALYSIS AUTO W/O SCOPE: CPT | Mod: 59 | Performed by: EMERGENCY MEDICINE

## 2022-06-07 PROCEDURE — 3074F PR MOST RECENT SYSTOLIC BLOOD PRESSURE < 130 MM HG: ICD-10-PCS | Mod: CPTII,S$GLB,, | Performed by: PHYSICIAN ASSISTANT

## 2022-06-07 PROCEDURE — 96372 THER/PROPH/DIAG INJ SC/IM: CPT | Performed by: EMERGENCY MEDICINE

## 2022-06-07 PROCEDURE — 99999 PR PBB SHADOW E&M-EST. PATIENT-LVL III: CPT | Mod: PBBFAC,,, | Performed by: PHYSICIAN ASSISTANT

## 2022-06-07 PROCEDURE — 1159F MED LIST DOCD IN RCRD: CPT | Mod: CPTII,S$GLB,, | Performed by: PHYSICIAN ASSISTANT

## 2022-06-07 PROCEDURE — 99214 OFFICE O/P EST MOD 30 MIN: CPT | Mod: S$GLB,,, | Performed by: PHYSICIAN ASSISTANT

## 2022-06-07 PROCEDURE — 1126F AMNT PAIN NOTED NONE PRSNT: CPT | Mod: CPTII,S$GLB,, | Performed by: PHYSICIAN ASSISTANT

## 2022-06-07 RX ORDER — LORAZEPAM 2 MG/ML
1 INJECTION INTRAMUSCULAR
Status: COMPLETED | OUTPATIENT
Start: 2022-06-07 | End: 2022-06-07

## 2022-06-07 RX ADMIN — LORAZEPAM 1 MG: 2 INJECTION INTRAMUSCULAR; INTRAVENOUS at 01:06

## 2022-06-07 NOTE — ED PROVIDER NOTES
"Encounter Date: 6/7/2022    SCRIBE #1 NOTE: I, Maribel Noni, am scribing for, and in the presence of, Matheus Tee MD.       History     Chief Complaint   Patient presents with    Suicidal     X several days without specific plan, overwhelmed with debilitating fatigue, denies HI      Time seen by provider: 12:34 PM on 06/07/2022    Rajendra Castle is a 76 y.o. male who presents to the ED for psychiatric evaluation with an onset of depression and anxiety. Patient was diagnosed with COVID-19 infection 6 weeks ago. He also notes previous COVID vaccinations including booster prior to diagnosis. After receiving monoclonal antibody infusion, he was seemingly doing fine but over the last several days, he has been experiencing worsening fatigue finding it difficult to walk at times. He has been having trouble sleeping. Patient states, "I just want this illness to go away. I just want to get well." Dealing with worsening fatigue has been causing a lot of anxiety and depression. He denies thoughts of ending his life and does not want to die, but he is tired of having to "force himself to go out and do things." He presents to ED to see if "someone could help." The patient denies SI, HI, or any other symptoms at this time. PCP is Dr. Sp Lilly. PMHx of RA, DDD, HTN, prostate CA. PSHx of parathyroidectomy.    The history is provided by the patient.     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    Prostate CA     RA (rheumatoid arthritis)     Vitamin D insufficiency 9/30/2016     Past Surgical History:   Procedure Laterality Date    CARPAL TUNNEL RELEASE Right 5/28/2021    Procedure: RELEASE, CARPAL TUNNEL;  Surgeon: Jose Ryan II, MD;  Location: [facility];  Service: Orthopedics;  " Laterality: Right;    COLONOSCOPY  prior to 2016    normal findings per patient report    CYSTOSCOPY N/A 2018    Procedure: CYSTOSCOPY;  Surgeon: Garrett iPna MD;  Location: [facility];  Service: Urology;  Laterality: N/A;    ESOPHAGOGASTRODUODENOSCOPY N/A 2020    Dr. Espinosa; empiric dilation; erythematous mucosa in antrum; gastric mucosal atrophy; hematin in entire stomach; biopsy: mid & distal esophagus WNL, stomach- WNL, negative for h pylori    PARATHYROIDECTOMY Right 10/27/2020    Procedure: PARATHYROIDECTOMY;  Surgeon: Latonia Clayton MD;  Location: [address];  Service: ENT;  Laterality: Right;    RELEASE OF ULNAR NERVE AT CUBITAL TUNNEL Right 2021    Procedure: RELEASE, ULNAR TUNNEL;  Surgeon: Jose Ryan II, MD;  Location: [facility];  Service: Orthopedics;  Laterality: Right;    TRANSRECTAL BIOPSY OF PROSTATE WITH ULTRASOUND GUIDANCE N/A 2018    Procedure: BIOPSY, PROSTATE, RECTAL APPROACH, WITH US GUIDANCE;  Surgeon: Garrett Pina MD;  Location: [facility];  Service: Urology;  Laterality: N/A;     Family History   Problem Relation Age of Onset    Heart disease Mother     Stroke Father     Drug abuse Sister     No Known Problems Daughter     No Known Problems Daughter     No Known Problems Son     Diabetes Maternal Uncle     Colon cancer Neg Hx     Crohn's disease Neg Hx     Esophageal cancer Neg Hx     Stomach cancer Neg Hx     Ulcerative colitis Neg Hx      Social History     Tobacco Use    Smoking status: Former Smoker     Packs/day: 0.25     Years: 10.00     Pack years: 2.50     Quit date: 2001     Years since quittin.8    Smokeless tobacco: Never Used   Substance Use Topics    Alcohol use: Yes     Comment: seldom    Drug use: Yes     Frequency: 8.0 times per week     Types: Marijuana     Review of Systems   Constitutional: Positive for fatigue. Negative for fever.   HENT: Negative for sore throat.    Respiratory: Negative for  shortness of breath.    Cardiovascular: Negative for chest pain.   Gastrointestinal: Negative for nausea.   Genitourinary: Negative for dysuria.   Musculoskeletal: Negative for back pain.   Skin: Negative for rash.   Neurological: Negative for weakness.   Hematological: Does not bruise/bleed easily.   Psychiatric/Behavioral: Positive for dysphoric mood and sleep disturbance. Negative for suicidal ideas. The patient is nervous/anxious.        Physical Exam     Initial Vitals [06/07/22 1134]   BP Pulse Resp Temp SpO2   132/67 76 18 97.9 °F (36.6 °C) 99 %      MAP       --         Physical Exam    Nursing note and vitals reviewed.  Constitutional: Vital signs are normal. He appears well-developed and well-nourished.  Non-toxic appearance. No distress.   HENT:   Head: Normocephalic and atraumatic.   Eyes: EOM are normal. Pupils are equal, round, and reactive to light.   Neck: Neck supple. No JVD present.   Normal range of motion.  Cardiovascular: Normal rate, regular rhythm, normal heart sounds and intact distal pulses. Exam reveals no gallop and no friction rub.    No murmur heard.  Pulmonary/Chest: Breath sounds normal. He has no wheezes. He has no rhonchi. He has no rales.   Abdominal: Abdomen is soft. Bowel sounds are normal. There is no abdominal tenderness. There is no rebound and no guarding.   Musculoskeletal:         General: Normal range of motion.      Cervical back: Normal range of motion and neck supple. No rigidity.     Neurological: He is alert and oriented to person, place, and time. He has normal strength and normal reflexes. No cranial nerve deficit or sensory deficit. He exhibits normal muscle tone. Coordination normal. GCS eye subscore is 4. GCS verbal subscore is 5. GCS motor subscore is 6.   Skin: Skin is warm and dry.   Psychiatric: His speech is normal and behavior is normal. He is not actively hallucinating. He exhibits a depressed mood. He expresses suicidal ideation.   Intermittent outbursts  with severe panic attacks. Passive suicidal ideations. No hallucinations.         ED Course   Procedures  Labs Reviewed   CBC W/ AUTO DIFFERENTIAL - Abnormal; Notable for the following components:       Result Value    Hemoglobin 13.7 (*)     RDW 17.9 (*)     Lymph # 0.9 (*)     Gran % 73.3 (*)     Lymph % 14.4 (*)     All other components within normal limits   COMPREHENSIVE METABOLIC PANEL - Abnormal; Notable for the following components:    Chloride 111 (*)     CO2 18 (*)     Albumin 3.2 (*)     eGFR if non  54 (*)     All other components within normal limits   DRUG SCREEN PANEL, URINE EMERGENCY - Abnormal; Notable for the following components:    Benzodiazepines Presumptive Positive (*)     THC Presumptive Positive (*)     All other components within normal limits    Narrative:     Specimen Source->Urine   ACETAMINOPHEN LEVEL - Abnormal; Notable for the following components:    Acetaminophen (Tylenol), Serum <3.0 (*)     All other components within normal limits   TSH   URINALYSIS, REFLEX TO URINE CULTURE    Narrative:     Specimen Source->Urine   ALCOHOL,MEDICAL (ETHANOL)   SARS-COV-2 RNA AMPLIFICATION, QUAL          Imaging Results    None          Medications   lorazepam injection 1 mg (1 mg Intramuscular Given 6/7/22 5356)     Medical Decision Making:   History:   Old Medical Records: I decided to obtain old medical records.  Initial Assessment:   This is an emergent evaluation for psychiatric evaluation.  The pt presents for evaluation of depression with passive suicidal thoughts and severe anxiety.    I feel the pt is suffering from major depression with severe debilitating anxiety and pt was placed under PEC with direct observation.  Medical workup was initiated to evaluate for organic etiologies.  Pt's etoh level was negative  I do not believe the pt has metabolic encephalopathy, CVA, severe electrolyte derrangement, drug induced temporary psychosis.    Psychiatry consult was obtained  and agrees with pec.  Patient is medically cleared for transfer to psychiatric facility..    Clinical Tests:   Lab Tests: Reviewed and Ordered          Scribe Attestation:   Scribe #1: I performed the above scribed service and the documentation accurately describes the services I performed. I attest to the accuracy of the note.               I, Randal Waggoner, personally performed the services described in this documentation. All medical record entries made by the scribe were at my direction and in my presence.  I have reviewed the chart and agree that the record reflects my personal performance and is accurate and complete. Matheus Tee MD.  5:10 PM 06/07/2022       DISCLAIMER: This note was prepared with [employer] Direct voice recognition transcription software. Garbled syntax, mangled pronouns, and other bizarre constructions may be attributed to that software system.    Clinical Impression:   Final diagnoses:  [F32.A] Depression, unspecified depression type (Primary)  [Z00.8] Medical clearance for psychiatric admission  [F41.0] Panic attack          ED Disposition Condition    Transfer to Psych Facility         ED Prescriptions     None        Follow-up Information    None          Matheus Tee MD  06/07/22 8669

## 2022-06-07 NOTE — CONSULTS
"[facility]  Psychiatry  Telepsychiatry Consult Note    Please see previous notes:    Patient agreeable to consultation via telepsychiatry.    Tele-Consultation from Psychiatry started: 6/7/2022 at 2;25pm  The chief complaint leading to psychiatric consultation is: SI  This consultation was requested by Dr.Alexander Tee, the Emergency Department attending physician.  The location of the consulting psychiatrist is [address] [place] [address].  The patient location is  [facility] EMERGENCY DEPARTMENT   The patient arrived at the ED at: today    Also present with the patient at the time of the consultation: none    Patient Identification:   Patient information was obtained from patient and past medical records.  Patient presented voluntarily to the Emergency Department ambulatory.    Consults  Teleconsult Time Documentation  Subjective:     History of Present Illness:  Rajendra Castle is a 76 y.o. male. With recent psych dx of depression, Anxiety and fatigue since COVID infection presents to the ED from PCP office for worsening of mood and physical debility over the past 10 days. Pt also has been experiencing suicidal thoughts for which he was told to come to the ED    PER ED STAFF   Suicidal X several days without specific plan, overwhelmed with debilitating fatigue, denies HI   Time seen by provider: 12:34 PM on 06/07/2022  Rajendra Castle is a 76 y.o. male who presents to the ED for psychiatric evaluation with an onset of depression and anxiety. Patient was diagnosed with COVID-19 infection 6 weeks ago. He also notes previous COVID vaccinations including booster prior to diagnosis. After receiving monoclonal antibody infusion, he was seemingly doing fine but over the last several days, he has been experiencing worsening fatigue finding it difficult to walk at times. He has been having trouble sleeping. Patient states, "I just want this illness to go away. I just want to get well." Dealing with worsening " "fatigue has been causing a lot of anxiety and depression. He denies thoughts of ending his life and does not want to die, but he is tired of having to "force himself to go out and do things." He presents to ED to see if "someone could help." The patient denies SI, HI, or any other symptoms at this time. PCP is Dr. Sp Lilly. PMHx of RA, DDD, HTN, prostate    Today on Psych exam,  About 3-4 weeks ago had Covid infection and since then he has been feeling very fatigued and physical debility. Pt states that since then he feels "very tired just very tired". Pt also complains of waking up several times a night due to enlarged prostate. Pt complains of poor appetite and forces himself to eat.  Pt states that he is not suicidal but he doesn't want to live like this. Pt states that he is very anxious and depressed about it. Pt states that most of his life he is a "happy go molly morgan and enjoyed things" Pt states that things were "hunky dory"  Pt endorsed depressed mood but mostly related to his debility and the decreased quality of life and has increased anxiety regarding his health  Odd responses at times pt quotes about God , [employer]?, some inconsistencies in psych history initially claims that it all started after covid infection but later pt state she was seeing a therapist int eh past and more than a year ago was seeing  for emd management.   Support system- Niece and her sons.     Psychiatric History:   Previous Psychiatric Hospitalizations: No   Previous Medication Trials: No   Previous Suicide Attempts: no   History of Violence: denied  History of Depression: yes  History of Neeru: denied  History of Auditory/Visual Hallucination: denied  History of Delusions: denied  Outpatient psychiatrist (current & past): Yes, was seeing  last saw him about a year ago.     Substance Abuse History:  Tobacco:No  Alcohol: No  Illicit Substances: Smokes marijuana once a day once every other day "helps " "me sleep"  Detox/Rehab: No    Legal History: Past charges/incarcerations: No     Family Psychiatric History: denied    Social History:  Developmental/Childhood:Achieved all developmental milestones timely  *Education:Professional Master's/PhD  Employment Status/Finances:Retired   Relationship Status/Sexual Orientation: Single  Children: 3  Housing Status: Home - alone    history:  NO  Access to gun: YES: "I keep them in the dresser drawer"     L[employer] 7th day Jainism  and unable to attend due to physical disabiliy  Recreational activities:Time with family    Psychiatric Mental Status Exam:  Arousal: alert  Sensorium/Orientation: oriented to grossly intact  Behavior/Cooperation: cooperative, reluctant to participate   Speech: normal tone, normal rate, normal pitch, normal volume  Language: grossly intact  Mood: " not good "   Affect: depressed, anxious and irritable  Thought Process: circumstantial, tangential, illogical, odd  Thought Content:   Auditory hallucinations: NO  Visual hallucinations: NO  Paranoia: NO  Delusions:  NO  Suicidal ideation: YES: passive no active plan     Homicidal ideation: NO  Attention/Concentration:  intact  Memory:    Recent:  Intact   Remote: Intact  Fund of Knowledge: Aware of current events and Intact   Insight: limited awareness of illness  Judgment: limited      Past Medical History:   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    Prostate CA     RA (rheumatoid arthritis)     Vitamin D insufficiency 9/30/2016      Laboratory Data:   Labs Reviewed   CBC W/ AUTO DIFFERENTIAL - Abnormal; Notable for the following components:       Result Value    Hemoglobin 13.7 (*)     RDW 17.9 (*)     Lymph # 0.9 (*)     Gran % 73.3 (*)     Lymph % 14.4 (*)     All other components within normal limits "   COMPREHENSIVE METABOLIC PANEL - Abnormal; Notable for the following components:    Chloride 111 (*)     CO2 18 (*)     Albumin 3.2 (*)     eGFR if non  54 (*)     All other components within normal limits   DRUG SCREEN PANEL, URINE EMERGENCY - Abnormal; Notable for the following components:    Benzodiazepines Presumptive Positive (*)     THC Presumptive Positive (*)     All other components within normal limits    Narrative:     Specimen Source->Urine   ACETAMINOPHEN LEVEL - Abnormal; Notable for the following components:    Acetaminophen (Tylenol), Serum <3.0 (*)     All other components within normal limits   TSH   URINALYSIS, REFLEX TO URINE CULTURE    Narrative:     Specimen Source->Urine   ALCOHOL,MEDICAL (ETHANOL)   SARS-COV-2 RNA AMPLIFICATION, QUAL       Neurological History:  Seizures: No  Head trauma: No    Allergies:  Review of patient's allergies indicates:  No Known Allergies    Medications in ER:   Medications   lorazepam injection 1 mg (1 mg Intramuscular Given 6/7/22 9640)       Medications at home: none    No new subjective & objective note has been filed under this hospital service since the last note was generated.      Assessment - Diagnosis - Goals:     Diagnosis/Impression:   MDD- severe  DEVANTE with Panic attacks   Marijuana use daily  Post Covid syndrome    Rec:  DISPOSITION- Once medically cleared;   Seek Involuntary Inpatient Psychiatric admission for stabilization of acute psychiatric symptoms and a safe disposition plan is enacted. The Pt &/or their family was informed that the pt will be transferred to an Inpt unit per ED placement team.     PRN Zyprexa 5mg q6 PO/IM for non redirectable agitation ; do not combine or administer within one hour of giving a Benzodiazepine     Legal-Seek/continue PEC because pt is in imminent danger of hurting self and is gravely disabled.     More than 50% of the time was spent counseling/coordinating care    Consulting clinician was informed  of the encounter and consult note.    Consultation ended: 6/7/2022 at 3:11pm    Cindy Andesren MD   Psychiatry  [facility]

## 2022-06-07 NOTE — PROGRESS NOTES
Patient presents to clinic with complaints of severe debilitating fatigue as well as a 10 day history of severe unrelenting anxiety and depression.  Patient had COVID back in February.  And states he has not been right since then.  He has gone to the emergency room numerous times and has had a completely negative workup of cardiac, GI and Pulmonary.  He states he is only occasionally short of breath but is so fatigued he can barely function.  He has started having suicidal thoughts but does not have a plan.  He states he is tired of dealing with his illness and is just looking for way out.  He does take temazepam at night to help him sleep but this is not effective currently.  He is not on any other anxiety or depression medication and does not have a history of severe depression or anxiety.    PE:  Patient very ill-appearing and frail looking.  Patient not well put together and disheveled.  Patient's personal hygiene also lacking and not per his usual self.  Patient states suicidal thoughts occur daily and for most part of the day.    Rajendra was seen today for fatigue.    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 sent to the ER for treatment and transfer.     Spoke to Risa in the emergency room and explained the situation.  We are willing the patient directly to the ER to be PEC'd and transferred to a psych facility where he can get the help he needs urgently.  Patient is very grateful for this and is going willingly.

## 2022-06-13 ENCOUNTER — TELEPHONE (OUTPATIENT)
Dept: NEPHROLOGY | Facility: CLINIC | Age: 77
End: 2022-06-13
Payer: MEDICARE

## 2022-06-13 NOTE — TELEPHONE ENCOUNTER
Patient just had heart surgery he wanted to reschedule his labs and follow up 2 weeks from now.He would like  labs to be done in [place].and a call back . Please call back patient in 2 weeks per patients request.Patient does not participate in the my chart.

## 2022-06-13 NOTE — TELEPHONE ENCOUNTER
----- Message from Vielka Bach sent at 6/13/2022  2:54 PM CDT -----  Contact: pt [phone]  Patient is refusing to come in for labs for the next 2 weeks. If the labs can wait 2 weeks please assist patient with scheduling.    Please call and advise.    Thank You

## 2022-06-20 ENCOUNTER — HOSPITAL ENCOUNTER (INPATIENT)
Facility: HOSPITAL | Age: 77
LOS: 2 days | Discharge: HOME | DRG: 313 | End: 2022-06-22
Attending: EMERGENCY MEDICINE | Admitting: FAMILY MEDICINE
Payer: MEDICARE

## 2022-06-20 DIAGNOSIS — R07.9 CHEST PAIN: ICD-10-CM

## 2022-06-20 DIAGNOSIS — I20.0 UNSTABLE ANGINA: Primary | ICD-10-CM

## 2022-06-20 DIAGNOSIS — Z95.5 HISTORY OF HEART ARTERY STENT: ICD-10-CM

## 2022-06-20 PROBLEM — U09.9 COVID-19 LONG HAULER: Chronic | Status: ACTIVE | Noted: 2022-06-20

## 2022-06-20 PROBLEM — I10 HTN (HYPERTENSION): Status: ACTIVE | Noted: 2022-06-20

## 2022-06-20 LAB
ALBUMIN SERPL BCP-MCNC: 3.6 G/DL (ref 3.5–5.2)
ALP SERPL-CCNC: 72 U/L (ref 55–135)
ALT SERPL W/O P-5'-P-CCNC: 17 U/L (ref 10–44)
ANION GAP SERPL CALC-SCNC: 10 MMOL/L (ref 8–16)
APTT PPP: 23.5 SEC (ref 23.3–35.1)
AST SERPL-CCNC: 17 U/L (ref 10–40)
BASOPHILS # BLD AUTO: 0.02 K/UL (ref 0–0.2)
BASOPHILS NFR BLD: 0.2 % (ref 0–1.9)
BILIRUB SERPL-MCNC: 0.3 MG/DL (ref 0.1–1)
BNP SERPL-MCNC: 945 PG/ML (ref 0–99)
BUN SERPL-MCNC: 30 MG/DL (ref 8–23)
CALCIUM SERPL-MCNC: 9 MG/DL (ref 8.7–10.5)
CHLORIDE SERPL-SCNC: 108 MMOL/L (ref 95–110)
CO2 SERPL-SCNC: 20 MMOL/L (ref 23–29)
CREAT SERPL-MCNC: 1.4 MG/DL (ref 0.5–1.4)
DIFFERENTIAL METHOD: ABNORMAL
EOSINOPHIL # BLD AUTO: 0.2 K/UL (ref 0–0.5)
EOSINOPHIL NFR BLD: 1.9 % (ref 0–8)
ERYTHROCYTE [DISTWIDTH] IN BLOOD BY AUTOMATED COUNT: 17.5 % (ref 11.5–14.5)
EST. GFR  (AFRICAN AMERICAN): 56.8 ML/MIN/1.73 M^2
EST. GFR  (NON AFRICAN AMERICAN): 49.1 ML/MIN/1.73 M^2
GLUCOSE SERPL-MCNC: 89 MG/DL (ref 70–110)
HCT VFR BLD AUTO: 40.1 % (ref 40–54)
HGB BLD-MCNC: 12.9 G/DL (ref 14–18)
IMM GRANULOCYTES # BLD AUTO: 0.07 K/UL (ref 0–0.04)
IMM GRANULOCYTES NFR BLD AUTO: 0.8 % (ref 0–0.5)
INR PPP: 1.2
LYMPHOCYTES # BLD AUTO: 1.5 K/UL (ref 1–4.8)
LYMPHOCYTES NFR BLD: 17.5 % (ref 18–48)
MCH RBC QN AUTO: 28.4 PG (ref 27–31)
MCHC RBC AUTO-ENTMCNC: 32.2 G/DL (ref 32–36)
MCV RBC AUTO: 88 FL (ref 82–98)
MONOCYTES # BLD AUTO: 1.1 K/UL (ref 0.3–1)
MONOCYTES NFR BLD: 12.9 % (ref 4–15)
NEUTROPHILS # BLD AUTO: 5.8 K/UL (ref 1.8–7.7)
NEUTROPHILS NFR BLD: 66.7 % (ref 38–73)
NRBC BLD-RTO: 0 /100 WBC
PLATELET # BLD AUTO: 344 K/UL (ref 150–450)
PMV BLD AUTO: 11.2 FL (ref 9.2–12.9)
POTASSIUM SERPL-SCNC: 4.9 MMOL/L (ref 3.5–5.1)
PROT SERPL-MCNC: 7.4 G/DL (ref 6–8.4)
PROTHROMBIN TIME: 14.5 SEC (ref 11.4–13.7)
RBC # BLD AUTO: 4.54 M/UL (ref 4.6–6.2)
SARS-COV-2 RDRP RESP QL NAA+PROBE: NEGATIVE
SODIUM SERPL-SCNC: 138 MMOL/L (ref 136–145)
TROPONIN I SERPL DL<=0.01 NG/ML-MCNC: 0.03 NG/ML
WBC # BLD AUTO: 8.76 K/UL (ref 3.9–12.7)

## 2022-06-20 PROCEDURE — 85610 PROTHROMBIN TIME: CPT | Performed by: EMERGENCY MEDICINE

## 2022-06-20 PROCEDURE — 80053 COMPREHEN METABOLIC PANEL: CPT | Performed by: EMERGENCY MEDICINE

## 2022-06-20 PROCEDURE — 96375 TX/PRO/DX INJ NEW DRUG ADDON: CPT

## 2022-06-20 PROCEDURE — 96367 TX/PROPH/DG ADDL SEQ IV INF: CPT

## 2022-06-20 PROCEDURE — 99285 EMERGENCY DEPT VISIT HI MDM: CPT | Mod: 25

## 2022-06-20 PROCEDURE — 83880 ASSAY OF NATRIURETIC PEPTIDE: CPT | Performed by: EMERGENCY MEDICINE

## 2022-06-20 PROCEDURE — 25000003 PHARM REV CODE 250: Performed by: FAMILY MEDICINE

## 2022-06-20 PROCEDURE — 96365 THER/PROPH/DIAG IV INF INIT: CPT

## 2022-06-20 PROCEDURE — 93005 ELECTROCARDIOGRAM TRACING: CPT | Performed by: SPECIALIST

## 2022-06-20 PROCEDURE — 25000003 PHARM REV CODE 250: Performed by: EMERGENCY MEDICINE

## 2022-06-20 PROCEDURE — 93010 EKG 12-LEAD: ICD-10-PCS | Mod: ,,, | Performed by: SPECIALIST

## 2022-06-20 PROCEDURE — 93010 ELECTROCARDIOGRAM REPORT: CPT | Mod: ,,, | Performed by: SPECIALIST

## 2022-06-20 PROCEDURE — 63600175 PHARM REV CODE 636 W HCPCS: Performed by: EMERGENCY MEDICINE

## 2022-06-20 PROCEDURE — 84484 ASSAY OF TROPONIN QUANT: CPT | Performed by: EMERGENCY MEDICINE

## 2022-06-20 PROCEDURE — 85025 COMPLETE CBC W/AUTO DIFF WBC: CPT | Performed by: EMERGENCY MEDICINE

## 2022-06-20 PROCEDURE — 21000000 HC CCU ICU ROOM CHARGE

## 2022-06-20 PROCEDURE — U0002 COVID-19 LAB TEST NON-CDC: HCPCS | Performed by: EMERGENCY MEDICINE

## 2022-06-20 PROCEDURE — 85730 THROMBOPLASTIN TIME PARTIAL: CPT | Performed by: EMERGENCY MEDICINE

## 2022-06-20 RX ORDER — TAMSULOSIN HYDROCHLORIDE 0.4 MG/1
0.4 CAPSULE ORAL DAILY
Status: DISCONTINUED | OUTPATIENT
Start: 2022-06-21 | End: 2022-06-22 | Stop reason: HOSPADM

## 2022-06-20 RX ORDER — CLOPIDOGREL BISULFATE 75 MG/1
75 TABLET ORAL DAILY
Status: DISCONTINUED | OUTPATIENT
Start: 2022-06-21 | End: 2022-06-22 | Stop reason: HOSPADM

## 2022-06-20 RX ORDER — CARVEDILOL 25 MG/1
25 TABLET ORAL 2 TIMES DAILY WITH MEALS
Status: DISCONTINUED | OUTPATIENT
Start: 2022-06-21 | End: 2022-06-21

## 2022-06-20 RX ORDER — MORPHINE SULFATE 4 MG/ML
4 INJECTION, SOLUTION INTRAMUSCULAR; INTRAVENOUS
Status: COMPLETED | OUTPATIENT
Start: 2022-06-20 | End: 2022-06-20

## 2022-06-20 RX ORDER — PANTOPRAZOLE SODIUM 40 MG/1
40 TABLET, DELAYED RELEASE ORAL
Status: DISCONTINUED | OUTPATIENT
Start: 2022-06-21 | End: 2022-06-22 | Stop reason: HOSPADM

## 2022-06-20 RX ORDER — SODIUM CHLORIDE 9 MG/ML
1000 INJECTION, SOLUTION INTRAVENOUS
Status: DISCONTINUED | OUTPATIENT
Start: 2022-06-20 | End: 2022-06-20

## 2022-06-20 RX ORDER — ONDANSETRON 2 MG/ML
4 INJECTION INTRAMUSCULAR; INTRAVENOUS EVERY 6 HOURS PRN
Status: DISCONTINUED | OUTPATIENT
Start: 2022-06-20 | End: 2022-06-22 | Stop reason: HOSPADM

## 2022-06-20 RX ORDER — ASPIRIN 325 MG
325 TABLET ORAL
Status: COMPLETED | OUTPATIENT
Start: 2022-06-20 | End: 2022-06-20

## 2022-06-20 RX ORDER — ACETAMINOPHEN 325 MG/1
650 TABLET ORAL EVERY 8 HOURS PRN
Status: DISCONTINUED | OUTPATIENT
Start: 2022-06-20 | End: 2022-06-22 | Stop reason: HOSPADM

## 2022-06-20 RX ORDER — SIMETHICONE 80 MG
1 TABLET,CHEWABLE ORAL 4 TIMES DAILY PRN
Status: DISCONTINUED | OUTPATIENT
Start: 2022-06-20 | End: 2022-06-22 | Stop reason: HOSPADM

## 2022-06-20 RX ORDER — HEPARIN SODIUM,PORCINE/D5W 25000/250
0-40 INTRAVENOUS SOLUTION INTRAVENOUS CONTINUOUS
Status: DISCONTINUED | OUTPATIENT
Start: 2022-06-20 | End: 2022-06-21

## 2022-06-20 RX ORDER — SODIUM CHLORIDE 0.9 % (FLUSH) 0.9 %
10 SYRINGE (ML) INJECTION
Status: DISCONTINUED | OUTPATIENT
Start: 2022-06-20 | End: 2022-06-22 | Stop reason: HOSPADM

## 2022-06-20 RX ORDER — IBUPROFEN 200 MG
24 TABLET ORAL
Status: DISCONTINUED | OUTPATIENT
Start: 2022-06-20 | End: 2022-06-22 | Stop reason: HOSPADM

## 2022-06-20 RX ORDER — POLYETHYLENE GLYCOL 3350 17 G/17G
17 POWDER, FOR SOLUTION ORAL 2 TIMES DAILY PRN
Status: DISCONTINUED | OUTPATIENT
Start: 2022-06-20 | End: 2022-06-22 | Stop reason: HOSPADM

## 2022-06-20 RX ORDER — IPRATROPIUM BROMIDE AND ALBUTEROL SULFATE 2.5; .5 MG/3ML; MG/3ML
3 SOLUTION RESPIRATORY (INHALATION) EVERY 4 HOURS PRN
Status: DISCONTINUED | OUTPATIENT
Start: 2022-06-20 | End: 2022-06-22 | Stop reason: HOSPADM

## 2022-06-20 RX ORDER — ALPRAZOLAM 0.25 MG/1
0.25 TABLET ORAL 3 TIMES DAILY PRN
Status: DISCONTINUED | OUTPATIENT
Start: 2022-06-20 | End: 2022-06-22 | Stop reason: HOSPADM

## 2022-06-20 RX ORDER — TALC
6 POWDER (GRAM) TOPICAL NIGHTLY PRN
Status: DISCONTINUED | OUTPATIENT
Start: 2022-06-20 | End: 2022-06-22 | Stop reason: HOSPADM

## 2022-06-20 RX ORDER — GLUCAGON 1 MG
1 KIT INJECTION
Status: DISCONTINUED | OUTPATIENT
Start: 2022-06-20 | End: 2022-06-22 | Stop reason: HOSPADM

## 2022-06-20 RX ORDER — ALPRAZOLAM 0.25 MG/1
0.25 TABLET ORAL ONCE
Status: COMPLETED | OUTPATIENT
Start: 2022-06-20 | End: 2022-06-20

## 2022-06-20 RX ORDER — IBUPROFEN 200 MG
16 TABLET ORAL
Status: DISCONTINUED | OUTPATIENT
Start: 2022-06-20 | End: 2022-06-22 | Stop reason: HOSPADM

## 2022-06-20 RX ORDER — NALOXONE HCL 0.4 MG/ML
0.02 VIAL (ML) INJECTION
Status: DISCONTINUED | OUTPATIENT
Start: 2022-06-20 | End: 2022-06-22 | Stop reason: HOSPADM

## 2022-06-20 RX ORDER — HYDROCODONE BITARTRATE AND ACETAMINOPHEN 5; 325 MG/1; MG/1
1 TABLET ORAL EVERY 4 HOURS PRN
Status: DISCONTINUED | OUTPATIENT
Start: 2022-06-20 | End: 2022-06-22 | Stop reason: HOSPADM

## 2022-06-20 RX ORDER — MORPHINE SULFATE 4 MG/ML
4 INJECTION, SOLUTION INTRAMUSCULAR; INTRAVENOUS EVERY 4 HOURS PRN
Status: DISCONTINUED | OUTPATIENT
Start: 2022-06-20 | End: 2022-06-22 | Stop reason: HOSPADM

## 2022-06-20 RX ORDER — AMOXICILLIN 250 MG
1 CAPSULE ORAL 2 TIMES DAILY PRN
Status: DISCONTINUED | OUTPATIENT
Start: 2022-06-20 | End: 2022-06-22 | Stop reason: HOSPADM

## 2022-06-20 RX ORDER — NITROGLYCERIN 20 MG/100ML
5 INJECTION INTRAVENOUS CONTINUOUS
Status: DISCONTINUED | OUTPATIENT
Start: 2022-06-20 | End: 2022-06-21

## 2022-06-20 RX ADMIN — HEPARIN SODIUM 12 UNITS/KG/HR: 10000 INJECTION, SOLUTION INTRAVENOUS at 08:06

## 2022-06-20 RX ADMIN — ASPIRIN 325 MG ORAL TABLET 325 MG: 325 PILL ORAL at 07:06

## 2022-06-20 RX ADMIN — NITROGLYCERIN 0.5 INCH: 20 OINTMENT TOPICAL at 07:06

## 2022-06-20 RX ADMIN — ALPRAZOLAM 0.25 MG: 0.25 TABLET ORAL at 10:06

## 2022-06-20 RX ADMIN — MORPHINE SULFATE 4 MG: 4 INJECTION, SOLUTION INTRAMUSCULAR; INTRAVENOUS at 07:06

## 2022-06-20 RX ADMIN — NITROGLYCERIN 5 MCG/MIN: 20 INJECTION INTRAVENOUS at 08:06

## 2022-06-20 NOTE — TELEPHONE ENCOUNTER
"I spoke with MR Castle to try to schedule his lab following his stent.  Offered to schedule it the same day he goes in for hospital follow up to see Dr Lilly.     The patient states he does not anticipate being able to come into the clinic to see Dr Lilly reportting he has long covid effects.  States "I can't move." and "I might have to go to some facility where they can care for me."     Sending this message to PCP to see if they have any plans for home health, etc., so that we may follow up according to his plan.   "

## 2022-06-21 ENCOUNTER — TELEPHONE (OUTPATIENT)
Dept: FAMILY MEDICINE | Facility: CLINIC | Age: 77
End: 2022-06-21
Payer: MEDICARE

## 2022-06-21 ENCOUNTER — CLINICAL SUPPORT (OUTPATIENT)
Dept: CARDIOLOGY | Facility: HOSPITAL | Age: 77
DRG: 313 | End: 2022-06-21
Attending: FAMILY MEDICINE
Payer: MEDICARE

## 2022-06-21 VITALS — HEIGHT: 68 IN | WEIGHT: 141 LBS | BODY MASS INDEX: 21.37 KG/M2

## 2022-06-21 LAB
ANION GAP SERPL CALC-SCNC: 8 MMOL/L (ref 8–16)
APTT PPP: 29.3 SEC (ref 23.3–35.1)
APTT PPP: 29.3 SEC (ref 23.3–35.1)
APTT PPP: 69.1 SEC (ref 23.3–35.1)
BASOPHILS # BLD AUTO: 0.02 K/UL (ref 0–0.2)
BASOPHILS # BLD AUTO: 0.02 K/UL (ref 0–0.2)
BASOPHILS NFR BLD: 0.2 % (ref 0–1.9)
BASOPHILS NFR BLD: 0.2 % (ref 0–1.9)
BSA FOR ECHO PROCEDURE: 1.75 M2
BUN SERPL-MCNC: 28 MG/DL (ref 8–23)
CALCIUM SERPL-MCNC: 8.8 MG/DL (ref 8.7–10.5)
CHLORIDE SERPL-SCNC: 107 MMOL/L (ref 95–110)
CO2 SERPL-SCNC: 22 MMOL/L (ref 23–29)
CREAT SERPL-MCNC: 1.2 MG/DL (ref 0.5–1.4)
CV ECHO LV RWT: 0.5 CM
DIFFERENTIAL METHOD: ABNORMAL
DIFFERENTIAL METHOD: ABNORMAL
E/A RATIO: 0.62
E/E' RATIO: 8.83 M/S
ECHO LV POSTERIOR WALL: 1.18 CM (ref 0.6–1.1)
EJECTION FRACTION: 65 %
EOSINOPHIL # BLD AUTO: 0.2 K/UL (ref 0–0.5)
EOSINOPHIL # BLD AUTO: 0.2 K/UL (ref 0–0.5)
EOSINOPHIL NFR BLD: 2.4 % (ref 0–8)
EOSINOPHIL NFR BLD: 2.4 % (ref 0–8)
ERYTHROCYTE [DISTWIDTH] IN BLOOD BY AUTOMATED COUNT: 17.6 % (ref 11.5–14.5)
ERYTHROCYTE [DISTWIDTH] IN BLOOD BY AUTOMATED COUNT: 17.6 % (ref 11.5–14.5)
EST. GFR  (AFRICAN AMERICAN): >60 ML/MIN/1.73 M^2
EST. GFR  (NON AFRICAN AMERICAN): 59.2 ML/MIN/1.73 M^2
ESTIMATED AVG GLUCOSE: 123 MG/DL (ref 68–131)
FRACTIONAL SHORTENING: 25 % (ref 28–44)
GLUCOSE SERPL-MCNC: 123 MG/DL (ref 70–110)
HBA1C MFR BLD: 5.9 % (ref 4.5–6.2)
HCT VFR BLD AUTO: 38.8 % (ref 40–54)
HCT VFR BLD AUTO: 38.8 % (ref 40–54)
HGB BLD-MCNC: 12.6 G/DL (ref 14–18)
HGB BLD-MCNC: 12.6 G/DL (ref 14–18)
IMM GRANULOCYTES # BLD AUTO: 0.06 K/UL (ref 0–0.04)
IMM GRANULOCYTES # BLD AUTO: 0.06 K/UL (ref 0–0.04)
IMM GRANULOCYTES NFR BLD AUTO: 0.7 % (ref 0–0.5)
IMM GRANULOCYTES NFR BLD AUTO: 0.7 % (ref 0–0.5)
INTERVENTRICULAR SEPTUM: 1.15 CM (ref 0.6–1.1)
LEFT INTERNAL DIMENSION IN SYSTOLE: 3.55 CM (ref 2.1–4)
LEFT VENTRICLE DIASTOLIC VOLUME INDEX: 61.22 ML/M2
LEFT VENTRICLE DIASTOLIC VOLUME: 107.75 ML
LEFT VENTRICLE MASS INDEX: 118 G/M2
LEFT VENTRICLE SYSTOLIC VOLUME INDEX: 25.4 ML/M2
LEFT VENTRICLE SYSTOLIC VOLUME: 44.63 ML
LEFT VENTRICULAR INTERNAL DIMENSION IN DIASTOLE: 4.76 CM (ref 3.5–6)
LEFT VENTRICULAR MASS: 207.39 G
LV LATERAL E/E' RATIO: 7.57 M/S
LV SEPTAL E/E' RATIO: 10.6 M/S
LYMPHOCYTES # BLD AUTO: 1.9 K/UL (ref 1–4.8)
LYMPHOCYTES # BLD AUTO: 1.9 K/UL (ref 1–4.8)
LYMPHOCYTES NFR BLD: 22.3 % (ref 18–48)
LYMPHOCYTES NFR BLD: 22.3 % (ref 18–48)
MAGNESIUM SERPL-MCNC: 2.1 MG/DL (ref 1.6–2.6)
MCH RBC QN AUTO: 28.5 PG (ref 27–31)
MCH RBC QN AUTO: 28.5 PG (ref 27–31)
MCHC RBC AUTO-ENTMCNC: 32.5 G/DL (ref 32–36)
MCHC RBC AUTO-ENTMCNC: 32.5 G/DL (ref 32–36)
MCV RBC AUTO: 88 FL (ref 82–98)
MCV RBC AUTO: 88 FL (ref 82–98)
MONOCYTES # BLD AUTO: 1.1 K/UL (ref 0.3–1)
MONOCYTES # BLD AUTO: 1.1 K/UL (ref 0.3–1)
MONOCYTES NFR BLD: 13.2 % (ref 4–15)
MONOCYTES NFR BLD: 13.2 % (ref 4–15)
MV PEAK A VEL: 0.85 M/S
MV PEAK E VEL: 0.53 M/S
NEUTROPHILS # BLD AUTO: 5.2 K/UL (ref 1.8–7.7)
NEUTROPHILS # BLD AUTO: 5.2 K/UL (ref 1.8–7.7)
NEUTROPHILS NFR BLD: 61.2 % (ref 38–73)
NEUTROPHILS NFR BLD: 61.2 % (ref 38–73)
NRBC BLD-RTO: 0 /100 WBC
NRBC BLD-RTO: 0 /100 WBC
PISA TR MAX VEL: 2.44 M/S
PLATELET # BLD AUTO: 350 K/UL (ref 150–450)
PLATELET # BLD AUTO: 350 K/UL (ref 150–450)
PMV BLD AUTO: 10.9 FL (ref 9.2–12.9)
PMV BLD AUTO: 10.9 FL (ref 9.2–12.9)
POTASSIUM SERPL-SCNC: 4 MMOL/L (ref 3.5–5.1)
RA PRESSURE: 3 MMHG
RBC # BLD AUTO: 4.42 M/UL (ref 4.6–6.2)
RBC # BLD AUTO: 4.42 M/UL (ref 4.6–6.2)
RIGHT VENTRICULAR END-DIASTOLIC DIMENSION: 267 CM
SODIUM SERPL-SCNC: 137 MMOL/L (ref 136–145)
TDI LATERAL: 0.07 M/S
TDI SEPTAL: 0.05 M/S
TDI: 0.06 M/S
TR MAX PG: 24 MMHG
TROPONIN I SERPL DL<=0.01 NG/ML-MCNC: 0.03 NG/ML
TROPONIN I SERPL DL<=0.01 NG/ML-MCNC: <0.03 NG/ML
TROPONIN I SERPL DL<=0.01 NG/ML-MCNC: <0.03 NG/ML
TV REST PULMONARY ARTERY PRESSURE: 27 MMHG
WBC # BLD AUTO: 8.44 K/UL (ref 3.9–12.7)
WBC # BLD AUTO: 8.44 K/UL (ref 3.9–12.7)

## 2022-06-21 PROCEDURE — 93325 DOPPLER ECHO COLOR FLOW MAPG: CPT | Mod: 26,,, | Performed by: INTERNAL MEDICINE

## 2022-06-21 PROCEDURE — 25000003 PHARM REV CODE 250: Performed by: FAMILY MEDICINE

## 2022-06-21 PROCEDURE — 93010 EKG 12-LEAD: ICD-10-PCS | Mod: ,,, | Performed by: SPECIALIST

## 2022-06-21 PROCEDURE — 93308 TTE F-UP OR LMTD: CPT | Mod: 26,,, | Performed by: INTERNAL MEDICINE

## 2022-06-21 PROCEDURE — 25000003 PHARM REV CODE 250: Performed by: NURSE PRACTITIONER

## 2022-06-21 PROCEDURE — 93321 DOPPLER ECHO F-UP/LMTD STD: CPT | Mod: 26,,, | Performed by: INTERNAL MEDICINE

## 2022-06-21 PROCEDURE — 94799 UNLISTED PULMONARY SVC/PX: CPT

## 2022-06-21 PROCEDURE — 36415 COLL VENOUS BLD VENIPUNCTURE: CPT | Performed by: FAMILY MEDICINE

## 2022-06-21 PROCEDURE — 99222 1ST HOSP IP/OBS MODERATE 55: CPT | Mod: ,,, | Performed by: INTERNAL MEDICINE

## 2022-06-21 PROCEDURE — 85730 THROMBOPLASTIN TIME PARTIAL: CPT | Mod: 91 | Performed by: FAMILY MEDICINE

## 2022-06-21 PROCEDURE — 99900035 HC TECH TIME PER 15 MIN (STAT)

## 2022-06-21 PROCEDURE — 83735 ASSAY OF MAGNESIUM: CPT | Performed by: FAMILY MEDICINE

## 2022-06-21 PROCEDURE — 93005 ELECTROCARDIOGRAM TRACING: CPT | Performed by: SPECIALIST

## 2022-06-21 PROCEDURE — 85025 COMPLETE CBC W/AUTO DIFF WBC: CPT | Performed by: FAMILY MEDICINE

## 2022-06-21 PROCEDURE — 93010 ELECTROCARDIOGRAM REPORT: CPT | Mod: 76,,, | Performed by: SPECIALIST

## 2022-06-21 PROCEDURE — 97535 SELF CARE MNGMENT TRAINING: CPT

## 2022-06-21 PROCEDURE — 84484 ASSAY OF TROPONIN QUANT: CPT | Performed by: FAMILY MEDICINE

## 2022-06-21 PROCEDURE — 80048 BASIC METABOLIC PNL TOTAL CA: CPT | Performed by: FAMILY MEDICINE

## 2022-06-21 PROCEDURE — 93010 ELECTROCARDIOGRAM REPORT: CPT | Mod: ,,, | Performed by: SPECIALIST

## 2022-06-21 PROCEDURE — 93321 ECHO (CUPID ONLY): ICD-10-PCS | Mod: 26,,, | Performed by: INTERNAL MEDICINE

## 2022-06-21 PROCEDURE — 99222 PR INITIAL HOSPITAL CARE,LEVL II: ICD-10-PCS | Mod: ,,, | Performed by: INTERNAL MEDICINE

## 2022-06-21 PROCEDURE — 93308 ECHO (CUPID ONLY): ICD-10-PCS | Mod: 26,,, | Performed by: INTERNAL MEDICINE

## 2022-06-21 PROCEDURE — 83036 HEMOGLOBIN GLYCOSYLATED A1C: CPT | Performed by: FAMILY MEDICINE

## 2022-06-21 PROCEDURE — 84484 ASSAY OF TROPONIN QUANT: CPT | Mod: 91 | Performed by: NURSE PRACTITIONER

## 2022-06-21 PROCEDURE — 93308 TTE F-UP OR LMTD: CPT

## 2022-06-21 PROCEDURE — 97162 PT EVAL MOD COMPLEX 30 MIN: CPT

## 2022-06-21 PROCEDURE — 21000000 HC CCU ICU ROOM CHARGE

## 2022-06-21 PROCEDURE — 93325 ECHO (CUPID ONLY): ICD-10-PCS | Mod: 26,,, | Performed by: INTERNAL MEDICINE

## 2022-06-21 PROCEDURE — 97166 OT EVAL MOD COMPLEX 45 MIN: CPT

## 2022-06-21 PROCEDURE — 36415 COLL VENOUS BLD VENIPUNCTURE: CPT | Performed by: NURSE PRACTITIONER

## 2022-06-21 PROCEDURE — 94761 N-INVAS EAR/PLS OXIMETRY MLT: CPT

## 2022-06-21 PROCEDURE — 36415 COLL VENOUS BLD VENIPUNCTURE: CPT | Performed by: INTERNAL MEDICINE

## 2022-06-21 PROCEDURE — 85730 THROMBOPLASTIN TIME PARTIAL: CPT | Mod: 91 | Performed by: INTERNAL MEDICINE

## 2022-06-21 PROCEDURE — 93321 DOPPLER ECHO F-UP/LMTD STD: CPT

## 2022-06-21 RX ORDER — ASPIRIN 81 MG/1
81 TABLET ORAL DAILY
Status: DISCONTINUED | OUTPATIENT
Start: 2022-06-22 | End: 2022-06-22 | Stop reason: HOSPADM

## 2022-06-21 RX ORDER — ASPIRIN 325 MG
325 TABLET, DELAYED RELEASE (ENTERIC COATED) ORAL DAILY
Status: DISCONTINUED | OUTPATIENT
Start: 2022-06-21 | End: 2022-06-21

## 2022-06-21 RX ORDER — ISOSORBIDE MONONITRATE 30 MG/1
30 TABLET, EXTENDED RELEASE ORAL DAILY
Status: DISCONTINUED | OUTPATIENT
Start: 2022-06-21 | End: 2022-06-22 | Stop reason: HOSPADM

## 2022-06-21 RX ORDER — CARVEDILOL 12.5 MG/1
12.5 TABLET ORAL 2 TIMES DAILY WITH MEALS
Status: DISCONTINUED | OUTPATIENT
Start: 2022-06-21 | End: 2022-06-22 | Stop reason: HOSPADM

## 2022-06-21 RX ORDER — ATORVASTATIN CALCIUM 20 MG/1
20 TABLET, FILM COATED ORAL NIGHTLY
Status: DISCONTINUED | OUTPATIENT
Start: 2022-06-21 | End: 2022-06-22 | Stop reason: HOSPADM

## 2022-06-21 RX ADMIN — CARVEDILOL 12.5 MG: 12.5 TABLET, FILM COATED ORAL at 05:06

## 2022-06-21 RX ADMIN — ISOSORBIDE MONONITRATE 30 MG: 30 TABLET, EXTENDED RELEASE ORAL at 01:06

## 2022-06-21 RX ADMIN — ALPRAZOLAM 0.25 MG: 0.25 TABLET ORAL at 08:06

## 2022-06-21 RX ADMIN — ATORVASTATIN CALCIUM 20 MG: 20 TABLET, FILM COATED ORAL at 08:06

## 2022-06-21 RX ADMIN — PANTOPRAZOLE SODIUM 40 MG: 40 TABLET, DELAYED RELEASE ORAL at 06:06

## 2022-06-21 RX ADMIN — ALPRAZOLAM 0.25 MG: 0.25 TABLET ORAL at 05:06

## 2022-06-21 RX ADMIN — ALPRAZOLAM 0.25 MG: 0.25 TABLET ORAL at 09:06

## 2022-06-21 RX ADMIN — TAMSULOSIN HYDROCHLORIDE 0.4 MG: 0.4 CAPSULE ORAL at 09:06

## 2022-06-21 RX ADMIN — CLOPIDOGREL BISULFATE 75 MG: 75 TABLET, FILM COATED ORAL at 09:06

## 2022-06-21 RX ADMIN — Medication 6 MG: at 08:06

## 2022-06-21 RX ADMIN — PANTOPRAZOLE SODIUM 40 MG: 40 TABLET, DELAYED RELEASE ORAL at 05:06

## 2022-06-21 NOTE — PT/OT/SLP EVAL
Occupational Therapy   Evaluation and Discharge Note    Name: Rajendra Castle  MRN: [id number]  Admitting Diagnosis:  Unstable angina   Recent Surgery: * No surgery found *      Recommendations:     Discharge Recommendations: home  Discharge Equipment Recommendations:  none  Barriers to discharge:  None    Assessment:     Rajendra Castle is a 76 y.o. male with a medical diagnosis of Unstable angina. At this time, patient does not require further acute OT services.     Plan:     During this hospitalization, patient does not require further acute OT services.  Please re-consult if situation changes.    · Plan of Care Reviewed with: patient    Subjective     Chief Complaint: fatigue  Patient/Family Comments/goals: to address his long hauler COVID symptoms     Occupational Profile:  Living Environment: Lives alone in a [facility] 0STE; tub/shower combination  Previous level of function: Pt reports being independent PTA but having lingering weakness/fatigue since COVID diagnosis   Roles and Routines: caretaker to self  Equipment Used at home:  none  Assistance upon Discharge: Pt has a friend who goes to the grocery store for him     Pain/Comfort:  · Pain Rating 1:  (not rated)  · Pain Rating Post-Intervention 1:  (not rated)    Objective:     Communicated with: nurse prior to session.  Patient found supine with telemetry, pulse ox (continuous), peripheral IV, blood pressure cuff upon OT entry to room.    General Precautions: Standard, fall   Respiratory Status: Room air     Occupational Performance:    Bed Mobility:    · Patient completed Supine to Sit with supervision  · Patient completed Sit to Supine with supervision    Functional Mobility/Transfers:  · Patient completed Sit <> Stand Transfer with stand by assistance  with  no assistive device   · Functional Mobility: SBA in hospital room no AD    Activities of Daily Living:  · Grooming: stand by assistance standing at sink  · Lower Body Dressing: stand by assistance to doff/don  socks seated EOB    Cognitive/Visual Perceptual:  Cognitive/Psychosocial Skills:     -       Oriented to: Person, Place, Time and Situation   -       Follows Commands/attention:Follows multistep  commands  -       Communication: clear/fluent  -       Safety awareness/insight to disability: intact     Physical Exam:  Upper Extremity Range of Motion:     -       Right Upper Extremity: WNL  -       Left Upper Extremity: WNL  Upper Extremity Strength:    -       Right Upper Extremity: 5/5  -       Left Upper Extremity: 5/5   Strength:    -       Right Upper Extremity: WNL  -       Left Upper Extremity: WNL  Fine Motor Coordination:    -       Intact  Gross motor coordination:   WFL    AMPAC 6 Click ADL:  AMPAC Total Score: 24  Education:    Patient left supine with all lines intact, call button in reach and bed alarm on      History:     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    Prostate CA     RA (rheumatoid arthritis)     Vitamin D insufficiency 9/30/2016       Past Surgical History:   Procedure Laterality Date    CARPAL TUNNEL RELEASE Right 5/28/2021    Procedure: RELEASE, CARPAL TUNNEL;  Surgeon: Jose Ryan II, MD;  Location: [facility] OR;  Service: Orthopedics;  Laterality: Right;    COLONOSCOPY  prior to 2016    normal findings per patient report    CYSTOSCOPY N/A 12/18/2018    Procedure: CYSTOSCOPY;  Surgeon: Garrett Pina MD;  Location: [facility] OR;  Service: Urology;  Laterality: N/A;    ESOPHAGOGASTRODUODENOSCOPY N/A 9/29/2020    Dr. Espinosa; empiric dilation; erythematous mucosa in antrum; gastric mucosal atrophy; hematin in entire stomach; biopsy: mid & distal esophagus WNL, stomach- WNL, negative for h pylori    PARATHYROIDECTOMY Right 10/27/2020    Procedure: PARATHYROIDECTOMY;  Surgeon: Latonia Clayton MD;   Location: [facility] OR 2ND FLR;  Service: ENT;  Laterality: Right;    RELEASE OF ULNAR NERVE AT CUBITAL TUNNEL Right 5/28/2021    Procedure: RELEASE, ULNAR TUNNEL;  Surgeon: Jose Ryan II, MD;  Location: [facility] OR;  Service: Orthopedics;  Laterality: Right;    TRANSRECTAL BIOPSY OF PROSTATE WITH ULTRASOUND GUIDANCE N/A 12/18/2018    Procedure: BIOPSY, PROSTATE, RECTAL APPROACH, WITH US GUIDANCE;  Surgeon: Garrett Pina MD;  Location: [facility] OR;  Service: Urology;  Laterality: N/A;       Time Tracking:     OT Date of Treatment: 06/21/22  OT Start Time: 1041  OT Stop Time: 1101  OT Total Time (min): 20 min    Billable Minutes:Evaluation 10  Self Care/Home Management 10    6/21/2022

## 2022-06-21 NOTE — CARE UPDATE
06/21/22 0800   Patient Assessment/Suction   Level of Consciousness (AVPU) alert   Respiratory Effort Unlabored   Expansion/Accessory Muscles/Retractions expansion symmetric   All Lung Fields Breath Sounds Anterior:;clear   Rhythm/Pattern, Respiratory depth regular   PRE-TX-O2   O2 Device (Oxygen Therapy) room air   SpO2 99 %   Pulse Oximetry Type Continuous   $ Pulse Oximetry - Multiple Charge Pulse Oximetry - Multiple   Pulse 82   Respiratory Evaluation   $ Care Plan Tech Time 15 min   $ Eval/Re-eval Charges Evaluation

## 2022-06-21 NOTE — TELEPHONE ENCOUNTER
----- Message from Mckinley Eisenberg sent at 6/21/2022 10:31 AM CDT -----  Type:  Sooner Appointment Request    Caller is requesting a sooner appointment.  Caller declined first available appointment listed below.  Caller will not accept being placed on the waitlist and is requesting a message be sent to doctor.    Name of Caller:  Ellie  When is the first available appointment?  8/22  Symptoms:  f/u  Best Call Back Number:  [phone] (home)     Additional Information:  pt need a 1 week follow up and 8/22 was first avail please advise-thank you

## 2022-06-21 NOTE — CONSULTS
Formerly [facility], later [facility]  Department of Cardiology  Consult Note      PATIENT NAME: Rajendra Castle  MRN: [id number]  TODAY'S DATE: 06/21/2022  ADMIT DATE: 6/20/2022                          CONSULT REQUESTED BY: Mely Wolfe MD    SUBJECTIVE     PRINCIPAL PROBLEM: Unstable angina      REASON FOR CONSULT:  Chest Pain      HPI:    Mr. Castle is a 76 year old black male patient with a PMH significant for HTN, Dyslipidemia, COVID, and CAD S/P PCI 3 days ago at [place]. Patient presented to ER with mild chest pain and generalized weakness. Currently rates CP at a 2/10. Currently denies SOB. Troponin negative x 4. Will obtain ECHO.     FROM H AND P       Rajendra Castle is a 76 y.o. Black or  male   With PMH of CAD s/p stent placement 3 days ago,  HTN, HLD, previous COVID infection approx 8 weeks ago,  who presents with chest pain.     Onset 2 hours before presentation to the ER  Located substernal with radiation across to B/L chest  Relieved with nitroglycerin gtt in the ER  Also started on heparin gtt in ER today  Recently had angiogram + stent placed 3 days ago at [place]  His cardiologist is Dr Lamb  CP associated with SOB  +dizziness  +palpitations  No n/v  No LE edema  He reports compliance with plavix and coreg since they were prescribed  No CP currently     Pt also reports worsening fatigue  +generalized weakness  He attributes this to long COVID  He reports he was getting better with OTC Coricidin (acetaminophen and chlorpheniramine) but then he had the stent placed  We also discussed today that his fatigue might also be due to his cardiac condition     He also reports anxiety  He went to the ER at [facility] for help with the fatigue   He reports they turned it into a visit about depression and he was discharged to [place].  He was surprised and upset by this.  He denies SI/HI and wants to feel better, mainly to have help with his fatigue and weakness.  He agrees to PT/OT  He is interested in  placement if it is available/recommended.      Review of patient's allergies indicates:  No Known Allergies    Past Medical History:   Diagnosis Date    Arthritis     DDD (degenerative disc disease), cervical     Degenerative disc disease     Heart murmur     Hypertension     Nontoxic multinodular goiter 2016    Prostate CA     RA (rheumatoid arthritis)     Vitamin D insufficiency 2016     Past Surgical History:   Procedure Laterality Date    CARPAL TUNNEL RELEASE Right 2021    Procedure: RELEASE, CARPAL TUNNEL;  Surgeon: Jose Ryan II, MD;  Location: [facility] OR;  Service: Orthopedics;  Laterality: Right;    COLONOSCOPY  prior to     normal findings per patient report    CYSTOSCOPY N/A 2018    Procedure: CYSTOSCOPY;  Surgeon: Garrett Pina MD;  Location: [facility] OR;  Service: Urology;  Laterality: N/A;    ESOPHAGOGASTRODUODENOSCOPY N/A 2020    Dr. Espinosa; empiric dilation; erythematous mucosa in antrum; gastric mucosal atrophy; hematin in entire stomach; biopsy: mid & distal esophagus WNL, stomach- WNL, negative for h pylori    PARATHYROIDECTOMY Right 10/27/2020    Procedure: PARATHYROIDECTOMY;  Surgeon: Latonia Clayton MD;  Location: [address];  Service: ENT;  Laterality: Right;    RELEASE OF ULNAR NERVE AT CUBITAL TUNNEL Right 2021    Procedure: RELEASE, ULNAR TUNNEL;  Surgeon: Jose Ryan II, MD;  Location: [facility] OR;  Service: Orthopedics;  Laterality: Right;    TRANSRECTAL BIOPSY OF PROSTATE WITH ULTRASOUND GUIDANCE N/A 2018    Procedure: BIOPSY, PROSTATE, RECTAL APPROACH, WITH US GUIDANCE;  Surgeon: Garrett Pina MD;  Location: [facility];  Service: Urology;  Laterality: N/A;     Social History     Tobacco Use    Smoking status: Former Smoker     Packs/day: 0.25     Years: 10.00     Pack years: 2.50     Quit date: 2001     Years since quittin.8    Smokeless tobacco: Never Used   Substance Use Topics    Alcohol use:  Yes     Comment: seldom    Drug use: Yes     Frequency: 8.0 times per week     Types: Marijuana        REVIEW OF SYSTEMS  CONSTITUTIONAL: +fatigue  EYES: No double vision, No blurred vision  NEURO: No headaches, No dizziness  RESPIRATORY: Negative for cough, shortness of breath and wheezing.    CARDIOVASCULAR: mild cp 2/10  GI: Negative for abdominal pain, No melena, diarrhea, nausea and vomiting.   : Negative for dysuria and frequency, Negative for hematuria  SKIN: Negative for bruising, Negative for edema or discoloration noted.   ENDOCRINE: Negative for polyphagia, Negative for heat intolerance, Negative for cold intolerance  PSYCHIATRIC: Negative for depression, Negative for anxiety, Negative for memory loss  MUSCULOSKELETAL:  +Generalized weakness and fatigue    OBJECTIVE     VITAL SIGNS (Most Recent)  Temp: 97.9 °F (36.6 °C) (06/21/22 0700)  Pulse: 82 (06/21/22 0800)  Resp: 20 (06/21/22 0700)  BP: (!) 158/79 (06/21/22 0700)  SpO2: 99 % (06/21/22 0800)    VENTILATION STATUS  Resp: 20 (06/21/22 0700)  SpO2: 99 % (06/21/22 0800)       I & O (Last 24H):    Intake/Output Summary (Last 24 hours) at 6/21/2022 0946  Last data filed at 6/21/2022 0400  Gross per 24 hour   Intake 240 ml   Output --   Net 240 ml       WEIGHTS  Wt Readings from Last 3 Encounters:   06/21/22 0300 64 kg (141 lb)   06/20/22 2240 64.2 kg (141 lb 8.6 oz)   06/20/22 1902 63.5 kg (140 lb)   06/07/22 1134 63.5 kg (140 lb)   06/07/22 1046 63.1 kg (139 lb 1.8 oz)       PHYSICAL EXAM  GENERAL: well built, well nourished, well-developed in no apparent distress alert and oriented.   HEENT: Normocephalic. Pupils normal and conjunctivae normal.  Mucous membranes normal, no cyanosis or icterus, trachea central,no pallor or icterus is noted..   NECK: No JVD. No bruit..   THYROID: Thyroid not enlarged. No nodules present..   CARDIAC: Regular rate and rhythm. S1 is normal.S2 is normal.No gallops, clicks or murmurs noted at this time.  CHEST ANATOMY:  normal.   LUNGS: Clear to auscultation. No wheezing or rhonchi..   ABDOMEN: Soft no masses or organomegaly.  No abdomen pulsations or bruits.  Normal bowel sounds. No pulsations and no masses felt, No guarding or rebound.   URINARY: No jackson catheter   EXTREMITIES: No cyanosis, clubbing or edema noted at this time., no calf tenderness bilaterally.   PERIPHERAL VASCULAR SYSTEM: Good palpable distal pulses.   CENTRAL NERVOUS SYSTEM: No focal motor or sensory deficits noted.   SKIN: Skin without lesions, moist, well perfused.   MUSCLE STRENGTH & TONE: No noteable weakness, atrophy or abnormal movement.     HOME MEDICATIONS:  Current Facility-Administered Medications on File Prior to Encounter   Medication Dose Route Frequency Provider Last Rate Last Admin    denosumab (PROLIA) injection 60 mg  60 mg Subcutaneous 1 time in Clinic/HOD Jean Carlos Hoffman MD         Current Outpatient Medications on File Prior to Encounter   Medication Sig Dispense Refill    acetaminophen/diphenhydramine (TYLENOL PM ORAL) Take 2 tablets by mouth nightly as needed. 2 tabs      alfuzosin (UROXATRAL) 10 mg Tb24 Take 1 tablet (10 mg total) by mouth daily with breakfast. 30 tablet 11    amLODIPine (NORVASC) 2.5 MG tablet TAKE 1 TABLET(2.5 MG) BY MOUTH EVERY DAY 90 tablet 0    carvediloL (COREG) 25 MG tablet TAKE 1 TABLET(25 MG) BY MOUTH TWICE DAILY WITH MEALS (Patient taking differently: Take 25 mg by mouth 2 (two) times daily with meals.) 180 tablet 3    cholecalciferol, vitamin D3, (VITAMIN D3) 100 mcg (4,000 unit) Cap Take 400 Units by mouth once daily.      cloNIDine (CATAPRES) 0.1 MG tablet Take 1 tablet (0.1 mg total) by mouth 2 (two) times daily as needed (only if blood pressure top number is over 200). (Patient not taking: Reported on 6/7/2022) 30 tablet 1    denosumab (PROLIA) 60 mg/mL Syrg Inject 60 mg into the skin every 6 (six) months.      methotrexate 2.5 MG Tab TAKE 6 TABLETS BY MOUTH EVERY WEEK (Patient taking  differently: Take 15 mg by mouth every 7 days. TAKE 6 TABLETS BY MOUTH EVERY WEEK) 72 tablet 1    multivitamin (THERAGRAN) per tablet Take 1 tablet by mouth once daily.      pantoprazole (PROTONIX) 40 MG tablet TAKE 1 TABLET(40 MG) BY MOUTH TWICE DAILY 180 tablet 0    sildenafiL (VIAGRA) 100 MG tablet Take 1 tablet (100 mg total) by mouth daily as needed for Erectile Dysfunction. 10 tablet 2    temazepam (RESTORIL) 30 mg capsule Take 30 mg by mouth nightly as needed.      traMADoL (ULTRAM) 50 mg tablet Take 1 tablet (50 mg total) by mouth every 6 (six) hours as needed for Pain. 120 tablet 5    [DISCONTINUED] meclizine (ANTIVERT) 12.5 mg tablet Take 1 tablet (12.5 mg total) by mouth 2 (two) times daily as needed for Dizziness. 30 tablet 0       SCHEDULED MEDS:   carvediloL  25 mg Oral BID WM    clopidogreL  75 mg Oral Daily    pantoprazole  40 mg Oral BID AC    tamsulosin  0.4 mg Oral Daily       CONTINUOUS INFUSIONS:   heparin (porcine) in D5W 12 Units/kg/hr (06/21/22 0926)    nitroGLYCERIN Stopped (06/21/22 0500)       PRN MEDS:acetaminophen, albuterol-ipratropium, ALPRAZolam, dextrose 50%, dextrose 50%, glucagon (human recombinant), glucose, glucose, heparin (PORCINE), heparin (PORCINE), HYDROcodone-acetaminophen, melatonin, morphine, naloxone, ondansetron, polyethylene glycol, senna-docusate 8.6-50 mg, simethicone, sodium chloride 0.9%    LABS AND DIAGNOSTICS     CBC LAST 3 DAYS  Recent Labs   Lab 06/20/22  1955 06/21/22  0200   WBC 8.76 8.44  8.44   RBC 4.54* 4.42*  4.42*   HGB 12.9* 12.6*  12.6*   HCT 40.1 38.8*  38.8*   MCV 88 88  88   MCH 28.4 28.5  28.5   MCHC 32.2 32.5  32.5   RDW 17.5* 17.6*  17.6*    350  350   MPV 11.2 10.9  10.9   GRAN 66.7  5.8 61.2  61.2  5.2  5.2   LYMPH 17.5*  1.5 22.3  22.3  1.9  1.9   MONO 12.9  1.1* 13.2  13.2  1.1*  1.1*   BASO 0.02 0.02  0.02   NRBC 0 0  0       COAGULATION LAST 3 DAYS  Recent Labs   Lab 06/20/22 1955 06/21/22  0159  06/21/22  0806   LABPT 14.5*  --   --    INR 1.2  --   --    APTT 23.5 69.1* 29.3       CHEMISTRY LAST 3 DAYS  Recent Labs   Lab 06/20/22 1955 06/21/22  0159    137   K 4.9 4.0    107   CO2 20* 22*   ANIONGAP 10 8   BUN 30* 28*   CREATININE 1.4 1.2   GLU 89 123*   CALCIUM 9.0 8.8   MG  --  2.1   ALBUMIN 3.6  --    PROT 7.4  --    ALKPHOS 72  --    ALT 17  --    AST 17  --    BILITOT 0.3  --        CARDIAC PROFILE LAST 3 DAYS  Recent Labs   Lab 06/20/22 1955 06/21/22  0200 06/21/22  0806   *  --   --    TROPONINI 0.032 <0.030 0.032       ENDOCRINE LAST 3 DAYS  No results for input(s): TSH, PROCAL in the last 168 hours.    LAST ARTERIAL BLOOD GAS  ABG  No results for input(s): PH, PO2, PCO2, HCO3, BE in the last 168 hours.    LAST 7 DAYS MICROBIOLOGY   Microbiology Results (last 7 days)     ** No results found for the last 168 hours. **          MOST RECENT IMAGING  X-Ray Chest AP Portable  Reason: Chest Pain    FINDINGS:    PA and lateral chest with comparison chest x-ray June 2, 2022 show normal cardiomediastinal silhouette.  Lungs are clear. Pulmonary vasculature is normal. No acute osseous abnormality.    IMPRESSION:    No acute cardiopulmonary abnormality.    Electronically signed by:  Jarad López DO  6/20/2022 7:18 PM CDT Workstation: [id number]      ECHOCARDIOGRAM RESULTS (last 5)  Results for orders placed during the hospital encounter of 05/22/22    STRESS TEST REPORT      Results for orders placed during the hospital encounter of 03/07/22    Echo    Interpretation Summary  · The left ventricle is normal in size with moderate concentric hypertrophy and normal systolic function.  · The estimated PA systolic pressure is 28 mmHg.  · Indeterminate left ventricular diastolic function.  · Normal right ventricular size with mildly reduced right ventricular systolic function.  · Normal central venous pressure (3 mmHg).  · The estimated ejection fraction is 70%.  · Moderate left atrial  enlargement.  · Mild mitral regurgitation.  · Mild tricuspid regurgitation.  · Mild-to-moderate aortic regurgitation.  · Trivial circumferential pericardial effusion. Effusion is fibrinous.  · Mild right atrial enlargement.      Results for orders placed during the hospital encounter of 09/28/20    Echo Color Flow Doppler? Yes    Interpretation Summary  · The left ventricle is normal in size with normal systolic function. The estimated ejection fraction is 65%.  · There is moderate left ventricular concentric hypertrophy.  · Normal left ventricular diastolic function.  · Normal right ventricular systolic function.  · Moderate aortic regurgitation.  · Moderate mitral regurgitation.  · No pulmonary hypertension  · Mild tricuspid regurgitation.  · Normal central venous pressure (3 mmHg).  · The estimated PA systolic pressure is 29 mmHg.  · Trivial posterior pericardial effusion. Effusion is fluid.    Left ventricle hypertrophy  Moderate aortic regurgitation mitral regurgitant  Mean left atrial pressure is normal  No pulmonary hypertension      CURRENT/PREVIOUS VISIT EKG  Results for orders placed or performed during the hospital encounter of 06/20/22   EKG 12-lead    Collection Time: 06/21/22  8:33 AM    Narrative    Test Reason : R07.9,    Vent. Rate : 081 BPM     Atrial Rate : 081 BPM     P-R Int : 318 ms          QRS Dur : 092 ms      QT Int : 440 ms       P-R-T Axes : 065 009 198 degrees     QTc Int : 511 ms    Sinus rhythm with 1st degree A-V block  Possible Left atrial enlargement  ST and Marked T wave abnormality, consider anterolateral ischemia  Prolonged QT  Abnormal ECG  When compared with ECG of 20-JUN-2022 19:24,  Sinus rhythm is no longer with 2nd degree A-V block (Mobitz I)  ST now depressed in Anterior leads    Referred By: [email or web address]   SELF           Confirmed By:            ASSESSMENT/PLAN:     Active Hospital Problems    Diagnosis    *Unstable angina    HTN (hypertension)    COVID-19 long hauler     Atherosclerosis of native coronary artery of native heart with angina pectoris with documented spasm    Chest pain    Stage 3 chronic kidney disease, unspecified whether stage 3a or 3b CKD    Rheumatoid lung    Pulmonary emphysema    Gastroesophageal reflux disease without esophagitis    Rheumatoid arthritis involving multiple sites       ASSESSMENT & PLAN:       1. Atypical CP- Troponin negative  2. CAD H/O PCI -Awaiting records  3. HTN  4. Dyslipidemia  5. H/O COVID  6. Generalized weakness  7. GERD        RECOMMENDATIONS:      DC Heparin and nitro gtt  Continue plavix and ASA  Trend troponin  Start Imdur 30 mg daily  ECHO   Walk around unit assess for symptoms  Awaiting recent records from [place]  No further recommendations for now will follow      Shanta Samuel NP  [facility]  Department of Cardiology  Date of Service: 06/21/2022        I have personally interviewed and examined the patient, I have reviewed the Nurse Practitioner's history and physical, assessment, and plan. I agree with the findings and plan.      Jone Puga M.D.  [facility]  Department of Cardiology  Date of Service: 06/21/2022  9:46 AM

## 2022-06-21 NOTE — H&P
[facility] Medicine   History & Physical   Patient Name: Rajendra Castle  MRN: [id number]  Admission Date: 6/20/2022  6:58 PM  Attending Physician: Jyoti Douglas MD  Primary Care Provider: Sp Lilly MD  Face-to-Face encounter date: 06/20/2022    Patient information was obtained from patient, past medical records, ER physician, and ER records.     HISTORY OF PRESENT ILLNESS:     Rajendra Castle is a 76 y.o. Black or  male   With PMH of CAD s/p stent placement 3 days ago,  HTN, HLD, previous COVID infection approx 8 weeks ago,  who presents with chest pain.    Onset 2 hours before presentation to the ER  Located substernal with radiation across to B/L chest  Relieved with nitroglycerin gtt in the ER  Also started on heparin gtt in ER today  Recently had angiogram + stent placed 3 days ago at [facility]  His cardiologist is Dr Lamb  CP associated with SOB  +dizziness  +palpitations  No n/v  No LE edema  He reports compliance with plavix and coreg since they were prescribed  No CP currently    Pt also reports worsening fatigue  +generalized weakness  He attributes this to long COVID  He reports he was getting better with OTC Coricidin (acetaminophen and chlorpheniramine) but then he had the stent placed  We also discussed today that his fatigue might also be due to his cardiac condition    He also reports anxiety  He went to the ER at [facility] for help with the fatigue   He reports they turned it into a visit about depression and he was discharged to [facility].  He was surprised and upset by this.  He denies SI/HI and wants to feel better, mainly to have help with his fatigue and weakness.  He agrees to PT/OT  He is interested in placement if it is available/recommended.    REVIEW OF SYSTEMS:     All systems reviewed and are negative except as noted per above.    PAST MEDICAL HISTORY:     Past Medical History:   Diagnosis Date    Arthritis     DDD (degenerative disc disease),  cervical     Degenerative disc disease     Heart murmur     Hypertension     Nontoxic multinodular goiter 9/20/2016    Prostate CA     RA (rheumatoid arthritis)     Vitamin D insufficiency 9/30/2016       PAST SURGICAL HISTORY:     Past Surgical History:   Procedure Laterality Date    CARPAL TUNNEL RELEASE Right 5/28/2021    Procedure: RELEASE, CARPAL TUNNEL;  Surgeon: Jose Ryan II, MD;  Location: [facility];  Service: Orthopedics;  Laterality: Right;    COLONOSCOPY  prior to 2016    normal findings per patient report    CYSTOSCOPY N/A 12/18/2018    Procedure: CYSTOSCOPY;  Surgeon: Garrett Pina MD;  [facility];  Service: Urology;  Laterality: N/A;    ESOPHAGOGASTRODUODENOSCOPY N/A 9/29/2020    Dr. Espinosa; empiric dilation; erythematous mucosa in antrum; gastric mucosal atrophy; hematin in entire stomach; biopsy: mid & distal esophagus WNL, stomach- WNL, negative for h pylori    PARATHYROIDECTOMY Right 10/27/2020    Procedure: PARATHYROIDECTOMY;  Surgeon: Latonia Clayton MD;  Location: [address];  Service: ENT;  Laterality: Right;    RELEASE OF ULNAR NERVE AT CUBITAL TUNNEL Right 5/28/2021    Procedure: RELEASE, ULNAR TUNNEL;  Surgeon: Jose Ryan II, MD;  Location: [facility];  Service: Orthopedics;  Laterality: Right;    TRANSRECTAL BIOPSY OF PROSTATE WITH ULTRASOUND GUIDANCE N/A 12/18/2018    Procedure: BIOPSY, PROSTATE, RECTAL APPROACH, WITH US GUIDANCE;  Surgeon: Garrett Pina MD;  Location: [facility];  Service: Urology;  Laterality: N/A;       ALLERGIES:   Patient has no known allergies.    FAMILY HISTORY:     Family History   Problem Relation Age of Onset    Heart disease Mother     Stroke Father     Drug abuse Sister     No Known Problems Daughter     No Known Problems Daughter     No Known Problems Son     Diabetes Maternal Uncle     Colon cancer Neg Hx     Crohn's disease Neg Hx     Esophageal cancer Neg Hx     Stomach cancer Neg Hx      Ulcerative colitis Neg Hx        SOCIAL HISTORY:     Social History     Tobacco Use    Smoking status: Former Smoker     Packs/day: 0.25     Years: 10.00     Pack years: 2.50     Quit date: 2001     Years since quittin.8    Smokeless tobacco: Never Used   Substance Use Topics    Alcohol use: Yes     Comment: seldom        Social History     Substance and Sexual Activity   Sexual Activity Yes    Partners: Female        HOME MEDICATIONS:     Prior to Admission medications    Medication Sig Start Date End Date Taking? Authorizing Provider   acetaminophen/diphenhydramine (TYLENOL PM ORAL) Take 2 tablets by mouth nightly as needed. 2 tabs    Historical Provider   alfuzosin (UROXATRAL) 10 mg Tb24 Take 1 tablet (10 mg total) by mouth daily with breakfast. 22  Garrett Pina MD   amLODIPine (NORVASC) 2.5 MG tablet TAKE 1 TABLET(2.5 MG) BY MOUTH EVERY DAY 22   ADDY SwansonP   carvediloL (COREG) 25 MG tablet TAKE 1 TABLET(25 MG) BY MOUTH TWICE DAILY WITH MEALS  Patient taking differently: Take 25 mg by mouth 2 (two) times daily with meals. 11/10/21   Irvin Aceves MD   cholecalciferol, vitamin D3, (VITAMIN D3) 100 mcg (4,000 unit) Cap Take 400 Units by mouth once daily.    Historical Provider   cloNIDine (CATAPRES) 0.1 MG tablet Take 1 tablet (0.1 mg total) by mouth 2 (two) times daily as needed (only if blood pressure top number is over 200).  Patient not taking: Reported on 2022 3/8/22   Lobito Andrea MD   denosumab (PROLIA) 60 mg/mL Syrg Inject 60 mg into the skin every 6 (six) months.    Historical Provider   methotrexate 2.5 MG Tab TAKE 6 TABLETS BY MOUTH EVERY WEEK  Patient taking differently: Take 15 mg by mouth every 7 days. TAKE 6 TABLETS BY MOUTH EVERY WEEK 3/28/22   Uriel Garcia MD   multivitamin (THERAGRAN) per tablet Take 1 tablet by mouth once daily.    Historical Provider   pantoprazole (PROTONIX) 40 MG tablet TAKE 1 TABLET(40 MG) BY MOUTH TWICE DAILY  "6/5/22   MARILEE Swanson   sildenafiL (VIAGRA) 100 MG tablet Take 1 tablet (100 mg total) by mouth daily as needed for Erectile Dysfunction. 9/28/21 4/25/22  Sp Lilly MD   temazepam (RESTORIL) 30 mg capsule Take 30 mg by mouth nightly as needed. 5/26/22   Historical Provider   traMADoL (ULTRAM) 50 mg tablet Take 1 tablet (50 mg total) by mouth every 6 (six) hours as needed for Pain. 2/22/22   Uriel Garcia MD   meclizine (ANTIVERT) 12.5 mg tablet Take 1 tablet (12.5 mg total) by mouth 2 (two) times daily as needed for Dizziness. 5/23/22 6/2/22  MARILEE Swanson       PHYSICAL EXAM:     /74   Pulse 79   Temp 98.8 °F (37.1 °C) (Oral)   Resp 20   Ht 5' 8" (1.727 m)   Wt 63.5 kg (140 lb)   SpO2 97%   BMI 21.29 kg/m²     Gen: alert, responsive  HEENT:  Eyes - no pallor  External ears with no lesions  Nares patent  Mouth - lips chapped  CV: RRR  Lungs: CTA B/L  Abd: +BS, soft, NT, ND  Ext: no atrophy or edema  Skin: warm, dry  Neuro: grossly intact  Psych: pleasant     LABS AND IMAGING:     Labs Reviewed   CBC W/ AUTO DIFFERENTIAL - Abnormal; Notable for the following components:       Result Value    RBC 4.54 (*)     Hemoglobin 12.9 (*)     RDW 17.5 (*)     Immature Granulocytes 0.8 (*)     Immature Grans (Abs) 0.07 (*)     Mono # 1.1 (*)     Lymph % 17.5 (*)     All other components within normal limits    Narrative:     Draw baseline aPTT prior to starting the heparin bolus or  infusion  (if patient is on warfarin prior to heparin therapy)   COMPREHENSIVE METABOLIC PANEL - Abnormal; Notable for the following components:    CO2 20 (*)     BUN 30 (*)     eGFR if  56.8 (*)     eGFR if non  49.1 (*)     All other components within normal limits    Narrative:     Draw baseline aPTT prior to starting the heparin bolus or  infusion  (if patient is on warfarin prior to heparin therapy)   B-TYPE NATRIURETIC PEPTIDE - Abnormal; Notable for the " following components:     (*)     All other components within normal limits    Narrative:     Draw baseline aPTT prior to starting the heparin bolus or  infusion  (if patient is on warfarin prior to heparin therapy)   PROTIME-INR - Abnormal; Notable for the following components:    PT 14.5 (*)     All other components within normal limits    Narrative:     Draw baseline aPTT prior to starting the heparin bolus or  infusion  (if patient is on warfarin prior to heparin therapy)   TROPONIN I    Narrative:     Draw baseline aPTT prior to starting the heparin bolus or  infusion  (if patient is on warfarin prior to heparin therapy)   SARS-COV-2 RNA AMPLIFICATION, QUAL   APTT    Narrative:     Draw baseline aPTT prior to starting the heparin bolus or  infusion  (if patient is on warfarin prior to heparin therapy)   TROPONIN I     Imaging Results          X-Ray Chest AP Portable (Final result)  Result time 06/20/22 19:18:28    Final result by Jarad López MD (06/20/22 19:18:28)                 Narrative:    Reason: Chest Pain    FINDINGS:    PA and lateral chest with comparison chest x-ray June 2, 2022 show normal cardiomediastinal silhouette.  Lungs are clear. Pulmonary vasculature is normal. No acute osseous abnormality.    IMPRESSION:    No acute cardiopulmonary abnormality.    Electronically signed by:  Jarad López DO  6/20/2022 7:18 PM CDT Workstation: [id number]                                ASSESSMENT & PLAN:   Rajendra Castle is a 76 y.o. male admitted for    Active Hospital Problems    Diagnosis  POA    *Unstable angina [I20.0]  Yes    HTN (hypertension) [I10]  Yes    Atherosclerosis of native coronary artery of native heart with angina pectoris with documented spasm [I25.111]  Yes    Chest pain [R07.9]  Yes    Stage 3 chronic kidney disease, unspecified whether stage 3a or 3b CKD [N18.30]  Yes    Rheumatoid lung [M05.10]  Yes    Pulmonary emphysema [J43.9]  Yes    Gastroesophageal reflux  disease without esophagitis [K21.9]  Yes    Rheumatoid arthritis involving multiple sites [M06.9]  Yes      Resolved Hospital Problems   No resolved problems to display.        Plan    Unstable Angina  - continue heparin gtt  - continue nitroglycerin gtt  - trending troponin  - telemetry  - echo  - cardiology consulted in the ER and is following, thank you    Fatigue  Generalized weakness  Long Covid  - onset after COVID infection  - PT/OT    Chronic conditions as noted above/below; home medications reviewed personally by me and restarted as appropriate  Electrolyte derangement:  Trending BMP; Mg; replacement prn  DVT ppx: heparin  FULL CODE    Jyoti Douglas MD  [facility] Hospitalist  06/20/2022

## 2022-06-21 NOTE — PLAN OF CARE
"   06/21/22 1713   Readmission   Why were you hospitalized in the last 30 days? Angiogram and stent placemnet   Why were you readmitted? Related to previous admission   When you left the hospital how did you feel? "felt alright"   When you left the hospital where did you go? Home Alone   Did patient/caregiver refused recommended DC plan? No   Tell me about what happened between when you left the hospital and the day you returned. Pt stated he started to have chest pain one day ago   When did you start not feeling well? 6/20   Did you try to manage your symptoms your self? No   Did you call anyone? No   Why? Pt stated he came to ER   Did you try to see or did see a doctor or nurse before you came? No   Did you have  a follow-up appointment on discharge? Yes   Did you go? No   Why?   (Appt scheduled for 6/23 with Dr Lamb)   Was this a planned readmission? No     "

## 2022-06-21 NOTE — ED PROVIDER NOTES
Encounter Date: 6/20/2022       History     Chief Complaint   Patient presents with    Chest Pain     76-year-old male presented emergency department with anterior chest pain which radiates to the shoulders across the chest.  Patient said pain started about 2 hours ago however now improved and down to 5 3/10.  Patient denies fever or chills or nausea vomiting or shortness of breath or abdominal pain.  Patient said he had similar pain and 3 days ago was at [facility] and had angiogram and a stent placement and he was told he had abnormal EKG while he was there as well.  Patient denies fever or chills or nausea vomiting or shortness of breath.  Patient states today's pain is not as bad and now improved significantly and is pain is 3/10 at this time.  Pain radiates across the chest.  Denies dysuria or hematuria or any weakness or numbness.  Patient taking Plavix currently.        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    Prostate CA     RA (rheumatoid arthritis)     Vitamin D insufficiency 9/30/2016     Past Surgical History:   Procedure Laterality Date    CARPAL TUNNEL RELEASE Right 5/28/2021    Procedure: RELEASE, CARPAL TUNNEL;  Surgeon: Jose Ryan II, MD;  [facility] OR;  Service: Orthopedics;  Laterality: Right;    COLONOSCOPY  prior to 2016    normal findings per patient report    CYSTOSCOPY N/A 12/18/2018    Procedure: CYSTOSCOPY;  Surgeon: Garrett Pina MD;  Location: [facility] OR;  Service: Urology;  Laterality: N/A;    ESOPHAGOGASTRODUODENOSCOPY N/A 9/29/2020    Dr. Espinosa; empiric dilation; erythematous mucosa in antrum; gastric mucosal atrophy; hematin in entire stomach; biopsy: mid & distal esophagus WNL, stomach- WNL, negative for h pylori     PARATHYROIDECTOMY Right 10/27/2020    Procedure: PARATHYROIDECTOMY;  Surgeon: Latonia Clayton MD;  [facility];  Service: ENT;  Laterality: Right;    RELEASE OF ULNAR NERVE AT CUBITAL TUNNEL Right 2021    Procedure: RELEASE, ULNAR TUNNEL;  Surgeon: Jose Ryan II, MD;  Location: [facility] OR;  Service: Orthopedics;  Laterality: Right;    TRANSRECTAL BIOPSY OF PROSTATE WITH ULTRASOUND GUIDANCE N/A 2018    Procedure: BIOPSY, PROSTATE, RECTAL APPROACH, WITH US GUIDANCE;  Surgeon: Garrett Pina MD;  Location: [facility] OR;  Service: Urology;  Laterality: N/A;     Family History   Problem Relation Age of Onset    Heart disease Mother     Stroke Father     Drug abuse Sister     No Known Problems Daughter     No Known Problems Daughter     No Known Problems Son     Diabetes Maternal Uncle     Colon cancer Neg Hx     Crohn's disease Neg Hx     Esophageal cancer Neg Hx     Stomach cancer Neg Hx     Ulcerative colitis Neg Hx      Social History     Tobacco Use    Smoking status: Former Smoker     Packs/day: 0.25     Years: 10.00     Pack years: 2.50     Quit date: 2001     Years since quittin.8    Smokeless tobacco: Never Used   Substance Use Topics    Alcohol use: Yes     Comment: seldom    Drug use: Yes     Frequency: 8.0 times per week     Types: Marijuana     Review of Systems   Constitutional: Negative.    HENT: Negative.    Eyes: Negative.    Respiratory: Negative.    Cardiovascular: Positive for chest pain.   Gastrointestinal: Negative.    Endocrine: Negative.    Genitourinary: Negative.    Musculoskeletal: Negative.    Skin: Negative.    Allergic/Immunologic: Negative.    Neurological: Negative.    Hematological: Negative.    Psychiatric/Behavioral: Negative.    All other systems reviewed and are negative.      Physical Exam     Initial Vitals [22 1902]   BP Pulse Resp Temp SpO2   138/80 94 18 98.8 °F (37.1 °C) 98 %      MAP       --         Physical  Exam    Nursing note and vitals reviewed.  Constitutional: He appears well-developed and well-nourished.   HENT:   Head: Normocephalic and atraumatic.   Nose: Nose normal.   Eyes: Conjunctivae and EOM are normal.   Neck: Neck supple. No tracheal deviation present.   Normal range of motion.  Cardiovascular: Normal rate, regular rhythm, normal heart sounds and intact distal pulses. Exam reveals no friction rub.    No murmur heard.  Pulmonary/Chest: Breath sounds normal. No respiratory distress. He has no wheezes. He has no rales. He exhibits no tenderness.   Abdominal: Abdomen is soft. He exhibits no distension. There is no abdominal tenderness.   Musculoskeletal:         General: No tenderness or edema. Normal range of motion.      Cervical back: Normal range of motion and neck supple.     Neurological: He is alert and oriented to person, place, and time. He has normal strength. No cranial nerve deficit or sensory deficit. GCS score is 15. GCS eye subscore is 4. GCS verbal subscore is 5. GCS motor subscore is 6.   Skin: Skin is warm and dry. Capillary refill takes less than 2 seconds.   Psychiatric: He has a normal mood and affect. Thought content normal.         ED Course   Critical Care    Date/Time: 6/20/2022 7:49 PM  Performed by: Cheko Aguila MD  Authorized by: Cheko Aguila MD   Direct patient critical care time: 20 minutes  Ordering / reviewing critical care time: 5 minutes  Documentation critical care time: 5 minutes  Consulting other physicians critical care time: 5 minutes  Total critical care time (exclusive of procedural time) : 35 minutes        Labs Reviewed   CBC W/ AUTO DIFFERENTIAL - Abnormal; Notable for the following components:       Result Value    RBC 4.54 (*)     Hemoglobin 12.9 (*)     RDW 17.5 (*)     Immature Granulocytes 0.8 (*)     Immature Grans (Abs) 0.07 (*)     Mono # 1.1 (*)     Lymph % 17.5 (*)     All other components within normal limits    Narrative:     Draw baseline aPTT prior to  starting the heparin bolus or  infusion  (if patient is on warfarin prior to heparin therapy)   COMPREHENSIVE METABOLIC PANEL - Abnormal; Notable for the following components:    CO2 20 (*)     BUN 30 (*)     eGFR if  56.8 (*)     eGFR if non  49.1 (*)     All other components within normal limits    Narrative:     Draw baseline aPTT prior to starting the heparin bolus or  infusion  (if patient is on warfarin prior to heparin therapy)   B-TYPE NATRIURETIC PEPTIDE - Abnormal; Notable for the following components:     (*)     All other components within normal limits    Narrative:     Draw baseline aPTT prior to starting the heparin bolus or  infusion  (if patient is on warfarin prior to heparin therapy)   PROTIME-INR - Abnormal; Notable for the following components:    PT 14.5 (*)     All other components within normal limits    Narrative:     Draw baseline aPTT prior to starting the heparin bolus or  infusion  (if patient is on warfarin prior to heparin therapy)   TROPONIN I    Narrative:     Draw baseline aPTT prior to starting the heparin bolus or  infusion  (if patient is on warfarin prior to heparin therapy)   SARS-COV-2 RNA AMPLIFICATION, QUAL   APTT    Narrative:     Draw baseline aPTT prior to starting the heparin bolus or  infusion  (if patient is on warfarin prior to heparin therapy)   TROPONIN I     EKG Readings: (Independently Interpreted)   Rhythm: Normal Sinus Rhythm. Ectopy: No Ectopy. Conduction: Normal. T Waves Flipped: V2, V3, V4, V5, V6, I, II, AVL and AVF.   Prolonged QT   Other EKG Interpretations: Repeat EKG does not show any dynamic changes       Imaging Results          X-Ray Chest AP Portable (Final result)  Result time 06/20/22 19:18:28    Final result by Jarad López MD (06/20/22 19:18:28)                 Narrative:    Reason: Chest Pain    FINDINGS:    PA and lateral chest with comparison chest x-ray June 2, 2022 show normal cardiomediastinal  silhouette.  Lungs are clear. Pulmonary vasculature is normal. No acute osseous abnormality.    IMPRESSION:    No acute cardiopulmonary abnormality.    Electronically signed by:  Jarad López DO  6/20/2022 7:18 PM CDT Workstation: [id number]                               Medications   nitroGLYCERIN 50 mg in dextrose 5 % 250 mL infusion (TITRATING) (5 mcg/min Intravenous New Bag 6/20/22 2049)   heparin 25,000 units in dextrose 5% 250 mL (100 units/mL) infusion LOW INTENSITY nomogram - [facility] (12 Units/kg/hr × 63.5 kg (Adjusted) Intravenous New Bag 6/20/22 2012)   heparin 25,000 units in dextrose 5% (100 units/ml) IV bolus from bag - ADDITIONAL PRN BOLUS - 60 units/kg (max bolus 4000 units) (has no administration in time range)   heparin 25,000 units in dextrose 5% (100 units/ml) IV bolus from bag - ADDITIONAL PRN BOLUS - 30 units/kg (max bolus 4000 units) (has no administration in time range)   0.9%  NaCl infusion (has no administration in time range)   aspirin tablet 325 mg (325 mg Oral Given 6/20/22 1917)   nitroGLYCERIN 2% TD oint ointment 0.5 inch (0.5 inches Topical (Top) Given 6/20/22 1917)   morphine injection 4 mg (4 mg Intravenous Given 6/20/22 1918)   heparin 25,000 units in dextrose 5% (100 units/ml) IV bolus from bag INITIAL BOLUS (max bolus 4000 units) (3,810 Units Intravenous Bolus from Bag 6/20/22 2009)     Medical Decision Making:   Differential Diagnosis:   76-year-old male presented emergency department with chest pain which now significantly improved.  Patient hemodynamically stable.  Patient does have abnormal EKG and EKG sent to cardiologist on-call Dr. Puga who reviewed the EKG and recommended treatment with nitroglycerin drip and heparin drip.  Patient did have improvement of symptoms with treatment.  Cardiologist determined patient not a cath lab patient at this time.  Screening labs and cardiac workup reviewed.  Screen symptoms improved.  Hospital Medicine consulted for evaluation for  admission and further management.  Clinical Tests:   Lab Tests: Reviewed  Radiological Study: Reviewed  Medical Tests: Reviewed  ED Management:  Screening cardiac workup reviewed.  Patient's pain completely resolved.  Troponin negative.  Hospital Medicine to evaluate patient for admission.                      Clinical Impression:   Final diagnoses:  [R07.9] Chest pain  [I20.0] Unstable angina (Primary)          ED Disposition Condition    Admit               Cheko Aguila MD  06/20/22 4779

## 2022-06-21 NOTE — PROGRESS NOTES
"[facility] Medicine Progress Note  Patient Name: Rajendra Castle MRN: [id number]   Patient Class: IP- Inpatient  Length of Stay: 1   Admission Date: 6/20/2022  6:58 PM Attending Physician: Mely Wolfe MD   Primary Care Provider: Sp Lilly MD Face-to-Face encounter date: 06/21/2022   Chief Complaint: Chest Pain      Subjective:    Interval History   Patient reports shortness of breath and generalize chest pain  His cardiac enzymes are negative  He recently had LHC and had stent  Placed  He attribute his symptoms to long COVID    Review of Systems   All other Review of Systems were found to be negative expect for that mentioned already in HPI.       Objective:   Physical Exam  BP (!) 158/79 (BP Location: Right arm, Patient Position: Lying)   Pulse 82   Temp 97.9 °F (36.6 °C) (Oral)   Resp 20   Ht 5' 8" (1.727 m)   Wt 64 kg (141 lb)   SpO2 99%   BMI 21.44 kg/m²   Constitutional: No distress.   HENT: Atraumatic.   Cardiovascular: Normal rate, regular rhythm and normal heart sounds.   Pulmonary/Chest: Effort normal. Clear to auscultation bilaterally. No wheezes.   Abdominal: Soft. Bowel sounds are normal. Exhibits no distension and no mass. No tenderness  Neurological: Alert.   Skin: Skin is warm and dry.     Labs and Imaging    Significant Labs: All pertinent labs within the past 24 hours have been reviewed.    Significant Imaging: I have reviewed all pertinent imaging results/findings within the past 24 hours.    I have reviewed the Vitals, labs and imaging as above.     Assessment & Plan:   Rajendra Castle is a 76 y.o. male admitted for    ACS - unlikely, normal cardiac enzymes, continue current management, cardiology evaluation pending  Essential hypertension - home meds  CAD s/p stenting - cardiology eval pending  Generalize weakness -may need home health PT and OT      Core measures:  - Code status: full code   - Diet: cardiac  VTE Risk Mitigation (From admission, onward) "         Ordered     IP VTE HIGH RISK PATIENT  Once         06/20/22 2218     Place sequential compression device  Until discontinued         06/20/22 2218                Discharge Planning:   Discharge Planning   DEVONTE: 06/22/2022    Code Status: Full Code   Is the patient medically ready for discharge?: No    Reason for patient still in hospital (select all that apply): Patient trending condition  Discharge Plan A: Home with assistance from family        Above encounter included review of the medical records, interviewing and examining the patient face-to-face, discussion with family and other health care providers, ordering and interpreting lab/test results and formulating a plan of care.     Medical Decision Making:  [] Low Complexity  [x] Moderate Complexity  [] High Complexity    Mely Wolfe MD  [facility] Hospitalist  06/21/2022

## 2022-06-21 NOTE — PLAN OF CARE
Goals to be met by: 2022     Patient will increase functional independence with mobility by performin. Supine to sit Independent  2. Sit to stand transfer Independent  3. Bed to chair transfer Independent  4. Gait  x 400 feet Independent.

## 2022-06-21 NOTE — PLAN OF CARE
[facility]  Initial Discharge Assessment       Primary Care Provider: Sp Lilly MD    Admission Diagnosis: Unstable angina [I20.0]    Admission Date: 6/20/2022  Expected Discharge Date: 6/22/2022    Discharge Barriers Identified: (P) None    Payor: [employer]ICARE / Plan: [employer] 65 / Product Type: Medicare Advantage /     Extended Emergency Contact Information  Primary Emergency Contact: Martel,Collette  Address: [facility]           [place] [address] [facility]  Home Phone: [phone]  Mobile Phone: [phone]  Relation: Relative  Preferred language: English   needed? No  Secondary Emergency Contact: Jane Castle  Mobile Phone: [phone]  Relation: Grandchild  Preferred language: English   needed? No    Discharge Plan A: (P) Home Health  Discharge Plan B: (P) Skilled Nursing Facility    Pt stated he has weakness to legs and has COVID lung and believe will need to go for rehab before going back home. Informed will f/u with PT and MD for their recommendation. Pt verbalized understanding.     As per Radha/PCP office, No available PHV appt until August. She stated she will send note to PCP office and office benjamin call pt with appt.     [id number] - [place], [place] - [address] [facility]  [address]  [facility] [address]  Phone: [phone] Fax: [phone]      Initial Assessment (most recent)       Adult Discharge Assessment - 06/21/22 8360          Discharge Assessment    Assessment Type Discharge Planning Assessment     Confirmed/corrected address, phone number and insurance Yes     Confirmed Demographics Correct on Facesheet     Source of Information patient     Lives With alone     Do you expect to return to your current living situation? Yes     Do you have help at home or someone to help you manage your care at home? Yes     Who are your caregiver(s)  and their phone number(s)? Jose(friend) (P)      Prior to hospitilization cognitive status: Alert/Oriented (P)      Current cognitive status: Alert/Oriented (P)      Walking or Climbing Stairs Difficulty none (P)      Dressing/Bathing Difficulty none (P)      Equipment Currently Used at Home none (P)      Readmission within 30 days? Yes (P)      Patient currently being followed by outpatient case management? No (P)      Do you currently have service(s) that help you manage your care at home? No (P)      Do you take prescription medications? Yes (P)      Do you have prescription coverage? Yes (P)      Coverage [employer] (P)      Do you have any problems affording any of your prescribed medications? No (P)      Is the patient taking medications as prescribed? yes (P)      Who is going to help you get home at discharge? Pt stated he will get someone (P)      How do you get to doctors appointments? car, drives self (P)      Are you on dialysis? No (P)      Do you take coumadin? No (P)      Discharge Plan A Home Health (P)      Discharge Plan B Skilled Nursing Facility (P)      Discharge Plan discussed with: Patient (P)      Discharge Barriers Identified None (P)

## 2022-06-21 NOTE — PT/OT/SLP EVAL
"Physical Therapy Evaluation    Patient Name:  Rajendra Castle   MRN:  [id number]    Recommendations:     Discharge Recommendations:  other (see comments) (HH vs. SNF depending on progress)   Discharge Equipment Recommendations: other (see comments) (TBD)   Barriers to discharge: Decreased caregiver support    Assessment:     Rajendra Castle is a 76 y.o. male admitted with a medical diagnosis of Unstable angina.  He presents with the following impairments/functional limitations:  weakness, impaired endurance, gait instability, pain, impaired cardiopulmonary response to activity. Pt found supine and agreeable to working with PT. Pt A & O x  4 and has the following co-morbidities: HTN, RA, CKD, COVID-19 long hauler, Pulmonary emphysema.  Pt tolerated session well and required SUP for safe mobilization during session today. Pt would benefit from acute PT during hospitalization to increase strength, endurance and safety with mobility. Due to the pt living alone with an uncertain amount of assistance from an outside source; the pt would benefit from SNF vs HH depending on progress prior to or upon discharge home. Pt was passionate about getting care for his COVID-19 long hauler symptoms stating "all of this started after I stopped getting my medication".    Rehab Prognosis: Good; patient would benefit from acute skilled PT services to address these deficits and reach maximum level of function.    Recent Surgery: * No surgery found *      Plan:     During this hospitalization, patient to be seen 5 x/week to address the identified rehab impairments via gait training, therapeutic activities, therapeutic exercises and progress toward the following goals:    · Plan of Care Expires:  07/21/22    Subjective     Chief Complaint: chest pain and generalized weakness  Patient/Family Comments/goals: "I feel weak" and "If I could sign a form that would get me the care I need, I'll sign anything"  Pain/Comfort:  · Pain Rating 1: " 4/10  · Location 1: chest  · Pain Addressed 1: Distraction  · Pain Rating Post-Intervention 1: 0/10    Patients cultural, spiritual, Quaker conflicts given the current situation:      Living Environment:  Pt lives alone in a one story home no steps to enter.  Prior to admission, patients level of function was Independent.  Equipment used at home: none.  DME owned (not currently used): none.  Upon discharge, patient will have assistance from no one.    Objective:     Communicated with MO Hooker prior to session.  Patient found supine with blood pressure cuff, peripheral IV, pulse ox (continuous), telemetry  upon PT entry to room.    General Precautions: Standard, fall   Orthopedic Precautions:N/A   Braces: N/A  Respiratory Status: Room air    Exams:  · Cognitive Exam:  Patient is oriented to Person, Place, Time and Situation  · RUE ROM: WFL  · LUE ROM: WFL  · RLE ROM: WFL  · RLE Strength: WFL  · LLE ROM: WFL  · LLE Strength: WFL    Functional Mobility:  · Bed Mobility:     · Supine to Sit: supervision. The pt reported feeling dizzy, but soon after reported that the dizziness subsided.  · Sit to Supine: supervision  · Transfers:     · Sit to Stand:  supervision with no AD  · Gait: Pt tolerated gt x10' SUP with no AD. Pt reported feeling weak when ambulating and chest pain that comes and goes    Therapeutic Activities and Exercises:   Pt was educated on the following: call light use, importance of OOB activity and functional mobility to negate the negative effects of prolonged bed rest during this hospitalization, safe transfers/ambulation and discharge planning recommendations/options.    AM-PAC 6 CLICK MOBILITY  Total Score:23     Patient left supine with all lines intact, call button in reach and RN notified.    GOALS:   Multidisciplinary Problems     Physical Therapy Goals        Problem: Physical Therapy    Goal Priority Disciplines Outcome Goal Variances Interventions   Physical Therapy Goal     PT, PT/OT       Description: Goals to be met by: 2022     Patient will increase functional independence with mobility by performin. Supine to sit Independent  2. Sit to stand transfer Independent  3. Bed to chair transfer Independent  4. Gait  x 400 feet Independent.                        History:     Past Medical History:   Diagnosis Date    Arthritis     DDD (degenerative disc disease), cervical     Degenerative disc disease     Heart murmur     Hypertension     Nontoxic multinodular goiter 2016    Prostate CA     RA (rheumatoid arthritis)     Vitamin D insufficiency 2016       Past Surgical History:   Procedure Laterality Date    CARPAL TUNNEL RELEASE Right 2021    Procedure: RELEASE, CARPAL TUNNEL;  Surgeon: Jose Ryan II, MD;  Location: [facility];  Service: Orthopedics;  Laterality: Right;    COLONOSCOPY  prior to     normal findings per patient report    CYSTOSCOPY N/A 2018    Procedure: CYSTOSCOPY;  Surgeon: Garrett Pina MD;  Location: [facility] OR;  Service: Urology;  Laterality: N/A;    ESOPHAGOGASTRODUODENOSCOPY N/A 2020    Dr. Espinosa; empiric dilation; erythematous mucosa in antrum; gastric mucosal atrophy; hematin in entire stomach; biopsy: mid & distal esophagus WNL, stomach- WNL, negative for h pylori    PARATHYROIDECTOMY Right 10/27/2020    Procedure: PARATHYROIDECTOMY;  Surgeon: Latonia Clayton MD;  Location: [address];  Service: ENT;  Laterality: Right;    RELEASE OF ULNAR NERVE AT CUBITAL TUNNEL Right 2021    Procedure: RELEASE, ULNAR TUNNEL;  Surgeon: Jose Ryan II, MD;  Location: [facility];  Service: Orthopedics;  Laterality: Right;    TRANSRECTAL BIOPSY OF PROSTATE WITH ULTRASOUND GUIDANCE N/A 2018    Procedure: BIOPSY, PROSTATE, RECTAL APPROACH, WITH US GUIDANCE;  Surgeon: Garrett Pina MD;  Location: [facility] OR;  Service: Urology;  Laterality: N/A;       Time Tracking:     PT Received On: 22  PT Start  Time: 0938     PT Stop Time: 0954  PT Total Time (min): 16 min     Billable Minutes: Evaluation 16      06/21/2022

## 2022-06-22 VITALS
RESPIRATION RATE: 15 BRPM | OXYGEN SATURATION: 98 % | WEIGHT: 136.69 LBS | TEMPERATURE: 98 F | HEART RATE: 93 BPM | DIASTOLIC BLOOD PRESSURE: 59 MMHG | SYSTOLIC BLOOD PRESSURE: 134 MMHG | BODY MASS INDEX: 20.72 KG/M2 | HEIGHT: 68 IN

## 2022-06-22 LAB
ANION GAP SERPL CALC-SCNC: 7 MMOL/L (ref 8–16)
BASOPHILS # BLD AUTO: 0.02 K/UL (ref 0–0.2)
BASOPHILS NFR BLD: 0.3 % (ref 0–1.9)
BUN SERPL-MCNC: 24 MG/DL (ref 8–23)
CALCIUM SERPL-MCNC: 8.5 MG/DL (ref 8.7–10.5)
CHLORIDE SERPL-SCNC: 108 MMOL/L (ref 95–110)
CO2 SERPL-SCNC: 23 MMOL/L (ref 23–29)
CREAT SERPL-MCNC: 1.3 MG/DL (ref 0.5–1.4)
DIFFERENTIAL METHOD: ABNORMAL
EOSINOPHIL # BLD AUTO: 0.2 K/UL (ref 0–0.5)
EOSINOPHIL NFR BLD: 2.9 % (ref 0–8)
ERYTHROCYTE [DISTWIDTH] IN BLOOD BY AUTOMATED COUNT: 17.8 % (ref 11.5–14.5)
EST. GFR  (AFRICAN AMERICAN): >60 ML/MIN/1.73 M^2
EST. GFR  (NON AFRICAN AMERICAN): 53.8 ML/MIN/1.73 M^2
GLUCOSE SERPL-MCNC: 89 MG/DL (ref 70–110)
HCT VFR BLD AUTO: 37.9 % (ref 40–54)
HGB BLD-MCNC: 11.9 G/DL (ref 14–18)
IMM GRANULOCYTES # BLD AUTO: 0.05 K/UL (ref 0–0.04)
IMM GRANULOCYTES NFR BLD AUTO: 0.7 % (ref 0–0.5)
LYMPHOCYTES # BLD AUTO: 1.3 K/UL (ref 1–4.8)
LYMPHOCYTES NFR BLD: 18.8 % (ref 18–48)
MAGNESIUM SERPL-MCNC: 2 MG/DL (ref 1.6–2.6)
MCH RBC QN AUTO: 27.9 PG (ref 27–31)
MCHC RBC AUTO-ENTMCNC: 31.4 G/DL (ref 32–36)
MCV RBC AUTO: 89 FL (ref 82–98)
MONOCYTES # BLD AUTO: 1.1 K/UL (ref 0.3–1)
MONOCYTES NFR BLD: 15.7 % (ref 4–15)
NEUTROPHILS # BLD AUTO: 4.4 K/UL (ref 1.8–7.7)
NEUTROPHILS NFR BLD: 61.6 % (ref 38–73)
NRBC BLD-RTO: 0 /100 WBC
PLATELET # BLD AUTO: 359 K/UL (ref 150–450)
PMV BLD AUTO: 10.5 FL (ref 9.2–12.9)
POTASSIUM SERPL-SCNC: 4.4 MMOL/L (ref 3.5–5.1)
RBC # BLD AUTO: 4.27 M/UL (ref 4.6–6.2)
SODIUM SERPL-SCNC: 138 MMOL/L (ref 136–145)
WBC # BLD AUTO: 7.12 K/UL (ref 3.9–12.7)

## 2022-06-22 PROCEDURE — 80048 BASIC METABOLIC PNL TOTAL CA: CPT | Performed by: FAMILY MEDICINE

## 2022-06-22 PROCEDURE — 99900035 HC TECH TIME PER 15 MIN (STAT)

## 2022-06-22 PROCEDURE — 85025 COMPLETE CBC W/AUTO DIFF WBC: CPT | Performed by: FAMILY MEDICINE

## 2022-06-22 PROCEDURE — 83735 ASSAY OF MAGNESIUM: CPT | Performed by: FAMILY MEDICINE

## 2022-06-22 PROCEDURE — 25000003 PHARM REV CODE 250: Performed by: NURSE PRACTITIONER

## 2022-06-22 PROCEDURE — 25000003 PHARM REV CODE 250: Performed by: FAMILY MEDICINE

## 2022-06-22 PROCEDURE — 36415 COLL VENOUS BLD VENIPUNCTURE: CPT | Performed by: FAMILY MEDICINE

## 2022-06-22 PROCEDURE — 97116 GAIT TRAINING THERAPY: CPT

## 2022-06-22 PROCEDURE — 99900031 HC PATIENT EDUCATION (STAT)

## 2022-06-22 PROCEDURE — 94761 N-INVAS EAR/PLS OXIMETRY MLT: CPT

## 2022-06-22 RX ORDER — ASPIRIN 81 MG/1
81 TABLET ORAL DAILY
Qty: 30 TABLET | Refills: 0 | Status: SHIPPED | OUTPATIENT
Start: 2022-06-23 | End: 2023-05-09

## 2022-06-22 RX ORDER — ISOSORBIDE MONONITRATE 30 MG/1
30 TABLET, EXTENDED RELEASE ORAL DAILY
Qty: 30 TABLET | Refills: 0 | Status: SHIPPED | OUTPATIENT
Start: 2022-06-23 | End: 2023-02-06 | Stop reason: ALTCHOICE

## 2022-06-22 RX ORDER — ATORVASTATIN CALCIUM 20 MG/1
20 TABLET, FILM COATED ORAL NIGHTLY
Qty: 30 TABLET | Refills: 0 | Status: SHIPPED | OUTPATIENT
Start: 2022-06-22 | End: 2022-07-22

## 2022-06-22 RX ORDER — CLOPIDOGREL BISULFATE 75 MG/1
75 TABLET ORAL DAILY
Qty: 30 TABLET | Refills: 0 | Status: SHIPPED | OUTPATIENT
Start: 2022-06-23 | End: 2023-05-09

## 2022-06-22 RX ORDER — CARVEDILOL 12.5 MG/1
12.5 TABLET ORAL 2 TIMES DAILY WITH MEALS
Qty: 60 TABLET | Refills: 0 | Status: ON HOLD | OUTPATIENT
Start: 2022-06-22 | End: 2023-01-22 | Stop reason: HOSPADM

## 2022-06-22 RX ADMIN — CARVEDILOL 12.5 MG: 12.5 TABLET, FILM COATED ORAL at 08:06

## 2022-06-22 RX ADMIN — ISOSORBIDE MONONITRATE 30 MG: 30 TABLET, EXTENDED RELEASE ORAL at 08:06

## 2022-06-22 RX ADMIN — ASPIRIN 81 MG: 81 TABLET, COATED ORAL at 08:06

## 2022-06-22 RX ADMIN — CLOPIDOGREL BISULFATE 75 MG: 75 TABLET, FILM COATED ORAL at 08:06

## 2022-06-22 RX ADMIN — TAMSULOSIN HYDROCHLORIDE 0.4 MG: 0.4 CAPSULE ORAL at 08:06

## 2022-06-22 RX ADMIN — PANTOPRAZOLE SODIUM 40 MG: 40 TABLET, DELAYED RELEASE ORAL at 06:06

## 2022-06-22 NOTE — CARE UPDATE
06/21/22 2058   Patient Assessment/Suction   Respiratory Effort Unlabored   AFSANEH Breath Sounds diminished   LLL Breath Sounds crackles   RUL Breath Sounds diminished   RML Breath Sounds diminished   RLL Breath Sounds diminished   Rhythm/Pattern, Respiratory pattern regular   PRE-TX-O2   O2 Device (Oxygen Therapy) room air   SpO2 96 %   Pulse Oximetry Type Continuous   Pulse 86   Resp 20   Aerosol Therapy   $ Aerosol Therapy Charges PRN treatment not required   Respiratory Evaluation   $ Care Plan Tech Time 15 min   $ Eval/Re-eval Charges Re-evaluation   Evaluation For   (care plan)

## 2022-06-22 NOTE — PLAN OF CARE
Problem: Physical Therapy  Goal: Physical Therapy Goal  Description: Goals to be met by: 2022     Patient will increase functional independence with mobility by performin. Supine to sit Independent  2. Sit to stand transfer Independent  3. Bed to chair transfer Independent  4. Gait  x 400 feet Independent.       Outcome: Met

## 2022-06-22 NOTE — PLAN OF CARE
PCP office will pt with PHV appt    Discharge orders and chart reviewed with no further post-acute discharge needs identified at this time.  At this time, patient is cleared for discharge from Case Management standpoint.        06/22/22 1555   Final Note   Assessment Type Final Discharge Note   Anticipated Discharge Disposition Home   What phone number can be called within the next 1-3 days to see how you are doing after discharge? [phone]   Post-Acute Status   Discharge Delays None known at this time

## 2022-06-22 NOTE — CARE UPDATE
06/22/22 0949   Patient Assessment/Suction   Level of Consciousness (AVPU) alert   All Lung Fields Breath Sounds clear   PRE-TX-O2   O2 Device (Oxygen Therapy) room air   Pulse Oximetry Type Continuous   $ Pulse Oximetry - Multiple Charge Pulse Oximetry - Multiple   Resp 15   Aerosol Therapy   $ Aerosol Therapy Charges PRN treatment not required   Education   $ Education Bronchodilator;15 min   Respiratory Evaluation   $ Care Plan Tech Time 15 min

## 2022-06-22 NOTE — DISCHARGE SUMMARY
Critical access hospital  Discharge Summary  Patient Name: Rajendra Castle MRN: [id number]   Patient Class: IP- Inpatient  Length of Stay: 2   Admission Date: 6/20/2022  6:58 PM Attending Physician: Mely Wolfe MD   Primary Care Provider: Sp Lilly MD Face-to-Face encounter date: 06/22/2022   Chief Complaint: Chest Pain    Date of Discharge: 6/22/2022  Discharge Disposition:Home or Self Care   Condition: Stable       Reason for Hospitalization   Chest pain - unclear etiology  CAD s/p stent placement x last week  Essential hypertension  Hyperlipidemia    Patient Active Problem List   Diagnosis    Heart murmur    Arthritis    Rotator cuff tendinitis    Dyspnea on exertion    DDD (degenerative disc disease), cervical    Anemia, iron deficiency    Chronic GI bleeding    Arthritis of wrist, right    Trigger thumb of left hand    Arthritis of right wrist    Essential hypertension    Rheumatoid arthritis involving multiple sites    Elevated troponin    Nausea and vomiting    Nontoxic multinodular goiter    Gastroesophageal reflux disease without esophagitis    Nodular thyroid disease    Prediabetes    Osteoporosis    Hypogonadism in male    Vitamin D insufficiency    Hyperparathyroidism    Pulmonary emphysema    Rheumatoid lung    Lung nodule    Stage 3 chronic kidney disease, unspecified whether stage 3a or 3b CKD    Elevated PSA    Prostate cancer    Chest pain    Dysphagia    Severe malnutrition    Tachycardia    S/P parathyroidectomy    Decreased appetite    Right carpal tunnel syndrome    Cubital tunnel syndrome on right    Encounter for long-term (current) use of medications    COVID    Atherosclerosis of native coronary artery of native heart with angina pectoris with documented spasm    Unstable angina    HTN (hypertension)    COVID-19 long hauler       Brief History of Present Illness    Rajendra Castle is a 76 y.o.  male who  has a past medical history of  "Arthritis, DDD (degenerative disc disease), cervical, Degenerative disc disease, Heart murmur, Hypertension, Nontoxic multinodular goiter (9/20/2016), Prostate CA, RA (rheumatoid arthritis), and Vitamin D insufficiency (9/30/2016).. The patient presented to [facility] on [facility]/20/2022 with a primary complaint of Chest Pain  .     For the full HPI please refer to the History & Physical from this admission.    Hospital Course By Problem with Pertinent Findings     Patient was observed in the hospital and monitored with EKG and serial troponin. They remained negative and patient did not complain of chest pain during his stay. Patient was seen by cardiologist and recommended medical management. The patient was discharged in the care of family/parents//wife, with discharge instructions were reviewed in written and verbal form. All pertinent questions were discussed and prescriptions were provided. The patient will follow up in 1 week or sooner as needed with the PCP and Cardiologist as discussed, and the patient is on board with the plan.    Patient was seen and examined on the date of discharge and determined to be suitable for discharge.    Physical Exam  BP (!) 134/59 (BP Location: Right arm, Patient Position: Lying)   Pulse 93   Temp 98.2 °F (36.8 °C) (Oral)   Resp 15   Ht 5' 8" (1.727 m)   Wt 62 kg (136 lb 11 oz)   SpO2 98%   BMI 20.78 kg/m²   Vitals reviewed.    Constitutional: No distress.   HENT: Atraumatic.   Cardiovascular: Normal rate, regular rhythm and normal heart sounds.   Pulmonary/Chest: Effort normal. Clear to auscultation bilaterally. No wheezes.   Abdominal: Soft. Bowel sounds are normal. Exhibits no distension and no mass. No tenderness  Neurological: Alert.   Skin: Skin is warm and dry.     Following labs were Reviewed   Recent Labs   Lab 06/22/22  0551   WBC 7.12   HGB 11.9*   HCT 37.9*      CALCIUM 8.5*      K 4.4   CO2 23      BUN 24*   CREATININE 1.3 "     No results found for: POCTGLUCOSE     All labs within the past 24 hours have been reviewed    Microbiology Results (last 7 days)     ** No results found for the last 168 hours. **        X-Ray Chest AP Portable   Final Result          No results found for this or any previous visit.      Consultants and Procedures   Consultants:  Consults (From admission, onward)        Status Ordering Provider     Inpatient consult to Hospitalist  Once        Provider:  Jyoti Douglas MD    Acknowledged JYOTI DOUGLAS     Inpatient consult to Cardiology  Once        Provider:  Jone Puag MD    Completed JYOTI DOUGLAS          Procedures:   None    Discharge Information:   Diet:  Resume cardiac diet    Physical Activity:  Activity as tolerated    Instructions:  1. Take all medications as prescribed  2. Keep all follow-up appointments  3. Return to the hospital or call your primary care physicians if any worsening symptoms such as chest pain, shortness of breath occur.    Follow-Up Appointments:  1. Please call your primary care physician to schedule an appointment in 1 week time.       Follow-up Information     Sp Lilly MD Follow up.    Specialty: Family Medicine  Contact information:  [address] [place] [address] [id number] [phone]                         Pending laboratory work/Tests to be performed/followed by the Primary care Physician: none    The patient was discharged in the care of her parents//wife/family/caregiver, with discharge instructions were reviewed in written and verbal form. All pertinent questions were discussed and prescriptions were provided. The importance of making follow up appointments and compliance of medications has been stressed repeatedly. The patient will follow up in 1 week or sooner as needed with the PCP, and the patient is on board with the plan. Upon discharge, patient needs to be on following medications.    Discharge Medications:     Medication List       START taking these medications    aspirin 81 MG EC tablet  Commonly known as: ECOTRIN  Take 1 tablet (81 mg total) by mouth once daily.  Start taking on: June 23, 2022     atorvastatin 20 MG tablet  Commonly known as: LIPITOR  Take 1 tablet (20 mg total) by mouth every evening.     clopidogreL 75 mg tablet  Commonly known as: PLAVIX  Take 1 tablet (75 mg total) by mouth once daily.  Start taking on: June 23, 2022     isosorbide mononitrate 30 MG 24 hr tablet  Commonly known as: IMDUR  Take 1 tablet (30 mg total) by mouth once daily.  Start taking on: June 23, 2022        CHANGE how you take these medications    carvediloL 12.5 MG tablet  Commonly known as: COREG  Take 1 tablet (12.5 mg total) by mouth 2 (two) times daily with meals.  What changed:   · medication strength  · how much to take        CONTINUE taking these medications    alfuzosin 10 mg Tb24  Commonly known as: UROXATRAL  Take 1 tablet (10 mg total) by mouth daily with breakfast.     amLODIPine 2.5 MG tablet  Commonly known as: NORVASC  TAKE 1 TABLET(2.5 MG) BY MOUTH EVERY DAY     pantoprazole 40 MG tablet  Commonly known as: PROTONIX  TAKE 1 TABLET(40 MG) BY MOUTH TWICE DAILY        STOP taking these medications    cloNIDine 0.1 MG tablet  Commonly known as: CATAPRES     meclizine 12.5 mg tablet  Commonly known as: ANTIVERT     methotrexate 2.5 MG Tab     multivitamin per tablet  Commonly known as: THERAGRAN     PROLIA 60 mg/mL Syrg  Generic drug: denosumab     sildenafiL 100 MG tablet  Commonly known as: VIAGRA     temazepam 30 mg capsule  Commonly known as: RESTORIL     traMADoL 50 mg tablet  Commonly known as: ULTRAM     TYLENOL PM ORAL     VITAMIN D3 100 mcg (4,000 unit) Cap capsule  Generic drug: cholecalciferol (vitamin D3)           Where to Get Your Medications      These medications were sent to [id number] - [place] - [address] [facility]  [address] [address]    Phone:  [phone]   · aspirin 81 MG EC tablet  · atorvastatin 20 MG tablet  · carvediloL 12.5 MG tablet  · clopidogreL 75 mg tablet  · isosorbide mononitrate 30 MG 24 hr tablet           I spent 35 minutes preparing the discharge including reviewing records from previous encounters, preparation of discharge summary, assessing and final examination of the patient, discharge medicine reconciliation, discussing plan of care, follow up and education and prescriptions.       Mely Wolfe  [facility] Hospitalist  06/22/2022

## 2022-06-22 NOTE — NURSING
Pt verbalized understanding of discharge education and instructions, made aware of prescriptions sent to pharmacy

## 2022-06-22 NOTE — PT/OT/SLP DISCHARGE
Physical Therapy Discharge Summary    Name: Rajendra Castle  MRN: [id number]   Principal Problem: Unstable angina     Patient Discharged from acute Physical Therapy on 22.  Please refer to prior PT noted date on /.- for functional status.     Assessment:     Patient has met all goals and is not appropriate for therapy.    Objective:     GOALS:   Multidisciplinary Problems     Physical Therapy Goals     Not on file          Multidisciplinary Problems (Resolved)        Problem: Physical Therapy    Goal Priority Disciplines Outcome Goal Variances Interventions   Physical Therapy Goal   (Resolved)     PT, PT/OT Met     Description: Goals to be met by: 2022     Patient will increase functional independence with mobility by performin. Supine to sit Independent  2. Sit to stand transfer Independent  3. Bed to chair transfer Independent  4. Gait  x 400 feet Independent.                        Reasons for Discontinuation of Therapy Services  Therapist determines that the patient will no longer benefit from therapy services.      Plan:     Patient Discharged to: Home with Home Health Service.      2022

## 2022-06-22 NOTE — PT/OT/SLP PROGRESS
"Physical Therapy Treatment    Patient Name:  Rajendra Castle   MRN:  [id number]    Recommendations:     Discharge Recommendations:  home   Discharge Equipment Recommendations: none   Barriers to discharge: Decreased caregiver support    Assessment:     Rajnedra Castle is a 76 y.o. male admitted with a medical diagnosis of Unstable angina.  He presents with the following impairments/functional limitations:  impaired cardiopulmonary response to activity Pt found ambulating in hallway and states "I'm just tryingi to keep up my strength.". He is agreeable to working with PT. Pt A & O x  3.  Pt tolerated session well and required stand by asssist  for safe mobilization during session today. Pt would benefit from acute PT during hospitalization to increase strength, endurance and safety with mobility and would benefit from gait, therex prior to discharge home.  .    Rehab Prognosis: Good; patient would benefit from acute skilled PT services to address these deficits and reach maximum level of function.    Recent Surgery: * No surgery found *      Plan:     During this hospitalization, patient to be seen 5 x/week to address the identified rehab impairments via gait training, therapeutic activities, therapeutic exercises and progress toward the following goals:    · Plan of Care Expires:  07/21/22    Subjective     Chief Complaint: weakness  Patient/Family Comments/goals: get medicine to treat his "long covid"  Pain/Comfort:  · Pain Rating 1: 0/10  · Location - Orientation 1: generalized  · Pain Addressed 1: Pre-medicate for activity, Reposition, Cessation of Activity, Distraction  · Pain Rating Post-Intervention 1: 0/10      Objective:     Communicated with RN prior to session.  Patient found supine with telemetry, pulse ox (continuous), peripheral IV, blood pressure cuff upon PT entry to room.     General Precautions: Standard, fall   Orthopedic Precautions:N/A   Braces: N/A  Respiratory Status: Room air     Functional " Mobility:  · Bed Mobility: supervision     · Transfers:     · Sit to Stand:  modified independence with straight cane  · Bed to Chair: supervision with  hand-held assist  using  Step Transfer  · Gait: took 3-fed to chair from bedside table      AM-PAC 6 CLICK MOBILITY  Turning over in bed (including adjusting bedclothes, sheets and blankets)?: 4  Sitting down on and standing up from a chair with arms (e.g., wheelchair, bedside commode, etc.): 4  Moving from lying on back to sitting on the side of the bed?: 4  Moving to and from a bed to a chair (including a wheelchair)?: 4  Need to walk in hospital room?: 4  Climbing 3-5 steps with a railing?: 4  Basic Mobility Total Score: 24       Therapeutic Activities and Exercises:   Pt was educated on the following: call light use, importance of OOB activity and functional mobility to negate the negative effects of prolonged bed rest during this hospitalization, safe transfers/ambulation and discharge planning recommendations/options.     Patient left lying in chair with legs propped on stool with all lines intact, call button in reach, chair alarm on and RN notified..    GOALS:   Multidisciplinary Problems     Physical Therapy Goals     Not on file          Multidisciplinary Problems (Resolved)        Problem: Physical Therapy    Goal Priority Disciplines Outcome Goal Variances Interventions   Physical Therapy Goal   (Resolved)     PT, PT/OT Met     Description: Goals to be met by: 2022     Patient will increase functional independence with mobility by performin. Supine to sit Independent  2. Sit to stand transfer Independent  3. Bed to chair transfer Independent  4. Gait  x 400 feet Independent.                        Time Tracking:     PT Received On: 22  PT Start Time: 1053     PT Stop Time: 1101  PT Total Time (min): 8 min     Billable Minutes: Evaluation 8    Treatment Type: Treatment  PT/PTA: PT           2022

## 2022-06-23 ENCOUNTER — TELEPHONE (OUTPATIENT)
Dept: FAMILY MEDICINE | Facility: CLINIC | Age: 77
End: 2022-06-23
Payer: MEDICARE

## 2022-06-23 NOTE — TELEPHONE ENCOUNTER
----- Message from Mishel Senior sent at 6/23/2022 12:59 PM CDT -----  Who Called: [employer]    What is the reqeust in detail: Requesting call back to know if patient will qualify for [facility] for his Hospital follow up and if it can be scheduled much sooner than his already schedule in office visit. Patient feels that he needs to be seen sooner but is incapable of coming in. Please advise.     Can the clinic reply by [facility]? No    Best Call Back Number: [phone]    Additional Information:

## 2022-06-27 NOTE — TELEPHONE ENCOUNTER
Patient needs HH after his hospitalization. Please advise   It needs to be external, thank you nick

## 2022-06-30 ENCOUNTER — TELEPHONE (OUTPATIENT)
Dept: FAMILY MEDICINE | Facility: CLINIC | Age: 77
End: 2022-06-30
Payer: MEDICARE

## 2022-06-30 ENCOUNTER — HOSPITAL ENCOUNTER (EMERGENCY)
Facility: HOSPITAL | Age: 77
Discharge: ANOTHER HOSPITAL | End: 2022-06-30
Attending: EMERGENCY MEDICINE
Payer: MEDICARE

## 2022-06-30 VITALS
TEMPERATURE: 98 F | OXYGEN SATURATION: 98 % | HEIGHT: 68 IN | BODY MASS INDEX: 21.22 KG/M2 | DIASTOLIC BLOOD PRESSURE: 74 MMHG | HEART RATE: 94 BPM | SYSTOLIC BLOOD PRESSURE: 156 MMHG | WEIGHT: 140 LBS | RESPIRATION RATE: 18 BRPM

## 2022-06-30 DIAGNOSIS — R06.02 SOB (SHORTNESS OF BREATH): Primary | ICD-10-CM

## 2022-06-30 DIAGNOSIS — G93.32 COVID-19 LONG HAULER MANIFESTING CHRONIC FATIGUE: Primary | ICD-10-CM

## 2022-06-30 DIAGNOSIS — U09.9 COVID-19 LONG HAULER MANIFESTING CHRONIC FATIGUE: Primary | ICD-10-CM

## 2022-06-30 DIAGNOSIS — R07.9 CHEST PAIN: ICD-10-CM

## 2022-06-30 LAB
ALBUMIN SERPL BCP-MCNC: 3.8 G/DL (ref 3.5–5.2)
ALP SERPL-CCNC: 58 U/L (ref 55–135)
ALT SERPL W/O P-5'-P-CCNC: 19 U/L (ref 10–44)
ANION GAP SERPL CALC-SCNC: 10 MMOL/L (ref 8–16)
AST SERPL-CCNC: 24 U/L (ref 10–40)
BASOPHILS # BLD AUTO: 0.02 K/UL (ref 0–0.2)
BASOPHILS NFR BLD: 0.2 % (ref 0–1.9)
BILIRUB SERPL-MCNC: 0.6 MG/DL (ref 0.1–1)
BNP SERPL-MCNC: 242 PG/ML (ref 0–99)
BUN SERPL-MCNC: 50 MG/DL (ref 8–23)
CALCIUM SERPL-MCNC: 8.9 MG/DL (ref 8.7–10.5)
CHLORIDE SERPL-SCNC: 107 MMOL/L (ref 95–110)
CO2 SERPL-SCNC: 20 MMOL/L (ref 23–29)
CREAT SERPL-MCNC: 1.5 MG/DL (ref 0.5–1.4)
DIFFERENTIAL METHOD: ABNORMAL
EOSINOPHIL # BLD AUTO: 0.1 K/UL (ref 0–0.5)
EOSINOPHIL NFR BLD: 0.8 % (ref 0–8)
ERYTHROCYTE [DISTWIDTH] IN BLOOD BY AUTOMATED COUNT: 17.2 % (ref 11.5–14.5)
EST. GFR  (AFRICAN AMERICAN): 52.3 ML/MIN/1.73 M^2
EST. GFR  (NON AFRICAN AMERICAN): 45.2 ML/MIN/1.73 M^2
GLUCOSE SERPL-MCNC: 120 MG/DL (ref 70–110)
HCT VFR BLD AUTO: 42.3 % (ref 40–54)
HGB BLD-MCNC: 13.7 G/DL (ref 14–18)
IMM GRANULOCYTES # BLD AUTO: 0.03 K/UL (ref 0–0.04)
IMM GRANULOCYTES NFR BLD AUTO: 0.4 % (ref 0–0.5)
LYMPHOCYTES # BLD AUTO: 1.1 K/UL (ref 1–4.8)
LYMPHOCYTES NFR BLD: 13.4 % (ref 18–48)
MCH RBC QN AUTO: 28.5 PG (ref 27–31)
MCHC RBC AUTO-ENTMCNC: 32.4 G/DL (ref 32–36)
MCV RBC AUTO: 88 FL (ref 82–98)
MONOCYTES # BLD AUTO: 0.6 K/UL (ref 0.3–1)
MONOCYTES NFR BLD: 7.3 % (ref 4–15)
NEUTROPHILS # BLD AUTO: 6.6 K/UL (ref 1.8–7.7)
NEUTROPHILS NFR BLD: 77.9 % (ref 38–73)
NRBC BLD-RTO: 0 /100 WBC
PLATELET # BLD AUTO: 424 K/UL (ref 150–450)
PMV BLD AUTO: 10.4 FL (ref 9.2–12.9)
POTASSIUM SERPL-SCNC: 4.5 MMOL/L (ref 3.5–5.1)
PROT SERPL-MCNC: 8.1 G/DL (ref 6–8.4)
RBC # BLD AUTO: 4.8 M/UL (ref 4.6–6.2)
SARS-COV-2 RDRP RESP QL NAA+PROBE: NEGATIVE
SODIUM SERPL-SCNC: 137 MMOL/L (ref 136–145)
TROPONIN I SERPL DL<=0.01 NG/ML-MCNC: 0.04 NG/ML
WBC # BLD AUTO: 8.41 K/UL (ref 3.9–12.7)

## 2022-06-30 PROCEDURE — 93005 ELECTROCARDIOGRAM TRACING: CPT | Performed by: INTERNAL MEDICINE

## 2022-06-30 PROCEDURE — 85025 COMPLETE CBC W/AUTO DIFF WBC: CPT | Performed by: EMERGENCY MEDICINE

## 2022-06-30 PROCEDURE — 84484 ASSAY OF TROPONIN QUANT: CPT | Performed by: EMERGENCY MEDICINE

## 2022-06-30 PROCEDURE — 25000003 PHARM REV CODE 250: Performed by: EMERGENCY MEDICINE

## 2022-06-30 PROCEDURE — 93010 ELECTROCARDIOGRAM REPORT: CPT | Mod: ,,, | Performed by: INTERNAL MEDICINE

## 2022-06-30 PROCEDURE — 99285 EMERGENCY DEPT VISIT HI MDM: CPT | Mod: 25

## 2022-06-30 PROCEDURE — 96374 THER/PROPH/DIAG INJ IV PUSH: CPT

## 2022-06-30 PROCEDURE — U0002 COVID-19 LAB TEST NON-CDC: HCPCS | Performed by: EMERGENCY MEDICINE

## 2022-06-30 PROCEDURE — 80053 COMPREHEN METABOLIC PANEL: CPT | Performed by: EMERGENCY MEDICINE

## 2022-06-30 PROCEDURE — 83880 ASSAY OF NATRIURETIC PEPTIDE: CPT | Performed by: EMERGENCY MEDICINE

## 2022-06-30 PROCEDURE — 63600175 PHARM REV CODE 636 W HCPCS: Performed by: EMERGENCY MEDICINE

## 2022-06-30 PROCEDURE — 93010 EKG 12-LEAD: ICD-10-PCS | Mod: ,,, | Performed by: INTERNAL MEDICINE

## 2022-06-30 RX ORDER — DIVALPROEX SODIUM 250 MG/1
250 TABLET, DELAYED RELEASE ORAL 2 TIMES DAILY
COMMUNITY
Start: 2022-06-13 | End: 2022-09-02

## 2022-06-30 RX ORDER — MIRTAZAPINE 15 MG/1
7.5 TABLET, FILM COATED ORAL NIGHTLY
COMMUNITY
Start: 2022-06-08 | End: 2022-09-02

## 2022-06-30 RX ORDER — GABAPENTIN 300 MG/1
300 CAPSULE ORAL 3 TIMES DAILY
COMMUNITY
Start: 2022-06-13 | End: 2023-08-16

## 2022-06-30 RX ORDER — HYDROXYZINE PAMOATE 50 MG/1
100 CAPSULE ORAL 2 TIMES DAILY
COMMUNITY
Start: 2022-06-13 | End: 2022-09-02

## 2022-06-30 RX ORDER — ASPIRIN 325 MG
325 TABLET ORAL
Status: COMPLETED | OUTPATIENT
Start: 2022-06-30 | End: 2022-06-30

## 2022-06-30 RX ORDER — METOPROLOL TARTRATE 1 MG/ML
5 INJECTION, SOLUTION INTRAVENOUS
Status: DISCONTINUED | OUTPATIENT
Start: 2022-06-30 | End: 2022-06-30 | Stop reason: HOSPADM

## 2022-06-30 RX ORDER — TRAZODONE HYDROCHLORIDE 50 MG/1
50 TABLET ORAL NIGHTLY
COMMUNITY
Start: 2022-06-13 | End: 2022-09-02 | Stop reason: SDUPTHER

## 2022-06-30 RX ORDER — MORPHINE SULFATE 2 MG/ML
2 INJECTION, SOLUTION INTRAMUSCULAR; INTRAVENOUS
Status: COMPLETED | OUTPATIENT
Start: 2022-06-30 | End: 2022-06-30

## 2022-06-30 RX ORDER — FLUOXETINE 10 MG/1
10 CAPSULE ORAL DAILY
COMMUNITY
Start: 2022-06-08 | End: 2022-09-02

## 2022-06-30 RX ADMIN — ASPIRIN 325 MG ORAL TABLET 325 MG: 325 PILL ORAL at 03:06

## 2022-06-30 RX ADMIN — NITROGLYCERIN 1 INCH: 20 OINTMENT TOPICAL at 03:06

## 2022-06-30 RX ADMIN — MORPHINE SULFATE 2 MG: 2 INJECTION, SOLUTION INTRAMUSCULAR; INTRAVENOUS at 06:06

## 2022-06-30 NOTE — CONSULTS
"76-year-old gentleman with numerous medical problems who recently had coronary stent placed in June 2nd week at [facility] coming to our emergency room 2nd time with easy fatigability, lack of energy, occasional diaphoresis.  He attributes his symptoms to long COVID.  Otherwise he denies any fever, chills, headache, palpitations, nausea, vomiting, bladder or bowel symptoms.  As per ER provider upon arrival he complained of chest pain and diaphoresis however during my interview he denies any active chest pain.    On exam his vital signs are stable, mildly hypertensive.  He appears euvolemic, cardiovascular exam unremarkable, abdomen is soft and non tender.  nonfocal neuro exam.     Reviewed all imaging and labs.  First troponin is negative, EKG did not show any new ischemic changes.     Recommendation:  Unfortunately this is his 2nd ER visit since left heart catheterization at [facility] in mid June.  Last visit about a week ago he was discharged next day after ACS was ruled out.  Unfortunately we do not have access to his records at [facility], no details regarding his coronary anatomy or details regarding if he has any under treated/untreated CAD.  I spent considerable time with patient explaining that considering his recent PCI and persistent symptoms he should be evaluated by his own cardiologist for continuity of care.  Instructed ER provider to initiate transferred to [facility].  In my opinion patient does not have acute coronary syndrome and he is safe for transfer.  As of writing this note patient is accepted at [facility] and ER staff is working on logistics    Patient is also fixated on "long COVID". I reassured him that he does not need any inpatient stay for long COVID management however his PCP can refer him to long COVID Clinic in [place].    "

## 2022-06-30 NOTE — TELEPHONE ENCOUNTER
----- Message from Hemal Castillo sent at 6/30/2022 11:46 AM CDT -----  Type: Patient Call Back         Who called: Pt          What is the request in detail: Pt call in regarding going to [facility] on  6/20/22 for shortness of Breath and chest Pain  .Pt had Covid .Also an EUA infusion . Pt states that he needs a Referral for the lung Covid Clinic          Can the clinic reply by [facility]?no          Would the patient rather a call back or a response via [facility]?call back          Best call back number:[phone] (mobile)          Additional Information:           Thank You

## 2022-06-30 NOTE — TELEPHONE ENCOUNTER
Called and spoke with pt. Pt would like to See Dr. Rita Ordoñez at [place] for his long covid symptoms. She is a Neurologist at [place]. Pt would like a external referral placed. Please advise.

## 2022-06-30 NOTE — ED PROVIDER NOTES
Encounter Date: 6/30/2022       History     Chief Complaint   Patient presents with    Shortness of Breath     Pt here with SOB today upon waking.     Chest Pain     Midsternal chest pain 4/10 nonradiating. Stent placement at [place] a few weeks ago.      76-year-old male presented emergency department complaining of chest tightness and shortness of breath.  Patient said  he woke up from sleep and felt this way.  Patient has history of coronary artery disease and had recent cardiac stents placed.  Patient states he had similar symptoms multiple times in the past and he has long COVID and these symptoms of long COVID.  Patient denies nausea vomiting.  Patient states he feels slightly better now since he came to the emergency department.  Patient could not tell me if anything makes the symptoms better or worse.  Patient describes this as substernal chest pain which is nonradiating and rates it 4/10 and describes it as tightness with associated shortness of breath.        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    Prostate CA     RA (rheumatoid arthritis)     Vitamin D insufficiency 9/30/2016     Past Surgical History:   Procedure Laterality Date    CARPAL TUNNEL RELEASE Right 5/28/2021    Procedure: RELEASE, CARPAL TUNNEL;  Surgeon: Jose Ryan II, MD;  Location[facility] OR;  Service: Orthopedics;  Laterality: Right;    COLONOSCOPY  prior to 2016    normal findings per patient report    CYSTOSCOPY N/A 12/18/2018    Procedure: CYSTOSCOPY;  Surgeon: Garrett Pina MD;  Location: [facility] OR;  Service: Urology;  Laterality: N/A;    ESOPHAGOGASTRODUODENOSCOPY N/A 9/29/2020    Dr. Espinosa; empiric dilation; erythematous mucosa in antrum; gastric mucosal atrophy; hematin in entire  stomach; biopsy: mid & distal esophagus WNL, stomach- WNL, negative for h pylori    PARATHYROIDECTOMY Right 10/27/2020    Procedure: PARATHYROIDECTOMY;  Surgeon: Latonia Clayton MD;  Location: [facility] OR [address] [place] [address];  Service: ENT;  Laterality: Right;    RELEASE OF ULNAR NERVE AT CUBITAL TUNNEL Right 2021    Procedure: RELEASE, ULNAR TUNNEL;  Surgeon: Jose Ryan II, MD;  Location: [facility];  Service: Orthopedics;  Laterality: Right;    TRANSRECTAL BIOPSY OF PROSTATE WITH ULTRASOUND GUIDANCE N/A 2018    Procedure: BIOPSY, PROSTATE, RECTAL APPROACH, WITH US GUIDANCE;  Surgeon: Garrett Pina MD;  Location: [facility] OR;  Service: Urology;  Laterality: N/A;     Family History   Problem Relation Age of Onset    Heart disease Mother     Stroke Father     Drug abuse Sister     No Known Problems Daughter     No Known Problems Daughter     No Known Problems Son     Diabetes Maternal Uncle     Colon cancer Neg Hx     Crohn's disease Neg Hx     Esophageal cancer Neg Hx     Stomach cancer Neg Hx     Ulcerative colitis Neg Hx      Social History     Tobacco Use    Smoking status: Former Smoker     Packs/day: 0.25     Years: 10.00     Pack years: 2.50     Quit date: 2001     Years since quittin.9    Smokeless tobacco: Never Used   Substance Use Topics    Alcohol use: Yes     Comment: seldom    Drug use: Yes     Frequency: 8.0 times per week     Types: Marijuana     Review of Systems   Constitutional: Negative.    HENT: Negative.    Eyes: Negative.    Respiratory: Positive for shortness of breath.    Cardiovascular: Positive for chest pain.   Gastrointestinal: Negative.    Endocrine: Negative.    Genitourinary: Negative.    Musculoskeletal: Negative.    Skin: Negative.    Allergic/Immunologic: Negative.    Neurological: Negative.    Hematological: Negative.    Psychiatric/Behavioral: Negative.    All other systems reviewed and are negative.      Physical Exam     Initial Vitals [22  1502]   BP Pulse Resp Temp SpO2   (!) 167/88 102 18 98.4 °F (36.9 °C) 97 %      MAP       --         Physical Exam    Nursing note and vitals reviewed.  Constitutional: He appears well-developed and well-nourished.   HENT:   Head: Normocephalic and atraumatic.   Nose: Nose normal.   Eyes: Conjunctivae and EOM are normal.   Neck: No tracheal deviation present.   Normal range of motion.  Cardiovascular: Normal rate, regular rhythm, normal heart sounds and intact distal pulses. Exam reveals no friction rub.    No murmur heard.  Pulmonary/Chest: Breath sounds normal. No respiratory distress. He has no wheezes. He has no rales. He exhibits no tenderness.   Abdominal: Abdomen is soft. He exhibits no distension. There is no abdominal tenderness.   Musculoskeletal:         General: No tenderness. Normal range of motion.      Cervical back: Normal range of motion.     Neurological: He is alert and oriented to person, place, and time. He has normal strength. GCS score is 15. GCS eye subscore is 4. GCS verbal subscore is 5. GCS motor subscore is 6.   Skin: Skin is warm and dry. Capillary refill takes less than 2 seconds.   Psychiatric: He has a normal mood and affect. Thought content normal.         ED Course   Procedures  Labs Reviewed   CBC W/ AUTO DIFFERENTIAL - Abnormal; Notable for the following components:       Result Value    Hemoglobin 13.7 (*)     RDW 17.2 (*)     Gran % 77.9 (*)     Lymph % 13.4 (*)     All other components within normal limits   COMPREHENSIVE METABOLIC PANEL   TROPONIN I   B-TYPE NATRIURETIC PEPTIDE   SARS-COV-2 RNA AMPLIFICATION, QUAL     EKG Readings: (Independently Interpreted)   Initial Reading: No STEMI. Ectopy: No Ectopy. Conduction: Normal. T Waves Flipped: V3, V4, V5, V6, I and AVL.   Regular rhythm     ECG Results          EKG 12-lead (In process)  Result time 06/30/22 15:37:03    In process by Interface, Lab In [facility] (06/30/22 15:37:03)                 Narrative:    Test Reason :  R07.9,    Vent. Rate : 102 BPM     Atrial Rate : 000 BPM     P-R Int : 000 ms          QRS Dur : 090 ms      QT Int : 382 ms       P-R-T Axes : 000 016 163 degrees     QTc Int : 497 ms    Accelerated Junctional rhythm  Minimal voltage criteria for LVH, may be normal variant ( Sokolow-Perkins )  ST and Marked T wave abnormality, consider anterolateral ischemia  Abnormal ECG  When compared with ECG of 21-JUN-2022 16:00,  Junctional rhythm has replaced Sinus rhythm  Criteria for Septal infarct are no longer Present  T wave inversion less evident in Inferior leads    Referred By: [email or web address]   SELF           Confirmed By:                             Imaging Results          X-Ray Chest AP Portable (In process)               X-Rays:   Independently Interpreted Readings:   Other Readings:  Chest x-ray does not show any acute changes    Medications   metoprolol injection 5 mg (has no administration in time range)   aspirin tablet 325 mg (325 mg Oral Given 6/30/22 1515)   nitroGLYCERIN 2% TD oint ointment 1 inch (1 inch Topical (Top) Given 6/30/22 1516)     Medical Decision Making:   Differential Diagnosis:   76-year-old male presented emergency department with shortness of breath and chest tightness similar to previous presentations however patient also recently had cardiac stent placement.  Screening labs and cardiac workup reviewed.  EKG abnormal however similar to prior abnormal EKGs.  No dynamic changes noted.  Patient's blood pressure elevated.  Screening labs reviewed.  Patient extremely anxious.  Will treat anxiety.  Hospital Medicine consult for evaluation for further management.  Clinical Tests:   Lab Tests: Reviewed  Radiological Study: Reviewed  Medical Tests: Reviewed                      Clinical Impression:   Final diagnoses:  [R07.9] Chest pain  [R06.02] SOB (shortness of breath) (Primary)          ED Disposition Condition    Admit               Cheko Aguila MD  06/30/22 1528       Cheko Aguila MD  06/30/22  [id number]

## 2022-06-30 NOTE — ED NOTES
Report called to MO Jarvis at [facility] at [phone] for the transfer of the pt to room 528 for the service of Dr. Luque. Questions and concerns addressed, POC discussed.

## 2022-06-30 NOTE — TELEPHONE ENCOUNTER
"----- Message from Lakia Rey sent at 6/30/2022  4:38 PM CDT -----  "Type:  Patient Call Back    Who Called:JONATHAN BHAKTA [[id number]]    What is the reqeust in detail:Pt requesting call back to follow on message in regards to referral. Please advise    Can the clinic reply by [facility]?no    Best Call Back Number:[phone]      Additional Information:            "

## 2022-06-30 NOTE — TELEPHONE ENCOUNTER
----- Message from Shari Carrion sent at 6/30/2022  2:19 PM CDT -----  Contact: [phone]  Type: Needs Medical Advice  Who Called:  Pt    Best Call Back Number: [phone]    Additional Information: Pt is calling to get a referral for long covid symptoms. Pls call back and advise. Pt states that this is urgent.       Dr Lashaun Ordoñez  [facility]   Office [phone]  Fax: [phone]       Medical Necessity Statement: Based on my medical judgement, Mohs surgery is the most appropriate treatment for this cancer compared to other treatments.

## 2022-07-01 NOTE — TELEPHONE ENCOUNTER
Referral faxed to Dr. Ordoñez's office. Called and verified that fax was received at Dr. Kelsey office. Called to inform pt that referral was placed and faxed to Dr. Kelsey office. Pt did not answer the phone. Message left for pt.

## 2022-07-05 ENCOUNTER — TELEPHONE (OUTPATIENT)
Dept: UROLOGY | Facility: CLINIC | Age: 77
End: 2022-07-05
Payer: MEDICARE

## 2022-07-05 NOTE — TELEPHONE ENCOUNTER
Spoke w pt was informed where and date of procedure is performed and should be called the day before with time   Informed pt no prep and can drive self   Pt voiced ok

## 2022-07-05 NOTE — TELEPHONE ENCOUNTER
----- Message from Mishel Senior sent at 7/5/2022 12:03 PM CDT -----  Who Called: Patient    What is the reqeust in detail: Requesting call back to discuss his upcoming procedure. Patient is looking for information on where to go to and when. Please advise.    Can the clinic reply by [facility]? No    Best Call Back Number: [phone]    Additional Information:

## 2022-07-11 NOTE — DISCHARGE INSTRUCTIONS
After the procedure    Drink plenty of fluids.  You may have burning or light bleeding when you urinate--this is normal.  Medications may be prescribed to ease any discomfort or prevent infection. Take these as directed.  Call your doctor if you have heavy bleeding or blood clots, burning that lasts more than a day, a fever over 100°F  (38° C), or trouble urinating.    After Surgery:  Always be aware that any surgery can cause these symptoms:    Pain- Medication can be prescribed for pain to decrease your pain but may not completely take your pain away.  Over the Counter pain medicine my be enough and you can always use Ice and rest to help ease pain.    Bleeding- a little bleeding after a surgery is usually within normal.  If there is a lot of blood you need to notify your MD.  Emergency treatments of bleeding are cold application, elevation of the bleeding site and compression.    Infection- Infection after surgery is NOT a normal occurrence.  Signs of infection are fever, swelling, hot to touch the incision.  If this occurs notify your MD immediately.    Nausea- this can be common after a surgery especially if you have had anesthesia medicine or are taking pain medicine.  Staying on clear liquids, bland foods, gingerale, or over the counter anti nausea medicines can help.  If you vomit more than once, notify your MD.  Anti Nausea medicines can be prescribed.       Transrectal Ultrasound   A transrectal ultrasound is an imaging test. It uses sound waves to create pictures of a mans prostate gland to determine its size.Your prostate gland is in front of your rectum and if too large may become inflamed and impede the flow of urine. For this test, a special probe (transducer) is placed directly into your rectum.     Before leaving, you may need to wait for a short time while the images are reviewed. In most cases, you can go back to your normal routine after the test.       © 7592-4511 [employer] [address] [place] [address]. All rights reserved. This information is not intended as a substitute for professional medical care. Always follow your healthcare professional's instructions.

## 2022-07-12 ENCOUNTER — HOSPITAL ENCOUNTER (OUTPATIENT)
Facility: AMBULARY SURGERY CENTER | Age: 77
Discharge: HOME | End: 2022-07-12
Attending: UROLOGY | Admitting: UROLOGY
Payer: MEDICARE

## 2022-07-12 VITALS
TEMPERATURE: 98 F | OXYGEN SATURATION: 96 % | DIASTOLIC BLOOD PRESSURE: 74 MMHG | SYSTOLIC BLOOD PRESSURE: 152 MMHG | HEART RATE: 70 BPM | WEIGHT: 140 LBS | BODY MASS INDEX: 21.29 KG/M2 | RESPIRATION RATE: 18 BRPM

## 2022-07-12 DIAGNOSIS — N40.1 BPH WITH OBSTRUCTION/LOWER URINARY TRACT SYMPTOMS: ICD-10-CM

## 2022-07-12 DIAGNOSIS — N13.8 BPH WITH OBSTRUCTION/LOWER URINARY TRACT SYMPTOMS: ICD-10-CM

## 2022-07-12 PROCEDURE — 52000 CYSTOURETHROSCOPY: CPT | Performed by: UROLOGY

## 2022-07-12 PROCEDURE — 76872 US TRANSRECTAL: CPT | Mod: 26,,, | Performed by: UROLOGY

## 2022-07-12 PROCEDURE — 76872 US TRANSRECTAL: CPT | Performed by: UROLOGY

## 2022-07-12 PROCEDURE — 52000 PR CYSTOURETHROSCOPY: ICD-10-PCS | Mod: ,,, | Performed by: UROLOGY

## 2022-07-12 PROCEDURE — 52000 CYSTOURETHROSCOPY: CPT | Mod: ,,, | Performed by: UROLOGY

## 2022-07-12 PROCEDURE — 76872 PR US TRANSRECTAL: ICD-10-PCS | Mod: 26,,, | Performed by: UROLOGY

## 2022-07-12 RX ORDER — WATER 1 ML/ML
IRRIGANT IRRIGATION
Status: DISCONTINUED | OUTPATIENT
Start: 2022-07-12 | End: 2022-07-12 | Stop reason: HOSPADM

## 2022-07-12 RX ORDER — LIDOCAINE HYDROCHLORIDE 20 MG/ML
JELLY TOPICAL
Status: DISCONTINUED
Start: 2022-07-12 | End: 2022-07-12 | Stop reason: HOSPADM

## 2022-07-12 RX ORDER — LIDOCAINE HYDROCHLORIDE 20 MG/ML
JELLY TOPICAL
Status: DISCONTINUED | OUTPATIENT
Start: 2022-07-12 | End: 2022-07-12 | Stop reason: HOSPADM

## 2022-07-12 RX ORDER — CIPROFLOXACIN 500 MG/1
500 TABLET ORAL 2 TIMES DAILY
Qty: 4 TABLET | Refills: 0 | Status: SHIPPED | OUTPATIENT
Start: 2022-07-12 | End: 2022-09-02

## 2022-07-12 NOTE — H&P
[facility] Urology Progress Note     Rajendra Castle is a 76 y.o. male who presents for follow up of prostate cancer and bph/luts component     He was initially seen by YAZ López on April 2018 for bothersome LUTS and was started on Uroxatral which decd NTF 3 to 1x.  Was also on finasteride and noting to have rising psa with low free psa and underwent cystoscopy and prostate biopsy in December 2018 with 20.4 g prostate with significant bilateral lateral lobe hypertrophy with severe obstruction with central near kissing lobes, no median lobe, mild early trabeculations, normal bladder  PATHOLOGY: 4 cores L sided G3+4=7 prostate cancer: LB lat 25% (10% pattern 4); LB med 5% (20% pattern 4); LM med <5%, LA lat 10%  He only periodically had erections, had CKD III, Hyperparathyroid, RA (on MTX), hypercalcemia, emphysema. LUTS largely unchanged on uroxatral AUA SS 12/4.   Initially considered surgery then elected XRT. Discussed urolift as fiducial markers and for LUTS resolution though deferred to LUTS management after treatment     Advised to DC methotrexate during XRT, but initially and stated that his prostate cancer was under control from raghav intervention by televangelist.  He later returned 3/19/19 noting compliance with sensipar for his hyperCa, and elected to proceed with ADT/XRT, noting improvement on uroxatral.  Lupron 45mg administered. He did decline fiducial markers/spaceoar. He chose to resume/continue methotrexate and did complete radiotherapy to 7740 cGy ending August 2019.    Continued Q6 mos ADT x total 2 yrs (last oct 2020, at which time  AUA symptom score:  12/5, unhappy (5:  Nocturia; for:  Emptying; 3:  Frequency) and PVR 0cc)   At that time He felt very ill and noted discharge from hospital day prior, couldnt eat and lost 30-40 lb losing weight weak  Ultimately found to be related to his hyperparathyroidism and underwent parathyroidectomy     I last saw him April 2021 noting PSA 3/29/21 is 0.04,  trending down from 0.07, down from 0.3  In interim had parathyroidectomy.  Was having abdominal pain and indigestion and saw the GI nurse practitioner in 21.  AUA symptom score 7-8/6 (2:  Urgency; 1:  Frequency, intermittency, weak stream; 2-3:  Nocturia). DTF only 2-3 normal. Cuts off fluid 2 hr before bed.    Does drink water and chocolate Ensure in the afternoon and evening hours. Cardiology:  Dr. Guzman.     Cysto planned 21 for re-evaluation of obstruction rule out any radiation cystitis component before proceeding with intervention obstructing prostate  He followed up with PCP, Dr. Aceves, few weeks prior and asked to message us to cancel his cystoscopy  PSA 0.11 in 2021 treated to slight rebound since discontinuing ADT, and did not follow-up with radiation oncology despite multiple calls from their department  21: AUA SS : 16/4 ( ICBE:2; Frequency:3; Intermittency:1; Urgency:2; Weak urine stream:2; Strainin; Nocturia:4) PVR 0cc. Uroxatral renewed. Again expressed interest in urolift  Booked for cystoscopy 22 but canceled due to covid. PSA 0.14 in 2022     ER visit 22 for SOB and covid concerns, Ab infusion, ER 3/7/22 weak/dizzy with near syncope and bradycardia and admitted on tele for 24 hours observed  Admitted -2422 for abdominal pain nausea vomiting, followed by ER visit 22 for abdominal pain and tarry stool determined to be from Pepto-Bismol     He returns today noting  Has not been right since his parathyroid hormone surgery  Feels tired all the time like he has chronic fatigue.  Concern for long COVID.  ER visits as above for dizziness and chest pain noting that they checked his heart and lungs, and his dark stools concerning for Pepto-Bismol, but still has concern for too much acid in his stomach and vomits up acid has been treated for reflux and his GI evaluation pending on 22.  His pain and acid issues only better with Pepto-Bismol.  Still  "bothered by his urinary symptoms.  Thinks he has been off of medication for a while, and just ordered some Super Beta prostate.  Not sure if he is on alfuzosin or not but does not think so and asked for refill  He drinks milk, grape juice, and sugar.  Only has a glass of water in the morning.           Focused Physical Exam:         Vitals:     05/30/22 1101   BP: (!) 147/80   Pulse: 104   Resp: 18      Body mass index is 20.98 kg/m². Weight: 62.6 kg (138 lb) Height: 5' 8" (172.7 cm)      Abdomen: Soft, non-tender, nondistended, no CVA tenderness  General:  Thin, frail, cachectic        Recent Results         Recent Results (from the past 336 hour(s))   COVID-19 Rapid Screening     Collection Time: 05/22/22 12:07 PM   Result Value Ref Range     SARS-CoV-2 RNA, Amplification, Qual Negative Negative   Influenza A & B by Molecular     Collection Time: 05/22/22 12:07 PM     Specimen: Nasopharyngeal Swab   Result Value Ref Range     Influenza A, Molecular Negative Negative     Influenza B, Molecular Negative Negative     Flu A & B Source Nasal swab     CBC auto differential     Collection Time: 05/22/22 12:20 PM   Result Value Ref Range     WBC 8.99 3.90 - 12.70 K/uL     RBC 4.78 4.60 - 6.20 M/uL     Hemoglobin 13.4 (L) 14.0 - 18.0 g/dL     Hematocrit 41.3 40.0 - 54.0 %     MCV 86 82 - 98 fL     MCH 28.0 27.0 - 31.0 pg     MCHC 32.4 32.0 - 36.0 g/dL     RDW 17.6 (H) 11.5 - 14.5 %     Platelets 359 150 - 450 K/uL     MPV 11.3 9.2 - 12.9 fL     Immature Granulocytes 0.3 0.0 - 0.5 %     Gran # (ANC) 7.6 1.8 - 7.7 K/uL     Immature Grans (Abs) 0.03 0.00 - 0.04 K/uL     Lymph # 0.7 (L) 1.0 - 4.8 K/uL     Mono # 0.7 0.3 - 1.0 K/uL     Eos # 0.0 0.0 - 0.5 K/uL     Baso # 0.01 0.00 - 0.20 K/uL     nRBC 0 0 /100 WBC     Gran % 84.2 (H) 38.0 - 73.0 %     Lymph % 7.3 (L) 18.0 - 48.0 %     Mono % 7.8 4.0 - 15.0 %     Eosinophil % 0.3 0.0 - 8.0 %     Basophil % 0.1 0.0 - 1.9 %     Differential Method Automated     Comprehensive " metabolic panel     Collection Time: 05/22/22 12:20 PM   Result Value Ref Range     Sodium 140 136 - 145 mmol/L     Potassium 4.8 3.5 - 5.1 mmol/L     Chloride 109 95 - 110 mmol/L     CO2 19 (L) 23 - 29 mmol/L     Glucose 111 (H) 70 - 110 mg/dL     BUN 23 8 - 23 mg/dL     Creatinine 1.2 0.5 - 1.4 mg/dL     Calcium 9.4 8.7 - 10.5 mg/dL     Total Protein 8.1 6.0 - 8.4 g/dL     Albumin 3.3 (L) 3.5 - 5.2 g/dL     Total Bilirubin 0.7 0.1 - 1.0 mg/dL     Alkaline Phosphatase 74 55 - 135 U/L     AST 25 10 - 40 U/L     ALT 17 10 - 44 U/L     Anion Gap 12 8 - 16 mmol/L     eGFR if African American >60 >60 mL/min/1.73 m^2     eGFR if non African American 59 (A) >60 mL/min/1.73 m^2   Troponin I #1     Collection Time: 05/22/22 12:20 PM   Result Value Ref Range     Troponin I 0.025 0.000 - 0.026 ng/mL   BNP     Collection Time: 05/22/22 12:20 PM   Result Value Ref Range      (H) 0 - 99 pg/mL   TSH     Collection Time: 05/22/22 12:20 PM   Result Value Ref Range     TSH 0.739 0.400 - 4.000 uIU/mL   Lipase     Collection Time: 05/22/22 12:20 PM   Result Value Ref Range     Lipase 16 4 - 60 U/L   Hemoglobin A1C     Collection Time: 05/22/22 12:20 PM   Result Value Ref Range     Hemoglobin A1C 5.6 4.0 - 5.6 %     Estimated Avg Glucose 114 68 - 131 mg/dL   Troponin I     Collection Time: 05/22/22  5:55 PM   Result Value Ref Range     Troponin I 0.025 0.000 - 0.026 ng/mL   Troponin I     Collection Time: 05/22/22 11:16 PM   Result Value Ref Range     Troponin I 0.041 (H) 0.000 - 0.026 ng/mL   Urinalysis, Reflex to Urine Culture Urine, Clean Catch     Collection Time: 05/23/22  3:45 AM     Specimen: Urine   Result Value Ref Range     Specimen UA Urine, Clean Catch       Color, UA Yellow Yellow, Straw, Noni     Appearance, UA Clear Clear     pH, UA 6.0 5.0 - 8.0     Specific Gravity, UA 1.015 1.005 - 1.030     Protein, UA Negative Negative     Glucose, UA Negative Negative     Ketones, UA Negative Negative     Bilirubin (UA)  Negative Negative     Occult Blood UA Negative Negative     Nitrite, UA Negative Negative     Urobilinogen, UA Negative <2.0 EU/dL     Leukocytes, UA Negative Negative   Basic Metabolic Panel (BMP)     Collection Time: 05/23/22  4:45 AM   Result Value Ref Range     Sodium 140 136 - 145 mmol/L     Potassium 4.3 3.5 - 5.1 mmol/L     Chloride 107 95 - 110 mmol/L     CO2 23 23 - 29 mmol/L     Glucose 93 70 - 110 mg/dL     BUN 19 8 - 23 mg/dL     Creatinine 1.3 0.5 - 1.4 mg/dL     Calcium 8.7 8.7 - 10.5 mg/dL     Anion Gap 10 8 - 16 mmol/L     eGFR if African American >60 >60 mL/min/1.73 m^2     eGFR if non African American 54 (A) >60 mL/min/1.73 m^2   Magnesium     Collection Time: 05/23/22  4:45 AM   Result Value Ref Range     Magnesium 2.1 1.6 - 2.6 mg/dL   Phosphorus     Collection Time: 05/23/22  4:45 AM   Result Value Ref Range     Phosphorus 3.3 2.7 - 4.5 mg/dL   CBC with Automated Differential     Collection Time: 05/23/22  4:45 AM   Result Value Ref Range     WBC 8.66 3.90 - 12.70 K/uL     RBC 4.26 (L) 4.60 - 6.20 M/uL     Hemoglobin 11.8 (L) 14.0 - 18.0 g/dL     Hematocrit 37.0 (L) 40.0 - 54.0 %     MCV 87 82 - 98 fL     MCH 27.7 27.0 - 31.0 pg     MCHC 31.9 (L) 32.0 - 36.0 g/dL     RDW 17.4 (H) 11.5 - 14.5 %     Platelets 347 150 - 450 K/uL     MPV 11.4 9.2 - 12.9 fL     Immature Granulocytes 0.3 0.0 - 0.5 %     Gran # (ANC) 6.0 1.8 - 7.7 K/uL     Immature Grans (Abs) 0.03 0.00 - 0.04 K/uL     Lymph # 1.5 1.0 - 4.8 K/uL     Mono # 1.1 (H) 0.3 - 1.0 K/uL     Eos # 0.0 0.0 - 0.5 K/uL     Baso # 0.01 0.00 - 0.20 K/uL     nRBC 0 0 /100 WBC     Gran % 69.2 38.0 - 73.0 %     Lymph % 17.8 (L) 18.0 - 48.0 %     Mono % 12.6 4.0 - 15.0 %     Eosinophil % 0.0 0.0 - 8.0 %     Basophil % 0.1 0.0 - 1.9 %     Differential Method Automated     Troponin I     Collection Time: 05/23/22  7:59 AM   Result Value Ref Range     Troponin I 0.044 (H) 0.000 - 0.026 ng/mL   Nuclear Stress Test     Collection Time: 05/23/22  9:31 AM    Result Value Ref Range     85% Max Predicted        Max Predicted        OHS CV CPX PATIENT IS MALE 1.0       OHS CV CPX PATIENT IS FEMALE 0.0       HR at rest 76 bpm     Systolic blood pressure 168 mmHg     Diastolic blood pressure 67 mmHg     RPP 12,768       Peak HR 98 bpm     Peak Systolic  mmHg     Peak Diatolic BP 72 mmHg     Peak RPP 17,738       % Max HR Achieved 67       dose 0.4 mg   Occult blood x 1, stool     Collection Time: 05/27/22  1:00 AM     Specimen: Stool   Result Value Ref Range     Occult Blood Negative Negative   CBC auto differential     Collection Time: 05/27/22  3:40 PM   Result Value Ref Range     WBC 8.04 3.90 - 12.70 K/uL     RBC 4.77 4.60 - 6.20 M/uL     Hemoglobin 13.2 (L) 14.0 - 18.0 g/dL     Hematocrit 41.8 40.0 - 54.0 %     MCV 88 82 - 98 fL     MCH 27.7 27.0 - 31.0 pg     MCHC 31.6 (L) 32.0 - 36.0 g/dL     RDW 18.0 (H) 11.5 - 14.5 %     Platelets 407 150 - 450 K/uL     MPV 11.6 9.2 - 12.9 fL     Immature Granulocytes 0.5 0.0 - 0.5 %     Gran # (ANC) 5.4 1.8 - 7.7 K/uL     Immature Grans (Abs) 0.04 0.00 - 0.04 K/uL     Lymph # 1.3 1.0 - 4.8 K/uL     Mono # 1.0 0.3 - 1.0 K/uL     Eos # 0.2 0.0 - 0.5 K/uL     Baso # 0.02 0.00 - 0.20 K/uL     nRBC 0 0 /100 WBC     Gran % 67.7 38.0 - 73.0 %     Lymph % 16.5 (L) 18.0 - 48.0 %     Mono % 12.6 4.0 - 15.0 %     Eosinophil % 2.5 0.0 - 8.0 %     Basophil % 0.2 0.0 - 1.9 %     Differential Method Automated     Comprehensive metabolic panel     Collection Time: 05/27/22  3:40 PM   Result Value Ref Range     Sodium 137 136 - 145 mmol/L     Potassium 4.4 3.5 - 5.1 mmol/L     Chloride 106 95 - 110 mmol/L     CO2 22 (L) 23 - 29 mmol/L     Glucose 79 70 - 110 mg/dL     BUN 21 8 - 23 mg/dL     Creatinine 1.3 0.5 - 1.4 mg/dL     Calcium 8.8 8.7 - 10.5 mg/dL     Total Protein 7.9 6.0 - 8.4 g/dL     Albumin 3.6 3.5 - 5.2 g/dL     Total Bilirubin 0.4 0.1 - 1.0 mg/dL     Alkaline Phosphatase 65 55 - 135 U/L     AST 16 10 - 40 U/L      ALT 14 10 - 44 U/L     Anion Gap 9 8 - 16 mmol/L     eGFR if African American >60.0 >60 mL/min/1.73 m^2     eGFR if non  53.8 (A) >60 mL/min/1.73 m^2   Magnesium     Collection Time: 05/27/22  3:40 PM   Result Value Ref Range     Magnesium 2.2 1.6 - 2.6 mg/dL   Protime-INR     Collection Time: 05/27/22  3:40 PM   Result Value Ref Range     PT 14.7 (H) 11.4 - 13.7 sec     INR 1.2     Troponin I     Collection Time: 05/27/22  3:40 PM   Result Value Ref Range     Troponin I <0.030 <=0.040 ng/mL   APTT     Collection Time: 05/27/22  3:40 PM   Result Value Ref Range     aPTT 29.7 23.3 - 35.1 sec   Lipase     Collection Time: 05/27/22  3:40 PM   Result Value Ref Range     Lipase 48 4 - 60 U/L   COVID-19 Rapid Screening     Collection Time: 05/27/22  4:44 PM   Result Value Ref Range     SARS-CoV-2 RNA, Amplification, Qual Negative Negative   Troponin I     Collection Time: 05/27/22  5:13 PM   Result Value Ref Range     Troponin I <0.030 <=0.040 ng/mL   POCT Bladder Scan     Collection Time: 05/30/22 11:12 AM   Result Value Ref Range     POC Residual Urine Volume 0 0 - 100 mL            ASSESSMENT   1. BPH with obstruction/lower urinary tract symptoms  POCT Bladder Scan     Case Request Operating Room: CYSTOSCOPY, ULTRASOUND, RECTAL APPROACH     Place in Outpatient   2. Prostate cancer  Prostate Specific Antigen, Diagnostic         Plan    Again extensively reviewed his history of BPH/LUTS component, present prior to and after treatment for prostate cancer with ADT and external beam radiation.  In regards to his prostate cancer, he did have good response to treatment and completed 2 years of androgen deprivation therapy, in the absence of which his PSA had demonstrated slight interval increases, and is due for PSA.  He never followed up with Radiation Oncology, despite multiple telephone calls from their office.  Advise that he do so, after next PSA.     In regards to his bothersome LUTS, has intermittently  noted his interest in minimally invasive BPH interventions such as UroLift, and again reviewed the utility post radiotherapy for prostate obstruction, however as his LEs lower tract evaluation was years ago advise of need to repeat lower tract evaluation with cystoscopy at minimum, and now given interval of time passage recommended transrectal ultrasound, to evaluate for candid to see and help guide further interventions for recommendation, as well as rule out any contributing radiation cystitis component.     This has been delayed multiple times due to both chronic health issues, and his cancellations, and most recently due to his COVID, of which he feels that he has long COVID.  He was very ill prior to parathyroidectomy, and my last point of evaluation was doing well.  Today he looks unwell, and has had multiple hospital admissions and ER visits since COVID in February 2022 and has pending GI evaluations, primary care evaluations etc..  Advised of the need to resolve underlying medical issues and will need to be cleared for general anesthesia depending on findings of lower tract evaluation.  Right now, his biggest complaint are his GI complaints and he will have GI follow-up on 6/9/22 as scheduled.    Cystoscopy and transrectal ultrasound rescheduled on 7/12/22.     Will be scheduled for PSA prior, and will cc Radiation Oncology to plan follow-up after PSA.  Reviewed conservative recommendations for urgency frequency in the interim, as well as restarted alpha blocker, Uroxatral.  Advised to limit great juice given the irritating nature of sugary juices, and significantly increased water intake.     He did transition primary care from Dr. Aceves to Dr. Lilly and has only seen PA, with PCP visit to establish care in September.  Given his multiple chronic health issues and recent ER visits and hospitalizations, and possible need for surgical intervention for his lower urinary tract, I will cc primary care as well to  consider earlier appointment      Here for cysto tus  Acceptable for asc

## 2022-07-12 NOTE — PLAN OF CARE
Discharge instructions given to pt, verbalized understanding.  Refused fluids.  No c/o burning or bleeding with urination.  Voided x1.  Ambulating out to self care in no distress.  
You can access the [facility], offered by [facility], by registering with the following website: [email or web address]

## 2022-07-12 NOTE — PROGRESS NOTES
CHIEF COMPLAINT: Elevated PTH/Osteoporosis/thyroid nodules.   72 year old being seen as a new patient to me. Was seeing Dr. Lopez and Dr. Sampson before that. On Fosamax. No fractures. No kidney stones. No HCTZ. Tolerating fosamax. No Falls. No difficulty swallowing. No XRT to head/neck. On prednisone for RA- several yrs.       PAST MEDICAL HISTORY/PAST SURGICAL HISTORY:  Reviewed in [facility]    SOCIAL HISTORY: No T/A    FAMILY HISTORY:  No osteoporosis. No Hip fractures. + CHF. + Dm 2.     MEDICATIONS/ALLERGIES: The patient's MedCard has been updated and reviewed.      ROS:   Constitutional: No recent significant weight change  Eyes: No recent visual changes  ENT: No dysphagia  Cardiovascular: Denies current anginal symptoms  Respiratory: Denies current respiratory difficulty  Gastrointestinal: Denies recent bowel disturbances  GenitoUrinary - No dysuria  Skin: No new skin rash  Neurologic: No focal neurologic complaints  Remainder ROS negative        PE:    GENERAL: Well developed, well nourished.  PSYCH:  appropriate mood and affect  EYES:  PERRL, EOM intact.  ENT: Nares patent, oropharynx clear, mucosa pink,   NECK: Supple, trachea midline, right nodule palpable.   CHEST: Resp even and unlabored, CTA bilateral.  CARDIAC: RRR, S1, S2 heard, no murmurs, rubs, S3, or S4    LABS   Results for JONATHAN BHAKTA (MRN [id number]) as of 7/6/2018 14:59   Ref. Range 5/23/2018 10:02   Sodium Latest Ref Range: 136 - 145 mmol/L 140   Potassium Latest Ref Range: 3.5 - 5.1 mmol/L 4.5   Chloride Latest Ref Range: 95 - 110 mmol/L 111 (H)   CO2 Latest Ref Range: 23 - 29 mmol/L 24   Anion Gap Latest Ref Range: 8 - 16 mmol/L 5 (L)   BUN, Bld Latest Ref Range: 8 - 23 mg/dL 17   Creatinine Latest Ref Range: 0.5 - 1.4 mg/dL 1.3   eGFR if non African American Latest Ref Range: >60 mL/min/1.73 m^2 55.3 (A)   eGFR if African American Latest Ref Range: >60 mL/min/1.73 m^2 >60.0   Glucose Latest Ref Range: 70 - 110 mg/dL 91   Calcium Latest Ref Range:  8.7 - 10.5 mg/dL 10.5   Phosphorus Latest Ref Range: 2.7 - 4.5 mg/dL 2.3 (L)   Albumin Latest Ref Range: 3.5 - 5.2 g/dL 3.4 (L)   PTH Latest Ref Range: 9.0 - 77.0 pg/mL 337.0 (H)       ASSESSMENT/PLAN:  1. Elevated PTH- he does have CKD but the Corrected ca is elevated which makes secondary hyperparathyroidism less likely. WIll repeat at f/u    2. Osteoporosis- on fosomax and tolerating well.     3. Multinodular Goiter- check US at f/u      FOLLOWUP  F/U 6-7 months with TSH, CMP, PTH, Phos, Thyroid US, DEXA,      11

## 2022-07-13 ENCOUNTER — OFFICE VISIT (OUTPATIENT)
Dept: URGENT CARE | Facility: CLINIC | Age: 77
End: 2022-07-13
Payer: MEDICARE

## 2022-07-13 ENCOUNTER — NURSE TRIAGE (OUTPATIENT)
Dept: ADMINISTRATIVE | Facility: CLINIC | Age: 77
End: 2022-07-13
Payer: MEDICARE

## 2022-07-13 VITALS
HEIGHT: 68 IN | BODY MASS INDEX: 21.58 KG/M2 | RESPIRATION RATE: 17 BRPM | HEART RATE: 98 BPM | WEIGHT: 142.38 LBS | DIASTOLIC BLOOD PRESSURE: 81 MMHG | SYSTOLIC BLOOD PRESSURE: 138 MMHG | TEMPERATURE: 98 F | OXYGEN SATURATION: 96 %

## 2022-07-13 DIAGNOSIS — F19.982 DRUG-INDUCED INSOMNIA: Primary | ICD-10-CM

## 2022-07-13 PROCEDURE — 3079F PR MOST RECENT DIASTOLIC BLOOD PRESSURE 80-89 MM HG: ICD-10-PCS | Mod: CPTII,S$GLB,, | Performed by: NURSE PRACTITIONER

## 2022-07-13 PROCEDURE — 99499 RISK ADDL DX/OHS AUDIT: ICD-10-PCS | Mod: S$GLB,,, | Performed by: NURSE PRACTITIONER

## 2022-07-13 PROCEDURE — 3075F SYST BP GE 130 - 139MM HG: CPT | Mod: CPTII,S$GLB,, | Performed by: NURSE PRACTITIONER

## 2022-07-13 PROCEDURE — 1125F PR PAIN SEVERITY QUANTIFIED, PAIN PRESENT: ICD-10-PCS | Mod: CPTII,S$GLB,, | Performed by: NURSE PRACTITIONER

## 2022-07-13 PROCEDURE — 99213 PR OFFICE/OUTPT VISIT, EST, LEVL III, 20-29 MIN: ICD-10-PCS | Mod: S$GLB,,, | Performed by: NURSE PRACTITIONER

## 2022-07-13 PROCEDURE — 1160F PR REVIEW ALL MEDS BY PRESCRIBER/CLIN PHARMACIST DOCUMENTED: ICD-10-PCS | Mod: CPTII,S$GLB,, | Performed by: NURSE PRACTITIONER

## 2022-07-13 PROCEDURE — 1159F PR MEDICATION LIST DOCUMENTED IN MEDICAL RECORD: ICD-10-PCS | Mod: CPTII,S$GLB,, | Performed by: NURSE PRACTITIONER

## 2022-07-13 PROCEDURE — 1160F RVW MEDS BY RX/DR IN RCRD: CPT | Mod: CPTII,S$GLB,, | Performed by: NURSE PRACTITIONER

## 2022-07-13 PROCEDURE — 3079F DIAST BP 80-89 MM HG: CPT | Mod: CPTII,S$GLB,, | Performed by: NURSE PRACTITIONER

## 2022-07-13 PROCEDURE — 1125F AMNT PAIN NOTED PAIN PRSNT: CPT | Mod: CPTII,S$GLB,, | Performed by: NURSE PRACTITIONER

## 2022-07-13 PROCEDURE — 3044F HG A1C LEVEL LT 7.0%: CPT | Mod: CPTII,S$GLB,, | Performed by: NURSE PRACTITIONER

## 2022-07-13 PROCEDURE — 3075F PR MOST RECENT SYSTOLIC BLOOD PRESS GE 130-139MM HG: ICD-10-PCS | Mod: CPTII,S$GLB,, | Performed by: NURSE PRACTITIONER

## 2022-07-13 PROCEDURE — 1159F MED LIST DOCD IN RCRD: CPT | Mod: CPTII,S$GLB,, | Performed by: NURSE PRACTITIONER

## 2022-07-13 PROCEDURE — 3008F BODY MASS INDEX DOCD: CPT | Mod: CPTII,S$GLB,, | Performed by: NURSE PRACTITIONER

## 2022-07-13 PROCEDURE — 99499 UNLISTED E&M SERVICE: CPT | Mod: S$GLB,,, | Performed by: NURSE PRACTITIONER

## 2022-07-13 PROCEDURE — 3044F PR MOST RECENT HEMOGLOBIN A1C LEVEL <7.0%: ICD-10-PCS | Mod: CPTII,S$GLB,, | Performed by: NURSE PRACTITIONER

## 2022-07-13 PROCEDURE — 99213 OFFICE O/P EST LOW 20 MIN: CPT | Mod: S$GLB,,, | Performed by: NURSE PRACTITIONER

## 2022-07-13 PROCEDURE — 3008F PR BODY MASS INDEX (BMI) DOCUMENTED: ICD-10-PCS | Mod: CPTII,S$GLB,, | Performed by: NURSE PRACTITIONER

## 2022-07-13 NOTE — PROGRESS NOTES
"Subjective:       Patient ID: Rajendra Castle is a 76 y.o. male.    Vitals:  height is 5' 8" (1.727 m) and weight is 64.6 kg (142 lb 6.4 oz). His oral temperature is 98.1 °F (36.7 °C). His blood pressure is 138/81 and his pulse is 98. His respiration is 17 and oxygen saturation is 96%.     Chief Complaint: Insomnia    Pt states that he has had insomnia for the last couple of weeks. Patient states that yesterday he saw Dr. Roberts and had a procedure done where they did a rectal ultrasound. Patient states that also had a stent put in about 3 weeks ago from Dr. Lamb. Patient states since the ultrasound he has only slept 4 hours. Patient states that he is still up every 2-3 hours to urinate.Patient states that he has tried melatonin to help him sleep but it doesn't work. Patient states that he also has been given trazodone and it has helped him some to sleep but not enough. Patient states that at times he gets a small pain shock in his chest that doesn't last long. Patient denies any other symptoms. Patient states that when he is sleeping that he typically jerks awake.  Patient states that in the last hour he started having a pain in the right eye that's at a level of 2      Psychiatric/Behavioral: Positive for sleep disturbance.       Objective:      Physical Exam   Constitutional: He is oriented to person, place, and time.   HENT:   Head: Normocephalic and atraumatic.   Nose: Nose normal.   Mouth/Throat: Oropharynx is clear.   Eyes: Conjunctivae are normal.   Neck: Neck supple.   Cardiovascular: Normal rate, regular rhythm, normal heart sounds and normal pulses.   Pulmonary/Chest: Effort normal and breath sounds normal.   Abdominal: Normal appearance. Soft.   Musculoskeletal: Normal range of motion.         General: Normal range of motion.   Neurological: He is alert and oriented to person, place, and time.   Skin: Skin is warm and dry. Capillary refill takes 2 to 3 seconds.   Psychiatric: His behavior is normal. Mood " "normal.   Nursing note and vitals reviewed.        Assessment:       1. Drug-induced insomnia          Plan:       Patient was prescribed Cipro yesterday post cystoscopy. He reports that he was unable to sleep last night. He slept for 1 hour earlier today and woke up "jerking". I suspect insomnia due to Cipro. Will discontinue the cipro and have patient take usual dose of trazodone and mirtazapine tonight at bedtime.  Discontinue Cipro-call Dr Pina if you develop burning with urination  Trazodone 50mg and Mirtazapine 7.5mg at bedtime tonight for sleep  Follow up if your symptoms do not improve  Drug-induced insomnia                   "

## 2022-07-13 NOTE — PATIENT INSTRUCTIONS
Discontinue Cipro-call Dr Pina if you develop burning with urination  Trazodone 50mg and Mirtazapine 7.5mg at bedtime tonight for sleep  Follow up if your symptoms do not improve

## 2022-07-13 NOTE — TELEPHONE ENCOUNTER
"Patient c/o insomnia x 1 week. Patient has tried ZZZquil and melatonin but states that they have not helped. He denies using stimulants. Reports running errands to stay active. Advised per protocol to be seen within the next 2-3 days. Patient VU. Advised the patient to call back with any further questions or if symptoms worsen.      Reason for Disposition   [1] Insomnia persists > 1 week AND [2] no improvement after using CARE ADVICE    Additional Information   Negative: Requesting medication for sleep ("sleeping pill")    Protocols used: INSOMNIA-A-AH    "

## 2022-07-14 NOTE — TELEPHONE ENCOUNTER
"Per Dr. Lilly:  "Yes he can it doesn't interact "  Informed pt message from provider. Pt verbalized understanding.  "

## 2022-07-14 NOTE — TELEPHONE ENCOUNTER
See portal message, please avise.   Thank You  Claudia   Patient wants to know can he take melatonin and ZZZquil together

## 2022-07-17 NOTE — OP NOTE
[facility] Operative/Brief Discharge Note     Date: 7/12/22     Staff Surgeon: Garrett Pina MD     Pre-Op Diagnosis:   LUTS  Prostate cancer s/p XRT     Post-Op Diagnosis:   LUTS  Prostate cancer s/p XRT  Mild urethral stricture     Procedure(s) Performed:   Cystoscopy, flexible  Transrectal ultrasound with volumetric measurement of prostate     Specimen(s): none     Anesthesia: local, 2% xylocaine jelly urojet     Findings:   Volume 23 cm3 (W 43.2 mm, H 22.8 mm, L 44.8mm), no med lobe  Cysto:  Small short-segment bulbar urethral stricture just distal to sphincter, will distinctly narrow, but passable with the flexible cystoscope without resistance.  Prostatic lateral lobe obstruction, more anterior lateral lobe ingrowth proximally with mild elevation of the bladder neck, and more classic lateral lobe distally. Mild bladder trabeculations      Estimated Blood Loss: none     Drains: none     Complications: none     Indications for procedure:  75 yo M with long history of BPH/LUTS diagnosed with prostate cancer in 2018 for which he ultimately underwent external beam radiation and has been on alpha blockers with refractory obstructive lower urinary tract symptoms.  Live cystoscopic evaluation at time of initial biopsy revealed prostate obstruction he has deferred management beyond alpha blockers, but has had persistent symptomatology.  Complex medical history, and most recently had cardiac stent placed 3 weeks ago.  On Plavix.  Dr. Lamb.  This is in addition to his workup for chronic fatigue, long COVID, significant GI issues, etc.. Given worsening symptoms and desire for minimally invasive intervention, presents for lower tract evaluation to guide recommendations for intervention     Procedure in detail:  After informed consent, the patient was placed in left lateral decubitus position. Transrectal ultrasound probe was passed into the rectum and the prostate was visualized on the screen.  Three-dimensional  measurements taken as above with reported prostate volume as documented.  Pertinent ultrasound findings noted above, with absence of median lobe, and No ultrasonic abnormalities of prostate were visualized. Transverse and saggital image captured.  The ultrasound probe was removed     He was then placed in supine position and prepped and drapped in standard cystoscopic fashion and 2% xylocaine jelly was instilled into the urethra.  A flexible cystoscope was passed into the bladder via the urethra.      Anterior urethra normal until point of narrowing/stricture, as above, passable. The prostatic urethra demonstrated significant obstruction as described above in findings, with lateral lobe obstruction present with the absence of median lobe, as confirmed on retroflexion      Bladder otherwise systematically inspected and no mucosal lesions or tumors seen.  Bladder was also assessed for signs of chronic obstruction such as trabeculations, cellules, diverticulum, of which pertinent findings are noted above with extensive signs of chronic obstruction.  Bilateral ureteral orifices seen in orthotopic position on trigone bilaterally with clear efflux.       Patient tolerated the procedure well. No complications     Disposition:  We did discuss options for his symptomatic obstruction refractory to alpha blockers. No benefit of adding finasteride given small gland. Despite small gland has moderate prostatic urethral length with only lateral lobe obstruction. Again reviewed options for intervention such as urolift, rezum, turp, noting risk profile in post radiation urethra, as well as in context of urethral stricture which can be concurrently managed. He is still interested in proceeding with Urolift after discussion of all risks, benefits, and alternatives.     Procedure in detail discussed as well as all risks benefits and alternatives.  Also discussed concurrent prostatic urethral dilation versus DVIU, more likely dilation  given proximity to sphincter, and less can be incise safely without compromise to sphincter to compromise his continence.  Understands this well increased length of postoperative catheterization, and discussed a bit about natural history of stricture, especially post radiation, and risk of recurrence.      All questions patient had answered and in light of his recent cardiac stent 3 weeks ago, we did discuss that it is usually approximately 6 months before cardiology will clear to electively hold blood thinners and have general anesthesia for elective procedures, but in some cases 3. Will set six-month office visit, and have him continue alpha blockers in the interim.  He has been interested in intervention at multiple points there are follow-up, has had very complex medical history and other comorbidities which needed management which delayed further intervention for his prostatic obstruction, including a few weeks go with cardiac stent.      Discharge home today status post uncomplicated procedure as above  Diet - resume home diet  Follow up: 6 mos office reeval, discuss urolift, cards eval/clearance in inter  Instructions:  Drink plenty of water, may see blood in urine, complete prophylactic antibiotics.  Hold asa/fish oil/bloodthinners 1 week prior to urolift  Meds:     Medication List      START taking these medications    ciprofloxacin HCl 500 MG tablet  Commonly known as: CIPRO  Take 1 tablet (500 mg total) by mouth 2 (two) times daily.        CHANGE how you take these medications    pantoprazole 40 MG tablet  Commonly known as: PROTONIX  TAKE 1 TABLET(40 MG) BY MOUTH TWICE DAILY  What changed: when to take this        CONTINUE taking these medications    alfuzosin 10 mg Tb24  Commonly known as: UROXATRAL  Take 1 tablet (10 mg total) by mouth daily with breakfast.     amLODIPine 2.5 MG tablet  Commonly known as: NORVASC  TAKE 1 TABLET(2.5 MG) BY MOUTH EVERY DAY     aspirin 81 MG EC tablet  Commonly known as:  ECOTRIN  Take 1 tablet (81 mg total) by mouth once daily.     atorvastatin 20 MG tablet  Commonly known as: LIPITOR  Take 1 tablet (20 mg total) by mouth every evening.     carvediloL 12.5 MG tablet  Commonly known as: COREG  Take 1 tablet (12.5 mg total) by mouth 2 (two) times daily with meals.     clopidogreL 75 mg tablet  Commonly known as: PLAVIX  Take 1 tablet (75 mg total) by mouth once daily.     divalproex 250 MG EC tablet  Commonly known as: DEPAKOTE     FLUoxetine 10 MG capsule     gabapentin 300 MG capsule  Commonly known as: NEURONTIN     hydrOXYzine pamoate 50 MG Cap  Commonly known as: VISTARIL     isosorbide mononitrate 30 MG 24 hr tablet  Commonly known as: IMDUR  Take 1 tablet (30 mg total) by mouth once daily.     mirtazapine 15 MG tablet  Commonly known as: REMERON     traZODone 50 MG tablet  Commonly known as: DESYREL        ASK your doctor about these medications    cloNIDine 0.1 MG tablet  Commonly known as: CATAPRES  Take 1 tablet (0.1 mg total) by mouth 2 (two) times daily as needed (only if blood pressure top number is over 200).     meclizine 12.5 mg tablet  Commonly known as: ANTIVERT  Take 1 tablet (12.5 mg total) by mouth 2 (two) times daily as needed for Dizziness.     methotrexate 2.5 MG Tab  TAKE 6 TABLETS BY MOUTH EVERY WEEK     sildenafiL 100 MG tablet  Commonly known as: VIAGRA  Take 1 tablet (100 mg total) by mouth daily as needed for Erectile Dysfunction.     VITAMIN D3 100 mcg (4,000 unit) Cap capsule  Generic drug: cholecalciferol (vitamin D3)           Where to Get Your Medications      These medications were sent to [id number] - [place] - [address] [facility]  [address] [facility] [place] [address]    Phone: [phone]   · ciprofloxacin HCl 500 MG tablet

## 2022-07-20 ENCOUNTER — TELEPHONE (OUTPATIENT)
Dept: UROLOGY | Facility: CLINIC | Age: 77
End: 2022-07-20
Payer: MEDICARE

## 2022-07-20 ENCOUNTER — OFFICE VISIT (OUTPATIENT)
Dept: FAMILY MEDICINE | Facility: CLINIC | Age: 77
End: 2022-07-20
Payer: MEDICARE

## 2022-07-20 VITALS
BODY MASS INDEX: 21.22 KG/M2 | HEART RATE: 101 BPM | HEIGHT: 68 IN | TEMPERATURE: 99 F | WEIGHT: 140 LBS | OXYGEN SATURATION: 96 % | RESPIRATION RATE: 14 BRPM

## 2022-07-20 DIAGNOSIS — R06.02 SHORTNESS OF BREATH: ICD-10-CM

## 2022-07-20 DIAGNOSIS — U09.9 COVID-19 LONG HAULER MANIFESTING CHRONIC FATIGUE: ICD-10-CM

## 2022-07-20 DIAGNOSIS — R07.9 CHEST PAIN, UNSPECIFIED TYPE: ICD-10-CM

## 2022-07-20 DIAGNOSIS — G93.32 COVID-19 LONG HAULER MANIFESTING CHRONIC FATIGUE: ICD-10-CM

## 2022-07-20 DIAGNOSIS — R73.03 PREDIABETES: ICD-10-CM

## 2022-07-20 DIAGNOSIS — I10 ESSENTIAL HYPERTENSION: Primary | ICD-10-CM

## 2022-07-20 PROCEDURE — 1160F PR REVIEW ALL MEDS BY PRESCRIBER/CLIN PHARMACIST DOCUMENTED: ICD-10-PCS | Mod: CPTII,S$GLB,,

## 2022-07-20 PROCEDURE — 3008F PR BODY MASS INDEX (BMI) DOCUMENTED: ICD-10-PCS | Mod: CPTII,S$GLB,,

## 2022-07-20 PROCEDURE — 99999 PR PBB SHADOW E&M-EST. PATIENT-LVL V: ICD-10-PCS | Mod: PBBFAC,,,

## 2022-07-20 PROCEDURE — 1111F DSCHRG MED/CURRENT MED MERGE: CPT | Mod: CPTII,S$GLB,,

## 2022-07-20 PROCEDURE — 99214 OFFICE O/P EST MOD 30 MIN: CPT | Mod: S$GLB,,,

## 2022-07-20 PROCEDURE — 1101F PT FALLS ASSESS-DOCD LE1/YR: CPT | Mod: CPTII,S$GLB,,

## 2022-07-20 PROCEDURE — 1111F PR DISCHARGE MEDS RECONCILED W/ CURRENT OUTPATIENT MED LIST: ICD-10-PCS | Mod: CPTII,S$GLB,,

## 2022-07-20 PROCEDURE — 3044F HG A1C LEVEL LT 7.0%: CPT | Mod: CPTII,S$GLB,,

## 2022-07-20 PROCEDURE — 1159F PR MEDICATION LIST DOCUMENTED IN MEDICAL RECORD: ICD-10-PCS | Mod: CPTII,S$GLB,,

## 2022-07-20 PROCEDURE — 3288F PR FALLS RISK ASSESSMENT DOCUMENTED: ICD-10-PCS | Mod: CPTII,S$GLB,,

## 2022-07-20 PROCEDURE — 3008F BODY MASS INDEX DOCD: CPT | Mod: CPTII,S$GLB,,

## 2022-07-20 PROCEDURE — 1160F RVW MEDS BY RX/DR IN RCRD: CPT | Mod: CPTII,S$GLB,,

## 2022-07-20 PROCEDURE — 3044F PR MOST RECENT HEMOGLOBIN A1C LEVEL <7.0%: ICD-10-PCS | Mod: CPTII,S$GLB,,

## 2022-07-20 PROCEDURE — 99214 PR OFFICE/OUTPT VISIT, EST, LEVL IV, 30-39 MIN: ICD-10-PCS | Mod: S$GLB,,,

## 2022-07-20 PROCEDURE — 3288F FALL RISK ASSESSMENT DOCD: CPT | Mod: CPTII,S$GLB,,

## 2022-07-20 PROCEDURE — 1101F PR PT FALLS ASSESS DOC 0-1 FALLS W/OUT INJ PAST YR: ICD-10-PCS | Mod: CPTII,S$GLB,,

## 2022-07-20 PROCEDURE — 99999 PR PBB SHADOW E&M-EST. PATIENT-LVL V: CPT | Mod: PBBFAC,,,

## 2022-07-20 PROCEDURE — 1159F MED LIST DOCD IN RCRD: CPT | Mod: CPTII,S$GLB,,

## 2022-07-20 PROCEDURE — 1126F PR PAIN SEVERITY QUANTIFIED, NO PAIN PRESENT: ICD-10-PCS | Mod: CPTII,S$GLB,,

## 2022-07-20 PROCEDURE — 1126F AMNT PAIN NOTED NONE PRSNT: CPT | Mod: CPTII,S$GLB,,

## 2022-07-20 RX ORDER — ATORVASTATIN CALCIUM 10 MG/1
10 TABLET, FILM COATED ORAL NIGHTLY
COMMUNITY
Start: 2022-06-13 | End: 2023-05-17 | Stop reason: DRUGHIGH

## 2022-07-20 NOTE — PROGRESS NOTES
Subjective:       Patient ID: Rajendra Castle is a 76 y.o. male.    Chief Complaint: Follow-up    Rajendra Castle is a 77 yo M pt w/ MHx listed below that presents to the clinic for ED follow up. His presentation and ED course can be seen below. Today he states that he is doing well despite struggling with symptoms of long COVID. He denies expereincing CP, SoB again since the incident. Recently saw his cardiologist, Dr. Melendez who stated he was doing well and could follow up in 6 months. Seeing Dr. Pina for LUTS for which he recommends the patient as a candidate for urolift. Patient's cardiologist recommended holding off on the procedure for at least 6 months. He is taking clopidogrel as prescribed without issue.       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    Prostate CA     RA (rheumatoid arthritis)     Vitamin D insufficiency 9/30/2016       Review of patient's allergies indicates:  No Known Allergies      Current Outpatient Medications:     alfuzosin (UROXATRAL) 10 mg Tb24, Take 1 tablet (10 mg total) by mouth daily with breakfast., Disp: 30 tablet, Rfl: 11    amLODIPine (NORVASC) 2.5 MG tablet, TAKE 1 TABLET(2.5 MG) BY MOUTH EVERY DAY (Patient taking differently: Take 2.5 mg by mouth once daily.), Disp: 90 tablet, Rfl: 0    aspirin (ECOTRIN) 81 MG EC tablet, Take 1 tablet (81 mg total) by mouth once daily., Disp: 30 tablet, Rfl: 0    atorvastatin (LIPITOR) 10 MG tablet, Take 10 mg by mouth nightly., Disp: , Rfl:     atorvastatin (LIPITOR) 20 MG tablet, Take 1 tablet (20 mg total) by mouth every evening., Disp: 30 tablet, Rfl: 0    carvediloL (COREG) 12.5 MG tablet, Take 1 tablet (12.5 mg total) by mouth 2 (two) times daily with meals., Disp: 60 tablet, Rfl: 0    ciprofloxacin HCl (CIPRO) 500 MG tablet, Take 1 tablet (500 mg total) by  mouth 2 (two) times daily., Disp: 4 tablet, Rfl: 0    clopidogreL (PLAVIX) 75 mg tablet, Take 1 tablet (75 mg total) by mouth once daily., Disp: 30 tablet, Rfl: 0    gabapentin (NEURONTIN) 300 MG capsule, Take 300 mg by mouth 3 (three) times daily., Disp: , Rfl:     isosorbide mononitrate (IMDUR) 30 MG 24 hr tablet, Take 1 tablet (30 mg total) by mouth once daily., Disp: 30 tablet, Rfl: 0    mirtazapine (REMERON) 15 MG tablet, Take 7.5 mg by mouth every evening., Disp: , Rfl:     pantoprazole (PROTONIX) 40 MG tablet, TAKE 1 TABLET(40 MG) BY MOUTH TWICE DAILY (Patient taking differently: Take 40 mg by mouth once daily.), Disp: 180 tablet, Rfl: 0    traZODone (DESYREL) 50 MG tablet, Take 50 mg by mouth nightly., Disp: , Rfl:     divalproex (DEPAKOTE) 250 MG EC tablet, Take 250 mg by mouth 2 (two) times daily., Disp: , Rfl:     FLUoxetine 10 MG capsule, Take 10 mg by mouth once daily., Disp: , Rfl:     hydrOXYzine pamoate (VISTARIL) 50 MG Cap, Take 100 mg by mouth 2 (two) times daily., Disp: , Rfl:   No current facility-administered medications for this visit.    Facility-Administered Medications Ordered in Other Visits:     denosumab (PROLIA) injection 60 mg, 60 mg, Subcutaneous, 1 time in Clinic/HOD, Jean Carlos Hoffman MD    Review of Systems   Constitutional: Positive for fatigue. Negative for activity change, appetite change, chills, diaphoresis, fever and unexpected weight change.   HENT: Negative for congestion, ear pain, postnasal drip, rhinorrhea, sinus pressure, sneezing, sore throat and trouble swallowing.    Eyes: Negative for pain, itching and visual disturbance.   Respiratory: Negative for cough, chest tightness, shortness of breath and wheezing.    Cardiovascular: Negative for chest pain, palpitations and leg swelling.   Gastrointestinal: Negative for abdominal distention, abdominal pain, blood in stool, constipation, diarrhea, nausea and vomiting.   Endocrine: Negative for cold intolerance and  "heat intolerance.   Genitourinary: Negative for difficulty urinating, dysuria, frequency, hematuria and urgency.   Musculoskeletal: Negative for arthralgias, back pain, myalgias and neck pain.   Skin: Negative for color change, pallor, rash and wound.   Neurological: Negative for dizziness, syncope, weakness, numbness and headaches.   Hematological: Negative for adenopathy.   Psychiatric/Behavioral: Negative for behavioral problems. The patient is not nervous/anxious.        Objective:      Pulse 101   Temp 99 °F (37.2 °C) (Oral)   Resp 14   Ht 5' 8" (1.727 m)   Wt 63.5 kg (139 lb 15.9 oz)   SpO2 96%   BMI 21.29 kg/m²   Physical Exam  Vitals reviewed.   Constitutional:       General: He is not in acute distress.     Appearance: Normal appearance. He is normal weight. He is not ill-appearing, toxic-appearing or diaphoretic.   HENT:      Head: Normocephalic.      Right Ear: External ear normal.      Left Ear: External ear normal.      Nose: Nose normal. No congestion or rhinorrhea.      Mouth/Throat:      Mouth: Mucous membranes are moist.      Pharynx: Oropharynx is clear.   Eyes:      General: No scleral icterus.        Right eye: No discharge.         Left eye: No discharge.      Extraocular Movements: Extraocular movements intact.      Conjunctiva/sclera: Conjunctivae normal.   Cardiovascular:      Rate and Rhythm: Normal rate and regular rhythm.      Pulses: Normal pulses.      Heart sounds: Normal heart sounds. No murmur heard.    No friction rub. No gallop.   Pulmonary:      Effort: Pulmonary effort is normal. No respiratory distress.      Breath sounds: Normal breath sounds. No wheezing, rhonchi or rales.   Chest:      Chest wall: No tenderness.   Abdominal:      General: There is no distension.      Palpations: Abdomen is soft. There is no mass.      Tenderness: There is no abdominal tenderness. There is no guarding.   Musculoskeletal:         General: No swelling, tenderness or deformity. Normal range " of motion.      Cervical back: Normal range of motion.      Right lower leg: No edema.      Left lower leg: No edema.   Skin:     General: Skin is warm and dry.      Capillary Refill: Capillary refill takes less than 2 seconds.      Coloration: Skin is not jaundiced.      Findings: No bruising, erythema, lesion or rash.   Neurological:      Mental Status: He is alert and oriented to person, place, and time.      Gait: Gait normal.   Psychiatric:         Mood and Affect: Mood normal.         Behavior: Behavior normal.         Thought Content: Thought content normal.         Judgment: Judgment normal.         Assessment:       1. Essential hypertension    2. 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 Prediabetes    4. Shortness of breath    5. Chest pain, unspecified type        Plan:       Essential hypertension       -    Stable. Continue meds as prescribed. Will continue to monitor.    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    Prediabetes        -   Has been stable. Will continue to monitor.    Shortness of breath         -   Resolved at this time.     Chest pain, unspecified type        -   Resolved at this time. Recently saw cardiologist and is scheduled for routine follow up.         Patient is scheduled to follow up with Dr. Lilly in September.        Jon Archer PA-C  Family Medicine Physician Assistant       I spent a total of 30 minutes on the day of the visit.This includes face to face time and non-face to face time preparing to see the patient (eg, review of tests), obtaining and/or reviewing separately obtained history, documenting clinical information in the electronic or other health record, independently interpreting results and communicating results to the patient/family/caregiver, or care coordinator.      We have addressed [4] Moderate: 1 or more chronic illnesses with exacerbation, progression, or side effects of treatment / 2 or  more stable chronic illnesses / 1 undiagnosed new problem with uncertain prognosis / 1 acute illness with systemic symptoms / 1 acute complicated injury  The complexity of the data reviewed and analyzed for this visit was [2] Minimal or None  The risk of complications and/or morbidity or mortality are [4] Moderate risk (I.e. prescription drug management / decision regarding minor surgery with identified pt or procedure risk factors / decision regarding elective major surgery without identified pt or procedure risk factors / diagnosis or treatment significantly limited by social determinants of health)   The level of Medical Decision Making for this visit is [4] Moderate

## 2022-07-20 NOTE — PATIENT INSTRUCTIONS
Ortiz Drew,     If you are due for any health screening(s) below please notify me so we can arrange them to be ordered and scheduled to maintain your health. Most healthy patients complete it. Don't lose out on improving your health.     Health Maintenance   Topic Date Due    High Dose Statin  07/13/2023    Lipid Panel  03/08/2027    TETANUS VACCINE  05/16/2028    Hepatitis C Screening  Completed    Abdominal Aortic Aneurysm Screening  Completed          Colon Cancer Screening    Of cancers affecting both men and women, colorectal cancer is the third leading cancer killer in the [place]. But it doesnt have to be. Screening can prevent colorectal cancer or find it at an early stage when treatment often leads to a cure.    A colonoscopy is the preferred test for detecting colon cancer. It is needed only once every 10 years if results are negative. While sedated, a flexible, lighted tube with a tiny camera is inserted into the rectum and advanced through the colon to look for cancers. An alternative screening test that is used at home and returned to the lab may also be used. It detects hidden blood in bowel movements which could indicate cancer in the colon. If results are positive, you will need a colonoscopy to determine if the blood is a sign of cancer. This type of follow up (diagnostic) colonoscopy usually requires additional copays as required by your insurance provider. Please contact your PCP if you have any questions.    Although you are still overdue for this important screening, due to the COVID-19 pandemic, we recommend scheduling it in the near future.

## 2022-07-20 NOTE — TELEPHONE ENCOUNTER
"Request submitted to Dr Lamb office for   "Given recent cardiac stent at what timeline inpatient electively hold plavix and undergo general anesthesia"    Dr Lamb responded in writing with   "Need to wait 6 mths from time of stent which was on 6/12/22"    "

## 2022-07-27 DIAGNOSIS — M19.90 CHRONIC INFLAMMATORY ARTHRITIS: ICD-10-CM

## 2022-07-27 RX ORDER — METHOTREXATE 2.5 MG/1
15 TABLET ORAL
Qty: 72 TABLET | Refills: 2 | Status: SHIPPED | OUTPATIENT
Start: 2022-07-27 | End: 2022-08-23 | Stop reason: SDUPTHER

## 2022-07-28 ENCOUNTER — TELEPHONE (OUTPATIENT)
Dept: ENDOCRINOLOGY | Facility: CLINIC | Age: 77
End: 2022-07-28
Payer: MEDICARE

## 2022-07-28 NOTE — TELEPHONE ENCOUNTER
ORDER.  Current / active in the Epic Therapy Plan, signed within a year. Signed today, 7/28/22  LABS. Serum Calcium within 6 weeks of treatment. Ca 6/30/22   DEXA SCAN within the last 2 years  DXA 6/2021.  DENTAL PRECAUTION CONFIRMED WITH PATIENT. No recent issues (infections, etc). No invasive procedures recently done or planned (root canal, extraction, implants, etc.)  A patient will need to bring a Dental Clearance signed by patient's dental provider if there are recent dental issues/procedures within the last 3 months.   FOLLOW-UP APPOINTMENT within the last 12 months with the diagnosis mentioned in the visit notes.    Last seen: 12/9/2021    Due for f/u 12/2022.    Should be fine for prolia 8/2022

## 2022-07-28 NOTE — TELEPHONE ENCOUNTER
----- Message from Julia Parish RN sent at 7/28/2022  2:39 PM CDT -----    ----- Message -----  From: Kristin Ochoa MA  Sent: 7/28/2022  10:40 AM CDT  To: [facility] Clinical Staff      ----- Message -----  From: Maren Sarabia  Sent: 7/28/2022  10:32 AM CDT  To: Yasmin GUERRERO Staff    Please review patient's Appointment Desk for an upcoming PROLIA INJECTION appointment.       The following are needed for this appointment.   Please contact patient for any tests or follow-up needed:      ORDER.  Current / active in the Epic Therapy Plan, signed within a year.  LABS. Serum Calcium within 6 weeks of treatment.    DEXA SCAN within the last 2 years   DENTAL PRECAUTION CONFIRMED WITH PATIENT. No recent issues (infections, etc). No invasive procedures recently done or planned (root canal, extraction, implants, etc.)  A patient will need to bring a Dental Clearance signed by patient's dental provider if there are recent dental issues/procedures within the last 3 months.   FOLLOW-UP APPOINTMENT within the last 12 months with the diagnosis mentioned in the visit notes.         Any item unavailable by the day prior to the scheduled appointment will cause the appointment to be CANCELLED.        To reschedule appointments, please contact [facility] or call [phone] or [phone].       Thank you,  [facility], Infusion Department

## 2022-08-11 ENCOUNTER — INFUSION (OUTPATIENT)
Dept: INFUSION THERAPY | Facility: HOSPITAL | Age: 77
End: 2022-08-11
Attending: INTERNAL MEDICINE
Payer: MEDICARE

## 2022-08-11 VITALS
TEMPERATURE: 98 F | SYSTOLIC BLOOD PRESSURE: 148 MMHG | HEART RATE: 76 BPM | DIASTOLIC BLOOD PRESSURE: 74 MMHG | RESPIRATION RATE: 18 BRPM | WEIGHT: 144.38 LBS | OXYGEN SATURATION: 97 % | HEIGHT: 68 IN | BODY MASS INDEX: 21.88 KG/M2

## 2022-08-11 DIAGNOSIS — M81.0 AGE-RELATED OSTEOPOROSIS WITHOUT CURRENT PATHOLOGICAL FRACTURE: Primary | ICD-10-CM

## 2022-08-11 PROCEDURE — 63600175 PHARM REV CODE 636 W HCPCS: Mod: JG | Performed by: INTERNAL MEDICINE

## 2022-08-11 PROCEDURE — 96372 THER/PROPH/DIAG INJ SC/IM: CPT

## 2022-08-11 RX ADMIN — DENOSUMAB 60 MG: 60 INJECTION SUBCUTANEOUS at 01:08

## 2022-08-11 NOTE — PLAN OF CARE
Problem: Fall Injury Risk  Goal: Absence of Fall and Fall-Related Injury  Outcome: Ongoing, Progressing  Intervention: Identify and Manage Contributors  Flowsheets (Taken 8/11/2022 1254)  Self-Care Promotion: independence encouraged  Medication Review/Management: medications reviewed  Intervention: Promote Injury-Free Environment  Flowsheets (Taken 8/11/2022 1254)  Safety Promotion/Fall Prevention: medications reviewed

## 2022-08-23 ENCOUNTER — OFFICE VISIT (OUTPATIENT)
Dept: RHEUMATOLOGY | Facility: CLINIC | Age: 77
End: 2022-08-23
Payer: MEDICARE

## 2022-08-23 VITALS — BODY MASS INDEX: 22.7 KG/M2 | SYSTOLIC BLOOD PRESSURE: 157 MMHG | DIASTOLIC BLOOD PRESSURE: 85 MMHG | WEIGHT: 149.31 LBS

## 2022-08-23 DIAGNOSIS — M19.90 CHRONIC INFLAMMATORY ARTHRITIS: ICD-10-CM

## 2022-08-23 DIAGNOSIS — Z79.899 ENCOUNTER FOR LONG-TERM (CURRENT) USE OF MEDICATIONS: ICD-10-CM

## 2022-08-23 DIAGNOSIS — M05.79 RHEUMATOID ARTHRITIS INVOLVING MULTIPLE SITES WITH POSITIVE RHEUMATOID FACTOR: Primary | ICD-10-CM

## 2022-08-23 PROCEDURE — 3077F SYST BP >= 140 MM HG: CPT | Mod: CPTII,S$GLB,, | Performed by: INTERNAL MEDICINE

## 2022-08-23 PROCEDURE — 3079F PR MOST RECENT DIASTOLIC BLOOD PRESSURE 80-89 MM HG: ICD-10-PCS | Mod: CPTII,S$GLB,, | Performed by: INTERNAL MEDICINE

## 2022-08-23 PROCEDURE — 3077F PR MOST RECENT SYSTOLIC BLOOD PRESSURE >= 140 MM HG: ICD-10-PCS | Mod: CPTII,S$GLB,, | Performed by: INTERNAL MEDICINE

## 2022-08-23 PROCEDURE — 1159F MED LIST DOCD IN RCRD: CPT | Mod: CPTII,S$GLB,, | Performed by: INTERNAL MEDICINE

## 2022-08-23 PROCEDURE — 3008F BODY MASS INDEX DOCD: CPT | Mod: CPTII,S$GLB,, | Performed by: INTERNAL MEDICINE

## 2022-08-23 PROCEDURE — 99214 PR OFFICE/OUTPT VISIT, EST, LEVL IV, 30-39 MIN: ICD-10-PCS | Mod: S$GLB,,, | Performed by: INTERNAL MEDICINE

## 2022-08-23 PROCEDURE — 3079F DIAST BP 80-89 MM HG: CPT | Mod: CPTII,S$GLB,, | Performed by: INTERNAL MEDICINE

## 2022-08-23 PROCEDURE — 3008F PR BODY MASS INDEX (BMI) DOCUMENTED: ICD-10-PCS | Mod: CPTII,S$GLB,, | Performed by: INTERNAL MEDICINE

## 2022-08-23 PROCEDURE — 3044F HG A1C LEVEL LT 7.0%: CPT | Mod: CPTII,S$GLB,, | Performed by: INTERNAL MEDICINE

## 2022-08-23 PROCEDURE — 1160F PR REVIEW ALL MEDS BY PRESCRIBER/CLIN PHARMACIST DOCUMENTED: ICD-10-PCS | Mod: CPTII,S$GLB,, | Performed by: INTERNAL MEDICINE

## 2022-08-23 PROCEDURE — 99214 OFFICE O/P EST MOD 30 MIN: CPT | Mod: S$GLB,,, | Performed by: INTERNAL MEDICINE

## 2022-08-23 PROCEDURE — 3044F PR MOST RECENT HEMOGLOBIN A1C LEVEL <7.0%: ICD-10-PCS | Mod: CPTII,S$GLB,, | Performed by: INTERNAL MEDICINE

## 2022-08-23 PROCEDURE — 1160F RVW MEDS BY RX/DR IN RCRD: CPT | Mod: CPTII,S$GLB,, | Performed by: INTERNAL MEDICINE

## 2022-08-23 PROCEDURE — 1159F PR MEDICATION LIST DOCUMENTED IN MEDICAL RECORD: ICD-10-PCS | Mod: CPTII,S$GLB,, | Performed by: INTERNAL MEDICINE

## 2022-08-23 RX ORDER — FOLIC ACID 1 MG/1
1 TABLET ORAL DAILY
Qty: 90 TABLET | Refills: 3 | Status: SHIPPED | OUTPATIENT
Start: 2022-08-23 | End: 2023-08-08

## 2022-08-23 RX ORDER — METHOTREXATE 2.5 MG/1
15 TABLET ORAL
Qty: 72 TABLET | Refills: 2 | Status: SHIPPED | OUTPATIENT
Start: 2022-08-23 | End: 2023-05-26

## 2022-08-23 RX ORDER — TRAMADOL HYDROCHLORIDE 50 MG/1
100 TABLET ORAL 2 TIMES DAILY PRN
Qty: 120 TABLET | Refills: 5 | Status: SHIPPED | OUTPATIENT
Start: 2022-08-23 | End: 2023-05-26 | Stop reason: SDUPTHER

## 2022-08-23 NOTE — PROGRESS NOTES
[facility] RHEUMATOLOGY           Follow-up visit    Notes dictated to M*Modal. Please forgive any unintended errors.  Subjective:       Patient ID:   NAME: Rajendra Castle : 1945     76 y.o. male    Referring Doc: No ref. provider found  Other Physicians:    Chief Complaint:  Rheumatoid Arthritis      HPI/Interval History:  The patient is doing fine.  He has not noted any newly inflamed joints.  He has no significant morning stiffness.    ROS:   GEN:    no fever, night sweats or weight loss  SKIN:   no rashes, bruising, or swelling, no Raynauds, no photosensitivity  HEENT: no changes in vision, no mouth ulcers, no sicca symptoms, no scalp tenderness, no jaw claudication.  CV:      no CP, PND, DEY, orthopnea, no palpitations  PULM: no SOB, cough, hemoptysis, sputum or pleuritic pain  GI:        no dysphagia, no GERD, no hematemesis, no abdominal pain, nausea, vomiting, constipation, diarrhea, melanotic stools, hematochezia  :      no hematuria, dysuria  NEURO: no paresthesias, headaches, seizures  MUSCULOSKELETAL:  No red, hot, and/or swollen joints  PSYCH:   No increased insomnia, no increased anxiety, no increased depression    Past Medical/Surgical History:  Past Medical History:   Diagnosis Date    Arthritis     DDD (degenerative disc disease), cervical     Degenerative disc disease     Heart murmur     Hypertension     Nontoxic multinodular goiter 2016    Prostate CA     RA (rheumatoid arthritis)     Vitamin D insufficiency 2016     Past Surgical History:   Procedure Laterality Date    CARPAL TUNNEL RELEASE Right 2021    Procedure: RELEASE, CARPAL TUNNEL;  Surgeon: Jose Ryan II, MD;  Location: [facility] OR;  Service: Orthopedics;  Laterality: Right;    COLONOSCOPY  prior to     normal findings per patient report    CYSTOSCOPY N/A 2018    Procedure: CYSTOSCOPY;  Surgeon: Garrett Pina MD;  Location: [facility] OR;  Service: Urology;  Laterality: N/A;    CYSTOSCOPY  N/A 2022    Procedure: CYSTOSCOPY;  Surgeon: Garrett Pina MD;  Location: [facility] OR;  Service: Urology;  Laterality: N/A;    ESOPHAGOGASTRODUODENOSCOPY N/A 2020    Dr. Espinosa; empiric dilation; erythematous mucosa in antrum; gastric mucosal atrophy; hematin in entire stomach; biopsy: mid & distal esophagus WNL, stomach- WNL, negative for h pylori    PARATHYROIDECTOMY Right 10/27/2020    Procedure: PARATHYROIDECTOMY;  Surgeon: Latonia Clayton MD;  [facility];  Service: ENT;  Laterality: Right;    RELEASE OF ULNAR NERVE AT CUBITAL TUNNEL Right 2021    Procedure: RELEASE, ULNAR TUNNEL;  Surgeon: Jose Ryan II, MD;  Location: [facility] OR;  Service: Orthopedics;  Laterality: Right;    TRANSRECTAL BIOPSY OF PROSTATE WITH ULTRASOUND GUIDANCE N/A 2018    Procedure: BIOPSY, PROSTATE, RECTAL APPROACH, WITH US GUIDANCE;  Surgeon: Garrett Pina MD;  Location: [facility] OR;  Service: Urology;  Laterality: N/A;    TRANSRECTAL ULTRASOUND EXAMINATION N/A 2022    Procedure: ULTRASOUND, RECTAL APPROACH;  Surgeon: Garrett Pina MD;  Location: [facility] OR;  Service: Urology;  Laterality: N/A;       Allergies:  Review of patient's allergies indicates:  No Known Allergies    Social/Family History:  Social History     Socioeconomic History    Marital status: Single   Tobacco Use    Smoking status: Former Smoker     Packs/day: 0.25     Years: 10.00     Pack years: 2.50     Quit date: 2001     Years since quittin.0    Smokeless tobacco: Never Used   Substance and Sexual Activity    Alcohol use: Yes     Comment: seldom    Drug use: Yes     Frequency: 8.0 times per week     Types: Marijuana    Sexual activity: Yes     Partners: Female     Social Determinants of Health     Financial Resource Strain: Low Risk     Difficulty of Paying Living Expenses: Not hard at all   Food Insecurity: No Food Insecurity    Worried About Running Out of Food in the Last Year: Never  true    Ran Out of Food in the Last Year: Never true   Transportation Needs: No Transportation Needs    Lack of Transportation (Medical): No    Lack of Transportation (Non-Medical): No   Physical Activity: Inactive    Days of Exercise per Week: 0 days    Minutes of Exercise per Session: 0 min   Stress: No Stress Concern Present    Feeling of Stress : Not at all   Social Connections: Unknown    Frequency of Communication with Friends and Family: More than three times a week    Frequency of Social Gatherings with Friends and Family: More than three times a week    Attends Spiritism Services: Never    Active Member of Clubs or Organizations: No    Attends Club or Organization Meetings: Never   Housing Stability: Low Risk     Unable to Pay for Housing in the Last Year: No    Number of Places Lived in the Last Year: 1    Unstable Housing in the Last Year: No     Family History   Problem Relation Age of Onset    Heart disease Mother     Stroke Father     Drug abuse Sister     No Known Problems Daughter     No Known Problems Daughter     No Known Problems Son     Diabetes Maternal Uncle     Colon cancer Neg Hx     Crohn's disease Neg Hx     Esophageal cancer Neg Hx     Stomach cancer Neg Hx     Ulcerative colitis Neg Hx      FAMILY HISTORY: negative for Connective Tissue Disease      Medications:    Current Outpatient Medications:     alfuzosin (UROXATRAL) 10 mg Tb24, Take 1 tablet (10 mg total) by mouth daily with breakfast., Disp: 30 tablet, Rfl: 11    amLODIPine (NORVASC) 2.5 MG tablet, TAKE 1 TABLET(2.5 MG) BY MOUTH EVERY DAY (Patient taking differently: Take 2.5 mg by mouth once daily.), Disp: 90 tablet, Rfl: 0    atorvastatin (LIPITOR) 10 MG tablet, Take 10 mg by mouth nightly., Disp: , Rfl:     ciprofloxacin HCl (CIPRO) 500 MG tablet, Take 1 tablet (500 mg total) by mouth 2 (two) times daily., Disp: 4 tablet, Rfl: 0    divalproex (DEPAKOTE) 250 MG EC tablet, Take 250 mg by mouth 2  (two) times daily., Disp: , Rfl:     FLUoxetine 10 MG capsule, Take 10 mg by mouth once daily., Disp: , Rfl:     gabapentin (NEURONTIN) 300 MG capsule, Take 300 mg by mouth 3 (three) times daily., Disp: , Rfl:     hydrOXYzine pamoate (VISTARIL) 50 MG Cap, Take 100 mg by mouth 2 (two) times daily., Disp: , Rfl:     mirtazapine (REMERON) 15 MG tablet, Take 7.5 mg by mouth every evening., Disp: , Rfl:     pantoprazole (PROTONIX) 40 MG tablet, TAKE 1 TABLET(40 MG) BY MOUTH TWICE DAILY (Patient taking differently: Take 40 mg by mouth once daily.), Disp: 180 tablet, Rfl: 0    traZODone (DESYREL) 50 MG tablet, Take 50 mg by mouth nightly., Disp: , Rfl:     aspirin (ECOTRIN) 81 MG EC tablet, Take 1 tablet (81 mg total) by mouth once daily., Disp: 30 tablet, Rfl: 0    carvediloL (COREG) 12.5 MG tablet, Take 1 tablet (12.5 mg total) by mouth 2 (two) times daily with meals., Disp: 60 tablet, Rfl: 0    clopidogreL (PLAVIX) 75 mg tablet, Take 1 tablet (75 mg total) by mouth once daily., Disp: 30 tablet, Rfl: 0    folic acid (FOLVITE) 1 MG tablet, Take 1 tablet (1 mg total) by mouth once daily., Disp: 90 tablet, Rfl: 3    isosorbide mononitrate (IMDUR) 30 MG 24 hr tablet, Take 1 tablet (30 mg total) by mouth once daily., Disp: 30 tablet, Rfl: 0    methotrexate 2.5 MG Tab, Take 6 tablets (15 mg total) by mouth every 7 days. TAKE 6 TABLETS BY MOUTH EVERY WEEK, Disp: 72 tablet, Rfl: 2  No current facility-administered medications for this visit.    Facility-Administered Medications Ordered in Other Visits:     denosumab (PROLIA) injection 60 mg, 60 mg, Subcutaneous, 1 time in Clinic/HOD, Jean Carlos Hoffman MD      Objective:     Vitals:  Blood pressure (!) 157/85, weight 67.7 kg (149 lb 4.8 oz).    Physical Examination:   GEN: wn/wd male in no apparent distress  SKIN: no rashes, no sclerodactyly, no Raynaud's, no periungual erythema, no digital tip ulcerations, no nailbed pitting  HEAD: no alopecia, no scalp tenderness,  no temporal artery tenderness or induration.  EYES: no pallor, no icterus, PERRLA  ENT:  no thrush, no mucosal dryness or ulcerations, adequate oral hygiene & dentition.  NECK: supple x 6, no masses, no thyromegaly, no lymphadenopathy.  CV:   S1 and S2 regular, no murmurs, gallop or rubs  CHEST: Normal respiratory effort;  normal breath sounds/no adventitious sounds. No signs of consolidation.  ABD: non-tender and non-distended; soft; normal bowel sounds; no rebound/guarding or tenderness. No hepatosplenomegaly.  Musculoskeletal:  No evidence of active synovitis.  No progressive deformity  EXTREM: no clubbing, cyanosis or edema. normal pulses.  NEURO:  grossly intact; motor/sensory WNL; no tremors  PSYCH:  normal mood, affect and behavior    Labs:   Lab Results   Component Value Date    WBC 8.41 06/30/2022    HGB 13.7 (L) 06/30/2022    HCT 42.3 06/30/2022    MCV 88 06/30/2022     06/30/2022   CMP@  Sodium   Date Value Ref Range Status   06/30/2022 137 136 - 145 mmol/L Final   04/25/2019 141 134 - 144 mmol/L      Potassium   Date Value Ref Range Status   06/30/2022 4.5 3.5 - 5.1 mmol/L Final     Chloride   Date Value Ref Range Status   06/30/2022 107 95 - 110 mmol/L Final   04/25/2019 110 98 - 110 mmol/L      CO2   Date Value Ref Range Status   06/30/2022 20 (L) 23 - 29 mmol/L Final     Glucose   Date Value Ref Range Status   06/30/2022 120 (H) 70 - 110 mg/dL Final   04/25/2019 95 70 - 99 mg/dL      BUN   Date Value Ref Range Status   06/30/2022 50 (H) 8 - 23 mg/dL Final     Creatinine   Date Value Ref Range Status   06/30/2022 1.5 (H) 0.5 - 1.4 mg/dL Final   04/25/2019 1.38 0.60 - 1.40 mg/dL      Calcium   Date Value Ref Range Status   06/30/2022 8.9 8.7 - 10.5 mg/dL Final     Total Protein   Date Value Ref Range Status   06/30/2022 8.1 6.0 - 8.4 g/dL Final     Albumin   Date Value Ref Range Status   06/30/2022 3.8 3.5 - 5.2 g/dL Final   04/25/2019 3.3 3.1 - 4.7 g/dL      Total Bilirubin   Date Value Ref  Range Status   06/30/2022 0.6 0.1 - 1.0 mg/dL Final     Comment:     For infants and newborns, interpretation of results should be based  on gestational age, weight and in agreement with clinical  observations.    Premature Infant recommended reference ranges:  Up to 24 hours.............<8.0 mg/dL  Up to 48 hours............<12.0 mg/dL  3-5 days..................<15.0 mg/dL  6-29 days.................<15.0 mg/dL       Alkaline Phosphatase   Date Value Ref Range Status   06/30/2022 58 55 - 135 U/L Final     AST   Date Value Ref Range Status   06/30/2022 24 10 - 40 U/L Final     ALT   Date Value Ref Range Status   06/30/2022 19 10 - 44 U/L Final     CPK   Date Value Ref Range Status   04/20/2017 48 18 - 170 IU/L      CRP   Date Value Ref Range Status   12/03/2021 5.9 0.0 - 8.2 mg/L Final     Rheumatoid Factor   Date Value Ref Range Status   04/20/2017 633.9 (H) 0.0 - 13.9 IU/mL      Comment:     Performed at: [facility] [address], [id number] [place], [place], Phone:  [phone]     Uric Acid   Date Value Ref Range Status   09/20/2016 8.6 (H) 3.4 - 7.0 mg/dL Final       Radiology/Diagnostic Studies:    None    Assessment/Discussion/Plan:   76 y.o. male with seropositive rheumatoid arthritis-stable on methotrexate 15 mg weekly and tramadol 100 mg twice daily as needed    PLAN:  I will continue his medication without change.  Routine follow-up blood testing was ordered and will be done in 3 months.    RTC:  I will see him back on an as-needed basis through the end of this year        Electronically signed by Uriel Garcia MD      Patient notified that this office will be closing December 22, 2022. They should begin looking for another rheumatologist as soon as possible.  A list with the names and contact details of rheumatologists in the surrounding area was provided.

## 2022-08-24 ENCOUNTER — TELEPHONE (OUTPATIENT)
Dept: RHEUMATOLOGY | Facility: CLINIC | Age: 77
End: 2022-08-24
Payer: MEDICARE

## 2022-08-24 NOTE — TELEPHONE ENCOUNTER
----- Message from Barbara Mcclain sent at 8/24/2022 10:31 AM CDT -----  Contact: Self  Type:  Sooner Appointment Request    Caller is requesting a sooner appointment.  Caller declined first available appointment listed below.  Caller will not accept being placed on the waitlist and is requesting a message be sent to doctor.    Name of Caller:  Patient  When is the first available appointment?  N/a  Symptoms:  - Rheumatoid arthritis involving multiple sites with positive rheumatoid factor  Best Call Back Number:  [phone]  Additional Information:  Pt is being referred by Dr Garcia. Referral in chart. Thank You

## 2022-09-02 ENCOUNTER — OFFICE VISIT (OUTPATIENT)
Dept: FAMILY MEDICINE | Facility: CLINIC | Age: 77
End: 2022-09-02
Payer: MEDICARE

## 2022-09-02 VITALS
RESPIRATION RATE: 16 BRPM | TEMPERATURE: 99 F | DIASTOLIC BLOOD PRESSURE: 72 MMHG | HEIGHT: 68 IN | BODY MASS INDEX: 22.52 KG/M2 | SYSTOLIC BLOOD PRESSURE: 140 MMHG | WEIGHT: 148.56 LBS | OXYGEN SATURATION: 97 % | HEART RATE: 71 BPM

## 2022-09-02 DIAGNOSIS — Z12.11 COLON CANCER SCREENING: ICD-10-CM

## 2022-09-02 DIAGNOSIS — N40.0 BENIGN PROSTATIC HYPERPLASIA, UNSPECIFIED WHETHER LOWER URINARY TRACT SYMPTOMS PRESENT: ICD-10-CM

## 2022-09-02 DIAGNOSIS — N52.9 ERECTILE DYSFUNCTION, UNSPECIFIED ERECTILE DYSFUNCTION TYPE: ICD-10-CM

## 2022-09-02 DIAGNOSIS — R73.03 PREDIABETES: ICD-10-CM

## 2022-09-02 DIAGNOSIS — G47.00 INSOMNIA, UNSPECIFIED TYPE: ICD-10-CM

## 2022-09-02 DIAGNOSIS — M06.9 RHEUMATOID ARTHRITIS INVOLVING MULTIPLE SITES, UNSPECIFIED WHETHER RHEUMATOID FACTOR PRESENT: ICD-10-CM

## 2022-09-02 DIAGNOSIS — I10 ESSENTIAL HYPERTENSION: Primary | ICD-10-CM

## 2022-09-02 DIAGNOSIS — G47.00 INSOMNIA, UNSPECIFIED TYPE: Primary | ICD-10-CM

## 2022-09-02 PROCEDURE — 3044F PR MOST RECENT HEMOGLOBIN A1C LEVEL <7.0%: ICD-10-PCS | Mod: CPTII,S$GLB,, | Performed by: STUDENT IN AN ORGANIZED HEALTH CARE EDUCATION/TRAINING PROGRAM

## 2022-09-02 PROCEDURE — 1159F PR MEDICATION LIST DOCUMENTED IN MEDICAL RECORD: ICD-10-PCS | Mod: CPTII,S$GLB,, | Performed by: STUDENT IN AN ORGANIZED HEALTH CARE EDUCATION/TRAINING PROGRAM

## 2022-09-02 PROCEDURE — 99999 PR PBB SHADOW E&M-EST. PATIENT-LVL IV: ICD-10-PCS | Mod: PBBFAC,,, | Performed by: STUDENT IN AN ORGANIZED HEALTH CARE EDUCATION/TRAINING PROGRAM

## 2022-09-02 PROCEDURE — 3078F DIAST BP <80 MM HG: CPT | Mod: CPTII,S$GLB,, | Performed by: STUDENT IN AN ORGANIZED HEALTH CARE EDUCATION/TRAINING PROGRAM

## 2022-09-02 PROCEDURE — 1159F MED LIST DOCD IN RCRD: CPT | Mod: CPTII,S$GLB,, | Performed by: STUDENT IN AN ORGANIZED HEALTH CARE EDUCATION/TRAINING PROGRAM

## 2022-09-02 PROCEDURE — 99214 OFFICE O/P EST MOD 30 MIN: CPT | Mod: S$GLB,,, | Performed by: STUDENT IN AN ORGANIZED HEALTH CARE EDUCATION/TRAINING PROGRAM

## 2022-09-02 PROCEDURE — 1125F PR PAIN SEVERITY QUANTIFIED, PAIN PRESENT: ICD-10-PCS | Mod: CPTII,S$GLB,, | Performed by: STUDENT IN AN ORGANIZED HEALTH CARE EDUCATION/TRAINING PROGRAM

## 2022-09-02 PROCEDURE — 1101F PT FALLS ASSESS-DOCD LE1/YR: CPT | Mod: CPTII,S$GLB,, | Performed by: STUDENT IN AN ORGANIZED HEALTH CARE EDUCATION/TRAINING PROGRAM

## 2022-09-02 PROCEDURE — 3044F HG A1C LEVEL LT 7.0%: CPT | Mod: CPTII,S$GLB,, | Performed by: STUDENT IN AN ORGANIZED HEALTH CARE EDUCATION/TRAINING PROGRAM

## 2022-09-02 PROCEDURE — 3288F FALL RISK ASSESSMENT DOCD: CPT | Mod: CPTII,S$GLB,, | Performed by: STUDENT IN AN ORGANIZED HEALTH CARE EDUCATION/TRAINING PROGRAM

## 2022-09-02 PROCEDURE — 3077F SYST BP >= 140 MM HG: CPT | Mod: CPTII,S$GLB,, | Performed by: STUDENT IN AN ORGANIZED HEALTH CARE EDUCATION/TRAINING PROGRAM

## 2022-09-02 PROCEDURE — 3078F PR MOST RECENT DIASTOLIC BLOOD PRESSURE < 80 MM HG: ICD-10-PCS | Mod: CPTII,S$GLB,, | Performed by: STUDENT IN AN ORGANIZED HEALTH CARE EDUCATION/TRAINING PROGRAM

## 2022-09-02 PROCEDURE — 99499 UNLISTED E&M SERVICE: CPT | Mod: S$GLB,,, | Performed by: STUDENT IN AN ORGANIZED HEALTH CARE EDUCATION/TRAINING PROGRAM

## 2022-09-02 PROCEDURE — 1101F PR PT FALLS ASSESS DOC 0-1 FALLS W/OUT INJ PAST YR: ICD-10-PCS | Mod: CPTII,S$GLB,, | Performed by: STUDENT IN AN ORGANIZED HEALTH CARE EDUCATION/TRAINING PROGRAM

## 2022-09-02 PROCEDURE — 3288F PR FALLS RISK ASSESSMENT DOCUMENTED: ICD-10-PCS | Mod: CPTII,S$GLB,, | Performed by: STUDENT IN AN ORGANIZED HEALTH CARE EDUCATION/TRAINING PROGRAM

## 2022-09-02 PROCEDURE — 99214 PR OFFICE/OUTPT VISIT, EST, LEVL IV, 30-39 MIN: ICD-10-PCS | Mod: S$GLB,,, | Performed by: STUDENT IN AN ORGANIZED HEALTH CARE EDUCATION/TRAINING PROGRAM

## 2022-09-02 PROCEDURE — 99999 PR PBB SHADOW E&M-EST. PATIENT-LVL IV: CPT | Mod: PBBFAC,,, | Performed by: STUDENT IN AN ORGANIZED HEALTH CARE EDUCATION/TRAINING PROGRAM

## 2022-09-02 PROCEDURE — 3077F PR MOST RECENT SYSTOLIC BLOOD PRESSURE >= 140 MM HG: ICD-10-PCS | Mod: CPTII,S$GLB,, | Performed by: STUDENT IN AN ORGANIZED HEALTH CARE EDUCATION/TRAINING PROGRAM

## 2022-09-02 PROCEDURE — 1125F AMNT PAIN NOTED PAIN PRSNT: CPT | Mod: CPTII,S$GLB,, | Performed by: STUDENT IN AN ORGANIZED HEALTH CARE EDUCATION/TRAINING PROGRAM

## 2022-09-02 PROCEDURE — 99499 RISK ADDL DX/OHS AUDIT: ICD-10-PCS | Mod: S$GLB,,, | Performed by: STUDENT IN AN ORGANIZED HEALTH CARE EDUCATION/TRAINING PROGRAM

## 2022-09-02 RX ORDER — TRAZODONE HYDROCHLORIDE 50 MG/1
100 TABLET ORAL NIGHTLY
Qty: 180 TABLET | Refills: 3 | Status: SHIPPED | OUTPATIENT
Start: 2022-09-02 | End: 2022-10-18

## 2022-09-02 RX ORDER — TADALAFIL 10 MG/1
10 TABLET ORAL DAILY PRN
Qty: 10 TABLET | Refills: 2 | Status: SHIPPED | OUTPATIENT
Start: 2022-09-02 | End: 2023-01-03

## 2022-09-02 NOTE — PROGRESS NOTES
[facility]    Subjective:    The HPI and pertinent ROS is included in the Diagnostic Impression Remarks section at the end of the note. Please see below for further details. Chief complaint is at end of note.     Rajendra is a pleasant intelligent patient who is here for evaluation.     Medication List with Changes/Refills   New Medications    TADALAFIL (CIALIS) 10 MG TABLET    Take 1 tablet (10 mg total) by mouth daily as needed for Erectile Dysfunction.   Current Medications    ALFUZOSIN (UROXATRAL) 10 MG TB24    Take 1 tablet (10 mg total) by mouth daily with breakfast.    AMLODIPINE (NORVASC) 2.5 MG TABLET    TAKE 1 TABLET(2.5 MG) BY MOUTH EVERY DAY    ASPIRIN (ECOTRIN) 81 MG EC TABLET    Take 1 tablet (81 mg total) by mouth once daily.    ATORVASTATIN (LIPITOR) 10 MG TABLET    Take 10 mg by mouth nightly.    CARVEDILOL (COREG) 12.5 MG TABLET    Take 1 tablet (12.5 mg total) by mouth 2 (two) times daily with meals.    CLOPIDOGREL (PLAVIX) 75 MG TABLET    Take 1 tablet (75 mg total) by mouth once daily.    FOLIC ACID (FOLVITE) 1 MG TABLET    Take 1 tablet (1 mg total) by mouth once daily.    GABAPENTIN (NEURONTIN) 300 MG CAPSULE    Take 300 mg by mouth 3 (three) times daily.    ISOSORBIDE MONONITRATE (IMDUR) 30 MG 24 HR TABLET    Take 1 tablet (30 mg total) by mouth once daily.    METHOTREXATE 2.5 MG TAB    Take 6 tablets (15 mg total) by mouth every 7 days. TAKE 6 TABLETS BY MOUTH EVERY WEEK    PANTOPRAZOLE (PROTONIX) 40 MG TABLET    TAKE 1 TABLET(40 MG) BY MOUTH TWICE DAILY    TRAMADOL (ULTRAM) 50 MG TABLET    Take 2 tablets (100 mg total) by mouth 2 (two) times daily as needed for Pain.    TRAZODONE (DESYREL) 50 MG TABLET    Take 50 mg by mouth nightly.   Discontinued Medications    CIPROFLOXACIN HCL (CIPRO) 500 MG TABLET    Take 1 tablet (500 mg total) by mouth 2 (two) times daily.    DIVALPROEX (DEPAKOTE) 250 MG EC TABLET    Take 250 mg by mouth 2 (two) times daily.    FLUOXETINE 10 MG  CAPSULE    Take 10 mg by mouth once daily.    HYDROXYZINE PAMOATE (VISTARIL) 50 MG CAP    Take 100 mg by mouth 2 (two) times daily.    MIRTAZAPINE (REMERON) 15 MG TABLET    Take 7.5 mg by mouth every evening.     Modified Medications    No medications on file       Data reviewed 274}  Previous medical records reviewed and summarized in plan section at end of note.      If you are due for any health screening(s) below please notify me so we can arrange them to be ordered and scheduled to maintain your health.     All of your core healthy metrics are met.      The following portions of the patient's history were reviewed and updated as appropriate: allergies, current medications, past family history, past medical history, past social history, past surgical history and problem list.    He  has a past medical history of Arthritis, DDD (degenerative disc disease), cervical, Degenerative disc disease, Heart murmur, Hypertension, Nontoxic multinodular goiter (9/20/2016), Prostate CA, RA (rheumatoid arthritis), and Vitamin D insufficiency (9/30/2016).  He  has a past surgical history that includes Transrectal biopsy of prostate with ultrasound guidance (N/A, 12/18/2018); Cystoscopy (N/A, 12/18/2018); Esophagogastroduodenoscopy (N/A, 9/29/2020); Parathyroidectomy (Right, 10/27/2020); Colonoscopy (prior to 2016); Carpal tunnel release (Right, 5/28/2021); Release of ulnar nerve at cubital tunnel (Right, 5/28/2021); Transrectal ultrasound examination (N/A, 7/12/2022); and Cystoscopy (N/A, 7/12/2022).    He  reports that he quit smoking about 21 years ago. His smoking use included cigarettes. He has a 2.50 pack-year smoking history. He has been exposed to tobacco smoke. He has never used smokeless tobacco. He reports current alcohol use. He reports current drug use. Frequency: 8.00 times per week. Drug: Marijuana.  He family history includes Diabetes in his maternal uncle; Drug abuse in his sister; Heart disease in his mother;  "No Known Problems in his daughter, daughter, and son; Stroke in his father.    Review of patient's allergies indicates:  No Known Allergies    Tobacco Use: Medium Risk    Smoking Tobacco Use: Former    Smokeless Tobacco Use: Never     Physical Examination  General appearance: alert, cooperative, no distress  Neck: no thyromegaly, no neck stiffness  Lungs: clear to auscultation, no wheezes, rales or rhonchi, symmetric air entry  Heart: normal rate, regular rhythm, normal S1, S2, no murmurs, rubs, clicks or gallops  Abdomen: soft, nontender, nondistended, no rigidity, rebound, or guarding.   Back: no point tenderness over spine  Extremities: peripheral pulses normal, no unilateral leg swelling or calf tenderness   Neurological:alert, oriented, normal speech, no new focal findings or movement disorder noted from baseline    BP Readings from Last 3 Encounters:   09/02/22 (!) 140/72   08/23/22 (!) 157/85   08/11/22 (!) 148/74     Wt Readings from Last 3 Encounters:   09/02/22 67.4 kg (148 lb 9.4 oz)   08/23/22 67.7 kg (149 lb 4.8 oz)   08/11/22 65.5 kg (144 lb 6.4 oz)     BP (!) 140/72 (BP Location: Right arm, Patient Position: Sitting, BP Method: Medium (Manual))   Pulse 71   Temp 99.2 °F (37.3 °C) (Oral)   Resp 16   Ht 5' 8" (1.727 m)   Wt 67.4 kg (148 lb 9.4 oz)   SpO2 97%   BMI 22.59 kg/m²    274}  Laboratory: I have reviewed old labs below:    274}    Lab Results   Component Value Date    WBC 8.41 06/30/2022    HGB 13.7 (L) 06/30/2022    HCT 42.3 06/30/2022    MCV 88 06/30/2022     06/30/2022     06/30/2022    K 4.5 06/30/2022     06/30/2022    CALCIUM 8.9 06/30/2022    PHOS 3.3 05/23/2022    CO2 20 (L) 06/30/2022     (H) 06/30/2022    BUN 50 (H) 06/30/2022    CREATININE 1.5 (H) 06/30/2022    ANIONGAP 10 06/30/2022    PROT 8.1 06/30/2022    ALBUMIN 3.8 06/30/2022    BILITOT 0.6 06/30/2022    ALKPHOS 58 06/30/2022    ALT 19 06/30/2022    AST 24 06/30/2022    INR 1.2 06/20/2022    CHOL " "116 (L) 03/08/2022    TRIG 66 03/08/2022    HDL 29 (L) 03/08/2022    LDLCALC 73.8 03/08/2022    TSH 0.668 06/07/2022    PSA 4.8 (H) 03/16/2018    HGBA1C 5.9 06/21/2022     Lab reviewed by me: Particular labs of significance that I will monitor, workup, or treat to improve are mentioned below in diagnostic impression remarks.    Imaging/EKG: I have reviewed the pertinent results and my findings are noted in remarks.  274}    CC:   Chief Complaint   Patient presents with    Follow-up    Hypertension        274}    Assessment/Plan  Rajendra Castle is a 77 y.o. male who presents to clinic with:  1. Essential hypertension    2. Prediabetes    3. Rheumatoid arthritis involving multiple sites, unspecified whether rheumatoid factor present    4. Insomnia, unspecified type    5. Benign prostatic hyperplasia, unspecified whether lower urinary tract symptoms present    6. Colon cancer screening    7. Erectile dysfunction, unspecified erectile dysfunction type       274}  Diagnostic Impression Remarks + HPI     Documentation entered by me for this encounter may have been done in part using speech-recognition technology. Although I have made an effort to ensure accuracy, "sound like" errors may exist and should be interpreted in context.     Hypertension-improved from previous recommend to take a higher dose of amlodipine with hold off will get blood pressure log recheck monitor    Diabetes well controlled continue diet exercise monitor  Insomnia-needs improvement trazodone does help recommend increasing dose monitor    BPH is stable continue current meds    Cialis -needs improvement will send in Cialis went over to not take this with his nitrate medication he verbalized understanding    - does not want to get a colonoscopy despite my recommendation the S Will send in fit kit  This is the extent of this pleasant patient's concerns at this present time. He did not feel chest pain upon exertion, dyspnea, nausea, vomiting, " diaphoresis, or syncope. No pleuritic chest pain, unilateral leg swelling, calf tenderness, or calf pain. Rajendra will return to clinic in a few months for further workup and reassessment or sooner as needed. He was instructed to call the clinic or go to the emergency department if his symptoms do not improve, worsens, or if new symptoms develop. As we discussed that symptoms could worsen over the next 24 hours he was advised that if any increased swelling, pain, or numbness arise to go immediately to the ED. Patient knows to call any time if an emergency arises. Shared decision making occurred and he verbalized understanding in agreement with this plan. I discussed imaging findings, diagnosis, possibilities, treatment options, medications, risks, and benefits. He had many questions regarding the options and long-term effects. All questions were answered. He expressed understanding after counseling regarding the diagnosis and recommendations. He was capable and demonstrated competence with understanding of these options. Shared decision making was performed resulting in him choosing the current treatment plan. Patient handout was given with instructions and recommendations. Advised the patient that if they become pregnant to alert us immediately to assess for medication changes. I also discussed the importance of close follow up to discuss labs, change or modify his medications if needed, monitor side effects, and further evaluation of medical problems.     Additional workup planned: see labs ordered below.    See below for labs and meds ordered with associated diagnosis      1. Essential hypertension    2. Prediabetes    3. Rheumatoid arthritis involving multiple sites, unspecified whether rheumatoid factor present    4. Insomnia, unspecified type    5. Benign prostatic hyperplasia, unspecified whether lower urinary tract symptoms present    6. Colon cancer screening  - Fecal Immunochemical Test (iFOBT);  Future    7. Erectile dysfunction, unspecified erectile dysfunction type  - tadalafiL (CIALIS) 10 MG tablet; Take 1 tablet (10 mg total) by mouth daily as needed for Erectile Dysfunction.  Dispense: 10 tablet; Refill: 2    Sp Lilly MD   274}    All of your core healthy metrics are met.

## 2022-09-02 NOTE — TELEPHONE ENCOUNTER
No new care gaps identified.  [facility] Embedded Care Gaps. Reference number: [id number]. 9/02/2022   2:37:23 PM CDT

## 2022-09-02 NOTE — PATIENT INSTRUCTIONS
Your [facility] wants to make sure we help you prevent illness and make the healthiest choices possible.      [facility] wants to help patients achieve their health goals. Our records indicate that you may be due for a Colorectal Cancer Screening. According to the American Cancer Society, colon cancer is the third most common cancer for people in the [place]. Your [facility] care team wants to be sure you get important tests and screenings done regularly to assure that your health needs are met.    The Fit Kit colon cancer screening takes just minutes and is a simple and effective method for detecting signs of colon cancer. Youll do the test at home and return it by mail in a pre-paid envelope.     Testing for blood in your stool (feces or bowel movement) is the first step. If you have an upcoming visit with your Primary Care Physician you can  a Fit Kit during your visit, or we can have one mailed to your mailing address.    If your test results are negative, you wont need testing again for another year.  If results show you need additional testing, we will notify you and follow up with next steps. If you have already completed or scheduled your screening, please let your primary care team know.    If you are interested in completing a Colorectal Cancer Screening, please contact your primary care clinic.    Sincerely,    Sp Lilly MD and your [facility]        Ortiz Drew, Please read below to learn more on healthy choices, screenings, and useful information related to your health.  Most of my patients read it. Most of my healthy patients complete their cancer screenings. Don't lose out on improving your health.     Table of Contents:     Overdue health maintenance   Cancer prevention  Explanation on the different components of your blood work and interpretation  Frequently asked questions   Blood pressure log     If you are due for any health  screening(s) below please notify me so we can arrange them to be ordered and scheduled to maintain your health. Most healthy patients at your age complete them.     All of your core healthy metrics are met.         Colon Cancer Screening    Of cancers affecting both men and women, colorectal cancer is the third leading cancer killer in the [place]. But it doesnt have to be. Screening can prevent colorectal cancer or find it at an early stage when treatment often leads to a cure.    A colonoscopy is the preferred test for detecting colon cancer. It is needed only once every 10 years if results are negative. While sedated, a flexible, lighted tube with a tiny camera is inserted into the rectum and advanced through the colon to look for cancers. An alternative screening test that is used at home and returned to the lab may also be used. It detects hidden blood in bowel movements which could indicate cancer in the colon. If results are positive, you will need a colonoscopy to determine if the blood is a sign of cancer. This type of follow up (diagnostic) colonoscopy usually requires additional copays as required by your insurance provider. Please contact your PCP if you have any questions.    Although you are still overdue for this important screening, due to the COVID-19 pandemic, we recommend scheduling it in the near future.                    Cancer Prevention    Why should you choose to get screened for cancer? One simple reason is because you are important. You matter and deserve to have the best health so you can fulfill your great potential.       Colon Cancer screening - Most colon cancers can be prevented by screening. In most cases a polyp in the colon can grow and is not cancerous at first, but it can become cancerous years later. A polyp is like a seed that can grow into a weed if it is left in the soil. If the seed is detected and removed, no weed sprouts. A FIT test/Cologuard test and colonoscopy  "can detect precancerous polyps and lead to the prevention of cancer. A polyp is just like a seed that can be removed before the roots take hold. A colonoscopy can remove these polyps and eliminate the chance that these polyps turn into cancer.     Cervical Cancer screening- A PAP smear can detect precancerous cells that can become cervical cancer and can lead to procedures to remove them. It is very important to get a PAP smear as investing these few minutes can help prevent a lot of trouble down the road. If you want to do the most you can do to spend more time with your family and friends', cancer screenings can help with that goal.     Breast Cancer screening- Mammograms can detect breast cancer before it has spread and early detection allows the ability to remove the lesion prior to any spread. It is similar to a small rotten spot on fruit. If the spoiled part is removed quickly it can preserve the rest of the fruit, but if it is left alone it will corrupt the whole peach.     Smoking Cessation- "Smoking is like a chimney. The tar builds up and causes a back draft which leads to a cough. Smoking is like sitting in a car with a blocked exhaust system." Smoking can increase your risk for lung, mouth, throat, nose, esophagus, bladder, kidney, ureter, pancreas, stomach, liver, cervix, ovary, bowel, and leukemia [2]. If you have smoked in the past, you might meet the criteria for a CT lung cancer screening.     Lung Cancer Screening - "The U.S. Preventive Services Task Force (USPSTF) recommendsexternal icon yearly lung cancer screening with LDCT for people who--have a 20 pack-year or more smoking history, and smoke now or have quit within the past 15 years, and are between 50 and 80 years old. A pack-year is smoking an average of one pack of cigarettes per day for one year. For example, a person could have a 20 pack-year history by smoking one pack a day for 20 years or two packs a day for 10 years." " "[3]    Hypertension - Common high blood pressure meds may lower colorectal cancer risk-NIMESH, July 6, 2020 - Hypertension Journal Report Medications commonly prescribed to treat high blood pressure may also reduce patients' colorectal cancer risk, according to new research published today in Hypertension, an American Heart Association journal." [4].     Low HbA1c - "Higher HbA1c levels (within both the non?diabetic and diabetic ranges) were associated with the risk of colorectal cancer (Model 2; P linear?=?0.009), especially for colon cancer." [5].    A healthy diet, exercise, limitation of alcohol use, certain infections, avoidance of radiation/environmental toxins, and staying lean lowers your cancer risk [6].     I want to empower you to make informed choices for your health. I have a listed below the most common blood components that we check for when you get blood work. I discuss what each component measures along with common reasons for why they can be abnormal. If you are interested in understanding your blood work better, please read the lab explanation attached below.     Explanation of Lab Results    Please note: This information is included as a reference to help you better understand your lab results and is not be used for diagnosis.     Lipid Panel:  Cholesterol is a measure of cardiac risk and stroke risk. A lipid panel measures total cholesterol and provides readings which are broken down into 3 subsections: Triglycerides, HDL and LDL.    Total Cholesterol: Your total blood cholesterol is a measure of LDL cholesterol, HDL cholesterol, and other lipid components.  If your individual lipid level components are in the normal range and you have an elevated total cholesterol level we are generally not to concerned since we primarily look at the LDL cholesterol which is considered the bad cholesterol which can lead to heart attacks and strokes.  Your calculated cardiac risk is the most important factor in " deciding if you would benefit from a cholesterol-lowering medication that the total cholesterol level.    Triglycerides are most diet and weight sensitive. They are affected easily by lifestyle changes. Ideally, this reading should be less than 150, although if the remainder of the cholesterol panel is within normal limits, we will tolerate upwards of 250-300. For the most part, if triglycerides are the only part of your cholesterol panel reading as 'abnormal', it is best to lose weight and modify your diet, including less saturated fat and less simple sugars. We only start medication to lower this is the level is greater than 500 since this can increase ones risk for pancreatitis. This is not the cholesterol type that leads to heart attacks.     HDL is your 'good cholesterol.' Ideally, the reading should be over 40 and is particularly helpful when it is greater than 60. Research indicates that high HDL is extremely protective; and if your HDL level is greater than 60, we tolerate far worse LDL or triglyceride levels. For the most part, HDL is responsive to aerobic activity and ideal weight. We do not have medicines that increase the HDL reading very easily.    LDL is your 'bad cholesterol.' Our goals depend on how many cardiac risk factors you have.     High LDL: If you are diabetic or have had a heart attack, we like the LDL level to be less than 100. If you have 2 cardiac risk factors (such as diabetes, hypertension, family history, a low HDL and smoking), we like your LDL to be less than 130. If you have 0-2 cardiac risk factors, we like your LDL level to be less than 160, but we may not necessarily initiate medications to 190 unless you cannot lower it. The latest guidelines recommend to consider taking a statin only if your ASCVD cardiac risk score is at least greater than 7.5% and a strong recommendation if your risk score is greater than 10%.     Low LDL: Normal or low LDL is considered good and having an  LDL below the normal range is not of a concern.     Complete Blood Count (CBC):    White blood cells: These are infection fighting cells. Mild fluctuations may represent minor illness at the time of blood draw. Marked fluctuations can represent immune diseases. This can also be elevated from smoking.     High WBC: Elevation in wbc can commonly be caused by an infection, steroid use, or any cause of inflammation such as many rheumatologic diseases, surgery, or trauma.      Low WBC: Many different things can cause this such as infections, medications, rheumatologic disorders, malignancies, nutritional deficiencies, and a normal variant in some individuals. If this occurs it is common practice to rule out a few viruses such as HIV, Hep C, B12, folate along with a a peripheral blood smear to look for abnormalities. If you are otherwise healthy with no concerning symptoms and the workup is negative we usually monitor it with yearly blood work.  If there are other abnormal cell line abnormalities sometimes we refer to hematology to further evaluate.    Hemoglobin: A key indicator for anemia. Ranges depend on age. If you are significantly out of this range, we may need to talk with you.    High Hemoglobin: This can be elevated in some blood disorders, sleep apnea, chronic lung disease, kidney disease, testosterone use, dehydration, smoking, along with some other rare causes.     Low Hemoglobin: Many things can lead to this but the most common cause is an iron deficiency in females who lose blood during their periods, decreased absorption due to antacids, poor diet, sickle cell, anemia of chronic disease, malignancy, bleeding from the gut, along with some other less common causes. If you are a young female who has periods it is usually assumed that this is from that blood loss unless other symptoms or abnormal blood work is present. If you are above 45 and have an iron deficiency it is always recommended to get a  colonoscopy to ensure that there is no lesion causing blood loss and to exclude malignancy.     Hematocrit: Another way of looking at anemia, much like your hemoglobin. If you are significantly out of this range, we may need to talk with you.    MCV: This looks at the size of your red blood cells.     High MCV:  A large number may indicate a B-12 deficiency, folate deficiency, alcohol use, liver disease, medication-induced phenomenon, or a need for further workup.    Low MCV: A small number may imply iron anemia or genetic disorder such as alpha-thalassemia. We may check iron studies called to see if you are low.    MCH: Much like MCV above.    Platelets: These are clotting factors. We tolerate a broad range in this. If your numbers are less than 100, we may need to work it up.     High Platelet Count: If your numbers are elevated, this could represent ongoing inflammation within the body which can be caused by any infection, illness, iron deficiency, or other malignancy. We usually recheck it to monitor for improvement and if it does not resolve will work it up with more blood work.     Low Platelet Count: Many things can cause this such as infections, alcohol use, medications, liver disease, vitamin deficiencies, aspleenia, and some other rare blood disorders. If it persists many times we refer to hematology if a cause is not identified.     Iron:  This is not a reliable blood marker since this fluctuates throughout the day and if you are fasting many times your iron level in your blood is low but this does not necessarily mean you have a deficiency.  There are more reliable ways to measure your iron stores and these are discussed below.    Transferrin:  Many things can cause an abnormal result and this is not as important as some other labs mentioned below.    TIBC:  This is the total iron-binding capacity and this measures the total amount of iron that can be bound by proteins in the blood.  If you have an  elevated TIBC this is a good marker that you could be low on iron and this is useful blood marker.  A simple way to think of this is seats in a car. The TIBC is the number of open seats that iron can sit in. If you have a high TIBC (number of open seats) that means you have a low iron level.     Ferritin:  A low ferritin is almost always caused from an iron deficiency.  A normal ferritin level does not need necessarily exclude an iron deficiency since many inflammatory conditions such as kidney disease, diabetes, arthritis, and obesity can raise this level hiding an iron deficiency.  If you are healthy with no inflammatory conditions this is a very reliable test for detecting an iron deficiency since nothing else lowers your ferritin level except a low iron level. Keep in mind that you can have an iron deficiency if your ferritin level is normal but your TIBC is elevated.  If you have a low iron level the next step is always to identify why you have a low iron level.    CMP (Comprehensive Metabolic Panel):  Broadly, this is a test of organ function including the kidneys, liver, and electrolyte levels.    Glucose: This is primarily looking for diabetes. We like your blood glucose level to be less than 100 when fasting. Readings of 100-125 indicate what we call 'pre-diabetes' or 'glucose intolerance.' This does not necessarily indicate diabetes, but we may check another test called a hemoglobin A1C for confirmation. This level puts you at great risk for becoming a true diabetic and we would encourage the reduction of simple sugars and processed white flour as well as appropriate weight loss. If this number is greater than 125, it is likely you are diabetic; we will get an additional hemoglobin A1C test and will likely schedule you for an appointment. If you notice that your blood glucose is above 125 and we have not scheduled a follow-up appointment, please call us. Patients who are known diabetics can have readings  greater than 125.    Urea Nitrogen: This is a kidney function and maybe elevated because of mild dehydration or because of excessive muscle breakdown from aggressive exercise habits.    Creatinine: This also is a kidney function test. It may be mildly elevated if you have particularly large muscle bulk or taking a supplement like creatine. It is related to the GFR. It is a muscle product that we track to look at your kidney function. If this is elevated and new, we may need to talk with you. If you have had this mildly elevated in the past, it is likely that we will just track it to ensure that it does not worsen quickly. Some medications may gently worsen this, namely blood pressure pills called ace inhibitors. To some degree, we will permit levels of up to 1.6.    Sodium: This is essentially the concentration of salt within the body. This may be mildly low because of dehydration, overhydration, diuretics, .    Potassium: This is an electrolyte that can cause muscular cramping or cardiac difficulties. It is sometimes lowered by diuretic medications.    Chloride: For the most part, this is only relevant if the other electrolytes are abnormal.    CO2: This is a function of acid balance within the body. For the most part, mild abnormalities are not important and may represent a starvation or dehydration state when blood was drawn. Many medications can change this level as well such as diuretics, acetazolamide, calcium carbonate, laxatives, aspirin, and many others.    High CO2: Many things can cause this which includes dehydration, sleep apnea, and COPD.     Low CO2: Many things can lead to a low level and if you are generally healthy we are usually  not concerned. Diarrhea, renal disease, diabetes, and many medications can cause this.     Anion Gap: Only relevant if your CO2 is abnormal.    Calcium: This is not related to dietary intake of calcium. It may fluctuate gently based on the amount of protein within your  body. If it is above 10.9, we may need to do additional testing. Many times if low the albumin level is low and once the corrected calcium level is calculated the calcium is in a normal range.     Total protein: This looks at protein within the body. Markedly elevated levels can represent an immune response and may require further workup.    Albumin: This may be elevated because of particularly high level of fitness. If it is markedly suppressed, it may represent organ dysfunction, particularly in the liver or kidneys.    AST and ALT: These are enzymes in the liver and if elevated can indicate liver damage.     Elevated AST/ALT: Many things can caused elevated liver enzymes and the most common reason is viral infections, alcohol use, medications, supplements, autoimmune, along with some other rare causes. One of the most common reasons for a mild elevation in liver enzymes (less than 3 times the upper limit) is fatty liver disease. Many individuals who have extra fat in their diet store this in the liver and this can build up and cause a mild elevation. This is usually diagnosed with a liver ultrasound and exclusion of other causes. The only treatment for this is diet and exercise along with avoidance of liver toxic medications and alcohol. It is standard of care to rule our viral hepatitis and get imaging of the liver if elevated. This is monitored and if I feel concerned will refer to hepatology.     Alk Phos: Part of liver function or bones    High Alk Phos: elevated levels may indicate a liver injury or obstruction of bile flow. Elevated levels can also be seen in vitamin D deficiency, drug induced, or bone disorders.     Low Alk Phos: In most cases having a low Alkaline Phosphatase enzyme activity is not due to any disease and is simply a normal variant.  Having an elevated Alk Phos is more concerning and associated with many diseases and thus I would not be overly concerned with a low level which is seen in  many health individuals. The reasons for a low serum alkaline phosphatase activity were reviewed in a 1-yr retrospective study and in this study it was found that no cause was found in most cases.  Low activity values were recorded in several individuals in the absence of any obvious cause. This would suggest that the definition of the lower limit of the reference range for alkaline phosphatase is arbitrary, thus limiting the use of low serum activity as a marker of disease.  In some cases micronutrients like Zinc (Zn) and Magnesium (Mg) are causes of low ALP activity and you can take zinc or magnesium supplements. Unfortunately, the blood work to measure zinc and magnesium levels are unreliable and not very accurate since it does not test for the intracellular zinc or magnesium levels.     Tyson GENAOD, Teresita JH, Melissa CHAUDHRY. Clinical significance of a low serum alkaline phosphatase. Net J Med. 1992 Feb;40(1-2):9-14. PMID: [id number].  Art MORTON S, Anirudh B, Tamiko I, Behera S, Art S. Low Alkaline Phosphatase (ALP) In Adult Population an Indicator of Zinc (Zn) and Magnesium (Mg) Deficiency. Curr Res Nutr Food Sci 2017;5(3). doi : http://dx.[email or web address]    Total Bilirubin: This is also part of liver function.    High T Bili: If you have right upper quadrant pain an elevation in total bilirubin can be caused by a gallbladder stone that is blocking the biliary tract that leads to your gastrointestinal tract. If you have fever and right upper quadrant pain this can sometimes be elevated when your gallbladder is infected and most individuals have nausea with vomiting associated with it. If you have no symptoms and are otherwise healthy this can be caused by Gilbert syndrome which is a benign normal variant. There are other causes such as some anemias but there would be abnormal blood counts.     Low T Bili: Nothing to be concerned about.     Thyroid Stimulating Hormone (TSH): This reading is an indicator  "of your thyroid function. The thyroid regulates energy levels throughout the entire body, affecting almost every organ system. This is an inverse relationship so a high number actually presents a low thyroid. If this is abnormal, we will often check an additional lab called a Free T4 to evaluate this more carefully. Borderline elevations, those of 5-10, can be watched or worked up further. Please do not take the supplement Biotin for at least a week prior to getting your TSH checked since this can lead to false measurement levels.     Prostate Specific Antigen (PSA): (Men only.) This is a prostate cancer screening test, and is no longer a routine screening test. Levels are truly a function of age. Being less than 4 is typical for someone more in their 60s. If you are young, it should probably be less than 3. A higher PSA result does not necessarily mean that you have cancer, but may indicate a need for a discussion with your provider. Options include observation to look at rate of rise or prostate biopsy. Not only is the absolute value important, but how much it has changed from previous years. Please ensure that there is not a dramatic rise from previous years.    Please Note: This information is included as a reference to help you better understand your lab results and is not be used for diagnosis.    Frequently asked questions    When should I take my blood pressure medication?    The latest studies show that taking your blood pressure medication at night is the best time. A recent study showed that this prevents more heart attacks and strokes. See the answer below from [place].     "Q. I've taken blood pressure medicines every morning for many years, and they keep my pressure under control. Recently, my doctor recommended taking them at bedtime, instead. Does that make sense?    A. It actually does make sense -- based on recent research. For many years, there have been at least three theoretical reasons for " "taking blood pressure medicines before bedtime. First, a body system that strongly affects blood pressure, called the renin-angiotensin system, has its peak activity during sleep. Second, circadian rhythms cause differences in the body chemistry at night compared with daytime. Third, most heart attacks occur in the morning, before medicines taken in the morning have a chance to "kick in."" [6].     When should I take my cholesterol medication?    It used to be recommended to take your cholesterol medication at night since the original statins that lowered cholesterol did not last 24 hours and most cholesterol synthesis is done at night. The long acting statins such as atorvastatin and rosuvastatin last 24 hours so they can be taken any time during the day. Simvastatin, pravastatin, fluvastatin, and lovastatin are shorter acting and should be taken at bed time.     Can supplements affect my blood work?    Yes they can. A very important supplement to not take for at least a week prior to your blood work is biotin. "Biotin supplement use is common and can lead to the false measurement of thyroid hormone in commonly used assays." [8.]    What are conditions that should not be addressed during a virtual visit?    There are some conditions that should not be evaluated via a virtual visit since optimal care is impossible. Chest pain, shortness of breath, lung conditions, abdominal pain, and any neurological complaint such as headache, dizziness, numbness ect.         When will I get commentary of my blood work?    I review all blood work that you get and I will send out commentary on this via for[MD] within 72 hours. In most cases you will get a message from me sooner, but many times not all of the blood work is completed thus I usually wait until all results have returned. If there is a critical abnormality you should be contacted the same day you got blood work.     How frequently do I need to have visits to get " controlled substances?    It is standard protocol to have a visit every 3 months if you are taking scheduled substances such as ADHD medications, psychiatric medications, and pain medications. This is to ensure your safety and monitor for any side effects.     When should I bring prior to a visit if I want to lose weight?    I recommend that you make a food diary for a week and fill out what you ate each day. You can bring this form in to your visit and I can look over it to suggest changes that you can make.     Which over the counter medications should I avoid if I have decreased kidney function?    NSAIDs which includes ibuprofen (Advil, Motrin, Nuprin), naproxen (Aleve), meloxicam (Mobic) and diclofenac(Zorvolex, Voltaren) and ketorolac (Toradol) can damage your kidneys if you take this long term.Tylenol does not affect your kidney and thus is safe as long as you don't have liver disease.     Is there an [facility] pharmacy?     Yes! The [facility] is located on the first floor of the [facility]. The address is listed below. You can get curbside pickup if you call their number at [phone]. One of the many benefits of using the [facility] pharmacy is that the pharmacists can contact me directly if a cheaper alternative is available to save you money. They also see your note to know more about what the medication was prescribed for. I recommend this pharmacy since communication with me is quick in case any confusion arises on your medications.     [address] [facility].  [address] [place] [id number]  Phone: [phone]    Hours:  Monday - Friday  8:00 a.m. - 5:30 a.m.    Why is it not in my best interest to call in order to get an antibiotic?    Medicine is a complex field and many times the correct diagnosis is critical in order to provide the correct care. One of the most important goals of a healthcare provider is to ensure that no dangerous condition is masquerading as a mild illness. Specific questions are  very important to obtain during an examination that provide a wealth of information to understand your illness. Health care providers are trained to investigate for signs that can be dangerous to your health. Messaging or calling the office in order to get an antibiotic can be very dangerous.     For example, many urinary tract infections can lead to an infection in the kidney that can result in a serious blood stream infection that can lead to hospitalization if not recognized. A cough can be caused by many different things and not necessarily an infection. It is not uncommon that one assumes a cough is from an infection when it is actually caused by a blood clot in the lungs. This can lead to death. Determining your risk can only be performed after a thorough history and examination. A few sentences through e-mail is not enough.     What are some common symptoms that should be evaluated by the emergency department and not by phone or e-mail?    This does not include every symptom, but common examples of symptoms that should prompt one to go to the emergency department are chest pain, chest pressure, shortness of breath, difficulty breathing, abdominal pain, weakness or numbness or an extremity, sudden weakness or drooping on one side of the body, speaking difficulty, unusual or bad headache (particularly if it started suddenly), head injury, confusion, seizure, passing out, lightheaded, pain in arm or jaw, suddenly not able to speak, see, walk, or move, dizziness, neck stiffness, suicidal thoughts, testicular pain, cuts and wounds, severe pain, along with many others. This is not an inclusive list.     Outside Records    It is common to have an colonoscopy, mammogram, PAP smear, or eye exam done outside the [facility] system. Many times we do not get the records automatically sent to us. Please call your provider's office to notify them to fax us your records so that we can have the most up to date information. Your  provider will review your outside results in order to provide you with the complete care that you deserve. We appreciate that you decided to choose us to be serving on your healthcare team and the more information we have about your health is essential.     If I have a psychiatric crisis what should I do?    If you ever feel that there is a risk of a harm to yourself we recommend to go to the emergency room. There is a National Suicide Prevention lifeline number 2-118-647-TALK which route you to the nearest crisis center. There is also a suicide hotline 0-878-AJZPEWR (1-881.246.9621).    988 has been designated as the new three-digit dialing code that will route callers to the National Suicide Prevention Lifeline (now known as the 988 Suicide & Crisis Lifeline), and is now active across the [place].    When people call, text, or chat 988, they will be connected to trained counselors that are part of the existing Lifeline network. These trained counselors will listen, understand how their problems are affecting them, provide support, and connect them to resources if necessary.    The previous Lifeline phone number (1-483.115.1901) will always remain available to people in emotional distress or suicidal crisis.    The [employer] network of over 200 crisis centers has been in operation since 2005, and has been proven to be effective. Its the counselors at these local crisis centers who answer the contacts the Lifeline receives every day. Numerous studies have shown that callers feel less suicidal, less depressed, less overwhelmed and more hopeful after speaking with a Lifeline counselor.     Patient Education       Checking Your Blood Pressure at Home   The Basics   Written by the doctors and editors at UpToDate   How is blood pressure measured? -- Blood pressure is usually measured with a device that goes around your upper arm. This is often done in a doctor's office. But some people also check their blood  "pressure themselves, at home or at work.  Blood pressure is explained with 2 numbers. For instance, your blood pressure might be "140 over 90." The first (top) number is the pressure inside your arteries when your heart is carlos alberto. The second (bottom) number is the pressure inside your arteries when your heart is relaxed. The table shows how doctors and nurses define high and normal blood pressure (table 1).  If your blood pressure gets too high, it puts you at risk for heart attack, stroke, and kidney disease. High blood pressure does not usually cause symptoms. But it can be serious.  What is a home blood pressure meter? -- A home blood pressure meter (or "monitor") is a device you can use to check your blood pressure yourself. It has a cuff that goes around your upper arm (figure 1). Some devices have a cuff that goes around your wrist instead. But doctors aren't sure if these work as well. The meter also has a small screen, or dial, that shows your blood pressure numbers.  There are also special meters you can wear for a day or 2. These are different because they automatically check your blood pressure throughout the day and night, even while you are sleeping. If your doctor thinks you should use one of these devices, they will talk to you about how to wear it.  Why do I need to check my blood pressure at home? -- If your doctor knows or suspects that you have high blood pressure, they might want you to check it at home. There are a few reasons for this. Your doctor might want to look at:  Whether your blood pressure measures the same at home as it did in the doctor's office  How well your blood pressure medicines are working  Changes in your blood pressure, for example, if it goes up and down  People who check their own blood pressure at home usually do better at keeping it low.  How do I choose a home blood pressure meter? -- When choosing a home blood pressure meter, you will probably want to think " about:  Cost - Some devices cost more than others. You should also check to see if your insurance will help pay for your device.  Size - It's important to make sure the cuff fits your arm comfortably. Your doctor or nurse can help you with this.  How easy it is to use - You should make sure you understand how to use the device. You also need to be able to read the numbers on the screen.  You do not need a prescription to buy a home blood pressure meter. You can buy them at most pharmacies or over the internet. Your doctor or nurse can help you choose the right device for you.  How do I check my blood pressure at home? -- Once you have a home blood pressure meter, your doctor or nurse should check it to make sure it fits you and works correctly.  When it's time to check your blood pressure:  Go to the bathroom and empty your bladder first. Having a full bladder can temporarily increase your blood pressure, making the results inaccurate.  Sit in a chair with your feet flat on the ground.  Try to breathe normally and stay calm.  Attach the cuff to your arm. Place the cuff directly on your skin, not over your clothing. The cuff should be tight enough to not slip down, but not uncomfortably tight.  Sit and relax for about 3 to 5 minutes with the cuff on.  Follow the directions that came with your device to start measuring your blood pressure. This might involve squeezing the bulb at the end of the tube to inflate the cuff (fill it with air). With some monitors, you just need to press a button to inflate the cuff. When the cuff fills with air, it feels like someone is squeezing your arm, but it should not hurt. Then you will slowly deflate the cuff (let the air out of it), or it will deflate by itself. The screen or dial will show your blood pressure numbers.  Stay seated and relax for 1 minute, then measure your blood pressure again.  How often should I check my blood pressure? -- It depends. Different people need to  follow different schedules. Your doctor or nurse will tell you how often to check your blood pressure, and when. Some people need to check their blood pressure twice a day, in the morning and evening.  Your doctor or nurse will probably tell you to keep track of your blood pressure for at least a few days (table 2). Then they will look at the numbers. The reason for this is that it's normal for your blood pressure to change a bit from day to day. For example, the numbers might change depending on whether you recently had caffeine, just exercised, or feel stressed. Checking your blood pressure over several days - or longer - will give your doctor or nurse a better idea of what is average for you.  How should I keep track of my blood pressure? -- Some blood pressure meters will record your numbers for you, or send them to your computer or smartphone. If yours does not do this, you will need to write them down. Your doctor or nurse can help you figure out the best way to keep track of the numbers.  What if my blood pressure is high? -- Your doctor or nurse will tell you what to do if your blood pressure is high when you check it at home. If you get a number that is higher than normal, measure it again to see if it is still high. If it is very high (above a certain number, which your doctor or nurse will tell you to watch out for), you should call your doctor right away.  If your blood pressure is only a little high, your doctor or nurse might tell you to keep checking it for a few more days or weeks, and then call if it does not go back down. Then they can help you decide what to do next.  All topics are updated as new evidence becomes available and our peer review process is complete.  This topic retrieved from [employer] on: Sep 21, 2021.  Topic 260784 Version 4.0  Release: 29.4.2 - C29.263  © 2021 UpToDate, Inc. and/or its affiliates. All rights reserved.  table 1: Definition of normal and high blood pressure  Level   "Top number  Bottom number    High 130 or above 80 or above   Elevated 120 to 129 79 or below   Normal 119 or below 79 or below   These definitions are from the American College of Cardiology/American Heart Association. Other expert groups might use slightly different definitions.  "Elevated blood pressure" is a term doctor or nurses use as a warning. It means you do not yet have high blood pressure, but your blood pressure is not as low as it should be for good health.  Graphic 34053 Version 6.0  figure 1: Using a home blood pressure meter     This is an example of a person using a home blood pressure meter.  Graphic 329472 Version 1.0    Consumer Information Use and Disclaimer   This information is not specific medical advice and does not replace information you receive from your health care provider. This is only a brief summary of general information. It does NOT include all information about conditions, illnesses, injuries, tests, procedures, treatments, therapies, discharge instructions or life-style choices that may apply to you. You must talk with your health care provider for complete information about your health and treatment options. This information should not be used to decide whether or not to accept your health care provider's advice, instructions or recommendations. Only your health care provider has the knowledge and training to provide advice that is right for you. The use of this information is governed by the [employer] End User License Agreement, available at https://www.[employer]/en/solutions/[employer]/about/robin.The use of [employer] content is governed by the [employer] Terms of Use. ©2021 UpToDate, Inc. All rights reserved.  Copyright   © 2021 UpToDate, Inc. and/or its affiliates. All rights reserved.      Daily Blood Pressure Log    Please print this form to assist you in keeping track of your blood pressure at home.      Name:  Date of Birth:       Average Blood Pressure:           Date: " Time  (a.m.) Blood  Pressure: Pulse  Rate: Time  (p.m.) Blood  Pressure : Pulse  Rate: Comments:   Sample 8:37 127/83 84    Stressful morning                                                                                                                                                                                                     Wishing you good health,     Sp Lilly MD     References:  1-https://www.[employer]/speakers/chalino_segun  2-https://www.AboutUs.org.com.au/news/zzniw-rsx-91-cancers-that-can-xn-syykux-pj-smoking/  3-https://www.cdc.gov/cancer/lung/basic_info/screening.htm  4-https://newsroom.heart.org/news/common-hypertension-medications-may-reduce-colorectal-cancer-risk  5-Senao A, Kamilla M, Sawada N, et al. High hemoglobin A1c levels within the non-diabetic range are associated with the risk of all cancers. Int J Cancer. 2016;138(7):4991-2540. doi:10.1002/ijc.14600  6-https://www.health.[employer].edu/newsletter_article/the-10-commandments-of-cancer-prevention  7-https://www.health.[employer].edu/diseases-and-conditions/should-i-take-blood-pressure-medications-at-night  8-Indigo MORRIS et al 2018 Prevalence of biotin supplement usage in outpatients and plasma biotin concentrations in patients presenting to the emergency department. Clin Biochem. Epub 2018 Jul 20. PMID: [id number].

## 2022-10-03 ENCOUNTER — PATIENT MESSAGE (OUTPATIENT)
Dept: ADMINISTRATIVE | Facility: HOSPITAL | Age: 77
End: 2022-10-03
Payer: MEDICARE

## 2022-10-17 ENCOUNTER — NURSE TRIAGE (OUTPATIENT)
Dept: ADMINISTRATIVE | Facility: CLINIC | Age: 77
End: 2022-10-17
Payer: MEDICARE

## 2022-10-18 ENCOUNTER — OFFICE VISIT (OUTPATIENT)
Dept: FAMILY MEDICINE | Facility: CLINIC | Age: 77
End: 2022-10-18
Payer: MEDICARE

## 2022-10-18 VITALS
TEMPERATURE: 98 F | BODY MASS INDEX: 22.66 KG/M2 | SYSTOLIC BLOOD PRESSURE: 160 MMHG | HEART RATE: 70 BPM | OXYGEN SATURATION: 99 % | WEIGHT: 149.5 LBS | DIASTOLIC BLOOD PRESSURE: 70 MMHG | HEIGHT: 68 IN

## 2022-10-18 DIAGNOSIS — G47.00 INSOMNIA, UNSPECIFIED TYPE: Primary | ICD-10-CM

## 2022-10-18 PROCEDURE — 3044F HG A1C LEVEL LT 7.0%: CPT | Mod: CPTII,S$GLB,, | Performed by: NURSE PRACTITIONER

## 2022-10-18 PROCEDURE — 3078F DIAST BP <80 MM HG: CPT | Mod: CPTII,S$GLB,, | Performed by: NURSE PRACTITIONER

## 2022-10-18 PROCEDURE — 99999 PR PBB SHADOW E&M-EST. PATIENT-LVL V: ICD-10-PCS | Mod: PBBFAC,,, | Performed by: NURSE PRACTITIONER

## 2022-10-18 PROCEDURE — 3288F FALL RISK ASSESSMENT DOCD: CPT | Mod: CPTII,S$GLB,, | Performed by: NURSE PRACTITIONER

## 2022-10-18 PROCEDURE — 99999 PR PBB SHADOW E&M-EST. PATIENT-LVL V: CPT | Mod: PBBFAC,,, | Performed by: NURSE PRACTITIONER

## 2022-10-18 PROCEDURE — 1126F AMNT PAIN NOTED NONE PRSNT: CPT | Mod: CPTII,S$GLB,, | Performed by: NURSE PRACTITIONER

## 2022-10-18 PROCEDURE — 1101F PR PT FALLS ASSESS DOC 0-1 FALLS W/OUT INJ PAST YR: ICD-10-PCS | Mod: CPTII,S$GLB,, | Performed by: NURSE PRACTITIONER

## 2022-10-18 PROCEDURE — 3044F PR MOST RECENT HEMOGLOBIN A1C LEVEL <7.0%: ICD-10-PCS | Mod: CPTII,S$GLB,, | Performed by: NURSE PRACTITIONER

## 2022-10-18 PROCEDURE — 1160F PR REVIEW ALL MEDS BY PRESCRIBER/CLIN PHARMACIST DOCUMENTED: ICD-10-PCS | Mod: CPTII,S$GLB,, | Performed by: NURSE PRACTITIONER

## 2022-10-18 PROCEDURE — 3288F PR FALLS RISK ASSESSMENT DOCUMENTED: ICD-10-PCS | Mod: CPTII,S$GLB,, | Performed by: NURSE PRACTITIONER

## 2022-10-18 PROCEDURE — 1101F PT FALLS ASSESS-DOCD LE1/YR: CPT | Mod: CPTII,S$GLB,, | Performed by: NURSE PRACTITIONER

## 2022-10-18 PROCEDURE — 1159F PR MEDICATION LIST DOCUMENTED IN MEDICAL RECORD: ICD-10-PCS | Mod: CPTII,S$GLB,, | Performed by: NURSE PRACTITIONER

## 2022-10-18 PROCEDURE — 1160F RVW MEDS BY RX/DR IN RCRD: CPT | Mod: CPTII,S$GLB,, | Performed by: NURSE PRACTITIONER

## 2022-10-18 PROCEDURE — 99213 PR OFFICE/OUTPT VISIT, EST, LEVL III, 20-29 MIN: ICD-10-PCS | Mod: S$GLB,,, | Performed by: NURSE PRACTITIONER

## 2022-10-18 PROCEDURE — 3077F SYST BP >= 140 MM HG: CPT | Mod: CPTII,S$GLB,, | Performed by: NURSE PRACTITIONER

## 2022-10-18 PROCEDURE — 1159F MED LIST DOCD IN RCRD: CPT | Mod: CPTII,S$GLB,, | Performed by: NURSE PRACTITIONER

## 2022-10-18 PROCEDURE — 1126F PR PAIN SEVERITY QUANTIFIED, NO PAIN PRESENT: ICD-10-PCS | Mod: CPTII,S$GLB,, | Performed by: NURSE PRACTITIONER

## 2022-10-18 PROCEDURE — 3077F PR MOST RECENT SYSTOLIC BLOOD PRESSURE >= 140 MM HG: ICD-10-PCS | Mod: CPTII,S$GLB,, | Performed by: NURSE PRACTITIONER

## 2022-10-18 PROCEDURE — 3078F PR MOST RECENT DIASTOLIC BLOOD PRESSURE < 80 MM HG: ICD-10-PCS | Mod: CPTII,S$GLB,, | Performed by: NURSE PRACTITIONER

## 2022-10-18 PROCEDURE — 99213 OFFICE O/P EST LOW 20 MIN: CPT | Mod: S$GLB,,, | Performed by: NURSE PRACTITIONER

## 2022-10-18 RX ORDER — TRAZODONE HYDROCHLORIDE 150 MG/1
150 TABLET ORAL NIGHTLY
Qty: 30 TABLET | Refills: 5 | Status: SHIPPED | OUTPATIENT
Start: 2022-10-18 | End: 2023-02-06 | Stop reason: ALTCHOICE

## 2022-10-18 NOTE — PROGRESS NOTES
Subjective:       Patient ID: Rajendra Castle is a 77 y.o. male.    Chief Complaint: Insomnia     HPI   76 y/o male patient with medical problems listed below presents for insomnia. Patient states has taken trazodone 100 mg but with not much help. States took melatonin but with no relief. Patient states usually goes to bed around 11:30 pm and wake up several times in between to go to bathroom. Patient is followed by Dr. Pina for prostate cancer, bph/luts. Denies smoking, eoth, drug use. Patient requests referral to sleep clinic.     Patient Active Problem List   Diagnosis    Heart murmur    Arthritis    Rotator cuff tendinitis    Dyspnea on exertion    DDD (degenerative disc disease), cervical    Anemia, iron deficiency    Chronic GI bleeding    Arthritis of wrist, right    Trigger thumb of left hand    Arthritis of right wrist    Essential hypertension    Rheumatoid arthritis involving multiple sites    Elevated troponin    Nausea and vomiting    Nontoxic multinodular goiter    Gastroesophageal reflux disease without esophagitis    Nodular thyroid disease    Prediabetes    Osteoporosis    Hypogonadism in male    Vitamin D insufficiency    Hyperparathyroidism    Pulmonary emphysema    Rheumatoid lung    Lung nodule    Stage 3 chronic kidney disease, unspecified whether stage 3a or 3b CKD    Elevated PSA    Prostate cancer    Chest pain    Dysphagia    Severe malnutrition    Tachycardia    S/P parathyroidectomy    Decreased appetite    Right carpal tunnel syndrome    Cubital tunnel syndrome on right    Encounter for long-term (current) use of medications    COVID    Atherosclerosis of native coronary artery of native heart with angina pectoris with documented spasm    Unstable angina    HTN (hypertension)    COVID-19 long hauler      Review of patient's allergies indicates:  No Known Allergies    Past Surgical History:   Procedure Laterality Date    CARPAL TUNNEL RELEASE Right 5/28/2021    Procedure: RELEASE, CARPAL  TUNNEL;  Surgeon: Jose Ryan II, MD;  Location: [facility] OR;  Service: Orthopedics;  Laterality: Right;    COLONOSCOPY  prior to 2016    normal findings per patient report    CYSTOSCOPY N/A 12/18/2018    Procedure: CYSTOSCOPY;  Surgeon: Garrett Pina MD;  Location: [facility] OR;  Service: Urology;  Laterality: N/A;    CYSTOSCOPY N/A 7/12/2022    Procedure: CYSTOSCOPY;  Surgeon: Garrett Pina MD;  Location: [facility] OR;  Service: Urology;  Laterality: N/A;    ESOPHAGOGASTRODUODENOSCOPY N/A 9/29/2020    Dr. Espinosa; empiric dilation; erythematous mucosa in antrum; gastric mucosal atrophy; hematin in entire stomach; biopsy: mid & distal esophagus WNL, stomach- WNL, negative for h pylori    PARATHYROIDECTOMY Right 10/27/2020    Procedure: PARATHYROIDECTOMY;  Surgeon: Latonia Clayton MD;  Location: [facility] OR [address] [place] [address];  Service: ENT;  Laterality: Right;    RELEASE OF ULNAR NERVE AT CUBITAL TUNNEL Right 5/28/2021    Procedure: RELEASE, ULNAR TUNNEL;  Surgeon: Jose Ryan II, MD;  Location: [facility] OR;  Service: Orthopedics;  Laterality: Right;    TRANSRECTAL BIOPSY OF PROSTATE WITH ULTRASOUND GUIDANCE N/A 12/18/2018    Procedure: BIOPSY, PROSTATE, RECTAL APPROACH, WITH US GUIDANCE;  Surgeon: Garrett Pina MD;  Location: [facility];  Service: Urology;  Laterality: N/A;    TRANSRECTAL ULTRASOUND EXAMINATION N/A 7/12/2022    Procedure: ULTRASOUND, RECTAL APPROACH;  Surgeon: Garrett Pina MD;  Location: [facility];  Service: Urology;  Laterality: N/A;        Current Outpatient Medications:     alfuzosin (UROXATRAL) 10 mg Tb24, Take 1 tablet (10 mg total) by mouth daily with breakfast., Disp: 30 tablet, Rfl: 11    amLODIPine (NORVASC) 2.5 MG tablet, TAKE 1 TABLET(2.5 MG) BY MOUTH EVERY DAY (Patient taking differently: Take 2.5 mg by mouth once daily.), Disp: 90 tablet, Rfl: 0    atorvastatin (LIPITOR) 10 MG tablet, Take 10 mg by mouth nightly., Disp: , Rfl:     folic acid (FOLVITE) 1 MG tablet, Take 1 tablet (1  mg total) by mouth once daily., Disp: 90 tablet, Rfl: 3    gabapentin (NEURONTIN) 300 MG capsule, Take 300 mg by mouth 3 (three) times daily., Disp: , Rfl:     methotrexate 2.5 MG Tab, Take 6 tablets (15 mg total) by mouth every 7 days. TAKE 6 TABLETS BY MOUTH EVERY WEEK, Disp: 72 tablet, Rfl: 2    pantoprazole (PROTONIX) 40 MG tablet, TAKE 1 TABLET(40 MG) BY MOUTH TWICE DAILY (Patient taking differently: Take 40 mg by mouth once daily.), Disp: 180 tablet, Rfl: 0    tadalafiL (CIALIS) 10 MG tablet, Take 1 tablet (10 mg total) by mouth daily as needed for Erectile Dysfunction., Disp: 10 tablet, Rfl: 2    traMADoL (ULTRAM) 50 mg tablet, Take 2 tablets (100 mg total) by mouth 2 (two) times daily as needed for Pain., Disp: 120 tablet, Rfl: 5    aspirin (ECOTRIN) 81 MG EC tablet, Take 1 tablet (81 mg total) by mouth once daily., Disp: 30 tablet, Rfl: 0    carvediloL (COREG) 12.5 MG tablet, Take 1 tablet (12.5 mg total) by mouth 2 (two) times daily with meals., Disp: 60 tablet, Rfl: 0    clopidogreL (PLAVIX) 75 mg tablet, Take 1 tablet (75 mg total) by mouth once daily., Disp: 30 tablet, Rfl: 0    isosorbide mononitrate (IMDUR) 30 MG 24 hr tablet, Take 1 tablet (30 mg total) by mouth once daily., Disp: 30 tablet, Rfl: 0    traZODone (DESYREL) 150 MG tablet, Take 1 tablet (150 mg total) by mouth every evening., Disp: 30 tablet, Rfl: 5  No current facility-administered medications for this visit.    Facility-Administered Medications Ordered in Other Visits:     denosumab (PROLIA) injection 60 mg, 60 mg, Subcutaneous, 1 time in Clinic/HOD, Jean Carlos Hfofman MD    Lab Results   Component Value Date    WBC 8.41 06/30/2022    HGB 13.7 (L) 06/30/2022    HCT 42.3 06/30/2022     06/30/2022    CHOL 116 (L) 03/08/2022    TRIG 66 03/08/2022    HDL 29 (L) 03/08/2022    ALT 19 06/30/2022    AST 24 06/30/2022     06/30/2022    K 4.5 06/30/2022     06/30/2022    CREATININE 1.5 (H) 06/30/2022    BUN 50 (H) 06/30/2022     "CO2 20 (L) 06/30/2022    TSH 0.668 06/07/2022    PSA 4.8 (H) 03/16/2018    INR 1.2 06/20/2022    HGBA1C 5.9 06/21/2022     Above labs reviewed    Review of Systems   Constitutional:  Negative for chills and fever.   Respiratory:  Negative for cough, chest tightness and shortness of breath.    Cardiovascular:  Negative for chest pain and palpitations.   Gastrointestinal:  Negative for abdominal pain.   Neurological:  Negative for dizziness and headaches.       Objective:   BP (!) 160/70 (BP Location: Right arm, Patient Position: Sitting, BP Method: Medium (Manual))   Pulse 70   Temp 98.2 °F (36.8 °C) (Oral)   Ht 5' 8" (1.727 m)   Wt 67.8 kg (149 lb 7.6 oz)   SpO2 99%   BMI 22.73 kg/m²       Physical Exam  Vitals reviewed.   Constitutional:       General: He is not in acute distress.     Appearance: Normal appearance.   Cardiovascular:      Rate and Rhythm: Normal rate and regular rhythm.      Pulses: Normal pulses.      Heart sounds: Normal heart sounds.   Pulmonary:      Effort: Pulmonary effort is normal.      Breath sounds: Normal breath sounds.   Chest:      Chest wall: No deformity, swelling or edema.   Abdominal:      General: Abdomen is flat. Bowel sounds are normal.      Palpations: Abdomen is soft.   Musculoskeletal:      Cervical back: Normal range of motion.   Skin:     General: Skin is warm and dry.   Neurological:      General: No focal deficit present.      Mental Status: He is alert.   Psychiatric:         Mood and Affect: Mood normal.         Behavior: Behavior normal.       Assessment:       1. Insomnia, unspecified type        Plan:       1. Insomnia, unspecified type  - Ambulatory referral/consult to Sleep Disorders; Future  - Will increase trazodone to 150 mg daily from 100 mg  - traZODone (DESYREL) 150 MG tablet; Take 1 tablet (150 mg total) by mouth every evening.  Dispense: 30 tablet; Refill: 5   - sleep hygiene discussed  - follow with dr. Pina as scheduled on 11/14    Patient with be " reevaluated in  as scheduled  or sooner prn  Annalee NP

## 2022-11-03 NOTE — TELEPHONE ENCOUNTER
Patient c/o insomnia x 2-3 months. Patient states he was prescribed Trazadone 50 mg for insomnia and it has been unsuccessful with allowing patient to get a full nights rest.     Care Advice given to See PCP within 3 Days. No appointments available with patient's PCP until 1/05/23. Patient declined [facility] at this time. Patient transferred to the [facility] to schedule appointment.    Reason for Disposition   Insomnia persists > 1 week and following Insomnia Care Advice    Additional Information   Negative: Difficulty breathing   Negative: Depression is suspected   Negative: Traumatic Brain Injury (TBI) is suspected   Negative: Suspected alcohol withdrawal or unhealthy use of alcohol (drinking too much)   Negative: Suspected substance use (drug use), addiction, or withdrawal   Negative: Pain is causing insomnia and pain is not a chronic symptom (recurrent or ongoing AND present > 4 weeks)    Protocols used: Insomnia-A-OH     Ear Star Wedge Flap Text: The defect edges were debeveled with a #15 blade scalpel.  Given the location of the defect and the proximity to free margins (helical rim) an ear star wedge flap was deemed most appropriate.  Using a sterile surgical marker, the appropriate flap was drawn incorporating the defect and placing the expected incisions between the helical rim and antihelix where possible.  The area thus outlined was incised through and through with a #15 scalpel blade.

## 2022-11-08 DIAGNOSIS — R06.83 SNORING: ICD-10-CM

## 2022-11-08 DIAGNOSIS — G47.33 OSA (OBSTRUCTIVE SLEEP APNEA): Primary | ICD-10-CM

## 2022-11-08 DIAGNOSIS — G47.19 EXCESSIVE DAYTIME SLEEPINESS: ICD-10-CM

## 2022-11-08 DIAGNOSIS — R53.83 FATIGUE DUE TO SLEEP PATTERN DISTURBANCE: ICD-10-CM

## 2022-11-08 DIAGNOSIS — G47.9 FATIGUE DUE TO SLEEP PATTERN DISTURBANCE: ICD-10-CM

## 2022-11-14 ENCOUNTER — LAB VISIT (OUTPATIENT)
Dept: LAB | Facility: HOSPITAL | Age: 77
End: 2022-11-14
Attending: UROLOGY
Payer: MEDICARE

## 2022-11-14 ENCOUNTER — OFFICE VISIT (OUTPATIENT)
Dept: UROLOGY | Facility: CLINIC | Age: 77
End: 2022-11-14
Payer: MEDICARE

## 2022-11-14 VITALS
BODY MASS INDEX: 22.58 KG/M2 | RESPIRATION RATE: 18 BRPM | HEART RATE: 72 BPM | SYSTOLIC BLOOD PRESSURE: 156 MMHG | DIASTOLIC BLOOD PRESSURE: 82 MMHG | WEIGHT: 149 LBS | HEIGHT: 68 IN

## 2022-11-14 DIAGNOSIS — C61 PROSTATE CANCER: ICD-10-CM

## 2022-11-14 DIAGNOSIS — N13.8 BPH WITH OBSTRUCTION/LOWER URINARY TRACT SYMPTOMS: Primary | ICD-10-CM

## 2022-11-14 DIAGNOSIS — N40.1 BPH WITH OBSTRUCTION/LOWER URINARY TRACT SYMPTOMS: Primary | ICD-10-CM

## 2022-11-14 LAB
COMPLEXED PSA SERPL-MCNC: 0.1 NG/ML (ref 0–4)
POC RESIDUAL URINE VOLUME: 20 ML (ref 0–100)

## 2022-11-14 PROCEDURE — 1159F MED LIST DOCD IN RCRD: CPT | Mod: CPTII,S$GLB,, | Performed by: UROLOGY

## 2022-11-14 PROCEDURE — 3077F PR MOST RECENT SYSTOLIC BLOOD PRESSURE >= 140 MM HG: ICD-10-PCS | Mod: CPTII,S$GLB,, | Performed by: UROLOGY

## 2022-11-14 PROCEDURE — 1126F AMNT PAIN NOTED NONE PRSNT: CPT | Mod: CPTII,S$GLB,, | Performed by: UROLOGY

## 2022-11-14 PROCEDURE — 84153 ASSAY OF PSA TOTAL: CPT | Performed by: UROLOGY

## 2022-11-14 PROCEDURE — 99213 PR OFFICE/OUTPT VISIT, EST, LEVL III, 20-29 MIN: ICD-10-PCS | Mod: S$GLB,,, | Performed by: UROLOGY

## 2022-11-14 PROCEDURE — 1126F PR PAIN SEVERITY QUANTIFIED, NO PAIN PRESENT: ICD-10-PCS | Mod: CPTII,S$GLB,, | Performed by: UROLOGY

## 2022-11-14 PROCEDURE — 3044F HG A1C LEVEL LT 7.0%: CPT | Mod: CPTII,S$GLB,, | Performed by: UROLOGY

## 2022-11-14 PROCEDURE — 1159F PR MEDICATION LIST DOCUMENTED IN MEDICAL RECORD: ICD-10-PCS | Mod: CPTII,S$GLB,, | Performed by: UROLOGY

## 2022-11-14 PROCEDURE — 99999 PR PBB SHADOW E&M-EST. PATIENT-LVL IV: CPT | Mod: PBBFAC,,, | Performed by: UROLOGY

## 2022-11-14 PROCEDURE — 99499 UNLISTED E&M SERVICE: CPT | Mod: S$GLB,,, | Performed by: UROLOGY

## 2022-11-14 PROCEDURE — 3288F FALL RISK ASSESSMENT DOCD: CPT | Mod: CPTII,S$GLB,, | Performed by: UROLOGY

## 2022-11-14 PROCEDURE — 3079F DIAST BP 80-89 MM HG: CPT | Mod: CPTII,S$GLB,, | Performed by: UROLOGY

## 2022-11-14 PROCEDURE — 1160F RVW MEDS BY RX/DR IN RCRD: CPT | Mod: CPTII,S$GLB,, | Performed by: UROLOGY

## 2022-11-14 PROCEDURE — 1101F PT FALLS ASSESS-DOCD LE1/YR: CPT | Mod: CPTII,S$GLB,, | Performed by: UROLOGY

## 2022-11-14 PROCEDURE — 1101F PR PT FALLS ASSESS DOC 0-1 FALLS W/OUT INJ PAST YR: ICD-10-PCS | Mod: CPTII,S$GLB,, | Performed by: UROLOGY

## 2022-11-14 PROCEDURE — 99499 RISK ADDL DX/OHS AUDIT: ICD-10-PCS | Mod: S$GLB,,, | Performed by: UROLOGY

## 2022-11-14 PROCEDURE — 3079F PR MOST RECENT DIASTOLIC BLOOD PRESSURE 80-89 MM HG: ICD-10-PCS | Mod: CPTII,S$GLB,, | Performed by: UROLOGY

## 2022-11-14 PROCEDURE — 99999 PR PBB SHADOW E&M-EST. PATIENT-LVL IV: ICD-10-PCS | Mod: PBBFAC,,, | Performed by: UROLOGY

## 2022-11-14 PROCEDURE — 36415 COLL VENOUS BLD VENIPUNCTURE: CPT | Performed by: UROLOGY

## 2022-11-14 PROCEDURE — 3077F SYST BP >= 140 MM HG: CPT | Mod: CPTII,S$GLB,, | Performed by: UROLOGY

## 2022-11-14 PROCEDURE — 3288F PR FALLS RISK ASSESSMENT DOCUMENTED: ICD-10-PCS | Mod: CPTII,S$GLB,, | Performed by: UROLOGY

## 2022-11-14 PROCEDURE — 99213 OFFICE O/P EST LOW 20 MIN: CPT | Mod: S$GLB,,, | Performed by: UROLOGY

## 2022-11-14 PROCEDURE — 51798 POCT BLADDER SCAN: ICD-10-PCS | Mod: S$GLB,,, | Performed by: UROLOGY

## 2022-11-14 PROCEDURE — 1160F PR REVIEW ALL MEDS BY PRESCRIBER/CLIN PHARMACIST DOCUMENTED: ICD-10-PCS | Mod: CPTII,S$GLB,, | Performed by: UROLOGY

## 2022-11-14 PROCEDURE — 51798 US URINE CAPACITY MEASURE: CPT | Mod: S$GLB,,, | Performed by: UROLOGY

## 2022-11-14 PROCEDURE — 3044F PR MOST RECENT HEMOGLOBIN A1C LEVEL <7.0%: ICD-10-PCS | Mod: CPTII,S$GLB,, | Performed by: UROLOGY

## 2022-11-14 RX ORDER — ZOLPIDEM TARTRATE 5 MG/1
TABLET ORAL
COMMUNITY
Start: 2022-11-09 | End: 2023-01-17 | Stop reason: SDUPTHER

## 2022-11-14 NOTE — Clinical Note
Believe clearance request sent from asc note to dr calderon per wrap up note at time of visit 11/14, though fax this note as well with f/u request, and fu with office to note date of next appt  Place in pending file for urolift once cleared  As well, send note to dr granados with pulm clearance request noting: pending tiffanie eval and sleep study with you and pending urolift in OR and will need pulm clearance prior, so please send based on your pending workup

## 2022-11-14 NOTE — PROGRESS NOTES
[facility] Urology Progress Note    Rajendra Castle is a 77 y.o. male with history of prostate cancer s/p XRT with bph/luts component    He was initially seen by YAZ López on April 2018 for bothersome LUTS and was started on Uroxatral which decd NTF 3 to 1x.  Was also on finasteride and noting to have rising psa with low free psa and underwent cystoscopy and prostate biopsy in December 2018 with 20.4 g prostate with significant bilateral lateral lobe hypertrophy with severe obstruction with central near kissing lobes, no median lobe, mild early trabeculations, normal bladder  PATHOLOGY: 4 cores L sided G3+4=7 prostate cancer: LB lat 25% (10% pattern 4); LB med 5% (20% pattern 4); LM med <5%, LA lat 10%  He only periodically had erections, had CKD III, Hyperparathyroid, RA (on MTX), hypercalcemia, emphysema. LUTS largely unchanged on uroxatral AUA SS 12/4.   Initially considered surgery then elected XRT. Discussed urolift as fiducial markers and for LUTS resolution though deferred to LUTS management after treatment     Advised to DC methotrexate during XRT, but initially and stated that his prostate cancer was under control from raghav intervention by televangelist.  He later returned 3/19/19 noting compliance with sensipar for his hyperCa, and elected to proceed with ADT/XRT, noting improvement on uroxatral.  Lupron 45mg administered. He did decline fiducial markers/spaceoar. He chose to resume/continue methotrexate and did complete radiotherapy to 7740 cGy ending August 2019.    Continued Q6 mos ADT x total 2 yrs (last oct 2020, at which time  AUA symptom score:  12/5, unhappy (5:  Nocturia; for:  Emptying; 3:  Frequency) and PVR 0cc)   At that time He felt very ill and noted discharge from hospital day prior, couldnt eat and lost 30-40 lb losing weight weak  Ultimately found to be related to his hyperparathyroidism and underwent parathyroidectomy     April 2021 noting PSA 3/29/21 is 0.04, trending down from 0.07, down  from 0.3. In interim had parathyroidectomy.  Was having abdominal pain and indigestion and saw the GI nurse practitioner in 21. AUA symptom score 7-8/6 (2:  Urgency; 1:  Frequency, intermittency, weak stream; 2-3:  Nocturia). DTF only 2-3 normal. Cuts off fluid 2 hr before bed.    Does drink water and chocolate Ensure in the afternoon and evening hours. Cardiology:  Dr. Guzman.     Cysto planned 21 for re-evaluation of obstruction rule out any radiation cystitis component before proceeding with intervention obstructing prostate  He followed up with PCP, Dr. Aceves, few weeks prior and asked to message us to cancel his cystoscopy  PSA 0.11 in 2021 treated to slight rebound since discontinuing ADT, and did not follow-up with radiation oncology despite multiple calls from their department  21: AUA SS : 16/4 ( ICBE:2; Frequency:3; Intermittency:1; Urgency:2; Weak urine stream:2; Strainin; Nocturia:4) PVR 0cc. Uroxatral renewed. Again expressed interest in urolift  Booked for cystoscopy 22 but canceled due to covid. PSA 0.14 in 2022     ER visit 22 for SOB and covid concerns, Ab infusion, ER 3/7/22 weak/dizzy with near syncope and bradycardia and admitted on tele for 24 hours observed  Admitted -2422 for abdominal pain nausea vomiting, followed by ER visit 22 for abdominal pain and tarry stool determined to be from Pepto-Bismol  May 2022: Has not been right since his parathyroid hormone surgery. Feels tired all the time like he has chronic fatigue.  Concern for long COVID. ER visits as above for dizziness and chest pain noting that they checked his heart and lungs, and his dark stools concerning for Pepto-Bismol, but still has concern for too much acid in his stomach and vomits up acid has been treated for reflux and his GI evaluation pending on 22.  His pain and acid issues only better with Pepto-Bismol.  Still bothered by his urinary symptoms.  Thinks he has  "been off of medication for a while, and just ordered some Super Beta prostate.  Not sure if he is on alfuzosin or not but does not think so and asked for refill. He drinks milk, grape juice, and sugar.  Only has a glass of water in the morning.     Resumed alfuzosin. Repeated lower tract eval cysto/trus 7/17/22  Volume 23 cm3 (W 43.2 mm, H 22.8 mm, L 44.8mm), no med lobe  Cysto:  Small short-segment bulbar urethral stricture just distal to sphincter, will distinctly narrow, but passable with the flexible cystoscope without resistance.  Prostatic lateral lobe obstruction, more anterior lateral lobe ingrowth proximally with mild elevation of the bladder neck, and more classic lateral lobe distally. Mild bladder trabeculations   - Had cardiac stent 3 weeks prior, we did discuss that it is usually approximately 6 months before cardiology will clear to electively hold blood thinners and have general anesthesia for elective procedures, so will set six-month office visit, and have him continue alpha blockers in the interim. When cleared to proceed and hold bloodthinners will proceed with urolit    He returns today noting  AUA symptom score 17/5 (4: Frequency; 3: Emptying, weak stream, sleeping; 2: Intermittency; 1: Urgency, straining)  Last PSA on file 2/28/22 was 0.14  Saw Dr. Lamb just a few weeks after his cardiac stent but has not seen him since.  Upcoming ISABEL evaluation with Dr. Martínez          Focused Physical Exam:    Vitals:    11/14/22 1428   BP: (!) 156/82   Pulse: 72   Resp: 18     Body mass index is 22.66 kg/m². Weight: 67.6 kg (149 lb) Height: 5' 8" (172.7 cm)     Abdomen: Soft, non-tender, nondistended, no CVA tenderness  :  circ normal phallus without plaques/lesions, orthotopic urethral meatus, bilaterally desc testes in normal scrotum without mass or lesion  SHIRLEY: normal sphincter tone, no masses, no hemmorrhoids   PROSTATE: 15-35g, no nodules, non-tender, symmetrical.       Recent Results (from the " past 336 hour(s))   POCT Bladder Scan    Collection Time: 11/14/22  2:34 PM   Result Value Ref Range    POC Residual Urine Volume 20 0 - 100 mL       ASSESSMENT   1. BPH with obstruction/lower urinary tract symptoms  POCT Bladder Scan      2. Prostate cancer  Prostate Specific Antigen, Diagnostic    Prostate Specific Antigen, Diagnostic          Plan    Reviewed prior PSA noting stability post ADT/XRT at [facility]/baseline.  Due for PSA, will get 1 today and chart check the results, and continue Q 6 months until 5 yrs out then annually.  He continues to desire intervention for his BPH/LUTS component, and is interested in proceeding with UroLift.  We reviewed that the goal of today's visit was to review his cardiac clearance and ability to hold blood thinners prior to urologic intervention but he has not seen his cardiologist yet. Also now has pulm workup pending.   He appears in better health today than at previous evaluations and has not had interim hospitalizations, and is ready to proceed with BPH intervention.  Again reviewed UroLift procedure, side effect profile, recovery etcetera in detail with the patient as per previous discussions, and noted we can proceed when he is cleared.  We also discuss the possible mild urethral stricture which may be passively dilated at time of procedure but may need management, and if so would impact the length of catheterization.  All questions answered and will CC Dr Lamb and Dr Kraus for clearance and schedule urolift when cleared.    Level of Medical Decision Making  [ X ]  Low: 2 minor/1 stable chronic/1 acute uncomplicated --- review record/result/order test x2  [ _ ]  Mod: 1 chronic exac/2stable chronic/1 acute comp --- review record/result/order test x3 OR independent interp of outside test OR discuss mgmt of outside ordered test --- start drug therapy/plan minor surgery   [ _ ]  High: chronic with exacerbation/progression or trtmnt side effect vs acute/chronic w/  life/body threat ---  (2/3) review record/result/order test x3 OR independent interp of ouutside test OR discuss mgmt of outside ordered test --- drug with monitoring for toxicity, plan major surgery, hospitalize, deescalate/withdraw care bc of prognosis

## 2022-11-18 ENCOUNTER — PROCEDURE VISIT (OUTPATIENT)
Dept: SLEEP MEDICINE | Facility: HOSPITAL | Age: 77
End: 2022-11-18
Attending: INTERNAL MEDICINE
Payer: MEDICARE

## 2022-11-18 DIAGNOSIS — G47.19 EXCESSIVE DAYTIME SLEEPINESS: ICD-10-CM

## 2022-11-18 DIAGNOSIS — R53.83 FATIGUE DUE TO SLEEP PATTERN DISTURBANCE: ICD-10-CM

## 2022-11-18 DIAGNOSIS — G47.9 FATIGUE DUE TO SLEEP PATTERN DISTURBANCE: ICD-10-CM

## 2022-11-18 DIAGNOSIS — R06.83 SNORING: ICD-10-CM

## 2022-11-18 DIAGNOSIS — G47.33 OSA (OBSTRUCTIVE SLEEP APNEA): ICD-10-CM

## 2022-11-18 PROCEDURE — 95810 POLYSOM 6/> YRS 4/> PARAM: CPT

## 2022-11-19 NOTE — PATIENT INSTRUCTIONS
NPSG study was performed and pt was informed to call his referring physician Dr. Raúl Kraus in 3 to 5 business days to make a f/u appt.

## 2022-11-22 ENCOUNTER — TELEPHONE (OUTPATIENT)
Dept: UROLOGY | Facility: CLINIC | Age: 77
End: 2022-11-22
Payer: MEDICARE

## 2022-11-22 NOTE — TELEPHONE ENCOUNTER
----- Message from Shari Carrion sent at 11/22/2022  2:31 PM CST -----  Contact: [phone]  Type:  Patient Returning Call    Who Called:  Pt   Who Left Message for Patient:  Anabela   Does the patient know what this is regarding?:  No   Best Call Back Number:  [phone]    Additional Information:  Pt stated he was returning a call from Monday

## 2022-11-26 NOTE — PROGRESS NOTES
[facility] Urology Progress Note    Rajendra Castle is a 74 y.o. male who presents for prostate cancer follow up     He was initially seen by YAZ López on April 2018 for bothersome LUTS and was started on Uroxatral which decd NTF 3 to 1x  PSA history: 1.8 on 10/3/06;   2.73 on 08/06/2012;   2.1 on 10/03/2013;   4.1 on 04/06/2015;   3.4 on 10/14/2015;   4.8 on 03/16/2018;   2.9 (11.7% free) on 04/25/2018  Dr Aceves added finasteride 8/10/18. PSA 11/9/18 is 2.4 (9.17% free) on finasteride - so equivalent of 4.8.   AUA symptom score:  11/4 mostly dissatisfied - (4:  Frequency; 3:  Emptying; 2:  Sleeping; 1:  Intermittency, urgency) Medications helped 3-4/10  Prostate biopsy and cystoscopy on 12/18/18. SHIRLEY: 30-35 g smooth benign. TRUS VOLUME:  20.4cm3  CYSTO FINDINGS: significant bilateral lateral lobe hypertrophy with severe obstruction with central near kissing lobes, no median lobe, mild early trabeculations, normal bladder  PATHOLOGY: 4 cores L sided G3+4=7 prostate cancer: LB lat 25% (10% pattern 4); LB med 5% (20% pattern 4); LM med <5%, LA lat 10%  He only periodically has erections.  If the opportunity presents itself he may have a decent erection.  He does not seek sex.    He has CKD III, Hyperparathyroid, RA (on MTX), hypercalcemia, emphysema  On Uroxatral AUA SS largely unchanged: 12/4 (3: freq urg sleep; 2 weak stream; 1 intermittency) - meds helped 3/10  Initially considered surgery then elected XRT. Discussed urolift as fiducial markers and for LUTS resolution. Advised to DC methotrexate during XRT, but initially and stated that his prostate cancer was under control from raghav intervention by televangelist.    He later returned 3/19/19 noting compliance with sensipar for his hyperCa, and elected to proceed with ADT/XRT, noting improvement on uroxatral.  Lupron 45mg administered. He did decline fiducial markers/spaceoar    He chose to resume/continue methotrexate and did complete radiotherapy to 7740 cGy ending  "August 2019.  Treatment was well tolerated and no incd side effect with concurrent MTX  9/11/19 fu with Dr Eng: arthralgias are well controlled.  He denies hot flashes. LUTS at baseline aua ss 11/4  PSA 8/12/19 0.86  AUA SS: 14/4 mostly dissat (4: frequency; 3: sleeping; 2: urgency/weak stream; 1: emptying, intermittency, straining) - meds helping 3/10  Stopping fluid intake in evening, still getting up a few times at night. This is most bothersome. Rare ntfx1 this is unusual  On meds stream is better and feels like he empties better but still bothered as above  Not really having hot flashes or fatigue.    ROS: A comprehensive 10 system review was performed and is negative except as noted above in HPI    PHYSICAL EXAM:    Vitals:    09/20/19 0936   BP: (!) 166/83   Pulse: 81   Resp: 18     Body mass index is 25.66 kg/m². Weight: 76.5 kg (168 lb 12.2 oz) Height: 5' 8" (172.7 cm)       General: Alert, cooperative, no distress, appears stated age   Head: Normocephalic, without obvious abnormality, atraumatic   Eyes: PERRL, conjunctiva/corneas clear   Lungs: Respirations unlabored   Heart: Warm and well perfused   Abdomen: soft NT ND   Extremities: Extremities normal, atraumatic, no cyanosis or edema   Skin: Skin color, texture, turgor normal, no rashes or lesions   Psych: Appropriate   Neurologic: Non-focal       Recent Results (from the past 336 hour(s))   POCT URINE DIPSTICK WITHOUT MICROSCOPE    Collection Time: 09/20/19  9:51 AM   Result Value Ref Range    Color, UA yellow     Spec Grav UA 1.025     pH, UA 6     WBC, UA neg     Nitrite, UA neg     Protein neg     Glucose, UA neg     Ketones, UA neg     Urobilinogen, UA 0.2     Bilirubin neg     Blood, UA neg        ASSESSMENT   1. Prostate cancer  POCT URINE DIPSTICK WITHOUT MICROSCOPE    leuprolide (6 month) injection 45 mg    Prostate Specific Antigen, Diagnostic    Prostate Specific Antigen, Diagnostic    Testosterone       Plan    He is doing well with " multimodal treatment of his prostate cancer with androgen deprivation therapy and external beam radiation.  He tolerated external beam radiation well and the side effects are continuing to wear off.  He is overall doing well on androgen deprivation therapy with mild side effects to be expected.Has had good psa response, continuing to trend down to alexander    He is known to have BPH with obstruction in addition to his prostate cancer diagnosis cystoscopically seen to have obstructing lateral lobes prior to his radiation therapy.  He is maintained on Uroxatral which has helped his urinary symptoms some but he still has moderate lower urinary tract symptoms which persist.  He was previously considering Urolift as fiducial markers but elected to defer and start radiation and deferred fiducial markers altogether.  We did have an extensive risk benefit discussion about proceeding with minimally invasive intervention to relieve prostatic obstruction, and the utility of Urolift in the post radiation setting.  At this time he would like to continue medical therapy and re-evaluate at his 6 month visit.  If he would like to consider proceeding with Urolift any time between now and then he is welcome to call back and we can plan this as we did have extensive discussion about procedure in detail and all risks and benefits today.    Will continue to trend psa Q3 mos, and RTC in 6 mos with psa/T prior to repeat Lupron injection and renew ADT. Plan for ADT x 2 years  Today, I prepared, mixed, and injected Lupron 45 mg 6 month Depo injection intramuscular into the right gluteus muscle.  Patient tolerated injection well.  RTC 6 months for followup and to renew ADT (will confirm total dosing with Dr munoz)    20 mins spent in encounter, over half in counseling         No

## 2022-12-01 ENCOUNTER — LAB VISIT (OUTPATIENT)
Dept: LAB | Facility: HOSPITAL | Age: 77
End: 2022-12-01
Attending: INTERNAL MEDICINE
Payer: MEDICARE

## 2022-12-01 DIAGNOSIS — E21.3 HYPERPARATHYROIDISM: ICD-10-CM

## 2022-12-01 DIAGNOSIS — M81.8 OTHER OSTEOPOROSIS WITHOUT CURRENT PATHOLOGICAL FRACTURE: ICD-10-CM

## 2022-12-01 DIAGNOSIS — E04.2 MULTINODULAR GOITER: ICD-10-CM

## 2022-12-01 LAB
25(OH)D3+25(OH)D2 SERPL-MCNC: 38 NG/ML (ref 30–96)
ALBUMIN SERPL BCP-MCNC: 3.3 G/DL (ref 3.5–5.2)
ALP SERPL-CCNC: 71 U/L (ref 55–135)
ALT SERPL W/O P-5'-P-CCNC: 23 U/L (ref 10–44)
ANION GAP SERPL CALC-SCNC: 12 MMOL/L (ref 8–16)
AST SERPL-CCNC: 22 U/L (ref 10–40)
BILIRUB SERPL-MCNC: 0.3 MG/DL (ref 0.1–1)
BUN SERPL-MCNC: 16 MG/DL (ref 8–23)
CALCIUM SERPL-MCNC: 8.8 MG/DL (ref 8.7–10.5)
CHLORIDE SERPL-SCNC: 111 MMOL/L (ref 95–110)
CO2 SERPL-SCNC: 22 MMOL/L (ref 23–29)
CREAT SERPL-MCNC: 1.4 MG/DL (ref 0.5–1.4)
EST. GFR  (NO RACE VARIABLE): 52.4 ML/MIN/1.73 M^2
GLUCOSE SERPL-MCNC: 102 MG/DL (ref 70–110)
POTASSIUM SERPL-SCNC: 4.7 MMOL/L (ref 3.5–5.1)
PROT SERPL-MCNC: 7.3 G/DL (ref 6–8.4)
PTH-INTACT SERPL-MCNC: 158 PG/ML (ref 9–77)
SODIUM SERPL-SCNC: 145 MMOL/L (ref 136–145)
TSH SERPL DL<=0.005 MIU/L-ACNC: 0.88 UIU/ML (ref 0.4–4)

## 2022-12-01 PROCEDURE — 36415 COLL VENOUS BLD VENIPUNCTURE: CPT | Mod: PO | Performed by: INTERNAL MEDICINE

## 2022-12-01 PROCEDURE — 82306 VITAMIN D 25 HYDROXY: CPT | Performed by: INTERNAL MEDICINE

## 2022-12-01 PROCEDURE — 83970 ASSAY OF PARATHORMONE: CPT | Performed by: INTERNAL MEDICINE

## 2022-12-01 PROCEDURE — 80053 COMPREHEN METABOLIC PANEL: CPT | Performed by: INTERNAL MEDICINE

## 2022-12-01 PROCEDURE — 84443 ASSAY THYROID STIM HORMONE: CPT | Performed by: INTERNAL MEDICINE

## 2022-12-05 DIAGNOSIS — G47.9 FATIGUE DUE TO SLEEP PATTERN DISTURBANCE: ICD-10-CM

## 2022-12-05 DIAGNOSIS — R06.83 SNORING: ICD-10-CM

## 2022-12-05 DIAGNOSIS — G47.33 OSA (OBSTRUCTIVE SLEEP APNEA): Primary | ICD-10-CM

## 2022-12-05 DIAGNOSIS — G47.19 EXCESSIVE DAYTIME SLEEPINESS: ICD-10-CM

## 2022-12-05 DIAGNOSIS — R53.83 FATIGUE DUE TO SLEEP PATTERN DISTURBANCE: ICD-10-CM

## 2022-12-06 ENCOUNTER — OFFICE VISIT (OUTPATIENT)
Dept: FAMILY MEDICINE | Facility: CLINIC | Age: 77
End: 2022-12-06
Payer: MEDICARE

## 2022-12-06 VITALS
SYSTOLIC BLOOD PRESSURE: 162 MMHG | TEMPERATURE: 99 F | HEIGHT: 68 IN | DIASTOLIC BLOOD PRESSURE: 82 MMHG | OXYGEN SATURATION: 96 % | RESPIRATION RATE: 18 BRPM | HEART RATE: 88 BPM | WEIGHT: 147.06 LBS | BODY MASS INDEX: 22.29 KG/M2

## 2022-12-06 DIAGNOSIS — E21.3 HYPERPARATHYROIDISM: ICD-10-CM

## 2022-12-06 DIAGNOSIS — N40.0 BENIGN PROSTATIC HYPERPLASIA, UNSPECIFIED WHETHER LOWER URINARY TRACT SYMPTOMS PRESENT: ICD-10-CM

## 2022-12-06 DIAGNOSIS — I10 ESSENTIAL HYPERTENSION: Primary | ICD-10-CM

## 2022-12-06 DIAGNOSIS — I25.111 ATHEROSCLEROSIS OF NATIVE CORONARY ARTERY OF NATIVE HEART WITH ANGINA PECTORIS WITH DOCUMENTED SPASM: ICD-10-CM

## 2022-12-06 DIAGNOSIS — M81.0 AGE-RELATED OSTEOPOROSIS WITHOUT CURRENT PATHOLOGICAL FRACTURE: ICD-10-CM

## 2022-12-06 DIAGNOSIS — M06.9 RHEUMATOID ARTHRITIS INVOLVING MULTIPLE SITES, UNSPECIFIED WHETHER RHEUMATOID FACTOR PRESENT: ICD-10-CM

## 2022-12-06 PROCEDURE — 3044F HG A1C LEVEL LT 7.0%: CPT | Mod: CPTII,S$GLB,, | Performed by: PHYSICIAN ASSISTANT

## 2022-12-06 PROCEDURE — 1101F PT FALLS ASSESS-DOCD LE1/YR: CPT | Mod: CPTII,S$GLB,, | Performed by: PHYSICIAN ASSISTANT

## 2022-12-06 PROCEDURE — 1160F RVW MEDS BY RX/DR IN RCRD: CPT | Mod: CPTII,S$GLB,, | Performed by: PHYSICIAN ASSISTANT

## 2022-12-06 PROCEDURE — 3079F PR MOST RECENT DIASTOLIC BLOOD PRESSURE 80-89 MM HG: ICD-10-PCS | Mod: CPTII,S$GLB,, | Performed by: PHYSICIAN ASSISTANT

## 2022-12-06 PROCEDURE — 1126F AMNT PAIN NOTED NONE PRSNT: CPT | Mod: CPTII,S$GLB,, | Performed by: PHYSICIAN ASSISTANT

## 2022-12-06 PROCEDURE — 1159F PR MEDICATION LIST DOCUMENTED IN MEDICAL RECORD: ICD-10-PCS | Mod: CPTII,S$GLB,, | Performed by: PHYSICIAN ASSISTANT

## 2022-12-06 PROCEDURE — 3077F SYST BP >= 140 MM HG: CPT | Mod: CPTII,S$GLB,, | Performed by: PHYSICIAN ASSISTANT

## 2022-12-06 PROCEDURE — 1160F PR REVIEW ALL MEDS BY PRESCRIBER/CLIN PHARMACIST DOCUMENTED: ICD-10-PCS | Mod: CPTII,S$GLB,, | Performed by: PHYSICIAN ASSISTANT

## 2022-12-06 PROCEDURE — 99999 PR PBB SHADOW E&M-EST. PATIENT-LVL V: CPT | Mod: PBBFAC,,, | Performed by: PHYSICIAN ASSISTANT

## 2022-12-06 PROCEDURE — 3288F PR FALLS RISK ASSESSMENT DOCUMENTED: ICD-10-PCS | Mod: CPTII,S$GLB,, | Performed by: PHYSICIAN ASSISTANT

## 2022-12-06 PROCEDURE — 1126F PR PAIN SEVERITY QUANTIFIED, NO PAIN PRESENT: ICD-10-PCS | Mod: CPTII,S$GLB,, | Performed by: PHYSICIAN ASSISTANT

## 2022-12-06 PROCEDURE — 99214 OFFICE O/P EST MOD 30 MIN: CPT | Mod: S$GLB,,, | Performed by: PHYSICIAN ASSISTANT

## 2022-12-06 PROCEDURE — 3044F PR MOST RECENT HEMOGLOBIN A1C LEVEL <7.0%: ICD-10-PCS | Mod: CPTII,S$GLB,, | Performed by: PHYSICIAN ASSISTANT

## 2022-12-06 PROCEDURE — 3077F PR MOST RECENT SYSTOLIC BLOOD PRESSURE >= 140 MM HG: ICD-10-PCS | Mod: CPTII,S$GLB,, | Performed by: PHYSICIAN ASSISTANT

## 2022-12-06 PROCEDURE — 99999 PR PBB SHADOW E&M-EST. PATIENT-LVL V: ICD-10-PCS | Mod: PBBFAC,,, | Performed by: PHYSICIAN ASSISTANT

## 2022-12-06 PROCEDURE — 3288F FALL RISK ASSESSMENT DOCD: CPT | Mod: CPTII,S$GLB,, | Performed by: PHYSICIAN ASSISTANT

## 2022-12-06 PROCEDURE — 1159F MED LIST DOCD IN RCRD: CPT | Mod: CPTII,S$GLB,, | Performed by: PHYSICIAN ASSISTANT

## 2022-12-06 PROCEDURE — 1101F PR PT FALLS ASSESS DOC 0-1 FALLS W/OUT INJ PAST YR: ICD-10-PCS | Mod: CPTII,S$GLB,, | Performed by: PHYSICIAN ASSISTANT

## 2022-12-06 PROCEDURE — 3079F DIAST BP 80-89 MM HG: CPT | Mod: CPTII,S$GLB,, | Performed by: PHYSICIAN ASSISTANT

## 2022-12-06 PROCEDURE — 99214 PR OFFICE/OUTPT VISIT, EST, LEVL IV, 30-39 MIN: ICD-10-PCS | Mod: S$GLB,,, | Performed by: PHYSICIAN ASSISTANT

## 2022-12-06 NOTE — PROGRESS NOTES
"Subjective:       Patient ID: Rajendra Castle is a 77 y.o. male.    Chief Complaint: Follow-up (3 month f/u )    Mr. Castle is a 77 year old male with HTN, BPH, RA, and CAD. The patient is followed by several specialists.     Endo: Dr. JARAD Cabrera  Urology: Dr. Pina  Rheumatology: Dr. Garcia  Cardiology: Dr. Lewis  Sleep: Dr. Kraus    The patient is considering urolift procedure with Dr. Pina; he will follow up with him today. The patient has no complaints for us today. His blood pressure is elevated today; he reports he "got hyper today" about something and his pressure is normally controlled. He denies HA, visual change, chest pain, increased shortness of breath.     Review of patient's allergies indicates:  No Known Allergies      Current Outpatient Medications:     alfuzosin (UROXATRAL) 10 mg Tb24, Take 1 tablet (10 mg total) by mouth daily with breakfast., Disp: 30 tablet, Rfl: 11    amLODIPine (NORVASC) 2.5 MG tablet, TAKE 1 TABLET(2.5 MG) BY MOUTH EVERY DAY (Patient taking differently: Take 2.5 mg by mouth once daily.), Disp: 90 tablet, Rfl: 0    atorvastatin (LIPITOR) 10 MG tablet, Take 10 mg by mouth nightly., Disp: , Rfl:     folic acid (FOLVITE) 1 MG tablet, Take 1 tablet (1 mg total) by mouth once daily., Disp: 90 tablet, Rfl: 3    gabapentin (NEURONTIN) 300 MG capsule, Take 300 mg by mouth 3 (three) times daily., Disp: , Rfl:     methotrexate 2.5 MG Tab, Take 6 tablets (15 mg total) by mouth every 7 days. TAKE 6 TABLETS BY MOUTH EVERY WEEK, Disp: 72 tablet, Rfl: 2    pantoprazole (PROTONIX) 40 MG tablet, TAKE 1 TABLET(40 MG) BY MOUTH TWICE DAILY (Patient taking differently: Take 40 mg by mouth once daily.), Disp: 180 tablet, Rfl: 0    tadalafiL (CIALIS) 10 MG tablet, Take 1 tablet (10 mg total) by mouth daily as needed for Erectile Dysfunction., Disp: 10 tablet, Rfl: 2    traMADoL (ULTRAM) 50 mg tablet, Take 2 tablets (100 mg total) by mouth 2 (two) times daily as needed for Pain., Disp: 120 tablet, " Rfl: 5    traZODone (DESYREL) 150 MG tablet, Take 1 tablet (150 mg total) by mouth every evening., Disp: 30 tablet, Rfl: 5    zolpidem (AMBIEN) 5 MG Tab, Take by mouth as needed., Disp: , Rfl:     aspirin (ECOTRIN) 81 MG EC tablet, Take 1 tablet (81 mg total) by mouth once daily., Disp: 30 tablet, Rfl: 0    carvediloL (COREG) 12.5 MG tablet, Take 1 tablet (12.5 mg total) by mouth 2 (two) times daily with meals., Disp: 60 tablet, Rfl: 0    clopidogreL (PLAVIX) 75 mg tablet, Take 1 tablet (75 mg total) by mouth once daily., Disp: 30 tablet, Rfl: 0    isosorbide mononitrate (IMDUR) 30 MG 24 hr tablet, Take 1 tablet (30 mg total) by mouth once daily., Disp: 30 tablet, Rfl: 0  No current facility-administered medications for this visit.    Facility-Administered Medications Ordered in Other Visits:     denosumab (PROLIA) injection 60 mg, 60 mg, Subcutaneous, 1 time in Clinic/HOD, Jean Carlos Hoffman MD    Lab Results   Component Value Date    WBC 8.41 06/30/2022    HGB 13.7 (L) 06/30/2022    HCT 42.3 06/30/2022     06/30/2022    CHOL 116 (L) 03/08/2022    TRIG 66 03/08/2022    HDL 29 (L) 03/08/2022    ALT 23 12/01/2022    AST 22 12/01/2022     12/01/2022    K 4.7 12/01/2022     (H) 12/01/2022    CREATININE 1.4 12/01/2022    BUN 16 12/01/2022    CO2 22 (L) 12/01/2022    TSH 0.883 12/01/2022    PSA 4.8 (H) 03/16/2018    INR 1.2 06/20/2022    HGBA1C 5.9 06/21/2022       Review of Systems   Constitutional:  Negative for activity change, appetite change and fever.   HENT:  Negative for postnasal drip, rhinorrhea and sinus pressure.    Eyes:  Negative for visual disturbance.   Respiratory:  Negative for cough and shortness of breath.    Cardiovascular:  Negative for chest pain.   Gastrointestinal:  Negative for abdominal distention and abdominal pain.   Genitourinary:  Positive for frequency (chronic due to BPH). Negative for difficulty urinating and dysuria.   Musculoskeletal:  Negative for arthralgias and  myalgias.   Neurological:  Negative for headaches.   Hematological:  Negative for adenopathy.   Psychiatric/Behavioral:  The patient is not nervous/anxious.      Objective:      Physical Exam  Constitutional:       Appearance: Normal appearance.   HENT:      Head: Normocephalic and atraumatic.   Eyes:      Conjunctiva/sclera: Conjunctivae normal.   Cardiovascular:      Rate and Rhythm: Normal rate and regular rhythm.   Pulmonary:      Effort: Pulmonary effort is normal. No respiratory distress.      Breath sounds: Normal breath sounds. No wheezing.   Abdominal:      General: There is no distension.      Palpations: There is no mass.      Tenderness: There is no abdominal tenderness.   Musculoskeletal:      Right lower leg: No edema.      Left lower leg: No edema.   Skin:     Findings: No erythema.   Neurological:      Mental Status: He is alert and oriented to person, place, and time.   Psychiatric:         Behavior: Behavior normal.       Assessment:       1. Essential hypertension    2. Rheumatoid arthritis involving multiple sites, unspecified whether rheumatoid factor present    3. Age-related osteoporosis without current pathological fracture    4. 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 Hyperparathyroidism    6. Benign prostatic hyperplasia, unspecified whether lower urinary tract symptoms present          Plan:         Rajendra was seen today for follow-up.    Diagnoses and all orders for this visit:    Essential hypertension  Uncontrolled- possibly situational? Follow up in 2 weeks for BP check and possible medication change  Low salt diet  Continue current medication  Rheumatoid arthritis involving multiple sites, unspecified whether rheumatoid factor present  Follow up with rheumatology- scheduled for new rheumatology appt in February  Age-related osteoporosis without current pathological fracture  Follow up with endo  Atherosclerosis of  native coronary artery of native heart with angina pectoris with documented spasm  Follow up with cardiology, Dr. Lamb  Hyperparathyroidism  Follow up with endo  Benign prostatic hyperplasia, unspecified whether lower urinary tract symptoms present  Follow up with urology

## 2022-12-06 NOTE — PATIENT INSTRUCTIONS
"Ortiz Drew,     If you are due for any health screening(s) below please notify me so we can arrange them to be ordered and scheduled to maintain your health. Most healthy patients complete it. Don't lose out on improving your health.     All of your core healthy metrics are met.         Colon Cancer Screening    Of cancers affecting both men and women, colorectal cancer is the third leading cancer killer in the [place]. But it doesnt have to be. Screening can prevent colorectal cancer or find it at an early stage when treatment often leads to a cure.    A colonoscopy is the preferred test for detecting colon cancer. It is needed only once every 10 years if results are negative. While sedated, a flexible, lighted tube with a tiny camera is inserted into the rectum and advanced through the colon to look for cancers. An alternative screening test that is used at home and returned to the lab may also be used. It detects hidden blood in bowel movements which could indicate cancer in the colon. If results are positive, you will need a colonoscopy to determine if the blood is a sign of cancer. This type of follow up (diagnostic) colonoscopy usually requires additional copays as required by your insurance provider. Please contact your PCP if you have any questions.    Although you are still overdue for this important screening, due to the COVID-19 pandemic, we recommend scheduling it in the near future.                Patient Education       Checking Your Blood Pressure at Home   The Basics   Written by the doctors and editors at [facility]   How is blood pressure measured? -- Blood pressure is usually measured with a device that goes around your upper arm. This is often done in a doctor's office. But some people also check their blood pressure themselves, at home or at work.  Blood pressure is explained with 2 numbers. For instance, your blood pressure might be "140 over 90." The first (top) number is the pressure " "inside your arteries when your heart is carlos alberto. The second (bottom) number is the pressure inside your arteries when your heart is relaxed. The table shows how doctors and nurses define high and normal blood pressure (table 1).  If your blood pressure gets too high, it puts you at risk for heart attack, stroke, and kidney disease. High blood pressure does not usually cause symptoms. But it can be serious.  What is a home blood pressure meter? -- A home blood pressure meter (or "monitor") is a device you can use to check your blood pressure yourself. It has a cuff that goes around your upper arm (figure 1). Some devices have a cuff that goes around your wrist instead. But doctors aren't sure if these work as well. The meter also has a small screen, or dial, that shows your blood pressure numbers.  There are also special meters you can wear for a day or 2. These are different because they automatically check your blood pressure throughout the day and night, even while you are sleeping. If your doctor thinks you should use one of these devices, they will talk to you about how to wear it.  Why do I need to check my blood pressure at home? -- If your doctor knows or suspects that you have high blood pressure, they might want you to check it at home. There are a few reasons for this. Your doctor might want to look at:  Whether your blood pressure measures the same at home as it did in the doctor's office  How well your blood pressure medicines are working  Changes in your blood pressure, for example, if it goes up and down  People who check their own blood pressure at home usually do better at keeping it low.  How do I choose a home blood pressure meter? -- When choosing a home blood pressure meter, you will probably want to think about:  Cost - Some devices cost more than others. You should also check to see if your insurance will help pay for your device.  Size - It's important to make sure the cuff fits your arm " comfortably. Your doctor or nurse can help you with this.  How easy it is to use - You should make sure you understand how to use the device. You also need to be able to read the numbers on the screen.  You do not need a prescription to buy a home blood pressure meter. You can buy them at most pharmacies or over the internet. Your doctor or nurse can help you choose the right device for you.  How do I check my blood pressure at home? -- Once you have a home blood pressure meter, your doctor or nurse should check it to make sure it fits you and works correctly.  When it's time to check your blood pressure:  Go to the bathroom and empty your bladder first. Having a full bladder can temporarily increase your blood pressure, making the results inaccurate.  Sit in a chair with your feet flat on the ground.  Try to breathe normally and stay calm.  Attach the cuff to your arm. Place the cuff directly on your skin, not over your clothing. The cuff should be tight enough to not slip down, but not uncomfortably tight.  Sit and relax for about 3 to 5 minutes with the cuff on.  Follow the directions that came with your device to start measuring your blood pressure. This might involve squeezing the bulb at the end of the tube to inflate the cuff (fill it with air). With some monitors, you just need to press a button to inflate the cuff. When the cuff fills with air, it feels like someone is squeezing your arm, but it should not hurt. Then you will slowly deflate the cuff (let the air out of it), or it will deflate by itself. The screen or dial will show your blood pressure numbers.  Stay seated and relax for 1 minute, then measure your blood pressure again.  How often should I check my blood pressure? -- It depends. Different people need to follow different schedules. Your doctor or nurse will tell you how often to check your blood pressure, and when. Some people need to check their blood pressure twice a day, in the morning and  evening.  Your doctor or nurse will probably tell you to keep track of your blood pressure for at least a few days (table 2). Then they will look at the numbers. The reason for this is that it's normal for your blood pressure to change a bit from day to day. For example, the numbers might change depending on whether you recently had caffeine, just exercised, or feel stressed. Checking your blood pressure over several days - or longer - will give your doctor or nurse a better idea of what is average for you.  How should I keep track of my blood pressure? -- Some blood pressure meters will record your numbers for you, or send them to your computer or smartphone. If yours does not do this, you will need to write them down. Your doctor or nurse can help you figure out the best way to keep track of the numbers.  What if my blood pressure is high? -- Your doctor or nurse will tell you what to do if your blood pressure is high when you check it at home. If you get a number that is higher than normal, measure it again to see if it is still high. If it is very high (above a certain number, which your doctor or nurse will tell you to watch out for), you should call your doctor right away.  If your blood pressure is only a little high, your doctor or nurse might tell you to keep checking it for a few more days or weeks, and then call if it does not go back down. Then they can help you decide what to do next.  All topics are updated as new evidence becomes available and our peer review process is complete.  This topic retrieved from [employer] on: Sep 21, 2021.  Topic 636074 Version 4.0  Release: 29.4.2 - C29.263  © 2021 UpToDate, Inc. and/or its affiliates. All rights reserved.  table 1: Definition of normal and high blood pressure  Level  Top number  Bottom number    High 130 or above 80 or above   Elevated 120 to 129 79 or below   Normal 119 or below 79 or below   These definitions are from the American College of  "Cardiology/American Heart Association. Other expert groups might use slightly different definitions.  "Elevated blood pressure" is a term doctor or nurses use as a warning. It means you do not yet have high blood pressure, but your blood pressure is not as low as it should be for good health.  Graphic 57484 Version 6.0  figure 1: Using a home blood pressure meter     This is an example of a person using a home blood pressure meter.  Graphic 469476 Version 1.0    table 2: 7-day diary for checking blood pressure at home  Day 1  Day 2  Day 3  Day 4  Day 5  Day 6  Day 7    Morning  1st read Morning  1st read Morning  1st read Morning  1st read Morning  1st read Morning  1st read Morning  1st read   Systolic: __________ Systolic: __________ Systolic: __________ Systolic: __________ Systolic: __________ Systolic: __________ Systolic: __________   Diastolic: __________ Diastolic: __________ Diastolic: __________ Diastolic: __________ Diastolic: __________ Diastolic: __________ Diastolic: __________   Pulse: __________ Pulse: __________ Pulse: __________ Pulse: __________ Pulse: __________ Pulse: __________ Pulse: __________   Morning  2nd read Morning  2nd read Morning  2nd read Morning  2nd read Morning  2nd read Morning  2nd read Morning  2nd read   Systolic: __________ Systolic: __________ Systolic: __________ Systolic: __________ Systolic: __________ Systolic: __________ Systolic: __________   Diastolic: __________ Diastolic: __________ Diastolic: __________ Diastolic: __________ Diastolic: __________ Diastolic: __________ Diastolic: __________   Pulse: __________ Pulse: __________ Pulse: __________ Pulse: __________ Pulse: __________ Pulse: __________ Pulse: __________   Evening  1st read Evening  1st read Evening  1st read Evening  1st read Evening  1st read Evening  1st read Evening  1st read   Systolic: __________ Systolic: __________ Systolic: __________ Systolic: __________ Systolic: __________ Systolic: " __________ Systolic: __________   Diastolic: __________ Diastolic: __________ Diastolic: __________ Diastolic: __________ Diastolic: __________ Diastolic: __________ Diastolic: __________   Pulse: __________ Pulse: __________ Pulse: __________ Pulse: __________ Pulse: __________ Pulse: __________ Pulse: __________   Evening  2nd read Evening  2nd read Evening  2nd read Evening  2nd read Evening  2nd read Evening  2nd read Evening  2nd read   Systolic: __________ Systolic: __________ Systolic: __________ Systolic: __________ Systolic: __________ Systolic: __________ Systolic: __________   Diastolic: __________ Diastolic: __________ Diastolic: __________ Diastolic: __________ Diastolic: __________ Diastolic: __________ Diastolic: __________   Pulse: __________ Pulse: __________ Pulse: __________ Pulse: __________ Pulse: __________ Pulse: __________ Pulse: __________   Notes    Notes    Notes    Notes    Notes    Notes    Notes      ____________________ ____________________ ____________________ ____________________ ____________________ ____________________ ____________________   ____________________ ____________________ ____________________ ____________________ ____________________ ____________________ ____________________   ____________________ ____________________ ____________________ ____________________ ____________________ ____________________ ____________________   Patient name: ______________________________     Patient ID: ________________________________    Primary care provider: _______________________    Average BP: _______________________________    Graphic 530272 Version 1.0  Consumer Information Use and Disclaimer   This information is not specific medical advice and does not replace information you receive from your health care provider. This is only a brief summary of general information. It does NOT include all information about conditions, illnesses, injuries, tests, procedures, treatments, therapies,  discharge instructions or life-style choices that may apply to you. You must talk with your health care provider for complete information about your health and treatment options. This information should not be used to decide whether or not to accept your health care provider's advice, instructions or recommendations. Only your health care provider has the knowledge and training to provide advice that is right for you. The use of this information is governed by the [employer] End User License Agreement, available at [email or web address]://[email or web address]/[email or web address]/robin.The use of [employer] content is governed by the [employer] Terms of Use. ©2021 [employer] All rights reserved.  Copyright   © 2021 UpToDate, Inc. and/or its affiliates. All rights reserved.

## 2022-12-07 ENCOUNTER — TELEPHONE (OUTPATIENT)
Dept: UROLOGY | Facility: CLINIC | Age: 77
End: 2022-12-07
Payer: MEDICARE

## 2022-12-07 NOTE — TELEPHONE ENCOUNTER
----- Message from Mckinley Eisenberg sent at 12/7/2022  9:05 AM CST -----  Type: Needs Medical Advice  Who Called:  pt  Symptoms (please be specific):  pt said he need to speak to the nurse or dr--pt said this is a URGENT matter--please call and advise  Best Call Back Number: [phone] (home)     Additional Information: thank you

## 2022-12-07 NOTE — TELEPHONE ENCOUNTER
Spoke w pt he voiced frustration of why Dr Kraus clearance is needed and the urinary prob is keeping him awake at night not his sleep   Explain to pt in detail why sleep clearance is needed   Pt vu

## 2022-12-08 ENCOUNTER — OFFICE VISIT (OUTPATIENT)
Dept: ENDOCRINOLOGY | Facility: CLINIC | Age: 77
End: 2022-12-08
Payer: MEDICARE

## 2022-12-08 ENCOUNTER — PROCEDURE VISIT (OUTPATIENT)
Dept: SLEEP MEDICINE | Facility: HOSPITAL | Age: 77
End: 2022-12-08
Attending: INTERNAL MEDICINE
Payer: MEDICARE

## 2022-12-08 VITALS
BODY MASS INDEX: 22.32 KG/M2 | HEIGHT: 68 IN | SYSTOLIC BLOOD PRESSURE: 148 MMHG | DIASTOLIC BLOOD PRESSURE: 70 MMHG | OXYGEN SATURATION: 96 % | WEIGHT: 147.25 LBS | TEMPERATURE: 98 F | HEART RATE: 79 BPM

## 2022-12-08 DIAGNOSIS — G47.19 EXCESSIVE DAYTIME SLEEPINESS: ICD-10-CM

## 2022-12-08 DIAGNOSIS — G47.9 FATIGUE DUE TO SLEEP PATTERN DISTURBANCE: ICD-10-CM

## 2022-12-08 DIAGNOSIS — R53.83 FATIGUE DUE TO SLEEP PATTERN DISTURBANCE: ICD-10-CM

## 2022-12-08 DIAGNOSIS — E04.1 NODULAR THYROID DISEASE: ICD-10-CM

## 2022-12-08 DIAGNOSIS — E21.3 HYPERPARATHYROIDISM: ICD-10-CM

## 2022-12-08 DIAGNOSIS — G47.33 OSA (OBSTRUCTIVE SLEEP APNEA): ICD-10-CM

## 2022-12-08 DIAGNOSIS — R06.83 SNORING: ICD-10-CM

## 2022-12-08 DIAGNOSIS — M81.0 AGE-RELATED OSTEOPOROSIS WITHOUT CURRENT PATHOLOGICAL FRACTURE: ICD-10-CM

## 2022-12-08 PROCEDURE — 1101F PT FALLS ASSESS-DOCD LE1/YR: CPT | Mod: CPTII,S$GLB,, | Performed by: INTERNAL MEDICINE

## 2022-12-08 PROCEDURE — 1101F PR PT FALLS ASSESS DOC 0-1 FALLS W/OUT INJ PAST YR: ICD-10-PCS | Mod: CPTII,S$GLB,, | Performed by: INTERNAL MEDICINE

## 2022-12-08 PROCEDURE — 99214 OFFICE O/P EST MOD 30 MIN: CPT | Mod: S$GLB,,, | Performed by: INTERNAL MEDICINE

## 2022-12-08 PROCEDURE — 99999 PR PBB SHADOW E&M-EST. PATIENT-LVL V: CPT | Mod: PBBFAC,,, | Performed by: INTERNAL MEDICINE

## 2022-12-08 PROCEDURE — 3288F PR FALLS RISK ASSESSMENT DOCUMENTED: ICD-10-PCS | Mod: CPTII,S$GLB,, | Performed by: INTERNAL MEDICINE

## 2022-12-08 PROCEDURE — 1159F MED LIST DOCD IN RCRD: CPT | Mod: CPTII,S$GLB,, | Performed by: INTERNAL MEDICINE

## 2022-12-08 PROCEDURE — 3044F HG A1C LEVEL LT 7.0%: CPT | Mod: CPTII,S$GLB,, | Performed by: INTERNAL MEDICINE

## 2022-12-08 PROCEDURE — 1159F PR MEDICATION LIST DOCUMENTED IN MEDICAL RECORD: ICD-10-PCS | Mod: CPTII,S$GLB,, | Performed by: INTERNAL MEDICINE

## 2022-12-08 PROCEDURE — 3044F PR MOST RECENT HEMOGLOBIN A1C LEVEL <7.0%: ICD-10-PCS | Mod: CPTII,S$GLB,, | Performed by: INTERNAL MEDICINE

## 2022-12-08 PROCEDURE — 95811 POLYSOM 6/>YRS CPAP 4/> PARM: CPT

## 2022-12-08 PROCEDURE — 99214 PR OFFICE/OUTPT VISIT, EST, LEVL IV, 30-39 MIN: ICD-10-PCS | Mod: S$GLB,,, | Performed by: INTERNAL MEDICINE

## 2022-12-08 PROCEDURE — 3078F DIAST BP <80 MM HG: CPT | Mod: CPTII,S$GLB,, | Performed by: INTERNAL MEDICINE

## 2022-12-08 PROCEDURE — 3077F PR MOST RECENT SYSTOLIC BLOOD PRESSURE >= 140 MM HG: ICD-10-PCS | Mod: CPTII,S$GLB,, | Performed by: INTERNAL MEDICINE

## 2022-12-08 PROCEDURE — 3077F SYST BP >= 140 MM HG: CPT | Mod: CPTII,S$GLB,, | Performed by: INTERNAL MEDICINE

## 2022-12-08 PROCEDURE — 1160F PR REVIEW ALL MEDS BY PRESCRIBER/CLIN PHARMACIST DOCUMENTED: ICD-10-PCS | Mod: CPTII,S$GLB,, | Performed by: INTERNAL MEDICINE

## 2022-12-08 PROCEDURE — 1126F PR PAIN SEVERITY QUANTIFIED, NO PAIN PRESENT: ICD-10-PCS | Mod: CPTII,S$GLB,, | Performed by: INTERNAL MEDICINE

## 2022-12-08 PROCEDURE — 3078F PR MOST RECENT DIASTOLIC BLOOD PRESSURE < 80 MM HG: ICD-10-PCS | Mod: CPTII,S$GLB,, | Performed by: INTERNAL MEDICINE

## 2022-12-08 PROCEDURE — 1126F AMNT PAIN NOTED NONE PRSNT: CPT | Mod: CPTII,S$GLB,, | Performed by: INTERNAL MEDICINE

## 2022-12-08 PROCEDURE — 3288F FALL RISK ASSESSMENT DOCD: CPT | Mod: CPTII,S$GLB,, | Performed by: INTERNAL MEDICINE

## 2022-12-08 PROCEDURE — 99999 PR PBB SHADOW E&M-EST. PATIENT-LVL V: ICD-10-PCS | Mod: PBBFAC,,, | Performed by: INTERNAL MEDICINE

## 2022-12-08 PROCEDURE — 1160F RVW MEDS BY RX/DR IN RCRD: CPT | Mod: CPTII,S$GLB,, | Performed by: INTERNAL MEDICINE

## 2022-12-08 RX ORDER — VITAMIN B COMPLEX
1 CAPSULE ORAL DAILY
COMMUNITY

## 2022-12-08 NOTE — PROGRESS NOTES
Subjective:      Chief Complaint: Hyperparathyroidism, Thyroid Nodule, and Osteoporosis    HPI: Rajendra Castle is a 77 y.o. male who is here for a follow-up evaluation for thyroid, parathyroid, bones. Last seen 12/9/2021    Osteoporosis:   Last DXA 6/2021. Worse from prior. -3.3 left fem neck, -1.9 spine, -2.4 total hip.    Prior DXA 1/2019. -3.1 left fem neck, -1.4 spine.     No falls/fractures.    Not smoking.   was on prednisone 2.5 mg BID (also methotrexate), does have hx RA.    other risks include hx prostate cancer, low testosterone.    off prednisone lately    Current medication:   Prolia. Started 7/2021 (after 6/2021 DXA declined on fosamax). Next due 2/2023.    Prior med:  Fosamax [id number], off for a few months, then restarted until 2021.     Hyperparathyroidism:   most likely primary. No kidney stones. Has osteoporosis. Calcium elevation into 12, but more often 10s-11s.  NM scan 9/2020: Potential right sided parathyroid adenoma.    Recommended surgery, pt wasn't interested initially, then was more open to surgery. Saw ENT.   s/p parathyroidectomy 10/2020    Last labs:  Lab Results   Component Value Date    CALCIUM 8.8 12/01/2022    ALBUMIN 3.3 (L) 12/01/2022    CREATININE 1.4 12/01/2022    [id number] 38 12/01/2022    .0 (H) 12/01/2022     Prior labs:        Lab Results   Component Value Date     CALCIUM 11.0 (H) 10/08/2020     ALBUMIN 3.6 10/08/2020     CREATININE 1.2 10/08/2020     [id number] 33 01/28/2019     .7 (H) 09/02/2020      Thyroid nodules:   Had US 9/2020. 2.2 cm right side nodule. Other small nodule.  No FH thyroid disease.     Had recommended FNA, 2/2020 was non-diagnostic so recommended repeat. not done until 10/2020 -> FLUS    Last US 7/2021:   right side hypoechoic nodule 1.3 cm (was 2.2)   no nodules on the left    Lab Results   Component Value Date    TSH 0.883 12/01/2022     Interval history:  Continued prolia.  Recommended US thyroid, not done    Today, pt reports  feeling okay overall.    Pending sleep medicine evaluation, working on CPAP.  Pending urology appointment.    Still insomnia. Frequent urination at night, often hard to get back to sleep.   Still polyuria in the daytime.  +Fatigue    No falls/fractures.  No palpitations  No dizziness.    Reviewed past medical, family, social history and updated as appropriate.    Review of Systems  As above    Objective:     Vitals:    12/08/22 1058   BP: (!) 148/70   Pulse: 79   Temp: 98.4 °F (36.9 °C)     BP Readings from Last 5 Encounters:   12/08/22 (!) 148/70   12/06/22 (!) 162/82   11/14/22 (!) 156/82   10/18/22 (!) 160/70   09/02/22 (!) 140/72     Physical Exam  Vitals reviewed.   Constitutional:       General: He is not in acute distress.  Neck:      Thyroid: Thyromegaly (nodule) present.   Cardiovascular:      Rate and Rhythm: Normal rate.      Heart sounds: Normal heart sounds.   Pulmonary:      Effort: Pulmonary effort is normal.        Wt Readings from Last 10 Encounters:   12/08/22 1058 66.8 kg (147 lb 4.3 oz)   12/06/22 0953 66.7 kg (147 lb 0.8 oz)   11/14/22 1428 67.6 kg (149 lb)   10/18/22 0900 67.8 kg (149 lb 7.6 oz)   09/02/22 1401 67.4 kg (148 lb 9.4 oz)   08/23/22 1041 67.7 kg (149 lb 4.8 oz)   08/11/22 1259 65.5 kg (144 lb 6.4 oz)   07/13/22 1833 64.6 kg (142 lb 6.4 oz)   07/07/22 1006 63.5 kg (139 lb 15.9 oz)   06/30/22 1502 63.5 kg (140 lb)     Lab Results   Component Value Date    HGBA1C 5.9 06/21/2022     Lab Results   Component Value Date    CHOL 116 (L) 03/08/2022    HDL 29 (L) 03/08/2022    LDLCALC 73.8 03/08/2022    TRIG 66 03/08/2022    CHOLHDL 25.0 03/08/2022     Lab Results   Component Value Date     12/01/2022    K 4.7 12/01/2022     (H) 12/01/2022    CO2 22 (L) 12/01/2022     12/01/2022    BUN 16 12/01/2022    CREATININE 1.4 12/01/2022    CALCIUM 8.8 12/01/2022    PROT 7.3 12/01/2022    ALBUMIN 3.3 (L) 12/01/2022    BILITOT 0.3 12/01/2022    ALKPHOS 71 12/01/2022    AST 22  12/01/2022    ALT 23 12/01/2022    ANIONGAP 12 12/01/2022    ESTGFRAFRICA 52.3 (A) 06/30/2022    EGFRNONAA 45.2 (A) 06/30/2022    TSH 0.883 12/01/2022      Assessment/Plan:     Hyperparathyroidism  primary hyperparathyroidism   - PTH elevation with high calcium   - complicated by osteoporosis, CKD, sometimes having very high calcium. All indications for surgery.   - now s/p parathyroidectomy in 10/2020 with normalization of PTH and calcium after that   - Then PTH increased. Stable lately. GFR somewhat low.   - calcium remains normal at this time   - continue to monitor on labs for now   - can consider repeat imaging/evaluate for additional surgery if this continues to get worse      Osteoporosis   - no falls/fractures   - s/p parathyroid treatment as above  Was on alendronate   - DXA worse.   - switched to prolia. Tolerating okay. continue   - repeat DXA 2023 to re-evaluate   - maintain calcium and vitamin D intake   - encourage pt to try not to fall    Nodular thyroid disease  MNG.    - no compressive symptoms   - FNA non-diagnostic of largest nodule, then on repeat with ENT came back FLUS. Potential for lobectomy, but already had parathyroidectomy without thyroid addressed.   on last US, nodule decreased in size.   repeat US when able. consider another FNA vs surgery evaluation at that time      Follow up in about 1 year (around 12/8/2023) for lab review, further monitoring, imaging review.      Jean Carlos Hoffman MD  Endocrinology

## 2022-12-08 NOTE — ASSESSMENT & PLAN NOTE
- no falls/fractures   - s/p parathyroid treatment as above  Was on alendronate   - DXA worse.   - switched to prolia. Tolerating okay. continue   - repeat DXA 2023 to re-evaluate   - maintain calcium and vitamin D intake   - encourage pt to try not to fall

## 2022-12-08 NOTE — ASSESSMENT & PLAN NOTE
MNG.    - no compressive symptoms   - FNA non-diagnostic of largest nodule, then on repeat with ENT came back FLUS. Potential for lobectomy, but already had parathyroidectomy without thyroid addressed.   on last US, nodule decreased in size.   repeat US when able. consider another FNA vs surgery evaluation at that time

## 2022-12-08 NOTE — ASSESSMENT & PLAN NOTE
primary hyperparathyroidism   - PTH elevation with high calcium   - complicated by osteoporosis, CKD, sometimes having very high calcium. All indications for surgery.   - now s/p parathyroidectomy in 10/2020 with normalization of PTH and calcium after that   - Then PTH increased. Stable lately. GFR somewhat low.   - calcium remains normal at this time   - continue to monitor on labs for now   - can consider repeat imaging/evaluate for additional surgery if this continues to get worse

## 2022-12-12 NOTE — PATIENT INSTRUCTIONS
[facility]  [address], [address]  [place] [address]      CPAP SETUP - Will be notified 5 - 10 business days upon completion of study    **FOLLOW-UP VISIT AFTER CPAP TITRATION**    SCHEDULE YOUR FOLLOW-UP VISIT WITH DR. RITCHIE 6 WEEKS     **AFTER USING YOUR EQUIPMENT**    IF YOU ARE AN DR. RITCHIE PATIENT AT [facility] PLEASE CALL: [phone]    ADDITIONAL INSTRUCTIONS FOR SCHEDULING YOUR APPOINTMENT:  Please bring SD chip or CPAP machine    *Prescriptons for CPAP and medications will be delayed in the event that Dr. Ritchie is out of the office     RECORD DATE AND TIME OF SCHEDULED APPOINTMENT    ___________________________________________________    You will receive a written confirmation of your scheduled appointment  with instructions by mail in five to seven days.

## 2022-12-13 ENCOUNTER — HOSPITAL ENCOUNTER (OUTPATIENT)
Dept: RADIOLOGY | Facility: CLINIC | Age: 77
Discharge: HOME | End: 2022-12-13
Attending: INTERNAL MEDICINE
Payer: MEDICARE

## 2022-12-13 DIAGNOSIS — M81.0 AGE-RELATED OSTEOPOROSIS WITHOUT CURRENT PATHOLOGICAL FRACTURE: ICD-10-CM

## 2022-12-13 PROCEDURE — 77081 DEXA BONE DENSITY APPENDICULAR SKELETON: ICD-10-PCS | Mod: 26,59,, | Performed by: RADIOLOGY

## 2022-12-13 PROCEDURE — 77080 DXA BONE DENSITY AXIAL: CPT | Mod: TC,PO

## 2022-12-13 PROCEDURE — 77081 DXA BONE DENSITY APPENDICULR: CPT | Mod: 59

## 2022-12-13 PROCEDURE — 77081 DXA BONE DENSITY APPENDICULR: CPT | Mod: 26,59,, | Performed by: RADIOLOGY

## 2022-12-13 PROCEDURE — 77080 DEXA BONE DENSITY SPINE HIP: ICD-10-PCS | Mod: 26,,, | Performed by: RADIOLOGY

## 2022-12-13 PROCEDURE — 77080 DXA BONE DENSITY AXIAL: CPT | Mod: 26,,, | Performed by: RADIOLOGY

## 2022-12-14 ENCOUNTER — TELEPHONE (OUTPATIENT)
Dept: ENDOCRINOLOGY | Facility: CLINIC | Age: 77
End: 2022-12-14
Payer: MEDICARE

## 2022-12-14 NOTE — TELEPHONE ENCOUNTER
----- Message from Jean Carlos Hoffman MD sent at 12/14/2022  8:24 AM CST -----  Bone density done 7 months too soon (had requested it be set up for July).    Please call pt to let him know bone density still shows osteoporosis, but improved since starting prolia so recommend continuing that treatment.

## 2022-12-20 ENCOUNTER — TELEPHONE (OUTPATIENT)
Dept: FAMILY MEDICINE | Facility: CLINIC | Age: 77
End: 2022-12-20
Payer: MEDICARE

## 2022-12-20 DIAGNOSIS — I10 HYPERTENSION, UNSPECIFIED TYPE: Primary | ICD-10-CM

## 2022-12-20 RX ORDER — AMLODIPINE BESYLATE 5 MG/1
5 TABLET ORAL DAILY
Qty: 30 TABLET | Refills: 2 | Status: ON HOLD | OUTPATIENT
Start: 2022-12-20 | End: 2023-01-22 | Stop reason: SDUPTHER

## 2022-12-20 NOTE — TELEPHONE ENCOUNTER
----- Message from Mya Leonardo sent at 12/20/2022 10:24 AM CST -----  Regarding: pt call back  Contact: PT  Name of Who is Calling: JONATHAN BHAKTA [[id number]]      What is the request in detail: Pt is requesting a call back to reschedule BP check appt. Also called to inform staff of BP readings. Pt stated on 12/19/2022 it was 147/72 and today 12/20/2022 it is 142/78. Please advise!        Can the clinic reply by [facility]: NO        What Number [facility]: [phone]

## 2022-12-20 NOTE — TELEPHONE ENCOUNTER
Blood pressure is better than in office visit but still not at goal, I am going to increase amlodipine to 5mg. New Rx sent. Please schedule BP check in 2-4 weeks.

## 2022-12-28 ENCOUNTER — TELEPHONE (OUTPATIENT)
Dept: UROLOGY | Facility: CLINIC | Age: 77
End: 2022-12-28
Payer: MEDICARE

## 2022-12-28 NOTE — TELEPHONE ENCOUNTER
----- Message from Lindsay Remy, Patient Care Assistant sent at 12/28/2022  2:17 PM CST -----  Contact: self  Type:  Patient Returning Call    Who Called:  self  Who Left Message for Patient:  svetlana  Does the patient know what this is regarding?:  CPAP machine  Best Call Back Number:  [phone]  Additional Information:  thanks

## 2022-12-28 NOTE — TELEPHONE ENCOUNTER
----- Message from Kelsie Glover sent at 12/28/2022  8:44 AM CST -----  Regarding: pt call back  Name of Who is Calling: JONATHAN BHAKTA [[id number]]      What is the request in detail: Pt is requesting to speak to the dr or his nurse about a urinary lift procedure. Please advise.       Can the clinic reply by [facility]: no       What [facility]: [phone]

## 2022-12-28 NOTE — TELEPHONE ENCOUNTER
Spoke w pt regarding sleep study and clearance   Informed pt will reach out to dr granados office regarding clearance to proceed with urolift.  Pt is concerned on why sleep study is needed and urinary frequency would like surgery scheduled asap

## 2022-12-28 NOTE — TELEPHONE ENCOUNTER
----- Message from Hemal Castillo sent at 12/28/2022 11:47 AM CST -----  Type:  Patient Returning Call    Who Called:Pt     Who Left Message for Patient:Barbara Beard    Does the patient know what this is regarding?:yes    Would the patient rather a call back or a response via [employer] [facility]?  Call back     Best Call Back Number:[phone] ([place])     Additional Information:

## 2022-12-29 ENCOUNTER — TELEPHONE (OUTPATIENT)
Dept: UROLOGY | Facility: CLINIC | Age: 77
End: 2022-12-29
Payer: MEDICARE

## 2022-12-29 NOTE — TELEPHONE ENCOUNTER
----- Message from Opal Estevez sent at 12/29/2022  9:13 AM CST -----  Contact: patient  Type:  Needs Medical Advice    Who Called:  Patient         Would the patient rather a call back or a response via [facility]? Call     Best Call Back Number: [phone] (home)      Additional Information: Patient would like to speak with the nurseAnabela about the machine.     Please call to advise

## 2022-12-29 NOTE — TELEPHONE ENCOUNTER
"Spoke w pt who is concerned with sleeping and surgery   And upset that he has to wait on clearance     I contacted Dr Kraus office regarding clearance and they stated in writing   "Pt has just received his compliance appt is scheduled for 1/30/23 to see Dr Kraus"    Patient was informed of Dr Kraus office recommendations   Have had long discussions discussing urgency/frequency measures.    We also discussed current medication he is taking and will inquire with MD to see if alfuzosin can be stopped to help combat some of the urgency frequency.    Further discussion also continued as patient had been calling anette in the phone with frustration and no understanding of why sleep study and compliance is needed prior to surgery.    Informed pt that disrespect and that conduct is not tolerated and we can not in the future hold further conversations if he can not control his emotions     Pt apologized and stated that he will call Dr Kraus office inquiring about sooner appt     I also contacted Dr Kraus office by fax due that is the only communication and contact pt and explain the importance of sleep study and being complaint with cpap,  Pt was also informed of this contact as well.      "

## 2022-12-30 ENCOUNTER — TELEPHONE (OUTPATIENT)
Dept: UROLOGY | Facility: CLINIC | Age: 77
End: 2022-12-30
Payer: MEDICARE

## 2022-12-30 NOTE — TELEPHONE ENCOUNTER
Cardio cl    Per Dr Lamb   Pt is cleared for surgery   Ok to hold plavix for 7 days prior to surgery

## 2023-01-03 ENCOUNTER — OFFICE VISIT (OUTPATIENT)
Dept: URGENT CARE | Facility: CLINIC | Age: 78
End: 2023-01-03
Payer: MEDICARE

## 2023-01-03 ENCOUNTER — NURSE TRIAGE (OUTPATIENT)
Dept: ADMINISTRATIVE | Facility: CLINIC | Age: 78
End: 2023-01-03
Payer: MEDICARE

## 2023-01-03 ENCOUNTER — HOSPITAL ENCOUNTER (EMERGENCY)
Facility: HOSPITAL | Age: 78
Discharge: HOME | End: 2023-01-03
Attending: EMERGENCY MEDICINE
Payer: MEDICARE

## 2023-01-03 ENCOUNTER — TELEPHONE (OUTPATIENT)
Dept: UROLOGY | Facility: CLINIC | Age: 78
End: 2023-01-03
Payer: MEDICARE

## 2023-01-03 VITALS
DIASTOLIC BLOOD PRESSURE: 88 MMHG | HEIGHT: 68 IN | SYSTOLIC BLOOD PRESSURE: 187 MMHG | WEIGHT: 144 LBS | TEMPERATURE: 98 F | BODY MASS INDEX: 21.82 KG/M2 | OXYGEN SATURATION: 98 % | RESPIRATION RATE: 18 BRPM | HEART RATE: 85 BPM

## 2023-01-03 DIAGNOSIS — R39.9 UTI SYMPTOMS: ICD-10-CM

## 2023-01-03 DIAGNOSIS — R30.0 DYSURIA: Primary | ICD-10-CM

## 2023-01-03 DIAGNOSIS — N41.9 PROSTATITIS, UNSPECIFIED PROSTATITIS TYPE: ICD-10-CM

## 2023-01-03 DIAGNOSIS — R39.15 URGENCY OF URINATION: Primary | ICD-10-CM

## 2023-01-03 LAB
BACTERIA #/AREA URNS HPF: ABNORMAL /HPF
BILIRUB UR QL STRIP: NEGATIVE
BILIRUB UR QL STRIP: NEGATIVE
CLARITY UR: CLEAR
COLOR UR: YELLOW
GLUCOSE UR QL STRIP: NEGATIVE
GLUCOSE UR QL STRIP: NEGATIVE
HGB UR QL STRIP: ABNORMAL
KETONES UR QL STRIP: ABNORMAL
KETONES UR QL STRIP: NEGATIVE
LEUKOCYTE ESTERASE UR QL STRIP: NEGATIVE
LEUKOCYTE ESTERASE UR QL STRIP: NEGATIVE
MICROSCOPIC COMMENT: ABNORMAL
NITRITE UR QL STRIP: NEGATIVE
PH UR STRIP: 7 [PH] (ref 5–8)
PH, POC UA: 6
POC BLOOD, URINE: NEGATIVE
POC NITRATES, URINE: NEGATIVE
PROT UR QL STRIP: NEGATIVE
PROT UR QL STRIP: POSITIVE
RBC #/AREA URNS HPF: 10 /HPF (ref 0–4)
SP GR UR STRIP: 1.01 (ref 1–1.03)
SP GR UR STRIP: 1.02 (ref 1–1.03)
SQUAMOUS #/AREA URNS HPF: 1 /HPF
URN SPEC COLLECT METH UR: ABNORMAL
UROBILINOGEN UR STRIP-ACNC: NEGATIVE EU/DL
UROBILINOGEN UR STRIP-ACNC: NORMAL (ref 0.3–2.2)
WBC #/AREA URNS HPF: 1 /HPF (ref 0–5)

## 2023-01-03 PROCEDURE — 99204 PR OFFICE/OUTPT VISIT, NEW, LEVL IV, 45-59 MIN: ICD-10-PCS | Mod: S$GLB,,, | Performed by: NURSE PRACTITIONER

## 2023-01-03 PROCEDURE — 1159F PR MEDICATION LIST DOCUMENTED IN MEDICAL RECORD: ICD-10-PCS | Mod: CPTII,S$GLB,, | Performed by: NURSE PRACTITIONER

## 2023-01-03 PROCEDURE — 1159F MED LIST DOCD IN RCRD: CPT | Mod: CPTII,S$GLB,, | Performed by: NURSE PRACTITIONER

## 2023-01-03 PROCEDURE — 99283 EMERGENCY DEPT VISIT LOW MDM: CPT | Mod: 25

## 2023-01-03 PROCEDURE — 3079F PR MOST RECENT DIASTOLIC BLOOD PRESSURE 80-89 MM HG: ICD-10-PCS | Mod: CPTII,S$GLB,, | Performed by: NURSE PRACTITIONER

## 2023-01-03 PROCEDURE — 3077F PR MOST RECENT SYSTOLIC BLOOD PRESSURE >= 140 MM HG: ICD-10-PCS | Mod: CPTII,S$GLB,, | Performed by: NURSE PRACTITIONER

## 2023-01-03 PROCEDURE — 99204 OFFICE O/P NEW MOD 45 MIN: CPT | Mod: S$GLB,,, | Performed by: NURSE PRACTITIONER

## 2023-01-03 PROCEDURE — 81000 URINALYSIS NONAUTO W/SCOPE: CPT | Performed by: EMERGENCY MEDICINE

## 2023-01-03 PROCEDURE — 81003 URINALYSIS AUTO W/O SCOPE: CPT | Mod: QW,S$GLB,, | Performed by: NURSE PRACTITIONER

## 2023-01-03 PROCEDURE — 3079F DIAST BP 80-89 MM HG: CPT | Mod: CPTII,S$GLB,, | Performed by: NURSE PRACTITIONER

## 2023-01-03 PROCEDURE — 1160F RVW MEDS BY RX/DR IN RCRD: CPT | Mod: CPTII,S$GLB,, | Performed by: NURSE PRACTITIONER

## 2023-01-03 PROCEDURE — 81003 POCT URINALYSIS, DIPSTICK, AUTOMATED, W/O SCOPE: ICD-10-PCS | Mod: QW,S$GLB,, | Performed by: NURSE PRACTITIONER

## 2023-01-03 PROCEDURE — 3077F SYST BP >= 140 MM HG: CPT | Mod: CPTII,S$GLB,, | Performed by: NURSE PRACTITIONER

## 2023-01-03 PROCEDURE — 1160F PR REVIEW ALL MEDS BY PRESCRIBER/CLIN PHARMACIST DOCUMENTED: ICD-10-PCS | Mod: CPTII,S$GLB,, | Performed by: NURSE PRACTITIONER

## 2023-01-03 RX ORDER — DOXYCYCLINE 100 MG/1
100 CAPSULE ORAL 2 TIMES DAILY
Qty: 56 CAPSULE | Refills: 0 | Status: SHIPPED | OUTPATIENT
Start: 2023-01-03 | End: 2023-01-05 | Stop reason: SDUPTHER

## 2023-01-03 NOTE — TELEPHONE ENCOUNTER
----- Message from Minnie Rodríguez sent at 1/3/2023  8:49 AM CST -----  Contact: patient  Type: Needs Medical Advice  Who Called:  patient  Best Call Back Number: [phone] (home)   Additional Information: requesting an urgent call back- just left pulmonary - he is going to send over clearance for procedure dr oliver is doing- he would like to discuss this and also getting a catheter

## 2023-01-03 NOTE — TELEPHONE ENCOUNTER
Seen at  for dysuria - states no UTI- diagnosed with prostatitis. States having trouble with incontinence. States he has frequency, urgency and feels like can not empty bladder. Also has leaking urine. States hx prostate CA. States his d/c instructions say to follow up with provider and ask about management. States they also mentioned he may need to go to ED for catheter placement. Pt asking to speak urgently with urologist for care directives. Advised would route high priority to urologist asking for outreach.     States he last voided 1-2 h ago but experienced great difficulty. Advised if he can not void, proceed to ED for possible catheter.     Reason for Disposition   Nursing judgment    Protocols used: Information Only Call - No Triage-A-[place]

## 2023-01-03 NOTE — TELEPHONE ENCOUNTER
Spoke w pt regarding recent UC visit and having prob with urination   Pt is continuing to having dribbling   Pt informed if continues for the next couple of hours   Pt was informed to go to [facility] ER for bladder scan   Pt vu   Pt will  med for prostatitis and continue to monitor urinary systems

## 2023-01-03 NOTE — PROGRESS NOTES
"Subjective:       Patient ID: Rajendra Castle is a 77 y.o. male.    Vitals:  height is 5' 8" (1.727 m) and weight is 65.3 kg (144 lb). His temperature is 98.2 °F (36.8 °C). His blood pressure is 187/88 (abnormal) and his pulse is 85. His respiration is 18 and oxygen saturation is 98%.     Chief Complaint: Dysuria    Dysuria   The current episode started in the past 7 days (3 days). The problem has been waxing and waning. The quality of the pain is described as burning. Associated symptoms include frequency (Longstanding) and urgency. Pertinent negatives include no chills, flank pain, hematuria, nausea or vomiting.     Constitution: Negative for chills and fever.   Gastrointestinal:  Negative for abdominal pain, nausea, vomiting and diarrhea.   Genitourinary:  Positive for dysuria (X3 days), frequency (Longstanding), urgency and penile pain (Burning sensation as the distal aspect with urination). Negative for flank pain, hematuria, genital trauma, penile discharge, penile swelling, scrotal swelling, testicular pain and pelvic pain.        Also reports hesitancy initiating stream, reduced stream and postvoid dribbling worsening over the last 1 week   Musculoskeletal:  Negative for back pain.     Objective:      Physical Exam   Constitutional: He is oriented to person, place, and time. He appears well-developed.  Non-toxic appearance. He does not appear ill. No distress.   HENT:   Head: Normocephalic and atraumatic.   Nose: Nose normal.   Mouth/Throat: Oropharynx is clear and moist. Mucous membranes are moist.   Eyes: Conjunctivae and EOM are normal.   Cardiovascular: Normal rate.   Pulmonary/Chest: Effort normal. No respiratory distress.   Abdominal: Normal appearance. Soft. flat abdomen There is no abdominal tenderness. There is no rebound, no guarding, no left CVA tenderness and no right CVA tenderness.   Genitourinary:         Comments: deferred     Neurological: no focal deficit. He is alert and oriented to person, " place, and time.   Skin: Skin is warm and dry. Capillary refill takes 2 to 3 seconds.   Psychiatric: His behavior is normal. Mood normal.   Nursing note and vitals reviewed.      Assessment:       1. Dysuria    2. UTI symptoms    3. Prostatitis, unspecified prostatitis type        UA: 10 protein, otherwise neg    Plan:       Urine culture pending.    Dysuria  -     POCT Urinalysis, Dipstick, Automated, W/O Scope    UTI symptoms  -     Urine culture    Prostatitis, unspecified prostatitis type  -     doxycycline (MONODOX) 100 MG capsule; Take 1 capsule (100 mg total) by mouth 2 (two) times daily.  Dispense: 56 capsule; Refill: 0    Doxycycline twice a day for 28 days.  Always take with food and a full glass of water and do not lay down for 1 hour after taking each dose.    Call today or tomorrow and make a follow-up appointment with your urologist.    If symptoms worsen or you are unable to urinate you need to go to the emergency room for intervention and possible need for catheter placement.

## 2023-01-03 NOTE — PATIENT INSTRUCTIONS
Doxycycline twice a day for 28 days.  Always take with food and a full glass of water and do not lay down for 1 hour after taking each dose.    Call today or tomorrow and make a follow-up appointment with your urologist.    If symptoms worsen or you are unable to urinate you need to go to the emergency room for intervention and possible need for catheter placement.

## 2023-01-03 NOTE — TELEPHONE ENCOUNTER
Dr Kraus clearance received   With note stating   Pt is cleared for surgery and to bring his device with him for his procedure and will need to use post op  (Clearance scanned to media)

## 2023-01-04 VITALS
OXYGEN SATURATION: 98 % | SYSTOLIC BLOOD PRESSURE: 187 MMHG | HEART RATE: 99 BPM | WEIGHT: 144 LBS | RESPIRATION RATE: 17 BRPM | DIASTOLIC BLOOD PRESSURE: 87 MMHG | HEIGHT: 68 IN | TEMPERATURE: 98 F | BODY MASS INDEX: 21.82 KG/M2

## 2023-01-04 NOTE — ED NOTES
Patient bladder scanned- no urinary retention noted- less than 10ml seen on bladder scan. Patient states he voided in the waiting room.

## 2023-01-04 NOTE — ED PROVIDER NOTES
Encounter Date: 1/3/2023    SCRIBE #1 NOTE: I, Cassie Lastguido, am scribing for, and in the presence of,  Brian Preciado MD.     History     Chief Complaint   Patient presents with    Urinary Urgency     Reports sensation of needing to use the bathroom often     Time seen by provider: 8:31 PM on 01/03/2023    Rajendra Castle is a 77 y.o. male who presents to the ED with an onset of urinary urgency and dysuria for the past 1 week. The patient states has the urge to urinate, but he is not able to fully empty his bladder. Patient notes he presented to Urgent Care this morning and was negative for UTI, and he was advised to follow-up with Urology for possible prostate infection. Patient states he is awaiting to undergo a bladder lift. He reports being prescribed Doxycycline today. The patient denies fever, N/V, chest pain, abdominal pain, hematuria, or any other symptoms at this time. PMHx of HTN and prostate cancer. PSHx of transrectal biopsy of prostate (2018).       The history is provided by the patient.   Review of patient's allergies indicates:  No Known Allergies  Past Medical History:   Diagnosis Date    Arthritis     DDD (degenerative disc disease), cervical     Degenerative disc disease     Heart murmur     Hypertension     Nontoxic multinodular goiter 9/20/2016    Prostate CA     RA (rheumatoid arthritis)     Vitamin D insufficiency 9/30/2016     Past Surgical History:   Procedure Laterality Date    CARPAL TUNNEL RELEASE Right 5/28/2021    Procedure: RELEASE, CARPAL TUNNEL;  Surgeon: Jose Ryan II, MD;  Location: [facility] OR;  Service: Orthopedics;  Laterality: Right;    COLONOSCOPY  prior to 2016    normal findings per patient report    CYSTOSCOPY N/A 12/18/2018    Procedure: CYSTOSCOPY;  Surgeon: Garrett Pina MD;  Location: [facility] OR;  Service: Urology;  Laterality: N/A;    CYSTOSCOPY N/A 7/12/2022    Procedure: CYSTOSCOPY;  Surgeon: Garrett Pina MD;  Location: [facility] OR;  Service: Urology;   Laterality: N/A;    ESOPHAGOGASTRODUODENOSCOPY N/A 2020    Dr. Espinosa; empiric dilation; erythematous mucosa in antrum; gastric mucosal atrophy; hematin in entire stomach; biopsy: mid & distal esophagus WNL, stomach- WNL, negative for h pylori    PARATHYROIDECTOMY Right 10/27/2020    Procedure: PARATHYROIDECTOMY;  Surgeon: Latonia Clayton MD;  [facility];  Service: ENT;  Laterality: Right;    RELEASE OF ULNAR NERVE AT CUBITAL TUNNEL Right 2021    Procedure: RELEASE, ULNAR TUNNEL;  Surgeon: Jose Ryan II, MD;  Location: [facility] OR;  Service: Orthopedics;  Laterality: Right;    TRANSRECTAL BIOPSY OF PROSTATE WITH ULTRASOUND GUIDANCE N/A 2018    Procedure: BIOPSY, PROSTATE, RECTAL APPROACH, WITH US GUIDANCE;  Surgeon: Garrett Pina MD;  Location: [facility];  Service: Urology;  Laterality: N/A;    TRANSRECTAL ULTRASOUND EXAMINATION N/A 2022    Procedure: ULTRASOUND, RECTAL APPROACH;  Surgeon: Garrett Pina MD;  Location: [facility];  Service: Urology;  Laterality: N/A;     Family History   Problem Relation Age of Onset    Heart disease Mother     Stroke Father     Drug abuse Sister     No Known Problems Daughter     No Known Problems Daughter     No Known Problems Son     Diabetes Maternal Uncle     Colon cancer Neg Hx     Crohn's disease Neg Hx     Esophageal cancer Neg Hx     Stomach cancer Neg Hx     Ulcerative colitis Neg Hx      Social History     Tobacco Use    Smoking status: Former     Packs/day: 0.25     Years: 10.00     Pack years: 2.50     Types: Cigarettes     Quit date: 2001     Years since quittin.4     Passive exposure: Past    Smokeless tobacco: Never   Substance Use Topics    Alcohol use: Yes     Comment: seldom    Drug use: Yes     Frequency: 8.0 times per week     Types: Marijuana     Review of Systems   Constitutional:  Negative for fever.   HENT:  Negative for sore throat.    Respiratory:  Negative for shortness of breath.     Cardiovascular:  Negative for chest pain.   Gastrointestinal:  Negative for abdominal pain, nausea and vomiting.   Genitourinary:  Positive for urgency. Negative for dysuria and hematuria.   Musculoskeletal:  Negative for back pain.   Skin:  Negative for rash.   Neurological:  Negative for weakness.   Hematological:  Does not bruise/bleed easily.     Physical Exam     Initial Vitals [01/03/23 1808]   BP Pulse Resp Temp SpO2   (!) 187/98 99 17 98.4 °F (36.9 °C) 98 %      MAP       --         Physical Exam    Nursing note and vitals reviewed.  Constitutional: He appears well-developed and well-nourished. He is not diaphoretic. No distress.   HENT:   Head: Normocephalic and atraumatic.   Eyes: EOM are normal. Pupils are equal, round, and reactive to light.   Neck: Neck supple.   Normal range of motion.  Cardiovascular:  Normal rate, regular rhythm, normal heart sounds and intact distal pulses.     Exam reveals no gallop and no friction rub.       No murmur heard.  Pulmonary/Chest: Breath sounds normal. No respiratory distress. He has no wheezes. He has no rhonchi. He has no rales.   Abdominal: Abdomen is soft. Bowel sounds are normal. There is no abdominal tenderness. There is no rebound and no guarding.   Musculoskeletal:         General: Normal range of motion.      Cervical back: Normal range of motion and neck supple.     Neurological: He is alert and oriented to person, place, and time.   Skin: Skin is warm.   Psychiatric: He has a normal mood and affect. His behavior is normal. Judgment and thought content normal.       ED Course   Procedures  Labs Reviewed   URINALYSIS, REFLEX TO URINE CULTURE - Abnormal; Notable for the following components:       Result Value    Ketones, UA Trace (*)     Occult Blood UA 1+ (*)     All other components within normal limits    Narrative:     Specimen Source->Urine   URINALYSIS MICROSCOPIC - Abnormal; Notable for the following components:    RBC, UA 10 (*)     All other  components within normal limits    Narrative:     Specimen Source->Urine          Imaging Results    None          Medications - No data to display  Medical Decision Making:   History:   Old Medical Records: I decided to obtain old medical records.  Initial Assessment:   77-year-old male presents with urinary urgency.  Differential Diagnosis:   Initial differential diagnosis included but not limited to urinary retention, UTI, and urethritis.  Clinical Tests:   Lab Tests: Ordered and Reviewed  ED Management:  The patient was emergently evaluated in the emergency department, his evaluation was significant for an elderly male with a benign exam noted.  The patient's bladder scan shows no acute urinary retention tonight.  The patient's urinalysis shows no evidence of infection.  The patient may have a urethritis versus prostatitis causing his symptoms.  The patient was given a prescription for  p.o. doxycycline from urgent care earlier today.  He is instructed to start taking this medication for his symptoms.  He is to otherwise follow up with his urologist for further care.        Scribe Attestation:   Scribe #1: I performed the above scribed service and the documentation accurately describes the services I performed. I attest to the accuracy of the note.               I, Dr. Brian Preciado, personally performed the services described in this documentation. All medical record entries made by the scribe were at my direction and in my presence.  I have reviewed the chart and agree that the record reflects my personal performance and is accurate and complete. Brian Preciado MD.  11:37 PM 01/03/2023      Clinical Impression:   Final diagnoses:  [R39.15] Urgency of urination (Primary)        ED Disposition Condition    Discharge Stable          ED Prescriptions    None       Follow-up Information       Follow up With Specialties Details Why Contact Info    Sp Lilly MD Family Medicine   [address] [place] [address]  [place]  [place] [address]  [phone]      follow up with your Urologist                 Brian Preciado MD  01/03/23 3951

## 2023-01-05 ENCOUNTER — TELEPHONE (OUTPATIENT)
Dept: URGENT CARE | Facility: CLINIC | Age: 78
End: 2023-01-05
Payer: MEDICARE

## 2023-01-05 ENCOUNTER — TELEPHONE (OUTPATIENT)
Dept: UROLOGY | Facility: CLINIC | Age: 78
End: 2023-01-05
Payer: MEDICARE

## 2023-01-05 DIAGNOSIS — N41.9 PROSTATITIS, UNSPECIFIED PROSTATITIS TYPE: ICD-10-CM

## 2023-01-05 DIAGNOSIS — R30.0 DYSURIA: Primary | ICD-10-CM

## 2023-01-05 LAB
BACTERIA UR CULT: NORMAL
BACTERIA UR CULT: NORMAL

## 2023-01-05 RX ORDER — DOXYCYCLINE 100 MG/1
100 CAPSULE ORAL 2 TIMES DAILY
Qty: 56 CAPSULE | Refills: 0 | Status: SHIPPED | OUTPATIENT
Start: 2023-01-05 | End: 2023-02-06

## 2023-01-05 NOTE — TELEPHONE ENCOUNTER
Called patient to follow-up on his multiple calls over the last 1-2 weeks, in which he continued to question the need for clearance after previous discussions, and has been screaming at office staff per documented notes demanding care surgery etc.    Advise that I reviewed his emergency department visit in agree that no Bustillo catheter is needed or would be placed when his bladder is emptying.  Reviewed his baseline urgency frequency and conservative recommendations.  He expressed extreme frustration in the ability to get a good night sleep and feels that now, different than previous ( as when his own medical problems for not postponing prostate intervention, he elected to postpone), he must proceed with UroLift immediately.    We reviewed that his behavior with our office in office staff and continued called screaming at the office is completely unacceptable and will not be tolerated.  He states that the inability to get a good night's sleep is causing him to be irritable.  I did again review that he is just been diagnosed with obstructive sleep apnea which is contributing to his poor nighttime rest, and he states he is only been using his CPAP for 1 week.  We again reviewed the urinary implications of sleep apnea, and also reviewed the concerns for possible exacerbation or prostatitis and provided medicine by urgent care which ER told him to continue.  He has not started this medicine.    During this follow-up call he stated he was in the lobby of our office waiting to be seen by nursing staff.  I advise he did not have any appointments or nurse visits, and he says that he checked in down stairs and I question for what.  I reviewed his phone call earlier with our office staff who offered to check his bladder if he was not convinced that the ER was appropriately documenting a 0 residual with a bladder scan but he proceeded to then scream and discontinued the call and I advised that he will not be seen by our office  staff today.  I advised that he leave the office, and was very clear with him that with his continued behavior I could not appropriately provide clinical care for him anymore.  He needs to focus on his underlying medical problems, continue his CPAP and sleep apnea treatment course, and after working with him over the years, including after originally declining all care and recommendations as it related to his initial diagnosis of prostate cancer which ultimately he sought treatment for, that with the continued combative behavior and inappropriately screaming at our office, I advised him to seek care elsewhere.  Will forward to management as well.  I expressed that I can not provide appropriate care and meet his expectations, and will certainly not perform any further procedures on him for him to continue to either not follow recommendations, and or follow them but continued to scream and be combative with our staff, calling it every day.    He apologized for his behavior again noting his irritability of late, and agreed to leave the office.  Will forward to management.

## 2023-01-05 NOTE — TELEPHONE ENCOUNTER
Spoke w pt who called to discuss ER visit and who is upset regarding no catheter placed due to pt having empty bladder and is becoming very upset due to he is having severe urgency and frequency   Pt voiced he knows his body and is asking for a catheter so he can have complete emptying.  Pt voiced he is not getting proper sleep and it it affecting his heart.  Offered pt to come in to office if he feels bladder is not emptying properly   Pt declined and said if empty will I still place catheter and I advised no   Pt voiced he will wait 2-3 hrs when he thinks bladder is full and have emptying checked.  As we continued to discuss conservative bladder and urgency and bladder training   Pt stated getting frustrated with the conversation we were having and started to holler what seemed like the top of the lungs   I politely ended conversation with patient as the conversation was becoming out of hand.

## 2023-01-05 NOTE — TELEPHONE ENCOUNTER
----- Message from Jane Franz sent at 1/5/2023  8:11 AM CST -----  Regarding: SAME DAY CALL BACK; URGENT PER PATEINT  Contact: Patient  Type: Patient Call Back         Who called: Patient         What is the request in detail: requesting to speak with nurse regarding his urgent care visit; states it is urgent and would like to talk asap; would not specify in details; please advise        Best call back number: [phone]         Additional Information:   [id number] - [place] - [address] AT [facility]  [address]  [place] [address]  Phone: [phone] Fax: [phone]             Thank You

## 2023-01-16 ENCOUNTER — HOSPITAL ENCOUNTER (EMERGENCY)
Facility: HOSPITAL | Age: 78
Discharge: HOME | End: 2023-01-16
Attending: EMERGENCY MEDICINE
Payer: MEDICARE

## 2023-01-16 ENCOUNTER — NURSE TRIAGE (OUTPATIENT)
Dept: ADMINISTRATIVE | Facility: CLINIC | Age: 78
End: 2023-01-16
Payer: MEDICARE

## 2023-01-16 VITALS
HEART RATE: 77 BPM | BODY MASS INDEX: 21.82 KG/M2 | WEIGHT: 144 LBS | TEMPERATURE: 98 F | SYSTOLIC BLOOD PRESSURE: 178 MMHG | DIASTOLIC BLOOD PRESSURE: 99 MMHG | RESPIRATION RATE: 18 BRPM | OXYGEN SATURATION: 99 % | HEIGHT: 68 IN

## 2023-01-16 DIAGNOSIS — R00.2 PALPITATIONS: ICD-10-CM

## 2023-01-16 DIAGNOSIS — G47.00 INSOMNIA, UNSPECIFIED TYPE: Primary | ICD-10-CM

## 2023-01-16 LAB
ALBUMIN SERPL BCP-MCNC: 3.7 G/DL (ref 3.5–5.2)
ALP SERPL-CCNC: 63 U/L (ref 55–135)
ALT SERPL W/O P-5'-P-CCNC: 27 U/L (ref 10–44)
AMORPH CRY URNS QL MICRO: ABNORMAL
ANION GAP SERPL CALC-SCNC: 10 MMOL/L (ref 8–16)
AST SERPL-CCNC: 22 U/L (ref 10–40)
BACTERIA #/AREA URNS HPF: ABNORMAL /HPF
BASOPHILS # BLD AUTO: 0.02 K/UL (ref 0–0.2)
BASOPHILS NFR BLD: 0.4 % (ref 0–1.9)
BILIRUB SERPL-MCNC: 0.6 MG/DL (ref 0.1–1)
BILIRUB UR QL STRIP: ABNORMAL
BUN SERPL-MCNC: 15 MG/DL (ref 8–23)
CALCIUM SERPL-MCNC: 8.8 MG/DL (ref 8.7–10.5)
CHLORIDE SERPL-SCNC: 113 MMOL/L (ref 95–110)
CLARITY UR: CLEAR
CO2 SERPL-SCNC: 21 MMOL/L (ref 23–29)
COLOR UR: ABNORMAL
CREAT SERPL-MCNC: 1.4 MG/DL (ref 0.5–1.4)
DIFFERENTIAL METHOD: ABNORMAL
EOSINOPHIL # BLD AUTO: 0.1 K/UL (ref 0–0.5)
EOSINOPHIL NFR BLD: 1.6 % (ref 0–8)
ERYTHROCYTE [DISTWIDTH] IN BLOOD BY AUTOMATED COUNT: 16.8 % (ref 11.5–14.5)
EST. GFR  (NO RACE VARIABLE): 52 ML/MIN/1.73 M^2
GLUCOSE SERPL-MCNC: 108 MG/DL (ref 70–110)
GLUCOSE UR QL STRIP: NEGATIVE
HCT VFR BLD AUTO: 41.9 % (ref 40–54)
HGB BLD-MCNC: 13.1 G/DL (ref 14–18)
HGB UR QL STRIP: NEGATIVE
HYALINE CASTS #/AREA URNS LPF: 0 /LPF
IMM GRANULOCYTES # BLD AUTO: 0.02 K/UL (ref 0–0.04)
IMM GRANULOCYTES NFR BLD AUTO: 0.4 % (ref 0–0.5)
KETONES UR QL STRIP: NEGATIVE
LEUKOCYTE ESTERASE UR QL STRIP: NEGATIVE
LYMPHOCYTES # BLD AUTO: 0.8 K/UL (ref 1–4.8)
LYMPHOCYTES NFR BLD: 14.5 % (ref 18–48)
MAGNESIUM SERPL-MCNC: 2 MG/DL (ref 1.6–2.6)
MCH RBC QN AUTO: 29 PG (ref 27–31)
MCHC RBC AUTO-ENTMCNC: 31.3 G/DL (ref 32–36)
MCV RBC AUTO: 93 FL (ref 82–98)
MICROSCOPIC COMMENT: ABNORMAL
MONOCYTES # BLD AUTO: 0.6 K/UL (ref 0.3–1)
MONOCYTES NFR BLD: 11.1 % (ref 4–15)
NEUTROPHILS # BLD AUTO: 4 K/UL (ref 1.8–7.7)
NEUTROPHILS NFR BLD: 72 % (ref 38–73)
NITRITE UR QL STRIP: NEGATIVE
NON-SQ EPI CELLS #/AREA URNS HPF: 2 /HPF
NRBC BLD-RTO: 0 /100 WBC
PH UR STRIP: 7 [PH] (ref 5–8)
PLATELET # BLD AUTO: 243 K/UL (ref 150–450)
PMV BLD AUTO: 12.4 FL (ref 9.2–12.9)
POTASSIUM SERPL-SCNC: 4.3 MMOL/L (ref 3.5–5.1)
PROT SERPL-MCNC: 7.1 G/DL (ref 6–8.4)
PROT UR QL STRIP: ABNORMAL
RBC # BLD AUTO: 4.51 M/UL (ref 4.6–6.2)
RBC #/AREA URNS HPF: 14 /HPF (ref 0–4)
SODIUM SERPL-SCNC: 144 MMOL/L (ref 136–145)
SP GR UR STRIP: 1.01 (ref 1–1.03)
SQUAMOUS #/AREA URNS HPF: 2 /HPF
TSH SERPL DL<=0.005 MIU/L-ACNC: 1.3 UIU/ML (ref 0.4–4)
URN SPEC COLLECT METH UR: ABNORMAL
UROBILINOGEN UR STRIP-ACNC: 1 EU/DL
WBC # BLD AUTO: 5.59 K/UL (ref 3.9–12.7)
WBC #/AREA URNS HPF: 5 /HPF (ref 0–5)
WBC CLUMPS URNS QL MICRO: ABNORMAL

## 2023-01-16 PROCEDURE — 83735 ASSAY OF MAGNESIUM: CPT | Performed by: EMERGENCY MEDICINE

## 2023-01-16 PROCEDURE — 93010 EKG 12-LEAD: ICD-10-PCS | Mod: ,,, | Performed by: INTERNAL MEDICINE

## 2023-01-16 PROCEDURE — 99284 EMERGENCY DEPT VISIT MOD MDM: CPT

## 2023-01-16 PROCEDURE — 80053 COMPREHEN METABOLIC PANEL: CPT | Performed by: EMERGENCY MEDICINE

## 2023-01-16 PROCEDURE — 81000 URINALYSIS NONAUTO W/SCOPE: CPT | Performed by: EMERGENCY MEDICINE

## 2023-01-16 PROCEDURE — 36415 COLL VENOUS BLD VENIPUNCTURE: CPT | Performed by: EMERGENCY MEDICINE

## 2023-01-16 PROCEDURE — 84443 ASSAY THYROID STIM HORMONE: CPT | Performed by: EMERGENCY MEDICINE

## 2023-01-16 PROCEDURE — 93005 ELECTROCARDIOGRAM TRACING: CPT

## 2023-01-16 PROCEDURE — 85025 COMPLETE CBC W/AUTO DIFF WBC: CPT | Performed by: EMERGENCY MEDICINE

## 2023-01-16 PROCEDURE — 93010 ELECTROCARDIOGRAM REPORT: CPT | Mod: ,,, | Performed by: INTERNAL MEDICINE

## 2023-01-16 NOTE — ED PROVIDER NOTES
"Encounter Date: 1/16/2023    SCRIBE #1 NOTE: I, Randifarhad Tovar, am scribing for, and in the presence of,  Joe Pringle MD.     History     Chief Complaint   Patient presents with    Insomnia     " For months "     Time seen by provider: 7:19 AM on 01/16/2023    Rajendra Castle is a 77 y.o. male with a PMHx of BPH, HTN, and nontoxic multinodular goiter who presents to the ED with an onset of difficulty sleeping for months. Patient states he has to get up several times per night to urinate and states he only gets 2-3 hours of sleep despite taking Trazodone. He does reportedly take short naps while watching TV. He also has been using a CPAP for 2-3 weeks but states that keeps him awake. Patient reports he is on Doxycycline for burning with urination and feels it is Improving. Per chart review, patient had a negative urinalysis 2 weeks ago. Patient reports his BP has bee high and he has been having some intermittent heart palpitations recently. The patient denies any other symptoms at this time. PSHx includes prostate biopsy (2018). Patient states he is waiting to have a urolift done.      The history is provided by the patient and medical records.   Review of patient's allergies indicates:  No Known Allergies  Past Medical History:   Diagnosis Date    Arthritis     DDD (degenerative disc disease), cervical     Degenerative disc disease     Heart murmur     Hypertension     Nontoxic multinodular goiter 9/20/2016    Prostate CA     RA (rheumatoid arthritis)     Vitamin D insufficiency 9/30/2016     Past Surgical History:   Procedure Laterality Date    CARPAL TUNNEL RELEASE Right 5/28/2021    Procedure: RELEASE, CARPAL TUNNEL;  Surgeon: Jose Ryan II, MD;  Location: [facility] OR;  Service: Orthopedics;  Laterality: Right;    COLONOSCOPY  prior to 2016    normal findings per patient report    CYSTOSCOPY N/A 12/18/2018    Procedure: CYSTOSCOPY;  Surgeon: Garrett Pina MD;  Location: [facility] OR;  Service: Urology;  " Laterality: N/A;    CYSTOSCOPY N/A 2022    Procedure: CYSTOSCOPY;  Surgeon: Garrett Pina MD;  Location: [facility] OR;  Service: Urology;  Laterality: N/A;    ESOPHAGOGASTRODUODENOSCOPY N/A 2020    Dr. Espinosa; empiric dilation; erythematous mucosa in antrum; gastric mucosal atrophy; hematin in entire stomach; biopsy: mid & distal esophagus WNL, stomach- WNL, negative for h pylori    PARATHYROIDECTOMY Right 10/27/2020    Procedure: PARATHYROIDECTOMY;  Surgeon: Latonia Clayton MD;  Location: [facility] OR [facility] FLR;  Service: ENT;  Laterality: Right;    RELEASE OF ULNAR NERVE AT CUBITAL TUNNEL Right 2021    Procedure: RELEASE, ULNAR TUNNEL;  Surgeon: Jose Ryan II, MD;  Location: [facility] OR;  Service: Orthopedics;  Laterality: Right;    TRANSRECTAL BIOPSY OF PROSTATE WITH ULTRASOUND GUIDANCE N/A 2018    Procedure: BIOPSY, PROSTATE, RECTAL APPROACH, WITH US GUIDANCE;  Surgeon: Garrett Pina MD;  Location: [facility] OR;  Service: Urology;  Laterality: N/A;    TRANSRECTAL ULTRASOUND EXAMINATION N/A 2022    Procedure: ULTRASOUND, RECTAL APPROACH;  Surgeon: Garrett Pina MD;  Location: [facility] OR;  Service: Urology;  Laterality: N/A;     Family History   Problem Relation Age of Onset    Heart disease Mother     Stroke Father     Drug abuse Sister     No Known Problems Daughter     No Known Problems Daughter     No Known Problems Son     Diabetes Maternal Uncle     Colon cancer Neg Hx     Crohn's disease Neg Hx     Esophageal cancer Neg Hx     Stomach cancer Neg Hx     Ulcerative colitis Neg Hx      Social History     Tobacco Use    Smoking status: Former     Packs/day: 0.25     Years: 10.00     Pack years: 2.50     Types: Cigarettes     Quit date: 2001     Years since quittin.4     Passive exposure: Past    Smokeless tobacco: Never   Substance Use Topics    Alcohol use: Yes     Comment: seldom    Drug use: Yes     Frequency: 8.0 times per week     Types: Marijuana     Review of  Systems   Constitutional:  Positive for fatigue. Negative for fever.   HENT:  Negative for sore throat.    Respiratory:  Negative for shortness of breath.    Cardiovascular:  Positive for palpitations. Negative for chest pain.   Gastrointestinal:  Negative for nausea.   Genitourinary:  Positive for dysuria and frequency.   Musculoskeletal:  Negative for back pain.   Skin:  Negative for rash.   Neurological:  Negative for weakness.   Hematological:  Does not bruise/bleed easily.     Physical Exam     Initial Vitals [01/16/23 0651]   BP Pulse Resp Temp SpO2   (!) 215/101 76 18 97.8 °F (36.6 °C) 100 %      MAP       --         Physical Exam    Nursing note and vitals reviewed.  Constitutional: He appears well-developed and well-nourished. No distress.   HENT:   Head: Normocephalic and atraumatic.   Eyes: Conjunctivae and EOM are normal. Pupils are equal, round, and reactive to light.   Neck: Neck supple.   Cardiovascular:  Normal rate, regular rhythm and normal heart sounds.     Exam reveals no gallop and no friction rub.       No murmur heard.  Pulmonary/Chest: Breath sounds normal. No respiratory distress. He has no wheezes. He has no rhonchi. He has no rales.   Abdominal: Abdomen is soft. Bowel sounds are normal. He exhibits no distension. There is no abdominal tenderness.   Musculoskeletal:         General: No tenderness or edema. Normal range of motion.      Cervical back: Neck supple.     Neurological: He is alert and oriented to person, place, and time.   Skin: Skin is warm and dry.   Psychiatric: He has a normal mood and affect.       ED Course   Procedures  Labs Reviewed   CBC W/ AUTO DIFFERENTIAL - Abnormal; Notable for the following components:       Result Value    RBC 4.51 (*)     Hemoglobin 13.1 (*)     MCHC 31.3 (*)     RDW 16.8 (*)     Lymph # 0.8 (*)     Lymph % 14.5 (*)     All other components within normal limits   COMPREHENSIVE METABOLIC PANEL - Abnormal; Notable for the following components:     Chloride 113 (*)     CO2 21 (*)     eGFR 52 (*)     All other components within normal limits   URINALYSIS, REFLEX TO URINE CULTURE - Abnormal; Notable for the following components:    Protein, UA 1+ (*)     Bilirubin (UA) 1+ (*)     All other components within normal limits    Narrative:     Specimen Source->Urine   URINALYSIS MICROSCOPIC - Abnormal; Notable for the following components:    RBC, UA 14 (*)     Non-Squam Epith 2 (*)     All other components within normal limits    Narrative:     Specimen Source->Urine   MAGNESIUM   TSH          Imaging Results    None          Medications - No data to display  Medical Decision Making:   History:   Old Medical Records: I decided to obtain old medical records.  Initial Assessment:   Insomnia nocturia  Differential Diagnosis:   Patient does have a history of insomnia for several months.  He has been on Ambien in the past since currently on trazodone.  He is hypertensive here but did not take his medications this morning.  He wants to get some sleep.  He is not appear to be delusional hallucinating manic.  He denies any stimulants.  Independently Interpreted Test(s):   I have ordered and independently interpreted EKG Reading(s) - see prior notes  Clinical Tests:   Lab Tests: Ordered and Reviewed  Medical Tests: Ordered and Reviewed  ED Management:  Ordered labs given that he had a history of thyroid disease but there is no evidence of hyperthyroidism.  He is hypertensive here but is asymptomatic and needs to take his daily medicines.  I do not believe this represents acute encephalopathy secondary to hypertension.  He is had no findings of acute cardiopulmonary emergency.  I do not think he needs to be PEC'd for grave disability for insomnia.  I think he is stable to follow up with primary care for further medications for his insomnia.  I do not want to start any new medications at this time given his age and his comorbidities.  In regards to his nocturia he already  follows with urology.  Urinalysis is not really indicative of urinary tract infection.  He likely has some BPH.        Scribe Attestation:   Scribe #1: I performed the above scribed service and the documentation accurately describes the services I performed. I attest to the accuracy of the note.      ED Course as of 01/16/23 0936   Mon Jan 16, 2023   0819 EKG independently interpreted by me as rate 73 sinus rhythm with first-degree AV block with T-wave inversions in the inferior lateral leads prolonged  no ST segment elevation or depression.      This EKG is very similar to multiple prior EKGs.  No acute changes.  Prolonged QT is also unchanged intermittently from several prior EKGs. [JS]   0833 CBC CMP magnesium all appear to be stable.  Patient does have insomnia.  He is only been on his CPAP for 2 weeks.  He has no evidence of heart failure here.  He has an abnormal EKG but it is unchanged from prior.  No chest pain.  He states he occasionally feels palpitations.  He has had a long history of prolonged QT according to prior EKGs.  Mostly his complaint is insomnia.  He is already taking trazodone.  I do not feel comfortable putting him on a another sleep agent at his age and feel like this would be best done by the primary care physician.  He is urinary frequency at night.  He is already followed by Urology. [JS]      ED Course User Index  [JS] Joe Pringle MD          I, Dr. Joe Pringle personally performed the services described in this documentation. All medical record entries made by the scribe were at my direction and in my presence.  I have reviewed the chart and agree that the record reflects my personal performance and is accurate and complete. Joe Pringle MD.  9:34 AM 01/16/2023    DISCLAIMER: This note was prepared with Dragon NaturallySpeaking voice recognition transcription software. Garbled syntax, mangled pronouns, and other bizarre constructions may be attributed to that software  system        Clinical Impression:   Final diagnoses:  [R00.2] Palpitations  [G47.00] Insomnia, unspecified type (Primary)        ED Disposition Condition    Discharge Stable          ED Prescriptions    None       Follow-up Information       Follow up With Specialties Details Why Contact Info    Sp Lilly MD Family Medicine Schedule an appointment as soon as possible for a visit   [address] [facility] [address]  [phone]               Joe Pringle MD  01/16/23 2940

## 2023-01-16 NOTE — ED NOTES
Pt identifiers checked and accurate with Rajendra Castle    Pt presents to ED with complaints of insomnia x several months. Pt denies all other complaints at this time.

## 2023-01-16 NOTE — TELEPHONE ENCOUNTER
Reason for Disposition   Prescription request for new medicine (not a refill)    Additional Information   Negative: New-onset or worsening symptoms, see that guideline (e.g., diarrhea, runny nose, sore throat)   Negative: Medicine question not related to refill or renewal   Negative: Caller (e.g., patient or pharmacist) requesting information about a new medicine   Negative: Caller requesting information unrelated to medicine   Negative: [1] Prescription refill request for ESSENTIAL medicine (i.e., likelihood of harm to patient if not taken) AND [2] triager unable to refill per department policy   Negative: [1] Prescription not at pharmacy AND [2] was prescribed by PCP recently  (Exception: triager has access to EMR and prescription is recorded there. Go to Home Care and confirm for pharmacy.)   Negative: [1] Pharmacy calling with prescription questions AND [2] triager unable to answer question    Protocols used: Medication Refill and Renewal Call-A-AH    Pt states he went to the ED for insomnia today. States the ED provider will not prescribe anything and told him to see his PCP. Pt states he needs medication to help him sleep and that he is awake all night.    Pt advised the office is closed today. Advised a message will be sent to the Provider to contact him.    Instructed to go to Urgent Care/ED if he becomes worse. Pt verbalized understanding.

## 2023-01-17 ENCOUNTER — TELEPHONE (OUTPATIENT)
Dept: UROLOGY | Facility: CLINIC | Age: 78
End: 2023-01-17
Payer: MEDICARE

## 2023-01-17 ENCOUNTER — NURSE TRIAGE (OUTPATIENT)
Dept: ADMINISTRATIVE | Facility: CLINIC | Age: 78
End: 2023-01-17
Payer: MEDICARE

## 2023-01-17 ENCOUNTER — OFFICE VISIT (OUTPATIENT)
Dept: FAMILY MEDICINE | Facility: CLINIC | Age: 78
End: 2023-01-17
Payer: MEDICARE

## 2023-01-17 VITALS
HEIGHT: 68 IN | BODY MASS INDEX: 21.72 KG/M2 | HEART RATE: 71 BPM | TEMPERATURE: 98 F | OXYGEN SATURATION: 99 % | WEIGHT: 143.31 LBS | SYSTOLIC BLOOD PRESSURE: 182 MMHG | DIASTOLIC BLOOD PRESSURE: 80 MMHG

## 2023-01-17 DIAGNOSIS — I10 ESSENTIAL HYPERTENSION: ICD-10-CM

## 2023-01-17 DIAGNOSIS — G47.00 INSOMNIA, UNSPECIFIED TYPE: Primary | ICD-10-CM

## 2023-01-17 DIAGNOSIS — N40.0 BENIGN PROSTATIC HYPERPLASIA, UNSPECIFIED WHETHER LOWER URINARY TRACT SYMPTOMS PRESENT: ICD-10-CM

## 2023-01-17 DIAGNOSIS — C61 PROSTATE CANCER: ICD-10-CM

## 2023-01-17 PROCEDURE — 3077F SYST BP >= 140 MM HG: CPT | Mod: CPTII,S$GLB,, | Performed by: NURSE PRACTITIONER

## 2023-01-17 PROCEDURE — 1101F PR PT FALLS ASSESS DOC 0-1 FALLS W/OUT INJ PAST YR: ICD-10-PCS | Mod: CPTII,S$GLB,, | Performed by: NURSE PRACTITIONER

## 2023-01-17 PROCEDURE — 1126F AMNT PAIN NOTED NONE PRSNT: CPT | Mod: CPTII,S$GLB,, | Performed by: NURSE PRACTITIONER

## 2023-01-17 PROCEDURE — 1160F RVW MEDS BY RX/DR IN RCRD: CPT | Mod: CPTII,S$GLB,, | Performed by: NURSE PRACTITIONER

## 2023-01-17 PROCEDURE — 3079F PR MOST RECENT DIASTOLIC BLOOD PRESSURE 80-89 MM HG: ICD-10-PCS | Mod: CPTII,S$GLB,, | Performed by: NURSE PRACTITIONER

## 2023-01-17 PROCEDURE — 1126F PR PAIN SEVERITY QUANTIFIED, NO PAIN PRESENT: ICD-10-PCS | Mod: CPTII,S$GLB,, | Performed by: NURSE PRACTITIONER

## 2023-01-17 PROCEDURE — 99999 PR PBB SHADOW E&M-EST. PATIENT-LVL IV: CPT | Mod: PBBFAC,,, | Performed by: NURSE PRACTITIONER

## 2023-01-17 PROCEDURE — 3288F FALL RISK ASSESSMENT DOCD: CPT | Mod: CPTII,S$GLB,, | Performed by: NURSE PRACTITIONER

## 2023-01-17 PROCEDURE — 3288F PR FALLS RISK ASSESSMENT DOCUMENTED: ICD-10-PCS | Mod: CPTII,S$GLB,, | Performed by: NURSE PRACTITIONER

## 2023-01-17 PROCEDURE — 1101F PT FALLS ASSESS-DOCD LE1/YR: CPT | Mod: CPTII,S$GLB,, | Performed by: NURSE PRACTITIONER

## 2023-01-17 PROCEDURE — 99213 OFFICE O/P EST LOW 20 MIN: CPT | Mod: S$GLB,,, | Performed by: NURSE PRACTITIONER

## 2023-01-17 PROCEDURE — 3079F DIAST BP 80-89 MM HG: CPT | Mod: CPTII,S$GLB,, | Performed by: NURSE PRACTITIONER

## 2023-01-17 PROCEDURE — 1159F MED LIST DOCD IN RCRD: CPT | Mod: CPTII,S$GLB,, | Performed by: NURSE PRACTITIONER

## 2023-01-17 PROCEDURE — 99213 PR OFFICE/OUTPT VISIT, EST, LEVL III, 20-29 MIN: ICD-10-PCS | Mod: S$GLB,,, | Performed by: NURSE PRACTITIONER

## 2023-01-17 PROCEDURE — 1159F PR MEDICATION LIST DOCUMENTED IN MEDICAL RECORD: ICD-10-PCS | Mod: CPTII,S$GLB,, | Performed by: NURSE PRACTITIONER

## 2023-01-17 PROCEDURE — 1160F PR REVIEW ALL MEDS BY PRESCRIBER/CLIN PHARMACIST DOCUMENTED: ICD-10-PCS | Mod: CPTII,S$GLB,, | Performed by: NURSE PRACTITIONER

## 2023-01-17 PROCEDURE — 3077F PR MOST RECENT SYSTOLIC BLOOD PRESSURE >= 140 MM HG: ICD-10-PCS | Mod: CPTII,S$GLB,, | Performed by: NURSE PRACTITIONER

## 2023-01-17 PROCEDURE — 99999 PR PBB SHADOW E&M-EST. PATIENT-LVL IV: ICD-10-PCS | Mod: PBBFAC,,, | Performed by: NURSE PRACTITIONER

## 2023-01-17 NOTE — TELEPHONE ENCOUNTER
No new care gaps identified.  [facility] Embedded Care Gaps. Reference number: [id number]. 1/17/2023   1:57:01 PM CST

## 2023-01-17 NOTE — TELEPHONE ENCOUNTER
----- Message from Earnestine Morales sent at 1/17/2023  7:31 AM CST -----  Contact: pt  Type: Same Day Appointment    Caller is requesting a same day appointment.  Caller declined first available appt listed below.    Name of caller:pt   When is the first available appt:02/15/2023  Symptoms:follow up from ER, pt states that the ER sent a message.   Best Call back number:[phone]    Additional Info:Please advise-Thank you~

## 2023-01-17 NOTE — TELEPHONE ENCOUNTER
Called and spoke with pt. Was able to get pt a appt for 1:00pm on 1/17. Pt verbalized understanding and states he will make appt.

## 2023-01-17 NOTE — TELEPHONE ENCOUNTER
----- Message from Christian Evangelista sent at 1/17/2023  8:03 AM CST -----  Type: Needs Medical Advice  Who Called: Pt   Symptoms (please be specific):   How long has patient had these symptoms:    Pharmacy name and phone #:    Best Call Back Number: [phone]  Additional Information: Pt requesting a call back concerning Dr Pina still being able to treat him.Pt states some medications he was taking was making him irritable Pt states he apologize.

## 2023-01-17 NOTE — PROGRESS NOTES
Subjective:       Patient ID: Rajendra Castle is a 77 y.o. male.    Chief Complaint: trouble sleeping     HPI   76 y/o male patient with medical problems listed below presents for insomnia. Patient states has had 2-3 hours of sleep at night for a few months. Has taken trazodone (tried 100 mg and 150 mg) and melatonin but with no relief. Patient was seen in ED yesterday for insomnia and states was recommended to take ambien which he requests today. States goes to bed around 11 pm and wakes up 2-3 hours later and can not go back to sleep. He also states get up several times per night to urinate. Patient is followed by urology Dr. Pina for prostate cancer, bph/luts. Patient was seen in an urgent care for dysuria on 1/3 and provided doxycycline for 4 weeks for prostatitis. He reports takes short naps for 2 hours during the day. Denies smoking, etoh or drug use.    Noted BP in clinic 182/80. States has been adherent with medication regimen. Patient states his bp elevated due to lack of sleep. He checks bp 2-3 times per week which he reports systolic blood pressure readings range b/w 170s. Denies headache, dizziness, chest pain, sob.     Labs reviewed 1/2023    Patient Active Problem List   Diagnosis    Heart murmur    Arthritis    Rotator cuff tendinitis    Dyspnea on exertion    DDD (degenerative disc disease), cervical    Anemia, iron deficiency    Chronic GI bleeding    Arthritis of wrist, right    Trigger thumb of left hand    Arthritis of right wrist    Essential hypertension    Rheumatoid arthritis involving multiple sites    Elevated troponin    Nausea and vomiting    Nontoxic multinodular goiter    Gastroesophageal reflux disease without esophagitis    Nodular thyroid disease    Prediabetes    Osteoporosis    Hypogonadism in male    Vitamin D insufficiency    Hyperparathyroidism    Pulmonary emphysema    Rheumatoid lung    Lung nodule    Stage 3 chronic kidney disease, unspecified whether stage 3a or 3b CKD     Elevated PSA    Prostate cancer    Chest pain    Dysphagia    Severe malnutrition    Tachycardia    S/P parathyroidectomy    Decreased appetite    Right carpal tunnel syndrome    Cubital tunnel syndrome on right    Encounter for long-term (current) use of medications    COVID    Atherosclerosis of native coronary artery of native heart with angina pectoris with documented spasm    Unstable angina    HTN (hypertension)    COVID-19 long hauler      Review of patient's allergies indicates:  No Known Allergies    Past Surgical History:   Procedure Laterality Date    CARPAL TUNNEL RELEASE Right 5/28/2021    Procedure: RELEASE, CARPAL TUNNEL;  Surgeon: Jose Ryan II, MD;  Location: [facility] OR;  Service: Orthopedics;  Laterality: Right;    COLONOSCOPY  prior to 2016    normal findings per patient report    CYSTOSCOPY N/A 12/18/2018    Procedure: CYSTOSCOPY;  Surgeon: Garrett Pina MD;  Location: [facility] OR;  Service: Urology;  Laterality: N/A;    CYSTOSCOPY N/A 7/12/2022    Procedure: CYSTOSCOPY;  Surgeon: Garrett Pina MD;  Location: [facility] OR;  Service: Urology;  Laterality: N/A;    ESOPHAGOGASTRODUODENOSCOPY N/A 9/29/2020    Dr. Espinosa; empiric dilation; erythematous mucosa in antrum; gastric mucosal atrophy; hematin in entire stomach; biopsy: mid & distal esophagus WNL, stomach- WNL, negative for h pylori    PARATHYROIDECTOMY Right 10/27/2020    Procedure: PARATHYROIDECTOMY;  Surgeon: Latonia Clayton MD;  Location: [address];  Service: ENT;  Laterality: Right;    RELEASE OF ULNAR NERVE AT CUBITAL TUNNEL Right 5/28/2021    Procedure: RELEASE, ULNAR TUNNEL;  Surgeon: Jose Ryan II, MD;  Location: [facility] OR;  Service: Orthopedics;  Laterality: Right;    TRANSRECTAL BIOPSY OF PROSTATE WITH ULTRASOUND GUIDANCE N/A 12/18/2018    Procedure: BIOPSY, PROSTATE, RECTAL APPROACH, WITH US GUIDANCE;  Surgeon: Garrett Pina MD;  Location: [facility] OR;  Service: Urology;  Laterality: N/A;    TRANSRECTAL  ULTRASOUND EXAMINATION N/A 7/12/2022    Procedure: ULTRASOUND, RECTAL APPROACH;  Surgeon: Garrett Pina MD;  Location: [facility] OR;  Service: Urology;  Laterality: N/A;        Current Outpatient Medications:     alfuzosin (UROXATRAL) 10 mg Tb24, Take 1 tablet (10 mg total) by mouth daily with breakfast., Disp: 30 tablet, Rfl: 11    amLODIPine (NORVASC) 5 MG tablet, Take 1 tablet (5 mg total) by mouth once daily., Disp: 30 tablet, Rfl: 2    atorvastatin (LIPITOR) 10 MG tablet, Take 10 mg by mouth nightly., Disp: , Rfl:     b complex vitamins capsule, Take 1 capsule by mouth once daily., Disp: , Rfl:     doxycycline (MONODOX) 100 MG capsule, Take 1 capsule (100 mg total) by mouth 2 (two) times daily., Disp: 56 capsule, Rfl: 0    folic acid (FOLVITE) 1 MG tablet, Take 1 tablet (1 mg total) by mouth once daily., Disp: 90 tablet, Rfl: 3    gabapentin (NEURONTIN) 300 MG capsule, Take 300 mg by mouth 3 (three) times daily., Disp: , Rfl:     methotrexate 2.5 MG Tab, Take 6 tablets (15 mg total) by mouth every 7 days. TAKE 6 TABLETS BY MOUTH EVERY WEEK, Disp: 72 tablet, Rfl: 2    pantoprazole (PROTONIX) 40 MG tablet, TAKE 1 TABLET(40 MG) BY MOUTH TWICE DAILY (Patient taking differently: Take 40 mg by mouth once daily.), Disp: 180 tablet, Rfl: 0    traMADoL (ULTRAM) 50 mg tablet, Take 2 tablets (100 mg total) by mouth 2 (two) times daily as needed for Pain., Disp: 120 tablet, Rfl: 5    traZODone (DESYREL) 150 MG tablet, Take 1 tablet (150 mg total) by mouth every evening., Disp: 30 tablet, Rfl: 5    zolpidem (AMBIEN) 5 MG Tab, Take by mouth as needed., Disp: , Rfl:     aspirin (ECOTRIN) 81 MG EC tablet, Take 1 tablet (81 mg total) by mouth once daily., Disp: 30 tablet, Rfl: 0    carvediloL (COREG) 12.5 MG tablet, Take 1 tablet (12.5 mg total) by mouth 2 (two) times daily with meals., Disp: 60 tablet, Rfl: 0    clopidogreL (PLAVIX) 75 mg tablet, Take 1 tablet (75 mg total) by mouth once daily., Disp: 30 tablet, Rfl: 0     "isosorbide mononitrate (IMDUR) 30 MG 24 hr tablet, Take 1 tablet (30 mg total) by mouth once daily., Disp: 30 tablet, Rfl: 0  No current facility-administered medications for this visit.    Facility-Administered Medications Ordered in Other Visits:     denosumab (PROLIA) injection 60 mg, 60 mg, Subcutaneous, 1 time in Clinic/HOD, Jean Carlos Hoffman MD    Review of Systems   Constitutional:  Negative for chills and fever.   Respiratory:  Negative for cough, chest tightness and shortness of breath.    Cardiovascular:  Negative for chest pain and palpitations.   Gastrointestinal:  Negative for abdominal pain.   Neurological:  Negative for dizziness and headaches.   Psychiatric/Behavioral:  Positive for sleep disturbance.        Objective:   BP (!) 182/80 (BP Location: Right arm, Patient Position: Sitting, BP Method: Medium (Manual))   Pulse 71   Temp 98.2 °F (36.8 °C) (Oral)   Ht 5' 8" (1.727 m)   Wt 65 kg (143 lb 4.8 oz)   SpO2 99%   BMI 21.79 kg/m²         Physical Exam  Vitals reviewed.   Constitutional:       General: He is not in acute distress.     Appearance: Normal appearance.   Cardiovascular:      Rate and Rhythm: Normal rate and regular rhythm.      Pulses: Normal pulses.      Heart sounds: Normal heart sounds.   Pulmonary:      Effort: Pulmonary effort is normal.      Breath sounds: Normal breath sounds.   Chest:      Chest wall: No deformity, swelling or edema.   Abdominal:      General: Abdomen is flat. Bowel sounds are normal.      Palpations: Abdomen is soft.   Musculoskeletal:      Cervical back: Normal range of motion.   Skin:     General: Skin is warm and dry.   Neurological:      General: No focal deficit present.      Mental Status: He is alert.   Psychiatric:         Mood and Affect: Mood normal.         Behavior: Behavior normal.       Assessment:       1. Insomnia, unspecified type    2. Essential hypertension    3. Prostate cancer    4. Benign prostatic hyperplasia, unspecified whether " lower urinary tract symptoms present        Plan:       1. Essential hypertension  - Initial BP elevated to 182/80 and manually repeated bp 50391  - patient is asymptomatic  - advised to monitor bp at home and to bring home bp log in 2 weeks to see nurse    2. Prostate cancer  - continue to f/u with uro    3. Benign prostatic hyperplasia, unspecified whether lower urinary tract symptoms present  - continue to f/u with uro    4. Insomnia, unspecified type  - patient reports trazodone and melatonin did help for sleep  - discussed regarding sleep hygiene  - sent rx request of Joyce to Dr. Lilly     Patient with be reevaluated in  as scheduled  or sooner domenic Mantilla NP

## 2023-01-18 RX ORDER — ZOLPIDEM TARTRATE 5 MG/1
5 TABLET ORAL NIGHTLY PRN
Qty: 90 TABLET | Refills: 1 | Status: SHIPPED | OUTPATIENT
Start: 2023-01-18 | End: 2023-02-06 | Stop reason: ALTCHOICE

## 2023-01-18 NOTE — TELEPHONE ENCOUNTER
Risks of Ambien discussed including grogginess, balance impairment, memory lapses, hallucinations, sleep walking, driving while asleep, dependence, and psychiatric complications. After a discussion of the risks and benefits, we feel that because the patient's insomnia failed to improve with good sleep hygiene, other first line treatments, poor quality of life without this medication, and disruption in their work/life balance that the benefits exceed the risks at this point in time. After understanding of the risks and shared decision making, the patient decided to pursue taking a sedative-hypnotic to treat their insomnia. The risks of having limited amount of sleep leading to impairment of driving is substantial and thus improving sleep time will likely prevent harm due to lack of sleep.  reviewed and refill deemed appropriate.

## 2023-01-18 NOTE — TELEPHONE ENCOUNTER
"Patient states history of Insomnia. Patient states he was seen in the [facility] ED and was advised that he could use Ambien to assist with Ambien. Patient states he went to see his PCP and requested a prescription for Ambien but was not provided. Patient called to request a prescription for Ambien.     Care Advice given to See PCP with 24 Hours. Patient states disappointment that prescription for Ambien was not called in earlier today during visit with Edvin Peñaloza NP. Patient request message be sent to the office of Dr. Sp Lilly regarding request for Ambien.     Reason for Disposition   Requesting medication for sleep ("sleeping pill")    Additional Information   Negative: Difficulty breathing   Negative: Depression is suspected   Negative: Traumatic Brain Injury (TBI) is suspected   Negative: Suspected alcohol withdrawal or unhealthy use of alcohol (drinking too much)   Negative: Suspected substance use (drug use), addiction, or withdrawal   Negative: [1] Pain is causing insomnia AND [2] pain is not a chronic symptom (recurrent or ongoing AND present > 4 weeks)    Protocols used: Insomnia-A-AH    "

## 2023-01-19 ENCOUNTER — TELEPHONE (OUTPATIENT)
Dept: UROLOGY | Facility: CLINIC | Age: 78
End: 2023-01-19
Payer: MEDICARE

## 2023-01-19 NOTE — TELEPHONE ENCOUNTER
----- Message from Ernestine Loza sent at 1/19/2023 10:09 AM CST -----  Regarding: appt  Contact: patient  Type: Needs Medical Advice  Who Called:  Patient  Symptoms (please be specific): treatment  How long has patient had these symptoms:    Pharmacy name and phone #:    Best Call Back Number: [phone] (home)     Additional Information:  I have Rajendra Castle MRN: [id number] needing to speak with you about treatment. He states there was a disagreement he sends his apologies and wants to start treatment.

## 2023-01-20 ENCOUNTER — HOSPITAL ENCOUNTER (OUTPATIENT)
Facility: HOSPITAL | Age: 78
Discharge: HOME | End: 2023-01-22
Attending: EMERGENCY MEDICINE | Admitting: HOSPITALIST
Payer: MEDICARE

## 2023-01-20 DIAGNOSIS — I10 HYPERTENSION, UNSPECIFIED TYPE: ICD-10-CM

## 2023-01-20 DIAGNOSIS — I44.1 MOBITZ I: Primary | ICD-10-CM

## 2023-01-20 DIAGNOSIS — R55 NEAR SYNCOPE: ICD-10-CM

## 2023-01-20 DIAGNOSIS — I44.2 ATRIOVENTRICULAR BLOCK, COMPLETE: ICD-10-CM

## 2023-01-20 DIAGNOSIS — R79.89 ELEVATED BRAIN NATRIURETIC PEPTIDE (BNP) LEVEL: ICD-10-CM

## 2023-01-20 DIAGNOSIS — R07.9 CHEST PAIN: ICD-10-CM

## 2023-01-20 LAB
ALBUMIN SERPL BCP-MCNC: 3.6 G/DL (ref 3.5–5.2)
ALP SERPL-CCNC: 61 U/L (ref 55–135)
ALT SERPL W/O P-5'-P-CCNC: 27 U/L (ref 10–44)
ANION GAP SERPL CALC-SCNC: 9 MMOL/L (ref 8–16)
AST SERPL-CCNC: 18 U/L (ref 10–40)
BASOPHILS # BLD AUTO: 0.01 K/UL (ref 0–0.2)
BASOPHILS NFR BLD: 0.1 % (ref 0–1.9)
BILIRUB SERPL-MCNC: 0.5 MG/DL (ref 0.1–1)
BNP SERPL-MCNC: 2135 PG/ML (ref 0–99)
BUN SERPL-MCNC: 14 MG/DL (ref 8–23)
CALCIUM SERPL-MCNC: 8.9 MG/DL (ref 8.7–10.5)
CHLORIDE SERPL-SCNC: 111 MMOL/L (ref 95–110)
CO2 SERPL-SCNC: 20 MMOL/L (ref 23–29)
CREAT SERPL-MCNC: 1.5 MG/DL (ref 0.5–1.4)
D DIMER PPP IA.FEU-MCNC: 0.23 MG/L FEU
DIFFERENTIAL METHOD: ABNORMAL
EOSINOPHIL # BLD AUTO: 0 K/UL (ref 0–0.5)
EOSINOPHIL NFR BLD: 0.6 % (ref 0–8)
ERYTHROCYTE [DISTWIDTH] IN BLOOD BY AUTOMATED COUNT: 16.4 % (ref 11.5–14.5)
EST. GFR  (NO RACE VARIABLE): 48 ML/MIN/1.73 M^2
GLUCOSE SERPL-MCNC: 131 MG/DL (ref 70–110)
HCT VFR BLD AUTO: 42.5 % (ref 40–54)
HGB BLD-MCNC: 13.7 G/DL (ref 14–18)
IMM GRANULOCYTES # BLD AUTO: 0.01 K/UL (ref 0–0.04)
IMM GRANULOCYTES NFR BLD AUTO: 0.1 % (ref 0–0.5)
LYMPHOCYTES # BLD AUTO: 1 K/UL (ref 1–4.8)
LYMPHOCYTES NFR BLD: 14.7 % (ref 18–48)
MCH RBC QN AUTO: 29.1 PG (ref 27–31)
MCHC RBC AUTO-ENTMCNC: 32.2 G/DL (ref 32–36)
MCV RBC AUTO: 90 FL (ref 82–98)
MONOCYTES # BLD AUTO: 0.8 K/UL (ref 0.3–1)
MONOCYTES NFR BLD: 12.2 % (ref 4–15)
NEUTROPHILS # BLD AUTO: 4.9 K/UL (ref 1.8–7.7)
NEUTROPHILS NFR BLD: 72.3 % (ref 38–73)
NRBC BLD-RTO: 0 /100 WBC
PLATELET # BLD AUTO: 251 K/UL (ref 150–450)
PMV BLD AUTO: 12.6 FL (ref 9.2–12.9)
POTASSIUM SERPL-SCNC: 3.9 MMOL/L (ref 3.5–5.1)
PROT SERPL-MCNC: 7.1 G/DL (ref 6–8.4)
RBC # BLD AUTO: 4.71 M/UL (ref 4.6–6.2)
SARS-COV-2 RDRP RESP QL NAA+PROBE: NEGATIVE
SODIUM SERPL-SCNC: 140 MMOL/L (ref 136–145)
TROPONIN I SERPL DL<=0.01 NG/ML-MCNC: 0.03 NG/ML (ref 0–0.03)
TROPONIN I SERPL DL<=0.01 NG/ML-MCNC: 0.04 NG/ML (ref 0–0.03)
WBC # BLD AUTO: 6.74 K/UL (ref 3.9–12.7)

## 2023-01-20 PROCEDURE — 93010 ELECTROCARDIOGRAM REPORT: CPT | Mod: ,,, | Performed by: SPECIALIST

## 2023-01-20 PROCEDURE — 93005 ELECTROCARDIOGRAM TRACING: CPT

## 2023-01-20 PROCEDURE — 85379 FIBRIN DEGRADATION QUANT: CPT | Performed by: EMERGENCY MEDICINE

## 2023-01-20 PROCEDURE — 84484 ASSAY OF TROPONIN QUANT: CPT | Mod: 91 | Performed by: HOSPITALIST

## 2023-01-20 PROCEDURE — 80053 COMPREHEN METABOLIC PANEL: CPT | Performed by: PHYSICIAN ASSISTANT

## 2023-01-20 PROCEDURE — 25000003 PHARM REV CODE 250: Performed by: HOSPITALIST

## 2023-01-20 PROCEDURE — G0378 HOSPITAL OBSERVATION PER HR: HCPCS

## 2023-01-20 PROCEDURE — 84484 ASSAY OF TROPONIN QUANT: CPT | Performed by: PHYSICIAN ASSISTANT

## 2023-01-20 PROCEDURE — 85025 COMPLETE CBC W/AUTO DIFF WBC: CPT | Performed by: PHYSICIAN ASSISTANT

## 2023-01-20 PROCEDURE — 83880 ASSAY OF NATRIURETIC PEPTIDE: CPT | Performed by: PHYSICIAN ASSISTANT

## 2023-01-20 PROCEDURE — 99285 EMERGENCY DEPT VISIT HI MDM: CPT | Mod: 25

## 2023-01-20 PROCEDURE — 93010 ELECTROCARDIOGRAM REPORT: CPT | Mod: 76,,, | Performed by: SPECIALIST

## 2023-01-20 PROCEDURE — 93010 EKG 12-LEAD: ICD-10-PCS | Mod: ,,, | Performed by: SPECIALIST

## 2023-01-20 PROCEDURE — 36415 COLL VENOUS BLD VENIPUNCTURE: CPT | Performed by: HOSPITALIST

## 2023-01-20 PROCEDURE — 36415 COLL VENOUS BLD VENIPUNCTURE: CPT | Performed by: PHYSICIAN ASSISTANT

## 2023-01-20 PROCEDURE — U0002 COVID-19 LAB TEST NON-CDC: HCPCS | Performed by: EMERGENCY MEDICINE

## 2023-01-20 RX ORDER — ASPIRIN 81 MG/1
81 TABLET ORAL DAILY
Status: DISCONTINUED | OUTPATIENT
Start: 2023-01-21 | End: 2023-01-22 | Stop reason: HOSPADM

## 2023-01-20 RX ORDER — PANTOPRAZOLE SODIUM 40 MG/1
40 TABLET, DELAYED RELEASE ORAL DAILY
Status: DISCONTINUED | OUTPATIENT
Start: 2023-01-21 | End: 2023-01-22 | Stop reason: HOSPADM

## 2023-01-20 RX ORDER — GABAPENTIN 300 MG/1
300 CAPSULE ORAL 3 TIMES DAILY
Status: DISCONTINUED | OUTPATIENT
Start: 2023-01-20 | End: 2023-01-22 | Stop reason: HOSPADM

## 2023-01-20 RX ORDER — TRAZODONE HYDROCHLORIDE 50 MG/1
150 TABLET ORAL NIGHTLY
Status: DISCONTINUED | OUTPATIENT
Start: 2023-01-20 | End: 2023-01-22 | Stop reason: HOSPADM

## 2023-01-20 RX ORDER — NALOXONE HCL 0.4 MG/ML
0.02 VIAL (ML) INJECTION
Status: DISCONTINUED | OUTPATIENT
Start: 2023-01-20 | End: 2023-01-22 | Stop reason: HOSPADM

## 2023-01-20 RX ORDER — SODIUM CHLORIDE 0.9 % (FLUSH) 0.9 %
10 SYRINGE (ML) INJECTION EVERY 12 HOURS PRN
Status: DISCONTINUED | OUTPATIENT
Start: 2023-01-20 | End: 2023-01-22 | Stop reason: HOSPADM

## 2023-01-20 RX ORDER — ONDANSETRON 8 MG/1
8 TABLET, ORALLY DISINTEGRATING ORAL EVERY 8 HOURS PRN
Status: DISCONTINUED | OUTPATIENT
Start: 2023-01-20 | End: 2023-01-22 | Stop reason: HOSPADM

## 2023-01-20 RX ORDER — ATORVASTATIN CALCIUM 10 MG/1
10 TABLET, FILM COATED ORAL NIGHTLY
Status: DISCONTINUED | OUTPATIENT
Start: 2023-01-20 | End: 2023-01-22 | Stop reason: HOSPADM

## 2023-01-20 RX ORDER — ISOSORBIDE MONONITRATE 30 MG/1
30 TABLET, EXTENDED RELEASE ORAL DAILY
Status: DISCONTINUED | OUTPATIENT
Start: 2023-01-21 | End: 2023-01-22 | Stop reason: HOSPADM

## 2023-01-20 RX ORDER — TAMSULOSIN HYDROCHLORIDE 0.4 MG/1
0.4 CAPSULE ORAL DAILY
Status: DISCONTINUED | OUTPATIENT
Start: 2023-01-21 | End: 2023-01-22 | Stop reason: HOSPADM

## 2023-01-20 RX ORDER — MAG HYDROX/ALUMINUM HYD/SIMETH 200-200-20
30 SUSPENSION, ORAL (FINAL DOSE FORM) ORAL 4 TIMES DAILY PRN
Status: DISCONTINUED | OUTPATIENT
Start: 2023-01-20 | End: 2023-01-22 | Stop reason: HOSPADM

## 2023-01-20 RX ORDER — CARVEDILOL 6.25 MG/1
12.5 TABLET ORAL 2 TIMES DAILY WITH MEALS
Status: DISCONTINUED | OUTPATIENT
Start: 2023-01-20 | End: 2023-01-22

## 2023-01-20 RX ORDER — AMLODIPINE BESYLATE 5 MG/1
5 TABLET ORAL DAILY
Status: DISCONTINUED | OUTPATIENT
Start: 2023-01-21 | End: 2023-01-22 | Stop reason: HOSPADM

## 2023-01-20 RX ORDER — OXYCODONE HYDROCHLORIDE 5 MG/1
5 TABLET ORAL EVERY 6 HOURS PRN
Status: DISCONTINUED | OUTPATIENT
Start: 2023-01-20 | End: 2023-01-22 | Stop reason: HOSPADM

## 2023-01-20 RX ORDER — ZOLPIDEM TARTRATE 5 MG/1
5 TABLET ORAL NIGHTLY PRN
Status: DISCONTINUED | OUTPATIENT
Start: 2023-01-20 | End: 2023-01-22 | Stop reason: HOSPADM

## 2023-01-20 RX ORDER — ACETAMINOPHEN 500 MG
1000 TABLET ORAL EVERY 6 HOURS PRN
Status: DISCONTINUED | OUTPATIENT
Start: 2023-01-20 | End: 2023-01-22 | Stop reason: HOSPADM

## 2023-01-20 RX ORDER — CLOPIDOGREL BISULFATE 75 MG/1
75 TABLET ORAL DAILY
Status: DISCONTINUED | OUTPATIENT
Start: 2023-01-21 | End: 2023-01-22 | Stop reason: HOSPADM

## 2023-01-20 RX ADMIN — TRAZODONE HYDROCHLORIDE 150 MG: 50 TABLET ORAL at 08:01

## 2023-01-20 RX ADMIN — GABAPENTIN 300 MG: 300 CAPSULE ORAL at 08:01

## 2023-01-20 RX ADMIN — CARVEDILOL 12.5 MG: 6.25 TABLET, FILM COATED ORAL at 08:01

## 2023-01-20 RX ADMIN — ACETAMINOPHEN 1000 MG: 500 TABLET ORAL at 08:01

## 2023-01-20 RX ADMIN — ATORVASTATIN CALCIUM 10 MG: 10 TABLET, FILM COATED ORAL at 08:01

## 2023-01-20 NOTE — ED PROVIDER NOTES
"Encounter Date: 1/20/2023    SCRIBE #1 NOTE: I, Caridadvikram Godoy, am scribing for, and in the presence of,  Mike Carias MD.     History     Chief Complaint   Patient presents with    Weakness     Weakness and pain in his legs near his knees      Time seen by provider: 3:11 PM on 01/20/2023    Rajendra Castle is a 77 y.o. male with a PMHx of arthritis, heart murmur, HTN, DDD, RA, nontoxic multinodular goiter, and prostate CA who presents to the ED for evaluation of generalized weakness with several episodes of near-syncope today.  Complaining of bilateral knee pain.  He states that he "felt stronger yesterday."  Patient confirms a decreased appetite and SOB which is abnormal for him.  Patient mentions accompanying CP that is worse with inhalation.  He has no chest heaviness or pressure.  There's no N/V/D reported at this time, though he references chronic insomnia and episodic HTN over the recent days.  The patient denies any other symptoms at this time.  He is a former smoker who quit 30+ years ago.  PSHx includes parathyroidectomy and transrectal biopsy of prostate with US guidance.      The history is provided by the patient.   Review of patient's allergies indicates:  No Known Allergies  Past Medical History:   Diagnosis Date    Arthritis     DDD (degenerative disc disease), cervical     Degenerative disc disease     Heart murmur     Hypertension     Nontoxic multinodular goiter 9/20/2016    Prostate CA     RA (rheumatoid arthritis)     Vitamin D insufficiency 9/30/2016     Past Surgical History:   Procedure Laterality Date    CARPAL TUNNEL RELEASE Right 5/28/2021    Procedure: RELEASE, CARPAL TUNNEL;  Surgeon: Jose Ryan II, MD;  Location: [facility] OR;  Service: Orthopedics;  Laterality: Right;    COLONOSCOPY  prior to 2016    normal findings per patient report    CYSTOSCOPY N/A 12/18/2018    Procedure: CYSTOSCOPY;  Surgeon: Garrett Pina MD;  Location: [facility] OR;  Service: Urology;  Laterality: N/A;    " CYSTOSCOPY N/A 2022    Procedure: CYSTOSCOPY;  Surgeon: Garrett Pina MD;  Location: [facility] OR;  Service: Urology;  Laterality: N/A;    ESOPHAGOGASTRODUODENOSCOPY N/A 2020    Dr. Espinosa; empiric dilation; erythematous mucosa in antrum; gastric mucosal atrophy; hematin in entire stomach; biopsy: mid & distal esophagus WNL, stomach- WNL, negative for h pylori    PARATHYROIDECTOMY Right 10/27/2020    Procedure: PARATHYROIDECTOMY;  Surgeon: Latonia Clayton MD;  Location: [facility] OR [facility] FLR;  Service: ENT;  Laterality: Right;    RELEASE OF ULNAR NERVE AT CUBITAL TUNNEL Right 2021    Procedure: RELEASE, ULNAR TUNNEL;  Surgeon: Jose Ryan II, MD;  Location: [facility] OR;  Service: Orthopedics;  Laterality: Right;    TRANSRECTAL BIOPSY OF PROSTATE WITH ULTRASOUND GUIDANCE N/A 2018    Procedure: BIOPSY, PROSTATE, RECTAL APPROACH, WITH US GUIDANCE;  Surgeon: Garrett Pina MD;  Location: [facility] OR;  Service: Urology;  Laterality: N/A;    TRANSRECTAL ULTRASOUND EXAMINATION N/A 2022    Procedure: ULTRASOUND, RECTAL APPROACH;  Surgeon: Garrett Pina MD;  Location: [facility] OR;  Service: Urology;  Laterality: N/A;     Family History   Problem Relation Age of Onset    Heart disease Mother     Stroke Father     Drug abuse Sister     No Known Problems Daughter     No Known Problems Daughter     No Known Problems Son     Diabetes Maternal Uncle     Colon cancer Neg Hx     Crohn's disease Neg Hx     Esophageal cancer Neg Hx     Stomach cancer Neg Hx     Ulcerative colitis Neg Hx      Social History     Tobacco Use    Smoking status: Former     Packs/day: 0.25     Years: 10.00     Pack years: 2.50     Types: Cigarettes     Quit date: 2001     Years since quittin.4     Passive exposure: Past    Smokeless tobacco: Never   Substance Use Topics    Alcohol use: Yes     Comment: seldom    Drug use: Yes     Frequency: 8.0 times per week     Types: Marijuana     Review of Systems    Constitutional:  Positive for appetite change (decreased).   Respiratory:  Positive for shortness of breath.    Cardiovascular:  Positive for chest pain.   Gastrointestinal:  Negative for diarrhea, nausea and vomiting.   Musculoskeletal:  Positive for arthralgias (bilateral knees).   Neurological:  Positive for dizziness and weakness. Negative for syncope.        Positive for near falls with near syncope.    Psychiatric/Behavioral:  Positive for sleep disturbance (chronic insomnia).    All other systems reviewed and are negative.    Physical Exam     Initial Vitals [01/20/23 1420]   BP Pulse Resp Temp SpO2   (!) 169/80 86 20 98 °F (36.7 °C) 96 %      MAP       --         Physical Exam    Nursing note and vitals reviewed.  Constitutional: He appears well-developed and well-nourished. He is not diaphoretic.  Non-toxic appearance. He does not have a sickly appearance. He appears ill (chronic). No distress.   Thin appearance.     HENT:   Head: Normocephalic and atraumatic.   Eyes: EOM are normal.   Neck: Neck supple.   Normal range of motion.  Cardiovascular:  Normal rate, regular rhythm and normal heart sounds.     Exam reveals no gallop and no friction rub.       No murmur heard.  Pulmonary/Chest: Breath sounds normal. No respiratory distress. He has no decreased breath sounds. He has no wheezes. He has no rhonchi. He has no rales.   Musculoskeletal:         General: Normal range of motion.      Cervical back: Normal range of motion and neck supple. No rigidity. Normal range of motion.     Neurological: He is alert and oriented to person, place, and time. He has normal strength. No sensory deficit.   No focal weaknesses or deficits.     Skin: Skin is warm and dry. No rash noted.   Psychiatric: He has a normal mood and affect. His behavior is normal. Judgment and thought content normal.       ED Course   Procedures  Labs Reviewed   CBC W/ AUTO DIFFERENTIAL - Abnormal; Notable for the following components:        Result Value    Hemoglobin 13.7 (*)     RDW 16.4 (*)     Lymph % 14.7 (*)     All other components within normal limits   COMPREHENSIVE METABOLIC PANEL - Abnormal; Notable for the following components:    Chloride 111 (*)     CO2 20 (*)     Glucose 131 (*)     Creatinine 1.5 (*)     eGFR 48 (*)     All other components within normal limits   TROPONIN I - Abnormal; Notable for the following components:    Troponin I 0.042 (*)     All other components within normal limits   B-TYPE NATRIURETIC PEPTIDE - Abnormal; Notable for the following components:    BNP 2,135 (*)     All other components within normal limits   D DIMER, QUANTITATIVE   TROPONIN I   SARS-COV-2 RNA AMPLIFICATION, QUAL          Imaging Results              X-Ray Chest AP Portable (Final result)  Result time 01/20/23 14:50:56      Final result by Darlene Beard MD (01/20/23 14:50:56)                   Impression:      No acute cardiopulmonary disease or significant change compared to the prior exam.      Electronically signed by: Darlene Beard MD  Date:    01/20/2023  Time:    14:50               Narrative:    EXAMINATION:  XR CHEST AP PORTABLE    CLINICAL HISTORY:  Chest Pain;    TECHNIQUE:  Single frontal view of the chest was performed.    COMPARISON:  06/30/2022    FINDINGS:  Stable cardiomediastinal silhouette.  Minimal atelectatic stranding in the right lung base and slight tenting suggesting along the left heart margin.  No new or confluent infiltrate.  No pleural effusion.                                       Medications - No data to display  Medical Decision Making:   History:   Old Medical Records: I decided to obtain old medical records.  Independently Interpreted Test(s):   I have ordered and independently interpreted EKG Reading(s) - see prior notes  Clinical Tests:   Lab Tests: Ordered and Reviewed  Radiological Study: Ordered and Reviewed  Medical Tests: Ordered and Reviewed  Other:   I have discussed this case with another health  care provider.       <> Summary of the Discussion: Dr jim burgess agrees with admit, tele, repeat echo        Scribe Attestation:   Scribe #1: I performed the above scribed service and the documentation accurately describes the services I performed. I attest to the accuracy of the note.      ED Course as of 01/20/23 1619   Fri Jan 20, 2023   1506 BP(!): 169/80 [EF]   1506 Temp: 98 °F (36.7 °C) [EF]   1506 Temp src: Oral [EF]   1506 Resp: 20 [EF]   1506 Pulse: 86 [EF]   1506 SpO2: 96 % [EF]   1506 X-Ray Chest AP Portable [EF]   1507 Possibly sinus rhythm around 70 beats per minute normal axis no ST elevation.  Biphasic T-waves inferiorly deep T-wave inversions laterally are unchanged from prior EKGs [EF]   1515 X-Ray Chest AP Portable [EF]   1527 WBC: 6.74 [EF]   1527 Hemoglobin(!): 13.7 [EF]   1527 Platelets: 251 [EF]   1547 Troponin I(!): 0.042 [EF]   1547 BNP(!): 2,135 [EF]   1547 Creatinine(!): 1.5 [EF]   1547 X-Ray Chest AP Portable [EF]   1548 D-Dimer: 0.23 [EF]   1608 Dr jim burgess reviewed ekg thinks ifeanyi ADAMS, agrees with admit, repeat echo, tele [EF]   1614 Palak with HM to admit [EF]      ED Course User Index  [EF] Mike Carias MD               I, Dr. Carias, personally performed the services described in this documentation. All medical record entries made by the scribe were at my direction and in my presence.  I have reviewed the chart and agree that the record reflects my personal performance and is accurate and complete.4:53 PM 01/20/2023      Clinical Impression:   Final diagnoses:  [R07.9] Chest pain  [R55] Near syncope        ED Disposition Condition    Observation Stable                      Mike Carias MD  01/20/23 7341

## 2023-01-20 NOTE — FIRST PROVIDER EVALUATION
Emergency Department TeleTriage Encounter Note      CHIEF COMPLAINT    Chief Complaint   Patient presents with    Weakness     Weakness and pain in his legs near his knees        VITAL SIGNS   Initial Vitals [01/20/23 1420]   BP Pulse Resp Temp SpO2   (!) 169/80 86 20 98 °F (36.7 °C) 96 %      MAP       --            ALLERGIES    Review of patient's allergies indicates:  No Known Allergies    PROVIDER TRIAGE NOTE  Patient presents with generalized weakness and feel like he is going to pass out. Also complains of intermittent chest pain and shortness of breath. He has been unable to sleep and is also having pain in the knees.       ORDERS  Labs Reviewed - No data to display    ED Orders (720h ago, onward)      None              Virtual Visit Note: The provider triage portion of this emergency department evaluation and documentation was performed via [employer], a HIPAA-compliant telemedicine application, in concert with a tele-presenter in the room. A face to face patient evaluation with one of my colleagues will occur once the patient is placed in an emergency department room.      DISCLAIMER: This note was prepared with [employer] voice recognition transcription software. Garbled syntax, mangled pronouns, and other bizarre constructions may be attributed to that software system.

## 2023-01-21 PROBLEM — I44.1 MOBITZ I: Status: ACTIVE | Noted: 2023-01-21

## 2023-01-21 PROBLEM — I25.10 ATHEROSCLEROSIS OF NATIVE CORONARY ARTERY OF NATIVE HEART WITHOUT ANGINA PECTORIS: Status: ACTIVE | Noted: 2022-04-25

## 2023-01-21 LAB
TROPONIN I SERPL DL<=0.01 NG/ML-MCNC: 0.04 NG/ML (ref 0–0.03)
TROPONIN I SERPL DL<=0.01 NG/ML-MCNC: 0.04 NG/ML (ref 0–0.03)

## 2023-01-21 PROCEDURE — 99223 1ST HOSP IP/OBS HIGH 75: CPT | Mod: ,,, | Performed by: INTERNAL MEDICINE

## 2023-01-21 PROCEDURE — 36415 COLL VENOUS BLD VENIPUNCTURE: CPT | Performed by: HOSPITALIST

## 2023-01-21 PROCEDURE — 25000003 PHARM REV CODE 250: Performed by: HOSPITALIST

## 2023-01-21 PROCEDURE — 27000190 HC CPAP FULL FACE MASK W/VALVE

## 2023-01-21 PROCEDURE — 84484 ASSAY OF TROPONIN QUANT: CPT | Performed by: HOSPITALIST

## 2023-01-21 PROCEDURE — 94660 CPAP INITIATION&MGMT: CPT

## 2023-01-21 PROCEDURE — 93010 EKG 12-LEAD: ICD-10-PCS | Mod: ,,, | Performed by: SPECIALIST

## 2023-01-21 PROCEDURE — 93005 ELECTROCARDIOGRAM TRACING: CPT

## 2023-01-21 PROCEDURE — 93010 ELECTROCARDIOGRAM REPORT: CPT | Mod: ,,, | Performed by: SPECIALIST

## 2023-01-21 PROCEDURE — 99900035 HC TECH TIME PER 15 MIN (STAT)

## 2023-01-21 PROCEDURE — 99223 PR INITIAL HOSPITAL CARE,LEVL III: ICD-10-PCS | Mod: ,,, | Performed by: INTERNAL MEDICINE

## 2023-01-21 PROCEDURE — G0378 HOSPITAL OBSERVATION PER HR: HCPCS

## 2023-01-21 RX ORDER — NAPROXEN 250 MG/1
500 TABLET ORAL 2 TIMES DAILY PRN
Status: DISCONTINUED | OUTPATIENT
Start: 2023-01-21 | End: 2023-01-22 | Stop reason: HOSPADM

## 2023-01-21 RX ADMIN — ISOSORBIDE MONONITRATE 30 MG: 30 TABLET, EXTENDED RELEASE ORAL at 08:01

## 2023-01-21 RX ADMIN — CARVEDILOL 12.5 MG: 6.25 TABLET, FILM COATED ORAL at 04:01

## 2023-01-21 RX ADMIN — GABAPENTIN 300 MG: 300 CAPSULE ORAL at 08:01

## 2023-01-21 RX ADMIN — OXYCODONE 5 MG: 5 TABLET ORAL at 08:01

## 2023-01-21 RX ADMIN — ZOLPIDEM TARTRATE 5 MG: 5 TABLET ORAL at 09:01

## 2023-01-21 RX ADMIN — NAPROXEN 500 MG: 250 TABLET ORAL at 02:01

## 2023-01-21 RX ADMIN — CARVEDILOL 12.5 MG: 6.25 TABLET, FILM COATED ORAL at 08:01

## 2023-01-21 RX ADMIN — ATORVASTATIN CALCIUM 10 MG: 10 TABLET, FILM COATED ORAL at 09:01

## 2023-01-21 RX ADMIN — PANTOPRAZOLE SODIUM 40 MG: 40 TABLET, DELAYED RELEASE ORAL at 08:01

## 2023-01-21 RX ADMIN — AMLODIPINE BESYLATE 5 MG: 5 TABLET ORAL at 08:01

## 2023-01-21 RX ADMIN — ASPIRIN 81 MG: 81 TABLET, COATED ORAL at 08:01

## 2023-01-21 RX ADMIN — TRAZODONE HYDROCHLORIDE 150 MG: 50 TABLET ORAL at 09:01

## 2023-01-21 RX ADMIN — GABAPENTIN 300 MG: 300 CAPSULE ORAL at 09:01

## 2023-01-21 RX ADMIN — GABAPENTIN 300 MG: 300 CAPSULE ORAL at 02:01

## 2023-01-21 RX ADMIN — CLOPIDOGREL BISULFATE 75 MG: 75 TABLET ORAL at 08:01

## 2023-01-21 RX ADMIN — TAMSULOSIN HYDROCHLORIDE 0.4 MG: 0.4 CAPSULE ORAL at 08:01

## 2023-01-21 NOTE — PLAN OF CARE
Problem: Adult Inpatient Plan of Care  Goal: Patient-Specific Goal (Individualized)  Outcome: Ongoing, Progressing  Goal: Optimal Comfort and Wellbeing  1/21/2023 0459 by Amina Hoffman RN  Outcome: Ongoing, Progressing  1/21/2023 0459 by Amina Hoffman RN  Outcome: Ongoing, Progressing     Problem: Chest Pain  Goal: Resolution of Chest Pain Symptoms  1/21/2023 0459 by Amina Hoffman RN  Outcome: Ongoing, Progressing  1/21/2023 0459 by Amina Hoffman RN  Outcome: Ongoing, Progressing

## 2023-01-21 NOTE — PLAN OF CARE
[facility]  Initial Discharge Assessment       Primary Care Provider: Sp Lilly MD    Admission Diagnosis: Chest pain [R07.9]  Near syncope [R55]    Admission Date: 1/20/2023  Expected Discharge Date:     Discharge Barriers Identified: None    Payor: [employer]ICARE / Plan: [employer] 65 / Product Type: Medicare Advantage /     Extended Emergency Contact Information  Primary Emergency Contact: Martel,Collette  Address: [facility]           [place] [address] [facility]  Home Phone: [phone]  Mobile Phone: [phone]  Relation: Relative  Preferred language: English   needed? No  Secondary Emergency Contact: Jane Castle  Mobile Phone: [phone]  Relation: Grandchild  Preferred language: English   needed? No    Discharge Plan A: Home Health  Discharge Plan B: Home      [id number] - [place], [place] - [address] & [facility]  [address]  [facility] [address]  Phone: [phone] Fax: [phone]      Completed DC assessment with pt at bedside. Confirmed information on facesheet as correct. Denies POA. NOK 2 children. Unable to give me contact information for either because his phone is dead. Did tell me that they both live out of state. Pt lives at listed address alone. Has limited help from niece and great nephews. PCP is Dr. Lilly. [facility] is [facility]. Denies HH/HD/DME. Reports being compliant with home medications. Reports being independent with activities at baseline and drives himself to apts. States that he is not sure about transportation home upon DC- niece may provide. Verified insurance on file. Denies recent inpt stay in last 30 days. DC plan is home.     Initial Assessment (most recent)       Adult Discharge Assessment - 01/21/23 0149          Discharge Assessment    Assessment Type Discharge Planning Assessment     Confirmed/corrected  address, phone number and insurance Yes     Confirmed Demographics Correct on Facesheet     Source of Information patient     Does patient/caregiver understand observation status Yes     Communicated DEVONTE with patient/caregiver Yes     Reason For Admission elevated troponin     People in Home alone     Facility Arrived From: home     Do you expect to return to your current living situation? Yes     Do you have help at home or someone to help you manage your care at home? No     Prior to hospitilization cognitive status: Alert/Oriented     Current cognitive status: Alert/Oriented     Equipment Currently Used at Home none     Readmission within 30 days? No     Patient currently being followed by outpatient case management? No     Do you currently have service(s) that help you manage your care at home? No     Do you take prescription medications? Yes     Do you have prescription coverage? Yes     Do you have any problems affording any of your prescribed medications? No     Is the patient taking medications as prescribed? yes     Who is going to help you get home at discharge? unsure     How do you get to doctors appointments? car, drives self     Are you on dialysis? No     Do you take coumadin? No     Discharge Plan A Home Health     Discharge Plan B Home     DME Needed Upon Discharge  none     Discharge Plan discussed with: Patient     Discharge Barriers Identified None

## 2023-01-21 NOTE — HPI
Mr. Castle presented to the ED complaining of feeling lightheaded.  Began earlier in the day and occurred intermittently.  The lightheadedness was not related to level of activity or change in position.  Associated symptoms included poor appetite and trouble breathing.  In addition, he had pain in the precordial region of his chest, worse with deep inspiration.  He had not felt these symptoms ever in the past.  No fevers, chills, nausea, vomiting, abdominal pain, or loss of consciousness.  He has had insomnia for the past six months due to restlessness, for which he sometimes has taken Ambien and trazodone.  Currently he complains of a headache.  He has HTN, rheumatoid arthritis, thyroid goiter, and prostate cancer.  Earlier this month, he was diagnosed with acute prostatitis and prescribed doxycycline, of which he is on roughly week 3 of 4.  He has coronary artery disease and underwent PCI in 2022.

## 2023-01-21 NOTE — ASSESSMENT & PLAN NOTE
Spoke to Murphy lazar from Dr. Celio Weston office, cardiologist, requesting the last OV note and EKG.   DOS: 3/15/2017 Underwent PCI in 2022.  Will continue the aspirin and statin.  Troponin mildly elevated; will defer to cardiology regarding the need for ischemic evaluation.

## 2023-01-21 NOTE — ED NOTES
Report called to Moragn BOYD on 2nd floor. Pt transporting to [facility] via  on tele [id number].

## 2023-01-21 NOTE — H&P
[facility] Medicine  History & Physical    Patient Name: Rajendra Castle  MRN: [id number]  Patient Class: OP- Observation  Admission Date: 1/20/2023  Attending Physician: Lobito Andrea MD   Primary Care Provider: Sp Lilly MD         Patient information was obtained from patient, past medical records and ER records.     Subjective:     Principal Problem:Elevated troponin    Chief Complaint:   Chief Complaint   Patient presents with    Weakness     Weakness and pain in his legs near his knees         HPI: Mr. Castle presented to the ED complaining of feeling lightheaded.  Began earlier in the day and occurred intermittently.  The lightheadedness was not related to level of activity or change in position.  Associated symptoms included poor appetite and trouble breathing.  In addition, he had pain in the precordial region of his chest, worse with deep inspiration.  He had not felt these symptoms ever in the past.  No fevers, chills, nausea, vomiting, abdominal pain, or loss of consciousness.  He has had insomnia for the past six months due to restlessness, for which he sometimes has taken Ambien and trazodone.  Currently he complains of a headache.  He has HTN, rheumatoid arthritis, thyroid goiter, and prostate cancer.  Earlier this month, he was diagnosed with acute prostatitis and prescribed doxycycline, of which he is on roughly week 3 of 4.  He has coronary artery disease and underwent PCI in 2022.      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    Prostate CA     RA (rheumatoid arthritis)     Vitamin D insufficiency 9/30/2016       Past Surgical History:   Procedure Laterality Date    CARPAL TUNNEL RELEASE Right 5/28/2021    Procedure: RELEASE, CARPAL TUNNEL;  Surgeon: Jose Ryan II, MD;  Location:  [facility] OR;  Service: Orthopedics;  Laterality: Right;    COLONOSCOPY  prior to 2016    normal findings per patient report    CYSTOSCOPY N/A 12/18/2018    Procedure: CYSTOSCOPY;  Surgeon: Garrett Pina MD;  Location: [facility] OR;  Service: Urology;  Laterality: N/A;    CYSTOSCOPY N/A 7/12/2022    Procedure: CYSTOSCOPY;  Surgeon: Garrett Pina MD;  Location: [facility] OR;  Service: Urology;  Laterality: N/A;    ESOPHAGOGASTRODUODENOSCOPY N/A 9/29/2020    Dr. Espinosa; empiric dilation; erythematous mucosa in antrum; gastric mucosal atrophy; hematin in entire stomach; biopsy: mid & distal esophagus WNL, stomach- WNL, negative for h pylori    PARATHYROIDECTOMY Right 10/27/2020    Procedure: PARATHYROIDECTOMY;  Surgeon: Latonia Clayton MD;  Location: [facility] OR [address] [place] [address];  Service: ENT;  Laterality: Right;    RELEASE OF ULNAR NERVE AT CUBITAL TUNNEL Right 5/28/2021    Procedure: RELEASE, ULNAR TUNNEL;  Surgeon: Jose Ryan II, MD;  Location: [facility] OR;  Service: Orthopedics;  Laterality: Right;    TRANSRECTAL BIOPSY OF PROSTATE WITH ULTRASOUND GUIDANCE N/A 12/18/2018    Procedure: BIOPSY, PROSTATE, RECTAL APPROACH, WITH US GUIDANCE;  Surgeon: Garrett Pina MD;  Location: [facility] OR;  Service: Urology;  Laterality: N/A;    TRANSRECTAL ULTRASOUND EXAMINATION N/A 7/12/2022    Procedure: ULTRASOUND, RECTAL APPROACH;  Surgeon: Garrett Pina MD;  Location: [facility];  Service: Urology;  Laterality: N/A;       Review of patient's allergies indicates:  No Known Allergies    Current Facility-Administered Medications on File Prior to Encounter   Medication    denosumab (PROLIA) injection 60 mg     Current Outpatient Medications on File Prior to Encounter   Medication Sig    alfuzosin (UROXATRAL) 10 mg Tb24 Take 1 tablet (10 mg total) by mouth daily with breakfast.    amLODIPine (NORVASC) 5 MG tablet Take 1 tablet (5 mg total) by mouth once daily.    atorvastatin (LIPITOR) 10 MG tablet Take 10 mg by mouth  nightly.    b complex vitamins capsule Take 1 capsule by mouth once daily.    doxycycline (MONODOX) 100 MG capsule Take 1 capsule (100 mg total) by mouth 2 (two) times daily.    folic acid (FOLVITE) 1 MG tablet Take 1 tablet (1 mg total) by mouth once daily.    gabapentin (NEURONTIN) 300 MG capsule Take 300 mg by mouth 3 (three) times daily.    methotrexate 2.5 MG Tab Take 6 tablets (15 mg total) by mouth every 7 days. TAKE 6 TABLETS BY MOUTH EVERY WEEK    pantoprazole (PROTONIX) 40 MG tablet TAKE 1 TABLET(40 MG) BY MOUTH TWICE DAILY (Patient taking differently: Take 40 mg by mouth once daily.)    traMADoL (ULTRAM) 50 mg tablet Take 2 tablets (100 mg total) by mouth 2 (two) times daily as needed for Pain.    traZODone (DESYREL) 150 MG tablet Take 1 tablet (150 mg total) by mouth every evening.    zolpidem (AMBIEN) 5 MG Tab Take 1 tablet (5 mg total) by mouth nightly as needed (insomnia).    aspirin (ECOTRIN) 81 MG EC tablet Take 1 tablet (81 mg total) by mouth once daily.    carvediloL (COREG) 12.5 MG tablet Take 1 tablet (12.5 mg total) by mouth 2 (two) times daily with meals.    clopidogreL (PLAVIX) 75 mg tablet Take 1 tablet (75 mg total) by mouth once daily.    isosorbide mononitrate (IMDUR) 30 MG 24 hr tablet Take 1 tablet (30 mg total) by mouth once daily.    [DISCONTINUED] cholecalciferol, vitamin D3, (VITAMIN D3) 100 mcg (4,000 unit) Cap Take 400 Units by mouth once daily.    [DISCONTINUED] cloNIDine (CATAPRES) 0.1 MG tablet Take 1 tablet (0.1 mg total) by mouth 2 (two) times daily as needed (only if blood pressure top number is over 200). (Patient not taking: Reported on 6/7/2022)    [DISCONTINUED] meclizine (ANTIVERT) 12.5 mg tablet Take 1 tablet (12.5 mg total) by mouth 2 (two) times daily as needed for Dizziness.    [DISCONTINUED] sildenafiL (VIAGRA) 100 MG tablet Take 1 tablet (100 mg total) by mouth daily as needed for Erectile Dysfunction.     Family History       Problem Relation  (Age of Onset)    Diabetes Maternal Uncle    Drug abuse Sister    Heart disease Mother    No Known Problems Daughter, Daughter, Son    Stroke Father          Tobacco Use    Smoking status: Former     Packs/day: 0.25     Years: 10.00     Pack years: 2.50     Types: Cigarettes     Quit date: 2001     Years since quittin.4     Passive exposure: Past    Smokeless tobacco: Never   Substance and Sexual Activity    Alcohol use: Yes     Comment: seldom    Drug use: Yes     Frequency: 8.0 times per week     Types: Marijuana    Sexual activity: Yes     Partners: Female     Review of Systems   Constitutional:  Negative for chills, fatigue and fever.   HENT:  Negative for congestion and sinus pressure.    Eyes:  Negative for pain and visual disturbance.   Respiratory:  Positive for shortness of breath. Negative for cough and wheezing.    Cardiovascular:  Positive for chest pain. Negative for palpitations and leg swelling.   Gastrointestinal:  Negative for abdominal pain, diarrhea, nausea and vomiting.   Genitourinary:  Negative for dysuria and hematuria.   Musculoskeletal:  Negative for arthralgias and myalgias.   Skin:  Negative for rash and wound.   Neurological:  Positive for dizziness and light-headedness. Negative for weakness and headaches.   Hematological:  Negative for adenopathy. Does not bruise/bleed easily.   Psychiatric/Behavioral:  Negative for confusion and dysphoric mood. The patient is not nervous/anxious.    Objective:     Vital Signs (Most Recent):  Temp: 97.5 °F (36.4 °C) (23 1147)  Pulse: 70 (23 1147)  Resp: 16 (23 1147)  BP: (!) 162/80 (23 1147)  SpO2: 97 % (23 1147)   Vital Signs (24h Range):  Temp:  [96.7 °F (35.9 °C)-97.9 °F (36.6 °C)] 97.5 °F (36.4 °C)  Pulse:  [67-79] 70  Resp:  [16-19] 16  SpO2:  [94 %-99 %] 97 %  BP: (160-197)/(80-92) 162/80     Weight: 65.8 kg (145 lb)  Body mass index is 22.05 kg/m².    Physical Exam  Constitutional:       General: He is  not in acute distress.     Appearance: He is not ill-appearing.   HENT:      Head: Normocephalic and atraumatic.      Right Ear: External ear normal.      Left Ear: External ear normal.      Mouth/Throat:      Pharynx: No oropharyngeal exudate.   Eyes:      General: No scleral icterus.        Right eye: No discharge.         Left eye: No discharge.      Conjunctiva/sclera: Conjunctivae normal.   Neck:      Thyroid: No thyromegaly.      Vascular: No JVD.   Cardiovascular:      Rate and Rhythm: Normal rate and regular rhythm.      Heart sounds:     No gallop.   Pulmonary:      Effort: Pulmonary effort is normal.      Breath sounds: Normal breath sounds. No wheezing.   Abdominal:      General: Bowel sounds are normal. There is no distension.      Palpations: Abdomen is soft. There is no mass.      Tenderness: There is no abdominal tenderness.   Musculoskeletal:         General: No deformity.      Cervical back: Normal range of motion and neck supple.   Lymphadenopathy:      Cervical: No cervical adenopathy.   Skin:     General: Skin is warm and dry.      Capillary Refill: Capillary refill takes less than 2 seconds.      Findings: No rash.   Neurological:      Mental Status: He is alert and oriented to person, place, and time.   Psychiatric:         Behavior: Behavior normal.           Significant Labs: BMP:   Recent Labs   Lab 01/20/23  1507   *      K 3.9   *   CO2 20*   BUN 14   CREATININE 1.5*   CALCIUM 8.9     CBC:   Recent Labs   Lab 01/20/23  1507   WBC 6.74   HGB 13.7*   HCT 42.5        Troponin:   Recent Labs   Lab 01/20/23  1745 01/20/23  2331 01/21/23  0511   TROPONINI 0.034* 0.039* 0.038*     BNP  Recent Labs   Lab 01/20/23  1507   BNP 2,135*         Significant Imaging:   Results for orders placed during the hospital encounter of 01/20/23    X-Ray Chest AP Portable    Narrative  EXAMINATION:  XR CHEST AP PORTABLE    CLINICAL HISTORY:  Chest Pain;    TECHNIQUE:  Single frontal view  of the chest was performed.    COMPARISON:  06/30/2022    FINDINGS:  Stable cardiomediastinal silhouette.  Minimal atelectatic stranding in the right lung base and slight tenting suggesting along the left heart margin.  No new or confluent infiltrate.  No pleural effusion.    Impression  No acute cardiopulmonary disease or significant change compared to the prior exam.      Electronically signed by: Darlene Beard MD  Date:    01/20/2023  Time:    14:50      I independently interpreted the EKG done Jan 20 at 1452:  Normal sinus rhythm.  There are appears to be AV block.  MO intervals progressively lengthen and then a P-wave fails to be transmitted.  Appears to be Mobitz I.  There are deep T-wave inversions in inferior leads, as well as V4, V5, V6.    Assessment/Plan:     * Elevated troponin  Mildly elevated but flat.  Will consult with cardiologist for advice.      Mobitz I  I question whether or not it's the cause of the near-syncope.  Will place on cardiac monitoring.  Consult with cardiologist regarding management.      Atherosclerosis of native coronary artery of native heart without angina pectoris  Underwent PCI in 2022.  Will continue the aspirin and statin.  Troponin mildly elevated; will defer to cardiology regarding the need for ischemic evaluation.    Prostate cancer  No acute issues.  Will continue his tamsulosin 0.4 daily.      Stage 3 chronic kidney disease, unspecified whether stage 3a or 3b CKD  Monitor creatinine daily.  Avoid hypotension and iodinated contrast, unless the latter is absolutely necessary.      Essential hypertension  Will continue his BP meds as follows:  Amlodipine 5 daily  Coreg 12.5 BID  ISMN 30 daily.    Monitor blood pressures.      VTE Risk Mitigation (From admission, onward)         Ordered     IP VTE LOW RISK PATIENT  Once         01/20/23 8092                   Lobito Andrea MD  Department of Hospital Medicine   [facility]

## 2023-01-21 NOTE — NURSING
Monitor room notified of pt hr dropping to 38 with a 3rd degree block. PT in bed asymptomatic.  Notified Np. EKG ordered.

## 2023-01-21 NOTE — PLAN OF CARE
01/21/23 1440   HERRERA Message   Medicare Outpatient and Observation Notification regarding financial responsibility Given to patient/caregiver;Explained to patient/caregiver;Signed/date by patient/caregiver   Date HERRERA was signed 01/21/23   Time HERRERA was signed 1440     HERRERA explained to pt. Pt verbalized understanding and signed form.

## 2023-01-21 NOTE — CONSULTS
"[facility]  Department of Cardiology  Consult Note      PATIENT NAME: Rajendra Castle    MRN: [id number]  TODAY'S DATE: 01/21/2023  ADMIT DATE: 1/20/2023                          CONSULT REQUESTED BY: Lobito Andrea MD    SUBJECTIVE     PRINCIPAL PROBLEM: <principal problem not specified>  Rajendra Castle is a 77 y.o. male with a PMHx of arthritis, heart murmur, HTN, DDD, RA, nontoxic multinodular goiter, and prostate CA who presents to the ED for evaluation of generalized weakness with several episodes of near-syncope today.  Complaining of bilateral knee pain.  He states that he "felt stronger yesterday."  Patient confirms a decreased appetite and SOB which is abnormal for him.  Patient mentions accompanying CP that is worse with inhalation.  He has no chest heaviness or pressure.  There's no N/V/D reported at this time, though he references chronic insomnia and episodic HTN over the recent days.  The patient denies any other symptoms at this time.  He is a former smoker who quit 30+ years ago.  PSHx includes parathyroidectomy and transrectal biopsy of prostate with US guidance.       The history is provided by the patient.   Review of patient's allergies indicates:  No Known Allergies       Past Medical History:   Diagnosis Date    Arthritis      DDD (degenerative disc disease), cervical      Degenerative disc disease      Heart murmur      Hypertension      Nontoxic multinodular goiter 9/20/2016    Prostate CA      RA (rheumatoid arthritis)      Vitamin D insufficiency 9/30/2016        REASON FOR CONSULT:  Abnormal EKG      HPI:   patient is a 76-year-old male with known history of coronary artery disease status post stent placement in June 2022 by Dr. Lamb and has hypertension rheumatoid arthritis and multinodular goiter and prostatic carcinoma.  Patient suffers from significant insomnia and has been having generalized weakness fatigue tiredness and does not feel good during the day.  Denies any " chest pain or tightness or heaviness.  Denies any dizziness or lightheadedness or loss of consciousness.    Review of patient's allergies indicates:  No Known Allergies    Past Medical History:   Diagnosis Date    Arthritis     DDD (degenerative disc disease), cervical     Degenerative disc disease     Heart murmur     Hypertension     Nontoxic multinodular goiter 9/20/2016    Prostate CA     RA (rheumatoid arthritis)     Vitamin D insufficiency 9/30/2016     Past Surgical History:   Procedure Laterality Date    CARPAL TUNNEL RELEASE Right 5/28/2021    Procedure: RELEASE, CARPAL TUNNEL;  Surgeon: Jose Ryan II, MD;  Location: On license of [facility];  Service: Orthopedics;  Laterality: Right;    COLONOSCOPY  prior to 2016    normal findings per patient report    CYSTOSCOPY N/A 12/18/2018    Procedure: CYSTOSCOPY;  Surgeon: Garrett Pina MD;  Location: [facility] OR;  Service: Urology;  Laterality: N/A;    CYSTOSCOPY N/A 7/12/2022    Procedure: CYSTOSCOPY;  Surgeon: Garrett Pina MD;  Location: [facility] OR;  Service: Urology;  Laterality: N/A;    ESOPHAGOGASTRODUODENOSCOPY N/A 9/29/2020    Dr. Espinosa; empiric dilation; erythematous mucosa in antrum; gastric mucosal atrophy; hematin in entire stomach; biopsy: mid & distal esophagus WNL, stomach- WNL, negative for h pylori    PARATHYROIDECTOMY Right 10/27/2020    Procedure: PARATHYROIDECTOMY;  Surgeon: Latonia Clayton MD;  Location: [address];  Service: ENT;  Laterality: Right;    RELEASE OF ULNAR NERVE AT CUBITAL TUNNEL Right 5/28/2021    Procedure: RELEASE, ULNAR TUNNEL;  Surgeon: Jose Ryan II, MD;  Location: [facility] OR;  Service: Orthopedics;  Laterality: Right;    TRANSRECTAL BIOPSY OF PROSTATE WITH ULTRASOUND GUIDANCE N/A 12/18/2018    Procedure: BIOPSY, PROSTATE, RECTAL APPROACH, WITH US GUIDANCE;  Surgeon: Garrett Pina MD;  Location: [facility];  Service: Urology;  Laterality: N/A;    TRANSRECTAL ULTRASOUND EXAMINATION N/A 7/12/2022    Procedure:  ULTRASOUND, RECTAL APPROACH;  Surgeon: Garrett Pina MD;  Location: [facility];  Service: Urology;  Laterality: N/A;     Social History     Tobacco Use    Smoking status: Former     Packs/day: 0.25     Years: 10.00     Pack years: 2.50     Types: Cigarettes     Quit date: 2001     Years since quittin.4     Passive exposure: Past    Smokeless tobacco: Never   Substance Use Topics    Alcohol use: Yes     Comment: seldom    Drug use: Yes     Frequency: 8.0 times per week     Types: Marijuana        REVIEW OF SYSTEMS  CONSTITUTIONAL: Negative for chills, and fever.  Daytime fatigue.  EYES: No double vision, No blurred vision  NEURO: No headaches, No dizziness  RESPIRATORY: Negative for cough, shortness of breath and wheezing.    CARDIOVASCULAR: Negative for chest pain. Negative for palpitations and leg swelling.   GI: Negative for abdominal pain, No melena, diarrhea, nausea and vomiting.   : Negative for dysuria and frequency, Negative for hematuria  SKIN: Negative for bruising, Negative for edema or discoloration noted.   ENDOCRINE: Negative for polyphagia, Negative for heat intolerance, Negative for cold intolerance  PSYCHIATRIC: Negative for depression, Negative for anxiety, Negative for memory loss  MUSCULOSKELETAL: Negative for neck pain, Negative for muscle weakness, Negative for back pain     OBJECTIVE     VITAL SIGNS (Most Recent)  Temp: 96.8 °F (36 °C) (23)  Pulse: 68 (23)  Resp: 17 (23)  BP: (!) 197/90 (23)  SpO2: 99 % (23)    VENTILATION STATUS  Resp: 17 (23)  SpO2: 99 % (23)       I & O (Last 24H):  Intake/Output Summary (Last 24 hours) at 2023 0942  Last data filed at 2023 2357  Gross per 24 hour   Intake 240 ml   Output 300 ml   Net -60 ml       WEIGHTS  Wt Readings from Last 1 Encounters:   23 1953 66.1 kg (145 lb 11.6 oz)   23 1420 65.8 kg (145 lb)       PHYSICAL EXAM  GENERAL: well built, well  nourished, well-developed in no apparent distress alert and oriented.   HEENT: Normocephalic. Pupils normal ..   NECK: No JVD. No bruit..   THYROID: Thyroid not evaluated.   CARDIAC: Regular rate and rhythm. S1 is normal.S2 is normal.  Systolic murmur audible.  CHEST ANATOMY: normal.   LUNGS: Clear to auscultation. No wheezing or rhonchi..   ABDOMEN: Soft .   URINARY: No jackson catheter   EXTREMITIES: No cyanosis, clubbing or edema noted at this time., no calf tenderness bilaterally.   PERIPHERAL VASCULAR SYSTEM: Good palpable distal pulses.   CENTRAL NERVOUS SYSTEM: No focal motor or sensory deficits noted.   SKIN: Skin without lesions, moist, well perfused.   MUSCLE STRENGTH & TONE: No noteable weakness, atrophy or abnormal movement.     HOME MEDICATIONS:  Current Facility-Administered Medications on File Prior to Encounter   Medication Dose Route Frequency Provider Last Rate Last Admin    denosumab (PROLIA) injection 60 mg  60 mg Subcutaneous 1 time in Clinic/HOD Jean Carlos Hoffman MD         Current Outpatient Medications on File Prior to Encounter   Medication Sig Dispense Refill    alfuzosin (UROXATRAL) 10 mg Tb24 Take 1 tablet (10 mg total) by mouth daily with breakfast. 30 tablet 11    amLODIPine (NORVASC) 5 MG tablet Take 1 tablet (5 mg total) by mouth once daily. 30 tablet 2    atorvastatin (LIPITOR) 10 MG tablet Take 10 mg by mouth nightly.      b complex vitamins capsule Take 1 capsule by mouth once daily.      doxycycline (MONODOX) 100 MG capsule Take 1 capsule (100 mg total) by mouth 2 (two) times daily. 56 capsule 0    folic acid (FOLVITE) 1 MG tablet Take 1 tablet (1 mg total) by mouth once daily. 90 tablet 3    gabapentin (NEURONTIN) 300 MG capsule Take 300 mg by mouth 3 (three) times daily.      methotrexate 2.5 MG Tab Take 6 tablets (15 mg total) by mouth every 7 days. TAKE 6 TABLETS BY MOUTH EVERY WEEK 72 tablet 2    pantoprazole (PROTONIX) 40 MG tablet TAKE 1 TABLET(40 MG) BY MOUTH TWICE DAILY  (Patient taking differently: Take 40 mg by mouth once daily.) 180 tablet 0    traMADoL (ULTRAM) 50 mg tablet Take 2 tablets (100 mg total) by mouth 2 (two) times daily as needed for Pain. 120 tablet 5    traZODone (DESYREL) 150 MG tablet Take 1 tablet (150 mg total) by mouth every evening. 30 tablet 5    zolpidem (AMBIEN) 5 MG Tab Take 1 tablet (5 mg total) by mouth nightly as needed (insomnia). 90 tablet 1    aspirin (ECOTRIN) 81 MG EC tablet Take 1 tablet (81 mg total) by mouth once daily. 30 tablet 0    carvediloL (COREG) 12.5 MG tablet Take 1 tablet (12.5 mg total) by mouth 2 (two) times daily with meals. 60 tablet 0    clopidogreL (PLAVIX) 75 mg tablet Take 1 tablet (75 mg total) by mouth once daily. 30 tablet 0    isosorbide mononitrate (IMDUR) 30 MG 24 hr tablet Take 1 tablet (30 mg total) by mouth once daily. 30 tablet 0    [DISCONTINUED] cholecalciferol, vitamin D3, (VITAMIN D3) 100 mcg (4,000 unit) Cap Take 400 Units by mouth once daily.      [DISCONTINUED] cloNIDine (CATAPRES) 0.1 MG tablet Take 1 tablet (0.1 mg total) by mouth 2 (two) times daily as needed (only if blood pressure top number is over 200). (Patient not taking: Reported on 6/7/2022) 30 tablet 1    [DISCONTINUED] meclizine (ANTIVERT) 12.5 mg tablet Take 1 tablet (12.5 mg total) by mouth 2 (two) times daily as needed for Dizziness. 30 tablet 0    [DISCONTINUED] sildenafiL (VIAGRA) 100 MG tablet Take 1 tablet (100 mg total) by mouth daily as needed for Erectile Dysfunction. 10 tablet 2       SCHEDULED MEDS:   amLODIPine  5 mg Oral Daily    aspirin  81 mg Oral Daily    atorvastatin  10 mg Oral Nightly    carvediloL  12.5 mg Oral BID WM    clopidogreL  75 mg Oral Daily    gabapentin  300 mg Oral TID    isosorbide mononitrate  30 mg Oral Daily    pantoprazole  40 mg Oral Daily    tamsulosin  0.4 mg Oral Daily    traZODone  150 mg Oral QHS       CONTINUOUS INFUSIONS:    PRN MEDS:acetaminophen, aluminum-magnesium hydroxide-simethicone, naloxone,  ondansetron, oxyCODONE, sodium chloride 0.9%, zolpidem    LABS AND DIAGNOSTICS     CBC LAST 3 DAYS  Recent Labs   Lab 01/16/23  0749 01/20/23  1507   WBC 5.59 6.74   RBC 4.51* 4.71   HGB 13.1* 13.7*   HCT 41.9 42.5   MCV 93 90   MCH 29.0 29.1   MCHC 31.3* 32.2   RDW 16.8* 16.4*    251   MPV 12.4 12.6   GRAN 72.0  4.0 72.3  4.9   LYMPH 14.5*  0.8* 14.7*  1.0   MONO 11.1  0.6 12.2  0.8   BASO 0.02 0.01   NRBC 0 0       COAGULATION LAST 3 DAYS  No results for input(s): LABPT, INR, APTT in the last 168 hours.    CHEMISTRY LAST 3 DAYS  Recent Labs   Lab 01/16/23  0749 01/20/23  1507    140   K 4.3 3.9   * 111*   CO2 21* 20*   ANIONGAP 10 9   BUN 15 14   CREATININE 1.4 1.5*    131*   CALCIUM 8.8 8.9   MG 2.0  --    ALBUMIN 3.7 3.6   PROT 7.1 7.1   ALKPHOS 63 61   ALT 27 27   AST 22 18   BILITOT 0.6 0.5       CARDIAC PROFILE LAST 3 DAYS  Recent Labs   Lab 01/20/23  1507 01/20/23  1745 01/20/23  2331 01/21/23  0511   BNP 2,135*  --   --   --    TROPONINI 0.042* 0.034* 0.039* 0.038*       ENDOCRINE LAST 3 DAYS  Recent Labs   Lab 01/16/23  0749   TSH 1.296       LAST ARTERIAL BLOOD GAS  ABG  No results for input(s): PH, PO2, PCO2, HCO3, BE in the last 168 hours.    LAST 7 DAYS MICROBIOLOGY   Microbiology Results (last 7 days)       ** No results found for the last 168 hours. **            MOST RECENT IMAGING  X-Ray Chest AP Portable  Narrative: EXAMINATION:  XR CHEST AP PORTABLE    CLINICAL HISTORY:  Chest Pain;    TECHNIQUE:  Single frontal view of the chest was performed.    COMPARISON:  06/30/2022    FINDINGS:  Stable cardiomediastinal silhouette.  Minimal atelectatic stranding in the right lung base and slight tenting suggesting along the left heart margin.  No new or confluent infiltrate.  No pleural effusion.  Impression: No acute cardiopulmonary disease or significant change compared to the prior exam.    Electronically signed by: Darlene Beard,  MD  Date:    01/20/2023  Time:    14:50      ECHOCARDIOGRAM RESULTS (last 5)  Results for orders placed during the hospital encounter of 06/20/22    Echo    Interpretation Summary  · The left ventricle is normal in size with mild concentric hypertrophy and normal systolic function.  · The estimated ejection fraction is 65%.  · Normal left ventricular diastolic function.  · Normal right ventricular size with normal right ventricular systolic function.  · The estimated PA systolic pressure is 27 mmHg.  · Normal central venous pressure (3 mmHg).      Results for orders placed during the hospital encounter of 05/22/22    STRESS TEST REPORT      Results for orders placed during the hospital encounter of 03/07/22    Echo    Interpretation Summary  · The left ventricle is normal in size with moderate concentric hypertrophy and normal systolic function.  · The estimated PA systolic pressure is 28 mmHg.  · Indeterminate left ventricular diastolic function.  · Normal right ventricular size with mildly reduced right ventricular systolic function.  · Normal central venous pressure (3 mmHg).  · The estimated ejection fraction is 70%.  · Moderate left atrial enlargement.  · Mild mitral regurgitation.  · Mild tricuspid regurgitation.  · Mild-to-moderate aortic regurgitation.  · Trivial circumferential pericardial effusion. Effusion is fibrinous.  · Mild right atrial enlargement.      Results for orders placed during the hospital encounter of 09/28/20    Echo Color Flow Doppler? Yes    Interpretation Summary  · The left ventricle is normal in size with normal systolic function. The estimated ejection fraction is 65%.  · There is moderate left ventricular concentric hypertrophy.  · Normal left ventricular diastolic function.  · Normal right ventricular systolic function.  · Moderate aortic regurgitation.  · Moderate mitral regurgitation.  · No pulmonary hypertension  · Mild tricuspid regurgitation.  · Normal central venous pressure  (3 mmHg).  · The estimated PA systolic pressure is 29 mmHg.  · Trivial posterior pericardial effusion. Effusion is fluid.    Left ventricle hypertrophy  Moderate aortic regurgitation mitral regurgitant  Mean left atrial pressure is normal  No pulmonary hypertension      CURRENT/PREVIOUS VISIT EKG  Results for orders placed or performed during the hospital encounter of 01/16/23   EKG 12-lead    Collection Time: 01/16/23  7:54 AM    Narrative    Test Reason : R00.2,    Vent. Rate : 073 BPM     Atrial Rate : 073 BPM     P-R Int : 346 ms          QRS Dur : 090 ms      QT Int : 466 ms       P-R-T Axes : 073 034 220 degrees     QTc Int : 513 ms    Sinus rhythm with 1st degree A-V block  Possible Left atrial enlargement  ST and Marked T wave abnormality, consider anterolateral ischemia  Prolonged QT  Abnormal ECG  When compared with ECG of 30-JUN-2022 14:59,  Sinus rhythm has replaced Junctional rhythm  T wave inversion more evident in Inferior leads  Confirmed by Bello Puga MD ([id number]) on 1/18/2023 5:36:09 PM    Referred By: CHAO   SELF           Confirmed By:Bello Puga MD           ASSESSMENT/PLAN:     There are no active hospital problems to display for this patient.      ASSESSMENT & PLAN:   1. Acute diastolic heart failure with elevated BNP of 2135  2. Coronary artery disease status post percutaneous intervention 6 months ago  3. Insomnia and daytime somnolence and fatigue  4. Moderate concentric left ventricle hypertrophy  5. Moderate mitral regurgitation and aortic regurgitation   6. Essential hypertension uncontrolled      RECOMMENDATIONS:  1. Patient has elevated BNP and has moderate mitral regurgitation and hypertrophic cardiomyopathy.  Patient's chest x-ray is clear without any evidence of pulmonary edema.    2. Coronary artery disease status post stent placement done 6 months ago.  Has no angina and patient has mild left ventricle hypertrophy with repolarization changes and T-wave inversions and no  significant change from the previous EKG done in June of 2022   3. Patient is currently on amlodipine 5 mg p.o. daily and carvedilol 12.5 mg p.o. b.i.d. and tamsulosin 0.4 mg p.o. daily.  May need to increase amlodipine to 5 mg p.o. b.i.d..  4. Continue Plavix 75 mg p.o. daily and aspirin 81 mg p.o. daily.  No bleeding issues.    5. Patient has significant insomnia which is possibly the primary cause for his daytime fatigue.  6. Thank you for the consultation will follow with you          Bello Puga MD  [facility]  Department of Cardiology  Date of Service: 01/21/2023  9:42 AM

## 2023-01-21 NOTE — ASSESSMENT & PLAN NOTE
Will continue his BP meds as follows:  Amlodipine 5 daily  Coreg 12.5 BID  ISMN 30 daily.    Monitor blood pressures.

## 2023-01-21 NOTE — ASSESSMENT & PLAN NOTE
Monitor creatinine daily.  Avoid hypotension and iodinated contrast, unless the latter is absolutely necessary.

## 2023-01-21 NOTE — ASSESSMENT & PLAN NOTE
I question whether or not it's the cause of the near-syncope.  Will place on cardiac monitoring.  Consult with cardiologist regarding management.

## 2023-01-21 NOTE — SUBJECTIVE & OBJECTIVE
Past Medical History:   Diagnosis Date    Arthritis     DDD (degenerative disc disease), cervical     Degenerative disc disease     Heart murmur     Hypertension     Nontoxic multinodular goiter 9/20/2016    Prostate CA     RA (rheumatoid arthritis)     Vitamin D insufficiency 9/30/2016       Past Surgical History:   Procedure Laterality Date    CARPAL TUNNEL RELEASE Right 5/28/2021    Procedure: RELEASE, CARPAL TUNNEL;  Surgeon: Jose Ryan II, MD;  Location: [facility];  Service: Orthopedics;  Laterality: Right;    COLONOSCOPY  prior to 2016    normal findings per patient report    CYSTOSCOPY N/A 12/18/2018    Procedure: CYSTOSCOPY;  Surgeon: Garrett Pina MD;  Location: [facility] OR;  Service: Urology;  Laterality: N/A;    CYSTOSCOPY N/A 7/12/2022    Procedure: CYSTOSCOPY;  Surgeon: Garrett Pina MD;  Location: [facility];  Service: Urology;  Laterality: N/A;    ESOPHAGOGASTRODUODENOSCOPY N/A 9/29/2020    Dr. Espinosa; empiric dilation; erythematous mucosa in antrum; gastric mucosal atrophy; hematin in entire stomach; biopsy: mid & distal esophagus WNL, stomach- WNL, negative for h pylori    PARATHYROIDECTOMY Right 10/27/2020    Procedure: PARATHYROIDECTOMY;  Surgeon: Latonia Clayton MD;  Location: [address];  Service: ENT;  Laterality: Right;    RELEASE OF ULNAR NERVE AT CUBITAL TUNNEL Right 5/28/2021    Procedure: RELEASE, ULNAR TUNNEL;  Surgeon: Jose Ryan II, MD;  Location: [facility] OR;  Service: Orthopedics;  Laterality: Right;    TRANSRECTAL BIOPSY OF PROSTATE WITH ULTRASOUND GUIDANCE N/A 12/18/2018    Procedure: BIOPSY, PROSTATE, RECTAL APPROACH, WITH US GUIDANCE;  Surgeon: Garrett Pina MD;  Location: [facility];  Service: Urology;  Laterality: N/A;    TRANSRECTAL ULTRASOUND EXAMINATION N/A 7/12/2022    Procedure: ULTRASOUND, RECTAL APPROACH;  Surgeon: Garrett Pina MD;  Location: [facility];  Service: Urology;  Laterality: N/A;       Review of patient's allergies indicates:  No  Known Allergies    Current Facility-Administered Medications on File Prior to Encounter   Medication    denosumab (PROLIA) injection 60 mg     Current Outpatient Medications on File Prior to Encounter   Medication Sig    alfuzosin (UROXATRAL) 10 mg Tb24 Take 1 tablet (10 mg total) by mouth daily with breakfast.    amLODIPine (NORVASC) 5 MG tablet Take 1 tablet (5 mg total) by mouth once daily.    atorvastatin (LIPITOR) 10 MG tablet Take 10 mg by mouth nightly.    b complex vitamins capsule Take 1 capsule by mouth once daily.    doxycycline (MONODOX) 100 MG capsule Take 1 capsule (100 mg total) by mouth 2 (two) times daily.    folic acid (FOLVITE) 1 MG tablet Take 1 tablet (1 mg total) by mouth once daily.    gabapentin (NEURONTIN) 300 MG capsule Take 300 mg by mouth 3 (three) times daily.    methotrexate 2.5 MG Tab Take 6 tablets (15 mg total) by mouth every 7 days. TAKE 6 TABLETS BY MOUTH EVERY WEEK    pantoprazole (PROTONIX) 40 MG tablet TAKE 1 TABLET(40 MG) BY MOUTH TWICE DAILY (Patient taking differently: Take 40 mg by mouth once daily.)    traMADoL (ULTRAM) 50 mg tablet Take 2 tablets (100 mg total) by mouth 2 (two) times daily as needed for Pain.    traZODone (DESYREL) 150 MG tablet Take 1 tablet (150 mg total) by mouth every evening.    zolpidem (AMBIEN) 5 MG Tab Take 1 tablet (5 mg total) by mouth nightly as needed (insomnia).    aspirin (ECOTRIN) 81 MG EC tablet Take 1 tablet (81 mg total) by mouth once daily.    carvediloL (COREG) 12.5 MG tablet Take 1 tablet (12.5 mg total) by mouth 2 (two) times daily with meals.    clopidogreL (PLAVIX) 75 mg tablet Take 1 tablet (75 mg total) by mouth once daily.    isosorbide mononitrate (IMDUR) 30 MG 24 hr tablet Take 1 tablet (30 mg total) by mouth once daily.    [DISCONTINUED] cholecalciferol, vitamin D3, (VITAMIN D3) 100 mcg (4,000 unit) Cap Take 400 Units by mouth once daily.    [DISCONTINUED] cloNIDine (CATAPRES) 0.1 MG tablet Take 1 tablet (0.1 mg total) by  mouth 2 (two) times daily as needed (only if blood pressure top number is over 200). (Patient not taking: Reported on 2022)    [DISCONTINUED] meclizine (ANTIVERT) 12.5 mg tablet Take 1 tablet (12.5 mg total) by mouth 2 (two) times daily as needed for Dizziness.    [DISCONTINUED] sildenafiL (VIAGRA) 100 MG tablet Take 1 tablet (100 mg total) by mouth daily as needed for Erectile Dysfunction.     Family History       Problem Relation (Age of Onset)    Diabetes Maternal Uncle    Drug abuse Sister    Heart disease Mother    No Known Problems Daughter, Daughter, Son    Stroke Father          Tobacco Use    Smoking status: Former     Packs/day: 0.25     Years: 10.00     Pack years: 2.50     Types: Cigarettes     Quit date: 2001     Years since quittin.4     Passive exposure: Past    Smokeless tobacco: Never   Substance and Sexual Activity    Alcohol use: Yes     Comment: seldom    Drug use: Yes     Frequency: 8.0 times per week     Types: Marijuana    Sexual activity: Yes     Partners: Female     Review of Systems   Constitutional:  Negative for chills, fatigue and fever.   HENT:  Negative for congestion and sinus pressure.    Eyes:  Negative for pain and visual disturbance.   Respiratory:  Positive for shortness of breath. Negative for cough and wheezing.    Cardiovascular:  Positive for chest pain. Negative for palpitations and leg swelling.   Gastrointestinal:  Negative for abdominal pain, diarrhea, nausea and vomiting.   Genitourinary:  Negative for dysuria and hematuria.   Musculoskeletal:  Negative for arthralgias and myalgias.   Skin:  Negative for rash and wound.   Neurological:  Positive for dizziness and light-headedness. Negative for weakness and headaches.   Hematological:  Negative for adenopathy. Does not bruise/bleed easily.   Psychiatric/Behavioral:  Negative for confusion and dysphoric mood. The patient is not nervous/anxious.    Objective:     Vital Signs (Most Recent):  Temp: 97.5 °F (36.4  °C) (01/21/23 1147)  Pulse: 70 (01/21/23 1147)  Resp: 16 (01/21/23 1147)  BP: (!) 162/80 (01/21/23 1147)  SpO2: 97 % (01/21/23 1147)   Vital Signs (24h Range):  Temp:  [96.7 °F (35.9 °C)-97.9 °F (36.6 °C)] 97.5 °F (36.4 °C)  Pulse:  [67-79] 70  Resp:  [16-19] 16  SpO2:  [94 %-99 %] 97 %  BP: (160-197)/(80-92) 162/80     Weight: 65.8 kg (145 lb)  Body mass index is 22.05 kg/m².    Physical Exam  Constitutional:       General: He is not in acute distress.     Appearance: He is not ill-appearing.   HENT:      Head: Normocephalic and atraumatic.      Right Ear: External ear normal.      Left Ear: External ear normal.      Mouth/Throat:      Pharynx: No oropharyngeal exudate.   Eyes:      General: No scleral icterus.        Right eye: No discharge.         Left eye: No discharge.      Conjunctiva/sclera: Conjunctivae normal.   Neck:      Thyroid: No thyromegaly.      Vascular: No JVD.   Cardiovascular:      Rate and Rhythm: Normal rate and regular rhythm.      Heart sounds:     No gallop.   Pulmonary:      Effort: Pulmonary effort is normal.      Breath sounds: Normal breath sounds. No wheezing.   Abdominal:      General: Bowel sounds are normal. There is no distension.      Palpations: Abdomen is soft. There is no mass.      Tenderness: There is no abdominal tenderness.   Musculoskeletal:         General: No deformity.      Cervical back: Normal range of motion and neck supple.   Lymphadenopathy:      Cervical: No cervical adenopathy.   Skin:     General: Skin is warm and dry.      Capillary Refill: Capillary refill takes less than 2 seconds.      Findings: No rash.   Neurological:      Mental Status: He is alert and oriented to person, place, and time.   Psychiatric:         Behavior: Behavior normal.           Significant Labs: BMP:   Recent Labs   Lab 01/20/23  1507   *      K 3.9   *   CO2 20*   BUN 14   CREATININE 1.5*   CALCIUM 8.9     CBC:   Recent Labs   Lab 01/20/23  1507   WBC 6.74   HGB  13.7*   HCT 42.5        Troponin:   Recent Labs   Lab 01/20/23  1745 01/20/23  2331 01/21/23  0511   TROPONINI 0.034* 0.039* 0.038*     BNP  Recent Labs   Lab 01/20/23  1507   BNP 2,135*         Significant Imaging:   Results for orders placed during the hospital encounter of 01/20/23    X-Ray Chest AP Portable    Narrative  EXAMINATION:  XR CHEST AP PORTABLE    CLINICAL HISTORY:  Chest Pain;    TECHNIQUE:  Single frontal view of the chest was performed.    COMPARISON:  06/30/2022    FINDINGS:  Stable cardiomediastinal silhouette.  Minimal atelectatic stranding in the right lung base and slight tenting suggesting along the left heart margin.  No new or confluent infiltrate.  No pleural effusion.    Impression  No acute cardiopulmonary disease or significant change compared to the prior exam.      Electronically signed by: Darlene Beard MD  Date:    01/20/2023  Time:    14:50      I independently interpreted the EKG done Jan 20 at 1452:  Normal sinus rhythm.  There are appears to be AV block.  NJ intervals progressively lengthen and then a P-wave fails to be transmitted.  Appears to be Mobitz I.  There are deep T-wave inversions in inferior leads, as well as V4, V5, V6.

## 2023-01-22 VITALS
SYSTOLIC BLOOD PRESSURE: 174 MMHG | WEIGHT: 145 LBS | TEMPERATURE: 98 F | RESPIRATION RATE: 17 BRPM | HEART RATE: 72 BPM | DIASTOLIC BLOOD PRESSURE: 81 MMHG | BODY MASS INDEX: 21.98 KG/M2 | HEIGHT: 68 IN | OXYGEN SATURATION: 99 %

## 2023-01-22 PROCEDURE — 94761 N-INVAS EAR/PLS OXIMETRY MLT: CPT

## 2023-01-22 PROCEDURE — 25000003 PHARM REV CODE 250: Performed by: HOSPITALIST

## 2023-01-22 PROCEDURE — 99900035 HC TECH TIME PER 15 MIN (STAT)

## 2023-01-22 PROCEDURE — G0378 HOSPITAL OBSERVATION PER HR: HCPCS

## 2023-01-22 PROCEDURE — 99232 SBSQ HOSP IP/OBS MODERATE 35: CPT | Mod: ,,, | Performed by: INTERNAL MEDICINE

## 2023-01-22 PROCEDURE — 99232 PR SUBSEQUENT HOSPITAL CARE,LEVL II: ICD-10-PCS | Mod: ,,, | Performed by: INTERNAL MEDICINE

## 2023-01-22 PROCEDURE — 94660 CPAP INITIATION&MGMT: CPT

## 2023-01-22 RX ORDER — AMLODIPINE BESYLATE 5 MG/1
5 TABLET ORAL 2 TIMES DAILY
Qty: 60 TABLET | Refills: 3 | Status: SHIPPED | OUTPATIENT
Start: 2023-01-22 | End: 2023-09-12 | Stop reason: SDUPTHER

## 2023-01-22 RX ORDER — TAMSULOSIN HYDROCHLORIDE 0.4 MG/1
0.4 CAPSULE ORAL DAILY
Qty: 30 CAPSULE | Refills: 3 | Status: ON HOLD | OUTPATIENT
Start: 2023-01-23 | End: 2023-08-10 | Stop reason: HOSPADM

## 2023-01-22 RX ADMIN — CLOPIDOGREL BISULFATE 75 MG: 75 TABLET ORAL at 08:01

## 2023-01-22 RX ADMIN — PANTOPRAZOLE SODIUM 40 MG: 40 TABLET, DELAYED RELEASE ORAL at 08:01

## 2023-01-22 RX ADMIN — CARVEDILOL 12.5 MG: 6.25 TABLET, FILM COATED ORAL at 08:01

## 2023-01-22 RX ADMIN — TAMSULOSIN HYDROCHLORIDE 0.4 MG: 0.4 CAPSULE ORAL at 08:01

## 2023-01-22 RX ADMIN — ISOSORBIDE MONONITRATE 30 MG: 30 TABLET, EXTENDED RELEASE ORAL at 08:01

## 2023-01-22 RX ADMIN — AMLODIPINE BESYLATE 5 MG: 5 TABLET ORAL at 08:01

## 2023-01-22 RX ADMIN — GABAPENTIN 300 MG: 300 CAPSULE ORAL at 03:01

## 2023-01-22 RX ADMIN — ASPIRIN 81 MG: 81 TABLET, COATED ORAL at 08:01

## 2023-01-22 RX ADMIN — GABAPENTIN 300 MG: 300 CAPSULE ORAL at 08:01

## 2023-01-22 NOTE — PLAN OF CARE
POC/Meds reviewed, pt verbalized understanding. Vitals stable. Afebrile.  Tele In place-SR/ST. Up with x1 assist .  Reposistions self. Hourly/Q2hr rounding performed, safety maintained. Bed in lowest position, wheels locked, SR up x2, call light in easy reach. No  complaints at this time. Will continue to monitor.    Patient requesting not to be bothered majority of the night, getting upset by Q 4 vitals and frequent rounding.

## 2023-01-22 NOTE — PLAN OF CARE
Pt clear for DC from case management standpoint. Discharging to home         01/22/23 1541   Final Note   Assessment Type Final Discharge Note   Anticipated Discharge Disposition Home   Hospital Resources/Appts/Education Provided Appointments scheduled and added to AVS

## 2023-01-22 NOTE — PROGRESS NOTES
[facility]  Department of Cardiology  Progress Note    PATIENT NAME: Rajendra Castle  MRN: [id number]  TODAY'S DATE: 01/22/2023  ADMIT DATE: 1/20/2023    SUBJECTIVE     PRINCIPLE PROBLEM: Elevated troponin    INTERVAL HISTORY:    1/22/2023  Patient is awake alert lying in bed feels much better now.  Denies any chest pain or tightness or heaviness.    Patient feels a whole lot better with the CPAP and has slept better.    Patient is concerned about his urinary frequency he wakes up at least 3-4 times a night.      Review of patient's allergies indicates:  No Known Allergies    REVIEW OF SYSTEMS  CARDIOVASCULAR: No recent chest pain, palpitations, arm, neck, or jaw pain  RESPIRATORY: No recent fever, cough chills, SOB or congestion  : No blood in the urine.  Urinary frequency.  GI: No Nausea, vomiting, constipation, diarrhea, blood, or reflux.  MUSCULOSKELETAL: No myalgias  NEURO: No lightheadedness or dizziness  EYES: No Double vision, blurry, vision or headache     OBJECTIVE     VITAL SIGNS (Most Recent)  Temp: 98.7 °F (37.1 °C) (01/22/23 0737)  Pulse: 76 (01/22/23 0819)  Resp: 18 (01/22/23 0819)  BP: (!) 187/91 (01/22/23 0737)  SpO2: 97 % (01/22/23 0819)    VENTILATION STATUS  Resp: 18 (01/22/23 0819)  SpO2: 97 % (01/22/23 0819)       I & O (Last 24H):  Intake/Output Summary (Last 24 hours) at 1/22/2023 1056  Last data filed at 1/22/2023 0829  Gross per 24 hour   Intake 120 ml   Output 300 ml   Net -180 ml       WEIGHTS  Wt Readings from Last 1 Encounters:   01/21/23 1147 65.8 kg (145 lb)   01/20/23 1953 66.1 kg (145 lb 11.6 oz)   01/20/23 1420 65.8 kg (145 lb)       PHYSICAL EXAM  CONSTITUTIONAL: Well built, well nourished in no apparent distress  NECK: no carotid bruit, no JVD  LUNGS: CTA  CHEST WALL: no tenderness  HEART: regular rate and rhythm, S1, S2 normal, systolic murmur ABDOMEN: soft,   EXTREMITIES: Extremities normal, no edema  NEURO: AAO X 3    SCHEDULED MEDS:   amLODIPine  5 mg Oral Daily     aspirin  81 mg Oral Daily    atorvastatin  10 mg Oral Nightly    carvediloL  12.5 mg Oral BID WM    clopidogreL  75 mg Oral Daily    gabapentin  300 mg Oral TID    isosorbide mononitrate  30 mg Oral Daily    pantoprazole  40 mg Oral Daily    tamsulosin  0.4 mg Oral Daily    traZODone  150 mg Oral QHS       CONTINUOUS INFUSIONS:    PRN MEDS:acetaminophen, aluminum-magnesium hydroxide-simethicone, naloxone, naproxen, ondansetron, oxyCODONE, sodium chloride 0.9%, zolpidem    LABS AND DIAGNOSTICS     CBC LAST 3 DAYS  Recent Labs   Lab 01/16/23  0749 01/20/23  1507   WBC 5.59 6.74   RBC 4.51* 4.71   HGB 13.1* 13.7*   HCT 41.9 42.5   MCV 93 90   MCH 29.0 29.1   MCHC 31.3* 32.2   RDW 16.8* 16.4*    251   MPV 12.4 12.6   GRAN 72.0  4.0 72.3  4.9   LYMPH 14.5*  0.8* 14.7*  1.0   MONO 11.1  0.6 12.2  0.8   BASO 0.02 0.01   NRBC 0 0       COAGULATION LAST 3 DAYS  No results for input(s): LABPT, INR, APTT in the last 168 hours.    CHEMISTRY LAST 3 DAYS  Recent Labs   Lab 01/16/23  0749 01/20/23  1507    140   K 4.3 3.9   * 111*   CO2 21* 20*   ANIONGAP 10 9   BUN 15 14   CREATININE 1.4 1.5*    131*   CALCIUM 8.8 8.9   MG 2.0  --    ALBUMIN 3.7 3.6   PROT 7.1 7.1   ALKPHOS 63 61   ALT 27 27   AST 22 18   BILITOT 0.6 0.5       CARDIAC PROFILE LAST 3 DAYS  Recent Labs   Lab 01/20/23  1507 01/20/23  1745 01/20/23  2331 01/21/23  0511   BNP 2,135*  --   --   --    TROPONINI 0.042* 0.034* 0.039* 0.038*       ENDOCRINE LAST 3 DAYS  Recent Labs   Lab 01/16/23  0749   TSH 1.296       LAST ARTERIAL BLOOD GAS  ABG  No results for input(s): PH, PO2, PCO2, HCO3, BE in the last 168 hours.    LAST 7 DAYS MICROBIOLOGY   Microbiology Results (last 7 days)       ** No results found for the last 168 hours. **            MOST RECENT IMAGING  X-Ray Chest AP Portable  Narrative: EXAMINATION:  XR CHEST AP PORTABLE    CLINICAL HISTORY:  Chest Pain;    TECHNIQUE:  Single frontal view of the chest was  performed.    COMPARISON:  06/30/2022    FINDINGS:  Stable cardiomediastinal silhouette.  Minimal atelectatic stranding in the right lung base and slight tenting suggesting along the left heart margin.  No new or confluent infiltrate.  No pleural effusion.  Impression: No acute cardiopulmonary disease or significant change compared to the prior exam.    Electronically signed by: Darlene Beard MD  Date:    01/20/2023  Time:    14:50      [facility]  Results for orders placed during the hospital encounter of 06/20/22    Echo    Interpretation Summary  · The left ventricle is normal in size with mild concentric hypertrophy and normal systolic function.  · The estimated ejection fraction is 65%.  · Normal left ventricular diastolic function.  · Normal right ventricular size with normal right ventricular systolic function.  · The estimated PA systolic pressure is 27 mmHg.  · Normal central venous pressure (3 mmHg).      CURRENT/PREVIOUS VISIT EKG  Results for orders placed or performed during the hospital encounter of 01/16/23   EKG 12-lead    Collection Time: 01/16/23  7:54 AM    Narrative    Test Reason : R00.2,    Vent. Rate : 073 BPM     Atrial Rate : 073 BPM     P-R Int : 346 ms          QRS Dur : 090 ms      QT Int : 466 ms       P-R-T Axes : 073 034 220 degrees     QTc Int : 513 ms    Sinus rhythm with 1st degree A-V block  Possible Left atrial enlargement  ST and Marked T wave abnormality, consider anterolateral ischemia  Prolonged QT  Abnormal ECG  When compared with ECG of 30-JUN-2022 14:59,  Sinus rhythm has replaced Junctional rhythm  T wave inversion more evident in Inferior leads  Confirmed by Bello Puga MD ([id number]) on 1/18/2023 5:36:09 PM    Referred By: [email or web address]   SELF           Confirmed By:Bello Puga MD       ASSESSMENT/PLAN:     Active Hospital Problems    Diagnosis    *Elevated troponin    Mobitz I    Atherosclerosis of native coronary artery of native heart without angina pectoris    Prostate  cancer    Stage 3 chronic kidney disease, unspecified whether stage 3a or 3b CKD    Essential hypertension       ASSESSMENT & PLAN:   1. Acute diastolic heart failure with elevated BNP of 2135  2. Coronary artery disease status post percutaneous intervention 6 months ago  3. Insomnia and daytime somnolence and fatigue  4. Moderate concentric left ventricle hypertrophy  5. Moderate mitral regurgitation and aortic regurgitation   6. Essential hypertension uncontrolled        RECOMMENDATIONS:  1. Patient's troponins are nondiagnostic and are at the high end of normal.    2. Status post stent placement and coronary artery disease on Plavix and aspirin continue the same.    3. Elevated BNP secondary due to diastolic dysfunction and moderate mitral regurgitation and aortic regurgitation.  4. Patient has multiple problems including insomnia and increased frequency of micturition where he goes to the bathroom it is 4 times a night interrupted sleep and poor sleep hygiene.  5. Patient to follow-up with Dr. Lamb when discharged.          Bello Puga MD  [facility]  Department of Cardiology  Date of Service: 01/22/2023  10:56 AM

## 2023-01-23 ENCOUNTER — TELEPHONE (OUTPATIENT)
Dept: MEDSURG UNIT | Facility: HOSPITAL | Age: 78
End: 2023-01-23
Payer: MEDICARE

## 2023-01-23 NOTE — DISCHARGE SUMMARY
[facility] Hospital Medicine  Discharge Summary      Patient Name: Rajendra Castle  MRN: [id number]  CHARLES: [id number]  Patient Class: OP- Observation  Admission Date: 1/20/2023  Hospital Length of Stay: 0 days  Discharge Date and Time:  01/22/2023 6:30 PM  Attending Physician: No att. providers found   Discharging Provider: Lobito Andrea MD  Primary Care Provider: Sp Lilly MD    Primary Care Team: Networked reference to record PCT     HPI:   Mr. Castle presented to the ED complaining of feeling lightheaded.  Began earlier in the day and occurred intermittently.  The lightheadedness was not related to level of activity or change in position.  Associated symptoms included poor appetite and trouble breathing.  In addition, he had pain in the precordial region of his chest, worse with deep inspiration.  He had not felt these symptoms ever in the past.  No fevers, chills, nausea, vomiting, abdominal pain, or loss of consciousness.  He has had insomnia for the past six months due to restlessness, for which he sometimes has taken Ambien and trazodone.  Currently he complains of a headache.  He has HTN, rheumatoid arthritis, thyroid goiter, and prostate cancer.  Earlier this month, he was diagnosed with acute prostatitis and prescribed doxycycline, of which he is on roughly week 3 of 4.  He has coronary artery disease and underwent PCI in 2022.      * No surgery found *      Hospital Course:   Pt was put in observation on telemetry.  EKG's done on admission showed prolonged SC intervals, and one EKG showed Mobitz I.  Troponin levels were measured, and they were elevated but only mildly.  And they stayed flat.  Cardiologist consulted.  There was no need to perform provocative ischemic testing.  An echocardiogram was done, the interpretation of which was pending on discharge day.  Patient had good heart rates on the cardiac monitor, even though he was on carvedilol.  On discharge, I decided to  stop the carvedilol based on what was on the EKG's (the first degree and second degree blocks).  To compensate for the loss of carvedilol, I increased the amlodipine from 5 mg daily to 5 mg bid.  He complained to me of his chronic BPH and LUTS.  I switched his alfuzosin to tamsulosin.  He wished to change his regular urologist to someone else.  I gave him information on Dr. Jayne Faria, whom I believed at the time was taking new patients.    Physical Exam  Constitutional:       General: He is not in acute distress.     Appearance: He is not ill-appearing.   Neck:      Thyroid: No thyromegaly.      Vascular: No JVD.   Cardiovascular:      Rate and Rhythm: Normal rate and regular rhythm.      Heart sounds:     No gallop.   Pulmonary:      Effort: Pulmonary effort is normal.      Breath sounds: Normal breath sounds. No wheezing.        Goals of Care Treatment Preferences:  Code Status: Full Code      Consults:   Consults (From admission, onward)        Status Ordering Provider     Inpatient consult to Cardiology  Once        Provider:  Bello Puga MD    Acknowledged KENDALL JASSO          No new Assessment & Plan notes have been filed under this hospital service since the last note was generated.  Service: Hospital Medicine    Final Active Diagnoses:    Diagnosis Date Noted POA    PRINCIPAL PROBLEM:  Elevated troponin [R77.8] 08/18/2016 Yes    Mobitz I [I44.1] 01/21/2023 Yes    Atherosclerosis of native coronary artery of native heart without angina pectoris [I25.10] 04/25/2022 Yes    Prostate cancer [C61] 02/06/2019 Yes    Stage 3 chronic kidney disease, unspecified whether stage 3a or 3b CKD [N18.30]  Yes    Essential hypertension [I10] 08/18/2016 Yes      Problems Resolved During this Admission:       Discharged Condition: stable    Disposition: Home or Self Care    Follow Up:   Follow-up Information     Jayne Faria Jr, MD Follow up.    Specialty: Urology  Contact information:  [address] [place] [address]  [address]  [facility] [address]  [phone]                       Patient Instructions:      Diet Adult Regular     Activity as tolerated       Significant Diagnostic Studies:   Troponin I   Date Value Ref Range Status   01/21/2023 0.038 (H) 0.000 - 0.026 ng/mL Final     Comment:     The reference interval for Troponin I represents the 99th percentile   cutoff   for our facility and is consistent with 3rd generation assay   performance.     01/20/2023 0.039 (H) 0.000 - 0.026 ng/mL Final     Comment:     The reference interval for Troponin I represents the 99th percentile   cutoff   for our facility and is consistent with 3rd generation assay   performance.     01/20/2023 0.034 (H) 0.000 - 0.026 ng/mL Final     Comment:     The reference interval for Troponin I represents the 99th percentile   cutoff   for our facility and is consistent with 3rd generation assay   performance.     01/20/2023 0.042 (H) 0.000 - 0.026 ng/mL Final     Comment:     The reference interval for Troponin I represents the 99th percentile   cutoff   for our facility and is consistent with 3rd generation assay   performance.           Pending Diagnostic Studies:     Procedure Component Value Units Date/Time    EKG 12-lead [[id number]]     Order Status: Sent Lab Status: No result     Echo [[id number]]  (Abnormal) Resulted: 01/21/23 1246    Order Status: Sent Lab Status: In process Updated: 01/21/23 1247     BSA 1.78 m2      TDI SEPTAL 0.08 m/s      LV LATERAL E/E' RATIO 11.00 m/s      LV SEPTAL E/E' RATIO 13.75 m/s      IVC diameter 1.86 cm      Left Ventricular Outflow Tract Mean Velocity 0.66 cm/s      Left Ventricular Outflow Tract Mean Gradient 2.09 mmHg      Pulmonary Valve Mean Velocity 0.79 m/s      AORTIC VALVE CUSP SEPERATION 2.05 cm      TDI LATERAL 0.10 m/s      PV PEAK VELOCITY 1.03 cm/s      LVIDd 3.81 cm      IVS 1.29 cm      Posterior Wall 1.20 cm      Ao root annulus 3.06 cm      LVIDs 2.64 cm      FS 31 %      LV mass 162.62 g       LA size 3.61 cm      RVDD 2.19 cm      RV S' 0.01 cm/s      Left Ventricle Relative Wall Thickness 0.63 cm      AV regurgitation pressure 1/2 time 1,[id number] ms      AV mean gradient 3 mmHg      AV valve area 2.63 cm2      AV Velocity Ratio 0.78     AV index (prosthetic) 0.84     MV mean gradient 2 mmHg      MV valve area p 1/2 method 3.16 cm2      MV valve area by continuity eq 1.74 cm2      E/A ratio 2.24     Mean e' 0.09 m/s      E wave deceleration time 155.87 msec      LVOT diameter 2.00 cm      LVOT area 3.1 cm2      LVOT peak florentin 0.96 m/s      LVOT peak VTI 17.40 cm      Ao peak florentin 1.23 m/s      Ao VTI 20.8 cm      Vn Nyquist 0.25 m/s      Radius 0.24 cm      Mr max florentin 5.64 m/s      LVOT stroke volume 54.64 cm3      AV peak gradient 6 mmHg      MV peak gradient 6 mmHg      E/E' ratio 12.22 m/s      Vn Nyquist MS 0.25 m/s      MV Peak E Florentin 1.10 m/s      MR PISA EROA 0.02 cm2      AR Max Florentin 4.04 m/s      TR Max Florentin 2.48 m/s      MV VTI 31.4 cm      MV stenosis pressure 1/2 time 69.67 ms      MV Peak A Florentin 0.49 m/s      LV Systolic Volume 25.45 mL      LV Systolic Volume Index 14.3 mL/m2      LV Diastolic Volume 62.48 mL      LV Diastolic Volume Index 35.10 mL/m2      LV Mass Index 91 g/m2      RA Major Axis 4.56 cm      Left Atrium Minor Axis 5.32 cm      Left Atrium Major Axis 5.36 cm      Triscuspid Valve Regurgitation Peak Gradient 25 mmHg          Medications:  Reconciled Home Medications:      Medication List      START taking these medications    tamsulosin 0.4 mg Cap  Commonly known as: FLOMAX  Take 1 capsule (0.4 mg total) by mouth once daily.  Start taking on: January 23, 2023  Replaces: alfuzosin 10 mg Tb24        CHANGE how you take these medications    amLODIPine 5 MG tablet  Commonly known as: NORVASC  Take 1 tablet (5 mg total) by mouth 2 (two) times daily.  What changed: when to take this     pantoprazole 40 MG tablet  Commonly known as: PROTONIX  TAKE 1 TABLET(40 MG) BY MOUTH  TWICE DAILY  What changed: when to take this        CONTINUE taking these medications    aspirin 81 MG EC tablet  Commonly known as: ECOTRIN  Take 1 tablet (81 mg total) by mouth once daily.     atorvastatin 10 MG tablet  Commonly known as: LIPITOR  Take 10 mg by mouth nightly.     b complex vitamins capsule  Take 1 capsule by mouth once daily.     clopidogreL 75 mg tablet  Commonly known as: PLAVIX  Take 1 tablet (75 mg total) by mouth once daily.     doxycycline 100 MG capsule  Commonly known as: MONODOX  Take 1 capsule (100 mg total) by mouth 2 (two) times daily.     folic acid 1 MG tablet  Commonly known as: FOLVITE  Take 1 tablet (1 mg total) by mouth once daily.     gabapentin 300 MG capsule  Commonly known as: NEURONTIN  Take 300 mg by mouth 3 (three) times daily.     isosorbide mononitrate 30 MG 24 hr tablet  Commonly known as: IMDUR  Take 1 tablet (30 mg total) by mouth once daily.     methotrexate 2.5 MG Tab  Take 6 tablets (15 mg total) by mouth every 7 days. TAKE 6 TABLETS BY MOUTH EVERY WEEK     traMADoL 50 mg tablet  Commonly known as: ULTRAM  Take 2 tablets (100 mg total) by mouth 2 (two) times daily as needed for Pain.     traZODone 150 MG tablet  Commonly known as: DESYREL  Take 1 tablet (150 mg total) by mouth every evening.     zolpidem 5 MG Tab  Commonly known as: AMBIEN  Take 1 tablet (5 mg total) by mouth nightly as needed (insomnia).        STOP taking these medications    alfuzosin 10 mg Tb24  Commonly known as: UROXATRAL  Replaced by: tamsulosin 0.4 mg Cap     carvediloL 12.5 MG tablet  Commonly known as: COREG            Indwelling Lines/Drains at time of discharge:   Lines/Drains/Airways     None                 Time spent on the discharge of patient: 32 minutes         Lobito Andrea MD  Department of Hospital Medicine  [facility]

## 2023-01-23 NOTE — HOSPITAL COURSE
Pt was put in observation on telemetry.  EKG's done on admission showed prolonged PA intervals, and one EKG showed Mobitz I.  Troponin levels were measured, and they were elevated but only mildly.  And they stayed flat.  Cardiologist consulted.  There was no need to perform provocative ischemic testing.  An echocardiogram was done, the interpretation of which was pending on discharge day.  Patient had good heart rates on the cardiac monitor, even though he was on carvedilol.  On discharge, I decided to stop the carvedilol based on what was on the EKG's (the first degree and second degree blocks).  To compensate for the loss of carvedilol, I increased the amlodipine from 5 mg daily to 5 mg bid.  He complained to me of his chronic BPH and LUTS.  I switched his alfuzosin to tamsulosin.  He wished to change his regular urologist to someone else.  I gave him information on Dr. Jayne Faria, whom I believed at the time was taking new patients.    Physical Exam  Constitutional:       General: He is not in acute distress.     Appearance: He is not ill-appearing.   Neck:      Thyroid: No thyromegaly.      Vascular: No JVD.   Cardiovascular:      Rate and Rhythm: Normal rate and regular rhythm.      Heart sounds:     No gallop.   Pulmonary:      Effort: Pulmonary effort is normal.      Breath sounds: Normal breath sounds. No wheezing.

## 2023-01-24 ENCOUNTER — OFFICE VISIT (OUTPATIENT)
Dept: UROLOGY | Facility: CLINIC | Age: 78
End: 2023-01-24
Payer: MEDICARE

## 2023-01-24 ENCOUNTER — TELEPHONE (OUTPATIENT)
Dept: UROLOGY | Facility: CLINIC | Age: 78
End: 2023-01-24
Payer: MEDICARE

## 2023-01-24 VITALS — HEIGHT: 68 IN | BODY MASS INDEX: 21.22 KG/M2 | WEIGHT: 140 LBS

## 2023-01-24 DIAGNOSIS — N13.8 BENIGN PROSTATIC HYPERPLASIA WITH URINARY OBSTRUCTION: Primary | ICD-10-CM

## 2023-01-24 DIAGNOSIS — N35.912 BULBOUS URETHRAL STRICTURE: ICD-10-CM

## 2023-01-24 DIAGNOSIS — N32.81 OAB (OVERACTIVE BLADDER): ICD-10-CM

## 2023-01-24 DIAGNOSIS — C61 PROSTATE CANCER: ICD-10-CM

## 2023-01-24 DIAGNOSIS — N40.1 BENIGN PROSTATIC HYPERPLASIA WITH URINARY OBSTRUCTION: Primary | ICD-10-CM

## 2023-01-24 LAB
BILIRUBIN, UA POC OHS: NEGATIVE
BLOOD, UA POC OHS: ABNORMAL
CLARITY, UA POC OHS: CLEAR
COLOR, UA POC OHS: YELLOW
GLUCOSE, UA POC OHS: NEGATIVE
KETONES, UA POC OHS: NEGATIVE
LEUKOCYTES, UA POC OHS: NEGATIVE
NITRITE, UA POC OHS: NEGATIVE
PH, UA POC OHS: 5.5
PROTEIN, UA POC OHS: 100
SPECIFIC GRAVITY, UA POC OHS: >=1.03
UROBILINOGEN, UA POC OHS: 0.2

## 2023-01-24 PROCEDURE — 3288F FALL RISK ASSESSMENT DOCD: CPT | Mod: CPTII,S$GLB,, | Performed by: STUDENT IN AN ORGANIZED HEALTH CARE EDUCATION/TRAINING PROGRAM

## 2023-01-24 PROCEDURE — 3288F PR FALLS RISK ASSESSMENT DOCUMENTED: ICD-10-PCS | Mod: CPTII,S$GLB,, | Performed by: STUDENT IN AN ORGANIZED HEALTH CARE EDUCATION/TRAINING PROGRAM

## 2023-01-24 PROCEDURE — 1159F MED LIST DOCD IN RCRD: CPT | Mod: CPTII,S$GLB,, | Performed by: STUDENT IN AN ORGANIZED HEALTH CARE EDUCATION/TRAINING PROGRAM

## 2023-01-24 PROCEDURE — 1126F PR PAIN SEVERITY QUANTIFIED, NO PAIN PRESENT: ICD-10-PCS | Mod: CPTII,S$GLB,, | Performed by: STUDENT IN AN ORGANIZED HEALTH CARE EDUCATION/TRAINING PROGRAM

## 2023-01-24 PROCEDURE — 1101F PT FALLS ASSESS-DOCD LE1/YR: CPT | Mod: CPTII,S$GLB,, | Performed by: STUDENT IN AN ORGANIZED HEALTH CARE EDUCATION/TRAINING PROGRAM

## 2023-01-24 PROCEDURE — 1159F PR MEDICATION LIST DOCUMENTED IN MEDICAL RECORD: ICD-10-PCS | Mod: CPTII,S$GLB,, | Performed by: STUDENT IN AN ORGANIZED HEALTH CARE EDUCATION/TRAINING PROGRAM

## 2023-01-24 PROCEDURE — 1160F PR REVIEW ALL MEDS BY PRESCRIBER/CLIN PHARMACIST DOCUMENTED: ICD-10-PCS | Mod: CPTII,S$GLB,, | Performed by: STUDENT IN AN ORGANIZED HEALTH CARE EDUCATION/TRAINING PROGRAM

## 2023-01-24 PROCEDURE — 1160F RVW MEDS BY RX/DR IN RCRD: CPT | Mod: CPTII,S$GLB,, | Performed by: STUDENT IN AN ORGANIZED HEALTH CARE EDUCATION/TRAINING PROGRAM

## 2023-01-24 PROCEDURE — 99999 PR PBB SHADOW E&M-EST. PATIENT-LVL III: CPT | Mod: PBBFAC,,, | Performed by: STUDENT IN AN ORGANIZED HEALTH CARE EDUCATION/TRAINING PROGRAM

## 2023-01-24 PROCEDURE — 99214 OFFICE O/P EST MOD 30 MIN: CPT | Mod: S$GLB,,, | Performed by: STUDENT IN AN ORGANIZED HEALTH CARE EDUCATION/TRAINING PROGRAM

## 2023-01-24 PROCEDURE — 81003 URINALYSIS AUTO W/O SCOPE: CPT | Mod: QW,S$GLB,, | Performed by: STUDENT IN AN ORGANIZED HEALTH CARE EDUCATION/TRAINING PROGRAM

## 2023-01-24 PROCEDURE — 1101F PR PT FALLS ASSESS DOC 0-1 FALLS W/OUT INJ PAST YR: ICD-10-PCS | Mod: CPTII,S$GLB,, | Performed by: STUDENT IN AN ORGANIZED HEALTH CARE EDUCATION/TRAINING PROGRAM

## 2023-01-24 PROCEDURE — 99999 PR PBB SHADOW E&M-EST. PATIENT-LVL III: ICD-10-PCS | Mod: PBBFAC,,, | Performed by: STUDENT IN AN ORGANIZED HEALTH CARE EDUCATION/TRAINING PROGRAM

## 2023-01-24 PROCEDURE — 1126F AMNT PAIN NOTED NONE PRSNT: CPT | Mod: CPTII,S$GLB,, | Performed by: STUDENT IN AN ORGANIZED HEALTH CARE EDUCATION/TRAINING PROGRAM

## 2023-01-24 PROCEDURE — 99214 PR OFFICE/OUTPT VISIT, EST, LEVL IV, 30-39 MIN: ICD-10-PCS | Mod: S$GLB,,, | Performed by: STUDENT IN AN ORGANIZED HEALTH CARE EDUCATION/TRAINING PROGRAM

## 2023-01-24 PROCEDURE — 81003 POCT URINALYSIS(INSTRUMENT): ICD-10-PCS | Mod: QW,S$GLB,, | Performed by: STUDENT IN AN ORGANIZED HEALTH CARE EDUCATION/TRAINING PROGRAM

## 2023-01-24 RX ORDER — OXYBUTYNIN CHLORIDE 5 MG/1
5 TABLET, EXTENDED RELEASE ORAL DAILY
Qty: 30 TABLET | Refills: 11 | Status: SHIPPED | OUTPATIENT
Start: 2023-01-24 | End: 2023-07-18

## 2023-01-24 NOTE — PROGRESS NOTES
[place] - Urology   Clinic Note    Subjective:     Chief Complaint: Urinary Retention and Benign Prostatic Hypertrophy      History of Present Illness:  Rajendra Castle is a 77 y.o. male who presents to clinic for evaluation and management of BPH and LUTS as well as prostate cancer. He is new to our clinic but previously was seen by Dr. Pina.    He reports urgency, hesitancy, and sensation of incomplete emptying with leakage and dribbling. He wears diapers at night for mild leakage. Nocturia 3-4x. He has ISABEL and CPAP. He also reports insomnia. He reports dysuria but no gross hematuria. Previously he was on alfuzosin, but was changed during his most recent hospitalization to flomax. He reports improvement in symptoms. He has a history of prostate cancer s/p EBRT.     Cysto/TRUS with Dr. Pina 7/202 op report was reviewed showing a prostate size of 23 cc, as well as a small short-segment bulbar urethral stricture just distal to the sphincter which was passable with the flexible scope. Prostatic lateral lobe obstruction, more anterior lateral lobe ingrowth proximally with mild elevation of the bladder neck, and more classic lateral lobe distally.    IPSS Questionnaire (AUA-SS):  1)  Incomplete Emptying 4 - More than half the time   2)  Frequency 3 - About half the time   3)  Intermittency 4 - More than half the time   4) Urgency 3 - About half the time   5) Weak Stream  5 - Almost always   6) Straining 4 - More than half the time   7) Nocturia 3 - 3 times   Total score:  0-7 mild, 8-19 mod, 20-35 severe 27   Quality of Life:  5 - Unhappy        PVR was measured and noted to be 11 mL.    He has multiple cardiorespiratory issues for which his urolift procedure with Dr. Pina was postponed for clearance. Recently admitted for chest pain and discomfort with high blood pressure.     Past medical, family, surgical and social history reviewed as documented in chart with pertinent positive medical, family, surgical and  "social history detailed in HPI.    A review systems was conducted with pertinent positive and negative findings documented in HPI.    Anticoagulation/Antiplatelets:  Yes aspirin and plavix    Objective:     Estimated body mass index is 21.29 kg/m² as calculated from the following:    Height as of this encounter: 5' 8" (1.727 m).    Weight as of this encounter: 63.5 kg (139 lb 15.9 oz).    Vital Signs (Most Recent)       Physical Exam  Vitals and nursing note reviewed.   Constitutional:       General: He is not in acute distress.     Appearance: Normal appearance. He is well-developed. He is not ill-appearing or toxic-appearing.   Pulmonary:      Effort: Pulmonary effort is normal. No accessory muscle usage or respiratory distress.   Neurological:      Mental Status: He is alert.   Psychiatric:         Behavior: Behavior is cooperative.       Lab Results   Component Value Date    BUN 14 01/20/2023    CREATININE 1.5 (H) 01/20/2023    WBC 6.74 01/20/2023    HGB 13.7 (L) 01/20/2023    HCT 42.5 01/20/2023     01/20/2023    AST 18 01/20/2023    ALT 27 01/20/2023    ALKPHOS 61 01/20/2023    ALBUMIN 3.6 01/20/2023    HGBA1C 5.9 06/21/2022        Lab Results   Component Value Date    PSA 4.8 (H) 03/16/2018    PSA 2.1 10/03/2013    PSA 2.73 08/06/2012    PSA 1.8 10/03/2006    PSADIAG 0.10 11/14/2022    PSADIAG 0.14 02/28/2022    PSADIAG 0.11 10/29/2021    PSADIAG 0.08 07/13/2021    PSADIAG 0.04 03/29/2021    PSADIAG 0.07 10/02/2020    PSADIAG 0.30 12/20/2019    PSADIAG 0.86 08/12/2019    PSADIAG 3.4 10/14/2015    PSADIAG 4.1 (H) 04/06/2015    PSAFREE 0.01 06/24/2020    PSAFREE 0.22 11/09/2018    PSAFREE 0.34 04/25/2018    PSAFREEPCT 9.09 06/24/2020    PSAFREEPCT 9.17 11/09/2018    PSAFREEPCT 11.72 04/25/2018      Assessment:     1. Benign prostatic hyperplasia with urinary obstruction    2. OAB (overactive bladder)    3. Bulbous urethral stricture    4. Prostate cancer      Plan:     He has a complicated urologic " history and I believe his symptoms are multifactorial.  Discussed bladder physiology and function including bladder outlet and overactivity of the bladder.  We discussed the urethral stricture as a possible obstruction contributing to symptoms.  PVR is minimal.    - Continue Flomax  - Start Ditropan XL.  Discussed side effects including dry eyes dry mouth and constipation in treating the side effects.  If he can not tolerate the medication he should let us know or stop it.   - Plan for cystoscopy to evaluate for residual stricture disease  - We will re-evaluate symptoms at that time, if he continues to have significant symptoms despite Ditropan and there is no anatomic abnormality on cystoscopy, then I recommend a urodynamic evaluation given his history of radiation.    Timo Myers MD

## 2023-01-26 ENCOUNTER — HOSPITAL ENCOUNTER (EMERGENCY)
Facility: HOSPITAL | Age: 78
Discharge: PSYCHIATRIC OR SUBSTANCE USE FACILITY | End: 2023-01-26
Attending: EMERGENCY MEDICINE
Payer: MEDICARE

## 2023-01-26 VITALS
SYSTOLIC BLOOD PRESSURE: 176 MMHG | HEIGHT: 68 IN | TEMPERATURE: 99 F | WEIGHT: 140 LBS | RESPIRATION RATE: 17 BRPM | HEART RATE: 83 BPM | BODY MASS INDEX: 21.22 KG/M2 | OXYGEN SATURATION: 97 % | DIASTOLIC BLOOD PRESSURE: 88 MMHG

## 2023-01-26 DIAGNOSIS — R45.851 SUICIDAL IDEATION: Primary | ICD-10-CM

## 2023-01-26 DIAGNOSIS — G47.00 INSOMNIA, UNSPECIFIED TYPE: ICD-10-CM

## 2023-01-26 LAB
ALBUMIN SERPL BCP-MCNC: 3.6 G/DL (ref 3.5–5.2)
ALP SERPL-CCNC: 57 U/L (ref 55–135)
ALT SERPL W/O P-5'-P-CCNC: 16 U/L (ref 10–44)
AMPHET+METHAMPHET UR QL: NEGATIVE
ANION GAP SERPL CALC-SCNC: 8 MMOL/L (ref 8–16)
APAP SERPL-MCNC: <3 UG/ML (ref 10–20)
AST SERPL-CCNC: 17 U/L (ref 10–40)
BARBITURATES UR QL SCN>200 NG/ML: NEGATIVE
BASOPHILS # BLD AUTO: 0.01 K/UL (ref 0–0.2)
BASOPHILS NFR BLD: 0.2 % (ref 0–1.9)
BENZODIAZ UR QL SCN>200 NG/ML: NEGATIVE
BILIRUB SERPL-MCNC: 1 MG/DL (ref 0.1–1)
BILIRUB UR QL STRIP: NEGATIVE
BUN SERPL-MCNC: 11 MG/DL (ref 8–23)
BZE UR QL SCN: NEGATIVE
CALCIUM SERPL-MCNC: 9 MG/DL (ref 8.7–10.5)
CANNABINOIDS UR QL SCN: ABNORMAL
CHLORIDE SERPL-SCNC: 112 MMOL/L (ref 95–110)
CLARITY UR: CLEAR
CO2 SERPL-SCNC: 20 MMOL/L (ref 23–29)
COLOR UR: YELLOW
CREAT SERPL-MCNC: 1.2 MG/DL (ref 0.5–1.4)
CREAT UR-MCNC: 149.3 MG/DL (ref 23–375)
DIFFERENTIAL METHOD: ABNORMAL
EOSINOPHIL # BLD AUTO: 0.1 K/UL (ref 0–0.5)
EOSINOPHIL NFR BLD: 0.8 % (ref 0–8)
ERYTHROCYTE [DISTWIDTH] IN BLOOD BY AUTOMATED COUNT: 16.2 % (ref 11.5–14.5)
EST. GFR  (NO RACE VARIABLE): >60 ML/MIN/1.73 M^2
ETHANOL SERPL-MCNC: <10 MG/DL
GLUCOSE SERPL-MCNC: 134 MG/DL (ref 70–110)
GLUCOSE UR QL STRIP: NEGATIVE
HCT VFR BLD AUTO: 43.8 % (ref 40–54)
HGB BLD-MCNC: 14 G/DL (ref 14–18)
HGB UR QL STRIP: NEGATIVE
IMM GRANULOCYTES # BLD AUTO: 0.01 K/UL (ref 0–0.04)
IMM GRANULOCYTES NFR BLD AUTO: 0.2 % (ref 0–0.5)
KETONES UR QL STRIP: NEGATIVE
LEUKOCYTE ESTERASE UR QL STRIP: NEGATIVE
LYMPHOCYTES # BLD AUTO: 0.7 K/UL (ref 1–4.8)
LYMPHOCYTES NFR BLD: 10.1 % (ref 18–48)
MCH RBC QN AUTO: 28.7 PG (ref 27–31)
MCHC RBC AUTO-ENTMCNC: 32 G/DL (ref 32–36)
MCV RBC AUTO: 90 FL (ref 82–98)
METHADONE UR QL SCN>300 NG/ML: NEGATIVE
MONOCYTES # BLD AUTO: 0.4 K/UL (ref 0.3–1)
MONOCYTES NFR BLD: 6.8 % (ref 4–15)
NEUTROPHILS # BLD AUTO: 5.3 K/UL (ref 1.8–7.7)
NEUTROPHILS NFR BLD: 81.9 % (ref 38–73)
NITRITE UR QL STRIP: NEGATIVE
NRBC BLD-RTO: 0 /100 WBC
OPIATES UR QL SCN: NEGATIVE
PCP UR QL SCN>25 NG/ML: NEGATIVE
PH UR STRIP: 7 [PH] (ref 5–8)
PLATELET # BLD AUTO: 245 K/UL (ref 150–450)
PMV BLD AUTO: 12.6 FL (ref 9.2–12.9)
POTASSIUM SERPL-SCNC: 3.9 MMOL/L (ref 3.5–5.1)
PROT SERPL-MCNC: 7.1 G/DL (ref 6–8.4)
PROT UR QL STRIP: NEGATIVE
RBC # BLD AUTO: 4.87 M/UL (ref 4.6–6.2)
SARS-COV-2 RDRP RESP QL NAA+PROBE: NEGATIVE
SODIUM SERPL-SCNC: 140 MMOL/L (ref 136–145)
SP GR UR STRIP: 1.01 (ref 1–1.03)
TOXICOLOGY INFORMATION: ABNORMAL
TSH SERPL DL<=0.005 MIU/L-ACNC: 0.9 UIU/ML (ref 0.4–4)
URN SPEC COLLECT METH UR: NORMAL
UROBILINOGEN UR STRIP-ACNC: NEGATIVE EU/DL
WBC # BLD AUTO: 6.44 K/UL (ref 3.9–12.7)

## 2023-01-26 PROCEDURE — 99285 EMERGENCY DEPT VISIT HI MDM: CPT

## 2023-01-26 PROCEDURE — G0426 INPT/ED TELECONSULT50: HCPCS | Mod: 95,,, | Performed by: PSYCHIATRY & NEUROLOGY

## 2023-01-26 PROCEDURE — 80307 DRUG TEST PRSMV CHEM ANLYZR: CPT | Performed by: EMERGENCY MEDICINE

## 2023-01-26 PROCEDURE — 80143 DRUG ASSAY ACETAMINOPHEN: CPT | Performed by: EMERGENCY MEDICINE

## 2023-01-26 PROCEDURE — 36415 COLL VENOUS BLD VENIPUNCTURE: CPT | Performed by: EMERGENCY MEDICINE

## 2023-01-26 PROCEDURE — 84443 ASSAY THYROID STIM HORMONE: CPT | Performed by: EMERGENCY MEDICINE

## 2023-01-26 PROCEDURE — 80053 COMPREHEN METABOLIC PANEL: CPT | Performed by: EMERGENCY MEDICINE

## 2023-01-26 PROCEDURE — 85025 COMPLETE CBC W/AUTO DIFF WBC: CPT | Performed by: EMERGENCY MEDICINE

## 2023-01-26 PROCEDURE — G0426 PR INPT TELEHEALTH CONSULT 50M: ICD-10-PCS | Mod: 95,,, | Performed by: PSYCHIATRY & NEUROLOGY

## 2023-01-26 PROCEDURE — 82077 ASSAY SPEC XCP UR&BREATH IA: CPT | Performed by: EMERGENCY MEDICINE

## 2023-01-26 PROCEDURE — U0002 COVID-19 LAB TEST NON-CDC: HCPCS | Performed by: EMERGENCY MEDICINE

## 2023-01-26 PROCEDURE — 81003 URINALYSIS AUTO W/O SCOPE: CPT | Mod: 59 | Performed by: EMERGENCY MEDICINE

## 2023-01-26 NOTE — CONSULTS
"  Consults  Consult Start Time: 01/26/2023 13:55 CST  Consult End Time: 01/26/2023 14:50 CST      Tele-Consultation to Emergency Department from Psychiatry    Patient agreeable to consultation via telepsychiatry.    Start time of consultation: 1:55 pm    The chief complaint leading to psychiatric consultation is: SI  This consultation is from the Emergency Department attending physician Dr. Matheus Tee.   The location of the consulting psychiatrist is [address] [place] [address].  The patient location is [facility].     Patient Identification:  Rajendra Castle is a 77 y.o. male.    Patient information was obtained from patient.    History of Present Illness:    From current presentation:  "Rajendra Castle is a 77 y.o. male who presents to the ED with an onset of difficulty sleeping for the past 5 months. He states he gets about an hour of sleep every once in a while. Patient has thoughts of self harm and states "he was afraid he would overdose on his sleep medication" this morning. He has been anxious due to the lack of sleep. Patient has tried Trazadone and Melatonin in the past with no relief. He is currently on Ambien and has a CPAP machine but has no relief still. The patient denies trouble breathing or any other symptoms at this time. He has a PMHx of heart murmur, HTN, and vitamin D deficiency. He has a PSHx of parathyroidectomy. He has Hx of psych hospitalization."    On interview by me today:  Insomnia for month. Thought of suicide this morning[to overdose on pills].  Switched from Trazodone to Ambien about 5 days ago.  Denies HI/AH's/paranoid feelings  Prostate is troublesome.  Denies alcohol use. Smokes marijuana occasionally.    Niece Collett, [phone]: saw pt. Yesterday, does not see pt. Often; pt. Is different from how he used to be; cries more; has pushed people away; pt. Is isolated; no friends; pt. Has not expressed SI to teresa;    From [place] :  Filled  Drug  QTY  Days  Prescriber  "    01/23/2023 Tramadol Hcl 50 Mg Tablet 120.00 30 Ph Sed    01/18/2023 Zolpidem Tartrate 5 Mg Tablet 90.00 90 St Jj    11/09/2022 Zolpidem Tartrate 5 Mg Tablet 4.00 4 Ha Jayden    10/15/2022 Tramadol Hcl 50 Mg Tablet 120.00 30 Ph Sed    08/24/2022 Tramadol Hcl 50 Mg Tablet 120.00 30 Ph Sed    06/13/2022 Gabapentin 300 Mg Capsule 90.00 30 Da Kvng    05/26/2022 Temazepam 30 Mg Capsule 30.00 30 Se Bar    04/18/2022 Tramadol Hcl 50 Mg Tablet 120.00 30 Ph Sed    02/22/2022 Tramadol Hcl 50 Mg Tablet 120.00 30 Ph Sed    12/03/2021 Tramadol Hcl 50 Mg Tablet 120.00 30 Je Shubham    08/11/2021 Hydrocodone-Acetamin 7.5-325 12.00 3 Ma Hab    05/28/2021 Hydrocodone-Acetamin 7.5-325 28.00 7 Ro But    05/13/2021 Gabapentin 300 Mg Capsule 90.00 30 Ro But    04/08/2021 Lorazepam 0.5 Mg Tablet 30.00 30 Ke Sum    04/06/2021 Tramadol Hcl 50 Mg Tablet 120.00 30 Ph Sed       Psychiatric History:   Please see telepsych consult from 06/07/22.  Hospitalization: one, in June of last year  Medication Trials: Trazodone  Suicide Attempts: denies  Violence: denies    Review of Systems:  Feels good physically    Past Medical History:   Past Medical History:   Diagnosis Date    Arthritis     DDD (degenerative disc disease), cervical     Degenerative disc disease     Heart murmur     Hypertension     Nontoxic multinodular goiter 9/20/2016    Prostate CA     RA (rheumatoid arthritis)     Vitamin D insufficiency 9/30/2016      Allergies: nkda  Review of patient's allergies indicates:  No Known Allergies    Medications in ER: Medications - No data to display    Legal History:   Pending charges: denies    Social History:   History of Physical/Sexual Abuse: denies  Employment/Disability: retired  Financial: adequate resources  Relationship Status/Sexual Orientation: currently not in a relationship  Children: 2 daughters, 1 son  Housing Status: lives alone, no pet  P[employer]ism: believes in God   History: denies  Recreational Activities: watching football,  "reading  Access to Gun: has one, for protection    Current Evaluation:     Constitutional  Vitals:  Vitals:    01/26/23 0746   BP: (!) 179/84   Pulse: 98   Resp: 17   Temp: 99.3 °F (37.4 °C)   TempSrc: Oral   SpO2: 97%   Weight: 63.5 kg (140 lb)   Height: 5' 8" (1.727 m)      General:  unremarkable, age appropriate     Musculoskeletal  Muscle Strength/Tone:   moving arms normally   Gait & Station:   sitting on stretcher     Psychiatric  Level of Consciousness: alert  Orientation: oriented to person, place and time  Grooming: in hospital gown  Psychomotor Behavior: no agitation  Speech: normal in rate, rhythm and volume  Language: uses words appropriately  Mood: has been depressed  Affect: appropriate  Thought Process: logical, goal directed  Associations: intact  Thought Content: presented with SI, denies HI  Memory: grossly intact  Attention: intact to interview  Insight: appears fair  Judgement: appears fair    Relevant Elements of Neurological Exam: no abnormality of posture noted    Assessment - Diagnosis - Goals:     Diagnosis/Impression:   SI  Insomnia  Depressive d/o, unspecified  Marijuana Use  Urine tox positive for THC  EKG today abnormal[with QTc 499], not yet officially read    Case d/w Dr. Tee.    Rec:  - medical clearance  - PEC and psychiatric hospitalization  - no standing psychotropic medication for now  - Benadryl/Ativan PRN for agitation  - would avoid antipsychotic medication due QTc of 499    Total time, including chart review, interview of the patient, obtaining collateral info[if possible]: 55 min    Laboratory Data:   Labs Reviewed   CBC W/ AUTO DIFFERENTIAL - Abnormal; Notable for the following components:       Result Value    RDW 16.2 (*)     Lymph # 0.7 (*)     Gran % 81.9 (*)     Lymph % 10.1 (*)     All other components within normal limits   COMPREHENSIVE METABOLIC PANEL - Abnormal; Notable for the following components:    Chloride 112 (*)     CO2 20 (*)     Glucose 134 (*)     All " other components within normal limits   DRUG SCREEN PANEL, URINE EMERGENCY - Abnormal; Notable for the following components:    THC Presumptive Positive (*)     All other components within normal limits    Narrative:     Specimen Source->Urine   ACETAMINOPHEN LEVEL - Abnormal; Notable for the following components:    Acetaminophen (Tylenol), Serum <3.0 (*)     All other components within normal limits   TSH   URINALYSIS, REFLEX TO URINE CULTURE    Narrative:     Specimen Source->Urine   ALCOHOL,MEDICAL (ETHANOL)   SARS-COV-2 RNA AMPLIFICATION, QUAL

## 2023-01-26 NOTE — ED PROVIDER NOTES
"Encounter Date: 1/26/2023    SCRIBE #1 NOTE: I, Carmenmelo Mann, am scribing for, and in the presence of,  Matheus Tee MD.     History     Chief Complaint   Patient presents with    Insomnia     States has been unable to sleep for the past several months    Fatigue     Time seen by provider: 8:33 AM on 01/26/2023    Rajendra Castle is a 77 y.o. male who presents to the ED with an onset of difficulty sleeping for the past 5 months. He states he gets about an hour of sleep every once in a while. Patient has thoughts of self harm and states "he was afraid he would overdose on his sleep medication" this morning. He has been anxious due to the lack of sleep. Patient has tried Trazadone and Melatonin in the past with no relief. He is currently on Ambien and has a CPAP machine but has no relief still. The patient denies trouble breathing or any other symptoms at this time. He has a PMHx of heart murmur, HTN, and vitamin D deficiency. He has a PSHx of parathyroidectomy. He has Hx of psych hospitalization.     The history is provided by the patient.   Review of patient's allergies indicates:  No Known Allergies  Past Medical History:   Diagnosis Date    Arthritis     DDD (degenerative disc disease), cervical     Degenerative disc disease     Heart murmur     Hypertension     Nontoxic multinodular goiter 9/20/2016    Prostate CA     RA (rheumatoid arthritis)     Vitamin D insufficiency 9/30/2016     Past Surgical History:   Procedure Laterality Date    CARPAL TUNNEL RELEASE Right 5/28/2021    Procedure: RELEASE, CARPAL TUNNEL;  Surgeon: Jose Ryan II, MD;  Location: [facility] OR;  Service: Orthopedics;  Laterality: Right;    COLONOSCOPY  prior to 2016    normal findings per patient report    CYSTOSCOPY N/A 12/18/2018    Procedure: CYSTOSCOPY;  Surgeon: Garrett Pina MD;  Location: [facility] OR;  Service: Urology;  Laterality: N/A;    CYSTOSCOPY N/A 7/12/2022    Procedure: CYSTOSCOPY;  Surgeon: Garrett Pina MD;  " Location: [facility] OR;  Service: Urology;  Laterality: N/A;    ESOPHAGOGASTRODUODENOSCOPY N/A 2020    Dr. Espinosa; empiric dilation; erythematous mucosa in antrum; gastric mucosal atrophy; hematin in entire stomach; biopsy: mid & distal esophagus WNL, stomach- WNL, negative for h pylori    PARATHYROIDECTOMY Right 10/27/2020    Procedure: PARATHYROIDECTOMY;  Surgeon: Latonia Clayton MD;  Location: [address];  Service: ENT;  Laterality: Right;    RELEASE OF ULNAR NERVE AT CUBITAL TUNNEL Right 2021    Procedure: RELEASE, ULNAR TUNNEL;  Surgeon: Jose Ryan II, MD;  Location: [facility] OR;  Service: Orthopedics;  Laterality: Right;    TRANSRECTAL BIOPSY OF PROSTATE WITH ULTRASOUND GUIDANCE N/A 2018    Procedure: BIOPSY, PROSTATE, RECTAL APPROACH, WITH US GUIDANCE;  Surgeon: Garrett Pina MD;  Location: [facility] OR;  Service: Urology;  Laterality: N/A;    TRANSRECTAL ULTRASOUND EXAMINATION N/A 2022    Procedure: ULTRASOUND, RECTAL APPROACH;  Surgeon: Garrett Pina MD;  Location: [facility];  Service: Urology;  Laterality: N/A;     Family History   Problem Relation Age of Onset    Heart disease Mother     Stroke Father     Drug abuse Sister     No Known Problems Daughter     No Known Problems Daughter     No Known Problems Son     Diabetes Maternal Uncle     Colon cancer Neg Hx     Crohn's disease Neg Hx     Esophageal cancer Neg Hx     Stomach cancer Neg Hx     Ulcerative colitis Neg Hx      Social History     Tobacco Use    Smoking status: Former     Packs/day: 0.25     Years: 10.00     Pack years: 2.50     Types: Cigarettes     Quit date: 2001     Years since quittin.4     Passive exposure: Past    Smokeless tobacco: Never   Substance Use Topics    Alcohol use: Yes     Comment: seldom    Drug use: Yes     Frequency: 8.0 times per week     Types: Marijuana     Review of Systems   Constitutional:  Negative for fever.   HENT:  Negative for sore throat.    Respiratory:  Negative  for shortness of breath.    Cardiovascular:  Negative for chest pain.   Gastrointestinal:  Negative for nausea.   Genitourinary:  Negative for dysuria.   Musculoskeletal:  Negative for back pain.   Skin:  Negative for rash.   Neurological:  Negative for weakness.   Hematological:  Does not bruise/bleed easily.   Psychiatric/Behavioral:  Positive for sleep disturbance and suicidal ideas. The patient is nervous/anxious.      Physical Exam     Initial Vitals [01/26/23 0746]   BP Pulse Resp Temp SpO2   (!) 179/84 98 17 99.3 °F (37.4 °C) 97 %      MAP       --         Physical Exam    Nursing note and vitals reviewed.  Constitutional: Vital signs are normal. He appears well-developed and well-nourished.  Non-toxic appearance. No distress.   HENT:   Head: Normocephalic and atraumatic.   Eyes: EOM are normal. Pupils are equal, round, and reactive to light.   Neck: Neck supple. No JVD present.   Normal range of motion.  Cardiovascular:  Normal rate, regular rhythm, normal heart sounds and intact distal pulses.     Exam reveals no gallop and no friction rub.       No murmur heard.  Pulmonary/Chest: Breath sounds normal. He has no wheezes. He has no rhonchi. He has no rales.   Abdominal: Abdomen is soft. Bowel sounds are normal. There is no abdominal tenderness. There is no rebound and no guarding.   Musculoskeletal:         General: Normal range of motion.      Cervical back: Normal range of motion and neck supple. No rigidity.     Neurological: He is alert and oriented to person, place, and time. He has normal strength and normal reflexes. No cranial nerve deficit or sensory deficit. He exhibits normal muscle tone. Coordination normal. GCS eye subscore is 4. GCS verbal subscore is 5. GCS motor subscore is 6.   Skin: Skin is warm and dry.   Psychiatric: His speech is normal. His mood appears anxious. He is agitated. He is not actively hallucinating. He expresses suicidal ideation. He expresses suicidal plans (overdose).        ED Course   Procedures  Labs Reviewed   CBC W/ AUTO DIFFERENTIAL - Abnormal; Notable for the following components:       Result Value    RDW 16.2 (*)     Lymph # 0.7 (*)     Gran % 81.9 (*)     Lymph % 10.1 (*)     All other components within normal limits   COMPREHENSIVE METABOLIC PANEL - Abnormal; Notable for the following components:    Chloride 112 (*)     CO2 20 (*)     Glucose 134 (*)     All other components within normal limits   DRUG SCREEN PANEL, URINE EMERGENCY - Abnormal; Notable for the following components:    THC Presumptive Positive (*)     All other components within normal limits    Narrative:     Specimen Source->Urine   ACETAMINOPHEN LEVEL - Abnormal; Notable for the following components:    Acetaminophen (Tylenol), Serum <3.0 (*)     All other components within normal limits   TSH   URINALYSIS, REFLEX TO URINE CULTURE    Narrative:     Specimen Source->Urine   ALCOHOL,MEDICAL (ETHANOL)   SARS-COV-2 RNA AMPLIFICATION, QUAL     EKG Readings: (Independently Interpreted)   Initial Reading: No STEMI.   Undetermined rhythm, 91 beats per minute with LVH and ST/T-wave inversions in anterolateral leads, QT prolonged at 499 milliseconds.  No significant change compared to previous EKGs January 21, 2023.     Imaging Results    None          Medications - No data to display  Medical Decision Making:   History:   Old Medical Records: I decided to obtain old medical records.  Initial Assessment:   This is an emergent evaluation for psychiatric evaluation.  The pt presents for evaluation of suicidal ideation/insomnia.    I feel the pt is danger to himself and pt was placed under PEC with direct observation.  Medical workup was initiated to evaluate for organic etiologies.  Pt's etoh level was neg  I do not believe the pt has metabolic encephalopathy, CVA, severe electrolyte derrangement, drug induced temporary psychosis.    Medically cleared for psych placement..      Clinical Tests:   Lab Tests: Ordered  and Reviewed        Scribe Attestation:   Scribe #1: I performed the above scribed service and the documentation accurately describes the services I performed. I attest to the accuracy of the note.         Medically cleared for psychiatry placement: 1/26/2023  2:43 PM       I, Randal Waggoner, personally performed the services described in this documentation. All medical record entries made by the scribe were at my direction and in my presence.  I have reviewed the chart and agree that the record reflects my personal performance and is accurate and complete. Matheus Tee MD.  10:38 AM 01/27/2023       DISCLAIMER: This note was prepared with txtr Direct voice recognition transcription software. Garbled syntax, mangled pronouns, and other bizarre constructions may be attributed to that software system.    Clinical Impression:   Final diagnoses:  [R45.851] Suicidal ideation (Primary)  [G47.00] Insomnia, unspecified type        ED Disposition Condition    Transfer to Psych Facility Stable          ED Prescriptions    None       Follow-up Information    None          Matheus Tee MD  01/27/23 1038

## 2023-01-26 NOTE — ED NOTES
Patient is resting in bed without any needs or questions at this time. Patient is calm and cooperative. Risk sitter is in the doorway with direct observation.

## 2023-01-27 LAB
AORTIC ROOT ANNULUS: 3.06 CM
AORTIC VALVE CUSP SEPERATION: 2.05 CM
AV INDEX (PROSTH): 0.84
AV MEAN GRADIENT: 3 MMHG
AV PEAK GRADIENT: 6 MMHG
AV REGURGITATION PRESSURE HALF TIME: 1086.79 MS
AV VALVE AREA: 2.63 CM2
AV VELOCITY RATIO: 0.78
BSA FOR ECHO PROCEDURE: 1.78 M2
CV ECHO LV RWT: 0.63 CM
DOP CALC AO PEAK VEL: 1.23 M/S
DOP CALC AO VTI: 20.8 CM
DOP CALC LVOT AREA: 3.1 CM2
DOP CALC LVOT DIAMETER: 2 CM
DOP CALC LVOT PEAK VEL: 0.96 M/S
DOP CALC LVOT STROKE VOLUME: 54.64 CM3
DOP CALC MV VTI: 31.4 CM
DOP CALCLVOT PEAK VEL VTI: 17.4 CM
E WAVE DECELERATION TIME: 155.87 MSEC
E/A RATIO: 2.24
E/E' RATIO: 12.22 M/S
ECHO LV POSTERIOR WALL: 1.2 CM (ref 0.6–1.1)
EJECTION FRACTION: 65 %
FRACTIONAL SHORTENING: 31 % (ref 28–44)
INTERVENTRICULAR SEPTUM: 1.29 CM (ref 0.6–1.1)
IVC DIAMETER: 1.86 CM
LA MAJOR: 5.36 CM
LA MINOR: 5.32 CM
LEFT ATRIUM SIZE: 3.61 CM
LEFT INTERNAL DIMENSION IN SYSTOLE: 2.64 CM (ref 2.1–4)
LEFT VENTRICLE DIASTOLIC VOLUME INDEX: 35.1 ML/M2
LEFT VENTRICLE DIASTOLIC VOLUME: 62.48 ML
LEFT VENTRICLE MASS INDEX: 91 G/M2
LEFT VENTRICLE SYSTOLIC VOLUME INDEX: 14.3 ML/M2
LEFT VENTRICLE SYSTOLIC VOLUME: 25.45 ML
LEFT VENTRICULAR INTERNAL DIMENSION IN DIASTOLE: 3.81 CM (ref 3.5–6)
LEFT VENTRICULAR MASS: 162.62 G
LV LATERAL E/E' RATIO: 11 M/S
LV SEPTAL E/E' RATIO: 13.75 M/S
LVOT MG: 2.09 MMHG
LVOT MV: 0.66 CM/S
MR PISA EROA: 0.02 CM2
MV MEAN GRADIENT: 2 MMHG
MV PEAK A VEL: 0.49 M/S
MV PEAK E VEL: 1.1 M/S
MV PEAK GRADIENT: 6 MMHG
MV STENOSIS PRESSURE HALF TIME: 69.67 MS
MV VALVE AREA BY CONTINUITY EQUATION: 1.74 CM2
MV VALVE AREA P 1/2 METHOD: 3.16 CM2
PISA AR MAX VEL: 4.04 M/S
PISA MRMAX VEL: 5.64 M/S
PISA RADIUS: 0.24 CM
PISA TR MAX VEL: 2.48 M/S
PISA VN NYQUIST MS: 0.25 M/S
PISA VN NYQUIST: 0.25 M/S
PV MV: 0.79 M/S
PV PEAK VELOCITY: 1.03 CM/S
RA MAJOR: 4.56 CM
RIGHT VENTRICULAR END-DIASTOLIC DIMENSION: 2.19 CM
RV TISSUE DOPPLER FREE WALL SYSTOLIC VELOCITY 1 (APICAL 4 CHAMBER VIEW): 0.01 CM/S
TDI LATERAL: 0.1 M/S
TDI SEPTAL: 0.08 M/S
TDI: 0.09 M/S
TR MAX PG: 25 MMHG

## 2023-02-06 ENCOUNTER — OFFICE VISIT (OUTPATIENT)
Dept: FAMILY MEDICINE | Facility: CLINIC | Age: 78
End: 2023-02-06
Payer: MEDICARE

## 2023-02-06 VITALS
WEIGHT: 137.56 LBS | BODY MASS INDEX: 20.85 KG/M2 | OXYGEN SATURATION: 94 % | HEIGHT: 68 IN | SYSTOLIC BLOOD PRESSURE: 136 MMHG | TEMPERATURE: 99 F | HEART RATE: 100 BPM | DIASTOLIC BLOOD PRESSURE: 68 MMHG

## 2023-02-06 DIAGNOSIS — N18.30 STAGE 3 CHRONIC KIDNEY DISEASE, UNSPECIFIED WHETHER STAGE 3A OR 3B CKD: ICD-10-CM

## 2023-02-06 DIAGNOSIS — R73.03 PREDIABETES: ICD-10-CM

## 2023-02-06 DIAGNOSIS — I10 ESSENTIAL HYPERTENSION: Primary | ICD-10-CM

## 2023-02-06 DIAGNOSIS — R64 CACHEXIA: ICD-10-CM

## 2023-02-06 DIAGNOSIS — G47.00 INSOMNIA, UNSPECIFIED TYPE: ICD-10-CM

## 2023-02-06 DIAGNOSIS — I25.111 ATHEROSCLEROSIS OF NATIVE CORONARY ARTERY OF NATIVE HEART WITH ANGINA PECTORIS WITH DOCUMENTED SPASM: ICD-10-CM

## 2023-02-06 DIAGNOSIS — F32.2 MAJOR DEPRESSIVE DISORDER, SINGLE EPISODE, SEVERE WITHOUT PSYCHOTIC FEATURES: ICD-10-CM

## 2023-02-06 DIAGNOSIS — E21.3 HYPERPARATHYROIDISM: ICD-10-CM

## 2023-02-06 PROCEDURE — 3075F SYST BP GE 130 - 139MM HG: CPT | Mod: CPTII,S$GLB,, | Performed by: NURSE PRACTITIONER

## 2023-02-06 PROCEDURE — 1159F MED LIST DOCD IN RCRD: CPT | Mod: CPTII,S$GLB,, | Performed by: NURSE PRACTITIONER

## 2023-02-06 PROCEDURE — 1126F AMNT PAIN NOTED NONE PRSNT: CPT | Mod: CPTII,S$GLB,, | Performed by: NURSE PRACTITIONER

## 2023-02-06 PROCEDURE — 3075F PR MOST RECENT SYSTOLIC BLOOD PRESS GE 130-139MM HG: ICD-10-PCS | Mod: CPTII,S$GLB,, | Performed by: NURSE PRACTITIONER

## 2023-02-06 PROCEDURE — 3288F FALL RISK ASSESSMENT DOCD: CPT | Mod: CPTII,S$GLB,, | Performed by: NURSE PRACTITIONER

## 2023-02-06 PROCEDURE — 1101F PT FALLS ASSESS-DOCD LE1/YR: CPT | Mod: CPTII,S$GLB,, | Performed by: NURSE PRACTITIONER

## 2023-02-06 PROCEDURE — 99214 OFFICE O/P EST MOD 30 MIN: CPT | Mod: S$GLB,,, | Performed by: NURSE PRACTITIONER

## 2023-02-06 PROCEDURE — 1126F PR PAIN SEVERITY QUANTIFIED, NO PAIN PRESENT: ICD-10-PCS | Mod: CPTII,S$GLB,, | Performed by: NURSE PRACTITIONER

## 2023-02-06 PROCEDURE — 99214 PR OFFICE/OUTPT VISIT, EST, LEVL IV, 30-39 MIN: ICD-10-PCS | Mod: S$GLB,,, | Performed by: NURSE PRACTITIONER

## 2023-02-06 PROCEDURE — 1159F PR MEDICATION LIST DOCUMENTED IN MEDICAL RECORD: ICD-10-PCS | Mod: CPTII,S$GLB,, | Performed by: NURSE PRACTITIONER

## 2023-02-06 PROCEDURE — 99999 PR PBB SHADOW E&M-EST. PATIENT-LVL III: CPT | Mod: PBBFAC,,, | Performed by: NURSE PRACTITIONER

## 2023-02-06 PROCEDURE — 3078F DIAST BP <80 MM HG: CPT | Mod: CPTII,S$GLB,, | Performed by: NURSE PRACTITIONER

## 2023-02-06 PROCEDURE — 3078F PR MOST RECENT DIASTOLIC BLOOD PRESSURE < 80 MM HG: ICD-10-PCS | Mod: CPTII,S$GLB,, | Performed by: NURSE PRACTITIONER

## 2023-02-06 PROCEDURE — 3288F PR FALLS RISK ASSESSMENT DOCUMENTED: ICD-10-PCS | Mod: CPTII,S$GLB,, | Performed by: NURSE PRACTITIONER

## 2023-02-06 PROCEDURE — 1101F PR PT FALLS ASSESS DOC 0-1 FALLS W/OUT INJ PAST YR: ICD-10-PCS | Mod: CPTII,S$GLB,, | Performed by: NURSE PRACTITIONER

## 2023-02-06 PROCEDURE — 99999 PR PBB SHADOW E&M-EST. PATIENT-LVL III: ICD-10-PCS | Mod: PBBFAC,,, | Performed by: NURSE PRACTITIONER

## 2023-02-06 RX ORDER — ESCITALOPRAM OXALATE 10 MG/1
10 TABLET ORAL DAILY
COMMUNITY
Start: 2023-01-30 | End: 2023-08-16 | Stop reason: SDUPTHER

## 2023-02-06 RX ORDER — TALC
3 POWDER (GRAM) TOPICAL NIGHTLY
COMMUNITY
Start: 2023-01-28 | End: 2023-04-17

## 2023-02-06 RX ORDER — TOLTERODINE 2 MG/1
2 CAPSULE, EXTENDED RELEASE ORAL DAILY
COMMUNITY
Start: 2023-01-27 | End: 2023-03-01

## 2023-02-06 RX ORDER — ALUMINUM HYDROXIDE, MAGNESIUM HYDROXIDE, AND DIMETHICONE 400; 400; 40 MG/5ML; MG/5ML; MG/5ML
SUSPENSION ORAL
COMMUNITY
Start: 2023-01-26 | End: 2023-02-06 | Stop reason: ALTCHOICE

## 2023-02-06 RX ORDER — TEMAZEPAM 15 MG/1
15 CAPSULE ORAL NIGHTLY PRN
COMMUNITY
Start: 2023-01-30 | End: 2023-05-26 | Stop reason: SDUPTHER

## 2023-02-06 RX ORDER — TEMAZEPAM 7.5 MG/1
15 CAPSULE ORAL NIGHTLY
COMMUNITY
Start: 2023-01-29 | End: 2023-02-06 | Stop reason: ALTCHOICE

## 2023-02-06 NOTE — PROGRESS NOTES
This dictation has been generated using Modal Fluency Dictation some phonetic errors may occur. Please contact author for clarification if needed.     Problem List Items Addressed This Visit       Essential hypertension - Primary    Prediabetes    Hyperparathyroidism    Current Assessment & Plan     Stable. Follows with renee Hoffman         Stage 3 chronic kidney disease, unspecified whether stage 3a or 3b CKD    Major depressive disorder, single episode, severe without psychotic features    Current Assessment & Plan     Recent admit. Stable and controlled. Insomnia is better.          Atherosclerosis of native coronary artery of native heart with angina pectoris with documented spasm    Current Assessment & Plan     Saw cardiology. Last admit 6/2022. Continue statin and asa.           Other Visit Diagnoses       Insomnia, unspecified type        Cachexia                   Hypertension stable and controlled continue current therapy  Prediabetes stable follows with Dr. Hoffman  Hyperparathyroidism stable follows with Dr. Hoffman.   CKD 3 stable and controlled continue current therapy.    Depression stable insomnia better.  Request med refill of Restoril from Psychiatry.  Aortic atherosclerosis continue aspirin follow-up with cardiology    Follow up in about 3 months (around 5/6/2023).    ________________________________________________________________  ________________________________________________________________      Chief Complaint   Patient presents with    Hospital Follow Up     History of present illness  This 77 y.o. presents today for complaint of hospital follow-up.  Recent psych admit insomnia meds changed.  Patient had Ambien and another benzodiazepine discontinued.  Restoril is currently helpful.  He has a follow-up with Psychiatry tomorrow.  Patient does have hypertension prediabetes hyperparathyroidism CKD 3 depression and aortic atherosclerosis.  Follows with appropriate specialist.      Past  Medical History:   Diagnosis Date    Arthritis     DDD (degenerative disc disease), cervical     Degenerative disc disease     Heart murmur     Hypertension     Nontoxic multinodular goiter 9/20/2016    Prostate CA     RA (rheumatoid arthritis)     Vitamin D insufficiency 9/30/2016       Past Surgical History:   Procedure Laterality Date    CARPAL TUNNEL RELEASE Right 5/28/2021    Procedure: RELEASE, CARPAL TUNNEL;  Surgeon: Jose Ryan II, MD;  Location: [facility];  Service: Orthopedics;  Laterality: Right;    COLONOSCOPY  prior to 2016    normal findings per patient report    CYSTOSCOPY N/A 12/18/2018    Procedure: CYSTOSCOPY;  Surgeon: Garrett Pina MD;  Location: [facility] OR;  Service: Urology;  Laterality: N/A;    CYSTOSCOPY N/A 7/12/2022    Procedure: CYSTOSCOPY;  Surgeon: Garrett Pina MD;  Location: [facility];  Service: Urology;  Laterality: N/A;    ESOPHAGOGASTRODUODENOSCOPY N/A 9/29/2020    Dr. Espinosa; empiric dilation; erythematous mucosa in antrum; gastric mucosal atrophy; hematin in entire stomach; biopsy: mid & distal esophagus WNL, stomach- WNL, negative for h pylori    PARATHYROIDECTOMY Right 10/27/2020    Procedure: PARATHYROIDECTOMY;  Surgeon: Latonia Clayton MD;  Location: [address];  Service: ENT;  Laterality: Right;    RELEASE OF ULNAR NERVE AT CUBITAL TUNNEL Right 5/28/2021    Procedure: RELEASE, ULNAR TUNNEL;  Surgeon: Jose Ryan II, MD;  Location: [facility] OR;  Service: Orthopedics;  Laterality: Right;    TRANSRECTAL BIOPSY OF PROSTATE WITH ULTRASOUND GUIDANCE N/A 12/18/2018    Procedure: BIOPSY, PROSTATE, RECTAL APPROACH, WITH US GUIDANCE;  Surgeon: Garrett Pian MD;  Location: [facility];  Service: Urology;  Laterality: N/A;    TRANSRECTAL ULTRASOUND EXAMINATION N/A 7/12/2022    Procedure: ULTRASOUND, RECTAL APPROACH;  Surgeon: Garrett Pina MD;  Location: [facility];  Service: Urology;  Laterality: N/A;       Family History   Problem Relation Age of Onset     Heart disease Mother     Stroke Father     Drug abuse Sister     No Known Problems Daughter     No Known Problems Daughter     No Known Problems Son     Diabetes Maternal Uncle     Colon cancer Neg Hx     Crohn's disease Neg Hx     Esophageal cancer Neg Hx     Stomach cancer Neg Hx     Ulcerative colitis Neg Hx        Social History     Socioeconomic History    Marital status: Single   Tobacco Use    Smoking status: Former     Packs/day: 0.25     Years: 10.00     Pack years: 2.50     Types: Cigarettes     Quit date: 2001     Years since quittin.5     Passive exposure: Past    Smokeless tobacco: Never   Substance and Sexual Activity    Alcohol use: Yes     Comment: seldom    Drug use: Yes     Frequency: 8.0 times per week     Types: Marijuana    Sexual activity: Yes     Partners: Female     Social Determinants of Health     Financial Resource Strain: Low Risk     Difficulty of Paying Living Expenses: Not hard at all   Food Insecurity: No Food Insecurity    Worried About Running Out of Food in the Last Year: Never true    Ran Out of Food in the Last Year: Never true   Transportation Needs: No Transportation Needs    Lack of Transportation (Medical): No    Lack of Transportation (Non-Medical): No   Physical Activity: Inactive    Days of Exercise per Week: 0 days    Minutes of Exercise per Session: 0 min   Stress: No Stress Concern Present    Feeling of Stress : Not at all   Social Connections: Unknown    Frequency of Communication with Friends and Family: More than three times a week    Frequency of Social Gatherings with Friends and Family: More than three times a week    Attends [employer] Services: Never    Active Member of Clubs or Organizations: No    Attends Club or Organization Meetings: Never   Housing Stability: Low Risk     Unable to Pay for Housing in the Last Year: No    Number of Places Lived in the Last Year: 1    Unstable Housing in the Last Year: No       Current Outpatient Medications    Medication Sig Dispense Refill    amLODIPine (NORVASC) 5 MG tablet Take 1 tablet (5 mg total) by mouth 2 (two) times daily. 60 tablet 3    aspirin (ECOTRIN) 81 MG EC tablet Take 1 tablet (81 mg total) by mouth once daily. 30 tablet 0    atorvastatin (LIPITOR) 10 MG tablet Take 10 mg by mouth nightly.      b complex vitamins capsule Take 1 capsule by mouth once daily.      clopidogreL (PLAVIX) 75 mg tablet Take 1 tablet (75 mg total) by mouth once daily. 30 tablet 0    EScitalopram oxalate (LEXAPRO) 10 MG tablet Take 10 mg by mouth.      folic acid (FOLVITE) 1 MG tablet Take 1 tablet (1 mg total) by mouth once daily. 90 tablet 3    gabapentin (NEURONTIN) 300 MG capsule Take 300 mg by mouth 3 (three) times daily.      melatonin (MELATIN) 3 mg tablet Take 3 mg by mouth every evening.      methotrexate 2.5 MG Tab Take 6 tablets (15 mg total) by mouth every 7 days. TAKE 6 TABLETS BY MOUTH EVERY WEEK 72 tablet 2    oxybutynin (DITROPAN-XL) 5 MG TR24 Take 1 tablet (5 mg total) by mouth once daily. 30 tablet 11    pantoprazole (PROTONIX) 40 MG tablet TAKE 1 TABLET(40 MG) BY MOUTH TWICE DAILY (Patient taking differently: Take 40 mg by mouth once daily.) 180 tablet 0    tamsulosin (FLOMAX) 0.4 mg Cap Take 1 capsule (0.4 mg total) by mouth once daily. 30 capsule 3    temazepam (RESTORIL) 15 mg Cap Take 15 mg by mouth nightly as needed.      tolterodine (DETROL LA) 2 MG Cp24 Take 2 mg by mouth.      traMADoL (ULTRAM) 50 mg tablet Take 2 tablets (100 mg total) by mouth 2 (two) times daily as needed for Pain. 120 tablet 5     No current facility-administered medications for this visit.     Facility-Administered Medications Ordered in Other Visits   Medication Dose Route Frequency Provider Last Rate Last Admin    denosumab (PROLIA) injection 60 mg  60 mg Subcutaneous 1 time in Clinic/HOD Jean Carlos Hoffman MD           Review of patient's allergies indicates:  No Known Allergies    Physical examination  Vitals Reviewed\  Vitals:     02/06/23 1054   BP: 136/68   Pulse: 100   Temp:      Body mass index is 20.92 kg/m². .FLOWAMB[14    Weight: 62.4 kg (137 lb 9.1 oz)    Gen. Well-dressed well-nourished   Skin warm dry and intact.  No rashes noted.  HEENT.  TM intact bilateral with normal light reflex.  No mastoid tenderness during percussion.  Nares patent bilateral.  Pharynx is unremarkable.  No maxillary or frontal sinus tenderness when percussed.    Neck is supple without adenopathy  Chest.  Respirations are even unlabored.  Lungs are clear to auscultation.  Cardiac regular rate and rhythm.  No chest wall adenopathy noted.  Neuro. Awake alert oriented x4.  Normal judgment and cognition noted.  Extremities no clubbing cyanosis or edema noted.     Call or return to clinic prn if these symptoms worsen or fail to improve as anticipated.

## 2023-02-06 NOTE — PATIENT INSTRUCTIONS
"Ortiz Drew,     If you are due for any health screening(s) below please notify me so we can arrange them to be ordered and scheduled to maintain your health. Most healthy patients complete it. Don't lose out on improving your health.     All of your core healthy metrics are met.         Colon Cancer Screening    Of cancers affecting both men and women, colorectal cancer is the third leading cancer killer in the [place]. But it doesnt have to be. Screening can prevent colorectal cancer or find it at an early stage when treatment often leads to a cure.    A colonoscopy is the preferred test for detecting colon cancer. It is needed only once every 10 years if results are negative. While sedated, a flexible, lighted tube with a tiny camera is inserted into the rectum and advanced through the colon to look for cancers. An alternative screening test that is used at home and returned to the lab may also be used. It detects hidden blood in bowel movements which could indicate cancer in the colon. If results are positive, you will need a colonoscopy to determine if the blood is a sign of cancer. This type of follow up (diagnostic) colonoscopy usually requires additional copays as required by your insurance provider. Please contact your PCP if you have any questions.    Although you are still overdue for this important screening, due to the COVID-19 pandemic, we recommend scheduling it in the near future.                Patient Education       Checking Your Blood Pressure at Home   The Basics   Written by the doctors and editors at [facility]   How is blood pressure measured? -- Blood pressure is usually measured with a device that goes around your upper arm. This is often done in a doctor's office. But some people also check their blood pressure themselves, at home or at work.  Blood pressure is explained with 2 numbers. For instance, your blood pressure might be "140 over 90." The first (top) number is the pressure " "inside your arteries when your heart is carlos alberto. The second (bottom) number is the pressure inside your arteries when your heart is relaxed. The table shows how doctors and nurses define high and normal blood pressure (table 1).  If your blood pressure gets too high, it puts you at risk for heart attack, stroke, and kidney disease. High blood pressure does not usually cause symptoms. But it can be serious.  What is a home blood pressure meter? -- A home blood pressure meter (or "monitor") is a device you can use to check your blood pressure yourself. It has a cuff that goes around your upper arm (figure 1). Some devices have a cuff that goes around your wrist instead. But doctors aren't sure if these work as well. The meter also has a small screen, or dial, that shows your blood pressure numbers.  There are also special meters you can wear for a day or 2. These are different because they automatically check your blood pressure throughout the day and night, even while you are sleeping. If your doctor thinks you should use one of these devices, they will talk to you about how to wear it.  Why do I need to check my blood pressure at home? -- If your doctor knows or suspects that you have high blood pressure, they might want you to check it at home. There are a few reasons for this. Your doctor might want to look at:  Whether your blood pressure measures the same at home as it did in the doctor's office  How well your blood pressure medicines are working  Changes in your blood pressure, for example, if it goes up and down  People who check their own blood pressure at home usually do better at keeping it low.  How do I choose a home blood pressure meter? -- When choosing a home blood pressure meter, you will probably want to think about:  Cost - Some devices cost more than others. You should also check to see if your insurance will help pay for your device.  Size - It's important to make sure the cuff fits your arm " comfortably. Your doctor or nurse can help you with this.  How easy it is to use - You should make sure you understand how to use the device. You also need to be able to read the numbers on the screen.  You do not need a prescription to buy a home blood pressure meter. You can buy them at most pharmacies or over the internet. Your doctor or nurse can help you choose the right device for you.  How do I check my blood pressure at home? -- Once you have a home blood pressure meter, your doctor or nurse should check it to make sure it fits you and works correctly.  When it's time to check your blood pressure:  Go to the bathroom and empty your bladder first. Having a full bladder can temporarily increase your blood pressure, making the results inaccurate.  Sit in a chair with your feet flat on the ground.  Try to breathe normally and stay calm.  Attach the cuff to your arm. Place the cuff directly on your skin, not over your clothing. The cuff should be tight enough to not slip down, but not uncomfortably tight.  Sit and relax for about 3 to 5 minutes with the cuff on.  Follow the directions that came with your device to start measuring your blood pressure. This might involve squeezing the bulb at the end of the tube to inflate the cuff (fill it with air). With some monitors, you just need to press a button to inflate the cuff. When the cuff fills with air, it feels like someone is squeezing your arm, but it should not hurt. Then you will slowly deflate the cuff (let the air out of it), or it will deflate by itself. The screen or dial will show your blood pressure numbers.  Stay seated and relax for 1 minute, then measure your blood pressure again.  How often should I check my blood pressure? -- It depends. Different people need to follow different schedules. Your doctor or nurse will tell you how often to check your blood pressure, and when. Some people need to check their blood pressure twice a day, in the morning and  evening.  Your doctor or nurse will probably tell you to keep track of your blood pressure for at least a few days (table 2). Then they will look at the numbers. The reason for this is that it's normal for your blood pressure to change a bit from day to day. For example, the numbers might change depending on whether you recently had caffeine, just exercised, or feel stressed. Checking your blood pressure over several days - or longer - will give your doctor or nurse a better idea of what is average for you.  How should I keep track of my blood pressure? -- Some blood pressure meters will record your numbers for you, or send them to your computer or smartphone. If yours does not do this, you will need to write them down. Your doctor or nurse can help you figure out the best way to keep track of the numbers.  What if my blood pressure is high? -- Your doctor or nurse will tell you what to do if your blood pressure is high when you check it at home. If you get a number that is higher than normal, measure it again to see if it is still high. If it is very high (above a certain number, which your doctor or nurse will tell you to watch out for), you should call your doctor right away.  If your blood pressure is only a little high, your doctor or nurse might tell you to keep checking it for a few more days or weeks, and then call if it does not go back down. Then they can help you decide what to do next.  All topics are updated as new evidence becomes available and our peer review process is complete.  This topic retrieved from [employer] on: Sep 21, 2021.  Topic 079305 Version 4.0  Release: 29.4.2 - C29.263  © 2021 UpToDate, Inc. and/or its affiliates. All rights reserved.  table 1: Definition of normal and high blood pressure  Level  Top number  Bottom number    High 130 or above 80 or above   Elevated 120 to 129 79 or below   Normal 119 or below 79 or below   These definitions are from the American College of  "Cardiology/American Heart Association. Other expert groups might use slightly different definitions.  "Elevated blood pressure" is a term doctor or nurses use as a warning. It means you do not yet have high blood pressure, but your blood pressure is not as low as it should be for good health.  Graphic 46871 Version 6.0  figure 1: Using a home blood pressure meter     This is an example of a person using a home blood pressure meter.  Graphic 503807 Version 1.0    table 2: 7-day diary for checking blood pressure at home  Day 1  Day 2  Day 3  Day 4  Day 5  Day 6  Day 7    Morning  1st read Morning  1st read Morning  1st read Morning  1st read Morning  1st read Morning  1st read Morning  1st read   Systolic: __________ Systolic: __________ Systolic: __________ Systolic: __________ Systolic: __________ Systolic: __________ Systolic: __________   Diastolic: __________ Diastolic: __________ Diastolic: __________ Diastolic: __________ Diastolic: __________ Diastolic: __________ Diastolic: __________   Pulse: __________ Pulse: __________ Pulse: __________ Pulse: __________ Pulse: __________ Pulse: __________ Pulse: __________   Morning  2nd read Morning  2nd read Morning  2nd read Morning  2nd read Morning  2nd read Morning  2nd read Morning  2nd read   Systolic: __________ Systolic: __________ Systolic: __________ Systolic: __________ Systolic: __________ Systolic: __________ Systolic: __________   Diastolic: __________ Diastolic: __________ Diastolic: __________ Diastolic: __________ Diastolic: __________ Diastolic: __________ Diastolic: __________   Pulse: __________ Pulse: __________ Pulse: __________ Pulse: __________ Pulse: __________ Pulse: __________ Pulse: __________   Evening  1st read Evening  1st read Evening  1st read Evening  1st read Evening  1st read Evening  1st read Evening  1st read   Systolic: __________ Systolic: __________ Systolic: __________ Systolic: __________ Systolic: __________ Systolic: " __________ Systolic: __________   Diastolic: __________ Diastolic: __________ Diastolic: __________ Diastolic: __________ Diastolic: __________ Diastolic: __________ Diastolic: __________   Pulse: __________ Pulse: __________ Pulse: __________ Pulse: __________ Pulse: __________ Pulse: __________ Pulse: __________   Evening  2nd read Evening  2nd read Evening  2nd read Evening  2nd read Evening  2nd read Evening  2nd read Evening  2nd read   Systolic: __________ Systolic: __________ Systolic: __________ Systolic: __________ Systolic: __________ Systolic: __________ Systolic: __________   Diastolic: __________ Diastolic: __________ Diastolic: __________ Diastolic: __________ Diastolic: __________ Diastolic: __________ Diastolic: __________   Pulse: __________ Pulse: __________ Pulse: __________ Pulse: __________ Pulse: __________ Pulse: __________ Pulse: __________   Notes    Notes    Notes    Notes    Notes    Notes    Notes      ____________________ ____________________ ____________________ ____________________ ____________________ ____________________ ____________________   ____________________ ____________________ ____________________ ____________________ ____________________ ____________________ ____________________   ____________________ ____________________ ____________________ ____________________ ____________________ ____________________ ____________________   Patient name: ______________________________     Patient ID: ________________________________    Primary care provider: _______________________    Average BP: _______________________________    Graphic 338951 Version 1.0  Consumer Information Use and Disclaimer   This information is not specific medical advice and does not replace information you receive from your health care provider. This is only a brief summary of general information. It does NOT include all information about conditions, illnesses, injuries, tests, procedures, treatments, therapies,  discharge instructions or life-style choices that may apply to you. You must talk with your health care provider for complete information about your health and treatment options. This information should not be used to decide whether or not to accept your health care provider's advice, instructions or recommendations. Only your health care provider has the knowledge and training to provide advice that is right for you. The use of this information is governed by the [employer] User License Agreement, available at https://www.Truli.careersmore/en/solutions/CrowdTorch/about/robin.The use of [employer] content is governed by the [employer] Terms of Use. ©2021 [employer] All rights reserved.  Copyright   © 2021 UpToDate, Inc. and/or its affiliates. All rights reserved.

## 2023-02-10 ENCOUNTER — PROCEDURE VISIT (OUTPATIENT)
Dept: UROLOGY | Facility: CLINIC | Age: 78
End: 2023-02-10
Payer: MEDICARE

## 2023-02-10 VITALS — BODY MASS INDEX: 20.85 KG/M2 | HEIGHT: 68 IN | WEIGHT: 137.56 LBS

## 2023-02-10 DIAGNOSIS — N13.8 BENIGN PROSTATIC HYPERPLASIA WITH URINARY OBSTRUCTION: ICD-10-CM

## 2023-02-10 DIAGNOSIS — N40.1 BENIGN PROSTATIC HYPERPLASIA WITH URINARY OBSTRUCTION: ICD-10-CM

## 2023-02-10 PROCEDURE — 52000 CYSTOSCOPY: ICD-10-PCS | Mod: S$GLB,,, | Performed by: STUDENT IN AN ORGANIZED HEALTH CARE EDUCATION/TRAINING PROGRAM

## 2023-02-10 PROCEDURE — 52000 CYSTOURETHROSCOPY: CPT | Mod: S$GLB,,, | Performed by: STUDENT IN AN ORGANIZED HEALTH CARE EDUCATION/TRAINING PROGRAM

## 2023-02-10 NOTE — PROCEDURES
Cystoscopy    Date/Time: 2/10/2023 8:30 AM  Performed by: Timo Myers MD  Authorized by: Timo Myers MD     Procedure:   Flexible cysto-urethroscopy    Pre Procedure Diagnosis:  LUTS and history of prostate cancer s/p EBRT , bulbar urethral stricture    Post Procedure Diagnosis:  Same and penile mass    Surgeon: Timo Myers MD     Anesthesia: 2% uro-jet lidocaine jelly for local analgesia    Procedure note:  The risks and benefits were explained and informed consent was obtained. 2% lidocaine urojet was used for local analgesia. The genitalia was prepped and draped in the sterile fashion.    There was difficulty advancing the scope past the meatus.  On examination there was a 1 cm firm mass along the left glans of the penis near the meatus. The scope was able to be manipulated around the mass and was able to be advanced into the urethra. The urethra was noted to have a thin bulbar stricture which was very easily navigated with the scope.     The bladder was completely surveyed in a systematic fashion. The bilateral ureteral orifices were identified in their normal orthotopic locations bilaterally. The remainder of the bladder was evaluated. There were no foreign bodies, stones, diverticula, or trabeculations. No bladder tumors or lesions suspicious for malignancy were seen.     Upon retroflexion there was no significant median lobe enlargement. As the flexible cystoscope was being withdrawn, the prostate was evaluated carefully. The lateral lobes of the prostate were not significantly enlarged. The estimated prostatic urethral length was 3 cm.     The patient tolerated the procedure well without complication.     Bilateral inguinal canals were evaluated and there was no palpable lymphadenopathy.    Findings in summary:  Bulbar urethral stricture, penile mass.    Plan:  Plan for punch vs. excisional penile biopsy  Needs clearance for anesthesia and to hold plavix and aspirin

## 2023-02-13 ENCOUNTER — TELEPHONE (OUTPATIENT)
Dept: UROLOGY | Facility: CLINIC | Age: 78
End: 2023-02-13
Payer: MEDICARE

## 2023-02-13 ENCOUNTER — INFUSION (OUTPATIENT)
Dept: INFUSION THERAPY | Facility: HOSPITAL | Age: 78
End: 2023-02-13
Attending: INTERNAL MEDICINE
Payer: MEDICARE

## 2023-02-13 VITALS
HEART RATE: 95 BPM | DIASTOLIC BLOOD PRESSURE: 85 MMHG | TEMPERATURE: 98 F | BODY MASS INDEX: 20.99 KG/M2 | WEIGHT: 138.5 LBS | SYSTOLIC BLOOD PRESSURE: 165 MMHG | OXYGEN SATURATION: 96 % | RESPIRATION RATE: 17 BRPM | HEIGHT: 68 IN

## 2023-02-13 DIAGNOSIS — M81.0 AGE-RELATED OSTEOPOROSIS WITHOUT CURRENT PATHOLOGICAL FRACTURE: Primary | ICD-10-CM

## 2023-02-13 PROCEDURE — 96372 THER/PROPH/DIAG INJ SC/IM: CPT

## 2023-02-13 PROCEDURE — 63600175 PHARM REV CODE 636 W HCPCS: Mod: JG

## 2023-02-13 RX ADMIN — DENOSUMAB 60 MG: 60 INJECTION SUBCUTANEOUS at 01:02

## 2023-02-13 NOTE — PLAN OF CARE
Problem: Fall Injury Risk  Goal: Absence of Fall and Fall-Related Injury  Outcome: Ongoing, Progressing  Intervention: Identify and Manage Contributors  Flowsheets (Taken 2/13/2023 1316)  Self-Care Promotion: independence encouraged  Medication Review/Management: medications reviewed

## 2023-02-13 NOTE — TELEPHONE ENCOUNTER
----- Message from Lacey Leonardo MA sent at 2/13/2023  8:44 AM CST -----  Contact: Patient    ----- Message -----  From: Dede Paniagua  Sent: 2/13/2023   8:41 AM CST  To: Louis [facility]wood Staff    Type:  Needs Medical Advice    Who Called:  Patient       Would the patient rather a call back or a response via [facility]?  Call    Best Call Back Number:  [phone] ([place])    .    Additional Information:  Patient needs to schedule the biopsy that  found on his penis

## 2023-02-13 NOTE — TELEPHONE ENCOUNTER
----- Message from Virgie Ballard MA sent at 2/13/2023  4:26 PM CST -----  Pt returning nurse's call about setting up a biopsy  Would like a call back asap today if possible  Please call him @ [phone]  Thanks !

## 2023-02-15 ENCOUNTER — TELEPHONE (OUTPATIENT)
Dept: UROLOGY | Facility: CLINIC | Age: 78
End: 2023-02-15
Payer: MEDICARE

## 2023-02-15 ENCOUNTER — OFFICE VISIT (OUTPATIENT)
Dept: FAMILY MEDICINE | Facility: CLINIC | Age: 78
End: 2023-02-15
Payer: MEDICARE

## 2023-02-15 ENCOUNTER — TELEPHONE (OUTPATIENT)
Dept: FAMILY MEDICINE | Facility: CLINIC | Age: 78
End: 2023-02-15
Payer: MEDICARE

## 2023-02-15 DIAGNOSIS — J43.9 PULMONARY EMPHYSEMA, UNSPECIFIED EMPHYSEMA TYPE: ICD-10-CM

## 2023-02-15 DIAGNOSIS — M06.9 RHEUMATOID ARTHRITIS INVOLVING MULTIPLE SITES, UNSPECIFIED WHETHER RHEUMATOID FACTOR PRESENT: ICD-10-CM

## 2023-02-15 DIAGNOSIS — D84.821 DRUG-INDUCED IMMUNODEFICIENCY: ICD-10-CM

## 2023-02-15 DIAGNOSIS — N48.89 PENILE MASS: Primary | ICD-10-CM

## 2023-02-15 DIAGNOSIS — Z79.899 DRUG-INDUCED IMMUNODEFICIENCY: ICD-10-CM

## 2023-02-15 DIAGNOSIS — F41.1 GAD (GENERALIZED ANXIETY DISORDER): Primary | ICD-10-CM

## 2023-02-15 PROCEDURE — 99443 PR PHYSICIAN TELEPHONE EVALUATION 21-30 MIN: CPT | Mod: 95,,, | Performed by: STUDENT IN AN ORGANIZED HEALTH CARE EDUCATION/TRAINING PROGRAM

## 2023-02-15 PROCEDURE — 99443 PR PHYSICIAN TELEPHONE EVALUATION 21-30 MIN: ICD-10-PCS | Mod: 95,,, | Performed by: STUDENT IN AN ORGANIZED HEALTH CARE EDUCATION/TRAINING PROGRAM

## 2023-02-15 RX ORDER — HYDROXYZINE HYDROCHLORIDE 10 MG/1
10 TABLET, FILM COATED ORAL 2 TIMES DAILY PRN
Qty: 60 TABLET | Refills: 2 | Status: ON HOLD | OUTPATIENT
Start: 2023-02-15 | End: 2023-05-18 | Stop reason: HOSPADM

## 2023-02-15 RX ORDER — BUSPIRONE HYDROCHLORIDE 10 MG/1
10 TABLET ORAL 3 TIMES DAILY
Qty: 90 TABLET | Refills: 3 | Status: SHIPPED | OUTPATIENT
Start: 2023-02-15 | End: 2023-08-16 | Stop reason: SDUPTHER

## 2023-02-15 NOTE — TELEPHONE ENCOUNTER
Called patient , advised him Dr Myers said that if he needs anxiety medication he will need to contact his pcp.

## 2023-02-15 NOTE — TELEPHONE ENCOUNTER
----- Message from Shari Carrion sent at 2/15/2023  9:12 AM CST -----  Contact: [phone]  Type: Needs Medical Advice  Who Called:  Pt     Best Call Back Number: [phone] (home)     Additional Information: Pt stated he just spoke with Ghislaine. Pt said he called [facility] and he is not scheduled for any  surgery on Feb 23rd. Pt just wants to confirm that surgery is till going to happen.

## 2023-02-15 NOTE — TELEPHONE ENCOUNTER
"See portal message, please avise.   Thank You  Claudia    "Pt is calling to ask if he can get an rx for anxiety medication. Pt stated is set to have surgery for a biopsy with Dr Chao next week and he is having very bad anxiety about it"  He has appt with you later this month.   "

## 2023-02-15 NOTE — TELEPHONE ENCOUNTER
----- Message from Shari Carrion sent at 2/15/2023  9:16 AM CST -----  Contact: [phone]  Type: Needs Medical Advice  Who Called:  Pt     Best Call Back Number: [phone] (home)     Additional Information: Pt is calling to ask if he can get an rx for anxiety medication. Pt stated is set to have surgery for a biopsy with Dr Chao next week and he is having very bad anxiety about it. Pt is upset, crying and begging for a call back from the office over this.   Dr Chao advised pt to reach out to pcp because he can not rx anxiety meds.       [id number] - [place] - [address] [facility]  [address] [place] [address]  Phone: [phone] Fax: [phone]

## 2023-02-15 NOTE — PROGRESS NOTES
Established Patient - Audio Only Telehealth Visit     The patient location is: [place]  The chief complaint leading to consultation is: anxiety   Visit type: Virtual visit with audio only (telephone)  Total time spent with patient: 23 min       The reason for the audio only service rather than synchronous audio and video virtual visit was related to technical difficulties or patient preference/necessity.     Each patient to whom I provide medical services by telemedicine is:  (1) informed of the relationship between the physician and patient and the respective role of any other health care provider with respect to management of the patient; and (2) notified that they may decline to receive medical services by telemedicine and may withdraw from such care at any time. Patient verbally consented to receive this service via voice-only telephone call.       HPI:  Patient reports he wants Urology and there was concerned about a penile malignancy going to have a biopsy scheduled.  Patient reports that he is lot anxiety wants to try medication for this.  He wants to take a daily medication along with an as needed medication if possible.  Patient reports no SI or plan     Assessment and plan:  DEVANTE-needs improvement will send in BuSpar since it is faster acting and scheduled will send in Atarax p.r.n. for as needed.  Can continue the Lexapro in the meantime.    RA-stable methotrexate continue along with follow-up with Rheumatology new   Immunocompromised-stable continue current meds no fevers, chills, or signs of active infection. Monitor blood counts and recommend to stay uptodate with vaccinations.     emphysema-stable monitor no recent flares no dyspnea       Diagnoses and all orders for this visit:    DEVANTE (generalized anxiety disorder)  -     busPIRone (BUSPAR) 10 MG tablet; Take 1 tablet (10 mg total) by mouth 3 (three) times daily.    Rheumatoid arthritis involving multiple sites, unspecified whether rheumatoid factor  present    Pulmonary emphysema, unspecified emphysema type    Drug-induced immunodeficiency                             This service was not originating from a related E/M service provided within the previous 7 days nor will  to an E/M service or procedure within the next 24 hours or my soonest available appointment.  Prevailing standard of care was able to be met in this audio-only visit.

## 2023-02-15 NOTE — TELEPHONE ENCOUNTER
----- Message from Davida Brown sent at 2/14/2023 11:56 AM CST -----  Regarding: medical questions  Contact: patient  Patient want to speak with a nurse regarding some medical questions, please call back at [phone] (home)     Case number [id number]

## 2023-02-16 ENCOUNTER — TELEPHONE (OUTPATIENT)
Dept: UROLOGY | Facility: CLINIC | Age: 78
End: 2023-02-16
Payer: MEDICARE

## 2023-02-16 NOTE — TELEPHONE ENCOUNTER
Lm on patient vm to call office, attempting to return patient call , please note Dr Myers said patient  will need to contact his pcp in regards to anxiety medication .

## 2023-02-16 NOTE — TELEPHONE ENCOUNTER
----- Message from Virgie Ballard MA sent at 2/16/2023  8:41 AM CST -----  Pt says  still waiting on call about appt for penile biopsy  Please call him @ [phone]  Thanks !  Please do NOT respond directly back to me, any questions, reply to staff box since this inbox is not routinely monitored

## 2023-02-20 ENCOUNTER — TELEPHONE (OUTPATIENT)
Dept: UROLOGY | Facility: CLINIC | Age: 78
End: 2023-02-20
Payer: MEDICARE

## 2023-02-20 NOTE — TELEPHONE ENCOUNTER
Spoke with patient , he reports he has stopped his Plavix and Aspirin last week , it was noted for him to stop 5-7 days prior per pt cardiologist.

## 2023-02-22 PROBLEM — N48.9 PENILE LESION: Status: ACTIVE | Noted: 2023-02-22

## 2023-02-23 ENCOUNTER — HOSPITAL ENCOUNTER (EMERGENCY)
Facility: HOSPITAL | Age: 78
Discharge: HOME | End: 2023-02-23
Attending: EMERGENCY MEDICINE
Payer: MEDICARE

## 2023-02-23 VITALS
SYSTOLIC BLOOD PRESSURE: 135 MMHG | BODY MASS INDEX: 21.52 KG/M2 | TEMPERATURE: 99 F | DIASTOLIC BLOOD PRESSURE: 84 MMHG | HEART RATE: 91 BPM | OXYGEN SATURATION: 100 % | HEIGHT: 68 IN | RESPIRATION RATE: 17 BRPM | WEIGHT: 142 LBS

## 2023-02-23 DIAGNOSIS — R31.9 HEMATURIA, UNSPECIFIED TYPE: Primary | ICD-10-CM

## 2023-02-23 DIAGNOSIS — T83.9XXA COMPLICATION OF FOLEY CATHETER, INITIAL ENCOUNTER: ICD-10-CM

## 2023-02-23 PROCEDURE — 94761 N-INVAS EAR/PLS OXIMETRY MLT: CPT

## 2023-02-23 PROCEDURE — 99284 EMERGENCY DEPT VISIT MOD MDM: CPT | Mod: 25

## 2023-02-23 PROCEDURE — 25000003 PHARM REV CODE 250: Performed by: EMERGENCY MEDICINE

## 2023-02-23 PROCEDURE — 63600175 PHARM REV CODE 636 W HCPCS: Performed by: EMERGENCY MEDICINE

## 2023-02-23 PROCEDURE — 96374 THER/PROPH/DIAG INJ IV PUSH: CPT

## 2023-02-23 RX ORDER — ACETAMINOPHEN 500 MG
1000 TABLET ORAL
Status: COMPLETED | OUTPATIENT
Start: 2023-02-23 | End: 2023-02-23

## 2023-02-23 RX ORDER — MORPHINE SULFATE 4 MG/ML
4 INJECTION, SOLUTION INTRAMUSCULAR; INTRAVENOUS
Status: COMPLETED | OUTPATIENT
Start: 2023-02-23 | End: 2023-02-23

## 2023-02-23 RX ADMIN — MORPHINE SULFATE 4 MG: 4 INJECTION INTRAVENOUS at 06:02

## 2023-02-23 RX ADMIN — ACETAMINOPHEN 1000 MG: 500 TABLET ORAL at 07:02

## 2023-02-24 ENCOUNTER — TELEPHONE (OUTPATIENT)
Dept: UROLOGY | Facility: CLINIC | Age: 78
End: 2023-02-24
Payer: MEDICARE

## 2023-02-24 NOTE — ED NOTES
Patient resting in bed. Awaiting shot time to be discharged. No needs noted at this time. Vss/nadn

## 2023-02-24 NOTE — ED PROVIDER NOTES
"Encounter Date: 2/23/2023    SCRIBE #1 NOTE: I, Abdulaziz Diaz, am scribing for, and in the presence of,  Jordan Sutton MD.     History     Chief Complaint   Patient presents with    catheter issue     Patient had a catheter placed at [facility] and they told him he could have it removed Monday but he decided he wanted it out today and tried to remove it himself and now has pain and bleeding      Time seen by provider: 6:27 PM on 02/23/2023    Rajendra Castle is a 77 y.o. male who presents to the ED with an onset of penile bleeding and intermittent sharp and ache-like penile pain that began just prior to arrival after attempting to pull his jackson catheter out for 10-15 minutes. The patient states his pain comes and goes and it ranges from a 1/10 to a 10/10. The patient had a penile lesion excision performed earlier today, and the patient was home for an hour before he attempted to take the catheter out. The patient states that he stopped pulling on his catheter when he noticed the bleeding. He states that he was told that he could take the catheter out himself. The patient has not taken any pain medications since he attempted to pull his catheter out. He states he takes 2 tramadol as needed for, "general pain." The patient denies any other symptoms at this time. PMHx of HTN, Vitamin D insufficiency, prostate cancer, long-term antcoagulant use, and CAD. PSHx of penile lesion excision.    The history is provided by the patient.   Review of patient's allergies indicates:  No Known Allergies  Past Medical History:   Diagnosis Date    Anticoagulant long-term use     Arthritis     Coronary artery disease     DDD (degenerative disc disease), cervical     Degenerative disc disease     Heart murmur     Hypertension     Nontoxic multinodular goiter 09/20/2016    Prostate CA     RA (rheumatoid arthritis)     Sleep apnea     uses C-PAP    Vitamin D insufficiency 09/30/2016     Past Surgical History:   Procedure Laterality Date    " CARPAL TUNNEL RELEASE Right 05/28/2021    Procedure: RELEASE, CARPAL TUNNEL;  Surgeon: Jose Ryan II, MD;  Location: [facility] OR;  Service: Orthopedics;  Laterality: Right;    COLONOSCOPY  prior to 2016    normal findings per patient report    CORONARY ANGIOPLASTY WITH STENT PLACEMENT  02/2022    CYSTOSCOPY N/A 12/18/2018    Procedure: CYSTOSCOPY;  Surgeon: Garrett Pina MD;  Location: [facility] OR;  Service: Urology;  Laterality: N/A;    CYSTOSCOPY N/A 07/12/2022    Procedure: CYSTOSCOPY;  Surgeon: Garrett Pina MD;  Location: [facility] OR;  Service: Urology;  Laterality: N/A;    ESOPHAGOGASTRODUODENOSCOPY N/A 09/29/2020    Dr. Espinosa; empiric dilation; erythematous mucosa in antrum; gastric mucosal atrophy; hematin in entire stomach; biopsy: mid & distal esophagus WNL, stomach- WNL, negative for h pylori    EXCISION OF LESION OF PENIS N/A 2/23/2023    Procedure: EXCISION, LESION, PENIS;  Surgeon: Timo Myers MD;  Location: [facility] OR;  Service: Urology;  Laterality: N/A;    PARATHYROIDECTOMY Right 10/27/2020    Procedure: PARATHYROIDECTOMY;  Surgeon: Latonia Clayton MD;  Location: [address];  Service: ENT;  Laterality: Right;    RELEASE OF ULNAR NERVE AT CUBITAL TUNNEL Right 05/28/2021    Procedure: RELEASE, ULNAR TUNNEL;  Surgeon: Jose Ryan II, MD;  Location: [facility] OR;  Service: Orthopedics;  Laterality: Right;    TRANSRECTAL BIOPSY OF PROSTATE WITH ULTRASOUND GUIDANCE N/A 12/18/2018    Procedure: BIOPSY, PROSTATE, RECTAL APPROACH, WITH US GUIDANCE;  Surgeon: Garrett Pina MD;  Location: [facility] OR;  Service: Urology;  Laterality: N/A;    TRANSRECTAL ULTRASOUND EXAMINATION N/A 07/12/2022    Procedure: ULTRASOUND, RECTAL APPROACH;  Surgeon: Garrett Pina MD;  Location: [facility] OR;  Service: Urology;  Laterality: N/A;     Family History   Problem Relation Age of Onset    Heart disease Mother     Stroke Father     Drug abuse Sister     No Known Problems Daughter     No Known Problems  Daughter     No Known Problems Son     Diabetes Maternal Uncle     Colon cancer Neg Hx     Crohn's disease Neg Hx     Esophageal cancer Neg Hx     Stomach cancer Neg Hx     Ulcerative colitis Neg Hx      Social History     Tobacco Use    Smoking status: Former     Packs/day: 0.25     Years: 10.00     Pack years: 2.50     Types: Cigarettes     Quit date: 2001     Years since quittin.5     Passive exposure: Past    Smokeless tobacco: Never   Substance Use Topics    Alcohol use: Not Currently     Comment: seldom    Drug use: Not Currently     Frequency: 8.0 times per week     Types: Marijuana     Review of Systems   Constitutional:  Negative for fever.   HENT:  Negative for congestion.    Eyes:  Negative for visual disturbance.   Respiratory:  Negative for wheezing.    Cardiovascular:  Negative for chest pain.   Gastrointestinal:  Negative for abdominal pain.   Genitourinary:  Positive for penile pain. Negative for dysuria.        Positive for penile bleeding.   Musculoskeletal:  Negative for joint swelling.   Skin:  Negative for rash.   Neurological:  Negative for syncope.   Hematological:  Does not bruise/bleed easily.   Psychiatric/Behavioral:  Negative for confusion.      Physical Exam     Initial Vitals   BP Pulse Resp Temp SpO2   23 1750 23 1750 23 1750 23 1753 23 1750   (!) 199/93 88 20 98.7 °F (37.1 °C) 98 %      MAP       --                Physical Exam    Nursing note and vitals reviewed.  Constitutional: Vital signs are normal. He appears well-nourished.   HENT:   Head: Normocephalic and atraumatic.   Eyes: Conjunctivae and EOM are normal.   Neck: Neck supple. No thyroid mass present.   Normal range of motion.  Cardiovascular:  Normal rate, regular rhythm and normal heart sounds.     Exam reveals no gallop and no friction rub.       No murmur heard.  Pulmonary/Chest: Breath sounds normal. He has no wheezes. He has no rhonchi. He has no rales.   Abdominal: Abdomen is  soft. Bowel sounds are normal. There is no abdominal tenderness.   Genitourinary: Right testis shows no tenderness. Left testis shows no tenderness. Penile tenderness (shaft pain extends circumferentially to the base of the penis) present.    Genitourinary Comments: Jackson catheter in place and draining clear red blood into the bag. There is no bleeding around the Jackson insertion. 2 lesions on the glans that are sutured and closed with no bleeding or drainage.     Musculoskeletal:      Cervical back: Normal range of motion and neck supple.     Neurological: He is alert and oriented to person, place, and time. He has normal strength. No cranial nerve deficit or sensory deficit.   Skin: Skin is warm and dry. No rash noted. No erythema.   Psychiatric: He has a normal mood and affect. His speech is normal. Cognition and memory are normal.       ED Course   Procedures  Labs Reviewed - No data to display       Imaging Results    None          Medications   morphine injection 4 mg (4 mg Intravenous Given 2/23/23 1846)   acetaminophen tablet 1,000 mg (1,000 mg Oral Given 2/23/23 1941)     Medical Decision Making:   History:   Old Medical Records: I decided to obtain old medical records.  ED Management:  Pt rapidly assessed. VS sig for HTN I suspect augmented by pain. Good control with single dosing IV morphine. Case and exam d/w with Dr. Aguilera who is covering for the patient's urologist. Requests to flush the jackson, which was accomplished without complication. Bleeding stopped shortly after initial exam. Pt observed over next 3.5 hrs without further bleeding or sig pain resumption. I do not suspect fractured penis or urethral fracture, sig internal bleeding. Ok to DC from uro standpoint with jackson still in place. He is educated about supportive care and asked to f/u with his urologist asap. Pt dc'd in stable condition.        Scribe Attestation:   Scribe #1: I performed the above scribed service and the documentation  accurately describes the services I performed. I attest to the accuracy of the note.            I, Dr. Jordan Sutton, personally performed the services described in this documentation. All medical record entries made by the scribe were at my direction and in my presence.  I have reviewed the chart and agree that the record reflects my personal performance and is accurate and complete. Jordan Sutton MD.  2:23 PM 02/24/2023         Clinical Impression:   Final diagnoses:  [R31.9] Hematuria, unspecified type (Primary)  [T83.9XXA] Complication of Bustillo catheter, initial encounter        ED Disposition Condition    Discharge           ED Prescriptions    None       Follow-up Information       Follow up With Specialties Details Why Contact Info    Timo Myers MD Urology Schedule an appointment as soon as possible for a visit   [address] [place] [address]  [phone]               Jordan Sutton MD  02/24/23 2058

## 2023-02-24 NOTE — TELEPHONE ENCOUNTER
----- Message from Timo Myers MD sent at 2/24/2023  3:41 PM CST -----  Contact: pt  Looks like he has an appointment with me Wednesday. I can see him. Is his catheter still in or did he remove it?  ----- Message -----  From: Lacey Leonardo MA  Sent: 2/24/2023   3:31 PM CST  To: Timo Myers MD    Do you want this pt to see you or nurse visit?   ----- Message -----  From: Yanni Miranda  Sent: 2/24/2023  10:48 AM CST  To: [facility] Staff    Patient is calling he pulled on catheter and damaged something   He was seen at ER they told him to follow up with    Please give pt a call back [phone]

## 2023-02-26 ENCOUNTER — HOSPITAL ENCOUNTER (EMERGENCY)
Facility: HOSPITAL | Age: 78
Discharge: HOME | End: 2023-02-27
Attending: STUDENT IN AN ORGANIZED HEALTH CARE EDUCATION/TRAINING PROGRAM
Payer: MEDICARE

## 2023-02-26 DIAGNOSIS — Z46.6 ENCOUNTER FOR FOLEY CATHETER REMOVAL: Primary | ICD-10-CM

## 2023-02-26 DIAGNOSIS — T83.511A URINARY TRACT INFECTION ASSOCIATED WITH INDWELLING URETHRAL CATHETER, INITIAL ENCOUNTER: ICD-10-CM

## 2023-02-26 DIAGNOSIS — N39.0 URINARY TRACT INFECTION ASSOCIATED WITH INDWELLING URETHRAL CATHETER, INITIAL ENCOUNTER: ICD-10-CM

## 2023-02-26 PROCEDURE — 99283 EMERGENCY DEPT VISIT LOW MDM: CPT | Mod: 25

## 2023-02-27 ENCOUNTER — TELEPHONE (OUTPATIENT)
Dept: UROLOGY | Facility: CLINIC | Age: 78
End: 2023-02-27
Payer: MEDICARE

## 2023-02-27 ENCOUNTER — HOSPITAL ENCOUNTER (OUTPATIENT)
Dept: RADIOLOGY | Facility: HOSPITAL | Age: 78
Discharge: HOME | End: 2023-02-27
Attending: STUDENT IN AN ORGANIZED HEALTH CARE EDUCATION/TRAINING PROGRAM
Payer: MEDICARE

## 2023-02-27 VITALS
RESPIRATION RATE: 18 BRPM | SYSTOLIC BLOOD PRESSURE: 188 MMHG | WEIGHT: 133.94 LBS | BODY MASS INDEX: 20.36 KG/M2 | DIASTOLIC BLOOD PRESSURE: 88 MMHG | OXYGEN SATURATION: 98 % | TEMPERATURE: 98 F | HEART RATE: 84 BPM

## 2023-02-27 DIAGNOSIS — C60.9 SQUAMOUS CELL CARCINOMA OF PENIS: ICD-10-CM

## 2023-02-27 DIAGNOSIS — C60.9 SQUAMOUS CELL CARCINOMA OF PENIS: Primary | ICD-10-CM

## 2023-02-27 DIAGNOSIS — C61 PROSTATE CANCER: Primary | ICD-10-CM

## 2023-02-27 LAB
BACTERIA #/AREA URNS HPF: ABNORMAL /HPF
BILIRUB UR QL STRIP: ABNORMAL
CLARITY UR: ABNORMAL
COLOR UR: ABNORMAL
GLUCOSE UR QL STRIP: NEGATIVE
HGB UR QL STRIP: ABNORMAL
HYALINE CASTS #/AREA URNS LPF: 0 /LPF
KETONES UR QL STRIP: ABNORMAL
LEUKOCYTE ESTERASE UR QL STRIP: ABNORMAL
MICROSCOPIC COMMENT: ABNORMAL
NITRITE UR QL STRIP: POSITIVE
PH UR STRIP: 6 [PH] (ref 5–8)
PROT UR QL STRIP: ABNORMAL
RBC #/AREA URNS HPF: >100 /HPF (ref 0–4)
SP GR UR STRIP: 1.02 (ref 1–1.03)
SQUAMOUS #/AREA URNS HPF: 1 /HPF
URN SPEC COLLECT METH UR: ABNORMAL
UROBILINOGEN UR STRIP-ACNC: NEGATIVE EU/DL
WBC #/AREA URNS HPF: 45 /HPF (ref 0–5)

## 2023-02-27 PROCEDURE — 87086 URINE CULTURE/COLONY COUNT: CPT | Performed by: STUDENT IN AN ORGANIZED HEALTH CARE EDUCATION/TRAINING PROGRAM

## 2023-02-27 PROCEDURE — 25500020 PHARM REV CODE 255: Performed by: STUDENT IN AN ORGANIZED HEALTH CARE EDUCATION/TRAINING PROGRAM

## 2023-02-27 PROCEDURE — 25000003 PHARM REV CODE 250: Performed by: STUDENT IN AN ORGANIZED HEALTH CARE EDUCATION/TRAINING PROGRAM

## 2023-02-27 PROCEDURE — 81000 URINALYSIS NONAUTO W/SCOPE: CPT | Performed by: STUDENT IN AN ORGANIZED HEALTH CARE EDUCATION/TRAINING PROGRAM

## 2023-02-27 PROCEDURE — 71260 CT THORAX DX C+: CPT | Mod: TC

## 2023-02-27 PROCEDURE — 74177 CT ABD & PELVIS W/CONTRAST: CPT | Mod: TC

## 2023-02-27 RX ORDER — CIPROFLOXACIN 500 MG/1
500 TABLET ORAL 2 TIMES DAILY
Qty: 14 TABLET | Refills: 0 | Status: SHIPPED | OUTPATIENT
Start: 2023-02-27 | End: 2023-03-06

## 2023-02-27 RX ORDER — CIPROFLOXACIN 500 MG/1
500 TABLET ORAL
Status: COMPLETED | OUTPATIENT
Start: 2023-02-27 | End: 2023-02-27

## 2023-02-27 RX ADMIN — CIPROFLOXACIN HYDROCHLORIDE 500 MG: 500 TABLET, FILM COATED ORAL at 02:02

## 2023-02-27 RX ADMIN — IOHEXOL 100 ML: 350 INJECTION, SOLUTION INTRAVENOUS at 04:02

## 2023-02-27 NOTE — ED PROVIDER NOTES
Encounter Date: 2/26/2023       History     Chief Complaint   Patient presents with    needs jackson removed     Post penile biopsy. Was told to take catheter out on Sunday     77-year-old male presents for evaluation and consideration of Jackson catheter removal.  Had penile biopsy done with Jackson catheter placed due to that procedure.  Was instructed to remove at home after 4 days.  Came to the emergency department after trying to remove it after 1 day.  Again tried to remove at home today.  Both times he tried to remove he did not deflate the balloon.  He did not know there was a balloon there or how to deflate it.    The history is provided by the patient and medical records.   Review of patient's allergies indicates:  No Known Allergies  Past Medical History:   Diagnosis Date    Anticoagulant long-term use     Arthritis     Coronary artery disease     DDD (degenerative disc disease), cervical     Degenerative disc disease     Heart murmur     Hypertension     Nontoxic multinodular goiter 09/20/2016    Prostate CA     RA (rheumatoid arthritis)     Sleep apnea     uses C-PAP    Vitamin D insufficiency 09/30/2016     Past Surgical History:   Procedure Laterality Date    CARPAL TUNNEL RELEASE Right 05/28/2021    Procedure: RELEASE, CARPAL TUNNEL;  Surgeon: Jose Ryan II, MD;  Location: [facility] OR;  Service: Orthopedics;  Laterality: Right;    COLONOSCOPY  prior to 2016    normal findings per patient report    CORONARY ANGIOPLASTY WITH STENT PLACEMENT  02/2022    CYSTOSCOPY N/A 12/18/2018    Procedure: CYSTOSCOPY;  Surgeon: Garrett Pina MD;  Location: [facility] OR;  Service: Urology;  Laterality: N/A;    CYSTOSCOPY N/A 07/12/2022    Procedure: CYSTOSCOPY;  Surgeon: Garrett Pina MD;  Location: [facility] OR;  Service: Urology;  Laterality: N/A;    ESOPHAGOGASTRODUODENOSCOPY N/A 09/29/2020    Dr. Espinosa; empiric dilation; erythematous mucosa in antrum; gastric mucosal atrophy; hematin in entire stomach; biopsy:  mid & distal esophagus WNL, stomach- WNL, negative for h pylori    EXCISION OF LESION OF PENIS N/A 2023    Procedure: EXCISION, LESION, PENIS;  Surgeon: Timo Myers MD;  Location: [facility] OR;  Service: Urology;  Laterality: N/A;    PARATHYROIDECTOMY Right 10/27/2020    Procedure: PARATHYROIDECTOMY;  Surgeon: Latonia Clayton MD;  Location: [facility] OR 2ND FLR;  Service: ENT;  Laterality: Right;    RELEASE OF ULNAR NERVE AT CUBITAL TUNNEL Right 2021    Procedure: RELEASE, ULNAR TUNNEL;  Surgeon: Jose Ryan II, MD;  Location: [facility] OR;  Service: Orthopedics;  Laterality: Right;    TRANSRECTAL BIOPSY OF PROSTATE WITH ULTRASOUND GUIDANCE N/A 2018    Procedure: BIOPSY, PROSTATE, RECTAL APPROACH, WITH US GUIDANCE;  Surgeon: Garrett Pina MD;  Location: [facility] OR;  Service: Urology;  Laterality: N/A;    TRANSRECTAL ULTRASOUND EXAMINATION N/A 2022    Procedure: ULTRASOUND, RECTAL APPROACH;  Surgeon: Garrett Pina MD;  Location: [facility] OR;  Service: Urology;  Laterality: N/A;     Family History   Problem Relation Age of Onset    Heart disease Mother     Stroke Father     Drug abuse Sister     No Known Problems Daughter     No Known Problems Daughter     No Known Problems Son     Diabetes Maternal Uncle     Colon cancer Neg Hx     Crohn's disease Neg Hx     Esophageal cancer Neg Hx     Stomach cancer Neg Hx     Ulcerative colitis Neg Hx      Social History     Tobacco Use    Smoking status: Former     Packs/day: 0.25     Years: 10.00     Pack years: 2.50     Types: Cigarettes     Quit date: 2001     Years since quittin.5     Passive exposure: Past    Smokeless tobacco: Never   Substance Use Topics    Alcohol use: Not Currently     Comment: seldom    Drug use: Not Currently     Frequency: 8.0 times per week     Types: Marijuana     Review of Systems   All other systems reviewed and are negative.    Physical Exam     Initial Vitals [23 2336]   BP Pulse Resp Temp SpO2   (!) 162/77  (!) 134 20 98.3 °F (36.8 °C) (!) 92 %      MAP       --         Physical Exam    Nursing note and vitals reviewed.  Constitutional: He appears well-developed and well-nourished. No distress.   HENT:   Head: Normocephalic and atraumatic.   Right Ear: External ear normal.   Left Ear: External ear normal.   Nose: Nose normal.   Eyes: Conjunctivae are normal. Pupils are equal, round, and reactive to light.   Neck: Neck supple.   Normal range of motion.  Cardiovascular:  Normal rate and regular rhythm.           Pulmonary/Chest: Breath sounds normal. No respiratory distress.   Abdominal: Abdomen is soft. He exhibits no distension.   Genitourinary:    Testes normal.      Genitourinary Comments: Normal penis aside from healing area from penile biopsy.  No signs of infection.  Bustillo catheter in place without significant hematuria.     Musculoskeletal:         General: No edema. Normal range of motion.      Cervical back: Normal range of motion and neck supple.     Neurological: He is alert and oriented to person, place, and time. He has normal strength. No cranial nerve deficit or sensory deficit.   Skin: Skin is warm and dry. No rash noted.   Psychiatric: He has a normal mood and affect. Thought content normal.       ED Course   Procedures  Labs Reviewed   URINALYSIS, REFLEX TO URINE CULTURE - Abnormal; Notable for the following components:       Result Value    Color, UA Red (*)     Appearance, UA Cloudy (*)     Protein, UA 3+ (*)     Ketones, UA 1+ (*)     Bilirubin (UA) 1+ (*)     Occult Blood UA 3+ (*)     Nitrite, UA Positive (*)     Leukocytes, UA 2+ (*)     All other components within normal limits    Narrative:     Specimen Source->Urine   URINALYSIS MICROSCOPIC - Abnormal; Notable for the following components:    RBC, UA >100 (*)     WBC, UA 45 (*)     All other components within normal limits    Narrative:     Specimen Source->Urine   CULTURE, URINE          Imaging Results    None          Medications    ciprofloxacin HCl tablet 500 mg (500 mg Oral Given 2/27/23 0214)     Medical Decision Making:   Clinical Tests:   Lab Tests: Ordered and Reviewed  ED Management:  Patient is very close to window when he was supposed to initially remove the Bustillo catheter.  Given this I had nursing remove Bustillo catheter which went well.  Patient stated emergency department until he spontaneously produced urine.  This urine did show evidence of possible urinary tract infection.  Culture pending.  Started on ciprofloxacin.  Advised close follow up with Urology.  Return ED if symptoms worsen or change in character.                        Clinical Impression:   Final diagnoses:  [Z46.6] Encounter for Bustillo catheter removal (Primary)  [T83.511A, N39.0] Urinary tract infection associated with indwelling urethral catheter, initial encounter        ED Disposition Condition    Discharge Stable          ED Prescriptions       Medication Sig Dispense Start Date End Date Auth. Provider    ciprofloxacin HCl (CIPRO) 500 MG tablet Take 1 tablet (500 mg total) by mouth 2 (two) times daily. for 7 days 14 tablet 2/27/2023 3/6/2023 Mitchel Alcantara MD          Follow-up Information       Follow up With Specialties Details Why Contact Info    Timo Myers MD Urology Call in 1 day For follow-up on today's visit. [address] [place] [address]  [phone]      [facility] Emergency Dept Emergency Medicine Go to  As needed, If symptoms worsen [address] [place] [id number] [phone]             Mitchel Alcantara MD  02/27/23 0616

## 2023-02-27 NOTE — ED NOTES
Pt. 16 [place] Bustillo catheter removed per MD order. 12mL of water deflated from balloon.  Pt tolerated removal well.  Small amount of bloody urine post catheter removal.

## 2023-02-27 NOTE — TELEPHONE ENCOUNTER
----- Message from Timo Myers MD sent at 2/27/2023  9:28 AM CST -----  Discussed pathology results with the patient.  Please arrange a CT chest abdomen pelvis for staging. Also, we can reschedule his upcoming appointment to be after the imaging.

## 2023-02-28 ENCOUNTER — TELEPHONE (OUTPATIENT)
Dept: UROLOGY | Facility: CLINIC | Age: 78
End: 2023-02-28
Payer: MEDICARE

## 2023-02-28 NOTE — TELEPHONE ENCOUNTER
----- Message from Brendan Sandhu sent at 2/28/2023 10:27 AM CST -----  Contact: Self  Type:  Same Day Appointment Request    Caller is requesting a same day appointment.  Caller declined first available appointment listed below.      Name of Caller:  Patient  When is the first available appointment?  3/1  Symptoms:  Loss of appetite, diarrhea, weight loss  Best Call Back Number:  [phone]   Additional Information:   States symptoms are worsening. Would like to speak with office to be seen today.

## 2023-03-01 ENCOUNTER — OFFICE VISIT (OUTPATIENT)
Dept: UROLOGY | Facility: CLINIC | Age: 78
End: 2023-03-01
Payer: MEDICARE

## 2023-03-01 VITALS — WEIGHT: 134 LBS | BODY MASS INDEX: 20.31 KG/M2 | HEIGHT: 68 IN

## 2023-03-01 DIAGNOSIS — C60.9 SQUAMOUS CELL CARCINOMA OF PENIS: Primary | ICD-10-CM

## 2023-03-01 LAB — BACTERIA UR CULT: NO GROWTH

## 2023-03-01 PROCEDURE — 1159F MED LIST DOCD IN RCRD: CPT | Mod: CPTII,S$GLB,, | Performed by: STUDENT IN AN ORGANIZED HEALTH CARE EDUCATION/TRAINING PROGRAM

## 2023-03-01 PROCEDURE — 99024 PR POST-OP FOLLOW-UP VISIT: ICD-10-PCS | Mod: S$GLB,,, | Performed by: STUDENT IN AN ORGANIZED HEALTH CARE EDUCATION/TRAINING PROGRAM

## 2023-03-01 PROCEDURE — 1126F AMNT PAIN NOTED NONE PRSNT: CPT | Mod: CPTII,S$GLB,, | Performed by: STUDENT IN AN ORGANIZED HEALTH CARE EDUCATION/TRAINING PROGRAM

## 2023-03-01 PROCEDURE — 99024 POSTOP FOLLOW-UP VISIT: CPT | Mod: S$GLB,,, | Performed by: STUDENT IN AN ORGANIZED HEALTH CARE EDUCATION/TRAINING PROGRAM

## 2023-03-01 PROCEDURE — 1159F PR MEDICATION LIST DOCUMENTED IN MEDICAL RECORD: ICD-10-PCS | Mod: CPTII,S$GLB,, | Performed by: STUDENT IN AN ORGANIZED HEALTH CARE EDUCATION/TRAINING PROGRAM

## 2023-03-01 PROCEDURE — 1160F PR REVIEW ALL MEDS BY PRESCRIBER/CLIN PHARMACIST DOCUMENTED: ICD-10-PCS | Mod: CPTII,S$GLB,, | Performed by: STUDENT IN AN ORGANIZED HEALTH CARE EDUCATION/TRAINING PROGRAM

## 2023-03-01 PROCEDURE — 3288F PR FALLS RISK ASSESSMENT DOCUMENTED: ICD-10-PCS | Mod: CPTII,S$GLB,, | Performed by: STUDENT IN AN ORGANIZED HEALTH CARE EDUCATION/TRAINING PROGRAM

## 2023-03-01 PROCEDURE — 1101F PR PT FALLS ASSESS DOC 0-1 FALLS W/OUT INJ PAST YR: ICD-10-PCS | Mod: CPTII,S$GLB,, | Performed by: STUDENT IN AN ORGANIZED HEALTH CARE EDUCATION/TRAINING PROGRAM

## 2023-03-01 PROCEDURE — 99999 PR PBB SHADOW E&M-EST. PATIENT-LVL IV: CPT | Mod: PBBFAC,,, | Performed by: STUDENT IN AN ORGANIZED HEALTH CARE EDUCATION/TRAINING PROGRAM

## 2023-03-01 PROCEDURE — 3288F FALL RISK ASSESSMENT DOCD: CPT | Mod: CPTII,S$GLB,, | Performed by: STUDENT IN AN ORGANIZED HEALTH CARE EDUCATION/TRAINING PROGRAM

## 2023-03-01 PROCEDURE — 99999 PR PBB SHADOW E&M-EST. PATIENT-LVL IV: ICD-10-PCS | Mod: PBBFAC,,, | Performed by: STUDENT IN AN ORGANIZED HEALTH CARE EDUCATION/TRAINING PROGRAM

## 2023-03-01 PROCEDURE — 1160F RVW MEDS BY RX/DR IN RCRD: CPT | Mod: CPTII,S$GLB,, | Performed by: STUDENT IN AN ORGANIZED HEALTH CARE EDUCATION/TRAINING PROGRAM

## 2023-03-01 PROCEDURE — 1126F PR PAIN SEVERITY QUANTIFIED, NO PAIN PRESENT: ICD-10-PCS | Mod: CPTII,S$GLB,, | Performed by: STUDENT IN AN ORGANIZED HEALTH CARE EDUCATION/TRAINING PROGRAM

## 2023-03-01 PROCEDURE — 1101F PT FALLS ASSESS-DOCD LE1/YR: CPT | Mod: CPTII,S$GLB,, | Performed by: STUDENT IN AN ORGANIZED HEALTH CARE EDUCATION/TRAINING PROGRAM

## 2023-03-01 RX ORDER — ATORVASTATIN CALCIUM 20 MG/1
20 TABLET, FILM COATED ORAL DAILY
COMMUNITY
Start: 2023-02-27 | End: 2023-09-12 | Stop reason: SDUPTHER

## 2023-03-01 NOTE — PROGRESS NOTES
[facility] - Urology   Clinic Note    Subjective:     Chief Complaint: Discuss Results      History of Present Illness:  Rajendra Castle is a 77 y.o. male who presents to clinic for evaluation and management of multiple urologic issues. He was initially seen for LUTS after radiation for prostate cancer and found to have penile cancer.      He was initially evaluated for LUTS and was requesting a UroLift.  During cystoscopy he was noted to have a very firm lesion on the glans on the left side.  There was no palpable inguinal lymphadenopathy. He underwent incisional wedge biopsy in the OR 02/23/2023. There was residual disease at the time of the biopsy. 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 Clinically this extended at least into the corpora cavernosa on the left c/w cT3 disease. CT CAP with contrast was independently reviewed and interpreted showing no evidence of inguinal or pelvic LAD and no evidence of distant metastatic disease.     A Bustillo was placed at the time of surgery given the proximity to the urethra.  He removed this the day of the procedure rather than a few days after as instructed but has been voiding fine.    He has had some recent weight loss but reports this has been more due to anxiety associated with the diagnosis rather than unintentional weight loss.    From previous:  He has a history of prostate cancer s/p EBRT. He reports urgency, hesitancy, and sensation of incomplete emptying with leakage and dribbling. Nocturia 3-4x. He has ISABEL and CPAP. He also reports insomnia. He was having dysuria but no gross hematuria. Previously he was on alfuzosin, but was changed recently to flomax. I started him on ditropan which improved his symptoms.      Cysto/TRUS with Dr. Pina 7/2022 showed a prostate volume of 23 cc, as well as a small short-segment bulbar urethral stricture just distal to the  "sphincter which was passable with the flexible scope. Prostatic lateral lobe obstruction, more anterior lateral lobe ingrowth proximally with mild elevation of the bladder neck, and more classic lateral lobe distally. PVR was measured and noted to be 11 mL.     He has multiple cardiorespiratory issues for which his urolift procedure with Dr. Pina was postponed for clearance. Recently admitted for chest pain and discomfort with high blood pressure.     Past medical, family, surgical and social history reviewed as documented in chart with pertinent positive medical, family, surgical and social history detailed in HPI.    A review systems was conducted with pertinent positive and negative findings documented in HPI.    Anticoagulation/Antiplatelets:  Yes aspirin and plavix.     Objective:     Estimated body mass index is 20.37 kg/m² as calculated from the following:    Height as of this encounter: 5' 8" (1.727 m).    Weight as of this encounter: 60.8 kg (134 lb).    Vital Signs (Most Recent)       Physical Exam  Vitals and nursing note reviewed.   Constitutional:       General: He is not in acute distress.     Appearance: He is not ill-appearing or toxic-appearing.   Pulmonary:      Effort: Pulmonary effort is normal. No accessory muscle usage or respiratory distress.   Genitourinary:     Penis: Uncircumcised.        Neurological:      Mental Status: He is alert.       Lab Results   Component Value Date    BUN 11 01/26/2023    CREATININE 1.7 (H) 02/27/2023    WBC 6.85 02/23/2023    HGB 14.2 02/23/2023    HCT 43.3 02/23/2023     02/23/2023    AST 17 01/26/2023    ALT 16 01/26/2023    ALKPHOS 57 01/26/2023    ALBUMIN 3.6 01/26/2023    HGBA1C 5.9 06/21/2022        Lab Results   Component Value Date    PSA 4.8 (H) 03/16/2018    PSA 2.1 10/03/2013    PSA 2.73 08/06/2012    PSA 1.8 10/03/2006    PSADIAG 0.10 11/14/2022    PSADIAG 0.14 02/28/2022    PSADIAG 0.11 10/29/2021    PSADIAG 0.08 07/13/2021    PSADIAG 0.04 " 03/29/2021    PSADIAG 0.07 10/02/2020    PSADIAG 0.30 12/20/2019    PSADIAG 0.86 08/12/2019    PSADIAG 3.4 10/14/2015    PSADIAG 4.1 (H) 04/06/2015    PSAFREE 0.01 06/24/2020    PSAFREE 0.22 11/09/2018    PSAFREE 0.34 04/25/2018    PSAFREEPCT 9.09 06/24/2020    PSAFREEPCT 9.17 11/09/2018    PSAFREEPCT 11.72 04/25/2018      Assessment:     1. Squamous cell carcinoma of penis      Plan:     We reviewed his pathology and staging imaging.  We discussed the management for penile cancer including partial penectomy and radical penectomy.  We discussed the need for inguinal lymph node dissection.    Referral to Dr. Bryant for further evaluation and management.    Timo Myers MD     Total time for today's visit was 40 minutes. This includes face to face time and non-face to face time preparing to see the patient (eg, review of tests), obtaining and/or reviewing separately obtained history, documenting clinical information in the electronic or other health record, independently interpreting results and communicating results to the patient/family/caregiver, or care coordinator.

## 2023-03-14 ENCOUNTER — TELEPHONE (OUTPATIENT)
Dept: FAMILY MEDICINE | Facility: CLINIC | Age: 78
End: 2023-03-14
Payer: MEDICARE

## 2023-03-14 NOTE — TELEPHONE ENCOUNTER
----- Message from Juju Bejarano sent at 3/14/2023  3:51 PM CDT -----  Regarding: pt request  Name of Who is Calling:JONATHAN BHAKTA [[id number]]          What is the request in detail: pt was in er for 3 nights and  still cant sleep even with the sleeping pill doctor bertha gave please give pt a call           Can the clinic reply by [facility]:no           [facility]  [phone] (home)

## 2023-03-23 ENCOUNTER — OFFICE VISIT (OUTPATIENT)
Dept: UROLOGY | Facility: CLINIC | Age: 78
End: 2023-03-23
Payer: MEDICARE

## 2023-03-23 VITALS
HEART RATE: 105 BPM | WEIGHT: 134.56 LBS | DIASTOLIC BLOOD PRESSURE: 78 MMHG | HEIGHT: 68 IN | SYSTOLIC BLOOD PRESSURE: 153 MMHG | BODY MASS INDEX: 20.39 KG/M2

## 2023-03-23 DIAGNOSIS — J43.9 PULMONARY EMPHYSEMA, UNSPECIFIED EMPHYSEMA TYPE: ICD-10-CM

## 2023-03-23 DIAGNOSIS — N18.30 STAGE 3 CHRONIC KIDNEY DISEASE, UNSPECIFIED WHETHER STAGE 3A OR 3B CKD: ICD-10-CM

## 2023-03-23 DIAGNOSIS — C61 PROSTATE CANCER: Primary | ICD-10-CM

## 2023-03-23 DIAGNOSIS — C60.9 SQUAMOUS CELL CARCINOMA OF PENIS: ICD-10-CM

## 2023-03-23 DIAGNOSIS — C60.9 PENILE CANCER: ICD-10-CM

## 2023-03-23 DIAGNOSIS — I20.0 UNSTABLE ANGINA: ICD-10-CM

## 2023-03-23 PROCEDURE — 99999 PR PBB SHADOW E&M-EST. PATIENT-LVL V: CPT | Mod: PBBFAC,,, | Performed by: UROLOGY

## 2023-03-23 PROCEDURE — 3078F PR MOST RECENT DIASTOLIC BLOOD PRESSURE < 80 MM HG: ICD-10-PCS | Mod: CPTII,S$GLB,, | Performed by: UROLOGY

## 2023-03-23 PROCEDURE — 1101F PR PT FALLS ASSESS DOC 0-1 FALLS W/OUT INJ PAST YR: ICD-10-PCS | Mod: CPTII,S$GLB,, | Performed by: UROLOGY

## 2023-03-23 PROCEDURE — 99999 PR PBB SHADOW E&M-EST. PATIENT-LVL V: ICD-10-PCS | Mod: PBBFAC,,, | Performed by: UROLOGY

## 2023-03-23 PROCEDURE — 1126F AMNT PAIN NOTED NONE PRSNT: CPT | Mod: CPTII,S$GLB,, | Performed by: UROLOGY

## 2023-03-23 PROCEDURE — 99215 PR OFFICE/OUTPT VISIT, EST, LEVL V, 40-54 MIN: ICD-10-PCS | Mod: S$GLB,,, | Performed by: UROLOGY

## 2023-03-23 PROCEDURE — 99215 OFFICE O/P EST HI 40 MIN: CPT | Mod: S$GLB,,, | Performed by: UROLOGY

## 2023-03-23 PROCEDURE — 3077F SYST BP >= 140 MM HG: CPT | Mod: CPTII,S$GLB,, | Performed by: UROLOGY

## 2023-03-23 PROCEDURE — 1159F MED LIST DOCD IN RCRD: CPT | Mod: CPTII,S$GLB,, | Performed by: UROLOGY

## 2023-03-23 PROCEDURE — 3077F PR MOST RECENT SYSTOLIC BLOOD PRESSURE >= 140 MM HG: ICD-10-PCS | Mod: CPTII,S$GLB,, | Performed by: UROLOGY

## 2023-03-23 PROCEDURE — 1159F PR MEDICATION LIST DOCUMENTED IN MEDICAL RECORD: ICD-10-PCS | Mod: CPTII,S$GLB,, | Performed by: UROLOGY

## 2023-03-23 PROCEDURE — 1101F PT FALLS ASSESS-DOCD LE1/YR: CPT | Mod: CPTII,S$GLB,, | Performed by: UROLOGY

## 2023-03-23 PROCEDURE — 3288F FALL RISK ASSESSMENT DOCD: CPT | Mod: CPTII,S$GLB,, | Performed by: UROLOGY

## 2023-03-23 PROCEDURE — 3288F PR FALLS RISK ASSESSMENT DOCUMENTED: ICD-10-PCS | Mod: CPTII,S$GLB,, | Performed by: UROLOGY

## 2023-03-23 PROCEDURE — 1126F PR PAIN SEVERITY QUANTIFIED, NO PAIN PRESENT: ICD-10-PCS | Mod: CPTII,S$GLB,, | Performed by: UROLOGY

## 2023-03-23 PROCEDURE — 3078F DIAST BP <80 MM HG: CPT | Mod: CPTII,S$GLB,, | Performed by: UROLOGY

## 2023-03-23 NOTE — LETTER
March 23, 2023        Sp Bryant MD  [address] [facility]  4th Floor  [facility] [address]             [place] [facility] - Urology 2nd Fl  [address] [facility] [address]  Phone: [phone]   Patient: Rajendra Castle   MR Number: [id number]   YOB: 1945   Date of Visit: 3/23/2023       Dear Dr. Bryant:    Thank you for referring Rajendra Castle to me for evaluation. Attached you will find relevant portions of my assessment and plan of care.    If you have questions, please do not hesitate to call me. I look forward to following Rajendra Castle along with you.    Sincerely,      Sp Bryant MD            CC  No Recipients    Enclosure

## 2023-03-23 NOTE — PROGRESS NOTES
Clinic Note  3/23/2023      Subjective:         Chief Complaint:   HERNANDEZ Castle is a 77 y.o. 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 Consult from Dr Myers.  Patient had wedge biopsy 2023- moderate to poorly differentiated SCC with + margin and + LVI. Per note lesion is subdermal and involves corpora (cT3).  CT C/A/P- 2023- stable lung nodules, negative inguinal and pelvic nodes.  Co-morbidities- COPD, CAD, CKD3b (recent GFR 41), rheumatoid arthritis, prostate cancer, depression. On Plavix.  Prostate cancer- [place] 3+4, treated with ADT/EBRT. 7740 Gy completed 2019. PSA 2022-0.1.      Past Medical History:   Diagnosis Date    Anticoagulant long-term use     Arthritis     Coronary artery disease     DDD (degenerative disc disease), cervical     Degenerative disc disease     Heart murmur     Hypertension     Nontoxic multinodular goiter 2016    Prostate CA     RA (rheumatoid arthritis)     Sleep apnea     uses C-PAP    Vitamin D insufficiency 2016     Family History   Problem Relation Age of Onset    Heart disease Mother     Stroke Father     Drug abuse Sister     No Known Problems Daughter     No Known Problems Daughter     No Known Problems Son     Diabetes Maternal Uncle     Colon cancer Neg Hx     Crohn's disease Neg Hx     Esophageal cancer Neg Hx     Stomach cancer Neg Hx     Ulcerative colitis Neg Hx      Social History     Socioeconomic History    Marital status: Single   Tobacco Use    Smoking status: Former     Packs/day: 0.25     Years: 10.00     Pack years: 2.50     Types: Cigarettes     Quit date: 2001     Years since quittin.6     Passive exposure: Past    Smokeless tobacco: Never   Substance and Sexual Activity    Alcohol use: Not Currently     Comment: seldom    Drug use: Not Currently     Frequency: 8.0 times per week     Types: Marijuana    Sexual activity: Yes     Partners: Female     Social Determinants of Health      Financial Resource Strain: Low Risk     Difficulty of Paying Living Expenses: Not hard at all   Food Insecurity: No Food Insecurity    Worried About Running Out of Food in the Last Year: Never true    Ran Out of Food in the Last Year: Never true   Transportation Needs: No Transportation Needs    Lack of Transportation (Medical): No    Lack of Transportation (Non-Medical): No   Physical Activity: Inactive    Days of Exercise per Week: 0 days    Minutes of Exercise per Session: 0 min   Stress: No Stress Concern Present    Feeling of Stress : Not at all   Social Connections: Unknown    Frequency of Communication with Friends and Family: More than three times a week    Frequency of Social Gatherings with Friends and Family: More than three times a week    Attends Gnosticism Services: Never    Active Member of Clubs or Organizations: No    Attends Club or Organization Meetings: Never   Housing Stability: Low Risk     Unable to Pay for Housing in the Last Year: No    Number of Places Lived in the Last Year: 1    Unstable Housing in the Last Year: No     Past Surgical History:   Procedure Laterality Date    CARPAL TUNNEL RELEASE Right 05/28/2021    Procedure: RELEASE, CARPAL TUNNEL;  Surgeon: Jose Ryan II, MD;  Location: [facility] OR;  Service: Orthopedics;  Laterality: Right;    COLONOSCOPY  prior to 2016    normal findings per patient report    CORONARY ANGIOPLASTY WITH STENT PLACEMENT  02/2022    CYSTOSCOPY N/A 12/18/2018    Procedure: CYSTOSCOPY;  Surgeon: Garrett Pina MD;  Location: [facility] OR;  Service: Urology;  Laterality: N/A;    CYSTOSCOPY N/A 07/12/2022    Procedure: CYSTOSCOPY;  Surgeon: Garrett Pina MD;  Location: [facility] OR;  Service: Urology;  Laterality: N/A;    ESOPHAGOGASTRODUODENOSCOPY N/A 09/29/2020    Dr. Espinosa; empiric dilation; erythematous mucosa in antrum; gastric mucosal atrophy; hematin in entire stomach; biopsy: mid & distal esophagus WNL, stomach- WNL,  negative for h pylori    EXCISION OF LESION OF PENIS N/A 2/23/2023    Procedure: EXCISION, LESION, PENIS;  Surgeon: Timo Myers MD;  Location: [facility] OR;  Service: Urology;  Laterality: N/A;    PARATHYROIDECTOMY Right 10/27/2020    Procedure: PARATHYROIDECTOMY;  Surgeon: Latonia Clayton MD;  [facility];  Service: ENT;  Laterality: Right;    RELEASE OF ULNAR NERVE AT CUBITAL TUNNEL Right 05/28/2021    Procedure: RELEASE, ULNAR TUNNEL;  Surgeon: Jose Ryan II, MD;  Location: [facility] OR;  Service: Orthopedics;  Laterality: Right;    TRANSRECTAL BIOPSY OF PROSTATE WITH ULTRASOUND GUIDANCE N/A 12/18/2018    Procedure: BIOPSY, PROSTATE, RECTAL APPROACH, WITH US GUIDANCE;  Surgeon: Garrett Pina MD;  Location: [facility] OR;  Service: Urology;  Laterality: N/A;    TRANSRECTAL ULTRASOUND EXAMINATION N/A 07/12/2022    Procedure: ULTRASOUND, RECTAL APPROACH;  Surgeon: Garrett Pina MD;  Location: [facility] OR;  Service: Urology;  Laterality: N/A;     Patient Active Problem List   Diagnosis    Heart murmur    Arthritis    Rotator cuff tendinitis    Dyspnea on exertion    DDD (degenerative disc disease), cervical    Anemia, iron deficiency    Chronic GI bleeding    Arthritis of wrist, right    Trigger thumb of left hand    Arthritis of right wrist    Essential hypertension    Rheumatoid arthritis involving multiple sites    Elevated troponin    Nausea and vomiting    Nontoxic multinodular goiter    Gastroesophageal reflux disease without esophagitis    Nodular thyroid disease    Prediabetes    Osteoporosis    Hypogonadism in male    Vitamin D insufficiency    Hyperparathyroidism    Pulmonary emphysema    Rheumatoid lung    Lung nodule    Stage 3 chronic kidney disease, unspecified whether stage 3a or 3b CKD    Elevated PSA    Prostate cancer    Chest pain    Dysphagia    Severe malnutrition    Tachycardia    S/P parathyroidectomy    Decreased appetite    Right  "carpal tunnel syndrome    Cubital tunnel syndrome on right    Encounter for long-term (current) use of medications    COVID    Atherosclerosis of native coronary artery of native heart without angina pectoris    Unstable angina    HTN (hypertension)    COVID-19 long hauler    Mobitz I    Major depressive disorder, single episode, severe without psychotic features    Atherosclerosis of native coronary artery of native heart with angina pectoris with documented spasm    Drug-induced immunodeficiency    Penile lesion    Penile cancer     Review of Systems      Objective:      There were no vitals taken for this visit.  Estimated body mass index is 20.37 kg/m² as calculated from the following:    Height as of 3/1/23: 5' 8" (1.727 m).    Weight as of 3/1/23: 60.8 kg (134 lb).  Physical Exam  Vitals and nursing note reviewed.   Constitutional:       Appearance: Normal appearance.   HENT:      Head: Atraumatic.      Nose: Nose normal.   Eyes:      Extraocular Movements: Extraocular movements intact.      Pupils: Pupils are equal, round, and reactive to light.   Cardiovascular:      Rate and Rhythm: Normal rate.   Pulmonary:      Effort: Pulmonary effort is normal.   Abdominal:      General: Abdomen is flat. There is no distension.      Tenderness: There is no abdominal tenderness. There is no right CVA tenderness or left CVA tenderness.   Genitourinary:     Comments: Erythematous patch on the right beulah-meatal lip. Left glans incision well healed. Mobile mass palpated within the left beulah-glans. No external mass noted. Single 1cm, mobile, right inguinal node palpated over the right fossa ovalis.  Musculoskeletal:         General: Normal range of motion.      Cervical back: Normal range of motion.   Skin:     Coloration: Skin is not jaundiced.   Neurological:      General: No focal deficit present.      Mental Status: He is alert and oriented to person, place, and time.   Psychiatric:         Mood and Affect: " Mood normal.         Behavior: Behavior normal.       Assessment and Plan:           Problem List Items Addressed This Visit       Prostate cancer - Primary    Penile cancer       Follow up:   Very unusual presentation for penile cancer with no surface component. Palpably lesion abuts urethra and involves corpora. Suspect this may arise from navicular fossa.  Will plan on cystoscopy and PET scan.  Discussed partial penectomy, patient very upset so did not extend discussion into dealing with node dissection.    Sp Bryant

## 2023-04-03 ENCOUNTER — HOSPITAL ENCOUNTER (OUTPATIENT)
Dept: RADIOLOGY | Facility: HOSPITAL | Age: 78
Discharge: HOME | End: 2023-04-03
Payer: MEDICARE

## 2023-04-03 DIAGNOSIS — C61 PROSTATE CANCER: ICD-10-CM

## 2023-04-03 PROCEDURE — 78815 PET IMAGE W/CT SKULL-THIGH: CPT | Mod: TC,PS

## 2023-04-03 PROCEDURE — A9698 NON-RAD CONTRAST MATERIALNOC: HCPCS

## 2023-04-03 PROCEDURE — A9552 F18 FDG: HCPCS

## 2023-04-03 PROCEDURE — 78815 NM PET CT ROUTINE: ICD-10-PCS | Mod: 26,PI,, | Performed by: RADIOLOGY

## 2023-04-03 PROCEDURE — 25500020 PHARM REV CODE 255

## 2023-04-03 PROCEDURE — 78815 PET IMAGE W/CT SKULL-THIGH: CPT | Mod: 26,PI,, | Performed by: RADIOLOGY

## 2023-04-03 RX ADMIN — Medication 900 ML: at 12:04

## 2023-04-04 ENCOUNTER — PROCEDURE VISIT (OUTPATIENT)
Dept: UROLOGY | Facility: CLINIC | Age: 78
End: 2023-04-04
Payer: MEDICARE

## 2023-04-04 ENCOUNTER — TELEPHONE (OUTPATIENT)
Dept: SURGERY | Facility: CLINIC | Age: 78
End: 2023-04-04
Payer: MEDICARE

## 2023-04-04 ENCOUNTER — TUMOR BOARD CONFERENCE (OUTPATIENT)
Dept: UROLOGY | Facility: HOSPITAL | Age: 78
End: 2023-04-04
Payer: MEDICARE

## 2023-04-04 VITALS
DIASTOLIC BLOOD PRESSURE: 69 MMHG | HEART RATE: 72 BPM | WEIGHT: 133.94 LBS | RESPIRATION RATE: 18 BRPM | BODY MASS INDEX: 20.3 KG/M2 | HEIGHT: 68 IN | TEMPERATURE: 98 F | SYSTOLIC BLOOD PRESSURE: 147 MMHG

## 2023-04-04 DIAGNOSIS — C61 PROSTATE CANCER: ICD-10-CM

## 2023-04-04 PROCEDURE — 52000 CYSTOSCOPY: ICD-10-PCS | Mod: S$GLB,,, | Performed by: UROLOGY

## 2023-04-04 PROCEDURE — 52000 CYSTOURETHROSCOPY: CPT | Mod: S$GLB,,, | Performed by: UROLOGY

## 2023-04-04 RX ORDER — LIDOCAINE HYDROCHLORIDE 20 MG/ML
JELLY TOPICAL ONCE
Status: COMPLETED | OUTPATIENT
Start: 2023-04-04 | End: 2023-04-04

## 2023-04-04 RX ORDER — TRAZODONE HYDROCHLORIDE 50 MG/1
TABLET ORAL
COMMUNITY
Start: 2023-03-11 | End: 2023-05-26 | Stop reason: ALTCHOICE

## 2023-04-04 RX ADMIN — LIDOCAINE HYDROCHLORIDE: 20 JELLY TOPICAL at 09:04

## 2023-04-04 NOTE — PROCEDURES
Cystoscopy    Date/Time: 4/4/2023 9:44 AM  Performed by: Sp Bryant MD  Authorized by: Jordon Crowell MD     Consent Done?:  Yes (Written)  Timeout: prior to procedure the correct patient, procedure, and site was verified    Prep: patient was prepped and draped in usual sterile fashion    Indications: hematuria    Position:  Supine  Preparation: Patient was prepped and draped in usual sterile fashion    Scope type:  Flexible cystoscope  Stent inserted: No    Stent removed: No    External exam normal: No    Urethral Meatus Normal: Yes     patient tolerated the procedure well with no immediate complications  Comments:      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  Office visit Thursday to discuss treatment in more detail.

## 2023-04-04 NOTE — TELEPHONE ENCOUNTER
Left voicemail with callback number informing of upcoming appointment. Reminder slip placed in mail and details viewable in portal.

## 2023-04-04 NOTE — PATIENT INSTRUCTIONS
What to Expect After a Cystoscopy  For the next 24-48 hours, you may feel a mild burning when you urinate. This burning is normal and expected. Drink plenty of water to dilute the urine to help relieve the burning sensation. You may also see a small amount of blood in your urine after the procedure.    Unless you are already taking antibiotics, you may be given an antibiotic after the test to prevent infection.    Signs and Symptoms to Report  Call the [facility] at [phone] if you develop any of the following:  Fever of 101 degrees or higher  Chills or persistent bleeding  Inability to urinate

## 2023-04-04 NOTE — PROGRESS NOTES
PATIENT SUMMARY:   iTn ([id number]) - Manny patient. 78 yo male with SCC of the penis. Lesion is subdermal with no surface component. Wedge biopsy showing moderate/poor diff SCC with + margin and + LVI. Involves corpora (cT3). CT with stable lung nodules, negative inguinal and pelvic nodes. Has COPD, CAD, CKD (GFR 41), RA. on plavix. Plan for cystoscopy and PET. Patient not amenable to partial penectomy.    PERFORMANCE STATUS:  ECOG 1    Estimated GFR/CKD Stage: 41.5 (Cr 1.7)    Clinical/Pathologic Stage (TNM): T2? Nx Mx    DISCUSSION:  No cutaneous findings on exam    Obstructive urinary symptoms. Palpable left corporal tumor, no cutaneous involvement. No path available for review. Lesion in distal urethral approximately 80% around lumen. PET scan showed distal penile lesion and incidental rectal vs anal sphincter lesion (flex sig later this week)    1 cm right inguinal node, not suspicious, negative on PET. Offered partial penectomy with sentinel LN.     Recommend surgical excision for more definitive path. [place] lymph node nonconcerning but will consider biopsy vs dissection.      FACULTY IN ATTENDANCE:    Urologic Oncology: Sp Bryant MD [x], Alfredito Andrea MD [x] , Garrett Ch MD [], Cricket Campbell MD [x]    CONSULT NEEDED:     [] Urologic Oncology    [] Med Onc    [] Rad/Onc  [] CRS [] Surg Onc    [] Treatment Guidelines (NCCN and AUA) reviewed and care planned is consistent with guidelines.    TUMOR BOARD RECOMMENDATIONS/PLAN/CONSENSUS:     Case discussed among group. Pathology and radiologic images were reviewed (if applicable).    Patient will proceed with flexible sigmoidoscopy with Dr. Roger later this week to assess rectal/anal sphincter mass on PET.  - Will likely recommend partial penectomy with inguinal lymph node sentinel biopsy depending on complete workup

## 2023-04-04 NOTE — LETTER
April 4, 2023        Jordon Crowell MD  [address] [facility] [address]             [place] [facility] - Urology  [address] [place] [address]  Phone: [phone]   Patient: Rajendra Castle   MR Number: [id number]   YOB: 1945   Date of Visit: 4/4/2023       Dear Dr. Crowell:    Thank you for referring Rajendra Castle to me for evaluation. Attached you will find relevant portions of my assessment and plan of care.    If you have questions, please do not hesitate to call me. I look forward to following Rajendra Castle along with you.    Sincerely,      Sp Bryant MD            CC  No Recipients    Enclosure

## 2023-04-05 NOTE — PROGRESS NOTES
Clinic Note  4/5/2023      Subjective:         Chief Complaint:   HERNANDEZ Catsle is a 77 y.o. malediagnosed with SCC of the penis. Consult from Dr Myers.  Patient had wedge biopsy 2/23/2023- moderate to poorly differentiated SCC with + margin and + LVI. Per note lesion is subdermal and involves corpora (cT3).  CT C/A/P- 2/27/2023- stable lung nodules, negative inguinal and pelvic nodes.  Co-morbidities- COPD, CAD, CKD3b (recent GFR 41), rheumatoid arthritis, prostate cancer, depression. On Plavix.  Prostate cancer- [place] 3+4, treated with ADT/EBRT. 7740 Gy completed August 2019. PSA 11/14/2022-0.1.    4/6/2023- Patient had cystoscopy yesterday which showed tumor in urethra. 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 PET scan shows no avid nodes or metastasis. Presented at tumor board yesterday. Consensus-partial penectomy with sentinel node biopsy. PET scan also showed uptake in rectum/external sphincter. Plan is for flex sig with Dr. Roger.    Past Medical History:   Diagnosis Date    Anticoagulant long-term use     Arthritis     Coronary artery disease     DDD (degenerative disc disease), cervical     Degenerative disc disease     Heart murmur     Hypertension     Nontoxic multinodular goiter 09/20/2016    Prostate CA     RA (rheumatoid arthritis)     Sleep apnea     uses C-PAP    Vitamin D insufficiency 09/30/2016     Family History   Problem Relation Age of Onset    Heart disease Mother     Stroke Father     Drug abuse Sister     No Known Problems Daughter     No Known Problems Daughter     No Known Problems Son     Diabetes Maternal Uncle     Colon cancer Neg Hx     Crohn's disease Neg Hx     Esophageal cancer Neg Hx     Stomach cancer Neg Hx     Ulcerative colitis Neg Hx      Social History     Socioeconomic History    Marital status: Single   Tobacco Use    Smoking status: Former     Packs/day: 0.25     Years: 10.00     Pack years: 2.50     Types: Cigarettes     Quit date: 8/6/2001     Years since  quittin.6     Passive exposure: Past    Smokeless tobacco: Never   Substance and Sexual Activity    Alcohol use: Not Currently     Comment: seldom    Drug use: Not Currently     Frequency: 8.0 times per week     Types: Marijuana    Sexual activity: Yes     Partners: Female     Social Determinants of Health     Financial Resource Strain: Low Risk     Difficulty of Paying Living Expenses: Not hard at all   Food Insecurity: No Food Insecurity    Worried About Running Out of Food in the Last Year: Never true    Ran Out of Food in the Last Year: Never true   Transportation Needs: No Transportation Needs    Lack of Transportation (Medical): No    Lack of Transportation (Non-Medical): No   Physical Activity: Inactive    Days of Exercise per Week: 0 days    Minutes of Exercise per Session: 0 min   Stress: No Stress Concern Present    Feeling of Stress : Not at all   Social Connections: Unknown    Frequency of Communication with Friends and Family: More than three times a week    Frequency of Social Gatherings with Friends and Family: More than three times a week    Attends Buddhism Services: Never    Active Member of Clubs or Organizations: No    Attends Club or Organization Meetings: Never   Housing Stability: Low Risk     Unable to Pay for Housing in the Last Year: No    Number of Places Lived in the Last Year: 1    Unstable Housing in the Last Year: No     Past Surgical History:   Procedure Laterality Date    CARPAL TUNNEL RELEASE Right 2021    Procedure: RELEASE, CARPAL TUNNEL;  Surgeon: Jose Ryan II, MD;  Location: [facility] OR;  Service: Orthopedics;  Laterality: Right;    COLONOSCOPY  prior to 2016    normal findings per patient report    CORONARY ANGIOPLASTY WITH STENT PLACEMENT  2022    CYSTOSCOPY N/A 2018    Procedure: CYSTOSCOPY;  Surgeon: Garrett Pina MD;  Location: [facility] OR;  Service: Urology;  Laterality: N/A;    CYSTOSCOPY N/A 2022    Procedure: CYSTOSCOPY;  Surgeon: Garrett  SUMAN Pina MD;  Location: [facility] OR;  Service: Urology;  Laterality: N/A;    ESOPHAGOGASTRODUODENOSCOPY N/A 09/29/2020    Dr. Espinosa; empiric dilation; erythematous mucosa in antrum; gastric mucosal atrophy; hematin in entire stomach; biopsy: mid & distal esophagus WNL, stomach- WNL, negative for h pylori    EXCISION OF LESION OF PENIS N/A 2/23/2023    Procedure: EXCISION, LESION, PENIS;  Surgeon: Timo Myers MD;  Location: [facility] OR;  Service: Urology;  Laterality: N/A;    PARATHYROIDECTOMY Right 10/27/2020    Procedure: PARATHYROIDECTOMY;  Surgeon: Latonia Clayton MD;  Location: [address];  Service: ENT;  Laterality: Right;    RELEASE OF ULNAR NERVE AT CUBITAL TUNNEL Right 05/28/2021    Procedure: RELEASE, ULNAR TUNNEL;  Surgeon: Jose Ryan II, MD;  Location: [facility] OR;  Service: Orthopedics;  Laterality: Right;    TRANSRECTAL BIOPSY OF PROSTATE WITH ULTRASOUND GUIDANCE N/A 12/18/2018    Procedure: BIOPSY, PROSTATE, RECTAL APPROACH, WITH US GUIDANCE;  Surgeon: Garrett Pina MD;  Location: [facility] OR;  Service: Urology;  Laterality: N/A;    TRANSRECTAL ULTRASOUND EXAMINATION N/A 07/12/2022    Procedure: ULTRASOUND, RECTAL APPROACH;  Surgeon: Garrett Pina MD;  Location: [facility];  Service: Urology;  Laterality: N/A;     Patient Active Problem List   Diagnosis    Heart murmur    Arthritis    Rotator cuff tendinitis    Dyspnea on exertion    DDD (degenerative disc disease), cervical    Anemia, iron deficiency    Chronic GI bleeding    Arthritis of wrist, right    Trigger thumb of left hand    Arthritis of right wrist    Essential hypertension    Rheumatoid arthritis involving multiple sites    Elevated troponin    Nausea and vomiting    Nontoxic multinodular goiter    Gastroesophageal reflux disease without esophagitis    Nodular thyroid disease    Prediabetes    Osteoporosis    Hypogonadism in male    Vitamin D insufficiency    Hyperparathyroidism    Pulmonary emphysema    Rheumatoid lung     "Lung nodule    Stage 3 chronic kidney disease, unspecified whether stage 3a or 3b CKD    Prostate cancer    Chest pain    Dysphagia    Severe malnutrition    Tachycardia    S/P parathyroidectomy    Decreased appetite    Right carpal tunnel syndrome    Cubital tunnel syndrome on right    Encounter for long-term (current) use of medications    COVID    Atherosclerosis of native coronary artery of native heart without angina pectoris    Unstable angina    HTN (hypertension)    COVID-19 long shannen Larios I    Major depressive disorder, single episode, severe without psychotic features    Atherosclerosis of native coronary artery of native heart with angina pectoris with documented spasm    Drug-induced immunodeficiency    Penile lesion    Penile cancer     Review of Systems      Objective:      There were no vitals taken for this visit.  Estimated body mass index is 20.36 kg/m² as calculated from the following:    Height as of 4/4/23: 5' 8" (1.727 m).    Weight as of 4/4/23: 60.7 kg (133 lb 14.9 oz).  Physical Exam      Assessment and Plan:           Problem List Items Addressed This Visit    None      Follow up:   Discussed partial penectomy, sentinel node biopsy. Patient refuses surgical therapy. He is very angry and adamant he want Urolift surgery for BPH. I explained that at this time cancer is what is causing his obstructive voiding symptoms.  Will consult radiation oncology and medical oncology to discuss chemo/rads.  Flex sig scheduled with Dr. Roger on 4/11/2023. Will cancel appointment. With Dr. Avila since sentinel node will not be needed.  Letter to Kana Brewer, [place].    Sp Bryant        "

## 2023-04-06 ENCOUNTER — OFFICE VISIT (OUTPATIENT)
Dept: UROLOGY | Facility: CLINIC | Age: 78
End: 2023-04-06
Payer: MEDICARE

## 2023-04-06 VITALS
WEIGHT: 133 LBS | DIASTOLIC BLOOD PRESSURE: 87 MMHG | BODY MASS INDEX: 20.22 KG/M2 | SYSTOLIC BLOOD PRESSURE: 183 MMHG | HEART RATE: 79 BPM

## 2023-04-06 DIAGNOSIS — C68.0 URETHRAL CANCER: Primary | ICD-10-CM

## 2023-04-06 PROBLEM — C60.9 PENILE CANCER: Status: RESOLVED | Noted: 2023-03-23 | Resolved: 2023-04-06

## 2023-04-06 PROCEDURE — 3079F PR MOST RECENT DIASTOLIC BLOOD PRESSURE 80-89 MM HG: ICD-10-PCS | Mod: CPTII,S$GLB,, | Performed by: UROLOGY

## 2023-04-06 PROCEDURE — 99215 PR OFFICE/OUTPT VISIT, EST, LEVL V, 40-54 MIN: ICD-10-PCS | Mod: S$GLB,,, | Performed by: UROLOGY

## 2023-04-06 PROCEDURE — 1126F PR PAIN SEVERITY QUANTIFIED, NO PAIN PRESENT: ICD-10-PCS | Mod: CPTII,S$GLB,, | Performed by: UROLOGY

## 2023-04-06 PROCEDURE — 3288F FALL RISK ASSESSMENT DOCD: CPT | Mod: CPTII,S$GLB,, | Performed by: UROLOGY

## 2023-04-06 PROCEDURE — 99999 PR PBB SHADOW E&M-EST. PATIENT-LVL IV: CPT | Mod: PBBFAC,,, | Performed by: UROLOGY

## 2023-04-06 PROCEDURE — 1101F PR PT FALLS ASSESS DOC 0-1 FALLS W/OUT INJ PAST YR: ICD-10-PCS | Mod: CPTII,S$GLB,, | Performed by: UROLOGY

## 2023-04-06 PROCEDURE — 3077F PR MOST RECENT SYSTOLIC BLOOD PRESSURE >= 140 MM HG: ICD-10-PCS | Mod: CPTII,S$GLB,, | Performed by: UROLOGY

## 2023-04-06 PROCEDURE — 99999 PR PBB SHADOW E&M-EST. PATIENT-LVL IV: ICD-10-PCS | Mod: PBBFAC,,, | Performed by: UROLOGY

## 2023-04-06 PROCEDURE — 3079F DIAST BP 80-89 MM HG: CPT | Mod: CPTII,S$GLB,, | Performed by: UROLOGY

## 2023-04-06 PROCEDURE — 3288F PR FALLS RISK ASSESSMENT DOCUMENTED: ICD-10-PCS | Mod: CPTII,S$GLB,, | Performed by: UROLOGY

## 2023-04-06 PROCEDURE — 3077F SYST BP >= 140 MM HG: CPT | Mod: CPTII,S$GLB,, | Performed by: UROLOGY

## 2023-04-06 PROCEDURE — 1126F AMNT PAIN NOTED NONE PRSNT: CPT | Mod: CPTII,S$GLB,, | Performed by: UROLOGY

## 2023-04-06 PROCEDURE — 99215 OFFICE O/P EST HI 40 MIN: CPT | Mod: S$GLB,,, | Performed by: UROLOGY

## 2023-04-06 PROCEDURE — 1159F PR MEDICATION LIST DOCUMENTED IN MEDICAL RECORD: ICD-10-PCS | Mod: CPTII,S$GLB,, | Performed by: UROLOGY

## 2023-04-06 PROCEDURE — 1101F PT FALLS ASSESS-DOCD LE1/YR: CPT | Mod: CPTII,S$GLB,, | Performed by: UROLOGY

## 2023-04-06 PROCEDURE — 1159F MED LIST DOCD IN RCRD: CPT | Mod: CPTII,S$GLB,, | Performed by: UROLOGY

## 2023-04-06 NOTE — LETTER
April 6, 2023        MICHAEL Roger MD  [address] [facility] [address]             [place] [facility] - Urology [address] [place], [place] [address] [facility] [address]  Phone: [phone]   Patient: Rajendra Castle   MR Number: [id number]   YOB: 1945   Date of Visit: 4/6/2023       Dear Dr. Roger:    Thank you for referring Rajendra Castle to me for evaluation. Attached you will find relevant portions of my assessment and plan of care.    If you have questions, please do not hesitate to call me. I look forward to following Rajendra Castle along with you.    Sincerely,      Sp Bryant MD            CC    No Recipients    Enclosure

## 2023-04-12 ENCOUNTER — TELEPHONE (OUTPATIENT)
Dept: RHEUMATOLOGY | Facility: CLINIC | Age: 78
End: 2023-04-12
Payer: MEDICARE

## 2023-04-12 ENCOUNTER — PATIENT MESSAGE (OUTPATIENT)
Dept: RHEUMATOLOGY | Facility: CLINIC | Age: 78
End: 2023-04-12
Payer: MEDICARE

## 2023-04-12 NOTE — TELEPHONE ENCOUNTER
----- Message from Mckinley Eisenberg sent at 4/12/2023 12:01 PM CDT -----  Type:  Sooner Appointment Request    Caller is requesting a sooner appointment.  Caller declined first available appointment listed below.  Caller will not accept being placed on the waitlist and is requesting a message be sent to doctor.    Name of Caller:  pt  When is the first available appointment?  none  Symptoms:  referral for Rheumatoid arthritis involving multiple sites with positive rheumatoid factor [M...  Encounter for long-term (current) use of medications [Z79.899]--said he been in and out of the hospital and need to be seen soon--please call and advise  Best Call Back Number:  [phone] (home)     Additional Information:  thank you    Former [facility] patient. Sent message through portal with all options to book with rheumatology. LISETTE

## 2023-04-17 ENCOUNTER — TELEPHONE (OUTPATIENT)
Dept: FAMILY MEDICINE | Facility: CLINIC | Age: 78
End: 2023-04-17
Payer: MEDICARE

## 2023-04-17 DIAGNOSIS — G47.00 INSOMNIA, UNSPECIFIED TYPE: Primary | ICD-10-CM

## 2023-04-17 RX ORDER — RAMELTEON 8 MG/1
8 TABLET ORAL NIGHTLY
Qty: 30 TABLET | Refills: 6 | Status: SHIPPED | OUTPATIENT
Start: 2023-04-17 | End: 2023-05-26 | Stop reason: ALTCHOICE

## 2023-04-17 NOTE — TELEPHONE ENCOUNTER
"Spoke with pt. Pt stated he wants to try another medication AND be referred to sleep medicine. Pt stated "insomnia is a serious thing and I don't think waiting to try all of these medications is going to work for me." Please send in new medication and place referral if appreciate.   "

## 2023-04-17 NOTE — TELEPHONE ENCOUNTER
----- Message from Yessy Orta sent at 4/17/2023 10:19 AM CDT -----  Who Called: Pt    What is the request in detail: Requesting call back to discuss the meds prescribed for insomnia, pt states he needs to take more for it to work, pt wants to know if he can be prescribed temazepam instead. Please advise    [id number] - [place] - [address] [facility]  [address] [facility] [place] [address]  Phone: [phone] Fax: [phone]      Can the clinic reply by [facility]? No    Best Call Back Number: [phone]      Additional Information:

## 2023-04-17 NOTE — TELEPHONE ENCOUNTER
"Spoke with pt. Notified pt of medication sent to pharmacy. Pt TG. Notified pt of provider recommendation to wear CPAP or BIPAP. Pt yelled and stated "I do not need that stuff because it makes things worse." Notified pt of referral-laced to sleep medicine. Pt TG. Pt asked for the number to the sleep doctor. Advised pt I do not have the number to the doctor and the office will reach out to him. Pt proceeded to yell and stated "I do not know why you all are treating me like an experiment." Notified pt we do not think he is an experiment. Notified pt we are just trying to help him. Pt yelled "That referral should have been placed from the start rather than starting me on all these medications." Advised pt I am unsure why a referral was not placed earlier. Pt did not say anything else. Advised pt to call us if sleep medicine does not reach out in the next couple of days. Pt stated "yeah sure I wont do that." Pt hung up.   "

## 2023-04-20 ENCOUNTER — TELEPHONE (OUTPATIENT)
Dept: HEMATOLOGY/ONCOLOGY | Facility: CLINIC | Age: 78
End: 2023-04-20
Payer: MEDICARE

## 2023-04-20 NOTE — TELEPHONE ENCOUNTER
Incoming call from patient to schedule referral from Dr. Bryant to medical oncology. Patient would like to be seen on the [facility]. Nurse navigator contacted [facility] team for assistance. Appointment scheduled 4/25, 10am. Patient verbalized understanding. Will attempt [facility] visit with radiation oncology on the same morning. Staff in radiation oncology aware and assisting. Contact information reviewed for continued needs. Patient thanked nurse for assistance.

## 2023-04-20 NOTE — NURSING
Received a call from [place] navigator in regards to a [facility] visit for patient for his urethral and prostate cancer because patient lives in [place]. Patient prefers a male provider.   She accepted our next available appointment date of Tuesday, 4/25, with Dr. Guzman for patient.

## 2023-04-20 NOTE — TELEPHONE ENCOUNTER
Oncology nurse navigator contacted patient for scheduling medical oncology and radiation oncology referrals placed by Dr. Bryant. Patient would like care at the [place] location. [facility] appointments scheduled for 4/25-10,11am. Date, time, and location discussed with patient. All questions/concerns addressed. Patient verbalized understanding. Contact information provided for further assistance.  Patient thanked nurse for the help provided.

## 2023-04-21 ENCOUNTER — OFFICE VISIT (OUTPATIENT)
Dept: PULMONOLOGY | Facility: CLINIC | Age: 78
End: 2023-04-21
Payer: MEDICARE

## 2023-04-21 VITALS
SYSTOLIC BLOOD PRESSURE: 160 MMHG | HEART RATE: 79 BPM | BODY MASS INDEX: 21.17 KG/M2 | DIASTOLIC BLOOD PRESSURE: 78 MMHG | WEIGHT: 139.19 LBS | OXYGEN SATURATION: 97 %

## 2023-04-21 DIAGNOSIS — G47.00 INSOMNIA, UNSPECIFIED TYPE: ICD-10-CM

## 2023-04-21 DIAGNOSIS — G47.31 CENTRAL SLEEP APNEA: Primary | ICD-10-CM

## 2023-04-21 DIAGNOSIS — J43.8 OTHER EMPHYSEMA: ICD-10-CM

## 2023-04-21 PROCEDURE — 3077F SYST BP >= 140 MM HG: CPT | Mod: CPTII,S$GLB,, | Performed by: NURSE PRACTITIONER

## 2023-04-21 PROCEDURE — 1126F PR PAIN SEVERITY QUANTIFIED, NO PAIN PRESENT: ICD-10-PCS | Mod: CPTII,S$GLB,, | Performed by: NURSE PRACTITIONER

## 2023-04-21 PROCEDURE — 99204 OFFICE O/P NEW MOD 45 MIN: CPT | Mod: S$GLB,,, | Performed by: NURSE PRACTITIONER

## 2023-04-21 PROCEDURE — 3078F PR MOST RECENT DIASTOLIC BLOOD PRESSURE < 80 MM HG: ICD-10-PCS | Mod: CPTII,S$GLB,, | Performed by: NURSE PRACTITIONER

## 2023-04-21 PROCEDURE — 3077F PR MOST RECENT SYSTOLIC BLOOD PRESSURE >= 140 MM HG: ICD-10-PCS | Mod: CPTII,S$GLB,, | Performed by: NURSE PRACTITIONER

## 2023-04-21 PROCEDURE — 1126F AMNT PAIN NOTED NONE PRSNT: CPT | Mod: CPTII,S$GLB,, | Performed by: NURSE PRACTITIONER

## 2023-04-21 PROCEDURE — 99204 PR OFFICE/OUTPT VISIT, NEW, LEVL IV, 45-59 MIN: ICD-10-PCS | Mod: S$GLB,,, | Performed by: NURSE PRACTITIONER

## 2023-04-21 PROCEDURE — 3078F DIAST BP <80 MM HG: CPT | Mod: CPTII,S$GLB,, | Performed by: NURSE PRACTITIONER

## 2023-04-21 PROCEDURE — 1159F PR MEDICATION LIST DOCUMENTED IN MEDICAL RECORD: ICD-10-PCS | Mod: CPTII,S$GLB,, | Performed by: NURSE PRACTITIONER

## 2023-04-21 PROCEDURE — 1159F MED LIST DOCD IN RCRD: CPT | Mod: CPTII,S$GLB,, | Performed by: NURSE PRACTITIONER

## 2023-04-21 RX ORDER — ZOLPIDEM TARTRATE 5 MG/1
5 TABLET ORAL NIGHTLY PRN
COMMUNITY
End: 2023-05-26 | Stop reason: ALTCHOICE

## 2023-04-21 NOTE — PROGRESS NOTES
"  SUBJECTIVE:    Patient ID: Rajendra Castle is a 77 y.o. male.    Chief Complaint: New Patient (New Patient/Referred by Sp Lilly for insomnia)    HPI  Patient here today to discuss "issues with sleep."The patient was diagnosed with sleep apnea in December and prescribed a machine that he turned in because he states he could not tolerate it and no one would listen to him.   Sleep study done in December which showed all central sleep apnea, he was prescribed an ASV machine to correct the central sleep apnea. The patient states he does take an occasional Ultram and tylenol for pain. He had Percocet but states did not take any of this the night of his study. He believes he took Trazodone 50mg the night of the study. He takes Gabapentin regularly. It is unclear why he has central sleep apnea, no history of stroke. Recent PET with no mets to brain.  He was recently diagnosed with urethral cancer and states he will be starting radiation and chemo soon.  He also has a history of prostate cancer.   He is exhausted all the time, naps during the day periodically. He wakes up all night.  He also has RA for which he has been on Methotrexate for years and states his RA is controlled.    Past Medical History:   Diagnosis Date    Anticoagulant long-term use     Arthritis     Coronary artery disease     DDD (degenerative disc disease), cervical     Degenerative disc disease     Heart murmur     Hypertension     Nontoxic multinodular goiter 09/20/2016    Prostate CA     RA (rheumatoid arthritis)     Sleep apnea     uses C-PAP    Vitamin D insufficiency 09/30/2016     Past Surgical History:   Procedure Laterality Date    CARPAL TUNNEL RELEASE Right 05/28/2021    Procedure: RELEASE, CARPAL TUNNEL;  Surgeon: Jose Ryan II, MD;  Location: [facility];  Service: Orthopedics;  Laterality: Right;    COLONOSCOPY  prior to 2016    normal findings per patient report    CORONARY ANGIOPLASTY WITH STENT PLACEMENT  02/2022    CYSTOSCOPY N/A " 12/18/2018    Procedure: CYSTOSCOPY;  Surgeon: Garrett Pina MD;  Location: [facility] OR;  Service: Urology;  Laterality: N/A;    CYSTOSCOPY N/A 07/12/2022    Procedure: CYSTOSCOPY;  Surgeon: Garrett Pina MD;  Location: [facility] OR;  Service: Urology;  Laterality: N/A;    ESOPHAGOGASTRODUODENOSCOPY N/A 09/29/2020    Dr. Espinosa; empiric dilation; erythematous mucosa in antrum; gastric mucosal atrophy; hematin in entire stomach; biopsy: mid & distal esophagus WNL, stomach- WNL, negative for h pylori    EXCISION OF LESION OF PENIS N/A 2/23/2023    Procedure: EXCISION, LESION, PENIS;  Surgeon: Timo Myers MD;  Location: [facility] OR;  Service: Urology;  Laterality: N/A;    PARATHYROIDECTOMY Right 10/27/2020    Procedure: PARATHYROIDECTOMY;  Surgeon: Latonia Clayton MD;  Location: [facility] OR [address] [place] [address];  Service: ENT;  Laterality: Right;    RELEASE OF ULNAR NERVE AT CUBITAL TUNNEL Right 05/28/2021    Procedure: RELEASE, ULNAR TUNNEL;  Surgeon: Jose Ryan II, MD;  Location: [facility] OR;  Service: Orthopedics;  Laterality: Right;    TRANSRECTAL BIOPSY OF PROSTATE WITH ULTRASOUND GUIDANCE N/A 12/18/2018    Procedure: BIOPSY, PROSTATE, RECTAL APPROACH, WITH US GUIDANCE;  Surgeon: Garrett Pina MD;  Location: [facility];  Service: Urology;  Laterality: N/A;    TRANSRECTAL ULTRASOUND EXAMINATION N/A 07/12/2022    Procedure: ULTRASOUND, RECTAL APPROACH;  Surgeon: Garrett Pina MD;  Location: [facility];  Service: Urology;  Laterality: N/A;     Family History   Problem Relation Age of Onset    Heart disease Mother     Stroke Father     Drug abuse Sister     No Known Problems Daughter     No Known Problems Daughter     No Known Problems Son     Diabetes Maternal Uncle     Colon cancer Neg Hx     Crohn's disease Neg Hx     Esophageal cancer Neg Hx     Stomach cancer Neg Hx     Ulcerative colitis Neg Hx         Social History:   Marital Status: Single  Occupation: Data Unavailable  Alcohol History:  reports that he does  not currently use alcohol.  Tobacco History:  reports that he quit smoking about 21 years ago. His smoking use included cigarettes. He has a 2.50 pack-year smoking history. He has been exposed to tobacco smoke. He has never used smokeless tobacco.  Drug History:  reports that he does not currently use drugs after having used the following drugs: Marijuana. Frequency: 8.00 times per week.    Review of patient's allergies indicates:  No Known Allergies    Current Outpatient Medications   Medication Sig Dispense Refill    amLODIPine (NORVASC) 5 MG tablet Take 1 tablet (5 mg total) by mouth 2 (two) times daily. 60 tablet 3    atorvastatin (LIPITOR) 10 MG tablet Take 10 mg by mouth nightly.      busPIRone (BUSPAR) 10 MG tablet Take 1 tablet (10 mg total) by mouth 3 (three) times daily. 90 tablet 3    EScitalopram oxalate (LEXAPRO) 10 MG tablet Take 10 mg by mouth once daily.      gabapentin (NEURONTIN) 300 MG capsule Take 300 mg by mouth 3 (three) times daily.      hydrOXYzine HCL (ATARAX) 10 MG Tab Take 1 tablet (10 mg total) by mouth 2 (two) times daily as needed (anxiety). 60 tablet 2    methotrexate 2.5 MG Tab Take 6 tablets (15 mg total) by mouth every 7 days. TAKE 6 TABLETS BY MOUTH EVERY WEEK (Patient taking differently: Take 15 mg by mouth every Saturday. TAKE 6 TABLETS BY MOUTH EVERY WEEK) 72 tablet 2    pantoprazole (PROTONIX) 40 MG tablet TAKE 1 TABLET(40 MG) BY MOUTH TWICE DAILY (Patient taking differently: Take 40 mg by mouth once daily.) 180 tablet 0    ramelteon (ROZEREM) 8 mg tablet Take 1 tablet (8 mg total) by mouth every evening. 30 tablet 6    traZODone (DESYREL) 50 MG tablet Take by mouth.      zolpidem (AMBIEN) 5 MG Tab Take 5 mg by mouth nightly as needed.      aspirin (ECOTRIN) 81 MG EC tablet Take 1 tablet (81 mg total) by mouth once daily. 30 tablet 0    atorvastatin (LIPITOR) 20 MG tablet Take 20 mg by mouth.      b complex vitamins capsule Take 1 capsule by mouth once daily.      clopidogreL  (PLAVIX) 75 mg tablet Take 1 tablet (75 mg total) by mouth once daily. 30 tablet 0    folic acid (FOLVITE) 1 MG tablet Take 1 tablet (1 mg total) by mouth once daily. (Patient not taking: Reported on 4/21/2023) 90 tablet 3    oxybutynin (DITROPAN-XL) 5 MG TR24 Take 1 tablet (5 mg total) by mouth once daily. (Patient not taking: Reported on 4/21/2023) 30 tablet 11    tamsulosin (FLOMAX) 0.4 mg Cap Take 1 capsule (0.4 mg total) by mouth once daily. (Patient not taking: Reported on 4/21/2023) 30 capsule 3    temazepam (RESTORIL) 15 mg Cap Take 15 mg by mouth nightly as needed.      traMADoL (ULTRAM) 50 mg tablet Take 2 tablets (100 mg total) by mouth 2 (two) times daily as needed for Pain. (Patient not taking: Reported on 4/21/2023) 120 tablet 5     No current facility-administered medications for this visit.     Facility-Administered Medications Ordered in Other Visits   Medication Dose Route Frequency Provider Last Rate Last Admin    albuterol nebulizer solution 2.5 mg  2.5 mg Nebulization Q15 Min PRN Pastor Saleh MD        albuterol-ipratropium 2.5 mg-0.5 mg/3 mL nebulizer solution 3 mL  3 mL Nebulization PRN Pastor Saleh MD        denosumab (PROLIA) injection 60 mg  60 mg Subcutaneous 1 time in Clinic/HOD Jean Carlos Hoffman MD        diphenhydrAMINE injection 25 mg  25 mg Intravenous Q6H PRN Pastor Saleh MD        HYDROmorphone injection 0.5 mg  0.5 mg Intravenous Q5 Min PRN Pastor Saleh MD   0.5 mg at 02/23/23 1312    lactated ringers infusion   Intravenous Continuous Jacqueline Volpi-Abadie, MD   Stopped at 02/23/23 1400    ondansetron injection 4 mg  4 mg Intravenous Q15 Min PRN Pastor Saleh MD        sodium chloride 0.9% flush 3 mL  3 mL Intravenous PRN Pastor Saleh MD         PET 04/03/2023  Impression:     There is hypermetabolic lesion in the distal penis measuring approximately 1.4 cm, positive for squamous cell carcinoma on recent biopsy.  No evidence of lymph node metastases.      Stable bilateral subcentimeter pulmonary nodules that are too small to characterize reliably by PET.  Given evidence of old granulomatous disease, noncalcified granulomas are a consideration.  Attention on follow-up    Review of Systems  General: Feeling Well. Fatigued all day long, falls asleep during the day, does not sleep all night   Eyes: Vision is good.  ENT:  No sinusitis or pharyngitis.   Heart:: No chest pain or palpitations.  Lungs: No cough, sputum, or wheezing.  GI: No Nausea, vomiting, constipation, diarrhea, or reflux.  : new diagnosis of urethral cancer, dysuria   Musculoskeletal: RA  controlled with methotrexate for years   Skin: No lesions or rashes.  Neuro: No headaches or neuropathy.  Lymph: No edema or adenopathy.  Psych:  anxiety .  Endo: weight loss     OBJECTIVE:      BP (!) 160/78 (BP Location: Right arm, Patient Position: Sitting, BP Method: Medium (Manual))   Pulse 79   Wt 63.1 kg (139 lb 3.2 oz)   SpO2 97%   BMI 21.17 kg/m²     Physical Exam  GENERAL: Older patient in no distress.  HEENT: Pupils equal and reactive. Extraocular movements intact. Nose intact.      Pharynx moist.  NECK: Supple.   HEART: Regular rate and rhythm. No murmur or gallop auscultated.  LUNGS: Clear to auscultation and percussion. Lung excursion symmetrical. No change in fremitus. No adventitial noises.  ABDOMEN: Bowel sounds present. Non-tender, no masses palpated.  EXTREMITIES: Normal muscle tone and joint movement, no cyanosis or clubbing.   LYMPHATICS: No adenopathy palpated, no edema.  SKIN: Dry, intact, no lesions.   NEURO: Cranial nerves II-XII intact. Motor strength 5/5 bilaterally, upper and lower extremities.  PSYCH: Appropriate affect.    Assessment:       1. Central sleep apnea    2. Insomnia, unspecified type    3. Other emphysema          Plan:          PFT   Will order a BIPAP ST 10/5 rate of 8, will do rosaura view mask   Small frequent meals     Follow up in about 6 weeks (around  6/2/2023).

## 2023-04-25 ENCOUNTER — OFFICE VISIT (OUTPATIENT)
Dept: RADIATION ONCOLOGY | Facility: CLINIC | Age: 78
End: 2023-04-25
Payer: MEDICARE

## 2023-04-25 ENCOUNTER — OFFICE VISIT (OUTPATIENT)
Dept: HEMATOLOGY/ONCOLOGY | Facility: CLINIC | Age: 78
End: 2023-04-25
Payer: MEDICARE

## 2023-04-25 ENCOUNTER — TELEPHONE (OUTPATIENT)
Dept: HEMATOLOGY/ONCOLOGY | Facility: CLINIC | Age: 78
End: 2023-04-25

## 2023-04-25 VITALS
TEMPERATURE: 97 F | HEART RATE: 96 BPM | WEIGHT: 141.56 LBS | RESPIRATION RATE: 16 BRPM | OXYGEN SATURATION: 97 % | DIASTOLIC BLOOD PRESSURE: 80 MMHG | BODY MASS INDEX: 21.45 KG/M2 | HEIGHT: 68 IN | SYSTOLIC BLOOD PRESSURE: 176 MMHG

## 2023-04-25 VITALS
DIASTOLIC BLOOD PRESSURE: 84 MMHG | RESPIRATION RATE: 20 BRPM | BODY MASS INDEX: 22.16 KG/M2 | SYSTOLIC BLOOD PRESSURE: 180 MMHG | TEMPERATURE: 98 F | WEIGHT: 146.19 LBS | HEIGHT: 68 IN

## 2023-04-25 DIAGNOSIS — C68.0 URETHRAL CANCER: Primary | ICD-10-CM

## 2023-04-25 DIAGNOSIS — I10 HYPERTENSION, UNSPECIFIED TYPE: ICD-10-CM

## 2023-04-25 DIAGNOSIS — N18.31 STAGE 3A CHRONIC KIDNEY DISEASE: ICD-10-CM

## 2023-04-25 DIAGNOSIS — I25.10 CORONARY ARTERY DISEASE INVOLVING NATIVE CORONARY ARTERY OF NATIVE HEART WITHOUT ANGINA PECTORIS: ICD-10-CM

## 2023-04-25 DIAGNOSIS — R30.0 DYSURIA: ICD-10-CM

## 2023-04-25 DIAGNOSIS — C61 PROSTATE CANCER: Primary | ICD-10-CM

## 2023-04-25 DIAGNOSIS — C68.0 URETHRAL CANCER: ICD-10-CM

## 2023-04-25 DIAGNOSIS — M06.9 RHEUMATOID ARTHRITIS, INVOLVING UNSPECIFIED SITE, UNSPECIFIED WHETHER RHEUMATOID FACTOR PRESENT: ICD-10-CM

## 2023-04-25 DIAGNOSIS — M05.79 RHEUMATOID ARTHRITIS INVOLVING MULTIPLE SITES WITH POSITIVE RHEUMATOID FACTOR: ICD-10-CM

## 2023-04-25 PROCEDURE — 3079F DIAST BP 80-89 MM HG: CPT | Mod: CPTII,S$GLB,, | Performed by: RADIOLOGY

## 2023-04-25 PROCEDURE — 1101F PR PT FALLS ASSESS DOC 0-1 FALLS W/OUT INJ PAST YR: ICD-10-PCS | Mod: CPTII,S$GLB,, | Performed by: RADIOLOGY

## 2023-04-25 PROCEDURE — 1101F PT FALLS ASSESS-DOCD LE1/YR: CPT | Mod: CPTII,S$GLB,, | Performed by: RADIOLOGY

## 2023-04-25 PROCEDURE — 1125F AMNT PAIN NOTED PAIN PRSNT: CPT | Mod: CPTII,S$GLB,, | Performed by: RADIOLOGY

## 2023-04-25 PROCEDURE — 3288F PR FALLS RISK ASSESSMENT DOCUMENTED: ICD-10-PCS | Mod: CPTII,S$GLB,, | Performed by: RADIOLOGY

## 2023-04-25 PROCEDURE — 99999 PR PBB SHADOW E&M-EST. PATIENT-LVL V: ICD-10-PCS | Mod: PBBFAC,,, | Performed by: RADIOLOGY

## 2023-04-25 PROCEDURE — 99999 PR PBB SHADOW E&M-EST. PATIENT-LVL V: ICD-10-PCS | Mod: PBBFAC,,, | Performed by: INTERNAL MEDICINE

## 2023-04-25 PROCEDURE — 3079F DIAST BP 80-89 MM HG: CPT | Mod: CPTII,S$GLB,, | Performed by: INTERNAL MEDICINE

## 2023-04-25 PROCEDURE — 3288F FALL RISK ASSESSMENT DOCD: CPT | Mod: CPTII,S$GLB,, | Performed by: INTERNAL MEDICINE

## 2023-04-25 PROCEDURE — 3288F PR FALLS RISK ASSESSMENT DOCUMENTED: ICD-10-PCS | Mod: CPTII,S$GLB,, | Performed by: INTERNAL MEDICINE

## 2023-04-25 PROCEDURE — 3077F PR MOST RECENT SYSTOLIC BLOOD PRESSURE >= 140 MM HG: ICD-10-PCS | Mod: CPTII,S$GLB,, | Performed by: RADIOLOGY

## 2023-04-25 PROCEDURE — 99499 UNLISTED E&M SERVICE: CPT | Mod: S$GLB,,, | Performed by: RADIOLOGY

## 2023-04-25 PROCEDURE — 1126F AMNT PAIN NOTED NONE PRSNT: CPT | Mod: CPTII,S$GLB,, | Performed by: INTERNAL MEDICINE

## 2023-04-25 PROCEDURE — 3077F PR MOST RECENT SYSTOLIC BLOOD PRESSURE >= 140 MM HG: ICD-10-PCS | Mod: CPTII,S$GLB,, | Performed by: INTERNAL MEDICINE

## 2023-04-25 PROCEDURE — 1101F PT FALLS ASSESS-DOCD LE1/YR: CPT | Mod: CPTII,S$GLB,, | Performed by: INTERNAL MEDICINE

## 2023-04-25 PROCEDURE — 3079F PR MOST RECENT DIASTOLIC BLOOD PRESSURE 80-89 MM HG: ICD-10-PCS | Mod: CPTII,S$GLB,, | Performed by: RADIOLOGY

## 2023-04-25 PROCEDURE — 1101F PR PT FALLS ASSESS DOC 0-1 FALLS W/OUT INJ PAST YR: ICD-10-PCS | Mod: CPTII,S$GLB,, | Performed by: INTERNAL MEDICINE

## 2023-04-25 PROCEDURE — 3077F SYST BP >= 140 MM HG: CPT | Mod: CPTII,S$GLB,, | Performed by: INTERNAL MEDICINE

## 2023-04-25 PROCEDURE — 99205 OFFICE O/P NEW HI 60 MIN: CPT | Mod: S$GLB,,, | Performed by: RADIOLOGY

## 2023-04-25 PROCEDURE — 99205 PR OFFICE/OUTPT VISIT, NEW, LEVL V, 60-74 MIN: ICD-10-PCS | Mod: S$GLB,,, | Performed by: RADIOLOGY

## 2023-04-25 PROCEDURE — 1125F PR PAIN SEVERITY QUANTIFIED, PAIN PRESENT: ICD-10-PCS | Mod: CPTII,S$GLB,, | Performed by: RADIOLOGY

## 2023-04-25 PROCEDURE — 3079F PR MOST RECENT DIASTOLIC BLOOD PRESSURE 80-89 MM HG: ICD-10-PCS | Mod: CPTII,S$GLB,, | Performed by: INTERNAL MEDICINE

## 2023-04-25 PROCEDURE — 3288F FALL RISK ASSESSMENT DOCD: CPT | Mod: CPTII,S$GLB,, | Performed by: RADIOLOGY

## 2023-04-25 PROCEDURE — 1126F PR PAIN SEVERITY QUANTIFIED, NO PAIN PRESENT: ICD-10-PCS | Mod: CPTII,S$GLB,, | Performed by: INTERNAL MEDICINE

## 2023-04-25 PROCEDURE — 99999 PR PBB SHADOW E&M-EST. PATIENT-LVL V: CPT | Mod: PBBFAC,,, | Performed by: INTERNAL MEDICINE

## 2023-04-25 PROCEDURE — 99499 RISK ADDL DX/OHS AUDIT: ICD-10-PCS | Mod: S$GLB,,, | Performed by: RADIOLOGY

## 2023-04-25 PROCEDURE — 99205 OFFICE O/P NEW HI 60 MIN: CPT | Mod: S$GLB,,, | Performed by: INTERNAL MEDICINE

## 2023-04-25 PROCEDURE — 99999 PR PBB SHADOW E&M-EST. PATIENT-LVL V: CPT | Mod: PBBFAC,,, | Performed by: RADIOLOGY

## 2023-04-25 PROCEDURE — 3077F SYST BP >= 140 MM HG: CPT | Mod: CPTII,S$GLB,, | Performed by: RADIOLOGY

## 2023-04-25 PROCEDURE — 99205 PR OFFICE/OUTPT VISIT, NEW, LEVL V, 60-74 MIN: ICD-10-PCS | Mod: S$GLB,,, | Performed by: INTERNAL MEDICINE

## 2023-04-25 RX ORDER — LIDOCAINE HYDROCHLORIDE 20 MG/ML
JELLY TOPICAL
Qty: 30 ML | Refills: 1 | Status: SHIPPED | OUTPATIENT
Start: 2023-04-25 | End: 2023-08-16 | Stop reason: ALTCHOICE

## 2023-04-25 RX ORDER — ONDANSETRON 4 MG/1
4 TABLET, ORALLY DISINTEGRATING ORAL EVERY 6 HOURS PRN
Qty: 60 TABLET | Refills: 6 | Status: SHIPPED | OUTPATIENT
Start: 2023-04-25 | End: 2023-06-15 | Stop reason: SDUPTHER

## 2023-04-25 RX ORDER — PHENAZOPYRIDINE HYDROCHLORIDE 200 MG/1
200 TABLET, FILM COATED ORAL 3 TIMES DAILY PRN
Qty: 60 TABLET | Refills: 0 | Status: SHIPPED | OUTPATIENT
Start: 2023-04-25 | End: 2023-04-25 | Stop reason: ALTCHOICE

## 2023-04-25 RX ORDER — LIDOCAINE AND PRILOCAINE 25; 25 MG/G; MG/G
CREAM TOPICAL
Qty: 30 G | Refills: 3 | Status: SHIPPED | OUTPATIENT
Start: 2023-04-25 | End: 2023-07-24

## 2023-04-25 NOTE — PLAN OF CARE
DISCONTINUE ON PATHWAY REGIMEN - Other            START OFF PATHWAY REGIMEN - Other            Gemcitabine           Additional Orders: In the Claudia CHACON et al study, patients achieving a CR   after 4 weeks of induction received consolidation with additional 2.5 weeks of   gemcitabine + RT.    **Always confirm dose/schedule in your pharmacy ordering system**    Patient Characteristics:  Intent of Therapy:  Curative Intent, Discussed with Patient

## 2023-04-25 NOTE — PROGRESS NOTES
PATIENT: Rajendra Castle  MRN: [id number]  DATE: 4/25/2023      Diagnosis:   1. Urethral cancer    2. Dysuria    3. Stage 3a chronic kidney disease    4. Coronary artery disease involving native coronary artery of native heart without angina pectoris    5. Hypertension, unspecified type    6. Rheumatoid arthritis, involving unspecified site, unspecified whether rheumatoid factor present        Chief Complaint: Establish Care (Urethral Cancer)      Oncologic History:      Oncologic History     Oncologic Treatment     Pathology           Subjective:    Initial History: Mr. Castle is a 77 y.o. male with Arthritis, CAD, HTN, RA, ISABEL, Vitamin D Deficiency who presents for urethral cancer.  The patient underwent cystoscopy on 02/10/2023 showing a 1 cm firm mass along the left glands of the penis near the meatus.  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  CT of the chest, abdomen, and pelvis on 02/27/2023 showed a stable 5 mm left lower lobe nodule; calcified granuloma adjacent to the left fissure; stable subpleural 2 mm right middle lobe nodule; stable 3 mm right upper lobe nodule; stable fissural nodule in the right lung; stable T6 compression fracture; and left 5th rib bone island.  PET-CT on 04/03/2023 showed stable bilateral pulmonary nodules which were non avid; focally increased FDG tracer uptake in the distal aspect of the left penis measuring 1.4 cm.  The patient was discussed at tumor Board on 04/04/2023 with recommendation for penectomy.  The patient met with Dr. Bryant on 04/06/2023 to discuss penectomy; however, the patient elected not to have surgery and wanted to undergo chemo and radiation treatment.    Currently the patient endorses burning with urination since the urethral biopsy in February.  He denies cloudiness of his urine or blood in  the urine.  He endorses weight loss of 20 lb over the last 3-4 months.  He endorses arthritis intermittently which he attributes to his rheumatoid arthritis.  The patient denies CP, cough, SOB, abdominal pain, nausea, vomiting, constipation, diarrhea.  The patient denies fever, chills, night sweats, weight loss, new lumps or bumps, easy bruising or bleeding.    Past Medical History:   Past Medical History:   Diagnosis Date    Anticoagulant long-term use     Arthritis     Coronary artery disease     DDD (degenerative disc disease), cervical     Degenerative disc disease     Heart murmur     Hypertension     Nontoxic multinodular goiter 09/20/2016    Prostate CA     RA (rheumatoid arthritis)     Sleep apnea     uses C-PAP    Vitamin D insufficiency 09/30/2016       Past Surgical HIstory:   Past Surgical History:   Procedure Laterality Date    CARPAL TUNNEL RELEASE Right 05/28/2021    Procedure: RELEASE, CARPAL TUNNEL;  Surgeon: Jose Ryan II, MD;  Location: [facility] OR;  Service: Orthopedics;  Laterality: Right;    COLONOSCOPY  prior to 2016    normal findings per patient report    CORONARY ANGIOPLASTY WITH STENT PLACEMENT  02/2022    CYSTOSCOPY N/A 12/18/2018    Procedure: CYSTOSCOPY;  Surgeon: Garrett Pina MD;  Location: [facility] OR;  Service: Urology;  Laterality: N/A;    CYSTOSCOPY N/A 07/12/2022    Procedure: CYSTOSCOPY;  Surgeon: Garrett Pina MD;  Location: [facility] OR;  Service: Urology;  Laterality: N/A;    ESOPHAGOGASTRODUODENOSCOPY N/A 09/29/2020    Dr. Espinosa; empiric dilation; erythematous mucosa in antrum; gastric mucosal atrophy; hematin in entire stomach; biopsy: mid & distal esophagus WNL, stomach- WNL, negative for h pylori    EXCISION OF LESION OF PENIS N/A 2/23/2023    Procedure: EXCISION, LESION, PENIS;  Surgeon: Timo Myers MD;  Location: [facility] OR;  Service: Urology;  Laterality: N/A;    PARATHYROIDECTOMY Right 10/27/2020    Procedure: PARATHYROIDECTOMY;  Surgeon: Latonia Clayton,  MD;  Location: [facility] OR 2ND FLR;  Service: ENT;  Laterality: Right;    RELEASE OF ULNAR NERVE AT CUBITAL TUNNEL Right 05/28/2021    Procedure: RELEASE, ULNAR TUNNEL;  Surgeon: Jose Ryan II, MD;  Location: [facility] OR;  Service: Orthopedics;  Laterality: Right;    TRANSRECTAL BIOPSY OF PROSTATE WITH ULTRASOUND GUIDANCE N/A 12/18/2018    Procedure: BIOPSY, PROSTATE, RECTAL APPROACH, WITH US GUIDANCE;  Surgeon: Garrett Pina MD;  Location: [facility] OR;  Service: Urology;  Laterality: N/A;    TRANSRECTAL ULTRASOUND EXAMINATION N/A 07/12/2022    Procedure: ULTRASOUND, RECTAL APPROACH;  Surgeon: Garrett Pina MD;  Location: [facility] OR;  Service: Urology;  Laterality: N/A;       Family History:   Family History   Problem Relation Age of Onset    Heart disease Mother     Stroke Father     Drug abuse Sister     No Known Problems Daughter     No Known Problems Daughter     No Known Problems Son     Diabetes Maternal Uncle     Colon cancer Neg Hx     Crohn's disease Neg Hx     Esophageal cancer Neg Hx     Stomach cancer Neg Hx     Ulcerative colitis Neg Hx        Social History:  reports that he quit smoking about 21 years ago. His smoking use included cigarettes. He has a 2.50 pack-year smoking history. He has been exposed to tobacco smoke. He has never used smokeless tobacco. He reports that he does not currently use alcohol. He reports that he does not currently use drugs after having used the following drugs: Marijuana. Frequency: 8.00 times per week.    Allergies:  Review of patient's allergies indicates:  No Known Allergies    Medications:  Current Outpatient Medications   Medication Sig Dispense Refill    amLODIPine (NORVASC) 5 MG tablet Take 1 tablet (5 mg total) by mouth 2 (two) times daily. 60 tablet 3    atorvastatin (LIPITOR) 10 MG tablet Take 10 mg by mouth nightly.      atorvastatin (LIPITOR) 20 MG tablet Take 20 mg by mouth.      b complex vitamins capsule Take 1 capsule by mouth once daily.      busPIRone  (BUSPAR) 10 MG tablet Take 1 tablet (10 mg total) by mouth 3 (three) times daily. 90 tablet 3    EScitalopram oxalate (LEXAPRO) 10 MG tablet Take 10 mg by mouth once daily.      folic acid (FOLVITE) 1 MG tablet Take 1 tablet (1 mg total) by mouth once daily. 90 tablet 3    gabapentin (NEURONTIN) 300 MG capsule Take 300 mg by mouth 3 (three) times daily.      hydrOXYzine HCL (ATARAX) 10 MG Tab Take 1 tablet (10 mg total) by mouth 2 (two) times daily as needed (anxiety). 60 tablet 2    methotrexate 2.5 MG Tab Take 6 tablets (15 mg total) by mouth every 7 days. TAKE 6 TABLETS BY MOUTH EVERY WEEK (Patient taking differently: Take 15 mg by mouth every Saturday. TAKE 6 TABLETS BY MOUTH EVERY WEEK) 72 tablet 2    oxybutynin (DITROPAN-XL) 5 MG TR24 Take 1 tablet (5 mg total) by mouth once daily. 30 tablet 11    pantoprazole (PROTONIX) 40 MG tablet TAKE 1 TABLET(40 MG) BY MOUTH TWICE DAILY (Patient taking differently: Take 40 mg by mouth once daily.) 180 tablet 0    ramelteon (ROZEREM) 8 mg tablet Take 1 tablet (8 mg total) by mouth every evening. 30 tablet 6    tamsulosin (FLOMAX) 0.4 mg Cap Take 1 capsule (0.4 mg total) by mouth once daily. 30 capsule 3    temazepam (RESTORIL) 15 mg Cap Take 15 mg by mouth nightly as needed.      traMADoL (ULTRAM) 50 mg tablet Take 2 tablets (100 mg total) by mouth 2 (two) times daily as needed for Pain. 120 tablet 5    traZODone (DESYREL) 50 MG tablet Take by mouth.      zolpidem (AMBIEN) 5 MG Tab Take 5 mg by mouth nightly as needed.      aspirin (ECOTRIN) 81 MG EC tablet Take 1 tablet (81 mg total) by mouth once daily. 30 tablet 0    clopidogreL (PLAVIX) 75 mg tablet Take 1 tablet (75 mg total) by mouth once daily. 30 tablet 0    LIDOcaine HCL 2% (XYLOCAINE) 2 % jelly Apply to affected area (tip of penis) daily prn 30 mL 1    LIDOcaine-prilocaine (EMLA) cream Apply topically as needed. Apply to PORT 15 minutes prior to PORT access 30 g 3    ondansetron (ZOFRAN-ODT) 4 MG TbDL Take 1  tablet (4 mg total) by mouth every 6 (six) hours as needed (nausea). 60 tablet 6     No current facility-administered medications for this visit.     Facility-Administered Medications Ordered in Other Visits   Medication Dose Route Frequency Provider Last Rate Last Admin    albuterol nebulizer solution 2.5 mg  2.5 mg Nebulization Q15 Min PRN Pastor Saleh MD        albuterol-ipratropium 2.5 mg-0.5 mg/3 mL nebulizer solution 3 mL  3 mL Nebulization PRN Pastor Saleh MD        denosumab (PROLIA) injection 60 mg  60 mg Subcutaneous 1 time in Clinic/HOD Jean Carlos Hoffman MD        diphenhydrAMINE injection 25 mg  25 mg Intravenous Q6H PRN Pastor Saleh MD        HYDROmorphone injection 0.5 mg  0.5 mg Intravenous Q5 Min PRN Pastor Saleh MD   0.5 mg at 02/23/23 1312    lactated ringers infusion   Intravenous Continuous Jacqueline Volpi-Abadie, MD   Stopped at 02/23/23 1400    ondansetron injection 4 mg  4 mg Intravenous Q15 Min PRN Pastor Saleh MD        sodium chloride 0.9% flush 3 mL  3 mL Intravenous PRN Pastor Saleh MD           Review of Systems   Constitutional:  Positive for unexpected weight change (20 pounds 3-4 months). Negative for appetite change, chills and fever.   HENT:  Negative for sore throat and trouble swallowing.    Eyes:  Negative for photophobia and visual disturbance.   Respiratory:  Negative for cough, chest tightness and shortness of breath.    Cardiovascular:  Negative for chest pain, palpitations and leg swelling.   Gastrointestinal:  Negative for abdominal pain, constipation, diarrhea, nausea and vomiting.   Endocrine: Negative for cold intolerance and heat intolerance.   Genitourinary:  Positive for dysuria. Negative for difficulty urinating and hematuria.   Musculoskeletal:  Positive for arthralgias. Negative for back pain and myalgias.   Skin:  Negative for color change and rash.   Neurological:  Negative for dizziness, light-headedness, numbness and headaches.  "  Hematological:  Negative for adenopathy.     ECOG Performance Status: 0   Objective:      Vitals:   Vitals:    04/25/23 0943   BP: (!) 176/80   BP Location: Left arm   Patient Position: Sitting   BP Method: Medium (Manual)   Pulse: 96   Resp: 16   Temp: 97.1 °F (36.2 °C)   TempSrc: Temporal   SpO2: 97%   Weight: 64.2 kg (141 lb 8.6 oz)   Height: 5' 8" (1.727 m)       Physical Exam  Constitutional:       General: He is not in acute distress.     Appearance: He is well-developed. He is not diaphoretic.   HENT:      Head: Normocephalic and atraumatic.   Eyes:      General: No scleral icterus.        Right eye: No discharge.         Left eye: No discharge.   Cardiovascular:      Rate and Rhythm: Normal rate and regular rhythm.      Heart sounds: Normal heart sounds. No murmur heard.    No friction rub. No gallop.   Pulmonary:      Effort: Pulmonary effort is normal. No respiratory distress.      Breath sounds: Normal breath sounds. No wheezing or rales.   Chest:      Chest wall: No tenderness.   Abdominal:      General: Bowel sounds are normal. There is no distension.      Palpations: Abdomen is soft. There is no mass.      Tenderness: There is no abdominal tenderness. There is no rebound.   Genitourinary:     Comments: Pt with firmarea at the meatus of the penis with peeling skin  Musculoskeletal:         General: No tenderness. Normal range of motion.   Lymphadenopathy:      Cervical: No cervical adenopathy.      Upper Body:      Right upper body: No supraclavicular or axillary adenopathy.      Left upper body: No supraclavicular or axillary adenopathy.      Lower Body: No right inguinal adenopathy. No left inguinal adenopathy.   Skin:     General: Skin is warm and dry.      Findings: No erythema or rash.   Neurological:      Mental Status: He is alert and oriented to person, place, and time.      Coordination: Coordination normal.   Psychiatric:         Behavior: Behavior normal.       Laboratory Data:  No visits " with results within 1 Week(s) from this visit.   Latest known visit with results is:   Lab Visit on 02/27/2023   Component Date Value Ref Range Status    Creatinine 02/27/2023 1.7 (H)  0.5 - 1.4 mg/dL Final    eGFR 02/27/2023 41.5 (A)  >60 mL/min/1.73 m^2 Final         Imaging: CT C/A/P 2/27/23  CT THORAX:  5 mm left lower lobe nodule (series 3 image 117) is unchanged. Calcified granuloma lies adjacent to left fissure (image 89). Subpleural 2 mm right middle lobe nodule (image 93) unchanged. 3 mm right upper lobe nodule (image 45) unchanged. Fissural nodule in right lung (series 3 image 80) unchanged.     Moderate emphysema unchanged. Trachea and main bronchi are patent     No enlarged hilar, mediastinal, axillary, or supraclavicular lymph nodes. Coronary artery calcification unchanged. Tiny pericardial effusion unchanged. No pleural effusion.     T6 compression fracture unchanged. Left fifth rib bone island unchanged. No new suspicious osseous abnormality.     CT ABDOMEN:  Liver, gallbladder, pancreas, spleen, and adrenals are normal. Bilateral renal hypodensities have not significantly changed suggesting cysts. Mild aortoiliac atherosclerotic plaque is present.     Enteric contrast reaches rectum without obstruction. Large and small intestines are unremarkable. No free intraperitoneal gas. No enlarged mesenteric or retroperitoneal lymph nodes.     Degenerative changes affect the spine. No suspicious osseous abnormality.     CT PELVIS:  Bladder is unremarkable. No free pelvic fluid. Bilateral inguinal canals are slightly patulous. No enlarged iliac or inguinal lymph nodes.     No suspicious osseous abnormality. Mild to moderate bilateral hip osteoarthrosis is present.    PET/CT 4/03/23  Quality of the study: Adequate.     In the head and neck, there are no hypermetabolic lesions worrisome for malignancy. There are no hypermetabolic mucosal lesions, and there are no pathologically enlarged or hypermetabolic lymph  nodes.  Increased FDG tracer uptake in the left paraspinal musculature likely physiological muscle uptake.     In the chest, there are no hypermetabolic lesions worrisome for malignancy.  There are no pathologically enlarged or hypermetabolic lymph nodes.  Stable bilateral pulmonary nodules on the right measuring 5 mm and on the left measure 4 mm (series 3, image 64 and 103).     In the abdomen and pelvis, there is focally increased FDG tracer uptake of the distal aspect of the penis measuring 1.4 cm with SUV max of 27 (fused image 264).     There is otherwise physiologic tracer distribution within the abdominal organs including prominent physiologic anal sphincter tone and excretion into the genitourinary system.  No pathologically enlarged or hypermetabolic inguinal lymph nodes.     In the bones, there are no hypermetabolic lesions worrisome for malignancy.     In the extremities, there are no hypermetabolic lesions worrisome for malignancy.     CT chest abdomen pelvis findings:     Right lower neck parathyroidectomy surgical changes.  Heart is normal in size with small pericardial effusion.  Multivessel coronaries calcific atherosclerosis.  Moderate aortoiliac calcific atherosclerosis without aneurysm.  Centrilobular emphysema.  Dependent bibasal atelectatic changes versus scarring.  Liver is normal in size measuring 14 cm without focal lesion.  Spleen is normal in size with numerous calcified granulomas.  Small splenules .  Degenerative changes of the spine most prominent at the cervical spine.  Stable T6 vertebral body height loss (series 604, image 81).   Assessment:       1. Urethral cancer    2. Dysuria    3. Stage 3a chronic kidney disease    4. Coronary artery disease involving native coronary artery of native heart without angina pectoris    5. Hypertension, unspecified type    6. Rheumatoid arthritis, involving unspecified site, unspecified whether rheumatoid factor present           Plan:     Urethral  Cancer - Pt has [id number] cancer of the urethra  -Pt discussed at tumor board on 4/04/23 with recommendation for penectomy  -Pt met with Dr Bryant on 4/06/23 and refused penectomy and elected for chemo/XRT  -Given pt's borderline kidney function with CrCl near 30 on recent lab work, I would consider use of biweekly Gemcitabine dosed 27mg/mm2 on days 1 and 4 of each week  -Pt to undergo chemo teaching and PORT placement with return appt with me on 1st day of treatment    Dysuria - given CKD pyridium is not a good options  -Case discussed with Dr Myers and will prescribe lidocaine jelly    CKD IIIb - pt's baseline creatinine is 1.2-1.7  -Will repeat Cr today    CAD - pt with stent in place  -Pt follows with Dr Lamb in cardiology  -Pt on ASA, plavix, lipitor, antihypertensives  -Management per cardiology    HTN - pt on amlodipine  -BP slightly increased  -Will monitor    Rheumatoid Arthritis - pt on MTX  -Management per rheumatolgoy    Route Chart for Scheduling    Med Onc Chart Routing      Follow up with physician Other. Pt needs approval for Gemcitabine.  The patient needs a CBC and CMP today.  He needs an appt with general surgery for PORT palcement.  He needs a chemo school.  He will need an appt with me with CBC and CMP on the first day of treatment.   Follow up with [facility]    Infusion scheduling note    Injection scheduling note    Labs    Imaging    Pharmacy appointment    Other referrals         Treatment Plan Information   OP BLADDER GEMCITABINE 27MG/M2 TWICE WEEKLY WITH RADIATION   Ayush Guzman MD   Upcoming Treatment Dates - OP BLADDER GEMCITABINE 27MG/M2 TWICE WEEKLY WITH RADIATION    5/8/2023       Chemotherapy       gemcitabine (GEMZAR) 48 mg in sodium chloride 0.9% SolP 100 mL chemo infusion       Antiemetics       ondansetron injection 8 mg  5/11/2023       Chemotherapy       gemcitabine (GEMZAR) 48 mg in sodium chloride 0.9% SolP 100 mL chemo infusion       Antiemetics       ONDANSETRON HCL (PF) 4  MG/2 ML INJ SOLN  5/15/2023       Chemotherapy       gemcitabine (GEMZAR) 48 mg in sodium chloride 0.9% SolP 100 mL chemo infusion       Antiemetics       ONDANSETRON HCL (PF) 4 MG/2 ML INJ SOLN  5/18/2023       Chemotherapy       gemcitabine (GEMZAR) 48 mg in sodium chloride 0.9% SolP 100 mL chemo infusion       Antiemetics       ONDANSETRON HCL (PF) 4 MG/2 ML INJ SOLN    Therapy Plan Information  Medications  denosumab (PROLIA) injection 60 mg  60 mg, Subcutaneous, Every 26 weeks      Ayush Guzman MD  [facility]  Hematology and Oncology  [facility]   [address] [place] [address]   O: [phone]  F: [phone]

## 2023-04-25 NOTE — PROGRESS NOTES
04/25/2023    [facility]   Radiation Oncology Consultation    ASSESSMENT  This is a 77 y.o. y/o male with RA on methotrexate, history of prostate ca status post EBRT  therapy 2019, now with Stage III urethral cancer, cT3 N0.       PLAN    Treatment options were discussed with the patient including penectomy vs concurrent chemoradiation therapy.  We discussed the goals of treatment to be curative.  The risks, benefits, scheduling, alternatives to and rationale of radiation therapy were explained in detail.    Indication, course, and potential toxicities of pelvic radiation therapy reviewed in detail, including but not limited to fatigue, increased frequency, urgency, or pain with urination, , pelvic bone fragility, erectile dysfunction, remote risk of secondary malignancy.   We discussed that given his prior prostate radiation therapy, I will be somewhat constrained in the volume I am able to treat (unable to cover the entire urethra, or the low pelvic nodes, most likely).  Additionally, he will be at greater risk of radiation therapy related toxicity in the setting of re-irradiation.   Rheumatologist re: d/c methotrexate prior to radiation therapy (pt is not currently established with a Rheumatologist)- advised to reach out ASAP to re-establish care (I have reached out and am awaiting return message)  Review records from [facility] prior radiation therapy to determine dose/field overlap  No Azo for dysuia 2/2 renal impairment, no Elmiron 2/2 on Plavix and ASA.   After this discussion, he elected to proceed with follow up in 2 weeks to review records and CT simulation.    A CT simulation will be performed pending review of prior records to begin the planning process for the patient's radiation therapy.     He was given our contact information, and he was told that he could call our clinic at any time if he has any questions or concerns.      Radiation Treatment Details:   We plan to treat gross  disease with margin to a dose of 66-70 Gy in 33-35 fractions at 2 Gy per fraction delivered daily with concurrent chemotherapy  We will utilize a(n) 3D technique with custom bolus  We will utilize daily CT or orthogonal image guidance due to the need for accurate daily patient alignment to treat the target volumes accurately and avoid radiation overdose to multiple regional organs at risk since we are treating the patient with complex target volumes with multiple steep isodose gradients.         Chief Complaint   Patient presents with    Prostate Cancer       Oncology History   Urethral cancer   4/6/2023 Initial Diagnosis    Urethral cancer       5/8/2023 -  Chemotherapy    Treatment Summary   Plan Name: OP BLADDER GEMCITABINE 27MG/M2 TWICE WEEKLY WITH RADIATION  Treatment Goal: Curative  Status: Active  Start Date: 5/8/2023 (Planned)  End Date: 6/19/2023 (Planned)  Provider: Ayush Guzman MD  Chemotherapy: GEMCITABINE CHEMO INFUSION, 27 mg/m2, Intravenous, Clinic/HOD 1 time, 0 of 2 cycles           HPI: The patient is a 77 year old male with multiple comorbidities (CAD, HTN, RA on methotrexate, ISABEL) who presents for management of recently diagnosed urethral cancer. He presented to Urology clinic with LUTS seeking UroLift procedure (status post XRT for prostate cancer), and was found to have a firm lesion on the glans of the penis.  Cystoscopy noted very firm lesion on the left glasn, no inguinal lymphadenopathy, friable lesion in extending into urethra, suspected involvement of the corpus cavernosum (T3).  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     2/27/23 CT chest, abdomen, and pelvis no evidence of metastatic disease. Chronic T 4 compression fracture.    4/3/23 PET/CT with hypermetabolic lesion in distal penis, 1.4cm, no lymphadenopathy, stable subcm bilat pulm nodules.     The patient met with Dr. Bryant to discuss penectomy but was not interested in surgery.     He  notes dysuria since biopsy in February.  20 lb weight loss over the last few months.        IPSS today 7    Possibility of pregnancy: No  History of prior irradiation: Yes - [facility] 2019 to prostate (Dr. Posadas)  History of prior systemic anti-cancer therapy: Yes - MTX for RA  History of collagen vascular disease: No  Implanted electronic device (pacer/defib/nerve stimulator): No    Past Medical History:   Diagnosis Date    Anticoagulant long-term use     Arthritis     Coronary artery disease     DDD (degenerative disc disease), cervical     Degenerative disc disease     Heart murmur     Hypertension     Nontoxic multinodular goiter 09/20/2016    Prostate CA     RA (rheumatoid arthritis)     Sleep apnea     uses C-PAP    Vitamin D insufficiency 09/30/2016       Past Surgical History:   Procedure Laterality Date    CARPAL TUNNEL RELEASE Right 05/28/2021    Procedure: RELEASE, CARPAL TUNNEL;  Surgeon: Jose Ryan II, MD;  Location: [facility];  Service: Orthopedics;  Laterality: Right;    COLONOSCOPY  prior to 2016    normal findings per patient report    CORONARY ANGIOPLASTY WITH STENT PLACEMENT  02/2022    CYSTOSCOPY N/A 12/18/2018    Procedure: CYSTOSCOPY;  Surgeon: Garrett Pina MD;  [facility]: [facility];  Service: Urology;  Laterality: N/A;    CYSTOSCOPY N/A 07/12/2022    Procedure: CYSTOSCOPY;  Surgeon: Garrett Pina MD;  Location: [facility] OR;  Service: Urology;  Laterality: N/A;    ESOPHAGOGASTRODUODENOSCOPY N/A 09/29/2020    Dr. Espinosa; empiric dilation; erythematous mucosa in antrum; gastric mucosal atrophy; hematin in entire stomach; biopsy: mid & distal esophagus WNL, stomach- WNL, negative for h pylori    EXCISION OF LESION OF PENIS N/A 2/23/2023    Procedure: EXCISION, LESION, PENIS;  Surgeon: Timo Myers MD;  Location: [facility] OR;  Service: Urology;  Laterality: N/A;    PARATHYROIDECTOMY Right 10/27/2020    Procedure: PARATHYROIDECTOMY;  Surgeon: Latonia Clayton MD;  Location: [facility] OR  2ND FLR;  Service: ENT;  Laterality: Right;    RELEASE OF ULNAR NERVE AT CUBITAL TUNNEL Right 2021    Procedure: RELEASE, ULNAR TUNNEL;  Surgeon: Jose Ryan II, MD;  Location: [facility] OR;  Service: Orthopedics;  Laterality: Right;    TRANSRECTAL BIOPSY OF PROSTATE WITH ULTRASOUND GUIDANCE N/A 2018    Procedure: BIOPSY, PROSTATE, RECTAL APPROACH, WITH US GUIDANCE;  Surgeon: Garrett Pina MD;  [facility];  Service: Urology;  Laterality: N/A;    TRANSRECTAL ULTRASOUND EXAMINATION N/A 2022    Procedure: ULTRASOUND, RECTAL APPROACH;  Surgeon: Garertt Pina MD;  [facility];  Service: Urology;  Laterality: N/A;       Social History     Tobacco Use    Smoking status: Former     Packs/day: 0.25     Years: 10.00     Pack years: 2.50     Types: Cigarettes     Quit date: 2001     Years since quittin.7     Passive exposure: Past    Smokeless tobacco: Never   Substance Use Topics    Alcohol use: Not Currently     Comment: seldom    Drug use: Not Currently     Frequency: 8.0 times per week     Types: Marijuana       Cancer-related family history is negative for Esophageal cancer.    Current Outpatient Medications on File Prior to Visit   Medication Sig Dispense Refill    amLODIPine (NORVASC) 5 MG tablet Take 1 tablet (5 mg total) by mouth 2 (two) times daily. 60 tablet 3    aspirin (ECOTRIN) 81 MG EC tablet Take 1 tablet (81 mg total) by mouth once daily. 30 tablet 0    atorvastatin (LIPITOR) 10 MG tablet Take 10 mg by mouth nightly.      atorvastatin (LIPITOR) 20 MG tablet Take 20 mg by mouth.      b complex vitamins capsule Take 1 capsule by mouth once daily.      busPIRone (BUSPAR) 10 MG tablet Take 1 tablet (10 mg total) by mouth 3 (three) times daily. 90 tablet 3    clopidogreL (PLAVIX) 75 mg tablet Take 1 tablet (75 mg total) by mouth once daily. 30 tablet 0    EScitalopram oxalate (LEXAPRO) 10 MG tablet Take 10 mg by mouth once daily.      folic acid (FOLVITE) 1 MG  tablet Take 1 tablet (1 mg total) by mouth once daily. 90 tablet 3    gabapentin (NEURONTIN) 300 MG capsule Take 300 mg by mouth 3 (three) times daily.      hydrOXYzine HCL (ATARAX) 10 MG Tab Take 1 tablet (10 mg total) by mouth 2 (two) times daily as needed (anxiety). 60 tablet 2    LIDOcaine-prilocaine (EMLA) cream Apply topically as needed. Apply to PORT 15 minutes prior to PORT access 30 g 3    methotrexate 2.5 MG Tab Take 6 tablets (15 mg total) by mouth every 7 days. TAKE 6 TABLETS BY MOUTH EVERY WEEK (Patient taking differently: Take 15 mg by mouth every Saturday. TAKE 6 TABLETS BY MOUTH EVERY WEEK) 72 tablet 2    ondansetron (ZOFRAN-ODT) 4 MG TbDL Take 1 tablet (4 mg total) by mouth every 6 (six) hours as needed (nausea). 60 tablet 6    oxybutynin (DITROPAN-XL) 5 MG TR24 Take 1 tablet (5 mg total) by mouth once daily. 30 tablet 11    pantoprazole (PROTONIX) 40 MG tablet TAKE 1 TABLET(40 MG) BY MOUTH TWICE DAILY (Patient taking differently: Take 40 mg by mouth once daily.) 180 tablet 0    phenazopyridine (PYRIDIUM) 200 MG tablet Take 1 tablet (200 mg total) by mouth 3 (three) times daily as needed for Pain. 60 tablet 0    ramelteon (ROZEREM) 8 mg tablet Take 1 tablet (8 mg total) by mouth every evening. 30 tablet 6    tamsulosin (FLOMAX) 0.4 mg Cap Take 1 capsule (0.4 mg total) by mouth once daily. 30 capsule 3    temazepam (RESTORIL) 15 mg Cap Take 15 mg by mouth nightly as needed.      traMADoL (ULTRAM) 50 mg tablet Take 2 tablets (100 mg total) by mouth 2 (two) times daily as needed for Pain. 120 tablet 5    traZODone (DESYREL) 50 MG tablet Take by mouth.      zolpidem (AMBIEN) 5 MG Tab Take 5 mg by mouth nightly as needed.      [DISCONTINUED] cholecalciferol, vitamin D3, (VITAMIN D3) 100 mcg (4,000 unit) Cap Take 400 Units by mouth once daily.      [DISCONTINUED] cloNIDine (CATAPRES) 0.1 MG tablet Take 1 tablet (0.1 mg total) by mouth 2 (two) times daily as needed (only if blood pressure top number is  over 200). (Patient not taking: Reported on 6/7/2022) 30 tablet 1    [DISCONTINUED] meclizine (ANTIVERT) 12.5 mg tablet Take 1 tablet (12.5 mg total) by mouth 2 (two) times daily as needed for Dizziness. 30 tablet 0    [DISCONTINUED] sildenafiL (VIAGRA) 100 MG tablet Take 1 tablet (100 mg total) by mouth daily as needed for Erectile Dysfunction. 10 tablet 2     Current Facility-Administered Medications on File Prior to Visit   Medication Dose Route Frequency Provider Last Rate Last Admin    albuterol nebulizer solution 2.5 mg  2.5 mg Nebulization Q15 Min PRN Pastor Saleh MD        albuterol-ipratropium 2.5 mg-0.5 mg/3 mL nebulizer solution 3 mL  3 mL Nebulization PRN Pastor Saleh MD        denosumab (PROLIA) injection 60 mg  60 mg Subcutaneous 1 time in Clinic/HOD Jean Carlos Hoffman MD        diphenhydrAMINE injection 25 mg  25 mg Intravenous Q6H PRN Pastor Saleh MD        HYDROmorphone injection 0.5 mg  0.5 mg Intravenous Q5 Min PRN Pastor Saleh MD   0.5 mg at 02/23/23 1312    lactated ringers infusion   Intravenous Continuous Jacqueline Volpi-Abadie, MD   Stopped at 02/23/23 1400    ondansetron injection 4 mg  4 mg Intravenous Q15 Min PRN Pastor Saleh MD        sodium chloride 0.9% flush 3 mL  3 mL Intravenous PRN Pastor Saleh MD           Review of patient's allergies indicates:  No Known Allergies    Review of Systems   Genitourinary:  Positive for dysuria (since biopsy) and frequency (mild daytime, nocturia x3). Negative for flank pain.   Musculoskeletal:  Negative for back pain, joint pain, myalgias and neck pain.      Vital Signs: BP (!) 180/84   Temp 98.2 °F (36.8 °C)   Wt 66.3 kg (146 lb 2.6 oz)   BMI 22.22 kg/m²     ECOG Performance Status: 1 - Ambulates, capable of light work    Physical Exam  Vitals reviewed.   Constitutional:       Appearance: Normal appearance. He is normal weight.   HENT:      Head: Normocephalic and atraumatic.      Nose: Nose normal.   Eyes:       Conjunctiva/sclera: Conjunctivae normal.   Pulmonary:      Effort: Pulmonary effort is normal.   Genitourinary:     Penis: Uncircumcised. Lesions present.        Lymphadenopathy:      Lower Body: No right inguinal adenopathy. No left inguinal adenopathy.   Skin:     General: Skin is warm.   Neurological:      Mental Status: He is alert and oriented to person, place, and time.          Imaging: I have personally reviewed the patient's available images and reports and summarized pertinent findings above in HPI.     Pathology: I have personally reviewed the patient's available pathology and summarized pertinent findings above in HPI.     This case was discussed with Dr. Guzman      - Thank you for allowing me to participate in the care of your patient.    Arlen Mahan MD

## 2023-04-25 NOTE — NURSING
Chart reviewed for treatment education.  Oncology Navigation   Intake  Date of Diagnosis: 04/06/23  Cancer Type:   Internal / External Referral: Internal  Date of Referral: 04/20/23  Initial Nurse Navigator Contact: 04/20/23  Date Worked: 04/25/23  Appointment Date: 04/25/23  Start of Treatment: 05/15/23  Diagnosis to Treat Timeline (days): 39 days     Treatment  Current Status: Active  Date Presented to Tumor Board: 04/04/23    Surgical Oncologist: Sp Bryant    Medical Oncologist: Ayush Guzman  Consult Date: 04/25/23  Chemotherapy: Planned  Chemotherapy Regimen: OP BLADDER GEMCITABINE 27MG/M2 TWICE WEEKLY WITH    Radiation Therapy: Planned  Radiation Oncologist: Arlen Mahan    Procedures: Port / PICC          Radiation Oncologist: Arlen Mahan        Acuity      Follow Up  No follow-ups on file.

## 2023-04-26 ENCOUNTER — TELEPHONE (OUTPATIENT)
Dept: HEMATOLOGY/ONCOLOGY | Facility: CLINIC | Age: 78
End: 2023-04-26
Payer: MEDICARE

## 2023-04-26 ENCOUNTER — HOSPITAL ENCOUNTER (OUTPATIENT)
Dept: PULMONOLOGY | Facility: HOSPITAL | Age: 78
Discharge: HOME | End: 2023-04-26
Attending: NURSE PRACTITIONER
Payer: MEDICARE

## 2023-04-26 DIAGNOSIS — J43.8 OTHER EMPHYSEMA: ICD-10-CM

## 2023-04-26 PROCEDURE — 94729 DIFFUSING CAPACITY: CPT

## 2023-04-26 PROCEDURE — 94060 EVALUATION OF WHEEZING: CPT

## 2023-04-26 PROCEDURE — 94010 BREATHING CAPACITY TEST: CPT | Mod: XB

## 2023-04-26 PROCEDURE — 94727 GAS DIL/WSHOT DETER LNG VOL: CPT

## 2023-04-26 NOTE — TELEPHONE ENCOUNTER
I spoke with the patient about getting an IO appt scheduled per the recommendation of his Rad/Onc MD. I explained in detail what we offer and listed some symptoms/side effects that we can help address using our support services. He verbalized understanding and asked to schedule an appt as soon as possible and accepted appt for tomorrow. Location was reviewed.

## 2023-04-27 ENCOUNTER — TELEPHONE (OUTPATIENT)
Dept: HEMATOLOGY/ONCOLOGY | Facility: CLINIC | Age: 78
End: 2023-04-27
Payer: MEDICARE

## 2023-04-27 NOTE — TELEPHONE ENCOUNTER
Spoke with the patient to r/s his missed IO appt today and he said he is already set up with a sleep specialist and doesn't think getting involved with another provider would be a good idea right now. I cancelled referral and advised him to contact us if he need anything moving forward due to s/e from cancer care. He verbalized understanding.

## 2023-04-27 NOTE — NURSING
Spoke with patient regarding education. Patient lives in [place] and would like to combine appts when possible.  He will be at [facility] on 5/9 to see Dr. Mahan.  Education scheduled at 1 pm with YAZ [place].  Patient will see RD/JUAN at later date and time.   Patient agreed to plan and verbalized understanding of date, time and location.

## 2023-04-28 ENCOUNTER — TELEPHONE (OUTPATIENT)
Dept: PULMONOLOGY | Facility: CLINIC | Age: 78
End: 2023-04-28

## 2023-04-28 NOTE — TELEPHONE ENCOUNTER
Called patient to discuss PFT. Had to leave a message.  Would like to put him on Anoro daily. Will await call return.

## 2023-04-28 NOTE — TELEPHONE ENCOUNTER
PFT shows mild obstruction with good response in small airway to bronchodilator, minimal restriction, severe diffusion defect.

## 2023-05-04 DIAGNOSIS — C68.0 URETHRAL CANCER: ICD-10-CM

## 2023-05-04 DIAGNOSIS — M05.79 RHEUMATOID ARTHRITIS INVOLVING MULTIPLE SITES WITH POSITIVE RHEUMATOID FACTOR: Primary | ICD-10-CM

## 2023-05-09 ENCOUNTER — HOSPITAL ENCOUNTER (OUTPATIENT)
Dept: RADIATION THERAPY | Facility: HOSPITAL | Age: 78
Discharge: HOME | End: 2023-05-09
Attending: INTERNAL MEDICINE
Payer: MEDICARE

## 2023-05-09 ENCOUNTER — CLINICAL SUPPORT (OUTPATIENT)
Dept: HEMATOLOGY/ONCOLOGY | Facility: CLINIC | Age: 78
End: 2023-05-09
Payer: MEDICARE

## 2023-05-09 ENCOUNTER — OFFICE VISIT (OUTPATIENT)
Dept: RADIATION ONCOLOGY | Facility: CLINIC | Age: 78
End: 2023-05-09
Payer: MEDICARE

## 2023-05-09 ENCOUNTER — TELEPHONE (OUTPATIENT)
Dept: HEMATOLOGY/ONCOLOGY | Facility: CLINIC | Age: 78
End: 2023-05-09
Payer: MEDICARE

## 2023-05-09 VITALS
HEIGHT: 68 IN | DIASTOLIC BLOOD PRESSURE: 90 MMHG | OXYGEN SATURATION: 97 % | TEMPERATURE: 98 F | HEART RATE: 86 BPM | BODY MASS INDEX: 20.78 KG/M2 | RESPIRATION RATE: 18 BRPM | SYSTOLIC BLOOD PRESSURE: 164 MMHG | WEIGHT: 137.13 LBS

## 2023-05-09 VITALS
HEIGHT: 68 IN | DIASTOLIC BLOOD PRESSURE: 90 MMHG | WEIGHT: 137.13 LBS | BODY MASS INDEX: 20.78 KG/M2 | RESPIRATION RATE: 16 BRPM | SYSTOLIC BLOOD PRESSURE: 164 MMHG | TEMPERATURE: 98 F | HEART RATE: 86 BPM | OXYGEN SATURATION: 97 %

## 2023-05-09 DIAGNOSIS — C68.0 URETHRAL CANCER: Primary | ICD-10-CM

## 2023-05-09 DIAGNOSIS — C68.0 URETHRAL CANCER: ICD-10-CM

## 2023-05-09 PROCEDURE — 77014 PR  CT GUIDANCE PLACEMENT RAD THERAPY FIELDS: ICD-10-PCS | Mod: 26,,, | Performed by: RADIOLOGY

## 2023-05-09 PROCEDURE — 1126F PR PAIN SEVERITY QUANTIFIED, NO PAIN PRESENT: ICD-10-PCS | Mod: CPTII,S$GLB,, | Performed by: RADIOLOGY

## 2023-05-09 PROCEDURE — 99999 PR PBB SHADOW E&M-EST. PATIENT-LVL V: ICD-10-PCS | Mod: PBBFAC,,, | Performed by: NURSE PRACTITIONER

## 2023-05-09 PROCEDURE — 99213 OFFICE O/P EST LOW 20 MIN: CPT | Mod: 25,S$GLB,, | Performed by: RADIOLOGY

## 2023-05-09 PROCEDURE — 77334 RADIATION TREATMENT AID(S): CPT | Mod: TC,PN | Performed by: RADIOLOGY

## 2023-05-09 PROCEDURE — 99499 UNLISTED E&M SERVICE: CPT | Mod: S$GLB,,, | Performed by: RADIOLOGY

## 2023-05-09 PROCEDURE — 3080F DIAST BP >= 90 MM HG: CPT | Mod: CPTII,S$GLB,, | Performed by: RADIOLOGY

## 2023-05-09 PROCEDURE — 99213 PR OFFICE/OUTPT VISIT, EST, LEVL III, 20-29 MIN: ICD-10-PCS | Mod: 25,S$GLB,, | Performed by: RADIOLOGY

## 2023-05-09 PROCEDURE — 3077F PR MOST RECENT SYSTOLIC BLOOD PRESSURE >= 140 MM HG: ICD-10-PCS | Mod: CPTII,S$GLB,, | Performed by: RADIOLOGY

## 2023-05-09 PROCEDURE — 77263 PR  RADIATION THERAPY PLAN COMPLEX: ICD-10-PCS | Mod: ,,, | Performed by: RADIOLOGY

## 2023-05-09 PROCEDURE — 99215 OFFICE O/P EST HI 40 MIN: CPT | Mod: S$GLB,,, | Performed by: NURSE PRACTITIONER

## 2023-05-09 PROCEDURE — 99499 RISK ADDL DX/OHS AUDIT: ICD-10-PCS | Mod: S$GLB,,, | Performed by: RADIOLOGY

## 2023-05-09 PROCEDURE — 3080F PR MOST RECENT DIASTOLIC BLOOD PRESSURE >= 90 MM HG: ICD-10-PCS | Mod: CPTII,S$GLB,, | Performed by: RADIOLOGY

## 2023-05-09 PROCEDURE — 77014 PR  CT GUIDANCE PLACEMENT RAD THERAPY FIELDS: CPT | Mod: 26,,, | Performed by: RADIOLOGY

## 2023-05-09 PROCEDURE — 99999 PR PBB SHADOW E&M-EST. PATIENT-LVL III: CPT | Mod: PBBFAC,,, | Performed by: RADIOLOGY

## 2023-05-09 PROCEDURE — 1126F AMNT PAIN NOTED NONE PRSNT: CPT | Mod: CPTII,S$GLB,, | Performed by: RADIOLOGY

## 2023-05-09 PROCEDURE — 3077F SYST BP >= 140 MM HG: CPT | Mod: CPTII,S$GLB,, | Performed by: RADIOLOGY

## 2023-05-09 PROCEDURE — 77334 RADIATION TREATMENT AID(S): CPT | Mod: 26,,, | Performed by: RADIOLOGY

## 2023-05-09 PROCEDURE — 99999 PR PBB SHADOW E&M-EST. PATIENT-LVL V: CPT | Mod: PBBFAC,,, | Performed by: NURSE PRACTITIONER

## 2023-05-09 PROCEDURE — 99215 PR OFFICE/OUTPT VISIT, EST, LEVL V, 40-54 MIN: ICD-10-PCS | Mod: S$GLB,,, | Performed by: NURSE PRACTITIONER

## 2023-05-09 PROCEDURE — 77263 THER RADIOLOGY TX PLNG CPLX: CPT | Mod: ,,, | Performed by: RADIOLOGY

## 2023-05-09 PROCEDURE — 77334 PR  RADN TREATMENT AID(S) COMPLX: ICD-10-PCS | Mod: 26,,, | Performed by: RADIOLOGY

## 2023-05-09 PROCEDURE — 99999 PR PBB SHADOW E&M-EST. PATIENT-LVL III: ICD-10-PCS | Mod: PBBFAC,,, | Performed by: RADIOLOGY

## 2023-05-09 PROCEDURE — 77014 HC CT GUIDANCE RADIATION THERAPY FLDS PLACEMENT: CPT | Mod: TC,PN | Performed by: RADIOLOGY

## 2023-05-09 NOTE — PROGRESS NOTES
Subjective:      Name: Rajendra Castle  : 1945  MRN: [id number]    CC:  Education    HPI:   Rajendra Castle is a 77 y.o. 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    Initial History: PMHx of Arthritis, CAD, HTN, RA, ISABEL, Vitamin D Deficiency.    The patient underwent cystoscopy on 02/10/2023 showing a 1 cm firm mass along the left glands of the penis near the meatus.    The patient underwent excision of the penile lesion on 2023 showing invasive squamous cell carcinoma moderately to poorly differentiated with positive lymphovascular invasion and positive perineural invasion.  CT of the chest, abdomen, and pelvis on 2023 showed a stable 5 mm left lower lobe nodule; calcified granuloma adjacent to the left fissure; stable subpleural 2 mm right middle lobe nodule; stable 3 mm right upper lobe nodule; stable fissural nodule in the right lung; stable T6 compression fracture; and left 5th rib bone island.  PET-CT on 2023 showed stable bilateral pulmonary nodules which were non avid; focally increased FDG tracer uptake in the distal aspect of the left penis measuring 1.4 cm.  The patient was discussed at tumor Board on 2023 with recommendation for penectomy.  The patient met with Dr. Bryant on 2023 to discuss penectomy; however, the patient elected not to have surgery and wanted to undergo chemo and radiation treatment.      Oncology History   Urethral cancer   2023 Initial Diagnosis    Urethral cancer       2023 Cancer Staged    Staging form: Male Penile Urethra and Female Urethra, AJCC 8th Edition  - Clinical: Stage III (cT3, cN0, cM0)       2023 -  Chemotherapy    Treatment Summary   Plan Name: OP BLADDER GEMCITABINE 27MG/M2 TWICE WEEKLY WITH RADIATION  Treatment Goal: Curative  Status: Active  Start Date: 2023 (Planned)  End Date: 2023 (Planned)  Provider: Ayush Guzman  MD  Chemotherapy: GEMCITABINE CHEMO INFUSION, 27 mg/m2, Intravenous, Clinic/HOD 1 time, 0 of 2 cycles              Past Medical History:   Diagnosis Date    Anticoagulant long-term use     Arthritis     Coronary artery disease     DDD (degenerative disc disease), cervical     Degenerative disc disease     Heart murmur     Hypertension     Nontoxic multinodular goiter 09/20/2016    Prostate CA     RA (rheumatoid arthritis)     Sleep apnea     uses C-PAP    Vitamin D insufficiency 09/30/2016       Past Surgical History:   Procedure Laterality Date    CARPAL TUNNEL RELEASE Right 05/28/2021    Procedure: RELEASE, CARPAL TUNNEL;  Surgeon: Jose Ryan II, MD;  Location: [facility] OR;  Service: Orthopedics;  Laterality: Right;    COLONOSCOPY  prior to 2016    normal findings per patient report    CORONARY ANGIOPLASTY WITH STENT PLACEMENT  02/2022    CYSTOSCOPY N/A 12/18/2018    Procedure: CYSTOSCOPY;  Surgeon: Garrett Pina MD;  Location: [facility] OR;  Service: Urology;  Laterality: N/A;    CYSTOSCOPY N/A 07/12/2022    Procedure: CYSTOSCOPY;  Surgeon: Garrett Pina MD;  Location: [facility] OR;  Service: Urology;  Laterality: N/A;    ESOPHAGOGASTRODUODENOSCOPY N/A 09/29/2020    Dr. Espniosa; empiric dilation; erythematous mucosa in antrum; gastric mucosal atrophy; hematin in entire stomach; biopsy: mid & distal esophagus WNL, stomach- WNL, negative for h pylori    EXCISION OF LESION OF PENIS N/A 2/23/2023    Procedure: EXCISION, LESION, PENIS;  Surgeon: Timo Myers MD;  Location: [facility] OR;  Service: Urology;  Laterality: N/A;    PARATHYROIDECTOMY Right 10/27/2020    Procedure: PARATHYROIDECTOMY;  Surgeon: Latonia Clayton MD;  Location: [address];  Service: ENT;  Laterality: Right;    RELEASE OF ULNAR NERVE AT CUBITAL TUNNEL Right 05/28/2021    Procedure: RELEASE, ULNAR TUNNEL;  Surgeon: Jose Ryan II, MD;  Location: [facility] OR;  Service: Orthopedics;  Laterality: Right;    TRANSRECTAL BIOPSY OF PROSTATE  "WITH ULTRASOUND GUIDANCE N/A 2018    Procedure: BIOPSY, PROSTATE, RECTAL APPROACH, WITH US GUIDANCE;  Surgeon: Garrett Pina MD;  Location: [facility] OR;  Service: Urology;  Laterality: N/A;    TRANSRECTAL ULTRASOUND EXAMINATION N/A 2022    Procedure: ULTRASOUND, RECTAL APPROACH;  Surgeon: Garrett Pina MD;  Location: [facility] OR;  Service: Urology;  Laterality: N/A;       Family History   Problem Relation Age of Onset    Heart disease Mother     Stroke Father     Drug abuse Sister     No Known Problems Daughter     No Known Problems Daughter     No Known Problems Son     Diabetes Maternal Uncle     Colon cancer Neg Hx     Crohn's disease Neg Hx     Esophageal cancer Neg Hx     Stomach cancer Neg Hx     Ulcerative colitis Neg Hx        Social History     Socioeconomic History    Marital status: Single   Tobacco Use    Smoking status: Former     Packs/day: 0.25     Years: 10.00     Pack years: 2.50     Types: Cigarettes     Quit date: 2001     Years since quittin.7     Passive exposure: Past    Smokeless tobacco: Never   Substance and Sexual Activity    Alcohol use: Not Currently     Comment: seldom    Drug use: Not Currently     Frequency: 8.0 times per week     Types: Marijuana    Sexual activity: Yes     Partners: Female       Review of patient's allergies indicates:  No Known Allergies    Review of Systems   All other systems reviewed and are negative.         Objective:     Vitals:    23 1308   BP: (!) 164/90   BP Location: Left arm   Patient Position: Sitting   BP Method: Medium (Manual)   Pulse: 86   Resp: 16   Temp: 98.2 °F (36.8 °C)   TempSrc: Temporal   SpO2: 97%   Weight: 62.2 kg (137 lb 2 oz)   Height: 5' 8" (1.727 m)        Physical Exam  Vitals reviewed.   Constitutional:       General: He is not in acute distress.  HENT:      Head: Normocephalic.   Pulmonary:      Effort: Pulmonary effort is normal.   Neurological:      Mental Status: He is alert and oriented to person, " place, and time.   Psychiatric:         Behavior: Behavior normal.         Thought Content: Thought content normal.           Current Outpatient Medications on File Prior to Visit   Medication Sig    amLODIPine (NORVASC) 5 MG tablet Take 1 tablet (5 mg total) by mouth 2 (two) times daily.    atorvastatin (LIPITOR) 10 MG tablet Take 10 mg by mouth nightly.    atorvastatin (LIPITOR) 20 MG tablet Take 20 mg by mouth.    b complex vitamins capsule Take 1 capsule by mouth once daily.    busPIRone (BUSPAR) 10 MG tablet Take 1 tablet (10 mg total) by mouth 3 (three) times daily.    EScitalopram oxalate (LEXAPRO) 10 MG tablet Take 10 mg by mouth once daily.    gabapentin (NEURONTIN) 300 MG capsule Take 300 mg by mouth 3 (three) times daily.    hydrOXYzine HCL (ATARAX) 10 MG Tab Take 1 tablet (10 mg total) by mouth 2 (two) times daily as needed (anxiety).    methotrexate 2.5 MG Tab Take 6 tablets (15 mg total) by mouth every 7 days. TAKE 6 TABLETS BY MOUTH EVERY WEEK (Patient taking differently: Take 15 mg by mouth every Saturday. TAKE 6 TABLETS BY MOUTH EVERY WEEK)    pantoprazole (PROTONIX) 40 MG tablet TAKE 1 TABLET(40 MG) BY MOUTH TWICE DAILY (Patient taking differently: Take 40 mg by mouth once daily.)    ramelteon (ROZEREM) 8 mg tablet Take 1 tablet (8 mg total) by mouth every evening.    temazepam (RESTORIL) 15 mg Cap Take 15 mg by mouth nightly as needed.    traZODone (DESYREL) 50 MG tablet Take by mouth.    zolpidem (AMBIEN) 5 MG Tab Take 5 mg by mouth nightly as needed.    folic acid (FOLVITE) 1 MG tablet Take 1 tablet (1 mg total) by mouth once daily.    LIDOcaine HCL 2% (XYLOCAINE) 2 % jelly Apply to affected area (tip of penis) daily prn    LIDOcaine-prilocaine (EMLA) cream Apply topically as needed. Apply to PORT 15 minutes prior to PORT access    ondansetron (ZOFRAN-ODT) 4 MG TbDL Take 1 tablet (4 mg total) by mouth every 6 (six) hours as needed (nausea).    oxybutynin (DITROPAN-XL) 5 MG TR24 Take 1 tablet (5  mg total) by mouth once daily.    phenazopyridine (PYRIDIUM) 200 MG tablet Take 200 mg by mouth 3 (three) times daily as needed.    tamsulosin (FLOMAX) 0.4 mg Cap Take 1 capsule (0.4 mg total) by mouth once daily.    traMADoL (ULTRAM) 50 mg tablet Take 2 tablets (100 mg total) by mouth 2 (two) times daily as needed for Pain.    [DISCONTINUED] aspirin (ECOTRIN) 81 MG EC tablet Take 1 tablet (81 mg total) by mouth once daily.    [DISCONTINUED] cholecalciferol, vitamin D3, (VITAMIN D3) 100 mcg (4,000 unit) Cap Take 400 Units by mouth once daily.    [DISCONTINUED] cloNIDine (CATAPRES) 0.1 MG tablet Take 1 tablet (0.1 mg total) by mouth 2 (two) times daily as needed (only if blood pressure top number is over 200). (Patient not taking: Reported on 6/7/2022)    [DISCONTINUED] clopidogreL (PLAVIX) 75 mg tablet Take 1 tablet (75 mg total) by mouth once daily.    [DISCONTINUED] meclizine (ANTIVERT) 12.5 mg tablet Take 1 tablet (12.5 mg total) by mouth 2 (two) times daily as needed for Dizziness.    [DISCONTINUED] sildenafiL (VIAGRA) 100 MG tablet Take 1 tablet (100 mg total) by mouth daily as needed for Erectile Dysfunction.     Current Facility-Administered Medications on File Prior to Visit   Medication    albuterol nebulizer solution 2.5 mg    albuterol-ipratropium 2.5 mg-0.5 mg/3 mL nebulizer solution 3 mL    denosumab (PROLIA) injection 60 mg    diphenhydrAMINE injection 25 mg    HYDROmorphone injection 0.5 mg    lactated ringers infusion    ondansetron injection 4 mg    sodium chloride 0.9% flush 3 mL       CBC:  Lab Results   Component Value Date    WBC 6.85 02/23/2023    HGB 14.2 02/23/2023    HCT 43.3 02/23/2023    MCV 89 02/23/2023     02/23/2023         CMP:  Sodium   Date Value Ref Range Status   01/26/2023 140 136 - 145 mmol/L Final   04/25/2019 141 134 - 144 mmol/L      Potassium   Date Value Ref Range Status   01/26/2023 3.9 3.5 - 5.1 mmol/L Final     Chloride   Date Value Ref Range Status   01/26/2023  112 (H) 95 - 110 mmol/L Final   04/25/2019 110 98 - 110 mmol/L      CO2   Date Value Ref Range Status   01/26/2023 20 (L) 23 - 29 mmol/L Final     Glucose   Date Value Ref Range Status   01/26/2023 134 (H) 70 - 110 mg/dL Final   04/25/2019 95 70 - 99 mg/dL      BUN   Date Value Ref Range Status   01/26/2023 11 8 - 23 mg/dL Final     Creatinine   Date Value Ref Range Status   05/09/2023 1.3 0.5 - 1.4 mg/dL Final   04/25/2019 1.38 0.60 - 1.40 mg/dL      Calcium   Date Value Ref Range Status   01/26/2023 9.0 8.7 - 10.5 mg/dL Final     Total Protein   Date Value Ref Range Status   01/26/2023 7.1 6.0 - 8.4 g/dL Final     Albumin   Date Value Ref Range Status   01/26/2023 3.6 3.5 - 5.2 g/dL Final   04/25/2019 3.3 3.1 - 4.7 g/dL      Total Bilirubin   Date Value Ref Range Status   01/26/2023 1.0 0.1 - 1.0 mg/dL Final     Comment:     For infants and newborns, interpretation of results should be based  on gestational age, weight and in agreement with clinical  observations.    Premature Infant recommended reference ranges:  Up to 24 hours.............<8.0 mg/dL  Up to 48 hours............<12.0 mg/dL  3-5 days..................<15.0 mg/dL  6-29 days.................<15.0 mg/dL       Alkaline Phosphatase   Date Value Ref Range Status   01/26/2023 57 55 - 135 U/L Final     AST   Date Value Ref Range Status   01/26/2023 17 10 - 40 U/L Final     ALT   Date Value Ref Range Status   01/26/2023 16 10 - 44 U/L Final     Anion Gap   Date Value Ref Range Status   01/26/2023 8 8 - 16 mmol/L Final     eGFR if    Date Value Ref Range Status   06/30/2022 52.3 (A) >60 mL/min/1.73 m^2 Final     eGFR if non    Date Value Ref Range Status   06/30/2022 45.2 (A) >60 mL/min/1.73 m^2 Final     Comment:     Calculation used to obtain the estimated glomerular filtration  rate (eGFR) is the CKD-EPI equation.          NM PET CT Routine FDG  Narrative: EXAMINATION:  NM PET CT ROUTINE    CLINICAL HISTORY:  possible  urethral/penile cancer, eval for mets; Malignant neoplasm of prostate    TECHNIQUE:  13.25 mCi of F18-FDG was administered intravenously in the left antecubital fossa.  After an approximately 60 min distribution time, PET/CT images were acquired from the skull base to mid thigh.  Transmission images were acquired to correct for attenuation using a whole body low-dose CT scan with oral contrast and without IV contrast with the arms positioned above the head. Glycemia at the time of injection was 100 mg/dL.    COMPARISON:  CT chest abdomen pelvis 02/27/2023    FINDINGS:  Quality of the study: Adequate.    In the head and neck, there are no hypermetabolic lesions worrisome for malignancy. There are no hypermetabolic mucosal lesions, and there are no pathologically enlarged or hypermetabolic lymph nodes.  Increased FDG tracer uptake in the left paraspinal musculature likely physiological muscle uptake.    In the chest, there are no hypermetabolic lesions worrisome for malignancy.  There are no pathologically enlarged or hypermetabolic lymph nodes.  Stable bilateral pulmonary nodules on the right measuring 5 mm and on the left measure 4 mm (series 3, image 64 and 103).    In the abdomen and pelvis, there is focally increased FDG tracer uptake of the distal aspect of the penis measuring 1.4 cm with SUV max of 27 (fused image 264).    There is otherwise physiologic tracer distribution within the abdominal organs including prominent physiologic anal sphincter tone and excretion into the genitourinary system.  No pathologically enlarged or hypermetabolic inguinal lymph nodes.    In the bones, there are no hypermetabolic lesions worrisome for malignancy.    In the extremities, there are no hypermetabolic lesions worrisome for malignancy.    CT chest abdomen pelvis findings:    Right lower neck parathyroidectomy surgical changes.  Heart is normal in size with small pericardial effusion.  Multivessel coronaries calcific  atherosclerosis.  Moderate aortoiliac calcific atherosclerosis without aneurysm.  Centrilobular emphysema.  Dependent bibasal atelectatic changes versus scarring.  Liver is normal in size measuring 14 cm without focal lesion.  Spleen is normal in size with numerous calcified granulomas.  Small splenules .  Degenerative changes of the spine most prominent at the cervical spine.  Stable T6 vertebral body height loss (series 604, image 81).  Impression: There is hypermetabolic lesion in the distal penis measuring approximately 1.4 cm, positive for squamous cell carcinoma on recent biopsy.  No evidence of lymph node metastases.    Stable bilateral subcentimeter pulmonary nodules that are too small to characterize reliably by PET.  Given evidence of old granulomatous disease, noncalcified granulomas are a consideration.  Attention on follow-up.    Additional findings as above.    I, Jey Griffith MD, attest that I reviewed and interpreted the images.    Electronically signed by resident: Juanito Tse  Date:    04/04/2023  Time:    06:30    Electronically signed by: Jey Griffith  Date:    04/04/2023  Time:    10:09         Assessment:       1. Urethral cancer         Plan:     Urethral cancer  -     Ambulatory referral/consult to [facility]  -     ONCOLOGY NAVIGATION REQUEST       1.  Treatment plan of Gemcitabine/XRT discussed with patient.  2.  Handouts provided on chemotherapy agent.    3.  Discussed the mechanism of action, potential side effects of this treatment as well as ways to manage them at home.   4.  Dietary modifications discussed.  Detail instructions to be provided by dietician.   5.  Chemotherapy Portfolio supplied with contact information.   6.  Discussed follow-up with the physician for toxicity monitoring throughout treatment.    7.  Consented the patient to the treatment plan and the patient was educated on the planned duration of the treatment and schedule of the treatment administration.  8.  Call  to Dr. Faria' office in re of scheduling port placement.  Crystal will return call to patient.    Plan reviewed and approved by Dr. Guzman.     60 minutes were spent in coordination of patient's care, record review and counseling.

## 2023-05-09 NOTE — TELEPHONE ENCOUNTER
----- Message from Ghanshyam Chen sent at 5/9/2023  9:48 AM CDT -----  Regarding: Call before appt today  Type:  Needs Medical Advice    Who Called: Pt    Would the patient rather a call back or a response via [facility]? Call back  Best Call Back Number: [phone]     Additional Information: Sts he wants a call back before he drives there for his appts today.  Also wants to cancel his other appts.  Please advise -- Thank you    Spoke with patient, He will keep today's scheduled appointments and discuss further with Evan during chemo eduction class today.

## 2023-05-09 NOTE — PROGRESS NOTES
[facility] Department of Radiation Oncology  Follow Up Visit Note    Diagnosis:  Rajendra Castle is a 77 y.o. 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       Oncologic History:  Oncology History   Urethral cancer   4/6/2023 Initial Diagnosis    Urethral cancer       4/25/2023 Cancer Staged    Staging form: Male Penile Urethra and Female Urethra, AJCC 8th Edition  - Clinical: Stage III (cT3, cN0, cM0)       5/8/2023 -  Chemotherapy    Treatment Summary   Plan Name: OP BLADDER GEMCITABINE 27MG/M2 TWICE WEEKLY WITH RADIATION  Treatment Goal: Curative  Status: Active  Start Date: 5/8/2023 (Planned)  End Date: 6/19/2023 (Planned)  Provider: Ayush Guzman MD  Chemotherapy: GEMCITABINE CHEMO INFUSION, 27 mg/m2, Intravenous, Clinic/HOD 1 time, 0 of 2 cycles            Interval History  The patient presents today for a regularly scheduled follow up visit.  He was last seen in our clinic on 4/25/23.   Since that time, review of prior radiation therapy records shows 72.4Gy to the prostate without jacinto coverage.  He has not been able to re-establish care with Rheumatologist. Discussed with the patient that, in light of his previous radiation therapy, we will not be able to safely deliver full dose of radiation to the traditional targets for urethral cancer. Specifically, we will not be able to cover the urethra all the way up to the bladder neck, nor are we likely to be able to cover the low pelvic nodes, though this will be determined during planning. He reports ongoing dysuria.       Review of Systems   Review of Systems   Constitutional:  Negative for chills and fever.   Eyes:  Negative for double vision.   Gastrointestinal:  Negative for blood in stool.   Genitourinary:  Positive for dysuria. Negative for frequency and hematuria.     Social History:  Social History     Tobacco Use    Smoking status: Former      "Packs/day: 0.25     Years: 10.00     Pack years: 2.50     Types: Cigarettes     Quit date: 2001     Years since quittin.7     Passive exposure: Past    Smokeless tobacco: Never   Substance Use Topics    Alcohol use: Not Currently     Comment: seldom    Drug use: Not Currently     Frequency: 8.0 times per week     Types: Marijuana       Family History:  Cancer-related family history is negative for Esophageal cancer.    Exam:  Vitals:    23 1418   BP: (!) 164/90   BP Location: Left arm   Patient Position: Sitting   BP Method: Medium (Automatic)   Pulse: 86   Resp: 18   Temp: 98.2 °F (36.8 °C)   TempSrc: Temporal   SpO2: 97%   Weight: 62.2 kg (137 lb 2 oz)   Height: 5' 8" (1.727 m)     Constitutional: Pleasant 77 y.o. male in no acute distress.  Well nourished. Well groomed.   HEENT: Normocephalic and atraumatic   Lungs: No audible wheezing.  Normal effort.   Musculoskeletal: No gross MSK deformities. Ambulates with cane  Skin: No rashes appreciated.   Psych: Alert and oriented with appropriate mood and affect.  Neuro:   Grossly normal.    Data Review:    Independent Interpretation of Test(s): Previous treatment records from 2019 was personally reviewed as detailed above    Discussion with Another Provider or Non-healthcare Professional:  I discussed this case with Dr. Guzman in order to coordinate care.      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 involving skin of the glans  ECOG: (1) Restricted in physically strenuous activity, ambulatory and able to do work of light nature    Plan:  D/C methotrexate- needs to re-establish with Rheum (we have been attempting to expedite consult) for alternate therapy- Appointement scheduled for tomorrow at 9AM at [facility]  CT sim today for treatment planning  MRI Pelvis to better characterize primary lesion ASAP (will not delay start of treatment if unable to schedule in the next 1-weeks)  Indication, course, and potential toxicities of " pelvic radiation therapy reviewed in detail, including but not limited to fatigue, increased frequency, urgency, or pain with urination, increased frequency or urgency of bowel movements, diarrhea, pelvic bone fragility, erectile dysfunction, penile pain, local skin reaction.  He understands that these risks are increased by the fact that he had a prior course of radiation therapy to the low pelvis in the past for prostate cancer   Informed consent obtained  Due to his prior treatment, we will not be able to safely deliver meaningful dose to the prostatic urethra or low pelvic nodes.  He understands that this may impact his chance of cure  He was given our contact information, and he was told that he could call our clinic at anytime if he has any questions or concerns.  Follow up with other providers as directed     Radiation Treatment Details:   We plan to treat gross disease with margin to a dose of 66-70 Gy in 33-35 fractions at 2 Gy per fraction delivered daily with concurrent chemotherapy  Special physics consult to evaluate for dose overlap  We will utilize a(n) 3D technique with custom bolus  We will utilize daily CT or orthogonal image guidance due to the need for accurate daily patient alignment to treat the target volumes accurately and avoid radiation overdose to multiple regional organs at risk since we are treating the patient with complex target volumes with multiple steep isodose gradients.

## 2023-05-10 ENCOUNTER — TELEPHONE (OUTPATIENT)
Dept: HEMATOLOGY/ONCOLOGY | Facility: CLINIC | Age: 78
End: 2023-05-10
Payer: MEDICARE

## 2023-05-10 ENCOUNTER — OFFICE VISIT (OUTPATIENT)
Dept: RHEUMATOLOGY | Facility: CLINIC | Age: 78
End: 2023-05-10
Payer: MEDICARE

## 2023-05-10 VITALS
BODY MASS INDEX: 21.72 KG/M2 | WEIGHT: 143.31 LBS | HEIGHT: 68 IN | SYSTOLIC BLOOD PRESSURE: 153 MMHG | DIASTOLIC BLOOD PRESSURE: 88 MMHG | HEART RATE: 106 BPM

## 2023-05-10 DIAGNOSIS — Z79.899 ENCOUNTER FOR LONG-TERM (CURRENT) USE OF MEDICATIONS: ICD-10-CM

## 2023-05-10 DIAGNOSIS — M05.79 RHEUMATOID ARTHRITIS INVOLVING MULTIPLE SITES WITH POSITIVE RHEUMATOID FACTOR: Primary | ICD-10-CM

## 2023-05-10 DIAGNOSIS — C68.0 URETHRAL CANCER: ICD-10-CM

## 2023-05-10 PROCEDURE — 1125F AMNT PAIN NOTED PAIN PRSNT: CPT | Mod: CPTII,S$GLB,, | Performed by: INTERNAL MEDICINE

## 2023-05-10 PROCEDURE — 3079F DIAST BP 80-89 MM HG: CPT | Mod: CPTII,S$GLB,, | Performed by: INTERNAL MEDICINE

## 2023-05-10 PROCEDURE — 3079F PR MOST RECENT DIASTOLIC BLOOD PRESSURE 80-89 MM HG: ICD-10-PCS | Mod: CPTII,S$GLB,, | Performed by: INTERNAL MEDICINE

## 2023-05-10 PROCEDURE — 1125F PR PAIN SEVERITY QUANTIFIED, PAIN PRESENT: ICD-10-PCS | Mod: CPTII,S$GLB,, | Performed by: INTERNAL MEDICINE

## 2023-05-10 PROCEDURE — 1159F PR MEDICATION LIST DOCUMENTED IN MEDICAL RECORD: ICD-10-PCS | Mod: CPTII,S$GLB,, | Performed by: INTERNAL MEDICINE

## 2023-05-10 PROCEDURE — 1159F MED LIST DOCD IN RCRD: CPT | Mod: CPTII,S$GLB,, | Performed by: INTERNAL MEDICINE

## 2023-05-10 PROCEDURE — 1160F PR REVIEW ALL MEDS BY PRESCRIBER/CLIN PHARMACIST DOCUMENTED: ICD-10-PCS | Mod: CPTII,S$GLB,, | Performed by: INTERNAL MEDICINE

## 2023-05-10 PROCEDURE — 99213 OFFICE O/P EST LOW 20 MIN: CPT | Mod: S$GLB,,, | Performed by: INTERNAL MEDICINE

## 2023-05-10 PROCEDURE — 99999 PR PBB SHADOW E&M-EST. PATIENT-LVL V: ICD-10-PCS | Mod: PBBFAC,,, | Performed by: INTERNAL MEDICINE

## 2023-05-10 PROCEDURE — 3077F PR MOST RECENT SYSTOLIC BLOOD PRESSURE >= 140 MM HG: ICD-10-PCS | Mod: CPTII,S$GLB,, | Performed by: INTERNAL MEDICINE

## 2023-05-10 PROCEDURE — 99213 PR OFFICE/OUTPT VISIT, EST, LEVL III, 20-29 MIN: ICD-10-PCS | Mod: S$GLB,,, | Performed by: INTERNAL MEDICINE

## 2023-05-10 PROCEDURE — 1160F RVW MEDS BY RX/DR IN RCRD: CPT | Mod: CPTII,S$GLB,, | Performed by: INTERNAL MEDICINE

## 2023-05-10 PROCEDURE — 3077F SYST BP >= 140 MM HG: CPT | Mod: CPTII,S$GLB,, | Performed by: INTERNAL MEDICINE

## 2023-05-10 PROCEDURE — 99999 PR PBB SHADOW E&M-EST. PATIENT-LVL V: CPT | Mod: PBBFAC,,, | Performed by: INTERNAL MEDICINE

## 2023-05-10 ASSESSMENT — ROUTINE ASSESSMENT OF PATIENT INDEX DATA (RAPID3)
AM STIFFNESS SCORE: 1, YES
MDHAQ FUNCTION SCORE: 0.1
FATIGUE SCORE: 7.5
TOTAL RAPID3 SCORE: 2.11
WHEN YOU AWAKENED IN THE MORNING OVER THE LAST WEEK, PLEASE INDICATE THE AMOUNT OF TIME IT TAKES UNTIL YOU ARE AS LIMBER AS YOU WILL BE FOR THE DAY: 3 MINUTES
PAIN SCORE: 0.5
PSYCHOLOGICAL DISTRESS SCORE: 5.5
PATIENT GLOBAL ASSESSMENT SCORE: 5.5

## 2023-05-10 NOTE — PROGRESS NOTES
History of present illness:  77-year-old male I had seen on 1 previous occasion 18 months ago.  He had previously been followed by Dr. Garcia for seropositive rheumatoid arthritis.  I saw him because the doctor was planning to retire.  He was seen 1 further time by Dr. Garcia over the summer.  He now needs a new rheumatologist.  He has been on chronic methotrexate therapy.    Since his last visit he has developed squamous cell carcinoma of the penis.  He is planning to start radiation and chemotherapy.  He is waiting on a port.  Arthritis has been doing quite well.  He is had no joint pain or swelling.  Has had no morning stiffness.  He takes no pain medication.  Has no limitation in his activity.  Previous x-rays have shown no erosive disease.      Physical examination:  Musculoskeletal:  Has full range of motion of all joints.  Has no synovitis.  He has no tenderness to palpation.  He has no arthritic deformities.      Assessment:  He has no evidence of active rheumatoid arthritis     Plans:  1. Since his arthritis has been under control for so long and he has no erosive disease, I feel he can stop the methotrexate at this time  2.  I told him to come back and see me if he does have a flare of his arthritis, otherwise I left it as an open appointment   3. If immuno therapy is required in the future, this is likely to flare his rheumatoid arthritis, but it is not a contraindication to such therapy.  I would like to see him at that time if he is to be placed on immunotherapy.

## 2023-05-11 ENCOUNTER — TELEPHONE (OUTPATIENT)
Dept: HEMATOLOGY/ONCOLOGY | Facility: CLINIC | Age: 78
End: 2023-05-11
Payer: MEDICARE

## 2023-05-11 NOTE — NURSING
Called patient to request him to call [facility] to schedule port placement.  Patient stated they were to call him, explained that they called but was unable to reach him.  Patient accepted number, [phone], and agreed to call.    Message sent to [facility] to notify when port placement had been scheduled.

## 2023-05-15 ENCOUNTER — TELEPHONE (OUTPATIENT)
Dept: RADIATION ONCOLOGY | Facility: CLINIC | Age: 78
End: 2023-05-15
Payer: MEDICARE

## 2023-05-15 NOTE — TELEPHONE ENCOUNTER
rescheduled missed MRI for Sunday 5/21 7pm at [facility].  Patient states he was too tired to go to it yesterday.

## 2023-05-23 ENCOUNTER — TELEPHONE (OUTPATIENT)
Dept: PULMONOLOGY | Facility: CLINIC | Age: 78
End: 2023-05-23

## 2023-05-23 NOTE — TELEPHONE ENCOUNTER
[facility] sent bipap auth to [facility] 5/4/23. I faxed orders and auth to [facility] and called pt to let him know.  Also gave him [facility] phone number to contact them to check status as well.

## 2023-05-23 NOTE — TELEPHONE ENCOUNTER
----- Message from Mario Jones MA sent at 5/23/2023  8:57 AM CDT -----  Regarding: cpap  Patient called checking on status of cpap, pt can be reached at   [phone]

## 2023-05-25 NOTE — PROGRESS NOTES
PATIENT: Rajendra Castle  MRN: [id number]  DATE: 5/26/2023      Diagnosis:   1. Urethral cancer    2. Chronic inflammatory arthritis    3. Insomnia, unspecified type    4. Rheumatoid arthritis involving multiple sites with positive rheumatoid factor    5. Dysuria    6. Stage 3a chronic kidney disease    7. Coronary artery disease involving native coronary artery of native heart without angina pectoris    8. Hypertension, unspecified type          Chief Complaint: urethral cancer (1 month follow up )      Oncologic History:      Oncologic History     Oncologic Treatment     Pathology           Subjective:    Initial History: Mr. Castle is a 77 y.o. male with Arthritis, CAD, HTN, RA, ISABEL, Vitamin D Deficiency who presents for urethral cancer.  The patient underwent cystoscopy on 02/10/2023 showing a 1 cm firm mass along the left glands of the penis near the meatus.  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  CT of the chest, abdomen, and pelvis on 02/27/2023 showed a stable 5 mm left lower lobe nodule; calcified granuloma adjacent to the left fissure; stable subpleural 2 mm right middle lobe nodule; stable 3 mm right upper lobe nodule; stable fissural nodule in the right lung; stable T6 compression fracture; and left 5th rib bone island.  PET-CT on 04/03/2023 showed stable bilateral pulmonary nodules which were non avid; focally increased FDG tracer uptake in the distal aspect of the left penis measuring 1.4 cm.  The patient was discussed at tumor Board on 04/04/2023 with recommendation for penectomy.  The patient met with Dr. Bryant on 04/06/2023 to discuss penectomy; however, the patient elected not to have surgery and wanted to undergo chemo and radiation treatment.    Currently the patient endorses burning with urination since the urethral biopsy in  February.  He denies cloudiness of his urine or blood in the urine.  He endorses weight loss of 20 lb over the last 3-4 months.  He endorses arthritis intermittently which he attributes to his rheumatoid arthritis.  The patient denies CP, cough, SOB, abdominal pain, nausea, vomiting, constipation, diarrhea.  The patient denies fever, chills, night sweats, weight loss, new lumps or bumps, easy bruising or bleeding.    Past Medical History:   Past Medical History:   Diagnosis Date    Anticoagulant long-term use     Arthritis     Coronary artery disease     Dr. Lamb    DDD (degenerative disc disease), cervical     Degenerative disc disease     Heart murmur     History of radiation therapy 2020    Hypertension     Nontoxic multinodular goiter 09/20/2016    Prostate CA     RA (rheumatoid arthritis)     Sleep apnea     No CPAP    Vitamin D insufficiency 09/30/2016       Past Surgical HIstory:   Past Surgical History:   Procedure Laterality Date    CARPAL TUNNEL RELEASE Right 05/28/2021    Procedure: RELEASE, CARPAL TUNNEL;  Surgeon: Jose Ryan II, MD;  Location: [facility] OR;  Service: Orthopedics;  Laterality: Right;    COLONOSCOPY  prior to 2016    normal findings per patient report    CORONARY ANGIOPLASTY WITH STENT PLACEMENT  02/2022    CYSTOSCOPY N/A 12/18/2018    Procedure: CYSTOSCOPY;  Surgeon: Garrett Pina MD;  Location: [facility] OR;  Service: Urology;  Laterality: N/A;    CYSTOSCOPY N/A 07/12/2022    Procedure: CYSTOSCOPY;  Surgeon: Garrett Pina MD;  Location: [facility] OR;  Service: Urology;  Laterality: N/A;    ESOPHAGOGASTRODUODENOSCOPY N/A 09/29/2020    Dr. Espinosa; empiric dilation; erythematous mucosa in antrum; gastric mucosal atrophy; hematin in entire stomach; biopsy: mid & distal esophagus WNL, stomach- WNL, negative for h pylori    EXCISION OF LESION OF PENIS N/A 2/23/2023    Procedure: EXCISION, LESION, PENIS;  Surgeon: Timo Myers MD;  Location: [facility] OR;  Service: Urology;  Laterality:  N/A;    INSERTION OF TUNNELED CENTRAL VENOUS CATHETER (CVC) WITH SUBCUTANEOUS PORT Left 5/18/2023    Procedure: [id number];  Surgeon: Atul Faria MD;  Location: [facility];  Service: General;  Laterality: Left;  left subclavian    PARATHYROIDECTOMY Right 10/27/2020    Procedure: PARATHYROIDECTOMY;  Surgeon: Latonia Clayton MD;  Location: [address] FLR;  Service: ENT;  Laterality: Right;    RELEASE OF ULNAR NERVE AT CUBITAL TUNNEL Right 05/28/2021    Procedure: RELEASE, ULNAR TUNNEL;  Surgeon: Jose Ryan II, MD;  Location: [facility];  Service: Orthopedics;  Laterality: Right;    TRANSRECTAL BIOPSY OF PROSTATE WITH ULTRASOUND GUIDANCE N/A 12/18/2018    Procedure: BIOPSY, PROSTATE, RECTAL APPROACH, WITH US GUIDANCE;  Surgeon: Garrett Pina MD;  Location: [facility];  Service: Urology;  Laterality: N/A;    TRANSRECTAL ULTRASOUND EXAMINATION N/A 07/12/2022    Procedure: ULTRASOUND, RECTAL APPROACH;  Surgeon: Garrett Pina MD;  Location: [facility];  Service: Urology;  Laterality: N/A;       Family History:   Family History   Problem Relation Age of Onset    Heart disease Mother     Stroke Father     Drug abuse Sister     No Known Problems Daughter     No Known Problems Daughter     No Known Problems Son     Diabetes Maternal Uncle     Colon cancer Neg Hx     Crohn's disease Neg Hx     Esophageal cancer Neg Hx     Stomach cancer Neg Hx     Ulcerative colitis Neg Hx        Social History:  reports that he quit smoking about 21 years ago. His smoking use included cigarettes. He has a 2.50 pack-year smoking history. He has been exposed to tobacco smoke. He has never used smokeless tobacco. He reports that he does not currently use alcohol. He reports that he does not currently use drugs after having used the following drugs: Marijuana. Frequency: 8.00 times per week.    Allergies:  Review of patient's allergies indicates:  No Known Allergies    Medications:  Current Outpatient Medications    Medication Sig Dispense Refill    alfuzosin (UROXATRAL) 10 mg Tb24 Take 10 mg by mouth.      aspirin (ECOTRIN) 81 MG EC tablet Take 1 tablet by mouth once daily.      atorvastatin (LIPITOR) 20 MG tablet Take 20 mg by mouth once daily.      b complex vitamins capsule Take 1 capsule by mouth once daily.      carvediloL (COREG) 25 MG tablet Take 25 mg by mouth.      clopidogreL (PLAVIX) 75 mg tablet Take 1 tablet (75 mg total) by mouth once daily. Pt not sure of dose      EScitalopram oxalate (LEXAPRO) 10 MG tablet Take 10 mg by mouth once daily.      folic acid (FOLVITE) 1 MG tablet Take 1 tablet (1 mg total) by mouth once daily. 90 tablet 3    gabapentin (NEURONTIN) 300 MG capsule Take 300 mg by mouth 3 (three) times daily.      HYDROcodone-acetaminophen (NORCO) 5-325 mg per tablet Take 1 tablet by mouth every 6 (six) hours as needed for Pain. 20 tablet 0    isosorbide mononitrate (IMDUR) 30 MG 24 hr tablet Take 30 mg by mouth.      LIDOcaine HCL 2% (XYLOCAINE) 2 % jelly Apply to affected area (tip of penis) daily prn 30 mL 1    LIDOcaine-prilocaine (EMLA) cream Apply topically as needed. Apply to PORT 15 minutes prior to PORT access 30 g 3    ondansetron (ZOFRAN-ODT) 4 MG TbDL Take 1 tablet (4 mg total) by mouth every 6 (six) hours as needed (nausea). 60 tablet 6    oxybutynin (DITROPAN-XL) 5 MG TR24 Take 1 tablet (5 mg total) by mouth once daily. 30 tablet 11    pantoprazole (PROTONIX) 40 MG tablet TAKE 1 TABLET(40 MG) BY MOUTH TWICE DAILY (Patient taking differently: Take 40 mg by mouth once daily.) 180 tablet 0    tamsulosin (FLOMAX) 0.4 mg Cap Take 1 capsule (0.4 mg total) by mouth once daily. 30 capsule 3    amLODIPine (NORVASC) 5 MG tablet Take 1 tablet (5 mg total) by mouth 2 (two) times daily. 60 tablet 3    busPIRone (BUSPAR) 10 MG tablet Take 1 tablet (10 mg total) by mouth 3 (three) times daily. 90 tablet 3    methotrexate 2.5 MG Tab Take 6 tablets (15 mg total) by mouth every 7 days. TAKE 6 TABLETS BY  MOUTH EVERY WEEK  STOP UNTIL INSTRUCTED BY MD 72 tablet 2    phenazopyridine (PYRIDIUM) 200 MG tablet Take 1 tablet (200 mg total) by mouth 3 (three) times daily as needed for Pain. 90 tablet 3    temazepam (RESTORIL) 15 mg Cap Take 1 capsule (15 mg total) by mouth nightly as needed. 30 capsule 1    traMADoL (ULTRAM) 50 mg tablet Take 2 tablets (100 mg total) by mouth 2 (two) times daily as needed for Pain. 120 tablet 5     No current facility-administered medications for this visit.     Facility-Administered Medications Ordered in Other Visits   Medication Dose Route Frequency Provider Last Rate Last Admin    albuterol nebulizer solution 2.5 mg  2.5 mg Nebulization Q15 Min PRN Pastor Saleh MD        albuterol-ipratropium 2.5 mg-0.5 mg/3 mL nebulizer solution 3 mL  3 mL Nebulization PRN Pastor Saleh MD        denosumab (PROLIA) injection 60 mg  60 mg Subcutaneous 1 time in Clinic/HOD Jean Carlos Hoffman MD        diphenhydrAMINE injection 25 mg  25 mg Intravenous Q6H PRN Pastor Saleh MD        HYDROmorphone injection 0.5 mg  0.5 mg Intravenous Q5 Min PRN Pastor Saleh MD   0.5 mg at 02/23/23 1312    lactated ringers infusion   Intravenous Continuous Jacqueline Volpi-Abadie, MD   Stopped at 02/23/23 1400    ondansetron injection 4 mg  4 mg Intravenous Q15 Min PRN Pastor Saleh MD        sodium chloride 0.9% flush 10 mL  10 mL Intravenous PRN Ayush Guzman MD   10 mL at 05/26/23 1500    sodium chloride 0.9% flush 3 mL  3 mL Intravenous PRN Pastor Saleh MD           Review of Systems   Constitutional:  Positive for unexpected weight change (20 pounds 3-4 months). Negative for appetite change, chills and fever.   HENT:  Negative for sore throat and trouble swallowing.    Eyes:  Negative for photophobia and visual disturbance.   Respiratory:  Negative for cough, chest tightness and shortness of breath.    Cardiovascular:  Negative for chest pain, palpitations and leg swelling.   Gastrointestinal:   "Negative for abdominal pain, constipation, diarrhea, nausea and vomiting.   Endocrine: Negative for cold intolerance and heat intolerance.   Genitourinary:  Positive for dysuria. Negative for difficulty urinating and hematuria.   Musculoskeletal:  Positive for arthralgias. Negative for back pain and myalgias.   Skin:  Negative for color change and rash.   Neurological:  Negative for dizziness, light-headedness, numbness and headaches.   Hematological:  Negative for adenopathy.     ECOG Performance Status: 0   Objective:      Vitals:   Vitals:    05/26/23 1342   BP: (!) 158/70   BP Location: Right arm   Patient Position: Sitting   BP Method: Medium (Manual)   Pulse: 104   Temp: 97.1 °F (36.2 °C)   TempSrc: Temporal   SpO2: 95%   Weight: 62.8 kg (138 lb 7.2 oz)   Height: 5' 8" (1.727 m)         Physical Exam  Constitutional:       General: He is not in acute distress.     Appearance: He is well-developed. He is not diaphoretic.   HENT:      Head: Normocephalic and atraumatic.   Eyes:      General: No scleral icterus.        Right eye: No discharge.         Left eye: No discharge.   Cardiovascular:      Rate and Rhythm: Normal rate and regular rhythm.      Heart sounds: Normal heart sounds. No murmur heard.    No friction rub. No gallop.   Pulmonary:      Effort: Pulmonary effort is normal. No respiratory distress.      Breath sounds: Normal breath sounds. No wheezing or rales.   Chest:      Chest wall: No tenderness.   Abdominal:      General: Bowel sounds are normal. There is no distension.      Palpations: Abdomen is soft. There is no mass.      Tenderness: There is no abdominal tenderness. There is no rebound.   Genitourinary:     Comments: Pt with firmarea at the meatus of the penis with peeling skin  Musculoskeletal:         General: No tenderness. Normal range of motion.   Lymphadenopathy:      Cervical: No cervical adenopathy.      Upper Body:      Right upper body: No supraclavicular or axillary adenopathy.    "   Left upper body: No supraclavicular or axillary adenopathy.      Lower Body: No right inguinal adenopathy. No left inguinal adenopathy.   Skin:     General: Skin is warm and dry.      Findings: No erythema or rash.   Neurological:      Mental Status: He is alert and oriented to person, place, and time.      Coordination: Coordination normal.   Psychiatric:         Behavior: Behavior normal.       Laboratory Data:  Infusion on 05/26/2023   Component Date Value Ref Range Status    WBC 05/26/2023 7.10  3.90 - 12.70 K/uL Final    RBC 05/26/2023 4.57 (L)  4.60 - 6.20 M/uL Final    Hemoglobin 05/26/2023 12.9 (L)  14.0 - 18.0 g/dL Final    Hematocrit 05/26/2023 40.8  40.0 - 54.0 % Final    MCV 05/26/2023 89  82 - 98 fL Final    MCH 05/26/2023 28.2  27.0 - 31.0 pg Final    MCHC 05/26/2023 31.6 (L)  32.0 - 36.0 g/dL Final    RDW 05/26/2023 15.9 (H)  11.5 - 14.5 % Final    Platelets 05/26/2023 373  150 - 450 K/uL Final    MPV 05/26/2023 11.8  9.2 - 12.9 fL Final    Immature Granulocytes 05/26/2023 0.3  0.0 - 0.5 % Final    Gran # (ANC) 05/26/2023 5.5  1.8 - 7.7 K/uL Final    Immature Grans (Abs) 05/26/2023 0.02  0.00 - 0.04 K/uL Final    Comment: Mild elevation in immature granulocytes is non specific and   can be seen in a variety of conditions including stress response,   acute inflammation, trauma and pregnancy. Correlation with other   laboratory and clinical findings is essential.      Lymph # 05/26/2023 0.8 (L)  1.0 - 4.8 K/uL Final    Mono # 05/26/2023 0.8  0.3 - 1.0 K/uL Final    Eos # 05/26/2023 0.1  0.0 - 0.5 K/uL Final    Baso # 05/26/2023 0.03  0.00 - 0.20 K/uL Final    nRBC 05/26/2023 0  0 /100 WBC Final    Gran % 05/26/2023 76.9 (H)  38.0 - 73.0 % Final    Lymph % 05/26/2023 11.1 (L)  18.0 - 48.0 % Final    Mono % 05/26/2023 10.6  4.0 - 15.0 % Final    Eosinophil % 05/26/2023 0.7  0.0 - 8.0 % Final    Basophil % 05/26/2023 0.4  0.0 - 1.9 % Final    Differential Method 05/26/2023 Automated   Final    Sodium  05/26/2023 142  136 - 145 mmol/L Final    Potassium 05/26/2023 4.1  3.5 - 5.1 mmol/L Final    Chloride 05/26/2023 108  95 - 110 mmol/L Final    CO2 05/26/2023 19 (L)  23 - 29 mmol/L Final    Glucose 05/26/2023 96  70 - 110 mg/dL Final    BUN 05/26/2023 13  8 - 23 mg/dL Final    Creatinine 05/26/2023 1.4  0.5 - 1.4 mg/dL Final    Calcium 05/26/2023 9.0  8.7 - 10.5 mg/dL Final    Total Protein 05/26/2023 8.0  6.0 - 8.4 g/dL Final    Albumin 05/26/2023 3.6  3.5 - 5.2 g/dL Final    Total Bilirubin 05/26/2023 0.6  0.1 - 1.0 mg/dL Final    Comment: For infants and newborns, interpretation of results should be based  on gestational age, weight and in agreement with clinical  observations.    Premature Infant recommended reference ranges:  Up to 24 hours.............<8.0 mg/dL  Up to 48 hours............<12.0 mg/dL  3-5 days..................<15.0 mg/dL  6-29 days.................<15.0 mg/dL      Alkaline Phosphatase 05/26/2023 77  55 - 135 U/L Final    AST 05/26/2023 11  10 - 40 U/L Final    ALT 05/26/2023 9 (L)  10 - 44 U/L Final    Anion Gap 05/26/2023 15  8 - 16 mmol/L Final    eGFR 05/26/2023 52.4 (A)  >60 mL/min/1.73 m^2 Final         Imaging: CT C/A/P 2/27/23  CT THORAX:  5 mm left lower lobe nodule (series 3 image 117) is unchanged. Calcified granuloma lies adjacent to left fissure (image 89). Subpleural 2 mm right middle lobe nodule (image 93) unchanged. 3 mm right upper lobe nodule (image 45) unchanged. Fissural nodule in right lung (series 3 image 80) unchanged.     Moderate emphysema unchanged. Trachea and main bronchi are patent     No enlarged hilar, mediastinal, axillary, or supraclavicular lymph nodes. Coronary artery calcification unchanged. Tiny pericardial effusion unchanged. No pleural effusion.     T6 compression fracture unchanged. Left fifth rib bone island unchanged. No new suspicious osseous abnormality.     CT ABDOMEN:  Liver, gallbladder, pancreas, spleen, and adrenals are normal. Bilateral renal  hypodensities have not significantly changed suggesting cysts. Mild aortoiliac atherosclerotic plaque is present.     Enteric contrast reaches rectum without obstruction. Large and small intestines are unremarkable. No free intraperitoneal gas. No enlarged mesenteric or retroperitoneal lymph nodes.     Degenerative changes affect the spine. No suspicious osseous abnormality.     CT PELVIS:  Bladder is unremarkable. No free pelvic fluid. Bilateral inguinal canals are slightly patulous. No enlarged iliac or inguinal lymph nodes.     No suspicious osseous abnormality. Mild to moderate bilateral hip osteoarthrosis is present.    PET/CT 4/03/23  Quality of the study: Adequate.     In the head and neck, there are no hypermetabolic lesions worrisome for malignancy. There are no hypermetabolic mucosal lesions, and there are no pathologically enlarged or hypermetabolic lymph nodes.  Increased FDG tracer uptake in the left paraspinal musculature likely physiological muscle uptake.     In the chest, there are no hypermetabolic lesions worrisome for malignancy.  There are no pathologically enlarged or hypermetabolic lymph nodes.  Stable bilateral pulmonary nodules on the right measuring 5 mm and on the left measure 4 mm (series 3, image 64 and 103).     In the abdomen and pelvis, there is focally increased FDG tracer uptake of the distal aspect of the penis measuring 1.4 cm with SUV max of 27 (fused image 264).     There is otherwise physiologic tracer distribution within the abdominal organs including prominent physiologic anal sphincter tone and excretion into the genitourinary system.  No pathologically enlarged or hypermetabolic inguinal lymph nodes.     In the bones, there are no hypermetabolic lesions worrisome for malignancy.     In the extremities, there are no hypermetabolic lesions worrisome for malignancy.     CT chest abdomen pelvis findings:     Right lower neck parathyroidectomy surgical changes.  Heart is normal  in size with small pericardial effusion.  Multivessel coronaries calcific atherosclerosis.  Moderate aortoiliac calcific atherosclerosis without aneurysm.  Centrilobular emphysema.  Dependent bibasal atelectatic changes versus scarring.  Liver is normal in size measuring 14 cm without focal lesion.  Spleen is normal in size with numerous calcified granulomas.  Small splenules .  Degenerative changes of the spine most prominent at the cervical spine.  Stable T6 vertebral body height loss (series 604, image 81).   Assessment:       1. Urethral cancer    2. Chronic inflammatory arthritis    3. Insomnia, unspecified type    4. Rheumatoid arthritis involving multiple sites with positive rheumatoid factor    5. Dysuria    6. Stage 3a chronic kidney disease    7. Coronary artery disease involving native coronary artery of native heart without angina pectoris    8. Hypertension, unspecified type             Plan:     Urethral Cancer - Pt has [id number] cancer of the urethra  -Pt discussed at tumor board on 4/04/23 with recommendation for penectomy  -Pt met with Dr Bryant on 4/06/23 and refused penectomy and elected for chemo/XRT  -Given pt's borderline kidney function with CrCl near 30 on recent lab work, pt to receive biweekly Gemcitabine dosed 27mg/mm2 on days 1 and 4 of each week  -Pt to start treatment on 5/29/23    CKD IIIb - pt's baseline creatinine is 1.2-1.7  -Cr 1.4 on 5/26/23    CAD - pt with stent in place  -Pt follows with Dr Lamb in cardiology  -Pt on ASA, plavix, lipitor, antihypertensives  -Management per cardiology    HTN - pt on amlodipine  -BP slightly increased  -Will monitor    Rheumatoid Arthritis - pt has stopped MTX  -Dr Hart contacted for recommendation on alternative medicine while pt is getting chemo/XRT  -Management per rheumatolgoy    Insomnia - pt to take temazepam as he states this helped better in the past    Dysuria - pyridium prescribed    Route Chart for Scheduling    Med Onc Chart  Routing      Follow up with physician 2 weeks. Pt needs CBC adn CMP from PORT today.  Pt needs to go to [facility] radiation department todayto get his schedule.  Pt can proceed with chemo on 5/29/23.  Pt needs a CBC and CMP from the port on 6/01/23 with NP visit and treatment.  Pt needs a CBC adn CMP from [facility] on 6/05/23 with an aoppt with me and treatment.   Follow up with [facility] 1 week.   Infusion scheduling note    Injection scheduling note    Labs    Imaging    Pharmacy appointment    Other referrals       Treatment Plan Information   OP BLADDER GEMCITABINE 27MG/M2 TWICE WEEKLY WITH RADIATION   Ayush Guzman MD   Upcoming Treatment Dates - OP BLADDER GEMCITABINE 27MG/M2 TWICE WEEKLY WITH RADIATION    5/8/2023       Chemotherapy       gemcitabine (GEMZAR) 47 mg in sodium chloride 0.9% SolP 100 mL chemo infusion       Antiemetics       ondansetron injection 8 mg  5/11/2023       Chemotherapy       gemcitabine (GEMZAR) 47 mg in sodium chloride 0.9% SolP 100 mL chemo infusion       Antiemetics       ondansetron injection 8 mg  5/15/2023       Chemotherapy       gemcitabine (GEMZAR) 47 mg in sodium chloride 0.9% SolP 100 mL chemo infusion       Antiemetics       ondansetron injection 8 mg  5/18/2023       Chemotherapy       gemcitabine (GEMZAR) 47 mg in sodium chloride 0.9% SolP 100 mL chemo infusion       Antiemetics       ondansetron injection 8 mg    Therapy Plan Information  Medications  denosumab (PROLIA) injection 60 mg  60 mg, Subcutaneous, Every 26 weeks      Ayush Guzman MD  [facility]  Hematology and Oncology  [facility]   [address] [place] [address]   O: [phone]  F: [phone]

## 2023-05-26 ENCOUNTER — INFUSION (OUTPATIENT)
Dept: INFUSION THERAPY | Facility: HOSPITAL | Age: 78
End: 2023-05-26
Attending: INTERNAL MEDICINE
Payer: MEDICARE

## 2023-05-26 ENCOUNTER — DOCUMENTATION ONLY (OUTPATIENT)
Dept: PHARMACY | Facility: AMBULARY SURGERY CENTER | Age: 78
End: 2023-05-26
Payer: MEDICARE

## 2023-05-26 ENCOUNTER — OFFICE VISIT (OUTPATIENT)
Dept: HEMATOLOGY/ONCOLOGY | Facility: CLINIC | Age: 78
End: 2023-05-26
Payer: MEDICARE

## 2023-05-26 VITALS
HEIGHT: 68 IN | WEIGHT: 138.44 LBS | TEMPERATURE: 97 F | BODY MASS INDEX: 20.98 KG/M2 | HEART RATE: 104 BPM | OXYGEN SATURATION: 95 % | SYSTOLIC BLOOD PRESSURE: 158 MMHG | DIASTOLIC BLOOD PRESSURE: 70 MMHG

## 2023-05-26 DIAGNOSIS — C68.0 URETHRAL CANCER: ICD-10-CM

## 2023-05-26 DIAGNOSIS — G47.00 INSOMNIA, UNSPECIFIED TYPE: ICD-10-CM

## 2023-05-26 DIAGNOSIS — C68.0 URETHRAL CANCER: Primary | ICD-10-CM

## 2023-05-26 DIAGNOSIS — R30.0 DYSURIA: ICD-10-CM

## 2023-05-26 DIAGNOSIS — M19.90 CHRONIC INFLAMMATORY ARTHRITIS: ICD-10-CM

## 2023-05-26 DIAGNOSIS — I10 HYPERTENSION, UNSPECIFIED TYPE: ICD-10-CM

## 2023-05-26 DIAGNOSIS — I25.10 CORONARY ARTERY DISEASE INVOLVING NATIVE CORONARY ARTERY OF NATIVE HEART WITHOUT ANGINA PECTORIS: ICD-10-CM

## 2023-05-26 DIAGNOSIS — M05.79 RHEUMATOID ARTHRITIS INVOLVING MULTIPLE SITES WITH POSITIVE RHEUMATOID FACTOR: ICD-10-CM

## 2023-05-26 DIAGNOSIS — N18.31 STAGE 3A CHRONIC KIDNEY DISEASE: ICD-10-CM

## 2023-05-26 LAB
ALBUMIN SERPL BCP-MCNC: 3.6 G/DL (ref 3.5–5.2)
ALP SERPL-CCNC: 77 U/L (ref 55–135)
ALT SERPL W/O P-5'-P-CCNC: 9 U/L (ref 10–44)
ANION GAP SERPL CALC-SCNC: 15 MMOL/L (ref 8–16)
AST SERPL-CCNC: 11 U/L (ref 10–40)
BASOPHILS # BLD AUTO: 0.03 K/UL (ref 0–0.2)
BASOPHILS NFR BLD: 0.4 % (ref 0–1.9)
BILIRUB SERPL-MCNC: 0.6 MG/DL (ref 0.1–1)
BUN SERPL-MCNC: 13 MG/DL (ref 8–23)
CALCIUM SERPL-MCNC: 9 MG/DL (ref 8.7–10.5)
CHLORIDE SERPL-SCNC: 108 MMOL/L (ref 95–110)
CO2 SERPL-SCNC: 19 MMOL/L (ref 23–29)
CREAT SERPL-MCNC: 1.4 MG/DL (ref 0.5–1.4)
DIFFERENTIAL METHOD: ABNORMAL
EOSINOPHIL # BLD AUTO: 0.1 K/UL (ref 0–0.5)
EOSINOPHIL NFR BLD: 0.7 % (ref 0–8)
ERYTHROCYTE [DISTWIDTH] IN BLOOD BY AUTOMATED COUNT: 15.9 % (ref 11.5–14.5)
EST. GFR  (NO RACE VARIABLE): 52.4 ML/MIN/1.73 M^2
GLUCOSE SERPL-MCNC: 96 MG/DL (ref 70–110)
HCT VFR BLD AUTO: 40.8 % (ref 40–54)
HGB BLD-MCNC: 12.9 G/DL (ref 14–18)
IMM GRANULOCYTES # BLD AUTO: 0.02 K/UL (ref 0–0.04)
IMM GRANULOCYTES NFR BLD AUTO: 0.3 % (ref 0–0.5)
LYMPHOCYTES # BLD AUTO: 0.8 K/UL (ref 1–4.8)
LYMPHOCYTES NFR BLD: 11.1 % (ref 18–48)
MCH RBC QN AUTO: 28.2 PG (ref 27–31)
MCHC RBC AUTO-ENTMCNC: 31.6 G/DL (ref 32–36)
MCV RBC AUTO: 89 FL (ref 82–98)
MONOCYTES # BLD AUTO: 0.8 K/UL (ref 0.3–1)
MONOCYTES NFR BLD: 10.6 % (ref 4–15)
NEUTROPHILS # BLD AUTO: 5.5 K/UL (ref 1.8–7.7)
NEUTROPHILS NFR BLD: 76.9 % (ref 38–73)
NRBC BLD-RTO: 0 /100 WBC
PLATELET # BLD AUTO: 373 K/UL (ref 150–450)
PMV BLD AUTO: 11.8 FL (ref 9.2–12.9)
POTASSIUM SERPL-SCNC: 4.1 MMOL/L (ref 3.5–5.1)
PROT SERPL-MCNC: 8 G/DL (ref 6–8.4)
RBC # BLD AUTO: 4.57 M/UL (ref 4.6–6.2)
SODIUM SERPL-SCNC: 142 MMOL/L (ref 136–145)
WBC # BLD AUTO: 7.1 K/UL (ref 3.9–12.7)

## 2023-05-26 PROCEDURE — 1159F MED LIST DOCD IN RCRD: CPT | Mod: CPTII,S$GLB,, | Performed by: INTERNAL MEDICINE

## 2023-05-26 PROCEDURE — 3078F PR MOST RECENT DIASTOLIC BLOOD PRESSURE < 80 MM HG: ICD-10-PCS | Mod: CPTII,S$GLB,, | Performed by: INTERNAL MEDICINE

## 2023-05-26 PROCEDURE — 99215 OFFICE O/P EST HI 40 MIN: CPT | Mod: S$GLB,,, | Performed by: INTERNAL MEDICINE

## 2023-05-26 PROCEDURE — 3288F FALL RISK ASSESSMENT DOCD: CPT | Mod: CPTII,S$GLB,, | Performed by: INTERNAL MEDICINE

## 2023-05-26 PROCEDURE — 3077F PR MOST RECENT SYSTOLIC BLOOD PRESSURE >= 140 MM HG: ICD-10-PCS | Mod: CPTII,S$GLB,, | Performed by: INTERNAL MEDICINE

## 2023-05-26 PROCEDURE — 1101F PT FALLS ASSESS-DOCD LE1/YR: CPT | Mod: CPTII,S$GLB,, | Performed by: INTERNAL MEDICINE

## 2023-05-26 PROCEDURE — 1159F PR MEDICATION LIST DOCUMENTED IN MEDICAL RECORD: ICD-10-PCS | Mod: CPTII,S$GLB,, | Performed by: INTERNAL MEDICINE

## 2023-05-26 PROCEDURE — 3077F SYST BP >= 140 MM HG: CPT | Mod: CPTII,S$GLB,, | Performed by: INTERNAL MEDICINE

## 2023-05-26 PROCEDURE — 80053 COMPREHEN METABOLIC PANEL: CPT | Mod: PN | Performed by: INTERNAL MEDICINE

## 2023-05-26 PROCEDURE — A4216 STERILE WATER/SALINE, 10 ML: HCPCS | Mod: PN | Performed by: INTERNAL MEDICINE

## 2023-05-26 PROCEDURE — 36591 DRAW BLOOD OFF VENOUS DEVICE: CPT | Mod: PN

## 2023-05-26 PROCEDURE — 1126F PR PAIN SEVERITY QUANTIFIED, NO PAIN PRESENT: ICD-10-PCS | Mod: CPTII,S$GLB,, | Performed by: INTERNAL MEDICINE

## 2023-05-26 PROCEDURE — 3288F PR FALLS RISK ASSESSMENT DOCUMENTED: ICD-10-PCS | Mod: CPTII,S$GLB,, | Performed by: INTERNAL MEDICINE

## 2023-05-26 PROCEDURE — 99999 PR PBB SHADOW E&M-EST. PATIENT-LVL IV: CPT | Mod: PBBFAC,,, | Performed by: INTERNAL MEDICINE

## 2023-05-26 PROCEDURE — 1126F AMNT PAIN NOTED NONE PRSNT: CPT | Mod: CPTII,S$GLB,, | Performed by: INTERNAL MEDICINE

## 2023-05-26 PROCEDURE — 3078F DIAST BP <80 MM HG: CPT | Mod: CPTII,S$GLB,, | Performed by: INTERNAL MEDICINE

## 2023-05-26 PROCEDURE — 99999 PR PBB SHADOW E&M-EST. PATIENT-LVL IV: ICD-10-PCS | Mod: PBBFAC,,, | Performed by: INTERNAL MEDICINE

## 2023-05-26 PROCEDURE — 85025 COMPLETE CBC W/AUTO DIFF WBC: CPT | Mod: PN | Performed by: INTERNAL MEDICINE

## 2023-05-26 PROCEDURE — 1101F PR PT FALLS ASSESS DOC 0-1 FALLS W/OUT INJ PAST YR: ICD-10-PCS | Mod: CPTII,S$GLB,, | Performed by: INTERNAL MEDICINE

## 2023-05-26 PROCEDURE — 25000003 PHARM REV CODE 250: Mod: PN | Performed by: INTERNAL MEDICINE

## 2023-05-26 PROCEDURE — 99215 PR OFFICE/OUTPT VISIT, EST, LEVL V, 40-54 MIN: ICD-10-PCS | Mod: S$GLB,,, | Performed by: INTERNAL MEDICINE

## 2023-05-26 RX ORDER — SODIUM CHLORIDE 0.9 % (FLUSH) 0.9 %
10 SYRINGE (ML) INJECTION
Status: CANCELLED | OUTPATIENT
Start: 2023-05-26

## 2023-05-26 RX ORDER — ONDANSETRON 2 MG/ML
8 INJECTION INTRAMUSCULAR; INTRAVENOUS
Status: CANCELLED | OUTPATIENT
Start: 2023-05-26

## 2023-05-26 RX ORDER — HEPARIN 100 UNIT/ML
500 SYRINGE INTRAVENOUS
Status: CANCELLED | OUTPATIENT
Start: 2023-05-26

## 2023-05-26 RX ORDER — TRAMADOL HYDROCHLORIDE 50 MG/1
100 TABLET ORAL 2 TIMES DAILY PRN
Qty: 120 TABLET | Refills: 5 | Status: SHIPPED | OUTPATIENT
Start: 2023-05-26 | End: 2023-06-08 | Stop reason: SDUPTHER

## 2023-05-26 RX ORDER — PHENAZOPYRIDINE HYDROCHLORIDE 200 MG/1
200 TABLET, FILM COATED ORAL 3 TIMES DAILY PRN
Qty: 90 TABLET | Refills: 3 | Status: SHIPPED | OUTPATIENT
Start: 2023-05-26 | End: 2023-08-16 | Stop reason: SDUPTHER

## 2023-05-26 RX ORDER — ISOSORBIDE MONONITRATE 30 MG/1
30 TABLET, EXTENDED RELEASE ORAL DAILY
Status: ON HOLD | COMMUNITY
End: 2023-08-10 | Stop reason: SDUPTHER

## 2023-05-26 RX ORDER — CARVEDILOL 25 MG/1
25 TABLET ORAL
Status: ON HOLD | COMMUNITY
End: 2023-08-10 | Stop reason: HOSPADM

## 2023-05-26 RX ORDER — ALFUZOSIN HYDROCHLORIDE 10 MG/1
10 TABLET, EXTENDED RELEASE ORAL
COMMUNITY
End: 2023-08-08

## 2023-05-26 RX ORDER — SODIUM CHLORIDE 0.9 % (FLUSH) 0.9 %
10 SYRINGE (ML) INJECTION
Status: DISCONTINUED | OUTPATIENT
Start: 2023-05-26 | End: 2023-05-26 | Stop reason: HOSPADM

## 2023-05-26 RX ORDER — METHOTREXATE 2.5 MG/1
15 TABLET ORAL
Qty: 72 TABLET | Refills: 2
Start: 2023-05-26 | End: 2023-08-16 | Stop reason: ALTCHOICE

## 2023-05-26 RX ORDER — ASPIRIN 81 MG/1
1 TABLET ORAL DAILY
COMMUNITY
Start: 2022-06-23 | End: 2023-08-08

## 2023-05-26 RX ORDER — TEMAZEPAM 15 MG/1
15 CAPSULE ORAL NIGHTLY PRN
Qty: 30 CAPSULE | Refills: 1 | Status: SHIPPED | OUTPATIENT
Start: 2023-05-26 | End: 2023-06-01 | Stop reason: SDUPTHER

## 2023-05-26 RX ADMIN — Medication 10 ML: at 03:05

## 2023-05-26 NOTE — PROGRESS NOTES
Pharmacy Treatment Plan Review    Patient  Rajendra Castle, 77 y.o.  1945    Indication: Urethral Cancer     History:  Urethral Cancer - Pt has [id number] cancer of the urethra  -Pt met with Dr Bryant on 4/06/23 and refused penectomy and elected for chemo/XRT  -Given pts borderline kidney function provider decided to use gemcitabine for chemo/xrt    Labs:  CBC  Lab Results   Component Value Date    WBC 8.57 05/18/2023    HGB 13.8 (L) 05/18/2023    HCT 43.1 05/18/2023    MCV 89 05/18/2023     05/18/2023       BMP  Lab Results   Component Value Date     05/18/2023    K 4.1 05/18/2023     (H) 05/18/2023    CO2 23 05/18/2023    BUN 28 (H) 05/18/2023    CREATININE 1.45 (H) 05/18/2023    CALCIUM 9.2 05/18/2023    ANIONGAP 9 05/18/2023    ESTGFRAFRICA 52.3 (A) 06/30/2022    EGFRNONAA 45.2 (A) 06/30/2022       LFTs  Lab Results   Component Value Date    ALT 16 01/26/2023    AST 17 01/26/2023    ALKPHOS 57 01/26/2023    BILITOT 1.0 01/26/2023       The patient is scheduled to start the following infusion:   OP BLADDER GEMCITABINE 27MG/M2 TWICE WEEKLY WITH RADIATION    28-day induction on days 1, 4, 8, 11, 15, 18, 22, 25 of:    -Gemcitabine 27 mg/m2 in sodium chloride 0.9% 100 mL, infusion, intravneous, over 30 mins    28-day consolidation on days 1, 4, 8, 11, 15 of:    -Gemcitabine 27 mg/m2 in sodium chloride 0.9% 100 mL, infusion, intravneous, over 30 mins    Premeds  -Ondansetron 8mg IV     The treatment plan is per [facility] guidelines    Gregory JavierD

## 2023-05-29 ENCOUNTER — INFUSION (OUTPATIENT)
Dept: INFUSION THERAPY | Facility: HOSPITAL | Age: 78
End: 2023-05-29
Attending: INTERNAL MEDICINE
Payer: MEDICARE

## 2023-05-29 VITALS
TEMPERATURE: 98 F | BODY MASS INDEX: 20.98 KG/M2 | HEART RATE: 74 BPM | RESPIRATION RATE: 20 BRPM | DIASTOLIC BLOOD PRESSURE: 72 MMHG | SYSTOLIC BLOOD PRESSURE: 152 MMHG | WEIGHT: 138.44 LBS | HEIGHT: 68 IN | OXYGEN SATURATION: 95 %

## 2023-05-29 DIAGNOSIS — C68.0 URETHRAL CANCER: Primary | ICD-10-CM

## 2023-05-29 PROCEDURE — 77370 RADIATION PHYSICS CONSULT: CPT | Mod: PN | Performed by: RADIOLOGY

## 2023-05-29 PROCEDURE — 63600175 PHARM REV CODE 636 W HCPCS: Mod: PN | Performed by: INTERNAL MEDICINE

## 2023-05-29 PROCEDURE — 96375 TX/PRO/DX INJ NEW DRUG ADDON: CPT | Mod: PN

## 2023-05-29 PROCEDURE — A4216 STERILE WATER/SALINE, 10 ML: HCPCS | Mod: PN | Performed by: INTERNAL MEDICINE

## 2023-05-29 PROCEDURE — 25000003 PHARM REV CODE 250: Mod: PN | Performed by: INTERNAL MEDICINE

## 2023-05-29 PROCEDURE — 96413 CHEMO IV INFUSION 1 HR: CPT | Mod: PN

## 2023-05-29 RX ORDER — SODIUM CHLORIDE 0.9 % (FLUSH) 0.9 %
10 SYRINGE (ML) INJECTION
Status: DISCONTINUED | OUTPATIENT
Start: 2023-05-29 | End: 2023-05-29 | Stop reason: HOSPADM

## 2023-05-29 RX ORDER — ONDANSETRON 2 MG/ML
8 INJECTION INTRAMUSCULAR; INTRAVENOUS
Status: COMPLETED | OUTPATIENT
Start: 2023-05-29 | End: 2023-05-29

## 2023-05-29 RX ORDER — SODIUM CHLORIDE 0.9 % (FLUSH) 0.9 %
10 SYRINGE (ML) INJECTION
Status: CANCELLED | OUTPATIENT
Start: 2023-05-29

## 2023-05-29 RX ORDER — HEPARIN 100 UNIT/ML
500 SYRINGE INTRAVENOUS
Status: CANCELLED | OUTPATIENT
Start: 2023-05-29

## 2023-05-29 RX ADMIN — Medication 10 ML: at 03:05

## 2023-05-29 RX ADMIN — ONDANSETRON 8 MG: 2 INJECTION INTRAMUSCULAR; INTRAVENOUS at 02:05

## 2023-05-29 RX ADMIN — SODIUM CHLORIDE: 9 INJECTION, SOLUTION INTRAVENOUS at 01:05

## 2023-05-29 RX ADMIN — GEMCITABINE 47 MG: 38 INJECTION, SOLUTION INTRAVENOUS at 02:05

## 2023-05-29 NOTE — PLAN OF CARE
Problem: Adult Inpatient Plan of Care  Goal: Plan of Care Review  Outcome: Ongoing, Progressing  Goal: Patient-Specific Goal (Individualized)  Outcome: Ongoing, Progressing     Problem: Fatigue  Goal: Improved Activity Tolerance  Outcome: Ongoing, Progressing     Problem: Anemia (Chemotherapy Effects)  Goal: Anemia Symptom Improvement  Outcome: Ongoing, Progressing     Problem: Urinary Bleeding Risk or Actual (Chemotherapy Effects)  Goal: Absence of Hematuria  Outcome: Ongoing, Progressing     Problem: Nausea and Vomiting (Chemotherapy Effects)  Goal: Fluid and Electrolyte Balance  Outcome: Ongoing, Progressing     Problem: Neurotoxicity (Chemotherapy Effects)  Goal: Neurotoxicity Symptom Control  Outcome: Ongoing, Progressing     Problem: Neutropenia (Chemotherapy Effects)  Goal: Absence of Infection  Outcome: Ongoing, Progressing     Problem: Oral Mucositis (Chemotherapy Effects)  Goal: Improved Oral Mucous Membrane Integrity  Outcome: Ongoing, Progressing     Problem: Thrombocytopenia Bleeding Risk (Chemotherapy Effects)  Goal: Absence of Bleeding  Outcome: Ongoing, Progressing   .Pt tolerated Gemzar infusion well.  No adverse reaction noted.   Port flushed with NS and de-accessed per protocol  Patient left clinic in no acute distress.

## 2023-05-30 ENCOUNTER — DOCUMENTATION ONLY (OUTPATIENT)
Dept: INFUSION THERAPY | Facility: HOSPITAL | Age: 78
End: 2023-05-30
Payer: MEDICARE

## 2023-05-30 ENCOUNTER — DOCUMENTATION ONLY (OUTPATIENT)
Dept: RADIATION ONCOLOGY | Facility: CLINIC | Age: 78
End: 2023-05-30
Payer: MEDICARE

## 2023-05-30 PROCEDURE — 77301 PR  INTEN MOD RADIOTHER PLAN W/DOSE VOL HIST: ICD-10-PCS | Mod: 26,,, | Performed by: RADIOLOGY

## 2023-05-30 PROCEDURE — 77301 RADIOTHERAPY DOSE PLAN IMRT: CPT | Mod: TC,PN | Performed by: RADIOLOGY

## 2023-05-30 PROCEDURE — 77014 HC CT GUIDANCE RADIATION THERAPY FLDS PLACEMENT: CPT | Mod: TC,PN | Performed by: RADIOLOGY

## 2023-05-30 PROCEDURE — 77386 HC IMRT, COMPLEX: CPT | Mod: PN | Performed by: RADIOLOGY

## 2023-05-30 PROCEDURE — 77301 RADIOTHERAPY DOSE PLAN IMRT: CPT | Mod: 26,,, | Performed by: RADIOLOGY

## 2023-05-30 NOTE — PROGRESS NOTES
Oncology Nutrition   New Patient Education  Rajendra Castle   1945    Nutrition Education   This is a 77 y.o.male with a medical diagnosis of urethral cancer.     Met w/ pt at chairside to discuss current nutritional status and nutrition as it relates to cancer and cancer treatment. Pt currently low nutrition risk. Pt will be receiving chemo-radiation. RD discussed role in POC. Pt interested in high protein ONS. RD provided pt with samples. RD will continue to follow throughout tx.     Wt Readings from Last 10 Encounters:   05/29/23 62.8 kg (138 lb 7.2 oz)   05/26/23 62.8 kg (138 lb 7.2 oz)   05/18/23 62.3 kg (137 lb 5.6 oz)   05/10/23 65 kg (143 lb 4.8 oz)   05/09/23 62.2 kg (137 lb 2 oz)   05/09/23 62.2 kg (137 lb 2 oz)   05/01/23 66.3 kg (146 lb 2.6 oz)   04/25/23 66.3 kg (146 lb 2.6 oz)   04/25/23 64.2 kg (141 lb 8.6 oz)   04/21/23 63.1 kg (139 lb 3.2 oz)      [x] PMHx reviewed  [x] Labs reviewed    Educated on food safety and common nutrition impact symptoms associated with chemotherapy treatment. Reinforced the importance of good hydration. Handouts provided.    Answered all nutrition related questions.     Patient provided with dietitian contact number and advised to call with questions or make future appointment if further intervention is needed. RD to follow throughout tx prn.    Kristine Glover, LAKESHA, LDN  05/30/2023  8:50 AM

## 2023-05-30 NOTE — PROGRESS NOTES
Late entry for 5/29/23    Oncology   Chemotherapy School Education  Rajendra Castle   1945    Social Service Education   This is a 77 y.o.male with a medical diagnosis of prostrate cancer. JUAN and RD met with pt at chairside for chemo school. Pt getting first treatment today. Pt is getting concurrent chemo and XRT.    Current Support System: Pt reports he lives alone. He has three grown children and do not live near him. He said he has a granddaughter who he is close with but she lives out of state. Pt said he maybe able to receive some support from his [employer]. He pays a man to clean his house and do his shopping. At present pt is driving himself to treatment. SW asked to if there would be anyone who could help if needed. Pt said he will explore options and plans to contact a deacon at his [employer] and ask the man who helps at his house if he could drive a day. SW instructed pt to let SW know if transportation becomes an issue and will explore options. He agreed.    Met with pt for new pt education. Reviewed role of  during cancer treatment. Educated on role of SW on care team and resources available at the cancer center.     Answered all psychosocial/socioeconomic related questions. Pt reports he monthly income is $4000 a month. This puts above the income limit for support enrolment at this time. SW instructed pt his situation  changes to let SW know and can reassess for support enrollment assistance.     Patient provided with social contact number and advised to call with questions or make future appointment if further intervention is needed. SW to follow throughout tx.    Earnestine Coomsb, DIOGO  05/30/2023  8:50 AM

## 2023-05-30 NOTE — PLAN OF CARE
Completed 1 of 37 outpatient radiation treatments to the penis/urethra this visit.  Education provided.  All questions and concerns addressed.

## 2023-05-31 PROCEDURE — 77300 RADIATION THERAPY DOSE PLAN: CPT | Mod: TC,PN | Performed by: RADIOLOGY

## 2023-05-31 PROCEDURE — 77338 PR  MLC IMRT DESIGN & CONSTRUCTION PER IMRT PLAN: ICD-10-PCS | Mod: 26,,, | Performed by: RADIOLOGY

## 2023-05-31 PROCEDURE — 77014 PR  CT GUIDANCE PLACEMENT RAD THERAPY FIELDS: ICD-10-PCS | Mod: 26,,, | Performed by: RADIOLOGY

## 2023-05-31 PROCEDURE — 77386 HC IMRT, COMPLEX: CPT | Mod: PN | Performed by: RADIOLOGY

## 2023-05-31 PROCEDURE — 77014 HC CT GUIDANCE RADIATION THERAPY FLDS PLACEMENT: CPT | Mod: TC,PN | Performed by: RADIOLOGY

## 2023-05-31 PROCEDURE — 77300 PR RADIATION THERAPY,DOSIMETRY PLAN: ICD-10-PCS | Mod: 26,,, | Performed by: RADIOLOGY

## 2023-05-31 PROCEDURE — 77338 DESIGN MLC DEVICE FOR IMRT: CPT | Mod: 26,,, | Performed by: RADIOLOGY

## 2023-05-31 PROCEDURE — 77338 DESIGN MLC DEVICE FOR IMRT: CPT | Mod: TC,PN | Performed by: RADIOLOGY

## 2023-05-31 PROCEDURE — 77014 PR  CT GUIDANCE PLACEMENT RAD THERAPY FIELDS: CPT | Mod: 26,,, | Performed by: RADIOLOGY

## 2023-05-31 PROCEDURE — 77300 RADIATION THERAPY DOSE PLAN: CPT | Mod: 26,,, | Performed by: RADIOLOGY

## 2023-06-01 ENCOUNTER — HOSPITAL ENCOUNTER (OUTPATIENT)
Dept: RADIATION THERAPY | Facility: HOSPITAL | Age: 78
Discharge: HOME | End: 2023-06-01
Attending: RADIOLOGY
Payer: MEDICARE

## 2023-06-01 ENCOUNTER — INFUSION (OUTPATIENT)
Dept: INFUSION THERAPY | Facility: HOSPITAL | Age: 78
End: 2023-06-01
Attending: INTERNAL MEDICINE
Payer: MEDICARE

## 2023-06-01 ENCOUNTER — OFFICE VISIT (OUTPATIENT)
Dept: HEMATOLOGY/ONCOLOGY | Facility: CLINIC | Age: 78
End: 2023-06-01
Payer: MEDICARE

## 2023-06-01 VITALS
HEART RATE: 76 BPM | SYSTOLIC BLOOD PRESSURE: 142 MMHG | OXYGEN SATURATION: 99 % | TEMPERATURE: 97 F | HEIGHT: 68 IN | RESPIRATION RATE: 18 BRPM | WEIGHT: 136.88 LBS | DIASTOLIC BLOOD PRESSURE: 86 MMHG | BODY MASS INDEX: 20.75 KG/M2

## 2023-06-01 VITALS
TEMPERATURE: 99 F | DIASTOLIC BLOOD PRESSURE: 81 MMHG | RESPIRATION RATE: 18 BRPM | BODY MASS INDEX: 20.75 KG/M2 | HEIGHT: 68 IN | HEART RATE: 73 BPM | WEIGHT: 136.88 LBS | SYSTOLIC BLOOD PRESSURE: 154 MMHG

## 2023-06-01 DIAGNOSIS — C68.0 URETHRAL CANCER: Primary | ICD-10-CM

## 2023-06-01 DIAGNOSIS — I10 HYPERTENSION, UNSPECIFIED TYPE: ICD-10-CM

## 2023-06-01 DIAGNOSIS — N18.31 STAGE 3A CHRONIC KIDNEY DISEASE: ICD-10-CM

## 2023-06-01 DIAGNOSIS — R30.0 DYSURIA: ICD-10-CM

## 2023-06-01 DIAGNOSIS — I25.10 CORONARY ARTERY DISEASE INVOLVING NATIVE CORONARY ARTERY OF NATIVE HEART WITHOUT ANGINA PECTORIS: ICD-10-CM

## 2023-06-01 DIAGNOSIS — G47.00 INSOMNIA, UNSPECIFIED TYPE: ICD-10-CM

## 2023-06-01 PROCEDURE — 63600175 PHARM REV CODE 636 W HCPCS: Mod: PN

## 2023-06-01 PROCEDURE — 25000003 PHARM REV CODE 250: Mod: PN | Performed by: INTERNAL MEDICINE

## 2023-06-01 PROCEDURE — 77470 SPECIAL RADIATION TREATMENT: CPT | Mod: 59,TC,PN | Performed by: RADIOLOGY

## 2023-06-01 PROCEDURE — 3288F FALL RISK ASSESSMENT DOCD: CPT | Mod: CPTII,S$GLB,,

## 2023-06-01 PROCEDURE — 77470 PR  SPECIAL RADIATION TREATMENT: ICD-10-PCS | Mod: 26,59,, | Performed by: RADIOLOGY

## 2023-06-01 PROCEDURE — 96413 CHEMO IV INFUSION 1 HR: CPT | Mod: PN

## 2023-06-01 PROCEDURE — 77470 SPECIAL RADIATION TREATMENT: CPT | Mod: 26,59,, | Performed by: RADIOLOGY

## 2023-06-01 PROCEDURE — 1101F PR PT FALLS ASSESS DOC 0-1 FALLS W/OUT INJ PAST YR: ICD-10-PCS | Mod: CPTII,S$GLB,,

## 2023-06-01 PROCEDURE — 77014 PR  CT GUIDANCE PLACEMENT RAD THERAPY FIELDS: CPT | Mod: 26,,, | Performed by: RADIOLOGY

## 2023-06-01 PROCEDURE — A4216 STERILE WATER/SALINE, 10 ML: HCPCS | Mod: PN | Performed by: INTERNAL MEDICINE

## 2023-06-01 PROCEDURE — 99999 PR PBB SHADOW E&M-EST. PATIENT-LVL V: ICD-10-PCS | Mod: PBBFAC,,,

## 2023-06-01 PROCEDURE — 1126F AMNT PAIN NOTED NONE PRSNT: CPT | Mod: CPTII,S$GLB,,

## 2023-06-01 PROCEDURE — 25000003 PHARM REV CODE 250: Mod: PN

## 2023-06-01 PROCEDURE — 99214 OFFICE O/P EST MOD 30 MIN: CPT | Mod: S$GLB,,,

## 2023-06-01 PROCEDURE — 99214 PR OFFICE/OUTPT VISIT, EST, LEVL IV, 30-39 MIN: ICD-10-PCS | Mod: S$GLB,,,

## 2023-06-01 PROCEDURE — 3079F PR MOST RECENT DIASTOLIC BLOOD PRESSURE 80-89 MM HG: ICD-10-PCS | Mod: CPTII,S$GLB,,

## 2023-06-01 PROCEDURE — 3077F SYST BP >= 140 MM HG: CPT | Mod: CPTII,S$GLB,,

## 2023-06-01 PROCEDURE — 1126F PR PAIN SEVERITY QUANTIFIED, NO PAIN PRESENT: ICD-10-PCS | Mod: CPTII,S$GLB,,

## 2023-06-01 PROCEDURE — 3077F PR MOST RECENT SYSTOLIC BLOOD PRESSURE >= 140 MM HG: ICD-10-PCS | Mod: CPTII,S$GLB,,

## 2023-06-01 PROCEDURE — 99999 PR PBB SHADOW E&M-EST. PATIENT-LVL V: CPT | Mod: PBBFAC,,,

## 2023-06-01 PROCEDURE — 77386 HC IMRT, COMPLEX: CPT | Mod: PN | Performed by: RADIOLOGY

## 2023-06-01 PROCEDURE — 77014 HC CT GUIDANCE RADIATION THERAPY FLDS PLACEMENT: CPT | Mod: TC,PN | Performed by: RADIOLOGY

## 2023-06-01 PROCEDURE — 1101F PT FALLS ASSESS-DOCD LE1/YR: CPT | Mod: CPTII,S$GLB,,

## 2023-06-01 PROCEDURE — 77014 PR  CT GUIDANCE PLACEMENT RAD THERAPY FIELDS: ICD-10-PCS | Mod: 26,,, | Performed by: RADIOLOGY

## 2023-06-01 PROCEDURE — 3079F DIAST BP 80-89 MM HG: CPT | Mod: CPTII,S$GLB,,

## 2023-06-01 PROCEDURE — 3288F PR FALLS RISK ASSESSMENT DOCUMENTED: ICD-10-PCS | Mod: CPTII,S$GLB,,

## 2023-06-01 RX ORDER — ONDANSETRON 2 MG/ML
8 INJECTION INTRAMUSCULAR; INTRAVENOUS
Status: CANCELLED | OUTPATIENT
Start: 2023-06-01

## 2023-06-01 RX ORDER — ONDANSETRON 2 MG/ML
8 INJECTION INTRAMUSCULAR; INTRAVENOUS
Status: COMPLETED | OUTPATIENT
Start: 2023-06-01 | End: 2023-06-01

## 2023-06-01 RX ORDER — HEPARIN 100 UNIT/ML
500 SYRINGE INTRAVENOUS
Status: CANCELLED | OUTPATIENT
Start: 2023-06-01

## 2023-06-01 RX ORDER — SODIUM CHLORIDE 0.9 % (FLUSH) 0.9 %
10 SYRINGE (ML) INJECTION
Status: DISCONTINUED | OUTPATIENT
Start: 2023-06-01 | End: 2023-06-01 | Stop reason: HOSPADM

## 2023-06-01 RX ORDER — SODIUM CHLORIDE 0.9 % (FLUSH) 0.9 %
10 SYRINGE (ML) INJECTION
Status: CANCELLED | OUTPATIENT
Start: 2023-06-01

## 2023-06-01 RX ORDER — TEMAZEPAM 15 MG/1
15 CAPSULE ORAL NIGHTLY PRN
Qty: 30 CAPSULE | Refills: 1 | Status: SHIPPED | OUTPATIENT
Start: 2023-06-01 | End: 2023-07-20 | Stop reason: SDUPTHER

## 2023-06-01 RX ORDER — HEPARIN 100 UNIT/ML
500 SYRINGE INTRAVENOUS
Status: DISCONTINUED | OUTPATIENT
Start: 2023-06-01 | End: 2023-06-01 | Stop reason: HOSPADM

## 2023-06-01 RX ADMIN — ONDANSETRON 8 MG: 2 INJECTION INTRAMUSCULAR; INTRAVENOUS at 03:06

## 2023-06-01 RX ADMIN — Medication 10 ML: at 03:06

## 2023-06-01 RX ADMIN — GEMCITABINE 45 MG: 38 INJECTION, SOLUTION INTRAVENOUS at 03:06

## 2023-06-01 RX ADMIN — SODIUM CHLORIDE: 9 INJECTION, SOLUTION INTRAVENOUS at 03:06

## 2023-06-01 NOTE — PLAN OF CARE
Problem: Adult Inpatient Plan of Care  Goal: Plan of Care Review  Outcome: Ongoing, Progressing  Goal: Patient-Specific Goal (Individualized)  Outcome: Ongoing, Progressing  Flowsheets (Taken 6/1/2023 1541)  Anxieties, Fears or Concerns: none  Individualized Care Needs: recliner, warm blanket, conversation     Problem: Fatigue  Goal: Improved Activity Tolerance  Outcome: Ongoing, Progressing  Intervention: Promote Improved Energy  Flowsheets (Taken 6/1/2023 1541)  Fatigue Management:   activity schedule adjusted   paced activity encouraged   frequent rest breaks encouraged  Sleep/Rest Enhancement:   natural light exposure provided   noise level reduced   reading promoted   regular sleep/rest pattern promoted   room darkened  Activity Management:   Ambulated -L4   Ambulated to bathroom - L4   Ambulated in saavedra - L4   Walk with assistive devise and /or staff member - L3   Up in stretcher chair - L1

## 2023-06-01 NOTE — PROGRESS NOTES
PATIENT: Rajendra Castle  MRN: [id number]  DATE: 6/1/2023      Diagnosis:   1. Urethral cancer    2. Stage 3a chronic kidney disease    3. Coronary artery disease involving native coronary artery of native heart without angina pectoris    4. Hypertension, unspecified type    5. Insomnia, unspecified type    6. Dysuria        Chief Complaint: Urethral cancer (Follow up with labs and tx)    Subjective:   HPI: Mr. Castle is a 77 y.o. male Arthritis, CAD, HTN, RA, ISABEL, Vitamin D Deficiency who presents for urethral cancer and consideration of C1D4 of biweekly Gemcitabine with XRT.     Did not  the prescription for Temazepam or pyridium. We reviewed these medications and instructions today, he will go to [place] to .   Met with rheumatology last week, MTX is on hold but no new medications/treatment has been started. He does endorse arthralgias in his hands.   The patient denies CP, cough, SOB, abdominal pain, nausea, vomiting, constipation, diarrhea.  The patient denies fever, chills, night sweats, weight loss, new lumps or bumps, easy bruising or bleeding.      Oncology History:   The patient underwent cystoscopy on 02/10/2023 showing a 1 cm firm mass along the left glands of the penis near the meatus.  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  CT of the chest, abdomen, and pelvis on 02/27/2023 showed a stable 5 mm left lower lobe nodule; calcified granuloma adjacent to the left fissure; stable subpleural 2 mm right middle lobe nodule; stable 3 mm right upper lobe nodule; stable fissural nodule in the right lung; stable T6 compression fracture; and left 5th rib bone island.  PET-CT on 04/03/2023 showed stable bilateral pulmonary nodules which were non avid; focally increased FDG tracer uptake in the distal aspect of the left penis measuring  1.4 cm.  The patient was discussed at tumor Board on 04/04/2023 with recommendation for penectomy.  The patient met with Dr. Bryant on 04/06/2023 to discuss penectomy; however, the patient elected not to have surgery and wanted to undergo chemo and radiation treatment.    Oncology History   Urethral cancer   4/6/2023 Initial Diagnosis    Urethral cancer     4/25/2023 Cancer Staged    Staging form: Male Penile Urethra and Female Urethra, AJCC 8th Edition  - Clinical: Stage III (cT3, cN0, cM0)     5/29/2023 -  Chemotherapy    Treatment Summary   Plan Name: OP BLADDER GEMCITABINE 27MG/M2 TWICE WEEKLY WITH RADIATION  Treatment Goal: Curative  Status: Active  Start Date: 5/29/2023  End Date: 7/10/2023 (Planned)  Provider: Ayush Guzman MD  Chemotherapy: gemcitabine (GEMZAR) 47 mg in sodium chloride 0.9% SolP 116.2 mL chemo infusion, 27 mg/m2 = 47 mg, Intravenous, Clinic/HOD 1 time, 1 of 2 cycles  Administration: 47 mg (5/29/2023)        Past Medical History:   Past Medical History:   Diagnosis Date    Anticoagulant long-term use     Arthritis     Coronary artery disease     Dr. Lamb    DDD (degenerative disc disease), cervical     Degenerative disc disease     Heart murmur     History of radiation therapy 2020    Hypertension     Nontoxic multinodular goiter 09/20/2016    Prostate CA     RA (rheumatoid arthritis)     Sleep apnea     No CPAP    Vitamin D insufficiency 09/30/2016       Past Surgical HIstory:   Past Surgical History:   Procedure Laterality Date    CARPAL TUNNEL RELEASE Right 05/28/2021    Procedure: RELEASE, CARPAL TUNNEL;  Surgeon: Jose Ryan II, MD;  Location: [facility] OR;  Service: Orthopedics;  Laterality: Right;    COLONOSCOPY  prior to 2016    normal findings per patient report    CORONARY ANGIOPLASTY WITH STENT PLACEMENT  02/2022    CYSTOSCOPY N/A 12/18/2018    Procedure: CYSTOSCOPY;  Surgeon: Garrett Pina MD;  Location: [facility] OR;  Service: Urology;  Laterality: N/A;    CYSTOSCOPY N/A  07/12/2022    Procedure: CYSTOSCOPY;  Surgeon: Garrett Pina MD;  Location: [facility] OR;  Service: Urology;  Laterality: N/A;    ESOPHAGOGASTRODUODENOSCOPY N/A 09/29/2020    Dr. Espinosa; empiric dilation; erythematous mucosa in antrum; gastric mucosal atrophy; hematin in entire stomach; biopsy: mid & distal esophagus WNL, stomach- WNL, negative for h pylori    EXCISION OF LESION OF PENIS N/A 2/23/2023    Procedure: EXCISION, LESION, PENIS;  Surgeon: Timo Myers MD;  Location: [facility] OR;  Service: Urology;  Laterality: N/A;    INSERTION OF TUNNELED CENTRAL VENOUS CATHETER (CVC) WITH SUBCUTANEOUS PORT Left 5/18/2023    Procedure: [id number];  Surgeon: Atul Faria MD;  Location: [facility];  Service: General;  Laterality: Left;  left subclavian    PARATHYROIDECTOMY Right 10/27/2020    Procedure: PARATHYROIDECTOMY;  Surgeon: Latnoia Clayton MD;  Location: [address];  Service: ENT;  Laterality: Right;    RELEASE OF ULNAR NERVE AT CUBITAL TUNNEL Right 05/28/2021    Procedure: RELEASE, ULNAR TUNNEL;  Surgeon: Jose Ryan II, MD;  Location: [facility] OR;  Service: Orthopedics;  Laterality: Right;    TRANSRECTAL BIOPSY OF PROSTATE WITH ULTRASOUND GUIDANCE N/A 12/18/2018    Procedure: BIOPSY, PROSTATE, RECTAL APPROACH, WITH US GUIDANCE;  Surgeon: Garrett Pina MD;  Location: [facility] OR;  Service: Urology;  Laterality: N/A;    TRANSRECTAL ULTRASOUND EXAMINATION N/A 07/12/2022    Procedure: ULTRASOUND, RECTAL APPROACH;  Surgeon: Garrett Pina MD;  Location: [facility];  Service: Urology;  Laterality: N/A;       Family History:   Family History   Problem Relation Age of Onset    Heart disease Mother     Stroke Father     Drug abuse Sister     No Known Problems Daughter     No Known Problems Daughter     No Known Problems Son     Diabetes Maternal Uncle     Colon cancer Neg Hx     Crohn's disease Neg Hx     Esophageal cancer Neg Hx     Stomach cancer Neg Hx     Ulcerative colitis Neg Hx         Social History:  reports that he quit smoking about 21 years ago. His smoking use included cigarettes. He has a 2.50 pack-year smoking history. He has been exposed to tobacco smoke. He has never used smokeless tobacco. He reports that he does not currently use alcohol. He reports that he does not currently use drugs after having used the following drugs: Marijuana. Frequency: 8.00 times per week.    Allergies:  Review of patient's allergies indicates:  No Known Allergies    Medications:  Current Outpatient Medications   Medication Sig Dispense Refill    alfuzosin (UROXATRAL) 10 mg Tb24 Take 10 mg by mouth.      aspirin (ECOTRIN) 81 MG EC tablet Take 1 tablet by mouth once daily.      b complex vitamins capsule Take 1 capsule by mouth once daily.      carvediloL (COREG) 25 MG tablet Take 25 mg by mouth.      clopidogreL (PLAVIX) 75 mg tablet Take 1 tablet (75 mg total) by mouth once daily. Pt not sure of dose      EScitalopram oxalate (LEXAPRO) 10 MG tablet Take 10 mg by mouth once daily.      folic acid (FOLVITE) 1 MG tablet Take 1 tablet (1 mg total) by mouth once daily. 90 tablet 3    gabapentin (NEURONTIN) 300 MG capsule Take 300 mg by mouth 3 (three) times daily.      LIDOcaine HCL 2% (XYLOCAINE) 2 % jelly Apply to affected area (tip of penis) daily prn 30 mL 1    LIDOcaine-prilocaine (EMLA) cream Apply topically as needed. Apply to PORT 15 minutes prior to PORT access 30 g 3    oxybutynin (DITROPAN-XL) 5 MG TR24 Take 1 tablet (5 mg total) by mouth once daily. 30 tablet 11    pantoprazole (PROTONIX) 40 MG tablet TAKE 1 TABLET(40 MG) BY MOUTH TWICE DAILY (Patient taking differently: Take 40 mg by mouth once daily.) 180 tablet 0    traMADoL (ULTRAM) 50 mg tablet Take 2 tablets (100 mg total) by mouth 2 (two) times daily as needed for Pain. 120 tablet 5    amLODIPine (NORVASC) 5 MG tablet Take 1 tablet (5 mg total) by mouth 2 (two) times daily. 60 tablet 3    atorvastatin (LIPITOR) 20 MG tablet Take 20 mg by  mouth once daily.      busPIRone (BUSPAR) 10 MG tablet Take 1 tablet (10 mg total) by mouth 3 (three) times daily. 90 tablet 3    HYDROcodone-acetaminophen (NORCO) 5-325 mg per tablet Take 1 tablet by mouth every 6 (six) hours as needed for Pain. (Patient not taking: Reported on 6/1/2023) 20 tablet 0    isosorbide mononitrate (IMDUR) 30 MG 24 hr tablet Take 30 mg by mouth.      methotrexate 2.5 MG Tab Take 6 tablets (15 mg total) by mouth every 7 days. TAKE 6 TABLETS BY MOUTH EVERY WEEK  STOP UNTIL INSTRUCTED BY MD (Patient not taking: Reported on 6/1/2023) 72 tablet 2    ondansetron (ZOFRAN-ODT) 4 MG TbDL Take 1 tablet (4 mg total) by mouth every 6 (six) hours as needed (nausea). (Patient not taking: Reported on 6/1/2023) 60 tablet 6    phenazopyridine (PYRIDIUM) 200 MG tablet Take 1 tablet (200 mg total) by mouth 3 (three) times daily as needed for Pain. (Patient not taking: Reported on 6/1/2023) 90 tablet 3    tamsulosin (FLOMAX) 0.4 mg Cap Take 1 capsule (0.4 mg total) by mouth once daily. 30 capsule 3    temazepam (RESTORIL) 15 mg Cap Take 1 capsule (15 mg total) by mouth nightly as needed. 30 capsule 1     No current facility-administered medications for this visit.     Facility-Administered Medications Ordered in Other Visits   Medication Dose Route Frequency Provider Last Rate Last Admin    albuterol nebulizer solution 2.5 mg  2.5 mg Nebulization Q15 Min PRN Pastor Saleh MD        albuterol-ipratropium 2.5 mg-0.5 mg/3 mL nebulizer solution 3 mL  3 mL Nebulization PRN Pastor Saleh MD        denosumab (PROLIA) injection 60 mg  60 mg Subcutaneous 1 time in Clinic/HOD Jean Carlos Hoffman MD        diphenhydrAMINE injection 25 mg  25 mg Intravenous Q6H PRN Pastor Saleh MD        gemcitabine (GEMZAR) 45 mg in sodium chloride 0.9% SolP 116.2 mL chemo infusion  27 mg/m2 (Treatment Plan Recorded) Intravenous 1 time in Clinic/HOD Leda Duarte NP        heparin, porcine (PF) 100 unit/mL injection flush 500  "Units  500 Units Intravenous PRN Leda Ozunagore, YAZ        HYDROmorphone injection 0.5 mg  0.5 mg Intravenous Q5 Min PRN Pastor Saleh MD   0.5 mg at 02/23/23 1312    lactated ringers infusion   Intravenous Continuous Jacqueline Volpi-Abadie, MD   Stopped at 02/23/23 1400    ondansetron injection 4 mg  4 mg Intravenous Q15 Min PRN Pastor Saleh MD        ondansetron injection 8 mg  8 mg Intravenous 1 time in Clinic/HOD [facility], NP        sodium chloride 0.9% 250 mL flush bag   Intravenous 1 time in Clinic/HOD [facility], NP        sodium chloride 0.9% flush 10 mL  10 mL Intravenous PRN Ayush Guzman MD        sodium chloride 0.9% flush 10 mL  10 mL Intravenous PRN [facility], YAZ        sodium chloride 0.9% flush 3 mL  3 mL Intravenous PRN Pastor Saleh MD           Review of Systems   Constitutional:  Negative for appetite change, chills and fever.   HENT:  Negative for mouth sores and trouble swallowing.    Respiratory:  Negative for cough and shortness of breath.    Cardiovascular:  Negative for chest pain, palpitations and leg swelling.   Gastrointestinal:  Negative for abdominal pain, diarrhea, nausea and vomiting.   Genitourinary:  Positive for dysuria. Negative for hematuria.   Musculoskeletal:  Positive for arthralgias. Negative for joint swelling and myalgias.   Skin:  Negative for rash.   Neurological:  Negative for headaches.   Hematological:  Negative for adenopathy. Does not bruise/bleed easily.   Psychiatric/Behavioral:  The patient is not nervous/anxious.      ECOG Performance Status:   ECOG SCORE    1 - Restricted in strenuous activity-ambulatory and able to carry out work of a light nature         Objective:      Vitals:   Vitals:    06/01/23 1445   BP: (!) 142/86   BP Location: Left arm   Patient Position: Sitting   BP Method: Medium (Manual)   Pulse: 76   Resp: 18   Temp: 96.7 °F (35.9 °C)   TempSrc: Temporal   SpO2: 99%   Weight: 62.1 kg (136 lb 14.5 oz)   Height: 5' 8" (1.727 " m)     BMI: Body mass index is 20.82 kg/m².    Physical Exam  Vitals reviewed.   Constitutional:       General: He is not in acute distress.     Appearance: He is not diaphoretic.   HENT:      Head: Normocephalic and atraumatic.      Mouth/Throat:      Mouth: Mucous membranes are moist.      Pharynx: No posterior oropharyngeal erythema.   Eyes:      General: No scleral icterus.  Cardiovascular:      Rate and Rhythm: Normal rate and regular rhythm.      Heart sounds: Normal heart sounds. No murmur heard.  Pulmonary:      Effort: Pulmonary effort is normal. No respiratory distress.      Breath sounds: No wheezing.   Abdominal:      General: Bowel sounds are normal. There is no distension.      Tenderness: There is no abdominal tenderness. There is no guarding.   Musculoskeletal:      Cervical back: No tenderness.      Right lower leg: No edema.      Left lower leg: No edema.   Lymphadenopathy:      Cervical: No cervical adenopathy.   Skin:     General: Skin is warm.      Coloration: Skin is not jaundiced.      Findings: No rash.   Neurological:      Mental Status: He is alert and oriented to person, place, and time.   Psychiatric:         Behavior: Behavior normal.       Laboratory Data:  Lab Results   Component Value Date    WBC 5.67 06/01/2023    HGB 11.7 (L) 06/01/2023    HCT 36.8 (L) 06/01/2023    MCV 90 06/01/2023     06/01/2023          Imaging: EXAMINATION:  NM PET CT ROUTINE     CLINICAL HISTORY:  possible urethral/penile cancer, eval for mets; Malignant neoplasm of prostate     TECHNIQUE:  13.25 mCi of F18-FDG was administered intravenously in the left antecubital fossa.  After an approximately 60 min distribution time, PET/CT images were acquired from the skull base to mid thigh.  Transmission images were acquired to correct for attenuation using a whole body low-dose CT scan with oral contrast and without IV contrast with the arms positioned above the head. Glycemia at the time of injection was 100  mg/dL.     COMPARISON:  CT chest abdomen pelvis 02/27/2023     FINDINGS:  Quality of the study: Adequate.     In the head and neck, there are no hypermetabolic lesions worrisome for malignancy. There are no hypermetabolic mucosal lesions, and there are no pathologically enlarged or hypermetabolic lymph nodes.  Increased FDG tracer uptake in the left paraspinal musculature likely physiological muscle uptake.     In the chest, there are no hypermetabolic lesions worrisome for malignancy.  There are no pathologically enlarged or hypermetabolic lymph nodes.  Stable bilateral pulmonary nodules on the right measuring 5 mm and on the left measure 4 mm (series 3, image 64 and 103).     In the abdomen and pelvis, there is focally increased FDG tracer uptake of the distal aspect of the penis measuring 1.4 cm with SUV max of 27 (fused image 264).     There is otherwise physiologic tracer distribution within the abdominal organs including prominent physiologic anal sphincter tone and excretion into the genitourinary system.  No pathologically enlarged or hypermetabolic inguinal lymph nodes.     In the bones, there are no hypermetabolic lesions worrisome for malignancy.     In the extremities, there are no hypermetabolic lesions worrisome for malignancy.     CT chest abdomen pelvis findings:     Right lower neck parathyroidectomy surgical changes.  Heart is normal in size with small pericardial effusion.  Multivessel coronaries calcific atherosclerosis.  Moderate aortoiliac calcific atherosclerosis without aneurysm.  Centrilobular emphysema.  Dependent bibasal atelectatic changes versus scarring.  Liver is normal in size measuring 14 cm without focal lesion.  Spleen is normal in size with numerous calcified granulomas.  Small splenules .  Degenerative changes of the spine most prominent at the cervical spine.  Stable T6 vertebral body height loss (series 604, image 81).     Impression:     There is hypermetabolic lesion in the  distal penis measuring approximately 1.4 cm, positive for squamous cell carcinoma on recent biopsy.  No evidence of lymph node metastases.     Stable bilateral subcentimeter pulmonary nodules that are too small to characterize reliably by PET.  Given evidence of old granulomatous disease, noncalcified granulomas are a consideration.  Attention on follow-up.     Additional findings as above.     I, Jey Griffith MD, attest that I reviewed and interpreted the images.     Electronically signed by resident: Juanito Tse  Date:                                            04/04/2023  Time:                                           06:30     Electronically signed by: Jey Griffith  Date:                                            04/04/2023  Time:                                           10:09   Assessment:       1. Urethral cancer    2. Stage 3a chronic kidney disease    3. Coronary artery disease involving native coronary artery of native heart without angina pectoris    4. Hypertension, unspecified type    5. Insomnia, unspecified type    6. Dysuria           Plan:   Urethral Cancer   -Pt has [id number] cancer of the urethra  -Pt discussed at tumor board on 4/04/23 with recommendation for penectomy  -Pt met with Dr Bryant on 4/06/23 and refused penectomy and elected for chemo/XRT  -Given pt's borderline kidney function with CrCl near 30 on recent lab work, pt to receive biweekly Gemcitabine dosed 27mg/mm2 on days 1 and 4 of each week  -Proceed with C1D4 Gemzar today   -Keep appointment with Dr. Guzman next week as planned, labs prior      CKD IIIb   -pt's baseline creatinine is 1.2-1.7  -Cr 1.3 on 6/1/23     CAD    -pt with stent in place  -Pt follows with Dr Lamb in cardiology  -Pt on ASA, plavix, lipitor, antihypertensives  -Management per cardiology     HTN   -pt on amlodipine  -BP slightly increased  -Will monitor     Rheumatoid Arthritis   -pt has stopped MTX  -Dr Hart contacted for recommendation on alternative  medicine while pt is getting chemo/XRT  -Management per rheumatolgoy     Insomnia   -pt to take temazepam, did not start last week  -Will  Rx and start as this has given some relief     Dysuria   -pyridium prescribed, reviewed instructions with him again today  -Will monitor       Assessment/Plan reviewed and approved by Dr. Guzman  30 minutes were spent in coordination of patient's care, record review and counseling.    Route Chart for Scheduling    Med Onc Chart Routing      Follow up with physician Other. Keep appointment 6/5 with Dr. Guzman   Follow up with [facility]    Infusion scheduling note   Proceed with C1D4 today   Injection scheduling note    Labs    Imaging    Pharmacy appointment    Other referrals            Treatment Plan Information   OP BLADDER GEMCITABINE 27MG/M2 TWICE WEEKLY WITH RADIATION   Ayush Guzman MD   Upcoming Treatment Dates - OP BLADDER GEMCITABINE 27MG/M2 TWICE WEEKLY WITH RADIATION    6/5/2023       Chemotherapy       gemcitabine (GEMZAR) 47 mg in sodium chloride 0.9% SolP 100 mL chemo infusion       Antiemetics       ondansetron injection 8 mg  6/8/2023       Chemotherapy       gemcitabine (GEMZAR) 47 mg in sodium chloride 0.9% SolP 100 mL chemo infusion       Antiemetics       ondansetron injection 8 mg  6/12/2023       Chemotherapy       gemcitabine (GEMZAR) 47 mg in sodium chloride 0.9% SolP 100 mL chemo infusion       Antiemetics       ondansetron injection 8 mg  6/15/2023       Chemotherapy       gemcitabine (GEMZAR) 47 mg in sodium chloride 0.9% SolP 100 mL chemo infusion       Antiemetics       ondansetron injection 8 mg    Therapy Plan Information  DENOSUMAB (PROLIA) Q6M  Medications  denosumab (PROLIA) injection 60 mg  60 mg, Subcutaneous, Every 26 weeks    PORT FLUSH  Flushes  heparin, porcine (PF) 100 unit/mL injection flush 500 Units  500 Units, Intravenous, Every visit  sodium chloride 0.9% flush 10 mL  10 mL, Intravenous, Every visit

## 2023-06-01 NOTE — PLAN OF CARE
Pt tolerated Gemzar well today. NAD/no concerns voiced. Reviewed follow-up appointments. All questions were answered, ambulated independently at d/c with cane.

## 2023-06-02 PROCEDURE — 77014 PR  CT GUIDANCE PLACEMENT RAD THERAPY FIELDS: CPT | Mod: 26,,, | Performed by: RADIOLOGY

## 2023-06-02 PROCEDURE — 77386 HC IMRT, COMPLEX: CPT | Mod: PN | Performed by: RADIOLOGY

## 2023-06-02 PROCEDURE — 77014 HC CT GUIDANCE RADIATION THERAPY FLDS PLACEMENT: CPT | Mod: TC,PN | Performed by: RADIOLOGY

## 2023-06-02 PROCEDURE — 77014 PR  CT GUIDANCE PLACEMENT RAD THERAPY FIELDS: ICD-10-PCS | Mod: 26,,, | Performed by: RADIOLOGY

## 2023-06-04 NOTE — PROGRESS NOTES
PATIENT: Rajendra Castle  MRN: [id number]  DATE: 6/5/2023      Diagnosis:   1. Urethral cancer    2. Chronic inflammatory arthritis    3. Rheumatoid arthritis, involving unspecified site, unspecified whether rheumatoid factor present    4. Insomnia, unspecified type    5. Stage 3a chronic kidney disease    6. Coronary artery disease involving native coronary artery of native heart without angina pectoris    7. Hypertension, unspecified type            Chief Complaint: urethral cancer (2 week follow up )      Oncologic History:      Oncologic History     Oncologic Treatment     Pathology           Subjective:    Interval History: Mr. Castle is a 77 y.o. male with Arthritis, CAD, HTN, RA, ISABEL, Vitamin D Deficiency who presents for urethral cancer.  Since the last clinic visit the patient states he has persistent pain in his wrists, fingers, and knees since stopping MTX.  He has been taking tramadol for pain.  He also complains of fatigue.  The patient denies CP, cough, SOB, abdominal pain, nausea, vomiting, constipation, diarrhea.  The patient denies fever, chills, night sweats, weight loss, new lumps or bumps, easy bruising or bleeding.    Prior History:  The patient underwent cystoscopy on 02/10/2023 showing a 1 cm firm mass along the left glands of the penis near the meatus.  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  CT of the chest, abdomen, and pelvis on 02/27/2023 showed a stable 5 mm left lower lobe nodule; calcified granuloma adjacent to the left fissure; stable subpleural 2 mm right middle lobe nodule; stable 3 mm right upper lobe nodule; stable fissural nodule in the right lung; stable T6 compression fracture; and left 5th rib bone island.  PET-CT on 04/03/2023 showed stable bilateral pulmonary nodules which were non avid; focally increased  FDG tracer uptake in the distal aspect of the left penis measuring 1.4 cm.  The patient was discussed at tumor Board on 04/04/2023 with recommendation for penectomy.  The patient met with Dr. Bryant on 04/06/2023 to discuss penectomy; however, the patient elected not to have surgery and wanted to undergo chemo and radiation treatment.    Past Medical History:   Past Medical History:   Diagnosis Date    Anticoagulant long-term use     Arthritis     Coronary artery disease     Dr. Lamb    DDD (degenerative disc disease), cervical     Degenerative disc disease     Heart murmur     History of radiation therapy 2020    Hypertension     Nontoxic multinodular goiter 09/20/2016    Prostate CA     RA (rheumatoid arthritis)     Sleep apnea     No CPAP    Vitamin D insufficiency 09/30/2016       Past Surgical HIstory:   Past Surgical History:   Procedure Laterality Date    CARPAL TUNNEL RELEASE Right 05/28/2021    Procedure: RELEASE, CARPAL TUNNEL;  Surgeon: Jose Ryan II, MD;  Location: [facility];  Service: Orthopedics;  Laterality: Right;    COLONOSCOPY  prior to 2016    normal findings per patient report    CORONARY ANGIOPLASTY WITH STENT PLACEMENT  02/2022    CYSTOSCOPY N/A 12/18/2018    Procedure: CYSTOSCOPY;  Surgeon: Garrett Pina MD;  Location: [facility] OR;  Service: Urology;  Laterality: N/A;    CYSTOSCOPY N/A 07/12/2022    Procedure: CYSTOSCOPY;  Surgeon: Garrett Pina MD;  Location: [facility] OR;  Service: Urology;  Laterality: N/A;    ESOPHAGOGASTRODUODENOSCOPY N/A 09/29/2020    Dr. Espinosa; empiric dilation; erythematous mucosa in antrum; gastric mucosal atrophy; hematin in entire stomach; biopsy: mid & distal esophagus WNL, stomach- WNL, negative for h pylori    EXCISION OF LESION OF PENIS N/A 2/23/2023    Procedure: EXCISION, LESION, PENIS;  Surgeon: Timo Myers MD;  Location: [facility] OR;  Service: Urology;  Laterality: N/A;    INSERTION OF TUNNELED CENTRAL VENOUS CATHETER (CVC) WITH SUBCUTANEOUS PORT  Left 5/18/2023    Procedure: [id number]-CATH;  Surgeon: Atul Faria MD;  Location: [facility];  Service: General;  Laterality: Left;  left subclavian    PARATHYROIDECTOMY Right 10/27/2020    Procedure: PARATHYROIDECTOMY;  Surgeon: Latonia Clayton MD;  Location: [facility] OR 2ND FLR;  Service: ENT;  Laterality: Right;    RELEASE OF ULNAR NERVE AT CUBITAL TUNNEL Right 05/28/2021    Procedure: RELEASE, ULNAR TUNNEL;  Surgeon: Jose Ryan II, MD;  Location: [facility];  Service: Orthopedics;  Laterality: Right;    TRANSRECTAL BIOPSY OF PROSTATE WITH ULTRASOUND GUIDANCE N/A 12/18/2018    Procedure: BIOPSY, PROSTATE, RECTAL APPROACH, WITH US GUIDANCE;  Surgeon: Garrtet Pina MD;  Location: [facility];  Service: Urology;  Laterality: N/A;    TRANSRECTAL ULTRASOUND EXAMINATION N/A 07/12/2022    Procedure: ULTRASOUND, RECTAL APPROACH;  Surgeon: Garrett Pina MD;  Location: [facility];  Service: Urology;  Laterality: N/A;       Family History:   Family History   Problem Relation Age of Onset    Heart disease Mother     Stroke Father     Drug abuse Sister     No Known Problems Daughter     No Known Problems Daughter     No Known Problems Son     Diabetes Maternal Uncle     Colon cancer Neg Hx     Crohn's disease Neg Hx     Esophageal cancer Neg Hx     Stomach cancer Neg Hx     Ulcerative colitis Neg Hx        Social History:  reports that he quit smoking about 21 years ago. His smoking use included cigarettes. He has a 2.50 pack-year smoking history. He has been exposed to tobacco smoke. He has never used smokeless tobacco. He reports that he does not currently use alcohol. He reports that he does not currently use drugs after having used the following drugs: Marijuana. Frequency: 8.00 times per week.    Allergies:  Review of patient's allergies indicates:  No Known Allergies    Medications:  Current Outpatient Medications   Medication Sig Dispense Refill    alfuzosin (UROXATRAL) 10 mg Tb24 Take 10 mg by mouth.       amLODIPine (NORVASC) 5 MG tablet Take 1 tablet (5 mg total) by mouth 2 (two) times daily. 60 tablet 3    aspirin (ECOTRIN) 81 MG EC tablet Take 1 tablet by mouth once daily.      atorvastatin (LIPITOR) 20 MG tablet Take 20 mg by mouth once daily.      b complex vitamins capsule Take 1 capsule by mouth once daily.      busPIRone (BUSPAR) 10 MG tablet Take 1 tablet (10 mg total) by mouth 3 (three) times daily. 90 tablet 3    carvediloL (COREG) 25 MG tablet Take 25 mg by mouth.      clopidogreL (PLAVIX) 75 mg tablet Take 1 tablet (75 mg total) by mouth once daily. Pt not sure of dose      EScitalopram oxalate (LEXAPRO) 10 MG tablet Take 10 mg by mouth once daily.      folic acid (FOLVITE) 1 MG tablet Take 1 tablet (1 mg total) by mouth once daily. 90 tablet 3    gabapentin (NEURONTIN) 300 MG capsule Take 300 mg by mouth 3 (three) times daily.      HYDROcodone-acetaminophen (NORCO) 5-325 mg per tablet Take 1 tablet by mouth every 6 (six) hours as needed for Pain. 20 tablet 0    isosorbide mononitrate (IMDUR) 30 MG 24 hr tablet Take 30 mg by mouth.      LIDOcaine HCL 2% (XYLOCAINE) 2 % jelly Apply to affected area (tip of penis) daily prn 30 mL 1    LIDOcaine-prilocaine (EMLA) cream Apply topically as needed. Apply to PORT 15 minutes prior to PORT access 30 g 3    methotrexate 2.5 MG Tab Take 6 tablets (15 mg total) by mouth every 7 days. TAKE 6 TABLETS BY MOUTH EVERY WEEK  STOP UNTIL INSTRUCTED BY MD 72 tablet 2    ondansetron (ZOFRAN-ODT) 4 MG TbDL Take 1 tablet (4 mg total) by mouth every 6 (six) hours as needed (nausea). 60 tablet 6    oxybutynin (DITROPAN-XL) 5 MG TR24 Take 1 tablet (5 mg total) by mouth once daily. 30 tablet 11    pantoprazole (PROTONIX) 40 MG tablet TAKE 1 TABLET(40 MG) BY MOUTH TWICE DAILY (Patient taking differently: Take 40 mg by mouth once daily.) 180 tablet 0    phenazopyridine (PYRIDIUM) 200 MG tablet Take 1 tablet (200 mg total) by mouth 3 (three) times daily as needed for Pain. 90  tablet 3    tamsulosin (FLOMAX) 0.4 mg Cap Take 1 capsule (0.4 mg total) by mouth once daily. 30 capsule 3    temazepam (RESTORIL) 15 mg Cap Take 1 capsule (15 mg total) by mouth nightly as needed. 30 capsule 1    traMADoL (ULTRAM) 50 mg tablet Take 2 tablets (100 mg total) by mouth 2 (two) times daily as needed for Pain. 120 tablet 5     No current facility-administered medications for this visit.     Facility-Administered Medications Ordered in Other Visits   Medication Dose Route Frequency Provider Last Rate Last Admin    albuterol nebulizer solution 2.5 mg  2.5 mg Nebulization Q15 Min PRN Pastor Saleh MD        albuterol-ipratropium 2.5 mg-0.5 mg/3 mL nebulizer solution 3 mL  3 mL Nebulization PRN Pastor Saleh MD        denosumab (PROLIA) injection 60 mg  60 mg Subcutaneous 1 time in Clinic/HOD Jean Carlos Hoffman MD        diphenhydrAMINE injection 25 mg  25 mg Intravenous Q6H PRN Pastor Saleh MD        HYDROmorphone injection 0.5 mg  0.5 mg Intravenous Q5 Min PRN Pastor Saleh MD   0.5 mg at 02/23/23 1312    lactated ringers infusion   Intravenous Continuous Jacqueline Volpi-Abadie, MD   Stopped at 02/23/23 1400    ondansetron injection 4 mg  4 mg Intravenous Q15 Min PRN Pastor Saleh MD        sodium chloride 0.9% flush 10 mL  10 mL Intravenous PRN Ayush Guzman MD   10 mL at 06/01/23 1535    sodium chloride 0.9% flush 10 mL  10 mL Intravenous PRN Ayush Guzman MD   10 mL at 06/05/23 1315    sodium chloride 0.9% flush 3 mL  3 mL Intravenous PRN Pastor Saleh MD           Review of Systems   Constitutional:  Positive for fatigue. Negative for chills, diaphoresis, fever and unexpected weight change.   Respiratory:  Negative for cough and shortness of breath.    Cardiovascular:  Negative for chest pain and palpitations.   Gastrointestinal:  Negative for abdominal pain, constipation, diarrhea, nausea and vomiting.   Musculoskeletal:  Positive for arthralgias.   Skin:  Negative for color  "change and rash.   Neurological:  Negative for headaches.   Hematological:  Negative for adenopathy. Does not bruise/bleed easily.     ECOG Performance Status: 0   Objective:      Vitals:   Vitals:    06/05/23 1410   BP: (!) 158/80   BP Location: Right arm   Patient Position: Sitting   BP Method: Medium (Manual)   Pulse: 88   Temp: 97 °F (36.1 °C)   TempSrc: Temporal   SpO2: 97%   Weight: 58.2 kg (128 lb 4.9 oz)   Height: 5' 8" (1.727 m)           Physical Exam  Constitutional:       General: He is not in acute distress.     Appearance: He is well-developed. He is not diaphoretic.   HENT:      Head: Normocephalic and atraumatic.   Cardiovascular:      Rate and Rhythm: Normal rate and regular rhythm.      Heart sounds: Normal heart sounds. No murmur heard.    No friction rub. No gallop.   Pulmonary:      Effort: Pulmonary effort is normal. No respiratory distress.      Breath sounds: Normal breath sounds. No wheezing or rales.   Chest:      Chest wall: No tenderness.   Abdominal:      General: Bowel sounds are normal. There is no distension.      Palpations: Abdomen is soft. There is no mass.      Tenderness: There is no abdominal tenderness. There is no rebound.   Musculoskeletal:      Cervical back: Normal range of motion.   Lymphadenopathy:      Cervical: No cervical adenopathy.      Upper Body:      Right upper body: No supraclavicular or axillary adenopathy.      Left upper body: No supraclavicular or axillary adenopathy.   Skin:     General: Skin is warm and dry.      Findings: No erythema or rash.   Neurological:      Mental Status: He is alert and oriented to person, place, and time.   Psychiatric:         Behavior: Behavior normal.       Laboratory Data:  Infusion on 06/05/2023   Component Date Value Ref Range Status    WBC 06/05/2023 5.57  3.90 - 12.70 K/uL Final    RBC 06/05/2023 4.11 (L)  4.60 - 6.20 M/uL Final    Hemoglobin 06/05/2023 11.6 (L)  14.0 - 18.0 g/dL Final    Hematocrit 06/05/2023 36.9 (L)  " 40.0 - 54.0 % Final    MCV 06/05/2023 90  82 - 98 fL Final    MCH 06/05/2023 28.2  27.0 - 31.0 pg Final    MCHC 06/05/2023 31.4 (L)  32.0 - 36.0 g/dL Final    RDW 06/05/2023 15.3 (H)  11.5 - 14.5 % Final    Platelets 06/05/2023 323  150 - 450 K/uL Final    MPV 06/05/2023 11.5  9.2 - 12.9 fL Final    Immature Granulocytes 06/05/2023 0.2  0.0 - 0.5 % Final    Gran # (ANC) 06/05/2023 4.7  1.8 - 7.7 K/uL Final    Immature Grans (Abs) 06/05/2023 0.01  0.00 - 0.04 K/uL Final    Comment: Mild elevation in immature granulocytes is non specific and   can be seen in a variety of conditions including stress response,   acute inflammation, trauma and pregnancy. Correlation with other   laboratory and clinical findings is essential.      Lymph # 06/05/2023 0.6 (L)  1.0 - 4.8 K/uL Final    Mono # 06/05/2023 0.2 (L)  0.3 - 1.0 K/uL Final    Eos # 06/05/2023 0.0  0.0 - 0.5 K/uL Final    Baso # 06/05/2023 0.01  0.00 - 0.20 K/uL Final    nRBC 06/05/2023 0  0 /100 WBC Final    Gran % 06/05/2023 84.7 (H)  38.0 - 73.0 % Final    Lymph % 06/05/2023 10.6 (L)  18.0 - 48.0 % Final    Mono % 06/05/2023 3.6 (L)  4.0 - 15.0 % Final    Eosinophil % 06/05/2023 0.7  0.0 - 8.0 % Final    Basophil % 06/05/2023 0.2  0.0 - 1.9 % Final    Differential Method 06/05/2023 Automated   Final    Sodium 06/05/2023 141  136 - 145 mmol/L Final    Potassium 06/05/2023 4.5  3.5 - 5.1 mmol/L Final    Chloride 06/05/2023 110  95 - 110 mmol/L Final    CO2 06/05/2023 20 (L)  23 - 29 mmol/L Final    Glucose 06/05/2023 99  70 - 110 mg/dL Final    BUN 06/05/2023 20  8 - 23 mg/dL Final    Creatinine 06/05/2023 1.3  0.5 - 1.4 mg/dL Final    Calcium 06/05/2023 8.8  8.7 - 10.5 mg/dL Final    Total Protein 06/05/2023 7.5  6.0 - 8.4 g/dL Final    Albumin 06/05/2023 3.2 (L)  3.5 - 5.2 g/dL Final    Total Bilirubin 06/05/2023 0.4  0.1 - 1.0 mg/dL Final    Comment: For infants and newborns, interpretation of results should be based  on gestational age, weight and in agreement  with clinical  observations.    Premature Infant recommended reference ranges:  Up to 24 hours.............<8.0 mg/dL  Up to 48 hours............<12.0 mg/dL  3-5 days..................<15.0 mg/dL  6-29 days.................<15.0 mg/dL      Alkaline Phosphatase 06/05/2023 73  55 - 135 U/L Final    AST 06/05/2023 11  10 - 40 U/L Final    ALT 06/05/2023 9 (L)  10 - 44 U/L Final    Anion Gap 06/05/2023 11  8 - 16 mmol/L Final    eGFR 06/05/2023 57.3 (A)  >60 mL/min/1.73 m^2 Final   Infusion on 06/01/2023   Component Date Value Ref Range Status    WBC 06/01/2023 5.67  3.90 - 12.70 K/uL Final    RBC 06/01/2023 4.10 (L)  4.60 - 6.20 M/uL Final    Hemoglobin 06/01/2023 11.7 (L)  14.0 - 18.0 g/dL Final    Hematocrit 06/01/2023 36.8 (L)  40.0 - 54.0 % Final    MCV 06/01/2023 90  82 - 98 fL Final    MCH 06/01/2023 28.5  27.0 - 31.0 pg Final    MCHC 06/01/2023 31.8 (L)  32.0 - 36.0 g/dL Final    RDW 06/01/2023 15.4 (H)  11.5 - 14.5 % Final    Platelets 06/01/2023 341  150 - 450 K/uL Final    MPV 06/01/2023 11.3  9.2 - 12.9 fL Final    Immature Granulocytes 06/01/2023 0.2  0.0 - 0.5 % Final    Gran # (ANC) 06/01/2023 4.5  1.8 - 7.7 K/uL Final    Immature Grans (Abs) 06/01/2023 0.01  0.00 - 0.04 K/uL Final    Comment: Mild elevation in immature granulocytes is non specific and   can be seen in a variety of conditions including stress response,   acute inflammation, trauma and pregnancy. Correlation with other   laboratory and clinical findings is essential.      Lymph # 06/01/2023 0.8 (L)  1.0 - 4.8 K/uL Final    Mono # 06/01/2023 0.3  0.3 - 1.0 K/uL Final    Eos # 06/01/2023 0.1  0.0 - 0.5 K/uL Final    Baso # 06/01/2023 0.01  0.00 - 0.20 K/uL Final    nRBC 06/01/2023 0  0 /100 WBC Final    Gran % 06/01/2023 79.3 (H)  38.0 - 73.0 % Final    Lymph % 06/01/2023 13.8 (L)  18.0 - 48.0 % Final    Mono % 06/01/2023 5.6  4.0 - 15.0 % Final    Eosinophil % 06/01/2023 0.9  0.0 - 8.0 % Final    Basophil % 06/01/2023 0.2  0.0 - 1.9 % Final     Differential Method 06/01/2023 Automated   Final    Sodium 06/01/2023 139  136 - 145 mmol/L Final    Potassium 06/01/2023 4.5  3.5 - 5.1 mmol/L Final    Chloride 06/01/2023 109  95 - 110 mmol/L Final    CO2 06/01/2023 22 (L)  23 - 29 mmol/L Final    Glucose 06/01/2023 77  70 - 110 mg/dL Final    BUN 06/01/2023 17  8 - 23 mg/dL Final    Creatinine 06/01/2023 1.3  0.5 - 1.4 mg/dL Final    Calcium 06/01/2023 8.5 (L)  8.7 - 10.5 mg/dL Final    Total Protein 06/01/2023 7.4  6.0 - 8.4 g/dL Final    Albumin 06/01/2023 3.4 (L)  3.5 - 5.2 g/dL Final    Total Bilirubin 06/01/2023 0.8  0.1 - 1.0 mg/dL Final    Comment: For infants and newborns, interpretation of results should be based  on gestational age, weight and in agreement with clinical  observations.    Premature Infant recommended reference ranges:  Up to 24 hours.............<8.0 mg/dL  Up to 48 hours............<12.0 mg/dL  3-5 days..................<15.0 mg/dL  6-29 days.................<15.0 mg/dL      Alkaline Phosphatase 06/01/2023 66  55 - 135 U/L Final    AST 06/01/2023 12  10 - 40 U/L Final    ALT 06/01/2023 7 (L)  10 - 44 U/L Final    Anion Gap 06/01/2023 8  8 - 16 mmol/L Final    eGFR 06/01/2023 57.3 (A)  >60 mL/min/1.73 m^2 Final         Imaging: CT C/A/P 2/27/23  CT THORAX:  5 mm left lower lobe nodule (series 3 image 117) is unchanged. Calcified granuloma lies adjacent to left fissure (image 89). Subpleural 2 mm right middle lobe nodule (image 93) unchanged. 3 mm right upper lobe nodule (image 45) unchanged. Fissural nodule in right lung (series 3 image 80) unchanged.     Moderate emphysema unchanged. Trachea and main bronchi are patent     No enlarged hilar, mediastinal, axillary, or supraclavicular lymph nodes. Coronary artery calcification unchanged. Tiny pericardial effusion unchanged. No pleural effusion.     T6 compression fracture unchanged. Left fifth rib bone island unchanged. No new suspicious osseous abnormality.     CT ABDOMEN:  Liver,  gallbladder, pancreas, spleen, and adrenals are normal. Bilateral renal hypodensities have not significantly changed suggesting cysts. Mild aortoiliac atherosclerotic plaque is present.     Enteric contrast reaches rectum without obstruction. Large and small intestines are unremarkable. No free intraperitoneal gas. No enlarged mesenteric or retroperitoneal lymph nodes.     Degenerative changes affect the spine. No suspicious osseous abnormality.     CT PELVIS:  Bladder is unremarkable. No free pelvic fluid. Bilateral inguinal canals are slightly patulous. No enlarged iliac or inguinal lymph nodes.     No suspicious osseous abnormality. Mild to moderate bilateral hip osteoarthrosis is present.    PET/CT 4/03/23  Quality of the study: Adequate.     In the head and neck, there are no hypermetabolic lesions worrisome for malignancy. There are no hypermetabolic mucosal lesions, and there are no pathologically enlarged or hypermetabolic lymph nodes.  Increased FDG tracer uptake in the left paraspinal musculature likely physiological muscle uptake.     In the chest, there are no hypermetabolic lesions worrisome for malignancy.  There are no pathologically enlarged or hypermetabolic lymph nodes.  Stable bilateral pulmonary nodules on the right measuring 5 mm and on the left measure 4 mm (series 3, image 64 and 103).     In the abdomen and pelvis, there is focally increased FDG tracer uptake of the distal aspect of the penis measuring 1.4 cm with SUV max of 27 (fused image 264).     There is otherwise physiologic tracer distribution within the abdominal organs including prominent physiologic anal sphincter tone and excretion into the genitourinary system.  No pathologically enlarged or hypermetabolic inguinal lymph nodes.     In the bones, there are no hypermetabolic lesions worrisome for malignancy.     In the extremities, there are no hypermetabolic lesions worrisome for malignancy.     CT chest abdomen pelvis findings:      Right lower neck parathyroidectomy surgical changes.  Heart is normal in size with small pericardial effusion.  Multivessel coronaries calcific atherosclerosis.  Moderate aortoiliac calcific atherosclerosis without aneurysm.  Centrilobular emphysema.  Dependent bibasal atelectatic changes versus scarring.  Liver is normal in size measuring 14 cm without focal lesion.  Spleen is normal in size with numerous calcified granulomas.  Small splenules .  Degenerative changes of the spine most prominent at the cervical spine.  Stable T6 vertebral body height loss (series 604, image 81).   Assessment:       1. Urethral cancer    2. Chronic inflammatory arthritis    3. Rheumatoid arthritis, involving unspecified site, unspecified whether rheumatoid factor present    4. Insomnia, unspecified type    5. Stage 3a chronic kidney disease    6. Coronary artery disease involving native coronary artery of native heart without angina pectoris    7. Hypertension, unspecified type               Plan:     Urethral Cancer - Pt has [id number] cancer of the urethra  -Pt discussed at tumor board on 4/04/23 with recommendation for penectomy  -Pt met with Dr Bryant on 4/06/23 and refused penectomy and elected for chemo/XRT  -Given pt's borderline kidney function with CrCl near 30 on recent lab work, pt to receive biweekly Gemcitabine dosed 27mg/mm2 on days 1 and 4 of each week  -Pt started treatment on 5/29/23  -Will proceed with day 11 of treatment  -PT to complete radiation on July 20th as he is to receive 25 fractions followed by boost of 12 fractions    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 - pt at increased risk of infection  -No current signs of infection  -Will monitor    CKD IIIb - pt's baseline creatinine is 1.2-1.7  -Cr 1.3 on 6/05/23    CAD - pt with stent in place  -Pt follows with Dr Lamb in cardiology  -Pt on ASA, plavix, lipitor, antihypertensives  -Management per cardiology    HTN - pt on amlodipine  -BP slightly increased  -Will  monitor    Rheumatoid Arthritis - pt has stopped MTX  -Will call Dr Hart for recommendations as pt symptoms worsened  -Management per rheumatology    Insomnia - pt instructed to take temazepam as he states this helped better in the past    Route Chart for Scheduling    Med Onc Chart Routing      Follow up with physician 2 weeks. Pt can proceed with treatment.  he will need a CBC and CMP on 6/08/23 with NP visit and treatment.  He will need a CBC and CMP on 6/12/23 and 6/15/23 with NP visit and treatment.  Pt will need an appt with me on 6/19/23 with an appt with me and treatment.   Follow up with [facility] Other.   Infusion scheduling note    Injection scheduling note    Labs    Imaging    Pharmacy appointment    Other referrals        Treatment Plan Information   OP BLADDER GEMCITABINE 27MG/M2 TWICE WEEKLY WITH RADIATION   Ayush Guzman MD   Upcoming Treatment Dates - OP BLADDER GEMCITABINE 27MG/M2 TWICE WEEKLY WITH RADIATION    6/8/2023       Chemotherapy       gemcitabine (GEMZAR) 47 mg in sodium chloride 0.9% SolP 100 mL chemo infusion       Antiemetics       ondansetron injection 8 mg  6/12/2023       Chemotherapy       gemcitabine (GEMZAR) 47 mg in sodium chloride 0.9% SolP 100 mL chemo infusion       Antiemetics       ondansetron injection 8 mg  6/15/2023       Chemotherapy       gemcitabine (GEMZAR) 47 mg in sodium chloride 0.9% SolP 100 mL chemo infusion       Antiemetics       ondansetron injection 8 mg  6/19/2023       Chemotherapy       gemcitabine (GEMZAR) 47 mg in sodium chloride 0.9% SolP 100 mL chemo infusion       Antiemetics       ondansetron injection 8 mg    Therapy Plan Information  DENOSUMAB (PROLIA) Q6M  Medications  denosumab (PROLIA) injection 60 mg  60 mg, Subcutaneous, Every 26 weeks    PORT FLUSH  Flushes  heparin, porcine (PF) 100 unit/mL injection flush 500 Units  500 Units, Intravenous, Every visit  sodium chloride 0.9% flush 10 mL  10 mL, Intravenous, Every visit      Ayush Guzman  MD  [facility]  Hematology and Oncology  [facility]   [address] [place] [address]   O: [phone]  F: [phone]

## 2023-06-05 ENCOUNTER — OFFICE VISIT (OUTPATIENT)
Dept: HEMATOLOGY/ONCOLOGY | Facility: CLINIC | Age: 78
End: 2023-06-05
Payer: MEDICARE

## 2023-06-05 ENCOUNTER — INFUSION (OUTPATIENT)
Dept: INFUSION THERAPY | Facility: HOSPITAL | Age: 78
End: 2023-06-05
Attending: INTERNAL MEDICINE
Payer: MEDICARE

## 2023-06-05 VITALS
WEIGHT: 128.31 LBS | HEART RATE: 79 BPM | RESPIRATION RATE: 18 BRPM | HEIGHT: 68 IN | DIASTOLIC BLOOD PRESSURE: 80 MMHG | BODY MASS INDEX: 19.45 KG/M2 | TEMPERATURE: 97 F | OXYGEN SATURATION: 97 % | SYSTOLIC BLOOD PRESSURE: 150 MMHG

## 2023-06-05 VITALS
TEMPERATURE: 97 F | DIASTOLIC BLOOD PRESSURE: 80 MMHG | HEART RATE: 88 BPM | BODY MASS INDEX: 19.45 KG/M2 | SYSTOLIC BLOOD PRESSURE: 158 MMHG | WEIGHT: 128.31 LBS | OXYGEN SATURATION: 97 % | HEIGHT: 68 IN

## 2023-06-05 DIAGNOSIS — M19.90 CHRONIC INFLAMMATORY ARTHRITIS: ICD-10-CM

## 2023-06-05 DIAGNOSIS — M06.9 RHEUMATOID ARTHRITIS, INVOLVING UNSPECIFIED SITE, UNSPECIFIED WHETHER RHEUMATOID FACTOR PRESENT: ICD-10-CM

## 2023-06-05 DIAGNOSIS — C68.0 URETHRAL CANCER: Primary | ICD-10-CM

## 2023-06-05 DIAGNOSIS — G47.00 INSOMNIA, UNSPECIFIED TYPE: ICD-10-CM

## 2023-06-05 DIAGNOSIS — I25.10 CORONARY ARTERY DISEASE INVOLVING NATIVE CORONARY ARTERY OF NATIVE HEART WITHOUT ANGINA PECTORIS: ICD-10-CM

## 2023-06-05 DIAGNOSIS — I10 HYPERTENSION, UNSPECIFIED TYPE: ICD-10-CM

## 2023-06-05 DIAGNOSIS — N18.31 STAGE 3A CHRONIC KIDNEY DISEASE: ICD-10-CM

## 2023-06-05 PROCEDURE — 77014 HC CT GUIDANCE RADIATION THERAPY FLDS PLACEMENT: CPT | Mod: TC,PN | Performed by: RADIOLOGY

## 2023-06-05 PROCEDURE — 1125F PR PAIN SEVERITY QUANTIFIED, PAIN PRESENT: ICD-10-PCS | Mod: CPTII,S$GLB,, | Performed by: INTERNAL MEDICINE

## 2023-06-05 PROCEDURE — 99999 PR PBB SHADOW E&M-EST. PATIENT-LVL III: ICD-10-PCS | Mod: PBBFAC,,, | Performed by: INTERNAL MEDICINE

## 2023-06-05 PROCEDURE — 3079F PR MOST RECENT DIASTOLIC BLOOD PRESSURE 80-89 MM HG: ICD-10-PCS | Mod: CPTII,S$GLB,, | Performed by: INTERNAL MEDICINE

## 2023-06-05 PROCEDURE — 99999 PR PBB SHADOW E&M-EST. PATIENT-LVL III: CPT | Mod: PBBFAC,,, | Performed by: INTERNAL MEDICINE

## 2023-06-05 PROCEDURE — 96413 CHEMO IV INFUSION 1 HR: CPT | Mod: PN

## 2023-06-05 PROCEDURE — 77336 RADIATION PHYSICS CONSULT: CPT | Mod: PN | Performed by: RADIOLOGY

## 2023-06-05 PROCEDURE — 25000003 PHARM REV CODE 250: Mod: PN | Performed by: INTERNAL MEDICINE

## 2023-06-05 PROCEDURE — 77386 HC IMRT, COMPLEX: CPT | Mod: PN | Performed by: RADIOLOGY

## 2023-06-05 PROCEDURE — A4216 STERILE WATER/SALINE, 10 ML: HCPCS | Mod: PN | Performed by: INTERNAL MEDICINE

## 2023-06-05 PROCEDURE — 96375 TX/PRO/DX INJ NEW DRUG ADDON: CPT | Mod: PN

## 2023-06-05 PROCEDURE — 3079F DIAST BP 80-89 MM HG: CPT | Mod: CPTII,S$GLB,, | Performed by: INTERNAL MEDICINE

## 2023-06-05 PROCEDURE — 63600175 PHARM REV CODE 636 W HCPCS: Mod: PN | Performed by: INTERNAL MEDICINE

## 2023-06-05 PROCEDURE — 1125F AMNT PAIN NOTED PAIN PRSNT: CPT | Mod: CPTII,S$GLB,, | Performed by: INTERNAL MEDICINE

## 2023-06-05 PROCEDURE — 3077F SYST BP >= 140 MM HG: CPT | Mod: CPTII,S$GLB,, | Performed by: INTERNAL MEDICINE

## 2023-06-05 PROCEDURE — 77014 PR  CT GUIDANCE PLACEMENT RAD THERAPY FIELDS: ICD-10-PCS | Mod: 26,,, | Performed by: RADIOLOGY

## 2023-06-05 PROCEDURE — 99215 PR OFFICE/OUTPT VISIT, EST, LEVL V, 40-54 MIN: ICD-10-PCS | Mod: S$GLB,,, | Performed by: INTERNAL MEDICINE

## 2023-06-05 PROCEDURE — 99215 OFFICE O/P EST HI 40 MIN: CPT | Mod: S$GLB,,, | Performed by: INTERNAL MEDICINE

## 2023-06-05 PROCEDURE — 3077F PR MOST RECENT SYSTOLIC BLOOD PRESSURE >= 140 MM HG: ICD-10-PCS | Mod: CPTII,S$GLB,, | Performed by: INTERNAL MEDICINE

## 2023-06-05 PROCEDURE — 77014 PR  CT GUIDANCE PLACEMENT RAD THERAPY FIELDS: CPT | Mod: 26,,, | Performed by: RADIOLOGY

## 2023-06-05 RX ORDER — HEPARIN 100 UNIT/ML
500 SYRINGE INTRAVENOUS
Status: CANCELLED | OUTPATIENT
Start: 2023-06-05

## 2023-06-05 RX ORDER — ONDANSETRON 2 MG/ML
8 INJECTION INTRAMUSCULAR; INTRAVENOUS
Status: COMPLETED | OUTPATIENT
Start: 2023-06-05 | End: 2023-06-05

## 2023-06-05 RX ORDER — SODIUM CHLORIDE 0.9 % (FLUSH) 0.9 %
10 SYRINGE (ML) INJECTION
Status: CANCELLED | OUTPATIENT
Start: 2023-06-05

## 2023-06-05 RX ORDER — ONDANSETRON 2 MG/ML
8 INJECTION INTRAMUSCULAR; INTRAVENOUS
Status: CANCELLED | OUTPATIENT
Start: 2023-06-05

## 2023-06-05 RX ORDER — HEPARIN 100 UNIT/ML
500 SYRINGE INTRAVENOUS
Status: DISCONTINUED | OUTPATIENT
Start: 2023-06-05 | End: 2023-06-05 | Stop reason: HOSPADM

## 2023-06-05 RX ORDER — SODIUM CHLORIDE 0.9 % (FLUSH) 0.9 %
10 SYRINGE (ML) INJECTION
Status: DISCONTINUED | OUTPATIENT
Start: 2023-06-05 | End: 2023-06-05 | Stop reason: HOSPADM

## 2023-06-05 RX ADMIN — Medication 10 ML: at 03:06

## 2023-06-05 RX ADMIN — SODIUM CHLORIDE: 9 INJECTION, SOLUTION INTRAVENOUS at 02:06

## 2023-06-05 RX ADMIN — GEMCITABINE 45 MG: 38 INJECTION, SOLUTION INTRAVENOUS at 03:06

## 2023-06-05 RX ADMIN — ONDANSETRON 8 MG: 2 INJECTION INTRAMUSCULAR; INTRAVENOUS at 03:06

## 2023-06-05 NOTE — PLAN OF CARE
Problem: Adult Inpatient Plan of Care  Goal: Plan of Care Review  Outcome: Ongoing, Progressing  Goal: Patient-Specific Goal (Individualized)  Outcome: Ongoing, Progressing  Flowsheets (Taken 6/5/2023 1504)  Anxieties, Fears or Concerns: none  Individualized Care Needs: recliner, warm blanket, conversation     Problem: Fatigue  Goal: Improved Activity Tolerance  Outcome: Ongoing, Progressing  Intervention: Promote Improved Energy  Flowsheets (Taken 6/5/2023 1504)  Fatigue Management:   activity schedule adjusted   paced activity encouraged   frequent rest breaks encouraged  Sleep/Rest Enhancement:   natural light exposure provided   noise level reduced   reading promoted   regular sleep/rest pattern promoted   room darkened  Activity Management:   Ambulated -L4   Ambulated to bathroom - L4   Ambulated in saavedra - L4   Walk with assistive devise and /or staff member - L3   Up in stretcher chair - L1

## 2023-06-06 PROCEDURE — 77386 HC IMRT, COMPLEX: CPT | Mod: PN | Performed by: RADIOLOGY

## 2023-06-06 PROCEDURE — 77014 PR  CT GUIDANCE PLACEMENT RAD THERAPY FIELDS: ICD-10-PCS | Mod: 26,,, | Performed by: RADIOLOGY

## 2023-06-06 PROCEDURE — 77014 PR  CT GUIDANCE PLACEMENT RAD THERAPY FIELDS: CPT | Mod: 26,,, | Performed by: RADIOLOGY

## 2023-06-06 PROCEDURE — 77014 HC CT GUIDANCE RADIATION THERAPY FLDS PLACEMENT: CPT | Mod: TC,PN | Performed by: RADIOLOGY

## 2023-06-07 ENCOUNTER — INFUSION (OUTPATIENT)
Dept: INFUSION THERAPY | Facility: HOSPITAL | Age: 78
End: 2023-06-07
Attending: INTERNAL MEDICINE
Payer: MEDICARE

## 2023-06-07 ENCOUNTER — OFFICE VISIT (OUTPATIENT)
Dept: HEMATOLOGY/ONCOLOGY | Facility: CLINIC | Age: 78
End: 2023-06-07
Payer: MEDICARE

## 2023-06-07 VITALS
BODY MASS INDEX: 20.61 KG/M2 | RESPIRATION RATE: 16 BRPM | SYSTOLIC BLOOD PRESSURE: 152 MMHG | DIASTOLIC BLOOD PRESSURE: 70 MMHG | WEIGHT: 136 LBS | OXYGEN SATURATION: 95 % | HEIGHT: 68 IN | HEART RATE: 94 BPM | TEMPERATURE: 97 F

## 2023-06-07 DIAGNOSIS — M06.9 RHEUMATOID ARTHRITIS INVOLVING MULTIPLE SITES, UNSPECIFIED WHETHER RHEUMATOID FACTOR PRESENT: ICD-10-CM

## 2023-06-07 DIAGNOSIS — C68.0 URETHRAL CANCER: Primary | ICD-10-CM

## 2023-06-07 LAB
ALBUMIN SERPL BCP-MCNC: 3.4 G/DL (ref 3.5–5.2)
ALP SERPL-CCNC: 72 U/L (ref 55–135)
ALT SERPL W/O P-5'-P-CCNC: 9 U/L (ref 10–44)
ANION GAP SERPL CALC-SCNC: 12 MMOL/L (ref 8–16)
ANISOCYTOSIS BLD QL SMEAR: SLIGHT
AST SERPL-CCNC: 11 U/L (ref 10–40)
BASOPHILS # BLD AUTO: 0.01 K/UL (ref 0–0.2)
BASOPHILS NFR BLD: 0.2 % (ref 0–1.9)
BILIRUB SERPL-MCNC: 1 MG/DL (ref 0.1–1)
BUN SERPL-MCNC: 21 MG/DL (ref 8–23)
CALCIUM SERPL-MCNC: 8.8 MG/DL (ref 8.7–10.5)
CHLORIDE SERPL-SCNC: 107 MMOL/L (ref 95–110)
CO2 SERPL-SCNC: 22 MMOL/L (ref 23–29)
CREAT SERPL-MCNC: 1.3 MG/DL (ref 0.5–1.4)
DIFFERENTIAL METHOD: ABNORMAL
EOSINOPHIL # BLD AUTO: 0 K/UL (ref 0–0.5)
EOSINOPHIL NFR BLD: 0.9 % (ref 0–8)
ERYTHROCYTE [DISTWIDTH] IN BLOOD BY AUTOMATED COUNT: 15.5 % (ref 11.5–14.5)
EST. GFR  (NO RACE VARIABLE): 57.3 ML/MIN/1.73 M^2
GLUCOSE SERPL-MCNC: 94 MG/DL (ref 70–110)
HCT VFR BLD AUTO: 37.4 % (ref 40–54)
HGB BLD-MCNC: 11.8 G/DL (ref 14–18)
HYPOCHROMIA BLD QL SMEAR: ABNORMAL
IMM GRANULOCYTES # BLD AUTO: 0.01 K/UL (ref 0–0.04)
IMM GRANULOCYTES NFR BLD AUTO: 0.2 % (ref 0–0.5)
LYMPHOCYTES # BLD AUTO: 0.4 K/UL (ref 1–4.8)
LYMPHOCYTES NFR BLD: 9.6 % (ref 18–48)
MCH RBC QN AUTO: 28.1 PG (ref 27–31)
MCHC RBC AUTO-ENTMCNC: 31.6 G/DL (ref 32–36)
MCV RBC AUTO: 89 FL (ref 82–98)
MONOCYTES # BLD AUTO: 0.1 K/UL (ref 0.3–1)
MONOCYTES NFR BLD: 2.3 % (ref 4–15)
NEUTROPHILS # BLD AUTO: 3.8 K/UL (ref 1.8–7.7)
NEUTROPHILS NFR BLD: 86.8 % (ref 38–73)
NRBC BLD-RTO: 0 /100 WBC
PLATELET # BLD AUTO: 259 K/UL (ref 150–450)
PLATELET BLD QL SMEAR: ABNORMAL
PMV BLD AUTO: 10.8 FL (ref 9.2–12.9)
POIKILOCYTOSIS BLD QL SMEAR: SLIGHT
POTASSIUM SERPL-SCNC: 4.5 MMOL/L (ref 3.5–5.1)
PROT SERPL-MCNC: 7.8 G/DL (ref 6–8.4)
RBC # BLD AUTO: 4.2 M/UL (ref 4.6–6.2)
SODIUM SERPL-SCNC: 141 MMOL/L (ref 136–145)
WBC # BLD AUTO: 4.38 K/UL (ref 3.9–12.7)

## 2023-06-07 PROCEDURE — 3288F PR FALLS RISK ASSESSMENT DOCUMENTED: ICD-10-PCS | Mod: CPTII,S$GLB,, | Performed by: NURSE PRACTITIONER

## 2023-06-07 PROCEDURE — 3077F PR MOST RECENT SYSTOLIC BLOOD PRESSURE >= 140 MM HG: ICD-10-PCS | Mod: CPTII,S$GLB,, | Performed by: NURSE PRACTITIONER

## 2023-06-07 PROCEDURE — 3077F SYST BP >= 140 MM HG: CPT | Mod: CPTII,S$GLB,, | Performed by: NURSE PRACTITIONER

## 2023-06-07 PROCEDURE — 99214 OFFICE O/P EST MOD 30 MIN: CPT | Mod: S$GLB,,, | Performed by: NURSE PRACTITIONER

## 2023-06-07 PROCEDURE — A4216 STERILE WATER/SALINE, 10 ML: HCPCS | Mod: PN | Performed by: INTERNAL MEDICINE

## 2023-06-07 PROCEDURE — 77386 HC IMRT, COMPLEX: CPT | Mod: PN | Performed by: RADIOLOGY

## 2023-06-07 PROCEDURE — 3078F PR MOST RECENT DIASTOLIC BLOOD PRESSURE < 80 MM HG: ICD-10-PCS | Mod: CPTII,S$GLB,, | Performed by: NURSE PRACTITIONER

## 2023-06-07 PROCEDURE — 1101F PR PT FALLS ASSESS DOC 0-1 FALLS W/OUT INJ PAST YR: ICD-10-PCS | Mod: CPTII,S$GLB,, | Performed by: NURSE PRACTITIONER

## 2023-06-07 PROCEDURE — 77014 PR  CT GUIDANCE PLACEMENT RAD THERAPY FIELDS: ICD-10-PCS | Mod: 26,,, | Performed by: RADIOLOGY

## 2023-06-07 PROCEDURE — 1101F PT FALLS ASSESS-DOCD LE1/YR: CPT | Mod: CPTII,S$GLB,, | Performed by: NURSE PRACTITIONER

## 2023-06-07 PROCEDURE — 85025 COMPLETE CBC W/AUTO DIFF WBC: CPT | Mod: PN | Performed by: INTERNAL MEDICINE

## 2023-06-07 PROCEDURE — 77014 HC CT GUIDANCE RADIATION THERAPY FLDS PLACEMENT: CPT | Mod: TC,PN | Performed by: RADIOLOGY

## 2023-06-07 PROCEDURE — 99214 PR OFFICE/OUTPT VISIT, EST, LEVL IV, 30-39 MIN: ICD-10-PCS | Mod: S$GLB,,, | Performed by: NURSE PRACTITIONER

## 2023-06-07 PROCEDURE — 3078F DIAST BP <80 MM HG: CPT | Mod: CPTII,S$GLB,, | Performed by: NURSE PRACTITIONER

## 2023-06-07 PROCEDURE — 1125F PR PAIN SEVERITY QUANTIFIED, PAIN PRESENT: ICD-10-PCS | Mod: CPTII,S$GLB,, | Performed by: NURSE PRACTITIONER

## 2023-06-07 PROCEDURE — 36591 DRAW BLOOD OFF VENOUS DEVICE: CPT | Mod: PN

## 2023-06-07 PROCEDURE — 1125F AMNT PAIN NOTED PAIN PRSNT: CPT | Mod: CPTII,S$GLB,, | Performed by: NURSE PRACTITIONER

## 2023-06-07 PROCEDURE — 3288F FALL RISK ASSESSMENT DOCD: CPT | Mod: CPTII,S$GLB,, | Performed by: NURSE PRACTITIONER

## 2023-06-07 PROCEDURE — 99999 PR PBB SHADOW E&M-EST. PATIENT-LVL IV: CPT | Mod: PBBFAC,,, | Performed by: NURSE PRACTITIONER

## 2023-06-07 PROCEDURE — 25000003 PHARM REV CODE 250: Mod: PN | Performed by: INTERNAL MEDICINE

## 2023-06-07 PROCEDURE — 99999 PR PBB SHADOW E&M-EST. PATIENT-LVL IV: ICD-10-PCS | Mod: PBBFAC,,, | Performed by: NURSE PRACTITIONER

## 2023-06-07 PROCEDURE — 77014 PR  CT GUIDANCE PLACEMENT RAD THERAPY FIELDS: CPT | Mod: 26,,, | Performed by: RADIOLOGY

## 2023-06-07 PROCEDURE — 80053 COMPREHEN METABOLIC PANEL: CPT | Mod: PN | Performed by: INTERNAL MEDICINE

## 2023-06-07 RX ORDER — HEPARIN 100 UNIT/ML
500 SYRINGE INTRAVENOUS
Status: CANCELLED | OUTPATIENT
Start: 2023-06-09

## 2023-06-07 RX ORDER — SODIUM CHLORIDE 0.9 % (FLUSH) 0.9 %
10 SYRINGE (ML) INJECTION
Status: CANCELLED | OUTPATIENT
Start: 2023-06-09

## 2023-06-07 RX ORDER — SODIUM CHLORIDE 0.9 % (FLUSH) 0.9 %
10 SYRINGE (ML) INJECTION
Status: DISCONTINUED | OUTPATIENT
Start: 2023-06-07 | End: 2023-06-07 | Stop reason: HOSPADM

## 2023-06-07 RX ORDER — NALOXONE HYDROCHLORIDE 4 MG/.1ML
SPRAY NASAL
Qty: 1 EACH | Refills: 11 | Status: SHIPPED | OUTPATIENT
Start: 2023-06-07 | End: 2023-08-16 | Stop reason: ALTCHOICE

## 2023-06-07 RX ORDER — HEPARIN 100 UNIT/ML
500 SYRINGE INTRAVENOUS
Status: CANCELLED | OUTPATIENT
Start: 2023-06-07

## 2023-06-07 RX ORDER — SODIUM CHLORIDE 0.9 % (FLUSH) 0.9 %
10 SYRINGE (ML) INJECTION
Status: CANCELLED | OUTPATIENT
Start: 2023-06-07

## 2023-06-07 RX ORDER — ONDANSETRON 2 MG/ML
8 INJECTION INTRAMUSCULAR; INTRAVENOUS
Status: CANCELLED | OUTPATIENT
Start: 2023-06-09

## 2023-06-07 RX ORDER — HEPARIN 100 UNIT/ML
500 SYRINGE INTRAVENOUS
Status: DISCONTINUED | OUTPATIENT
Start: 2023-06-07 | End: 2023-06-07 | Stop reason: HOSPADM

## 2023-06-07 RX ORDER — OXYCODONE HYDROCHLORIDE 10 MG/1
10 TABLET ORAL EVERY 4 HOURS PRN
Qty: 30 TABLET | Refills: 0 | Status: SHIPPED | OUTPATIENT
Start: 2023-06-07 | End: 2023-08-16 | Stop reason: ALTCHOICE

## 2023-06-07 RX ADMIN — Medication 10 ML: at 04:06

## 2023-06-07 NOTE — PROGRESS NOTES
PATIENT: Rajendra Castle  MRN: [id number]  DATE: 6/7/2023      Diagnosis:   1. Urethral cancer    2. Rheumatoid arthritis involving multiple sites, unspecified whether rheumatoid factor present        Chief Complaint: Joint pain    Subjective:   HPI: Mr. Castle is a 77 y.o. male Arthritis, CAD, HTN, RA, ISABEL, Vitamin D Deficiency who presents for urethral cancer and consideration of C1D11 of biweekly Gemcitabine with XRT.     Patient reports that since he has been off of MTX, his pain in his wrists, fingers & knees have become unbearable.  He states that Dr. Guzman & Negrito have reached out to Dr. Hart, but he only hears that they can't get a hold of him.  He did not allow the phlebotomist to perform a peripheral stick for labs today.  He is hurting & does not want to be stuck again.  Mild, intermittent itching.  Using Dove soap.  The patient denies CP, cough, SOB, abdominal pain, nausea, vomiting, constipation, diarrhea.  The patient denies fever, chills, night sweats, weight loss, new lumps or bumps, easy bruising or bleeding.      Oncology History:   The patient underwent cystoscopy on 02/10/2023 showing a 1 cm firm mass along the left glands of the penis near the meatus.  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  CT of the chest, abdomen, and pelvis on 02/27/2023 showed a stable 5 mm left lower lobe nodule; calcified granuloma adjacent to the left fissure; stable subpleural 2 mm right middle lobe nodule; stable 3 mm right upper lobe nodule; stable fissural nodule in the right lung; stable T6 compression fracture; and left 5th rib bone island.  PET-CT on 04/03/2023 showed stable bilateral pulmonary nodules which were non avid; focally increased FDG tracer uptake in the distal aspect of the left penis measuring 1.4 cm.  The patient was discussed at  tumor Board on 04/04/2023 with recommendation for penectomy.  The patient met with Dr. Bryant on 04/06/2023 to discuss penectomy; however, the patient elected not to have surgery and wanted to undergo chemo and radiation treatment.    Oncology History   Urethral cancer   4/6/2023 Initial Diagnosis    Urethral cancer     4/25/2023 Cancer Staged    Staging form: Male Penile Urethra and Female Urethra, AJCC 8th Edition  - Clinical: Stage III (cT3, cN0, cM0)     5/29/2023 -  Chemotherapy    Treatment Summary   Plan Name: OP BLADDER GEMCITABINE 27MG/M2 TWICE WEEKLY WITH RADIATION  Treatment Goal: Curative  Status: Active  Start Date: 5/29/2023  End Date: 6/26/2023 (Planned)  Provider: Ayush Guzman MD  Chemotherapy: gemcitabine (GEMZAR) 47 mg in sodium chloride 0.9% SolP 116.2 mL chemo infusion, 27 mg/m2 = 47 mg, Intravenous, Clinic/HOD 1 time, 1 of 1 cycle  Administration: 47 mg (5/29/2023), 45 mg (6/1/2023), 45 mg (6/5/2023)        Past Medical History:   Past Medical History:   Diagnosis Date    Anticoagulant long-term use     Arthritis     Coronary artery disease     Dr. Lamb    DDD (degenerative disc disease), cervical     Degenerative disc disease     Heart murmur     History of radiation therapy 2020    Hypertension     Nontoxic multinodular goiter 09/20/2016    Prostate CA     RA (rheumatoid arthritis)     Sleep apnea     No CPAP    Vitamin D insufficiency 09/30/2016       Past Surgical HIstory:   Past Surgical History:   Procedure Laterality Date    CARPAL TUNNEL RELEASE Right 05/28/2021    Procedure: RELEASE, CARPAL TUNNEL;  Surgeon: Jose Ryan II, MD;  Location: [facility] OR;  Service: Orthopedics;  Laterality: Right;    COLONOSCOPY  prior to 2016    normal findings per patient report    CORONARY ANGIOPLASTY WITH STENT PLACEMENT  02/2022    CYSTOSCOPY N/A 12/18/2018    Procedure: CYSTOSCOPY;  Surgeon: Garrett Pina MD;  Location: [facility] OR;  Service: Urology;  Laterality: N/A;    CYSTOSCOPY N/A  07/12/2022    Procedure: CYSTOSCOPY;  Surgeon: Garrett Pina MD;  [facility];  Service: Urology;  Laterality: N/A;    ESOPHAGOGASTRODUODENOSCOPY N/A 09/29/2020    Dr. Espinosa; empiric dilation; erythematous mucosa in antrum; gastric mucosal atrophy; hematin in entire stomach; biopsy: mid & distal esophagus WNL, stomach- WNL, negative for h pylori    EXCISION OF LESION OF PENIS N/A 2/23/2023    Procedure: EXCISION, LESION, PENIS;  Surgeon: Timo Myers MD;  Location: [facility] OR;  Service: Urology;  Laterality: N/A;    INSERTION OF TUNNELED CENTRAL VENOUS CATHETER (CVC) WITH SUBCUTANEOUS PORT Left 5/18/2023    Procedure: [id number];  Surgeon: Atul Faria MD;  Location: [facility];  Service: General;  Laterality: Left;  left subclavian    PARATHYROIDECTOMY Right 10/27/2020    Procedure: PARATHYROIDECTOMY;  Surgeon: Latonia Clayton MD;  Location: [address];  Service: ENT;  Laterality: Right;    RELEASE OF ULNAR NERVE AT CUBITAL TUNNEL Right 05/28/2021    Procedure: RELEASE, ULNAR TUNNEL;  Surgeon: Jose Ryan II, MD;  Location: [facility] OR;  Service: Orthopedics;  Laterality: Right;    TRANSRECTAL BIOPSY OF PROSTATE WITH ULTRASOUND GUIDANCE N/A 12/18/2018    Procedure: BIOPSY, PROSTATE, RECTAL APPROACH, WITH US GUIDANCE;  Surgeon: Garrett Pina MD;  [facility];  Service: Urology;  Laterality: N/A;    TRANSRECTAL ULTRASOUND EXAMINATION N/A 07/12/2022    Procedure: ULTRASOUND, RECTAL APPROACH;  Surgeon: Garrett Pina MD;  Location: [facility];  Service: Urology;  Laterality: N/A;       Family History:   Family History   Problem Relation Age of Onset    Heart disease Mother     Stroke Father     Drug abuse Sister     No Known Problems Daughter     No Known Problems Daughter     No Known Problems Son     Diabetes Maternal Uncle     Colon cancer Neg Hx     Crohn's disease Neg Hx     Esophageal cancer Neg Hx     Stomach cancer Neg Hx     Ulcerative colitis Neg Hx         Social History:  reports that he quit smoking about 21 years ago. His smoking use included cigarettes. He has a 2.50 pack-year smoking history. He has been exposed to tobacco smoke. He has never used smokeless tobacco. He reports that he does not currently use alcohol. He reports that he does not currently use drugs after having used the following drugs: Marijuana. Frequency: 8.00 times per week.    Allergies:  Review of patient's allergies indicates:  No Known Allergies    Medications:  Current Outpatient Medications   Medication Sig Dispense Refill    alfuzosin (UROXATRAL) 10 mg Tb24 Take 10 mg by mouth.      aspirin (ECOTRIN) 81 MG EC tablet Take 1 tablet by mouth once daily.      atorvastatin (LIPITOR) 20 MG tablet Take 20 mg by mouth once daily.      b complex vitamins capsule Take 1 capsule by mouth once daily.      carvediloL (COREG) 25 MG tablet Take 25 mg by mouth.      clopidogreL (PLAVIX) 75 mg tablet Take 1 tablet (75 mg total) by mouth once daily. Pt not sure of dose      EScitalopram oxalate (LEXAPRO) 10 MG tablet Take 10 mg by mouth once daily.      folic acid (FOLVITE) 1 MG tablet Take 1 tablet (1 mg total) by mouth once daily. 90 tablet 3    gabapentin (NEURONTIN) 300 MG capsule Take 300 mg by mouth 3 (three) times daily.      HYDROcodone-acetaminophen (NORCO) 5-325 mg per tablet Take 1 tablet by mouth every 6 (six) hours as needed for Pain. 20 tablet 0    isosorbide mononitrate (IMDUR) 30 MG 24 hr tablet Take 30 mg by mouth.      LIDOcaine HCL 2% (XYLOCAINE) 2 % jelly Apply to affected area (tip of penis) daily prn 30 mL 1    LIDOcaine-prilocaine (EMLA) cream Apply topically as needed. Apply to PORT 15 minutes prior to PORT access 30 g 3    ondansetron (ZOFRAN-ODT) 4 MG TbDL Take 1 tablet (4 mg total) by mouth every 6 (six) hours as needed (nausea). 60 tablet 6    oxybutynin (DITROPAN-XL) 5 MG TR24 Take 1 tablet (5 mg total) by mouth once daily. 30 tablet 11    pantoprazole (PROTONIX) 40 MG  tablet TAKE 1 TABLET(40 MG) BY MOUTH TWICE DAILY (Patient taking differently: Take 40 mg by mouth once daily.) 180 tablet 0    phenazopyridine (PYRIDIUM) 200 MG tablet Take 1 tablet (200 mg total) by mouth 3 (three) times daily as needed for Pain. 90 tablet 3    temazepam (RESTORIL) 15 mg Cap Take 1 capsule (15 mg total) by mouth nightly as needed. 30 capsule 1    traMADoL (ULTRAM) 50 mg tablet Take 2 tablets (100 mg total) by mouth 2 (two) times daily as needed for Pain. 120 tablet 5    amLODIPine (NORVASC) 5 MG tablet Take 1 tablet (5 mg total) by mouth 2 (two) times daily. 60 tablet 3    busPIRone (BUSPAR) 10 MG tablet Take 1 tablet (10 mg total) by mouth 3 (three) times daily. 90 tablet 3    methotrexate 2.5 MG Tab Take 6 tablets (15 mg total) by mouth every 7 days. TAKE 6 TABLETS BY MOUTH EVERY WEEK  STOP UNTIL INSTRUCTED BY MD (Patient not taking: Reported on 6/7/2023) 72 tablet 2    naloxone (NARCAN) 4 mg/actuation Spry 4mg by nasal route as needed for opioid overdose; may repeat every 2-3 minutes in alternating nostrils until medical help arrives. Call 911 1 each 11    oxyCODONE (ROXICODONE) 10 mg Tab immediate release tablet Take 1 tablet (10 mg total) by mouth every 4 (four) hours as needed for Pain. 30 tablet 0    tamsulosin (FLOMAX) 0.4 mg Cap Take 1 capsule (0.4 mg total) by mouth once daily. 30 capsule 3     No current facility-administered medications for this visit.     Facility-Administered Medications Ordered in Other Visits   Medication Dose Route Frequency Provider Last Rate Last Admin    albuterol nebulizer solution 2.5 mg  2.5 mg Nebulization Q15 Min PRN Pastor Saleh MD        albuterol-ipratropium 2.5 mg-0.5 mg/3 mL nebulizer solution 3 mL  3 mL Nebulization PRN Pastor Saleh MD        denosumab (PROLIA) injection 60 mg  60 mg Subcutaneous 1 time in Clinic/HOD Jean Carlos Hoffman MD        diphenhydrAMINE injection 25 mg  25 mg Intravenous Q6H PRN Pastor Saleh MD        heparin,  "porcine (PF) 100 unit/mL injection flush 500 Units  500 Units Intravenous PRN Ayush Guzman MD        HYDROmorphone injection 0.5 mg  0.5 mg Intravenous Q5 Min PRN Pastor Saleh MD   0.5 mg at 02/23/23 1312    lactated ringers infusion   Intravenous Continuous Jacqueline Volpi-Abadie, MD   Stopped at 02/23/23 1400    ondansetron injection 4 mg  4 mg Intravenous Q15 Min PRN Pastor Saleh MD        sodium chloride 0.9% flush 10 mL  10 mL Intravenous PRN Ayush Guzman MD   10 mL at 06/01/23 1535    sodium chloride 0.9% flush 10 mL  10 mL Intravenous PRN Ayush Guzman MD   10 mL at 06/05/23 1315    sodium chloride 0.9% flush 10 mL  10 mL Intravenous PRN Ayush Guzman MD   10 mL at 06/07/23 1610    sodium chloride 0.9% flush 3 mL  3 mL Intravenous PRN Pastor Saleh MD           Review of Systems   Constitutional:  Negative for appetite change, chills and fever.   HENT:  Negative for mouth sores and trouble swallowing.    Respiratory:  Negative for cough and shortness of breath.    Cardiovascular:  Negative for chest pain, palpitations and leg swelling.   Gastrointestinal:  Negative for abdominal pain, diarrhea, nausea and vomiting.   Genitourinary:  Negative for dysuria and hematuria.   Musculoskeletal:  Positive for arthralgias. Negative for joint swelling and myalgias.   Skin:  Negative for rash.   Neurological:  Negative for headaches.   Hematological:  Negative for adenopathy. Does not bruise/bleed easily.   Psychiatric/Behavioral:  The patient is not nervous/anxious.      ECOG Performance Status: 0  ECOG SCORE             Objective:      Vitals:   Vitals:    06/07/23 1449   BP: (!) 152/70   Pulse: 94   Resp: 16   Temp: 97 °F (36.1 °C)   TempSrc: Temporal   SpO2: 95%   Weight: 61.7 kg (136 lb 0.4 oz)   Height: 5' 8" (1.727 m)     BMI: Body mass index is 20.68 kg/m².    Physical Exam  Vitals reviewed.   Constitutional:       General: He is not in acute distress.     Appearance: He is not " diaphoretic.   HENT:      Head: Normocephalic and atraumatic.      Mouth/Throat:      Mouth: Mucous membranes are moist.      Pharynx: No posterior oropharyngeal erythema.   Eyes:      General: No scleral icterus.  Cardiovascular:      Rate and Rhythm: Normal rate and regular rhythm.      Heart sounds: Normal heart sounds. No murmur heard.  Pulmonary:      Effort: Pulmonary effort is normal. No respiratory distress.      Breath sounds: No wheezing.   Abdominal:      General: Bowel sounds are normal. There is no distension.      Tenderness: There is no abdominal tenderness. There is no guarding.   Musculoskeletal:      Cervical back: No tenderness.      Right lower leg: No edema.      Left lower leg: No edema.   Lymphadenopathy:      Cervical: No cervical adenopathy.   Skin:     General: Skin is warm.      Coloration: Skin is not jaundiced.      Findings: No rash.   Neurological:      Mental Status: He is alert and oriented to person, place, and time.   Psychiatric:         Behavior: Behavior is agitated.       Laboratory Data:  Lab Results   Component Value Date    WBC 4.38 06/07/2023    HGB 11.8 (L) 06/07/2023    HCT 37.4 (L) 06/07/2023    MCV 89 06/07/2023     06/07/2023      CMP  Sodium   Date Value Ref Range Status   06/07/2023 141 136 - 145 mmol/L Final   04/25/2019 141 134 - 144 mmol/L      Potassium   Date Value Ref Range Status   06/07/2023 4.5 3.5 - 5.1 mmol/L Final     Chloride   Date Value Ref Range Status   06/07/2023 107 95 - 110 mmol/L Final   04/25/2019 110 98 - 110 mmol/L      CO2   Date Value Ref Range Status   06/07/2023 22 (L) 23 - 29 mmol/L Final     Glucose   Date Value Ref Range Status   06/07/2023 94 70 - 110 mg/dL Final   04/25/2019 95 70 - 99 mg/dL      BUN   Date Value Ref Range Status   06/07/2023 21 8 - 23 mg/dL Final     Creatinine   Date Value Ref Range Status   06/07/2023 1.3 0.5 - 1.4 mg/dL Final   04/25/2019 1.38 0.60 - 1.40 mg/dL      Calcium   Date Value Ref Range Status    06/07/2023 8.8 8.7 - 10.5 mg/dL Final     Total Protein   Date Value Ref Range Status   06/07/2023 7.8 6.0 - 8.4 g/dL Final     Albumin   Date Value Ref Range Status   06/07/2023 3.4 (L) 3.5 - 5.2 g/dL Final   04/25/2019 3.3 3.1 - 4.7 g/dL      Total Bilirubin   Date Value Ref Range Status   06/07/2023 1.0 0.1 - 1.0 mg/dL Final     Comment:     For infants and newborns, interpretation of results should be based  on gestational age, weight and in agreement with clinical  observations.    Premature Infant recommended reference ranges:  Up to 24 hours.............<8.0 mg/dL  Up to 48 hours............<12.0 mg/dL  3-5 days..................<15.0 mg/dL  6-29 days.................<15.0 mg/dL       Alkaline Phosphatase   Date Value Ref Range Status   06/07/2023 72 55 - 135 U/L Final     AST   Date Value Ref Range Status   06/07/2023 11 10 - 40 U/L Final     ALT   Date Value Ref Range Status   06/07/2023 9 (L) 10 - 44 U/L Final     Anion Gap   Date Value Ref Range Status   06/07/2023 12 8 - 16 mmol/L Final     eGFR   Date Value Ref Range Status   06/07/2023 57.3 (A) >60 mL/min/1.73 m^2 Final           Imaging: EXAMINATION:  NM PET CT ROUTINE     CLINICAL HISTORY:  possible urethral/penile cancer, eval for mets; Malignant neoplasm of prostate     TECHNIQUE:  13.25 mCi of F18-FDG was administered intravenously in the left antecubital fossa.  After an approximately 60 min distribution time, PET/CT images were acquired from the skull base to mid thigh.  Transmission images were acquired to correct for attenuation using a whole body low-dose CT scan with oral contrast and without IV contrast with the arms positioned above the head. Glycemia at the time of injection was 100 mg/dL.     COMPARISON:  CT chest abdomen pelvis 02/27/2023     FINDINGS:  Quality of the study: Adequate.     In the head and neck, there are no hypermetabolic lesions worrisome for malignancy. There are no hypermetabolic mucosal lesions, and there are no  pathologically enlarged or hypermetabolic lymph nodes.  Increased FDG tracer uptake in the left paraspinal musculature likely physiological muscle uptake.     In the chest, there are no hypermetabolic lesions worrisome for malignancy.  There are no pathologically enlarged or hypermetabolic lymph nodes.  Stable bilateral pulmonary nodules on the right measuring 5 mm and on the left measure 4 mm (series 3, image 64 and 103).     In the abdomen and pelvis, there is focally increased FDG tracer uptake of the distal aspect of the penis measuring 1.4 cm with SUV max of 27 (fused image 264).     There is otherwise physiologic tracer distribution within the abdominal organs including prominent physiologic anal sphincter tone and excretion into the genitourinary system.  No pathologically enlarged or hypermetabolic inguinal lymph nodes.     In the bones, there are no hypermetabolic lesions worrisome for malignancy.     In the extremities, there are no hypermetabolic lesions worrisome for malignancy.     CT chest abdomen pelvis findings:     Right lower neck parathyroidectomy surgical changes.  Heart is normal in size with small pericardial effusion.  Multivessel coronaries calcific atherosclerosis.  Moderate aortoiliac calcific atherosclerosis without aneurysm.  Centrilobular emphysema.  Dependent bibasal atelectatic changes versus scarring.  Liver is normal in size measuring 14 cm without focal lesion.  Spleen is normal in size with numerous calcified granulomas.  Small splenules .  Degenerative changes of the spine most prominent at the cervical spine.  Stable T6 vertebral body height loss (series 604, image 81).     Impression:     There is hypermetabolic lesion in the distal penis measuring approximately 1.4 cm, positive for squamous cell carcinoma on recent biopsy.  No evidence of lymph node metastases.     Stable bilateral subcentimeter pulmonary nodules that are too small to characterize reliably by PET.  Given  evidence of old granulomatous disease, noncalcified granulomas are a consideration.  Attention on follow-up.     Additional findings as above.     I, Jey Griffith MD, attest that I reviewed and interpreted the images.     Electronically signed by resident: Juanito Tse  Date:                                            04/04/2023  Time:                                           06:30     Electronically signed by: Jey Griffith  Date:                                            04/04/2023  Time:                                           10:09   Assessment:       1. Urethral cancer    2. Rheumatoid arthritis involving multiple sites, unspecified whether rheumatoid factor present    3. RA associated pain - off MTX; Hydrocodone/Tramadol not holding       Plan:   Urethral Cancer   -Pt has [id number] cancer of the urethra  -Pt discussed at tumor board on 4/04/23 with recommendation for penectomy  -Pt met with Dr Bryant on 4/06/23 and refused penectomy and elected for chemo/XRT  -Given pt's borderline kidney function with CrCl near 30 on recent lab work, pt to receive biweekly Gemcitabine dosed 27mg/mm2 on days 1 and 4 of each week    -Arranged for labs to be drawn from port today.  -Proceed with C1D11 Gemzar tomorrow  -Keep appointment with MAYURI Duarte NP next week as planned, 06/12 with labs prior      CKD IIIb   -pt's baseline creatinine is 1.2-1.7  -Cr 1.3 on 6/7/23     CAD    -pt with stent in place  -Pt follows with Dr Lamb in cardiology  -Pt on ASA, plavix, lipitor, antihypertensives  -Management per cardiology     HTN   -pt on amlodipine  -BP slightly increased  -Will monitor     Rheumatoid Arthritis   -pt has stopped MTX  -Dr Hart contacted for recommendation on alternative medicine while pt is getting chemo/XRT  -Dr. Guzman notified of increased pain; Dr. Hart to return to clinic tomorrow, 06/08; Dr. Guzman will contact & update; for now - Oxycodone 10 mg escribed to assist with pain unrelieved with  Hydrocodone/Tramadol.  -Management per rheumatology     Insomnia   -pt to take temazepam, did not start last week  -Will  Rx and start as this has given some relief     Assessment/Plan reviewed and approved by Dr. Guzman  35 minutes were spent in coordination of patient's care, record review and counseling.    Route Chart for Scheduling    Med Onc Chart Routing      Follow up with physician    Follow up with [facility] . F/u with MAYURI Duarte NP with labs prior on Monday, 06/12   Infusion scheduling note   Proceed with C1 D11 on 06/08   Injection scheduling note    Labs    Imaging    Pharmacy appointment    Other referrals            Treatment Plan Information   OP BLADDER GEMCITABINE 27MG/M2 TWICE WEEKLY WITH RADIATION   Ayush Guzman MD   Upcoming Treatment Dates - OP BLADDER GEMCITABINE 27MG/M2 TWICE WEEKLY WITH RADIATION    6/8/2023       Chemotherapy       gemcitabine (GEMZAR) 47 mg in sodium chloride 0.9% SolP 100 mL chemo infusion       Antiemetics       ondansetron injection 8 mg  6/12/2023       Chemotherapy       gemcitabine (GEMZAR) 47 mg in sodium chloride 0.9% SolP 100 mL chemo infusion       Antiemetics       ondansetron injection 8 mg  6/15/2023       Chemotherapy       gemcitabine (GEMZAR) 47 mg in sodium chloride 0.9% SolP 100 mL chemo infusion       Antiemetics       ondansetron injection 8 mg  6/19/2023       Chemotherapy       gemcitabine (GEMZAR) 47 mg in sodium chloride 0.9% SolP 100 mL chemo infusion       Antiemetics       ondansetron injection 8 mg    Therapy Plan Information  DENOSUMAB (PROLIA) Q6M  Medications  denosumab (PROLIA) injection 60 mg  60 mg, Subcutaneous, Every 26 weeks    PORT FLUSH  Flushes  heparin, porcine (PF) 100 unit/mL injection flush 500 Units  500 Units, Intravenous, Every visit  sodium chloride 0.9% flush 10 mL  10 mL, Intravenous, Every visit

## 2023-06-07 NOTE — PLAN OF CARE
Problem: Adult Inpatient Plan of Care  Goal: Plan of Care Review  Outcome: Ongoing, Progressing  Goal: Patient-Specific Goal (Individualized)  Outcome: Ongoing, Progressing   Pt tolerated labs from port well.   No adverse reaction noted.  PAC flushed with NS and de-accessed per protocol.   Pt left clinic in no acute distress.

## 2023-06-08 ENCOUNTER — DOCUMENTATION ONLY (OUTPATIENT)
Dept: RADIATION ONCOLOGY | Facility: CLINIC | Age: 78
End: 2023-06-08
Payer: MEDICARE

## 2023-06-08 ENCOUNTER — DOCUMENTATION ONLY (OUTPATIENT)
Dept: INFUSION THERAPY | Facility: HOSPITAL | Age: 78
End: 2023-06-08
Payer: MEDICARE

## 2023-06-08 ENCOUNTER — TELEPHONE (OUTPATIENT)
Dept: PULMONOLOGY | Facility: CLINIC | Age: 78
End: 2023-06-08

## 2023-06-08 DIAGNOSIS — M05.79 RHEUMATOID ARTHRITIS INVOLVING MULTIPLE SITES WITH POSITIVE RHEUMATOID FACTOR: ICD-10-CM

## 2023-06-08 PROCEDURE — 77014 PR  CT GUIDANCE PLACEMENT RAD THERAPY FIELDS: CPT | Mod: 26,,, | Performed by: RADIOLOGY

## 2023-06-08 PROCEDURE — 77386 HC IMRT, COMPLEX: CPT | Mod: PN | Performed by: RADIOLOGY

## 2023-06-08 PROCEDURE — 77014 PR  CT GUIDANCE PLACEMENT RAD THERAPY FIELDS: ICD-10-PCS | Mod: 26,,, | Performed by: RADIOLOGY

## 2023-06-08 PROCEDURE — 77014 HC CT GUIDANCE RADIATION THERAPY FLDS PLACEMENT: CPT | Mod: TC,PN | Performed by: RADIOLOGY

## 2023-06-08 RX ORDER — TRAMADOL HYDROCHLORIDE 50 MG/1
100 TABLET ORAL 2 TIMES DAILY PRN
Qty: 120 TABLET | Refills: 5 | Status: ON HOLD | OUTPATIENT
Start: 2023-06-08 | End: 2023-08-10 | Stop reason: HOSPADM

## 2023-06-08 NOTE — PROGRESS NOTES
"SW met with the pt in XRT. SW followed up with pt regarding supports. Pt said he has spoken with his [employer] member regarding help driving pt to and from treatment if needed. Pt said he he agreed to drive if needed. Pt said for now he will drive himself bit it is good to know he has this help "in my back pocket" if needed.  No other needs identified today.    Earnestine Coombs, LORENEW      "

## 2023-06-08 NOTE — TELEPHONE ENCOUNTER
Pts apt was canceled today because he has not received his new bipap machine that was ordered on 5/23/23.  I called [facility] as to why they have not contacted pt and they stated they did not all of the proper paper work.  I informed them that the auth from pts insurance company was authorized on 5/4/23 and I faxed it directly to them on 5/23/23 to make sure they received it.  She did not respond to me.  She said they will call him today and get him scheduled to be set up.

## 2023-06-08 NOTE — PLAN OF CARE
Completed 8 of 25 outpatient radiation treatments to the urethra.  No new problems noted.  All questions and concerns addressed by MD this visit.

## 2023-06-09 ENCOUNTER — INFUSION (OUTPATIENT)
Dept: INFUSION THERAPY | Facility: HOSPITAL | Age: 78
End: 2023-06-09
Attending: INTERNAL MEDICINE
Payer: MEDICARE

## 2023-06-09 ENCOUNTER — PES CALL (OUTPATIENT)
Dept: ADMINISTRATIVE | Facility: CLINIC | Age: 78
End: 2023-06-09
Payer: MEDICARE

## 2023-06-09 ENCOUNTER — DOCUMENTATION ONLY (OUTPATIENT)
Dept: INFUSION THERAPY | Facility: HOSPITAL | Age: 78
End: 2023-06-09
Payer: MEDICARE

## 2023-06-09 VITALS
SYSTOLIC BLOOD PRESSURE: 155 MMHG | BODY MASS INDEX: 20.61 KG/M2 | DIASTOLIC BLOOD PRESSURE: 79 MMHG | WEIGHT: 136 LBS | RESPIRATION RATE: 18 BRPM | HEART RATE: 73 BPM | HEIGHT: 68 IN | TEMPERATURE: 98 F

## 2023-06-09 DIAGNOSIS — C68.0 URETHRAL CANCER: Primary | ICD-10-CM

## 2023-06-09 PROCEDURE — 96375 TX/PRO/DX INJ NEW DRUG ADDON: CPT | Mod: PN

## 2023-06-09 PROCEDURE — 77014 PR  CT GUIDANCE PLACEMENT RAD THERAPY FIELDS: CPT | Mod: 26,,, | Performed by: RADIOLOGY

## 2023-06-09 PROCEDURE — 77014 PR  CT GUIDANCE PLACEMENT RAD THERAPY FIELDS: ICD-10-PCS | Mod: 26,,, | Performed by: RADIOLOGY

## 2023-06-09 PROCEDURE — 96413 CHEMO IV INFUSION 1 HR: CPT | Mod: PN

## 2023-06-09 PROCEDURE — 63600175 PHARM REV CODE 636 W HCPCS: Mod: PN | Performed by: NURSE PRACTITIONER

## 2023-06-09 PROCEDURE — 77014 HC CT GUIDANCE RADIATION THERAPY FLDS PLACEMENT: CPT | Mod: TC,PN | Performed by: RADIOLOGY

## 2023-06-09 PROCEDURE — 77386 HC IMRT, COMPLEX: CPT | Mod: PN | Performed by: RADIOLOGY

## 2023-06-09 PROCEDURE — 25000003 PHARM REV CODE 250: Mod: PN | Performed by: NURSE PRACTITIONER

## 2023-06-09 RX ORDER — HEPARIN 100 UNIT/ML
500 SYRINGE INTRAVENOUS
Status: DISCONTINUED | OUTPATIENT
Start: 2023-06-09 | End: 2023-06-09 | Stop reason: HOSPADM

## 2023-06-09 RX ORDER — ONDANSETRON 2 MG/ML
8 INJECTION INTRAMUSCULAR; INTRAVENOUS
Status: COMPLETED | OUTPATIENT
Start: 2023-06-09 | End: 2023-06-09

## 2023-06-09 RX ORDER — SODIUM CHLORIDE 0.9 % (FLUSH) 0.9 %
10 SYRINGE (ML) INJECTION
Status: DISCONTINUED | OUTPATIENT
Start: 2023-06-09 | End: 2023-06-09 | Stop reason: HOSPADM

## 2023-06-09 RX ADMIN — GEMCITABINE 45 MG: 38 INJECTION, SOLUTION INTRAVENOUS at 03:06

## 2023-06-09 RX ADMIN — SODIUM CHLORIDE: 9 INJECTION, SOLUTION INTRAVENOUS at 02:06

## 2023-06-09 RX ADMIN — ONDANSETRON 8 MG: 2 INJECTION INTRAMUSCULAR; INTRAVENOUS at 02:06

## 2023-06-09 NOTE — PROGRESS NOTES
"Oncology Nutrition   Chemotherapy Infusion Visit    Nutrition Follow Up   RD f/u with patient at chairside. He said he liked the Ensure Complete Vanilla he got at first meeting here. He drinks them with meals and between meals. He asked for more samples. RD asked about appetite he said "it's good". Encouraged Ensure shakes and provided coupons. Denies any other nutrition related concerns.     Wt Readings from Last 10 Encounters:   06/09/23 61.7 kg (136 lb 0.4 oz)   06/07/23 61.7 kg (136 lb 0.4 oz)   06/05/23 58.2 kg (128 lb 4.9 oz)   06/05/23 58.2 kg (128 lb 4.9 oz)   06/01/23 62.1 kg (136 lb 14.5 oz)   06/01/23 62.1 kg (136 lb 14.5 oz)   05/29/23 62.8 kg (138 lb 7.2 oz)   05/26/23 62.8 kg (138 lb 7.2 oz)   05/18/23 62.3 kg (137 lb 5.6 oz)   05/10/23 65 kg (143 lb 4.8 oz)     Will continue to monitor prn throughout treatment.     Genia Avila RDN, LDN, [facility]   06/09/2023  2:46 PM          "

## 2023-06-12 ENCOUNTER — OFFICE VISIT (OUTPATIENT)
Dept: HEMATOLOGY/ONCOLOGY | Facility: CLINIC | Age: 78
End: 2023-06-12
Payer: MEDICARE

## 2023-06-12 ENCOUNTER — INFUSION (OUTPATIENT)
Dept: INFUSION THERAPY | Facility: HOSPITAL | Age: 78
End: 2023-06-12
Attending: INTERNAL MEDICINE
Payer: MEDICARE

## 2023-06-12 ENCOUNTER — TELEPHONE (OUTPATIENT)
Dept: RHEUMATOLOGY | Facility: CLINIC | Age: 78
End: 2023-06-12
Payer: MEDICARE

## 2023-06-12 VITALS
SYSTOLIC BLOOD PRESSURE: 156 MMHG | WEIGHT: 133.81 LBS | RESPIRATION RATE: 16 BRPM | HEIGHT: 68 IN | OXYGEN SATURATION: 97 % | DIASTOLIC BLOOD PRESSURE: 79 MMHG | BODY MASS INDEX: 20.28 KG/M2 | TEMPERATURE: 97 F | HEART RATE: 87 BPM

## 2023-06-12 VITALS
BODY MASS INDEX: 20.28 KG/M2 | HEIGHT: 68 IN | SYSTOLIC BLOOD PRESSURE: 156 MMHG | OXYGEN SATURATION: 97 % | RESPIRATION RATE: 16 BRPM | TEMPERATURE: 97 F | WEIGHT: 133.81 LBS | HEART RATE: 77 BPM | DIASTOLIC BLOOD PRESSURE: 72 MMHG

## 2023-06-12 DIAGNOSIS — C68.0 URETHRAL CANCER: Primary | ICD-10-CM

## 2023-06-12 DIAGNOSIS — I25.10 CORONARY ARTERY DISEASE INVOLVING NATIVE CORONARY ARTERY OF NATIVE HEART WITHOUT ANGINA PECTORIS: ICD-10-CM

## 2023-06-12 DIAGNOSIS — N18.31 STAGE 3A CHRONIC KIDNEY DISEASE: ICD-10-CM

## 2023-06-12 DIAGNOSIS — I10 HYPERTENSION, UNSPECIFIED TYPE: ICD-10-CM

## 2023-06-12 DIAGNOSIS — M06.9 RHEUMATOID ARTHRITIS INVOLVING MULTIPLE SITES, UNSPECIFIED WHETHER RHEUMATOID FACTOR PRESENT: ICD-10-CM

## 2023-06-12 DIAGNOSIS — G47.00 INSOMNIA, UNSPECIFIED TYPE: ICD-10-CM

## 2023-06-12 PROCEDURE — 3078F DIAST BP <80 MM HG: CPT | Mod: CPTII,S$GLB,,

## 2023-06-12 PROCEDURE — 99214 OFFICE O/P EST MOD 30 MIN: CPT | Mod: S$GLB,,,

## 2023-06-12 PROCEDURE — 63600175 PHARM REV CODE 636 W HCPCS: Mod: PN

## 2023-06-12 PROCEDURE — 99214 PR OFFICE/OUTPT VISIT, EST, LEVL IV, 30-39 MIN: ICD-10-PCS | Mod: S$GLB,,,

## 2023-06-12 PROCEDURE — 77014 PR  CT GUIDANCE PLACEMENT RAD THERAPY FIELDS: ICD-10-PCS | Mod: 26,,, | Performed by: RADIOLOGY

## 2023-06-12 PROCEDURE — 3077F PR MOST RECENT SYSTOLIC BLOOD PRESSURE >= 140 MM HG: ICD-10-PCS | Mod: CPTII,S$GLB,,

## 2023-06-12 PROCEDURE — 96375 TX/PRO/DX INJ NEW DRUG ADDON: CPT | Mod: PN

## 2023-06-12 PROCEDURE — 1125F AMNT PAIN NOTED PAIN PRSNT: CPT | Mod: CPTII,S$GLB,,

## 2023-06-12 PROCEDURE — 99999 PR PBB SHADOW E&M-EST. PATIENT-LVL III: CPT | Mod: PBBFAC,,,

## 2023-06-12 PROCEDURE — 77336 RADIATION PHYSICS CONSULT: CPT | Mod: PN | Performed by: RADIOLOGY

## 2023-06-12 PROCEDURE — 77386 HC IMRT, COMPLEX: CPT | Mod: PN | Performed by: RADIOLOGY

## 2023-06-12 PROCEDURE — 3077F SYST BP >= 140 MM HG: CPT | Mod: CPTII,S$GLB,,

## 2023-06-12 PROCEDURE — 3078F PR MOST RECENT DIASTOLIC BLOOD PRESSURE < 80 MM HG: ICD-10-PCS | Mod: CPTII,S$GLB,,

## 2023-06-12 PROCEDURE — 77014 HC CT GUIDANCE RADIATION THERAPY FLDS PLACEMENT: CPT | Mod: TC,PN | Performed by: RADIOLOGY

## 2023-06-12 PROCEDURE — 1125F PR PAIN SEVERITY QUANTIFIED, PAIN PRESENT: ICD-10-PCS | Mod: CPTII,S$GLB,,

## 2023-06-12 PROCEDURE — 77014 PR  CT GUIDANCE PLACEMENT RAD THERAPY FIELDS: CPT | Mod: 26,,, | Performed by: RADIOLOGY

## 2023-06-12 PROCEDURE — 25000003 PHARM REV CODE 250: Mod: PN

## 2023-06-12 PROCEDURE — 96413 CHEMO IV INFUSION 1 HR: CPT | Mod: PN

## 2023-06-12 PROCEDURE — 99999 PR PBB SHADOW E&M-EST. PATIENT-LVL III: ICD-10-PCS | Mod: PBBFAC,,,

## 2023-06-12 RX ORDER — SODIUM CHLORIDE 0.9 % (FLUSH) 0.9 %
10 SYRINGE (ML) INJECTION
Status: DISCONTINUED | OUTPATIENT
Start: 2023-06-12 | End: 2023-06-12 | Stop reason: HOSPADM

## 2023-06-12 RX ORDER — SODIUM CHLORIDE 0.9 % (FLUSH) 0.9 %
10 SYRINGE (ML) INJECTION
Status: CANCELLED | OUTPATIENT
Start: 2023-06-12

## 2023-06-12 RX ORDER — ONDANSETRON 2 MG/ML
8 INJECTION INTRAMUSCULAR; INTRAVENOUS
Status: CANCELLED | OUTPATIENT
Start: 2023-06-12

## 2023-06-12 RX ORDER — HEPARIN 100 UNIT/ML
500 SYRINGE INTRAVENOUS
Status: CANCELLED | OUTPATIENT
Start: 2023-06-12

## 2023-06-12 RX ORDER — ONDANSETRON 2 MG/ML
8 INJECTION INTRAMUSCULAR; INTRAVENOUS
Status: COMPLETED | OUTPATIENT
Start: 2023-06-12 | End: 2023-06-12

## 2023-06-12 RX ADMIN — SODIUM CHLORIDE: 9 INJECTION, SOLUTION INTRAVENOUS at 04:06

## 2023-06-12 RX ADMIN — GEMCITABINE 47 MG: 38 INJECTION, SOLUTION INTRAVENOUS at 04:06

## 2023-06-12 RX ADMIN — ONDANSETRON 8 MG: 2 INJECTION INTRAMUSCULAR; INTRAVENOUS at 04:06

## 2023-06-12 NOTE — TELEPHONE ENCOUNTER
----- Message from Darren Denson MA sent at 6/12/2023  1:11 PM CDT -----  Regarding: FW: Medication  Contact: Pt @ [phone]  Please advise  ----- Message -----  From: Maria G Carter  Sent: 6/12/2023  12:13 PM CDT  To: [facility] Staff  Subject: Medication                                       Message is for Darren, pt is in extreme pain and cannot take methotrexate 2.5 MG Tab, because it interfere with radiation. Need an urgent call back because pt need another type of medication.

## 2023-06-12 NOTE — PLAN OF CARE
Problem: Adult Inpatient Plan of Care  Goal: Plan of Care Review  Outcome: Ongoing, Progressing  Flowsheets (Taken 6/12/2023 1628)  Plan of Care Reviewed With: patient  Goal: Patient-Specific Goal (Individualized)  Outcome: Ongoing, Progressing  Flowsheets (Taken 6/12/2023 1628)  Anxieties, Fears or Concerns: None  Individualized Care Needs: Recliner, warm blanket, conversation     Problem: Fatigue  Goal: Improved Activity Tolerance  Outcome: Ongoing, Progressing  Intervention: Promote Improved Energy  Flowsheets (Taken 6/12/2023 1628)  Fatigue Management:   frequent rest breaks encouraged   paced activity encouraged  Sleep/Rest Enhancement: regular sleep/rest pattern promoted  Activity Management: Walk with assistive devise and /or staff member - L3    Patient to Infusion for Gemzar following appointment with the provider. Labs collected as ordered. Treatment plan reviewed with patient. VSS. Tolerated treatment. Provided with copy of upcoming appointment schedule. Escorted to the front lobby in no distress for discharge to home.

## 2023-06-12 NOTE — TELEPHONE ENCOUNTER
----- Message from Maria G Carter sent at 6/12/2023 12:10 PM CDT -----  Regarding: Medication  Contact: Pt @ [phone]  Message is for Darren, pt is in extreme pain and cannot take methotrexate 2.5 MG Tab, because it interfere with radiation. Need an urgent call back because pt need another type of medication.

## 2023-06-12 NOTE — PROGRESS NOTES
PATIENT: Rajendra Castle  MRN: [id number]  DATE: 6/12/2023      Diagnosis:   1. Urethral cancer    2. Stage 3a chronic kidney disease    3. Coronary artery disease involving native coronary artery of native heart without angina pectoris    4. Hypertension, unspecified type    5. Rheumatoid arthritis involving multiple sites, unspecified whether rheumatoid factor present    6. Insomnia, unspecified type      Chief Complaint: No chief complaint on file.    Subjective:   HPI: Mr. Castle is a 77 y.o. male Arthritis, CAD, HTN, RA, ISABEL, Vitamin D Deficiency who presents for urethral cancer and consideration of C1D15 of biweekly Gemcitabine with XRT.     Began taking Temazepam at night with moderate results, sleep has improved. Ordered a new CPAP this week.  Urinary symptoms have improved some.   MTX remains on hold but no new medications/treatment has been started. He does endorse arthralgias in his hands and knees. Dr. Guzman, Dr. Mahan and the patient have reached out to rheumatology for assistance with management while undergoing XRT.    The patient denies CP, cough, SOB, abdominal pain, nausea, vomiting, constipation, diarrhea.  The patient denies fever, chills, night sweats, weight loss, new lumps or bumps, easy bruising or bleeding.    Oncology History:   The patient underwent cystoscopy on 02/10/2023 showing a 1 cm firm mass along the left glands of the penis near the meatus.  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  CT of the chest, abdomen, and pelvis on 02/27/2023 showed a stable 5 mm left lower lobe nodule; calcified granuloma adjacent to the left fissure; stable subpleural 2 mm right middle lobe nodule; stable 3 mm right upper lobe nodule; stable fissural nodule in the right lung; stable T6 compression fracture; and left 5th rib bone island.   PET-CT on 04/03/2023 showed stable bilateral pulmonary nodules which were non avid; focally increased FDG tracer uptake in the distal aspect of the left penis measuring 1.4 cm.  The patient was discussed at tumor Board on 04/04/2023 with recommendation for penectomy.  The patient met with Dr. Bryant on 04/06/2023 to discuss penectomy; however, the patient elected not to have surgery and wanted to undergo chemo and radiation treatment.    Oncology History   Urethral cancer   4/6/2023 Initial Diagnosis    Urethral cancer     4/25/2023 Cancer Staged    Staging form: Male Penile Urethra and Female Urethra, AJCC 8th Edition  - Clinical: Stage III (cT3, cN0, cM0)     5/29/2023 -  Chemotherapy    Treatment Summary   Plan Name: OP BLADDER GEMCITABINE 27MG/M2 TWICE WEEKLY WITH RADIATION  Treatment Goal: Curative  Status: Active  Start Date: 5/29/2023  End Date: 6/26/2023 (Planned)  Provider: Ayush Guzman MD  Chemotherapy: gemcitabine (GEMZAR) 47 mg in sodium chloride 0.9% SolP 116.2 mL chemo infusion, 27 mg/m2 = 47 mg, Intravenous, Clinic/HOD 1 time, 1 of 1 cycle  Administration: 47 mg (5/29/2023), 45 mg (6/1/2023), 45 mg (6/5/2023), 45 mg (6/9/2023)        Past Medical History:   Past Medical History:   Diagnosis Date    Anticoagulant long-term use     Arthritis     Coronary artery disease     Dr. Lamb    DDD (degenerative disc disease), cervical     Degenerative disc disease     Heart murmur     History of radiation therapy 2020    Hypertension     Nontoxic multinodular goiter 09/20/2016    Prostate CA     RA (rheumatoid arthritis)     Sleep apnea     No CPAP    Vitamin D insufficiency 09/30/2016       Past Surgical HIstory:   Past Surgical History:   Procedure Laterality Date    CARPAL TUNNEL RELEASE Right 05/28/2021    Procedure: RELEASE, CARPAL TUNNEL;  Surgeon: Jose Ryan II, MD;  Location: [facility];  Service: Orthopedics;  Laterality: Right;    COLONOSCOPY  prior to 2016    normal findings per patient  report    CORONARY ANGIOPLASTY WITH STENT PLACEMENT  02/2022    CYSTOSCOPY N/A 12/18/2018    Procedure: CYSTOSCOPY;  Surgeon: Garrett Pina MD;  Location: [facility] OR;  Service: Urology;  Laterality: N/A;    CYSTOSCOPY N/A 07/12/2022    Procedure: CYSTOSCOPY;  Surgeon: Garrett Pina MD;  Location: [facility] OR;  Service: Urology;  Laterality: N/A;    ESOPHAGOGASTRODUODENOSCOPY N/A 09/29/2020    Dr. Espinosa; empiric dilation; erythematous mucosa in antrum; gastric mucosal atrophy; hematin in entire stomach; biopsy: mid & distal esophagus WNL, stomach- WNL, negative for h pylori    EXCISION OF LESION OF PENIS N/A 2/23/2023    Procedure: EXCISION, LESION, PENIS;  Surgeon: Timo Myers MD;  Location: [facility];  Service: Urology;  Laterality: N/A;    INSERTION OF TUNNELED CENTRAL VENOUS CATHETER (CVC) WITH SUBCUTANEOUS PORT Left 5/18/2023    Procedure: [id number];  Surgeon: Atul Faria MD;  Location: [facility];  Service: General;  Laterality: Left;  left subclavian    PARATHYROIDECTOMY Right 10/27/2020    Procedure: PARATHYROIDECTOMY;  Surgeon: Latonia Clayton MD;  Location: [address];  Service: ENT;  Laterality: Right;    RELEASE OF ULNAR NERVE AT CUBITAL TUNNEL Right 05/28/2021    Procedure: RELEASE, ULNAR TUNNEL;  Surgeon: Jose Ryan II, MD;  Location: [facility] OR;  Service: Orthopedics;  Laterality: Right;    TRANSRECTAL BIOPSY OF PROSTATE WITH ULTRASOUND GUIDANCE N/A 12/18/2018    Procedure: BIOPSY, PROSTATE, RECTAL APPROACH, WITH US GUIDANCE;  Surgeon: Garrett Pina MD;  Location: [facility] OR;  Service: Urology;  Laterality: N/A;    TRANSRECTAL ULTRASOUND EXAMINATION N/A 07/12/2022    Procedure: ULTRASOUND, RECTAL APPROACH;  Surgeon: Garrett Pina MD;  Location: [facility];  Service: Urology;  Laterality: N/A;       Family History:   Family History   Problem Relation Age of Onset    Heart disease Mother     Stroke Father     Drug abuse Sister     No Known Problems Daughter     No  Known Problems Daughter     No Known Problems Son     Diabetes Maternal Uncle     Colon cancer Neg Hx     Crohn's disease Neg Hx     Esophageal cancer Neg Hx     Stomach cancer Neg Hx     Ulcerative colitis Neg Hx        Social History:  reports that he quit smoking about 21 years ago. His smoking use included cigarettes. He has a 2.50 pack-year smoking history. He has been exposed to tobacco smoke. He has never used smokeless tobacco. He reports that he does not currently use alcohol. He reports that he does not currently use drugs after having used the following drugs: Marijuana. Frequency: 8.00 times per week.    Allergies:  Review of patient's allergies indicates:  No Known Allergies    Medications:  Current Outpatient Medications   Medication Sig Dispense Refill    alfuzosin (UROXATRAL) 10 mg Tb24 Take 10 mg by mouth.      amLODIPine (NORVASC) 5 MG tablet Take 1 tablet (5 mg total) by mouth 2 (two) times daily. 60 tablet 3    aspirin (ECOTRIN) 81 MG EC tablet Take 1 tablet by mouth once daily.      atorvastatin (LIPITOR) 20 MG tablet Take 20 mg by mouth once daily.      b complex vitamins capsule Take 1 capsule by mouth once daily.      busPIRone (BUSPAR) 10 MG tablet Take 1 tablet (10 mg total) by mouth 3 (three) times daily. 90 tablet 3    carvediloL (COREG) 25 MG tablet Take 25 mg by mouth.      clopidogreL (PLAVIX) 75 mg tablet Take 1 tablet (75 mg total) by mouth once daily. Pt not sure of dose      EScitalopram oxalate (LEXAPRO) 10 MG tablet Take 10 mg by mouth once daily.      folic acid (FOLVITE) 1 MG tablet Take 1 tablet (1 mg total) by mouth once daily. 90 tablet 3    gabapentin (NEURONTIN) 300 MG capsule Take 300 mg by mouth 3 (three) times daily.      HYDROcodone-acetaminophen (NORCO) 5-325 mg per tablet Take 1 tablet by mouth every 6 (six) hours as needed for Pain. 20 tablet 0    isosorbide mononitrate (IMDUR) 30 MG 24 hr tablet Take 30 mg by mouth.      LIDOcaine HCL 2% (XYLOCAINE) 2 % jelly Apply  to affected area (tip of penis) daily prn 30 mL 1    LIDOcaine-prilocaine (EMLA) cream Apply topically as needed. Apply to PORT 15 minutes prior to PORT access 30 g 3    methotrexate 2.5 MG Tab Take 6 tablets (15 mg total) by mouth every 7 days. TAKE 6 TABLETS BY MOUTH EVERY WEEK  STOP UNTIL INSTRUCTED BY MD (Patient not taking: Reported on 6/7/2023) 72 tablet 2    naloxone (NARCAN) 4 mg/actuation Spry 4mg by nasal route as needed for opioid overdose; may repeat every 2-3 minutes in alternating nostrils until medical help arrives. Call 911 1 each 11    ondansetron (ZOFRAN-ODT) 4 MG TbDL Take 1 tablet (4 mg total) by mouth every 6 (six) hours as needed (nausea). 60 tablet 6    oxybutynin (DITROPAN-XL) 5 MG TR24 Take 1 tablet (5 mg total) by mouth once daily. 30 tablet 11    oxyCODONE (ROXICODONE) 10 mg Tab immediate release tablet Take 1 tablet (10 mg total) by mouth every 4 (four) hours as needed for Pain. 30 tablet 0    pantoprazole (PROTONIX) 40 MG tablet TAKE 1 TABLET(40 MG) BY MOUTH TWICE DAILY (Patient taking differently: Take 40 mg by mouth once daily.) 180 tablet 0    phenazopyridine (PYRIDIUM) 200 MG tablet Take 1 tablet (200 mg total) by mouth 3 (three) times daily as needed for Pain. 90 tablet 3    tamsulosin (FLOMAX) 0.4 mg Cap Take 1 capsule (0.4 mg total) by mouth once daily. 30 capsule 3    temazepam (RESTORIL) 15 mg Cap Take 1 capsule (15 mg total) by mouth nightly as needed. 30 capsule 1    traMADoL (ULTRAM) 50 mg tablet Take 2 tablets (100 mg total) by mouth 2 (two) times daily as needed for Pain. 120 tablet 5     No current facility-administered medications for this visit.     Facility-Administered Medications Ordered in Other Visits   Medication Dose Route Frequency Provider Last Rate Last Admin    albuterol nebulizer solution 2.5 mg  2.5 mg Nebulization Q15 Min PRN Pastor Saleh MD        albuterol-ipratropium 2.5 mg-0.5 mg/3 mL nebulizer solution 3 mL  3 mL Nebulization PRN Pastor Saleh  "MD        denosumab (PROLIA) injection 60 mg  60 mg Subcutaneous 1 time in Clinic/HOD Jean Carlos Hoffman MD        diphenhydrAMINE injection 25 mg  25 mg Intravenous Q6H PRN Pastor Saleh MD        HYDROmorphone injection 0.5 mg  0.5 mg Intravenous Q5 Min PRN Pastor Saleh MD   0.5 mg at 02/23/23 1312    lactated ringers infusion   Intravenous Continuous Jacqueline Volpi-Abadie, MD   Stopped at 02/23/23 1400    ondansetron injection 4 mg  4 mg Intravenous Q15 Min PRN Pastor Saleh MD        sodium chloride 0.9% flush 10 mL  10 mL Intravenous PRN Ayush Guzman MD   10 mL at 06/01/23 1535    sodium chloride 0.9% flush 10 mL  10 mL Intravenous PRN Ayush Guzman MD   10 mL at 06/05/23 1315    sodium chloride 0.9% flush 3 mL  3 mL Intravenous PRN Pastor Saleh MD           Review of Systems   Constitutional:  Negative for appetite change, chills and fever.   HENT:  Negative for mouth sores and trouble swallowing.    Respiratory:  Negative for cough and shortness of breath.    Cardiovascular:  Negative for chest pain, palpitations and leg swelling.   Gastrointestinal:  Negative for abdominal pain, diarrhea, nausea and vomiting.   Genitourinary:  Positive for dysuria. Negative for hematuria.   Musculoskeletal:  Positive for arthralgias. Negative for joint swelling and myalgias.   Skin:  Negative for rash.   Neurological:  Negative for headaches.   Hematological:  Negative for adenopathy. Does not bruise/bleed easily.   Psychiatric/Behavioral:  The patient is not nervous/anxious.      ECOG Performance Status:   ECOG SCORE             Objective:      Vitals:   Vitals:    06/12/23 1511   BP: (!) 156/79   BP Location: Left arm   Patient Position: Sitting   BP Method: Medium (Automatic)   Pulse: 87   Resp: 16   Temp: 97.3 °F (36.3 °C)   TempSrc: Temporal   SpO2: 97%   Weight: 60.7 kg (133 lb 13.1 oz)   Height: 5' 8" (1.727 m)       BMI: Body mass index is 20.35 kg/m².    Physical Exam  Vitals reviewed. "   Constitutional:       General: He is not in acute distress.     Appearance: He is not diaphoretic.   HENT:      Head: Normocephalic and atraumatic.      Mouth/Throat:      Mouth: Mucous membranes are moist.      Pharynx: No posterior oropharyngeal erythema.   Eyes:      General: No scleral icterus.  Cardiovascular:      Rate and Rhythm: Normal rate and regular rhythm.      Heart sounds: Normal heart sounds. No murmur heard.  Pulmonary:      Effort: Pulmonary effort is normal. No respiratory distress.      Breath sounds: No wheezing.   Abdominal:      General: Bowel sounds are normal. There is no distension.      Tenderness: There is no abdominal tenderness. There is no guarding.   Musculoskeletal:      Cervical back: No tenderness.      Right lower leg: No edema.      Left lower leg: No edema.   Lymphadenopathy:      Cervical: No cervical adenopathy.   Skin:     General: Skin is warm.      Coloration: Skin is not jaundiced.      Findings: No rash.   Neurological:      Mental Status: He is alert and oriented to person, place, and time.   Psychiatric:         Behavior: Behavior normal.       Laboratory Data:  Lab Results   Component Value Date    WBC 4.48 06/12/2023    HGB 11.4 (L) 06/12/2023    HCT 35.7 (L) 06/12/2023    MCV 89 06/12/2023     06/12/2023          Imaging: EXAMINATION:  NM PET CT ROUTINE     CLINICAL HISTORY:  possible urethral/penile cancer, eval for mets; Malignant neoplasm of prostate     TECHNIQUE:  13.25 mCi of F18-FDG was administered intravenously in the left antecubital fossa.  After an approximately 60 min distribution time, PET/CT images were acquired from the skull base to mid thigh.  Transmission images were acquired to correct for attenuation using a whole body low-dose CT scan with oral contrast and without IV contrast with the arms positioned above the head. Glycemia at the time of injection was 100 mg/dL.     COMPARISON:  CT chest abdomen pelvis 02/27/2023      FINDINGS:  Quality of the study: Adequate.     In the head and neck, there are no hypermetabolic lesions worrisome for malignancy. There are no hypermetabolic mucosal lesions, and there are no pathologically enlarged or hypermetabolic lymph nodes.  Increased FDG tracer uptake in the left paraspinal musculature likely physiological muscle uptake.     In the chest, there are no hypermetabolic lesions worrisome for malignancy.  There are no pathologically enlarged or hypermetabolic lymph nodes.  Stable bilateral pulmonary nodules on the right measuring 5 mm and on the left measure 4 mm (series 3, image 64 and 103).     In the abdomen and pelvis, there is focally increased FDG tracer uptake of the distal aspect of the penis measuring 1.4 cm with SUV max of 27 (fused image 264).     There is otherwise physiologic tracer distribution within the abdominal organs including prominent physiologic anal sphincter tone and excretion into the genitourinary system.  No pathologically enlarged or hypermetabolic inguinal lymph nodes.     In the bones, there are no hypermetabolic lesions worrisome for malignancy.     In the extremities, there are no hypermetabolic lesions worrisome for malignancy.     CT chest abdomen pelvis findings:     Right lower neck parathyroidectomy surgical changes.  Heart is normal in size with small pericardial effusion.  Multivessel coronaries calcific atherosclerosis.  Moderate aortoiliac calcific atherosclerosis without aneurysm.  Centrilobular emphysema.  Dependent bibasal atelectatic changes versus scarring.  Liver is normal in size measuring 14 cm without focal lesion.  Spleen is normal in size with numerous calcified granulomas.  Small splenules .  Degenerative changes of the spine most prominent at the cervical spine.  Stable T6 vertebral body height loss (series 604, image 81).     Impression:     There is hypermetabolic lesion in the distal penis measuring approximately 1.4 cm, positive for  squamous cell carcinoma on recent biopsy.  No evidence of lymph node metastases.     Stable bilateral subcentimeter pulmonary nodules that are too small to characterize reliably by PET.  Given evidence of old granulomatous disease, noncalcified granulomas are a consideration.  Attention on follow-up.     Additional findings as above.     I, Jey Griffith MD, attest that I reviewed and interpreted the images.     Electronically signed by resident: Juanito Tse  Date:                                            04/04/2023  Time:                                           06:30     Electronically signed by: Jey Griffith  Date:                                            04/04/2023  Time:                                           10:09   Assessment:       1. Urethral cancer    2. Stage 3a chronic kidney disease    3. Coronary artery disease involving native coronary artery of native heart without angina pectoris    4. Hypertension, unspecified type    5. Rheumatoid arthritis involving multiple sites, unspecified whether rheumatoid factor present    6. Insomnia, unspecified type         Plan:   Urethral Cancer   -Pt has [id number] cancer of the urethra  -Pt discussed at tumor board on 4/04/23 with recommendation for penectomy  -Pt met with Dr Bryant on 4/06/23 and refused penectomy and elected for chemo/XRT  -Given pt's borderline kidney function with CrCl near 30 on recent lab work, pt to receive biweekly Gemcitabine dosed 27mg/mm2 on days 1 and 4 of each week  -Proceed with C1D15 Gemzar today   -Keep appointment with me on 6/15 as planned, labs prior      CKD IIIb   -pt's baseline creatinine is 1.2-1.7  -Cr 1.4 on 6/12/23     CAD    -pt with stent in place  -Pt follows with Dr Lamb in cardiology  -Pt on ASA, plavix, lipitor, antihypertensives  -Management per cardiology     HTN   -pt on amlodipine  -BP slightly increased  -Will monitor     Rheumatoid Arthritis   -pt has stopped MTX  -Dr Hart contacted for recommendation on  alternative medicine while pt is getting chemo/XRT  -Oxycodone 10 mg escribed to assist with pain unrelieved with Hydrocodone/Tramadol; no need for refills today  -Management per rheumatolgoy     Insomnia   -pt to take temazepam, did not start last week  -Started taking Temazepam and this is giving him some relief        Assessment/Plan reviewed and approved by Dr. Guzman  30 minutes were spent in coordination of patient's care, record review and counseling.    Route Chart for Scheduling    Med Onc Chart Routing      Follow up with physician    Follow up with [facility] Other. Keep appointment in 2 days with me, labs prior to appointment   Infusion scheduling note   Can proceed with D15 of treatment today once CMP results   Injection scheduling note    Labs    Imaging    Pharmacy appointment    Other referrals            Treatment Plan Information   OP BLADDER GEMCITABINE 27MG/M2 TWICE WEEKLY WITH RADIATION   Ayush Guzman MD   Upcoming Treatment Dates - OP BLADDER GEMCITABINE 27MG/M2 TWICE WEEKLY WITH RADIATION    6/12/2023       Chemotherapy       gemcitabine (GEMZAR) 47 mg in sodium chloride 0.9% SolP 100 mL chemo infusion       Antiemetics       ondansetron injection 8 mg  6/15/2023       Chemotherapy       gemcitabine (GEMZAR) 47 mg in sodium chloride 0.9% SolP 100 mL chemo infusion       Antiemetics       ondansetron injection 8 mg  6/19/2023       Chemotherapy       gemcitabine (GEMZAR) 47 mg in sodium chloride 0.9% SolP 100 mL chemo infusion       Antiemetics       ondansetron injection 8 mg  6/22/2023       Chemotherapy       gemcitabine (GEMZAR) 47 mg in sodium chloride 0.9% SolP 100 mL chemo infusion       Antiemetics       ondansetron injection 8 mg    Therapy Plan Information  DENOSUMAB (PROLIA) Q6M  Medications  denosumab (PROLIA) injection 60 mg  60 mg, Subcutaneous, Every 26 weeks    PORT FLUSH  Flushes  heparin, porcine (PF) 100 unit/mL injection flush 500 Units  500 Units, Intravenous, Every  visit  sodium chloride 0.9% flush 10 mL  10 mL, Intravenous, Every visit

## 2023-06-12 NOTE — TELEPHONE ENCOUNTER
Called Patient and left voicemail.----- Message from Kenrick Hart MD sent at 6/12/2023  8:49 AM CDT -----  Regarding: RE: RA flare - needs phone call and/or appt  He may resume the methotrexate without seeing me.  Does he need a new prescription?  ----- Message -----  From: Darren Denson MA  Sent: 6/9/2023   8:42 AM CDT  To: Kenrick Hart MD  Subject: FW: RA flare - needs phone call and/or appt        ----- Message -----  From: Josie Loving RN  Sent: 6/8/2023   2:26 PM CDT  To: [facility] Staff  Subject: RA flare - needs phone call and/or appt          This patient has stopped MTX while getting radiation but is starting to have trouble with his RA.  He would like an appt.  Dr. Guzman and Dr. Mahan have messaged previously.  Thanks!

## 2023-06-13 PROCEDURE — 77014 PR  CT GUIDANCE PLACEMENT RAD THERAPY FIELDS: CPT | Mod: 26,,, | Performed by: RADIOLOGY

## 2023-06-13 PROCEDURE — 77014 PR  CT GUIDANCE PLACEMENT RAD THERAPY FIELDS: ICD-10-PCS | Mod: 26,,, | Performed by: RADIOLOGY

## 2023-06-13 PROCEDURE — 77386 HC IMRT, COMPLEX: CPT | Mod: PN | Performed by: RADIOLOGY

## 2023-06-13 PROCEDURE — 77014 HC CT GUIDANCE RADIATION THERAPY FLDS PLACEMENT: CPT | Mod: TC,PN | Performed by: RADIOLOGY

## 2023-06-14 ENCOUNTER — INFUSION (OUTPATIENT)
Dept: INFUSION THERAPY | Facility: HOSPITAL | Age: 78
End: 2023-06-14
Attending: INTERNAL MEDICINE
Payer: MEDICARE

## 2023-06-14 ENCOUNTER — TELEPHONE (OUTPATIENT)
Dept: RHEUMATOLOGY | Facility: CLINIC | Age: 78
End: 2023-06-14
Payer: MEDICARE

## 2023-06-14 ENCOUNTER — TELEPHONE (OUTPATIENT)
Dept: INFUSION THERAPY | Facility: HOSPITAL | Age: 78
End: 2023-06-14
Payer: MEDICARE

## 2023-06-14 DIAGNOSIS — C68.0 URETHRAL CANCER: Primary | ICD-10-CM

## 2023-06-14 LAB
ALBUMIN SERPL BCP-MCNC: 3.1 G/DL (ref 3.5–5.2)
ALP SERPL-CCNC: 71 U/L (ref 55–135)
ALT SERPL W/O P-5'-P-CCNC: 9 U/L (ref 10–44)
ANION GAP SERPL CALC-SCNC: 11 MMOL/L (ref 8–16)
ANISOCYTOSIS BLD QL SMEAR: SLIGHT
AST SERPL-CCNC: 13 U/L (ref 10–40)
BASOPHILS # BLD AUTO: 0.01 K/UL (ref 0–0.2)
BASOPHILS NFR BLD: 0.2 % (ref 0–1.9)
BILIRUB SERPL-MCNC: 0.7 MG/DL (ref 0.1–1)
BUN SERPL-MCNC: 16 MG/DL (ref 8–23)
CALCIUM SERPL-MCNC: 8.3 MG/DL (ref 8.7–10.5)
CHLORIDE SERPL-SCNC: 110 MMOL/L (ref 95–110)
CO2 SERPL-SCNC: 21 MMOL/L (ref 23–29)
CREAT SERPL-MCNC: 1.4 MG/DL (ref 0.5–1.4)
DIFFERENTIAL METHOD: ABNORMAL
EOSINOPHIL # BLD AUTO: 0 K/UL (ref 0–0.5)
EOSINOPHIL NFR BLD: 0.9 % (ref 0–8)
ERYTHROCYTE [DISTWIDTH] IN BLOOD BY AUTOMATED COUNT: 16.2 % (ref 11.5–14.5)
EST. GFR  (NO RACE VARIABLE): 52.4 ML/MIN/1.73 M^2
GLUCOSE SERPL-MCNC: 130 MG/DL (ref 70–110)
HCT VFR BLD AUTO: 35.4 % (ref 40–54)
HGB BLD-MCNC: 11.2 G/DL (ref 14–18)
HYPOCHROMIA BLD QL SMEAR: ABNORMAL
IMM GRANULOCYTES # BLD AUTO: 0.02 K/UL (ref 0–0.04)
IMM GRANULOCYTES NFR BLD AUTO: 0.4 % (ref 0–0.5)
LYMPHOCYTES # BLD AUTO: 0.4 K/UL (ref 1–4.8)
LYMPHOCYTES NFR BLD: 8.1 % (ref 18–48)
MCH RBC QN AUTO: 28.7 PG (ref 27–31)
MCHC RBC AUTO-ENTMCNC: 31.6 G/DL (ref 32–36)
MCV RBC AUTO: 91 FL (ref 82–98)
MONOCYTES # BLD AUTO: 0.1 K/UL (ref 0.3–1)
MONOCYTES NFR BLD: 1.1 % (ref 4–15)
NEUTROPHILS # BLD AUTO: 4 K/UL (ref 1.8–7.7)
NEUTROPHILS NFR BLD: 89.3 % (ref 38–73)
NRBC BLD-RTO: 0 /100 WBC
PLATELET # BLD AUTO: 166 K/UL (ref 150–450)
PLATELET BLD QL SMEAR: ABNORMAL
PMV BLD AUTO: 10.9 FL (ref 9.2–12.9)
POIKILOCYTOSIS BLD QL SMEAR: SLIGHT
POTASSIUM SERPL-SCNC: 4.5 MMOL/L (ref 3.5–5.1)
PROT SERPL-MCNC: 7.6 G/DL (ref 6–8.4)
RBC # BLD AUTO: 3.9 M/UL (ref 4.6–6.2)
SODIUM SERPL-SCNC: 142 MMOL/L (ref 136–145)
WBC # BLD AUTO: 4.46 K/UL (ref 3.9–12.7)

## 2023-06-14 PROCEDURE — A4216 STERILE WATER/SALINE, 10 ML: HCPCS | Mod: PN | Performed by: INTERNAL MEDICINE

## 2023-06-14 PROCEDURE — 85025 COMPLETE CBC W/AUTO DIFF WBC: CPT | Mod: PN | Performed by: INTERNAL MEDICINE

## 2023-06-14 PROCEDURE — 25000003 PHARM REV CODE 250: Mod: PN | Performed by: INTERNAL MEDICINE

## 2023-06-14 PROCEDURE — 80053 COMPREHEN METABOLIC PANEL: CPT | Mod: PN | Performed by: INTERNAL MEDICINE

## 2023-06-14 PROCEDURE — 77014 HC CT GUIDANCE RADIATION THERAPY FLDS PLACEMENT: CPT | Mod: TC,PN | Performed by: RADIOLOGY

## 2023-06-14 PROCEDURE — 77386 HC IMRT, COMPLEX: CPT | Mod: PN | Performed by: RADIOLOGY

## 2023-06-14 PROCEDURE — 77014 PR  CT GUIDANCE PLACEMENT RAD THERAPY FIELDS: ICD-10-PCS | Mod: 26,,, | Performed by: RADIOLOGY

## 2023-06-14 PROCEDURE — 77014 PR  CT GUIDANCE PLACEMENT RAD THERAPY FIELDS: CPT | Mod: 26,,, | Performed by: RADIOLOGY

## 2023-06-14 PROCEDURE — 36591 DRAW BLOOD OFF VENOUS DEVICE: CPT | Mod: PN

## 2023-06-14 RX ORDER — HEPARIN 100 UNIT/ML
500 SYRINGE INTRAVENOUS
OUTPATIENT
Start: 2023-06-14

## 2023-06-14 RX ORDER — HYDROXYCHLOROQUINE SULFATE 200 MG/1
400 TABLET, FILM COATED ORAL DAILY
Qty: 60 TABLET | Refills: 6 | Status: SHIPPED | OUTPATIENT
Start: 2023-06-14 | End: 2023-08-16

## 2023-06-14 RX ORDER — PREDNISONE 5 MG/1
10 TABLET ORAL 2 TIMES DAILY
Qty: 120 TABLET | Refills: 3 | Status: ON HOLD | OUTPATIENT
Start: 2023-06-14 | End: 2023-10-23 | Stop reason: SDUPTHER

## 2023-06-14 RX ORDER — SODIUM CHLORIDE 0.9 % (FLUSH) 0.9 %
10 SYRINGE (ML) INJECTION
Status: DISCONTINUED | OUTPATIENT
Start: 2023-06-14 | End: 2023-06-14 | Stop reason: HOSPADM

## 2023-06-14 RX ORDER — SODIUM CHLORIDE 0.9 % (FLUSH) 0.9 %
10 SYRINGE (ML) INJECTION
OUTPATIENT
Start: 2023-06-14

## 2023-06-14 RX ADMIN — Medication 10 ML: at 02:06

## 2023-06-14 NOTE — TELEPHONE ENCOUNTER
----- Message from Darren Denson MA sent at 6/9/2023  8:42 AM CDT -----  Regarding: FW: RA flare - needs phone call and/or appt    ----- Message -----  From: Josie Loving RN  Sent: 6/8/2023   2:26 PM CDT  To: [facility] Staff  Subject: RA flare - needs phone call and/or appt          This patient has stopped MTX while getting radiation but is starting to have trouble with his RA.  He would like an appt.  Dr. Guzman and Dr. Mahan have messaged previously.  Thanks!

## 2023-06-14 NOTE — TELEPHONE ENCOUNTER
He is had a flare of his arthritis since he has been off methotrexate.  He is finished his radiation but is starting chemotherapy.  He is had joint pain and swelling.  I will place him on prednisone 20 mg daily.  I will also place him on Plaquenil 400 mg daily.  If he is not better by next week, he is to contact me.  I wish to see him in follow-up in 2 months.

## 2023-06-15 ENCOUNTER — INFUSION (OUTPATIENT)
Dept: INFUSION THERAPY | Facility: HOSPITAL | Age: 78
End: 2023-06-15
Attending: INTERNAL MEDICINE
Payer: MEDICARE

## 2023-06-15 ENCOUNTER — DOCUMENTATION ONLY (OUTPATIENT)
Dept: RADIATION ONCOLOGY | Facility: CLINIC | Age: 78
End: 2023-06-15
Payer: MEDICARE

## 2023-06-15 ENCOUNTER — OFFICE VISIT (OUTPATIENT)
Dept: HEMATOLOGY/ONCOLOGY | Facility: CLINIC | Age: 78
End: 2023-06-15
Payer: MEDICARE

## 2023-06-15 VITALS
RESPIRATION RATE: 16 BRPM | SYSTOLIC BLOOD PRESSURE: 170 MMHG | BODY MASS INDEX: 19.78 KG/M2 | HEART RATE: 76 BPM | OXYGEN SATURATION: 96 % | DIASTOLIC BLOOD PRESSURE: 66 MMHG | WEIGHT: 130.5 LBS | HEIGHT: 68 IN | TEMPERATURE: 97 F

## 2023-06-15 VITALS
WEIGHT: 130.5 LBS | RESPIRATION RATE: 16 BRPM | SYSTOLIC BLOOD PRESSURE: 160 MMHG | OXYGEN SATURATION: 96 % | HEIGHT: 68 IN | DIASTOLIC BLOOD PRESSURE: 80 MMHG | HEART RATE: 90 BPM | TEMPERATURE: 97 F | BODY MASS INDEX: 19.78 KG/M2

## 2023-06-15 DIAGNOSIS — I10 HYPERTENSION, UNSPECIFIED TYPE: ICD-10-CM

## 2023-06-15 DIAGNOSIS — G47.00 INSOMNIA, UNSPECIFIED TYPE: ICD-10-CM

## 2023-06-15 DIAGNOSIS — C68.0 URETHRAL CANCER: Primary | ICD-10-CM

## 2023-06-15 DIAGNOSIS — I25.10 CORONARY ARTERY DISEASE INVOLVING NATIVE CORONARY ARTERY OF NATIVE HEART WITHOUT ANGINA PECTORIS: ICD-10-CM

## 2023-06-15 DIAGNOSIS — M06.9 RHEUMATOID ARTHRITIS INVOLVING MULTIPLE SITES, UNSPECIFIED WHETHER RHEUMATOID FACTOR PRESENT: ICD-10-CM

## 2023-06-15 DIAGNOSIS — N18.31 STAGE 3A CHRONIC KIDNEY DISEASE: ICD-10-CM

## 2023-06-15 PROCEDURE — 3288F FALL RISK ASSESSMENT DOCD: CPT | Mod: CPTII,S$GLB,,

## 2023-06-15 PROCEDURE — 3288F PR FALLS RISK ASSESSMENT DOCUMENTED: ICD-10-PCS | Mod: CPTII,S$GLB,,

## 2023-06-15 PROCEDURE — 1101F PT FALLS ASSESS-DOCD LE1/YR: CPT | Mod: CPTII,S$GLB,,

## 2023-06-15 PROCEDURE — 63600175 PHARM REV CODE 636 W HCPCS: Mod: PN

## 2023-06-15 PROCEDURE — A4216 STERILE WATER/SALINE, 10 ML: HCPCS | Mod: PN

## 2023-06-15 PROCEDURE — 3077F PR MOST RECENT SYSTOLIC BLOOD PRESSURE >= 140 MM HG: ICD-10-PCS | Mod: CPTII,S$GLB,,

## 2023-06-15 PROCEDURE — 99999 PR PBB SHADOW E&M-EST. PATIENT-LVL V: ICD-10-PCS | Mod: PBBFAC,,,

## 2023-06-15 PROCEDURE — 99214 OFFICE O/P EST MOD 30 MIN: CPT | Mod: S$GLB,,,

## 2023-06-15 PROCEDURE — 99999 PR PBB SHADOW E&M-EST. PATIENT-LVL V: CPT | Mod: PBBFAC,,,

## 2023-06-15 PROCEDURE — 1125F PR PAIN SEVERITY QUANTIFIED, PAIN PRESENT: ICD-10-PCS | Mod: CPTII,S$GLB,,

## 2023-06-15 PROCEDURE — 77014 HC CT GUIDANCE RADIATION THERAPY FLDS PLACEMENT: CPT | Mod: TC,PN | Performed by: RADIOLOGY

## 2023-06-15 PROCEDURE — 3077F SYST BP >= 140 MM HG: CPT | Mod: CPTII,S$GLB,,

## 2023-06-15 PROCEDURE — 96413 CHEMO IV INFUSION 1 HR: CPT | Mod: PN

## 2023-06-15 PROCEDURE — 1101F PR PT FALLS ASSESS DOC 0-1 FALLS W/OUT INJ PAST YR: ICD-10-PCS | Mod: CPTII,S$GLB,,

## 2023-06-15 PROCEDURE — 1125F AMNT PAIN NOTED PAIN PRSNT: CPT | Mod: CPTII,S$GLB,,

## 2023-06-15 PROCEDURE — 77014 PR  CT GUIDANCE PLACEMENT RAD THERAPY FIELDS: CPT | Mod: 26,,, | Performed by: RADIOLOGY

## 2023-06-15 PROCEDURE — 25000003 PHARM REV CODE 250: Mod: PN

## 2023-06-15 PROCEDURE — 77386 HC IMRT, COMPLEX: CPT | Mod: PN | Performed by: RADIOLOGY

## 2023-06-15 PROCEDURE — 99214 PR OFFICE/OUTPT VISIT, EST, LEVL IV, 30-39 MIN: ICD-10-PCS | Mod: S$GLB,,,

## 2023-06-15 PROCEDURE — 3079F DIAST BP 80-89 MM HG: CPT | Mod: CPTII,S$GLB,,

## 2023-06-15 PROCEDURE — 77014 PR  CT GUIDANCE PLACEMENT RAD THERAPY FIELDS: ICD-10-PCS | Mod: 26,,, | Performed by: RADIOLOGY

## 2023-06-15 PROCEDURE — 3079F PR MOST RECENT DIASTOLIC BLOOD PRESSURE 80-89 MM HG: ICD-10-PCS | Mod: CPTII,S$GLB,,

## 2023-06-15 RX ORDER — ONDANSETRON 4 MG/1
4 TABLET, ORALLY DISINTEGRATING ORAL EVERY 6 HOURS PRN
Qty: 60 TABLET | Refills: 6 | Status: SHIPPED | OUTPATIENT
Start: 2023-06-15 | End: 2023-08-16 | Stop reason: ALTCHOICE

## 2023-06-15 RX ORDER — SODIUM CHLORIDE 0.9 % (FLUSH) 0.9 %
10 SYRINGE (ML) INJECTION
Status: CANCELLED | OUTPATIENT
Start: 2023-06-15

## 2023-06-15 RX ORDER — ONDANSETRON 2 MG/ML
8 INJECTION INTRAMUSCULAR; INTRAVENOUS
Status: COMPLETED | OUTPATIENT
Start: 2023-06-15 | End: 2023-06-15

## 2023-06-15 RX ORDER — HEPARIN 100 UNIT/ML
500 SYRINGE INTRAVENOUS
Status: CANCELLED | OUTPATIENT
Start: 2023-06-15

## 2023-06-15 RX ORDER — HEPARIN 100 UNIT/ML
500 SYRINGE INTRAVENOUS
Status: DISCONTINUED | OUTPATIENT
Start: 2023-06-15 | End: 2023-06-15 | Stop reason: HOSPADM

## 2023-06-15 RX ORDER — ONDANSETRON 2 MG/ML
8 INJECTION INTRAMUSCULAR; INTRAVENOUS
Status: CANCELLED | OUTPATIENT
Start: 2023-06-15

## 2023-06-15 RX ORDER — SODIUM CHLORIDE 0.9 % (FLUSH) 0.9 %
10 SYRINGE (ML) INJECTION
Status: DISCONTINUED | OUTPATIENT
Start: 2023-06-15 | End: 2023-06-15 | Stop reason: HOSPADM

## 2023-06-15 RX ADMIN — GEMCITABINE 47 MG: 38 INJECTION, SOLUTION INTRAVENOUS at 03:06

## 2023-06-15 RX ADMIN — ONDANSETRON 8 MG: 2 INJECTION INTRAMUSCULAR; INTRAVENOUS at 03:06

## 2023-06-15 RX ADMIN — SODIUM CHLORIDE: 9 INJECTION, SOLUTION INTRAVENOUS at 03:06

## 2023-06-15 RX ADMIN — Medication 10 ML: at 04:06

## 2023-06-15 NOTE — PLAN OF CARE
Problem: Adult Inpatient Plan of Care  Goal: Plan of Care Review  Outcome: Ongoing, Progressing  Goal: Patient-Specific Goal (Individualized)  Outcome: Ongoing, Progressing     Problem: Fatigue  Goal: Improved Activity Tolerance  Outcome: Ongoing, Progressing   .Pt tolerated gemzar infusion well.  No adverse reaction noted.   Port flushed with NS and de-accessed per protocol.  Patient left clinic in no acute distress.

## 2023-06-15 NOTE — PROGRESS NOTES
PATIENT: Rajendra Castle  MRN: [id number]  DATE: 6/15/2023      Diagnosis:   1. Urethral cancer    2. Stage 3a chronic kidney disease    3. Coronary artery disease involving native coronary artery of native heart without angina pectoris    4. Hypertension, unspecified type    5. Rheumatoid arthritis involving multiple sites, unspecified whether rheumatoid factor present    6. Insomnia, unspecified type        Chief Complaint: Urethral cancer (Follow up with labs and tx)    Subjective:   HPI: Mr. Castle is a 77 y.o. male Arthritis, CAD, HTN, RA, ISABEL, Vitamin D Deficiency who presents for urethral cancer and consideration of C1D18 of biweekly Gemcitabine with XRT.     MTX remains on hold but no new medications/treatment has been started. He does endorse arthralgias in his hands and knees. Dr. Hart has prescribed Prednisone 20 mg daily with Plaquenil 400 mg daily, the patient has not picked up the prescription from the pharmacy yet.   Did have some nausea after the last treatment, has not picked up any of his antiemetics from pharmacy.   The patient denies CP, cough, SOB, abdominal pain, vomiting, constipation, diarrhea.  The patient denies fever, chills, night sweats, weight loss, new lumps or bumps, easy bruising or bleeding.    Oncology History:   The patient underwent cystoscopy on 02/10/2023 showing a 1 cm firm mass along the left glands of the penis near the meatus.  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  CT of the chest, abdomen, and pelvis on 02/27/2023 showed a stable 5 mm left lower lobe nodule; calcified granuloma adjacent to the left fissure; stable subpleural 2 mm right middle lobe nodule; stable 3 mm right upper lobe nodule; stable fissural nodule in the right lung; stable T6 compression fracture; and left 5th rib bone island.  PET-CT on  04/03/2023 showed stable bilateral pulmonary nodules which were non avid; focally increased FDG tracer uptake in the distal aspect of the left penis measuring 1.4 cm.  The patient was discussed at tumor Board on 04/04/2023 with recommendation for penectomy.  The patient met with Dr. Bryant on 04/06/2023 to discuss penectomy; however, the patient elected not to have surgery and wanted to undergo chemo and radiation treatment.    Oncology History   Urethral cancer   4/6/2023 Initial Diagnosis    Urethral cancer     4/25/2023 Cancer Staged    Staging form: Male Penile Urethra and Female Urethra, AJCC 8th Edition  - Clinical: Stage III (cT3, cN0, cM0)     5/29/2023 -  Chemotherapy    Treatment Summary   Plan Name: OP BLADDER GEMCITABINE 27MG/M2 TWICE WEEKLY WITH RADIATION  Treatment Goal: Curative  Status: Active  Start Date: 5/29/2023  End Date: 6/26/2023 (Planned)  Provider: Ayush Guzman MD  Chemotherapy: gemcitabine (GEMZAR) 47 mg in sodium chloride 0.9% SolP 116.2 mL chemo infusion, 27 mg/m2 = 47 mg, Intravenous, Clinic/HOD 1 time, 1 of 1 cycle  Administration: 47 mg (5/29/2023), 45 mg (6/1/2023), 45 mg (6/5/2023), 45 mg (6/9/2023), 47 mg (6/12/2023)        Past Medical History:   Past Medical History:   Diagnosis Date    Anticoagulant long-term use     Arthritis     Coronary artery disease     Dr. Lamb    DDD (degenerative disc disease), cervical     Degenerative disc disease     Heart murmur     History of radiation therapy 2020    Hypertension     Nontoxic multinodular goiter 09/20/2016    Prostate CA     RA (rheumatoid arthritis)     Sleep apnea     No CPAP    Vitamin D insufficiency 09/30/2016       Past Surgical HIstory:   Past Surgical History:   Procedure Laterality Date    CARPAL TUNNEL RELEASE Right 05/28/2021    Procedure: RELEASE, CARPAL TUNNEL;  Surgeon: Jose Ryan II, MD;  Location: [facility];  Service: Orthopedics;  Laterality: Right;    COLONOSCOPY  prior to 2016    normal findings per  patient report    CORONARY ANGIOPLASTY WITH STENT PLACEMENT  02/2022    CYSTOSCOPY N/A 12/18/2018    Procedure: CYSTOSCOPY;  Surgeon: Garrett Pnia MD;  [facility];  Service: Urology;  Laterality: N/A;    CYSTOSCOPY N/A 07/12/2022    Procedure: CYSTOSCOPY;  Surgeon: Garrett Pina MD;  [facility]: [facility];  Service: Urology;  Laterality: N/A;    ESOPHAGOGASTRODUODENOSCOPY N/A 09/29/2020    Dr. Espinosa; empiric dilation; erythematous mucosa in antrum; gastric mucosal atrophy; hematin in entire stomach; biopsy: mid & distal esophagus WNL, stomach- WNL, negative for h pylori    EXCISION OF LESION OF PENIS N/A 2/23/2023    Procedure: EXCISION, LESION, PENIS;  Surgeon: Timo Myers MD;  Location: [facility];  Service: Urology;  Laterality: N/A;    INSERTION OF TUNNELED CENTRAL VENOUS CATHETER (CVC) WITH SUBCUTANEOUS PORT Left 5/18/2023    Procedure: [id number];  Surgeon: Atul Faria MD;  Location: [facility];  Service: General;  Laterality: Left;  left subclavian    PARATHYROIDECTOMY Right 10/27/2020    Procedure: PARATHYROIDECTOMY;  Surgeon: Latonia Clayton MD;  Location: [address];  Service: ENT;  Laterality: Right;    RELEASE OF ULNAR NERVE AT CUBITAL TUNNEL Right 05/28/2021    Procedure: RELEASE, ULNAR TUNNEL;  Surgeon: Jose Ryan II, MD;  Location: [facility] OR;  Service: Orthopedics;  Laterality: Right;    TRANSRECTAL BIOPSY OF PROSTATE WITH ULTRASOUND GUIDANCE N/A 12/18/2018    Procedure: BIOPSY, PROSTATE, RECTAL APPROACH, WITH US GUIDANCE;  Surgeon: Garrett Pina MD;  [facility]: [facility];  Service: Urology;  Laterality: N/A;    TRANSRECTAL ULTRASOUND EXAMINATION N/A 07/12/2022    Procedure: ULTRASOUND, RECTAL APPROACH;  Surgeon: Garrett Pina MD;  Location: [facility];  Service: Urology;  Laterality: N/A;       Family History:   Family History   Problem Relation Age of Onset    Heart disease Mother     Stroke Father     Drug abuse Sister     No Known Problems Daughter      No Known Problems Daughter     No Known Problems Son     Diabetes Maternal Uncle     Colon cancer Neg Hx     Crohn's disease Neg Hx     Esophageal cancer Neg Hx     Stomach cancer Neg Hx     Ulcerative colitis Neg Hx        Social History:  reports that he quit smoking about 21 years ago. His smoking use included cigarettes. He has a 2.50 pack-year smoking history. He has been exposed to tobacco smoke. He has never used smokeless tobacco. He reports that he does not currently use alcohol. He reports that he does not currently use drugs after having used the following drugs: Marijuana. Frequency: 8.00 times per week.    Allergies:  Review of patient's allergies indicates:  No Known Allergies    Medications:  Current Outpatient Medications   Medication Sig Dispense Refill    alfuzosin (UROXATRAL) 10 mg Tb24 Take 10 mg by mouth.      aspirin (ECOTRIN) 81 MG EC tablet Take 1 tablet by mouth once daily.      atorvastatin (LIPITOR) 20 MG tablet Take 20 mg by mouth once daily.      b complex vitamins capsule Take 1 capsule by mouth once daily.      carvediloL (COREG) 25 MG tablet Take 25 mg by mouth.      clopidogreL (PLAVIX) 75 mg tablet Take 1 tablet (75 mg total) by mouth once daily. Pt not sure of dose      EScitalopram oxalate (LEXAPRO) 10 MG tablet Take 10 mg by mouth once daily.      folic acid (FOLVITE) 1 MG tablet Take 1 tablet (1 mg total) by mouth once daily. 90 tablet 3    gabapentin (NEURONTIN) 300 MG capsule Take 300 mg by mouth 3 (three) times daily.      HYDROcodone-acetaminophen (NORCO) 5-325 mg per tablet Take 1 tablet by mouth every 6 (six) hours as needed for Pain. 20 tablet 0    hydrOXYchloroQUINE (PLAQUENIL) 200 mg tablet Take 2 tablets (400 mg total) by mouth once daily. 60 tablet 6    isosorbide mononitrate (IMDUR) 30 MG 24 hr tablet Take 30 mg by mouth.      LIDOcaine HCL 2% (XYLOCAINE) 2 % jelly Apply to affected area (tip of penis) daily prn 30 mL 1    LIDOcaine-prilocaine (EMLA) cream Apply  topically as needed. Apply to PORT 15 minutes prior to PORT access 30 g 3    methotrexate 2.5 MG Tab Take 6 tablets (15 mg total) by mouth every 7 days. TAKE 6 TABLETS BY MOUTH EVERY WEEK  STOP UNTIL INSTRUCTED BY MD Boss tablet 2    naloxone (NARCAN) 4 mg/actuation Spry 4mg by nasal route as needed for opioid overdose; may repeat every 2-3 minutes in alternating nostrils until medical help arrives. Call 911 1 each 11    ondansetron (ZOFRAN-ODT) 4 MG TbDL Take 1 tablet (4 mg total) by mouth every 6 (six) hours as needed (nausea). 60 tablet 6    oxybutynin (DITROPAN-XL) 5 MG TR24 Take 1 tablet (5 mg total) by mouth once daily. 30 tablet 11    oxyCODONE (ROXICODONE) 10 mg Tab immediate release tablet Take 1 tablet (10 mg total) by mouth every 4 (four) hours as needed for Pain. 30 tablet 0    pantoprazole (PROTONIX) 40 MG tablet TAKE 1 TABLET(40 MG) BY MOUTH TWICE DAILY (Patient taking differently: Take 40 mg by mouth once daily.) 180 tablet 0    phenazopyridine (PYRIDIUM) 200 MG tablet Take 1 tablet (200 mg total) by mouth 3 (three) times daily as needed for Pain. 90 tablet 3    predniSONE (DELTASONE) 5 MG tablet Take 2 tablets (10 mg total) by mouth 2 (two) times daily. 120 tablet 3    temazepam (RESTORIL) 15 mg Cap Take 1 capsule (15 mg total) by mouth nightly as needed. 30 capsule 1    traMADoL (ULTRAM) 50 mg tablet Take 2 tablets (100 mg total) by mouth 2 (two) times daily as needed for Pain. 120 tablet 5    amLODIPine (NORVASC) 5 MG tablet Take 1 tablet (5 mg total) by mouth 2 (two) times daily. 60 tablet 3    busPIRone (BUSPAR) 10 MG tablet Take 1 tablet (10 mg total) by mouth 3 (three) times daily. 90 tablet 3    tamsulosin (FLOMAX) 0.4 mg Cap Take 1 capsule (0.4 mg total) by mouth once daily. 30 capsule 3     No current facility-administered medications for this visit.     Facility-Administered Medications Ordered in Other Visits   Medication Dose Route Frequency Provider Last Rate Last Admin    albuterol  nebulizer solution 2.5 mg  2.5 mg Nebulization Q15 Min PRN Pastor Saleh MD        albuterol-ipratropium 2.5 mg-0.5 mg/3 mL nebulizer solution 3 mL  3 mL Nebulization PRN Pastor Saleh MD        denosumab (PROLIA) injection 60 mg  60 mg Subcutaneous 1 time in Clinic/HOD Jean Carlos Hoffman MD        diphenhydrAMINE injection 25 mg  25 mg Intravenous Q6H PRN Pastor Saleh MD        gemcitabine (GEMZAR) 47 mg in sodium chloride 0.9% SolP 116.2 mL chemo infusion  27 mg/m2 (Treatment Plan Recorded) Intravenous 1 time in Clinic/HOD Leda Duarte NP        heparin, porcine (PF) 100 unit/mL injection flush 500 Units  500 Units Intravenous PRN Leda Duarte NP        HYDROmorphone injection 0.5 mg  0.5 mg Intravenous Q5 Min PRN Pastor Saleh MD   0.5 mg at 02/23/23 1312    lactated ringers infusion   Intravenous Continuous Jacqueline Volpi-Abadie, MD   Stopped at 02/23/23 1400    ondansetron injection 4 mg  4 mg Intravenous Q15 Min PRN Pastor Saleh MD        ondansetron injection 8 mg  8 mg Intravenous 1 time in Clinic/HOD [facility] [place]        sodium chloride 0.9% 250 mL flush bag   Intravenous 1 time in Clinic/HOD [facility] [place]        sodium chloride 0.9% flush 10 mL  10 mL Intravenous PRN Ayush Guzman MD   10 mL at 06/01/23 1535    sodium chloride 0.9% flush 10 mL  10 mL Intravenous PRN Ayush Guzman MD   10 mL at 06/05/23 1315    sodium chloride 0.9% flush 10 mL  10 mL Intravenous PRN Leda Duarte NP        sodium chloride 0.9% flush 3 mL  3 mL Intravenous PRN Pastor Saleh MD           Review of Systems   Constitutional:  Negative for appetite change, chills and fever.   HENT:  Negative for mouth sores and trouble swallowing.    Respiratory:  Negative for cough and shortness of breath.    Cardiovascular:  Negative for chest pain, palpitations and leg swelling.   Gastrointestinal:  Negative for abdominal pain, diarrhea, nausea and vomiting.   Genitourinary:  Negative for dysuria and  "hematuria.   Musculoskeletal:  Positive for arthralgias. Negative for joint swelling and myalgias.   Skin:  Negative for rash.   Neurological:  Negative for headaches.   Hematological:  Negative for adenopathy. Does not bruise/bleed easily.   Psychiatric/Behavioral:  The patient is not nervous/anxious.      ECOG Performance Status:   ECOG SCORE    1 - Restricted in strenuous activity-ambulatory and able to carry out work of a light nature         Objective:      Vitals:   Vitals:    06/15/23 1429   BP: (!) 160/80   BP Location: Right arm   Patient Position: Sitting   BP Method: Medium (Automatic)   Pulse: 90   Resp: 16   Temp: 96.8 °F (36 °C)   TempSrc: Temporal   SpO2: 96%   Weight: 59.2 kg (130 lb 8.2 oz)   Height: 5' 8" (1.727 m)         BMI: Body mass index is 19.84 kg/m².    Physical Exam  Vitals reviewed.   Constitutional:       General: He is not in acute distress.     Appearance: He is not diaphoretic.   HENT:      Head: Normocephalic and atraumatic.      Mouth/Throat:      Mouth: Mucous membranes are moist.      Pharynx: No posterior oropharyngeal erythema.   Eyes:      General: No scleral icterus.  Cardiovascular:      Rate and Rhythm: Normal rate and regular rhythm.      Heart sounds: Normal heart sounds. No murmur heard.  Pulmonary:      Effort: Pulmonary effort is normal. No respiratory distress.      Breath sounds: No wheezing.   Abdominal:      General: Bowel sounds are normal. There is no distension.      Tenderness: There is no abdominal tenderness. There is no guarding.   Musculoskeletal:      Cervical back: No tenderness.      Right lower leg: No edema.      Left lower leg: No edema.   Lymphadenopathy:      Cervical: No cervical adenopathy.   Skin:     General: Skin is warm.      Coloration: Skin is not jaundiced.      Findings: No rash.   Neurological:      Mental Status: He is alert and oriented to person, place, and time.   Psychiatric:         Behavior: Behavior normal.       Laboratory " Data:  Lab Results   Component Value Date    WBC 4.46 06/14/2023    HGB 11.2 (L) 06/14/2023    HCT 35.4 (L) 06/14/2023    MCV 91 06/14/2023     06/14/2023          Imaging: EXAMINATION:  NM PET CT ROUTINE     CLINICAL HISTORY:  possible urethral/penile cancer, eval for mets; Malignant neoplasm of prostate     TECHNIQUE:  13.25 mCi of F18-FDG was administered intravenously in the left antecubital fossa.  After an approximately 60 min distribution time, PET/CT images were acquired from the skull base to mid thigh.  Transmission images were acquired to correct for attenuation using a whole body low-dose CT scan with oral contrast and without IV contrast with the arms positioned above the head. Glycemia at the time of injection was 100 mg/dL.     COMPARISON:  CT chest abdomen pelvis 02/27/2023     FINDINGS:  Quality of the study: Adequate.     In the head and neck, there are no hypermetabolic lesions worrisome for malignancy. There are no hypermetabolic mucosal lesions, and there are no pathologically enlarged or hypermetabolic lymph nodes.  Increased FDG tracer uptake in the left paraspinal musculature likely physiological muscle uptake.     In the chest, there are no hypermetabolic lesions worrisome for malignancy.  There are no pathologically enlarged or hypermetabolic lymph nodes.  Stable bilateral pulmonary nodules on the right measuring 5 mm and on the left measure 4 mm (series 3, image 64 and 103).     In the abdomen and pelvis, there is focally increased FDG tracer uptake of the distal aspect of the penis measuring 1.4 cm with SUV max of 27 (fused image 264).     There is otherwise physiologic tracer distribution within the abdominal organs including prominent physiologic anal sphincter tone and excretion into the genitourinary system.  No pathologically enlarged or hypermetabolic inguinal lymph nodes.     In the bones, there are no hypermetabolic lesions worrisome for malignancy.     In the extremities,  there are no hypermetabolic lesions worrisome for malignancy.     CT chest abdomen pelvis findings:     Right lower neck parathyroidectomy surgical changes.  Heart is normal in size with small pericardial effusion.  Multivessel coronaries calcific atherosclerosis.  Moderate aortoiliac calcific atherosclerosis without aneurysm.  Centrilobular emphysema.  Dependent bibasal atelectatic changes versus scarring.  Liver is normal in size measuring 14 cm without focal lesion.  Spleen is normal in size with numerous calcified granulomas.  Small splenules .  Degenerative changes of the spine most prominent at the cervical spine.  Stable T6 vertebral body height loss (series 604, image 81).     Impression:     There is hypermetabolic lesion in the distal penis measuring approximately 1.4 cm, positive for squamous cell carcinoma on recent biopsy.  No evidence of lymph node metastases.     Stable bilateral subcentimeter pulmonary nodules that are too small to characterize reliably by PET.  Given evidence of old granulomatous disease, noncalcified granulomas are a consideration.  Attention on follow-up.     Additional findings as above.     I, Jey Griffith MD, attest that I reviewed and interpreted the images.     Electronically signed by resident: Juanito Tse  Date:                                            04/04/2023  Time:                                           06:30     Electronically signed by: Jey Griffith  Date:                                            04/04/2023  Time:                                           10:09   Assessment:       1. Urethral cancer    2. Stage 3a chronic kidney disease    3. Coronary artery disease involving native coronary artery of native heart without angina pectoris    4. Hypertension, unspecified type    5. Rheumatoid arthritis involving multiple sites, unspecified whether rheumatoid factor present    6. Insomnia, unspecified type           Plan:   Urethral Cancer   -Pt has [id number]  cancer of the urethra  -Pt discussed at tumor board on 4/04/23 with recommendation for penectomy  -Pt met with Dr Bryant on 4/06/23 and refused penectomy and elected for chemo/XRT  -Given pt's borderline kidney function with CrCl near 30 on recent lab work, pt to receive biweekly Gemcitabine dosed 27mg/mm2 on days 1 and 4 of each week  -Proceed with C1D18 Gemzar today   -Keep appointment with me on 6/19 as planned, labs prior      CKD IIIb   -pt's baseline creatinine is 1.2-1.7  -Cr 1.4 on 6/14/23     CAD    -pt with stent in place  -Pt follows with Dr Lamb in cardiology  -Pt on ASA, plavix, lipitor, antihypertensives  -Management per cardiology     HTN   -pt on amlodipine  -BP slightly increased  -Will monitor     Rheumatoid Arthritis   -pt has stopped MTX  -Dr Hart contacted for recommendation on alternative medicine while pt is getting chemo/XRT; patient has been started on Prednisone 20 mg daily with Plaquenil 400 mg daily.   -Oxycodone 10 mg escribed to assist with pain unrelieved with Hydrocodone/Tramadol; no need for refills today  -Management per rheumatolgoy     Insomnia   -Started taking Temazepam and this is giving him some relief   -Monitor     Assessment/Plan reviewed and approved by Dr. Guzman  30 minutes were spent in coordination of patient's care, record review and counseling.  Patient queried and all questions were answered.     Route Chart for Scheduling    Med Onc Chart Routing      Follow up with physician 1 week. MD and labs prior to treatment on 6/22/23   Follow up with [facility] Other. Keep appointment with me on 6/19/23, labs prior to visit   Infusion scheduling note   Proceed with Gemzar today   Injection scheduling note    Labs CBC and CMP   Scheduling:  Preferred lab:  Lab interval:  Prior to appointment on 6/22 (can be the day prior as patient will be here for XRT)   Imaging    Pharmacy appointment    Other referrals            Treatment Plan Information   OP BLADDER GEMCITABINE 27MG/M2  TWICE WEEKLY WITH RADIATION   Ayush Guzman MD   Upcoming Treatment Dates - OP BLADDER GEMCITABINE 27MG/M2 TWICE WEEKLY WITH RADIATION    6/19/2023       Chemotherapy       gemcitabine (GEMZAR) 47 mg in sodium chloride 0.9% SolP 100 mL chemo infusion       Antiemetics       ondansetron injection 8 mg  6/22/2023       Chemotherapy       gemcitabine (GEMZAR) 47 mg in sodium chloride 0.9% SolP 100 mL chemo infusion       Antiemetics       ondansetron injection 8 mg  6/26/2023       Chemotherapy       gemcitabine (GEMZAR) 47 mg in sodium chloride 0.9% SolP 116.2 mL chemo infusion       Antiemetics       ondansetron injection 8 mg    Therapy Plan Information  DENOSUMAB (PROLIA) Q6M  Medications  denosumab (PROLIA) injection 60 mg  60 mg, Subcutaneous, Every 26 weeks    PORT FLUSH  Flushes  heparin, porcine (PF) 100 unit/mL injection flush 500 Units  500 Units, Intravenous, Every visit  sodium chloride 0.9% flush 10 mL  10 mL, Intravenous, Every visit

## 2023-06-15 NOTE — PLAN OF CARE
Completed 13 of 37 outpatient radiation treatments without difficulty. No new problems verbalized.  All questions and concerns addressed by MD this visit.

## 2023-06-16 ENCOUNTER — TELEPHONE (OUTPATIENT)
Dept: FAMILY MEDICINE | Facility: CLINIC | Age: 78
End: 2023-06-16
Payer: MEDICARE

## 2023-06-16 PROCEDURE — 77386 HC IMRT, COMPLEX: CPT | Mod: PN | Performed by: RADIOLOGY

## 2023-06-16 PROCEDURE — 77014 PR  CT GUIDANCE PLACEMENT RAD THERAPY FIELDS: ICD-10-PCS | Mod: 26,,, | Performed by: RADIOLOGY

## 2023-06-16 PROCEDURE — 77014 PR  CT GUIDANCE PLACEMENT RAD THERAPY FIELDS: CPT | Mod: 26,,, | Performed by: RADIOLOGY

## 2023-06-16 PROCEDURE — 77014 HC CT GUIDANCE RADIATION THERAPY FLDS PLACEMENT: CPT | Mod: TC,PN | Performed by: RADIOLOGY

## 2023-06-19 ENCOUNTER — TELEPHONE (OUTPATIENT)
Dept: HEMATOLOGY/ONCOLOGY | Facility: CLINIC | Age: 78
End: 2023-06-19
Payer: MEDICARE

## 2023-06-19 NOTE — TELEPHONE ENCOUNTER
Called pt 6/19 to remind him he needed labs prior to treatment. Pt stated he wasn't coming in for appointment with Leda Duarte NP or doing tx today. Stated he isn't feeling well, did not want to talk about it.  I expressed the need for him to go to the ED if he started feeling worse, pt hung up phone.

## 2023-06-22 ENCOUNTER — PES CALL (OUTPATIENT)
Dept: ADMINISTRATIVE | Facility: CLINIC | Age: 78
End: 2023-06-22
Payer: MEDICARE

## 2023-06-22 ENCOUNTER — TELEPHONE (OUTPATIENT)
Dept: HEMATOLOGY/ONCOLOGY | Facility: CLINIC | Age: 78
End: 2023-06-22
Payer: MEDICARE

## 2023-06-22 NOTE — TELEPHONE ENCOUNTER
Called patient to confirm if he was still coming to his appt with Dr. Guzman, that was scheduled for 11:00am, no answer. LVM to return phone call to clinic. Office number provided.

## 2023-06-23 ENCOUNTER — TELEPHONE (OUTPATIENT)
Dept: RADIATION ONCOLOGY | Facility: CLINIC | Age: 78
End: 2023-06-23
Payer: MEDICARE

## 2023-06-23 NOTE — TELEPHONE ENCOUNTER
Radiation therapists spoke with patient on Monday about missing radiation treatment.  Patient stated he was feeling too tired to come in and also wasn't going to come Tuesday.  Patient did not answer phone Tue, Wed, or Thursday even though we left messages for him to call.  He has not shown back up for radiation this week.  Social Workers notified.

## 2023-06-26 ENCOUNTER — TELEPHONE (OUTPATIENT)
Dept: HEMATOLOGY/ONCOLOGY | Facility: CLINIC | Age: 78
End: 2023-06-26
Payer: MEDICARE

## 2023-06-26 NOTE — TELEPHONE ENCOUNTER
Nurse reached out to pt regarding appt with Evan Leonardo NP today.  Pt stated he would not be able to keep appt today, due to fatigue, but is willing to reschedule his appt to tomorrow, after radiation in afternoon.  Pt states he no longer wishes to continue chemo, due to side effects.  Pt agreeable to discuss tx options with Dr Guzman.  Appt given for 6/27 at 2:00, after radiation tx is completed.  Pt verbalized understanding.

## 2023-06-27 ENCOUNTER — OFFICE VISIT (OUTPATIENT)
Dept: HEMATOLOGY/ONCOLOGY | Facility: CLINIC | Age: 78
End: 2023-06-27
Payer: MEDICARE

## 2023-06-27 VITALS
OXYGEN SATURATION: 98 % | DIASTOLIC BLOOD PRESSURE: 60 MMHG | TEMPERATURE: 97 F | HEART RATE: 85 BPM | SYSTOLIC BLOOD PRESSURE: 130 MMHG

## 2023-06-27 DIAGNOSIS — M06.9 RHEUMATOID ARTHRITIS, INVOLVING UNSPECIFIED SITE, UNSPECIFIED WHETHER RHEUMATOID FACTOR PRESENT: ICD-10-CM

## 2023-06-27 DIAGNOSIS — C68.0 URETHRAL CANCER: Primary | ICD-10-CM

## 2023-06-27 DIAGNOSIS — N18.31 STAGE 3A CHRONIC KIDNEY DISEASE: ICD-10-CM

## 2023-06-27 DIAGNOSIS — G47.00 INSOMNIA, UNSPECIFIED TYPE: ICD-10-CM

## 2023-06-27 DIAGNOSIS — I10 HYPERTENSION, UNSPECIFIED TYPE: ICD-10-CM

## 2023-06-27 DIAGNOSIS — I25.10 CORONARY ARTERY DISEASE INVOLVING NATIVE CORONARY ARTERY OF NATIVE HEART WITHOUT ANGINA PECTORIS: ICD-10-CM

## 2023-06-27 DIAGNOSIS — R63.0 ANOREXIA: ICD-10-CM

## 2023-06-27 PROCEDURE — 77336 RADIATION PHYSICS CONSULT: CPT | Mod: PN | Performed by: RADIOLOGY

## 2023-06-27 PROCEDURE — 1101F PR PT FALLS ASSESS DOC 0-1 FALLS W/OUT INJ PAST YR: ICD-10-PCS | Mod: CPTII,S$GLB,, | Performed by: INTERNAL MEDICINE

## 2023-06-27 PROCEDURE — 1159F PR MEDICATION LIST DOCUMENTED IN MEDICAL RECORD: ICD-10-PCS | Mod: CPTII,S$GLB,, | Performed by: INTERNAL MEDICINE

## 2023-06-27 PROCEDURE — 3078F PR MOST RECENT DIASTOLIC BLOOD PRESSURE < 80 MM HG: ICD-10-PCS | Mod: CPTII,S$GLB,, | Performed by: INTERNAL MEDICINE

## 2023-06-27 PROCEDURE — 1159F MED LIST DOCD IN RCRD: CPT | Mod: CPTII,S$GLB,, | Performed by: INTERNAL MEDICINE

## 2023-06-27 PROCEDURE — 3288F FALL RISK ASSESSMENT DOCD: CPT | Mod: CPTII,S$GLB,, | Performed by: INTERNAL MEDICINE

## 2023-06-27 PROCEDURE — 99999 PR PBB SHADOW E&M-EST. PATIENT-LVL IV: ICD-10-PCS | Mod: PBBFAC,,, | Performed by: INTERNAL MEDICINE

## 2023-06-27 PROCEDURE — 77386 HC IMRT, COMPLEX: CPT | Mod: PN | Performed by: RADIOLOGY

## 2023-06-27 PROCEDURE — 99499 UNLISTED E&M SERVICE: CPT | Mod: S$GLB,,, | Performed by: INTERNAL MEDICINE

## 2023-06-27 PROCEDURE — 1101F PT FALLS ASSESS-DOCD LE1/YR: CPT | Mod: CPTII,S$GLB,, | Performed by: INTERNAL MEDICINE

## 2023-06-27 PROCEDURE — 99999 PR PBB SHADOW E&M-EST. PATIENT-LVL IV: CPT | Mod: PBBFAC,,, | Performed by: INTERNAL MEDICINE

## 2023-06-27 PROCEDURE — 99213 OFFICE O/P EST LOW 20 MIN: CPT | Mod: S$GLB,,, | Performed by: INTERNAL MEDICINE

## 2023-06-27 PROCEDURE — 99499 RISK ADDL DX/OHS AUDIT: ICD-10-PCS | Mod: S$GLB,,, | Performed by: INTERNAL MEDICINE

## 2023-06-27 PROCEDURE — 1126F PR PAIN SEVERITY QUANTIFIED, NO PAIN PRESENT: ICD-10-PCS | Mod: CPTII,S$GLB,, | Performed by: INTERNAL MEDICINE

## 2023-06-27 PROCEDURE — 77014 PR  CT GUIDANCE PLACEMENT RAD THERAPY FIELDS: ICD-10-PCS | Mod: 26,,, | Performed by: RADIOLOGY

## 2023-06-27 PROCEDURE — 77014 HC CT GUIDANCE RADIATION THERAPY FLDS PLACEMENT: CPT | Mod: TC,PN | Performed by: RADIOLOGY

## 2023-06-27 PROCEDURE — 77014 PR  CT GUIDANCE PLACEMENT RAD THERAPY FIELDS: CPT | Mod: 26,,, | Performed by: RADIOLOGY

## 2023-06-27 PROCEDURE — 3075F SYST BP GE 130 - 139MM HG: CPT | Mod: CPTII,S$GLB,, | Performed by: INTERNAL MEDICINE

## 2023-06-27 PROCEDURE — 3075F PR MOST RECENT SYSTOLIC BLOOD PRESS GE 130-139MM HG: ICD-10-PCS | Mod: CPTII,S$GLB,, | Performed by: INTERNAL MEDICINE

## 2023-06-27 PROCEDURE — 3288F PR FALLS RISK ASSESSMENT DOCUMENTED: ICD-10-PCS | Mod: CPTII,S$GLB,, | Performed by: INTERNAL MEDICINE

## 2023-06-27 PROCEDURE — 1126F AMNT PAIN NOTED NONE PRSNT: CPT | Mod: CPTII,S$GLB,, | Performed by: INTERNAL MEDICINE

## 2023-06-27 PROCEDURE — 99213 PR OFFICE/OUTPT VISIT, EST, LEVL III, 20-29 MIN: ICD-10-PCS | Mod: S$GLB,,, | Performed by: INTERNAL MEDICINE

## 2023-06-27 PROCEDURE — 3078F DIAST BP <80 MM HG: CPT | Mod: CPTII,S$GLB,, | Performed by: INTERNAL MEDICINE

## 2023-06-27 RX ORDER — CYPROHEPTADINE HYDROCHLORIDE 4 MG/1
4 TABLET ORAL 4 TIMES DAILY
Qty: 120 TABLET | Refills: 2 | Status: SHIPPED | OUTPATIENT
Start: 2023-06-27 | End: 2023-08-16 | Stop reason: SDUPTHER

## 2023-06-27 NOTE — PROGRESS NOTES
"  PATIENT: Rajendra Castle  MRN: [id number]  DATE: 6/27/2023      Diagnosis:   1. Urethral cancer    2. Rheumatoid arthritis, involving unspecified site, unspecified whether rheumatoid factor present    3. Insomnia, unspecified type    4. Stage 3a chronic kidney disease    5. Coronary artery disease involving native coronary artery of native heart without angina pectoris    6. Hypertension, unspecified type    7. Anorexia              Chief Complaint: urethral cancer (2 week follow up to discuss treatment. Pt states "I DO NOT want to do chemo; I only want to do the radiation.")      Oncologic History:      Oncologic History     Oncologic Treatment     Pathology           Subjective:    Interval History: Mr. Castle is a 77 y.o. male with Arthritis, CAD, HTN, RA, ISABEL, Vitamin D Deficiency who presents for urethral cancer.  Since the last clinic visit the patient started on prednisone for his arthritis pain with improvement.  The patient missed several appts for chemo and radiation.  The patient endorses decreased appetite and weight loss.  The patient states he feels the chemotherapy is making him run down and states he does not want to get any further chemotherapy.  The patient stated he be willing to take incomplete radiation.  The patient denies CP, cough, SOB, abdominal pain, nausea, vomiting, constipation, diarrhea.  The patient denies fever, chills, night sweats, weight loss, new lumps or bumps, easy bruising or bleeding.    Prior History:  The patient underwent cystoscopy on 02/10/2023 showing a 1 cm firm mass along the left glands of the penis near the meatus.  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  CT of the chest, abdomen, and pelvis on 02/27/2023 showed a stable 5 mm left lower lobe nodule; calcified granuloma adjacent to the left fissure; " stable subpleural 2 mm right middle lobe nodule; stable 3 mm right upper lobe nodule; stable fissural nodule in the right lung; stable T6 compression fracture; and left 5th rib bone island.  PET-CT on 04/03/2023 showed stable bilateral pulmonary nodules which were non avid; focally increased FDG tracer uptake in the distal aspect of the left penis measuring 1.4 cm.  The patient was discussed at tumor Board on 04/04/2023 with recommendation for penectomy.  The patient met with Dr. Bryant on 04/06/2023 to discuss penectomy; however, the patient elected not to have surgery and wanted to undergo chemo and radiation treatment.    Past Medical History:   Past Medical History:   Diagnosis Date    Anticoagulant long-term use     Arthritis     Coronary artery disease     Dr. Lamb    DDD (degenerative disc disease), cervical     Degenerative disc disease     Heart murmur     History of radiation therapy 2020    Hypertension     Nontoxic multinodular goiter 09/20/2016    Prostate CA     RA (rheumatoid arthritis)     Sleep apnea     No CPAP    Vitamin D insufficiency 09/30/2016       Past Surgical HIstory:   Past Surgical History:   Procedure Laterality Date    CARPAL TUNNEL RELEASE Right 05/28/2021    Procedure: RELEASE, CARPAL TUNNEL;  Surgeon: Jose Ryan II, MD;  Location: [facility] OR;  Service: Orthopedics;  Laterality: Right;    COLONOSCOPY  prior to 2016    normal findings per patient report    CORONARY ANGIOPLASTY WITH STENT PLACEMENT  02/2022    CYSTOSCOPY N/A 12/18/2018    Procedure: CYSTOSCOPY;  Surgeon: Garrett Pina MD;  Location: [facility] OR;  Service: Urology;  Laterality: N/A;    CYSTOSCOPY N/A 07/12/2022    Procedure: CYSTOSCOPY;  Surgeon: Garrett Pina MD;  Location: [facility] OR;  Service: Urology;  Laterality: N/A;    ESOPHAGOGASTRODUODENOSCOPY N/A 09/29/2020    Dr. Espinosa; empiric dilation; erythematous mucosa in antrum; gastric mucosal atrophy; hematin in entire stomach; biopsy: mid & distal  esophagus WNL, stomach- WNL, negative for h pylori    EXCISION OF LESION OF PENIS N/A 2/23/2023    Procedure: EXCISION, LESION, PENIS;  Surgeon: Timo Myers MD;  Location: [facility] OR;  Service: Urology;  Laterality: N/A;    INSERTION OF TUNNELED CENTRAL VENOUS CATHETER (CVC) WITH SUBCUTANEOUS PORT Left 5/18/2023    Procedure: [id number];  Surgeon: Atul Faria MD;  Location: [facility];  Service: General;  Laterality: Left;  left subclavian    PARATHYROIDECTOMY Right 10/27/2020    Procedure: PARATHYROIDECTOMY;  Surgeon: Latonia Clayton MD;  [facility];  Service: ENT;  Laterality: Right;    RELEASE OF ULNAR NERVE AT CUBITAL TUNNEL Right 05/28/2021    Procedure: RELEASE, ULNAR TUNNEL;  Surgeon: Jose Ryan II, MD;  Location: [facility];  Service: Orthopedics;  Laterality: Right;    TRANSRECTAL BIOPSY OF PROSTATE WITH ULTRASOUND GUIDANCE N/A 12/18/2018    Procedure: BIOPSY, PROSTATE, RECTAL APPROACH, WITH US GUIDANCE;  Surgeon: Garrett Pina MD;  Location: [facility];  Service: Urology;  Laterality: N/A;    TRANSRECTAL ULTRASOUND EXAMINATION N/A 07/12/2022    Procedure: ULTRASOUND, RECTAL APPROACH;  Surgeon: Garrett Pina MD;  Location: [facility];  Service: Urology;  Laterality: N/A;       Family History:   Family History   Problem Relation Age of Onset    Heart disease Mother     Stroke Father     Drug abuse Sister     No Known Problems Daughter     No Known Problems Daughter     No Known Problems Son     Diabetes Maternal Uncle     Colon cancer Neg Hx     Crohn's disease Neg Hx     Esophageal cancer Neg Hx     Stomach cancer Neg Hx     Ulcerative colitis Neg Hx        Social History:  reports that he quit smoking about 21 years ago. His smoking use included cigarettes. He has a 2.50 pack-year smoking history. He has been exposed to tobacco smoke. He has never used smokeless tobacco. He reports that he does not currently use alcohol. He reports that he does not currently use drugs  after having used the following drugs: Marijuana. Frequency: 8.00 times per week.    Allergies:  Review of patient's allergies indicates:  No Known Allergies    Medications:  Current Outpatient Medications   Medication Sig Dispense Refill    alfuzosin (UROXATRAL) 10 mg Tb24 Take 10 mg by mouth.      amLODIPine (NORVASC) 5 MG tablet Take 1 tablet (5 mg total) by mouth 2 (two) times daily. 60 tablet 3    aspirin (ECOTRIN) 81 MG EC tablet Take 1 tablet by mouth once daily.      atorvastatin (LIPITOR) 20 MG tablet Take 20 mg by mouth once daily.      b complex vitamins capsule Take 1 capsule by mouth once daily.      busPIRone (BUSPAR) 10 MG tablet Take 1 tablet (10 mg total) by mouth 3 (three) times daily. 90 tablet 3    carvediloL (COREG) 25 MG tablet Take 25 mg by mouth.      clopidogreL (PLAVIX) 75 mg tablet Take 1 tablet (75 mg total) by mouth once daily. Pt not sure of dose      cyproheptadine (PERIACTIN) 4 mg tablet Take 1 tablet (4 mg total) by mouth 4 (four) times daily. 120 tablet 2    EScitalopram oxalate (LEXAPRO) 10 MG tablet Take 10 mg by mouth once daily.      folic acid (FOLVITE) 1 MG tablet Take 1 tablet (1 mg total) by mouth once daily. 90 tablet 3    gabapentin (NEURONTIN) 300 MG capsule Take 300 mg by mouth 3 (three) times daily.      HYDROcodone-acetaminophen (NORCO) 5-325 mg per tablet Take 1 tablet by mouth every 6 (six) hours as needed for Pain. 20 tablet 0    hydrOXYchloroQUINE (PLAQUENIL) 200 mg tablet Take 2 tablets (400 mg total) by mouth once daily. 60 tablet 6    isosorbide mononitrate (IMDUR) 30 MG 24 hr tablet Take 30 mg by mouth.      LIDOcaine HCL 2% (XYLOCAINE) 2 % jelly Apply to affected area (tip of penis) daily prn 30 mL 1    LIDOcaine-prilocaine (EMLA) cream Apply topically as needed. Apply to PORT 15 minutes prior to PORT access 30 g 3    methotrexate 2.5 MG Tab Take 6 tablets (15 mg total) by mouth every 7 days. TAKE 6 TABLETS BY MOUTH EVERY WEEK  STOP UNTIL INSTRUCTED BY MD Boss  tablet 2    naloxone (NARCAN) 4 mg/actuation Spry 4mg by nasal route as needed for opioid overdose; may repeat every 2-3 minutes in alternating nostrils until medical help arrives. Call 911 1 each 11    ondansetron (ZOFRAN-ODT) 4 MG TbDL Take 1 tablet (4 mg total) by mouth every 6 (six) hours as needed (nausea). 60 tablet 6    oxybutynin (DITROPAN-XL) 5 MG TR24 Take 1 tablet (5 mg total) by mouth once daily. 30 tablet 11    oxyCODONE (ROXICODONE) 10 mg Tab immediate release tablet Take 1 tablet (10 mg total) by mouth every 4 (four) hours as needed for Pain. 30 tablet 0    pantoprazole (PROTONIX) 40 MG tablet TAKE 1 TABLET(40 MG) BY MOUTH TWICE DAILY (Patient taking differently: Take 40 mg by mouth once daily.) 180 tablet 0    phenazopyridine (PYRIDIUM) 200 MG tablet Take 1 tablet (200 mg total) by mouth 3 (three) times daily as needed for Pain. 90 tablet 3    predniSONE (DELTASONE) 5 MG tablet Take 2 tablets (10 mg total) by mouth 2 (two) times daily. 120 tablet 3    tamsulosin (FLOMAX) 0.4 mg Cap Take 1 capsule (0.4 mg total) by mouth once daily. 30 capsule 3    temazepam (RESTORIL) 15 mg Cap Take 1 capsule (15 mg total) by mouth nightly as needed. 30 capsule 1    traMADoL (ULTRAM) 50 mg tablet Take 2 tablets (100 mg total) by mouth 2 (two) times daily as needed for Pain. 120 tablet 5     No current facility-administered medications for this visit.     Facility-Administered Medications Ordered in Other Visits   Medication Dose Route Frequency Provider Last Rate Last Admin    albuterol nebulizer solution 2.5 mg  2.5 mg Nebulization Q15 Min PRN Pastor Saleh MD        albuterol-ipratropium 2.5 mg-0.5 mg/3 mL nebulizer solution 3 mL  3 mL Nebulization PRN Pastor Saleh MD        denosumab (PROLIA) injection 60 mg  60 mg Subcutaneous 1 time in Clinic/HOD Jean Carlos Hoffman MD        diphenhydrAMINE injection 25 mg  25 mg Intravenous Q6H PRN Pastor S. Delfino, MD        HYDROmorphone injection 0.5 mg  0.5 mg  Intravenous Q5 Min PRN Pastor Saleh MD   0.5 mg at 02/23/23 1312    lactated ringers infusion   Intravenous Continuous Jacqueline Volpi-Abadie, MD   Stopped at 02/23/23 1400    ondansetron injection 4 mg  4 mg Intravenous Q15 Min PRN Pastor Saleh MD        sodium chloride 0.9% flush 10 mL  10 mL Intravenous PRN Ayush Guzman MD   10 mL at 06/01/23 1535    sodium chloride 0.9% flush 10 mL  10 mL Intravenous PRN Ayush Guzman MD   10 mL at 06/05/23 1315    sodium chloride 0.9% flush 3 mL  3 mL Intravenous PRN Pastor Saleh MD           Review of Systems   Constitutional:  Positive for appetite change, fatigue and unexpected weight change. Negative for chills, diaphoresis and fever.   Respiratory:  Negative for cough and shortness of breath.    Cardiovascular:  Negative for chest pain and palpitations.   Gastrointestinal:  Negative for abdominal pain, constipation, diarrhea, nausea and vomiting.   Musculoskeletal:  Positive for arthralgias.   Skin:  Negative for color change and rash.   Neurological:  Negative for headaches.   Hematological:  Negative for adenopathy. Does not bruise/bleed easily.     ECOG Performance Status: 0   Objective:      Vitals:   Vitals:    06/27/23 1412   BP: 130/60   BP Location: Right arm   Patient Position: Sitting   BP Method: Medium (Manual)   Pulse: 85   Temp: 97.3 °F (36.3 °C)   TempSrc: Temporal   SpO2: 98%             Physical Exam  Constitutional:       General: He is not in acute distress.     Appearance: He is well-developed. He is not diaphoretic.   HENT:      Head: Normocephalic and atraumatic.   Cardiovascular:      Rate and Rhythm: Normal rate and regular rhythm.      Heart sounds: Normal heart sounds. No murmur heard.    No friction rub. No gallop.   Pulmonary:      Effort: Pulmonary effort is normal. No respiratory distress.      Breath sounds: Normal breath sounds. No wheezing or rales.   Chest:      Chest wall: No tenderness.   Abdominal:      General:  Bowel sounds are normal. There is no distension.      Palpations: Abdomen is soft. There is no mass.      Tenderness: There is no abdominal tenderness. There is no rebound.   Genitourinary:     Comments: Pt with ulcerated lesion on glans of penis near urethra  Musculoskeletal:      Cervical back: Normal range of motion.   Lymphadenopathy:      Cervical: No cervical adenopathy.      Upper Body:      Right upper body: No supraclavicular or axillary adenopathy.      Left upper body: No supraclavicular or axillary adenopathy.   Skin:     General: Skin is warm and dry.      Findings: No erythema or rash.   Neurological:      Mental Status: He is alert and oriented to person, place, and time.   Psychiatric:         Behavior: Behavior normal.       Laboratory Data:  No visits with results within 1 Week(s) from this visit.   Latest known visit with results is:   Infusion on 06/14/2023   Component Date Value Ref Range Status    WBC 06/14/2023 4.46  3.90 - 12.70 K/uL Final    RBC 06/14/2023 3.90 (L)  4.60 - 6.20 M/uL Final    Hemoglobin 06/14/2023 11.2 (L)  14.0 - 18.0 g/dL Final    Hematocrit 06/14/2023 35.4 (L)  40.0 - 54.0 % Final    MCV 06/14/2023 91  82 - 98 fL Final    MCH 06/14/2023 28.7  27.0 - 31.0 pg Final    MCHC 06/14/2023 31.6 (L)  32.0 - 36.0 g/dL Final    RDW 06/14/2023 16.2 (H)  11.5 - 14.5 % Final    Platelets 06/14/2023 166  150 - 450 K/uL Final    MPV 06/14/2023 10.9  9.2 - 12.9 fL Final    Immature Granulocytes 06/14/2023 0.4  0.0 - 0.5 % Final    Gran # (ANC) 06/14/2023 4.0  1.8 - 7.7 K/uL Final    Immature Grans (Abs) 06/14/2023 0.02  0.00 - 0.04 K/uL Final    Comment: Mild elevation in immature granulocytes is non specific and   can be seen in a variety of conditions including stress response,   acute inflammation, trauma and pregnancy. Correlation with other   laboratory and clinical findings is essential.      Lymph # 06/14/2023 0.4 (L)  1.0 - 4.8 K/uL Final    Mono # 06/14/2023 0.1 (L)  0.3 - 1.0  K/uL Final    Eos # 06/14/2023 0.0  0.0 - 0.5 K/uL Final    Baso # 06/14/2023 0.01  0.00 - 0.20 K/uL Final    nRBC 06/14/2023 0  0 /100 WBC Final    Gran % 06/14/2023 89.3 (H)  38.0 - 73.0 % Final    Lymph % 06/14/2023 8.1 (L)  18.0 - 48.0 % Final    Mono % 06/14/2023 1.1 (L)  4.0 - 15.0 % Final    Eosinophil % 06/14/2023 0.9  0.0 - 8.0 % Final    Basophil % 06/14/2023 0.2  0.0 - 1.9 % Final    Platelet Estimate 06/14/2023 Appears normal   Final    Aniso 06/14/2023 Slight   Final    Poik 06/14/2023 Slight   Final    Hypo 06/14/2023 Occasional   Final    Differential Method 06/14/2023 Automated   Final    Sodium 06/14/2023 142  136 - 145 mmol/L Final    Potassium 06/14/2023 4.5  3.5 - 5.1 mmol/L Final    Chloride 06/14/2023 110  95 - 110 mmol/L Final    CO2 06/14/2023 21 (L)  23 - 29 mmol/L Final    Glucose 06/14/2023 130 (H)  70 - 110 mg/dL Final    BUN 06/14/2023 16  8 - 23 mg/dL Final    Creatinine 06/14/2023 1.4  0.5 - 1.4 mg/dL Final    Calcium 06/14/2023 8.3 (L)  8.7 - 10.5 mg/dL Final    Total Protein 06/14/2023 7.6  6.0 - 8.4 g/dL Final    Albumin 06/14/2023 3.1 (L)  3.5 - 5.2 g/dL Final    Total Bilirubin 06/14/2023 0.7  0.1 - 1.0 mg/dL Final    Comment: For infants and newborns, interpretation of results should be based  on gestational age, weight and in agreement with clinical  observations.    Premature Infant recommended reference ranges:  Up to 24 hours.............<8.0 mg/dL  Up to 48 hours............<12.0 mg/dL  3-5 days..................<15.0 mg/dL  6-29 days.................<15.0 mg/dL      Alkaline Phosphatase 06/14/2023 71  55 - 135 U/L Final    AST 06/14/2023 13  10 - 40 U/L Final    ALT 06/14/2023 9 (L)  10 - 44 U/L Final    Anion Gap 06/14/2023 11  8 - 16 mmol/L Final    eGFR 06/14/2023 52.4 (A)  >60 mL/min/1.73 m^2 Final         Imaging: CT C/A/P 2/27/23  CT THORAX:  5 mm left lower lobe nodule (series 3 image 117) is unchanged. Calcified granuloma lies adjacent to left fissure (image 89).  Subpleural 2 mm right middle lobe nodule (image 93) unchanged. 3 mm right upper lobe nodule (image 45) unchanged. Fissural nodule in right lung (series 3 image 80) unchanged.     Moderate emphysema unchanged. Trachea and main bronchi are patent     No enlarged hilar, mediastinal, axillary, or supraclavicular lymph nodes. Coronary artery calcification unchanged. Tiny pericardial effusion unchanged. No pleural effusion.     T6 compression fracture unchanged. Left fifth rib bone island unchanged. No new suspicious osseous abnormality.     CT ABDOMEN:  Liver, gallbladder, pancreas, spleen, and adrenals are normal. Bilateral renal hypodensities have not significantly changed suggesting cysts. Mild aortoiliac atherosclerotic plaque is present.     Enteric contrast reaches rectum without obstruction. Large and small intestines are unremarkable. No free intraperitoneal gas. No enlarged mesenteric or retroperitoneal lymph nodes.     Degenerative changes affect the spine. No suspicious osseous abnormality.     CT PELVIS:  Bladder is unremarkable. No free pelvic fluid. Bilateral inguinal canals are slightly patulous. No enlarged iliac or inguinal lymph nodes.     No suspicious osseous abnormality. Mild to moderate bilateral hip osteoarthrosis is present.    PET/CT 4/03/23  Quality of the study: Adequate.     In the head and neck, there are no hypermetabolic lesions worrisome for malignancy. There are no hypermetabolic mucosal lesions, and there are no pathologically enlarged or hypermetabolic lymph nodes.  Increased FDG tracer uptake in the left paraspinal musculature likely physiological muscle uptake.     In the chest, there are no hypermetabolic lesions worrisome for malignancy.  There are no pathologically enlarged or hypermetabolic lymph nodes.  Stable bilateral pulmonary nodules on the right measuring 5 mm and on the left measure 4 mm (series 3, image 64 and 103).     In the abdomen and pelvis, there is focally increased  FDG tracer uptake of the distal aspect of the penis measuring 1.4 cm with SUV max of 27 (fused image 264).     There is otherwise physiologic tracer distribution within the abdominal organs including prominent physiologic anal sphincter tone and excretion into the genitourinary system.  No pathologically enlarged or hypermetabolic inguinal lymph nodes.     In the bones, there are no hypermetabolic lesions worrisome for malignancy.     In the extremities, there are no hypermetabolic lesions worrisome for malignancy.     CT chest abdomen pelvis findings:     Right lower neck parathyroidectomy surgical changes.  Heart is normal in size with small pericardial effusion.  Multivessel coronaries calcific atherosclerosis.  Moderate aortoiliac calcific atherosclerosis without aneurysm.  Centrilobular emphysema.  Dependent bibasal atelectatic changes versus scarring.  Liver is normal in size measuring 14 cm without focal lesion.  Spleen is normal in size with numerous calcified granulomas.  Small splenules .  Degenerative changes of the spine most prominent at the cervical spine.  Stable T6 vertebral body height loss (series 604, image 81).   Assessment:       1. Urethral cancer    2. Rheumatoid arthritis, involving unspecified site, unspecified whether rheumatoid factor present    3. Insomnia, unspecified type    4. Stage 3a chronic kidney disease    5. Coronary artery disease involving native coronary artery of native heart without angina pectoris    6. Hypertension, unspecified type    7. Anorexia                 Plan:     Urethral Cancer - Pt has [id number] cancer of the urethra  -Pt discussed at tumor board on 4/04/23 with recommendation for penectomy  -Pt met with Dr Bryant on 4/06/23 and refused penectomy and elected for chemo/XRT  -Given pt's borderline kidney function with CrCl near 30 on recent lab work, pt to was receive biweekly Gemcitabine dosed 27mg/mm2 on days 1 and 4 of each week  -Pt started treatment on  5/29/23  -the patient states he refuses to have additional gemcitabine along with radiation   -Patient instructed that by failing to take chemotherapy reduces the chance of potential cure.  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 - pt at increased risk of infection  -No current signs of infection  -Will monitor    CKD IIIb - pt's baseline creatinine is 1.2-1.7  -Cr 1.4 on 6/14/23    CAD - pt with stent in place  -Pt follows with Dr Lamb in cardiology  -Pt on ASA, plavix, lipitor, antihypertensives  -Management per cardiology    HTN - pt on amlodipine  -BP stable  -Will monitor    Rheumatoid Arthritis - pt has stopped MTX and is taking prednisone with relief  -Management per rheumatology    Insomnia - pt taking temazepam   -WIll monitor    Anorexia - will prescribe periactin    Route Chart for Scheduling    Med Onc Chart Routing      Follow up with physician Other. The patient will not get further chemo.  The patient will need an appt with me once he has completed radiation.   Follow up with [facility]    Infusion scheduling note    Injection scheduling note    Labs    Imaging    Pharmacy appointment    Other referrals        Treatment Plan Information   OP BLADDER GEMCITABINE 27MG/M2 TWICE WEEKLY WITH RADIATION   Auysh Guzman MD   Upcoming Treatment Dates - OP BLADDER GEMCITABINE 27MG/M2 TWICE WEEKLY WITH RADIATION    6/26/2023       Chemotherapy       gemcitabine (GEMZAR) 47 mg in sodium chloride 0.9% SolP 100 mL chemo infusion       Antiemetics       ondansetron injection 8 mg  6/29/2023       Chemotherapy       gemcitabine (GEMZAR) 47 mg in sodium chloride 0.9% SolP 100 mL chemo infusion       Antiemetics       ondansetron  injection 8 mg  7/3/2023       Chemotherapy       gemcitabine (GEMZAR) 47 mg in sodium chloride 0.9% SolP 116.2 mL chemo infusion       Antiemetics       ondansetron injection 8 mg    Therapy Plan Information  DENOSUMAB (PROLIA) Q6M  Medications  denosumab (PROLIA) injection 60 mg  60 mg, Subcutaneous, Every 26 weeks    PORT FLUSH  Flushes  heparin, porcine (PF) 100 unit/mL injection flush 500 Units  500 Units, Intravenous, Every visit  sodium chloride 0.9% flush 10 mL  10 mL, Intravenous, Every visit      Ayush Guzman MD  [facility]  Hematology and Oncology  [facility]   [address] [place] [address]   O: [phone]  F: [phone]

## 2023-06-28 PROCEDURE — 77386 HC IMRT, COMPLEX: CPT | Mod: PN | Performed by: RADIOLOGY

## 2023-06-28 PROCEDURE — 77014 PR  CT GUIDANCE PLACEMENT RAD THERAPY FIELDS: CPT | Mod: 26,,, | Performed by: RADIOLOGY

## 2023-06-28 PROCEDURE — 77014 HC CT GUIDANCE RADIATION THERAPY FLDS PLACEMENT: CPT | Mod: TC,PN | Performed by: RADIOLOGY

## 2023-06-28 PROCEDURE — 77014 PR  CT GUIDANCE PLACEMENT RAD THERAPY FIELDS: ICD-10-PCS | Mod: 26,,, | Performed by: RADIOLOGY

## 2023-06-29 ENCOUNTER — DOCUMENTATION ONLY (OUTPATIENT)
Dept: RADIATION ONCOLOGY | Facility: CLINIC | Age: 78
End: 2023-06-29
Payer: MEDICARE

## 2023-06-29 PROCEDURE — 77014 PR  CT GUIDANCE PLACEMENT RAD THERAPY FIELDS: ICD-10-PCS | Mod: 26,,, | Performed by: RADIOLOGY

## 2023-06-29 PROCEDURE — 77014 HC CT GUIDANCE RADIATION THERAPY FLDS PLACEMENT: CPT | Mod: TC,PN | Performed by: RADIOLOGY

## 2023-06-29 PROCEDURE — 77014 PR  CT GUIDANCE PLACEMENT RAD THERAPY FIELDS: CPT | Mod: 26,,, | Performed by: RADIOLOGY

## 2023-06-29 PROCEDURE — 77386 HC IMRT, COMPLEX: CPT | Mod: PN | Performed by: RADIOLOGY

## 2023-06-29 NOTE — PLAN OF CARE
Completed 17 of 25 outpatient radiation treatments without difficulty this visit.  Patient slightly unsteady on his feet but does not have dizziness.  He is feeling better than last week - stronger and less fatigued.  No questions or concerns noted.

## 2023-06-30 PROCEDURE — 77386 HC IMRT, COMPLEX: CPT | Mod: PN | Performed by: RADIOLOGY

## 2023-06-30 PROCEDURE — 77014 PR  CT GUIDANCE PLACEMENT RAD THERAPY FIELDS: ICD-10-PCS | Mod: 26,,, | Performed by: RADIOLOGY

## 2023-06-30 PROCEDURE — 77014 PR  CT GUIDANCE PLACEMENT RAD THERAPY FIELDS: CPT | Mod: 26,,, | Performed by: RADIOLOGY

## 2023-06-30 PROCEDURE — 77014 HC CT GUIDANCE RADIATION THERAPY FLDS PLACEMENT: CPT | Mod: TC,PN | Performed by: RADIOLOGY

## 2023-07-03 ENCOUNTER — HOSPITAL ENCOUNTER (OUTPATIENT)
Dept: RADIATION THERAPY | Facility: HOSPITAL | Age: 78
Discharge: HOME | End: 2023-07-03
Attending: RADIOLOGY
Payer: MEDICARE

## 2023-07-03 PROCEDURE — 77014 HC CT GUIDANCE RADIATION THERAPY FLDS PLACEMENT: CPT | Mod: TC,PN | Performed by: RADIOLOGY

## 2023-07-03 PROCEDURE — 77014 PR  CT GUIDANCE PLACEMENT RAD THERAPY FIELDS: CPT | Mod: 26,,, | Performed by: RADIOLOGY

## 2023-07-03 PROCEDURE — 77386 HC IMRT, COMPLEX: CPT | Mod: PN | Performed by: RADIOLOGY

## 2023-07-03 PROCEDURE — 77014 PR  CT GUIDANCE PLACEMENT RAD THERAPY FIELDS: ICD-10-PCS | Mod: 26,,, | Performed by: RADIOLOGY

## 2023-07-05 PROCEDURE — 77014 PR  CT GUIDANCE PLACEMENT RAD THERAPY FIELDS: ICD-10-PCS | Mod: 26,,, | Performed by: RADIOLOGY

## 2023-07-05 PROCEDURE — 77386 HC IMRT, COMPLEX: CPT | Mod: PN | Performed by: RADIOLOGY

## 2023-07-05 PROCEDURE — 77014 PR  CT GUIDANCE PLACEMENT RAD THERAPY FIELDS: CPT | Mod: 26,,, | Performed by: RADIOLOGY

## 2023-07-05 PROCEDURE — 77336 RADIATION PHYSICS CONSULT: CPT | Mod: PN | Performed by: RADIOLOGY

## 2023-07-05 PROCEDURE — 77014 HC CT GUIDANCE RADIATION THERAPY FLDS PLACEMENT: CPT | Mod: TC,PN | Performed by: RADIOLOGY

## 2023-07-06 ENCOUNTER — DOCUMENTATION ONLY (OUTPATIENT)
Dept: RADIATION ONCOLOGY | Facility: CLINIC | Age: 78
End: 2023-07-06
Payer: MEDICARE

## 2023-07-06 PROCEDURE — 77386 HC IMRT, COMPLEX: CPT | Mod: PN | Performed by: RADIOLOGY

## 2023-07-06 PROCEDURE — 77014 PR  CT GUIDANCE PLACEMENT RAD THERAPY FIELDS: CPT | Mod: 26,,, | Performed by: RADIOLOGY

## 2023-07-06 PROCEDURE — 77014 PR  CT GUIDANCE PLACEMENT RAD THERAPY FIELDS: ICD-10-PCS | Mod: 26,,, | Performed by: RADIOLOGY

## 2023-07-06 PROCEDURE — 77014 HC CT GUIDANCE RADIATION THERAPY FLDS PLACEMENT: CPT | Mod: TC,PN | Performed by: RADIOLOGY

## 2023-07-07 PROCEDURE — 77014 PR  CT GUIDANCE PLACEMENT RAD THERAPY FIELDS: ICD-10-PCS | Mod: 26,,, | Performed by: RADIOLOGY

## 2023-07-07 PROCEDURE — 77014 PR  CT GUIDANCE PLACEMENT RAD THERAPY FIELDS: CPT | Mod: 26,,, | Performed by: RADIOLOGY

## 2023-07-07 PROCEDURE — 77386 HC IMRT, COMPLEX: CPT | Mod: PN | Performed by: RADIOLOGY

## 2023-07-07 PROCEDURE — 77014 HC CT GUIDANCE RADIATION THERAPY FLDS PLACEMENT: CPT | Mod: TC,PN | Performed by: RADIOLOGY

## 2023-07-10 PROCEDURE — 77338 DESIGN MLC DEVICE FOR IMRT: CPT | Mod: 26,,, | Performed by: RADIOLOGY

## 2023-07-10 PROCEDURE — 77300 PR RADIATION THERAPY,DOSIMETRY PLAN: ICD-10-PCS | Mod: 26,,, | Performed by: RADIOLOGY

## 2023-07-10 PROCEDURE — 77338 DESIGN MLC DEVICE FOR IMRT: CPT | Mod: TC,59,PN | Performed by: RADIOLOGY

## 2023-07-10 PROCEDURE — 77338 PR  MLC IMRT DESIGN & CONSTRUCTION PER IMRT PLAN: ICD-10-PCS | Mod: 26,,, | Performed by: RADIOLOGY

## 2023-07-10 PROCEDURE — 77300 RADIATION THERAPY DOSE PLAN: CPT | Mod: TC,PN | Performed by: RADIOLOGY

## 2023-07-10 PROCEDURE — 77014 PR  CT GUIDANCE PLACEMENT RAD THERAPY FIELDS: CPT | Mod: 26,,, | Performed by: RADIOLOGY

## 2023-07-10 PROCEDURE — 77014 HC CT GUIDANCE RADIATION THERAPY FLDS PLACEMENT: CPT | Mod: TC,PN | Performed by: RADIOLOGY

## 2023-07-10 PROCEDURE — 77386 HC IMRT, COMPLEX: CPT | Mod: PN | Performed by: RADIOLOGY

## 2023-07-10 PROCEDURE — 77300 RADIATION THERAPY DOSE PLAN: CPT | Mod: 26,,, | Performed by: RADIOLOGY

## 2023-07-10 PROCEDURE — 77014 PR  CT GUIDANCE PLACEMENT RAD THERAPY FIELDS: ICD-10-PCS | Mod: 26,,, | Performed by: RADIOLOGY

## 2023-07-11 ENCOUNTER — TELEPHONE (OUTPATIENT)
Dept: UROLOGY | Facility: CLINIC | Age: 78
End: 2023-07-11
Payer: MEDICARE

## 2023-07-11 NOTE — TELEPHONE ENCOUNTER
----- Message from Jenny Haq RN sent at 7/11/2023  8:21 AM CDT -----  Regarding: [facility] ER visit f/u 7/10/2023 Dx: Phimosis of penis  Please call patient to schedule an appointment for further evaluation and treatment of phimosis of penis. Dr Pena ([facility]) spoke with Dr Myers during the patient's ER visit and Dr Myers wanted to see him in the clinic in 2 weeks.     Thanks,    Jenny Haq, RN, BSN  ED Navigator/Case Management  [phone]

## 2023-07-12 PROCEDURE — 77014 PR  CT GUIDANCE PLACEMENT RAD THERAPY FIELDS: ICD-10-PCS | Mod: 26,,, | Performed by: RADIOLOGY

## 2023-07-12 PROCEDURE — 77386 HC IMRT, COMPLEX: CPT | Mod: PN | Performed by: RADIOLOGY

## 2023-07-12 PROCEDURE — 77014 PR  CT GUIDANCE PLACEMENT RAD THERAPY FIELDS: CPT | Mod: 26,,, | Performed by: RADIOLOGY

## 2023-07-12 PROCEDURE — 77014 HC CT GUIDANCE RADIATION THERAPY FLDS PLACEMENT: CPT | Mod: TC,PN | Performed by: RADIOLOGY

## 2023-07-13 ENCOUNTER — DOCUMENTATION ONLY (OUTPATIENT)
Dept: RADIATION ONCOLOGY | Facility: CLINIC | Age: 78
End: 2023-07-13
Payer: MEDICARE

## 2023-07-13 PROCEDURE — 77014 PR  CT GUIDANCE PLACEMENT RAD THERAPY FIELDS: ICD-10-PCS | Mod: 26,,, | Performed by: RADIOLOGY

## 2023-07-13 PROCEDURE — 77386 HC IMRT, COMPLEX: CPT | Mod: PN | Performed by: RADIOLOGY

## 2023-07-13 PROCEDURE — 77014 PR  CT GUIDANCE PLACEMENT RAD THERAPY FIELDS: CPT | Mod: 26,,, | Performed by: RADIOLOGY

## 2023-07-13 PROCEDURE — 77014 HC CT GUIDANCE RADIATION THERAPY FLDS PLACEMENT: CPT | Mod: TC,PN | Performed by: RADIOLOGY

## 2023-07-14 ENCOUNTER — TELEPHONE (OUTPATIENT)
Dept: HEMATOLOGY/ONCOLOGY | Facility: CLINIC | Age: 78
End: 2023-07-14
Payer: MEDICARE

## 2023-07-14 PROCEDURE — 77014 PR  CT GUIDANCE PLACEMENT RAD THERAPY FIELDS: CPT | Mod: 26,,, | Performed by: RADIOLOGY

## 2023-07-14 PROCEDURE — 77014 HC CT GUIDANCE RADIATION THERAPY FLDS PLACEMENT: CPT | Mod: TC,PN | Performed by: RADIOLOGY

## 2023-07-14 PROCEDURE — 77386 HC IMRT, COMPLEX: CPT | Mod: PN | Performed by: RADIOLOGY

## 2023-07-14 PROCEDURE — 77014 PR  CT GUIDANCE PLACEMENT RAD THERAPY FIELDS: ICD-10-PCS | Mod: 26,,, | Performed by: RADIOLOGY

## 2023-07-14 PROCEDURE — 77336 RADIATION PHYSICS CONSULT: CPT | Mod: PN | Performed by: RADIOLOGY

## 2023-07-14 NOTE — TELEPHONE ENCOUNTER
Patient made aware ok to take port out and understands that if his cancer should recur that he will need another one placed. Patient verbalized understanding.    ----- Message from Jeanna Alejandre, Patient Care Assistant sent at 7/14/2023 10:36 AM CDT -----  Type: Needs Medical Advice  Who Called:  jayro  Best Call Back Number: [phone]    Additional Information: jayro would like to speak with wadge or nurse about removing his port , he states above provider agreed to stop chemo since  it is making him way to sick and would like the port out , please call to further discuss,. Thank you

## 2023-07-17 DIAGNOSIS — C68.0 URETHRAL CANCER: Primary | ICD-10-CM

## 2023-07-17 PROCEDURE — 77386 HC IMRT, COMPLEX: CPT | Mod: PN | Performed by: RADIOLOGY

## 2023-07-17 PROCEDURE — 77014 HC CT GUIDANCE RADIATION THERAPY FLDS PLACEMENT: CPT | Mod: TC,PN | Performed by: RADIOLOGY

## 2023-07-17 PROCEDURE — 77014 PR  CT GUIDANCE PLACEMENT RAD THERAPY FIELDS: ICD-10-PCS | Mod: 26,,, | Performed by: RADIOLOGY

## 2023-07-17 PROCEDURE — 77014 PR  CT GUIDANCE PLACEMENT RAD THERAPY FIELDS: CPT | Mod: 26,,, | Performed by: RADIOLOGY

## 2023-07-18 ENCOUNTER — TELEPHONE (OUTPATIENT)
Dept: UROLOGY | Facility: CLINIC | Age: 78
End: 2023-07-18
Payer: MEDICARE

## 2023-07-18 ENCOUNTER — OFFICE VISIT (OUTPATIENT)
Dept: UROLOGY | Facility: CLINIC | Age: 78
End: 2023-07-18
Payer: MEDICARE

## 2023-07-18 VITALS — BODY MASS INDEX: 20.65 KG/M2 | HEIGHT: 68 IN | WEIGHT: 136.25 LBS

## 2023-07-18 DIAGNOSIS — N47.1 PHIMOSIS: Primary | ICD-10-CM

## 2023-07-18 DIAGNOSIS — N32.81 OAB (OVERACTIVE BLADDER): ICD-10-CM

## 2023-07-18 DIAGNOSIS — N40.1 BENIGN PROSTATIC HYPERPLASIA WITH URINARY OBSTRUCTION: ICD-10-CM

## 2023-07-18 DIAGNOSIS — N13.8 BENIGN PROSTATIC HYPERPLASIA WITH URINARY OBSTRUCTION: ICD-10-CM

## 2023-07-18 PROCEDURE — 1159F MED LIST DOCD IN RCRD: CPT | Mod: CPTII,S$GLB,, | Performed by: STUDENT IN AN ORGANIZED HEALTH CARE EDUCATION/TRAINING PROGRAM

## 2023-07-18 PROCEDURE — 99214 PR OFFICE/OUTPT VISIT, EST, LEVL IV, 30-39 MIN: ICD-10-PCS | Mod: S$GLB,,, | Performed by: STUDENT IN AN ORGANIZED HEALTH CARE EDUCATION/TRAINING PROGRAM

## 2023-07-18 PROCEDURE — 3288F FALL RISK ASSESSMENT DOCD: CPT | Mod: CPTII,S$GLB,, | Performed by: STUDENT IN AN ORGANIZED HEALTH CARE EDUCATION/TRAINING PROGRAM

## 2023-07-18 PROCEDURE — 1126F PR PAIN SEVERITY QUANTIFIED, NO PAIN PRESENT: ICD-10-PCS | Mod: CPTII,S$GLB,, | Performed by: STUDENT IN AN ORGANIZED HEALTH CARE EDUCATION/TRAINING PROGRAM

## 2023-07-18 PROCEDURE — 77014 PR  CT GUIDANCE PLACEMENT RAD THERAPY FIELDS: CPT | Mod: 26,,, | Performed by: RADIOLOGY

## 2023-07-18 PROCEDURE — 1101F PR PT FALLS ASSESS DOC 0-1 FALLS W/OUT INJ PAST YR: ICD-10-PCS | Mod: CPTII,S$GLB,, | Performed by: STUDENT IN AN ORGANIZED HEALTH CARE EDUCATION/TRAINING PROGRAM

## 2023-07-18 PROCEDURE — 99214 OFFICE O/P EST MOD 30 MIN: CPT | Mod: S$GLB,,, | Performed by: STUDENT IN AN ORGANIZED HEALTH CARE EDUCATION/TRAINING PROGRAM

## 2023-07-18 PROCEDURE — 77014 HC CT GUIDANCE RADIATION THERAPY FLDS PLACEMENT: CPT | Mod: TC,PN | Performed by: RADIOLOGY

## 2023-07-18 PROCEDURE — 1159F PR MEDICATION LIST DOCUMENTED IN MEDICAL RECORD: ICD-10-PCS | Mod: CPTII,S$GLB,, | Performed by: STUDENT IN AN ORGANIZED HEALTH CARE EDUCATION/TRAINING PROGRAM

## 2023-07-18 PROCEDURE — 99999 PR PBB SHADOW E&M-EST. PATIENT-LVL V: ICD-10-PCS | Mod: PBBFAC,,, | Performed by: STUDENT IN AN ORGANIZED HEALTH CARE EDUCATION/TRAINING PROGRAM

## 2023-07-18 PROCEDURE — 99999 PR PBB SHADOW E&M-EST. PATIENT-LVL V: CPT | Mod: PBBFAC,,, | Performed by: STUDENT IN AN ORGANIZED HEALTH CARE EDUCATION/TRAINING PROGRAM

## 2023-07-18 PROCEDURE — 77014 PR  CT GUIDANCE PLACEMENT RAD THERAPY FIELDS: ICD-10-PCS | Mod: 26,,, | Performed by: RADIOLOGY

## 2023-07-18 PROCEDURE — 1101F PT FALLS ASSESS-DOCD LE1/YR: CPT | Mod: CPTII,S$GLB,, | Performed by: STUDENT IN AN ORGANIZED HEALTH CARE EDUCATION/TRAINING PROGRAM

## 2023-07-18 PROCEDURE — 77386 HC IMRT, COMPLEX: CPT | Mod: PN | Performed by: RADIOLOGY

## 2023-07-18 PROCEDURE — 1160F RVW MEDS BY RX/DR IN RCRD: CPT | Mod: CPTII,S$GLB,, | Performed by: STUDENT IN AN ORGANIZED HEALTH CARE EDUCATION/TRAINING PROGRAM

## 2023-07-18 PROCEDURE — 1126F AMNT PAIN NOTED NONE PRSNT: CPT | Mod: CPTII,S$GLB,, | Performed by: STUDENT IN AN ORGANIZED HEALTH CARE EDUCATION/TRAINING PROGRAM

## 2023-07-18 PROCEDURE — 3288F PR FALLS RISK ASSESSMENT DOCUMENTED: ICD-10-PCS | Mod: CPTII,S$GLB,, | Performed by: STUDENT IN AN ORGANIZED HEALTH CARE EDUCATION/TRAINING PROGRAM

## 2023-07-18 PROCEDURE — 1160F PR REVIEW ALL MEDS BY PRESCRIBER/CLIN PHARMACIST DOCUMENTED: ICD-10-PCS | Mod: CPTII,S$GLB,, | Performed by: STUDENT IN AN ORGANIZED HEALTH CARE EDUCATION/TRAINING PROGRAM

## 2023-07-18 RX ORDER — BETAMETHASONE VALERATE 1.2 MG/G
CREAM TOPICAL 2 TIMES DAILY
Qty: 45 G | Refills: 0 | Status: SHIPPED | OUTPATIENT
Start: 2023-07-18

## 2023-07-18 RX ORDER — OXYBUTYNIN CHLORIDE 5 MG/1
10 TABLET, EXTENDED RELEASE ORAL DAILY
Qty: 60 TABLET | Refills: 11 | Status: SHIPPED | OUTPATIENT
Start: 2023-07-18 | End: 2023-07-21

## 2023-07-18 NOTE — PROGRESS NOTES
"[facility] - [facility]   Clinic Note    Subjective:     Chief Complaint: phimosis      History of Present Illness:  Rajendra Castle is a 77 y.o. male who presents to clinic for evaluation and management of multiple urologic issues.     He was initially evaluated for LUTS and was requesting a UroLift. 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 He was found to have phimosis and a UTI during presentation to the ED. He was given topical steroids and has only been on them for the last week without much improvement. He has been able to void. He has pain on the foreskin with an erection.     From previous:  He has a history of prostate cancer s/p EBRT. He reports urgency, hesitancy, and sensation of incomplete emptying with leakage and dribbling. He has ISABEL and CPAP. He also reports insomnia. He has been on ditropan which has helped his symptoms     Cysto/TRUS with Dr. Pina 7/2022 showed a prostate volume of 23 cc, as well as a small short-segment bulbar urethral stricture just distal to the sphincter which was passable with the flexible scope. PVR was measured and noted to be 11 mL.    Past medical, family, surgical and social history reviewed as documented in chart with pertinent positive medical, family, surgical and social history detailed in HPI.    A review systems was conducted with pertinent positive and negative findings documented in HPI.    Anticoagulation/Antiplatelets:  Yes aspirin and plavix but reports he has stopped his plavix    Objective:     Estimated body mass index is 20.72 kg/m² as calculated from the following:    Height as of this encounter: 5' 8" (1.727 m).    Weight as of this encounter: 61.8 kg (136 lb 3.9 oz).    Vital Signs (Most Recent)       Physical Exam  Vitals and nursing note reviewed.   Constitutional:       General: He is not in acute distress.     Appearance: He is not ill-appearing or toxic-appearing.   Pulmonary:      Effort: Pulmonary " effort is normal. No accessory muscle usage or respiratory distress.   Genitourinary:     Penis: Uncircumcised. Phimosis present.    Neurological:      Mental Status: He is alert.       Lab Results   Component Value Date    BUN 18 07/10/2023    CREATININE 1.38 07/10/2023    WBC 13.38 (H) 07/10/2023    HGB 12.2 (L) 07/10/2023    HCT 39.8 (L) 07/10/2023     07/10/2023    AST 27 07/10/2023    ALT 20 07/10/2023    ALKPHOS 85 07/10/2023    ALBUMIN 3.8 07/10/2023    HGBA1C 5.9 06/21/2022        Lab Results   Component Value Date    PSA 4.8 (H) 03/16/2018    PSA 2.1 10/03/2013    PSA 2.73 08/06/2012    PSA 1.8 10/03/2006    PSADIAG 0.10 11/14/2022    PSADIAG 0.14 02/28/2022    PSADIAG 0.11 10/29/2021    PSADIAG 0.08 07/13/2021    PSADIAG 0.04 03/29/2021    PSADIAG 0.07 10/02/2020    PSADIAG 0.30 12/20/2019    PSADIAG 0.86 08/12/2019    PSADIAG 3.4 10/14/2015    PSADIAG 4.1 (H) 04/06/2015    PSAFREE 0.01 06/24/2020    PSAFREE 0.22 11/09/2018    PSAFREE 0.34 04/25/2018    PSAFREEPCT 9.09 06/24/2020    PSAFREEPCT 9.17 11/09/2018    PSAFREEPCT 11.72 04/25/2018      Assessment:     1. Phimosis    2. Benign prostatic hyperplasia with urinary obstruction    3. OAB (overactive bladder)      Plan:     I explained that it may take 4-6 weeks before the steroids can have effect.   He is interested in circumcision regardless. I explained he may have a higher risk of complications given the radiation therapy.   The patient is scheduled for a circumcision. The risks and benefits were explained to the patient in detail and consent was obtained. The risks include but are not limited to bleeding, infection, pain, fistula formation, meatal stenosis, ulceration/scarring at the tip of the penis, skin bridge, removal of too much or too little skin, buried penis, penile swelling of discomfort, infection of incision or bleeding that may result in a hospital treatment.  The patient was given the alternatives of observation and medical  therapy as well. The patient understands these risks and has agreed to proceed with surgery.     Schedule after completion of radiation therapy    New prescription sent for betamethasone.   Continue the ditropan, increased to 10 mg    Timo Myers MD

## 2023-07-19 PROCEDURE — 77014 HC CT GUIDANCE RADIATION THERAPY FLDS PLACEMENT: CPT | Mod: TC,PN | Performed by: RADIOLOGY

## 2023-07-19 PROCEDURE — 77014 PR  CT GUIDANCE PLACEMENT RAD THERAPY FIELDS: CPT | Mod: 26,,, | Performed by: RADIOLOGY

## 2023-07-19 PROCEDURE — 77014 PR  CT GUIDANCE PLACEMENT RAD THERAPY FIELDS: ICD-10-PCS | Mod: 26,,, | Performed by: RADIOLOGY

## 2023-07-19 PROCEDURE — 77386 HC IMRT, COMPLEX: CPT | Mod: PN | Performed by: RADIOLOGY

## 2023-07-20 ENCOUNTER — DOCUMENTATION ONLY (OUTPATIENT)
Dept: RADIATION ONCOLOGY | Facility: CLINIC | Age: 78
End: 2023-07-20
Payer: MEDICARE

## 2023-07-20 ENCOUNTER — TELEPHONE (OUTPATIENT)
Dept: UROLOGY | Facility: CLINIC | Age: 78
End: 2023-07-20
Payer: MEDICARE

## 2023-07-20 DIAGNOSIS — G47.00 INSOMNIA, UNSPECIFIED TYPE: ICD-10-CM

## 2023-07-20 PROCEDURE — 77014 HC CT GUIDANCE RADIATION THERAPY FLDS PLACEMENT: CPT | Mod: TC,PN | Performed by: RADIOLOGY

## 2023-07-20 PROCEDURE — 77014 PR  CT GUIDANCE PLACEMENT RAD THERAPY FIELDS: ICD-10-PCS | Mod: 26,,, | Performed by: RADIOLOGY

## 2023-07-20 PROCEDURE — 77386 HC IMRT, COMPLEX: CPT | Mod: PN | Performed by: RADIOLOGY

## 2023-07-20 PROCEDURE — 77014 PR  CT GUIDANCE PLACEMENT RAD THERAPY FIELDS: CPT | Mod: 26,,, | Performed by: RADIOLOGY

## 2023-07-20 RX ORDER — TEMAZEPAM 15 MG/1
15 CAPSULE ORAL NIGHTLY PRN
Qty: 30 CAPSULE | Refills: 1 | Status: SHIPPED | OUTPATIENT
Start: 2023-07-20 | End: 2023-07-27 | Stop reason: DRUGHIGH

## 2023-07-20 NOTE — TELEPHONE ENCOUNTER
----- Message from Valentine Hammer sent at 7/20/2023  9:04 AM CDT -----  Regarding: RX oxybutynin (DITROPAN-XL) 5 MG TR24  Contact: JONATHAN BHAKTA [phone]    Type: Needs Medical Advice      Who Called:  JONATHAN BHAKTA    Best Call Back Number: [phone] (home)       Additional Information: Patient is calling to speak with nurse/MA regarding Rx oxybutynin (DITROPAN-XL) 5 MG TR24.    Please call back and advise. Thanks

## 2023-07-21 ENCOUNTER — OFFICE VISIT (OUTPATIENT)
Dept: HEMATOLOGY/ONCOLOGY | Facility: CLINIC | Age: 78
End: 2023-07-21
Payer: MEDICARE

## 2023-07-21 VITALS
DIASTOLIC BLOOD PRESSURE: 72 MMHG | TEMPERATURE: 97 F | BODY MASS INDEX: 20.67 KG/M2 | SYSTOLIC BLOOD PRESSURE: 141 MMHG | HEART RATE: 87 BPM | OXYGEN SATURATION: 95 % | HEIGHT: 68 IN | WEIGHT: 136.38 LBS

## 2023-07-21 DIAGNOSIS — C68.0 URETHRAL CANCER: ICD-10-CM

## 2023-07-21 DIAGNOSIS — M54.50 CHRONIC LOW BACK PAIN, UNSPECIFIED BACK PAIN LATERALITY, UNSPECIFIED WHETHER SCIATICA PRESENT: Primary | ICD-10-CM

## 2023-07-21 DIAGNOSIS — F32.A ANXIETY AND DEPRESSION: ICD-10-CM

## 2023-07-21 DIAGNOSIS — N32.81 OAB (OVERACTIVE BLADDER): Primary | ICD-10-CM

## 2023-07-21 DIAGNOSIS — G89.29 CHRONIC LOW BACK PAIN, UNSPECIFIED BACK PAIN LATERALITY, UNSPECIFIED WHETHER SCIATICA PRESENT: Primary | ICD-10-CM

## 2023-07-21 DIAGNOSIS — G47.09 OTHER INSOMNIA: ICD-10-CM

## 2023-07-21 DIAGNOSIS — R68.89 DECREASED STRENGTH, ENDURANCE, AND MOBILITY: ICD-10-CM

## 2023-07-21 DIAGNOSIS — Z74.09 DECREASED STRENGTH, ENDURANCE, AND MOBILITY: ICD-10-CM

## 2023-07-21 DIAGNOSIS — F41.9 ANXIETY AND DEPRESSION: ICD-10-CM

## 2023-07-21 DIAGNOSIS — F32.2 MAJOR DEPRESSIVE DISORDER, SINGLE EPISODE, SEVERE WITHOUT PSYCHOTIC FEATURES: ICD-10-CM

## 2023-07-21 DIAGNOSIS — R53.1 DECREASED STRENGTH, ENDURANCE, AND MOBILITY: ICD-10-CM

## 2023-07-21 PROCEDURE — 77014 HC CT GUIDANCE RADIATION THERAPY FLDS PLACEMENT: CPT | Mod: TC,PN | Performed by: RADIOLOGY

## 2023-07-21 PROCEDURE — 1126F PR PAIN SEVERITY QUANTIFIED, NO PAIN PRESENT: ICD-10-PCS | Mod: CPTII,S$GLB,, | Performed by: NURSE PRACTITIONER

## 2023-07-21 PROCEDURE — 1159F MED LIST DOCD IN RCRD: CPT | Mod: CPTII,S$GLB,, | Performed by: NURSE PRACTITIONER

## 2023-07-21 PROCEDURE — 1159F PR MEDICATION LIST DOCUMENTED IN MEDICAL RECORD: ICD-10-PCS | Mod: CPTII,S$GLB,, | Performed by: NURSE PRACTITIONER

## 2023-07-21 PROCEDURE — 99999 PR PBB SHADOW E&M-EST. PATIENT-LVL V: CPT | Mod: PBBFAC,,, | Performed by: NURSE PRACTITIONER

## 2023-07-21 PROCEDURE — 3077F PR MOST RECENT SYSTOLIC BLOOD PRESSURE >= 140 MM HG: ICD-10-PCS | Mod: CPTII,S$GLB,, | Performed by: NURSE PRACTITIONER

## 2023-07-21 PROCEDURE — 1101F PR PT FALLS ASSESS DOC 0-1 FALLS W/OUT INJ PAST YR: ICD-10-PCS | Mod: CPTII,S$GLB,, | Performed by: NURSE PRACTITIONER

## 2023-07-21 PROCEDURE — 77336 RADIATION PHYSICS CONSULT: CPT | Mod: PN | Performed by: RADIOLOGY

## 2023-07-21 PROCEDURE — 3288F FALL RISK ASSESSMENT DOCD: CPT | Mod: CPTII,S$GLB,, | Performed by: NURSE PRACTITIONER

## 2023-07-21 PROCEDURE — 77014 PR  CT GUIDANCE PLACEMENT RAD THERAPY FIELDS: CPT | Mod: 26,,, | Performed by: RADIOLOGY

## 2023-07-21 PROCEDURE — 99215 PR OFFICE/OUTPT VISIT, EST, LEVL V, 40-54 MIN: ICD-10-PCS | Mod: S$GLB,,, | Performed by: NURSE PRACTITIONER

## 2023-07-21 PROCEDURE — 1101F PT FALLS ASSESS-DOCD LE1/YR: CPT | Mod: CPTII,S$GLB,, | Performed by: NURSE PRACTITIONER

## 2023-07-21 PROCEDURE — 3077F SYST BP >= 140 MM HG: CPT | Mod: CPTII,S$GLB,, | Performed by: NURSE PRACTITIONER

## 2023-07-21 PROCEDURE — 3078F DIAST BP <80 MM HG: CPT | Mod: CPTII,S$GLB,, | Performed by: NURSE PRACTITIONER

## 2023-07-21 PROCEDURE — 77014 PR  CT GUIDANCE PLACEMENT RAD THERAPY FIELDS: ICD-10-PCS | Mod: 26,,, | Performed by: RADIOLOGY

## 2023-07-21 PROCEDURE — 3078F PR MOST RECENT DIASTOLIC BLOOD PRESSURE < 80 MM HG: ICD-10-PCS | Mod: CPTII,S$GLB,, | Performed by: NURSE PRACTITIONER

## 2023-07-21 PROCEDURE — 99215 OFFICE O/P EST HI 40 MIN: CPT | Mod: S$GLB,,, | Performed by: NURSE PRACTITIONER

## 2023-07-21 PROCEDURE — 1160F PR REVIEW ALL MEDS BY PRESCRIBER/CLIN PHARMACIST DOCUMENTED: ICD-10-PCS | Mod: CPTII,S$GLB,, | Performed by: NURSE PRACTITIONER

## 2023-07-21 PROCEDURE — 1160F RVW MEDS BY RX/DR IN RCRD: CPT | Mod: CPTII,S$GLB,, | Performed by: NURSE PRACTITIONER

## 2023-07-21 PROCEDURE — 3288F PR FALLS RISK ASSESSMENT DOCUMENTED: ICD-10-PCS | Mod: CPTII,S$GLB,, | Performed by: NURSE PRACTITIONER

## 2023-07-21 PROCEDURE — 99999 PR PBB SHADOW E&M-EST. PATIENT-LVL V: ICD-10-PCS | Mod: PBBFAC,,, | Performed by: NURSE PRACTITIONER

## 2023-07-21 PROCEDURE — 77386 HC IMRT, COMPLEX: CPT | Mod: PN | Performed by: RADIOLOGY

## 2023-07-21 PROCEDURE — 1126F AMNT PAIN NOTED NONE PRSNT: CPT | Mod: CPTII,S$GLB,, | Performed by: NURSE PRACTITIONER

## 2023-07-21 RX ORDER — OXYBUTYNIN CHLORIDE 10 MG/1
10 TABLET, EXTENDED RELEASE ORAL DAILY
Qty: 30 TABLET | Refills: 11 | Status: SHIPPED | OUTPATIENT
Start: 2023-07-21 | End: 2023-07-24

## 2023-07-21 NOTE — PROGRESS NOTES
"Rajendra Castle  77 y.o. is here to seek an integrative approach to discuss side effects related to urethral cancer treatment. Rajnedra Castle  was referred by Dr. Mahan     HPI  Mr. Castle reports he is no longer getting chemotherapy due to not being able to tolerate it. He states he had fatigue, weakness, vomiting, diarrhea. He does not want chemo and he is having his port removed on Monday. He is currently having radiation and states he is doing well. He is not sleeping well, getting about 4 hours with 2 Restoril and 1/2 trazodone. He is unable to take a nap due to not being able to fall asleep. He has a good appetite and feels he eats a healthy diet with a lot of protein in addition to drinking protein shakes. He reports low physical activity. He reports some low back pain when he wakes up in the morning and when he walks often. He states rest helps improve the back pain. He does worry about his condition and is anxious regarding his health. He also feels he is slightly depressed. He reports he was previously in a mental hospital. He states, " It was a young black doctor trying to impress her white peers." He states, I don't trust people of your color. He asked, "Do you know the [place] Study when white people killed black men." He stated that if you are Trump supporter then you do not have the Holy Ghost in your heart.   He is not  and has 2 children who do not live close. He has a good support system. He is a retired teacher and public health worker.       Pillars Assessment    Sleep  How many hours of sleep per night? 4 hours  Do you have trouble falling asleep, staying asleep or waking up earlier than you need to? yes  Do you have daytime fatigue? yes  Do you need medication for sleep? yes  Do you use any supplements or other interventions for sleep? no    Resilience  Rate your current level of stress- high sometimes    Nutrition   Food allergies or sensitivities: no  Do you adhere to a particular type " of diet? no  Do you have any concerns with your eating habits? no    Exercise  How would you describe your physical activity level? low    Past Medical History  Past Medical History:   Diagnosis Date    Anticoagulant long-term use     Arthritis     Coronary artery disease     Dr. Lamb    DDD (degenerative disc disease), cervical     Degenerative disc disease     Heart murmur     History of radiation therapy 2020    Hypertension     Nontoxic multinodular goiter 09/20/2016    Prostate CA     RA (rheumatoid arthritis)     Sleep apnea     No CPAP    Vitamin D insufficiency 09/30/2016      Past Surgical History   Past Surgical History:   Procedure Laterality Date    CARPAL TUNNEL RELEASE Right 05/28/2021    Procedure: RELEASE, CARPAL TUNNEL;  Surgeon: Jose Ryan II, MD;  Location: [facility];  Service: Orthopedics;  Laterality: Right;    COLONOSCOPY  prior to 2016    normal findings per patient report    CORONARY ANGIOPLASTY WITH STENT PLACEMENT  02/2022    CYSTOSCOPY N/A 12/18/2018    Procedure: CYSTOSCOPY;  Surgeon: Garrett Pina MD;  Location: [facility];  Service: Urology;  Laterality: N/A;    CYSTOSCOPY N/A 07/12/2022    Procedure: CYSTOSCOPY;  Surgeon: Garrett Pina MD;  Location: [facility] OR;  Service: Urology;  Laterality: N/A;    ESOPHAGOGASTRODUODENOSCOPY N/A 09/29/2020    Dr. Espinosa; empiric dilation; erythematous mucosa in antrum; gastric mucosal atrophy; hematin in entire stomach; biopsy: mid & distal esophagus WNL, stomach- WNL, negative for h pylori    EXCISION OF LESION OF PENIS N/A 2/23/2023    Procedure: EXCISION, LESION, PENIS;  Surgeon: Timo Myers MD;  Location: [facility];  Service: Urology;  Laterality: N/A;    INSERTION OF TUNNELED CENTRAL VENOUS CATHETER (CVC) WITH SUBCUTANEOUS PORT Left 5/18/2023    Procedure: [id number];  Surgeon: Atul Faria MD;  Location: [facility];  Service: General;  Laterality: Left;  left subclavian    PARATHYROIDECTOMY Right 10/27/2020     Procedure: PARATHYROIDECTOMY;  Surgeon: Latonia Clayton MD;  [facility]: [facility];  Service: ENT;  Laterality: Right;    RELEASE OF ULNAR NERVE AT CUBITAL TUNNEL Right 2021    Procedure: RELEASE, ULNAR TUNNEL;  Surgeon: Jose Ryan II, MD;  Location: [facility];  Service: Orthopedics;  Laterality: Right;    TRANSRECTAL BIOPSY OF PROSTATE WITH ULTRASOUND GUIDANCE N/A 2018    Procedure: BIOPSY, PROSTATE, RECTAL APPROACH, WITH US GUIDANCE;  Surgeon: Garrett Pina MD;  [facility];  Service: Urology;  Laterality: N/A;    TRANSRECTAL ULTRASOUND EXAMINATION N/A 2022    Procedure: ULTRASOUND, RECTAL APPROACH;  Surgeon: Garrett Pina MD;  Location: [facility];  Service: Urology;  Laterality: N/A;      Family History   Family History   Problem Relation Age of Onset    Heart disease Mother     Stroke Father     Drug abuse Sister     No Known Problems Daughter     No Known Problems Daughter     No Known Problems Son     Diabetes Maternal Uncle     Colon cancer Neg Hx     Crohn's disease Neg Hx     Esophageal cancer Neg Hx     Stomach cancer Neg Hx     Ulcerative colitis Neg Hx       Social History  Social History     Socioeconomic History    Marital status: Single   Tobacco Use    Smoking status: Former     Packs/day: 0.25     Years: 10.00     Pack years: 2.50     Types: Cigarettes     Quit date: 2001     Years since quittin.9     Passive exposure: Past    Smokeless tobacco: Never   Substance and Sexual Activity    Alcohol use: Not Currently     Comment: seldom    Drug use: Not Currently     Frequency: 8.0 times per week     Types: Marijuana    Sexual activity: Yes     Partners: Female      Allergies  Review of patient's allergies indicates:  No Known Allergies   Current Medications:    Current Outpatient Medications:     alfuzosin (UROXATRAL) 10 mg Tb24, Take 10 mg by mouth., Disp: , Rfl:     amLODIPine (NORVASC) 5 MG tablet, Take 1 tablet (5 mg total) by mouth 2 (two)  times daily., Disp: 60 tablet, Rfl: 3    amoxicillin-clavulanate 875-125mg (AUGMENTIN) 875-125 mg per tablet, Take 1 tablet by mouth 2 (two) times daily., Disp: 14 tablet, Rfl: 0    aspirin (ECOTRIN) 81 MG EC tablet, Take 1 tablet by mouth once daily., Disp: , Rfl:     atorvastatin (LIPITOR) 20 MG tablet, Take 20 mg by mouth once daily., Disp: , Rfl:     b complex vitamins capsule, Take 1 capsule by mouth once daily., Disp: , Rfl:     betamethasone dipropionate 0.05 % cream, Apply topically 2 (two) times daily., Disp: 45 g, Rfl: 1    betamethasone valerate 0.1% (VALISONE) 0.1 % Crea, Apply topically 2 (two) times daily., Disp: 45 g, Rfl: 0    busPIRone (BUSPAR) 10 MG tablet, Take 1 tablet (10 mg total) by mouth 3 (three) times daily., Disp: 90 tablet, Rfl: 3    carvediloL (COREG) 25 MG tablet, Take 25 mg by mouth., Disp: , Rfl:     clopidogreL (PLAVIX) 75 mg tablet, Take 1 tablet (75 mg total) by mouth once daily. Pt not sure of dose, Disp: , Rfl:     cyproheptadine (PERIACTIN) 4 mg tablet, Take 1 tablet (4 mg total) by mouth 4 (four) times daily., Disp: 120 tablet, Rfl: 2    EScitalopram oxalate (LEXAPRO) 10 MG tablet, Take 10 mg by mouth once daily., Disp: , Rfl:     folic acid (FOLVITE) 1 MG tablet, Take 1 tablet (1 mg total) by mouth once daily., Disp: 90 tablet, Rfl: 3    gabapentin (NEURONTIN) 300 MG capsule, Take 300 mg by mouth 3 (three) times daily., Disp: , Rfl:     HYDROcodone-acetaminophen (NORCO) 5-325 mg per tablet, Take 1 tablet by mouth every 6 (six) hours as needed for Pain., Disp: 20 tablet, Rfl: 0    hydrOXYchloroQUINE (PLAQUENIL) 200 mg tablet, Take 2 tablets (400 mg total) by mouth once daily., Disp: 60 tablet, Rfl: 6    isosorbide mononitrate (IMDUR) 30 MG 24 hr tablet, Take 30 mg by mouth., Disp: , Rfl:     LIDOcaine HCL 2% (XYLOCAINE) 2 % jelly, Apply to affected area (tip of penis) daily prn, Disp: 30 mL, Rfl: 1    LIDOcaine-prilocaine (EMLA) cream, Apply topically as needed. Apply to PORT  15 minutes prior to PORT access, Disp: 30 g, Rfl: 3    methotrexate 2.5 MG Tab, Take 6 tablets (15 mg total) by mouth every 7 days. TAKE 6 TABLETS BY MOUTH EVERY WEEK STOP UNTIL INSTRUCTED BY MD, Disp: 72 tablet, Rfl: 2    naloxone (NARCAN) 4 mg/actuation Spry, 4mg by nasal route as needed for opioid overdose; may repeat every 2-3 minutes in alternating nostrils until medical help arrives. Call 911, Disp: 1 each, Rfl: 11    ondansetron (ZOFRAN-ODT) 4 MG TbDL, Take 1 tablet (4 mg total) by mouth every 6 (six) hours as needed (nausea)., Disp: 60 tablet, Rfl: 6    oxybutynin (DITROPAN-XL) 5 MG TR24, Take 2 tablets (10 mg total) by mouth once daily., Disp: 60 tablet, Rfl: 11    oxyCODONE (ROXICODONE) 10 mg Tab immediate release tablet, Take 1 tablet (10 mg total) by mouth every 4 (four) hours as needed for Pain., Disp: 30 tablet, Rfl: 0    pantoprazole (PROTONIX) 40 MG tablet, TAKE 1 TABLET(40 MG) BY MOUTH TWICE DAILY (Patient taking differently: Take 40 mg by mouth once daily.), Disp: 180 tablet, Rfl: 0    phenazopyridine (PYRIDIUM) 200 MG tablet, Take 1 tablet (200 mg total) by mouth 3 (three) times daily as needed for Pain., Disp: 90 tablet, Rfl: 3    predniSONE (DELTASONE) 5 MG tablet, Take 2 tablets (10 mg total) by mouth 2 (two) times daily., Disp: 120 tablet, Rfl: 3    tamsulosin (FLOMAX) 0.4 mg Cap, Take 1 capsule (0.4 mg total) by mouth once daily., Disp: 30 capsule, Rfl: 3    temazepam (RESTORIL) 15 mg Cap, Take 1 capsule (15 mg total) by mouth nightly as needed (insomnia)., Disp: 30 capsule, Rfl: 1    traMADoL (ULTRAM) 50 mg tablet, Take 2 tablets (100 mg total) by mouth 2 (two) times daily as needed for Pain., Disp: 120 tablet, Rfl: 5  No current facility-administered medications for this visit.    Facility-Administered Medications Ordered in Other Visits:     albuterol nebulizer solution 2.5 mg, 2.5 mg, Nebulization, Q15 Min PRN, Pastor Saleh MD    albuterol-ipratropium 2.5 mg-0.5 mg/3 mL nebulizer  "solution 3 mL, 3 mL, Nebulization, PRN, Pastor Saleh MD    denosumab (PROLIA) injection 60 mg, 60 mg, Subcutaneous, 1 time in Clinic/HOD, Jean Carlos Hoffman MD    diphenhydrAMINE injection 25 mg, 25 mg, Intravenous, Q6H PRN, Pastor Saleh MD    HYDROmorphone injection 0.5 mg, 0.5 mg, Intravenous, Q5 Min PRN, Pastor Saleh MD, 0.5 mg at 02/23/23 1312    lactated ringers infusion, , Intravenous, Continuous, Jacqueline Volpi-Abadie, MD, Stopped at 02/23/23 1400    ondansetron injection 4 mg, 4 mg, Intravenous, Q15 Min PRN, Pastor Saleh MD    sodium chloride 0.9% flush 10 mL, 10 mL, Intravenous, PRN, Ayush Guzman MD, 10 mL at 06/01/23 1535    sodium chloride 0.9% flush 10 mL, 10 mL, Intravenous, PRN, Ayush Guzman MD, 10 mL at 06/05/23 1315    sodium chloride 0.9% flush 3 mL, 3 mL, Intravenous, PRN, Pastor Saleh MD     Review of Systems  Review of Systems   Constitutional:  Positive for malaise/fatigue.   HENT: Negative.     Eyes: Negative.    Respiratory: Negative.     Cardiovascular: Negative.    Gastrointestinal: Negative.    Genitourinary:  Positive for frequency.   Musculoskeletal:  Positive for back pain.   Skin: Negative.    Neurological: Negative.    Endo/Heme/Allergies: Negative.    Psychiatric/Behavioral:  Positive for depression. The patient is nervous/anxious and has insomnia.       Physical Exam      Vitals:    07/21/23 1112   BP: (!) 141/72   Pulse: 87   Temp: 97.4 °F (36.3 °C)   TempSrc: Temporal   SpO2: 95%   Weight: 61.9 kg (136 lb 6.4 oz)   Height: 5' 8" (1.727 m)     Body mass index is 20.74 kg/m².  Physical Exam  Vitals reviewed.   Constitutional:       Appearance: Normal appearance.   Neurological:      Mental Status: He is alert.   Psychiatric:         Mood and Affect: Affect is angry.         Behavior: Behavior is agitated and aggressive.        ASSESSMENT :  1. Urethral cancer    2. Other insomnia    3. Decreased strength, endurance, and mobility    4. Major depressive " disorder, single episode, severe without psychotic features    5. Anxiety and depression        PLAN:  Reviewed all information discussed at today's visit and all questions were answered.    Counseled on healthy lifestyle and behavior modifications   Referral to PT   Referral to Oncology Psychology  I discussed and recommended the following support services:  [facility] and Yoga   Music and Relaxation therapy   Meditation    Follow up with Integrative Services as needed    I spent a total of 51 minutes on the day of the visit.This includes face to face time and non-face to face time preparing to see the patient (eg, review of tests), obtaining and/or reviewing separately obtained history, documenting clinical information in the electronic or other health record, independently interpreting results and communicating results to the patient/family/caregiver, or care coordinator.

## 2023-07-24 PROCEDURE — 77014 PR  CT GUIDANCE PLACEMENT RAD THERAPY FIELDS: CPT | Mod: 26,,, | Performed by: RADIOLOGY

## 2023-07-24 PROCEDURE — 77386 HC IMRT, COMPLEX: CPT | Mod: PN | Performed by: RADIOLOGY

## 2023-07-24 PROCEDURE — 77014 PR  CT GUIDANCE PLACEMENT RAD THERAPY FIELDS: ICD-10-PCS | Mod: 26,,, | Performed by: RADIOLOGY

## 2023-07-25 PROCEDURE — 77014 PR  CT GUIDANCE PLACEMENT RAD THERAPY FIELDS: CPT | Mod: 26,,, | Performed by: RADIOLOGY

## 2023-07-25 PROCEDURE — 77014 PR  CT GUIDANCE PLACEMENT RAD THERAPY FIELDS: ICD-10-PCS | Mod: 26,,, | Performed by: RADIOLOGY

## 2023-07-25 PROCEDURE — 77386 HC IMRT, COMPLEX: CPT | Mod: PN | Performed by: RADIOLOGY

## 2023-07-26 PROBLEM — F41.9 ANXIETY AND DEPRESSION: Status: ACTIVE | Noted: 2023-07-26

## 2023-07-26 PROBLEM — F32.A ANXIETY AND DEPRESSION: Status: ACTIVE | Noted: 2023-07-26

## 2023-07-26 PROCEDURE — 77014 PR  CT GUIDANCE PLACEMENT RAD THERAPY FIELDS: ICD-10-PCS | Mod: 26,,, | Performed by: RADIOLOGY

## 2023-07-26 PROCEDURE — 77014 PR  CT GUIDANCE PLACEMENT RAD THERAPY FIELDS: CPT | Mod: 26,,, | Performed by: RADIOLOGY

## 2023-07-26 PROCEDURE — 77386 HC IMRT, COMPLEX: CPT | Mod: PN | Performed by: RADIOLOGY

## 2023-07-27 ENCOUNTER — DOCUMENTATION ONLY (OUTPATIENT)
Dept: RADIATION ONCOLOGY | Facility: CLINIC | Age: 78
End: 2023-07-27
Payer: MEDICARE

## 2023-07-27 ENCOUNTER — TELEPHONE (OUTPATIENT)
Dept: HEMATOLOGY/ONCOLOGY | Facility: CLINIC | Age: 78
End: 2023-07-27
Payer: MEDICARE

## 2023-07-27 ENCOUNTER — TELEPHONE (OUTPATIENT)
Dept: RADIATION ONCOLOGY | Facility: CLINIC | Age: 78
End: 2023-07-27
Payer: MEDICARE

## 2023-07-27 DIAGNOSIS — C68.0 URETHRAL CANCER: Primary | ICD-10-CM

## 2023-07-27 DIAGNOSIS — G47.09 OTHER INSOMNIA: Primary | ICD-10-CM

## 2023-07-27 DIAGNOSIS — G47.09 OTHER INSOMNIA: ICD-10-CM

## 2023-07-27 PROCEDURE — 77014 PR  CT GUIDANCE PLACEMENT RAD THERAPY FIELDS: CPT | Mod: 26,,, | Performed by: RADIOLOGY

## 2023-07-27 PROCEDURE — 77014 PR  CT GUIDANCE PLACEMENT RAD THERAPY FIELDS: ICD-10-PCS | Mod: 26,,, | Performed by: RADIOLOGY

## 2023-07-27 PROCEDURE — 77386 HC IMRT, COMPLEX: CPT | Mod: PN | Performed by: RADIOLOGY

## 2023-07-27 RX ORDER — TEMAZEPAM 22.5 MG/1
22.5 CAPSULE ORAL NIGHTLY PRN
Qty: 30 CAPSULE | Refills: 1 | Status: SHIPPED | OUTPATIENT
Start: 2023-07-27 | End: 2023-07-27

## 2023-07-27 RX ORDER — TEMAZEPAM 30 MG/1
30 CAPSULE ORAL NIGHTLY PRN
Qty: 30 CAPSULE | Refills: 1 | Status: ON HOLD | OUTPATIENT
Start: 2023-07-27 | End: 2023-08-10 | Stop reason: HOSPADM

## 2023-07-27 NOTE — TELEPHONE ENCOUNTER
Called pt and sched psych appt per referral.     Pt stated he has a rx issue.  He said Kira Hernandez sent in the wrong strength of Temazepam (22.5mg) to his [facility] in [place] on  rd.   He said he called his ins co and they do cover the 30 mg Temazepam, not the 22.5 that was ordered.    Pt is requesting for Dr Mahan to call [facility] today, he said he needs this med.  And pt asked for Dr Mahan staff to call him today when done to keep him updated.   Thanks.   [id number] - [place], [place] - [address] AT [facility] Phone:  [phone]   Fax:  [phone]

## 2023-07-27 NOTE — PLAN OF CARE
Completed 35 of 37 outpatient radiation treatments to the urethra.  All questions and concerns addressed by MD this visit.

## 2023-07-28 ENCOUNTER — TELEPHONE (OUTPATIENT)
Dept: HEMATOLOGY/ONCOLOGY | Facility: CLINIC | Age: 78
End: 2023-07-28
Payer: MEDICARE

## 2023-07-28 PROCEDURE — 77336 RADIATION PHYSICS CONSULT: CPT | Mod: PN | Performed by: RADIOLOGY

## 2023-07-28 PROCEDURE — 77014 PR  CT GUIDANCE PLACEMENT RAD THERAPY FIELDS: ICD-10-PCS | Mod: 26,,, | Performed by: RADIOLOGY

## 2023-07-28 PROCEDURE — 77386 HC IMRT, COMPLEX: CPT | Mod: PN | Performed by: RADIOLOGY

## 2023-07-28 PROCEDURE — 77014 PR  CT GUIDANCE PLACEMENT RAD THERAPY FIELDS: CPT | Mod: 26,,, | Performed by: RADIOLOGY

## 2023-07-28 NOTE — TELEPHONE ENCOUNTER
"I called Mr. Castle to discuss acupuncture and physical therapy. He states he does not see how PT will help him at this time. I explained it can help with the back pain and resistance exercise is a good way to fight fatigue. He states he has Long Covid and nothing will help his fatigue and he would like to cancel his PT appointment. He was agreeable to start acupuncture after the appropriate time has passed from completing radiation. I explained I have placed the referral for acupuncture and it remains pending at this time. He verbalized understanding.  He stated, "thank you for being so gracious and have a blessed day."   "

## 2023-07-31 ENCOUNTER — DOCUMENTATION ONLY (OUTPATIENT)
Dept: HEMATOLOGY/ONCOLOGY | Facility: CLINIC | Age: 78
End: 2023-07-31
Payer: MEDICARE

## 2023-07-31 ENCOUNTER — TELEPHONE (OUTPATIENT)
Dept: UROLOGY | Facility: CLINIC | Age: 78
End: 2023-07-31
Payer: MEDICARE

## 2023-07-31 ENCOUNTER — DOCUMENTATION ONLY (OUTPATIENT)
Dept: RADIATION ONCOLOGY | Facility: CLINIC | Age: 78
End: 2023-07-31
Payer: MEDICARE

## 2023-07-31 PROCEDURE — 77014 PR  CT GUIDANCE PLACEMENT RAD THERAPY FIELDS: ICD-10-PCS | Mod: 26,,, | Performed by: RADIOLOGY

## 2023-07-31 PROCEDURE — 77386 HC IMRT, COMPLEX: CPT | Mod: PN | Performed by: RADIOLOGY

## 2023-07-31 PROCEDURE — 77014 PR  CT GUIDANCE PLACEMENT RAD THERAPY FIELDS: CPT | Mod: 26,,, | Performed by: RADIOLOGY

## 2023-07-31 NOTE — PROGRESS NOTES
Behavior contract initiated this date with Mary Back Sr. Patient Provider Advocate, Karon Ortiz,  & [employer], Ras Woodard, Director Security.  I also discussed his further appointments.  He does not want further care with Dr. Guzman so 8/2/23 appointment cancelled.  He also was curious about circumcision scheduled 8/7/23.  I told him I would have Dr. Myers's office reach out to him regarding that procedure since he just finished radiation therapy today.  I also advised acupuncture would be in touch for scheduling.  Patient verbalized understanding.

## 2023-07-31 NOTE — TELEPHONE ENCOUNTER
Spoke with patient advised him due to just finishing radiation we will need to reschedule patient circumcision to 8/28/2023 . Patient expressed understanding and denies that he is taking Plavix and Aspirin at this time , he states he is not under the care of a cardiologist at this time. Circumcision rescheduled for 8/28/2023.

## 2023-08-08 ENCOUNTER — HOSPITAL ENCOUNTER (INPATIENT)
Facility: HOSPITAL | Age: 78
LOS: 1 days | Discharge: HOME | DRG: 313 | End: 2023-08-10
Attending: EMERGENCY MEDICINE | Admitting: HOSPITALIST
Payer: MEDICARE

## 2023-08-08 DIAGNOSIS — R07.9 CHEST PAIN: Primary | ICD-10-CM

## 2023-08-08 DIAGNOSIS — R79.89 ELEVATED BRAIN NATRIURETIC PEPTIDE (BNP) LEVEL: ICD-10-CM

## 2023-08-08 DIAGNOSIS — R06.00 DYSPNEA, UNSPECIFIED TYPE: ICD-10-CM

## 2023-08-08 PROBLEM — Z71.89 ACP (ADVANCE CARE PLANNING): Status: ACTIVE | Noted: 2021-12-03

## 2023-08-08 PROBLEM — N17.9 AKI (ACUTE KIDNEY INJURY): Status: ACTIVE | Noted: 2023-08-08

## 2023-08-08 LAB
ALBUMIN SERPL BCP-MCNC: 3.3 G/DL (ref 3.5–5.2)
ALP SERPL-CCNC: 43 U/L (ref 55–135)
ALT SERPL W/O P-5'-P-CCNC: 21 U/L (ref 10–44)
AMPHET+METHAMPHET UR QL: NEGATIVE
ANION GAP SERPL CALC-SCNC: 8 MMOL/L (ref 8–16)
AST SERPL-CCNC: 21 U/L (ref 10–40)
BARBITURATES UR QL SCN>200 NG/ML: NEGATIVE
BASOPHILS # BLD AUTO: 0.02 K/UL (ref 0–0.2)
BASOPHILS NFR BLD: 0.3 % (ref 0–1.9)
BENZODIAZ UR QL SCN>200 NG/ML: ABNORMAL
BILIRUB SERPL-MCNC: 0.9 MG/DL (ref 0.1–1)
BNP SERPL-MCNC: 511 PG/ML (ref 0–99)
BUN SERPL-MCNC: 21 MG/DL (ref 8–23)
BZE UR QL SCN: NEGATIVE
CALCIUM SERPL-MCNC: 8.5 MG/DL (ref 8.7–10.5)
CANNABINOIDS UR QL SCN: ABNORMAL
CHLORIDE SERPL-SCNC: 115 MMOL/L (ref 95–110)
CO2 SERPL-SCNC: 17 MMOL/L (ref 23–29)
CREAT SERPL-MCNC: 1.5 MG/DL (ref 0.5–1.4)
CREAT UR-MCNC: 160 MG/DL (ref 23–375)
D DIMER PPP IA.FEU-MCNC: 3.79 MG/L FEU
DIFFERENTIAL METHOD: ABNORMAL
EOSINOPHIL # BLD AUTO: 0.1 K/UL (ref 0–0.5)
EOSINOPHIL NFR BLD: 1.2 % (ref 0–8)
ERYTHROCYTE [DISTWIDTH] IN BLOOD BY AUTOMATED COUNT: 20.7 % (ref 11.5–14.5)
EST. GFR  (NO RACE VARIABLE): 48.2 ML/MIN/1.73 M^2
GLUCOSE SERPL-MCNC: 99 MG/DL (ref 70–110)
HCT VFR BLD AUTO: 43.1 % (ref 40–54)
HGB BLD-MCNC: 13.7 G/DL (ref 14–18)
IMM GRANULOCYTES # BLD AUTO: 0.04 K/UL (ref 0–0.04)
IMM GRANULOCYTES NFR BLD AUTO: 0.6 % (ref 0–0.5)
LYMPHOCYTES # BLD AUTO: 0.6 K/UL (ref 1–4.8)
LYMPHOCYTES NFR BLD: 9.2 % (ref 18–48)
MAGNESIUM SERPL-MCNC: 1.8 MG/DL (ref 1.6–2.6)
MCH RBC QN AUTO: 28.7 PG (ref 27–31)
MCHC RBC AUTO-ENTMCNC: 31.8 G/DL (ref 32–36)
MCV RBC AUTO: 90 FL (ref 82–98)
MONOCYTES # BLD AUTO: 0.6 K/UL (ref 0.3–1)
MONOCYTES NFR BLD: 9.3 % (ref 4–15)
NEUTROPHILS # BLD AUTO: 5.4 K/UL (ref 1.8–7.7)
NEUTROPHILS NFR BLD: 79.4 % (ref 38–73)
NRBC BLD-RTO: 0 /100 WBC
OPIATES UR QL SCN: NEGATIVE
PCP UR QL SCN>25 NG/ML: NEGATIVE
PLATELET # BLD AUTO: 161 K/UL (ref 150–450)
PMV BLD AUTO: 11.1 FL (ref 9.2–12.9)
POTASSIUM SERPL-SCNC: 4.1 MMOL/L (ref 3.5–5.1)
PROT SERPL-MCNC: 6.8 G/DL (ref 6–8.4)
RBC # BLD AUTO: 4.78 M/UL (ref 4.6–6.2)
SARS-COV-2 RDRP RESP QL NAA+PROBE: NEGATIVE
SODIUM SERPL-SCNC: 140 MMOL/L (ref 136–145)
TOXICOLOGY INFORMATION: ABNORMAL
TROPONIN I SERPL HS-MCNC: 47.3 PG/ML (ref 0–14.9)
TROPONIN I SERPL HS-MCNC: 48.3 PG/ML (ref 0–14.9)
WBC # BLD AUTO: 6.77 K/UL (ref 3.9–12.7)

## 2023-08-08 PROCEDURE — 84484 ASSAY OF TROPONIN QUANT: CPT | Performed by: EMERGENCY MEDICINE

## 2023-08-08 PROCEDURE — 99900035 HC TECH TIME PER 15 MIN (STAT)

## 2023-08-08 PROCEDURE — 99900031 HC PATIENT EDUCATION (STAT)

## 2023-08-08 PROCEDURE — 85025 COMPLETE CBC W/AUTO DIFF WBC: CPT | Performed by: EMERGENCY MEDICINE

## 2023-08-08 PROCEDURE — 94761 N-INVAS EAR/PLS OXIMETRY MLT: CPT

## 2023-08-08 PROCEDURE — G0378 HOSPITAL OBSERVATION PER HR: HCPCS

## 2023-08-08 PROCEDURE — 93010 ELECTROCARDIOGRAM REPORT: CPT | Mod: 76,,, | Performed by: INTERNAL MEDICINE

## 2023-08-08 PROCEDURE — U0002 COVID-19 LAB TEST NON-CDC: HCPCS | Performed by: EMERGENCY MEDICINE

## 2023-08-08 PROCEDURE — 83735 ASSAY OF MAGNESIUM: CPT | Performed by: EMERGENCY MEDICINE

## 2023-08-08 PROCEDURE — 93005 ELECTROCARDIOGRAM TRACING: CPT | Performed by: INTERNAL MEDICINE

## 2023-08-08 PROCEDURE — 80053 COMPREHEN METABOLIC PANEL: CPT | Performed by: EMERGENCY MEDICINE

## 2023-08-08 PROCEDURE — 80307 DRUG TEST PRSMV CHEM ANLYZR: CPT | Performed by: STUDENT IN AN ORGANIZED HEALTH CARE EDUCATION/TRAINING PROGRAM

## 2023-08-08 PROCEDURE — 96372 THER/PROPH/DIAG INJ SC/IM: CPT | Performed by: STUDENT IN AN ORGANIZED HEALTH CARE EDUCATION/TRAINING PROGRAM

## 2023-08-08 PROCEDURE — 36415 COLL VENOUS BLD VENIPUNCTURE: CPT | Performed by: EMERGENCY MEDICINE

## 2023-08-08 PROCEDURE — 83880 ASSAY OF NATRIURETIC PEPTIDE: CPT | Performed by: EMERGENCY MEDICINE

## 2023-08-08 PROCEDURE — 63600175 PHARM REV CODE 636 W HCPCS: Performed by: STUDENT IN AN ORGANIZED HEALTH CARE EDUCATION/TRAINING PROGRAM

## 2023-08-08 PROCEDURE — 85379 FIBRIN DEGRADATION QUANT: CPT | Performed by: EMERGENCY MEDICINE

## 2023-08-08 PROCEDURE — 25000003 PHARM REV CODE 250: Performed by: STUDENT IN AN ORGANIZED HEALTH CARE EDUCATION/TRAINING PROGRAM

## 2023-08-08 PROCEDURE — 99285 EMERGENCY DEPT VISIT HI MDM: CPT | Mod: 25

## 2023-08-08 PROCEDURE — 93010 EKG 12-LEAD: ICD-10-PCS | Mod: 76,,, | Performed by: INTERNAL MEDICINE

## 2023-08-08 PROCEDURE — 25000003 PHARM REV CODE 250: Performed by: EMERGENCY MEDICINE

## 2023-08-08 PROCEDURE — 25500020 PHARM REV CODE 255: Performed by: STUDENT IN AN ORGANIZED HEALTH CARE EDUCATION/TRAINING PROGRAM

## 2023-08-08 RX ORDER — ASPIRIN 325 MG
325 TABLET ORAL
Status: COMPLETED | OUTPATIENT
Start: 2023-08-08 | End: 2023-08-08

## 2023-08-08 RX ORDER — CLOPIDOGREL BISULFATE 75 MG/1
75 TABLET ORAL DAILY
Status: DISCONTINUED | OUTPATIENT
Start: 2023-08-09 | End: 2023-08-10 | Stop reason: HOSPADM

## 2023-08-08 RX ORDER — FLUOCINONIDE 0.5 MG/G
CREAM TOPICAL 2 TIMES DAILY
Status: DISCONTINUED | OUTPATIENT
Start: 2023-08-08 | End: 2023-08-10 | Stop reason: HOSPADM

## 2023-08-08 RX ORDER — ONDANSETRON 2 MG/ML
4 INJECTION INTRAMUSCULAR; INTRAVENOUS EVERY 8 HOURS PRN
Status: DISCONTINUED | OUTPATIENT
Start: 2023-08-08 | End: 2023-08-10 | Stop reason: HOSPADM

## 2023-08-08 RX ORDER — SODIUM CHLORIDE 9 MG/ML
1000 INJECTION, SOLUTION INTRAVENOUS
Status: COMPLETED | OUTPATIENT
Start: 2023-08-08 | End: 2023-08-08

## 2023-08-08 RX ORDER — ESCITALOPRAM OXALATE 5 MG/1
10 TABLET ORAL DAILY
Status: DISCONTINUED | OUTPATIENT
Start: 2023-08-09 | End: 2023-08-10 | Stop reason: HOSPADM

## 2023-08-08 RX ORDER — SODIUM CHLORIDE 0.9 % (FLUSH) 0.9 %
10 SYRINGE (ML) INJECTION
Status: DISCONTINUED | OUTPATIENT
Start: 2023-08-08 | End: 2023-08-10 | Stop reason: HOSPADM

## 2023-08-08 RX ORDER — MORPHINE SULFATE 2 MG/ML
2 INJECTION, SOLUTION INTRAMUSCULAR; INTRAVENOUS EVERY 4 HOURS PRN
Status: DISCONTINUED | OUTPATIENT
Start: 2023-08-08 | End: 2023-08-10 | Stop reason: HOSPADM

## 2023-08-08 RX ORDER — NITROGLYCERIN 0.4 MG/1
0.4 TABLET SUBLINGUAL EVERY 5 MIN PRN
Status: DISCONTINUED | OUTPATIENT
Start: 2023-08-08 | End: 2023-08-10 | Stop reason: HOSPADM

## 2023-08-08 RX ORDER — ATORVASTATIN CALCIUM 20 MG/1
20 TABLET, FILM COATED ORAL DAILY
Status: DISCONTINUED | OUTPATIENT
Start: 2023-08-08 | End: 2023-08-10 | Stop reason: HOSPADM

## 2023-08-08 RX ORDER — LIDOCAINE HYDROCHLORIDE 20 MG/ML
JELLY TOPICAL
Status: DISCONTINUED | OUTPATIENT
Start: 2023-08-08 | End: 2023-08-10 | Stop reason: HOSPADM

## 2023-08-08 RX ORDER — CARVEDILOL 12.5 MG/1
25 TABLET ORAL 2 TIMES DAILY WITH MEALS
Status: DISCONTINUED | OUTPATIENT
Start: 2023-08-08 | End: 2023-08-08

## 2023-08-08 RX ORDER — PANTOPRAZOLE SODIUM 40 MG/1
40 TABLET, DELAYED RELEASE ORAL
Status: DISCONTINUED | OUTPATIENT
Start: 2023-08-09 | End: 2023-08-10 | Stop reason: HOSPADM

## 2023-08-08 RX ORDER — AMLODIPINE BESYLATE 5 MG/1
5 TABLET ORAL 2 TIMES DAILY
Status: DISCONTINUED | OUTPATIENT
Start: 2023-08-08 | End: 2023-08-10 | Stop reason: HOSPADM

## 2023-08-08 RX ORDER — ZOLPIDEM TARTRATE 5 MG/1
5 TABLET ORAL NIGHTLY PRN
Status: DISCONTINUED | OUTPATIENT
Start: 2023-08-08 | End: 2023-08-10

## 2023-08-08 RX ORDER — LIDOCAINE HYDROCHLORIDE 20 MG/ML
JELLY TOPICAL
Status: DISCONTINUED | OUTPATIENT
Start: 2023-08-08 | End: 2023-08-08

## 2023-08-08 RX ORDER — ISOSORBIDE MONONITRATE 30 MG/1
30 TABLET, EXTENDED RELEASE ORAL DAILY
Status: DISCONTINUED | OUTPATIENT
Start: 2023-08-09 | End: 2023-08-10 | Stop reason: HOSPADM

## 2023-08-08 RX ORDER — ASPIRIN 81 MG/1
81 TABLET ORAL DAILY
Status: DISCONTINUED | OUTPATIENT
Start: 2023-08-09 | End: 2023-08-10 | Stop reason: HOSPADM

## 2023-08-08 RX ORDER — ENOXAPARIN SODIUM 100 MG/ML
40 INJECTION SUBCUTANEOUS EVERY 24 HOURS
Status: DISCONTINUED | OUTPATIENT
Start: 2023-08-08 | End: 2023-08-10 | Stop reason: HOSPADM

## 2023-08-08 RX ADMIN — IOHEXOL 100 ML: 350 INJECTION, SOLUTION INTRAVENOUS at 06:08

## 2023-08-08 RX ADMIN — ENOXAPARIN SODIUM 40 MG: 40 INJECTION SUBCUTANEOUS at 09:08

## 2023-08-08 RX ADMIN — SODIUM CHLORIDE 1000 ML: 0.9 INJECTION, SOLUTION INTRAVENOUS at 12:08

## 2023-08-08 RX ADMIN — NITROGLYCERIN 0.5 INCH: 20 OINTMENT TOPICAL at 09:08

## 2023-08-08 RX ADMIN — ASPIRIN 325 MG: 325 TABLET ORAL at 09:08

## 2023-08-08 RX ADMIN — FLUOCINONIDE: 0.5 CREAM TOPICAL at 11:08

## 2023-08-08 RX ADMIN — ATORVASTATIN CALCIUM 20 MG: 20 TABLET, FILM COATED ORAL at 09:08

## 2023-08-08 RX ADMIN — AMLODIPINE BESYLATE 5 MG: 5 TABLET ORAL at 09:08

## 2023-08-08 NOTE — ASSESSMENT & PLAN NOTE
Patient was seen today earlier at [facility] on account of urinary symptoms  Urinalysis negative for UTI  Would do a bladder scan to rule out obstructive uropathy  If negative, give IV fluids   Patient with acute kidney injury/acute renal failure likely due to pre-renal azotemia due to dehydration KENYATTA is currently worsening. Baseline creatinine unknown - Labs reviewed- Renal function/electrolytes with Estimated Creatinine Clearance: 41.2 mL/min (A) (based on SCr of 1.5 mg/dL (H)). according to latest data. Monitor urine output and serial BMP and adjust therapy as needed. Avoid nephrotoxins and renally dose meds for GFR listed above.

## 2023-08-08 NOTE — HPI
Rajendra Castle is a 77 y.o.  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 The patient presented to [facility] on 8/8/2023 with a primary complaint of Shortness of Breath and Chest Pain (  X2 DAYS)  He describes the chest pain as intermittent shocking pain which started in the middle of his back and gradually radiated to both sides of his chest .  He rated this pain 8/10 which has no aggravating or relieving factors, associated chest tightness and mild SOB.  He states that shortness of breath is worse on exertion but denies any pedal edema.  No associated nausea, vomiting, orthopnea, or abdominal pain.  Patient has a history of CAD with 1 stent placement and his cardiologist is Dr. Lamb who he states he has not followed up with in months.  He does not smoke cigarettes or drink alcohol but states that he uses marijuana with last use 1 week ago.  Patient presented to his insurance wellness clinic today on account of these symptoms and was referred to the ED for further evaluation.  On presentation to the ED, creatinine elevated at 1.5,  (compared to 2135 six months ago) was elevated at 47.3.  EKG done showed ST and Marked T wave abnormality, Prolonged QT.  Review of echo done 01/21/2023 showed  The left ventricle is normal in size with mild concentric hypertrophy and normal systolic function.  The estimated ejection fraction is 65%.  Normal left ventricular diastolic function.  Normal right ventricular size with normal right ventricular systolic function.  Moderate mitral regurgitation.  Mild-to-moderate aortic regurgitation.  Small circumferential  pericardial effusion. Effusion is fluid. No Temponade.

## 2023-08-08 NOTE — ED PROVIDER NOTES
Encounter Date: 8/8/2023       History     Chief Complaint   Patient presents with    Shortness of Breath    Chest Pain       X2 DAYS     77-year-old male who is had a history of prostate cancer, coronary artery disease with a stent, sleep apnea, degenerative disc disease, presents emergency room with complaints of having right-sided chest pain and shortness of breath for the last 2 days.  The patient denied any nausea vomiting.  No diaphoresis.  He states he is had some palpitations.  The patient does not use nitrates.  His cardiologist is Dr. Lamb.  He denies any coughing or sputum production.  No wheezing.  Has not smoked in 40 years.  Does complain of severe insomnia.      Review of patient's allergies indicates:  No Known Allergies  Past Medical History:   Diagnosis Date    Anticoagulant long-term use     Arthritis     Coronary artery disease     Dr. Lamb    DDD (degenerative disc disease), cervical     Degenerative disc disease     Heart murmur     History of radiation therapy 2020    Hypertension     Nontoxic multinodular goiter 09/20/2016    Prostate CA     RA (rheumatoid arthritis)     Sleep apnea     No CPAP    Vitamin D insufficiency 09/30/2016     Past Surgical History:   Procedure Laterality Date    CARPAL TUNNEL RELEASE Right 05/28/2021    Procedure: RELEASE, CARPAL TUNNEL;  Surgeon: Jose Ryan II, MD;  Location: [facility] OR;  Service: Orthopedics;  Laterality: Right;    COLONOSCOPY  prior to 2016    normal findings per patient report    CORONARY ANGIOPLASTY WITH STENT PLACEMENT  02/2022    CYSTOSCOPY N/A 12/18/2018    Procedure: CYSTOSCOPY;  Surgeon: Garrett Pina MD;  Location: [facility] OR;  Service: Urology;  Laterality: N/A;    CYSTOSCOPY N/A 07/12/2022    Procedure: CYSTOSCOPY;  Surgeon: Garrett Pina MD;  Location: [facility] OR;  Service: Urology;  Laterality: N/A;    ESOPHAGOGASTRODUODENOSCOPY N/A 09/29/2020    Dr. Espinosa; empiric dilation; erythematous mucosa in antrum; gastric  mucosal atrophy; hematin in entire stomach; biopsy: mid & distal esophagus WNL, stomach- WNL, negative for h pylori    EXCISION OF LESION OF PENIS N/A 2023    Procedure: EXCISION, LESION, PENIS;  Surgeon: Timo Myers MD;  Location: [facility] OR;  Service: Urology;  Laterality: N/A;    INSERTION OF TUNNELED CENTRAL VENOUS CATHETER (CVC) WITH SUBCUTANEOUS PORT Left 2023    Procedure: [id number];  Surgeon: Atul Faria MD;  Location: [facility];  Service: General;  Laterality: Left;  left subclavian    PARATHYROIDECTOMY Right 10/27/2020    Procedure: PARATHYROIDECTOMY;  Surgeon: Latonia Clayton MD;  Location: [address];  Service: ENT;  Laterality: Right;    RELEASE OF ULNAR NERVE AT CUBITAL TUNNEL Right 2021    Procedure: RELEASE, ULNAR TUNNEL;  Surgeon: Jose Ryan II, MD;  [facility];  Service: Orthopedics;  Laterality: Right;    TRANSRECTAL BIOPSY OF PROSTATE WITH ULTRASOUND GUIDANCE N/A 2018    Procedure: BIOPSY, PROSTATE, RECTAL APPROACH, WITH US GUIDANCE;  Surgeon: Garrett Pina MD;  Location: [facility];  Service: Urology;  Laterality: N/A;    TRANSRECTAL ULTRASOUND EXAMINATION N/A 2022    Procedure: ULTRASOUND, RECTAL APPROACH;  Surgeon: Garrett Pina MD;  Location: [facility];  Service: Urology;  Laterality: N/A;     Family History   Problem Relation Age of Onset    Heart disease Mother     Stroke Father     Drug abuse Sister     No Known Problems Daughter     No Known Problems Daughter     No Known Problems Son     Diabetes Maternal Uncle     Colon cancer Neg Hx     Crohn's disease Neg Hx     Esophageal cancer Neg Hx     Stomach cancer Neg Hx     Ulcerative colitis Neg Hx      Social History     Tobacco Use    Smoking status: Former     Current packs/day: 0.00     Average packs/day: 0.3 packs/day for 10.0 years (2.5 ttl pk-yrs)     Types: Cigarettes     Start date: 1991     Quit date: 2001     Years since quittin.0     Passive  exposure: Past    Smokeless tobacco: Never   Substance Use Topics    Alcohol use: Not Currently     Comment: seldom    Drug use: Not Currently     Frequency: 8.0 times per week     Types: Marijuana     Review of Systems   Constitutional:  Positive for activity change. Negative for appetite change, chills, diaphoresis and fever.   HENT: Negative.  Negative for rhinorrhea, sinus pain, sore throat and trouble swallowing.    Respiratory:  Positive for shortness of breath. Negative for cough, wheezing and stridor.    Cardiovascular:  Positive for chest pain. Negative for leg swelling.   Gastrointestinal:  Negative for abdominal pain, constipation, diarrhea, nausea and vomiting.   Genitourinary:  Negative for difficulty urinating, flank pain, frequency and hematuria.   Musculoskeletal:  Negative for neck pain.   Skin:  Negative for color change, pallor and rash.   Neurological:  Negative for headaches.   All other systems reviewed and are negative.      Physical Exam     Initial Vitals [08/08/23 0842]   BP Pulse Resp Temp SpO2   (!) 187/87 88 (!) 22 99.4 °F (37.4 °C) 100 %      MAP       --         Physical Exam    Vitals reviewed.  Constitutional: He appears well-developed and well-nourished. He is not diaphoretic. No distress.   HENT:   Head: Normocephalic and atraumatic.   Right Ear: External ear normal.   Left Ear: External ear normal.   Nose: Nose normal.   Mouth/Throat: Oropharynx is clear and moist.   Eyes: Conjunctivae and EOM are normal. Pupils are equal, round, and reactive to light.   Neck: Neck supple. No JVD present.   Normal range of motion.  Cardiovascular:  Normal rate, regular rhythm, normal heart sounds and intact distal pulses.     Exam reveals no gallop and no friction rub.       No murmur heard.  Pulmonary/Chest: No respiratory distress. He has no wheezes. He has no rhonchi. He has rales. He exhibits no tenderness.   Abdominal: Abdomen is soft. Bowel sounds are normal. He exhibits no distension. There  is no abdominal tenderness. There is no rebound and no guarding.   Musculoskeletal:         General: No tenderness or edema. Normal range of motion.      Cervical back: Normal range of motion and neck supple.     Lymphadenopathy:     He has no cervical adenopathy.   Neurological: He is alert and oriented to person, place, and time. He has normal strength. No cranial nerve deficit or sensory deficit. GCS score is 15. GCS eye subscore is 4. GCS verbal subscore is 5. GCS motor subscore is 6.   Skin: Skin is warm and dry. Capillary refill takes less than 2 seconds. No rash noted. No erythema. No pallor.   Psychiatric: He has a normal mood and affect. His behavior is normal. Judgment and thought content normal.         ED Course   Procedures  Labs Reviewed   CBC W/ AUTO DIFFERENTIAL - Abnormal; Notable for the following components:       Result Value    Hemoglobin 13.7 (*)     MCHC 31.8 (*)     RDW 20.7 (*)     Immature Granulocytes 0.6 (*)     Lymph # 0.6 (*)     Gran % 79.4 (*)     Lymph % 9.2 (*)     All other components within normal limits   COMPREHENSIVE METABOLIC PANEL - Abnormal; Notable for the following components:    Chloride 115 (*)     CO2 17 (*)     Creatinine 1.5 (*)     Calcium 8.5 (*)     Albumin 3.3 (*)     Alkaline Phosphatase 43 (*)     eGFR 48.2 (*)     All other components within normal limits   TROPONIN I HIGH SENSITIVITY - Abnormal; Notable for the following components:    Troponin I High Sensitivity 47.3 (*)     All other components within normal limits   B-TYPE NATRIURETIC PEPTIDE - Abnormal; Notable for the following components:     (*)     All other components within normal limits   D DIMER, QUANTITATIVE - Abnormal; Notable for the following components:    D-Dimer 3.79 (*)     All other components within normal limits    Narrative:     DDimer critical result(s) called and verbal readback obtained from   Clint Jama RN by HS3 08/08/2023 15:02   MAGNESIUM   SARS-COV-2 RNA AMPLIFICATION,  QUAL   D DIMER, QUANTITATIVE   TROPONIN I HIGH SENSITIVITY   DRUG SCREEN PANEL, URINE EMERGENCY        ECG Results              EKG 12-lead (In process)  Result time 08/08/23 12:37:40      In process by Interface, Lab In [facility] (08/08/23 12:37:40)                   Narrative:    Test Reason : R07.9,    Vent. Rate : 080 BPM     Atrial Rate : 086 BPM     P-R Int : 000 ms          QRS Dur : 094 ms      QT Int : 452 ms       P-R-T Axes : 000 054 240 degrees     QTc Int : 521 ms     Suspect unspecified pacemaker failure  Undetermined rhythm  Septal infarct (cited on or before 08-AUG-2023)  ST and Marked T wave abnormality, consider inferior ischemia  ST and Marked T wave abnormality, consider anterolateral ischemia  Prolonged QT  Abnormal ECG  When compared with ECG of 08-AUG-2023 08:44,  Current undetermined rhythm precludes rhythm comparison, needs review  Serial changes of Septal infarct Present    Referred By: [email or web address]   SELF           Confirmed By:                                      EKG 12-lead (In process)  Result time 08/08/23 11:33:17      In process by Interface, Lab In [facility] (08/08/23 11:33:17)                   Narrative:    Test Reason : R07.9,    Vent. Rate : 075 BPM     Atrial Rate : 087 BPM     P-R Int : 000 ms          QRS Dur : 090 ms      QT Int : 434 ms       P-R-T Axes : 228 054 237 degrees     QTc Int : 484 ms    Undetermined rhythm  Minimal voltage criteria for LVH, may be normal variant  Septal infarct ,age undetermined  ST and Marked T wave abnormality, consider inferior ischemia  ST and Marked T wave abnormality, consider anterolateral ischemia  Abnormal ECG  When compared with ECG of 26-JAN-2023 08:06,  Current undetermined rhythm precludes rhythm comparison, needs review  Septal infarct is now Present  T wave inversion more evident in Inferior leads    Referred By: [email or web address]   SELF           Confirmed By:                                   Imaging Results              X-Ray Chest AP  Portable (Final result)  Result time 08/08/23 09:28:12      Final result by Christopher Fuchs MD (08/08/23 09:28:12)                   Narrative:    Chest single view    CLINICAL DATA: Chest pain, shortness of breath    FINDINGS: 2 AP views demonstrate no cardiac, pulmonary, or acute osseous abnormalities.    IMPRESSION:  1. No acute radiographic abnormalities.    Electronically signed by:  Christopher Fuchs MD  8/8/2023 9:28 AM CDT Workstation: [id number]                                     Medications   nitroGLYCERIN 2% TD oint ointment 0.5 inch (0.5 inches Transdermal Given 8/8/23 0945)   aspirin tablet 325 mg (325 mg Oral Given 8/8/23 0947)   0.9%  NaCl infusion (1,000 mLs Intravenous New Bag 8/8/23 1224)                Attending Attestation:             Attending ED Notes:   This 77-year-old male referred to emergency room by his nurse practitioner for complaints of chest pain and shortness of breath.  At the office the patient was witnessed to have been extremely dyspneic especially with  any exertion.  The patient has no history of CHF.  No fever or chills.  ED workup shows a chest x-ray without any acute infiltrate.  Screening labs obtained were reviewed the patient has an elevated troponin of 47.3.  The 2nd is pending.  The BNP is elevated at 511 and again he is no history of CHF.  His creatinine is slightly elevated compared to old at 1.5 and a CO2 of 17.    During the ED course I did speak to Dr. Busby in the patient will be seen by her in the ED for admission.      Differential diagnosis includes CHF, pneumonia, bronchospasm, NSTEMI, STEMI                   Clinical Impression:   Final diagnoses:  [R07.9] Chest pain (Primary)  [R06.00] Dyspnea, unspecified type  [R79.89] Elevated brain natriuretic peptide (BNP) level        ED Disposition Condition    Observation Stable                Charly Martin Jr., MD  08/08/23 9532

## 2023-08-08 NOTE — SUBJECTIVE & OBJECTIVE
Past Medical History:   Diagnosis Date    Anticoagulant long-term use     Arthritis     Coronary artery disease     Dr. Lamb    DDD (degenerative disc disease), cervical     Degenerative disc disease     Heart murmur     History of radiation therapy 2020    Hypertension     Nontoxic multinodular goiter 09/20/2016    Prostate CA     RA (rheumatoid arthritis)     Sleep apnea     No CPAP    Vitamin D insufficiency 09/30/2016       Past Surgical History:   Procedure Laterality Date    CARPAL TUNNEL RELEASE Right 05/28/2021    Procedure: RELEASE, CARPAL TUNNEL;  Surgeon: oJse Ryan II, MD;  Location: [facility] OR;  Service: Orthopedics;  Laterality: Right;    COLONOSCOPY  prior to 2016    normal findings per patient report    CORONARY ANGIOPLASTY WITH STENT PLACEMENT  02/2022    CYSTOSCOPY N/A 12/18/2018    Procedure: CYSTOSCOPY;  Surgeon: Garrett Pina MD;  Location: [facility] OR;  Service: Urology;  Laterality: N/A;    CYSTOSCOPY N/A 07/12/2022    Procedure: CYSTOSCOPY;  Surgeon: Garrett Pina MD;  Location: [facility] OR;  Service: Urology;  Laterality: N/A;    ESOPHAGOGASTRODUODENOSCOPY N/A 09/29/2020    Dr. Espinosa; empiric dilation; erythematous mucosa in antrum; gastric mucosal atrophy; hematin in entire stomach; biopsy: mid & distal esophagus WNL, stomach- WNL, negative for h pylori    EXCISION OF LESION OF PENIS N/A 2/23/2023    Procedure: EXCISION, LESION, PENIS;  Surgeon: Timo Myers MD;  Location: [facility];  Service: Urology;  Laterality: N/A;    INSERTION OF TUNNELED CENTRAL VENOUS CATHETER (CVC) WITH SUBCUTANEOUS PORT Left 5/18/2023    Procedure: [id number];  Surgeon: Atul Faria MD;  Location: [facility];  Service: General;  Laterality: Left;  left subclavian    PARATHYROIDECTOMY Right 10/27/2020    Procedure: PARATHYROIDECTOMY;  Surgeon: Latonia Clayton MD;  Location: [address];  Service: ENT;  Laterality: Right;    RELEASE OF ULNAR NERVE AT CUBITAL TUNNEL Right 05/28/2021     Procedure: RELEASE, ULNAR TUNNEL;  Surgeon: Jose Ryan II, MD;  Location: [facility] OR;  Service: Orthopedics;  Laterality: Right;    TRANSRECTAL BIOPSY OF PROSTATE WITH ULTRASOUND GUIDANCE N/A 12/18/2018    Procedure: BIOPSY, PROSTATE, RECTAL APPROACH, WITH US GUIDANCE;  Surgeon: Garrett Pina MD;  Location: [facility] OR;  Service: Urology;  Laterality: N/A;    TRANSRECTAL ULTRASOUND EXAMINATION N/A 07/12/2022    Procedure: ULTRASOUND, RECTAL APPROACH;  Surgeon: Garrett Pina MD;  Location: [facility] OR;  Service: Urology;  Laterality: N/A;       Review of patient's allergies indicates:  No Known Allergies    Current Facility-Administered Medications on File Prior to Encounter   Medication    albuterol nebulizer solution 2.5 mg    albuterol-ipratropium 2.5 mg-0.5 mg/3 mL nebulizer solution 3 mL    denosumab (PROLIA) injection 60 mg    diphenhydrAMINE injection 25 mg    HYDROmorphone injection 0.5 mg    lactated ringers infusion    ondansetron injection 4 mg    sodium chloride 0.9% flush 10 mL    sodium chloride 0.9% flush 10 mL    sodium chloride 0.9% flush 3 mL     Current Outpatient Medications on File Prior to Encounter   Medication Sig    atorvastatin (LIPITOR) 20 MG tablet Take 20 mg by mouth once daily.    cyproheptadine (PERIACTIN) 4 mg tablet Take 1 tablet (4 mg total) by mouth 4 (four) times daily.    hydrOXYchloroQUINE (PLAQUENIL) 200 mg tablet Take 2 tablets (400 mg total) by mouth once daily.    oxybutynin (DITROPAN-XL) 5 MG TR24 Take 5 mg by mouth once daily.    tamsulosin (FLOMAX) 0.4 mg Cap Take 1 capsule (0.4 mg total) by mouth once daily.    temazepam (RESTORIL) 30 mg capsule Take 1 capsule (30 mg total) by mouth nightly as needed for Insomnia.    traMADoL (ULTRAM) 50 mg tablet Take 2 tablets (100 mg total) by mouth 2 (two) times daily as needed for Pain.    alfuzosin (UROXATRAL) 10 mg Tb24 Take 10 mg by mouth.    amLODIPine (NORVASC) 5 MG tablet Take 1 tablet (5 mg total) by mouth 2 (two)  times daily.    aspirin (ECOTRIN) 81 MG EC tablet Take 1 tablet by mouth once daily.    b complex vitamins capsule Take 1 capsule by mouth once daily.    betamethasone dipropionate 0.05 % cream Apply topically 2 (two) times daily.    betamethasone valerate 0.1% (VALISONE) 0.1 % Crea Apply topically 2 (two) times daily.    busPIRone (BUSPAR) 10 MG tablet Take 1 tablet (10 mg total) by mouth 3 (three) times daily.    carvediloL (COREG) 25 MG tablet Take 25 mg by mouth.    clopidogreL (PLAVIX) 75 mg tablet Take 1 tablet (75 mg total) by mouth once daily. Pt not sure of dose    EScitalopram oxalate (LEXAPRO) 10 MG tablet Take 10 mg by mouth once daily.    folic acid (FOLVITE) 1 MG tablet Take 1 tablet (1 mg total) by mouth once daily.    gabapentin (NEURONTIN) 300 MG capsule Take 300 mg by mouth 3 (three) times daily.    isosorbide mononitrate (IMDUR) 30 MG 24 hr tablet Take 30 mg by mouth.    LIDOcaine HCL 2% (XYLOCAINE) 2 % jelly Apply to affected area (tip of penis) daily prn    methotrexate 2.5 MG Tab Take 6 tablets (15 mg total) by mouth every 7 days. TAKE 6 TABLETS BY MOUTH EVERY WEEK  STOP UNTIL INSTRUCTED BY MD    naloxone (NARCAN) 4 mg/actuation Spry 4mg by nasal route as needed for opioid overdose; may repeat every 2-3 minutes in alternating nostrils until medical help arrives. Call 911    ondansetron (ZOFRAN-ODT) 4 MG TbDL Take 1 tablet (4 mg total) by mouth every 6 (six) hours as needed (nausea).    oxyCODONE (ROXICODONE) 10 mg Tab immediate release tablet Take 1 tablet (10 mg total) by mouth every 4 (four) hours as needed for Pain.    pantoprazole (PROTONIX) 40 MG tablet TAKE 1 TABLET(40 MG) BY MOUTH TWICE DAILY (Patient taking differently: Take 40 mg by mouth once daily.)    phenazopyridine (PYRIDIUM) 200 MG tablet Take 1 tablet (200 mg total) by mouth 3 (three) times daily as needed for Pain.    predniSONE (DELTASONE) 5 MG tablet Take 2 tablets (10 mg total) by mouth 2 (two) times daily.    [DISCONTINUED]  cholecalciferol, vitamin D3, (VITAMIN D3) 100 mcg (4,000 unit) Cap Take 400 Units by mouth once daily.    [DISCONTINUED] cloNIDine (CATAPRES) 0.1 MG tablet Take 1 tablet (0.1 mg total) by mouth 2 (two) times daily as needed (only if blood pressure top number is over 200). (Patient not taking: Reported on 2022)    [DISCONTINUED] meclizine (ANTIVERT) 12.5 mg tablet Take 1 tablet (12.5 mg total) by mouth 2 (two) times daily as needed for Dizziness.    [DISCONTINUED] sildenafiL (VIAGRA) 100 MG tablet Take 1 tablet (100 mg total) by mouth daily as needed for Erectile Dysfunction.     Family History       Problem Relation (Age of Onset)    Diabetes Maternal Uncle    Drug abuse Sister    Heart disease Mother    No Known Problems Daughter, Daughter, Son    Stroke Father          Tobacco Use    Smoking status: Former     Current packs/day: 0.00     Average packs/day: 0.3 packs/day for 10.0 years (2.5 ttl pk-yrs)     Types: Cigarettes     Start date: 1991     Quit date: 2001     Years since quittin.0     Passive exposure: Past    Smokeless tobacco: Never   Substance and Sexual Activity    Alcohol use: Not Currently     Comment: seldom    Drug use: Not Currently     Frequency: 8.0 times per week     Types: Marijuana    Sexual activity: Yes     Partners: Female     Review of Systems   Constitutional:  Negative for fever.   Respiratory:  Positive for shortness of breath.    Cardiovascular:  Positive for chest pain and palpitations.   Gastrointestinal:  Negative for abdominal pain and vomiting.   All other systems reviewed and are negative.    Objective:     Vital Signs (Most Recent):  Temp: 99.4 °F (37.4 °C) (23 0842)  Pulse: 78 (23 1142)  Resp: 14 (23 1142)  BP: (!) 153/74 (23 1157)  SpO2: 100 % (23 1142) Vital Signs (24h Range):  Temp:  [97.9 °F (36.6 °C)-99.4 °F (37.4 °C)] 99.4 °F (37.4 °C)  Pulse:  [70-94] 78  Resp:  [14-22] 14  SpO2:  [97 %-100 %] 100 %  BP: (153-187)/(74-87)  "153/74     Weight: 77.1 kg (170 lb)  Body mass index is 25.85 kg/m².     Physical Exam  Vitals and nursing note reviewed.   HENT:      Head: Normocephalic.   Eyes:      Pupils: Pupils are equal, round, and reactive to light.   Cardiovascular:      Rate and Rhythm: Normal rate.   Pulmonary:      Effort: Pulmonary effort is normal.   Abdominal:      Palpations: Abdomen is soft.   Musculoskeletal:      Cervical back: Normal range of motion.      Right lower leg: No edema.      Left lower leg: No edema.   Neurological:      General: No focal deficit present.      Mental Status: He is alert.   Psychiatric:         Mood and Affect: Mood normal.              CRANIAL NERVES     CN III, IV, VI   Pupils are equal, round, and reactive to light.       Significant Labs: All pertinent labs within the past 24 hours have been reviewed.  CBC:   Recent Labs   Lab 08/08/23 0900   WBC 6.77   HGB 13.7*   HCT 43.1        CMP:   Recent Labs   Lab 08/08/23 0900      K 4.1   *   CO2 17*   GLU 99   BUN 21   CREATININE 1.5*   CALCIUM 8.5*   PROT 6.8   ALBUMIN 3.3*   BILITOT 0.9   ALKPHOS 43*   AST 21   ALT 21   ANIONGAP 8     Cardiac Markers:   Recent Labs   Lab 08/08/23 0900   *     Troponin:   Recent Labs   Lab 08/08/23  0900   TROPONINIHS 47.3*     Urine Studies: No results for input(s): "COLORU", "APPEARANCEUA", "PHUR", "SPECGRAV", "PROTEINUA", "GLUCUA", "KETONESU", "BILIRUBINUA", "OCCULTUA", "NITRITE", "UROBILINOGEN", "LEUKOCYTESUR", "RBCUA", "WBCUA", "BACTERIA", "SQUAMEPITHEL", "HYALINECASTS" in the last 48 hours.    Invalid input(s): "WRIGHTSUR"    Significant Imaging: I have reviewed all pertinent imaging results/findings within the past 24 hours.  Imaging Results              X-Ray Chest AP Portable (Final result)  Result time 08/08/23 09:28:12      Final result by Christopher Fuchs MD (08/08/23 09:28:12)                   Narrative:    Chest single view    CLINICAL DATA: Chest pain, shortness of " breath    FINDINGS: 2 AP views demonstrate no cardiac, pulmonary, or acute osseous abnormalities.    IMPRESSION:  1. No acute radiographic abnormalities.    Electronically signed by:  Christopher Fuchs MD  8/8/2023 9:28 AM CDT Workstation: [id number]

## 2023-08-08 NOTE — ASSESSMENT & PLAN NOTE
Rule out ACS  Trend Trops  P.r.n. nitroContinue daily aspirin 81mg and atorvastatin  Cardiac telemetry  Morphine PRN pain  Consult to cardiology

## 2023-08-08 NOTE — PHARMACY MED REC
"        Admission Medication History     The home medication history was taken by Loni Dykes.    You may go to "Admission" then "Reconcile Home Medications" tabs to review and/or act upon these items.     The home medication list has been updated by the Pharmacy department.   Please read ALL comments highlighted in yellow.   Please address this information as you see fit.    Feel free to contact us if you have any questions or require assistance.      The medications listed below were removed from the home medication list. Please reorder if appropriate:  Patient reports no longer taking the following medication(s):  Alfuzosin  ASA 81mg  Coreg-flagged  Plavix-flagged  Folic acid  Methotrexate 2.5mg  Zofran  Tramadol-flagged        Medications listed below were obtained from: Patient/family and Analytic software- [employer]  Current Facility-Administered Medications on File Prior to Encounter   Medication Dose Route Frequency Provider Last Rate Last Admin    albuterol nebulizer solution 2.5 mg  2.5 mg Nebulization Q15 Min PRN Pastor Saleh MD        albuterol-ipratropium 2.5 mg-0.5 mg/3 mL nebulizer solution 3 mL  3 mL Nebulization PRN Pastor Saleh MD        denosumab (PROLIA) injection 60 mg  60 mg Subcutaneous 1 time in Clinic/HOD Jean Carlos Hoffman MD        diphenhydrAMINE injection 25 mg  25 mg Intravenous Q6H PRN Pastor Saleh MD        HYDROmorphone injection 0.5 mg  0.5 mg Intravenous Q5 Min PRN Pastor Saleh MD   0.5 mg at 02/23/23 1312    lactated ringers infusion   Intravenous Continuous Volpi-Abadie, Jacqueline, MD   Stopped at 02/23/23 1400    ondansetron injection 4 mg  4 mg Intravenous Q15 Min PRN Pastor Saleh MD        sodium chloride 0.9% flush 10 mL  10 mL Intravenous PRN Ayush Guzman MD   10 mL at 06/01/23 1535    sodium chloride 0.9% flush 10 mL  10 mL Intravenous PRN Ayush Guzman MD   10 mL at 06/05/23 1315    sodium chloride 0.9% flush 3 mL  3 mL Intravenous PRN " Pastor Saleh MD         Current Outpatient Medications on File Prior to Encounter   Medication Sig Dispense Refill    amLODIPine (NORVASC) 5 MG tablet Take 1 tablet (5 mg total) by mouth 2 (two) times daily. 60 tablet 3    b complex vitamins capsule Take 1 capsule by mouth once daily.      busPIRone (BUSPAR) 10 MG tablet Take 1 tablet (10 mg total) by mouth 3 (three) times daily. 90 tablet 3    gabapentin (NEURONTIN) 300 MG capsule Take 300 mg by mouth 3 (three) times daily.      hydrOXYchloroQUINE (PLAQUENIL) 200 mg tablet Take 2 tablets (400 mg total) by mouth once daily. 60 tablet 6    oxybutynin (DITROPAN-XL) 5 MG TR24 Take 5 mg by mouth once daily.      oxyCODONE (ROXICODONE) 10 mg Tab immediate release tablet Take 1 tablet (10 mg total) by mouth every 4 (four) hours as needed for Pain. 30 tablet 0    pantoprazole (PROTONIX) 40 MG tablet TAKE 1 TABLET(40 MG) BY MOUTH TWICE DAILY (Patient taking differently: Take 40 mg by mouth once daily.) 180 tablet 0    predniSONE (DELTASONE) 5 MG tablet Take 2 tablets (10 mg total) by mouth 2 (two) times daily. 120 tablet 3    temazepam (RESTORIL) 30 mg capsule Take 1 capsule (30 mg total) by mouth nightly as needed for Insomnia. 30 capsule 1    atorvastatin (LIPITOR) 20 MG tablet Take 20 mg by mouth once daily.      betamethasone dipropionate 0.05 % cream Apply topically 2 (two) times daily. 45 g 1    betamethasone valerate 0.1% (VALISONE) 0.1 % Crea Apply topically 2 (two) times daily. 45 g 0    carvediloL (COREG) 25 MG tablet Take 25 mg by mouth.      clopidogreL (PLAVIX) 75 mg tablet Take 1 tablet (75 mg total) by mouth once daily. Pt not sure of dose (Patient not taking: Reported on 8/8/2023)      cyproheptadine (PERIACTIN) 4 mg tablet Take 1 tablet (4 mg total) by mouth 4 (four) times daily. 120 tablet 2    EScitalopram oxalate (LEXAPRO) 10 MG tablet Take 10 mg by mouth once daily.      isosorbide mononitrate (IMDUR) 30 MG 24 hr tablet Take 30 mg by mouth once  daily.      LIDOcaine HCL 2% (XYLOCAINE) 2 % jelly Apply to affected area (tip of penis) daily prn 30 mL 1    methotrexate 2.5 MG Tab Take 6 tablets (15 mg total) by mouth every 7 days. TAKE 6 TABLETS BY MOUTH EVERY WEEK  STOP UNTIL INSTRUCTED BY MD 72 tablet 2    naloxone (NARCAN) 4 mg/actuation Spry 4mg by nasal route as needed for opioid overdose; may repeat every 2-3 minutes in alternating nostrils until medical help arrives. Call 911 1 each 11    ondansetron (ZOFRAN-ODT) 4 MG TbDL Take 1 tablet (4 mg total) by mouth every 6 (six) hours as needed (nausea). 60 tablet 6    phenazopyridine (PYRIDIUM) 200 MG tablet Take 1 tablet (200 mg total) by mouth 3 (three) times daily as needed for Pain. 90 tablet 3    tamsulosin (FLOMAX) 0.4 mg Cap Take 1 capsule (0.4 mg total) by mouth once daily. 30 capsule 3    traMADoL (ULTRAM) 50 mg tablet Take 2 tablets (100 mg total) by mouth 2 (two) times daily as needed for Pain. 120 tablet 5    [DISCONTINUED] alfuzosin (UROXATRAL) 10 mg Tb24 Take 10 mg by mouth.      [DISCONTINUED] aspirin (ECOTRIN) 81 MG EC tablet Take 1 tablet by mouth once daily.      [DISCONTINUED] cholecalciferol, vitamin D3, (VITAMIN D3) 100 mcg (4,000 unit) Cap Take 400 Units by mouth once daily.      [DISCONTINUED] cloNIDine (CATAPRES) 0.1 MG tablet Take 1 tablet (0.1 mg total) by mouth 2 (two) times daily as needed (only if blood pressure top number is over 200). (Patient not taking: Reported on 6/7/2022) 30 tablet 1    [DISCONTINUED] folic acid (FOLVITE) 1 MG tablet Take 1 tablet (1 mg total) by mouth once daily. 90 tablet 3    [DISCONTINUED] meclizine (ANTIVERT) 12.5 mg tablet Take 1 tablet (12.5 mg total) by mouth 2 (two) times daily as needed for Dizziness. 30 tablet 0    [DISCONTINUED] sildenafiL (VIAGRA) 100 MG tablet Take 1 tablet (100 mg total) by mouth daily as needed for Erectile Dysfunction. 10 tablet 2       Potential issues to be addressed PRIOR TO DISCHARGE  Patient requested refills for the  following medications: (flomax, protonix, periactin)    Loni Dykes  [id number]          .

## 2023-08-08 NOTE — H&P
[facility] - Emergency Dept  Hospital Medicine  History & Physical    Patient Name: Rajendra Castle  MRN: [id number]  Patient Class: OP- Observation  Admission Date: 8/8/2023  Attending Physician: Johnny Busby MD  Primary Care Provider: Sp Lilly MD         Patient information was obtained from patient and ER records.     Subjective:     Principal Problem:Chest pain    Chief Complaint:   Chief Complaint   Patient presents with    Shortness of Breath    Chest Pain       X2 DAYS        HPI: Rajendra Castle is a 77 y.o.  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 The patient presented to [facility] on 8/8/2023 with a primary complaint of Shortness of Breath and Chest Pain (  X2 DAYS)  He describes the chest pain as intermittent shocking pain which started in the middle of his back and gradually radiated to both sides of his chest .  He rated this pain 8/10 which has no aggravating or relieving factors, associated chest tightness and mild SOB.  He states that shortness of breath is worse on exertion but denies any pedal edema.  No associated nausea, vomiting, orthopnea, or abdominal pain.  Patient has a history of CAD with 1 stent placement and his cardiologist is Dr. Lamb who he states he has not followed up with in months.  He does not smoke cigarettes or drink alcohol but states that he uses marijuana with last use 1 week ago.  Patient presented to his insurance wellness clinic today on account of these symptoms and was referred to the ED for further evaluation.  On presentation to the ED, creatinine elevated at 1.5,   (compared to 2135 six months ago) was elevated at 47.3.  EKG done showed ST and Marked T wave abnormality, Prolonged QT.  Review of echo done 01/21/2023 showed  The left ventricle is normal in size with mild concentric hypertrophy and normal systolic function.   The estimated ejection fraction is 65%.   Normal left ventricular diastolic function.   Normal right ventricular size with normal right ventricular systolic function.   Moderate mitral regurgitation.   Mild-to-moderate aortic regurgitation.   Small circumferential pericardial effusion. Effusion is fluid. No Temponade.         Past Medical History:   Diagnosis Date    Anticoagulant long-term use     Arthritis     Coronary artery disease     Dr. Lamb    DDD (degenerative disc disease), cervical     Degenerative disc disease     Heart murmur     History of radiation therapy 2020    Hypertension     Nontoxic multinodular goiter 09/20/2016    Prostate CA     RA (rheumatoid arthritis)     Sleep apnea     No CPAP    Vitamin D insufficiency 09/30/2016       Past Surgical History:   Procedure Laterality Date    CARPAL TUNNEL RELEASE Right 05/28/2021    Procedure: RELEASE, CARPAL TUNNEL;  Surgeon: Jose Ryan II, MD;  Location: [facility] OR;  Service: Orthopedics;  Laterality: Right;    COLONOSCOPY  prior to 2016    normal findings per patient report    CORONARY ANGIOPLASTY WITH STENT PLACEMENT  02/2022    CYSTOSCOPY N/A 12/18/2018    Procedure: CYSTOSCOPY;  Surgeon: Garrett Pina MD;  Location: [facility] OR;  Service: Urology;  Laterality: N/A;    CYSTOSCOPY N/A 07/12/2022    Procedure: CYSTOSCOPY;  Surgeon: Garrett Pina MD;  Location: [facility] OR;  Service: Urology;  Laterality: N/A;    ESOPHAGOGASTRODUODENOSCOPY N/A 09/29/2020    Dr. Espinosa; empiric dilation; erythematous mucosa in antrum; gastric mucosal atrophy; hematin in entire stomach; biopsy: mid & distal esophagus WNL, stomach- WNL, negative for h pylori    EXCISION OF  LESION OF PENIS N/A 2/23/2023    Procedure: EXCISION, LESION, PENIS;  Surgeon: Timo Myers MD;  Location[facility] OR;  Service: Urology;  Laterality: N/A;    INSERTION OF TUNNELED CENTRAL VENOUS CATHETER (CVC) WITH SUBCUTANEOUS PORT Left 5/18/2023    Procedure: [id number]-[id number];  Surgeon: Atul Faria MD;  Location: [facility];  Service: General;  Laterality: Left;  left subclavian    PARATHYROIDECTOMY Right 10/27/2020    Procedure: PARATHYROIDECTOMY;  Surgeon: Latonia Clayton MD;  Location: [facility] OR [facility];  Service: ENT;  Laterality: Right;    RELEASE OF ULNAR NERVE AT CUBITAL TUNNEL Right 05/28/2021    Procedure: RELEASE, ULNAR TUNNEL;  Surgeon: Jose Ryan II, MD;  Location: [facility] OR;  Service: Orthopedics;  Laterality: Right;    TRANSRECTAL BIOPSY OF PROSTATE WITH ULTRASOUND GUIDANCE N/A 12/18/2018    Procedure: BIOPSY, PROSTATE, RECTAL APPROACH, WITH US GUIDANCE;  Surgeon: Garrett Pina MD;  Location: [facility] OR;  Service: Urology;  Laterality: N/A;    TRANSRECTAL ULTRASOUND EXAMINATION N/A 07/12/2022    Procedure: ULTRASOUND, RECTAL APPROACH;  Surgeon: Garrett Pina MD;  Location: [facility] OR;  Service: Urology;  Laterality: N/A;       Review of patient's allergies indicates:  No Known Allergies    Current Facility-Administered Medications on File Prior to Encounter   Medication    albuterol nebulizer solution 2.5 mg    albuterol-ipratropium 2.5 mg-0.5 mg/3 mL nebulizer solution 3 mL    denosumab (PROLIA) injection 60 mg    diphenhydrAMINE injection 25 mg    HYDROmorphone injection 0.5 mg    lactated ringers infusion    ondansetron injection 4 mg    sodium chloride 0.9% flush 10 mL    sodium chloride 0.9% flush 10 mL    sodium chloride 0.9% flush 3 mL     Current Outpatient Medications on File Prior to Encounter   Medication Sig    atorvastatin (LIPITOR) 20 MG tablet Take 20 mg by mouth once daily.    cyproheptadine (PERIACTIN) 4 mg tablet Take 1 tablet (4 mg total) by mouth  4 (four) times daily.    hydrOXYchloroQUINE (PLAQUENIL) 200 mg tablet Take 2 tablets (400 mg total) by mouth once daily.    oxybutynin (DITROPAN-XL) 5 MG TR24 Take 5 mg by mouth once daily.    tamsulosin (FLOMAX) 0.4 mg Cap Take 1 capsule (0.4 mg total) by mouth once daily.    temazepam (RESTORIL) 30 mg capsule Take 1 capsule (30 mg total) by mouth nightly as needed for Insomnia.    traMADoL (ULTRAM) 50 mg tablet Take 2 tablets (100 mg total) by mouth 2 (two) times daily as needed for Pain.    alfuzosin (UROXATRAL) 10 mg Tb24 Take 10 mg by mouth.    amLODIPine (NORVASC) 5 MG tablet Take 1 tablet (5 mg total) by mouth 2 (two) times daily.    aspirin (ECOTRIN) 81 MG EC tablet Take 1 tablet by mouth once daily.    b complex vitamins capsule Take 1 capsule by mouth once daily.    betamethasone dipropionate 0.05 % cream Apply topically 2 (two) times daily.    betamethasone valerate 0.1% (VALISONE) 0.1 % Crea Apply topically 2 (two) times daily.    busPIRone (BUSPAR) 10 MG tablet Take 1 tablet (10 mg total) by mouth 3 (three) times daily.    carvediloL (COREG) 25 MG tablet Take 25 mg by mouth.    clopidogreL (PLAVIX) 75 mg tablet Take 1 tablet (75 mg total) by mouth once daily. Pt not sure of dose    EScitalopram oxalate (LEXAPRO) 10 MG tablet Take 10 mg by mouth once daily.    folic acid (FOLVITE) 1 MG tablet Take 1 tablet (1 mg total) by mouth once daily.    gabapentin (NEURONTIN) 300 MG capsule Take 300 mg by mouth 3 (three) times daily.    isosorbide mononitrate (IMDUR) 30 MG 24 hr tablet Take 30 mg by mouth.    LIDOcaine HCL 2% (XYLOCAINE) 2 % jelly Apply to affected area (tip of penis) daily prn    methotrexate 2.5 MG Tab Take 6 tablets (15 mg total) by mouth every 7 days. TAKE 6 TABLETS BY MOUTH EVERY WEEK  STOP UNTIL INSTRUCTED BY MD    naloxone (NARCAN) 4 mg/actuation Spry 4mg by nasal route as needed for opioid overdose; may repeat every 2-3 minutes in alternating nostrils until medical help  arrives. Call 911    ondansetron (ZOFRAN-ODT) 4 MG TbDL Take 1 tablet (4 mg total) by mouth every 6 (six) hours as needed (nausea).    oxyCODONE (ROXICODONE) 10 mg Tab immediate release tablet Take 1 tablet (10 mg total) by mouth every 4 (four) hours as needed for Pain.    pantoprazole (PROTONIX) 40 MG tablet TAKE 1 TABLET(40 MG) BY MOUTH TWICE DAILY (Patient taking differently: Take 40 mg by mouth once daily.)    phenazopyridine (PYRIDIUM) 200 MG tablet Take 1 tablet (200 mg total) by mouth 3 (three) times daily as needed for Pain.    predniSONE (DELTASONE) 5 MG tablet Take 2 tablets (10 mg total) by mouth 2 (two) times daily.    [DISCONTINUED] cholecalciferol, vitamin D3, (VITAMIN D3) 100 mcg (4,000 unit) Cap Take 400 Units by mouth once daily.    [DISCONTINUED] cloNIDine (CATAPRES) 0.1 MG tablet Take 1 tablet (0.1 mg total) by mouth 2 (two) times daily as needed (only if blood pressure top number is over 200). (Patient not taking: Reported on 2022)    [DISCONTINUED] meclizine (ANTIVERT) 12.5 mg tablet Take 1 tablet (12.5 mg total) by mouth 2 (two) times daily as needed for Dizziness.    [DISCONTINUED] sildenafiL (VIAGRA) 100 MG tablet Take 1 tablet (100 mg total) by mouth daily as needed for Erectile Dysfunction.     Family History       Problem Relation (Age of Onset)    Diabetes Maternal Uncle    Drug abuse Sister    Heart disease Mother    No Known Problems Daughter, Daughter, Son    Stroke Father          Tobacco Use    Smoking status: Former     Current packs/day: 0.00     Average packs/day: 0.3 packs/day for 10.0 years (2.5 ttl pk-yrs)     Types: Cigarettes     Start date: 1991     Quit date: 2001     Years since quittin.0     Passive exposure: Past    Smokeless tobacco: Never   Substance and Sexual Activity    Alcohol use: Not Currently     Comment: seldom    Drug use: Not Currently     Frequency: 8.0 times per week     Types: Marijuana    Sexual activity: Yes     Partners:  Female     Review of Systems   Constitutional:  Negative for fever.   Respiratory:  Positive for shortness of breath.    Cardiovascular:  Positive for chest pain and palpitations.   Gastrointestinal:  Negative for abdominal pain and vomiting.   All other systems reviewed and are negative.    Objective:     Vital Signs (Most Recent):  Temp: 99.4 °F (37.4 °C) (08/08/23 0842)  Pulse: 78 (08/08/23 1142)  Resp: 14 (08/08/23 1142)  BP: (!) 153/74 (08/08/23 1157)  SpO2: 100 % (08/08/23 1142) Vital Signs (24h Range):  Temp:  [97.9 °F (36.6 °C)-99.4 °F (37.4 °C)] 99.4 °F (37.4 °C)  Pulse:  [70-94] 78  Resp:  [14-22] 14  SpO2:  [97 %-100 %] 100 %  BP: (153-187)/(74-87) 153/74     Weight: 77.1 kg (170 lb)  Body mass index is 25.85 kg/m².     Physical Exam  Vitals and nursing note reviewed.   HENT:      Head: Normocephalic.   Eyes:      Pupils: Pupils are equal, round, and reactive to light.   Cardiovascular:      Rate and Rhythm: Normal rate.   Pulmonary:      Effort: Pulmonary effort is normal.   Abdominal:      Palpations: Abdomen is soft.   Musculoskeletal:      Cervical back: Normal range of motion.      Right lower leg: No edema.      Left lower leg: No edema.   Neurological:      General: No focal deficit present.      Mental Status: He is alert.   Psychiatric:         Mood and Affect: Mood normal.              CRANIAL NERVES     CN III, IV, VI   Pupils are equal, round, and reactive to light.       Significant Labs: All pertinent labs within the past 24 hours have been reviewed.  CBC:   Recent Labs   Lab 08/08/23  0900   WBC 6.77   HGB 13.7*   HCT 43.1        CMP:   Recent Labs   Lab 08/08/23 0900      K 4.1   *   CO2 17*   GLU 99   BUN 21   CREATININE 1.5*   CALCIUM 8.5*   PROT 6.8   ALBUMIN 3.3*   BILITOT 0.9   ALKPHOS 43*   AST 21   ALT 21   ANIONGAP 8     Cardiac Markers:   Recent Labs   Lab 08/08/23 0900   *     Troponin:   Recent Labs   Lab 08/08/23  0900   TROPONINIHS 47.3*     Urine  "Studies: No results for input(s): "COLORU", "APPEARANCEUA", "PHUR", "SPECGRAV", "PROTEINUA", "GLUCUA", "KETONESU", "BILIRUBINUA", "OCCULTUA", "NITRITE", "UROBILINOGEN", "LEUKOCYTESUR", "RBCUA", "WBCUA", "BACTERIA", "SQUAMEPITHEL", "HYALINECASTS" in the last 48 hours.    Invalid input(s): "WRIGHTSUR"    Significant Imaging: I have reviewed all pertinent imaging results/findings within the past 24 hours.  Imaging Results              X-Ray Chest AP Portable (Final result)  Result time 08/08/23 09:28:12      Final result by Christopher Fuchs MD (08/08/23 09:28:12)                   Narrative:    Chest single view    CLINICAL DATA: Chest pain, shortness of breath    FINDINGS: 2 AP views demonstrate no cardiac, pulmonary, or acute osseous abnormalities.    IMPRESSION:  1. No acute radiographic abnormalities.    Electronically signed by:  Christopher Fuchs MD  8/8/2023 9:28 AM CDT Workstation: [id number]                                      Assessment/Plan:     * Chest pain  Rule out ACS  Trend Trops  P.r.n. nitroContinue daily aspirin 81mg and atorvastatin  Cardiac telemetry  Morphine PRN pain  Consult to cardiology      KENYATTA (acute kidney injury)  Patient was seen today earlier at [facility] used on account of urinary symptom  Urinalysis negative for UTI  Would do a bladder scan to rule out obstructive uropathy  If negative, give IV fluids   Patient with acute kidney injury/acute renal failure likely due to pre-renal azotemia due to dehydration KENYATTA is currently worsening. Baseline creatinine unknown - Labs reviewed- Renal function/electrolytes with Estimated Creatinine Clearance: 41.2 mL/min (A) (based on SCr of 1.5 mg/dL (H)). according to latest data. Monitor urine output and serial BMP and adjust therapy as needed. Avoid nephrotoxins and renally dose meds for GFR listed above.    Essential hypertension  Resume home antihypertensive medication      ACP (advance care planning)  Patient with LAPOST stating full " code, confirmed with patient that he wishes to be full code      VTE Risk Mitigation (From admission, onward)    None             On 08/08/2023, patient should be placed in hospital observation services under my care.        Johnny Busby MD  Department of Hospital Medicine  [facility] - Emergency Dept

## 2023-08-09 ENCOUNTER — HOSPITAL ENCOUNTER (OUTPATIENT)
Dept: RADIOLOGY | Facility: HOSPITAL | Age: 78
Discharge: HOME | DRG: 313 | End: 2023-08-09
Attending: STUDENT IN AN ORGANIZED HEALTH CARE EDUCATION/TRAINING PROGRAM | Admitting: STUDENT IN AN ORGANIZED HEALTH CARE EDUCATION/TRAINING PROGRAM
Payer: MEDICARE

## 2023-08-09 ENCOUNTER — TELEPHONE (OUTPATIENT)
Dept: UROLOGY | Facility: CLINIC | Age: 78
End: 2023-08-09
Payer: MEDICARE

## 2023-08-09 ENCOUNTER — CLINICAL SUPPORT (OUTPATIENT)
Dept: CARDIOLOGY | Facility: HOSPITAL | Age: 78
DRG: 313 | End: 2023-08-09
Attending: STUDENT IN AN ORGANIZED HEALTH CARE EDUCATION/TRAINING PROGRAM
Payer: MEDICARE

## 2023-08-09 LAB
ANION GAP SERPL CALC-SCNC: 9 MMOL/L (ref 8–16)
BUN SERPL-MCNC: 19 MG/DL (ref 8–23)
CALCIUM SERPL-MCNC: 8 MG/DL (ref 8.7–10.5)
CHLORIDE SERPL-SCNC: 111 MMOL/L (ref 95–110)
CO2 SERPL-SCNC: 18 MMOL/L (ref 23–29)
CREAT SERPL-MCNC: 1.2 MG/DL (ref 0.5–1.4)
ERYTHROCYTE [DISTWIDTH] IN BLOOD BY AUTOMATED COUNT: 19.9 % (ref 11.5–14.5)
EST. GFR  (NO RACE VARIABLE): >60 ML/MIN/1.73 M^2
GLUCOSE SERPL-MCNC: 89 MG/DL (ref 70–110)
HCT VFR BLD AUTO: 38.5 % (ref 40–54)
HGB BLD-MCNC: 12.7 G/DL (ref 14–18)
MAGNESIUM SERPL-MCNC: 2 MG/DL (ref 1.6–2.6)
MCH RBC QN AUTO: 28.9 PG (ref 27–31)
MCHC RBC AUTO-ENTMCNC: 33 G/DL (ref 32–36)
MCV RBC AUTO: 88 FL (ref 82–98)
PLATELET # BLD AUTO: 159 K/UL (ref 150–450)
PMV BLD AUTO: 11.5 FL (ref 9.2–12.9)
POTASSIUM SERPL-SCNC: 3.8 MMOL/L (ref 3.5–5.1)
RBC # BLD AUTO: 4.39 M/UL (ref 4.6–6.2)
SODIUM SERPL-SCNC: 138 MMOL/L (ref 136–145)
TROPONIN I SERPL HS-MCNC: 58.1 PG/ML (ref 0–14.9)
WBC # BLD AUTO: 6.42 K/UL (ref 3.9–12.7)

## 2023-08-09 PROCEDURE — 83735 ASSAY OF MAGNESIUM: CPT | Performed by: STUDENT IN AN ORGANIZED HEALTH CARE EDUCATION/TRAINING PROGRAM

## 2023-08-09 PROCEDURE — 99223 1ST HOSP IP/OBS HIGH 75: CPT | Mod: ,,, | Performed by: INTERNAL MEDICINE

## 2023-08-09 PROCEDURE — 99223 PR INITIAL HOSPITAL CARE,LEVL III: ICD-10-PCS | Mod: ,,, | Performed by: INTERNAL MEDICINE

## 2023-08-09 PROCEDURE — 21400001 HC TELEMETRY ROOM

## 2023-08-09 PROCEDURE — 25000003 PHARM REV CODE 250: Performed by: HOSPITALIST

## 2023-08-09 PROCEDURE — 80048 BASIC METABOLIC PNL TOTAL CA: CPT | Performed by: STUDENT IN AN ORGANIZED HEALTH CARE EDUCATION/TRAINING PROGRAM

## 2023-08-09 PROCEDURE — 25000003 PHARM REV CODE 250: Performed by: INTERNAL MEDICINE

## 2023-08-09 PROCEDURE — 25000003 PHARM REV CODE 250: Performed by: STUDENT IN AN ORGANIZED HEALTH CARE EDUCATION/TRAINING PROGRAM

## 2023-08-09 PROCEDURE — 85027 COMPLETE CBC AUTOMATED: CPT | Performed by: STUDENT IN AN ORGANIZED HEALTH CARE EDUCATION/TRAINING PROGRAM

## 2023-08-09 PROCEDURE — 36415 COLL VENOUS BLD VENIPUNCTURE: CPT | Performed by: STUDENT IN AN ORGANIZED HEALTH CARE EDUCATION/TRAINING PROGRAM

## 2023-08-09 PROCEDURE — 63600175 PHARM REV CODE 636 W HCPCS: Performed by: STUDENT IN AN ORGANIZED HEALTH CARE EDUCATION/TRAINING PROGRAM

## 2023-08-09 PROCEDURE — A9502 TC99M TETROFOSMIN: HCPCS

## 2023-08-09 PROCEDURE — 84484 ASSAY OF TROPONIN QUANT: CPT | Performed by: STUDENT IN AN ORGANIZED HEALTH CARE EDUCATION/TRAINING PROGRAM

## 2023-08-09 RX ORDER — LOSARTAN POTASSIUM 25 MG/1
25 TABLET ORAL DAILY
Status: DISCONTINUED | OUTPATIENT
Start: 2023-08-09 | End: 2023-08-10 | Stop reason: HOSPADM

## 2023-08-09 RX ORDER — ACETAMINOPHEN 500 MG
1000 TABLET ORAL EVERY 6 HOURS PRN
Status: DISCONTINUED | OUTPATIENT
Start: 2023-08-09 | End: 2023-08-10 | Stop reason: HOSPADM

## 2023-08-09 RX ORDER — REGADENOSON 0.08 MG/ML
0.4 INJECTION, SOLUTION INTRAVENOUS ONCE
Status: DISCONTINUED | OUTPATIENT
Start: 2023-08-09 | End: 2023-08-10 | Stop reason: HOSPADM

## 2023-08-09 RX ORDER — CLONAZEPAM 0.5 MG/1
0.5 TABLET ORAL NIGHTLY
Status: DISCONTINUED | OUTPATIENT
Start: 2023-08-09 | End: 2023-08-10 | Stop reason: HOSPADM

## 2023-08-09 RX ORDER — MUPIROCIN 20 MG/G
OINTMENT TOPICAL 2 TIMES DAILY
Status: DISCONTINUED | OUTPATIENT
Start: 2023-08-09 | End: 2023-08-10 | Stop reason: HOSPADM

## 2023-08-09 RX ADMIN — CLONAZEPAM 0.5 MG: 0.5 TABLET ORAL at 08:08

## 2023-08-09 RX ADMIN — ASPIRIN 81 MG: 81 TABLET, COATED ORAL at 09:08

## 2023-08-09 RX ADMIN — FLUOCINONIDE: 0.5 CREAM TOPICAL at 08:08

## 2023-08-09 RX ADMIN — ISOSORBIDE MONONITRATE 30 MG: 30 TABLET, EXTENDED RELEASE ORAL at 09:08

## 2023-08-09 RX ADMIN — AMLODIPINE BESYLATE 5 MG: 5 TABLET ORAL at 08:08

## 2023-08-09 RX ADMIN — AMLODIPINE BESYLATE 5 MG: 5 TABLET ORAL at 09:08

## 2023-08-09 RX ADMIN — CLOPIDOGREL BISULFATE 75 MG: 75 TABLET, FILM COATED ORAL at 09:08

## 2023-08-09 RX ADMIN — ATORVASTATIN CALCIUM 20 MG: 20 TABLET, FILM COATED ORAL at 08:08

## 2023-08-09 RX ADMIN — ACETAMINOPHEN 1000 MG: 500 TABLET, FILM COATED ORAL at 03:08

## 2023-08-09 RX ADMIN — ESCITALOPRAM 10 MG: 5 TABLET, FILM COATED ORAL at 09:08

## 2023-08-09 RX ADMIN — MUPIROCIN 1 G: 20 OINTMENT TOPICAL at 08:08

## 2023-08-09 RX ADMIN — PANTOPRAZOLE SODIUM 40 MG: 40 TABLET, DELAYED RELEASE ORAL at 05:08

## 2023-08-09 RX ADMIN — FLUOCINONIDE: 0.5 CREAM TOPICAL at 11:08

## 2023-08-09 RX ADMIN — LOSARTAN POTASSIUM 25 MG: 25 TABLET, FILM COATED ORAL at 08:08

## 2023-08-09 RX ADMIN — ENOXAPARIN SODIUM 40 MG: 40 INJECTION SUBCUTANEOUS at 05:08

## 2023-08-09 NOTE — NURSING
Nurses Note -- 4 Eyes      8/8/2023  8:15 PM      Skin assessed during: Admit      [x] No Altered Skin Integrity Present    []Prevention Measures Documented      [] Yes- Altered Skin Integrity Present or Discovered   [] LDA Added if Not in Epic (Describe Wound)   [] New Altered Skin Integrity was Present on Admit and Documented in LDA   [] Wound Image Taken    Wound Care Consulted? No    Attending Nurse:  Sanjay Dobbins RN    Second RN/Staff Member:   Anabela Seay RN

## 2023-08-09 NOTE — SUBJECTIVE & OBJECTIVE
Interval History:  Patient seen and examined.  No acute events overnight.  Patient remains chest pain-free currently.  Was unable to get stress test today, but will be scheduled for tomorrow.      Objective:     Vital Signs (Most Recent):  Temp: 98.1 °F (36.7 °C) (08/09/23 1106)  Pulse: 85 (08/09/23 0730)  Resp: 17 (08/09/23 1106)  BP: (!) 168/75 (08/09/23 1106)  SpO2: 97 % (08/09/23 1106) Vital Signs (24h Range):  Temp:  [98.1 °F (36.7 °C)-98.7 °F (37.1 °C)] 98.1 °F (36.7 °C)  Pulse:  [67-85] 85  Resp:  [14-19] 17  SpO2:  [96 %-100 %] 97 %  BP: (155-180)/(74-81) 168/75     Weight: 62.6 kg (138 lb)  Body mass index is 20.98 kg/m².    Intake/Output Summary (Last 24 hours) at 8/9/2023 1247  Last data filed at 8/9/2023 0600  Gross per 24 hour   Intake 360 ml   Output 700 ml   Net -340 ml         Physical Exam  Vitals and nursing note reviewed.   Eyes:      Pupils: Pupils are equal, round, and reactive to light.   Neck:      Vascular: No JVD.   Cardiovascular:      Rate and Rhythm: Normal rate and regular rhythm.      Heart sounds: Normal heart sounds.   Pulmonary:      Effort: Pulmonary effort is normal.      Breath sounds: Normal breath sounds.   Abdominal:      General: Bowel sounds are normal. There is no distension.      Palpations: Abdomen is soft.      Tenderness: There is no abdominal tenderness.   Musculoskeletal:         General: No deformity.   Lymphadenopathy:      Cervical: No cervical adenopathy.   Skin:     Coloration: Skin is not pale.      Findings: No rash.             Significant Labs: All pertinent labs within the past 24 hours have been reviewed.    Significant Imaging: I have reviewed all pertinent imaging results/findings within the past 24 hours.

## 2023-08-09 NOTE — PROGRESS NOTES
[facility] Medicine  Progress Note    Patient Name: Rajendra Castle  MRN: [id number]  Patient Class: OP- Observation   Admission Date: 8/8/2023  Length of Stay: 0 days  Attending Physician: Morgan Cai MD  Primary Care Provider: Sp Lilly MD        Subjective:     Principal Problem:Chest pain        HPI:  Rajendra Castle is a 77 y.o.  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 The patient presented to [facility] on 8/8/2023 with a primary complaint of Shortness of Breath and Chest Pain (  X2 DAYS)  He describes the chest pain as intermittent shocking pain which started in the middle of his back and gradually radiated to both sides of his chest .  He rated this pain 8/10 which has no aggravating or relieving factors, associated chest tightness and mild SOB.  He states that shortness of breath is worse on exertion but denies any pedal edema.  No associated nausea, vomiting, orthopnea, or abdominal pain.  Patient has a history of CAD with 1 stent placement and his cardiologist is Dr. Lamb who he states he has not followed up with in months.  He does not smoke cigarettes or drink alcohol but states that he uses marijuana with last use 1 week ago.  Patient presented to his insurance wellness clinic today on account of these symptoms and was referred to the ED for further evaluation.  On presentation to the ED, creatinine elevated at 1.5,  (compared to 2135 six months ago) was elevated at 47.3.  EKG done showed ST and Marked T wave abnormality, Prolonged QT.  Review of echo done 01/21/2023 showed  The left ventricle is  normal in size with mild concentric hypertrophy and normal systolic function.   The estimated ejection fraction is 65%.   Normal left ventricular diastolic function.   Normal right ventricular size with normal right ventricular systolic function.   Moderate mitral regurgitation.   Mild-to-moderate aortic regurgitation.   Small circumferential pericardial effusion. Effusion is fluid. No Temponade.         Overview/Hospital Course:  No notes on file    Interval History:  Patient seen and examined.  No acute events overnight.  Patient remains chest pain-free currently.  Was unable to get stress test today, but will be scheduled for tomorrow.      Objective:     Vital Signs (Most Recent):  Temp: 98.1 °F (36.7 °C) (08/09/23 1106)  Pulse: 85 (08/09/23 0730)  Resp: 17 (08/09/23 1106)  BP: (!) 168/75 (08/09/23 1106)  SpO2: 97 % (08/09/23 1106) Vital Signs (24h Range):  Temp:  [98.1 °F (36.7 °C)-98.7 °F (37.1 °C)] 98.1 °F (36.7 °C)  Pulse:  [67-85] 85  Resp:  [14-19] 17  SpO2:  [96 %-100 %] 97 %  BP: (155-180)/(74-81) 168/75     Weight: 62.6 kg (138 lb)  Body mass index is 20.98 kg/m².    Intake/Output Summary (Last 24 hours) at 8/9/2023 1247  Last data filed at 8/9/2023 0600  Gross per 24 hour   Intake 360 ml   Output 700 ml   Net -340 ml         Physical Exam  Vitals and nursing note reviewed.   Eyes:      Pupils: Pupils are equal, round, and reactive to light.   Neck:      Vascular: No JVD.   Cardiovascular:      Rate and Rhythm: Normal rate and regular rhythm.      Heart sounds: Normal heart sounds.   Pulmonary:      Effort: Pulmonary effort is normal.      Breath sounds: Normal breath sounds.   Abdominal:      General: Bowel sounds are normal. There is no distension.      Palpations: Abdomen is soft.      Tenderness: There is no abdominal tenderness.   Musculoskeletal:         General: No deformity.   Lymphadenopathy:      Cervical: No cervical adenopathy.   Skin:     Coloration: Skin is not pale.      Findings:  No rash.             Significant Labs: All pertinent labs within the past 24 hours have been reviewed.    Significant Imaging: I have reviewed all pertinent imaging results/findings within the past 24 hours.      Assessment/Plan:      * Chest pain  Rule out ACS  Trend Trops  P.r.n. nitroContinue daily aspirin 81mg and atorvastatin  Cardiac telemetry  Morphine PRN pain  Consult to cardiology      KENYATTA (acute kidney injury)  Patient with acute kidney injury/acute renal failure likely due to pre-renal azotemia due to dehydration KENYATTA is currently stable. Baseline creatinine unknown - Labs reviewed- Renal function/electrolytes with Estimated Creatinine Clearance: 47.1 mL/min (based on SCr of 1.2 mg/dL). according to latest data. Monitor urine output and serial BMP and adjust therapy as needed. Avoid nephrotoxins and renally dose meds for GFR listed above.    ACP (advance care planning)  Patient with [employer] stating full code, confirmed with patient that he wishes to be full code      Essential hypertension  Chronic, controlled.  Latest blood pressure and vitals reviewed-     Temp:  [98.1 °F (36.7 °C)-98.7 °F (37.1 °C)]   Pulse:  [67-85]   Resp:  [14-19]   BP: (155-180)/(74-81)   SpO2:  [96 %-100 %] .   Home meds for hypertension were reviewed and noted below-  Hypertension Medications             amLODIPine (NORVASC) 5 MG tablet Take 1 tablet (5 mg total) by mouth 2 (two) times daily.    carvediloL (COREG) 25 MG tablet Take 25 mg by mouth.    isosorbide mononitrate (IMDUR) 30 MG 24 hr tablet Take 30 mg by mouth once daily.          While in the hospital, will manage blood pressure as follows; Continue home antihypertensive regimen    Will utilize p.r.n. blood pressure medication only if patient's blood pressure greater than 180/110 and he develops symptoms such as worsening chest pain or shortness of breath.          VTE Risk Mitigation (From admission, onward)         Ordered     enoxaparin injection 40 mg  Daily          08/08/23 1644     IP VTE HIGH RISK PATIENT  Once         08/08/23 1644     Place sequential compression device  Until discontinued         08/08/23 1644                Discharge Planning   DEVONTE: 8/10/2023     Code Status: Full Code   Is the patient medically ready for discharge?:     Reason for patient still in hospital (select all that apply): Treatment  Discharge Plan A: Home                  Morgan Cai MD  Department of Hospital Medicine   [facility]

## 2023-08-09 NOTE — CARE UPDATE
08/08/23 2032   Patient Assessment/Suction   Level of Consciousness (AVPU) alert   Respiratory Effort Normal   PRE-TX-O2   Device (Oxygen Therapy) room air   SpO2 100 %   Pulse Oximetry Type Intermittent   $ Pulse Oximetry - Multiple Charge Pulse Oximetry - Multiple   Pulse 78   Resp 19   Education   $ Education 15 min   Respiratory Evaluation   $ Care Plan Tech Time 15 min

## 2023-08-09 NOTE — PLAN OF CARE
[facility]  Initial Discharge Assessment       Primary Care Provider: Sp Lilly MD    Admission Diagnosis: Chest pain [R07.9]    Admission Date: 8/8/2023  Expected Discharge Date: 8/10/2023     met with Pt at bedside to complete discharge assessment. Pt AAOx4s. Demographics, PCP, and insurance verified. Pt lives alone. No home health. No dialysis. Pt reports ability to complete ADLs with the assistance of a shower chair. Pt verbalized plan to discharge home via family transport. Pt has no other needs to be addressed at this time.    Transition of Care Barriers: None    Payor: [employer] MEDICARE / Plan: "[employer] 65 / Product Type: Medicare Advantage /     Extended Emergency Contact Information  Primary Emergency Contact: Martel,Collette  Address: [facility]           [place] [address] [facility]  Home Phone: [phone]  Mobile Phone: [phone]  Relation: Relative  Preferred language: English   needed? No  Secondary Emergency Contact: Jane Castle  Mobile Phone: [phone]  Relation: Grandchild  Preferred language: English   needed? No    Discharge Plan A: Home  Discharge Plan B: Home      [id number] - [place], [place] - [address] & [facility]  [address]  [facility] [address]  Phone: [phone] Fax: [phone]    [facility] [address] [place], [place] - [address]  [address] [facility] [place] [address]  Phone: [phone] Fax: [phone]      Initial Assessment (most recent)       Adult Discharge Assessment - 08/09/23 1234          Discharge Assessment    Assessment Type Discharge Planning Assessment     Confirmed/corrected address, phone number and insurance Yes     Confirmed Demographics Correct on Facesheet     Source of Information patient     Does patient/caregiver understand observation status Yes     Communicated DEVONTE with patient/caregiver  Yes     Reason For Admission Chest Pain     People in Home alone     Facility Arrived From: Home     Do you expect to return to your current living situation? Yes     Do you have help at home or someone to help you manage your care at home? Yes     Who are your caregiver(s) and their phone number(s)? Martel,Collette (Relative)   [phone] (Mobile)     Prior to hospitilization cognitive status: Alert/Oriented     Current cognitive status: Alert/Oriented     Dressing/Bathing bathing difficulty, requires equipment     Dressing/Bathing Management Shower chair     Home Accessibility wheelchair accessible     Home Layout Able to live on 1st floor     Equipment Currently Used at Home shower chair     Readmission within 30 days? No     Patient currently being followed by outpatient case management? No     Do you currently have service(s) that help you manage your care at home? No     Do you take prescription medications? Yes     Do you have prescription coverage? Yes     Do you have any problems affording any of your prescribed medications? No     Is the patient taking medications as prescribed? yes     Who is going to help you get home at discharge? Martel,Collette (Relative)   [phone] (Mobile)     How do you get to doctors appointments? car, drives self     Are you on dialysis? No     Do you take coumadin? No     Discharge Plan A Home     Discharge Plan B Home     DME Needed Upon Discharge  none     Discharge Plan discussed with: Patient     Transition of Care Barriers None

## 2023-08-09 NOTE — CONSULTS
[facility]  Department of Cardiology  Consult Note      PATIENT NAME: Rajendra Castle    MRN: [id number]  TODAY'S DATE: 08/09/2023  ADMIT DATE: 8/8/2023                          CONSULT REQUESTED BY: Morgan Cai MD    SUBJECTIVE     PRINCIPAL PROBLEM: Chest pain      REASON FOR CONSULT:  From H&P: Rajendra Castle is a 77 y.o.  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 The patient presented to [facility] on 8/8/2023 with a primary complaint of Shortness of Breath and Chest Pain (  X2 DAYS)  He describes the chest pain as intermittent shocking pain which started in the middle of his back and gradually radiated to both sides of his chest .  He rated this pain 8/10 which has no aggravating or relieving factors, associated chest tightness and mild SOB.  He states that shortness of breath is worse on exertion but denies any pedal edema.  No associated nausea, vomiting, orthopnea, or abdominal pain.  Patient has a history of CAD with 1 stent placement and his cardiologist is Dr. Lamb who he states he has not followed up with in months.  He does not smoke cigarettes or drink alcohol but states that he uses marijuana with last use 1 week ago.  Patient presented to his insurance wellness clinic today on account of these symptoms and was referred to the ED for further evaluation.  On presentation to the ED, creatinine elevated at 1.5,  (compared to 2135 six months ago) was elevated at 47.3.  EKG done showed ST and Marked T wave abnormality, Prolonged QT.  Review of echo done 01/21/2023 showed  The left ventricle is normal in size with mild  concentric hypertrophy and normal systolic function.  The estimated ejection fraction is 65%.  Normal left ventricular diastolic function.  Normal right ventricular size with normal right ventricular systolic function.  Moderate mitral regurgitation.  Mild-to-moderate aortic regurgitation.  Small circumferential pericardial effusion. Effusion is fluid. No Temponade.      HPI:    The high patient is a 77-year-old male who presented to the emergency room with complaints of intermittent chest discomfort and progressively worsening shortness of breath over the past 2-5 days.  Patient has a history of CAD with stent placed in the past.  Reports feeling very anxious and states that he suffers with insomnia and did not sleep well last night.  He was ordered to have a stress test today but was unable to complete the stress test due to heightened anxiety and drowsiness from not sleeping well last night.        Review of patient's allergies indicates:  No Known Allergies    Past Medical History:   Diagnosis Date    Anticoagulant long-term use     Arthritis     Coronary artery disease     Dr. Lamb    DDD (degenerative disc disease), cervical     Degenerative disc disease     Heart murmur     History of radiation therapy 2020    Hypertension     Nontoxic multinodular goiter 09/20/2016    Prostate CA     RA (rheumatoid arthritis)     Sleep apnea     No CPAP    Vitamin D insufficiency 09/30/2016     Past Surgical History:   Procedure Laterality Date    CARPAL TUNNEL RELEASE Right 05/28/2021    Procedure: RELEASE, CARPAL TUNNEL;  Surgeon: Jose Ryan II, MD;  Location: [facility] OR;  Service: Orthopedics;  Laterality: Right;    COLONOSCOPY  prior to 2016    normal findings per patient report    CORONARY ANGIOPLASTY WITH STENT PLACEMENT  02/2022    CYSTOSCOPY N/A 12/18/2018    Procedure: CYSTOSCOPY;  Surgeon: Garrett Pina MD;  Location: [facility] OR;  Service: Urology;  Laterality: N/A;    CYSTOSCOPY N/A 07/12/2022     Procedure: CYSTOSCOPY;  Surgeon: Garrett Pina MD;  Location: [facility] OR;  Service: Urology;  Laterality: N/A;    ESOPHAGOGASTRODUODENOSCOPY N/A 2020    Dr. Espinosa; empiric dilation; erythematous mucosa in antrum; gastric mucosal atrophy; hematin in entire stomach; biopsy: mid & distal esophagus WNL, stomach- WNL, negative for h pylori    EXCISION OF LESION OF PENIS N/A 2023    Procedure: EXCISION, LESION, PENIS;  Surgeon: Timo Myers MD;  Location: [facility] OR;  Service: Urology;  Laterality: N/A;    INSERTION OF TUNNELED CENTRAL VENOUS CATHETER (CVC) WITH SUBCUTANEOUS PORT Left 2023    Procedure: XNFEDNFYT-AQNL-T-CATH;  Surgeon: Atul Faria MD;  Location: [facility];  Service: General;  Laterality: Left;  left subclavian    PARATHYROIDECTOMY Right 10/27/2020    Procedure: PARATHYROIDECTOMY;  Surgeon: Latonia Clayton MD;  Location: [address];  Service: ENT;  Laterality: Right;    RELEASE OF ULNAR NERVE AT CUBITAL TUNNEL Right 2021    Procedure: RELEASE, ULNAR TUNNEL;  Surgeon: Jose Ryan II, MD;  Location: [facility] OR;  Service: Orthopedics;  Laterality: Right;    TRANSRECTAL BIOPSY OF PROSTATE WITH ULTRASOUND GUIDANCE N/A 2018    Procedure: BIOPSY, PROSTATE, RECTAL APPROACH, WITH US GUIDANCE;  Surgeon: Garrett Pina MD;  Location: [facility] OR;  Service: Urology;  Laterality: N/A;    TRANSRECTAL ULTRASOUND EXAMINATION N/A 2022    Procedure: ULTRASOUND, RECTAL APPROACH;  Surgeon: Garrett Pina MD;  Location: [facility];  Service: Urology;  Laterality: N/A;     Social History     Tobacco Use    Smoking status: Former     Current packs/day: 0.00     Average packs/day: 0.3 packs/day for 10.0 years (2.5 ttl pk-yrs)     Types: Cigarettes     Start date: 1991     Quit date: 2001     Years since quittin.0     Passive exposure: Past    Smokeless tobacco: Never   Substance Use Topics    Alcohol use: Not Currently     Comment: seldom    Drug use: Not  Currently     Frequency: 8.0 times per week     Types: Marijuana        REVIEW OF SYSTEMS  Per HPI    OBJECTIVE     VITAL SIGNS (Most Recent)  Temp: 98.3 °F (36.8 °C) (08/09/23 0730)  Pulse: 85 (08/09/23 0730)  Resp: 14 (08/09/23 0730)  BP: (!) 180/81 (08/09/23 0730)  SpO2: 99 % (08/09/23 0730)    VENTILATION STATUS  Resp: 14 (08/09/23 0730)  SpO2: 99 % (08/09/23 0730)           I & O (Last 24H):  Intake/Output Summary (Last 24 hours) at 8/9/2023 0928  Last data filed at 8/9/2023 0600  Gross per 24 hour   Intake 360 ml   Output 700 ml   Net -340 ml       WEIGHTS  Wt Readings from Last 1 Encounters:   08/08/23 2015 62.6 kg (138 lb)   08/08/23 0842 77.1 kg (170 lb)       PHYSICAL EXAM  CONSTITUTIONAL: No fever, no chills  HEENT: Normocephalic, atraumatic,pupils reactive to light                 NECK:  No JVD no carotid bruit  CVS: S1S2+, RRR  LUNGS: Clear  ABDOMEN: Soft, NT, BS+  EXTREMITIES: No cyanosis, edema  : No jackson catheter  NEURO: AAO X 3  PSY: Normal affect      HOME MEDICATIONS:  Current Facility-Administered Medications on File Prior to Encounter   Medication Dose Route Frequency Provider Last Rate Last Admin    albuterol nebulizer solution 2.5 mg  2.5 mg Nebulization Q15 Min PRN Pastor Saleh MD        albuterol-ipratropium 2.5 mg-0.5 mg/3 mL nebulizer solution 3 mL  3 mL Nebulization PRN Pastor Saleh MD        denosumab (PROLIA) injection 60 mg  60 mg Subcutaneous 1 time in Clinic/HOD Jean Carlos Hoffman MD        diphenhydrAMINE injection 25 mg  25 mg Intravenous Q6H PRN Pastor Saleh MD        HYDROmorphone injection 0.5 mg  0.5 mg Intravenous Q5 Min PRN Pastor Saleh MD   0.5 mg at 02/23/23 1312    lactated ringers infusion   Intravenous Continuous Volpi-Abadie, Jacqueline, MD   Stopped at 02/23/23 1400    ondansetron injection 4 mg  4 mg Intravenous Q15 Min PRN Pastor Saleh MD        sodium chloride 0.9% flush 10 mL  10 mL Intravenous PRN Ayush Guzman MD   10 mL at 06/01/23  1535    sodium chloride 0.9% flush 10 mL  10 mL Intravenous PRN Ayush Guzman MD   10 mL at 06/05/23 1315    sodium chloride 0.9% flush 3 mL  3 mL Intravenous PRN Pastor Saleh MD         Current Outpatient Medications on File Prior to Encounter   Medication Sig Dispense Refill    amLODIPine (NORVASC) 5 MG tablet Take 1 tablet (5 mg total) by mouth 2 (two) times daily. 60 tablet 3    b complex vitamins capsule Take 1 capsule by mouth once daily.      busPIRone (BUSPAR) 10 MG tablet Take 1 tablet (10 mg total) by mouth 3 (three) times daily. 90 tablet 3    gabapentin (NEURONTIN) 300 MG capsule Take 300 mg by mouth 3 (three) times daily.      hydrOXYchloroQUINE (PLAQUENIL) 200 mg tablet Take 2 tablets (400 mg total) by mouth once daily. 60 tablet 6    oxybutynin (DITROPAN-XL) 5 MG TR24 Take 5 mg by mouth once daily.      oxyCODONE (ROXICODONE) 10 mg Tab immediate release tablet Take 1 tablet (10 mg total) by mouth every 4 (four) hours as needed for Pain. 30 tablet 0    pantoprazole (PROTONIX) 40 MG tablet TAKE 1 TABLET(40 MG) BY MOUTH TWICE DAILY (Patient taking differently: Take 40 mg by mouth once daily.) 180 tablet 0    predniSONE (DELTASONE) 5 MG tablet Take 2 tablets (10 mg total) by mouth 2 (two) times daily. 120 tablet 3    temazepam (RESTORIL) 30 mg capsule Take 1 capsule (30 mg total) by mouth nightly as needed for Insomnia. 30 capsule 1    atorvastatin (LIPITOR) 20 MG tablet Take 20 mg by mouth once daily.      betamethasone dipropionate 0.05 % cream Apply topically 2 (two) times daily. 45 g 1    betamethasone valerate 0.1% (VALISONE) 0.1 % Crea Apply topically 2 (two) times daily. 45 g 0    carvediloL (COREG) 25 MG tablet Take 25 mg by mouth.      clopidogreL (PLAVIX) 75 mg tablet Take 1 tablet (75 mg total) by mouth once daily. Pt not sure of dose (Patient not taking: Reported on 8/8/2023)      cyproheptadine (PERIACTIN) 4 mg tablet Take 1 tablet (4 mg total) by mouth 4 (four) times daily. 120  tablet 2    EScitalopram oxalate (LEXAPRO) 10 MG tablet Take 10 mg by mouth once daily.      isosorbide mononitrate (IMDUR) 30 MG 24 hr tablet Take 30 mg by mouth once daily.      LIDOcaine HCL 2% (XYLOCAINE) 2 % jelly Apply to affected area (tip of penis) daily prn 30 mL 1    methotrexate 2.5 MG Tab Take 6 tablets (15 mg total) by mouth every 7 days. TAKE 6 TABLETS BY MOUTH EVERY WEEK  STOP UNTIL INSTRUCTED BY MD 72 tablet 2    naloxone (NARCAN) 4 mg/actuation Spry 4mg by nasal route as needed for opioid overdose; may repeat every 2-3 minutes in alternating nostrils until medical help arrives. Call 911 1 each 11    ondansetron (ZOFRAN-ODT) 4 MG TbDL Take 1 tablet (4 mg total) by mouth every 6 (six) hours as needed (nausea). 60 tablet 6    phenazopyridine (PYRIDIUM) 200 MG tablet Take 1 tablet (200 mg total) by mouth 3 (three) times daily as needed for Pain. 90 tablet 3    tamsulosin (FLOMAX) 0.4 mg Cap Take 1 capsule (0.4 mg total) by mouth once daily. 30 capsule 3    traMADoL (ULTRAM) 50 mg tablet Take 2 tablets (100 mg total) by mouth 2 (two) times daily as needed for Pain. 120 tablet 5    [DISCONTINUED] cholecalciferol, vitamin D3, (VITAMIN D3) 100 mcg (4,000 unit) Cap Take 400 Units by mouth once daily.      [DISCONTINUED] cloNIDine (CATAPRES) 0.1 MG tablet Take 1 tablet (0.1 mg total) by mouth 2 (two) times daily as needed (only if blood pressure top number is over 200). (Patient not taking: Reported on 6/7/2022) 30 tablet 1    [DISCONTINUED] meclizine (ANTIVERT) 12.5 mg tablet Take 1 tablet (12.5 mg total) by mouth 2 (two) times daily as needed for Dizziness. 30 tablet 0    [DISCONTINUED] sildenafiL (VIAGRA) 100 MG tablet Take 1 tablet (100 mg total) by mouth daily as needed for Erectile Dysfunction. 10 tablet 2       SCHEDULED MEDS:   amLODIPine  5 mg Oral BID    aspirin  81 mg Oral Daily    atorvastatin  20 mg Oral Daily    clopidogreL  75 mg Oral Daily    enoxparin  40 mg Subcutaneous Daily    EScitalopram  "oxalate  10 mg Oral Daily    fluocinonide 0.05%   Topical (Top) BID    isosorbide mononitrate  30 mg Oral Daily    pantoprazole  40 mg Oral Before breakfast       CONTINUOUS INFUSIONS:    PRN MEDS:LIDOcaine HCl 2%, morphine, nitroGLYCERIN, ondansetron, sodium chloride 0.9%, zolpidem    LABS AND DIAGNOSTICS     CBC LAST 3 DAYS  Recent Labs   Lab 08/08/23  0900 08/09/23  0347   WBC 6.77 6.42   RBC 4.78 4.39*   HGB 13.7* 12.7*   HCT 43.1 38.5*   MCV 90 88   MCH 28.7 28.9   MCHC 31.8* 33.0   RDW 20.7* 19.9*    159   MPV 11.1 11.5   GRAN 79.4*  5.4  --    LYMPH 9.2*  0.6*  --    MONO 9.3  0.6  --    BASO 0.02  --    NRBC 0  --        COAGULATION LAST 3 DAYS  No results for input(s): "LABPT", "INR", "APTT" in the last 168 hours.    CHEMISTRY LAST 3 DAYS  Recent Labs   Lab 08/08/23  0900 08/09/23  0347    138   K 4.1 3.8   * 111*   CO2 17* 18*   ANIONGAP 8 9   BUN 21 19   CREATININE 1.5* 1.2   GLU 99 89   CALCIUM 8.5* 8.0*   MG 1.8 2.0   ALBUMIN 3.3*  --    PROT 6.8  --    ALKPHOS 43*  --    ALT 21  --    AST 21  --    BILITOT 0.9  --        CARDIAC PROFILE LAST 3 DAYS  Recent Labs   Lab 08/08/23  0900 08/08/23  1456 08/09/23  0044   *  --   --    TROPONINIHS 47.3* 48.3* 58.1*       ENDOCRINE LAST 3 DAYS  No results for input(s): "TSH", "PROCAL" in the last 168 hours.    LAST ARTERIAL BLOOD GAS  ABG  No results for input(s): "PH", "PO2", "PCO2", "HCO3", "BE" in the last 168 hours.    LAST 7 DAYS MICROBIOLOGY   Microbiology Results (last 7 days)       ** No results found for the last 168 hours. **            MOST RECENT IMAGING  CTA Chest Non-Coronary (PE Studies)  PROCEDURE: CT CHEST ANGIOGRAM WITH IV CONTRAST.    DATE: 8/8/2023 6:34 PM CDT    INDICATION: Positive d-dimer and chest pain with shortness of breath. Suspect pulmonary embolism.    COMPARISON: None    TECHNIQUE: CT multiplanar thin section imaging of the thorax following the administration of intravenous contrast with sagittal and " coronal reformatted images. Departmental pulmonary embolism protocol with 2-D and MIP reformations were also obtained. CMS MANDATED QUALITY DATA - CT RADIATION 436. All CT scans at this facility utilize dose modulation, iterative reconstruction, and/or weight based dosing when appropriate to reduce radiation dose to as low as reasonably achievable.  IV contrast: Refer to MAR/CT technologist documentation  DLP (mSv): Refer to CT protocol form.    FINDINGS:    : Noncontributory.    Lines and tubes: None.    Lower neck: Unremarkable.    Axilla: Clear.    Airway: Clear.    Lungs and pleura:  Right upper lobe pulmonary nodule measures 3 mm (series 4 image 87). Centrilobular emphysematous changes are noted. Bibasilar subsegmental atelectasis and scarring are present.    Mediastinum, mounika and intrathoracic lymph nodes: Normal.    Heart, pericardium and great vessels:  Pulmonary emboli: No pulmonary embolus is identified to the subsegmental level.  Pulmonary trunk: Normal.  Ascending aorta: Normal.  Heart: Normal. No right heart strain. RV:LV ratio is normal.  Pericardium: Small dependent 1 cm pericardial effusion.    Upper abdomen: Scattered splenic granulomata. Mild bilateral adrenal thickening.    Bones: Chronic appearing T6 compression deformity.    Soft tissues: Normal.    IMPRESSION:  1.  No pulmonary embolus is identified to the subsegmental level.  2.  CT evidence for right heart strain is absent.  3.  Right upper lobe pulmonary nodule measuring 3 mm should be followed up with chest CT in 6-12 months for further evaluation.  4.  Small dependent 1 cm simple pericardial effusion.  5.  Chronic appearing T6 compression deformity.    Electronically signed by:  Jerome Mark DO  8/8/2023 7:38 PM CDT Workstation: [id number]  X-Ray Chest AP Portable  Chest single view    CLINICAL DATA: Chest pain, shortness of breath    FINDINGS: 2 AP views demonstrate no cardiac, pulmonary, or acute osseous  abnormalities.    IMPRESSION:  1. No acute radiographic abnormalities.    Electronically signed by:  Christopher Fuchs MD  8/8/2023 9:28 AM CDT Workstation: [id number]      ECHOCARDIOGRAM RESULTS (last 5)  Results for orders placed during the hospital encounter of 01/20/23    Echo    Interpretation Summary  · The left ventricle is normal in size with mild concentric hypertrophy and normal systolic function.  · The estimated ejection fraction is 65%.  · Normal left ventricular diastolic function.  · Normal right ventricular size with normal right ventricular systolic function.  · Moderate mitral regurgitation.  · Mild-to-moderate aortic regurgitation.  · Small circumferential pericardial effusion. Effusion is fluid. No Temponade.      Results for orders placed during the hospital encounter of 06/20/22    Echo    Interpretation Summary  · The left ventricle is normal in size with mild concentric hypertrophy and normal systolic function.  · The estimated ejection fraction is 65%.  · Normal left ventricular diastolic function.  · Normal right ventricular size with normal right ventricular systolic function.  · The estimated PA systolic pressure is 27 mmHg.  · Normal central venous pressure (3 mmHg).      Results for orders placed during the hospital encounter of 05/22/22    STRESS TEST REPORT      Results for orders placed during the hospital encounter of 03/07/22    Echo    Interpretation Summary  · The left ventricle is normal in size with moderate concentric hypertrophy and normal systolic function.  · The estimated PA systolic pressure is 28 mmHg.  · Indeterminate left ventricular diastolic function.  · Normal right ventricular size with mildly reduced right ventricular systolic function.  · Normal central venous pressure (3 mmHg).  · The estimated ejection fraction is 70%.  · Moderate left atrial enlargement.  · Mild mitral regurgitation.  · Mild tricuspid regurgitation.  · Mild-to-moderate aortic  regurgitation.  · Trivial circumferential pericardial effusion. Effusion is fibrinous.  · Mild right atrial enlargement.      Results for orders placed during the hospital encounter of 09/28/20    Echo Color Flow Doppler? Yes    Interpretation Summary  · The left ventricle is normal in size with normal systolic function. The estimated ejection fraction is 65%.  · There is moderate left ventricular concentric hypertrophy.  · Normal left ventricular diastolic function.  · Normal right ventricular systolic function.  · Moderate aortic regurgitation.  · Moderate mitral regurgitation.  · No pulmonary hypertension  · Mild tricuspid regurgitation.  · Normal central venous pressure (3 mmHg).  · The estimated PA systolic pressure is 29 mmHg.  · Trivial posterior pericardial effusion. Effusion is fluid.    Left ventricle hypertrophy  Moderate aortic regurgitation mitral regurgitant  Mean left atrial pressure is normal  No pulmonary hypertension      CURRENT/PREVIOUS VISIT EKG  Results for orders placed or performed during the hospital encounter of 08/08/23   EKG 12-lead    Collection Time: 08/08/23 12:18 PM    Narrative    Test Reason : R07.9,    Vent. Rate : 080 BPM     Atrial Rate : 086 BPM     P-R Int : 000 ms          QRS Dur : 094 ms      QT Int : 452 ms       P-R-T Axes : 000 054 240 degrees     QTc Int : 521 ms     Suspect unspecified pacemaker failure  Undetermined rhythm  Septal infarct (cited on or before 08-AUG-2023)  ST and Marked T wave abnormality, consider inferior ischemia  ST and Marked T wave abnormality, consider anterolateral ischemia  Prolonged QT  Abnormal ECG  When compared with ECG of 08-AUG-2023 08:44,  Current undetermined rhythm precludes rhythm comparison, needs review  Serial changes of Septal infarct Present    Referred By: [email or web address]   SELF           Confirmed By:            ASSESSMENT/PLAN:     Active Hospital Problems    Diagnosis    *Chest pain    KENYATTA (acute kidney injury)    ACP (advance care  planning)    Essential hypertension       ASSESSMENT & PLAN:     Chest pain  Cad with hx of stent  Anxiety  Insomnia  KENYATTA on CKD- back to baseline      RECOMMENDATIONS:    Discussed with patient the recommendation for echocardiogram to evaluate for any change in EF.   Advised patient on the recommendation to complete his stress test. Patient has heightened anxiety and did not sleep well last night. He should do better with a night's sleep and some PRN anxiety medication before stress test. He will need a lexiscan.   Alternately the option for angiogram was discussed. Risks and benefits described at length. Patient will proceed with stress test for now.         Michelle Duncan NP  Date of Service: 08/09/2023  9:28 AM

## 2023-08-09 NOTE — PLAN OF CARE
Problem: Adult Inpatient Plan of Care  Goal: Plan of Care Review  Outcome: Ongoing, Progressing  Goal: Absence of Hospital-Acquired Illness or Injury  Outcome: Ongoing, Progressing  Goal: Readiness for Transition of Care  Outcome: Ongoing, Progressing     Problem: Fluid and Electrolyte Imbalance (Acute Kidney Injury/Impairment)  Goal: Fluid and Electrolyte Balance  Outcome: Ongoing, Progressing     Problem: Renal Function Impairment (Acute Kidney Injury/Impairment)  Goal: Effective Renal Function  Outcome: Ongoing, Progressing     Problem: Fall Injury Risk  Goal: Absence of Fall and Fall-Related Injury  Outcome: Ongoing, Progressing

## 2023-08-09 NOTE — PLAN OF CARE
Hospital follow up scheduled with PCP for 8/16/23 @ 3:30. Added to AVS.     08/09/23 2923   Post-Acute Status   Hospital Resources/Appts/Education Provided Appointments scheduled and added to AVS

## 2023-08-09 NOTE — PLAN OF CARE
Pt was explained HERRERA. Pt verbalized understanding of HERRERA and signed. HERRERA scanned to .       08/09/23 1120   HERRERA Message   Medicare Outpatient and Observation Notification regarding financial responsibility Given to patient/caregiver;Explained to patient/caregiver;Signed/date by patient/caregiver   Date HERRERA was signed 08/09/23   Time HERRERA was signed 1120

## 2023-08-09 NOTE — ASSESSMENT & PLAN NOTE
Chronic, controlled.  Latest blood pressure and vitals reviewed-     Temp:  [98.1 °F (36.7 °C)-98.7 °F (37.1 °C)]   Pulse:  [67-85]   Resp:  [14-19]   BP: (155-180)/(74-81)   SpO2:  [96 %-100 %] .   Home meds for hypertension were reviewed and noted below-  Hypertension Medications             amLODIPine (NORVASC) 5 MG tablet Take 1 tablet (5 mg total) by mouth 2 (two) times daily.    carvediloL (COREG) 25 MG tablet Take 25 mg by mouth.    isosorbide mononitrate (IMDUR) 30 MG 24 hr tablet Take 30 mg by mouth once daily.          While in the hospital, will manage blood pressure as follows; Continue home antihypertensive regimen    Will utilize p.r.n. blood pressure medication only if patient's blood pressure greater than 180/110 and he develops symptoms such as worsening chest pain or shortness of breath.

## 2023-08-10 ENCOUNTER — CLINICAL SUPPORT (OUTPATIENT)
Dept: CARDIOLOGY | Facility: HOSPITAL | Age: 78
DRG: 313 | End: 2023-08-10
Attending: HOSPITALIST
Payer: MEDICARE

## 2023-08-10 VITALS
RESPIRATION RATE: 18 BRPM | OXYGEN SATURATION: 96 % | HEIGHT: 68 IN | WEIGHT: 138 LBS | TEMPERATURE: 99 F | HEART RATE: 91 BPM | SYSTOLIC BLOOD PRESSURE: 140 MMHG | BODY MASS INDEX: 20.92 KG/M2 | DIASTOLIC BLOOD PRESSURE: 85 MMHG

## 2023-08-10 PROBLEM — N28.9 ACUTE RENAL INSUFFICIENCY: Status: ACTIVE | Noted: 2023-08-08

## 2023-08-10 LAB
ANION GAP SERPL CALC-SCNC: 7 MMOL/L (ref 8–16)
BUN SERPL-MCNC: 14 MG/DL (ref 8–23)
CALCIUM SERPL-MCNC: 8.2 MG/DL (ref 8.7–10.5)
CHLORIDE SERPL-SCNC: 113 MMOL/L (ref 95–110)
CO2 SERPL-SCNC: 18 MMOL/L (ref 23–29)
CREAT SERPL-MCNC: 1.2 MG/DL (ref 0.5–1.4)
CV PHARM DOSE: 0.4 MG
CV STRESS BASE HR: 110 BPM
DIASTOLIC BLOOD PRESSURE: 67 MMHG
ERYTHROCYTE [DISTWIDTH] IN BLOOD BY AUTOMATED COUNT: 19.7 % (ref 11.5–14.5)
EST. GFR  (NO RACE VARIABLE): >60 ML/MIN/1.73 M^2
GLUCOSE SERPL-MCNC: 103 MG/DL (ref 70–110)
HCT VFR BLD AUTO: 40.3 % (ref 40–54)
HGB BLD-MCNC: 13.2 G/DL (ref 14–18)
MAGNESIUM SERPL-MCNC: 1.9 MG/DL (ref 1.6–2.6)
MCH RBC QN AUTO: 28.8 PG (ref 27–31)
MCHC RBC AUTO-ENTMCNC: 32.8 G/DL (ref 32–36)
MCV RBC AUTO: 88 FL (ref 82–98)
OHS CV CPX 1 MINUTE RECOVERY HEART RATE: 108 BPM
OHS CV CPX 85 PERCENT MAX PREDICTED HEART RATE MALE: 123
OHS CV CPX MAX PREDICTED HEART RATE: 145
OHS CV CPX PATIENT IS FEMALE: 0
OHS CV CPX PATIENT IS MALE: 1
OHS CV CPX PEAK DIASTOLIC BLOOD PRESSURE: 73 MMHG
OHS CV CPX PEAK HEAR RATE: 115 BPM
OHS CV CPX PEAK RATE PRESSURE PRODUCT: NORMAL
OHS CV CPX PEAK SYSTOLIC BLOOD PRESSURE: 196 MMHG
OHS CV CPX PERCENT MAX PREDICTED HEART RATE ACHIEVED: 79
OHS CV CPX RATE PRESSURE PRODUCT PRESENTING: NORMAL
PLATELET # BLD AUTO: 179 K/UL (ref 150–450)
PMV BLD AUTO: 11.7 FL (ref 9.2–12.9)
POTASSIUM SERPL-SCNC: 3.5 MMOL/L (ref 3.5–5.1)
RBC # BLD AUTO: 4.59 M/UL (ref 4.6–6.2)
SODIUM SERPL-SCNC: 138 MMOL/L (ref 136–145)
SYSTOLIC BLOOD PRESSURE: 181 MMHG
WBC # BLD AUTO: 7.04 K/UL (ref 3.9–12.7)

## 2023-08-10 PROCEDURE — 93017 CV STRESS TEST TRACING ONLY: CPT

## 2023-08-10 PROCEDURE — 25000003 PHARM REV CODE 250: Performed by: HOSPITALIST

## 2023-08-10 PROCEDURE — 80048 BASIC METABOLIC PNL TOTAL CA: CPT | Performed by: STUDENT IN AN ORGANIZED HEALTH CARE EDUCATION/TRAINING PROGRAM

## 2023-08-10 PROCEDURE — 36415 COLL VENOUS BLD VENIPUNCTURE: CPT | Performed by: INTERNAL MEDICINE

## 2023-08-10 PROCEDURE — 83735 ASSAY OF MAGNESIUM: CPT | Performed by: INTERNAL MEDICINE

## 2023-08-10 PROCEDURE — 99233 PR SUBSEQUENT HOSPITAL CARE,LEVL III: ICD-10-PCS | Mod: ,,, | Performed by: INTERNAL MEDICINE

## 2023-08-10 PROCEDURE — 93016 CV STRESS TEST SUPVJ ONLY: CPT | Mod: ,,, | Performed by: GENERAL PRACTICE

## 2023-08-10 PROCEDURE — 93018 NUCLEAR STRESS TEST (CUPID ONLY): ICD-10-PCS | Mod: ,,, | Performed by: GENERAL PRACTICE

## 2023-08-10 PROCEDURE — 99233 SBSQ HOSP IP/OBS HIGH 50: CPT | Mod: ,,, | Performed by: INTERNAL MEDICINE

## 2023-08-10 PROCEDURE — 85027 COMPLETE CBC AUTOMATED: CPT | Performed by: STUDENT IN AN ORGANIZED HEALTH CARE EDUCATION/TRAINING PROGRAM

## 2023-08-10 PROCEDURE — 63600175 PHARM REV CODE 636 W HCPCS: Performed by: HOSPITALIST

## 2023-08-10 PROCEDURE — 36415 COLL VENOUS BLD VENIPUNCTURE: CPT | Performed by: STUDENT IN AN ORGANIZED HEALTH CARE EDUCATION/TRAINING PROGRAM

## 2023-08-10 PROCEDURE — 93018 CV STRESS TEST I&R ONLY: CPT | Mod: ,,, | Performed by: GENERAL PRACTICE

## 2023-08-10 PROCEDURE — 93016 NUCLEAR STRESS TEST (CUPID ONLY): ICD-10-PCS | Mod: ,,, | Performed by: GENERAL PRACTICE

## 2023-08-10 RX ORDER — ZOLPIDEM TARTRATE 5 MG/1
10 TABLET ORAL NIGHTLY PRN
Status: DISCONTINUED | OUTPATIENT
Start: 2023-08-10 | End: 2023-08-10 | Stop reason: HOSPADM

## 2023-08-10 RX ORDER — LOSARTAN POTASSIUM 25 MG/1
25 TABLET ORAL DAILY
Qty: 30 TABLET | Refills: 2 | Status: ON HOLD | OUTPATIENT
Start: 2023-08-11 | End: 2023-10-23 | Stop reason: SDUPTHER

## 2023-08-10 RX ORDER — REGADENOSON 0.08 MG/ML
0.4 INJECTION, SOLUTION INTRAVENOUS
Status: COMPLETED | OUTPATIENT
Start: 2023-08-10 | End: 2023-08-10

## 2023-08-10 RX ORDER — ZOLPIDEM TARTRATE 10 MG/1
10 TABLET ORAL NIGHTLY PRN
Qty: 30 TABLET | Refills: 0 | Status: SHIPPED | OUTPATIENT
Start: 2023-08-10 | End: 2023-09-07

## 2023-08-10 RX ORDER — ISOSORBIDE MONONITRATE 30 MG/1
30 TABLET, EXTENDED RELEASE ORAL DAILY
Qty: 30 TABLET | Refills: 2 | Status: ON HOLD | OUTPATIENT
Start: 2023-08-10 | End: 2023-10-23 | Stop reason: SDUPTHER

## 2023-08-10 RX ORDER — REGADENOSON 0.08 MG/ML
0.4 INJECTION, SOLUTION INTRAVENOUS ONCE
Status: DISCONTINUED | OUTPATIENT
Start: 2023-08-10 | End: 2023-08-11 | Stop reason: HOSPADM

## 2023-08-10 RX ADMIN — CLOPIDOGREL BISULFATE 75 MG: 75 TABLET, FILM COATED ORAL at 10:08

## 2023-08-10 RX ADMIN — FLUOCINONIDE: 0.5 CREAM TOPICAL at 09:08

## 2023-08-10 RX ADMIN — ESCITALOPRAM 10 MG: 5 TABLET, FILM COATED ORAL at 10:08

## 2023-08-10 RX ADMIN — ASPIRIN 81 MG: 81 TABLET, COATED ORAL at 10:08

## 2023-08-10 RX ADMIN — LOSARTAN POTASSIUM 25 MG: 25 TABLET, FILM COATED ORAL at 10:08

## 2023-08-10 RX ADMIN — REGADENOSON 0.4 MG: 0.08 INJECTION, SOLUTION INTRAVENOUS at 12:08

## 2023-08-10 RX ADMIN — AMLODIPINE BESYLATE 5 MG: 5 TABLET ORAL at 10:08

## 2023-08-10 RX ADMIN — MUPIROCIN 1 G: 20 OINTMENT TOPICAL at 10:08

## 2023-08-10 RX ADMIN — ISOSORBIDE MONONITRATE 30 MG: 30 TABLET, EXTENDED RELEASE ORAL at 10:08

## 2023-08-10 NOTE — PLAN OF CARE
Patient cleared for discharge from case management standpoint.    Chart and discharge orders reviewed.  Patient discharged home with no further case management needs.     08/10/23 1803   Final Note   Assessment Type Final Discharge Note   Anticipated Discharge Disposition Home   Post-Acute Status   Discharge Delays None known at this time

## 2023-08-10 NOTE — PROGRESS NOTES
[facility]  Department of Cardiology  Progress Note      PATIENT NAME: Rajendra Castle    MRN: [id number]  TODAY'S DATE: 08/10/2023  ADMIT DATE: 8/8/2023                          CONSULT REQUESTED BY: Lobito Andrea MD    SUBJECTIVE     PRINCIPAL PROBLEM: Chest pain    8/10/23:    Patient this afternoon in stress lab. He continues to complain of insomnia. No chest pain reported.     REASON FOR CONSULT:  From H&P: Rajendra Castle is a 77 y.o.  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 The patient presented to [facility] on 8/8/2023 with a primary complaint of Shortness of Breath and Chest Pain (  X2 DAYS)  He describes the chest pain as intermittent shocking pain which started in the middle of his back and gradually radiated to both sides of his chest .  He rated this pain 8/10 which has no aggravating or relieving factors, associated chest tightness and mild SOB.  He states that shortness of breath is worse on exertion but denies any pedal edema.  No associated nausea, vomiting, orthopnea, or abdominal pain.  Patient has a history of CAD with 1 stent placement and his cardiologist is Dr. Lamb who he states he has not followed up with in months.  He does not smoke cigarettes or drink alcohol but states that he uses marijuana with last use 1 week ago.  Patient presented to his insurance wellness clinic today on account of these symptoms and was referred to the ED for further evaluation.  On presentation to the ED, creatinine elevated at 1.5,  (compared to 2135 six months ago) was elevated at 47.3.  EKG done showed ST and Marked T wave  abnormality, Prolonged QT.  Review of echo done 01/21/2023 showed  The left ventricle is normal in size with mild concentric hypertrophy and normal systolic function.  The estimated ejection fraction is 65%.  Normal left ventricular diastolic function.  Normal right ventricular size with normal right ventricular systolic function.  Moderate mitral regurgitation.  Mild-to-moderate aortic regurgitation.  Small circumferential pericardial effusion. Effusion is fluid. No Temponade.      HPI:    The high patient is a 77-year-old male who presented to the emergency room with complaints of intermittent chest discomfort and progressively worsening shortness of breath over the past 2-5 days.  Patient has a history of CAD with stent placed in the past.  Reports feeling very anxious and states that he suffers with insomnia and did not sleep well last night.  He was ordered to have a stress test today but was unable to complete the stress test due to heightened anxiety and drowsiness from not sleeping well last night.        Review of patient's allergies indicates:  No Known Allergies    Past Medical History:   Diagnosis Date    Anticoagulant long-term use     Arthritis     Coronary artery disease     Dr. Lamb    DDD (degenerative disc disease), cervical     Degenerative disc disease     Heart murmur     History of radiation therapy 2020    Hypertension     Nontoxic multinodular goiter 09/20/2016    Prostate CA     RA (rheumatoid arthritis)     Sleep apnea     No CPAP    Vitamin D insufficiency 09/30/2016     Past Surgical History:   Procedure Laterality Date    CARPAL TUNNEL RELEASE Right 05/28/2021    Procedure: RELEASE, CARPAL TUNNEL;  Surgeon: Jose Ryan II, MD;  Location: [facility];  Service: Orthopedics;  Laterality: Right;    COLONOSCOPY  prior to 2016    normal findings per patient report    CORONARY ANGIOPLASTY WITH STENT PLACEMENT  02/2022    CYSTOSCOPY N/A 12/18/2018    Procedure: CYSTOSCOPY;  Surgeon: Garrett  SUMAN Pina MD;  Location: [facility] OR;  Service: Urology;  Laterality: N/A;    CYSTOSCOPY N/A 2022    Procedure: CYSTOSCOPY;  Surgeon: Garrett Pina MD;  Location: [facility] OR;  Service: Urology;  Laterality: N/A;    ESOPHAGOGASTRODUODENOSCOPY N/A 2020    Dr. Espinosa; empiric dilation; erythematous mucosa in antrum; gastric mucosal atrophy; hematin in entire stomach; biopsy: mid & distal esophagus WNL, stomach- WNL, negative for h pylori    EXCISION OF LESION OF PENIS N/A 2023    Procedure: EXCISION, LESION, PENIS;  Surgeon: Timo Myers MD;  Location: [facility] OR;  Service: Urology;  Laterality: N/A;    INSERTION OF TUNNELED CENTRAL VENOUS CATHETER (CVC) WITH SUBCUTANEOUS PORT Left 2023    Procedure: [id number]-CATH;  Surgeon: Atul Faria MD;  Location: [facility];  Service: General;  Laterality: Left;  left subclavian    PARATHYROIDECTOMY Right 10/27/2020    Procedure: PARATHYROIDECTOMY;  Surgeon: Latonia Clayton MD;  Location: [address];  Service: ENT;  Laterality: Right;    RELEASE OF ULNAR NERVE AT CUBITAL TUNNEL Right 2021    Procedure: RELEASE, ULNAR TUNNEL;  Surgeon: Jose Ryan II, MD;  Location: [facility];  Service: Orthopedics;  Laterality: Right;    TRANSRECTAL BIOPSY OF PROSTATE WITH ULTRASOUND GUIDANCE N/A 2018    Procedure: BIOPSY, PROSTATE, RECTAL APPROACH, WITH US GUIDANCE;  Surgeon: Garrett Pina MD;  Location: [facility];  Service: Urology;  Laterality: N/A;    TRANSRECTAL ULTRASOUND EXAMINATION N/A 2022    Procedure: ULTRASOUND, RECTAL APPROACH;  Surgeon: Garrett Pina MD;  Location: [facility];  Service: Urology;  Laterality: N/A;     Social History     Tobacco Use    Smoking status: Former     Current packs/day: 0.00     Average packs/day: 0.3 packs/day for 10.0 years (2.5 ttl pk-yrs)     Types: Cigarettes     Start date: 1991     Quit date: 2001     Years since quittin.0     Passive exposure: Past    Smokeless tobacco:  Never   Substance Use Topics    Alcohol use: Not Currently     Comment: seldom    Drug use: Not Currently     Frequency: 8.0 times per week     Types: Marijuana        REVIEW OF SYSTEMS  Per HPI    OBJECTIVE     VITAL SIGNS (Most Recent)  Temp: 97.9 °F (36.6 °C) (08/10/23 0745)  Pulse: 67 (08/10/23 0745)  Resp: 18 (08/10/23 0745)  BP: (!) 167/69 (08/10/23 0745)  SpO2: 98 % (08/10/23 0745)    VENTILATION STATUS  Resp: 18 (08/10/23 0745)  SpO2: 98 % (08/10/23 0745)           I & O (Last 24H):  Intake/Output Summary (Last 24 hours) at 8/10/2023 0958  Last data filed at 8/9/2023 1713  Gross per 24 hour   Intake 480 ml   Output 200 ml   Net 280 ml         WEIGHTS  Wt Readings from Last 1 Encounters:   08/08/23 2015 62.6 kg (138 lb)   08/08/23 0842 77.1 kg (170 lb)       PHYSICAL EXAM  CONSTITUTIONAL: No fever, no chills  HEENT: Normocephalic, atraumatic,pupils reactive to light                 NECK:  No JVD no carotid bruit  CVS: S1S2+, RRR  LUNGS: Clear  ABDOMEN: Soft, NT, BS+  EXTREMITIES: No cyanosis, edema  : No jackson catheter  NEURO: AAO X 3  PSY: Normal affect      HOME MEDICATIONS:  Current Facility-Administered Medications on File Prior to Encounter   Medication Dose Route Frequency Provider Last Rate Last Admin    albuterol nebulizer solution 2.5 mg  2.5 mg Nebulization Q15 Min PRN Pastor Saleh MD        albuterol-ipratropium 2.5 mg-0.5 mg/3 mL nebulizer solution 3 mL  3 mL Nebulization PRN Pastor Saleh MD        denosumab (PROLIA) injection 60 mg  60 mg Subcutaneous 1 time in Clinic/HOD Jean Carlos Hoffman MD        diphenhydrAMINE injection 25 mg  25 mg Intravenous Q6H PRN Pastor Saleh MD        HYDROmorphone injection 0.5 mg  0.5 mg Intravenous Q5 Min PRN Pastor Saleh MD   0.5 mg at 02/23/23 1312    lactated ringers infusion   Intravenous Continuous Volpi-Abadie, Swati, MD   Stopped at 02/23/23 1400    ondansetron injection 4 mg  4 mg Intravenous Q15 Min PRN Pastor Saleh MD         sodium chloride 0.9% flush 10 mL  10 mL Intravenous PRN Ayush Guzman MD   10 mL at 06/01/23 1535    sodium chloride 0.9% flush 10 mL  10 mL Intravenous PRN Ayush Guzman MD   10 mL at 06/05/23 1315    sodium chloride 0.9% flush 3 mL  3 mL Intravenous PRN Pastor Saleh MD         Current Outpatient Medications on File Prior to Encounter   Medication Sig Dispense Refill    amLODIPine (NORVASC) 5 MG tablet Take 1 tablet (5 mg total) by mouth 2 (two) times daily. 60 tablet 3    b complex vitamins capsule Take 1 capsule by mouth once daily.      busPIRone (BUSPAR) 10 MG tablet Take 1 tablet (10 mg total) by mouth 3 (three) times daily. 90 tablet 3    gabapentin (NEURONTIN) 300 MG capsule Take 300 mg by mouth 3 (three) times daily.      hydrOXYchloroQUINE (PLAQUENIL) 200 mg tablet Take 2 tablets (400 mg total) by mouth once daily. 60 tablet 6    oxybutynin (DITROPAN-XL) 5 MG TR24 Take 5 mg by mouth once daily.      oxyCODONE (ROXICODONE) 10 mg Tab immediate release tablet Take 1 tablet (10 mg total) by mouth every 4 (four) hours as needed for Pain. 30 tablet 0    pantoprazole (PROTONIX) 40 MG tablet TAKE 1 TABLET(40 MG) BY MOUTH TWICE DAILY (Patient taking differently: Take 40 mg by mouth once daily.) 180 tablet 0    predniSONE (DELTASONE) 5 MG tablet Take 2 tablets (10 mg total) by mouth 2 (two) times daily. 120 tablet 3    temazepam (RESTORIL) 30 mg capsule Take 1 capsule (30 mg total) by mouth nightly as needed for Insomnia. 30 capsule 1    atorvastatin (LIPITOR) 20 MG tablet Take 20 mg by mouth once daily.      betamethasone dipropionate 0.05 % cream Apply topically 2 (two) times daily. 45 g 1    betamethasone valerate 0.1% (VALISONE) 0.1 % Crea Apply topically 2 (two) times daily. 45 g 0    carvediloL (COREG) 25 MG tablet Take 25 mg by mouth.      clopidogreL (PLAVIX) 75 mg tablet Take 1 tablet (75 mg total) by mouth once daily. Pt not sure of dose (Patient not taking: Reported on 8/8/2023)       cyproheptadine (PERIACTIN) 4 mg tablet Take 1 tablet (4 mg total) by mouth 4 (four) times daily. 120 tablet 2    EScitalopram oxalate (LEXAPRO) 10 MG tablet Take 10 mg by mouth once daily.      isosorbide mononitrate (IMDUR) 30 MG 24 hr tablet Take 30 mg by mouth once daily.      LIDOcaine HCL 2% (XYLOCAINE) 2 % jelly Apply to affected area (tip of penis) daily prn 30 mL 1    methotrexate 2.5 MG Tab Take 6 tablets (15 mg total) by mouth every 7 days. TAKE 6 TABLETS BY MOUTH EVERY WEEK  STOP UNTIL INSTRUCTED BY MD 72 tablet 2    naloxone (NARCAN) 4 mg/actuation Spry 4mg by nasal route as needed for opioid overdose; may repeat every 2-3 minutes in alternating nostrils until medical help arrives. Call 911 1 each 11    ondansetron (ZOFRAN-ODT) 4 MG TbDL Take 1 tablet (4 mg total) by mouth every 6 (six) hours as needed (nausea). 60 tablet 6    phenazopyridine (PYRIDIUM) 200 MG tablet Take 1 tablet (200 mg total) by mouth 3 (three) times daily as needed for Pain. 90 tablet 3    tamsulosin (FLOMAX) 0.4 mg Cap Take 1 capsule (0.4 mg total) by mouth once daily. 30 capsule 3    traMADoL (ULTRAM) 50 mg tablet Take 2 tablets (100 mg total) by mouth 2 (two) times daily as needed for Pain. 120 tablet 5    [DISCONTINUED] cholecalciferol, vitamin D3, (VITAMIN D3) 100 mcg (4,000 unit) Cap Take 400 Units by mouth once daily.      [DISCONTINUED] cloNIDine (CATAPRES) 0.1 MG tablet Take 1 tablet (0.1 mg total) by mouth 2 (two) times daily as needed (only if blood pressure top number is over 200). (Patient not taking: Reported on 6/7/2022) 30 tablet 1    [DISCONTINUED] meclizine (ANTIVERT) 12.5 mg tablet Take 1 tablet (12.5 mg total) by mouth 2 (two) times daily as needed for Dizziness. 30 tablet 0    [DISCONTINUED] sildenafiL (VIAGRA) 100 MG tablet Take 1 tablet (100 mg total) by mouth daily as needed for Erectile Dysfunction. 10 tablet 2       SCHEDULED MEDS:   amLODIPine  5 mg Oral BID    aspirin  81 mg Oral Daily    atorvastatin  20  "mg Oral Daily    clonazePAM  0.5 mg Oral QHS    clopidogreL  75 mg Oral Daily    enoxparin  40 mg Subcutaneous Daily    EScitalopram oxalate  10 mg Oral Daily    fluocinonide 0.05%   Topical (Top) BID    isosorbide mononitrate  30 mg Oral Daily    losartan  25 mg Oral Daily    mupirocin   Nasal BID    pantoprazole  40 mg Oral Before breakfast       CONTINUOUS INFUSIONS:    PRN MEDS:acetaminophen, LIDOcaine HCl 2%, morphine, nitroGLYCERIN, ondansetron, sodium chloride 0.9%, zolpidem    LABS AND DIAGNOSTICS     CBC LAST 3 DAYS  Recent Labs   Lab 08/08/23  0900 08/09/23  0347 08/10/23  0415   WBC 6.77 6.42 7.04   RBC 4.78 4.39* 4.59*   HGB 13.7* 12.7* 13.2*   HCT 43.1 38.5* 40.3   MCV 90 88 88   MCH 28.7 28.9 28.8   MCHC 31.8* 33.0 32.8   RDW 20.7* 19.9* 19.7*    159 179   MPV 11.1 11.5 11.7   GRAN 79.4*  5.4  --   --    LYMPH 9.2*  0.6*  --   --    MONO 9.3  0.6  --   --    BASO 0.02  --   --    NRBC 0  --   --          COAGULATION LAST 3 DAYS  No results for input(s): "LABPT", "INR", "APTT" in the last 168 hours.    CHEMISTRY LAST 3 DAYS  Recent Labs   Lab 08/08/23  0900 08/09/23  0347 08/10/23  0415    138 138   K 4.1 3.8 3.5   * 111* 113*   CO2 17* 18* 18*   ANIONGAP 8 9 7*   BUN 21 19 14   CREATININE 1.5* 1.2 1.2   GLU 99 89 103   CALCIUM 8.5* 8.0* 8.2*   MG 1.8 2.0 1.9   ALBUMIN 3.3*  --   --    PROT 6.8  --   --    ALKPHOS 43*  --   --    ALT 21  --   --    AST 21  --   --    BILITOT 0.9  --   --          CARDIAC PROFILE LAST 3 DAYS  Recent Labs   Lab 08/08/23  0900 08/08/23  1456 08/09/23  0044   *  --   --    TROPONINIHS 47.3* 48.3* 58.1*         ENDOCRINE LAST 3 DAYS  No results for input(s): "TSH", "PROCAL" in the last 168 hours.    LAST ARTERIAL BLOOD GAS  ABG  No results for input(s): "PH", "PO2", "PCO2", "HCO3", "BE" in the last 168 hours.    LAST 7 DAYS MICROBIOLOGY   Microbiology Results (last 7 days)       ** No results found for the last 168 hours. **            MOST " RECENT IMAGING  NM Myocardial Perfusion Spect Multi Pharmacologic  Myocardial profusion study, resting only    CLINICAL DATA: Chest pain    Radiopharmaceutical: 10.8 mCi technetium 99m Myoview at rest    FINDINGS: Stress/rest myocardial profusion scintigraphy was ordered. While the resting portion of the examination was obtained, the patient discontinued the study prior to the stress portion. As such, the exam is nondiagnostic and assessment for possible myocardial ischemia.    Resting images demonstrate abnormally diminished tracer accumulation at the inferior wall which could reflect a region of previous infarction or diaphragmatic attenuation. Ventricular chamber size is within normal limits.    IMPRESSION:  1. Exam is nondiagnostic in the assessment of possible myocardial ischemia, as patient discontinued the study prior to stress imaging.  2. Abnormally diminished tracer activity at the inferior wall may reflect diaphragmatic attenuation or previous inferior wall infarct.    Electronically signed by:  Christopher Fuchs MD  8/9/2023 11:53 AM CDT Workstation: [id number]      ECHOCARDIOGRAM RESULTS (last 5)  Results for orders placed during the hospital encounter of 01/20/23    Echo    Interpretation Summary  · The left ventricle is normal in size with mild concentric hypertrophy and normal systolic function.  · The estimated ejection fraction is 65%.  · Normal left ventricular diastolic function.  · Normal right ventricular size with normal right ventricular systolic function.  · Moderate mitral regurgitation.  · Mild-to-moderate aortic regurgitation.  · Small circumferential pericardial effusion. Effusion is fluid. No Temponade.      Results for orders placed during the hospital encounter of 06/20/22    Echo    Interpretation Summary  · The left ventricle is normal in size with mild concentric hypertrophy and normal systolic function.  · The estimated ejection fraction is 65%.  · Normal left ventricular  diastolic function.  · Normal right ventricular size with normal right ventricular systolic function.  · The estimated PA systolic pressure is 27 mmHg.  · Normal central venous pressure (3 mmHg).      Results for orders placed during the hospital encounter of 05/22/22    STRESS TEST REPORT      Results for orders placed during the hospital encounter of 03/07/22    Echo    Interpretation Summary  · The left ventricle is normal in size with moderate concentric hypertrophy and normal systolic function.  · The estimated PA systolic pressure is 28 mmHg.  · Indeterminate left ventricular diastolic function.  · Normal right ventricular size with mildly reduced right ventricular systolic function.  · Normal central venous pressure (3 mmHg).  · The estimated ejection fraction is 70%.  · Moderate left atrial enlargement.  · Mild mitral regurgitation.  · Mild tricuspid regurgitation.  · Mild-to-moderate aortic regurgitation.  · Trivial circumferential pericardial effusion. Effusion is fibrinous.  · Mild right atrial enlargement.      Results for orders placed during the hospital encounter of 09/28/20    Echo Color Flow Doppler? Yes    Interpretation Summary  · The left ventricle is normal in size with normal systolic function. The estimated ejection fraction is 65%.  · There is moderate left ventricular concentric hypertrophy.  · Normal left ventricular diastolic function.  · Normal right ventricular systolic function.  · Moderate aortic regurgitation.  · Moderate mitral regurgitation.  · No pulmonary hypertension  · Mild tricuspid regurgitation.  · Normal central venous pressure (3 mmHg).  · The estimated PA systolic pressure is 29 mmHg.  · Trivial posterior pericardial effusion. Effusion is fluid.    Left ventricle hypertrophy  Moderate aortic regurgitation mitral regurgitant  Mean left atrial pressure is normal  No pulmonary hypertension      CURRENT/PREVIOUS VISIT EKG  Results for orders placed or performed during the  hospital encounter of 08/08/23   EKG 12-lead    Collection Time: 08/08/23 12:18 PM    Narrative    Test Reason : R07.9,    Vent. Rate : 080 BPM     Atrial Rate : 086 BPM     P-R Int : 000 ms          QRS Dur : 094 ms      QT Int : 452 ms       P-R-T Axes : 000 054 240 degrees     QTc Int : 521 ms     Suspect unspecified pacemaker failure  Undetermined rhythm  Septal infarct (cited on or before 08-AUG-2023)  ST and Marked T wave abnormality, consider inferior ischemia  ST and Marked T wave abnormality, consider anterolateral ischemia  Prolonged QT  Abnormal ECG  When compared with ECG of 08-AUG-2023 08:44,  Current undetermined rhythm precludes rhythm comparison, needs review  Serial changes of Septal infarct Present    Referred By: [email or web address]   SELF           Confirmed By:            ASSESSMENT/PLAN:     Active Hospital Problems    Diagnosis    *Chest pain    KENYATTA (acute kidney injury)    ACP (advance care planning)    Essential hypertension       ASSESSMENT & PLAN:     Chest pain  Cad with hx of stent  Anxiety  Insomnia  KENYATTA on CKD- back to baseline      RECOMMENDATIONS:    Stress test negative for reversible ischemia. Continue with medical treatment.   Recommend follow up with cardiology outpatient.   Will sign off for now.       Michelle Duncan NP  Date of Service: 08/10/2023  9:28 AM

## 2023-08-10 NOTE — NURSING
Notified Dr. Dilcia MD about patient having 2 runs V-Tach Both 10 beats each @10:24 went and checked on patient denied any distress was asleep. Dr. Sabra MD was notified. And was monitored throughout night.  Patient is scheduled for a stress test this morning and is now refusing to take stress test. Because of test refusal Per Dr. Ha Magnesium blood level has been ordered STAT.

## 2023-08-10 NOTE — NURSING
"Patient  had 2 runs V-Tach Both 10 beats each @10:24 went and checked on patient. He was asleep woke patient up he denied s/s of distress  On call Dr. Montaño was notified via secure message. Dr seen and responded "okay, thank you" confirming message. No new orders at this time will continue to monitor patient.   Both V-tach scripts printed and placed in chart.  "

## 2023-08-11 ENCOUNTER — PATIENT OUTREACH (OUTPATIENT)
Dept: ADMINISTRATIVE | Facility: CLINIC | Age: 78
End: 2023-08-11
Payer: MEDICARE

## 2023-08-11 NOTE — HOSPITAL COURSE
He was assessed by cardiologist.  Patient underwent pharmacologic Myoview stress test.  It was negative for stress-induced ischemia.  He had no further chest pain.  The cardiologist recommended adding losartan to help with BP control.  He was discharged home in good condition.    Physical Exam  Vitals and nursing note reviewed.   Eyes:      Pupils: Pupils are equal, round, and reactive to light.   Neck:      Vascular: No JVD.   Cardiovascular:      Rate and Rhythm: Normal rate and regular rhythm.      Heart sounds: Normal heart sounds.   Pulmonary:      Effort: Pulmonary effort is normal.      Breath sounds: Normal breath sounds.

## 2023-08-11 NOTE — DISCHARGE SUMMARY
[facility]  Discharge Summary      Patient Name: Rajenrda Castle  MRN: [id number]  CHARLES: [id number]  Patient Class: IP- Inpatient  Admission Date: 8/8/2023  Hospital Length of Stay: 1 days  Discharge Date and Time: 8/10/2023  6:50 PM  Attending Physician: No att. providers found   Discharging Provider: Lobito Andrea MD  Primary Care Provider: Sp Lilly MD    Primary Care Team: Networked reference to record PCT     HPI:   Rajendra Castle is a 77 y.o.  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 The patient presented to [facility] on 8/8/2023 with a primary complaint of Shortness of Breath and Chest Pain (  X2 DAYS)  He describes the chest pain as intermittent shocking pain which started in the middle of his back and gradually radiated to both sides of his chest .  He rated this pain 8/10 which has no aggravating or relieving factors, associated chest tightness and mild SOB.  He states that shortness of breath is worse on exertion but denies any pedal edema.  No associated nausea, vomiting, orthopnea, or abdominal pain.  Patient has a history of CAD with 1 stent placement and his cardiologist is Dr. Lamb who he states he has not followed up with in months.  He does not smoke cigarettes or drink alcohol but states that he uses marijuana with last use 1 week ago.  Patient presented to his insurance wellness clinic today on account of these symptoms and was referred to the ED for further evaluation.  On presentation to the ED, creatinine elevated at 1.5,  (compared to 2135 six months ago) was elevated at 47.3.   EKG done showed ST and Marked T wave abnormality, Prolonged QT.  Review of echo done 01/21/2023 showed  The left ventricle is normal in size with mild concentric hypertrophy and normal systolic function.   The estimated ejection fraction is 65%.   Normal left ventricular diastolic function.   Normal right ventricular size with normal right ventricular systolic function.   Moderate mitral regurgitation.   Mild-to-moderate aortic regurgitation.   Small circumferential pericardial effusion. Effusion is fluid. No Temponade.         * No surgery found *      Hospital Course:   He was assessed by cardiologist.  Patient underwent pharmacologic Myoview stress test.  It was negative for stress-induced ischemia.  He had no further chest pain.  The cardiologist recommended adding losartan to help with BP control.  He was discharged home in good condition.    Physical Exam  Vitals and nursing note reviewed.   Eyes:      Pupils: Pupils are equal, round, and reactive to light.   Neck:      Vascular: No JVD.   Cardiovascular:      Rate and Rhythm: Normal rate and regular rhythm.      Heart sounds: Normal heart sounds.   Pulmonary:      Effort: Pulmonary effort is normal.      Breath sounds: Normal breath sounds.        Goals of Care Treatment Preferences:  Code Status: Full Code      Consults:   Consults (From admission, onward)        Status Ordering Provider     IP consult to case management  Once        Provider:  (Not yet assigned)    Completed MARCELLA MATTHEWS     Inpatient consult to Cardiology  Once        Provider:  Sp Phillips MD    Completed MARCELLA MATTHEWS            Final Active Diagnoses:    Diagnosis Date Noted POA    PRINCIPAL PROBLEM:  Chest pain [R07.9] 09/28/2020 Yes    Acute renal insufficiency [N28.9] 08/08/2023 Yes    ACP (advance care planning) [Z71.89] 12/03/2021 Not Applicable    Essential hypertension [I10] 08/18/2016 Yes      Problems Resolved During this Admission:       Discharged  Condition: good    Disposition: Home or Self Care    Follow Up:   Follow-up Information     Sp Lilly MD. Go on 8/16/2023.    Specialty: Family Medicine  Why: hospital follow up at 3:30    this appt is with the NP  Contact information:  [id number] [place] [id number]  [phone]                       Patient Instructions:      Diet Adult Regular     Activity as tolerated       Significant Diagnostic Studies: No significant findings    Pending Diagnostic Studies:     None         Medications:  Reconciled Home Medications:      Medication List      START taking these medications    losartan 25 MG tablet  Commonly known as: COZAAR  Take 1 tablet (25 mg total) by mouth once daily.  Start taking on: August 11, 2023     zolpidem 10 mg Tab  Commonly known as: AMBIEN  Take 1 tablet (10 mg total) by mouth nightly as needed.  Replaces: temazepam 30 mg capsule        CHANGE how you take these medications    pantoprazole 40 MG tablet  Commonly known as: PROTONIX  TAKE 1 TABLET(40 MG) BY MOUTH TWICE DAILY  What changed: when to take this        CONTINUE taking these medications    amLODIPine 5 MG tablet  Commonly known as: NORVASC  Take 1 tablet (5 mg total) by mouth 2 (two) times daily.     atorvastatin 20 MG tablet  Commonly known as: LIPITOR  Take 20 mg by mouth once daily.     b complex vitamins capsule  Take 1 capsule by mouth once daily.     betamethasone valerate 0.1% 0.1 % Crea  Commonly known as: VALISONE  Apply topically 2 (two) times daily.     busPIRone 10 MG tablet  Commonly known as: BUSPAR  Take 1 tablet (10 mg total) by mouth 3 (three) times daily.     cyproheptadine 4 mg tablet  Commonly known as: PERIACTIN  Take 1 tablet (4 mg total) by mouth 4 (four) times daily.     EScitalopram oxalate 10 MG tablet  Commonly known as: LEXAPRO  Take 10 mg by mouth once daily.     gabapentin 300 MG capsule  Commonly known as: NEURONTIN  Take 300 mg by mouth 3 (three) times daily.     hydrOXYchloroQUINE 200 mg  tablet  Commonly known as: PLAQUENIL  Take 2 tablets (400 mg total) by mouth once daily.     isosorbide mononitrate 30 MG 24 hr tablet  Commonly known as: IMDUR  Take 1 tablet (30 mg total) by mouth once daily.     LIDOcaine HCL 2% 2 % jelly  Commonly known as: XYLOCAINE  Apply to affected area (tip of penis) daily prn     methotrexate 2.5 MG Tab  Take 6 tablets (15 mg total) by mouth every 7 days. TAKE 6 TABLETS BY MOUTH EVERY WEEK  STOP UNTIL INSTRUCTED BY MD     naloxone 4 mg/actuation Spry  Commonly known as: NARCAN  4mg by nasal route as needed for opioid overdose; may repeat every 2-3 minutes in alternating nostrils until medical help arrives. Call 911     ondansetron 4 MG Tbdl  Commonly known as: ZOFRAN-ODT  Take 1 tablet (4 mg total) by mouth every 6 (six) hours as needed (nausea).     oxybutynin 5 MG Tr24  Commonly known as: DITROPAN-XL  Take 5 mg by mouth once daily.     oxyCODONE 10 mg Tab immediate release tablet  Commonly known as: ROXICODONE  Take 1 tablet (10 mg total) by mouth every 4 (four) hours as needed for Pain.     phenazopyridine 200 MG tablet  Commonly known as: PYRIDIUM  Take 1 tablet (200 mg total) by mouth 3 (three) times daily as needed for Pain.     predniSONE 5 MG tablet  Commonly known as: DELTASONE  Take 2 tablets (10 mg total) by mouth 2 (two) times daily.        STOP taking these medications    betamethasone dipropionate 0.05 % cream     carvediloL 25 MG tablet  Commonly known as: COREG     clopidogreL 75 mg tablet  Commonly known as: PLAVIX     tamsulosin 0.4 mg Cap  Commonly known as: FLOMAX     temazepam 30 mg capsule  Commonly known as: RESTORIL  Replaced by: zolpidem 10 mg Tab     traMADoL 50 mg tablet  Commonly known as: ULTRAM            Indwelling Lines/Drains at time of discharge:   Lines/Drains/Airways     None                 Time spent on the discharge of patient: 17 minutes         Lobito Andrea MD  Department of Hospital Medicine  [facility]

## 2023-08-14 ENCOUNTER — TELEPHONE (OUTPATIENT)
Dept: ENDOCRINOLOGY | Facility: CLINIC | Age: 78
End: 2023-08-14
Payer: MEDICARE

## 2023-08-14 DIAGNOSIS — M81.8 OTHER OSTEOPOROSIS WITHOUT CURRENT PATHOLOGICAL FRACTURE: Primary | ICD-10-CM

## 2023-08-14 NOTE — TELEPHONE ENCOUNTER
Called and spoke with pt regarding calcium level being low and not able to get his Prolia injection.  Patient will repeat lab on Thursday @ 10:00.  If level is good, will send to schedule Prolia injection appt.  Patient verbalized understanding.    ----- Message from Lisbeth Lim LPN sent at 8/14/2023  8:10 AM CDT -----    ----- Message -----  From: Tim Lopez  Sent: 8/11/2023   1:13 PM CDT  To: Lisbeth Lim LPN; Jeffrey Jiménez Staff      Please confirm if patient had been called to let him know that HENNY Murphy is holding his Prolia treatment for Monday 8/14, per Lisbeth's message on the secure chat this morning.     Please reply once patient is notified so that can then take his appointment off the schedule for Monday.     Thank you,  Tim Saavedra

## 2023-08-16 ENCOUNTER — OFFICE VISIT (OUTPATIENT)
Dept: FAMILY MEDICINE | Facility: CLINIC | Age: 78
End: 2023-08-16
Payer: MEDICARE

## 2023-08-16 ENCOUNTER — OFFICE VISIT (OUTPATIENT)
Dept: PSYCHIATRY | Facility: CLINIC | Age: 78
End: 2023-08-16
Payer: COMMERCIAL

## 2023-08-16 ENCOUNTER — TELEPHONE (OUTPATIENT)
Dept: SURGERY | Facility: HOSPITAL | Age: 78
End: 2023-08-16
Payer: MEDICARE

## 2023-08-16 VITALS
WEIGHT: 130.5 LBS | HEART RATE: 86 BPM | SYSTOLIC BLOOD PRESSURE: 136 MMHG | TEMPERATURE: 98 F | HEIGHT: 68 IN | OXYGEN SATURATION: 98 % | BODY MASS INDEX: 19.78 KG/M2 | DIASTOLIC BLOOD PRESSURE: 68 MMHG

## 2023-08-16 DIAGNOSIS — G47.00 INSOMNIA, UNSPECIFIED TYPE: ICD-10-CM

## 2023-08-16 DIAGNOSIS — F32.A ANXIETY AND DEPRESSION: Primary | ICD-10-CM

## 2023-08-16 DIAGNOSIS — C68.0 URETHRAL CANCER: ICD-10-CM

## 2023-08-16 DIAGNOSIS — R30.0 DYSURIA: ICD-10-CM

## 2023-08-16 DIAGNOSIS — F41.9 ANXIETY AND DEPRESSION: Primary | ICD-10-CM

## 2023-08-16 DIAGNOSIS — R63.0 ANOREXIA: ICD-10-CM

## 2023-08-16 DIAGNOSIS — R63.4 WEIGHT LOSS: ICD-10-CM

## 2023-08-16 DIAGNOSIS — F41.9 ANXIETY DISORDER, UNSPECIFIED TYPE: Primary | ICD-10-CM

## 2023-08-16 DIAGNOSIS — F41.1 GAD (GENERALIZED ANXIETY DISORDER): ICD-10-CM

## 2023-08-16 PROCEDURE — 1111F PR DISCHARGE MEDS RECONCILED W/ CURRENT OUTPATIENT MED LIST: ICD-10-PCS | Mod: CPTII,S$GLB,, | Performed by: NURSE PRACTITIONER

## 2023-08-16 PROCEDURE — 99214 PR OFFICE/OUTPT VISIT, EST, LEVL IV, 30-39 MIN: ICD-10-PCS | Mod: S$GLB,,, | Performed by: NURSE PRACTITIONER

## 2023-08-16 PROCEDURE — 3075F SYST BP GE 130 - 139MM HG: CPT | Mod: CPTII,S$GLB,, | Performed by: NURSE PRACTITIONER

## 2023-08-16 PROCEDURE — 3288F PR FALLS RISK ASSESSMENT DOCUMENTED: ICD-10-PCS | Mod: CPTII,S$GLB,, | Performed by: NURSE PRACTITIONER

## 2023-08-16 PROCEDURE — 1159F PR MEDICATION LIST DOCUMENTED IN MEDICAL RECORD: ICD-10-PCS | Mod: CPTII,S$GLB,, | Performed by: NURSE PRACTITIONER

## 2023-08-16 PROCEDURE — 99999 PR PBB SHADOW E&M-EST. PATIENT-LVL I: CPT | Mod: PBBFAC,,,

## 2023-08-16 PROCEDURE — 1126F AMNT PAIN NOTED NONE PRSNT: CPT | Mod: CPTII,S$GLB,, | Performed by: NURSE PRACTITIONER

## 2023-08-16 PROCEDURE — 90791 PR PSYCHIATRIC DIAGNOSTIC EVALUATION: ICD-10-PCS | Mod: S$GLB,,,

## 2023-08-16 PROCEDURE — 1126F PR PAIN SEVERITY QUANTIFIED, NO PAIN PRESENT: ICD-10-PCS | Mod: CPTII,S$GLB,, | Performed by: NURSE PRACTITIONER

## 2023-08-16 PROCEDURE — 3075F PR MOST RECENT SYSTOLIC BLOOD PRESS GE 130-139MM HG: ICD-10-PCS | Mod: CPTII,S$GLB,, | Performed by: NURSE PRACTITIONER

## 2023-08-16 PROCEDURE — 3288F FALL RISK ASSESSMENT DOCD: CPT | Mod: CPTII,S$GLB,, | Performed by: NURSE PRACTITIONER

## 2023-08-16 PROCEDURE — 99999 PR PBB SHADOW E&M-EST. PATIENT-LVL III: CPT | Mod: PBBFAC,,, | Performed by: NURSE PRACTITIONER

## 2023-08-16 PROCEDURE — 99999 PR PBB SHADOW E&M-EST. PATIENT-LVL I: ICD-10-PCS | Mod: PBBFAC,,,

## 2023-08-16 PROCEDURE — 1101F PR PT FALLS ASSESS DOC 0-1 FALLS W/OUT INJ PAST YR: ICD-10-PCS | Mod: CPTII,S$GLB,, | Performed by: NURSE PRACTITIONER

## 2023-08-16 PROCEDURE — 1111F DSCHRG MED/CURRENT MED MERGE: CPT | Mod: CPTII,S$GLB,, | Performed by: NURSE PRACTITIONER

## 2023-08-16 PROCEDURE — 3078F PR MOST RECENT DIASTOLIC BLOOD PRESSURE < 80 MM HG: ICD-10-PCS | Mod: CPTII,S$GLB,, | Performed by: NURSE PRACTITIONER

## 2023-08-16 PROCEDURE — 1159F MED LIST DOCD IN RCRD: CPT | Mod: CPTII,S$GLB,, | Performed by: NURSE PRACTITIONER

## 2023-08-16 PROCEDURE — 3078F DIAST BP <80 MM HG: CPT | Mod: CPTII,S$GLB,, | Performed by: NURSE PRACTITIONER

## 2023-08-16 PROCEDURE — 1101F PT FALLS ASSESS-DOCD LE1/YR: CPT | Mod: CPTII,S$GLB,, | Performed by: NURSE PRACTITIONER

## 2023-08-16 PROCEDURE — 90791 PSYCH DIAGNOSTIC EVALUATION: CPT | Mod: S$GLB,,,

## 2023-08-16 PROCEDURE — 99214 OFFICE O/P EST MOD 30 MIN: CPT | Mod: S$GLB,,, | Performed by: NURSE PRACTITIONER

## 2023-08-16 PROCEDURE — 99999 PR PBB SHADOW E&M-EST. PATIENT-LVL III: ICD-10-PCS | Mod: PBBFAC,,, | Performed by: NURSE PRACTITIONER

## 2023-08-16 RX ORDER — CYPROHEPTADINE HYDROCHLORIDE 4 MG/1
4 TABLET ORAL 4 TIMES DAILY
Qty: 120 TABLET | Refills: 2 | Status: ON HOLD | OUTPATIENT
Start: 2023-08-16 | End: 2023-10-23 | Stop reason: SDUPTHER

## 2023-08-16 RX ORDER — PHENAZOPYRIDINE HYDROCHLORIDE 200 MG/1
200 TABLET, FILM COATED ORAL 3 TIMES DAILY PRN
Qty: 30 TABLET | Refills: 0 | Status: SHIPPED | OUTPATIENT
Start: 2023-08-16

## 2023-08-16 RX ORDER — ESCITALOPRAM OXALATE 10 MG/1
10 TABLET ORAL DAILY
Qty: 90 TABLET | Refills: 3 | Status: ON HOLD | OUTPATIENT
Start: 2023-08-16 | End: 2023-10-23 | Stop reason: SDUPTHER

## 2023-08-16 RX ORDER — PANTOPRAZOLE SODIUM 40 MG/1
40 TABLET, DELAYED RELEASE ORAL DAILY
Qty: 90 TABLET | Refills: 1 | Status: ON HOLD | OUTPATIENT
Start: 2023-08-16 | End: 2023-10-23 | Stop reason: SDUPTHER

## 2023-08-16 RX ORDER — BUSPIRONE HYDROCHLORIDE 10 MG/1
10 TABLET ORAL 3 TIMES DAILY
Qty: 90 TABLET | Refills: 3 | Status: ON HOLD | OUTPATIENT
Start: 2023-08-16 | End: 2023-10-23 | Stop reason: SDUPTHER

## 2023-08-16 NOTE — PROGRESS NOTES
PSYCHO-ONCOLOGY INTAKE    Diagnostic Interview - CPT 45341    Date: 8/16/2023  Site: [facility]    Evaluation Length (direct face-to-face time):  1 hour    This includes face to face time and non-face to face time preparing to see the patient, obtaining and/or reviewing separately obtained history, documenting clinical information in the electronic or other health record, independently interpreting results and communicating results to the patient/family/caregiver, or care coordinator.     Referral Source: Erika Blanco FNP-C   Oncologist: Arlen Mahan MD   PCP: Sp Lilly MD    Clinical status of patient: Outpatient    Rajendra Castle, a 77 y.o. male, seen for initial evaluation visit.    Rajendra Castle reviewed and agreed to informed consent and the limits of confidentiality.    Chief complaint/reason for encounter: adjustment to illness, anxiety and sleep    Medical/Surgical History:    Patient Active Problem List   Diagnosis    Heart murmur    Arthritis    Rotator cuff tendinitis    Dyspnea on exertion    DDD (degenerative disc disease), cervical    Anemia, iron deficiency    Chronic GI bleeding    Arthritis of wrist, right    Trigger thumb of left hand    Arthritis of right wrist    Essential hypertension    Rheumatoid arthritis involving multiple sites    Elevated troponin    Nausea and vomiting    Nontoxic multinodular goiter    Gastroesophageal reflux disease without esophagitis    Nodular thyroid disease    Prediabetes    Osteoporosis    Hypogonadism in male    Vitamin D insufficiency    Hyperparathyroidism    Pulmonary emphysema    Rheumatoid lung    Lung nodule    Stage 3 chronic kidney disease, unspecified whether stage 3a or 3b CKD    Prostate cancer    Chest pain    Dysphagia    Severe malnutrition    Tachycardia    S/P parathyroidectomy    Decreased appetite    Right carpal tunnel syndrome    Cubital tunnel syndrome on right    ACP (advance care planning)    COVID    Atherosclerosis of  native coronary artery of native heart without angina pectoris    Unstable angina    HTN (hypertension)    COVID-19 long hauler    Mobitz I    Major depressive disorder, single episode, severe without psychotic features    Atherosclerosis of native coronary artery of native heart with angina pectoris with documented spasm    Drug-induced immunodeficiency    Penile lesion    Urethral cancer    Other insomnia    Decreased strength, endurance, and mobility    Anxiety and depression    Acute renal insufficiency       Health Behaviors:       ETOH Use: No       Tobacco Use: No; quit 40 years ago   Illicit Drug Use:  Yes; marijuana use     Prescription Misuse:No   Caffeine: minimal; coke every once in a while; no caffeine after 2pm   Exercise:The patient engages in little, if any physical activity. The patient engages in environmental activity only.   Firearms:  No   Advanced directives: No     Family History:   Psychiatric illness: No     Alcohol/Drug Abuse: yes; uncle () alcoholism     Suicide: No      Past Psychiatric History:   Inpatient treatment: No     Outpatient treatment: No     Prior substance abuse treatment: No     Suicide Attempts: No     Psychotropic Medications:  Current: Buspar, Lexapro , and Ambien       Past: none    Current medications as per below, allergies reviewed in chart.    Current Outpatient Medications   Medication    amLODIPine (NORVASC) 5 MG tablet    atorvastatin (LIPITOR) 20 MG tablet    b complex vitamins capsule    betamethasone valerate 0.1% (VALISONE) 0.1 % Crea    busPIRone (BUSPAR) 10 MG tablet    cyproheptadine (PERIACTIN) 4 mg tablet    EScitalopram oxalate (LEXAPRO) 10 MG tablet    isosorbide mononitrate (IMDUR) 30 MG 24 hr tablet    losartan (COZAAR) 25 MG tablet    oxybutynin (DITROPAN-XL) 5 MG TR24    pantoprazole (PROTONIX) 40 MG tablet    phenazopyridine (PYRIDIUM) 200 MG tablet    predniSONE (DELTASONE) 5 MG tablet    zolpidem (AMBIEN) 10 mg Tab     No current  facility-administered medications for this visit.     Facility-Administered Medications Ordered in Other Visits   Medication Frequency    albuterol nebulizer solution 2.5 mg Q15 Min PRN    albuterol-ipratropium 2.5 mg-0.5 mg/3 mL nebulizer solution 3 mL PRN    denosumab (PROLIA) injection 60 mg 1 time in Clinic/HOD    diphenhydrAMINE injection 25 mg Q6H PRN    HYDROmorphone injection 0.5 mg Q5 Min PRN    lactated ringers infusion Continuous    ondansetron injection 4 mg Q15 Min PRN    sodium chloride 0.9% flush 10 mL PRN    sodium chloride 0.9% flush 10 mL PRN    sodium chloride 0.9% flush 3 mL PRN              Social situation/Stressors: Rajendra Castle lives alone in [place], [place].  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 Rajendra Castle is single and has 2 children and 2 grandchildren and multiple great-grandchildren.  The patient reports limited social support.  Rajendra Castle is  SDA .  Rajendra Castle's hobbies include none at this time.  Additional stressors:     Strengths:Housing stability, Able to vocalize needs, Cultural/spiritual/Mandaeism and community involvement, and Financial stability  Liabilities: Medication non-compliance, No interests, and Lack of social supports    Current Evaluation:     Mental Status Exam: Rajendra Castle arrived promptly for the assessment session.  The patient was fully cooperative throughout the interview and was an adequate historian   Appearance: age appropriate, appropriately  dressed, adequately  groomed  Behavior/Cooperation: friendly and cooperative  Speech: normal in rate, volume, and tone  Mood: anxious  Affect: anxious  Thought Process: goal-directed, logical  Thought Content: normal, no suicidality, no homicidality, delusions, or paranoia;did not appear to be responding to internal stimuli during the interview.   Orientation: grossly intact  Memory: grossly intact  Attention Span/Concentration: Attends to interview without distraction; reports no  difficulty  Fund of Knowledge: average  Estimate of Intelligence: average from verbal skills and history  Cognition: grossly intact  Insight: patient has awareness of illness; good insight into own behavior and behavior of others  Judgment: the patient's behavior is adequate to circumstances      History of present illness:    Oncology History   Urethral cancer   4/6/2023 Initial Diagnosis    Urethral cancer     4/25/2023 Cancer Staged    Staging form: Male Penile Urethra and Female Urethra, AJCC 8th Edition  - Clinical: Stage III (cT3, cN0, cM0)     5/29/2023 -  Chemotherapy    Treatment Summary   Plan Name: OP BLADDER GEMCITABINE 27MG/M2 TWICE WEEKLY WITH RADIATION  Treatment Goal: Curative  Status: Active  Start Date: 5/29/2023  End Date: 7/3/2023 (Planned)  Provider: Ayush Guzman MD  Chemotherapy: gemcitabine (GEMZAR) 47 mg in sodium chloride 0.9% SolP 116.2 mL chemo infusion, 27 mg/m2 = 47 mg, Intravenous, Clinic/HOD 1 time, 1 of 1 cycle  Administration: 47 mg (5/29/2023), 45 mg (6/1/2023), 45 mg (6/5/2023), 45 mg (6/9/2023), 47 mg (6/12/2023), 47 mg (6/15/2023)           Rajendra Castle has adjusted to illness with difficulty primarily through passive coping strategies, avoidance, and prayer. He has engaged in appropriate information gathering.  The patient has inadequate family/friend support.  His support system is coping adequately with the diagnosis/treatment/prognosis. Illness-related psychosocial stressors include changes in ability to engage in leisure activities and limitations on sexual interactions.  The patient has a unsatisfactory partnership with his [facility] treatment team. The patient reports the following barriers to cancer care: treatment providers.     NCCN Distress thermometer:   DISTRESS SCREENING 6/15/2023 6/1/2023 5/9/2023 4/25/2023 2/20/2019   Distress Score 6 4 7 8 6   Practical Problems Treatment Decisions None of these None of these Transportation -   Family Problems  None of these Dealing with Children;Dealing with Partner;Family Health Issues Dealing with Children;Dealing with Partner;Family Health Issues - -   Emotional Problems None of these Depression;Loss of Interest;Fears;Nervousness;Sadness;Worry Loss of Interest;Depression;Fears;Nervousness;Sadness;Worry - -   Spiritual / Mandaeism Concerns No Yes Yes - No   Physical Problems None of these Pain;Fatigue;Skin Dry/Itchy;Sleep Breathing;Changes in urination;Fatigue;Pain;Sexual;Skin Dry/Itchy;Sleep Pain -   Other Problems - - - - pt stated he is being treated for depression and anxiety.        Symptoms:   Mood: depressed mood and social isolation;  no prior; no SI/HI;   Anxiety: Feeling nervous, anxious, or on edge, Difficulty relaxing, and Fear of unknown; anxiety throughout adulthood;    Substance abuse: denied  Cognitive functioning: denied  Health behaviors: noncontributory  Sexual/Intimacy: notes improvement  Sleep: reports 4-5 hours of sleep per night. Sleep is non-restorative.  Pain: minimal discomfort.      Assessment - Diagnosis - Goals:       ICD-10-CM ICD-9-CM   1. Anxiety disorder, unspecified type  F41.9 300.00   2. Urethral cancer  C68.0 189.3   3. Insomnia, unspecified type  G47.00 780.52         Plan:individual psychotherapy; follow up with NP to discuss medication adherence    Summary and Recommendations  Rajendra Castle is a 77 y.o. male referred by Erika Blanco FNP-C for psychological evaluation and treatment.  Mr. Castle appears to be coping great difficulty with his proposed treatment course. Mr. Castle has finished his last round of radiation and decided today to opt out of upcoming circumcision procedure. He notes distrust of some of his treatment team which makes it difficult to adhere to treatment suggestions. Mr. Castle discussed ongoing sleep problems including difficulty falling and remaining asleep. He notes medication has not been as helpful as he would like.  He is interested in CBT to address  depression/anxiety/insomnia and will follow up with me for that purpose. He is to complete sleep log and bring it to next appointment.     Return to clinic: as scheduled    GOALS:   Increase medication adherence  Improve sleep quality and quantity  Decrease anxiety and develop coping skills to help manage symptoms              Deb Brooks Psy.D  Clinical Health Psychology Fellow

## 2023-08-16 NOTE — TELEPHONE ENCOUNTER
Patient has canceled procedure, he stated he is no longer having any issue's. Patient stated he will reschedule for a later time.

## 2023-08-16 NOTE — PATIENT INSTRUCTIONS
Ortiz Drew,     If you are due for any health screening(s) below please notify me so we can arrange them to be ordered and scheduled to maintain your health. Most healthy patients complete it. Don't lose out on improving your health.     All of your core healthy metrics are met.         Colon Cancer Screening    Of cancers affecting both men and women, colorectal cancer is the third leading cancer killer in the [place]. But it doesnt have to be. Screening can prevent colorectal cancer or find it at an early stage when treatment often leads to a cure.    A colonoscopy is the preferred test for detecting colon cancer. It is needed only once every 10 years if results are negative. While sedated, a flexible, lighted tube with a tiny camera is inserted into the rectum and advanced through the colon to look for cancers. An alternative screening test that is used at home and returned to the lab may also be used. It detects hidden blood in bowel movements which could indicate cancer in the colon. If results are positive, you will need a colonoscopy to determine if the blood is a sign of cancer. This type of follow up (diagnostic) colonoscopy usually requires additional copays as required by your insurance provider. Please contact your PCP if you have any questions.

## 2023-08-17 NOTE — PROGRESS NOTES
This dictation has been generated using Modal Fluency Dictation some phonetic errors may occur. Please contact author for clarification if needed.     Problem List Items Addressed This Visit       Anxiety and depression - Primary     Other Visit Diagnoses       DEVANTE (generalized anxiety disorder)        Relevant Medications    busPIRone (BUSPAR) 10 MG tablet    Dysuria        Relevant Medications    phenazopyridine (PYRIDIUM) 200 MG tablet    Weight loss        Anorexia        Relevant Medications    cyproheptadine (PERIACTIN) 4 mg tablet            Orders Placed This Encounter    EScitalopram oxalate (LEXAPRO) 10 MG tablet    busPIRone (BUSPAR) 10 MG tablet    phenazopyridine (PYRIDIUM) 200 MG tablet    cyproheptadine (PERIACTIN) 4 mg tablet    pantoprazole (PROTONIX) 40 MG tablet     General anxiety disorder continue buspirone.  Refill Lexapro.  Dysuria and urethral cancer continue Pyridium   Weight loss and anorexia refill Periactin   Reviewed medications with patient and reason for meds.  Provided patient with a med list.  Three week follow-up to discuss med and symptoms.  GERD refill Protonix once a day.  May consider increasing the twice a day.  Follow up in about 3 weeks (around 9/6/2023).    ________________________________________________________________  ________________________________________________________________      Chief Complaint   Patient presents with    Hospital Follow Up     History of present illness  This 77 y.o. presents today for complaint of following up on anxiety depression general anxiety disorder dysuria weight loss anorexia.  Patient is a little confused on his medications and reason for taking his meds.  Did receive a message from Psychiatry about reviewing his meds.  Patient notes some reflux issues and change of appetite.  Has not been taking his Protonix.  He notes anxiety he did require review of his meds.  Here Main somewhat unsure of the medications dosage and reason for taking  med.  He does note difficulty with his appetite.  He still has dysuria.  Limited review of systems negative    Past Medical History:   Diagnosis Date    Anticoagulant long-term use     Arthritis     Coronary artery disease     Dr. Lamb    DDD (degenerative disc disease), cervical     Degenerative disc disease     Heart murmur     History of radiation therapy 2020    Hypertension     Nontoxic multinodular goiter 09/20/2016    Prostate CA     RA (rheumatoid arthritis)     Sleep apnea     No CPAP    Vitamin D insufficiency 09/30/2016       Past Surgical History:   Procedure Laterality Date    CARPAL TUNNEL RELEASE Right 05/28/2021    Procedure: RELEASE, CARPAL TUNNEL;  Surgeon: Jose Ryan II, MD;  Location: [facility];  Service: Orthopedics;  Laterality: Right;    COLONOSCOPY  prior to 2016    normal findings per patient report    CORONARY ANGIOPLASTY WITH STENT PLACEMENT  02/2022    CYSTOSCOPY N/A 12/18/2018    Procedure: CYSTOSCOPY;  Surgeon: Garrett Pina MD;  Location: [facility] OR;  Service: Urology;  Laterality: N/A;    CYSTOSCOPY N/A 07/12/2022    Procedure: CYSTOSCOPY;  Surgeon: Garrett Pina MD;  Location: [facility] OR;  Service: Urology;  Laterality: N/A;    ESOPHAGOGASTRODUODENOSCOPY N/A 09/29/2020    Dr. Espinosa; empiric dilation; erythematous mucosa in antrum; gastric mucosal atrophy; hematin in entire stomach; biopsy: mid & distal esophagus WNL, stomach- WNL, negative for h pylori    EXCISION OF LESION OF PENIS N/A 2/23/2023    Procedure: EXCISION, LESION, PENIS;  Surgeon: Timo Myers MD;  Location: [facility];  Service: Urology;  Laterality: N/A;    INSERTION OF TUNNELED CENTRAL VENOUS CATHETER (CVC) WITH SUBCUTANEOUS PORT Left 5/18/2023    Procedure: [id number];  Surgeon: Atul Faria MD;  Location: [facility];  Service: General;  Laterality: Left;  left subclavian    PARATHYROIDECTOMY Right 10/27/2020    Procedure: PARATHYROIDECTOMY;  Surgeon: Latonia Clayton MD;  Location:  [facility] OR 2ND FLR;  Service: ENT;  Laterality: Right;    RELEASE OF ULNAR NERVE AT CUBITAL TUNNEL Right 2021    Procedure: RELEASE, ULNAR TUNNEL;  Surgeon: Jose Ryan II, MD;  Location: [facility] OR;  Service: Orthopedics;  Laterality: Right;    TRANSRECTAL BIOPSY OF PROSTATE WITH ULTRASOUND GUIDANCE N/A 2018    Procedure: BIOPSY, PROSTATE, RECTAL APPROACH, WITH US GUIDANCE;  Surgeon: Garrett Pina MD;  Location: [facility] OR;  Service: Urology;  Laterality: N/A;    TRANSRECTAL ULTRASOUND EXAMINATION N/A 2022    Procedure: ULTRASOUND, RECTAL APPROACH;  Surgeon: Garrett Pina MD;  Location: [facility] OR;  Service: Urology;  Laterality: N/A;       Family History   Problem Relation Age of Onset    Heart disease Mother     Stroke Father     Drug abuse Sister     No Known Problems Daughter     No Known Problems Daughter     No Known Problems Son     Diabetes Maternal Uncle     Colon cancer Neg Hx     Crohn's disease Neg Hx     Esophageal cancer Neg Hx     Stomach cancer Neg Hx     Ulcerative colitis Neg Hx        Social History     Socioeconomic History    Marital status: Single   Tobacco Use    Smoking status: Former     Current packs/day: 0.00     Average packs/day: 0.3 packs/day for 10.0 years (2.5 ttl pk-yrs)     Types: Cigarettes     Start date: 1991     Quit date: 2001     Years since quittin.0     Passive exposure: Past    Smokeless tobacco: Never   Substance and Sexual Activity    Alcohol use: Not Currently     Comment: seldom    Drug use: Not Currently     Frequency: 8.0 times per week     Types: Marijuana    Sexual activity: Yes     Partners: Female     Social Determinants of Health     Financial Resource Strain: Low Risk  (2022)    Overall Financial Resource Strain (CARDIA)     Difficulty of Paying Living Expenses: Not hard at all   Food Insecurity: No Food Insecurity (2022)    Hunger Vital Sign     Worried About Running Out of Food in the Last Year: Never true     Ran  Out of Food in the Last Year: Never true   Transportation Needs: No Transportation Needs (4/25/2022)    PRAPARE - Transportation     Lack of Transportation (Medical): No     Lack of Transportation (Non-Medical): No   Physical Activity: Inactive (4/25/2022)    Exercise Vital Sign     Days of Exercise per Week: 0 days     Minutes of Exercise per Session: 0 min   Stress: No Stress Concern Present (4/25/2022)    [employer] - Occupational Stress Questionnaire     Feeling of Stress : Not at all   Social Connections: Unknown (4/25/2022)    Social Connection and Isolation Panel [NHANES]     Frequency of Communication with Friends and Family: More than three times a week     Frequency of Social Gatherings with Friends and Family: More than three times a week     Attends Hindu Services: Never     Active Member of Clubs or Organizations: No     Attends Club or Organization Meetings: Never   Housing Stability: Low Risk  (4/25/2022)    Housing Stability Vital Sign     Unable to Pay for Housing in the Last Year: No     Number of Places Lived in the Last Year: 1     Unstable Housing in the Last Year: No       Current Outpatient Medications   Medication Sig Dispense Refill    amLODIPine (NORVASC) 5 MG tablet Take 1 tablet (5 mg total) by mouth 2 (two) times daily. 60 tablet 3    atorvastatin (LIPITOR) 20 MG tablet Take 20 mg by mouth once daily.      b complex vitamins capsule Take 1 capsule by mouth once daily.      betamethasone valerate 0.1% (VALISONE) 0.1 % Crea Apply topically 2 (two) times daily. 45 g 0    isosorbide mononitrate (IMDUR) 30 MG 24 hr tablet Take 1 tablet (30 mg total) by mouth once daily. 30 tablet 2    losartan (COZAAR) 25 MG tablet Take 1 tablet (25 mg total) by mouth once daily. 30 tablet 2    oxybutynin (DITROPAN-XL) 5 MG TR24 Take 5 mg by mouth once daily.      predniSONE (DELTASONE) 5 MG tablet Take 2 tablets (10 mg total) by mouth 2 (two) times daily. 120 tablet 3     zolpidem (AMBIEN) 10 mg Tab Take 1 tablet (10 mg total) by mouth nightly as needed. 30 tablet 0    busPIRone (BUSPAR) 10 MG tablet Take 1 tablet (10 mg total) by mouth 3 (three) times daily. 90 tablet 3    cyproheptadine (PERIACTIN) 4 mg tablet Take 1 tablet (4 mg total) by mouth 4 (four) times daily. 120 tablet 2    EScitalopram oxalate (LEXAPRO) 10 MG tablet Take 1 tablet (10 mg total) by mouth once daily. 90 tablet 3    pantoprazole (PROTONIX) 40 MG tablet Take 1 tablet (40 mg total) by mouth once daily. 90 tablet 1    phenazopyridine (PYRIDIUM) 200 MG tablet Take 1 tablet (200 mg total) by mouth 3 (three) times daily as needed for Pain. 30 tablet 0     No current facility-administered medications for this visit.     Facility-Administered Medications Ordered in Other Visits   Medication Dose Route Frequency Provider Last Rate Last Admin    albuterol nebulizer solution 2.5 mg  2.5 mg Nebulization Q15 Min PRN Pastor Saleh MD        albuterol-ipratropium 2.5 mg-0.5 mg/3 mL nebulizer solution 3 mL  3 mL Nebulization PRN Pastor Saleh MD        denosumab (PROLIA) injection 60 mg  60 mg Subcutaneous 1 time in Clinic/HOD Jean Carlos Hoffman MD        diphenhydrAMINE injection 25 mg  25 mg Intravenous Q6H PRN Pastor Saleh MD        HYDROmorphone injection 0.5 mg  0.5 mg Intravenous Q5 Min PRN Pastor Saleh MD   0.5 mg at 02/23/23 1312    lactated ringers infusion   Intravenous Continuous Volpi-Abadie, Jacqueline, MD   Stopped at 02/23/23 1400    ondansetron injection 4 mg  4 mg Intravenous Q15 Min PRN Pastor Saleh MD        sodium chloride 0.9% flush 10 mL  10 mL Intravenous PRN Ayush Guzman MD   10 mL at 06/01/23 1535    sodium chloride 0.9% flush 10 mL  10 mL Intravenous PRN Ayush Guzman MD   10 mL at 06/05/23 1315    sodium chloride 0.9% flush 3 mL  3 mL Intravenous PRN Pastor Saelh MD           Review of patient's allergies indicates:  No Known Allergies    Physical  examination  Vitals Reviewed\  Vitals:    08/16/23 1545   BP: 136/68   Pulse: 86   Temp: 98 °F (36.7 °C)     Body mass index is 19.84 kg/m².   Gen. Well-dressed well-nourished   Skin warm dry and intact.  No rashes noted.  Neck is supple without adenopathy  Chest.  Respirations are even unlabored.  Lungs are clear to auscultation.  Cardiac regular rate and rhythm.  No chest wall adenopathy noted.  Neuro. Awake alert oriented x4.  Normal judgment and cognition noted.  Extremities no clubbing cyanosis or edema noted.     Call or return to clinic prn if these symptoms worsen or fail to improve as anticipated.    In excessive of 30 minutes total time spent for evaluation and management services. Time included elements of the following: time spent preparing to see patient, obtaining and reviewing separately obtained history, exam, evaluation, counseling and education of patient/family member or care giver, documenting in the HMR, independently interpreting results and communication of results, coordination of care ordering medications, tests, or procedures, referral and communication to other health care professionals.

## 2023-08-23 ENCOUNTER — PATIENT MESSAGE (OUTPATIENT)
Dept: FAMILY MEDICINE | Facility: CLINIC | Age: 78
End: 2023-08-23
Payer: MEDICARE

## 2023-08-29 ENCOUNTER — TELEPHONE (OUTPATIENT)
Dept: FAMILY MEDICINE | Facility: CLINIC | Age: 78
End: 2023-08-29
Payer: MEDICARE

## 2023-08-29 RX ORDER — ZOLPIDEM TARTRATE 10 MG/1
10 TABLET ORAL NIGHTLY PRN
Qty: 30 TABLET | Refills: 0 | OUTPATIENT
Start: 2023-08-29

## 2023-08-29 NOTE — TELEPHONE ENCOUNTER
Patient reports he was recently discharged from the hospital (performed hospital follow up appointment on 8-16-23 with NP Brendan Helms).  Patient states he was previously prescribed Temazepam and Ramelteon for insomnia. States he neither of these medications were effective. States he received an order for Ambien 10 mg upon hospital discharge. Patient reports Ambien works better than Temazepam and Ramelteon.  States he is receiving 3-4 hours of sleep at night with Ambien 10 mg.  Patient requesting to know if there is a 15 mg tablet of Ambien.  [facility]  Please advise. Thank you.

## 2023-08-29 NOTE — TELEPHONE ENCOUNTER
----- Message from Opal Estevez sent at 8/29/2023 11:02 AM CDT -----  Contact: patient  Type:  Needs Medical Advice    Who Called: patient     Would the patient rather a call back or a response via [facility]? Call     Best Call Back Number: [phone] (home)      Additional Information: Patient would like to speak with the nurse in regards to change in a medication.     Please call to advise

## 2023-08-29 NOTE — TELEPHONE ENCOUNTER
Last refill by: Lobito Andrea MD  I have not been prescribing ambien and avoid using that med.  Alternately, He may follow up with pcp.

## 2023-08-29 NOTE — TELEPHONE ENCOUNTER
Patient requesting refill of Ambien 10 mg. States only has 8 days left of current supply. Preferred pharmacy is [facility] location. Please advise. Thank you.      LR--8-10-23        LOV--8-17-23       FOV--9-7-23

## 2023-08-29 NOTE — TELEPHONE ENCOUNTER
Juanpablo Katz, Patient Care Assistant  [facility] Staff    ----- Message from Juanpablo Katz Patient Care Assistant sent at 8/29/2023 11:54 AM CDT -----  Contact: Pt  Type: Return Call    Who Called: Pt  Who Left Message for Pt: Angely  Does the patient know what this is regarding: Yes/Medication Discussion  Best Call Back Number: [phone]  Pt is calling to see if he can get zolpidem (AMBIEN) 10 mg Tab or 15 mg instead of the insomnia medication he was previously prescribed. Pt states the Ambien is the only medication that helps him sleep well. Please advise.Thank you~

## 2023-08-29 NOTE — TELEPHONE ENCOUNTER
Call placed to patient for notification. Patient states okay to refill Ambien 10 mg dosage. Refill request forwarded to NP Brendan Scooter as patient performed a recent appointment with Mr. Helms on 8-16-23.      Sp Lilly MD  You 47 minutes ago (1:38 PM)       There is no 15 mg dose. Its a scheduled substance and I have to see him in person before prescribing it due to regulations.

## 2023-08-31 NOTE — TELEPHONE ENCOUNTER
Mr. Helms unable to refill Ambien prescription.  Appointment scheduled for the date of 9-5-23 with Dr. Lilly. Patient agreed to appointment date, time, and location.

## 2023-09-07 ENCOUNTER — OFFICE VISIT (OUTPATIENT)
Dept: FAMILY MEDICINE | Facility: CLINIC | Age: 78
End: 2023-09-07
Payer: MEDICARE

## 2023-09-07 VITALS
HEART RATE: 92 BPM | OXYGEN SATURATION: 97 % | BODY MASS INDEX: 20.04 KG/M2 | HEIGHT: 68 IN | SYSTOLIC BLOOD PRESSURE: 138 MMHG | DIASTOLIC BLOOD PRESSURE: 74 MMHG | TEMPERATURE: 99 F | WEIGHT: 132.25 LBS

## 2023-09-07 DIAGNOSIS — G47.00 INSOMNIA, UNSPECIFIED TYPE: ICD-10-CM

## 2023-09-07 DIAGNOSIS — F41.9 ANXIETY AND DEPRESSION: Primary | ICD-10-CM

## 2023-09-07 DIAGNOSIS — F32.A ANXIETY AND DEPRESSION: Primary | ICD-10-CM

## 2023-09-07 PROCEDURE — 1111F PR DISCHARGE MEDS RECONCILED W/ CURRENT OUTPATIENT MED LIST: ICD-10-PCS | Mod: CPTII,S$GLB,, | Performed by: NURSE PRACTITIONER

## 2023-09-07 PROCEDURE — 1101F PT FALLS ASSESS-DOCD LE1/YR: CPT | Mod: CPTII,S$GLB,, | Performed by: NURSE PRACTITIONER

## 2023-09-07 PROCEDURE — 99999 PR PBB SHADOW E&M-EST. PATIENT-LVL III: ICD-10-PCS | Mod: PBBFAC,,, | Performed by: NURSE PRACTITIONER

## 2023-09-07 PROCEDURE — 3288F FALL RISK ASSESSMENT DOCD: CPT | Mod: CPTII,S$GLB,, | Performed by: NURSE PRACTITIONER

## 2023-09-07 PROCEDURE — 1159F MED LIST DOCD IN RCRD: CPT | Mod: CPTII,S$GLB,, | Performed by: NURSE PRACTITIONER

## 2023-09-07 PROCEDURE — 1126F AMNT PAIN NOTED NONE PRSNT: CPT | Mod: CPTII,S$GLB,, | Performed by: NURSE PRACTITIONER

## 2023-09-07 PROCEDURE — 99999 PR PBB SHADOW E&M-EST. PATIENT-LVL III: CPT | Mod: PBBFAC,,, | Performed by: NURSE PRACTITIONER

## 2023-09-07 PROCEDURE — 3288F PR FALLS RISK ASSESSMENT DOCUMENTED: ICD-10-PCS | Mod: CPTII,S$GLB,, | Performed by: NURSE PRACTITIONER

## 2023-09-07 PROCEDURE — 99213 PR OFFICE/OUTPT VISIT, EST, LEVL III, 20-29 MIN: ICD-10-PCS | Mod: S$GLB,,, | Performed by: NURSE PRACTITIONER

## 2023-09-07 PROCEDURE — 3078F PR MOST RECENT DIASTOLIC BLOOD PRESSURE < 80 MM HG: ICD-10-PCS | Mod: CPTII,S$GLB,, | Performed by: NURSE PRACTITIONER

## 2023-09-07 PROCEDURE — 1126F PR PAIN SEVERITY QUANTIFIED, NO PAIN PRESENT: ICD-10-PCS | Mod: CPTII,S$GLB,, | Performed by: NURSE PRACTITIONER

## 2023-09-07 PROCEDURE — 99213 OFFICE O/P EST LOW 20 MIN: CPT | Mod: S$GLB,,, | Performed by: NURSE PRACTITIONER

## 2023-09-07 PROCEDURE — 1101F PR PT FALLS ASSESS DOC 0-1 FALLS W/OUT INJ PAST YR: ICD-10-PCS | Mod: CPTII,S$GLB,, | Performed by: NURSE PRACTITIONER

## 2023-09-07 PROCEDURE — 3075F PR MOST RECENT SYSTOLIC BLOOD PRESS GE 130-139MM HG: ICD-10-PCS | Mod: CPTII,S$GLB,, | Performed by: NURSE PRACTITIONER

## 2023-09-07 PROCEDURE — 1111F DSCHRG MED/CURRENT MED MERGE: CPT | Mod: CPTII,S$GLB,, | Performed by: NURSE PRACTITIONER

## 2023-09-07 PROCEDURE — 3075F SYST BP GE 130 - 139MM HG: CPT | Mod: CPTII,S$GLB,, | Performed by: NURSE PRACTITIONER

## 2023-09-07 PROCEDURE — 1159F PR MEDICATION LIST DOCUMENTED IN MEDICAL RECORD: ICD-10-PCS | Mod: CPTII,S$GLB,, | Performed by: NURSE PRACTITIONER

## 2023-09-07 PROCEDURE — 3078F DIAST BP <80 MM HG: CPT | Mod: CPTII,S$GLB,, | Performed by: NURSE PRACTITIONER

## 2023-09-07 RX ORDER — ZOLPIDEM TARTRATE 10 MG/1
10 TABLET ORAL NIGHTLY PRN
Qty: 14 TABLET | Refills: 0 | Status: SHIPPED | OUTPATIENT
Start: 2023-09-07 | End: 2023-09-12 | Stop reason: SDUPTHER

## 2023-09-07 NOTE — PROGRESS NOTES
This dictation has been generated using Modal Fluency Dictation some phonetic errors may occur. Please contact author for clarification if needed.     Problem List Items Addressed This Visit       Anxiety and depression - Primary     Other Visit Diagnoses       Insomnia, unspecified type                Orders Placed This Encounter    zolpidem (AMBIEN) 10 mg Tab     Requesting refill of Ambien.  I discussed with him this is not a medication that I would endorse or can feel for long-term use.  Needs to follow-up with PCP or psychiatry.     No follow-ups on file.    ________________________________________________________________  ________________________________________________________________      Chief Complaint   Patient presents with    Follow-up     History of present illness  This 78 y.o. presents today for complaint of insomnia.  Patient has anxiety and depression.  He is had recent medical issues.  Notes prior admit to hospital and Ambien 5 mg was not helping with sleep.  On discharge the increased it to 10 mg.  I discussed with him that elderly patients 10 mg dosing is not recommended.  Patient states he has failed trazodone and Rozerem.  Has difficulty falling asleep and staying asleep.  He has had some prostate issues and notes getting up to go to the restroom.  He has difficulty falling asleep afterwards.  Has not had any difficulty with the Ambien.  He has not had any side effects.  He denies any hallucinations.    Past Medical History:   Diagnosis Date    Anticoagulant long-term use     Arthritis     Coronary artery disease     Dr. Lamb    DDD (degenerative disc disease), cervical     Degenerative disc disease     Heart murmur     History of radiation therapy 2020    Hypertension     Nontoxic multinodular goiter 09/20/2016    Prostate CA     RA (rheumatoid arthritis)     Sleep apnea     No CPAP    Vitamin D insufficiency 09/30/2016       Past Surgical History:   Procedure Laterality Date    CARPAL  TUNNEL RELEASE Right 05/28/2021    Procedure: RELEASE, CARPAL TUNNEL;  Surgeon: Jose Ryan II, MD;  Location: [facility] OR;  Service: Orthopedics;  Laterality: Right;    COLONOSCOPY  prior to 2016    normal findings per patient report    CORONARY ANGIOPLASTY WITH STENT PLACEMENT  02/2022    CYSTOSCOPY N/A 12/18/2018    Procedure: CYSTOSCOPY;  Surgeon: Garrett Pina MD;  [facility];  Service: Urology;  Laterality: N/A;    CYSTOSCOPY N/A 07/12/2022    Procedure: CYSTOSCOPY;  Surgeon: Garrett Pina MD;  Location: [facility] OR;  Service: Urology;  Laterality: N/A;    ESOPHAGOGASTRODUODENOSCOPY N/A 09/29/2020    Dr. Espinosa; empiric dilation; erythematous mucosa in antrum; gastric mucosal atrophy; hematin in entire stomach; biopsy: mid & distal esophagus WNL, stomach- WNL, negative for h pylori    EXCISION OF LESION OF PENIS N/A 2/23/2023    Procedure: EXCISION, LESION, PENIS;  Surgeon: Timo Myers MD;  Location: [facility];  Service: Urology;  Laterality: N/A;    INSERTION OF TUNNELED CENTRAL VENOUS CATHETER (CVC) WITH SUBCUTANEOUS PORT Left 5/18/2023    Procedure: [id number];  Surgeon: Atul Faria MD;  Location: [facility];  Service: General;  Laterality: Left;  left subclavian    PARATHYROIDECTOMY Right 10/27/2020    Procedure: PARATHYROIDECTOMY;  Surgeon: Latonia Clayton MD;  Location: [address];  Service: ENT;  Laterality: Right;    RELEASE OF ULNAR NERVE AT CUBITAL TUNNEL Right 05/28/2021    Procedure: RELEASE, ULNAR TUNNEL;  Surgeon: Jose Ryan II, MD;  Location: [facility] OR;  Service: Orthopedics;  Laterality: Right;    TRANSRECTAL BIOPSY OF PROSTATE WITH ULTRASOUND GUIDANCE N/A 12/18/2018    Procedure: BIOPSY, PROSTATE, RECTAL APPROACH, WITH US GUIDANCE;  Surgeon: Garrett Pina MD;  Location: [facility];  Service: Urology;  Laterality: N/A;    TRANSRECTAL ULTRASOUND EXAMINATION N/A 07/12/2022    Procedure: ULTRASOUND, RECTAL APPROACH;  Surgeon: Garrett Pina MD;   Location: [facility] OR;  Service: Urology;  Laterality: N/A;       Family History   Problem Relation Age of Onset    Heart disease Mother     Stroke Father     Drug abuse Sister     No Known Problems Daughter     No Known Problems Daughter     No Known Problems Son     Diabetes Maternal Uncle     Colon cancer Neg Hx     Crohn's disease Neg Hx     Esophageal cancer Neg Hx     Stomach cancer Neg Hx     Ulcerative colitis Neg Hx        Social History     Socioeconomic History    Marital status: Single   Tobacco Use    Smoking status: Former     Current packs/day: 0.00     Average packs/day: 0.3 packs/day for 10.0 years (2.5 ttl pk-yrs)     Types: Cigarettes     Start date: 1991     Quit date: 2001     Years since quittin.1     Passive exposure: Past    Smokeless tobacco: Never   Substance and Sexual Activity    Alcohol use: Not Currently     Comment: seldom    Drug use: Not Currently     Frequency: 8.0 times per week     Types: Marijuana    Sexual activity: Yes     Partners: Female     Social Determinants of Health     Financial Resource Strain: Low Risk  (2022)    Overall Financial Resource Strain (CARDIA)     Difficulty of Paying Living Expenses: Not hard at all   Food Insecurity: No Food Insecurity (2022)    Hunger Vital Sign     Worried About Running Out of Food in the Last Year: Never true     Ran Out of Food in the Last Year: Never true   Transportation Needs: No Transportation Needs (2022)    PRAPARE - Transportation     Lack of Transportation (Medical): No     Lack of Transportation (Non-Medical): No   Physical Activity: Inactive (2022)    Exercise Vital Sign     Days of Exercise per Week: 0 days     Minutes of Exercise per Session: 0 min   Stress: No Stress Concern Present (2022)    [employer] - Occupational Stress Questionnaire     Feeling of Stress : Not at all   Social Connections: Unknown (2022)    Social Connection and Isolation Panel  [NHANES]     Frequency of Communication with Friends and Family: More than three times a week     Frequency of Social Gatherings with Friends and Family: More than three times a week     Attends Mandaeism Services: Never     Active Member of Clubs or Organizations: No     Attends Club or Organization Meetings: Never   Housing Stability: Low Risk  (4/25/2022)    Housing Stability Vital Sign     Unable to Pay for Housing in the Last Year: No     Number of Places Lived in the Last Year: 1     Unstable Housing in the Last Year: No       Current Outpatient Medications   Medication Sig Dispense Refill    atorvastatin (LIPITOR) 20 MG tablet Take 20 mg by mouth once daily.      b complex vitamins capsule Take 1 capsule by mouth once daily.      betamethasone valerate 0.1% (VALISONE) 0.1 % Crea Apply topically 2 (two) times daily. 45 g 0    busPIRone (BUSPAR) 10 MG tablet Take 1 tablet (10 mg total) by mouth 3 (three) times daily. 90 tablet 3    cyproheptadine (PERIACTIN) 4 mg tablet Take 1 tablet (4 mg total) by mouth 4 (four) times daily. 120 tablet 2    EScitalopram oxalate (LEXAPRO) 10 MG tablet Take 1 tablet (10 mg total) by mouth once daily. 90 tablet 3    isosorbide mononitrate (IMDUR) 30 MG 24 hr tablet Take 1 tablet (30 mg total) by mouth once daily. 30 tablet 2    losartan (COZAAR) 25 MG tablet Take 1 tablet (25 mg total) by mouth once daily. 30 tablet 2    oxybutynin (DITROPAN-XL) 5 MG TR24 Take 5 mg by mouth once daily.      pantoprazole (PROTONIX) 40 MG tablet Take 1 tablet (40 mg total) by mouth once daily. 90 tablet 1    phenazopyridine (PYRIDIUM) 200 MG tablet Take 1 tablet (200 mg total) by mouth 3 (three) times daily as needed for Pain. 30 tablet 0    predniSONE (DELTASONE) 5 MG tablet Take 2 tablets (10 mg total) by mouth 2 (two) times daily. 120 tablet 3    amLODIPine (NORVASC) 5 MG tablet Take 1 tablet (5 mg total) by mouth 2 (two) times daily. 60 tablet 3    zolpidem (AMBIEN) 10 mg Tab Take 1 tablet (10  mg total) by mouth nightly as needed (insomnia). 14 tablet 0     No current facility-administered medications for this visit.     Facility-Administered Medications Ordered in Other Visits   Medication Dose Route Frequency Provider Last Rate Last Admin    albuterol nebulizer solution 2.5 mg  2.5 mg Nebulization Q15 Min PRN Pastor Saleh MD        albuterol-ipratropium 2.5 mg-0.5 mg/3 mL nebulizer solution 3 mL  3 mL Nebulization PRN Pastor Saleh MD        denosumab (PROLIA) injection 60 mg  60 mg Subcutaneous 1 time in Clinic/HOD Jean Carlos Hoffman MD        diphenhydrAMINE injection 25 mg  25 mg Intravenous Q6H PRN Pastor Saleh MD        HYDROmorphone injection 0.5 mg  0.5 mg Intravenous Q5 Min PRN Pastor Saleh MD   0.5 mg at 02/23/23 1312    lactated ringers infusion   Intravenous Continuous Volpi-Abadie, Jacqueline, MD   Stopped at 02/23/23 1400    ondansetron injection 4 mg  4 mg Intravenous Q15 Min PRN Pastor Saleh MD        sodium chloride 0.9% flush 10 mL  10 mL Intravenous PRN Ayush Guzman MD   10 mL at 06/01/23 1535    sodium chloride 0.9% flush 10 mL  10 mL Intravenous PRN Ayush Guzman MD   10 mL at 06/05/23 1315    sodium chloride 0.9% flush 3 mL  3 mL Intravenous PRN Pastor Saleh MD           Review of patient's allergies indicates:  No Known Allergies    Physical examination  Vitals Reviewed\  Vitals:    09/07/23 1436   BP: 138/74   Pulse: 92   Temp: 98.6 °F (37 °C)     Body mass index is 20.11 kg/m².   Gen. Well-dressed well-nourished   Skin warm dry and intact.  No rashes noted.  Chest.  Respirations are even unlabored.  Lungs are clear to auscultation.    Neuro. Awake alert oriented x4.  Normal judgment and cognition noted.  Extremities no clubbing cyanosis or edema noted.     Call or return to clinic prn if these symptoms worsen or fail to improve as anticipated.

## 2023-09-12 ENCOUNTER — OFFICE VISIT (OUTPATIENT)
Dept: FAMILY MEDICINE | Facility: CLINIC | Age: 78
End: 2023-09-12
Payer: MEDICARE

## 2023-09-12 VITALS
HEIGHT: 68 IN | WEIGHT: 132 LBS | BODY MASS INDEX: 20 KG/M2 | OXYGEN SATURATION: 97 % | SYSTOLIC BLOOD PRESSURE: 132 MMHG | HEART RATE: 84 BPM | DIASTOLIC BLOOD PRESSURE: 72 MMHG | RESPIRATION RATE: 16 BRPM | TEMPERATURE: 99 F

## 2023-09-12 DIAGNOSIS — G47.00 INSOMNIA, UNSPECIFIED TYPE: Primary | ICD-10-CM

## 2023-09-12 DIAGNOSIS — N40.1 BENIGN LOCALIZED PROSTATIC HYPERPLASIA WITH LOWER URINARY TRACT SYMPTOMS (LUTS): ICD-10-CM

## 2023-09-12 DIAGNOSIS — I10 HYPERTENSION, UNSPECIFIED TYPE: ICD-10-CM

## 2023-09-12 DIAGNOSIS — Z12.11 COLON CANCER SCREENING: ICD-10-CM

## 2023-09-12 DIAGNOSIS — I25.10 ATHEROSCLEROSIS OF NATIVE CORONARY ARTERY OF NATIVE HEART WITHOUT ANGINA PECTORIS: ICD-10-CM

## 2023-09-12 DIAGNOSIS — R35.0 URINARY FREQUENCY: ICD-10-CM

## 2023-09-12 DIAGNOSIS — F41.1 GAD (GENERALIZED ANXIETY DISORDER): ICD-10-CM

## 2023-09-12 LAB
BACTERIA #/AREA URNS AUTO: NORMAL /HPF
BILIRUBIN, UA POC OHS: NEGATIVE
BLOOD, UA POC OHS: NEGATIVE
CLARITY, UA POC OHS: CLEAR
COLOR, UA POC OHS: YELLOW
GLUCOSE, UA POC OHS: NEGATIVE
KETONES, UA POC OHS: NEGATIVE
LEUKOCYTES, UA POC OHS: NEGATIVE
MICROSCOPIC COMMENT: NORMAL
NITRITE, UA POC OHS: NEGATIVE
PH, UA POC OHS: 7
PROTEIN, UA POC OHS: NEGATIVE
RBC #/AREA URNS AUTO: 1 /HPF (ref 0–4)
SPECIFIC GRAVITY, UA POC OHS: 1.01
SQUAMOUS #/AREA URNS AUTO: 0 /HPF
UROBILINOGEN, UA POC OHS: 0.2
WBC #/AREA URNS AUTO: 1 /HPF (ref 0–5)

## 2023-09-12 PROCEDURE — 1159F MED LIST DOCD IN RCRD: CPT | Mod: CPTII,S$GLB,, | Performed by: STUDENT IN AN ORGANIZED HEALTH CARE EDUCATION/TRAINING PROGRAM

## 2023-09-12 PROCEDURE — 3078F DIAST BP <80 MM HG: CPT | Mod: CPTII,S$GLB,, | Performed by: STUDENT IN AN ORGANIZED HEALTH CARE EDUCATION/TRAINING PROGRAM

## 2023-09-12 PROCEDURE — 99999 PR PBB SHADOW E&M-EST. PATIENT-LVL IV: ICD-10-PCS | Mod: PBBFAC,,, | Performed by: STUDENT IN AN ORGANIZED HEALTH CARE EDUCATION/TRAINING PROGRAM

## 2023-09-12 PROCEDURE — 81001 URINALYSIS AUTO W/SCOPE: CPT | Performed by: STUDENT IN AN ORGANIZED HEALTH CARE EDUCATION/TRAINING PROGRAM

## 2023-09-12 PROCEDURE — 3288F FALL RISK ASSESSMENT DOCD: CPT | Mod: CPTII,S$GLB,, | Performed by: STUDENT IN AN ORGANIZED HEALTH CARE EDUCATION/TRAINING PROGRAM

## 2023-09-12 PROCEDURE — 81003 URINALYSIS AUTO W/O SCOPE: CPT | Mod: QW,S$GLB,, | Performed by: STUDENT IN AN ORGANIZED HEALTH CARE EDUCATION/TRAINING PROGRAM

## 2023-09-12 PROCEDURE — 81003 POCT URINALYSIS(INSTRUMENT): ICD-10-PCS | Mod: QW,S$GLB,, | Performed by: STUDENT IN AN ORGANIZED HEALTH CARE EDUCATION/TRAINING PROGRAM

## 2023-09-12 PROCEDURE — 99999 PR PBB SHADOW E&M-EST. PATIENT-LVL IV: CPT | Mod: PBBFAC,,, | Performed by: STUDENT IN AN ORGANIZED HEALTH CARE EDUCATION/TRAINING PROGRAM

## 2023-09-12 PROCEDURE — 3075F PR MOST RECENT SYSTOLIC BLOOD PRESS GE 130-139MM HG: ICD-10-PCS | Mod: CPTII,S$GLB,, | Performed by: STUDENT IN AN ORGANIZED HEALTH CARE EDUCATION/TRAINING PROGRAM

## 2023-09-12 PROCEDURE — 1159F PR MEDICATION LIST DOCUMENTED IN MEDICAL RECORD: ICD-10-PCS | Mod: CPTII,S$GLB,, | Performed by: STUDENT IN AN ORGANIZED HEALTH CARE EDUCATION/TRAINING PROGRAM

## 2023-09-12 PROCEDURE — 3075F SYST BP GE 130 - 139MM HG: CPT | Mod: CPTII,S$GLB,, | Performed by: STUDENT IN AN ORGANIZED HEALTH CARE EDUCATION/TRAINING PROGRAM

## 2023-09-12 PROCEDURE — 3288F PR FALLS RISK ASSESSMENT DOCUMENTED: ICD-10-PCS | Mod: CPTII,S$GLB,, | Performed by: STUDENT IN AN ORGANIZED HEALTH CARE EDUCATION/TRAINING PROGRAM

## 2023-09-12 PROCEDURE — 99214 PR OFFICE/OUTPT VISIT, EST, LEVL IV, 30-39 MIN: ICD-10-PCS | Mod: S$GLB,,, | Performed by: STUDENT IN AN ORGANIZED HEALTH CARE EDUCATION/TRAINING PROGRAM

## 2023-09-12 PROCEDURE — 99214 OFFICE O/P EST MOD 30 MIN: CPT | Mod: S$GLB,,, | Performed by: STUDENT IN AN ORGANIZED HEALTH CARE EDUCATION/TRAINING PROGRAM

## 2023-09-12 PROCEDURE — 3078F PR MOST RECENT DIASTOLIC BLOOD PRESSURE < 80 MM HG: ICD-10-PCS | Mod: CPTII,S$GLB,, | Performed by: STUDENT IN AN ORGANIZED HEALTH CARE EDUCATION/TRAINING PROGRAM

## 2023-09-12 PROCEDURE — 1126F AMNT PAIN NOTED NONE PRSNT: CPT | Mod: CPTII,S$GLB,, | Performed by: STUDENT IN AN ORGANIZED HEALTH CARE EDUCATION/TRAINING PROGRAM

## 2023-09-12 PROCEDURE — 1126F PR PAIN SEVERITY QUANTIFIED, NO PAIN PRESENT: ICD-10-PCS | Mod: CPTII,S$GLB,, | Performed by: STUDENT IN AN ORGANIZED HEALTH CARE EDUCATION/TRAINING PROGRAM

## 2023-09-12 PROCEDURE — 1101F PR PT FALLS ASSESS DOC 0-1 FALLS W/OUT INJ PAST YR: ICD-10-PCS | Mod: CPTII,S$GLB,, | Performed by: STUDENT IN AN ORGANIZED HEALTH CARE EDUCATION/TRAINING PROGRAM

## 2023-09-12 PROCEDURE — 1101F PT FALLS ASSESS-DOCD LE1/YR: CPT | Mod: CPTII,S$GLB,, | Performed by: STUDENT IN AN ORGANIZED HEALTH CARE EDUCATION/TRAINING PROGRAM

## 2023-09-12 RX ORDER — ZOLPIDEM TARTRATE 10 MG/1
10 TABLET ORAL NIGHTLY PRN
Qty: 90 TABLET | Refills: 0 | Status: CANCELLED | OUTPATIENT
Start: 2023-09-12 | End: 2023-12-11

## 2023-09-12 RX ORDER — ZOLPIDEM TARTRATE 10 MG/1
10 TABLET ORAL NIGHTLY PRN
Qty: 90 TABLET | Refills: 0 | Status: ON HOLD | OUTPATIENT
Start: 2023-09-12 | End: 2023-10-23 | Stop reason: HOSPADM

## 2023-09-12 RX ORDER — ATORVASTATIN CALCIUM 20 MG/1
20 TABLET, FILM COATED ORAL DAILY
Qty: 90 TABLET | Refills: 3 | Status: ON HOLD | OUTPATIENT
Start: 2023-09-12 | End: 2023-10-23 | Stop reason: SDUPTHER

## 2023-09-12 RX ORDER — OXYBUTYNIN CHLORIDE 5 MG/1
10 TABLET ORAL NIGHTLY
Qty: 60 TABLET | Refills: 4 | Status: SHIPPED | OUTPATIENT
Start: 2023-09-12 | End: 2023-10-06

## 2023-09-12 RX ORDER — AMLODIPINE BESYLATE 5 MG/1
5 TABLET ORAL 2 TIMES DAILY
Qty: 60 TABLET | Refills: 3 | Status: ON HOLD | OUTPATIENT
Start: 2023-09-12 | End: 2023-10-23 | Stop reason: SDUPTHER

## 2023-09-12 NOTE — PROGRESS NOTES
[facility] Note    Subjective:    The HPI and pertinent ROS is included in the Diagnostic Impression Remarks section at the end of the note. Please see below for further details. Chief complaint is at end of note.     Rajendra is a pleasant intelligent patient who is here for evaluation.     Modified Medications    Modified Medication Previous Medication    AMLODIPINE (NORVASC) 5 MG TABLET amLODIPine (NORVASC) 5 MG tablet       Take 1 tablet (5 mg total) by mouth 2 (two) times daily.    Take 1 tablet (5 mg total) by mouth 2 (two) times daily.    ATORVASTATIN (LIPITOR) 20 MG TABLET atorvastatin (LIPITOR) 20 MG tablet       Take 1 tablet (20 mg total) by mouth once daily.    Take 20 mg by mouth once daily.    ZOLPIDEM (AMBIEN) 10 MG TAB zolpidem (AMBIEN) 10 mg Tab       Take 1 tablet (10 mg total) by mouth nightly as needed (insomnia).    Take 1 tablet (10 mg total) by mouth nightly as needed (insomnia).       Data reviewed 274}  Previous medical records reviewed and summarized in plan section at end of note.      If you are due for any health screening(s) below please notify me so we can arrange them to be ordered and scheduled. Most healthy patients at your age complete them, but you are free to accept or refuse. If you can't do it, I'll definitely understand. If you can, I'd certainly appreciate it!     All of your core healthy metrics are met.      The following portions of the patient's history were reviewed and updated as appropriate: allergies, current medications, past family history, past medical history, past social history, past surgical history and problem list.    He  has a past medical history of Anticoagulant long-term use, Arthritis, Coronary artery disease, DDD (degenerative disc disease), cervical, Degenerative disc disease, Heart murmur, History of radiation therapy (2020), Hypertension, Nontoxic multinodular goiter (09/20/2016), Prostate CA, RA (rheumatoid arthritis), Sleep apnea,  and Vitamin D insufficiency (09/30/2016).  He  has a past surgical history that includes Transrectal biopsy of prostate with ultrasound guidance (N/A, 12/18/2018); Cystoscopy (N/A, 12/18/2018); Esophagogastroduodenoscopy (N/A, 09/29/2020); Parathyroidectomy (Right, 10/27/2020); Colonoscopy (prior to 2016); Carpal tunnel release (Right, 05/28/2021); Release of ulnar nerve at cubital tunnel (Right, 05/28/2021); Transrectal ultrasound examination (N/A, 07/12/2022); Cystoscopy (N/A, 07/12/2022); Coronary angioplasty with stent (02/2022); Excision of lesion of penis (N/A, 2/23/2023); and Insertion of tunneled central venous catheter (CVC) with subcutaneous port (Left, 5/18/2023).    He  reports that he quit smoking about 22 years ago. His smoking use included cigarettes. He started smoking about 32 years ago. He has a 2.5 pack-year smoking history. He has been exposed to tobacco smoke. He has never used smokeless tobacco. He reports that he does not currently use alcohol. He reports that he does not currently use drugs after having used the following drugs: Marijuana. Frequency: 8.00 times per week.  He family history includes Diabetes in his maternal uncle; Drug abuse in his sister; Heart disease in his mother; No Known Problems in his daughter, daughter, and son; Stroke in his father.    Review of patient's allergies indicates:  No Known Allergies    Tobacco Use: Medium Risk (9/12/2023)    Patient History     Smoking Tobacco Use: Former     Smokeless Tobacco Use: Never     Passive Exposure: Past     Physical Examination  General appearance: alert, cooperative, no distress  Neck: no thyromegaly, no neck stiffness  Lungs: clear to auscultation, no wheezes, rales or rhonchi, symmetric air entry  Heart: normal rate, regular rhythm, normal S1, S2, no murmurs, rubs, clicks or gallops  Abdomen: soft, nontender, nondistended, no rigidity, rebound, or guarding.   Back: no point tenderness over spine  Extremities: peripheral  "pulses normal, no unilateral leg swelling or calf tenderness   Neurological:alert, oriented, normal speech, no new focal findings or movement disorder noted from baseline    BP Readings from Last 3 Encounters:   09/12/23 132/72   09/07/23 138/74   08/16/23 136/68     Wt Readings from Last 3 Encounters:   09/12/23 59.9 kg (132 lb)   09/07/23 60 kg (132 lb 4.4 oz)   08/16/23 59.2 kg (130 lb 8.2 oz)     /72 (BP Location: Right arm, Patient Position: Sitting, BP Method: Large (Manual))   Pulse 84   Temp 99.2 °F (37.3 °C) (Oral)   Resp 16   Ht 5' 8" (1.727 m)   Wt 59.9 kg (132 lb)   SpO2 97%   BMI 20.07 kg/m²    274}  Laboratory: I have reviewed old labs below:    274}    Lab Results   Component Value Date    WBC 7.04 08/10/2023    HGB 13.2 (L) 08/10/2023    HCT 40.3 08/10/2023    MCV 88 08/10/2023     08/10/2023     08/10/2023    K 3.5 08/10/2023     (H) 08/10/2023    CALCIUM 8.2 (L) 08/10/2023    PHOS 3.3 05/23/2022    CO2 18 (L) 08/10/2023     08/10/2023    BUN 14 08/10/2023    CREATININE 1.2 08/10/2023    EGFRNORACEVR >60.0 08/10/2023    ANIONGAP 7 (L) 08/10/2023    PROT 6.8 08/08/2023    ALBUMIN 3.3 (L) 08/08/2023    BILITOT 0.9 08/08/2023    ALKPHOS 43 (L) 08/08/2023    ALT 21 08/08/2023    AST 21 08/08/2023    INR 1.2 02/23/2023    CHOL 116 (L) 03/08/2022    TRIG 66 03/08/2022    HDL 29 (L) 03/08/2022    LDLCALC 73.8 03/08/2022    TSH 0.899 01/26/2023    PSA 4.8 (H) 03/16/2018    HGBA1C 5.9 06/21/2022      Lab reviewed by me: Particular labs of significance that I will monitor, workup, or treat to improve are mentioned below in diagnostic impression remarks.    Imaging/EKG: I have reviewed the pertinent results and my findings are noted in remarks.  274}    CC:   Chief Complaint   Patient presents with    Anxiety    Depression    Insomnia        274}    Assessment/Plan  Rajendra Castle is a 78 y.o. male who presents to clinic with:  1. Insomnia, unspecified type    2. " "Hypertension, unspecified type    3. Benign localized prostatic hyperplasia with lower urinary tract symptoms (LUTS)    4. Urinary frequency    5. Atherosclerosis of native coronary artery of native heart without angina pectoris    6. Colon cancer screening    7. DEVANTE (generalized anxiety disorder)       274}  Diagnostic Impression Remarks + HPI     Documentation entered by me for this encounter may have been done in part using speech-recognition technology. Although I have made an effort to ensure accuracy, "sound like" errors may exist and should be interpreted in context.     LUTS-needs improvement has to urinate freq at night is taking oxybutynin but not helping enough no suprapubic pain. He has tried flomax in the past. Will try higher dose short acting will also get urine studies   Insomnia multifactorial took ambien and helped want to continue it improved his sleep will work on urination as well  Atherosclerosis - stable continue statin and rec healthy diet monitor follow lipid levels   DEVANTE-stable continue current meds monitor         Risks of Ambien discussed including grogginess, balance impairment, memory lapses, hallucinations, sleep walking, driving while asleep, dependence, and psychiatric complications. After a discussion of the risks and benefits, we feel that because the patient's insomnia failed to improve with good sleep hygiene, other first line treatments, poor quality of life without this medication, and disruption in their work/life balance that the benefits exceed the risks at this point in time. After understanding of the risks and shared decision making, the patient decided to pursue taking a sedative-hypnotic to treat their insomnia. The risks of having limited amount of sleep leading to impairment of driving is substantial and thus improving sleep time will likely prevent harm due to lack of sleep.  reviewed and refill deemed appropriate.     This is the extent of this pleasant patient's " concerns at this present time. He did not feel chest pain upon exertion, dyspnea, nausea, vomiting, diaphoresis, or syncope. No pleuritic chest pain, unilateral leg swelling, calf tenderness, or calf pain. Negative for unintentional weight loss night sweats, hematuria, and fevers. Rajendra will return to clinic in a few months for further workup and reassessment or sooner as needed. He was instructed to call the clinic or go to the emergency department or urgent care immediately if his symptoms do not improve, worsens, or if any new symptoms develop. As we discussed that symptoms could worsen over the next 24 hours he was advised that if any increased swelling, pain, or numbness arise to go immediately to the ED. Patient knows to call any time if an emergency arises. Shared decision making occurred and he verbalized understanding in agreement with this plan. I discussed imaging findings, diagnosis, possibilities, treatment options, medications, risks, and benefits. He had many questions regarding the options and long-term effects. All questions were answered. He expressed understanding after counseling regarding the diagnosis and recommendations. He was capable and demonstrated competence with understanding of these options. Shared decision making was performed resulting in him choosing the current treatment plan. Patient handout was given with instructions and recommendations. Advised the patient that if they become pregnant to alert us immediately to assess for medication changes. Having a healthy weight can decrease the risk of 13 cancers and is an important goal. I also discussed the importance of close follow up to discuss labs, change or modify his medications if needed, monitor side effects, and further evaluation of medical problems.     Additional workup planned: see labs ordered below.    See below for labs and meds ordered with associated diagnosis      1. Insomnia, unspecified type  - zolpidem (AMBIEN) 10  mg Tab; Take 1 tablet (10 mg total) by mouth nightly as needed (insomnia).  Dispense: 90 tablet; Refill: 0    2. Hypertension, unspecified type  - amLODIPine (NORVASC) 5 MG tablet; Take 1 tablet (5 mg total) by mouth 2 (two) times daily.  Dispense: 60 tablet; Refill: 3    3. Benign localized prostatic hyperplasia with lower urinary tract symptoms (LUTS)  - oxybutynin (DITROPAN) 5 MG Tab; Take 2 tablets (10 mg total) by mouth every evening.  Dispense: 60 tablet; Refill: 4    4. Urinary frequency  - POCT Urinalysis(Instrument); Future  - Urinalysis Microscopic    5. Atherosclerosis of native coronary artery of native heart without angina pectoris  - atorvastatin (LIPITOR) 20 MG tablet; Take 1 tablet (20 mg total) by mouth once daily.  Dispense: 90 tablet; Refill: 3    6. Colon cancer screening  - Cologuard Screening (Multitarget Stool DNA); Future  - Cologuard Screening (Multitarget Stool DNA)    7. DEVANTE (generalized anxiety disorder)      Sp Lilly MD   274}    If you are due for any health screening(s) below please notify me so we can arrange them to be ordered and scheduled. Most healthy patients at your age complete them, but you are free to accept or refuse.     If you can't do it, I'll definitely understand. If you can, I'd certainly appreciate it!   All of your core healthy metrics are met.

## 2023-09-22 ENCOUNTER — PES CALL (OUTPATIENT)
Dept: ADMINISTRATIVE | Facility: CLINIC | Age: 78
End: 2023-09-22
Payer: MEDICARE

## 2023-09-25 ENCOUNTER — NURSE TRIAGE (OUTPATIENT)
Dept: ADMINISTRATIVE | Facility: CLINIC | Age: 78
End: 2023-09-25
Payer: MEDICARE

## 2023-09-25 NOTE — TELEPHONE ENCOUNTER
Reason for Disposition   Insomnia persists > 1 week and following Insomnia Care Advice    Protocols used: Insomnia-A-OH  Spoke with pt who reports that he has been having insomnia for months states that he was prescribed ambien for sleep but reports medication is not helping. Advised need to be seen by provider within 3 days. No appointments available. Advised will send message to office. Also advised can be seen in ED/UC.

## 2023-09-26 ENCOUNTER — OFFICE VISIT (OUTPATIENT)
Dept: FAMILY MEDICINE | Facility: CLINIC | Age: 78
End: 2023-09-26
Payer: MEDICARE

## 2023-09-26 DIAGNOSIS — G47.00 INSOMNIA, UNSPECIFIED TYPE: Primary | ICD-10-CM

## 2023-09-26 DIAGNOSIS — N40.0 BENIGN PROSTATIC HYPERPLASIA, UNSPECIFIED WHETHER LOWER URINARY TRACT SYMPTOMS PRESENT: ICD-10-CM

## 2023-09-26 DIAGNOSIS — I10 HYPERTENSION, UNSPECIFIED TYPE: ICD-10-CM

## 2023-09-26 PROCEDURE — 99443 PR PHYSICIAN TELEPHONE EVALUATION 21-30 MIN: CPT | Mod: 95,,, | Performed by: STUDENT IN AN ORGANIZED HEALTH CARE EDUCATION/TRAINING PROGRAM

## 2023-09-26 PROCEDURE — 99443 PR PHYSICIAN TELEPHONE EVALUATION 21-30 MIN: ICD-10-PCS | Mod: 95,,, | Performed by: STUDENT IN AN ORGANIZED HEALTH CARE EDUCATION/TRAINING PROGRAM

## 2023-09-26 NOTE — PROGRESS NOTES
10/27/2020 Care Everywhere updates requested and reviewed.  Immunizations reconciled. Media reports reviewed.  Duplicate HM overrides and  orders removed.  Overdue HM topic chart audit and/or requested.  Overdue lab testing linked to upcoming lab appointments if applies.    LETTER MAILED    Health Maintenance Due   Topic Date Due    Colorectal Cancer Screening  2019    Influenza Vaccine (1) 2020          details… ROM intact/normal gait/strength 5/5 bilateral upper extremities

## 2023-09-26 NOTE — PROGRESS NOTES
Established Patient - Audio Only Telehealth Visit     The patient location is: [place]  The chief complaint leading to consultation is: insomnia  Visit type: Virtual visit with audio only (telephone)  Total time spent with patient: 24 min        The reason for the audio only service rather than synchronous audio and video virtual visit was related to technical difficulties or patient preference/necessity.     Each patient to whom I provide medical services by telemedicine is:  (1) informed of the relationship between the physician and patient and the respective role of any other health care provider with respect to management of the patient; and (2) notified that they may decline to receive medical services by telemedicine and may withdraw from such care at any time. Patient verbally consented to receive this service via voice-only telephone call.       HPI: pt reports he has to wake up in the middle of the night to urinate and he has trouble sleeping. The ambien does help him sleep but he still wakes up.      Assessment and plan:  insomnia-needs improvement ambien does help will work on improving bph he did try flomax without improvement on oxybutynin will increase dose to 10 mg nightly short acting rec see urology      Hypertension stable continue current meds monitor no headache or chest pain f/u blood work                      This service was not originating from a related E/M service provided within the previous 7 days nor will  to an E/M service or procedure within the next 24 hours or my soonest available appointment.  Prevailing standard of care was able to be met in this audio-only visit.

## 2023-10-06 ENCOUNTER — TELEPHONE (OUTPATIENT)
Dept: FAMILY MEDICINE | Facility: CLINIC | Age: 78
End: 2023-10-06
Payer: MEDICARE

## 2023-10-06 DIAGNOSIS — R35.0 URINARY FREQUENCY: Primary | ICD-10-CM

## 2023-10-06 NOTE — TELEPHONE ENCOUNTER
----- Message from Rositaoctavia Rojas sent at 10/6/2023 10:40 AM CDT -----  Regarding: RX concern  Contact: pt  Type:  Needs Medical Advice    Who Called: pt  Pharmacy name and phone #:    [id number] - [place] - [address] & [facility]  [address]  [place] [address]  Phone: [phone] Fax: [phone]    Would the patient rather a call back or a response via [facility]? Call back  Best Call Back Number: [phone]    Additional Information: sts he seen the dr last week and he was given and Rx for oxybutynin (DITROPAN) 5 MG Tab and he sts its not working he would like to speak to the nurse or dr about getting a  Rx for another medication--please advise

## 2023-10-06 NOTE — TELEPHONE ENCOUNTER
"Spoke to pt states he feels the Ditropan is not working. Pt states he is emptying his bladder every hour and not drinking much water. Pt wanting to know if there is something else that can be prescribed. Pt also states he feels like "the water is from his body" Pt was just seen on 9-26-23 by Dr. Lilly  "

## 2023-10-22 ENCOUNTER — HOSPITAL ENCOUNTER (OUTPATIENT)
Facility: HOSPITAL | Age: 78
Discharge: HOME HEALTH | End: 2023-10-23
Attending: EMERGENCY MEDICINE | Admitting: INTERNAL MEDICINE
Payer: MEDICARE

## 2023-10-22 DIAGNOSIS — R35.0 URINARY FREQUENCY: ICD-10-CM

## 2023-10-22 DIAGNOSIS — R55 NEAR SYNCOPE: Primary | ICD-10-CM

## 2023-10-22 DIAGNOSIS — R30.0 DYSURIA: ICD-10-CM

## 2023-10-22 DIAGNOSIS — F32.A DEPRESSION, UNSPECIFIED DEPRESSION TYPE: ICD-10-CM

## 2023-10-22 DIAGNOSIS — F32.A ANXIETY AND DEPRESSION: ICD-10-CM

## 2023-10-22 DIAGNOSIS — R55 PRE-SYNCOPE: ICD-10-CM

## 2023-10-22 DIAGNOSIS — R63.0 ANOREXIA: ICD-10-CM

## 2023-10-22 DIAGNOSIS — N39.0 URINARY TRACT INFECTION WITHOUT HEMATURIA, SITE UNSPECIFIED: ICD-10-CM

## 2023-10-22 DIAGNOSIS — F41.9 ANXIETY AND DEPRESSION: ICD-10-CM

## 2023-10-22 DIAGNOSIS — C61 PROSTATE CANCER: ICD-10-CM

## 2023-10-22 DIAGNOSIS — F41.1 GAD (GENERALIZED ANXIETY DISORDER): ICD-10-CM

## 2023-10-22 DIAGNOSIS — I25.10 ATHEROSCLEROSIS OF NATIVE CORONARY ARTERY OF NATIVE HEART WITHOUT ANGINA PECTORIS: ICD-10-CM

## 2023-10-22 DIAGNOSIS — R94.31 LONG QT INTERVAL: ICD-10-CM

## 2023-10-22 DIAGNOSIS — I10 HYPERTENSION, UNSPECIFIED TYPE: ICD-10-CM

## 2023-10-22 DIAGNOSIS — R53.1 GENERALIZED WEAKNESS: ICD-10-CM

## 2023-10-22 DIAGNOSIS — R53.83 FATIGUE, UNSPECIFIED TYPE: ICD-10-CM

## 2023-10-22 LAB
ALBUMIN SERPL BCP-MCNC: 3.6 G/DL (ref 3.5–5.2)
ALP SERPL-CCNC: 56 U/L (ref 55–135)
ALT SERPL W/O P-5'-P-CCNC: 11 U/L (ref 10–44)
ANION GAP SERPL CALC-SCNC: 7 MMOL/L (ref 8–16)
AST SERPL-CCNC: 14 U/L (ref 10–40)
BACTERIA #/AREA URNS HPF: NEGATIVE /HPF
BASOPHILS # BLD AUTO: 0.02 K/UL (ref 0–0.2)
BASOPHILS NFR BLD: 0.4 % (ref 0–1.9)
BILIRUB SERPL-MCNC: 0.5 MG/DL (ref 0.1–1)
BILIRUB UR QL STRIP: NEGATIVE
BNP SERPL-MCNC: 307 PG/ML (ref 0–99)
BUN SERPL-MCNC: 23 MG/DL (ref 8–23)
CALCIUM SERPL-MCNC: 9.1 MG/DL (ref 8.7–10.5)
CHLORIDE SERPL-SCNC: 109 MMOL/L (ref 95–110)
CLARITY UR: CLEAR
CO2 SERPL-SCNC: 22 MMOL/L (ref 23–29)
COLOR UR: YELLOW
CREAT SERPL-MCNC: 1.4 MG/DL (ref 0.5–1.4)
DIFFERENTIAL METHOD: ABNORMAL
EOSINOPHIL # BLD AUTO: 0.1 K/UL (ref 0–0.5)
EOSINOPHIL NFR BLD: 1.6 % (ref 0–8)
ERYTHROCYTE [DISTWIDTH] IN BLOOD BY AUTOMATED COUNT: 13.8 % (ref 11.5–14.5)
EST. GFR  (NO RACE VARIABLE): 52.1 ML/MIN/1.73 M^2
GLUCOSE SERPL-MCNC: 133 MG/DL (ref 70–110)
GLUCOSE SERPL-MCNC: 139 MG/DL (ref 70–110)
GLUCOSE UR QL STRIP: NEGATIVE
HCT VFR BLD AUTO: 42 % (ref 40–54)
HGB BLD-MCNC: 13.6 G/DL (ref 14–18)
HGB UR QL STRIP: NEGATIVE
HYALINE CASTS #/AREA URNS LPF: 1 /LPF
IMM GRANULOCYTES # BLD AUTO: 0.02 K/UL (ref 0–0.04)
IMM GRANULOCYTES NFR BLD AUTO: 0.4 % (ref 0–0.5)
KETONES UR QL STRIP: NEGATIVE
LEUKOCYTE ESTERASE UR QL STRIP: ABNORMAL
LYMPHOCYTES # BLD AUTO: 0.6 K/UL (ref 1–4.8)
LYMPHOCYTES NFR BLD: 10.6 % (ref 18–48)
MAGNESIUM SERPL-MCNC: 2.1 MG/DL (ref 1.6–2.6)
MCH RBC QN AUTO: 28.9 PG (ref 27–31)
MCHC RBC AUTO-ENTMCNC: 32.4 G/DL (ref 32–36)
MCV RBC AUTO: 89 FL (ref 82–98)
MICROSCOPIC COMMENT: ABNORMAL
MONOCYTES # BLD AUTO: 0.6 K/UL (ref 0.3–1)
MONOCYTES NFR BLD: 10.5 % (ref 4–15)
NEUTROPHILS # BLD AUTO: 4.3 K/UL (ref 1.8–7.7)
NEUTROPHILS NFR BLD: 76.5 % (ref 38–73)
NITRITE UR QL STRIP: NEGATIVE
NRBC BLD-RTO: 0 /100 WBC
OB PNL STL: NEGATIVE
PH UR STRIP: 8 [PH] (ref 5–8)
PLATELET # BLD AUTO: 278 K/UL (ref 150–450)
PMV BLD AUTO: 11.5 FL (ref 9.2–12.9)
POTASSIUM SERPL-SCNC: 4.3 MMOL/L (ref 3.5–5.1)
PROT SERPL-MCNC: 7.3 G/DL (ref 6–8.4)
PROT UR QL STRIP: NEGATIVE
RBC # BLD AUTO: 4.7 M/UL (ref 4.6–6.2)
RBC #/AREA URNS HPF: 10 /HPF (ref 0–4)
SARS-COV-2 RDRP RESP QL NAA+PROBE: NEGATIVE
SODIUM SERPL-SCNC: 138 MMOL/L (ref 136–145)
SP GR UR STRIP: 1.01 (ref 1–1.03)
SQUAMOUS #/AREA URNS HPF: 1 /HPF
TROPONIN I SERPL HS-MCNC: 30.5 PG/ML (ref 0–14.9)
TROPONIN I SERPL HS-MCNC: 31.4 PG/ML (ref 0–14.9)
TROPONIN I SERPL HS-MCNC: 39.2 PG/ML (ref 0–14.9)
TSH SERPL DL<=0.005 MIU/L-ACNC: 1.28 UIU/ML (ref 0.34–5.6)
URN SPEC COLLECT METH UR: ABNORMAL
UROBILINOGEN UR STRIP-ACNC: NEGATIVE EU/DL
VIT B12 SERPL-MCNC: 508 PG/ML (ref 210–950)
WBC # BLD AUTO: 5.55 K/UL (ref 3.9–12.7)
WBC #/AREA URNS HPF: 8 /HPF (ref 0–5)

## 2023-10-22 PROCEDURE — G0378 HOSPITAL OBSERVATION PER HR: HCPCS

## 2023-10-22 PROCEDURE — 93005 ELECTROCARDIOGRAM TRACING: CPT | Performed by: INTERNAL MEDICINE

## 2023-10-22 PROCEDURE — 63600175 PHARM REV CODE 636 W HCPCS: Performed by: EMERGENCY MEDICINE

## 2023-10-22 PROCEDURE — 84484 ASSAY OF TROPONIN QUANT: CPT | Mod: 91 | Performed by: EMERGENCY MEDICINE

## 2023-10-22 PROCEDURE — 36415 COLL VENOUS BLD VENIPUNCTURE: CPT

## 2023-10-22 PROCEDURE — G0426 INPT/ED TELECONSULT50: HCPCS | Mod: GT,,, | Performed by: STUDENT IN AN ORGANIZED HEALTH CARE EDUCATION/TRAINING PROGRAM

## 2023-10-22 PROCEDURE — 84443 ASSAY THYROID STIM HORMONE: CPT | Performed by: EMERGENCY MEDICINE

## 2023-10-22 PROCEDURE — 93010 EKG 12-LEAD: ICD-10-PCS | Mod: ,,, | Performed by: INTERNAL MEDICINE

## 2023-10-22 PROCEDURE — 81001 URINALYSIS AUTO W/SCOPE: CPT | Performed by: EMERGENCY MEDICINE

## 2023-10-22 PROCEDURE — 83735 ASSAY OF MAGNESIUM: CPT | Performed by: EMERGENCY MEDICINE

## 2023-10-22 PROCEDURE — 84484 ASSAY OF TROPONIN QUANT: CPT | Mod: 91

## 2023-10-22 PROCEDURE — 83880 ASSAY OF NATRIURETIC PEPTIDE: CPT | Performed by: EMERGENCY MEDICINE

## 2023-10-22 PROCEDURE — 25000003 PHARM REV CODE 250

## 2023-10-22 PROCEDURE — 25000003 PHARM REV CODE 250: Performed by: EMERGENCY MEDICINE

## 2023-10-22 PROCEDURE — 85025 COMPLETE CBC W/AUTO DIFF WBC: CPT | Performed by: EMERGENCY MEDICINE

## 2023-10-22 PROCEDURE — 82607 VITAMIN B-12: CPT

## 2023-10-22 PROCEDURE — G0426 PR INPT TELEHEALTH CONSULT 50M: ICD-10-PCS | Mod: GT,,, | Performed by: STUDENT IN AN ORGANIZED HEALTH CARE EDUCATION/TRAINING PROGRAM

## 2023-10-22 PROCEDURE — 80053 COMPREHEN METABOLIC PANEL: CPT | Performed by: EMERGENCY MEDICINE

## 2023-10-22 PROCEDURE — 63600175 PHARM REV CODE 636 W HCPCS

## 2023-10-22 PROCEDURE — U0002 COVID-19 LAB TEST NON-CDC: HCPCS | Performed by: EMERGENCY MEDICINE

## 2023-10-22 PROCEDURE — 93010 ELECTROCARDIOGRAM REPORT: CPT | Mod: ,,, | Performed by: INTERNAL MEDICINE

## 2023-10-22 PROCEDURE — 99285 EMERGENCY DEPT VISIT HI MDM: CPT | Mod: 25

## 2023-10-22 PROCEDURE — 82272 OCCULT BLD FECES 1-3 TESTS: CPT | Performed by: EMERGENCY MEDICINE

## 2023-10-22 PROCEDURE — 96365 THER/PROPH/DIAG IV INF INIT: CPT

## 2023-10-22 PROCEDURE — 96372 THER/PROPH/DIAG INJ SC/IM: CPT | Mod: 59

## 2023-10-22 RX ORDER — ISOSORBIDE MONONITRATE 30 MG/1
30 TABLET, EXTENDED RELEASE ORAL DAILY
Status: DISCONTINUED | OUTPATIENT
Start: 2023-10-23 | End: 2023-10-23 | Stop reason: HOSPADM

## 2023-10-22 RX ORDER — SODIUM,POTASSIUM PHOSPHATES 280-250MG
2 POWDER IN PACKET (EA) ORAL
Status: DISCONTINUED | OUTPATIENT
Start: 2023-10-22 | End: 2023-10-23 | Stop reason: HOSPADM

## 2023-10-22 RX ORDER — LANOLIN ALCOHOL/MO/W.PET/CERES
800 CREAM (GRAM) TOPICAL
Status: DISCONTINUED | OUTPATIENT
Start: 2023-10-22 | End: 2023-10-23 | Stop reason: HOSPADM

## 2023-10-22 RX ORDER — IBUPROFEN 200 MG
24 TABLET ORAL
Status: DISCONTINUED | OUTPATIENT
Start: 2023-10-22 | End: 2023-10-23 | Stop reason: HOSPADM

## 2023-10-22 RX ORDER — SODIUM CHLORIDE 0.9 % (FLUSH) 0.9 %
2 SYRINGE (ML) INJECTION
Status: DISCONTINUED | OUTPATIENT
Start: 2023-10-22 | End: 2023-10-23 | Stop reason: HOSPADM

## 2023-10-22 RX ORDER — ATORVASTATIN CALCIUM 20 MG/1
20 TABLET, FILM COATED ORAL DAILY
Status: DISCONTINUED | OUTPATIENT
Start: 2023-10-23 | End: 2023-10-23 | Stop reason: HOSPADM

## 2023-10-22 RX ORDER — LOSARTAN POTASSIUM 25 MG/1
25 TABLET ORAL DAILY
Status: DISCONTINUED | OUTPATIENT
Start: 2023-10-23 | End: 2023-10-23 | Stop reason: HOSPADM

## 2023-10-22 RX ORDER — BUSPIRONE HYDROCHLORIDE 5 MG/1
10 TABLET ORAL 3 TIMES DAILY
Status: DISCONTINUED | OUTPATIENT
Start: 2023-10-22 | End: 2023-10-23 | Stop reason: HOSPADM

## 2023-10-22 RX ORDER — PANTOPRAZOLE SODIUM 40 MG/1
40 TABLET, DELAYED RELEASE ORAL
Status: DISCONTINUED | OUTPATIENT
Start: 2023-10-23 | End: 2023-10-23 | Stop reason: HOSPADM

## 2023-10-22 RX ORDER — ACETAMINOPHEN 325 MG/1
650 TABLET ORAL EVERY 8 HOURS PRN
Status: DISCONTINUED | OUTPATIENT
Start: 2023-10-22 | End: 2023-10-23 | Stop reason: HOSPADM

## 2023-10-22 RX ORDER — TALC
6 POWDER (GRAM) TOPICAL NIGHTLY PRN
Status: DISCONTINUED | OUTPATIENT
Start: 2023-10-22 | End: 2023-10-23 | Stop reason: HOSPADM

## 2023-10-22 RX ORDER — OXYBUTYNIN CHLORIDE 5 MG/1
5 TABLET, EXTENDED RELEASE ORAL DAILY
Status: DISCONTINUED | OUTPATIENT
Start: 2023-10-23 | End: 2023-10-23 | Stop reason: HOSPADM

## 2023-10-22 RX ORDER — ENOXAPARIN SODIUM 100 MG/ML
40 INJECTION SUBCUTANEOUS EVERY 24 HOURS
Status: DISCONTINUED | OUTPATIENT
Start: 2023-10-22 | End: 2023-10-23 | Stop reason: HOSPADM

## 2023-10-22 RX ORDER — MIRTAZAPINE 15 MG/1
15 TABLET, FILM COATED ORAL NIGHTLY
Status: DISCONTINUED | OUTPATIENT
Start: 2023-10-22 | End: 2023-10-23 | Stop reason: HOSPADM

## 2023-10-22 RX ORDER — LORAZEPAM 0.5 MG/1
0.5 TABLET ORAL
Status: COMPLETED | OUTPATIENT
Start: 2023-10-22 | End: 2023-10-22

## 2023-10-22 RX ORDER — ESCITALOPRAM OXALATE 10 MG/1
20 TABLET ORAL DAILY
Status: DISCONTINUED | OUTPATIENT
Start: 2023-10-23 | End: 2023-10-23 | Stop reason: HOSPADM

## 2023-10-22 RX ORDER — IBUPROFEN 200 MG
16 TABLET ORAL
Status: DISCONTINUED | OUTPATIENT
Start: 2023-10-22 | End: 2023-10-23 | Stop reason: HOSPADM

## 2023-10-22 RX ORDER — GLUCAGON 1 MG
1 KIT INJECTION
Status: DISCONTINUED | OUTPATIENT
Start: 2023-10-22 | End: 2023-10-23 | Stop reason: HOSPADM

## 2023-10-22 RX ORDER — SODIUM CHLORIDE, SODIUM LACTATE, POTASSIUM CHLORIDE, CALCIUM CHLORIDE 600; 310; 30; 20 MG/100ML; MG/100ML; MG/100ML; MG/100ML
1000 INJECTION, SOLUTION INTRAVENOUS
Status: COMPLETED | OUTPATIENT
Start: 2023-10-22 | End: 2023-10-22

## 2023-10-22 RX ORDER — AMLODIPINE BESYLATE 5 MG/1
5 TABLET ORAL 2 TIMES DAILY
Status: DISCONTINUED | OUTPATIENT
Start: 2023-10-22 | End: 2023-10-23 | Stop reason: HOSPADM

## 2023-10-22 RX ORDER — INSULIN ASPART 100 [IU]/ML
0-5 INJECTION, SOLUTION INTRAVENOUS; SUBCUTANEOUS
Status: DISCONTINUED | OUTPATIENT
Start: 2023-10-22 | End: 2023-10-23 | Stop reason: HOSPADM

## 2023-10-22 RX ADMIN — MIRTAZAPINE 15 MG: 15 TABLET, FILM COATED ORAL at 08:10

## 2023-10-22 RX ADMIN — BUSPIRONE HYDROCHLORIDE 10 MG: 5 TABLET ORAL at 08:10

## 2023-10-22 RX ADMIN — AMLODIPINE BESYLATE 5 MG: 5 TABLET ORAL at 08:10

## 2023-10-22 RX ADMIN — ENOXAPARIN SODIUM 40 MG: 40 INJECTION SUBCUTANEOUS at 08:10

## 2023-10-22 RX ADMIN — LORAZEPAM 0.5 MG: 0.5 TABLET ORAL at 06:10

## 2023-10-22 RX ADMIN — Medication 6 MG: at 08:10

## 2023-10-22 RX ADMIN — CEFTRIAXONE SODIUM 1 G: 1 INJECTION, POWDER, FOR SOLUTION INTRAMUSCULAR; INTRAVENOUS at 06:10

## 2023-10-22 RX ADMIN — SODIUM CHLORIDE, SODIUM LACTATE, POTASSIUM CHLORIDE, AND CALCIUM CHLORIDE 1000 ML: .6; .31; .03; .02 INJECTION, SOLUTION INTRAVENOUS at 10:10

## 2023-10-22 NOTE — ED PROVIDER NOTES
"Encounter Date: 10/22/2023       History     Chief Complaint   Patient presents with    Fatigue     "I feel weak and fatigued, I passed out at home and I can hardly walk"     78-year-old male with history of peptic ulcer disease, prostate cancer, status post completion of radiation therapy, hypertension, coronary artery disease, hypertension, degenerative disc disease, chronic insomnia, sleep apnea.  Patient presents emergency department with complaint of severe fatigue which has been present for the last 5 months.  Patient states that he is had difficulty with sleep in which he is not getting any sleep at night.  He presents here today with increasing exhaustion and fatigue.  Patient states that he was attempting to walk to the bathroom and nearly passed out.  He does admit to a 35 lb weight loss over last month as well.  He denies headache, no chest pain, does admit to shortness of breath, no abdominal pain, no nausea, vomiting, no melena, no ill contacts.  Patient states has been depressed over his symptoms but denies homicidal suicidal ideation.      Review of patient's allergies indicates:  No Known Allergies  Past Medical History:   Diagnosis Date    Anticoagulant long-term use     Arthritis     Coronary artery disease     Dr. Lamb    DDD (degenerative disc disease), cervical     Degenerative disc disease     Heart murmur     History of radiation therapy 2020    Hypertension     Nontoxic multinodular goiter 09/20/2016    Prostate CA     RA (rheumatoid arthritis)     Sleep apnea     No CPAP    Vitamin D insufficiency 09/30/2016     Past Surgical History:   Procedure Laterality Date    CARPAL TUNNEL RELEASE Right 05/28/2021    Procedure: RELEASE, CARPAL TUNNEL;  Surgeon: Jose Ryan II, MD;  Location: [facility];  Service: Orthopedics;  Laterality: Right;    COLONOSCOPY  prior to 2016    normal findings per patient report    CORONARY ANGIOPLASTY WITH STENT PLACEMENT  02/2022    CYSTOSCOPY N/A 12/18/2018    " Procedure: CYSTOSCOPY;  Surgeon: Garrett Pina MD;  Location: [facility];  Service: Urology;  Laterality: N/A;    CYSTOSCOPY N/A 07/12/2022    Procedure: CYSTOSCOPY;  Surgeon: Garrett Pina MD;  Location: [facility];  Service: Urology;  Laterality: N/A;    ESOPHAGOGASTRODUODENOSCOPY N/A 09/29/2020    Dr. Espinosa; empiric dilation; erythematous mucosa in antrum; gastric mucosal atrophy; hematin in entire stomach; biopsy: mid & distal esophagus WNL, stomach- WNL, negative for h pylori    EXCISION OF LESION OF PENIS N/A 2/23/2023    Procedure: EXCISION, LESION, PENIS;  Surgeon: Timo Myers MD;  Location: [facility];  Service: Urology;  Laterality: N/A;    INSERTION OF TUNNELED CENTRAL VENOUS CATHETER (CVC) WITH SUBCUTANEOUS PORT Left 5/18/2023    Procedure: [id number];  Surgeon: Atul Faria MD;  Location: [facility];  Service: General;  Laterality: Left;  left subclavian    PARATHYROIDECTOMY Right 10/27/2020    Procedure: PARATHYROIDECTOMY;  Surgeon: Latonia Clayton MD;  Location: [address];  Service: ENT;  Laterality: Right;    RELEASE OF ULNAR NERVE AT CUBITAL TUNNEL Right 05/28/2021    Procedure: RELEASE, ULNAR TUNNEL;  Surgeon: Jose Ryan II, MD;  Location: [facility];  Service: Orthopedics;  Laterality: Right;    TRANSRECTAL BIOPSY OF PROSTATE WITH ULTRASOUND GUIDANCE N/A 12/18/2018    Procedure: BIOPSY, PROSTATE, RECTAL APPROACH, WITH US GUIDANCE;  Surgeon: Garrett Pina MD;  Location: [facility];  Service: Urology;  Laterality: N/A;    TRANSRECTAL ULTRASOUND EXAMINATION N/A 07/12/2022    Procedure: ULTRASOUND, RECTAL APPROACH;  Surgeon: Garrett Pina MD;  Location: [facility];  Service: Urology;  Laterality: N/A;     Family History   Problem Relation Age of Onset    Heart disease Mother     Stroke Father     Drug abuse Sister     No Known Problems Daughter     No Known Problems Daughter     No Known Problems Son     Diabetes Maternal Uncle     Colon cancer Neg Hx     Crohn's  disease Neg Hx     Esophageal cancer Neg Hx     Stomach cancer Neg Hx     Ulcerative colitis Neg Hx      Social History     Tobacco Use    Smoking status: Former     Current packs/day: 0.00     Average packs/day: 0.3 packs/day for 10.0 years (2.5 ttl pk-yrs)     Types: Cigarettes     Start date: 1991     Quit date: 2001     Years since quittin.2     Passive exposure: Past    Smokeless tobacco: Never   Substance Use Topics    Alcohol use: Not Currently     Comment: seldom    Drug use: Not Currently     Frequency: 8.0 times per week     Types: Marijuana     Review of Systems   Constitutional:  Positive for fatigue and unexpected weight change. Negative for fever.   HENT:  Negative for congestion and sore throat.    Respiratory:  Negative for shortness of breath.    Cardiovascular:  Negative for chest pain.   Gastrointestinal:  Negative for abdominal pain, nausea and vomiting.   Endocrine: Negative for cold intolerance, heat intolerance, polydipsia, polyphagia and polyuria.   Genitourinary:  Negative for dysuria.   Musculoskeletal:  Negative for back pain.   Skin:  Negative for rash.   Neurological:  Positive for weakness and light-headedness. Negative for seizures, facial asymmetry, speech difficulty and numbness.        Near syncope   Hematological:  Does not bruise/bleed easily.       Physical Exam     Initial Vitals [10/22/23 0926]   BP Pulse Resp Temp SpO2   (!) 152/94 91 19 98.4 °F (36.9 °C) 98 %      MAP       --         Physical Exam    Nursing note and vitals reviewed.  Constitutional:   Elderly, thin appearing male, no acute distress   HENT:   Head: Normocephalic and atraumatic.   Nose: Nose normal.   Mouth/Throat: Oropharynx is clear and moist.   Eyes: Conjunctivae and EOM are normal. Pupils are equal, round, and reactive to light. No scleral icterus.   Neck: Neck supple.   Normal range of motion.  Cardiovascular:  Normal rate, regular rhythm, normal heart sounds and intact distal pulses.      Exam reveals no gallop and no friction rub.       No murmur heard.  Pulmonary/Chest: No stridor. No respiratory distress.   Course bilateral breath sounds no adventitious sounds   Abdominal: Abdomen is soft. Bowel sounds are normal. He exhibits no distension and no mass. There is no abdominal tenderness. There is no rebound and no guarding.   Genitourinary:    Rectum normal.   Rectum:      No rectal mass, anal fissure, tenderness, external hemorrhoid, internal hemorrhoid or abnormal anal tone.   : Acceptable.Prostate is enlarged. Prostate is not tender.   Musculoskeletal:         General: No edema. Normal range of motion.      Cervical back: Normal range of motion and neck supple.     Lymphadenopathy:     He has no cervical adenopathy.   Neurological: He is alert and oriented to person, place, and time. He has normal strength and normal reflexes. No cranial nerve deficit or sensory deficit. GCS score is 15. GCS eye subscore is 4. GCS verbal subscore is 5. GCS motor subscore is 6.   Skin: Skin is warm and dry. Capillary refill takes less than 2 seconds. No rash noted.   Psychiatric: He has a normal mood and affect. His behavior is normal. Judgment and thought content normal.         ED Course   Procedures  Labs Reviewed   CBC W/ AUTO DIFFERENTIAL - Abnormal; Notable for the following components:       Result Value    Hemoglobin 13.6 (*)     Lymph # 0.6 (*)     Gran % 76.5 (*)     Lymph % 10.6 (*)     All other components within normal limits   COMPREHENSIVE METABOLIC PANEL - Abnormal; Notable for the following components:    CO2 22 (*)     Glucose 139 (*)     eGFR 52.1 (*)     Anion Gap 7 (*)     All other components within normal limits   TROPONIN I HIGH SENSITIVITY - Abnormal; Notable for the following components:    Troponin I High Sensitivity 31.4 (*)     All other components within normal limits   B-TYPE NATRIURETIC PEPTIDE - Abnormal; Notable for the following components:     (*)     All  other components within normal limits   TROPONIN I HIGH SENSITIVITY - Abnormal; Notable for the following components:    Troponin I High Sensitivity 30.5 (*)     All other components within normal limits   URINALYSIS, REFLEX TO URINE CULTURE - Abnormal; Notable for the following components:    Leukocytes, UA Trace (*)     All other components within normal limits    Narrative:     Specimen Source->Urine   URINALYSIS MICROSCOPIC - Abnormal; Notable for the following components:    RBC, UA 10 (*)     WBC, UA 8 (*)     All other components within normal limits    Narrative:     Specimen Source->Urine   MAGNESIUM   SARS-COV-2 RNA AMPLIFICATION, QUAL   OCCULT BLOOD X 1, STOOL   TSH   TSH     EKG Readings: (Independently Interpreted)   Initial Reading: No STEMI. Rhythm: Normal Sinus Rhythm. Ectopy: PVCs. Conduction: 2nd Degree AV Block - Type I. Axis: Normal.   Sinus rhythm 2nd degree AV block Mobitz type 1, 68, /     ECG Results              EKG 12-lead (In process)  Result time 10/22/23 10:42:52      In process by Interface, Lab In [facility] (10/22/23 10:42:52)                   Narrative:    Test Reason : R07.9,    Vent. Rate : 068 BPM     Atrial Rate : 094 BPM     P-R Int : 000 ms          QRS Dur : 086 ms      QT Int : 468 ms       P-R-T Axes : 066 048 230 degrees     QTc Int : 497 ms    Sinus rhythm with 2nd degree A-V block (Mobitz I)  LVH with repolarization abnormality  Prolonged QT  Abnormal ECG  When compared with ECG of 08-AUG-2023 12:18,  Previous ECG has undetermined rhythm, needs review  Criteria for Septal infarct are no longer Present    Referred By: [email or web address]   SELF           Confirmed By:                                   Imaging Results              X-Ray Chest AP Portable (Final result)  Result time 10/22/23 10:20:23      Final result by Jonelle Moon MD (10/22/23 10:20:23)                   Narrative:    XR CHEST 1 VIEW    CLINICAL HISTORY:  78 years Male Chest Pain    COMPARISON:  8/8/2023    FINDINGS: Cardiomediastinal silhouette is within normal limits. Lungs are normally expanded with no airspace consolidation. No pleural effusion or pneumothorax. No acute osseous abnormality.    IMPRESSION: No acute pulmonary process.    Electronically signed by:  Jonelle Moon MD  10/22/2023 10:20 AM CDT Workstation: [id number]                                     Medications   cefTRIAXone (ROCEPHIN) 1 g in dextrose 5 % 100 mL IVPB (ready to mix) (has no administration in time range)   LORazepam tablet 0.5 mg (has no administration in time range)   lactated ringers infusion (1,000 mLs Intravenous New Bag 10/22/23 1029)     Medical Decision Making  Amount and/or Complexity of Data Reviewed  Labs: ordered.  Radiology: ordered.    Risk  Prescription drug management.  Decision regarding hospitalization.               ED Course as of 10/22/23 1749   Sun Oct 22, 2023   1717 Patient seen evaluated emergency department.  Patient found [RM]      ED Course User Index  [RM] Jose Vivar MD               Medical Decision Making:   Initial Assessment:   78-year-old male with history of peptic ulcer disease, prostate cancer, status post completion of radiation therapy, hypertension, coronary artery disease, hypertension, degenerative disc disease, chronic insomnia, sleep apnea.  Patient presents emergency department with complaint of severe fatigue which has been present for the last 5 months.  Patient states that he is had difficulty with sleep in which he is not getting any sleep at night.  He presents here today with increasing exhaustion and fatigue.  Patient states that he was attempting to walk to the bathroom and nearly passed out.  He does admit to a 35 lb weight loss over last month as well.  He denies headache, no chest pain, does admit to shortness of breath, no abdominal pain, no nausea, vomiting, no melena, no ill contacts.  Patient states has been depressed over his symptoms but denies homicidal  suicidal ideation.    Differential Diagnosis:   Symptomatic anemia, hypothyroidism, electrolyte abnormality, exacerbation of obstructive sleep apnea, pulmonary hypertension, clinical depression, dehydration  Clinical Tests:   Lab Tests: Ordered and Reviewed  Radiological Study: Ordered and Reviewed  Medical Tests: Ordered and Reviewed      Clinical Impression:   Final diagnoses:  [R55] Near syncope (Primary)  [R53.83] Fatigue, unspecified type  [R53.1] Generalized weakness  [N39.0] Urinary tract infection without hematuria, site unspecified  [F32.A] Depression, unspecified depression type        ED Disposition Condition    Admit Stable                Jose Vivar MD  10/22/23 1036       Jose Vivar MD  10/22/23 2537       Jose Vivar MD  10/22/23 3278

## 2023-10-22 NOTE — ASSESSMENT & PLAN NOTE
Patient is not on home insulin   Sliding scale insulin ordered for PRN use for elevated POC glucose  Continue to monitor with POC glucose

## 2023-10-22 NOTE — CONSULTS
"[facility]  Psychiatry  Telepsychiatry Consult Note    Please see previous notes:    Patient agreeable to consultation via telepsychiatry.    Tele-Consultation from Psychiatry started: 10/22/2023 at 4:13P  The chief complaint leading to psychiatric consultation is: insomnia and depression  This consultation was requested by the Emergency Department attending physician.  The location of the consulting psychiatrist is [place], [place]  The patient location is  [facility] EMERGENCY DEPARTMENT   The patient arrived at the ED at: 9am  Also present with the patient at the time of the consultation: nurse    Patient Identification:   Rajendra Castle is a 78 y.o. male.    Patient information was obtained from patient and past medical records.  Patient presented voluntarily to the Emergency Department by private vehicle.    Consults  Teleconsult Time Documentation  Subjective:     History of Present Illness:  Per ED note "78-year-old male with history of peptic ulcer disease, prostate cancer, status post completion of radiation therapy, hypertension, coronary artery disease, hypertension, degenerative disc disease, chronic insomnia, sleep apnea.  Patient presents emergency department with complaint of severe fatigue which has been present for the last 5 months.  Patient states that he is had difficulty with sleep in which he is not getting any sleep at night.  He presents here today with increasing exhaustion and fatigue.  Patient states that he was attempting to walk to the bathroom and nearly passed out.  He does admit to a 35 lb weight loss over last month as well.  He denies headache, no chest pain, does admit to shortness of breath, no abdominal pain, no nausea, vomiting, no melena, no ill contacts.  Patient states has been depressed over his symptoms but denies homicidal suicidal ideation."    Nurse says he was having a panic attack when he arrived. Got no anxiolytic med, pt was able to self-soothe without PRN.    Of " "note, pt found to have new arrythmia, Mobitz type 1 with Qtc 500. BNP 300s. Two months ago had Qtc 520s.     On interview with the patient,   Pt is calm, alert and fully oriented. Pt has articulate, spontaneous speech.   Pt says he has been feeling very fatigued over the last two months. Having increasing dyspnea on exertion. Says this morning he nearly fainted.   Pt says he had a panic attack today. He is interested in a better medication to help him sleep.  Pt says he takes ambien 10 mg at home for sleep once nightly. Has nocturia, gets up every 2 hours.   Pt has been diagnosed with sleep apnea, but he doesn't use the CPAP machine.   Depressed mood intermittently, not every day. Endorses more anxiety, he is worried about his health only, says his relationships, marisabel and finances are all "fine". He has lost a significant amount of weight. He says his appetite is okay and he is eating. Has been trying to drink less fluids because he is tired of excessive urination.    Lives alone. Has a niece in [place] [facility] [place] [facility] who used to check on him, but they had a falling out so she hasn't been coming around. Has a granddaughter in [place]. Has a neighbor Pa[place] who has been checking in on him home.     Consultation for anxiety and insomnia.         Psych Review of Symptoms: items highlighted should be considered positive    Depression/Mood: depression, changes in sleep, anhedonia, energy, appetite, concentration, psychomotor agitation/retardation, SI, HI. Denies euphoria, increased energy, grandiosity, decreased sleep, hyper-religiousity, impulsivity, distractibility, pleasure seeking.    Anxiety: panic attacks, ruminative thoughts, obsession/compulsions    Trauma: nightmares, flashbacks, avoidance, intrusive symptoms, dissociation/derealization, disordered eating, self-injurious behavior    Cognition: memory troubles, executive function concerns, "brain fog"    Psychosis: hallucinations, delusions, paranoia, persecutory thoughts, " difficulty with reality testing    Suicide Screening Tool:  In the past week, have you wished you were dead? Denies   In the past week, have you felt that you or your family would be better off if you were dead? Denies  In the past week, have you been having thoughts about killing yourself? Denies  Have you ever tried to kill yourself? Denies  Are you having thoughts of killing yourself right now? Denies     Biological considerations:  hx hyperthyroidism, no known b12 deficiency, syphillis, autoimmune disorders, strokes. Chronic medical diseases in the care of a primary care provider. Pt with CHF hx (not on GDMT) with new arrythmia, long Qtc, and recent care for prostate cancer. +35 pound weight loss    Psychiatric History:   Previous Psychiatric Hospitalizations: none  Previous Medication Trials: ambien, buspirone (hasn't taken for a month- wasn't helpful)  Previous Suicide Attempts: never   History of Violence: never  History of Depression: never  History of Neeru:  never  History of Auditory/Visual Hallucination: never  History of Delusions: none  Outpatient psychiatrist (current & past): primary care provider provides psych med- Dr. 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   Relationship Status/Sexual Orientation: single  Children: 2, son lives in [facility]  Housing Status: lives alone, independent   history: none  Access to gun: none  Jainism: seventh day Bahai  Recreation/Hobbies:  "usually likes to take walks, but has more fatigued than usual    Psychiatric Mental Status Exam:  Arousal: alert  Sensorium/Orientation: oriented to grossly intact, person, place, situation, month of year, year  Behavior/Cooperation: normal, cooperative   Speech: normal tone, normal rate, normal pitch, normal volume  Language: grossly intact  Mood: " I'm really anxious "   Affect: appropriate  Thought Process: normal and logical  Thought Content: focused on learning why he nearly passed out today  Auditory hallucinations: none  Visual hallucinations: none  Paranoia: none  Delusions:  none  Suicidal ideation: none  Homicidal ideation: none  Attention/Concentration:  intact  Memory:    Recent:  Intact   Remote: Intact   3/3 immediate, 3/3 at 5 min  Fund of Knowledge:  well-educated, bright  Abstract reasoning: intact  Insight: intact, has awareness of illness  Judgment: behavior is adequate to circumstances      Past Medical History:   Past Medical History:   Diagnosis Date    Anticoagulant long-term use     Arthritis     Coronary artery disease     Dr. Lamb    DDD (degenerative disc disease), cervical     Degenerative disc disease     Heart murmur     History of radiation therapy 2020    Hypertension     Nontoxic multinodular goiter 09/20/2016    Prostate CA     RA (rheumatoid arthritis)     Sleep apnea     No CPAP    Vitamin D insufficiency 09/30/2016      Laboratory Data:   Labs Reviewed   CBC W/ AUTO DIFFERENTIAL - Abnormal; Notable for the following components:       Result Value    Hemoglobin 13.6 (*)     Lymph # 0.6 (*)     Gran % 76.5 (*)     Lymph % 10.6 (*)     All other components within normal limits   COMPREHENSIVE METABOLIC PANEL - Abnormal; Notable for the following components:    CO2 22 (*)     Glucose 139 (*)     eGFR 52.1 (*)     Anion Gap 7 (*)     All other components within normal limits   TROPONIN I HIGH SENSITIVITY - Abnormal; Notable for the following components:    Troponin I High " Sensitivity 31.4 (*)     All other components within normal limits   B-TYPE NATRIURETIC PEPTIDE - Abnormal; Notable for the following components:     (*)     All other components within normal limits   TROPONIN I HIGH SENSITIVITY - Abnormal; Notable for the following components:    Troponin I High Sensitivity 30.5 (*)     All other components within normal limits   URINALYSIS, REFLEX TO URINE CULTURE - Abnormal; Notable for the following components:    Leukocytes, UA Trace (*)     All other components within normal limits    Narrative:     Specimen Source->Urine   URINALYSIS MICROSCOPIC - Abnormal; Notable for the following components:    RBC, UA 10 (*)     WBC, UA 8 (*)     All other components within normal limits    Narrative:     Specimen Source->Urine   MAGNESIUM   SARS-COV-2 RNA AMPLIFICATION, QUAL   OCCULT BLOOD X 1, STOOL   TSH   TSH       Neurological History:  Seizures: No  Head trauma: No    Allergies:   Review of patient's allergies indicates:  No Known Allergies    Medications in ER:   Medications   lactated ringers infusion (1,000 mLs Intravenous New Bag 10/22/23 1029)       Medications at home: losartan, PRN ambien    No new subjective & objective note has been filed under this hospital service since the last note was generated.      Assessment - Diagnosis - Goals:     Assessment:  Anxiety, unspecified, likely 2/2 medical illness  Long Qtc with new arrythmia, fatigue, dypnea on exertion  35 pound weight loss    ISABEL, not on CPAP    Recommend inpatient admission for syncope work up- telemetry    I discussed with pt the risk of QT prolongation with ambien, risk of torsades. I recommended he stop taking ambien and recommended trial of alternative, mirtazapine 15 mg QHS    Plan:  1. Dispo/Legal Status: Pt does not meet criteria for involuntary hold or PEC  at this time  2. Scheduled Medications: STOP ambien, initiate mirtazapine 15 mg QHS. Discussed r/b/a with patient  3. PRN Medications: Ativan  0.25 mg PRN for breakthrough anxiety, do not recommend d/c patient with benzodiazepine  4. Precautions/Nursing: cardiac  5. To-Do: pls obtain repeat EKG after change in medication, monitor for Qtc prolongation   6. Please ensure referral to outpatient geriatric psychiatry for follow up upon discharge, and please re-consult telepsychiatry with any changes in patient status or needs for alternative med recommendations.  7- untreated ISABEL may be worsening cardiomyopathy, recommend trial and discussion to encourage adherence.      Time with patient: 45 minutes      More than 50% of the time was spent counseling/coordinating care    Consulting clinician was informed of the encounter and consult note.    Consultation ended: 10/22/2023 at 5:10P    Lavern Almendarez MD   Psychiatry  [facility]

## 2023-10-22 NOTE — HPI
79 y/o male with history of HTN, anxiety, depression, GERD, prediabetes, CKD 3, PUD, and history of multiple  cancers presents to the ED for increasing fatigue and insomnia for the last 5 months. Patient states that he has not slept more than 2-3 hours a night for the last 5 months. He states that he has also been experiencing increasing generalized fatigue and has had multiple episodes of presyncope where he became very lightheaded and had to sit down. Patient states that he has been taking Ambien nightly and is able to go to sleep until he wakes up to urinate. He states the first time he is usually able to go back to sleep but when he wakes up again to urinate he is up until the next night when he can take his Ambien again. Patient states that when he gets lightheaded he is usually up walking and gets SOB and exertional chest pain. Patient has decreased his PO fluid intake drastically intermittently for the last 2 weeks to try to avoid having to get up to use the bathroom at night however it has not helped. Patient denies nay abdominal pain, nausea, vomiting, falls, or loss of consciousness.     ED: Vitals showed HR 91, /94, RR 19 saturating at 98% on RA. Labs were significant for troponin 31.4, second troponin 30.5, , UA that showed RBC and WBC, glucose of 139, and occult blood negative. CXR showed No acute pulmonary process.

## 2023-10-22 NOTE — SUBJECTIVE & OBJECTIVE
Past Medical History:   Diagnosis Date    Anticoagulant long-term use     Arthritis     Coronary artery disease     Dr. Lamb    DDD (degenerative disc disease), cervical     Degenerative disc disease     Heart murmur     History of radiation therapy 2020    Hypertension     Nontoxic multinodular goiter 09/20/2016    Prostate CA     RA (rheumatoid arthritis)     Sleep apnea     No CPAP    Vitamin D insufficiency 09/30/2016       Past Surgical History:   Procedure Laterality Date    CARPAL TUNNEL RELEASE Right 05/28/2021    Procedure: RELEASE, CARPAL TUNNEL;  Surgeon: Jose Ryan II, MD;  Location: [facility] OR;  Service: Orthopedics;  Laterality: Right;    COLONOSCOPY  prior to 2016    normal findings per patient report    CORONARY ANGIOPLASTY WITH STENT PLACEMENT  02/2022    CYSTOSCOPY N/A 12/18/2018    Procedure: CYSTOSCOPY;  Surgeon: Garrett Pina MD;  Location: [facility] OR;  Service: Urology;  Laterality: N/A;    CYSTOSCOPY N/A 07/12/2022    Procedure: CYSTOSCOPY;  Surgeon: Garrett Pina MD;  Location: [facility] OR;  Service: Urology;  Laterality: N/A;    ESOPHAGOGASTRODUODENOSCOPY N/A 09/29/2020    Dr. Espinosa; empiric dilation; erythematous mucosa in antrum; gastric mucosal atrophy; hematin in entire stomach; biopsy: mid & distal esophagus WNL, stomach- WNL, negative for h pylori    EXCISION OF LESION OF PENIS N/A 2/23/2023    Procedure: EXCISION, LESION, PENIS;  Surgeon: Timo Myers MD;  Location: [facility];  Service: Urology;  Laterality: N/A;    INSERTION OF TUNNELED CENTRAL VENOUS CATHETER (CVC) WITH SUBCUTANEOUS PORT Left 5/18/2023    Procedure: CWORZITIR-VAOR-D-CATH;  Surgeon: Atul Faria MD;  Location: [facility];  Service: General;  Laterality: Left;  left subclavian    PARATHYROIDECTOMY Right 10/27/2020    Procedure: PARATHYROIDECTOMY;  Surgeon: Latonia Clayton MD;  Location: [address];  Service: ENT;  Laterality: Right;    RELEASE OF ULNAR NERVE AT CUBITAL TUNNEL Right 05/28/2021     Procedure: RELEASE, ULNAR TUNNEL;  Surgeon: Jose Ryan II, MD;  Location: [facility] OR;  Service: Orthopedics;  Laterality: Right;    TRANSRECTAL BIOPSY OF PROSTATE WITH ULTRASOUND GUIDANCE N/A 12/18/2018    Procedure: BIOPSY, PROSTATE, RECTAL APPROACH, WITH US GUIDANCE;  Surgeon: Garrett Pina MD;  Location: [facility] OR;  Service: Urology;  Laterality: N/A;    TRANSRECTAL ULTRASOUND EXAMINATION N/A 07/12/2022    Procedure: ULTRASOUND, RECTAL APPROACH;  Surgeon: Garrett Pian MD;  Location: [facility] OR;  Service: Urology;  Laterality: N/A;       Review of patient's allergies indicates:  No Known Allergies    Current Facility-Administered Medications on File Prior to Encounter   Medication    albuterol nebulizer solution 2.5 mg    albuterol-ipratropium 2.5 mg-0.5 mg/3 mL nebulizer solution 3 mL    denosumab (PROLIA) injection 60 mg    diphenhydrAMINE injection 25 mg    HYDROmorphone injection 0.5 mg    lactated ringers infusion    ondansetron injection 4 mg    sodium chloride 0.9% flush 10 mL    sodium chloride 0.9% flush 10 mL    sodium chloride 0.9% flush 3 mL     Current Outpatient Medications on File Prior to Encounter   Medication Sig    amLODIPine (NORVASC) 5 MG tablet Take 1 tablet (5 mg total) by mouth 2 (two) times daily.    atorvastatin (LIPITOR) 20 MG tablet Take 1 tablet (20 mg total) by mouth once daily.    b complex vitamins capsule Take 1 capsule by mouth once daily.    busPIRone (BUSPAR) 10 MG tablet Take 1 tablet (10 mg total) by mouth 3 (three) times daily.    cyproheptadine (PERIACTIN) 4 mg tablet Take 1 tablet (4 mg total) by mouth 4 (four) times daily.    EScitalopram oxalate (LEXAPRO) 10 MG tablet Take 1 tablet (10 mg total) by mouth once daily.    isosorbide mononitrate (IMDUR) 30 MG 24 hr tablet Take 1 tablet (30 mg total) by mouth once daily.    losartan (COZAAR) 25 MG tablet Take 1 tablet (25 mg total) by mouth once daily.    mirabegron (MYRBETRIQ) 25 mg Tb24 ER tablet Take 1 tablet  (25 mg total) by mouth once daily.    pantoprazole (PROTONIX) 40 MG tablet Take 1 tablet (40 mg total) by mouth once daily.    phenazopyridine (PYRIDIUM) 200 MG tablet Take 1 tablet (200 mg total) by mouth 3 (three) times daily as needed for Pain.    predniSONE (DELTASONE) 5 MG tablet Take 2 tablets (10 mg total) by mouth 2 (two) times daily.    zolpidem (AMBIEN) 10 mg Tab Take 1 tablet (10 mg total) by mouth nightly as needed (insomnia).    betamethasone valerate 0.1% (VALISONE) 0.1 % Crea Apply topically 2 (two) times daily.    [DISCONTINUED] cholecalciferol, vitamin D3, (VITAMIN D3) 100 mcg (4,000 unit) Cap Take 400 Units by mouth once daily.    [DISCONTINUED] cloNIDine (CATAPRES) 0.1 MG tablet Take 1 tablet (0.1 mg total) by mouth 2 (two) times daily as needed (only if blood pressure top number is over 200). (Patient not taking: Reported on 2022)    [DISCONTINUED] meclizine (ANTIVERT) 12.5 mg tablet Take 1 tablet (12.5 mg total) by mouth 2 (two) times daily as needed for Dizziness.    [DISCONTINUED] sildenafiL (VIAGRA) 100 MG tablet Take 1 tablet (100 mg total) by mouth daily as needed for Erectile Dysfunction.     Family History       Problem Relation (Age of Onset)    Diabetes Maternal Uncle    Drug abuse Sister    Heart disease Mother    No Known Problems Daughter, Daughter, Son    Stroke Father          Tobacco Use    Smoking status: Former     Current packs/day: 0.00     Average packs/day: 0.3 packs/day for 10.0 years (2.5 ttl pk-yrs)     Types: Cigarettes     Start date: 1991     Quit date: 2001     Years since quittin.2     Passive exposure: Past    Smokeless tobacco: Never   Substance and Sexual Activity    Alcohol use: Not Currently     Comment: seldom    Drug use: Not Currently     Frequency: 8.0 times per week     Types: Marijuana    Sexual activity: Yes     Partners: Female     Review of Systems   Constitutional:  Positive for fatigue. Negative for activity change, appetite change,  chills, diaphoresis and fever.   HENT:  Negative for congestion, ear discharge, ear pain, postnasal drip, rhinorrhea, sinus pressure and sore throat.    Eyes:  Negative for pain, discharge, redness, itching and visual disturbance.   Respiratory:  Positive for shortness of breath (exertional). Negative for cough and chest tightness.    Cardiovascular:  Positive for chest pain (exertional). Negative for leg swelling.   Gastrointestinal:  Negative for abdominal pain, constipation, diarrhea, nausea and vomiting.   Genitourinary:  Negative for dysuria, frequency, hematuria and urgency.   Musculoskeletal:  Negative for back pain.   Skin:  Negative for color change, pallor and rash.   Neurological:  Positive for light-headedness. Negative for dizziness, syncope, facial asymmetry and weakness.   Psychiatric/Behavioral:  Positive for sleep disturbance (insomnia for 5 months). Negative for behavioral problems, confusion and hallucinations.      Objective:     Vital Signs (Most Recent):  Temp: 98.4 °F (36.9 °C) (10/22/23 0926)  Pulse: 73 (10/22/23 1800)  Resp: 20 (10/22/23 1801)  BP: (!) 147/75 (10/22/23 1800)  SpO2: 100 % (10/22/23 1800) Vital Signs (24h Range):  Temp:  [98.4 °F (36.9 °C)] 98.4 °F (36.9 °C)  Pulse:  [71-92] 73  Resp:  [19-24] 20  SpO2:  [98 %-100 %] 100 %  BP: (147-182)/(74-94) 147/75     Weight: 59 kg (130 lb)  Body mass index is 19.77 kg/m².     Physical Exam  Vitals and nursing note reviewed.   Constitutional:       General: He is not in acute distress.     Appearance: Normal appearance. He is not ill-appearing, toxic-appearing or diaphoretic.   HENT:      Head: Normocephalic and atraumatic.      Nose: Nose normal.      Mouth/Throat:      Mouth: Mucous membranes are moist.   Eyes:      Extraocular Movements: Extraocular movements intact.   Cardiovascular:      Rate and Rhythm: Normal rate and regular rhythm.      Heart sounds: Normal heart sounds.   Pulmonary:      Effort: Pulmonary effort is normal. No  respiratory distress.      Breath sounds: Normal breath sounds. No wheezing.   Abdominal:      General: Abdomen is flat. Bowel sounds are normal.      Palpations: Abdomen is soft. There is no mass.      Tenderness: There is no abdominal tenderness. There is no guarding.      Hernia: No hernia is present.   Musculoskeletal:         General: No swelling, tenderness or deformity. Normal range of motion.      Cervical back: Normal range of motion. No rigidity or tenderness.   Skin:     General: Skin is warm and dry.      Coloration: Skin is not jaundiced.      Findings: No bruising or erythema.   Neurological:      General: No focal deficit present.      Mental Status: He is alert and oriented to person, place, and time. Mental status is at baseline.   Psychiatric:         Mood and Affect: Mood is anxious.         Behavior: Behavior normal.                Significant Labs: All pertinent labs within the past 24 hours have been reviewed.  CBC:   Recent Labs   Lab 10/22/23  1025   WBC 5.55   HGB 13.6*   HCT 42.0        CMP:   Recent Labs   Lab 10/22/23  1025      K 4.3      CO2 22*   *   BUN 23   CREATININE 1.4   CALCIUM 9.1   PROT 7.3   ALBUMIN 3.6   BILITOT 0.5   ALKPHOS 56   AST 14   ALT 11   ANIONGAP 7*     Cardiac Markers:   Recent Labs   Lab 10/22/23  1025   *     Magnesium:   Recent Labs   Lab 10/22/23  1025   MG 2.1     Troponin:   Recent Labs   Lab 10/22/23  1025 10/22/23  1302   TROPONINIHS 31.4* 30.5*     Urine Studies:   Recent Labs   Lab 10/22/23  1125   COLORU Yellow   APPEARANCEUA Clear   PHUR 8.0   SPECGRAV 1.015   PROTEINUA Negative   GLUCUA Negative   KETONESU Negative   BILIRUBINUA Negative   OCCULTUA Negative   NITRITE Negative   UROBILINOGEN Negative   LEUKOCYTESUR Trace*   RBCUA 10*   WBCUA 8*   BACTERIA Negative   SQUAMEPITHEL 1   HYALINECASTS 1       Significant Imaging: I have reviewed all pertinent imaging results/findings within the past 24 hours.

## 2023-10-22 NOTE — H&P
"[facility] - Emergency Dept  Hospital Medicine  History & Physical    Patient Name: Rajendra Castle  MRN: [id number]  Patient Class: OP- Observation  Admission Date: 10/22/2023  Attending Physician: Dr. Kuo  Primary Care Provider: Sp Lilly MD         Patient information was obtained from patient and ER records.     Subjective:     Principal Problem:Fatigue    Chief Complaint:   Chief Complaint   Patient presents with    Fatigue     "I feel weak and fatigued, I passed out at home and I can hardly walk"        HPI: 79 y/o male with history of HTN, anxiety, depression, GERD, prediabetes, CKD 3, PUD, and history of multiple  cancers presents to the ED for increasing fatigue and insomnia for the last 5 months. Patient states that he has not slept more than 2-3 hours a night for the last 5 months. He states that he has also been experiencing increasing generalized fatigue and has had multiple episodes of presyncope where he became very lightheaded and had to sit down. Patient states that he has been taking Ambien nightly and is able to go to sleep until he wakes up to urinate. He states the first time he is usually able to go back to sleep but when he wakes up again to urinate he is up until the next night when he can take his Ambien again. Patient states that when he gets lightheaded he is usually up walking and gets SOB and exertional chest pain. Patient has decreased his PO fluid intake drastically intermittently for the last 2 weeks to try to avoid having to get up to use the bathroom at night however it has not helped. Patient denies nay abdominal pain, nausea, vomiting, falls, or loss of consciousness.     ED: Vitals showed HR 91, /94, RR 19 saturating at 98% on RA. Labs were significant for troponin 31.4, second troponin 30.5, , UA that showed RBC and WBC, glucose of 139, and occult blood negative. CXR showed No acute pulmonary process.      Past Medical History: "   Diagnosis Date    Anticoagulant long-term use     Arthritis     Coronary artery disease     Dr. Lamb    DDD (degenerative disc disease), cervical     Degenerative disc disease     Heart murmur     History of radiation therapy 2020    Hypertension     Nontoxic multinodular goiter 09/20/2016    Prostate CA     RA (rheumatoid arthritis)     Sleep apnea     No CPAP    Vitamin D insufficiency 09/30/2016       Past Surgical History:   Procedure Laterality Date    CARPAL TUNNEL RELEASE Right 05/28/2021    Procedure: RELEASE, CARPAL TUNNEL;  Surgeon: Jose Ryan II, MD;  Location: [facility];  Service: Orthopedics;  Laterality: Right;    COLONOSCOPY  prior to 2016    normal findings per patient report    CORONARY ANGIOPLASTY WITH STENT PLACEMENT  02/2022    CYSTOSCOPY N/A 12/18/2018    Procedure: CYSTOSCOPY;  Surgeon: Garrett Pina MD;  Location: [facility] OR;  Service: Urology;  Laterality: N/A;    CYSTOSCOPY N/A 07/12/2022    Procedure: CYSTOSCOPY;  Surgeon: Garrett Pina MD;  [facility];  Service: Urology;  Laterality: N/A;    ESOPHAGOGASTRODUODENOSCOPY N/A 09/29/2020    Dr. Espinosa; empiric dilation; erythematous mucosa in antrum; gastric mucosal atrophy; hematin in entire stomach; biopsy: mid & distal esophagus WNL, stomach- WNL, negative for h pylori    EXCISION OF LESION OF PENIS N/A 2/23/2023    Procedure: EXCISION, LESION, PENIS;  Surgeon: Timo Myers MD;  Location: [facility];  Service: Urology;  Laterality: N/A;    INSERTION OF TUNNELED CENTRAL VENOUS CATHETER (CVC) WITH SUBCUTANEOUS PORT Left 5/18/2023    Procedure: [id number];  Surgeon: Atul Faria MD;  Location: [facility];  Service: General;  Laterality: Left;  left subclavian    PARATHYROIDECTOMY Right 10/27/2020    Procedure: PARATHYROIDECTOMY;  Surgeon: Latonia Clayton MD;  Location: [address];  Service: ENT;  Laterality: Right;    RELEASE OF ULNAR NERVE AT CUBITAL TUNNEL Right 05/28/2021     Procedure: RELEASE, ULNAR TUNNEL;  Surgeon: Jose Ryan II, MD;  Location: [facility] OR;  Service: Orthopedics;  Laterality: Right;    TRANSRECTAL BIOPSY OF PROSTATE WITH ULTRASOUND GUIDANCE N/A 12/18/2018    Procedure: BIOPSY, PROSTATE, RECTAL APPROACH, WITH US GUIDANCE;  Surgeon: Garrett Pina MD;  [facility];  Service: Urology;  Laterality: N/A;    TRANSRECTAL ULTRASOUND EXAMINATION N/A 07/12/2022    Procedure: ULTRASOUND, RECTAL APPROACH;  Surgeon: Garrett Pina MD;  [facility];  Service: Urology;  Laterality: N/A;       Review of patient's allergies indicates:  No Known Allergies    Current Facility-Administered Medications on File Prior to Encounter   Medication    albuterol nebulizer solution 2.5 mg    albuterol-ipratropium 2.5 mg-0.5 mg/3 mL nebulizer solution 3 mL    denosumab (PROLIA) injection 60 mg    diphenhydrAMINE injection 25 mg    HYDROmorphone injection 0.5 mg    lactated ringers infusion    ondansetron injection 4 mg    sodium chloride 0.9% flush 10 mL    sodium chloride 0.9% flush 10 mL    sodium chloride 0.9% flush 3 mL     Current Outpatient Medications on File Prior to Encounter   Medication Sig    amLODIPine (NORVASC) 5 MG tablet Take 1 tablet (5 mg total) by mouth 2 (two) times daily.    atorvastatin (LIPITOR) 20 MG tablet Take 1 tablet (20 mg total) by mouth once daily.    b complex vitamins capsule Take 1 capsule by mouth once daily.    busPIRone (BUSPAR) 10 MG tablet Take 1 tablet (10 mg total) by mouth 3 (three) times daily.    cyproheptadine (PERIACTIN) 4 mg tablet Take 1 tablet (4 mg total) by mouth 4 (four) times daily.    EScitalopram oxalate (LEXAPRO) 10 MG tablet Take 1 tablet (10 mg total) by mouth once daily.    isosorbide mononitrate (IMDUR) 30 MG 24 hr tablet Take 1 tablet (30 mg total) by mouth once daily.    losartan (COZAAR) 25 MG tablet Take 1 tablet (25 mg total) by mouth once daily.    mirabegron (MYRBETRIQ) 25 mg Tb24 ER  tablet Take 1 tablet (25 mg total) by mouth once daily.    pantoprazole (PROTONIX) 40 MG tablet Take 1 tablet (40 mg total) by mouth once daily.    phenazopyridine (PYRIDIUM) 200 MG tablet Take 1 tablet (200 mg total) by mouth 3 (three) times daily as needed for Pain.    predniSONE (DELTASONE) 5 MG tablet Take 2 tablets (10 mg total) by mouth 2 (two) times daily.    zolpidem (AMBIEN) 10 mg Tab Take 1 tablet (10 mg total) by mouth nightly as needed (insomnia).    betamethasone valerate 0.1% (VALISONE) 0.1 % Crea Apply topically 2 (two) times daily.    [DISCONTINUED] cholecalciferol, vitamin D3, (VITAMIN D3) 100 mcg (4,000 unit) Cap Take 400 Units by mouth once daily.    [DISCONTINUED] cloNIDine (CATAPRES) 0.1 MG tablet Take 1 tablet (0.1 mg total) by mouth 2 (two) times daily as needed (only if blood pressure top number is over 200). (Patient not taking: Reported on 2022)    [DISCONTINUED] meclizine (ANTIVERT) 12.5 mg tablet Take 1 tablet (12.5 mg total) by mouth 2 (two) times daily as needed for Dizziness.    [DISCONTINUED] sildenafiL (VIAGRA) 100 MG tablet Take 1 tablet (100 mg total) by mouth daily as needed for Erectile Dysfunction.     Family History       Problem Relation (Age of Onset)    Diabetes Maternal Uncle    Drug abuse Sister    Heart disease Mother    No Known Problems Daughter, Daughter, Son    Stroke Father          Tobacco Use    Smoking status: Former     Current packs/day: 0.00     Average packs/day: 0.3 packs/day for 10.0 years (2.5 ttl pk-yrs)     Types: Cigarettes     Start date: 1991     Quit date: 2001     Years since quittin.2     Passive exposure: Past    Smokeless tobacco: Never   Substance and Sexual Activity    Alcohol use: Not Currently     Comment: seldom    Drug use: Not Currently     Frequency: 8.0 times per week     Types: Marijuana    Sexual activity: Yes     Partners: Female     Review of Systems   Constitutional:  Positive for fatigue. Negative for  activity change, appetite change, chills, diaphoresis and fever.   HENT:  Negative for congestion, ear discharge, ear pain, postnasal drip, rhinorrhea, sinus pressure and sore throat.    Eyes:  Negative for pain, discharge, redness, itching and visual disturbance.   Respiratory:  Positive for shortness of breath (exertional). Negative for cough and chest tightness.    Cardiovascular:  Positive for chest pain (exertional). Negative for leg swelling.   Gastrointestinal:  Negative for abdominal pain, constipation, diarrhea, nausea and vomiting.   Genitourinary:  Negative for dysuria, frequency, hematuria and urgency.   Musculoskeletal:  Negative for back pain.   Skin:  Negative for color change, pallor and rash.   Neurological:  Positive for light-headedness. Negative for dizziness, syncope, facial asymmetry and weakness.   Psychiatric/Behavioral:  Positive for sleep disturbance (insomnia for 5 months). Negative for behavioral problems, confusion and hallucinations.      Objective:     Vital Signs (Most Recent):  Temp: 98.4 °F (36.9 °C) (10/22/23 0926)  Pulse: 73 (10/22/23 1800)  Resp: 20 (10/22/23 1801)  BP: (!) 147/75 (10/22/23 1800)  SpO2: 100 % (10/22/23 1800) Vital Signs (24h Range):  Temp:  [98.4 °F (36.9 °C)] 98.4 °F (36.9 °C)  Pulse:  [71-92] 73  Resp:  [19-24] 20  SpO2:  [98 %-100 %] 100 %  BP: (147-182)/(74-94) 147/75     Weight: 59 kg (130 lb)  Body mass index is 19.77 kg/m².     Physical Exam  Vitals and nursing note reviewed.   Constitutional:       General: He is not in acute distress.     Appearance: Normal appearance. He is not ill-appearing, toxic-appearing or diaphoretic.   HENT:      Head: Normocephalic and atraumatic.      Nose: Nose normal.      Mouth/Throat:      Mouth: Mucous membranes are moist.   Eyes:      Extraocular Movements: Extraocular movements intact.   Cardiovascular:      Rate and Rhythm: Normal rate and regular rhythm.      Heart sounds: Normal heart sounds.   Pulmonary:      Effort:  Pulmonary effort is normal. No respiratory distress.      Breath sounds: Normal breath sounds. No wheezing.   Abdominal:      General: Abdomen is flat. Bowel sounds are normal.      Palpations: Abdomen is soft. There is no mass.      Tenderness: There is no abdominal tenderness. There is no guarding.      Hernia: No hernia is present.   Musculoskeletal:         General: No swelling, tenderness or deformity. Normal range of motion.      Cervical back: Normal range of motion. No rigidity or tenderness.   Skin:     General: Skin is warm and dry.      Coloration: Skin is not jaundiced.      Findings: No bruising or erythema.   Neurological:      General: No focal deficit present.      Mental Status: He is alert and oriented to person, place, and time. Mental status is at baseline.   Psychiatric:         Mood and Affect: Mood is anxious.         Behavior: Behavior normal.                Significant Labs: All pertinent labs within the past 24 hours have been reviewed.  CBC:   Recent Labs   Lab 10/22/23  1025   WBC 5.55   HGB 13.6*   HCT 42.0        CMP:   Recent Labs   Lab 10/22/23  1025      K 4.3      CO2 22*   *   BUN 23   CREATININE 1.4   CALCIUM 9.1   PROT 7.3   ALBUMIN 3.6   BILITOT 0.5   ALKPHOS 56   AST 14   ALT 11   ANIONGAP 7*     Cardiac Markers:   Recent Labs   Lab 10/22/23  1025   *     Magnesium:   Recent Labs   Lab 10/22/23  1025   MG 2.1     Troponin:   Recent Labs   Lab 10/22/23  1025 10/22/23  1302   TROPONINIHS 31.4* 30.5*     Urine Studies:   Recent Labs   Lab 10/22/23  1125   COLORU Yellow   APPEARANCEUA Clear   PHUR 8.0   SPECGRAV 1.015   PROTEINUA Negative   GLUCUA Negative   KETONESU Negative   BILIRUBINUA Negative   OCCULTUA Negative   NITRITE Negative   UROBILINOGEN Negative   LEUKOCYTESUR Trace*   RBCUA 10*   WBCUA 8*   BACTERIA Negative   SQUAMEPITHEL 1   HYALINECASTS 1       Significant Imaging: I have reviewed all pertinent imaging results/findings within the  past 24 hours.    Assessment/Plan:     * Fatigue  Patient came in with 5 months of extreme fatigue and presyncope  Patient denies any falls   CXR   - no acute pulmonary changes  CT head ordered   Occult blood negative with history of PUD and Chronic GI bleeding  - Consider GI f/u outpatient for this and subjective weight loss   CBC stable   Orthostatics negative  B12 ordered   Continue to monitor     Anxiety and depression  Patient is very anxious in the ED  Patient denies SI/HI    Patient's anxiety is likely contributing to his Insomnia   Patient should continue home   - Buspar  - Lexapro ( which we increased to 20 mg daily)    PRN Ativan ordered for anxiety          Other insomnia  Patient has had insomnia for the last 5 months and has only slept about 2-3 hours per night  Patient is taking Ambien nightly  - Discontinue per tele-psych      Patient was seen by tele-psych and they suggested the patient be switched to Mirtazapine   Continue to monitor       Exertional chest pain  Patient states that he is experiencing exertional CP and SOB when he has had his presyncopal events  Patient has history of Mobitz 1  Patient has been taking Ambien nightly  - Discontinued   ECG: mobitz 1 with long QT with no acute changes when compared to last ECG  Repeat ECG ordered  ECHO ordered  Cardiology consulted  Bilateral Carotid US ordered       Prediabetes  Patient is not on home insulin   Sliding scale insulin ordered for PRN use for elevated POC glucose  Continue to monitor with POC glucose         Gastroesophageal reflux disease without esophagitis  Continue home PPI      Essential hypertension  Continue home regimen as tolerated  Continue to monitor vitals       VTE Risk Mitigation (From admission, onward)         Ordered     enoxaparin injection 40 mg  Daily         10/22/23 1817     IP VTE HIGH RISK PATIENT  Once         10/22/23 1817     Place sequential compression device  Until discontinued         10/22/23 1817                      On 10/22/2023, patient should be placed in hospital observation services under my care in collaboration with Dr. Kuo.          Bella Morales PA-C  Department of Hospital Medicine  [facility] - Emergency Dept

## 2023-10-22 NOTE — ASSESSMENT & PLAN NOTE
Patient came in with 5 months of extreme fatigue and presyncope  Patient denies any falls   CXR   - no acute pulmonary changes  CT head ordered   Occult blood negative with history of PUD and Chronic GI bleeding  - Consider GI f/u outpatient for this and subjective weight loss   CBC stable   Orthostatics negative  B12 ordered   Continue to monitor

## 2023-10-22 NOTE — ASSESSMENT & PLAN NOTE
Patient states that he is experiencing exertional CP and SOB when he has had his presyncopal events  Patient has history of Mobitz 1  Patient has been taking Ambien nightly  - Discontinued   ECG: mobitz 1 with long QT with no acute changes when compared to last ECG  Repeat ECG ordered  ECHO ordered  Cardiology consulted  Bilateral Carotid US ordered

## 2023-10-22 NOTE — ED NOTES
INCREASEING AGITATION AT REST REPORTED TO MD.  PT DISSATISFIED WITH WAIT TIME.  PROVIDED WITH PEN AND PAPER. POC UPDATE. NO FURTHER ORDERS.

## 2023-10-22 NOTE — ASSESSMENT & PLAN NOTE
Patient is very anxious in the ED  Patient denies SI/HI    Patient's anxiety is likely contributing to his Insomnia   Patient should continue home   - Buspar  - Lexapro ( which we increased to 20 mg daily)    PRN Ativan ordered for anxiety

## 2023-10-22 NOTE — ASSESSMENT & PLAN NOTE
Patient has had insomnia for the last 5 months and has only slept about 2-3 hours per night  Patient is taking Ambien nightly  - Discontinue per tele-psych      Patient was seen by tele-psych and they suggested the patient be switched to Mirtazapine   Continue to monitor

## 2023-10-22 NOTE — ED NOTES
"C/O WEAKNESS, FATIGUE AND INSOMNIA PRIOR TO ARRIVAL.  ASKING FOR SEDATIVE AT ARRIVAL . FRAIL APPEARANCE.  STATE WEIGHT LOSS OVER 40LBS IN LAST MONTH. PRIVATE ROOM. EVEN AND NON LABORED RESPIRATIONS.  AIRWAY CLEAR.  PULSES REGULAR.  < 3" CAPILLARY REFILL. SKIN WDI.  MAEW.  NON DISTENDED ABDOMEN. ALERT, ORIENTED AND CALM.FALLS PRECAUTIONS. CALL LIGHT IN REACH.   "

## 2023-10-23 ENCOUNTER — CLINICAL SUPPORT (OUTPATIENT)
Dept: CARDIOLOGY | Facility: HOSPITAL | Age: 78
End: 2023-10-23
Attending: EMERGENCY MEDICINE
Payer: MEDICARE

## 2023-10-23 VITALS
SYSTOLIC BLOOD PRESSURE: 135 MMHG | RESPIRATION RATE: 18 BRPM | OXYGEN SATURATION: 96 % | HEIGHT: 68 IN | TEMPERATURE: 96 F | BODY MASS INDEX: 19.43 KG/M2 | WEIGHT: 128.19 LBS | DIASTOLIC BLOOD PRESSURE: 69 MMHG | HEART RATE: 93 BPM

## 2023-10-23 VITALS — HEIGHT: 68 IN | BODY MASS INDEX: 19.45 KG/M2 | WEIGHT: 128.31 LBS

## 2023-10-23 LAB
ALBUMIN SERPL BCP-MCNC: 3.3 G/DL (ref 3.5–5.2)
ALP SERPL-CCNC: 56 U/L (ref 55–135)
ALT SERPL W/O P-5'-P-CCNC: 10 U/L (ref 10–44)
ANION GAP SERPL CALC-SCNC: 8 MMOL/L (ref 8–16)
AORTIC ROOT ANNULUS: 3.1 CM
AORTIC VALVE CUSP SEPERATION: 1.3 CM
AST SERPL-CCNC: 12 U/L (ref 10–40)
AV INDEX (PROSTH): 1
AV MEAN GRADIENT: 5 MMHG
AV PEAK GRADIENT: 10 MMHG
AV REGURGITATION PRESSURE HALF TIME: 708 MS
AV VALVE AREA BY VELOCITY RATIO: 1.72 CM²
AV VALVE AREA: 2 CM²
AV VELOCITY RATIO: 0.85
BASOPHILS # BLD AUTO: 0.02 K/UL (ref 0–0.2)
BASOPHILS NFR BLD: 0.3 % (ref 0–1.9)
BILIRUB SERPL-MCNC: 0.3 MG/DL (ref 0.1–1)
BSA FOR ECHO PROCEDURE: 1.67 M2
BUN SERPL-MCNC: 25 MG/DL (ref 8–23)
CALCIUM SERPL-MCNC: 8.7 MG/DL (ref 8.7–10.5)
CHLORIDE SERPL-SCNC: 112 MMOL/L (ref 95–110)
CO2 SERPL-SCNC: 22 MMOL/L (ref 23–29)
CREAT SERPL-MCNC: 1.4 MG/DL (ref 0.5–1.4)
CV ECHO LV RWT: 0.59 CM
DIFFERENTIAL METHOD: ABNORMAL
DOP CALC AO PEAK VEL: 1.58 M/S
DOP CALC AO VTI: 25.9 CM
DOP CALC LVOT AREA: 2 CM2
DOP CALC LVOT DIAMETER: 1.6 CM
DOP CALC LVOT PEAK VEL: 1.35 M/S
DOP CALC LVOT STROKE VOLUME: 51.85 CM3
DOP CALC MV VTI: 22.6 CM
DOP CALCLVOT PEAK VEL VTI: 25.8 CM
E WAVE DECELERATION TIME: 148 MSEC
E/A RATIO: 2.35
E/E' RATIO: 12.11 M/S
ECHO LV POSTERIOR WALL: 1.1 CM (ref 0.6–1.1)
EOSINOPHIL # BLD AUTO: 0.1 K/UL (ref 0–0.5)
EOSINOPHIL NFR BLD: 2.2 % (ref 0–8)
ERYTHROCYTE [DISTWIDTH] IN BLOOD BY AUTOMATED COUNT: 13.9 % (ref 11.5–14.5)
EST. GFR  (NO RACE VARIABLE): 52.1 ML/MIN/1.73 M^2
FRACTIONAL SHORTENING: 35 % (ref 28–44)
GLUCOSE SERPL-MCNC: 108 MG/DL (ref 70–110)
GLUCOSE SERPL-MCNC: 88 MG/DL (ref 70–110)
GLUCOSE SERPL-MCNC: 89 MG/DL (ref 70–110)
HCT VFR BLD AUTO: 41.1 % (ref 40–54)
HGB BLD-MCNC: 13 G/DL (ref 14–18)
IMM GRANULOCYTES # BLD AUTO: 0.03 K/UL (ref 0–0.04)
IMM GRANULOCYTES NFR BLD AUTO: 0.5 % (ref 0–0.5)
INTERVENTRICULAR SEPTUM: 1.05 CM (ref 0.6–1.1)
IVC DIAMETER: 2.31 CM
LEFT INTERNAL DIMENSION IN SYSTOLE: 2.43 CM (ref 2.1–4)
LEFT VENTRICLE DIASTOLIC VOLUME INDEX: 35.5 ML/M2
LEFT VENTRICLE DIASTOLIC VOLUME: 60 ML
LEFT VENTRICLE MASS INDEX: 76 G/M2
LEFT VENTRICLE SYSTOLIC VOLUME INDEX: 12.3 ML/M2
LEFT VENTRICLE SYSTOLIC VOLUME: 20.8 ML
LEFT VENTRICULAR INTERNAL DIMENSION IN DIASTOLE: 3.75 CM (ref 3.5–6)
LEFT VENTRICULAR MASS: 127.6 G
LV LATERAL E/E' RATIO: 16.43 M/S
LV SEPTAL E/E' RATIO: 9.58 M/S
LVOT MG: 4 MMHG
LVOT MV: 0.89 CM/S
LYMPHOCYTES # BLD AUTO: 0.9 K/UL (ref 1–4.8)
LYMPHOCYTES NFR BLD: 14.6 % (ref 18–48)
MCH RBC QN AUTO: 28.8 PG (ref 27–31)
MCHC RBC AUTO-ENTMCNC: 31.6 G/DL (ref 32–36)
MCV RBC AUTO: 91 FL (ref 82–98)
MONOCYTES # BLD AUTO: 0.8 K/UL (ref 0.3–1)
MONOCYTES NFR BLD: 13.3 % (ref 4–15)
MV MEAN GRADIENT: 2 MMHG
MV PEAK A VEL: 0.49 M/S
MV PEAK E VEL: 1.15 M/S
MV PEAK GRADIENT: 6 MMHG
MV VALVE AREA BY CONTINUITY EQUATION: 2.29 CM2
NEUTROPHILS # BLD AUTO: 4.2 K/UL (ref 1.8–7.7)
NEUTROPHILS NFR BLD: 69.1 % (ref 38–73)
NRBC BLD-RTO: 0 /100 WBC
PISA AR MAX VEL: 3.74 M/S
PISA MRMAX VEL: 2.8 M/S
PLATELET # BLD AUTO: 289 K/UL (ref 150–450)
PMV BLD AUTO: 11.8 FL (ref 9.2–12.9)
POTASSIUM SERPL-SCNC: 4.2 MMOL/L (ref 3.5–5.1)
PROT SERPL-MCNC: 6.7 G/DL (ref 6–8.4)
PV MV: 0.98 M/S
PV PEAK GRADIENT: 9 MMHG
PV PEAK VELOCITY: 1.48 M/S
RA PRESSURE ESTIMATED: 3 MMHG
RBC # BLD AUTO: 4.52 M/UL (ref 4.6–6.2)
RIGHT VENTRICULAR END-DIASTOLIC DIMENSION: 2.18 CM
RV TISSUE DOPPLER FREE WALL SYSTOLIC VELOCITY 1 (APICAL 4 CHAMBER VIEW): 14.1 CM/S
SODIUM SERPL-SCNC: 142 MMOL/L (ref 136–145)
TDI LATERAL: 0.07 M/S
TDI SEPTAL: 0.12 M/S
TDI: 0.1 M/S
TRICUSPID ANNULAR PLANE SYSTOLIC EXCURSION: 2.43 CM
TROPONIN I SERPL HS-MCNC: 38.9 PG/ML (ref 0–14.9)
WBC # BLD AUTO: 6.03 K/UL (ref 3.9–12.7)
Z-SCORE OF LEFT VENTRICULAR DIMENSION IN END DIASTOLE: -2.27
Z-SCORE OF LEFT VENTRICULAR DIMENSION IN END SYSTOLE: -1.44

## 2023-10-23 PROCEDURE — 93010 EKG 12-LEAD: ICD-10-PCS | Mod: ,,, | Performed by: INTERNAL MEDICINE

## 2023-10-23 PROCEDURE — 93306 TTE W/DOPPLER COMPLETE: CPT | Mod: 26,,, | Performed by: INTERNAL MEDICINE

## 2023-10-23 PROCEDURE — 80053 COMPREHEN METABOLIC PANEL: CPT

## 2023-10-23 PROCEDURE — 94761 N-INVAS EAR/PLS OXIMETRY MLT: CPT

## 2023-10-23 PROCEDURE — 36415 COLL VENOUS BLD VENIPUNCTURE: CPT

## 2023-10-23 PROCEDURE — 85025 COMPLETE CBC W/AUTO DIFF WBC: CPT

## 2023-10-23 PROCEDURE — 93306 ECHO (CUPID ONLY): ICD-10-PCS | Mod: 26,,, | Performed by: INTERNAL MEDICINE

## 2023-10-23 PROCEDURE — 93306 TTE W/DOPPLER COMPLETE: CPT

## 2023-10-23 PROCEDURE — G0378 HOSPITAL OBSERVATION PER HR: HCPCS

## 2023-10-23 PROCEDURE — 84484 ASSAY OF TROPONIN QUANT: CPT

## 2023-10-23 PROCEDURE — 93005 ELECTROCARDIOGRAM TRACING: CPT | Performed by: INTERNAL MEDICINE

## 2023-10-23 PROCEDURE — 99213 OFFICE O/P EST LOW 20 MIN: CPT | Mod: 25,,, | Performed by: INTERNAL MEDICINE

## 2023-10-23 PROCEDURE — 93010 ELECTROCARDIOGRAM REPORT: CPT | Mod: ,,, | Performed by: INTERNAL MEDICINE

## 2023-10-23 PROCEDURE — 25000003 PHARM REV CODE 250

## 2023-10-23 PROCEDURE — 99213 PR OFFICE/OUTPT VISIT, EST, LEVL III, 20-29 MIN: ICD-10-PCS | Mod: 25,,, | Performed by: INTERNAL MEDICINE

## 2023-10-23 RX ORDER — TRAZODONE HYDROCHLORIDE 50 MG/1
50 TABLET ORAL NIGHTLY
Qty: 30 TABLET | Refills: 0 | Status: SHIPPED | OUTPATIENT
Start: 2023-10-23 | End: 2023-12-12

## 2023-10-23 RX ORDER — TRAZODONE HYDROCHLORIDE 50 MG/1
50 TABLET ORAL NIGHTLY
Status: DISCONTINUED | OUTPATIENT
Start: 2023-10-23 | End: 2023-10-23 | Stop reason: HOSPADM

## 2023-10-23 RX ORDER — PANTOPRAZOLE SODIUM 40 MG/1
40 TABLET, DELAYED RELEASE ORAL DAILY
Qty: 90 TABLET | Refills: 0 | Status: SHIPPED | OUTPATIENT
Start: 2023-10-23

## 2023-10-23 RX ORDER — MIRTAZAPINE 15 MG/1
15 TABLET, FILM COATED ORAL NIGHTLY
Qty: 30 TABLET | Refills: 0 | Status: SHIPPED | OUTPATIENT
Start: 2023-10-23 | End: 2023-12-12

## 2023-10-23 RX ORDER — CYPROHEPTADINE HYDROCHLORIDE 4 MG/1
4 TABLET ORAL 4 TIMES DAILY
Qty: 120 TABLET | Refills: 0 | Status: SHIPPED | OUTPATIENT
Start: 2023-10-23

## 2023-10-23 RX ORDER — PREDNISONE 5 MG/1
10 TABLET ORAL 2 TIMES DAILY
Qty: 120 TABLET | Refills: 0 | Status: SHIPPED | OUTPATIENT
Start: 2023-10-23 | End: 2023-11-22

## 2023-10-23 RX ORDER — BUSPIRONE HYDROCHLORIDE 10 MG/1
10 TABLET ORAL 3 TIMES DAILY
Qty: 90 TABLET | Refills: 0 | Status: SHIPPED | OUTPATIENT
Start: 2023-10-23

## 2023-10-23 RX ORDER — AMLODIPINE BESYLATE 5 MG/1
5 TABLET ORAL 2 TIMES DAILY
Qty: 60 TABLET | Refills: 0 | Status: SHIPPED | OUTPATIENT
Start: 2023-10-23 | End: 2024-02-20

## 2023-10-23 RX ORDER — ESCITALOPRAM OXALATE 10 MG/1
10 TABLET ORAL DAILY
Qty: 90 TABLET | Refills: 0 | Status: SHIPPED | OUTPATIENT
Start: 2023-10-23

## 2023-10-23 RX ORDER — LOSARTAN POTASSIUM 25 MG/1
25 TABLET ORAL DAILY
Qty: 30 TABLET | Refills: 0 | Status: SHIPPED | OUTPATIENT
Start: 2023-10-23 | End: 2024-01-21

## 2023-10-23 RX ORDER — ISOSORBIDE MONONITRATE 30 MG/1
30 TABLET, EXTENDED RELEASE ORAL DAILY
Qty: 30 TABLET | Refills: 0 | Status: SHIPPED | OUTPATIENT
Start: 2023-10-23 | End: 2024-01-21

## 2023-10-23 RX ORDER — ATORVASTATIN CALCIUM 20 MG/1
20 TABLET, FILM COATED ORAL DAILY
Qty: 90 TABLET | Refills: 0 | Status: SHIPPED | OUTPATIENT
Start: 2023-10-23 | End: 2024-10-22

## 2023-10-23 RX ADMIN — BUSPIRONE HYDROCHLORIDE 10 MG: 5 TABLET ORAL at 09:10

## 2023-10-23 RX ADMIN — PANTOPRAZOLE SODIUM 40 MG: 40 TABLET, DELAYED RELEASE ORAL at 05:10

## 2023-10-23 RX ADMIN — AMLODIPINE BESYLATE 5 MG: 5 TABLET ORAL at 09:10

## 2023-10-23 RX ADMIN — ISOSORBIDE MONONITRATE 30 MG: 30 TABLET, EXTENDED RELEASE ORAL at 09:10

## 2023-10-23 RX ADMIN — ATORVASTATIN CALCIUM 20 MG: 20 TABLET, FILM COATED ORAL at 09:10

## 2023-10-23 RX ADMIN — BUSPIRONE HYDROCHLORIDE 10 MG: 5 TABLET ORAL at 02:10

## 2023-10-23 RX ADMIN — OXYBUTYNIN CHLORIDE 5 MG: 5 TABLET, EXTENDED RELEASE ORAL at 09:10

## 2023-10-23 RX ADMIN — LOSARTAN POTASSIUM 25 MG: 25 TABLET, FILM COATED ORAL at 09:10

## 2023-10-23 RX ADMIN — ESCITALOPRAM OXALATE 20 MG: 10 TABLET ORAL at 09:10

## 2023-10-23 RX ADMIN — TRAZODONE HYDROCHLORIDE 50 MG: 50 TABLET ORAL at 02:10

## 2023-10-23 NOTE — CONSULTS
"[facility]  Department of Cardiology  Consult Note      PATIENT NAME: Rajendra Castle  MRN: [id number]  TODAY'S DATE: 10/23/2023  ADMIT DATE: 10/22/2023                          CONSULT REQUESTED BY: Estrada Aguilar MD    SUBJECTIVE     PRINCIPAL PROBLEM: Fatigue      REASON FOR CONSULT:    Pre-syncope with long QT      HPI:    Patient is a 78-year-old male with past medical history of hypertension, anxiety, depression, GERD, diabetes, CKD stage 3, PUD,  cancer, CAD s/p PCI who presented to the ED with increase fatigue and insomnia x 5 months.  Patient is waking up often in the night to urinate.  He has limited his PO intake but continues to have urinary frequency.  He is wanting a new urologist.  He also c/o unexplained weight loss  although patient claims that his appetite has been good and eating well.   Denies falls and syncope.  No headaches gender repair    IN ED: H&H 13/42, K 4.3, Cr 1.4, 2.1, , Troponin HS 31.4, > 30.5, >39.2, >38.9. CT head negative for acute findings. CXR negative. EKG SR with 2nd degree AV block Mobitz I, LVH, prolonged QTI.     ECHO 1/21/23  The left ventricle is normal in size with mild concentric hypertrophy and normal systolic function.  The estimated ejection fraction is 65%.  Normal left ventricular diastolic function.  Normal right ventricular size with normal right ventricular systolic function.  Moderate mitral regurgitation.  Mild-to-moderate aortic regurgitation.  Small circumferential pericardial effusion. Effusion is fluid. No Temponade.    FROM H&P   Fatigue       "I feel weak and fatigued, I passed out at home and I can hardly walk"         HPI: 77 y/o male with history of HTN, anxiety, depression, GERD, prediabetes, CKD 3, PUD, and history of multiple  cancers presents to the ED for increasing fatigue and insomnia for the last 5 months. Patient states that he has not slept more than 2-3 hours a night for the last 5 months. He states that he has also " been experiencing increasing generalized fatigue and has had multiple episodes of presyncope where he became very lightheaded and had to sit down. Patient states that he has been taking Ambien nightly and is able to go to sleep until he wakes up to urinate. He states the first time he is usually able to go back to sleep but when he wakes up again to urinate he is up until the next night when he can take his Ambien again. Patient states that when he gets lightheaded he is usually up walking and gets SOB and exertional chest pain. Patient has decreased his PO fluid intake drastically intermittently for the last 2 weeks to try to avoid having to get up to use the bathroom at night however it has not helped. Patient denies nay abdominal pain, nausea, vomiting, falls, or loss of consciousness.      ED: Vitals showed HR 91, /94, RR 19 saturating at 98% on RA. Labs were significant for troponin 31.4, second troponin 30.5, , UA that showed RBC and WBC, glucose of 139, and occult blood negative. CXR showed No acute pulmonary process.         Review of patient's allergies indicates:  No Known Allergies    Past Medical History:   Diagnosis Date    Anticoagulant long-term use     Arthritis     Coronary artery disease     Dr. Lamb    DDD (degenerative disc disease), cervical     Degenerative disc disease     Heart murmur     History of radiation therapy 2020    Hypertension     Nontoxic multinodular goiter 09/20/2016    Prostate CA     RA (rheumatoid arthritis)     Sleep apnea     No CPAP    Vitamin D insufficiency 09/30/2016     Past Surgical History:   Procedure Laterality Date    CARPAL TUNNEL RELEASE Right 05/28/2021    Procedure: RELEASE, CARPAL TUNNEL;  Surgeon: Jose Ryan II, MD;  Location: [facility];  Service: Orthopedics;  Laterality: Right;    COLONOSCOPY  prior to 2016    normal findings per patient report    CORONARY ANGIOPLASTY WITH STENT PLACEMENT  02/2022    CYSTOSCOPY N/A 12/18/2018     Procedure: CYSTOSCOPY;  Surgeon: Garrett Pina MD;  Location: [facility];  Service: Urology;  Laterality: N/A;    CYSTOSCOPY N/A 2022    Procedure: CYSTOSCOPY;  Surgeon: Garrett Pina MD;  Location: [facility];  Service: Urology;  Laterality: N/A;    ESOPHAGOGASTRODUODENOSCOPY N/A 2020    Dr. Espinosa; empiric dilation; erythematous mucosa in antrum; gastric mucosal atrophy; hematin in entire stomach; biopsy: mid & distal esophagus WNL, stomach- WNL, negative for h pylori    EXCISION OF LESION OF PENIS N/A 2023    Procedure: EXCISION, LESION, PENIS;  Surgeon: Timo Myers MD;  Location: [facility];  Service: Urology;  Laterality: N/A;    INSERTION OF TUNNELED CENTRAL VENOUS CATHETER (CVC) WITH SUBCUTANEOUS PORT Left 2023    Procedure: XOIEFPETC-NCTP-T-CATH;  Surgeon: Atul Faria MD;  Location: [facility];  Service: General;  Laterality: Left;  left subclavian    PARATHYROIDECTOMY Right 10/27/2020    Procedure: PARATHYROIDECTOMY;  Surgeon: Latonia Clayton MD;  Location: [address];  Service: ENT;  Laterality: Right;    RELEASE OF ULNAR NERVE AT CUBITAL TUNNEL Right 2021    Procedure: RELEASE, ULNAR TUNNEL;  Surgeon: Jose Ryan II, MD;  Location: [facility];  Service: Orthopedics;  Laterality: Right;    TRANSRECTAL BIOPSY OF PROSTATE WITH ULTRASOUND GUIDANCE N/A 2018    Procedure: BIOPSY, PROSTATE, RECTAL APPROACH, WITH US GUIDANCE;  Surgeon: Garrett Pina MD;  Location: [facility];  Service: Urology;  Laterality: N/A;    TRANSRECTAL ULTRASOUND EXAMINATION N/A 2022    Procedure: ULTRASOUND, RECTAL APPROACH;  Surgeon: Garrett Pina MD;  Location: [facility];  Service: Urology;  Laterality: N/A;     Social History     Tobacco Use    Smoking status: Former     Current packs/day: 0.00     Average packs/day: 0.3 packs/day for 10.0 years (2.5 ttl pk-yrs)     Types: Cigarettes     Start date: 1991     Quit date: 2001     Years since quittin.2      Passive exposure: Past    Smokeless tobacco: Never   Substance Use Topics    Alcohol use: Not Currently     Comment: seldom    Drug use: Not Currently     Frequency: 8.0 times per week     Types: Marijuana        REVIEW OF SYSTEMS    As mentioned in HPI    OBJECTIVE     VITAL SIGNS (Most Recent)  Temp: 97.5 °F (36.4 °C) (10/23/23 0734)  Pulse: 99 (10/23/23 0734)  Resp: 18 (10/23/23 0734)  BP: 119/74 (10/23/23 0734)  SpO2: 96 % (10/23/23 0734)    VENTILATION STATUS  Resp: 18 (10/23/23 0734)  SpO2: 96 % (10/23/23 0734)           I & O (Last 24H):  Intake/Output Summary (Last 24 hours) at 10/23/2023 0922  Last data filed at 10/23/2023 0326  Gross per 24 hour   Intake 120 ml   Output 800 ml   Net -680 ml       WEIGHTS  Wt Readings from Last 3 Encounters:   10/22/23 2003 58.2 kg (128 lb 3.2 oz)   10/22/23 0926 59 kg (130 lb)   09/12/23 1306 59.9 kg (132 lb)   09/07/23 1436 60 kg (132 lb 4.4 oz)       PHYSICAL EXAM    CONSTITUTIONAL: NAD  HEENT: Normocephalic. No pallor  NECK: no JVD  LUNGS: CTA b/l  HEART: regular rate and rhythm, S1, S2 normal, no murmur   ABDOMEN: soft, non-tender, bowel sounds normal  EXTREMITIES: No edema  SKIN: No rash  NEURO: AAO X 3  PSYCH: normal affect      HOME MEDICATIONS:  Current Facility-Administered Medications on File Prior to Encounter   Medication Dose Route Frequency Provider Last Rate Last Admin    albuterol nebulizer solution 2.5 mg  2.5 mg Nebulization Q15 Min PRN Pastor Saleh MD        albuterol-ipratropium 2.5 mg-0.5 mg/3 mL nebulizer solution 3 mL  3 mL Nebulization PRN Pastor Saleh MD        denosumab (PROLIA) injection 60 mg  60 mg Subcutaneous 1 time in Clinic/HOD Jean Carlos Hoffman MD        diphenhydrAMINE injection 25 mg  25 mg Intravenous Q6H PRN Pastor Saleh MD        HYDROmorphone injection 0.5 mg  0.5 mg Intravenous Q5 Min PRN Pastor Saleh MD   0.5 mg at 02/23/23 1312    lactated ringers infusion   Intravenous Continuous Volpi-Abadie, Jacqueline, MD    Stopped at 02/23/23 1400    ondansetron injection 4 mg  4 mg Intravenous Q15 Min PRN Pastor Saleh MD        sodium chloride 0.9% flush 10 mL  10 mL Intravenous PRN Ayush Guzman MD   10 mL at 06/01/23 1535    sodium chloride 0.9% flush 10 mL  10 mL Intravenous PRN Ayush Guzman MD   10 mL at 06/05/23 1315    sodium chloride 0.9% flush 3 mL  3 mL Intravenous PRN Pastor Saleh MD         Current Outpatient Medications on File Prior to Encounter   Medication Sig Dispense Refill    amLODIPine (NORVASC) 5 MG tablet Take 1 tablet (5 mg total) by mouth 2 (two) times daily. 60 tablet 3    atorvastatin (LIPITOR) 20 MG tablet Take 1 tablet (20 mg total) by mouth once daily. 90 tablet 3    b complex vitamins capsule Take 1 capsule by mouth once daily.      busPIRone (BUSPAR) 10 MG tablet Take 1 tablet (10 mg total) by mouth 3 (three) times daily. 90 tablet 3    cyproheptadine (PERIACTIN) 4 mg tablet Take 1 tablet (4 mg total) by mouth 4 (four) times daily. 120 tablet 2    EScitalopram oxalate (LEXAPRO) 10 MG tablet Take 1 tablet (10 mg total) by mouth once daily. 90 tablet 3    isosorbide mononitrate (IMDUR) 30 MG 24 hr tablet Take 1 tablet (30 mg total) by mouth once daily. 30 tablet 2    losartan (COZAAR) 25 MG tablet Take 1 tablet (25 mg total) by mouth once daily. 30 tablet 2    mirabegron (MYRBETRIQ) 25 mg Tb24 ER tablet Take 1 tablet (25 mg total) by mouth once daily. 30 tablet 6    pantoprazole (PROTONIX) 40 MG tablet Take 1 tablet (40 mg total) by mouth once daily. 90 tablet 1    phenazopyridine (PYRIDIUM) 200 MG tablet Take 1 tablet (200 mg total) by mouth 3 (three) times daily as needed for Pain. 30 tablet 0    predniSONE (DELTASONE) 5 MG tablet Take 2 tablets (10 mg total) by mouth 2 (two) times daily. 120 tablet 3    zolpidem (AMBIEN) 10 mg Tab Take 1 tablet (10 mg total) by mouth nightly as needed (insomnia). 90 tablet 0    betamethasone valerate 0.1% (VALISONE) 0.1 % Crea Apply topically 2 (two)  "times daily. 45 g 0    [DISCONTINUED] cholecalciferol, vitamin D3, (VITAMIN D3) 100 mcg (4,000 unit) Cap Take 400 Units by mouth once daily.      [DISCONTINUED] cloNIDine (CATAPRES) 0.1 MG tablet Take 1 tablet (0.1 mg total) by mouth 2 (two) times daily as needed (only if blood pressure top number is over 200). (Patient not taking: Reported on 6/7/2022) 30 tablet 1    [DISCONTINUED] meclizine (ANTIVERT) 12.5 mg tablet Take 1 tablet (12.5 mg total) by mouth 2 (two) times daily as needed for Dizziness. 30 tablet 0    [DISCONTINUED] sildenafiL (VIAGRA) 100 MG tablet Take 1 tablet (100 mg total) by mouth daily as needed for Erectile Dysfunction. 10 tablet 2       SCHEDULED MEDS:   amLODIPine  5 mg Oral BID    atorvastatin  20 mg Oral Daily    busPIRone  10 mg Oral TID    cefTRIAXone (ROCEPHIN) IVPB  1 g Intravenous Q24H    enoxparin  40 mg Subcutaneous Daily    EScitalopram oxalate  20 mg Oral Daily    isosorbide mononitrate  30 mg Oral Daily    losartan  25 mg Oral Daily    mirtazapine  15 mg Oral QHS    oxybutynin  5 mg Oral Daily    pantoprazole  40 mg Oral Before breakfast    traZODone  50 mg Oral QHS       CONTINUOUS INFUSIONS:    PRN MEDS:acetaminophen, dextrose 50%, dextrose 50%, glucagon (human recombinant), glucose, glucose, insulin aspart U-100, magnesium oxide, magnesium oxide, melatonin, potassium bicarbonate, potassium bicarbonate, potassium bicarbonate, potassium, sodium phosphates, potassium, sodium phosphates, potassium, sodium phosphates, sodium chloride 0.9%    LABS AND DIAGNOSTICS     CBC LAST 3 DAYS  Recent Labs   Lab 10/22/23  1025 10/23/23  0258   WBC 5.55 6.03   RBC 4.70 4.52*   HGB 13.6* 13.0*   HCT 42.0 41.1   MCV 89 91   MCH 28.9 28.8   MCHC 32.4 31.6*   RDW 13.8 13.9    289   MPV 11.5 11.8   GRAN 76.5*  4.3 69.1  4.2   LYMPH 10.6*  0.6* 14.6*  0.9*   MONO 10.5  0.6 13.3  0.8   BASO 0.02 0.02   NRBC 0 0       COAGULATION LAST 3 DAYS  No results for input(s): "LABPT", "INR", "APTT" " "in the last 168 hours.    CHEMISTRY LAST 3 DAYS  Recent Labs   Lab 10/22/23  1025 10/23/23  0258    142   K 4.3 4.2    112*   CO2 22* 22*   ANIONGAP 7* 8   BUN 23 25*   CREATININE 1.4 1.4   * 88   CALCIUM 9.1 8.7   MG 2.1  --    ALBUMIN 3.6 3.3*   PROT 7.3 6.7   ALKPHOS 56 56   ALT 11 10   AST 14 12   BILITOT 0.5 0.3       CARDIAC PROFILE LAST 3 DAYS  Recent Labs   Lab 10/22/23  1025 10/22/23  1302 10/22/23  2053 10/23/23  0258   *  --   --   --    TROPONINIHS 31.4* 30.5* 39.2* 38.9*       ENDOCRINE LAST 3 DAYS  Recent Labs   Lab 10/22/23  1025   TSH 1.277       LAST ARTERIAL BLOOD GAS  ABG  No results for input(s): "PH", "PO2", "PCO2", "HCO3", "BE" in the last 168 hours.    LAST 7 DAYS MICROBIOLOGY   Microbiology Results (last 7 days)       Procedure Component Value Units Date/Time    Urine Culture High Risk [[id number]]     Order Status: Completed Specimen: Urine     Urine Culture High Risk [[id number]]     Order Status: Canceled Specimen: Urine             MOST RECENT IMAGING  CT Head Without Contrast  All CT scans at this facility use dose modulation, degenerative reconstruction  and/or weight-based dosing when appropriate to reduce radiation dose to as low as reasonably achievable.    CT of the brain without contrast    CLINICAL HISTORY: Dizziness, syncope    There are scattered changes of decrease in the attenuation of the periventricular and subcortical white matter which represents a nonspecific finding most likely representative of areas of demyelination on the basis of chronic small vessel ischemia. There are no findings of acute intracranial hemorrhage or infarction. There is no intra-axial mass, mass effect or shift of the midline.    Prominence of the ventricular system and sulci are a reflection of cortical atrophy. Arteriosclerotic calcification is observed within the intracranial segments of the carotid and vertebral arteries.    Viewed at bone windows there is no evidence " of skull fracture. There is scattered mucosal thickening within the paranasal sinuses with small retention cyst or polyp formation observed.    IMPRESSION:    Chronic small vessel ischemic changes and age-related involutional changes.    No acute intracranial abnormality.    Electronically signed by:  Hay Goss MD  10/23/2023 07:03 AM CDT Workstation: [id number]  US Carotid Bilateral  Bilateral Carotid doppler evaluation    Clinical history : Syncope    Grey scale, duplex and color Doppler interrogation of the carotid arteries is performed bilaterally and interpreted in accordance with NASCET criteria. There is mild scattered intimal thickening observed bilaterally. Doppler interrogation fails to demonstrate evidence of hemodynamically significant carotid arterial stenosis. Antegrade flow is observed within both vertebral arteries.    IMPRESSION:    No evidence of hemodynamically significant carotid arterial stenosis.    Electronically signed by:  Hay Goss MD  10/23/2023 06:58 AM CDT Workstation: [id number]      ECHOCARDIOGRAM RESULTS (last 5)  Results for orders placed during the hospital encounter of 01/20/23    Echo    Interpretation Summary  · The left ventricle is normal in size with mild concentric hypertrophy and normal systolic function.  · The estimated ejection fraction is 65%.  · Normal left ventricular diastolic function.  · Normal right ventricular size with normal right ventricular systolic function.  · Moderate mitral regurgitation.  · Mild-to-moderate aortic regurgitation.  · Small circumferential pericardial effusion. Effusion is fluid. No Temponade.      Results for orders placed during the hospital encounter of 06/20/22    Echo    Interpretation Summary  · The left ventricle is normal in size with mild concentric hypertrophy and normal systolic function.  · The estimated ejection fraction is 65%.  · Normal left ventricular diastolic function.  · Normal right ventricular size with normal  right ventricular systolic function.  · The estimated PA systolic pressure is 27 mmHg.  · Normal central venous pressure (3 mmHg).      Results for orders placed during the hospital encounter of 05/22/22    STRESS TEST REPORT      Results for orders placed during the hospital encounter of 03/07/22    Echo    Interpretation Summary  · The left ventricle is normal in size with moderate concentric hypertrophy and normal systolic function.  · The estimated PA systolic pressure is 28 mmHg.  · Indeterminate left ventricular diastolic function.  · Normal right ventricular size with mildly reduced right ventricular systolic function.  · Normal central venous pressure (3 mmHg).  · The estimated ejection fraction is 70%.  · Moderate left atrial enlargement.  · Mild mitral regurgitation.  · Mild tricuspid regurgitation.  · Mild-to-moderate aortic regurgitation.  · Trivial circumferential pericardial effusion. Effusion is fibrinous.  · Mild right atrial enlargement.      Results for orders placed during the hospital encounter of 09/28/20    Echo Color Flow Doppler? Yes    Interpretation Summary  · The left ventricle is normal in size with normal systolic function. The estimated ejection fraction is 65%.  · There is moderate left ventricular concentric hypertrophy.  · Normal left ventricular diastolic function.  · Normal right ventricular systolic function.  · Moderate aortic regurgitation.  · Moderate mitral regurgitation.  · No pulmonary hypertension  · Mild tricuspid regurgitation.  · Normal central venous pressure (3 mmHg).  · The estimated PA systolic pressure is 29 mmHg.  · Trivial posterior pericardial effusion. Effusion is fluid.    Left ventricle hypertrophy  Moderate aortic regurgitation mitral regurgitant  Mean left atrial pressure is normal  No pulmonary hypertension      CURRENT/PREVIOUS VISIT EKG  Results for orders placed or performed during the hospital encounter of 10/22/23   EKG 12-lead    Collection Time:  10/22/23 10:11 AM    Narrative    Test Reason : R07.9,    Vent. Rate : 068 BPM     Atrial Rate : 094 BPM     P-R Int : 000 ms          QRS Dur : 086 ms      QT Int : 468 ms       P-R-T Axes : 066 048 230 degrees     QTc Int : 497 ms    Sinus rhythm with 2nd degree A-V block (Mobitz I)  LVH with repolarization abnormality  Prolonged QT  Abnormal ECG  When compared with ECG of 08-AUG-2023 12:18,  Previous ECG has undetermined rhythm, needs review  Criteria for Septal infarct are no longer Present    Referred By: [email or web address]   SELF           Confirmed By:            ASSESSMENT/PLAN:     Active Hospital Problems    Diagnosis    *Fatigue    Anxiety and depression    Other insomnia    Exertional chest pain    Prediabetes    Gastroesophageal reflux disease without esophagitis    Essential hypertension       ASSESSMENT & PLAN:       Second degree AV block Mobitz I  LVH  Prolonged QTI  GERD   HTN  CAD s/p PCI  AbNormal EKG        RECOMMENDATIONS:    Patient presented to ED with c/o fatigue, insomnia, and urinary frequency. He denies any recent CP/shortness of breath/near syncope prior to admission.  EKG 2nd degree Type I AV block with prolonged QTI.  Patient is on several QTI prolonging drugs- trazodone, escitalopram, and mirtazapine; consider reducing if possible.  Caution with sleeping aids as patient is elderly.  Risk for falls.  Nuclear stress test 8/2023- negative for RI.  Denies CP/SOB.  Continue amlodipine 5 mg BID, imdur 30 mg daily, and losartan 25 mg daily. Continue statin therapy.  ECHO today EF 60-65% with normal diastolic function.   Recommend work up for unexplained weight loss.   Recommend OP evaluation with urology for urinary frequency.  Orthostatics negative.  US carotids are negative.  Thank you for the consultation. We will follow PRN.       Hannah Leonardo NP  Department of Cardiology  Date of Service: 10/23/2023      I had a long discussion with the patient rg hid medical problems.  I have personally  interviewed and examined the patient, I have reviewed the Nurse Practitioner's history and physical, assessment, and plan. I agree with the findings and plan.  Pt. Prefers to use Trazadone for sleep.  He wants the med on Dc  to be sent to [facility].  He want to follow up with a new Urologist. Dr. Newton.  From a cardiac standpoint. He is stable.    Jone Puga M.D.  Department of Cardiology  Date of Service: 10/23/2023  9:22 AM

## 2023-10-23 NOTE — DISCHARGE SUMMARY
[facility] Medicine  Discharge Summary      Patient Name: Rajendra Castle  MRN: [id number]  CHARLES: [id number]  Patient Class: OP- Observation  Admission Date: 10/22/2023  Hospital Length of Stay: 0 days  Discharge Date and Time:  10/23/2023 4:24 PM  Attending Physician: Estrada Aguilar MD   Discharging Provider: Kristine Varner NP  Primary Care Provider: Sp Lilly MD    Primary Care Team: Networked reference to record PCT     HPI:   79 y/o male with history of HTN, anxiety, depression, GERD, prediabetes, CKD 3, PUD, and history of multiple  cancers presents to the ED for increasing fatigue and insomnia for the last 5 months. Patient states that he has not slept more than 2-3 hours a night for the last 5 months. He states that he has also been experiencing increasing generalized fatigue and has had multiple episodes of presyncope where he became very lightheaded and had to sit down. Patient states that he has been taking Ambien nightly and is able to go to sleep until he wakes up to urinate. He states the first time he is usually able to go back to sleep but when he wakes up again to urinate he is up until the next night when he can take his Ambien again. Patient states that when he gets lightheaded he is usually up walking and gets SOB and exertional chest pain. Patient has decreased his PO fluid intake drastically intermittently for the last 2 weeks to try to avoid having to get up to use the bathroom at night however it has not helped. Patient denies nay abdominal pain, nausea, vomiting, falls, or loss of consciousness.     ED: Vitals showed HR 91, /94, RR 19 saturating at 98% on RA. Labs were significant for troponin 31.4, second troponin 30.5, , UA that showed RBC and WBC, glucose of 139, and occult blood negative. CXR showed No acute pulmonary process.      * No surgery found *      Hospital Course:   Pt was monitored closely during his stay.  TSH and vitamin B12  were checked given severe fatigue case management notified.  Message sent to his pulmonology office to coordinate further.  His troponin was trended with flat trend noted.  He was seen by Cardiology.  His orthostatics were negative.  Carotid ultrasound was negative.  Echo was unrevealing for any concerning changes to explain any near-syncope episodes.  Pt was seen by tele psych on admission.  She discontinued his Ambien and recommended the addition mirtazapine and trazodone which were ordered.  Pt notably has prolonged QT interval at baseline.  Repeat EKG showed no worsening of QT interval with medication changes.  He needs ongoing surveillance with EKGs as an outpatient to ensure he can stay on current medication regimen.  We discussed at length need for follow-up with Urology management of low urinary tract symptoms as it seems this is what is mostly waking Pt up during the night.  He wishes to reestablish with a different urologist.  Case Management facilitating.  He needs to follow back with his hematologist/oncologist as well.  Ultimately, the Pt has chronic health issues which need continued following closely as an outpatient to ensure stability.  He will follow closely with his PCP in clinic.  Home health was ordered, as well as outpatient case management. Ambulatory referral to psychiatry was placed for continuity of care for chronic anxiety/depression.  Pt verbalized understanding and agreement with discharge plan.  Again, has multiple comorbid conditions will need ongoing outpatient follow-up and medication adjustments to ensure stability.  He does not have any acute needs that require ongoing hospitalization at this time. Of note, patient stated he was out of medication and requested his prescriptions sent to [facility]- a one month prescription was sent.  He can f/u with PCP for ongoing medication management.    Pt seen on day of discharge and deemed appropriate for discharge.       Goals of Care  Treatment Preferences:  Code Status: Full Code      Consults:   Consults (From admission, onward)        Status Ordering Provider     Inpatient consult to Cardiology  Once        Provider:  Bertram Meyer MD    Completed ANN DALAL     Inpatient consult to Telemedicine - Psychiatry  Once        Provider:  (Not yet assigned)    Acknowledged ANN DALAL          No new Assessment & Plan notes have been filed under this hospital service since the last note was generated.  Service: Hospital Medicine    Final Active Diagnoses:    Diagnosis Date Noted POA    PRINCIPAL PROBLEM:  Fatigue [R53.83] 10/22/2023 Yes    Anxiety and depression [F41.9, F32.A] 07/26/2023 Yes    Other insomnia [G47.09] 07/21/2023 Yes    Exertional chest pain [R07.9] 09/28/2020 Unknown    Prediabetes [R73.03] 09/22/2016 Yes    Gastroesophageal reflux disease without esophagitis [K21.9] 09/20/2016 Yes    Essential hypertension [I10] 08/18/2016 Yes      Problems Resolved During this Admission:       Discharged Condition: good    Disposition: Home-Health Care [facility]    Follow Up:   Follow-up Information     Gabriel Astorga MD Follow up.    Specialty: Urology  Why: Ambulatory Referral to Dr. Velasquez ordered - scheduling to contact you with appointment date/time    OP Case Management ordered for assistance  Contact information:  [address] [facility]  [address]  [place] [address]  [phone]             Sp Lilly MD. Go on 10/31/2023.    Specialty: Family Medicine  Why: hospital follow up appt scheduled at 9:30 AM  Contact information:  [address] [facility]  [place] [address]  [phone]             Home Health Follow up.    Why: home health orders sent to [facility] for assignment - case management to call you on tomorrow with agency name    Message sent to Dr. Kraus's clinic regarding CPAP - CPAP orders sent to [facility] as well to help expedite new CPAP machine.           Ayush Guzman MD Follow up.     Specialty: Hematology and Oncology  Why: cancer f/u  Contact information:  [address] [facility] [address] [place] [address]  [phone]                       Patient Instructions:      Ambulatory referral/consult to Psychiatry   Standing Status: Future   Referral Priority: Routine Referral Type: Psychiatric   Referral Reason: Specialty Services Required   Requested Specialty: Psychiatry   Number of Visits Requested: 1     Ambulatory referral/consult to Home Health   Standing Status: Future   Referral Priority: Routine Referral Type: Home Health   Referral Reason: Specialty Services Required   Requested Specialty: Home Health Services   Number of Visits Requested: 1     Ambulatory referral/consult to Outpatient Case Management   Referral Priority: Routine Referral Type: Consultation   Referral Reason: Specialty Services Required   Number of Visits Requested: 1     Ambulatory referral/consult to Urology   Standing Status: Future   Referral Priority: Urgent Referral Type: Consultation   Referral Reason: Specialty Services Required   Referred to Provider: BROWN GEORGE Requested Specialty: Urology   Number of Visits Requested: 1     Diet Cardiac     Notify your health care provider if you experience any of the following:  temperature >100.4     Notify your health care provider if you experience any of the following:  persistent nausea and vomiting or diarrhea     Notify your health care provider if you experience any of the following:  severe uncontrolled pain     Notify your health care provider if you experience any of the following:  increased confusion or weakness     Notify your health care provider if you experience any of the following:  persistent dizziness, light-headedness, or visual disturbances     Activity as tolerated       Significant Diagnostic Studies: Labs:   CMP   Recent Labs   Lab 10/22/23  1025 10/23/23  0258    142   K 4.3 4.2    112*   CO2 22* 22*   * 88   BUN 23 25*   CREATININE  "1.4 1.4   CALCIUM 9.1 8.7   PROT 7.3 6.7   ALBUMIN 3.6 3.3*   BILITOT 0.5 0.3   ALKPHOS 56 56   AST 14 12   ALT 11 10   ANIONGAP 7* 8   , CBC   Recent Labs   Lab 10/22/23  1025 10/23/23  0258   WBC 5.55 6.03   HGB 13.6* 13.0*   HCT 42.0 41.1    289    and Troponin No results for input(s): "TROPONINI" in the last 168 hours.    Pending Diagnostic Studies:     None         Medications:  Reconciled Home Medications:      Medication List      START taking these medications    mirtazapine 15 MG tablet  Commonly known as: REMERON  Take 1 tablet (15 mg total) by mouth every evening.     traZODone 50 MG tablet  Commonly known as: DESYREL  Take 1 tablet (50 mg total) by mouth every evening.        CONTINUE taking these medications    amLODIPine 5 MG tablet  Commonly known as: NORVASC  Take 1 tablet (5 mg total) by mouth 2 (two) times daily.     atorvastatin 20 MG tablet  Commonly known as: LIPITOR  Take 1 tablet (20 mg total) by mouth once daily.     b complex vitamins capsule  Take 1 capsule by mouth once daily.     betamethasone valerate 0.1% 0.1 % Crea  Commonly known as: VALISONE  Apply topically 2 (two) times daily.     busPIRone 10 MG tablet  Commonly known as: BUSPAR  Take 1 tablet (10 mg total) by mouth 3 (three) times daily.     cyproheptadine 4 mg tablet  Commonly known as: PERIACTIN  Take 1 tablet (4 mg total) by mouth 4 (four) times daily.     EScitalopram oxalate 10 MG tablet  Commonly known as: LEXAPRO  Take 1 tablet (10 mg total) by mouth once daily.     isosorbide mononitrate 30 MG 24 hr tablet  Commonly known as: IMDUR  Take 1 tablet (30 mg total) by mouth once daily.     losartan 25 MG tablet  Commonly known as: COZAAR  Take 1 tablet (25 mg total) by mouth once daily.     mirabegron 25 mg Tb24 ER tablet  Commonly known as: MYRBETRIQ  Take 1 tablet (25 mg total) by mouth once daily.     pantoprazole 40 MG tablet  Commonly known as: PROTONIX  Take 1 tablet (40 mg total) by mouth once daily.   "   phenazopyridine 200 MG tablet  Commonly known as: PYRIDIUM  Take 1 tablet (200 mg total) by mouth 3 (three) times daily as needed for Pain.     predniSONE 5 MG tablet  Commonly known as: DELTASONE  Take 2 tablets (10 mg total) by mouth 2 (two) times daily.        STOP taking these medications    zolpidem 10 mg Tab  Commonly known as: AMBIEN            Indwelling Lines/Drains at time of discharge:   Lines/Drains/Airways     None                 Time spent on the discharge of patient: 38 minutes         Kristine Varner NP  Department of Hospital Medicine  [facility]

## 2023-10-23 NOTE — NURSING
Nurses Note -- 4 Eyes      10/22/2023   9:43 PM      Skin assessed during: Admit      [x] No Altered Skin Integrity Present    []Prevention Measures Documented      [] Yes- Altered Skin Integrity Present or Discovered   [] LDA Added if Not in Epic (Describe Wound)   [] New Altered Skin Integrity was Present on Admit and Documented in LDA   [] Wound Image Taken    Wound Care Consulted? No    Attending Nurse:  Sloane Venegas RN/Staff Member:  MO More

## 2023-10-23 NOTE — PLAN OF CARE
[facility]  Initial Discharge Assessment       Primary Care Provider: Sp Lilly MD    Admission Diagnosis: Near syncope [R55]    Admission Date: 10/22/2023  Expected Discharge Date: 10/24/2023    Transition of Care Barriers: Transportation    Payor: [employer] MANAGED MEDICARE / Plan: [employer] 65 / Product Type: Medicare Advantage /     Extended Emergency Contact Information  Primary Emergency Contact: Jane Castle  Mobile Phone: [phone]  Relation: Grandchild  Preferred language: English   needed? No  Secondary Emergency Contact: Brenton Kulkarni  Mobile Phone: [phone]  Relation: Relative  Preferred language: English   needed? No    Discharge Plan A: Home, Home with family  Discharge Plan B: Home Health      [id number] - [place], [place] - [address]  [address]  [facility] [address]  Phone: [phone] Fax: [phone]    [facility] - [place], [place] - [address]  [address]  [facility] [address]  Phone: [phone] Fax: [phone]    [facility]  [address]  [facility] [address]  Phone: [phone] Fax: [phone]    SW met with patient at bedside to complete discharge planning assessment.  Patient alert and oriented xs 4.  Patient verified all demographic information on facesheet is correct.  Patient verified PCP is Dr. Lilly.  Patient verified primary health insurance is [employer].  Patient with NO home health or DME.  Patient request new (Dr. Kraus office) CPAP machine with nasal cannula.  Patient with NO POA or Living Will.  Patient not on dialysis or medication coumadin.  Patient with no 30 day admission.  Patient with no financial issues at this time.  Patient WILL NEED CAB upon discharge from facility.  Patient independent with ADLs, live alone, drives self.      Initial Assessment (most recent)       Adult  Discharge Assessment - 10/23/23 1130          Discharge Assessment    Assessment Type Discharge Planning Assessment     Confirmed/corrected address, phone number and insurance Yes     Confirmed Demographics Correct on Facesheet     Source of Information patient     Does patient/caregiver understand observation status Yes     Communicated DEVONTE with patient/caregiver Yes     People in Home alone     Facility Arrived From: home     Do you expect to return to your current living situation? Yes     Do you have help at home or someone to help you manage your care at home? Yes     Who are your caregiver(s) and their phone number(s)? self     Prior to hospitilization cognitive status: Alert/Oriented     Current cognitive status: Alert/Oriented     Equipment Currently Used at Home none     Readmission within 30 days? No     Patient currently being followed by outpatient case management? No     Do you currently have service(s) that help you manage your care at home? No     Do you take prescription medications? Yes     Do you have prescription coverage? Yes     Do you have any problems affording any of your prescribed medications? No     Is the patient taking medications as prescribed? yes     Who is going to help you get home at discharge? self     How do you get to doctors appointments? car, drives self     Are you on dialysis? No     Do you take coumadin? No     DME Needed Upon Discharge  CPAP     Discharge Plan discussed with: Patient     Transition of Care Barriers Transportation     Discharge Plan A Home;Home with family     Discharge Plan B Home Health

## 2023-10-23 NOTE — PLAN OF CARE
Discharge orders and chart reviewed. No other discharge needs noted at this time. Pt is clear for discharge from case management. Pt is discharging to home with new home health.    Home health orders sent to [facility] for assignment. CM to call patient with agency name once obtained.     Patient stated he needs new CPAP - sent his previous CPAP back d/t him being noncompliant. Message sent to patient's pulmonologist to notify of above.      OP Case management ordered on discharge    Patient requested cab - primary nurse notified     10/23/23 6095   Final Note   Assessment Type Final Discharge Note   Anticipated Discharge Disposition Home-Health   What phone number can be called within the next 1-3 days to see how you are doing after discharge? [phone]   Hospital Resources/Appts/Education Provided Appointments scheduled and added to AVS;Post-Acute resouces added to AVS   Post-Acute Status   Discharge Delays (!) Taxi Service Set-up

## 2023-10-23 NOTE — PLAN OF CARE
Home health orders sent to [facility] for review     10/23/23 4987   Post-Acute Status   Post-Acute Authorization Home Health   Home Health Status Pending Payor Review

## 2023-10-23 NOTE — PROGRESS NOTES
"[facility]  Adult Nutrition   Progress Note (Initial Assessment)     SUMMARY     Recommendations  Recommendation/Intervention:   1. Recommend Ensure HP for additional protein energy intake.   2. RD to monitor and provide recommendations prn.    Goals: Maintain po intake > 75% to meet EEN/EPN.  Nutrition Goal Status: progressing towards goal    Dietitian Rounds Brief  MST: 4 Pt states he has lost about 20# over the past 6 months. States he has no problems eating or drinking, just with his frequent urination and sleep. NFPE was not completed however mild/ moderate fat and muscle wasting visible.    Diet order:   Current Diet Order: 2 gm Na                 Evaluation of Received Nutrient/Fluid Intake  Energy Calories Required: meeting needs  Protein Required: meeting needs  Fluid Required: meeting needs    Tolerance: tolerating     % Intake of Estimated Energy Needs: 75 - 100 %  % Meal Intake: 75 - 100 %      Intake/Output Summary (Last 24 hours) at 10/23/2023 1543  Last data filed at 10/23/2023 1354  Gross per 24 hour   Intake 360 ml   Output 1100 ml   Net -740 ml        Anthropometrics  Temp: 97.4 °F (36.3 °C)  Height Method: Stated  Height: 5' 8" (172.7 cm)  Height (inches): 68 in  Weight Method: Standard Scale  Weight: 58.2 kg (128 lb 3.2 oz)  Weight (lb): 128.2 lb  Ideal Body Weight (IBW), Male: 154 lb  % Ideal Body Weight, Male (lb): 83.25 %  BMI (Calculated): 19.5  BMI Grade: 18.5-24.9 - normal       Estimated/Assessed Needs  Weight Used For Calorie Calculations: 58.2 kg (128 lb 4.9 oz)  Energy Calorie Requirements (kcal): 9144-5822 (25-30 kcal/ kg bw)  Energy Need Method: Kcal/kg  Protein Requirements: 70-87 (1.2-1.5 gm/ kg bw)  Weight Used For Protein Calculations: 58.2 kg (128 lb 4.9 oz)     Estimated Fluid Requirement Method: RDA Method  RDA Method (mL): 1455       Reason for Assessment  Reason For Assessment: identified at risk by screening criteria  Relevant Medical History: hypertension, " anxiety, depression, GERD, diabetes, CKD stage 3, PUD,  cancer, CAD s/p PCI who presented to the ED with increase fatigue and insomnia x 5 months.  Interdisciplinary Rounds: did not attend    Nutrition/Diet History  Food Allergies: NKFA  Factors Affecting Nutritional Intake: None identified at this time    Nutrition Risk Screen  Nutrition Risk Screen: no indicators present     MST Score: 4  Have you recently lost weight without trying?: Yes: 34 lbs or more  Weight loss score: 4  Have you been eating poorly because of a decreased appetite?: No  Appetite score: 0       Weight History:  Wt Readings from Last 10 Encounters:   10/22/23 58.2 kg (128 lb 3.2 oz)   10/23/23 58.2 kg (128 lb 4.9 oz)   09/12/23 59.9 kg (132 lb)   09/07/23 60 kg (132 lb 4.4 oz)   08/16/23 59.2 kg (130 lb 8.2 oz)   08/08/23 62.6 kg (138 lb)   08/08/23 63.5 kg (140 lb)   07/24/23 61.9 kg (136 lb 7.4 oz)   07/21/23 61.9 kg (136 lb 6.4 oz)   07/18/23 61.8 kg (136 lb 3.9 oz)        Lab/Procedures/Meds: Pertinent Labs/Meds Reviewed    Medications:Pertinent Medications Reviewed  Scheduled Meds:   amLODIPine  5 mg Oral BID    atorvastatin  20 mg Oral Daily    busPIRone  10 mg Oral TID    cefTRIAXone (ROCEPHIN) IVPB  1 g Intravenous Q24H    enoxparin  40 mg Subcutaneous Daily    EScitalopram oxalate  20 mg Oral Daily    isosorbide mononitrate  30 mg Oral Daily    losartan  25 mg Oral Daily    mirtazapine  15 mg Oral QHS    oxybutynin  5 mg Oral Daily    pantoprazole  40 mg Oral Before breakfast    traZODone  50 mg Oral QHS     Continuous Infusions:  PRN Meds:.acetaminophen, dextrose 50%, dextrose 50%, glucagon (human recombinant), glucose, glucose, insulin aspart U-100, magnesium oxide, magnesium oxide, melatonin, potassium bicarbonate, potassium bicarbonate, potassium bicarbonate, potassium, sodium phosphates, potassium, sodium phosphates, potassium, sodium phosphates, sodium chloride 0.9%    Labs: Pertinent Labs Reviewed  Clinical Chemistry:  Recent  "Labs   Lab 10/22/23  1025 10/23/23  0258    142   K 4.3 4.2    112*   CO2 22* 22*   * 88   BUN 23 25*   CREATININE 1.4 1.4   CALCIUM 9.1 8.7   PROT 7.3 6.7   ALBUMIN 3.6 3.3*   BILITOT 0.5 0.3   ALKPHOS 56 56   AST 14 12   ALT 11 10   ANIONGAP 7* 8   MG 2.1  --      CBC:   Recent Labs   Lab 10/23/23  0258   WBC 6.03   RBC 4.52*   HGB 13.0*   HCT 41.1      MCV 91   MCH 28.8   MCHC 31.6*     Lipid Panel:  No results for input(s): "CHOL", "HDL", "LDLCALC", "TRIG", "CHOLHDL" in the last 168 hours.  Cardiac Profile:  Recent Labs   Lab 10/22/23  1025   *     Inflammatory Labs:  No results for input(s): "CRP" in the last 168 hours.  Diabetes:  No results for input(s): "HGBA1C", "POCTGLUCOSE" in the last 168 hours.  Thyroid & Parathyroid:  Recent Labs   Lab 10/22/23  1025   TSH 1.277       Monitor and Evaluation  Food and Nutrient Intake: food and beverage intake, energy intake  Food and Nutrient Adminstration: diet order  Knowledge/Beliefs/Attitudes: food and nutrition knowledge/skill, beliefs and attitudes  Anthropometric Measurements: weight, weight change  Nutrition-Focused Physical Findings: overall appearance     Nutrition Risk  Level of Risk/Frequency of Follow-up: moderate - high     Nutrition Follow-Up  RD Follow-up?: Yes      Catherine Bah, JILLIAN 10/23/2023 3:43 PM      "

## 2023-10-23 NOTE — HOSPITAL COURSE
Pt was monitored closely during his stay.  TSH and vitamin B12 were checked given severe fatigue case management notified.  Message sent to his pulmonology office to coordinate further.  His troponin was trended with flat trend noted.  He was seen by Cardiology.  His orthostatics were negative.  Carotid ultrasound was negative.  Echo was unrevealing for any concerning changes to explain any near-syncope episodes.  Pt was seen by tele psych on admission.  She discontinued his Ambien and recommended the addition mirtazapine and trazodone which were ordered.  Pt notably has prolonged QT interval at baseline.  Repeat EKG showed no worsening of QT interval with medication changes.  He needs ongoing surveillance with EKGs as an outpatient to ensure he can stay on current medication regimen.  We discussed at length need for follow-up with Urology management of low urinary tract symptoms as it seems this is what is mostly waking Pt up during the night.  He wishes to reestablish with a different urologist.  Case Management facilitating.  He needs to follow back with his hematologist/oncologist as well.  Ultimately, the Pt has chronic health issues which need continued following closely as an outpatient to ensure stability.  He will follow closely with his PCP in clinic.  Home health was ordered, as well as outpatient case management. Ambulatory referral to psychiatry was placed for continuity of care for chronic anxiety/depression.  Pt verbalized understanding and agreement with discharge plan.  Again, has multiple comorbid conditions will need ongoing outpatient follow-up and medication adjustments to ensure stability.  He does not have any acute needs that require ongoing hospitalization at this time. Of note, patient stated he was out of medication and requested his prescriptions sent to [facility]- a one month prescription was sent.  He can f/u with PCP for ongoing medication management.    Pt seen on day of  discharge and deemed appropriate for discharge.

## 2023-10-24 ENCOUNTER — PATIENT OUTREACH (OUTPATIENT)
Dept: ADMINISTRATIVE | Facility: CLINIC | Age: 78
End: 2023-10-24
Payer: MEDICARE

## 2023-10-29 ENCOUNTER — HOSPITAL ENCOUNTER (EMERGENCY)
Facility: HOSPITAL | Age: 78
Discharge: PSYCHIATRIC OR SUBSTANCE USE FACILITY | End: 2023-10-29
Attending: EMERGENCY MEDICINE
Payer: MEDICARE

## 2023-10-29 VITALS
BODY MASS INDEX: 19.46 KG/M2 | DIASTOLIC BLOOD PRESSURE: 70 MMHG | OXYGEN SATURATION: 99 % | WEIGHT: 128 LBS | HEART RATE: 68 BPM | RESPIRATION RATE: 20 BRPM | TEMPERATURE: 99 F | SYSTOLIC BLOOD PRESSURE: 159 MMHG

## 2023-10-29 DIAGNOSIS — R45.851 SUICIDAL IDEATION: Primary | ICD-10-CM

## 2023-10-29 DIAGNOSIS — Z00.8 MEDICAL CLEARANCE FOR PSYCHIATRIC ADMISSION: ICD-10-CM

## 2023-10-29 LAB
ALBUMIN SERPL BCP-MCNC: 3.6 G/DL (ref 3.5–5.2)
ALP SERPL-CCNC: 53 U/L (ref 55–135)
ALT SERPL W/O P-5'-P-CCNC: 12 U/L (ref 10–44)
AMPHET+METHAMPHET UR QL: NEGATIVE
ANION GAP SERPL CALC-SCNC: 9 MMOL/L (ref 8–16)
APAP SERPL-MCNC: 0.4 UG/ML (ref 10–20)
AST SERPL-CCNC: 15 U/L (ref 10–40)
BARBITURATES UR QL SCN>200 NG/ML: NEGATIVE
BASOPHILS # BLD AUTO: 0.03 K/UL (ref 0–0.2)
BASOPHILS NFR BLD: 0.4 % (ref 0–1.9)
BENZODIAZ UR QL SCN>200 NG/ML: NEGATIVE
BILIRUB SERPL-MCNC: 0.3 MG/DL (ref 0.1–1)
BILIRUB UR QL STRIP: NEGATIVE
BUN SERPL-MCNC: 36 MG/DL (ref 8–23)
BZE UR QL SCN: NEGATIVE
CALCIUM SERPL-MCNC: 8.8 MG/DL (ref 8.7–10.5)
CANNABINOIDS UR QL SCN: NEGATIVE
CHLORIDE SERPL-SCNC: 111 MMOL/L (ref 95–110)
CLARITY UR: CLEAR
CO2 SERPL-SCNC: 20 MMOL/L (ref 23–29)
COLOR UR: YELLOW
CREAT SERPL-MCNC: 1.4 MG/DL (ref 0.5–1.4)
CREAT UR-MCNC: 108.1 MG/DL (ref 23–375)
DIFFERENTIAL METHOD: ABNORMAL
EOSINOPHIL # BLD AUTO: 0.1 K/UL (ref 0–0.5)
EOSINOPHIL NFR BLD: 0.9 % (ref 0–8)
ERYTHROCYTE [DISTWIDTH] IN BLOOD BY AUTOMATED COUNT: 13.7 % (ref 11.5–14.5)
EST. GFR  (NO RACE VARIABLE): 52.1 ML/MIN/1.73 M^2
ETHANOL SERPL-MCNC: <10 MG/DL
GLUCOSE SERPL-MCNC: 104 MG/DL (ref 70–110)
GLUCOSE UR QL STRIP: NEGATIVE
HCT VFR BLD AUTO: 42.4 % (ref 40–54)
HGB BLD-MCNC: 13.5 G/DL (ref 14–18)
HGB UR QL STRIP: NEGATIVE
IMM GRANULOCYTES # BLD AUTO: 0.09 K/UL (ref 0–0.04)
IMM GRANULOCYTES NFR BLD AUTO: 1.1 % (ref 0–0.5)
KETONES UR QL STRIP: NEGATIVE
LEUKOCYTE ESTERASE UR QL STRIP: NEGATIVE
LYMPHOCYTES # BLD AUTO: 0.6 K/UL (ref 1–4.8)
LYMPHOCYTES NFR BLD: 7.2 % (ref 18–48)
MCH RBC QN AUTO: 28.5 PG (ref 27–31)
MCHC RBC AUTO-ENTMCNC: 31.8 G/DL (ref 32–36)
MCV RBC AUTO: 90 FL (ref 82–98)
MONOCYTES # BLD AUTO: 0.9 K/UL (ref 0.3–1)
MONOCYTES NFR BLD: 10.2 % (ref 4–15)
NEUTROPHILS # BLD AUTO: 6.8 K/UL (ref 1.8–7.7)
NEUTROPHILS NFR BLD: 80.2 % (ref 38–73)
NITRITE UR QL STRIP: NEGATIVE
NRBC BLD-RTO: 0 /100 WBC
OPIATES UR QL SCN: NEGATIVE
PCP UR QL SCN>25 NG/ML: NEGATIVE
PH UR STRIP: 6 [PH] (ref 5–8)
PLATELET # BLD AUTO: 316 K/UL (ref 150–450)
PMV BLD AUTO: 11.5 FL (ref 9.2–12.9)
POTASSIUM SERPL-SCNC: 4.2 MMOL/L (ref 3.5–5.1)
PROT SERPL-MCNC: 7.1 G/DL (ref 6–8.4)
PROT UR QL STRIP: NEGATIVE
RBC # BLD AUTO: 4.73 M/UL (ref 4.6–6.2)
SALICYLATES SERPL-MCNC: <1.5 MG/DL (ref 15–30)
SODIUM SERPL-SCNC: 140 MMOL/L (ref 136–145)
SP GR UR STRIP: 1.02 (ref 1–1.03)
TOXICOLOGY INFORMATION: NORMAL
TSH SERPL DL<=0.005 MIU/L-ACNC: 2.68 UIU/ML (ref 0.34–5.6)
URN SPEC COLLECT METH UR: NORMAL
UROBILINOGEN UR STRIP-ACNC: NEGATIVE EU/DL
WBC # BLD AUTO: 8.49 K/UL (ref 3.9–12.7)

## 2023-10-29 PROCEDURE — 25000003 PHARM REV CODE 250: Performed by: EMERGENCY MEDICINE

## 2023-10-29 PROCEDURE — 82077 ASSAY SPEC XCP UR&BREATH IA: CPT | Mod: XB | Performed by: EMERGENCY MEDICINE

## 2023-10-29 PROCEDURE — 80053 COMPREHEN METABOLIC PANEL: CPT | Performed by: EMERGENCY MEDICINE

## 2023-10-29 PROCEDURE — 84443 ASSAY THYROID STIM HORMONE: CPT | Performed by: EMERGENCY MEDICINE

## 2023-10-29 PROCEDURE — 85025 COMPLETE CBC W/AUTO DIFF WBC: CPT | Performed by: EMERGENCY MEDICINE

## 2023-10-29 PROCEDURE — 80179 DRUG ASSAY SALICYLATE: CPT | Performed by: EMERGENCY MEDICINE

## 2023-10-29 PROCEDURE — 80143 DRUG ASSAY ACETAMINOPHEN: CPT | Performed by: EMERGENCY MEDICINE

## 2023-10-29 PROCEDURE — 81003 URINALYSIS AUTO W/O SCOPE: CPT | Mod: 59 | Performed by: EMERGENCY MEDICINE

## 2023-10-29 PROCEDURE — 99285 EMERGENCY DEPT VISIT HI MDM: CPT

## 2023-10-29 PROCEDURE — 80307 DRUG TEST PRSMV CHEM ANLYZR: CPT | Performed by: EMERGENCY MEDICINE

## 2023-10-29 RX ORDER — LORAZEPAM 1 MG/1
1 TABLET ORAL
Status: COMPLETED | OUTPATIENT
Start: 2023-10-29 | End: 2023-10-29

## 2023-10-29 RX ADMIN — LORAZEPAM 1 MG: 1 TABLET ORAL at 09:10

## 2023-10-29 NOTE — ED PROVIDER NOTES
Encounter Date: 10/29/2023       History     Chief Complaint   Patient presents with    Suicidal     Patient states he has been having thoughts of killing himself and States he has not been able to sleep for 6 months.     Chief complaint is depression and suicidal thoughts.  The patient has history of depression.  He has a history of weight loss.  He has a history of waking up multiple times during the night to urinate because of his prostate.  He is just upset about how he feels in feels like he may want to hurt himself.  He does not have a plan but does state that he has thoughts of hurting himself.  Patient is cooperative no distress at the present time will need to be medically cleared for psych evaluation        Review of patient's allergies indicates:  No Known Allergies  Past Medical History:   Diagnosis Date    Anticoagulant long-term use     Arthritis     Coronary artery disease     Dr. Lamb    DDD (degenerative disc disease), cervical     Degenerative disc disease     Heart murmur     History of radiation therapy 2020    Hypertension     Nontoxic multinodular goiter 09/20/2016    Prostate CA     RA (rheumatoid arthritis)     Sleep apnea     No CPAP    Vitamin D insufficiency 09/30/2016     Past Surgical History:   Procedure Laterality Date    CARPAL TUNNEL RELEASE Right 05/28/2021    Procedure: RELEASE, CARPAL TUNNEL;  Surgeon: Jose Ryan II, MD;  Location: [facility] OR;  Service: Orthopedics;  Laterality: Right;    COLONOSCOPY  prior to 2016    normal findings per patient report    CORONARY ANGIOPLASTY WITH STENT PLACEMENT  02/2022    CYSTOSCOPY N/A 12/18/2018    Procedure: CYSTOSCOPY;  Surgeon: Garrett Pina MD;  Location: [facility] OR;  Service: Urology;  Laterality: N/A;    CYSTOSCOPY N/A 07/12/2022    Procedure: CYSTOSCOPY;  Surgeon: Garrett Pina MD;  Location: [facility] OR;  Service: Urology;  Laterality: N/A;    ESOPHAGOGASTRODUODENOSCOPY N/A 09/29/2020    Dr. Espinosa; empiric dilation;  erythematous mucosa in antrum; gastric mucosal atrophy; hematin in entire stomach; biopsy: mid & distal esophagus WNL, stomach- WNL, negative for h pylori    EXCISION OF LESION OF PENIS N/A 2/23/2023    Procedure: EXCISION, LESION, PENIS;  Surgeon: Timo Myers MD;  Location: [facility] OR;  Service: Urology;  Laterality: N/A;    INSERTION OF TUNNELED CENTRAL VENOUS CATHETER (CVC) WITH SUBCUTANEOUS PORT Left 5/18/2023    Procedure: [id number];  Surgeon: Atul Faria MD;  Location: [facility];  Service: General;  Laterality: Left;  left subclavian    PARATHYROIDECTOMY Right 10/27/2020    Procedure: PARATHYROIDECTOMY;  Surgeon: Latonia Clayton MD;  Location: [address];  Service: ENT;  Laterality: Right;    RELEASE OF ULNAR NERVE AT CUBITAL TUNNEL Right 05/28/2021    Procedure: RELEASE, ULNAR TUNNEL;  Surgeon: Jose Ryan II, MD;  Location: [facility] OR;  Service: Orthopedics;  Laterality: Right;    TRANSRECTAL BIOPSY OF PROSTATE WITH ULTRASOUND GUIDANCE N/A 12/18/2018    Procedure: BIOPSY, PROSTATE, RECTAL APPROACH, WITH US GUIDANCE;  Surgeon: Garrett Pina MD;  Location: [facility] OR;  Service: Urology;  Laterality: N/A;    TRANSRECTAL ULTRASOUND EXAMINATION N/A 07/12/2022    Procedure: ULTRASOUND, RECTAL APPROACH;  Surgeon: Garrett Pina MD;  Location: [facility];  Service: Urology;  Laterality: N/A;     Family History   Problem Relation Age of Onset    Heart disease Mother     Stroke Father     Drug abuse Sister     No Known Problems Daughter     No Known Problems Daughter     No Known Problems Son     Diabetes Maternal Uncle     Colon cancer Neg Hx     Crohn's disease Neg Hx     Esophageal cancer Neg Hx     Stomach cancer Neg Hx     Ulcerative colitis Neg Hx      Social History     Tobacco Use    Smoking status: Former     Current packs/day: 0.00     Average packs/day: 0.3 packs/day for 10.0 years (2.5 ttl pk-yrs)     Types: Cigarettes     Start date: 8/6/1991     Quit date: 8/6/2001     Years  since quittin.2     Passive exposure: Past    Smokeless tobacco: Never   Substance Use Topics    Alcohol use: Not Currently     Comment: seldom    Drug use: Not Currently     Frequency: 8.0 times per week     Types: Marijuana     Review of Systems   Constitutional:  Negative for chills and fever.   HENT:  Negative for ear pain, rhinorrhea and sore throat.    Eyes:  Negative for pain and visual disturbance.   Respiratory:  Negative for cough and shortness of breath.    Cardiovascular:  Negative for chest pain and palpitations.   Gastrointestinal:  Negative for abdominal pain, constipation, diarrhea, nausea and vomiting.   Genitourinary:  Negative for dysuria, frequency, hematuria and urgency.   Musculoskeletal:  Negative for back pain, joint swelling and myalgias.   Skin:  Negative for rash.   Neurological:  Negative for dizziness, seizures, weakness and headaches.   Psychiatric/Behavioral:  Positive for suicidal ideas. Negative for dysphoric mood. The patient is nervous/anxious.        Physical Exam     Initial Vitals [10/29/23 0412]   BP Pulse Resp Temp SpO2   (!) 118/92 99 18 98.2 °F (36.8 °C) 97 %      MAP       --         Physical Exam    Nursing note and vitals reviewed.  Constitutional: He appears well-developed and well-nourished.   HENT:   Head: Normocephalic and atraumatic.   Eyes: Conjunctivae, EOM and lids are normal. Pupils are equal, round, and reactive to light.   Neck: Trachea normal. Neck supple. No thyroid mass present.   Cardiovascular:  Normal rate, regular rhythm and normal heart sounds.           Pulmonary/Chest: Breath sounds normal. No respiratory distress.   Abdominal: Abdomen is soft. There is no abdominal tenderness.   Musculoskeletal:         General: Normal range of motion.      Cervical back: Neck supple.     Neurological: He is alert and oriented to person, place, and time. He has normal strength and normal reflexes. No cranial nerve deficit or sensory deficit.   Skin: Skin is warm  and dry.   Psychiatric: His speech is normal and behavior is normal. Judgment and thought content normal.   Patient depressed suicidal         ED Course   Procedures  Labs Reviewed   CBC W/ AUTO DIFFERENTIAL - Abnormal; Notable for the following components:       Result Value    Hemoglobin 13.5 (*)     MCHC 31.8 (*)     Immature Granulocytes 1.1 (*)     Immature Grans (Abs) 0.09 (*)     Lymph # 0.6 (*)     Gran % 80.2 (*)     Lymph % 7.2 (*)     All other components within normal limits   COMPREHENSIVE METABOLIC PANEL - Abnormal; Notable for the following components:    Chloride 111 (*)     CO2 20 (*)     BUN 36 (*)     Alkaline Phosphatase 53 (*)     eGFR 52.1 (*)     All other components within normal limits   ACETAMINOPHEN LEVEL - Abnormal; Notable for the following components:    Acetaminophen (Tylenol), Serum 0.4 (*)     All other components within normal limits   SALICYLATE LEVEL - Abnormal; Notable for the following components:    Salicylate Lvl <1.5 (*)     All other components within normal limits   TSH   URINALYSIS, REFLEX TO URINE CULTURE    Narrative:     Specimen Source->Urine   DRUG SCREEN PANEL, URINE EMERGENCY    Narrative:     Specimen Source->Urine   ALCOHOL,MEDICAL (ETHANOL)          Imaging Results    None          Medications - No data to display  Medical Decision Making  The patient is suicidal due to multiple medical problems.  He also has a history of depression.  We will do medical clearance and then transferred for psych evaluation.  Care the patient will be turned over to the oncoming team.  6:00 a.m.Valeri Ponce MD  5:47 AM 10/29/2023    Patient is medically clear for transport to psychiatric facility.  Serg Lamb D.O.   Emergency Medicine Attending Physician  7:15 AM        Amount and/or Complexity of Data Reviewed  Labs: ordered.                               Clinical Impression:   Final diagnoses:  [R45.851] Suicidal ideation (Primary)  [Z00.8] Medical clearance for  psychiatric admission        ED Disposition Condition    Transfer to Another Facility Stable                Juarez Lamb,   10/29/23 0715

## 2023-10-29 NOTE — NURSING
Called report to luisa at [facility] to give an intake report for patient going to them. Gave current and past medical history as well as current vital signs. Answered all questions. Informed them I had given patient ativan and he is calm and resting quietly. [facility] ambulance has been contacted and will transport patient.

## 2023-10-29 NOTE — PHARMACY MED REC
"        Admission Medication History     The home medication history was taken by Ramila Morales.    You may go to "Admission" then "Reconcile Home Medications" tabs to review and/or act upon these items.     The home medication list has been updated by the Pharmacy department.   Please read ALL comments highlighted in yellow.   Please address this information as you see fit.    Feel free to contact us if you have any questions or require assistance.        Medications listed below were obtained from: Patient/family and Analytic software- [employer]  Current Facility-Administered Medications on File Prior to Encounter   Medication Dose Route Frequency Provider Last Rate Last Admin    albuterol nebulizer solution 2.5 mg  2.5 mg Nebulization Q15 Min PRN Pastor Saleh MD        albuterol-ipratropium 2.5 mg-0.5 mg/3 mL nebulizer solution 3 mL  3 mL Nebulization PRN Pastor Saleh MD        denosumab (PROLIA) injection 60 mg  60 mg Subcutaneous 1 time in Clinic/HOD Jean Carlos Hoffman MD        diphenhydrAMINE injection 25 mg  25 mg Intravenous Q6H PRN Pastor Saleh MD        HYDROmorphone injection 0.5 mg  0.5 mg Intravenous Q5 Min PRN Pastor Saleh MD   0.5 mg at 02/23/23 1312    lactated ringers infusion   Intravenous Continuous Volpi-Abadie, Jacqueline, MD   Stopped at 02/23/23 1400    ondansetron injection 4 mg  4 mg Intravenous Q15 Min PRN Pastor Saleh MD        sodium chloride 0.9% flush 10 mL  10 mL Intravenous PRN Ayush Guzman MD   10 mL at 06/01/23 1535    sodium chloride 0.9% flush 10 mL  10 mL Intravenous PRN Ayush Guzman MD   10 mL at 06/05/23 1315    sodium chloride 0.9% flush 3 mL  3 mL Intravenous PRN Pastor Saleh MD         Current Outpatient Medications on File Prior to Encounter   Medication Sig Dispense Refill    amLODIPine (NORVASC) 5 MG tablet Take 1 tablet (5 mg total) by mouth 2 (two) times daily. 60 tablet 0    atorvastatin (LIPITOR) 20 MG tablet Take 1 tablet (20 " mg total) by mouth once daily. 90 tablet 0    b complex vitamins capsule Take 1 capsule by mouth once daily.      betamethasone valerate 0.1% (VALISONE) 0.1 % Crea Apply topically 2 (two) times daily. (Patient taking differently: Apply 1 application  topically 2 (two) times daily.) 45 g 0    busPIRone (BUSPAR) 10 MG tablet Take 1 tablet (10 mg total) by mouth 3 (three) times daily. 90 tablet 0    cyproheptadine (PERIACTIN) 4 mg tablet Take 1 tablet (4 mg total) by mouth 4 (four) times daily. 120 tablet 0    EScitalopram oxalate (LEXAPRO) 10 MG tablet Take 1 tablet (10 mg total) by mouth once daily. 90 tablet 0    isosorbide mononitrate (IMDUR) 30 MG 24 hr tablet Take 1 tablet (30 mg total) by mouth once daily. 30 tablet 0    losartan (COZAAR) 25 MG tablet Take 1 tablet (25 mg total) by mouth once daily. 30 tablet 0    mirabegron (MYRBETRIQ) 25 mg Tb24 ER tablet Take 1 tablet (25 mg total) by mouth once daily. 30 tablet 0    mirtazapine (REMERON) 15 MG tablet Take 1 tablet (15 mg total) by mouth every evening. 30 tablet 0    pantoprazole (PROTONIX) 40 MG tablet Take 1 tablet (40 mg total) by mouth once daily. 90 tablet 0    phenazopyridine (PYRIDIUM) 200 MG tablet Take 1 tablet (200 mg total) by mouth 3 (three) times daily as needed for Pain. 30 tablet 0    predniSONE (DELTASONE) 5 MG tablet Take 2 tablets (10 mg total) by mouth 2 (two) times daily. 120 tablet 0    traZODone (DESYREL) 50 MG tablet Take 1 tablet (50 mg total) by mouth every evening. 30 tablet 0    [DISCONTINUED] cholecalciferol, vitamin D3, (VITAMIN D3) 100 mcg (4,000 unit) Cap Take 400 Units by mouth once daily.      [DISCONTINUED] cloNIDine (CATAPRES) 0.1 MG tablet Take 1 tablet (0.1 mg total) by mouth 2 (two) times daily as needed (only if blood pressure top number is over 200). (Patient not taking: Reported on 6/7/2022) 30 tablet 1    [DISCONTINUED] meclizine (ANTIVERT) 12.5 mg tablet Take 1 tablet (12.5 mg total) by mouth 2 (two) times daily as  needed for Dizziness. 30 tablet 0    [DISCONTINUED] sildenafiL (VIAGRA) 100 MG tablet Take 1 tablet (100 mg total) by mouth daily as needed for Erectile Dysfunction. 10 tablet 2           Ramila PARR 1922          .

## 2023-11-13 PROBLEM — N28.9 ACUTE RENAL INSUFFICIENCY: Status: RESOLVED | Noted: 2023-08-08 | Resolved: 2023-11-13

## 2023-11-15 ENCOUNTER — OFFICE VISIT (OUTPATIENT)
Dept: FAMILY MEDICINE | Facility: CLINIC | Age: 78
End: 2023-11-15
Payer: MEDICARE

## 2023-11-15 VITALS
SYSTOLIC BLOOD PRESSURE: 134 MMHG | HEART RATE: 70 BPM | OXYGEN SATURATION: 98 % | BODY MASS INDEX: 20.41 KG/M2 | WEIGHT: 134.69 LBS | RESPIRATION RATE: 16 BRPM | TEMPERATURE: 98 F | HEIGHT: 68 IN | DIASTOLIC BLOOD PRESSURE: 70 MMHG

## 2023-11-15 DIAGNOSIS — N32.81 OVERACTIVE BLADDER: ICD-10-CM

## 2023-11-15 DIAGNOSIS — I10 ESSENTIAL HYPERTENSION: ICD-10-CM

## 2023-11-15 DIAGNOSIS — F32.2 MAJOR DEPRESSIVE DISORDER, SINGLE EPISODE, SEVERE WITHOUT PSYCHOTIC FEATURES: ICD-10-CM

## 2023-11-15 DIAGNOSIS — N40.0 BENIGN PROSTATIC HYPERPLASIA, UNSPECIFIED WHETHER LOWER URINARY TRACT SYMPTOMS PRESENT: Primary | ICD-10-CM

## 2023-11-15 PROCEDURE — 3288F FALL RISK ASSESSMENT DOCD: CPT | Mod: CPTII,S$GLB,, | Performed by: STUDENT IN AN ORGANIZED HEALTH CARE EDUCATION/TRAINING PROGRAM

## 2023-11-15 PROCEDURE — 1101F PR PT FALLS ASSESS DOC 0-1 FALLS W/OUT INJ PAST YR: ICD-10-PCS | Mod: CPTII,S$GLB,, | Performed by: STUDENT IN AN ORGANIZED HEALTH CARE EDUCATION/TRAINING PROGRAM

## 2023-11-15 PROCEDURE — 3075F PR MOST RECENT SYSTOLIC BLOOD PRESS GE 130-139MM HG: ICD-10-PCS | Mod: CPTII,S$GLB,, | Performed by: STUDENT IN AN ORGANIZED HEALTH CARE EDUCATION/TRAINING PROGRAM

## 2023-11-15 PROCEDURE — 99999 PR PBB SHADOW E&M-EST. PATIENT-LVL IV: ICD-10-PCS | Mod: PBBFAC,,, | Performed by: STUDENT IN AN ORGANIZED HEALTH CARE EDUCATION/TRAINING PROGRAM

## 2023-11-15 PROCEDURE — 1126F PR PAIN SEVERITY QUANTIFIED, NO PAIN PRESENT: ICD-10-PCS | Mod: CPTII,S$GLB,, | Performed by: STUDENT IN AN ORGANIZED HEALTH CARE EDUCATION/TRAINING PROGRAM

## 2023-11-15 PROCEDURE — 99214 PR OFFICE/OUTPT VISIT, EST, LEVL IV, 30-39 MIN: ICD-10-PCS | Mod: S$GLB,,, | Performed by: STUDENT IN AN ORGANIZED HEALTH CARE EDUCATION/TRAINING PROGRAM

## 2023-11-15 PROCEDURE — 99214 OFFICE O/P EST MOD 30 MIN: CPT | Mod: S$GLB,,, | Performed by: STUDENT IN AN ORGANIZED HEALTH CARE EDUCATION/TRAINING PROGRAM

## 2023-11-15 PROCEDURE — 99999 PR PBB SHADOW E&M-EST. PATIENT-LVL IV: CPT | Mod: PBBFAC,,, | Performed by: STUDENT IN AN ORGANIZED HEALTH CARE EDUCATION/TRAINING PROGRAM

## 2023-11-15 PROCEDURE — 1159F MED LIST DOCD IN RCRD: CPT | Mod: CPTII,S$GLB,, | Performed by: STUDENT IN AN ORGANIZED HEALTH CARE EDUCATION/TRAINING PROGRAM

## 2023-11-15 PROCEDURE — 1159F PR MEDICATION LIST DOCUMENTED IN MEDICAL RECORD: ICD-10-PCS | Mod: CPTII,S$GLB,, | Performed by: STUDENT IN AN ORGANIZED HEALTH CARE EDUCATION/TRAINING PROGRAM

## 2023-11-15 PROCEDURE — 3075F SYST BP GE 130 - 139MM HG: CPT | Mod: CPTII,S$GLB,, | Performed by: STUDENT IN AN ORGANIZED HEALTH CARE EDUCATION/TRAINING PROGRAM

## 2023-11-15 PROCEDURE — 3288F PR FALLS RISK ASSESSMENT DOCUMENTED: ICD-10-PCS | Mod: CPTII,S$GLB,, | Performed by: STUDENT IN AN ORGANIZED HEALTH CARE EDUCATION/TRAINING PROGRAM

## 2023-11-15 PROCEDURE — 3078F DIAST BP <80 MM HG: CPT | Mod: CPTII,S$GLB,, | Performed by: STUDENT IN AN ORGANIZED HEALTH CARE EDUCATION/TRAINING PROGRAM

## 2023-11-15 PROCEDURE — 1101F PT FALLS ASSESS-DOCD LE1/YR: CPT | Mod: CPTII,S$GLB,, | Performed by: STUDENT IN AN ORGANIZED HEALTH CARE EDUCATION/TRAINING PROGRAM

## 2023-11-15 PROCEDURE — 1126F AMNT PAIN NOTED NONE PRSNT: CPT | Mod: CPTII,S$GLB,, | Performed by: STUDENT IN AN ORGANIZED HEALTH CARE EDUCATION/TRAINING PROGRAM

## 2023-11-15 PROCEDURE — 3078F PR MOST RECENT DIASTOLIC BLOOD PRESSURE < 80 MM HG: ICD-10-PCS | Mod: CPTII,S$GLB,, | Performed by: STUDENT IN AN ORGANIZED HEALTH CARE EDUCATION/TRAINING PROGRAM

## 2023-11-15 RX ORDER — OXYBUTYNIN CHLORIDE 15 MG/1
15 TABLET, EXTENDED RELEASE ORAL DAILY
Qty: 30 TABLET | Refills: 11 | Status: SHIPPED | OUTPATIENT
Start: 2023-11-15 | End: 2024-11-14

## 2023-11-15 NOTE — PROGRESS NOTES
[facility] Note    Subjective:    The HPI and pertinent ROS is included in the Diagnostic Impression Remarks section at the end of the note. Please see below for further details. Chief complaint is at end of note.     Rajendra is a pleasant intelligent patient who is here for evaluation.     Modified Medications    No medications on file       Data reviewed    274}  Previous medical records reviewed and summarized in plan section at end of note.      If you are due for any health screening(s) below please notify me so we can arrange them to be ordered and scheduled. Most healthy patients at your age complete them, but you are free to accept or refuse. If you can't do it, I'll definitely understand. If you can, I'd certainly appreciate it!     All of your core healthy metrics are met.      The following portions of the patient's history were reviewed and updated as appropriate: allergies, current medications, past family history, past medical history, past social history, past surgical history and problem list.    He  has a past medical history of Anticoagulant long-term use, Arthritis, Coronary artery disease, DDD (degenerative disc disease), cervical, Degenerative disc disease, Heart murmur, History of radiation therapy (2020), Hypertension, Nontoxic multinodular goiter (09/20/2016), Prostate CA, RA (rheumatoid arthritis), Sleep apnea, and Vitamin D insufficiency (09/30/2016).  He  has a past surgical history that includes Transrectal biopsy of prostate with ultrasound guidance (N/A, 12/18/2018); Cystoscopy (N/A, 12/18/2018); Esophagogastroduodenoscopy (N/A, 09/29/2020); Parathyroidectomy (Right, 10/27/2020); Colonoscopy (prior to 2016); Carpal tunnel release (Right, 05/28/2021); Release of ulnar nerve at cubital tunnel (Right, 05/28/2021); Transrectal ultrasound examination (N/A, 07/12/2022); Cystoscopy (N/A, 07/12/2022); Coronary angioplasty with stent (02/2022); Excision of lesion of penis (N/A,  "2/23/2023); and Insertion of tunneled central venous catheter (CVC) with subcutaneous port (Left, 5/18/2023).    He  reports that he quit smoking about 22 years ago. His smoking use included cigarettes. He started smoking about 32 years ago. He has a 2.5 pack-year smoking history. He has been exposed to tobacco smoke. He has never used smokeless tobacco. He reports that he does not currently use alcohol. He reports that he does not currently use drugs after having used the following drugs: Marijuana. Frequency: 8.00 times per week.  He family history includes Diabetes in his maternal uncle; Drug abuse in his sister; Heart disease in his mother; No Known Problems in his daughter, daughter, and son; Stroke in his father.    Review of patient's allergies indicates:  No Known Allergies    Tobacco Use: Medium Risk (11/15/2023)    Patient History     Smoking Tobacco Use: Former     Smokeless Tobacco Use: Never     Passive Exposure: Past     Physical Examination  General appearance: alert, cooperative, no distress  Neck: no thyromegaly, no neck stiffness  Lungs: clear to auscultation, no wheezes, rales or rhonchi, symmetric air entry  Heart: normal rate, regular rhythm, normal S1, S2, no murmurs, rubs, clicks or gallops  Abdomen: soft, nontender, nondistended, no rigidity, rebound, or guarding.   Back: no point tenderness over spine  Extremities: peripheral pulses normal, no unilateral leg swelling or calf tenderness   Neurological:alert, oriented, normal speech, no new focal findings or movement disorder noted from baseline    BP Readings from Last 3 Encounters:   11/15/23 134/70   10/29/23 (!) 159/70   10/23/23 135/69     Wt Readings from Last 3 Encounters:   11/15/23 61.1 kg (134 lb 11.2 oz)   10/29/23 58.1 kg (128 lb)   10/29/23 54.1 kg (119 lb 4.3 oz)     /70 (BP Location: Right arm, Patient Position: Sitting, BP Method: Small (Manual))   Pulse 70   Temp 98.3 °F (36.8 °C) (Oral)   Resp 16   Ht 5' 8" (1.727 " "m)   Wt 61.1 kg (134 lb 11.2 oz)   SpO2 98%   BMI 20.48 kg/m²       274}  Laboratory: I have reviewed old labs below:       274}    Lab Results   Component Value Date    WBC 8.49 10/29/2023    HGB 13.5 (L) 10/29/2023    HCT 42.4 10/29/2023    MCV 90 10/29/2023     10/29/2023     10/29/2023    K 4.2 10/29/2023     (H) 10/29/2023    CALCIUM 8.8 10/29/2023    PHOS 3.3 05/23/2022    CO2 20 (L) 10/29/2023     10/29/2023    BUN 36 (H) 10/29/2023    CREATININE 1.4 10/29/2023    EGFRNORACEVR 52.1 (A) 10/29/2023    ANIONGAP 9 10/29/2023    PROT 7.1 10/29/2023    ALBUMIN 3.6 10/29/2023    BILITOT 0.3 10/29/2023    ALKPHOS 53 (L) 10/29/2023    ALT 12 10/29/2023    AST 15 10/29/2023    INR 1.2 02/23/2023    CHOL 116 (L) 03/08/2022    TRIG 66 03/08/2022    HDL 29 (L) 03/08/2022    LDLCALC 73.8 03/08/2022    TSH 2.678 10/29/2023    PSA 4.8 (H) 03/16/2018    HGBA1C 5.9 06/21/2022      Lab reviewed by me: Particular labs of significance that I will monitor, workup, or treat to improve are mentioned below in diagnostic impression remarks.    Imaging/EKG: I have reviewed the pertinent results and my findings are noted in remarks.     274}    CC:   Chief Complaint   Patient presents with    Urinary Frequency           274}    Assessment/Plan  Rajendra Castle is a 78 y.o. male who presents to clinic with:  1. Benign prostatic hyperplasia, unspecified whether lower urinary tract symptoms present    2. Overactive bladder    3. Essential hypertension    4. Major depressive disorder, single episode, severe without psychotic features          274}  Diagnostic Impression Remarks + HPI     Documentation entered by me for this encounter may have been done in part using speech-recognition technology. Although I have made an effort to ensure accuracy, "sound like" errors may exist and should be interpreted in context.     Overactive bladder-poorly controlled has tried oxybutynin at 10 mg nightly also try fluid " restriction along with Flomax and beta agonist without improvement will try higher dose of oxybutynin over the side effects recommended see Urology as well will check urine as well no fever chills   BPH-needs improvement tried Flomax  Hypertension stable continue current meds monitor no headache or chest pain f/u blood work   Depression-stable cont current meds monitor no SI/plan     This is the extent of this pleasant patient's concerns at this present time. He did not feel chest pain upon exertion, dyspnea, nausea, vomiting, diaphoresis, or syncope. No pleuritic chest pain, unilateral leg swelling, calf tenderness, or calf pain. Negative for unintentional weight loss night sweats, hematuria, and fevers. Rajendra will return to clinic in a few months for further workup and reassessment or sooner as needed. He was instructed to call the clinic or go to the emergency department or urgent care immediately if his symptoms do not improve, worsens, or if any new symptoms develop. As we discussed that symptoms could worsen over the next 24 hours he was advised that if any increased swelling, pain, or numbness arise to go immediately to the ED. Patient knows to call any time if an emergency arises. Shared decision making occurred and he verbalized understanding in agreement with this plan. I discussed imaging findings, diagnosis, possibilities, treatment options, medications, risks, and benefits. He had many questions regarding the options and long-term effects. All questions were answered. He expressed understanding after counseling regarding the diagnosis and recommendations. He was capable and demonstrated competence with understanding of these options. Shared decision making was performed resulting in him choosing the current treatment plan. Patient handout was given with instructions and recommendations. Advised the patient that if they become pregnant to alert us immediately to assess for medication changes. Having a  healthy weight can decrease the risk of 13 cancers and is an important goal. I also discussed the importance of close follow up to discuss labs, change or modify his medications if needed, monitor side effects, and further evaluation of medical problems.     Additional workup planned: see labs ordered below.    See below for labs and meds ordered with associated diagnosis      1. Benign prostatic hyperplasia, unspecified whether lower urinary tract symptoms present  - POCT Urinalysis(Instrument); Future  - Urinalysis Microscopic    2. Overactive bladder  - oxybutynin (DITROPAN XL) 15 MG TR24; Take 1 tablet (15 mg total) by mouth once daily.  Dispense: 30 tablet; Refill: 11  - Ambulatory referral/consult to Urology; Future  - POCT Urinalysis(Instrument); Future  - Urinalysis Microscopic    3. Essential hypertension    4. Major depressive disorder, single episode, severe without psychotic features      Sp Lilly MD      274}    If you are due for any health screening(s) below please notify me so we can arrange them to be ordered and scheduled. Most healthy patients at your age complete them, but you are free to accept or refuse.     If you can't do it, I'll definitely understand. If you can, I'd certainly appreciate it!   All of your core healthy metrics are met.

## 2023-11-16 ENCOUNTER — OFFICE VISIT (OUTPATIENT)
Dept: UROLOGY | Facility: CLINIC | Age: 78
End: 2023-11-16
Payer: MEDICARE

## 2023-11-16 VITALS — BODY MASS INDEX: 20.41 KG/M2 | WEIGHT: 134.69 LBS | RESPIRATION RATE: 15 BRPM | HEIGHT: 68 IN

## 2023-11-16 DIAGNOSIS — N32.81 OVERACTIVE BLADDER: ICD-10-CM

## 2023-11-16 LAB — POC RESIDUAL URINE VOLUME: 92 ML (ref 0–100)

## 2023-11-16 PROCEDURE — 99999 PR PBB SHADOW E&M-EST. PATIENT-LVL III: CPT | Mod: PBBFAC,,, | Performed by: NURSE PRACTITIONER

## 2023-11-16 PROCEDURE — 51798 US URINE CAPACITY MEASURE: CPT | Mod: S$GLB,,, | Performed by: NURSE PRACTITIONER

## 2023-11-16 PROCEDURE — 1101F PT FALLS ASSESS-DOCD LE1/YR: CPT | Mod: CPTII,S$GLB,, | Performed by: NURSE PRACTITIONER

## 2023-11-16 PROCEDURE — 1160F PR REVIEW ALL MEDS BY PRESCRIBER/CLIN PHARMACIST DOCUMENTED: ICD-10-PCS | Mod: CPTII,S$GLB,, | Performed by: NURSE PRACTITIONER

## 2023-11-16 PROCEDURE — 1101F PR PT FALLS ASSESS DOC 0-1 FALLS W/OUT INJ PAST YR: ICD-10-PCS | Mod: CPTII,S$GLB,, | Performed by: NURSE PRACTITIONER

## 2023-11-16 PROCEDURE — 3288F PR FALLS RISK ASSESSMENT DOCUMENTED: ICD-10-PCS | Mod: CPTII,S$GLB,, | Performed by: NURSE PRACTITIONER

## 2023-11-16 PROCEDURE — 99214 OFFICE O/P EST MOD 30 MIN: CPT | Mod: S$GLB,,, | Performed by: NURSE PRACTITIONER

## 2023-11-16 PROCEDURE — 3288F FALL RISK ASSESSMENT DOCD: CPT | Mod: CPTII,S$GLB,, | Performed by: NURSE PRACTITIONER

## 2023-11-16 PROCEDURE — 1159F MED LIST DOCD IN RCRD: CPT | Mod: CPTII,S$GLB,, | Performed by: NURSE PRACTITIONER

## 2023-11-16 PROCEDURE — 99214 PR OFFICE/OUTPT VISIT, EST, LEVL IV, 30-39 MIN: ICD-10-PCS | Mod: S$GLB,,, | Performed by: NURSE PRACTITIONER

## 2023-11-16 PROCEDURE — 1126F PR PAIN SEVERITY QUANTIFIED, NO PAIN PRESENT: ICD-10-PCS | Mod: CPTII,S$GLB,, | Performed by: NURSE PRACTITIONER

## 2023-11-16 PROCEDURE — 1126F AMNT PAIN NOTED NONE PRSNT: CPT | Mod: CPTII,S$GLB,, | Performed by: NURSE PRACTITIONER

## 2023-11-16 PROCEDURE — 1159F PR MEDICATION LIST DOCUMENTED IN MEDICAL RECORD: ICD-10-PCS | Mod: CPTII,S$GLB,, | Performed by: NURSE PRACTITIONER

## 2023-11-16 PROCEDURE — 51798 POCT BLADDER SCAN: ICD-10-PCS | Mod: S$GLB,,, | Performed by: NURSE PRACTITIONER

## 2023-11-16 PROCEDURE — 99999 PR PBB SHADOW E&M-EST. PATIENT-LVL III: ICD-10-PCS | Mod: PBBFAC,,, | Performed by: NURSE PRACTITIONER

## 2023-11-16 PROCEDURE — 1160F RVW MEDS BY RX/DR IN RCRD: CPT | Mod: CPTII,S$GLB,, | Performed by: NURSE PRACTITIONER

## 2023-11-16 NOTE — PROGRESS NOTES
CHIEF COMPLAINT:    Mr. Castle is a 78 y.o. male presenting for urinary frequency.  PRESENTING ILLNESS:    Rajendra Castle is a 78 y.o. male who presents for urinary frequency. Last clinic visit was 7/18/23 with Dr Myers    7/18/23  He was initially evaluated for LUTS and was requesting a UroLift. 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 He was found to have phimosis and a UTI during presentation to the ED. He was given topical steroids and has only been on them for the last week without much improvement. He has been able to void. He has pain on the foreskin with an erection.      From previous:  He has a history of prostate cancer s/p EBRT. He reports urgency, hesitancy, and sensation of incomplete emptying with leakage and dribbling. He has ISABEL and CPAP. He also reports insomnia. He has been on ditropan which has helped his symptoms     Cysto/TRUS with Dr. Pina 7/2022 showed a prostate volume of 23 cc, as well as a small short-segment bulbar urethral stricture just distal to the sphincter which was passable with the flexible scope. PVR was measured and noted to be 11 mL    11/16/23  Pt was started on oxybutynin 10 mg by Dr Myers for OAB symptoms that started after radiation.  Stopped oxybutynin 10 mg and started on Myrbetriq by PCP which did not work per pt.   PCP increased to oxybutynin 15 mg yesterday. Has not started taking yet    PVR today 92 ml    C/o urinary frequency and urgency that has been ongoing since radiation  Pt is voiding every 1-2 hours, urgency, nocturia x 4-6. No UUI.   Good urinary stream. No hesitancy or intermittency.  Denies dysuria, gross hematuria, flank pain, fever, chills, nausea or vomiting.    10/29/23 UA negative    Urine cultures:  Lab Results   Component Value Date    LABURIN PSEUDOMONAS AERUGINOSA  50,000 - 99,999 cfu/ml   (A) 07/10/2023    LABURIN No growth 02/27/2023    LABURIN Final report 01/03/2023    LABURIN  10/02/2020      Multiple organisms isolated. None in predominance.  Repeat if    LABURIN clinically necessary. 10/02/2020    LABURIN No growth 04/25/2018       REVIEW OF SYSTEMS:    Review of Systems    Constitutional: Negative for fever and chills.   HENT: Negative for hearing loss.   Eyes: Negative for visual disturbance.   Respiratory: Negative for shortness of breath.   Cardiovascular: Negative for chest pain.   Gastrointestinal: Negative for nausea, vomiting  Genitourinary:  See above  Neurological: Negative for dizziness.   Hematological: Does not bruise/bleed easily.   Psychiatric/Behavioral: Negative for confusion.       PATIENT HISTORY:    Past Medical History:   Diagnosis Date    Anticoagulant long-term use     Arthritis     Coronary artery disease     Dr. Lamb    DDD (degenerative disc disease), cervical     Degenerative disc disease     Heart murmur     History of radiation therapy 2020    Hypertension     Nontoxic multinodular goiter 09/20/2016    Prostate CA     RA (rheumatoid arthritis)     Sleep apnea     No CPAP    Vitamin D insufficiency 09/30/2016       Past Surgical History:   Procedure Laterality Date    CARPAL TUNNEL RELEASE Right 05/28/2021    Procedure: RELEASE, CARPAL TUNNEL;  Surgeon: Jose Ryan II, MD;  Location: [facility] OR;  Service: Orthopedics;  Laterality: Right;    COLONOSCOPY  prior to 2016    normal findings per patient report    CORONARY ANGIOPLASTY WITH STENT PLACEMENT  02/2022    CYSTOSCOPY N/A 12/18/2018    Procedure: CYSTOSCOPY;  Surgeon: Garrett Pina MD;  Location: [facility] OR;  Service: Urology;  Laterality: N/A;    CYSTOSCOPY N/A 07/12/2022    Procedure: CYSTOSCOPY;  Surgeon: Garrett Pina MD;  Location: [facility] OR;  Service: Urology;  Laterality: N/A;    ESOPHAGOGASTRODUODENOSCOPY N/A 09/29/2020    Dr. Espinosa; empiric dilation; erythematous mucosa in antrum; gastric mucosal atrophy; hematin in entire stomach; biopsy: mid & distal esophagus WNL, stomach- WNL, negative for h  pylori    EXCISION OF LESION OF PENIS N/A 2023    Procedure: EXCISION, LESION, PENIS;  Surgeon: Timo Myers MD;  Location: [facility] OR;  Service: Urology;  Laterality: N/A;    INSERTION OF TUNNELED CENTRAL VENOUS CATHETER (CVC) WITH SUBCUTANEOUS PORT Left 2023    Procedure: [id number]-[id number];  Surgeon: Atul Faria MD;  Location: [facility];  Service: General;  Laterality: Left;  left subclavian    PARATHYROIDECTOMY Right 10/27/2020    Procedure: PARATHYROIDECTOMY;  Surgeon: Latonia Clayton MD;  Location: [facility] OR [facility];  Service: ENT;  Laterality: Right;    RELEASE OF ULNAR NERVE AT CUBITAL TUNNEL Right 2021    Procedure: RELEASE, ULNAR TUNNEL;  Surgeon: Jose Ryan II, MD;  Location: [facility] OR;  Service: Orthopedics;  Laterality: Right;    TRANSRECTAL BIOPSY OF PROSTATE WITH ULTRASOUND GUIDANCE N/A 2018    Procedure: BIOPSY, PROSTATE, RECTAL APPROACH, WITH US GUIDANCE;  Surgeon: Garrett Pina MD;  Location: [facility] OR;  Service: Urology;  Laterality: N/A;    TRANSRECTAL ULTRASOUND EXAMINATION N/A 2022    Procedure: ULTRASOUND, RECTAL APPROACH;  Surgeon: Garrett Pina MD;  Location: [facility];  Service: Urology;  Laterality: N/A;       Family History   Problem Relation Age of Onset    Heart disease Mother     Stroke Father     Drug abuse Sister     No Known Problems Daughter     No Known Problems Daughter     No Known Problems Son     Diabetes Maternal Uncle     Colon cancer Neg Hx     Crohn's disease Neg Hx     Esophageal cancer Neg Hx     Stomach cancer Neg Hx     Ulcerative colitis Neg Hx        Social History     Socioeconomic History    Marital status: Single   Tobacco Use    Smoking status: Former     Current packs/day: 0.00     Average packs/day: 0.3 packs/day for 10.0 years (2.5 ttl pk-yrs)     Types: Cigarettes     Start date: 1991     Quit date: 2001     Years since quittin.2     Passive exposure: Past    Smokeless tobacco: Never   Substance and  Sexual Activity    Alcohol use: Not Currently     Comment: seldom    Drug use: Not Currently     Frequency: 8.0 times per week     Types: Marijuana    Sexual activity: Yes     Partners: Female     Social Determinants of Health     Financial Resource Strain: Low Risk  (4/25/2022)    Overall Financial Resource Strain (CARDIA)     Difficulty of Paying Living Expenses: Not hard at all   Food Insecurity: No Food Insecurity (4/25/2022)    Hunger Vital Sign     Worried About Running Out of Food in the Last Year: Never true     Ran Out of Food in the Last Year: Never true   Transportation Needs: No Transportation Needs (4/25/2022)    PRAPARE - Transportation     Lack of Transportation (Medical): No     Lack of Transportation (Non-Medical): No   Physical Activity: Inactive (4/25/2022)    Exercise Vital Sign     Days of Exercise per Week: 0 days     Minutes of Exercise per Session: 0 min   Stress: No Stress Concern Present (4/25/2022)    [employer] [place] [employer] - Occupational Stress Questionnaire     Feeling of Stress : Not at all   Social Connections: Unknown (4/25/2022)    Social Connection and Isolation Panel [NHANES]     Frequency of Communication with Friends and Family: More than three times a week     Frequency of Social Gatherings with Friends and Family: More than three times a week     Attends [employer] Services: Never     Active Member of Clubs or Organizations: No     Attends Club or Organization Meetings: Never   Housing Stability: Low Risk  (4/25/2022)    Housing Stability Vital Sign     Unable to Pay for Housing in the Last Year: No     Number of Places Lived in the Last Year: 1     Unstable Housing in the Last Year: No       Allergies:  Patient has no known allergies.    Medications:    Current Outpatient Medications:     amLODIPine (NORVASC) 5 MG tablet, Take 1 tablet (5 mg total) by mouth 2 (two) times daily., Disp: 60 tablet, Rfl: 0    atorvastatin (LIPITOR) 20 MG tablet, Take 1 tablet (20  mg total) by mouth once daily., Disp: 90 tablet, Rfl: 0    b complex vitamins capsule, Take 1 capsule by mouth once daily., Disp: , Rfl:     betamethasone valerate 0.1% (VALISONE) 0.1 % Crea, Apply topically 2 (two) times daily. (Patient taking differently: Apply 1 application  topically 2 (two) times daily.), Disp: 45 g, Rfl: 0    busPIRone (BUSPAR) 10 MG tablet, Take 1 tablet (10 mg total) by mouth 3 (three) times daily., Disp: 90 tablet, Rfl: 0    cyproheptadine (PERIACTIN) 4 mg tablet, Take 1 tablet (4 mg total) by mouth 4 (four) times daily., Disp: 120 tablet, Rfl: 0    EScitalopram oxalate (LEXAPRO) 10 MG tablet, Take 1 tablet (10 mg total) by mouth once daily., Disp: 90 tablet, Rfl: 0    isosorbide mononitrate (IMDUR) 30 MG 24 hr tablet, Take 1 tablet (30 mg total) by mouth once daily., Disp: 30 tablet, Rfl: 0    losartan (COZAAR) 25 MG tablet, Take 1 tablet (25 mg total) by mouth once daily., Disp: 30 tablet, Rfl: 0    mirtazapine (REMERON) 15 MG tablet, Take 1 tablet (15 mg total) by mouth every evening., Disp: 30 tablet, Rfl: 0    oxybutynin (DITROPAN XL) 15 MG TR24, Take 1 tablet (15 mg total) by mouth once daily., Disp: 30 tablet, Rfl: 11    pantoprazole (PROTONIX) 40 MG tablet, Take 1 tablet (40 mg total) by mouth once daily., Disp: 90 tablet, Rfl: 0    phenazopyridine (PYRIDIUM) 200 MG tablet, Take 1 tablet (200 mg total) by mouth 3 (three) times daily as needed for Pain., Disp: 30 tablet, Rfl: 0    predniSONE (DELTASONE) 5 MG tablet, Take 2 tablets (10 mg total) by mouth 2 (two) times daily., Disp: 120 tablet, Rfl: 0    traZODone (DESYREL) 50 MG tablet, Take 1 tablet (50 mg total) by mouth every evening., Disp: 30 tablet, Rfl: 0  No current facility-administered medications for this visit.    Facility-Administered Medications Ordered in Other Visits:     albuterol nebulizer solution 2.5 mg, 2.5 mg, Nebulization, Q15 Min PRN, Pastor Saleh MD    albuterol-ipratropium 2.5 mg-0.5 mg/3 mL nebulizer  solution 3 mL, 3 mL, Nebulization, PRN, Pastor Saleh MD    denosumab (PROLIA) injection 60 mg, 60 mg, Subcutaneous, 1 time in Clinic/HOD, Jean Carlos Hoffman MD    diphenhydrAMINE injection 25 mg, 25 mg, Intravenous, Q6H PRN, Pastor Saleh MD    HYDROmorphone injection 0.5 mg, 0.5 mg, Intravenous, Q5 Min PRN, Pastor Saleh MD, 0.5 mg at 02/23/23 1312    lactated ringers infusion, , Intravenous, Continuous, Volpi-Abadie, Jacqueline, MD, Stopped at 02/23/23 1400    ondansetron injection 4 mg, 4 mg, Intravenous, Q15 Min PRN, Pastor Saleh MD    sodium chloride 0.9% flush 10 mL, 10 mL, Intravenous, PRN, Ayush Guzman MD, 10 mL at 06/01/23 1535    sodium chloride 0.9% flush 10 mL, 10 mL, Intravenous, PRN, Ayush Guzman MD, 10 mL at 06/05/23 1315    sodium chloride 0.9% flush 3 mL, 3 mL, Intravenous, PRN, Pastor Saleh MD    PHYSICAL EXAMINATION:    Constitutional: He is oriented to person, place, and time. He appears well-developed and well-nourished.  He is in no apparent distress.    Neck: Normal ROM.     Cardiovascular: Normal rate.      Pulmonary/Chest: Effort normal. No respiratory distress.     Abdominal:  He exhibits no distension.  There is no CVA tenderness.     Neurological: He is alert and oriented to person, place, and time.     Skin: Skin is warm and dry.     Psych: Cooperative with normal affect.    Physical Exam    LABS:    Lab Results   Component Value Date    PSA 4.8 (H) 03/16/2018    PSA 2.1 10/03/2013    PSA 2.73 08/06/2012    PSADIAG 0.10 11/14/2022    PSADIAG 0.14 02/28/2022    PSADIAG 0.11 10/29/2021    PSATOTAL 0.11 06/24/2020    PSATOTAL 2.4 11/09/2018    PSATOTAL 2.9 04/25/2018    PSAFREE 0.01 06/24/2020    PSAFREE 0.22 11/09/2018    PSAFREE 0.34 04/25/2018    PSAFREEPCT 9.09 06/24/2020    PSAFREEPCT 9.17 11/09/2018    PSAFREEPCT 11.72 04/25/2018     Lab Results   Component Value Date    CREATININE 1.4 10/29/2023         IMPRESSION:    Encounter Diagnoses   Name  Primary?    Overactive bladder        PLAN:  -Discussed possible side effects of oxybutynin. Pt verbalized understanding and wants to try that first since ordered by PCP. If no improvement, can try vesicare 10 mg. If vesicare does not improve symptoms, pt will need to follow up with Dr Myers for further treatment options.    -Discussed conservative measures to control urgency and frequency including   1. Avoiding/minimizing bladder irritants (see below), especially in afternoon and evening hours  Discussed bladder irritants include coffe (even decaf), tea, alcohol, soda, spicy foods, acidic juices (orange, tomato), vinegar, and artificial sweeteners/sugary beverages.  2. timed voiding - empty on a schedule (approx ~2-3 hours) in spite of need to urinate, to get ahead of urge  3. dont postponing voiding - dont hold it on purpose   4. bowel regimen as distended bowel has extrinsic compressive effect on bladder.   - any or all of the following in any combination, titrate to soft daily bowel movement without pushing or straining  - colace/stool softener capsule - once to twice daily  - miralax - 1 capful daily to start, can increase to 2x daily (or decrease to 1/2 cap daily)  - increase dietary fibery  - fiber supplements, such as metamucil  - prunes, prune juice  5. INCREASE water intake  6. Stop fluids 2 hours before bed, and urinate just before bed    -RTC 3 months with Dr Myers    I encouraged him or any of his family members to call or email me with questions and/or concerns.      30 minutes of total time spent on the encounter, which includes face to face time and non-face to face time preparing to see the patient (eg, review of tests), Obtaining and/or reviewing separately obtained history, Documenting clinical information in the electronic or other health record, Independently interpreting results (not separately reported) and communicating results to the patient/family/caregiver, or Care coordination (not  separately reported).

## 2023-11-28 DIAGNOSIS — F41.1 GAD (GENERALIZED ANXIETY DISORDER): ICD-10-CM

## 2023-11-28 DIAGNOSIS — G47.00 INSOMNIA, UNSPECIFIED TYPE: Primary | ICD-10-CM

## 2023-11-28 RX ORDER — TRAZODONE HYDROCHLORIDE 50 MG/1
50 TABLET ORAL NIGHTLY
Qty: 30 TABLET | Refills: 0 | Status: CANCELLED | OUTPATIENT
Start: 2023-11-28 | End: 2024-11-27

## 2023-11-28 RX ORDER — BUSPIRONE HYDROCHLORIDE 10 MG/1
10 TABLET ORAL 3 TIMES DAILY
Qty: 90 TABLET | Refills: 0 | Status: CANCELLED | OUTPATIENT
Start: 2023-11-28

## 2023-11-28 NOTE — TELEPHONE ENCOUNTER
Pt is requesting seroquel; he received this med at [facility] in [place] from Dr. Ortega; pt is due to be seen 12/12/23; he will be out of his current prescription on 12/8/23; please advise

## 2023-11-29 RX ORDER — QUETIAPINE FUMARATE 100 MG/1
200 TABLET, FILM COATED ORAL DAILY
Qty: 60 TABLET | Refills: 11 | Status: SHIPPED | OUTPATIENT
Start: 2023-11-29 | End: 2024-11-28

## 2023-12-12 ENCOUNTER — OFFICE VISIT (OUTPATIENT)
Dept: FAMILY MEDICINE | Facility: CLINIC | Age: 78
End: 2023-12-12
Payer: MEDICARE

## 2023-12-12 VITALS
BODY MASS INDEX: 22.05 KG/M2 | HEIGHT: 68 IN | WEIGHT: 145.5 LBS | RESPIRATION RATE: 18 BRPM | SYSTOLIC BLOOD PRESSURE: 142 MMHG | OXYGEN SATURATION: 97 % | HEART RATE: 85 BPM | TEMPERATURE: 98 F | DIASTOLIC BLOOD PRESSURE: 72 MMHG

## 2023-12-12 DIAGNOSIS — N40.0 BENIGN PROSTATIC HYPERPLASIA, UNSPECIFIED WHETHER LOWER URINARY TRACT SYMPTOMS PRESENT: ICD-10-CM

## 2023-12-12 DIAGNOSIS — G47.00 INSOMNIA, UNSPECIFIED TYPE: ICD-10-CM

## 2023-12-12 DIAGNOSIS — N32.81 OVERACTIVE BLADDER: ICD-10-CM

## 2023-12-12 DIAGNOSIS — R01.1 HEART MURMUR: ICD-10-CM

## 2023-12-12 DIAGNOSIS — R94.31 PROLONGED QT INTERVAL: ICD-10-CM

## 2023-12-12 DIAGNOSIS — Z00.00 HEALTHCARE MAINTENANCE: Primary | ICD-10-CM

## 2023-12-12 DIAGNOSIS — I10 ESSENTIAL HYPERTENSION: ICD-10-CM

## 2023-12-12 PROCEDURE — 3078F DIAST BP <80 MM HG: CPT | Mod: CPTII,S$GLB,, | Performed by: STUDENT IN AN ORGANIZED HEALTH CARE EDUCATION/TRAINING PROGRAM

## 2023-12-12 PROCEDURE — 1126F PR PAIN SEVERITY QUANTIFIED, NO PAIN PRESENT: ICD-10-PCS | Mod: CPTII,S$GLB,, | Performed by: STUDENT IN AN ORGANIZED HEALTH CARE EDUCATION/TRAINING PROGRAM

## 2023-12-12 PROCEDURE — 99214 PR OFFICE/OUTPT VISIT, EST, LEVL IV, 30-39 MIN: ICD-10-PCS | Mod: S$GLB,,, | Performed by: STUDENT IN AN ORGANIZED HEALTH CARE EDUCATION/TRAINING PROGRAM

## 2023-12-12 PROCEDURE — 3077F SYST BP >= 140 MM HG: CPT | Mod: CPTII,S$GLB,, | Performed by: STUDENT IN AN ORGANIZED HEALTH CARE EDUCATION/TRAINING PROGRAM

## 2023-12-12 PROCEDURE — 3288F FALL RISK ASSESSMENT DOCD: CPT | Mod: CPTII,S$GLB,, | Performed by: STUDENT IN AN ORGANIZED HEALTH CARE EDUCATION/TRAINING PROGRAM

## 2023-12-12 PROCEDURE — 99999 PR PBB SHADOW E&M-EST. PATIENT-LVL V: ICD-10-PCS | Mod: PBBFAC,,, | Performed by: STUDENT IN AN ORGANIZED HEALTH CARE EDUCATION/TRAINING PROGRAM

## 2023-12-12 PROCEDURE — 3078F PR MOST RECENT DIASTOLIC BLOOD PRESSURE < 80 MM HG: ICD-10-PCS | Mod: CPTII,S$GLB,, | Performed by: STUDENT IN AN ORGANIZED HEALTH CARE EDUCATION/TRAINING PROGRAM

## 2023-12-12 PROCEDURE — 1126F AMNT PAIN NOTED NONE PRSNT: CPT | Mod: CPTII,S$GLB,, | Performed by: STUDENT IN AN ORGANIZED HEALTH CARE EDUCATION/TRAINING PROGRAM

## 2023-12-12 PROCEDURE — 1159F PR MEDICATION LIST DOCUMENTED IN MEDICAL RECORD: ICD-10-PCS | Mod: CPTII,S$GLB,, | Performed by: STUDENT IN AN ORGANIZED HEALTH CARE EDUCATION/TRAINING PROGRAM

## 2023-12-12 PROCEDURE — 3077F PR MOST RECENT SYSTOLIC BLOOD PRESSURE >= 140 MM HG: ICD-10-PCS | Mod: CPTII,S$GLB,, | Performed by: STUDENT IN AN ORGANIZED HEALTH CARE EDUCATION/TRAINING PROGRAM

## 2023-12-12 PROCEDURE — 1101F PR PT FALLS ASSESS DOC 0-1 FALLS W/OUT INJ PAST YR: ICD-10-PCS | Mod: CPTII,S$GLB,, | Performed by: STUDENT IN AN ORGANIZED HEALTH CARE EDUCATION/TRAINING PROGRAM

## 2023-12-12 PROCEDURE — 99999 PR PBB SHADOW E&M-EST. PATIENT-LVL V: CPT | Mod: PBBFAC,,, | Performed by: STUDENT IN AN ORGANIZED HEALTH CARE EDUCATION/TRAINING PROGRAM

## 2023-12-12 PROCEDURE — 1159F MED LIST DOCD IN RCRD: CPT | Mod: CPTII,S$GLB,, | Performed by: STUDENT IN AN ORGANIZED HEALTH CARE EDUCATION/TRAINING PROGRAM

## 2023-12-12 PROCEDURE — 99214 OFFICE O/P EST MOD 30 MIN: CPT | Mod: S$GLB,,, | Performed by: STUDENT IN AN ORGANIZED HEALTH CARE EDUCATION/TRAINING PROGRAM

## 2023-12-12 PROCEDURE — 93010 ELECTROCARDIOGRAM REPORT: CPT | Mod: S$GLB,,, | Performed by: INTERNAL MEDICINE

## 2023-12-12 PROCEDURE — 93005 EKG 12-LEAD: ICD-10-PCS | Mod: S$GLB,,, | Performed by: STUDENT IN AN ORGANIZED HEALTH CARE EDUCATION/TRAINING PROGRAM

## 2023-12-12 PROCEDURE — 1101F PT FALLS ASSESS-DOCD LE1/YR: CPT | Mod: CPTII,S$GLB,, | Performed by: STUDENT IN AN ORGANIZED HEALTH CARE EDUCATION/TRAINING PROGRAM

## 2023-12-12 PROCEDURE — 3288F PR FALLS RISK ASSESSMENT DOCUMENTED: ICD-10-PCS | Mod: CPTII,S$GLB,, | Performed by: STUDENT IN AN ORGANIZED HEALTH CARE EDUCATION/TRAINING PROGRAM

## 2023-12-12 PROCEDURE — 93005 ELECTROCARDIOGRAM TRACING: CPT | Mod: S$GLB,,, | Performed by: STUDENT IN AN ORGANIZED HEALTH CARE EDUCATION/TRAINING PROGRAM

## 2023-12-12 PROCEDURE — 93010 EKG 12-LEAD: ICD-10-PCS | Mod: S$GLB,,, | Performed by: INTERNAL MEDICINE

## 2023-12-12 RX ORDER — LANOLIN ALCOHOL/MO/W.PET/CERES
400 CREAM (GRAM) TOPICAL DAILY
Qty: 90 TABLET | Refills: 3 | Status: SHIPPED | OUTPATIENT
Start: 2023-12-12

## 2023-12-12 RX ORDER — ZOLPIDEM TARTRATE 10 MG/1
10 TABLET ORAL NIGHTLY PRN
Qty: 90 TABLET | Refills: 1 | Status: SHIPPED | OUTPATIENT
Start: 2023-12-12 | End: 2024-06-09

## 2023-12-12 NOTE — PROGRESS NOTES
[facility] Note    Subjective:    The HPI and pertinent ROS is included in the Diagnostic Impression Remarks section at the end of the note. Please see below for further details. Chief complaint is at end of note.     Rajendra is a pleasant intelligent patient who is here for evaluation.     Modified Medications    No medications on file       Data reviewed 274}  Previous medical records reviewed and summarized in plan section at end of note.      If you are due for any health screening(s) below please notify me so we can arrange them to be ordered and scheduled. Most healthy patients at your age complete them, but you are free to accept or refuse. If you can't do it, I'll definitely understand. If you can, I'd certainly appreciate it!     All of your core healthy metrics are met.      The following portions of the patient's history were reviewed and updated as appropriate: allergies, current medications, past family history, past medical history, past social history, past surgical history and problem list.    He  has a past medical history of Anticoagulant long-term use, Arthritis, Coronary artery disease, DDD (degenerative disc disease), cervical, Degenerative disc disease, Heart murmur, History of radiation therapy (2020), Hypertension, Nontoxic multinodular goiter (09/20/2016), Prostate CA, RA (rheumatoid arthritis), Sleep apnea, and Vitamin D insufficiency (09/30/2016).  He  has a past surgical history that includes Transrectal biopsy of prostate with ultrasound guidance (N/A, 12/18/2018); Cystoscopy (N/A, 12/18/2018); Esophagogastroduodenoscopy (N/A, 09/29/2020); Parathyroidectomy (Right, 10/27/2020); Colonoscopy (prior to 2016); Carpal tunnel release (Right, 05/28/2021); Release of ulnar nerve at cubital tunnel (Right, 05/28/2021); Transrectal ultrasound examination (N/A, 07/12/2022); Cystoscopy (N/A, 07/12/2022); Coronary angioplasty with stent (02/2022); Excision of lesion of penis (N/A,  2/23/2023); and Insertion of tunneled central venous catheter (CVC) with subcutaneous port (Left, 5/18/2023).    He  reports that he quit smoking about 22 years ago. His smoking use included cigarettes. He started smoking about 32 years ago. He has a 2.5 pack-year smoking history. He has been exposed to tobacco smoke. He has never used smokeless tobacco. He reports that he does not currently use alcohol. He reports that he does not currently use drugs after having used the following drugs: Marijuana. Frequency: 8.00 times per week.  He family history includes Diabetes in his maternal uncle; Drug abuse in his sister; Heart disease in his mother; No Known Problems in his daughter, daughter, and son; Stroke in his father.    Review of patient's allergies indicates:  No Known Allergies    Tobacco Use: Medium Risk (12/12/2023)    Patient History     Smoking Tobacco Use: Former     Smokeless Tobacco Use: Never     Passive Exposure: Past     Physical Examination  General appearance: alert, cooperative, no distress  Neck: no thyromegaly, no neck stiffness  Lungs: clear to auscultation, no wheezes, rales or rhonchi, symmetric air entry  Heart: normal rate, regular rhythm, normal S1, S2, no murmurs, rubs, clicks or gallops  Abdomen: soft, nontender, nondistended, no rigidity, rebound, or guarding.   Back: no point tenderness over spine  Extremities: peripheral pulses normal, no unilateral leg swelling or calf tenderness   Neurological:alert, oriented, normal speech, no new focal findings or movement disorder noted from baseline    BP Readings from Last 3 Encounters:   12/12/23 (!) 142/72   11/15/23 134/70   10/29/23 (!) 159/70     Wt Readings from Last 3 Encounters:   12/12/23 66 kg (145 lb 8.1 oz)   11/16/23 61.1 kg (134 lb 11.2 oz)   11/15/23 61.1 kg (134 lb 11.2 oz)     BP (!) 142/72 (BP Location: Right arm, Patient Position: Sitting, BP Method: Medium (Manual))   Pulse 85   Temp 98.4 °F (36.9 °C) (Oral)   Resp 18   Ht  "5' 8" (1.727 m)   Wt 66 kg (145 lb 8.1 oz)   SpO2 97%   BMI 22.12 kg/m²    274}  Laboratory: I have reviewed old labs below:    274}    Lab Results   Component Value Date    WBC 8.49 10/29/2023    HGB 13.5 (L) 10/29/2023    HCT 42.4 10/29/2023    MCV 90 10/29/2023     10/29/2023     10/29/2023    K 4.2 10/29/2023     (H) 10/29/2023    CALCIUM 8.8 10/29/2023    PHOS 3.3 05/23/2022    CO2 20 (L) 10/29/2023     10/29/2023    BUN 36 (H) 10/29/2023    CREATININE 1.4 10/29/2023    EGFRNORACEVR 52.1 (A) 10/29/2023    ANIONGAP 9 10/29/2023    PROT 7.1 10/29/2023    ALBUMIN 3.6 10/29/2023    BILITOT 0.3 10/29/2023    ALKPHOS 53 (L) 10/29/2023    ALT 12 10/29/2023    AST 15 10/29/2023    INR 1.2 02/23/2023    CHOL 116 (L) 03/08/2022    TRIG 66 03/08/2022    HDL 29 (L) 03/08/2022    LDLCALC 73.8 03/08/2022    TSH 2.678 10/29/2023    PSA 4.8 (H) 03/16/2018    HGBA1C 5.9 06/21/2022      Lab reviewed by me: Particular labs of significance that I will monitor, workup, or treat to improve are mentioned below in diagnostic impression remarks.    Imaging/EKG: I have reviewed the pertinent results and my findings are noted in remarks.  274}    CC:   Chief Complaint   Patient presents with    Follow-up    Insomnia    Medication Refill        274}    Assessment/Plan  Rajendra Castle is a 78 y.o. male who presents to clinic with:  1. Healthcare maintenance    2. Insomnia, unspecified type    3. Benign prostatic hyperplasia, unspecified whether lower urinary tract symptoms present    4. Overactive bladder    5. Essential hypertension    6. Heart murmur       274}  Diagnostic Impression Remarks + HPI     Documentation entered by me for this encounter may have been done in part using speech-recognition technology. Although I have made an effort to ensure accuracy, "sound like" errors may exist and should be interpreted in context.     Overactive bladder-patient is taking oxybutynin mg daily reports that does help " some will continue recommend follow-up with Urology    Insomnia-needs improvement he reports that Ambien helped his symptoms he reports no side effects wants to take this he failed trazodone and other medications    Hypertension stable continue current meds monitor no headache or chest pain f/u blood work     HLD stable continue current meds monitor blood work rec mediterranean diet     DEVANTE-stable continue current meds monitor     Prolonged QT-patient reports no chest pain or shortness a breath EKG performed today initial had lead mislplacement still showed a prolonged QTC recommend for him to not take trazodone or mirtazapine also recommend for him to take magnesium is taking a PPI can decrease the absorption and the serum magnesium is not always reliable recommend follow-up with cardiology he reports he sees Dr. Adonis vaca to see him.     Risks of Ambien discussed including grogginess, balance impairment, memory lapses, hallucinations, sleep walking, driving while asleep, dependence, and psychiatric complications. After a discussion of the risks and benefits, we feel that because the patient's insomnia failed to improve with good sleep hygiene, other first line treatments, poor quality of life without this medication, and disruption in their work/life balance that the benefits exceed the risks at this point in time. After understanding of the risks and shared decision making, the patient decided to pursue taking a sedative-hypnotic to treat their insomnia. The risks of having limited amount of sleep leading to impairment of driving is substantial and thus improving sleep time will likely prevent harm due to lack of sleep.  reviewed and refill deemed appropriate.      This is the extent of this pleasant patient's concerns at this present time. He did not feel chest pain upon exertion, dyspnea, nausea, vomiting, diaphoresis, or syncope. No pleuritic chest pain, unilateral leg swelling, calf tenderness, or calf  pain. Negative for unintentional weight loss night sweats, hematuria, and fevers. Rajendra will return to clinic in a few months for further workup and reassessment or sooner as needed. He was instructed to call the clinic or go to the emergency department or urgent care immediately if his symptoms do not improve, worsens, or if any new symptoms develop. As we discussed that symptoms could worsen over the next 24 hours he was advised that if any increased swelling, pain, or numbness arise to go immediately to the ED. Patient knows to call any time if an emergency arises. Shared decision making occurred and he verbalized understanding in agreement with this plan. I discussed imaging findings, diagnosis, possibilities, treatment options, medications, risks, and benefits. He had many questions regarding the options and long-term effects. All questions were answered. He expressed understanding after counseling regarding the diagnosis and recommendations. He was capable and demonstrated competence with understanding of these options. Shared decision making was performed resulting in him choosing the current treatment plan. Patient handout was given with instructions and recommendations. Advised the patient that if they become pregnant to alert us immediately to assess for medication changes. Having a healthy weight can decrease the risk of 13 cancers and is an important goal. I also discussed the importance of close follow up to discuss labs, change or modify his medications if needed, monitor side effects, and further evaluation of medical problems.     Additional workup planned: see labs ordered below.    See below for labs and meds ordered with associated diagnosis      1. Healthcare maintenance    2. Insomnia, unspecified type  - zolpidem (AMBIEN) 10 mg Tab; Take 1 tablet (10 mg total) by mouth nightly as needed.  Dispense: 90 tablet; Refill: 1    3. Benign prostatic hyperplasia, unspecified whether lower urinary tract  symptoms present    4. Overactive bladder    5. Essential hypertension    6. Heart murmur  - EKG 12-lead; Future      Sp Lilly MD   274}    If you are due for any health screening(s) below please notify me so we can arrange them to be ordered and scheduled. Most healthy patients at your age complete them, but you are free to accept or refuse.     If you can't do it, I'll definitely understand. If you can, I'd certainly appreciate it!   All of your core healthy metrics are met.

## 2024-01-31 DIAGNOSIS — F41.1 GAD (GENERALIZED ANXIETY DISORDER): ICD-10-CM

## 2024-01-31 RX ORDER — BUSPIRONE HYDROCHLORIDE 10 MG/1
10 TABLET ORAL 3 TIMES DAILY
Qty: 90 TABLET | Refills: 0 | Status: CANCELLED | OUTPATIENT
Start: 2023-11-28

## 2024-01-31 RX ORDER — TRAZODONE HYDROCHLORIDE 50 MG/1
50 TABLET ORAL NIGHTLY
Qty: 30 TABLET | Refills: 0 | Status: CANCELLED | OUTPATIENT
Start: 2023-11-28 | End: 2024-11-27

## 2024-03-13 DIAGNOSIS — M06.9 RHEUMATOID ARTHRITIS, INVOLVING UNSPECIFIED SITE, UNSPECIFIED WHETHER RHEUMATOID FACTOR PRESENT: Primary | ICD-10-CM

## 2024-03-13 PROCEDURE — 99452 NTRPROF PH1/NTRNET/EHR RFRL: CPT | Mod: S$GLB,,, | Performed by: STUDENT IN AN ORGANIZED HEALTH CARE EDUCATION/TRAINING PROGRAM

## 2024-03-13 NOTE — TELEPHONE ENCOUNTER
----- Message from Marla Cifuentes sent at 3/13/2024  1:02 PM CDT -----  Type: Needs Medical Advice  Who Called:  pt  Best Call Back Number: [phone]  Additional Information: pt is calling in regards to needing to speak to a nurse to check on his   Rx for Prednisone. Needs a call back. Please call back and advise. Thanks!

## 2024-03-13 NOTE — TELEPHONE ENCOUNTER
Pt states that he was taking prednisone for his RA in a behavioral facility and he would like a refill. He was unable to contact the previous prescribing MD.     LOV 12/12/23  LAB 10/29/23  Next OV 6/24/24

## 2024-03-14 RX ORDER — PREDNISONE 5 MG/1
5 TABLET ORAL 2 TIMES DAILY
Qty: 180 TABLET | Refills: 0 | Status: SHIPPED | OUTPATIENT
Start: 2024-03-14 | End: 2024-06-12

## 2024-03-15 ENCOUNTER — E-CONSULT (OUTPATIENT)
Dept: RHEUMATOLOGY | Facility: CLINIC | Age: 79
End: 2024-03-15
Payer: COMMERCIAL

## 2024-03-15 ENCOUNTER — TELEPHONE (OUTPATIENT)
Dept: RHEUMATOLOGY | Facility: CLINIC | Age: 79
End: 2024-03-15
Payer: MEDICARE

## 2024-03-15 ENCOUNTER — TELEPHONE (OUTPATIENT)
Dept: FAMILY MEDICINE | Facility: CLINIC | Age: 79
End: 2024-03-15
Payer: MEDICARE

## 2024-03-15 DIAGNOSIS — M06.9 RHEUMATOID ARTHRITIS, INVOLVING UNSPECIFIED SITE, UNSPECIFIED WHETHER RHEUMATOID FACTOR PRESENT: Primary | ICD-10-CM

## 2024-03-15 DIAGNOSIS — M05.79 RHEUMATOID ARTHRITIS INVOLVING MULTIPLE SITES WITH POSITIVE RHEUMATOID FACTOR: Primary | ICD-10-CM

## 2024-03-15 PROCEDURE — 99451 NTRPROF PH1/NTRNET/EHR 5/>: CPT | Mod: S$GLB,,, | Performed by: INTERNAL MEDICINE

## 2024-03-15 NOTE — CONSULTS
[facility] 4th Fl  Response for E-Consult     Patient Name: Rajnedra Castle  MRN: [id number]  Primary Care Provider: Sp Lilly MD   Requesting Provider: Sp Lilly MD  Consults    After evaluation of your eConsult clinical questions, I believe the patient should be scheduled for an office visit in our specialty due to severe erosive seropositive rheumatoid arthritis.  My team will schedule an in-person or virtual visit within the next couple of weeks..    Total time of Consultation: 5 minute    *This eConsult is based on the clinical data available to me and is furnished without benefit of a physician examination.  The eConsult will need to be interpreted in light of any clinical issues of changes in patient status not available to me at the time rime of filing this eConsults.  Significant changes in patient condition of level of acuity should result in a referral for in person consultation and reevaluation.  Please alert me if you have any furth questions.     Thank you for this eConsult referral.     Barrington Gomez MD  [facility]

## 2024-03-15 NOTE — TELEPHONE ENCOUNTER
----- Message from Juanpablo Katz, Patient Care Assistant sent at 3/15/2024  2:07 PM CDT -----  Contact: Pt  Type: Needs Medical Advice    Who Called: Pt  Best Call Back Number: [phone]  Inquiry/Question: Pt is calling to get a referral to [facility] in [place], [place]. Pt states they have avail appts and needs is records to schedule. Please advise  Thank you~    Phone:[phone]  Fax: [phone]

## 2024-03-15 NOTE — TELEPHONE ENCOUNTER
----- Message from Barrington Gomez MD sent at 3/15/2024 12:14 PM CDT -----  Regarding: New patient appointment  Hello team,  Patient has severe erosive rheumatoid arthritis.  Please try to get him a new patient visit in the next couple of weeks with knee or with 1 of our other new providers.  If patient prefers only virtual, schedule with me.   Thanks  Dr. ISSA

## 2024-03-18 ENCOUNTER — TELEPHONE (OUTPATIENT)
Dept: FAMILY MEDICINE | Facility: CLINIC | Age: 79
End: 2024-03-18
Payer: MEDICARE

## 2024-03-18 ENCOUNTER — PATIENT MESSAGE (OUTPATIENT)
Dept: RHEUMATOLOGY | Facility: CLINIC | Age: 79
End: 2024-03-18
Payer: MEDICARE

## 2024-03-18 NOTE — TELEPHONE ENCOUNTER
Pt informed that referral was faxed on Friday. Pt asked that I include lab work and last notes; referral and attachments faxed to fax number pt provided

## 2024-03-18 NOTE — TELEPHONE ENCOUNTER
----- Message from Barbara Sarahi sent at 3/18/2024 10:42 AM CDT -----  Contact: Self  Pt is calling back in regards to the request for the rheumatology referral and his medical records needing to be sent to [facility] in [place], [place]. He is calling to see if we have sent the referral as well as his medical records to them yet. Can we please check on this and call pt back to advise at [phone]. Stated we can have these documents sent to their office at Phone :[phone] Fax: [phone]. Thank You.

## 2024-03-19 ENCOUNTER — TELEPHONE (OUTPATIENT)
Dept: FAMILY MEDICINE | Facility: CLINIC | Age: 79
End: 2024-03-19
Payer: MEDICARE

## 2024-03-19 NOTE — TELEPHONE ENCOUNTER
----- Message from Michelle Zhang sent at 3/19/2024  4:00 PM CDT -----  Regarding: following up  Contact: Rajendra [phone]  Type: Needs Medical Advice    Who Called:  Rajendra Rojas Call Back Number: [phone]    Additional Information: patient following up from prior communications about referral and medical records. Please call to advise.

## 2024-03-20 ENCOUNTER — TELEPHONE (OUTPATIENT)
Dept: FAMILY MEDICINE | Facility: CLINIC | Age: 79
End: 2024-03-20
Payer: MEDICARE

## 2024-03-20 NOTE — TELEPHONE ENCOUNTER
----- Message from Jalyn Trujillo sent at 3/20/2024 11:29 AM CDT -----  Contact: pt  Type: Needs Medical Advice  Who Called:  pt  Best Call Back Number: [phone]    Additional Information: Pt is calling the office needs last lab results sent over to  office fax # [phone].Please call back and advise.  [phone] office number

## 2024-04-12 ENCOUNTER — HOSPITAL ENCOUNTER (OUTPATIENT)
Facility: HOSPITAL | Age: 79
Discharge: HOME | End: 2024-04-13
Attending: STUDENT IN AN ORGANIZED HEALTH CARE EDUCATION/TRAINING PROGRAM | Admitting: STUDENT IN AN ORGANIZED HEALTH CARE EDUCATION/TRAINING PROGRAM
Payer: MEDICARE

## 2024-04-12 DIAGNOSIS — R79.89 ELEVATED TROPONIN: ICD-10-CM

## 2024-04-12 DIAGNOSIS — R53.1 GENERALIZED WEAKNESS: ICD-10-CM

## 2024-04-12 DIAGNOSIS — G47.00 INSOMNIA, UNSPECIFIED TYPE: ICD-10-CM

## 2024-04-12 DIAGNOSIS — R53.83 FATIGUE: ICD-10-CM

## 2024-04-12 DIAGNOSIS — R94.31 ABNORMAL EKG: ICD-10-CM

## 2024-04-12 DIAGNOSIS — R06.00 DYSPNEA, UNSPECIFIED TYPE: Primary | ICD-10-CM

## 2024-04-12 DIAGNOSIS — R55 NEAR SYNCOPE: ICD-10-CM

## 2024-04-12 DIAGNOSIS — R79.89 ELEVATED BRAIN NATRIURETIC PEPTIDE (BNP) LEVEL: ICD-10-CM

## 2024-04-12 PROBLEM — R13.10 DYSPHAGIA: Status: RESOLVED | Noted: 2020-09-29 | Resolved: 2024-04-12

## 2024-04-12 PROBLEM — R63.0 DECREASED APPETITE: Status: RESOLVED | Noted: 2021-04-12 | Resolved: 2024-04-12

## 2024-04-12 PROBLEM — U07.1 COVID: Status: RESOLVED | Noted: 2022-02-21 | Resolved: 2024-04-12

## 2024-04-12 PROBLEM — Z74.09 DECREASED STRENGTH, ENDURANCE, AND MOBILITY: Status: RESOLVED | Noted: 2023-07-21 | Resolved: 2024-04-12

## 2024-04-12 PROBLEM — R68.89 DECREASED STRENGTH, ENDURANCE, AND MOBILITY: Status: RESOLVED | Noted: 2023-07-21 | Resolved: 2024-04-12

## 2024-04-12 PROBLEM — U09.9 COVID-19 LONG HAULER: Chronic | Status: RESOLVED | Noted: 2022-06-20 | Resolved: 2024-04-12

## 2024-04-12 PROBLEM — E87.5 HYPERKALEMIA: Status: ACTIVE | Noted: 2024-04-12

## 2024-04-12 LAB
ALBUMIN SERPL BCP-MCNC: 3.5 G/DL (ref 3.5–5.2)
ALP SERPL-CCNC: 61 U/L (ref 55–135)
ALT SERPL W/O P-5'-P-CCNC: 17 U/L (ref 10–44)
ANION GAP SERPL CALC-SCNC: 12 MMOL/L (ref 8–16)
APTT PPP: <21 SEC (ref 21–32)
AST SERPL-CCNC: 22 U/L (ref 10–40)
BASOPHILS # BLD AUTO: 0.02 K/UL (ref 0–0.2)
BASOPHILS NFR BLD: 0.2 % (ref 0–1.9)
BILIRUB SERPL-MCNC: 0.6 MG/DL (ref 0.1–1)
BILIRUB UR QL STRIP: NEGATIVE
BNP SERPL-MCNC: 861 PG/ML (ref 0–99)
BUN SERPL-MCNC: 24 MG/DL (ref 8–23)
CALCIUM SERPL-MCNC: 9.5 MG/DL (ref 8.7–10.5)
CHLORIDE SERPL-SCNC: 113 MMOL/L (ref 95–110)
CK SERPL-CCNC: 151 U/L (ref 20–200)
CLARITY UR: CLEAR
CO2 SERPL-SCNC: 18 MMOL/L (ref 23–29)
COLOR UR: YELLOW
CREAT SERPL-MCNC: 1.6 MG/DL (ref 0.5–1.4)
D DIMER PPP IA.FEU-MCNC: 0.31 MG/L FEU
DIFFERENTIAL METHOD BLD: ABNORMAL
EOSINOPHIL # BLD AUTO: 0 K/UL (ref 0–0.5)
EOSINOPHIL NFR BLD: 0.1 % (ref 0–8)
ERYTHROCYTE [DISTWIDTH] IN BLOOD BY AUTOMATED COUNT: 16.3 % (ref 11.5–14.5)
EST. GFR  (NO RACE VARIABLE): 44 ML/MIN/1.73 M^2
GLUCOSE SERPL-MCNC: 114 MG/DL (ref 70–110)
GLUCOSE UR QL STRIP: NEGATIVE
HCT VFR BLD AUTO: 44.6 % (ref 40–54)
HGB BLD-MCNC: 13.8 G/DL (ref 14–18)
HGB UR QL STRIP: NEGATIVE
IMM GRANULOCYTES # BLD AUTO: 0.07 K/UL (ref 0–0.04)
IMM GRANULOCYTES NFR BLD AUTO: 0.8 % (ref 0–0.5)
INFLUENZA A, MOLECULAR: NEGATIVE
INFLUENZA B, MOLECULAR: NEGATIVE
INR PPP: 1 (ref 0.8–1.2)
KETONES UR QL STRIP: NEGATIVE
LEUKOCYTE ESTERASE UR QL STRIP: NEGATIVE
LYMPHOCYTES # BLD AUTO: 1 K/UL (ref 1–4.8)
LYMPHOCYTES NFR BLD: 11.1 % (ref 18–48)
MAGNESIUM SERPL-MCNC: 2.2 MG/DL (ref 1.6–2.6)
MCH RBC QN AUTO: 27.8 PG (ref 27–31)
MCHC RBC AUTO-ENTMCNC: 30.9 G/DL (ref 32–36)
MCV RBC AUTO: 90 FL (ref 82–98)
MONOCYTES # BLD AUTO: 0.7 K/UL (ref 0.3–1)
MONOCYTES NFR BLD: 8.3 % (ref 4–15)
NEUTROPHILS # BLD AUTO: 7.1 K/UL (ref 1.8–7.7)
NEUTROPHILS NFR BLD: 79.5 % (ref 38–73)
NITRITE UR QL STRIP: NEGATIVE
NRBC BLD-RTO: 0 /100 WBC
PH UR STRIP: 7 [PH] (ref 5–8)
PLATELET # BLD AUTO: 237 K/UL (ref 150–450)
PMV BLD AUTO: 12.3 FL (ref 9.2–12.9)
POCT GLUCOSE: 109 MG/DL (ref 70–110)
POTASSIUM SERPL-SCNC: 5.4 MMOL/L (ref 3.5–5.1)
PROT SERPL-MCNC: 7.7 G/DL (ref 6–8.4)
PROT UR QL STRIP: ABNORMAL
PROTHROMBIN TIME: 10.6 SEC (ref 9–12.5)
RBC # BLD AUTO: 4.96 M/UL (ref 4.6–6.2)
SARS-COV-2 RDRP RESP QL NAA+PROBE: NEGATIVE
SODIUM SERPL-SCNC: 143 MMOL/L (ref 136–145)
SP GR UR STRIP: 1.02 (ref 1–1.03)
SPECIMEN SOURCE: NORMAL
TROPONIN I SERPL DL<=0.01 NG/ML-MCNC: 0.07 NG/ML (ref 0–0.03)
TROPONIN I SERPL DL<=0.01 NG/ML-MCNC: 0.09 NG/ML (ref 0–0.03)
TROPONIN I SERPL DL<=0.01 NG/ML-MCNC: 0.09 NG/ML (ref 0–0.03)
TSH SERPL DL<=0.005 MIU/L-ACNC: 0.66 UIU/ML (ref 0.4–4)
URN SPEC COLLECT METH UR: ABNORMAL
UROBILINOGEN UR STRIP-ACNC: NEGATIVE EU/DL
WBC # BLD AUTO: 8.92 K/UL (ref 3.9–12.7)

## 2024-04-12 PROCEDURE — 36415 COLL VENOUS BLD VENIPUNCTURE: CPT | Performed by: NURSE PRACTITIONER

## 2024-04-12 PROCEDURE — 80053 COMPREHEN METABOLIC PANEL: CPT | Performed by: NURSE PRACTITIONER

## 2024-04-12 PROCEDURE — 99900035 HC TECH TIME PER 15 MIN (STAT)

## 2024-04-12 PROCEDURE — G0378 HOSPITAL OBSERVATION PER HR: HCPCS

## 2024-04-12 PROCEDURE — 87502 INFLUENZA DNA AMP PROBE: CPT | Performed by: STUDENT IN AN ORGANIZED HEALTH CARE EDUCATION/TRAINING PROGRAM

## 2024-04-12 PROCEDURE — 85610 PROTHROMBIN TIME: CPT | Performed by: STUDENT IN AN ORGANIZED HEALTH CARE EDUCATION/TRAINING PROGRAM

## 2024-04-12 PROCEDURE — 83880 ASSAY OF NATRIURETIC PEPTIDE: CPT | Performed by: STUDENT IN AN ORGANIZED HEALTH CARE EDUCATION/TRAINING PROGRAM

## 2024-04-12 PROCEDURE — 85379 FIBRIN DEGRADATION QUANT: CPT | Performed by: STUDENT IN AN ORGANIZED HEALTH CARE EDUCATION/TRAINING PROGRAM

## 2024-04-12 PROCEDURE — 93010 ELECTROCARDIOGRAM REPORT: CPT | Mod: ,,, | Performed by: GENERAL PRACTICE

## 2024-04-12 PROCEDURE — 82550 ASSAY OF CK (CPK): CPT | Performed by: STUDENT IN AN ORGANIZED HEALTH CARE EDUCATION/TRAINING PROGRAM

## 2024-04-12 PROCEDURE — U0002 COVID-19 LAB TEST NON-CDC: HCPCS | Performed by: NURSE PRACTITIONER

## 2024-04-12 PROCEDURE — 25000003 PHARM REV CODE 250: Performed by: NURSE PRACTITIONER

## 2024-04-12 PROCEDURE — 85730 THROMBOPLASTIN TIME PARTIAL: CPT | Performed by: STUDENT IN AN ORGANIZED HEALTH CARE EDUCATION/TRAINING PROGRAM

## 2024-04-12 PROCEDURE — 83735 ASSAY OF MAGNESIUM: CPT | Performed by: STUDENT IN AN ORGANIZED HEALTH CARE EDUCATION/TRAINING PROGRAM

## 2024-04-12 PROCEDURE — 84484 ASSAY OF TROPONIN QUANT: CPT | Mod: 91 | Performed by: STUDENT IN AN ORGANIZED HEALTH CARE EDUCATION/TRAINING PROGRAM

## 2024-04-12 PROCEDURE — 84443 ASSAY THYROID STIM HORMONE: CPT | Performed by: NURSE PRACTITIONER

## 2024-04-12 PROCEDURE — 36415 COLL VENOUS BLD VENIPUNCTURE: CPT | Performed by: STUDENT IN AN ORGANIZED HEALTH CARE EDUCATION/TRAINING PROGRAM

## 2024-04-12 PROCEDURE — 25000003 PHARM REV CODE 250: Performed by: STUDENT IN AN ORGANIZED HEALTH CARE EDUCATION/TRAINING PROGRAM

## 2024-04-12 PROCEDURE — 93005 ELECTROCARDIOGRAM TRACING: CPT

## 2024-04-12 PROCEDURE — 94760 N-INVAS EAR/PLS OXIMETRY 1: CPT

## 2024-04-12 PROCEDURE — 81003 URINALYSIS AUTO W/O SCOPE: CPT | Performed by: NURSE PRACTITIONER

## 2024-04-12 PROCEDURE — 84484 ASSAY OF TROPONIN QUANT: CPT | Mod: 91 | Performed by: NURSE PRACTITIONER

## 2024-04-12 PROCEDURE — 85025 COMPLETE CBC W/AUTO DIFF WBC: CPT | Performed by: NURSE PRACTITIONER

## 2024-04-12 PROCEDURE — 99285 EMERGENCY DEPT VISIT HI MDM: CPT | Mod: 25

## 2024-04-12 RX ORDER — ACETAMINOPHEN 325 MG/1
650 TABLET ORAL EVERY 4 HOURS PRN
Status: DISCONTINUED | OUTPATIENT
Start: 2024-04-12 | End: 2024-04-13 | Stop reason: HOSPADM

## 2024-04-12 RX ORDER — SIMETHICONE 80 MG
1 TABLET,CHEWABLE ORAL 4 TIMES DAILY PRN
Status: DISCONTINUED | OUTPATIENT
Start: 2024-04-12 | End: 2024-04-13 | Stop reason: HOSPADM

## 2024-04-12 RX ORDER — OXYCODONE HYDROCHLORIDE 5 MG/1
5 TABLET ORAL EVERY 6 HOURS PRN
Status: DISCONTINUED | OUTPATIENT
Start: 2024-04-12 | End: 2024-04-13 | Stop reason: HOSPADM

## 2024-04-12 RX ORDER — ACETAMINOPHEN 325 MG/1
650 TABLET ORAL EVERY 6 HOURS PRN
Status: DISCONTINUED | OUTPATIENT
Start: 2024-04-12 | End: 2024-04-13 | Stop reason: HOSPADM

## 2024-04-12 RX ORDER — ACETAMINOPHEN 500 MG
1000 TABLET ORAL
Status: COMPLETED | OUTPATIENT
Start: 2024-04-12 | End: 2024-04-12

## 2024-04-12 RX ORDER — GLUCAGON 1 MG
1 KIT INJECTION
Status: DISCONTINUED | OUTPATIENT
Start: 2024-04-12 | End: 2024-04-13 | Stop reason: HOSPADM

## 2024-04-12 RX ORDER — DULOXETIN HYDROCHLORIDE 30 MG/1
CAPSULE, DELAYED RELEASE ORAL
COMMUNITY
Start: 2023-11-09

## 2024-04-12 RX ORDER — IPRATROPIUM BROMIDE AND ALBUTEROL SULFATE 2.5; .5 MG/3ML; MG/3ML
3 SOLUTION RESPIRATORY (INHALATION) EVERY 4 HOURS PRN
Status: DISCONTINUED | OUTPATIENT
Start: 2024-04-12 | End: 2024-04-13 | Stop reason: HOSPADM

## 2024-04-12 RX ORDER — SODIUM CHLORIDE 0.9 % (FLUSH) 0.9 %
10 SYRINGE (ML) INJECTION EVERY 8 HOURS PRN
Status: DISCONTINUED | OUTPATIENT
Start: 2024-04-12 | End: 2024-04-13 | Stop reason: HOSPADM

## 2024-04-12 RX ORDER — HEPARIN SODIUM,PORCINE/D5W 25000/250
0-40 INTRAVENOUS SOLUTION INTRAVENOUS CONTINUOUS
Status: DISCONTINUED | OUTPATIENT
Start: 2024-04-12 | End: 2024-04-12

## 2024-04-12 RX ORDER — LANOLIN ALCOHOL/MO/W.PET/CERES
800 CREAM (GRAM) TOPICAL
Status: DISCONTINUED | OUTPATIENT
Start: 2024-04-12 | End: 2024-04-13 | Stop reason: HOSPADM

## 2024-04-12 RX ORDER — DIVALPROEX SODIUM 250 MG/1
250 TABLET, DELAYED RELEASE ORAL 3 TIMES DAILY
COMMUNITY
Start: 2023-11-14

## 2024-04-12 RX ORDER — TALC
9 POWDER (GRAM) TOPICAL NIGHTLY PRN
Status: DISCONTINUED | OUTPATIENT
Start: 2024-04-12 | End: 2024-04-13 | Stop reason: HOSPADM

## 2024-04-12 RX ORDER — QUETIAPINE FUMARATE 100 MG/1
200 TABLET, FILM COATED ORAL NIGHTLY
Status: DISCONTINUED | OUTPATIENT
Start: 2024-04-12 | End: 2024-04-13 | Stop reason: HOSPADM

## 2024-04-12 RX ORDER — AMLODIPINE BESYLATE 5 MG/1
5 TABLET ORAL 2 TIMES DAILY
Status: DISCONTINUED | OUTPATIENT
Start: 2024-04-12 | End: 2024-04-13 | Stop reason: HOSPADM

## 2024-04-12 RX ORDER — ONDANSETRON HYDROCHLORIDE 2 MG/ML
4 INJECTION, SOLUTION INTRAVENOUS EVERY 8 HOURS PRN
Status: DISCONTINUED | OUTPATIENT
Start: 2024-04-12 | End: 2024-04-13 | Stop reason: HOSPADM

## 2024-04-12 RX ORDER — IBUPROFEN 200 MG
24 TABLET ORAL
Status: DISCONTINUED | OUTPATIENT
Start: 2024-04-12 | End: 2024-04-13 | Stop reason: HOSPADM

## 2024-04-12 RX ORDER — IBUPROFEN 200 MG
16 TABLET ORAL
Status: DISCONTINUED | OUTPATIENT
Start: 2024-04-12 | End: 2024-04-13 | Stop reason: HOSPADM

## 2024-04-12 RX ORDER — NALOXONE HCL 0.4 MG/ML
0.02 VIAL (ML) INJECTION
Status: DISCONTINUED | OUTPATIENT
Start: 2024-04-12 | End: 2024-04-13 | Stop reason: HOSPADM

## 2024-04-12 RX ORDER — ASPIRIN 325 MG
325 TABLET ORAL
Status: COMPLETED | OUTPATIENT
Start: 2024-04-12 | End: 2024-04-12

## 2024-04-12 RX ORDER — ATORVASTATIN CALCIUM 20 MG/1
20 TABLET, FILM COATED ORAL DAILY
Status: DISCONTINUED | OUTPATIENT
Start: 2024-04-13 | End: 2024-04-13 | Stop reason: HOSPADM

## 2024-04-12 RX ORDER — ALUMINUM HYDROXIDE, MAGNESIUM HYDROXIDE, AND SIMETHICONE 1200; 120; 1200 MG/30ML; MG/30ML; MG/30ML
30 SUSPENSION ORAL 4 TIMES DAILY PRN
Status: DISCONTINUED | OUTPATIENT
Start: 2024-04-12 | End: 2024-04-13 | Stop reason: HOSPADM

## 2024-04-12 RX ADMIN — AMLODIPINE BESYLATE 5 MG: 5 TABLET ORAL at 10:04

## 2024-04-12 RX ADMIN — ASPIRIN 325 MG: 325 TABLET ORAL at 07:04

## 2024-04-12 RX ADMIN — ACETAMINOPHEN 1000 MG: 500 TABLET ORAL at 07:04

## 2024-04-12 RX ADMIN — NITROGLYCERIN 1 INCH: 20 OINTMENT TOPICAL at 07:04

## 2024-04-12 NOTE — ED PROVIDER NOTES
Encounter Date: 4/12/2024       History     Chief Complaint   Patient presents with    Nasal Congestion     Started yesterday     Weakness     78-year-old male presents with generalized body aches.  Associated shortness of breath and near-syncope, worsened over the last 24 hours, no chest pain.    The history is provided by the patient.     Review of patient's allergies indicates:  No Known Allergies  Past Medical History:   Diagnosis Date    Anticoagulant long-term use     Arthritis     Coronary artery disease     Dr. Lamb    DDD (degenerative disc disease), cervical     Degenerative disc disease     Heart murmur     History of radiation therapy 2020    Hypertension     Nontoxic multinodular goiter 09/20/2016    Prostate CA     RA (rheumatoid arthritis)     Sleep apnea     No CPAP    Vitamin D insufficiency 09/30/2016     Past Surgical History:   Procedure Laterality Date    CARPAL TUNNEL RELEASE Right 05/28/2021    Procedure: RELEASE, CARPAL TUNNEL;  Surgeon: Jose Ryan II, MD;  Location: [facility];  Service: Orthopedics;  Laterality: Right;    COLONOSCOPY  prior to 2016    normal findings per patient report    CORONARY ANGIOPLASTY WITH STENT PLACEMENT  02/2022    CYSTOSCOPY N/A 12/18/2018    Procedure: CYSTOSCOPY;  Surgeon: Garrett Pina MD;  Location: [facility] OR;  Service: Urology;  Laterality: N/A;    CYSTOSCOPY N/A 07/12/2022    Procedure: CYSTOSCOPY;  Surgeon: Garrett Pina MD;  Location: [facility] OR;  Service: Urology;  Laterality: N/A;    ESOPHAGOGASTRODUODENOSCOPY N/A 09/29/2020    Dr. Espinosa; empiric dilation; erythematous mucosa in antrum; gastric mucosal atrophy; hematin in entire stomach; biopsy: mid & distal esophagus WNL, stomach- WNL, negative for h pylori    EXCISION OF LESION OF PENIS N/A 2/23/2023    Procedure: EXCISION, LESION, PENIS;  Surgeon: Timo Myers MD;  Location: [facility] OR;  Service: Urology;  Laterality: N/A;    INSERTION OF TUNNELED CENTRAL VENOUS CATHETER (CVC) WITH  SUBCUTANEOUS PORT Left 2023    Procedure: [id number];  Surgeon: Atul Faria MD;  Location: [facility];  Service: General;  Laterality: Left;  left subclavian    PARATHYROIDECTOMY Right 10/27/2020    Procedure: PARATHYROIDECTOMY;  Surgeon: Latonia Clayton MD;  Location: [facility] OR 2ND FLR;  Service: ENT;  Laterality: Right;    RELEASE OF ULNAR NERVE AT CUBITAL TUNNEL Right 2021    Procedure: RELEASE, ULNAR TUNNEL;  Surgeon: Jose Ryan II, MD;  Location: Replaced by [facility];  Service: Orthopedics;  Laterality: Right;    TRANSRECTAL BIOPSY OF PROSTATE WITH ULTRASOUND GUIDANCE N/A 2018    Procedure: BIOPSY, PROSTATE, RECTAL APPROACH, WITH US GUIDANCE;  Surgeon: Garrett Pina MD;  Location: [facility];  Service: Urology;  Laterality: N/A;    TRANSRECTAL ULTRASOUND EXAMINATION N/A 2022    Procedure: ULTRASOUND, RECTAL APPROACH;  Surgeon: Garrett Pina MD;  Location: [facility];  Service: Urology;  Laterality: N/A;     Family History   Problem Relation Age of Onset    Heart disease Mother     Stroke Father     Drug abuse Sister     No Known Problems Daughter     No Known Problems Daughter     No Known Problems Son     Diabetes Maternal Uncle     Colon cancer Neg Hx     Crohn's disease Neg Hx     Esophageal cancer Neg Hx     Stomach cancer Neg Hx     Ulcerative colitis Neg Hx      Social History     Tobacco Use    Smoking status: Former     Current packs/day: 0.00     Average packs/day: 0.3 packs/day for 10.0 years (2.5 ttl pk-yrs)     Types: Cigarettes     Start date: 1991     Quit date: 2001     Years since quittin.6     Passive exposure: Past    Smokeless tobacco: Never   Substance Use Topics    Alcohol use: Not Currently     Comment: seldom    Drug use: Not Currently     Frequency: 8.0 times per week     Types: Marijuana     Review of Systems   All other systems reviewed and are negative.      Physical Exam     Initial Vitals [24 1530]   BP Pulse Resp Temp SpO2    (!) 145/70 104 20 98.5 °F (36.9 °C) 95 %      MAP       --         Physical Exam    Nursing note and vitals reviewed.  Constitutional: Vital signs are normal.  Non-toxic appearance.   Patient appears fatigued   HENT:   Head: Normocephalic.   Eyes: No scleral icterus.   Cardiovascular:            Rate 104 in triage   Pulmonary/Chest: No stridor. No respiratory distress.   Bilateral chest rise   Abdominal: There is no guarding.   Musculoskeletal:         General: No tenderness.      Cervical back: No rigidity.     Neurological: He is alert.   No associated focal motor or sensory deficit   Skin: Skin is warm and dry. No rash noted.   Psychiatric: His speech is normal. He is not actively hallucinating.   Not anxious  or agitated         ED Course   Critical Care    Date/Time: 4/12/2024 2:54 PM    Performed by: Jose Canales Jr., DO  Authorized by: Jose Canales Jr., DO  Direct patient critical care time: 25 minutes  Ordering / reviewing critical care time: 10 minutes  Documentation critical care time: 10 minutes  Consulting other physicians critical care time: 10 minutes  Total critical care time (exclusive of procedural time) : 55 minutes        Labs Reviewed   CBC W/ AUTO DIFFERENTIAL - Abnormal; Notable for the following components:       Result Value    Hemoglobin 13.8 (*)     MCHC 30.9 (*)     RDW 16.3 (*)     Immature Granulocytes 0.8 (*)     Immature Grans (Abs) 0.07 (*)     Gran % 79.5 (*)     Lymph % 11.1 (*)     All other components within normal limits   COMPREHENSIVE METABOLIC PANEL - Abnormal; Notable for the following components:    Potassium 5.4 (*)     Chloride 113 (*)     CO2 18 (*)     Glucose 114 (*)     BUN 24 (*)     Creatinine 1.6 (*)     eGFR 44 (*)     All other components within normal limits   TROPONIN I - Abnormal; Notable for the following components:    Troponin I 0.074 (*)     All other components within normal limits    Narrative:     trop critical result(s) called and verbal  readback obtained from   marcos boland rn by KFA1 04/12/2024 18:27   B-TYPE NATRIURETIC PEPTIDE - Abnormal; Notable for the following components:     (*)     All other components within normal limits   TROPONIN I - Abnormal; Notable for the following components:    Troponin I 0.088 (*)     All other components within normal limits    Narrative:     trop  critical result(s) called and verbal readback obtained from   henrry arrington rn by KFA1 04/12/2024 19:26   INFLUENZA A & B BY MOLECULAR   TSH   SARS-COV-2 RNA AMPLIFICATION, QUAL   CK   MAGNESIUM   D DIMER, QUANTITATIVE   APTT   PROTIME-INR   POCT GLUCOSE   POCT GLUCOSE MONITORING CONTINUOUS          Imaging Results              X-Ray Chest 1 View (Final result)  Result time 04/12/24 17:00:47      Final result by Ghanshyam Curiel Jr., MD (04/12/24 17:00:47)                   Impression:      No acute abnormality.      Electronically signed by: Ghanshyam Curiel MD  Date:    04/12/2024  Time:    17:00               Narrative:    EXAMINATION:  XR CHEST 1 VIEW    CLINICAL HISTORY:  Other fatigue    TECHNIQUE:  Single frontal view of the chest was performed.    COMPARISON:  Chest x-ray of October 22, 2023.    FINDINGS:  The lungs are clear, with normal appearance of pulmonary vasculature and no pleural effusion or pneumothorax.    The cardiac silhouette is normal in size. The hilar and mediastinal contours are unremarkable.    Bones are intact.                                       Medications   acetaminophen tablet 1,000 mg (1,000 mg Oral Given 4/12/24 1934)   nitroGLYCERIN 2% TD oint ointment 1 inch (1 inch Topical (Top) Given 4/12/24 1934)   aspirin tablet 325 mg (325 mg Oral Given 4/12/24 1934)     Medical Decision Making  78-year-old male presents with generalized body aches, shortness a breath and near syncopal event.  Sounds like possible viral syndrome however multiple comorbidities.  EKG obtained with biphasic T-wave in V2 through V6.  Per chart  review similar EKGs in the past.  Concern for Wellens syndrome, notify cardiologist on-call who reviewed EKG.  Initial troponin elevated twice as high as his normal troponin level.  No significant KENYATTA.  Also elevated BNP.  Repeat troponin 90 minute with increased however not significant per cardiologist Dr. Meyer.  Patient's blood pressure increased here in the ED and was given topical nitroglycerin.  Also given oral aspirin and oral Tylenol.  Spoke with cardiologist regarding heparin drip when he decided to hold off on heparin drip at this time.  Recommend q.6 troponins.  Within differential diagnosis includes PE, D-dimer negative so no PE protocol obtained.  No large focal pneumonia on x-ray.  Questionable right middle lobe atelectasis versus increased density.  Could be viral, no leukocytosis.  We will defer antibiotics at this time, patient not tachycardic or hypoxemic.  Spoke with hospitalist team will admit for further management evaluation    Amount and/or Complexity of Data Reviewed  Labs: ordered. Decision-making details documented in ED Course.  ECG/medicine tests: ordered and independent interpretation performed.     Details: Rate 81, QRS 82, , inverted T-waves in lateral leads and biphasic T-waves in V2 and V3 concerning for Wellens syndrome  Discussion of management or test interpretation with external provider(s): Spoke with cardiologist Dr. Meyer-he reviewed case including EKG, no heparin or catheterization at this time indicated    Spoke with Carlos Ballesteros nurse practitioner with hospitalist team will admit for further management and evaluation    Risk  OTC drugs.  Prescription drug management.  Decision regarding hospitalization.               ED Course as of 04/12/24 2022 Fri Apr 12, 2024   1745 WBC: 8.92 [KB]   1745 Hemoglobin(!): 13.8 [KB]   1745 Hematocrit: 44.6 [KB]   1826 CPK: 151 [KB]   1826 Magnesium : 2.2 [KB]   1826 BNP(!): 861 [KB]   1826 Sodium: 143 [KB]   1826 Potassium(!): 5.4  [KB]   1826 Chloride(!): 113 [KB]   1826 CO2(!): 18 [KB]   1826 Glucose(!): 114 [KB]   1826 BUN(!): 24 [KB]   1826 Creatinine(!): 1.6 [KB]   1826 eGFR(!): 44 [KB]   1857 TSH: 0.662 [KB]   1858 Troponin I(!!): 0.074 [KB]   1918 D-Dimer: 0.31  D-dimer negative we will not obtain PE protocol [KB]   1928 Troponin I(!!): 0.088 [KB]   1928 INR: 1.0 [KB]   1928 PTT: <21.0 [KB]   1942 Influenza A, Molecular: Negative [KB]   1943 Influenza B, Molecular: Negative [KB]   1943 SARS-CoV-2 RNA, Amplification, Qual: Negative [KB]   1943 Troponin minimally increased to 0.088 [KB]   1943 Spoke with cardiologist on-call .  Recommends holding heparin drip at this time.  Repeat troponin q.6 hours notify him for any significant change. [KB]   1943 No cardiac beds available at [facility] at this time, at least 8 patient is waiting to obtain a cardiac bed at [facility] per their house supervisor Abigail.  We will admit to hospitalist team here at [facility] [KB]      ED Course User Index  [KB] Jose Canales Jr.,                            Clinical Impression:  Final diagnoses:  [R53.1] Generalized weakness  [R53.83] Fatigue  [R06.00] Dyspnea, unspecified type (Primary)  [R55] Near syncope  [R94.31] Abnormal EKG  [R79.89] Elevated troponin  [R79.89] Elevated brain natriuretic peptide (BNP) level          ED Disposition Condition    Admit Stable                Jose Canales Jr.,   04/12/24 2020       Jose Canales Jr.,   04/12/24 2023

## 2024-04-12 NOTE — FIRST PROVIDER EVALUATION
Emergency Department TeleTriage Encounter Note      CHIEF COMPLAINT    Chief Complaint   Patient presents with    Nasal Congestion     Started yesterday     Weakness       VITAL SIGNS   Initial Vitals [04/12/24 1530]   BP Pulse Resp Temp SpO2   (!) 145/70 104 20 98.5 °F (36.9 °C) 95 %      MAP       --            ALLERGIES    Review of patient's allergies indicates:  No Known Allergies    PROVIDER TRIAGE NOTE  78 year old male presents to the ER with complaints of new onset generalized weakness and fatigue that began yesterday. Reports no energy. Denies CP or SOB or fever. Decreased appetite. No n/v/d. No abdominal pain.       AAOx3, respirations even and non- labored, stable vitals, normal coloration of skin, sitting upright in triage chair, appears in no acute distress.          ORDERS  Labs Reviewed - No data to display    ED Orders (720h ago, onward)      None              Virtual Visit Note: The provider triage portion of this emergency department evaluation and documentation was performed via [employer], a HIPAA-compliant telemedicine application, in concert with a tele-presenter in the room. A face to face patient evaluation with one of my colleagues will occur once the patient is placed in an emergency department room.      DISCLAIMER: This note was prepared with [email or web address] voice recognition transcription software. Garbled syntax, mangled pronouns, and other bizarre constructions may be attributed to that software system.

## 2024-04-13 VITALS
DIASTOLIC BLOOD PRESSURE: 79 MMHG | HEIGHT: 68 IN | TEMPERATURE: 98 F | SYSTOLIC BLOOD PRESSURE: 165 MMHG | RESPIRATION RATE: 16 BRPM | WEIGHT: 144.19 LBS | BODY MASS INDEX: 21.85 KG/M2 | OXYGEN SATURATION: 96 % | HEART RATE: 83 BPM

## 2024-04-13 PROBLEM — E87.5 HYPERKALEMIA: Status: RESOLVED | Noted: 2024-04-12 | Resolved: 2024-04-13

## 2024-04-13 LAB
ALBUMIN SERPL BCP-MCNC: 2.9 G/DL (ref 3.5–5.2)
ALP SERPL-CCNC: 53 U/L (ref 55–135)
ALT SERPL W/O P-5'-P-CCNC: 17 U/L (ref 10–44)
ANION GAP SERPL CALC-SCNC: 10 MMOL/L (ref 8–16)
AST SERPL-CCNC: 13 U/L (ref 10–40)
BASOPHILS # BLD AUTO: 0.02 K/UL (ref 0–0.2)
BASOPHILS NFR BLD: 0.3 % (ref 0–1.9)
BILIRUB SERPL-MCNC: 0.6 MG/DL (ref 0.1–1)
BUN SERPL-MCNC: 24 MG/DL (ref 8–23)
CALCIUM SERPL-MCNC: 8.9 MG/DL (ref 8.7–10.5)
CHLORIDE SERPL-SCNC: 113 MMOL/L (ref 95–110)
CO2 SERPL-SCNC: 22 MMOL/L (ref 23–29)
CREAT SERPL-MCNC: 1.5 MG/DL (ref 0.5–1.4)
DIFFERENTIAL METHOD BLD: ABNORMAL
EOSINOPHIL # BLD AUTO: 0.1 K/UL (ref 0–0.5)
EOSINOPHIL NFR BLD: 1.8 % (ref 0–8)
ERYTHROCYTE [DISTWIDTH] IN BLOOD BY AUTOMATED COUNT: 16.4 % (ref 11.5–14.5)
EST. GFR  (NO RACE VARIABLE): 48 ML/MIN/1.73 M^2
GLUCOSE SERPL-MCNC: 79 MG/DL (ref 70–110)
HCT VFR BLD AUTO: 40.1 % (ref 40–54)
HGB BLD-MCNC: 12.7 G/DL (ref 14–18)
IMM GRANULOCYTES # BLD AUTO: 0.08 K/UL (ref 0–0.04)
IMM GRANULOCYTES NFR BLD AUTO: 1.3 % (ref 0–0.5)
LYMPHOCYTES # BLD AUTO: 1.1 K/UL (ref 1–4.8)
LYMPHOCYTES NFR BLD: 17 % (ref 18–48)
MAGNESIUM SERPL-MCNC: 2.2 MG/DL (ref 1.6–2.6)
MCH RBC QN AUTO: 28 PG (ref 27–31)
MCHC RBC AUTO-ENTMCNC: 31.7 G/DL (ref 32–36)
MCV RBC AUTO: 88 FL (ref 82–98)
MONOCYTES # BLD AUTO: 0.8 K/UL (ref 0.3–1)
MONOCYTES NFR BLD: 12.9 % (ref 4–15)
NEUTROPHILS # BLD AUTO: 4.2 K/UL (ref 1.8–7.7)
NEUTROPHILS NFR BLD: 66.7 % (ref 38–73)
NRBC BLD-RTO: 0 /100 WBC
PHOSPHATE SERPL-MCNC: 3.3 MG/DL (ref 2.7–4.5)
PLATELET # BLD AUTO: 220 K/UL (ref 150–450)
PMV BLD AUTO: 12.2 FL (ref 9.2–12.9)
POTASSIUM SERPL-SCNC: 4.6 MMOL/L (ref 3.5–5.1)
PROT SERPL-MCNC: 6.4 G/DL (ref 6–8.4)
RBC # BLD AUTO: 4.54 M/UL (ref 4.6–6.2)
SODIUM SERPL-SCNC: 145 MMOL/L (ref 136–145)
WBC # BLD AUTO: 6.28 K/UL (ref 3.9–12.7)

## 2024-04-13 PROCEDURE — 94761 N-INVAS EAR/PLS OXIMETRY MLT: CPT

## 2024-04-13 PROCEDURE — 80053 COMPREHEN METABOLIC PANEL: CPT | Performed by: NURSE PRACTITIONER

## 2024-04-13 PROCEDURE — 94760 N-INVAS EAR/PLS OXIMETRY 1: CPT

## 2024-04-13 PROCEDURE — 36415 COLL VENOUS BLD VENIPUNCTURE: CPT | Mod: XB | Performed by: NURSE PRACTITIONER

## 2024-04-13 PROCEDURE — 99214 OFFICE O/P EST MOD 30 MIN: CPT | Mod: ,,, | Performed by: INTERNAL MEDICINE

## 2024-04-13 PROCEDURE — G0378 HOSPITAL OBSERVATION PER HR: HCPCS

## 2024-04-13 PROCEDURE — 85025 COMPLETE CBC W/AUTO DIFF WBC: CPT | Performed by: NURSE PRACTITIONER

## 2024-04-13 PROCEDURE — 99900035 HC TECH TIME PER 15 MIN (STAT)

## 2024-04-13 PROCEDURE — 84100 ASSAY OF PHOSPHORUS: CPT | Performed by: NURSE PRACTITIONER

## 2024-04-13 PROCEDURE — 25000003 PHARM REV CODE 250: Performed by: NURSE PRACTITIONER

## 2024-04-13 PROCEDURE — 83735 ASSAY OF MAGNESIUM: CPT | Performed by: NURSE PRACTITIONER

## 2024-04-13 RX ORDER — QUETIAPINE FUMARATE 100 MG/1
100 TABLET, FILM COATED ORAL NIGHTLY
Qty: 30 TABLET | Refills: 0 | Status: SHIPPED | OUTPATIENT
Start: 2024-04-13 | End: 2024-04-25 | Stop reason: SDUPTHER

## 2024-04-13 RX ORDER — TAMSULOSIN HYDROCHLORIDE 0.4 MG/1
0.4 CAPSULE ORAL NIGHTLY
Qty: 30 CAPSULE | Refills: 0 | Status: SHIPPED | OUTPATIENT
Start: 2024-04-13 | End: 2024-05-13

## 2024-04-13 RX ADMIN — ACETAMINOPHEN 650 MG: 325 TABLET ORAL at 02:04

## 2024-04-13 RX ADMIN — AMLODIPINE BESYLATE 5 MG: 5 TABLET ORAL at 09:04

## 2024-04-13 RX ADMIN — ATORVASTATIN CALCIUM 20 MG: 20 TABLET, FILM COATED ORAL at 09:04

## 2024-04-13 RX ADMIN — QUETIAPINE 200 MG: 100 TABLET ORAL at 12:04

## 2024-04-13 NOTE — ASSESSMENT & PLAN NOTE
No chest pain. Trop 0.074, 0.088.  Will trend. EKG concerning for Wellen's Pattern. ED discussed with Cardiology. EKG similar to Dec EKG.

## 2024-04-13 NOTE — HPI
Mr Drew is a 78 year old male with a PMH that includes HTN, anxiety, depression, GERD, prediabetes, CKD 4, PUD, and history of prostate and urethral cancers.  He came to the ED for weakness and fatigue with presyncope s/t his insomnia. He has been having nocturia for at least 6 months and is unable to sleep more than two hours in a row. He does report dysuria.   He also reports associated dizziness and headache with pain behind both eyes.  The HA resolved with tylenol ans aspirin. He denies chest pain and SOB. He is not aware of his kidney disease. In the ED he was found to be hypertensive, hyperkalemic , and had an elevated creatinine. Mr Castle is admitted to hospital medicine for further management.

## 2024-04-13 NOTE — SUBJECTIVE & OBJECTIVE
Past Medical History:   Diagnosis Date    Anticoagulant long-term use     Arthritis     Coronary artery disease     Dr. Lamb    DDD (degenerative disc disease), cervical     Degenerative disc disease     Heart murmur     History of radiation therapy 2020    Hypertension     Nontoxic multinodular goiter 09/20/2016    Prostate CA     RA (rheumatoid arthritis)     Sleep apnea     No CPAP    Vitamin D insufficiency 09/30/2016       Past Surgical History:   Procedure Laterality Date    CARPAL TUNNEL RELEASE Right 05/28/2021    Procedure: RELEASE, CARPAL TUNNEL;  Surgeon: Jose Ryan II, MD;  Location: [facility] OR;  Service: Orthopedics;  Laterality: Right;    COLONOSCOPY  prior to 2016    normal findings per patient report    CORONARY ANGIOPLASTY WITH STENT PLACEMENT  02/2022    CYSTOSCOPY N/A 12/18/2018    Procedure: CYSTOSCOPY;  Surgeon: Garrett Pina MD;  [facility]: [facility];  Service: Urology;  Laterality: N/A;    CYSTOSCOPY N/A 07/12/2022    Procedure: CYSTOSCOPY;  Surgeon: Garrett Pina MD;  Location: [facility];  Service: Urology;  Laterality: N/A;    ESOPHAGOGASTRODUODENOSCOPY N/A 09/29/2020    Dr. Espinosa; empiric dilation; erythematous mucosa in antrum; gastric mucosal atrophy; hematin in entire stomach; biopsy: mid & distal esophagus WNL, stomach- WNL, negative for h pylori    EXCISION OF LESION OF PENIS N/A 2/23/2023    Procedure: EXCISION, LESION, PENIS;  Surgeon: Timo Myers MD;  Location: [facility];  Service: Urology;  Laterality: N/A;    INSERTION OF TUNNELED CENTRAL VENOUS CATHETER (CVC) WITH SUBCUTANEOUS PORT Left 5/18/2023    Procedure: [id number];  Surgeon: Atul Faria MD;  Location: [facility];  Service: General;  Laterality: Left;  left subclavian    PARATHYROIDECTOMY Right 10/27/2020    Procedure: PARATHYROIDECTOMY;  Surgeon: Latonia Clayton MD;  Location: [address];  Service: ENT;  Laterality: Right;    RELEASE OF ULNAR NERVE AT CUBITAL TUNNEL Right 05/28/2021     Procedure: RELEASE, ULNAR TUNNEL;  Surgeon: Jose Ryan II, MD;  Location: [facility] OR;  Service: Orthopedics;  Laterality: Right;    TRANSRECTAL BIOPSY OF PROSTATE WITH ULTRASOUND GUIDANCE N/A 2018    Procedure: BIOPSY, PROSTATE, RECTAL APPROACH, WITH US GUIDANCE;  Surgeon: Garrett Pina MD;  Location: [facility] OR;  Service: Urology;  Laterality: N/A;    TRANSRECTAL ULTRASOUND EXAMINATION N/A 2022    Procedure: ULTRASOUND, RECTAL APPROACH;  Surgeon: Garrett Pina MD;  Location: [facility] OR;  Service: Urology;  Laterality: N/A;       Review of patient's allergies indicates:  No Known Allergies    Current Facility-Administered Medications on File Prior to Encounter   Medication    albuterol nebulizer solution 2.5 mg    albuterol-ipratropium 2.5 mg-0.5 mg/3 mL nebulizer solution 3 mL    denosumab (PROLIA) injection 60 mg    diphenhydrAMINE injection 25 mg    HYDROmorphone injection 0.5 mg    lactated ringers infusion    ondansetron injection 4 mg    sodium chloride 0.9% flush 10 mL    sodium chloride 0.9% flush 10 mL    sodium chloride 0.9% flush 3 mL     Current Outpatient Medications on File Prior to Encounter   Medication Sig    divalproex (DEPAKOTE) 250 MG EC tablet Take 250 mg by mouth 3 (three) times daily.    DULoxetine (CYMBALTA) 30 MG capsule SMARTSI caplet By Mouth Twice Daily    QUEtiapine (SEROQUEL) 100 MG Tab Take 2 tablets (200 mg total) by mouth once daily.    zolpidem (AMBIEN) 10 mg Tab Take 1 tablet (10 mg total) by mouth nightly as needed.    amLODIPine (NORVASC) 5 MG tablet Take 1 tablet (5 mg total) by mouth 2 (two) times daily.    atorvastatin (LIPITOR) 20 MG tablet Take 1 tablet (20 mg total) by mouth once daily.    b complex vitamins capsule Take 1 capsule by mouth once daily.    betamethasone valerate 0.1% (VALISONE) 0.1 % Crea Apply topically 2 (two) times daily. (Patient taking differently: Apply 1 application  topically 2 (two) times daily.)    busPIRone (BUSPAR) 10 MG  tablet Take 1 tablet (10 mg total) by mouth 3 (three) times daily.    cyproheptadine (PERIACTIN) 4 mg tablet Take 1 tablet (4 mg total) by mouth 4 (four) times daily.    EScitalopram oxalate (LEXAPRO) 10 MG tablet Take 1 tablet (10 mg total) by mouth once daily.    isosorbide mononitrate (IMDUR) 30 MG 24 hr tablet Take 1 tablet (30 mg total) by mouth once daily.    losartan (COZAAR) 25 MG tablet Take 1 tablet (25 mg total) by mouth once daily.    magnesium oxide (MAG-OX) 400 mg (241.3 mg magnesium) tablet Take 1 tablet (400 mg total) by mouth once daily.    oxybutynin (DITROPAN XL) 15 MG TR24 Take 1 tablet (15 mg total) by mouth once daily.    pantoprazole (PROTONIX) 40 MG tablet Take 1 tablet (40 mg total) by mouth once daily.    phenazopyridine (PYRIDIUM) 200 MG tablet Take 1 tablet (200 mg total) by mouth 3 (three) times daily as needed for Pain.    predniSONE (DELTASONE) 5 MG tablet Take 1 tablet (5 mg total) by mouth 2 (two) times daily.    [DISCONTINUED] cholecalciferol, vitamin D3, (VITAMIN D3) 100 mcg (4,000 unit) Cap Take 400 Units by mouth once daily.    [DISCONTINUED] cloNIDine (CATAPRES) 0.1 MG tablet Take 1 tablet (0.1 mg total) by mouth 2 (two) times daily as needed (only if blood pressure top number is over 200). (Patient not taking: Reported on 6/7/2022)    [DISCONTINUED] meclizine (ANTIVERT) 12.5 mg tablet Take 1 tablet (12.5 mg total) by mouth 2 (two) times daily as needed for Dizziness.    [DISCONTINUED] sildenafiL (VIAGRA) 100 MG tablet Take 1 tablet (100 mg total) by mouth daily as needed for Erectile Dysfunction.     Family History       Problem Relation (Age of Onset)    Diabetes Maternal Uncle    Drug abuse Sister    Heart disease Mother    No Known Problems Daughter, Daughter, Son    Stroke Father          Tobacco Use    Smoking status: Former     Current packs/day: 0.00     Average packs/day: 0.3 packs/day for 10.0 years (2.5 ttl pk-yrs)     Types: Cigarettes     Start date: 8/6/1991      Quit date: 2001     Years since quittin.6     Passive exposure: Past    Smokeless tobacco: Never   Substance and Sexual Activity    Alcohol use: Not Currently     Comment: seldom    Drug use: Not Currently     Frequency: 8.0 times per week     Types: Marijuana    Sexual activity: Yes     Partners: Female     Review of Systems   Constitutional:  Positive for activity change and fatigue.   HENT:  Negative for trouble swallowing.    Eyes:  Negative for visual disturbance.   Respiratory:  Negative for chest tightness.    Cardiovascular:  Negative for chest pain.   Gastrointestinal:  Negative for abdominal distention, constipation, rectal pain and vomiting.   Endocrine: Positive for polyuria. Negative for polydipsia.   Genitourinary:  Positive for dysuria. Negative for flank pain.   Musculoskeletal:  Negative for arthralgias.   Neurological:  Positive for headaches.   Psychiatric/Behavioral:  Positive for sleep disturbance. Negative for agitation.      Objective:     Vital Signs (Most Recent):  Temp: 98.5 °F (36.9 °C) (24 1530)  Pulse: 72 (24)  Resp: 16 (24)  BP: (!) 192/93 (24)  SpO2: 98 % (24) Vital Signs (24h Range):  Temp:  [98.5 °F (36.9 °C)] 98.5 °F (36.9 °C)  Pulse:  [] 72  Resp:  [16-20] 16  SpO2:  [95 %-98 %] 98 %  BP: (145-192)/(70-93) 192/93     Weight: 65.8 kg (145 lb)  Body mass index is 22.05 kg/m².     Physical Exam  Vitals and nursing note reviewed.   HENT:      Head: Normocephalic.      Mouth/Throat:      Mouth: Mucous membranes are moist.   Eyes:      Extraocular Movements: Extraocular movements intact.      Pupils: Pupils are equal, round, and reactive to light.   Cardiovascular:      Rate and Rhythm: Normal rate and regular rhythm.   Pulmonary:      Effort: Pulmonary effort is normal. No respiratory distress.      Breath sounds: No wheezing.   Abdominal:      General: Abdomen is flat. There is no distension.      Palpations: Abdomen is  soft.      Tenderness: There is no abdominal tenderness. There is no guarding.   Musculoskeletal:         General: Normal range of motion.      Cervical back: No rigidity.      Right lower leg: No edema.      Left lower leg: No edema.   Skin:     General: Skin is dry.   Neurological:      General: No focal deficit present.      Mental Status: He is alert and oriented to person, place, and time.   Psychiatric:         Mood and Affect: Mood normal.         Behavior: Behavior normal.              CRANIAL NERVES     CN III, IV, VI   Pupils are equal, round, and reactive to light.       Significant Labs: All pertinent labs within the past 24 hours have been reviewed.  BMP:   Recent Labs   Lab 04/12/24  1705   *      K 5.4*   *   CO2 18*   BUN 24*   CREATININE 1.6*   CALCIUM 9.5   MG 2.2     CBC:   Recent Labs   Lab 04/12/24  1705   WBC 8.92   HGB 13.8*   HCT 44.6        CMP:   Recent Labs   Lab 04/12/24  1705      K 5.4*   *   CO2 18*   *   BUN 24*   CREATININE 1.6*   CALCIUM 9.5   PROT 7.7   ALBUMIN 3.5   BILITOT 0.6   ALKPHOS 61   AST 22   ALT 17   ANIONGAP 12       Significant Imaging: I have reviewed all pertinent imaging results/findings within the past 24 hours.  X-Ray Chest 1 View  Narrative: EXAMINATION:  XR CHEST 1 VIEW    CLINICAL HISTORY:  Other fatigue    TECHNIQUE:  Single frontal view of the chest was performed.    COMPARISON:  Chest x-ray of October 22, 2023.    FINDINGS:  The lungs are clear, with normal appearance of pulmonary vasculature and no pleural effusion or pneumothorax.    The cardiac silhouette is normal in size. The hilar and mediastinal contours are unremarkable.    Bones are intact.  Impression: No acute abnormality.    Electronically signed by: Ghanshyam Curiel MD  Date:    04/12/2024  Time:    17:00

## 2024-04-13 NOTE — ASSESSMENT & PLAN NOTE
Chronic, uncontrolled. Latest blood pressure and vitals reviewed-   Patient not taking medications at home.   Temp:  [98.5 °F (36.9 °C)]   Pulse:  []   Resp:  [16-20]   BP: (145-192)/(70-93)   SpO2:  [95 %-98 %] .   Home meds for hypertension were reviewed and noted below.   Hypertension Medications               amLODIPine (NORVASC) 5 MG tablet Take 1 tablet (5 mg total) by mouth 2 (two) times daily.    isosorbide mononitrate (IMDUR) 30 MG 24 hr tablet Take 1 tablet (30 mg total) by mouth once daily.    losartan (COZAAR) 25 MG tablet Take 1 tablet (25 mg total) by mouth once daily.          Continue amlodipine. Hold ARB. Imdur held also bc patient is not taking.       W

## 2024-04-13 NOTE — H&P
[facility] Medicine  History & Physical    Patient Name: Rajendra Castle  MRN: [id number]  Patient Class: Emergency  Admission Date: 4/12/2024  Attending Physician: Jose Canales Jr., *   Primary Care Provider: Sp Lilly MD         Patient information was obtained from patient, past medical records, and ER records.     Subjective:     Principal Problem:Elevated troponin    Chief Complaint:   Chief Complaint   Patient presents with    Nasal Congestion     Started yesterday     Weakness        HPI: Mr Drew is a 78 year old male with a PMH that includes HTN, anxiety, depression, GERD, prediabetes, CKD 4, PUD, and history of prostate and urethral cancers.  He came to the ED for weakness and fatigue with presyncope s/t his insomnia. He has been having nocturia for at least 6 months and is unable to sleep more than two hours in a row. He does report dysuria.   He also reports associated dizziness and headache with pain behind both eyes.  The HA resolved with tylenol ans aspirin. He denies chest pain and SOB. He is not aware of his kidney disease. In the ED he was found to be hypertensive, hyperkalemic , and had an elevated creatinine. Mr Castle is admitted to hospital medicine for further management.     Past Medical History:   Diagnosis Date    Anticoagulant long-term use     Arthritis     Coronary artery disease     Dr. Lamb    DDD (degenerative disc disease), cervical     Degenerative disc disease     Heart murmur     History of radiation therapy 2020    Hypertension     Nontoxic multinodular goiter 09/20/2016    Prostate CA     RA (rheumatoid arthritis)     Sleep apnea     No CPAP    Vitamin D insufficiency 09/30/2016       Past Surgical History:   Procedure Laterality Date    CARPAL TUNNEL RELEASE Right 05/28/2021    Procedure: RELEASE, CARPAL TUNNEL;  Surgeon: Jose Ryan II, MD;  Location: [facility];  Service: Orthopedics;  Laterality: Right;    COLONOSCOPY  prior to  2016    normal findings per patient report    CORONARY ANGIOPLASTY WITH STENT PLACEMENT  02/2022    CYSTOSCOPY N/A 12/18/2018    Procedure: CYSTOSCOPY;  Surgeon: Garrett Pina MD;  Location: [facility] OR;  Service: Urology;  Laterality: N/A;    CYSTOSCOPY N/A 07/12/2022    Procedure: CYSTOSCOPY;  Surgeon: Garrett Pina MD;  Location: [facility] OR;  Service: Urology;  Laterality: N/A;    ESOPHAGOGASTRODUODENOSCOPY N/A 09/29/2020    Dr. Espinosa; empiric dilation; erythematous mucosa in antrum; gastric mucosal atrophy; hematin in entire stomach; biopsy: mid & distal esophagus WNL, stomach- WNL, negative for h pylori    EXCISION OF LESION OF PENIS N/A 2/23/2023    Procedure: EXCISION, LESION, PENIS;  Surgeon: Timo Myers MD;  Location: [facility] OR;  Service: Urology;  Laterality: N/A;    INSERTION OF TUNNELED CENTRAL VENOUS CATHETER (CVC) WITH SUBCUTANEOUS PORT Left 5/18/2023    Procedure: [id number];  Surgeon: Atul Faria MD;  Location: [facility];  Service: General;  Laterality: Left;  left subclavian    PARATHYROIDECTOMY Right 10/27/2020    Procedure: PARATHYROIDECTOMY;  Surgeon: Latonia Clayton MD;  Location: [address];  Service: ENT;  Laterality: Right;    RELEASE OF ULNAR NERVE AT CUBITAL TUNNEL Right 05/28/2021    Procedure: RELEASE, ULNAR TUNNEL;  Surgeon: Jose Ryan II, MD;  Location: [facility] OR;  Service: Orthopedics;  Laterality: Right;    TRANSRECTAL BIOPSY OF PROSTATE WITH ULTRASOUND GUIDANCE N/A 12/18/2018    Procedure: BIOPSY, PROSTATE, RECTAL APPROACH, WITH US GUIDANCE;  Surgeon: Garrett Pina MD;  Location: [facility] OR;  Service: Urology;  Laterality: N/A;    TRANSRECTAL ULTRASOUND EXAMINATION N/A 07/12/2022    Procedure: ULTRASOUND, RECTAL APPROACH;  Surgeon: Garrett Pina MD;  Location: [facility];  Service: Urology;  Laterality: N/A;       Review of patient's allergies indicates:  No Known Allergies    Current Facility-Administered Medications on File Prior to Encounter    Medication    albuterol nebulizer solution 2.5 mg    albuterol-ipratropium 2.5 mg-0.5 mg/3 mL nebulizer solution 3 mL    denosumab (PROLIA) injection 60 mg    diphenhydrAMINE injection 25 mg    HYDROmorphone injection 0.5 mg    lactated ringers infusion    ondansetron injection 4 mg    sodium chloride 0.9% flush 10 mL    sodium chloride 0.9% flush 10 mL    sodium chloride 0.9% flush 3 mL     Current Outpatient Medications on File Prior to Encounter   Medication Sig    divalproex (DEPAKOTE) 250 MG EC tablet Take 250 mg by mouth 3 (three) times daily.    DULoxetine (CYMBALTA) 30 MG capsule SMARTSI caplet By Mouth Twice Daily    QUEtiapine (SEROQUEL) 100 MG Tab Take 2 tablets (200 mg total) by mouth once daily.    zolpidem (AMBIEN) 10 mg Tab Take 1 tablet (10 mg total) by mouth nightly as needed.    amLODIPine (NORVASC) 5 MG tablet Take 1 tablet (5 mg total) by mouth 2 (two) times daily.    atorvastatin (LIPITOR) 20 MG tablet Take 1 tablet (20 mg total) by mouth once daily.    b complex vitamins capsule Take 1 capsule by mouth once daily.    betamethasone valerate 0.1% (VALISONE) 0.1 % Crea Apply topically 2 (two) times daily. (Patient taking differently: Apply 1 application  topically 2 (two) times daily.)    busPIRone (BUSPAR) 10 MG tablet Take 1 tablet (10 mg total) by mouth 3 (three) times daily.    cyproheptadine (PERIACTIN) 4 mg tablet Take 1 tablet (4 mg total) by mouth 4 (four) times daily.    EScitalopram oxalate (LEXAPRO) 10 MG tablet Take 1 tablet (10 mg total) by mouth once daily.    isosorbide mononitrate (IMDUR) 30 MG 24 hr tablet Take 1 tablet (30 mg total) by mouth once daily.    losartan (COZAAR) 25 MG tablet Take 1 tablet (25 mg total) by mouth once daily.    magnesium oxide (MAG-OX) 400 mg (241.3 mg magnesium) tablet Take 1 tablet (400 mg total) by mouth once daily.    oxybutynin (DITROPAN XL) 15 MG TR24 Take 1 tablet (15 mg total) by mouth once daily.    pantoprazole (PROTONIX) 40 MG tablet  Take 1 tablet (40 mg total) by mouth once daily.    phenazopyridine (PYRIDIUM) 200 MG tablet Take 1 tablet (200 mg total) by mouth 3 (three) times daily as needed for Pain.    predniSONE (DELTASONE) 5 MG tablet Take 1 tablet (5 mg total) by mouth 2 (two) times daily.    [DISCONTINUED] cholecalciferol, vitamin D3, (VITAMIN D3) 100 mcg (4,000 unit) Cap Take 400 Units by mouth once daily.    [DISCONTINUED] cloNIDine (CATAPRES) 0.1 MG tablet Take 1 tablet (0.1 mg total) by mouth 2 (two) times daily as needed (only if blood pressure top number is over 200). (Patient not taking: Reported on 2022)    [DISCONTINUED] meclizine (ANTIVERT) 12.5 mg tablet Take 1 tablet (12.5 mg total) by mouth 2 (two) times daily as needed for Dizziness.    [DISCONTINUED] sildenafiL (VIAGRA) 100 MG tablet Take 1 tablet (100 mg total) by mouth daily as needed for Erectile Dysfunction.     Family History       Problem Relation (Age of Onset)    Diabetes Maternal Uncle    Drug abuse Sister    Heart disease Mother    No Known Problems Daughter, Daughter, Son    Stroke Father          Tobacco Use    Smoking status: Former     Current packs/day: 0.00     Average packs/day: 0.3 packs/day for 10.0 years (2.5 ttl pk-yrs)     Types: Cigarettes     Start date: 1991     Quit date: 2001     Years since quittin.6     Passive exposure: Past    Smokeless tobacco: Never   Substance and Sexual Activity    Alcohol use: Not Currently     Comment: seldom    Drug use: Not Currently     Frequency: 8.0 times per week     Types: Marijuana    Sexual activity: Yes     Partners: Female     Review of Systems   Constitutional:  Positive for activity change and fatigue.   HENT:  Negative for trouble swallowing.    Eyes:  Negative for visual disturbance.   Respiratory:  Negative for chest tightness.    Cardiovascular:  Negative for chest pain.   Gastrointestinal:  Negative for abdominal distention, constipation, rectal pain and vomiting.   Endocrine: Positive  for polyuria. Negative for polydipsia.   Genitourinary:  Positive for dysuria. Negative for flank pain.   Musculoskeletal:  Negative for arthralgias.   Neurological:  Positive for headaches.   Psychiatric/Behavioral:  Positive for sleep disturbance. Negative for agitation.      Objective:     Vital Signs (Most Recent):  Temp: 98.5 °F (36.9 °C) (04/12/24 1530)  Pulse: 72 (04/12/24 2000)  Resp: 16 (04/12/24 2000)  BP: (!) 192/93 (04/12/24 2000)  SpO2: 98 % (04/12/24 2000) Vital Signs (24h Range):  Temp:  [98.5 °F (36.9 °C)] 98.5 °F (36.9 °C)  Pulse:  [] 72  Resp:  [16-20] 16  SpO2:  [95 %-98 %] 98 %  BP: (145-192)/(70-93) 192/93     Weight: 65.8 kg (145 lb)  Body mass index is 22.05 kg/m².     Physical Exam  Vitals and nursing note reviewed.   HENT:      Head: Normocephalic.      Mouth/Throat:      Mouth: Mucous membranes are moist.   Eyes:      Extraocular Movements: Extraocular movements intact.      Pupils: Pupils are equal, round, and reactive to light.   Cardiovascular:      Rate and Rhythm: Normal rate and regular rhythm.   Pulmonary:      Effort: Pulmonary effort is normal. No respiratory distress.      Breath sounds: No wheezing.   Abdominal:      General: Abdomen is flat. There is no distension.      Palpations: Abdomen is soft.      Tenderness: There is no abdominal tenderness. There is no guarding.   Musculoskeletal:         General: Normal range of motion.      Cervical back: No rigidity.      Right lower leg: No edema.      Left lower leg: No edema.   Skin:     General: Skin is dry.   Neurological:      General: No focal deficit present.      Mental Status: He is alert and oriented to person, place, and time.   Psychiatric:         Mood and Affect: Mood normal.         Behavior: Behavior normal.              CRANIAL NERVES     CN III, IV, VI   Pupils are equal, round, and reactive to light.       Significant Labs: All pertinent labs within the past 24 hours have been reviewed.  BMP:   Recent Labs    Lab 04/12/24  1705   *      K 5.4*   *   CO2 18*   BUN 24*   CREATININE 1.6*   CALCIUM 9.5   MG 2.2     CBC:   Recent Labs   Lab 04/12/24  1705   WBC 8.92   HGB 13.8*   HCT 44.6        CMP:   Recent Labs   Lab 04/12/24  1705      K 5.4*   *   CO2 18*   *   BUN 24*   CREATININE 1.6*   CALCIUM 9.5   PROT 7.7   ALBUMIN 3.5   BILITOT 0.6   ALKPHOS 61   AST 22   ALT 17   ANIONGAP 12       Significant Imaging: I have reviewed all pertinent imaging results/findings within the past 24 hours.  X-Ray Chest 1 View  Narrative: EXAMINATION:  XR CHEST 1 VIEW    CLINICAL HISTORY:  Other fatigue    TECHNIQUE:  Single frontal view of the chest was performed.    COMPARISON:  Chest x-ray of October 22, 2023.    FINDINGS:  The lungs are clear, with normal appearance of pulmonary vasculature and no pleural effusion or pneumothorax.    The cardiac silhouette is normal in size. The hilar and mediastinal contours are unremarkable.    Bones are intact.  Impression: No acute abnormality.    Electronically signed by: Ghanshyam Curiel MD  Date:    04/12/2024  Time:    17:00      Assessment/Plan:     * Elevated troponin  No chest pain. Trop 0.074, 0.088.  Will trend. EKG concerning for Wellen's Pattern. ED discussed with Cardiology. EKG similar to Dec EKG.       Fatigue  Weakness  Presyncope  Headache  Patient believes it is s/t his insomnia. Nonfocal neurological Exam.  Continue tylenol for HA relief, as it is working.   Check orthostatics.      Other insomnia  Recently started taking seroquel 200 mg at night and ambien 10 mg nightly ( 2 hours after seroquel when he wakes from his sleep).  Thinks his insomnia is r/t nocturia.       Stage 3 chronic kidney disease, unspecified whether stage 3a or 3b CKD  Hyperkalemia 5.4  Baseline Creatinine 1.4  Creatine stable for now. BMP reviewed- noted Estimated Creatinine Clearance: 36.6 mL/min (A) (based on SCr of 1.6 mg/dL (H)). according to latest data.  Based on current GFR, CKD stage is stage 3 - GFR 30-59.  Monitor UOP and serial BMP and adjust therapy as needed. Renally dose meds. Avoid nephrotoxic medications and procedures.  Monitor BMP    Pulmonary emphysema  He does not use inhalers at this time. Rec OP referral to Pulm for sleep study.     Essential hypertension  Chronic, uncontrolled. Latest blood pressure and vitals reviewed-   Patient not taking medications at home.   Temp:  [98.5 °F (36.9 °C)]   Pulse:  []   Resp:  [16-20]   BP: (145-192)/(70-93)   SpO2:  [95 %-98 %] .   Home meds for hypertension were reviewed and noted below.   Hypertension Medications               amLODIPine (NORVASC) 5 MG tablet Take 1 tablet (5 mg total) by mouth 2 (two) times daily.    isosorbide mononitrate (IMDUR) 30 MG 24 hr tablet Take 1 tablet (30 mg total) by mouth once daily.    losartan (COZAAR) 25 MG tablet Take 1 tablet (25 mg total) by mouth once daily.          Continue amlodipine. Hold ARB. Imdur held also bc patient is not taking.             VTE Risk Mitigation (From admission, onward)      None                          Department of Hospital Medicine  [facility]

## 2024-04-13 NOTE — PLAN OF CARE
MOON form signed at bedside. Patient verbalized an understanding and was given copy.    04/13/24 1640   Medicare Message   Important Message from Medicare regarding Discharge Appeal Rights  --    HERRERA Message   Medicare Outpatient and Observation Notification regarding financial responsibility Signed/date by patient/caregiver;Explained to patient/caregiver;Given to patient/caregiver   Date HERRERA was signed 04/13/24   Time HERRERA was signed 1640

## 2024-04-13 NOTE — PLAN OF CARE
Problem: Adult Inpatient Plan of Care  Goal: Plan of Care Review  Outcome: Ongoing, Progressing     Problem: Dysrhythmia  Goal: Normalized Cardiac Rhythm  Outcome: Ongoing, Progressing

## 2024-04-13 NOTE — HOSPITAL COURSE
78 year old male with a PMH that includes HTN, anxiety, depression, GERD, prediabetes, CKD 4, PUD, and history of prostate and urethral cancers. He came to the ED for weakness and fatigue with presyncope s/t his insomnia. He has been having nocturia for at least 6 months and is unable to sleep more than two hours in a row. He does report dysuria. He also reports associated dizziness and headache with pain behind both eyes. The HA resolved with tylenol ans aspirin. He denies chest pain and SOB. He is not aware of his kidney disease. In the ED he was found to be hypertensive, hyperkalemic , and had an elevated creatinine. Mr Castle is admitted to hospital medicine for further management.   Patient was evaluated in follow-up.  He is fully oriented, denies chest pain or shortness of breath, admits to feeling constantly tired and not getting enough sleep despite Medicine.  We discussed polypharmacy and his decreased kidney function, recommended to start medicine Ambien and decrease the dose of Seroquel.  Patient also has symptoms consistent with a prostatic hypertrophy with multiple awakenings during the night to urinate, Flomax initiated.  Will await for cardiology clearance for discharge as on admission troponin was checked and trended down since it was borderline elevated, otherwise medically stable and cleared for discharge on outpatient follow-up in clinic.

## 2024-04-13 NOTE — PLAN OF CARE
[facility] - Med/Surg  Initial Discharge Assessment       Primary Care Provider: Sp Lilly MD    Admission Diagnosis: Dyspnea, unspecified type [R06.00]    Admission Date: 4/12/2024  Expected Discharge Date: 4/13/2024    Transition of Care Barriers: None    Initial assessment completed with patient at bedside. AAOx4. SW verified facesheet information. Does not have a living will or POA. Patient reports being independent with all ADLs and does not use any DME at home. Has a CM through [employer]. Patient does not have HH, and is not on coumadin or dialysis. Denies any medication affordability or compliance issues. Patient plans to discharge home alone and will drive himself. No discharge needs anticipated.     Payor: [employer] [facility] [facility] / Plan: [employer] CHOICES / Product Type: Medicare Advantage /     Extended Emergency Contact Information  Primary Emergency Contact: TinJane  Mobile Phone: [phone]  Relation: Grandchild  Preferred language: English   needed? No  Secondary Emergency Contact: Brenton Kulkarni  Mobile Phone: [phone]  Relation: Relative  Preferred language: English   needed? No    Discharge Plan A: Home  Discharge Plan B: Home      "[id number] - [place], [place] - [address]  [address]  [facility] [address]  Phone: [phone] Fax: [phone]    [facility] [address]  [address]  [facility] [address]  Phone: [phone] Fax: [phone]    [facility]  [address]  [facility] [address]  Phone: [phone] Fax: [phone]      Initial Assessment (most recent)       Adult Discharge Assessment - 04/13/24 1634          Discharge Assessment    Assessment Type Discharge Planning Assessment     Confirmed/corrected address, phone number and insurance Yes     Confirmed Demographics Correct on Facesheet     Source of  Information patient     When was your last doctors appointment? --   March 2024    Does patient/caregiver understand observation status Yes     Communicated DEVONTE with patient/caregiver Yes     Reason For Admission Dyspenea     People in Home alone     Facility Arrived From: Home     Do you expect to return to your current living situation? Yes     Do you have help at home or someone to help you manage your care at home? No     Prior to hospitilization cognitive status: Alert/Oriented;No Deficits     Current cognitive status: No Deficits;Alert/Oriented     Walking or Climbing Stairs Difficulty no     Dressing/Bathing Difficulty no     Home Accessibility wheelchair accessible     Home Layout Able to live on 1st floor     Equipment Currently Used at Home none     Readmission within 30 days? No     Patient currently being followed by outpatient case management? Yes     If yes, name of outpatient case management following: insurance company assigned oupatient case management     Do you currently have service(s) that help you manage your care at home? No     Do you take prescription medications? Yes     Do you have prescription coverage? Yes     Coverage [employer]     Do you have any problems affording any of your prescribed medications? No     Is the patient taking medications as prescribed? yes     Who is going to help you get home at discharge? Drive self     How do you get to doctors appointments? car, drives self     Are you on dialysis? No     Do you take coumadin? No     Discharge Plan A Home     Discharge Plan B Home     DME Needed Upon Discharge  none     Discharge Plan discussed with: Patient     Transition of Care Barriers None

## 2024-04-13 NOTE — CONSULTS
[facility]/Surg  Department of Cardiology  Consult Note      PATIENT NAME: Rajendra Castle    MRN: [id number]  TODAY'S DATE: 04/13/2024  ADMIT DATE: 4/12/2024                          CONSULT REQUESTED BY: Gene Guidry MD    SUBJECTIVE     PRINCIPAL PROBLEM: Elevated troponin      REASON FOR CONSULT:  Hypertension hyperkalemia      HPI:  78 year old male with a PMH that includes HTN, anxiety, depression, GERD, prediabetes, CKD 4, PUD, and history of prostate and urethral cancers. He came to the ED for weakness and fatigue with presyncope s/t his insomnia. He has been having nocturia for at least 6 months and is unable to sleep more than two hours in a row. He does report dysuria. He also reports associated dizziness and headache with pain behind both eyes. The HA resolved with tylenol ans aspirin. He denies chest pain and SOB. He is not aware of his kidney disease. In the ED he was found to be hypertensive, hyperkalemic , and had an elevated creatinine. Mr Castle is admitted to hospital medicine for further management.     He complains of having fatigued unable to sleep because of frequent urination during the night he has anywhere between 3-4 times nocturia.  He has occasional shortness of breath he denies any chest pains.  He had a stent placed by Dr. Lamb proximally 2 years ago.          Review of patient's allergies indicates:  No Known Allergies    Past Medical History:   Diagnosis Date    Anticoagulant long-term use     Arthritis     Coronary artery disease     Dr. Lamb    DDD (degenerative disc disease), cervical     Degenerative disc disease     Heart murmur     History of radiation therapy 2020    Hypertension     Nontoxic multinodular goiter 09/20/2016    Prostate CA     RA (rheumatoid arthritis)     Sleep apnea     No CPAP    Vitamin D insufficiency 09/30/2016     Past Surgical History:   Procedure Laterality Date    CARPAL TUNNEL RELEASE Right 05/28/2021    Procedure: RELEASE, CARPAL  TUNNEL;  Surgeon: Jose Ryan II, MD;  Location: [facility] OR;  Service: Orthopedics;  Laterality: Right;    COLONOSCOPY  prior to 2016    normal findings per patient report    CORONARY ANGIOPLASTY WITH STENT PLACEMENT  02/2022    CYSTOSCOPY N/A 12/18/2018    Procedure: CYSTOSCOPY;  Surgeon: Garrett Pina MD;  Location: [facility] OR;  Service: Urology;  Laterality: N/A;    CYSTOSCOPY N/A 07/12/2022    Procedure: CYSTOSCOPY;  Surgeon: Garrett Pina MD;  Location: [facility] OR;  Service: Urology;  Laterality: N/A;    ESOPHAGOGASTRODUODENOSCOPY N/A 09/29/2020    Dr. Espinosa; empiric dilation; erythematous mucosa in antrum; gastric mucosal atrophy; hematin in entire stomach; biopsy: mid & distal esophagus WNL, stomach- WNL, negative for h pylori    EXCISION OF LESION OF PENIS N/A 2/23/2023    Procedure: EXCISION, LESION, PENIS;  Surgeon: Timo Myers MD;  Location: [facility];  Service: Urology;  Laterality: N/A;    INSERTION OF TUNNELED CENTRAL VENOUS CATHETER (CVC) WITH SUBCUTANEOUS PORT Left 5/18/2023    Procedure: [id number];  Surgeon: Atul Faria MD;  Location: [facility];  Service: General;  Laterality: Left;  left subclavian    PARATHYROIDECTOMY Right 10/27/2020    Procedure: PARATHYROIDECTOMY;  Surgeon: Latonia Clayton MD;  Location: [address];  Service: ENT;  Laterality: Right;    RELEASE OF ULNAR NERVE AT CUBITAL TUNNEL Right 05/28/2021    Procedure: RELEASE, ULNAR TUNNEL;  Surgeon: Jose Ryan II, MD;  Location: [facility] OR;  Service: Orthopedics;  Laterality: Right;    TRANSRECTAL BIOPSY OF PROSTATE WITH ULTRASOUND GUIDANCE N/A 12/18/2018    Procedure: BIOPSY, PROSTATE, RECTAL APPROACH, WITH US GUIDANCE;  Surgeon: Garrett Pina MD;  Location: [facility] OR;  Service: Urology;  Laterality: N/A;    TRANSRECTAL ULTRASOUND EXAMINATION N/A 07/12/2022    Procedure: ULTRASOUND, RECTAL APPROACH;  Surgeon: Garrett Pina MD;  Location: [facility];  Service: Urology;  Laterality: N/A;      Social History     Tobacco Use    Smoking status: Former     Current packs/day: 0.00     Average packs/day: 0.3 packs/day for 10.0 years (2.5 ttl pk-yrs)     Types: Cigarettes     Start date: 1991     Quit date: 2001     Years since quittin.7     Passive exposure: Past    Smokeless tobacco: Never   Substance Use Topics    Alcohol use: Not Currently     Comment: seldom    Drug use: Not Currently     Frequency: 8.0 times per week     Types: Marijuana        REVIEW OF SYSTEMS  CONSTITUTIONAL: Negative for chills, fever, positive for fatigue  EYES: No double vision, No blurred vision  NEURO: No headaches, No dizziness  RESPIRATORY: Negative for cough, occasional fatigued   CARDIOVASCULAR:  Negative for chest pains he has irregular heartbeats for years.  GI: Negative for abdominal pain, No melena, diarrhea, nausea and vomiting.   :  Positive for nocturia for frequency enlarged amount of urination  SKIN: Negative for bruising, Negative for edema or discoloration noted.     OBJECTIVE     VITAL SIGNS (Most Recent)  Temp: 98.1 °F (36.7 °C) (24 113)  Pulse: 83 (24 113)  Resp: 16 (24 113)  BP: (!) 165/79 (24 113)  SpO2: 96 % (24 113)    VENTILATION STATUS  Resp: 16 (24)  SpO2: 96 % (24 1137)           I & O (Last 24H):  Intake/Output Summary (Last 24 hours) at 2024 1550  Last data filed at 2024 0506  Gross per 24 hour   Intake 200 ml   Output 400 ml   Net -200 ml       WEIGHTS  Wt Readings from Last 1 Encounters:   24 0019 65.4 kg (144 lb 2.9 oz)   24 1530 65.8 kg (145 lb)       PHYSICAL EXAM  GENERAL: well built, well nourished, well-developed appears to be very concerned due to his inability to sleep  HEENT: Normocephalic. Pupils normal and conjunctivae normal.  Mucous membranes normal, no cyanosis or icterus, trachea central,no pallor or icterus is noted..   NECK: No JVD. No bruit..    CARDIAC: Regular rate and rhythm.  Frequent  ectopy  CHEST ANATOMY: normal.   LUNGS: Clear to auscultation. No wheezing or rhonchi..   ABDOMEN: Soft no masses or organomegaly.  No abdomen pulsations or bruits.  Normal bowel sounds. No pulsations and no masses felt, No guarding or rebound.   URINARY: No jackson catheter post void  residual volume 24 mL  EXTREMITIES: No cyanosis, clubbing or edema noted at this time., no calf tenderness bilaterally.   PERIPHERAL VASCULAR SYSTEM: Good palpable distal pulses.     HOME MEDICATIONS:  Current Facility-Administered Medications on File Prior to Encounter   Medication Dose Route Frequency Provider Last Rate Last Admin    albuterol nebulizer solution 2.5 mg  2.5 mg Nebulization Q15 Min PRN Pastor Saleh MD        albuterol-ipratropium 2.5 mg-0.5 mg/3 mL nebulizer solution 3 mL  3 mL Nebulization PRN Pastor Saleh MD        denosumab (PROLIA) injection 60 mg  60 mg Subcutaneous 1 time in Clinic/HOD Jean Carlos Hoffman MD        diphenhydrAMINE injection 25 mg  25 mg Intravenous Q6H PRN Pastor Saleh MD        HYDROmorphone injection 0.5 mg  0.5 mg Intravenous Q5 Min PRN Pastor Saleh MD   0.5 mg at 02/23/23 1312    lactated ringers infusion   Intravenous Continuous Volpi-Abadie, Jacqueline, MD   Stopped at 02/23/23 1400    ondansetron injection 4 mg  4 mg Intravenous Q15 Min PRN Pastor Saleh MD        sodium chloride 0.9% flush 10 mL  10 mL Intravenous PRN Ayush Guzman MD   10 mL at 06/01/23 1535    sodium chloride 0.9% flush 10 mL  10 mL Intravenous PRN Ayush Guzman MD   10 mL at 06/05/23 1315    sodium chloride 0.9% flush 3 mL  3 mL Intravenous PRN Pastor Saleh MD         Current Outpatient Medications on File Prior to Encounter   Medication Sig Dispense Refill    predniSONE (DELTASONE) 5 MG tablet Take 1 tablet (5 mg total) by mouth 2 (two) times daily. 180 tablet 0    [DISCONTINUED] QUEtiapine (SEROQUEL) 100 MG Tab Take 2 tablets (200 mg total) by mouth once daily. 60 tablet 11     [DISCONTINUED] zolpidem (AMBIEN) 10 mg Tab Take 1 tablet (10 mg total) by mouth nightly as needed. 90 tablet 1    amLODIPine (NORVASC) 5 MG tablet Take 1 tablet (5 mg total) by mouth 2 (two) times daily. 60 tablet 0    atorvastatin (LIPITOR) 20 MG tablet Take 1 tablet (20 mg total) by mouth once daily. 90 tablet 0    b complex vitamins capsule Take 1 capsule by mouth once daily.      betamethasone valerate 0.1% (VALISONE) 0.1 % Crea Apply topically 2 (two) times daily. (Patient taking differently: Apply 1 application  topically 2 (two) times daily.) 45 g 0    busPIRone (BUSPAR) 10 MG tablet Take 1 tablet (10 mg total) by mouth 3 (three) times daily. 90 tablet 0    cyproheptadine (PERIACTIN) 4 mg tablet Take 1 tablet (4 mg total) by mouth 4 (four) times daily. 120 tablet 0    divalproex (DEPAKOTE) 250 MG EC tablet Take 250 mg by mouth 3 (three) times daily.      DULoxetine (CYMBALTA) 30 MG capsule SMARTSI caplet By Mouth Twice Daily      EScitalopram oxalate (LEXAPRO) 10 MG tablet Take 1 tablet (10 mg total) by mouth once daily. 90 tablet 0    isosorbide mononitrate (IMDUR) 30 MG 24 hr tablet Take 1 tablet (30 mg total) by mouth once daily. 30 tablet 0    losartan (COZAAR) 25 MG tablet Take 1 tablet (25 mg total) by mouth once daily. 30 tablet 0    magnesium oxide (MAG-OX) 400 mg (241.3 mg magnesium) tablet Take 1 tablet (400 mg total) by mouth once daily. 90 tablet 3    oxybutynin (DITROPAN XL) 15 MG TR24 Take 1 tablet (15 mg total) by mouth once daily. 30 tablet 11    pantoprazole (PROTONIX) 40 MG tablet Take 1 tablet (40 mg total) by mouth once daily. 90 tablet 0    phenazopyridine (PYRIDIUM) 200 MG tablet Take 1 tablet (200 mg total) by mouth 3 (three) times daily as needed for Pain. 30 tablet 0    [DISCONTINUED] cholecalciferol, vitamin D3, (VITAMIN D3) 100 mcg (4,000 unit) Cap Take 400 Units by mouth once daily.      [DISCONTINUED] cloNIDine (CATAPRES) 0.1 MG tablet Take 1 tablet (0.1 mg total) by mouth 2  (two) times daily as needed (only if blood pressure top number is over 200). (Patient not taking: Reported on 6/7/2022) 30 tablet 1    [DISCONTINUED] meclizine (ANTIVERT) 12.5 mg tablet Take 1 tablet (12.5 mg total) by mouth 2 (two) times daily as needed for Dizziness. 30 tablet 0    [DISCONTINUED] sildenafiL (VIAGRA) 100 MG tablet Take 1 tablet (100 mg total) by mouth daily as needed for Erectile Dysfunction. 10 tablet 2       SCHEDULED MEDS:  Current Facility-Administered Medications   Medication Dose Route Frequency Provider Last Rate Last Admin    acetaminophen tablet 650 mg  650 mg Oral Q4H PRN Calros Ballesteros, NP   650 mg at 04/13/24 1416    acetaminophen tablet 650 mg  650 mg Oral Q6H PRN Carlos Ballesteros, NP        albuterol-ipratropium 2.5 mg-0.5 mg/3 mL nebulizer solution 3 mL  3 mL Nebulization Q4H PRN Carlos Ballesteros, NP        aluminum-magnesium hydroxide-simethicone 200-200-20 mg/5 mL suspension 30 mL  30 mL Oral QID PRN Carlos Ballesteros, NP        amLODIPine tablet 5 mg  5 mg Oral BID Ellie Hope, DNP   5 mg at 04/13/24 0913    atorvastatin tablet 20 mg  20 mg Oral Daily Ellie Hope DNP   20 mg at 04/13/24 0913    dextrose 10% bolus 125 mL 125 mL  12.5 g Intravenous PRN Carlos Ballesteros, NP        dextrose 10% bolus 250 mL 250 mL  25 g Intravenous PRN Carlos Ballesteros, NP        glucagon (human recombinant) injection 1 mg  1 mg Intramuscular PRN Carlos Ballesteros, NP        glucose chewable tablet 16 g  16 g Oral PRN Carlos Ballesteros, NP        glucose chewable tablet 24 g  24 g Oral PRN Carlos Ballesteros, YAZ        magnesium oxide tablet 800 mg  800 mg Oral PRN Carlos Ballesteros, YAZ        magnesium oxide tablet 800 mg  800 mg Oral PRN Carlos Ballesteros, NP        melatonin tablet 9 mg  9 mg Oral Nightly PRN Carlos Ballesteros, NP        naloxone 0.4 mg/mL injection 0.02 mg  0.02 mg Intravenous PRN Carlos Ballesteros, NP        ondansetron injection 4 mg  4 mg  Intravenous Q8H PRN Carlos Ballesteros, NP        oxyCODONE immediate release tablet 5 mg  5 mg Oral Q6H PRN Carlos Ballesteros, NP        potassium bicarbonate disintegrating tablet 60 mEq  60 mEq Oral PRN Carlos Ballesteros, YAZ        QUEtiapine tablet 200 mg  200 mg Oral Nightly Ellie Hope DNP   200 mg at 04/13/24 0029    simethicone chewable tablet 80 mg  1 tablet Oral QID PRN Carlos Ballesteros, YAZ        sodium chloride 0.9% flush 10 mL  10 mL Intravenous Q8H PRN Carlos Ballesteros NP         Facility-Administered Medications Ordered in Other Encounters   Medication Dose Route Frequency Provider Last Rate Last Admin    albuterol nebulizer solution 2.5 mg  2.5 mg Nebulization Q15 Min PRN Pastor Saleh MD        albuterol-ipratropium 2.5 mg-0.5 mg/3 mL nebulizer solution 3 mL  3 mL Nebulization PRN Pastor Saleh MD        denosumab (PROLIA) injection 60 mg  60 mg Subcutaneous 1 time in Clinic/HOD Jean Carlos Hoffman MD        diphenhydrAMINE injection 25 mg  25 mg Intravenous Q6H PRN Pastor Saleh MD        HYDROmorphone injection 0.5 mg  0.5 mg Intravenous Q5 Min PRN Pastor Saleh MD   0.5 mg at 02/23/23 1312    lactated ringers infusion   Intravenous Continuous Volpi-Abadie, Jacqueline, MD   Stopped at 02/23/23 1400    ondansetron injection 4 mg  4 mg Intravenous Q15 Min PRN Pastor Saleh MD        sodium chloride 0.9% flush 10 mL  10 mL Intravenous PRN Ayush Guzman MD   10 mL at 06/01/23 1535    sodium chloride 0.9% flush 10 mL  10 mL Intravenous PRN Ayush Guzman MD   10 mL at 06/05/23 1315    sodium chloride 0.9% flush 3 mL  3 mL Intravenous PRN Pastor Saleh MD           CONTINUOUS INFUSIONS:  Current Facility-Administered Medications   Medication Dose Route Frequency Provider Last Rate Last Admin    acetaminophen tablet 650 mg  650 mg Oral Q4H PRN Carlos Ballesteros, NP   650 mg at 04/13/24 1416    acetaminophen tablet 650 mg  650 mg Oral Q6H PRN Carlos Ballesteros  MAEGAN, NP        albuterol-ipratropium 2.5 mg-0.5 mg/3 mL nebulizer solution 3 mL  3 mL Nebulization Q4H PRN Carlos Ballesteros, NP        aluminum-magnesium hydroxide-simethicone 200-200-20 mg/5 mL suspension 30 mL  30 mL Oral QID PRN Carlos Ballesteros, NP        amLODIPine tablet 5 mg  5 mg Oral BID Ellie Hope, DNP   5 mg at 04/13/24 0913    atorvastatin tablet 20 mg  20 mg Oral Daily Ellie Hope, DNP   20 mg at 04/13/24 0913    dextrose 10% bolus 125 mL 125 mL  12.5 g Intravenous PRN Carlos Ballesteros, NP        dextrose 10% bolus 250 mL 250 mL  25 g Intravenous PRN Carlos Ballesteros, NP        glucagon (human recombinant) injection 1 mg  1 mg Intramuscular PRN Carlos Ballesteros, NP        glucose chewable tablet 16 g  16 g Oral PRN Carlos Ballesteros, NP        glucose chewable tablet 24 g  24 g Oral PRN Carlos Ballesteros, NP        magnesium oxide tablet 800 mg  800 mg Oral PRN Carlos Ballesteros, NP        magnesium oxide tablet 800 mg  800 mg Oral PRN Carlos Ballesteros, NP        melatonin tablet 9 mg  9 mg Oral Nightly PRN Carlos Ballesteros, NP        naloxone 0.4 mg/mL injection 0.02 mg  0.02 mg Intravenous PRN Carlos Ballesteros, NP        ondansetron injection 4 mg  4 mg Intravenous Q8H PRN Carlos Ballesteros, NP        oxyCODONE immediate release tablet 5 mg  5 mg Oral Q6H PRN Carlos Ballesteros, NP        potassium bicarbonate disintegrating tablet 60 mEq  60 mEq Oral PRN Carlos Ballesteros, NP        QUEtiapine tablet 200 mg  200 mg Oral Nightly Ellie Hope, DNP   200 mg at 04/13/24 0029    simethicone chewable tablet 80 mg  1 tablet Oral QID PRN Carlos Ballesteros, NP        sodium chloride 0.9% flush 10 mL  10 mL Intravenous Q8H PRN Carlos Ballesteros, NP         Facility-Administered Medications Ordered in Other Encounters   Medication Dose Route Frequency Provider Last Rate Last Admin    albuterol nebulizer solution 2.5 mg  2.5 mg Nebulization Q15 Min PRN Pastor Saleh  MD MAYURI        albuterol-ipratropium 2.5 mg-0.5 mg/3 mL nebulizer solution 3 mL  3 mL Nebulization PRN Pastor Saleh MD        denosumab (PROLIA) injection 60 mg  60 mg Subcutaneous 1 time in Clinic/HOD Jean Carlos Hoffman MD        diphenhydrAMINE injection 25 mg  25 mg Intravenous Q6H PRN Pastor Saleh MD        HYDROmorphone injection 0.5 mg  0.5 mg Intravenous Q5 Min PRN Pastor Saleh MD   0.5 mg at 02/23/23 1312    lactated ringers infusion   Intravenous Continuous Tavares-Abadie, Jacqueline, MD   Stopped at 02/23/23 1400    ondansetron injection 4 mg  4 mg Intravenous Q15 Min PRN Pastor Saleh MD        sodium chloride 0.9% flush 10 mL  10 mL Intravenous PRN Ayush Guzman MD   10 mL at 06/01/23 1535    sodium chloride 0.9% flush 10 mL  10 mL Intravenous PRN Ayush Guzman MD   10 mL at 06/05/23 1315    sodium chloride 0.9% flush 3 mL  3 mL Intravenous PRN Pastor Saleh MD           PRN MEDS:  Current Facility-Administered Medications   Medication Dose Route Frequency Provider Last Rate Last Admin    acetaminophen tablet 650 mg  650 mg Oral Q4H PRN Carlos Ballesteros NP   650 mg at 04/13/24 1416    acetaminophen tablet 650 mg  650 mg Oral Q6H PRN Carlos Ballesteros NP        albuterol-ipratropium 2.5 mg-0.5 mg/3 mL nebulizer solution 3 mL  3 mL Nebulization Q4H PRN Carlos Ballesteros NP        aluminum-magnesium hydroxide-simethicone 200-200-20 mg/5 mL suspension 30 mL  30 mL Oral QID PRN Carlos Ballesteros NP        amLODIPine tablet 5 mg  5 mg Oral BID Ellie Hope, DNP   5 mg at 04/13/24 0913    atorvastatin tablet 20 mg  20 mg Oral Daily Ellie Hope, DNP   20 mg at 04/13/24 0913    dextrose 10% bolus 125 mL 125 mL  12.5 g Intravenous PRN Carlos Ballesteros, NP        dextrose 10% bolus 250 mL 250 mL  25 g Intravenous PRN Carlos Ballesteros, NP        glucagon (human recombinant) injection 1 mg  1 mg Intramuscular PRN Carlos Ballesteros, NP        glucose chewable  tablet 16 g  16 g Oral PRN Carlos Ballesteros, NP        glucose chewable tablet 24 g  24 g Oral PRN Carlos Ballesteros, YAZ        magnesium oxide tablet 800 mg  800 mg Oral PRN Carlos Ballesteros, YAZ        magnesium oxide tablet 800 mg  800 mg Oral PRN Carlos Ballesteros, NP        melatonin tablet 9 mg  9 mg Oral Nightly PRN Carlos Ballesteros, NP        naloxone 0.4 mg/mL injection 0.02 mg  0.02 mg Intravenous PRN Carlos Ballesteros, NP        ondansetron injection 4 mg  4 mg Intravenous Q8H PRN Carlos Ballesteros, NP        oxyCODONE immediate release tablet 5 mg  5 mg Oral Q6H PRN Carlos Ballesteros, NP        potassium bicarbonate disintegrating tablet 60 mEq  60 mEq Oral PRN Carlos Ballesteros, YAZ        QUEtiapine tablet 200 mg  200 mg Oral Nightly Ellie Hope, DNP   200 mg at 04/13/24 0029    simethicone chewable tablet 80 mg  1 tablet Oral QID PRN Carlos Ballesteros, YAZ        sodium chloride 0.9% flush 10 mL  10 mL Intravenous Q8H PRN Carlos Ballesteros, NP         Facility-Administered Medications Ordered in Other Encounters   Medication Dose Route Frequency Provider Last Rate Last Admin    albuterol nebulizer solution 2.5 mg  2.5 mg Nebulization Q15 Min PRN Pastor Saleh MD        albuterol-ipratropium 2.5 mg-0.5 mg/3 mL nebulizer solution 3 mL  3 mL Nebulization PRN Pastor Saleh MD        denosumab (PROLIA) injection 60 mg  60 mg Subcutaneous 1 time in Clinic/HOD Jean Carlos Hoffman MD        diphenhydrAMINE injection 25 mg  25 mg Intravenous Q6H PRN Pastor Saleh MD        HYDROmorphone injection 0.5 mg  0.5 mg Intravenous Q5 Min PRN Pastor Saleh MD   0.5 mg at 02/23/23 1312    lactated ringers infusion   Intravenous Continuous Volpi-Abadie, Jacqueline, MD   Stopped at 02/23/23 1400    ondansetron injection 4 mg  4 mg Intravenous Q15 Min PRN Pastor Saleh MD        sodium chloride 0.9% flush 10 mL  10 mL Intravenous PRN Ayush Guzman MD   10 mL at 06/01/23 1536     "sodium chloride 0.9% flush 10 mL  10 mL Intravenous PRN Ayush Guzman MD   10 mL at 06/05/23 1315    sodium chloride 0.9% flush 3 mL  3 mL Intravenous PRN Pastor Saleh MD           LABS AND DIAGNOSTICS     CBC LAST 3 DAYS  Recent Labs   Lab 04/12/24  1705 04/13/24  0452   WBC 8.92 6.28   RBC 4.96 4.54*   HGB 13.8* 12.7*   HCT 44.6 40.1   MCV 90 88   MCH 27.8 28.0   MCHC 30.9* 31.7*   RDW 16.3* 16.4*    220   MPV 12.3 12.2   GRAN 79.5*  7.1 66.7  4.2   LYMPH 11.1*  1.0 17.0*  1.1   MONO 8.3  0.7 12.9  0.8   BASO 0.02 0.02   NRBC 0 0       COAGULATION LAST 3 DAYS  Recent Labs   Lab 04/12/24  1848   INR 1.0   APTT <21.0       CHEMISTRY LAST 3 DAYS  Recent Labs   Lab 04/12/24  1705 04/13/24  0452    145   K 5.4* 4.6   * 113*   CO2 18* 22*   ANIONGAP 12 10   BUN 24* 24*   CREATININE 1.6* 1.5*   * 79   CALCIUM 9.5 8.9   MG 2.2 2.2   ALBUMIN 3.5 2.9*   PROT 7.7 6.4   ALKPHOS 61 53*   ALT 17 17   AST 22 13   BILITOT 0.6 0.6       CARDIAC PROFILE LAST 3 DAYS  Recent Labs   Lab 04/12/24  1705 04/12/24  1847 04/12/24  2210   *  --   --      --   --    TROPONINI 0.074* 0.088* 0.085*       ENDOCRINE LAST 3 DAYS  Recent Labs   Lab 04/12/24  1705   TSH 0.662       LAST ARTERIAL BLOOD GAS  ABG  No results for input(s): "PH", "PO2", "PCO2", "HCO3", "BE" in the last 168 hours.    LAST 7 DAYS MICROBIOLOGY   Microbiology Results (last 7 days)       Procedure Component Value Units Date/Time    Influenza A & B by Molecular [[id number]] Collected: 04/12/24 1915    Order Status: Completed Specimen: Nasopharyngeal Swab Updated: 04/12/24 1935     Influenza A, Molecular Negative     Influenza B, Molecular Negative     Flu A & B Source NP            MOST RECENT IMAGING  X-Ray Chest 1 View  Narrative: EXAMINATION:  XR CHEST 1 VIEW    CLINICAL HISTORY:  Other fatigue    TECHNIQUE:  Single frontal view of the chest was performed.    COMPARISON:  Chest x-ray of October 22, " 2023.    FINDINGS:  The lungs are clear, with normal appearance of pulmonary vasculature and no pleural effusion or pneumothorax.    The cardiac silhouette is normal in size. The hilar and mediastinal contours are unremarkable.    Bones are intact.  Impression: No acute abnormality.    Electronically signed by: Ghanshyam Curiel MD  Date:    04/12/2024  Time:    17:00      ECHOCARDIOGRAM RESULTS (last 5)  Results for orders placed during the hospital encounter of 10/22/23    Echo    Interpretation Summary    Left Ventricle: The left ventricle is normal in size. Mildly increased wall thickness. There is mild concentric hypertrophy. Normal wall motion. Septal motion is consistent with bundle branch block. There is normal systolic function with a visually estimated ejection fraction of 60 - 65%. There is normal diastolic function.    Left Atrium: Left atrium is mildly dilated.    Right Ventricle: Normal right ventricular cavity size. Wall thickness is normal. Right ventricle wall motion  is normal. Systolic function is normal.    Aortic Valve: There is mild aortic regurgitation.    IVC/SVC: Normal venous pressure at 3 mmHg.      Results for orders placed during the hospital encounter of 01/20/23    Echo    Interpretation Summary  · The left ventricle is normal in size with mild concentric hypertrophy and normal systolic function.  · The estimated ejection fraction is 65%.  · Normal left ventricular diastolic function.  · Normal right ventricular size with normal right ventricular systolic function.  · Moderate mitral regurgitation.  · Mild-to-moderate aortic regurgitation.  · Small circumferential pericardial effusion. Effusion is fluid. No Temponade.      Results for orders placed during the hospital encounter of 06/20/22    Echo    Interpretation Summary  · The left ventricle is normal in size with mild concentric hypertrophy and normal systolic function.  · The estimated ejection fraction is 65%.  · Normal left  ventricular diastolic function.  · Normal right ventricular size with normal right ventricular systolic function.  · The estimated PA systolic pressure is 27 mmHg.  · Normal central venous pressure (3 mmHg).      Results for orders placed during the hospital encounter of 05/22/22    STRESS TEST REPORT      Results for orders placed during the hospital encounter of 03/07/22    Echo    Interpretation Summary  · The left ventricle is normal in size with moderate concentric hypertrophy and normal systolic function.  · The estimated PA systolic pressure is 28 mmHg.  · Indeterminate left ventricular diastolic function.  · Normal right ventricular size with mildly reduced right ventricular systolic function.  · Normal central venous pressure (3 mmHg).  · The estimated ejection fraction is 70%.  · Moderate left atrial enlargement.  · Mild mitral regurgitation.  · Mild tricuspid regurgitation.  · Mild-to-moderate aortic regurgitation.  · Trivial circumferential pericardial effusion. Effusion is fibrinous.  · Mild right atrial enlargement.      CURRENT/PREVIOUS VISIT EKG  Results for orders placed or performed in visit on 12/12/23   EKG 12-lead    Collection Time: 12/12/23  2:24 PM    Narrative    Test Reason : R01.1,    Vent. Rate : 073 BPM     Atrial Rate : 049 BPM     P-R Int : 000 ms          QRS Dur : 086 ms      QT Int : 444 ms       P-R-T Axes : 000 046 207 degrees     QTc Int : 489 ms    Normal sinus rhythm with frequent Premature atrial complexes  Minimal voltage criteria for LVH, may be normal variant ( Sokolow-Perkins )  ST and Marked T wave abnormality, consider anterolateral ischemia  Prolonged QT  Abnormal ECG  When compared with ECG of 23-OCT-2023 08:06,  No significant change was found  Confirmed by Jean Carlos Jj MD ([id number]) on 12/20/2023 1:09:04 PM    Referred By: Sp Lilly           Confirmed By:Jean Carlos Jj MD           ASSESSMENT/PLAN:     Active Hospital Problems    Diagnosis    Fatigue    Other insomnia     Stage 3 chronic kidney disease, unspecified whether stage 3a or 3b CKD    Pulmonary emphysema    Essential hypertension       ASSESSMENT & PLAN:   Fatigued could be secondary to sleep deprivation.  Insomnia probably secondary to nocturia  Chronic kidney disease  Pulmonary emphysema  Hypertension  Second-degree AV block Mobitz type 1 as well as junctional a arrhythmias dating back to 2020  Left ventricular hypertrophy with repolarization abnormalities  Heart failure with preserved ejection fraction  Hyperkalemia that has improved  Coronary artery disease with previous percutaneous revascularization      RECOMMENDATIONS:  Agree with trying Flomax q.h.s. and Seroquel.  From the cardiovascular standpoint he needs an echocardiogram as well as a pharmacological myocardial perfusion scan and an event monitor.  These are not performed during the weekend in this institution.  The patient will have to stay until Monday otherwise they can be done as an outpatient and follow-up in the office      Thank you for the consult. I will continue to follow the patient and provide recommendations as needed.    Rex Beyer MD  Date of Service: 04/13/2024  3:50 PM

## 2024-04-13 NOTE — ASSESSMENT & PLAN NOTE
Recently started taking seroquel 200 mg at night and ambien 10 mg nightly ( 2 hours after seroquel when he wakes from his sleep).  Thinks his insomnia is r/t nocturia.

## 2024-04-13 NOTE — NURSING
Nurses Note -- 4 Eyes      4/13/2024   12:56 AM      Skin assessed during: Admit      [x] No Altered Skin Integrity Present    [x]Prevention Measures Documented      [] Yes- Altered Skin Integrity Present or Discovered   [] LDA Added if Not in Epic (Describe Wound)   [] New Altered Skin Integrity was Present on Admit and Documented in LDA   [] Wound Image Taken    Wound Care Consulted? No    Attending Nurse:  Tammi Venegas RN/Staff Member:   Makenna Oliver RN    Received to room 219 from ER via wheelchair. Pt is awake and alert, able to make needs known. Denies pain or other discomforts. Oriented to call light, bed controls, plan of care, and safety precautions.  Pt verbalized understanding.

## 2024-04-13 NOTE — ASSESSMENT & PLAN NOTE
Weakness  Presyncope  Headache  Patient believes it is s/t his insomnia. Nonfocal neurological Exam.  Continue tylenol for HA relief, as it is working.   Check orthostatics.

## 2024-04-13 NOTE — PLAN OF CARE
[facility] - Med/Surg  Discharge Final Note    Primary Care Provider: pS Lilly MD    Expected Discharge Date: 4/13/2024    JUAN reviewed discharge orders. Patient will discharge home this afternoon and will drive self. Patient had concerns about getting his new medication Tamulosis filled at his pharmacy, [email or web address] on [address] in [place] as they stated it is still pending. SW contacted his pharmacy on his behalf was was informed the medication was approved and filled however, they close at 6pm. SW was advised to have patient go to [email or web address] on [address] which is open 24 hours to fill the prescription. Per patient's request, JUAN contacted [email or web address] on [address] and the medication was transferred. Medication will be ready for  at 6:23pm. JUAN informed patient. No further case management needs. Cleared.     Final Discharge Note (most recent)       Final Note - 04/13/24 1711          Final Note    Assessment Type Final Discharge Note     Anticipated Discharge Disposition Home or Self Care     What phone number can be called within the next 1-3 days to see how you are doing after discharge? [phone]     Hospital Resources/Appts/Education Provided Provided patient/caregiver with written discharge plan information        Post-Acute Status    Coverage PHN     Discharge Delays None known at this time                     Important Message from Medicare

## 2024-04-13 NOTE — DISCHARGE SUMMARY
[facility]  Discharge Summary      Patient Name: Rajendra Castle  MRN: [id number]  [facility]: [id number]  Patient Class: OP- Observation  Admission Date: 4/12/2024  Hospital Length of Stay: 0 days  Discharge Date and Time:  04/13/2024 1:17 PM  Attending Physician: Gene Guidry MD   Discharging Provider: Gene Guidry MD  Primary Care Provider: Sp Lilly MD    Primary Care Team: Networked reference to record PCT     HPI:   Mr Drew is a 78 year old male with a PMH that includes HTN, anxiety, depression, GERD, prediabetes, CKD 4, PUD, and history of prostate and urethral cancers.  He came to the ED for weakness and fatigue with presyncope s/t his insomnia. He has been having nocturia for at least 6 months and is unable to sleep more than two hours in a row. He does report dysuria.   He also reports associated dizziness and headache with pain behind both eyes.  The HA resolved with tylenol ans aspirin. He denies chest pain and SOB. He is not aware of his kidney disease. In the ED he was found to be hypertensive, hyperkalemic , and had an elevated creatinine. Mr Castle is admitted to hospital medicine for further management.     * No surgery found *      Hospital Course:   78 year old male with a PMH that includes HTN, anxiety, depression, GERD, prediabetes, CKD 4, PUD, and history of prostate and urethral cancers. He came to the ED for weakness and fatigue with presyncope s/t his insomnia. He has been having nocturia for at least 6 months and is unable to sleep more than two hours in a row. He does report dysuria. He also reports associated dizziness and headache with pain behind both eyes. The HA resolved with tylenol ans aspirin. He denies chest pain and SOB. He is not aware of his kidney disease. In the ED he was found to be hypertensive, hyperkalemic , and had an elevated creatinine. Mr Castle is admitted to hospital medicine for further management.   Patient was  evaluated in follow-up.  He is fully oriented, denies chest pain or shortness of breath, admits to feeling constantly tired and not getting enough sleep despite Medicine.  We discussed polypharmacy and his decreased kidney function, recommended to start medicine Ambien and decrease the dose of Seroquel.  Patient also has symptoms consistent with a prostatic hypertrophy with multiple awakenings during the night to urinate, Flomax initiated.  Will await for cardiology clearance for discharge as on admission troponin was checked and trended down since it was borderline elevated, otherwise medically stable and cleared for discharge on outpatient follow-up in clinic.     Goals of Care Treatment Preferences:  Code Status: Full Code      Consults:   Consults (From admission, onward)          Status Ordering Provider     Inpatient consult to Cardiology  Once        Provider:  Bertram Meyer MD    Acknowledged MICAELA HOFFMAN            No new Assessment & Plan notes have been filed under this hospital service since the last note was generated.  Service: Hospital Medicine    Final Active Diagnoses:    Diagnosis Date Noted POA    Fatigue [R53.83] 10/22/2023 Yes    Other insomnia [G47.09] 07/21/2023 Yes    Stage 3 chronic kidney disease, unspecified whether stage 3a or 3b CKD [N18.30]  Yes    Pulmonary emphysema [J43.9] 10/24/2016 Yes    Essential hypertension [I10] 08/18/2016 Yes      Problems Resolved During this Admission:    Diagnosis Date Noted Date Resolved POA    PRINCIPAL PROBLEM:  Elevated troponin [R79.89] 08/18/2016 04/13/2024 Yes    Hyperkalemia [E87.5] 04/12/2024 04/13/2024 Yes       Discharged Condition: fair    Disposition: Home or Self Care    Follow Up:    Patient Instructions:   No discharge procedures on file.    Significant Diagnostic Studies: Labs: BMP:   Recent Labs   Lab 04/12/24  1705 04/13/24  0452   * 79    145   K 5.4* 4.6   * 113*   CO2 18* 22*   BUN 24* 24*   CREATININE 1.6*  1.5*   CALCIUM 9.5 8.9   MG 2.2 2.2    and CBC   Recent Labs   Lab 24  1705 24  0452   WBC 8.92 6.28   HGB 13.8* 12.7*   HCT 44.6 40.1    220       Pending Diagnostic Studies:       None           Medications:  Reconciled Home Medications:      Medication List        START taking these medications      tamsulosin 0.4 mg Cap  Commonly known as: FLOMAX  Take 1 capsule (0.4 mg total) by mouth nightly.            CHANGE how you take these medications      QUEtiapine 100 MG Tab  Commonly known as: SEROQUEL  Take 1 tablet (100 mg total) by mouth nightly.  What changed:   how much to take  when to take this            CONTINUE taking these medications      amLODIPine 5 MG tablet  Commonly known as: NORVASC  Take 1 tablet (5 mg total) by mouth 2 (two) times daily.     atorvastatin 20 MG tablet  Commonly known as: LIPITOR  Take 1 tablet (20 mg total) by mouth once daily.     b complex vitamins capsule  Take 1 capsule by mouth once daily.     betamethasone valerate 0.1% 0.1 % Crea  Commonly known as: VALISONE  Apply topically 2 (two) times daily.     busPIRone 10 MG tablet  Commonly known as: BUSPAR  Take 1 tablet (10 mg total) by mouth 3 (three) times daily.     cyproheptadine 4 mg tablet  Commonly known as: PERIACTIN  Take 1 tablet (4 mg total) by mouth 4 (four) times daily.     divalproex 250 MG EC tablet  Commonly known as: DEPAKOTE  Take 250 mg by mouth 3 (three) times daily.     DULoxetine 30 MG capsule  Commonly known as: CYMBALTA  SMARTSI caplet By Mouth Twice Daily     EScitalopram oxalate 10 MG tablet  Commonly known as: LEXAPRO  Take 1 tablet (10 mg total) by mouth once daily.     isosorbide mononitrate 30 MG 24 hr tablet  Commonly known as: IMDUR  Take 1 tablet (30 mg total) by mouth once daily.     losartan 25 MG tablet  Commonly known as: COZAAR  Take 1 tablet (25 mg total) by mouth once daily.     magnesium oxide 400 mg (241.3 mg magnesium) tablet  Commonly known as: MAG-OX  Take 1 tablet  (400 mg total) by mouth once daily.     oxybutynin 15 MG Tr24  Commonly known as: DITROPAN XL  Take 1 tablet (15 mg total) by mouth once daily.     pantoprazole 40 MG tablet  Commonly known as: PROTONIX  Take 1 tablet (40 mg total) by mouth once daily.     phenazopyridine 200 MG tablet  Commonly known as: PYRIDIUM  Take 1 tablet (200 mg total) by mouth 3 (three) times daily as needed for Pain.     predniSONE 5 MG tablet  Commonly known as: DELTASONE  Take 1 tablet (5 mg total) by mouth 2 (two) times daily.            STOP taking these medications      zolpidem 10 mg Tab  Commonly known as: AMBIEN              Indwelling Lines/Drains at time of discharge:   Lines/Drains/Airways       None                   Time spent on the discharge of patient: 30 minutes         Gene Guidry MD  Department of Hospital Medicine  [facility]/Surg

## 2024-04-13 NOTE — ASSESSMENT & PLAN NOTE
Hyperkalemia 5.4  Baseline Creatinine 1.4  Creatine stable for now. BMP reviewed- noted Estimated Creatinine Clearance: 36.6 mL/min (A) (based on SCr of 1.6 mg/dL (H)). according to latest data. Based on current GFR, CKD stage is stage 3 - GFR 30-59.  Monitor UOP and serial BMP and adjust therapy as needed. Renally dose meds. Avoid nephrotoxic medications and procedures.  Monitor BMP

## 2024-04-13 NOTE — CARE UPDATE
04/13/24 0803   Patient Assessment/Suction   Level of Consciousness (AVPU) alert   Respiratory Effort Normal   Expansion/Accessory Muscles/Retractions no use of accessory muscles   All Lung Fields Breath Sounds equal bilaterally   Rhythm/Pattern, Respiratory pattern regular   PRE-TX-O2   Device (Oxygen Therapy) room air   SpO2 96 %   Pulse Oximetry Type Intermittent   $ Pulse Oximetry - Multiple Charge Pulse Oximetry - Multiple   Pulse 67   Resp 18   Aerosol Therapy   $ Aerosol Therapy Charges PRN treatment not required   Respiratory Treatment Status (SVN) PRN treatment not required

## 2024-04-15 ENCOUNTER — PATIENT OUTREACH (OUTPATIENT)
Dept: ADMINISTRATIVE | Facility: CLINIC | Age: 79
End: 2024-04-15
Payer: MEDICARE

## 2024-04-24 LAB
OHS QRS DURATION: 82 MS
OHS QTC CALCULATION: 506 MS

## 2024-04-25 DIAGNOSIS — G47.00 INSOMNIA, UNSPECIFIED TYPE: ICD-10-CM

## 2024-04-25 RX ORDER — QUETIAPINE FUMARATE 100 MG/1
200 TABLET, FILM COATED ORAL NIGHTLY
Qty: 180 TABLET | Refills: 3 | Status: SHIPPED | OUTPATIENT
Start: 2024-04-25 | End: 2025-04-20

## 2024-04-25 NOTE — TELEPHONE ENCOUNTER
No care due was identified.  [facility] Embedded Care Due Messages. Reference number: [id number].   4/25/2024 9:32:04 AM CDT

## 2024-05-27 ENCOUNTER — LAB VISIT (OUTPATIENT)
Dept: LAB | Facility: HOSPITAL | Age: 79
End: 2024-05-27
Attending: NURSE PRACTITIONER
Payer: MEDICARE

## 2024-05-27 ENCOUNTER — OFFICE VISIT (OUTPATIENT)
Dept: FAMILY MEDICINE | Facility: CLINIC | Age: 79
End: 2024-05-27
Payer: MEDICARE

## 2024-05-27 VITALS
BODY MASS INDEX: 23.49 KG/M2 | WEIGHT: 155 LBS | HEART RATE: 69 BPM | DIASTOLIC BLOOD PRESSURE: 70 MMHG | HEIGHT: 68 IN | SYSTOLIC BLOOD PRESSURE: 180 MMHG | TEMPERATURE: 98 F | OXYGEN SATURATION: 97 %

## 2024-05-27 DIAGNOSIS — F41.1 GAD (GENERALIZED ANXIETY DISORDER): ICD-10-CM

## 2024-05-27 DIAGNOSIS — Z12.5 ENCOUNTER FOR SCREENING FOR MALIGNANT NEOPLASM OF PROSTATE: ICD-10-CM

## 2024-05-27 DIAGNOSIS — I10 HYPERTENSION, UNSPECIFIED TYPE: ICD-10-CM

## 2024-05-27 DIAGNOSIS — R35.0 URINARY FREQUENCY: Primary | ICD-10-CM

## 2024-05-27 DIAGNOSIS — R35.0 URINARY FREQUENCY: ICD-10-CM

## 2024-05-27 LAB
BILIRUB UR QL STRIP: NEGATIVE
CLARITY UR REFRACT.AUTO: CLEAR
COLOR UR AUTO: YELLOW
COMPLEXED PSA SERPL-MCNC: 0.14 NG/ML (ref 0–4)
GLUCOSE UR QL STRIP: NEGATIVE
HGB UR QL STRIP: NEGATIVE
KETONES UR QL STRIP: NEGATIVE
LEUKOCYTE ESTERASE UR QL STRIP: NEGATIVE
NITRITE UR QL STRIP: NEGATIVE
PH UR STRIP: 6 [PH] (ref 5–8)
PROT UR QL STRIP: NEGATIVE
SP GR UR STRIP: 1.02 (ref 1–1.03)
URN SPEC COLLECT METH UR: NORMAL

## 2024-05-27 PROCEDURE — 3078F DIAST BP <80 MM HG: CPT | Mod: CPTII,S$GLB,, | Performed by: NURSE PRACTITIONER

## 2024-05-27 PROCEDURE — 1159F MED LIST DOCD IN RCRD: CPT | Mod: CPTII,S$GLB,, | Performed by: NURSE PRACTITIONER

## 2024-05-27 PROCEDURE — 99214 OFFICE O/P EST MOD 30 MIN: CPT | Mod: S$GLB,,, | Performed by: NURSE PRACTITIONER

## 2024-05-27 PROCEDURE — 3077F SYST BP >= 140 MM HG: CPT | Mod: CPTII,S$GLB,, | Performed by: NURSE PRACTITIONER

## 2024-05-27 PROCEDURE — 84153 ASSAY OF PSA TOTAL: CPT | Performed by: NURSE PRACTITIONER

## 2024-05-27 PROCEDURE — 36415 COLL VENOUS BLD VENIPUNCTURE: CPT | Mod: PO | Performed by: NURSE PRACTITIONER

## 2024-05-27 PROCEDURE — 1160F RVW MEDS BY RX/DR IN RCRD: CPT | Mod: CPTII,S$GLB,, | Performed by: NURSE PRACTITIONER

## 2024-05-27 PROCEDURE — 99999 PR PBB SHADOW E&M-EST. PATIENT-LVL V: CPT | Mod: PBBFAC,,, | Performed by: NURSE PRACTITIONER

## 2024-05-27 PROCEDURE — 1126F AMNT PAIN NOTED NONE PRSNT: CPT | Mod: CPTII,S$GLB,, | Performed by: NURSE PRACTITIONER

## 2024-05-27 PROCEDURE — 1101F PT FALLS ASSESS-DOCD LE1/YR: CPT | Mod: CPTII,S$GLB,, | Performed by: NURSE PRACTITIONER

## 2024-05-27 PROCEDURE — 81003 URINALYSIS AUTO W/O SCOPE: CPT | Performed by: NURSE PRACTITIONER

## 2024-05-27 PROCEDURE — 3288F FALL RISK ASSESSMENT DOCD: CPT | Mod: CPTII,S$GLB,, | Performed by: NURSE PRACTITIONER

## 2024-05-27 RX ORDER — BUSPIRONE HYDROCHLORIDE 10 MG/1
10 TABLET ORAL 3 TIMES DAILY
Qty: 90 TABLET | Refills: 2 | Status: SHIPPED | OUTPATIENT
Start: 2024-05-27

## 2024-05-27 NOTE — PROGRESS NOTES
Subjective:       Patient ID: Rajendra Castle is a 78 y.o. male.    Chief Complaint: No chief complaint on file.     HPI   77 y/o male patient with medical problems listed below presents for frequent urination. This is chronic problem. He states has chronic sleeping issue and has had 3 hours sleep at night. He woke up frequently at night for the frequent urination. Associated with dysuria but denies nausea, abdominal pain, hematuria, fever, chills. He states flomax and oxybutynin did not help with the symptoms.   He requests the refill of Buspar.   Noted that BP in clinic was elevated to 180/70 and states has not taken his blood pressure medication this morning (amlodipine 10 mg qd, losartan 25 mg qd).     Patient Active Problem List   Diagnosis    Heart murmur    Arthritis    Rotator cuff tendinitis    Dyspnea on exertion    DDD (degenerative disc disease), cervical    Anemia, iron deficiency    Chronic GI bleeding    Arthritis of wrist, right    Trigger thumb of left hand    Arthritis of right wrist    Essential hypertension    Rheumatoid arthritis involving multiple sites    Nontoxic multinodular goiter    Gastroesophageal reflux disease without esophagitis    Nodular thyroid disease    Prediabetes    Osteoporosis    Hypogonadism in male    Vitamin D insufficiency    Hyperparathyroidism    Pulmonary emphysema    Rheumatoid lung    Lung nodule    Stage 3 chronic kidney disease, unspecified whether stage 3a or 3b CKD    Prostate cancer    Exertional chest pain    Severe malnutrition    Tachycardia    S/P parathyroidectomy    Right carpal tunnel syndrome    Cubital tunnel syndrome on right    ACP (advance care planning)    Atherosclerosis of native coronary artery of native heart without angina pectoris    Unstable angina    HTN (hypertension)    Mobitz I    Major depressive disorder, single episode, severe without psychotic features    Atherosclerosis of native coronary artery of native heart with angina pectoris with  documented spasm    Drug-induced immunodeficiency    Penile lesion    Urethral cancer    Other insomnia    Anxiety and depression    Fatigue      Review of patient's allergies indicates:  No Known Allergies    Past Surgical History:   Procedure Laterality Date    CARPAL TUNNEL RELEASE Right 05/28/2021    Procedure: RELEASE, CARPAL TUNNEL;  Surgeon: Jose Ryan II, MD;  Location: [facility];  Service: Orthopedics;  Laterality: Right;    COLONOSCOPY  prior to 2016    normal findings per patient report    CORONARY ANGIOPLASTY WITH STENT PLACEMENT  02/2022    CYSTOSCOPY N/A 12/18/2018    Procedure: CYSTOSCOPY;  Surgeon: Garrett Pina MD;  Location: [facility];  Service: Urology;  Laterality: N/A;    CYSTOSCOPY N/A 07/12/2022    Procedure: CYSTOSCOPY;  Surgeon: Garrett Pina MD;  Location: [facility];  Service: Urology;  Laterality: N/A;    ESOPHAGOGASTRODUODENOSCOPY N/A 09/29/2020    Dr. Espinosa; empiric dilation; erythematous mucosa in antrum; gastric mucosal atrophy; hematin in entire stomach; biopsy: mid & distal esophagus WNL, stomach- WNL, negative for h pylori    EXCISION OF LESION OF PENIS N/A 2/23/2023    Procedure: EXCISION, LESION, PENIS;  Surgeon: Timo Myers MD;  Location: [facility];  Service: Urology;  Laterality: N/A;    INSERTION OF TUNNELED CENTRAL VENOUS CATHETER (CVC) WITH SUBCUTANEOUS PORT Left 5/18/2023    Procedure: [id number];  Surgeon: Atul Faria MD;  [facility];  Service: General;  Laterality: Left;  left subclavian    PARATHYROIDECTOMY Right 10/27/2020    Procedure: PARATHYROIDECTOMY;  Surgeon: Latonia Clayton MD;  Location: [address];  Service: ENT;  Laterality: Right;    RELEASE OF ULNAR NERVE AT CUBITAL TUNNEL Right 05/28/2021    Procedure: RELEASE, ULNAR TUNNEL;  Surgeon: Jose Ryan II, MD;  Location: [facility];  Service: Orthopedics;  Laterality: Right;    TRANSRECTAL BIOPSY OF PROSTATE WITH ULTRASOUND GUIDANCE N/A 12/18/2018    Procedure:  BIOPSY, PROSTATE, RECTAL APPROACH, WITH US GUIDANCE;  Surgeon: Garrett Pina MD;  Location: [facility];  Service: Urology;  Laterality: N/A;    TRANSRECTAL ULTRASOUND EXAMINATION N/A 2022    Procedure: ULTRASOUND, RECTAL APPROACH;  Surgeon: Garrett Pina MD;  Location: [facility] OR;  Service: Urology;  Laterality: N/A;        Current Outpatient Medications:     atorvastatin (LIPITOR) 20 MG tablet, Take 1 tablet (20 mg total) by mouth once daily., Disp: 90 tablet, Rfl: 0    b complex vitamins capsule, Take 1 capsule by mouth once daily., Disp: , Rfl:     betamethasone valerate 0.1% (VALISONE) 0.1 % Crea, Apply topically 2 (two) times daily. (Patient taking differently: Apply 1 application  topically 2 (two) times daily.), Disp: 45 g, Rfl: 0    cyproheptadine (PERIACTIN) 4 mg tablet, Take 1 tablet (4 mg total) by mouth 4 (four) times daily., Disp: 120 tablet, Rfl: 0    divalproex (DEPAKOTE) 250 MG EC tablet, Take 250 mg by mouth 3 (three) times daily., Disp: , Rfl:     DULoxetine (CYMBALTA) 30 MG capsule, SMARTSI caplet By Mouth Twice Daily, Disp: , Rfl:     EScitalopram oxalate (LEXAPRO) 10 MG tablet, Take 1 tablet (10 mg total) by mouth once daily., Disp: 90 tablet, Rfl: 0    magnesium oxide (MAG-OX) 400 mg (241.3 mg magnesium) tablet, Take 1 tablet (400 mg total) by mouth once daily., Disp: 90 tablet, Rfl: 3    oxybutynin (DITROPAN XL) 15 MG TR24, Take 1 tablet (15 mg total) by mouth once daily., Disp: 30 tablet, Rfl: 11    pantoprazole (PROTONIX) 40 MG tablet, Take 1 tablet (40 mg total) by mouth once daily., Disp: 90 tablet, Rfl: 0    phenazopyridine (PYRIDIUM) 200 MG tablet, Take 1 tablet (200 mg total) by mouth 3 (three) times daily as needed for Pain., Disp: 30 tablet, Rfl: 0    predniSONE (DELTASONE) 5 MG tablet, Take 1 tablet (5 mg total) by mouth 2 (two) times daily., Disp: 180 tablet, Rfl: 0    QUEtiapine (SEROQUEL) 100 MG Tab, Take 2 tablets (200 mg total) by mouth nightly., Disp: 180 tablet,  Rfl: 3    amLODIPine (NORVASC) 5 MG tablet, Take 1 tablet (5 mg total) by mouth 2 (two) times daily., Disp: 60 tablet, Rfl: 0    busPIRone (BUSPAR) 10 MG tablet, Take 1 tablet (10 mg total) by mouth 3 (three) times daily., Disp: 90 tablet, Rfl: 2    isosorbide mononitrate (IMDUR) 30 MG 24 hr tablet, Take 1 tablet (30 mg total) by mouth once daily., Disp: 30 tablet, Rfl: 0    losartan (COZAAR) 25 MG tablet, Take 1 tablet (25 mg total) by mouth once daily., Disp: 30 tablet, Rfl: 0    tamsulosin (FLOMAX) 0.4 mg Cap, Take 1 capsule (0.4 mg total) by mouth nightly., Disp: 30 capsule, Rfl: 0  No current facility-administered medications for this visit.    Facility-Administered Medications Ordered in Other Visits:     albuterol nebulizer solution 2.5 mg, 2.5 mg, Nebulization, Q15 Min PRN, Pastor Saleh MD    albuterol-ipratropium 2.5 mg-0.5 mg/3 mL nebulizer solution 3 mL, 3 mL, Nebulization, PRN, Pastor Saleh MD    denosumab (PROLIA) injection 60 mg, 60 mg, Subcutaneous, 1 time in Clinic/HOD, Jean Carlos Hoffman MD    diphenhydrAMINE injection 25 mg, 25 mg, Intravenous, Q6H PRN, Pastor Saleh MD    HYDROmorphone injection 0.5 mg, 0.5 mg, Intravenous, Q5 Min PRN, Pastor Saleh MD, 0.5 mg at 02/23/23 1312    lactated ringers infusion, , Intravenous, Continuous, Volpi-Abadie, Jacqueline, MD, Stopped at 02/23/23 1400    ondansetron injection 4 mg, 4 mg, Intravenous, Q15 Min PRN, Pastor Saleh MD    sodium chloride 0.9% flush 10 mL, 10 mL, Intravenous, PRN, Ayush Guzman MD, 10 mL at 06/01/23 1535    sodium chloride 0.9% flush 10 mL, 10 mL, Intravenous, PRN, Ayush Guzman MD, 10 mL at 06/05/23 1315    sodium chloride 0.9% flush 3 mL, 3 mL, Intravenous, PRN, Pastor Saleh MD    Review of Systems   Constitutional:  Negative for chills and fever.   Respiratory:  Negative for cough and shortness of breath.    Cardiovascular:  Negative for chest pain and palpitations.   Gastrointestinal:  Negative for  "abdominal pain.   Genitourinary:  Positive for dysuria and frequency.   Neurological:  Negative for dizziness and headaches.       Objective:   BP (!) 180/70 (BP Location: Left arm, Patient Position: Sitting, BP Method: Medium (Manual))   Pulse 69   Temp 98.4 °F (36.9 °C) (Oral)   Ht 5' 8" (1.727 m)   Wt 70.3 kg (154 lb 15.7 oz)   SpO2 97%   BMI 23.57 kg/m²         Physical Exam  Vitals reviewed.   Constitutional:       General: He is not in acute distress.     Appearance: Normal appearance.   Cardiovascular:      Rate and Rhythm: Normal rate and regular rhythm.      Pulses: Normal pulses.      Heart sounds: Normal heart sounds.   Pulmonary:      Effort: Pulmonary effort is normal.      Breath sounds: Normal breath sounds.   Chest:      Chest wall: No deformity, swelling or edema.   Abdominal:      General: Abdomen is flat. Bowel sounds are normal.      Palpations: Abdomen is soft.   Musculoskeletal:      Cervical back: Normal range of motion.   Neurological:      Mental Status: He is oriented to person, place, and time.         Assessment:       1. Urinary frequency    2. Encounter for screening for malignant neoplasm of prostate    3. DEVANTE (generalized anxiety disorder)    4. Hypertension, unspecified type        Plan:       1. Urinary frequency  - Ambulatory referral/consult to Urology; Future  - PSA, SCREENING; Future  - Urinalysis; Future  - Urine culture; Future    2. Encounter for screening for malignant neoplasm of prostate  - PSA, SCREENING; Future    3. DEVANTE (generalized anxiety disorder)  - busPIRone (BUSPAR) 10 MG tablet; Take 1 tablet (10 mg total) by mouth 3 (three) times daily.  Dispense: 90 tablet; Refill: 2    4. Hypertension, unspecified type  - Manually repeated /70 and patient reports has not taken his BP medications  - Stressed the importance of medication adherence  - Advised to monitor BP at home     Patient with be reevaluated in  as scheduled oon 6/24/2024   or sooner domenic Mantilla " NP

## 2024-05-29 ENCOUNTER — OFFICE VISIT (OUTPATIENT)
Dept: UROLOGY | Facility: CLINIC | Age: 79
End: 2024-05-29
Payer: MEDICARE

## 2024-05-29 DIAGNOSIS — N32.81 OVERACTIVE BLADDER: ICD-10-CM

## 2024-05-29 DIAGNOSIS — R35.0 URINARY FREQUENCY: ICD-10-CM

## 2024-05-29 DIAGNOSIS — N40.1 BENIGN PROSTATIC HYPERPLASIA WITH URINARY OBSTRUCTION: ICD-10-CM

## 2024-05-29 DIAGNOSIS — R30.0 DYSURIA: Primary | ICD-10-CM

## 2024-05-29 DIAGNOSIS — N13.8 BENIGN PROSTATIC HYPERPLASIA WITH URINARY OBSTRUCTION: ICD-10-CM

## 2024-05-29 DIAGNOSIS — C68.0 URETHRAL CANCER: ICD-10-CM

## 2024-05-29 PROCEDURE — G2211 COMPLEX E/M VISIT ADD ON: HCPCS | Mod: S$GLB,,, | Performed by: NURSE PRACTITIONER

## 2024-05-29 PROCEDURE — 3288F FALL RISK ASSESSMENT DOCD: CPT | Mod: CPTII,S$GLB,, | Performed by: NURSE PRACTITIONER

## 2024-05-29 PROCEDURE — 1160F RVW MEDS BY RX/DR IN RCRD: CPT | Mod: CPTII,S$GLB,, | Performed by: NURSE PRACTITIONER

## 2024-05-29 PROCEDURE — 1101F PT FALLS ASSESS-DOCD LE1/YR: CPT | Mod: CPTII,S$GLB,, | Performed by: NURSE PRACTITIONER

## 2024-05-29 PROCEDURE — 99999 PR PBB SHADOW E&M-EST. PATIENT-LVL V: CPT | Mod: PBBFAC,,, | Performed by: NURSE PRACTITIONER

## 2024-05-29 PROCEDURE — 99214 OFFICE O/P EST MOD 30 MIN: CPT | Mod: S$GLB,,, | Performed by: NURSE PRACTITIONER

## 2024-05-29 PROCEDURE — 1159F MED LIST DOCD IN RCRD: CPT | Mod: CPTII,S$GLB,, | Performed by: NURSE PRACTITIONER

## 2024-05-29 RX ORDER — SOLIFENACIN SUCCINATE 10 MG/1
10 TABLET, FILM COATED ORAL DAILY
Qty: 30 TABLET | Refills: 11 | Status: SHIPPED | OUTPATIENT
Start: 2024-05-29 | End: 2025-05-29

## 2024-05-29 NOTE — PROGRESS NOTES
[facility] Note  Staff: EDEL Mccarthy    PCP: YAZ Peñaloza  Urologist:  OKSANA Myers    Chief Complaint: Chronic dysuria, urinary frequency    Subjective:        HPI: Rajendra Castle is a 78 y.o. male presents today for evaluation of ongoing and chronic dysuria and urinary frequency at this time.    Pt was last evaluated by Radha Martinez NP on 11/16/23 for urinary frequency symptoms.    Last clinic visit was 7/18/23 with Dr Myers     7/18/23  He was initially evaluated for LUTS and was requesting a UroLift. 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 He was found to have phimosis and a UTI during presentation to the ED. He was given topical steroids and has only been on them for the last week without much improvement. He has been able to void. He has pain on the foreskin with an erection.      From previous:  He has a history of prostate cancer s/p EBRT. He reports urgency, hesitancy, and sensation of incomplete emptying with leakage and dribbling. He has ISABEL and CPAP. He also reports insomnia. He has been on ditropan which has helped his symptoms     Cysto/TRUS with Dr. Pina 7/2022 showed a prostate volume of 23 cc, as well as a small short-segment bulbar urethral stricture just distal to the sphincter which was passable with the flexible scope. PVR was measured and noted to be 11 mL     11/16/23  Pt was started on oxybutynin 10 mg by Dr Myers for OAB symptoms that started after radiation.  Stopped oxybutynin 10 mg and started on Myrbetriq by PCP which did not work per pt.   PCP increased to oxybutynin 15 mg yesterday. Has not started taking yet     PVR today 92 ml     C/o urinary frequency and urgency that has been ongoing since radiation  Pt is voiding every 1-2 hours, urgency, nocturia x 4-6. No UUI.   Good urinary stream. No hesitancy or intermittency.  Denies dysuria, gross hematuria, flank pain, fever, chills, nausea or  vomiting.    5/29/24:  Pt refused to give urine sample.  He states he had one done 2 days ago at another office and it was negative.  Pt states he has been having ongoing burning with urination for months, but his UA always is WNL.  Pt is currently taking Flomax 0.4 mg in the morning and Oxybutynin XL 15 mg daily at night.  But pt states he is up all hours of the night unable to sleep due to frequency of urination.  No gross hematuria noted by pt.    REVIEW OF SYSTEMS:  A comprehensive 10 system review was performed and is negative except as noted above in HPI    PMHx:  Past Medical History:   Diagnosis Date    Anticoagulant long-term use     Arthritis     Coronary artery disease     Dr. Lamb    DDD (degenerative disc disease), cervical     Degenerative disc disease     Heart murmur     History of radiation therapy 2020    Hypertension     Nontoxic multinodular goiter 09/20/2016    Prostate CA     RA (rheumatoid arthritis)     Sleep apnea     No CPAP    Vitamin D insufficiency 09/30/2016     PSHx:  Past Surgical History:   Procedure Laterality Date    CARPAL TUNNEL RELEASE Right 05/28/2021    Procedure: RELEASE, CARPAL TUNNEL;  Surgeon: Jose Ryan II, MD;  Location: [facility] OR;  Service: Orthopedics;  Laterality: Right;    COLONOSCOPY  prior to 2016    normal findings per patient report    CORONARY ANGIOPLASTY WITH STENT PLACEMENT  02/2022    CYSTOSCOPY N/A 12/18/2018    Procedure: CYSTOSCOPY;  Surgeon: Garrett Pina MD;  Location: [facility] OR;  Service: Urology;  Laterality: N/A;    CYSTOSCOPY N/A 07/12/2022    Procedure: CYSTOSCOPY;  Surgeon: Garrett Pina MD;  Location: [facility] OR;  Service: Urology;  Laterality: N/A;    ESOPHAGOGASTRODUODENOSCOPY N/A 09/29/2020    Dr. Espinosa; empiric dilation; erythematous mucosa in antrum; gastric mucosal atrophy; hematin in entire stomach; biopsy: mid & distal esophagus WNL, stomach- WNL, negative for h pylori    EXCISION OF LESION OF PENIS N/A 2/23/2023     Procedure: EXCISION, LESION, PENIS;  Surgeon: Timo Myers MD;  [email or web address] OR;  Service: Urology;  Laterality: N/A;    INSERTION OF TUNNELED CENTRAL VENOUS CATHETER (CVC) WITH SUBCUTANEOUS PORT Left 5/18/2023    Procedure: [id number];  Surgeon: Atul Faria MD;  Location: [facility];  Service: General;  Laterality: Left;  left subclavian    PARATHYROIDECTOMY Right 10/27/2020    Procedure: PARATHYROIDECTOMY;  Surgeon: Latonia Clayton MD;  [facility];  Service: ENT;  Laterality: Right;    RELEASE OF ULNAR NERVE AT CUBITAL TUNNEL Right 05/28/2021    Procedure: RELEASE, ULNAR TUNNEL;  Surgeon: Jose Ryan II, MD;  Location: [facility] OR;  Service: Orthopedics;  Laterality: Right;    TRANSRECTAL BIOPSY OF PROSTATE WITH ULTRASOUND GUIDANCE N/A 12/18/2018    Procedure: BIOPSY, PROSTATE, RECTAL APPROACH, WITH US GUIDANCE;  Surgeon: Garrett Pina MD;  Location: [facility] OR;  Service: Urology;  Laterality: N/A;    TRANSRECTAL ULTRASOUND EXAMINATION N/A 07/12/2022    Procedure: ULTRASOUND, RECTAL APPROACH;  Surgeon: Garrett Pina MD;  Location: [facility] OR;  Service: Urology;  Laterality: N/A;       Allergies:  Patient has no known allergies.    Medications: reviewed     Objective:   There were no vitals filed for this visit.    General:WDWN in NAD  Eyes: PERRLA, normal conjunctiva  Respiratory: no increased work on breathing, clear to auscultation  Cardiovascular: regular rate and rhythm. No obvious extremity edema.  GI: palpation of masses. No tenderness. No hepatosplenomegaly to palpation.  Musculoskeletal: normal range of motion of bilateral upper extremities. Normal muscle strength and tone.  Skin: no obvious rashes or lesions. No tightening of skin noted.  Neurologic: CN grossly normal. Normal sensation.   Psychiatric: awake, alert and oriented x 3. Mood and affect normal. Cooperative.    Assessment:       1. Dysuria    2. Urinary frequency    3. Overactive bladder    4. Benign  "prostatic hyperplasia with urinary obstruction    5. Urethral cancer          Plan:     Stop the Oxybutynin due to treatment failure.  Start Solifenacin 10 mg one tablet daily as alternative for the frequency and instructed him to take this in the afternoon/evening.  Continue the Flomax 0.4 mg daily in the am as previous.    "START TIMED VOIDING/BLADDER TRAINING" ASAP!!:  WHETHER YOU FEEL AN URGE TO URINATE OR NOT, YOU SHOULD BE FREQUENTING THE TOILET AND ATTEMPT TO URINATE EVERY 3 HOURS!!  NEVER POSTPONE URINATING OR HOLD YOUR URINE.    MAKE SURE YOU ARE HAVING REGULAR/NORMAL BOWEL MOVEMENTS AS THIS ALSO WILL HAVE AN EFFECT ON HOW YOUR BLADDER FUNCTIONS.    NOTHING TO EAT OR DRINK BY MOUTH (THIS INCLUDES WATER) AT LEAST 1-2 HOURS PRIOR TO YOUR BEDTIME ROUTINE.     F/u with Dr. Myers for re-evaluation of pt's ongoing and chronic dysuria and frequency at this time.      Kaur López, FNP-C  Visit today is associated with current or anticipated ongoing medical care related to this patient's single serious condition/complex condition.     "

## 2024-06-24 ENCOUNTER — OFFICE VISIT (OUTPATIENT)
Dept: FAMILY MEDICINE | Facility: CLINIC | Age: 79
End: 2024-06-24
Payer: MEDICARE

## 2024-06-24 VITALS
HEIGHT: 68 IN | TEMPERATURE: 98 F | HEART RATE: 60 BPM | RESPIRATION RATE: 12 BRPM | DIASTOLIC BLOOD PRESSURE: 60 MMHG | SYSTOLIC BLOOD PRESSURE: 165 MMHG | WEIGHT: 153.44 LBS | OXYGEN SATURATION: 96 % | BODY MASS INDEX: 23.26 KG/M2

## 2024-06-24 DIAGNOSIS — I10 HYPERTENSION, UNSPECIFIED TYPE: ICD-10-CM

## 2024-06-24 DIAGNOSIS — I70.0 AORTIC ATHEROSCLEROSIS: ICD-10-CM

## 2024-06-24 DIAGNOSIS — Z00.00 HEALTHCARE MAINTENANCE: Primary | ICD-10-CM

## 2024-06-24 DIAGNOSIS — F32.2 MAJOR DEPRESSIVE DISORDER, SINGLE EPISODE, SEVERE WITHOUT PSYCHOTIC FEATURES: ICD-10-CM

## 2024-06-24 DIAGNOSIS — M06.9 RHEUMATOID ARTHRITIS, INVOLVING UNSPECIFIED SITE, UNSPECIFIED WHETHER RHEUMATOID FACTOR PRESENT: ICD-10-CM

## 2024-06-24 DIAGNOSIS — E21.3 HYPERPARATHYROIDISM: ICD-10-CM

## 2024-06-24 DIAGNOSIS — N18.30 STAGE 3 CHRONIC KIDNEY DISEASE, UNSPECIFIED WHETHER STAGE 3A OR 3B CKD: ICD-10-CM

## 2024-06-24 DIAGNOSIS — R79.9 ABNORMAL FINDING OF BLOOD CHEMISTRY, UNSPECIFIED: ICD-10-CM

## 2024-06-24 DIAGNOSIS — G47.00 INSOMNIA, UNSPECIFIED TYPE: ICD-10-CM

## 2024-06-24 DIAGNOSIS — N18.31 STAGE 3A CHRONIC KIDNEY DISEASE: ICD-10-CM

## 2024-06-24 DIAGNOSIS — D84.821 IMMUNODEFICIENCY DUE TO DRUGS (CODE): ICD-10-CM

## 2024-06-24 PROBLEM — I25.111 ATHEROSCLEROSIS OF NATIVE CORONARY ARTERY OF NATIVE HEART WITH ANGINA PECTORIS WITH DOCUMENTED SPASM: Status: RESOLVED | Noted: 2023-02-06 | Resolved: 2024-06-24

## 2024-06-24 PROBLEM — E43 SEVERE MALNUTRITION: Status: RESOLVED | Noted: 2020-09-29 | Resolved: 2024-06-24

## 2024-06-24 PROCEDURE — 99999 PR PBB SHADOW E&M-EST. PATIENT-LVL III: CPT | Mod: PBBFAC,,, | Performed by: STUDENT IN AN ORGANIZED HEALTH CARE EDUCATION/TRAINING PROGRAM

## 2024-06-24 PROCEDURE — G2211 COMPLEX E/M VISIT ADD ON: HCPCS | Mod: S$GLB,,, | Performed by: STUDENT IN AN ORGANIZED HEALTH CARE EDUCATION/TRAINING PROGRAM

## 2024-06-24 PROCEDURE — 3077F SYST BP >= 140 MM HG: CPT | Mod: CPTII,S$GLB,, | Performed by: STUDENT IN AN ORGANIZED HEALTH CARE EDUCATION/TRAINING PROGRAM

## 2024-06-24 PROCEDURE — 3078F DIAST BP <80 MM HG: CPT | Mod: CPTII,S$GLB,, | Performed by: STUDENT IN AN ORGANIZED HEALTH CARE EDUCATION/TRAINING PROGRAM

## 2024-06-24 PROCEDURE — 1101F PT FALLS ASSESS-DOCD LE1/YR: CPT | Mod: CPTII,S$GLB,, | Performed by: STUDENT IN AN ORGANIZED HEALTH CARE EDUCATION/TRAINING PROGRAM

## 2024-06-24 PROCEDURE — 1159F MED LIST DOCD IN RCRD: CPT | Mod: CPTII,S$GLB,, | Performed by: STUDENT IN AN ORGANIZED HEALTH CARE EDUCATION/TRAINING PROGRAM

## 2024-06-24 PROCEDURE — 3288F FALL RISK ASSESSMENT DOCD: CPT | Mod: CPTII,S$GLB,, | Performed by: STUDENT IN AN ORGANIZED HEALTH CARE EDUCATION/TRAINING PROGRAM

## 2024-06-24 PROCEDURE — 1125F AMNT PAIN NOTED PAIN PRSNT: CPT | Mod: CPTII,S$GLB,, | Performed by: STUDENT IN AN ORGANIZED HEALTH CARE EDUCATION/TRAINING PROGRAM

## 2024-06-24 PROCEDURE — 99215 OFFICE O/P EST HI 40 MIN: CPT | Mod: S$GLB,,, | Performed by: STUDENT IN AN ORGANIZED HEALTH CARE EDUCATION/TRAINING PROGRAM

## 2024-06-24 RX ORDER — ZOLPIDEM TARTRATE 10 MG/1
10 TABLET ORAL NIGHTLY PRN
Qty: 90 TABLET | Refills: 0 | Status: SHIPPED | OUTPATIENT
Start: 2024-06-24 | End: 2024-12-21

## 2024-06-24 RX ORDER — AMLODIPINE BESYLATE 10 MG/1
10 TABLET ORAL DAILY
Qty: 90 TABLET | Refills: 3 | Status: SHIPPED | OUTPATIENT
Start: 2024-06-24 | End: 2025-06-24

## 2024-06-24 RX ORDER — LIDOCAINE 50 MG/G
1 PATCH TOPICAL DAILY
Qty: 30 PATCH | Refills: 2 | Status: SHIPPED | OUTPATIENT
Start: 2024-06-24

## 2024-06-24 RX ORDER — PREDNISONE 5 MG/1
5 TABLET ORAL 2 TIMES DAILY
Qty: 180 TABLET | Refills: 0 | Status: SHIPPED | OUTPATIENT
Start: 2024-06-24 | End: 2024-09-22

## 2024-06-24 NOTE — PROGRESS NOTES
[facility] Note    Subjective:    The HPI and pertinent ROS is included in the Diagnostic Impression Remarks section at the end of the note. Please see below for further details. Chief complaint is at end of note.     Rajendra is a pleasant intelligent patient who is here for evaluation.     Modified Medications    No medications on file       Data reviewed 274}  Previous medical records reviewed and summarized in plan section at end of note.      If you are due for any health screening(s) below please notify me so we can arrange them to be ordered and scheduled. Most healthy patients at your age complete them, but you are free to accept or refuse. If you can't do it, I'll definitely understand. If you can, I'd certainly appreciate it!     All of your core healthy metrics are met.      The following portions of the patient's history were reviewed and updated as appropriate: allergies, current medications, past family history, past medical history, past social history, past surgical history and problem list.    He  has a past medical history of Anticoagulant long-term use, Arthritis, Coronary artery disease, DDD (degenerative disc disease), cervical, Degenerative disc disease, Heart murmur, History of radiation therapy (2020), Hypertension, Nontoxic multinodular goiter (09/20/2016), Prostate CA, RA (rheumatoid arthritis), Sleep apnea, and Vitamin D insufficiency (09/30/2016).  He  has a past surgical history that includes Transrectal biopsy of prostate with ultrasound guidance (N/A, 12/18/2018); Cystoscopy (N/A, 12/18/2018); Esophagogastroduodenoscopy (N/A, 09/29/2020); Parathyroidectomy (Right, 10/27/2020); Colonoscopy (prior to 2016); Carpal tunnel release (Right, 05/28/2021); Release of ulnar nerve at cubital tunnel (Right, 05/28/2021); Transrectal ultrasound examination (N/A, 07/12/2022); Cystoscopy (N/A, 07/12/2022); Coronary angioplasty with stent (02/2022); Excision of lesion of penis (N/A,  "2/23/2023); and Insertion of tunneled central venous catheter (CVC) with subcutaneous port (Left, 5/18/2023).    He  reports that he quit smoking about 22 years ago. His smoking use included cigarettes. He started smoking about 32 years ago. He has a 2.5 pack-year smoking history. He has been exposed to tobacco smoke. He has never used smokeless tobacco. He reports that he does not currently use alcohol. He reports that he does not currently use drugs after having used the following drugs: Marijuana. Frequency: 8.00 times per week.  He family history includes Diabetes in his maternal uncle; Drug abuse in his sister; Heart disease in his mother; No Known Problems in his daughter, daughter, and son; Stroke in his father.    Review of patient's allergies indicates:  No Known Allergies    Tobacco Use: Medium Risk (6/24/2024)    Patient History     Smoking Tobacco Use: Former     Smokeless Tobacco Use: Never     Passive Exposure: Past     Physical Examination  General appearance: alert, cooperative, no distress  Neck: no thyromegaly, no neck stiffness  Lungs: clear to auscultation, no wheezes, rales or rhonchi, symmetric air entry  Heart: normal rate, regular rhythm, normal S1, S2, no murmurs, rubs, clicks or gallops  Abdomen: soft, nontender, nondistended, no rigidity, rebound, or guarding.   Back: no point tenderness over spine  Extremities: peripheral pulses normal, no unilateral leg swelling or calf tenderness   Neurological:alert, oriented, normal speech, no new focal findings or movement disorder noted from baseline    BP Readings from Last 3 Encounters:   06/24/24 (!) 160/60   05/27/24 (!) 180/70   04/13/24 (!) 165/79     Wt Readings from Last 3 Encounters:   06/24/24 69.6 kg (153 lb 7 oz)   05/27/24 70.3 kg (154 lb 15.7 oz)   04/13/24 65.4 kg (144 lb 2.9 oz)     BP (!) 160/60   Pulse 60   Temp 98.4 °F (36.9 °C) (Oral)   Resp 12   Ht 5' 8" (1.727 m)   Wt 69.6 kg (153 lb 7 oz)   SpO2 96%   BMI 23.33 kg/m²    " 274}  Laboratory: I have reviewed old labs below:    274}    Lab Results   Component Value Date    WBC 6.28 04/13/2024    HGB 12.7 (L) 04/13/2024    HCT 40.1 04/13/2024    MCV 88 04/13/2024     04/13/2024     04/13/2024    K 4.6 04/13/2024     (H) 04/13/2024    CALCIUM 8.9 04/13/2024    PHOS 3.3 04/13/2024    CO2 22 (L) 04/13/2024    GLU 79 04/13/2024    BUN 24 (H) 04/13/2024    CREATININE 1.5 (H) 04/13/2024    EGFRNORACEVR 48 (A) 04/13/2024    ANIONGAP 10 04/13/2024    PROT 6.4 04/13/2024    ALBUMIN 2.9 (L) 04/13/2024    BILITOT 0.6 04/13/2024    ALKPHOS 53 (L) 04/13/2024    ALT 17 04/13/2024    AST 13 04/13/2024    INR 1.0 04/12/2024    CHOL 116 (L) 03/08/2022    TRIG 66 03/08/2022    HDL 29 (L) 03/08/2022    LDLCALC 73.8 03/08/2022    TSH 0.662 04/12/2024    PSA 0.14 05/27/2024    HGBA1C 5.9 06/21/2022      Lab reviewed by me: Particular labs of significance that I will monitor, workup, or treat to improve are mentioned below in diagnostic impression remarks.    Imaging/EKG: I have reviewed the pertinent results and my findings are noted in remarks.  274}    CC:   Chief Complaint   Patient presents with    Follow-up     6mth f/u        274}    Assessment/Plan  Rajednra Castle is a 78 y.o. male who presents to clinic with:  1. Healthcare maintenance    2. Hypertension, unspecified type    3. Stage 3 chronic kidney disease, unspecified whether stage 3a or 3b CKD    4. Insomnia, unspecified type    5. Rheumatoid arthritis, involving unspecified site, unspecified whether rheumatoid factor present    6. Aortic atherosclerosis    7. Major depressive disorder, single episode, severe without psychotic features    8. Hyperparathyroidism    9. Immunodeficiency due to drugs (CODE)    10. Stage 3a chronic kidney disease       274}  Diagnostic Impression Remarks + HPI     Documentation entered by me for this encounter may have been done in part using speech-recognition technology. Although I have made an  "effort to ensure accuracy, "sound like" errors may exist and should be interpreted in context.         HTN control uncertain he reports is lower at home rec recheck he did not take amlodipine will send in he was not taking it twice a day    Major depression disorder-stable continue current meds and will monitor no SI/plan.  Hyperparathyroidism-stable monitor calcium, phos, and vit d levels. Recommend to stay up to date with DEXA. If complications develop will set up with endocrinology.   The patient's chronic kidney disease was monitored, evaluated, addressed and/or treated. Recommend to avoid NSAIDs and renally dose medications. ACE/ARB inhibitor if indicated and optimize blood pressure and A1c to goal.   Atherosclerosis of aorta- stable continue current medications and rec healthy diet monitor lipid levels   Rheumatoid arthritis-stable continue current medications monitor blood work and follow-up with rheumatology he needs to establish went over the side effects Of steroids recommend him to not take this long term this was refilled has it was can so long for him to see Rheumatology stopping this would create many side effects will continue for now    DEVANTE-stable continue current meds monitor     Insomnia pt reports both ambien and seroquel is necessary to sleep he reports no side effects discussed the risks A discussion was made going over the risks and benefits of the medication and after this discussion they decided to continue their medication. They understood the choice, were able to express a  choice, appreciated the risks associated with it, and they had logical reasoning for their choice and demonstrated capacity.     Immunocompromised-stable continue current meds no fevers, chills, or signs of active infection. Monitor blood counts and recommend to stay uptodate with vaccinations.       Altogether 43 minutes of total time spent on the encounter, which includes face to face time and non-face to face time " preparing to see the patient (eg, review of tests), Obtaining and/or reviewing separately obtained history, Documenting clinical information in the electronic or other health record, Independently interpreting results (not separately reported) and communicating results to the patient/family/caregiver, or Care coordination (not separately reported). I also counseled him on common and the most usual side effect of prescribed medications. Risk and benefits of the medication were also discussed. I reviewed previous notes to better coordinate patient's care. He test results and lifestyle changes were also discussed. All questions were answered, and patient voiced understanding.       This is the extent of this pleasant patient's concerns at this present time. He did not feel chest pain upon exertion, dyspnea, nausea, vomiting, diaphoresis, or syncope. No pleuritic chest pain, unilateral leg swelling, calf tenderness, or calf pain. Negative for unintentional weight loss night sweats, hematuria, and fevers.     Additional workup planned: see labs ordered below.    See below for labs and meds ordered with associated diagnosis      1. Healthcare maintenance    2. Hypertension, unspecified type  - amLODIPine (NORVASC) 10 MG tablet; Take 1 tablet (10 mg total) by mouth once daily.  Dispense: 90 tablet; Refill: 3    3. Stage 3 chronic kidney disease, unspecified whether stage 3a or 3b CKD    4. Insomnia, unspecified type  - zolpidem (AMBIEN) 10 mg Tab; Take 1 tablet (10 mg total) by mouth nightly as needed (insomnia).  Dispense: 90 tablet; Refill: 0    5. Rheumatoid arthritis, involving unspecified site, unspecified whether rheumatoid factor present  - predniSONE (DELTASONE) 5 MG tablet; Take 1 tablet (5 mg total) by mouth 2 (two) times daily.  Dispense: 180 tablet; Refill: 0    6. Aortic atherosclerosis    7. Major depressive disorder, single episode, severe without psychotic features    8. Hyperparathyroidism    9.  Immunodeficiency due to drugs (CODE)    10. Stage 3a chronic kidney disease      Sp Lilly MD   274}    If you are due for any health screening(s) below please notify me so we can arrange them to be ordered and scheduled. Most healthy patients at your age complete them, but you are free to accept or refuse.     If you can't do it, I'll definitely understand. If you can, I'd certainly appreciate it!   All of your core healthy metrics are met.

## 2024-08-26 DIAGNOSIS — G47.00 INSOMNIA, UNSPECIFIED TYPE: ICD-10-CM

## 2024-08-26 NOTE — TELEPHONE ENCOUNTER
No care due was identified.  [facility] Embedded Care Due Messages. Reference number: [id number].   8/26/2024 5:24:53 PM CDT

## 2024-08-27 RX ORDER — ZOLPIDEM TARTRATE 10 MG/1
10 TABLET ORAL NIGHTLY PRN
Qty: 90 TABLET | Refills: 0 | Status: SHIPPED | OUTPATIENT
Start: 2024-08-27

## 2024-09-18 ENCOUNTER — TELEPHONE (OUTPATIENT)
Dept: RHEUMATOLOGY | Facility: CLINIC | Age: 79
End: 2024-09-18
Payer: MEDICARE

## 2024-09-18 NOTE — TELEPHONE ENCOUNTER
----- Message from Jaclyn Hammond MA sent at 9/18/2024  9:42 AM CDT -----  Type:  Sooner Appointment Request    Patient is requesting a sooner appointment.  Patient declined first available appointment listed as well as another facility and provider .  Patient will not accept being placed on the waitlist and is requesting a message be sent to doctor.    Name of Caller:  Pt   When is the first available appointment?  None   Symptoms:  M06.9 (ICD-10-CM) - Rheumatoid arthritis, involving unspecified site, unspecified whether rheumatoid factor present  Would the patient rather a call back or a response via My [facility]?  Call back   Best Call Back Number:  Telephone Information:  Mobile          [phone]     Additional Information:   Pt was made a virtual , says he doesn't have Mychart nor knows how to complete visit

## 2024-09-19 NOTE — PROGRESS NOTES
[facility] Note    Subjective:    The HPI and pertinent ROS is included in the Diagnostic Impression Remarks section at the end of the note. Please see below for further details. Chief complaint is at end of note.     Rajendra is a pleasant intelligent patient who is here for evaluation.     Modified Medications    Modified Medication Previous Medication    ATORVASTATIN (LIPITOR) 20 MG TABLET atorvastatin (LIPITOR) 20 MG tablet       Take 1 tablet (20 mg total) by mouth once daily.    Take 1 tablet (20 mg total) by mouth once daily.    LIDOCAINE (LIDODERM) 5 % LIDOcaine (LIDODERM) 5 %       Place 1 patch onto the skin once daily. Remove & Discard patch within 12 hours or as directed by MD    Place 1 patch onto the skin once daily. Remove & Discard patch within 12 hours or as directed by MD    LOSARTAN (COZAAR) 25 MG TABLET losartan (COZAAR) 25 MG tablet       Take 1 tablet (25 mg total) by mouth once daily.    Take 1 tablet (25 mg total) by mouth once daily.       Data reviewed 274}  Previous medical records reviewed and summarized in plan section at end of note.      If you are due for any health screening(s) below please notify me so we can arrange them to be ordered and scheduled. Most healthy patients at your age complete them, but you are free to accept or refuse. If you can't do it, I'll definitely understand. If you can, I'd certainly appreciate it!     All of your core healthy metrics are met.      The following portions of the patient's history were reviewed and updated as appropriate: allergies, current medications, past family history, past medical history, past social history, past surgical history and problem list.    He  has a past medical history of Anticoagulant long-term use, Arthritis, Coronary artery disease, DDD (degenerative disc disease), cervical, Degenerative disc disease, Heart murmur, History of radiation therapy (2020), Hypertension, Nontoxic multinodular goiter  (09/20/2016), Prostate CA, RA (rheumatoid arthritis), Sleep apnea, and Vitamin D insufficiency (09/30/2016).  He  has a past surgical history that includes Transrectal biopsy of prostate with ultrasound guidance (N/A, 12/18/2018); Cystoscopy (N/A, 12/18/2018); Esophagogastroduodenoscopy (N/A, 09/29/2020); Parathyroidectomy (Right, 10/27/2020); Colonoscopy (prior to 2016); Carpal tunnel release (Right, 05/28/2021); Release of ulnar nerve at cubital tunnel (Right, 05/28/2021); Transrectal ultrasound examination (N/A, 07/12/2022); Cystoscopy (N/A, 07/12/2022); Coronary angioplasty with stent (02/2022); Excision of lesion of penis (N/A, 2/23/2023); and Insertion of tunneled central venous catheter (CVC) with subcutaneous port (Left, 5/18/2023).    He  reports that he quit smoking about 23 years ago. His smoking use included cigarettes. He started smoking about 33 years ago. He has a 2.5 pack-year smoking history. He has been exposed to tobacco smoke. He has never used smokeless tobacco. He reports that he does not currently use alcohol. He reports that he does not currently use drugs after having used the following drugs: Marijuana. Frequency: 8.00 times per week.  He family history includes Diabetes in his maternal uncle; Drug abuse in his sister; Heart disease in his mother; No Known Problems in his daughter, daughter, and son; Stroke in his father.    Review of patient's allergies indicates:  No Known Allergies    Tobacco Use: Medium Risk (9/20/2024)    Patient History     Smoking Tobacco Use: Former     Smokeless Tobacco Use: Never     Passive Exposure: Past     Physical Examination  Physical Exam       General appearance: alert, cooperative, no distress  Neck: no thyromegaly, no neck stiffness  Lungs: clear to auscultation, no wheezes, rales or rhonchi, symmetric air entry  Heart: normal rate, regular rhythm, normal S1, S2, no murmurs, rubs, clicks or gallops  Abdomen: soft, nontender, nondistended, no rigidity,  "rebound, or guarding.   Back: no point tenderness over spine  Extremities: peripheral pulses normal, no unilateral leg swelling or calf tenderness   Neurological:alert, oriented, normal speech, no new focal findings or movement disorder noted from baseline      BP Readings from Last 3 Encounters:   09/20/24 (!) 142/80   06/24/24 (!) 165/60   05/27/24 (!) 180/70     Wt Readings from Last 3 Encounters:   09/20/24 69 kg (152 lb 1.9 oz)   06/24/24 69.6 kg (153 lb 7 oz)   05/27/24 70.3 kg (154 lb 15.7 oz)     BP (!) 142/80 (BP Location: Right arm, Patient Position: Sitting, BP Method: Medium (Manual))   Pulse 65   Temp 98.3 °F (36.8 °C) (Oral)   Resp 16   Ht 5' 8" (1.727 m)   Wt 69 kg (152 lb 1.9 oz)   SpO2 95%   BMI 23.13 kg/m²    274}  Laboratory: I have reviewed old labs below:    274}    Lab Results   Component Value Date    WBC 6.28 04/13/2024    HGB 12.7 (L) 04/13/2024    HCT 40.1 04/13/2024    MCV 88 04/13/2024     04/13/2024     04/13/2024    K 4.6 04/13/2024     (H) 04/13/2024    CALCIUM 8.9 04/13/2024    PHOS 3.3 04/13/2024    CO2 22 (L) 04/13/2024    GLU 79 04/13/2024    BUN 24 (H) 04/13/2024    CREATININE 1.5 (H) 04/13/2024    EGFRNORACEVR 48 (A) 04/13/2024    ANIONGAP 10 04/13/2024    PROT 6.4 04/13/2024    ALBUMIN 2.9 (L) 04/13/2024    BILITOT 0.6 04/13/2024    ALKPHOS 53 (L) 04/13/2024    ALT 17 04/13/2024    AST 13 04/13/2024    INR 1.0 04/12/2024    CHOL 116 (L) 03/08/2022    TRIG 66 03/08/2022    HDL 29 (L) 03/08/2022    LDLCALC 73.8 03/08/2022    TSH 0.662 04/12/2024    PSA 0.14 05/27/2024    HGBA1C 5.9 06/21/2022      Lab reviewed by me: Particular labs of significance that I will monitor, workup, or treat to improve are mentioned below in diagnostic impression remarks.    Results         Imaging/EKG: I have reviewed the pertinent results and my findings are noted in remarks.  274}    CC:   Chief Complaint   Patient presents with    Follow-up    Medication Refill        " 274}    History of Present Illness  The patient is a 79-year-old male who presents for a follow-up visit.    He reports experiencing insomnia, which he manages with Seroquel. His sleep pattern typically involves going to bed late, around 11:30 or 12, and falling asleep within 2 hours. However, his sleep is interrupted due to the need to urinate. He wakes up at 2:30 am, goes back to bed, and falls asleep again after some time. He then wakes up again after 1.5 hours to urinate. After this second awakening, he usually does not return to sleep. To manage this, he takes zolpidem 5 mg, which allows him to sleep for an additional 2 hours. Despite these measures, he often feels tired.    He has been prescribed solifenacin once daily by his urologist to reduce the frequency of urination, but he has not yet started this medication.    He also reports lower back pain, for which he takes Tylenol.    He has not been taking any medication for depression or anxiety, including Lexapro. He has tried BuSpar in the past.    He has not been using pantoprazole for his reflux. He has discontinued the use of Imdur.       Assessment/Plan  Rajendra Castle is a 79 y.o. male who presents to clinic with:  1. Healthcare maintenance    2. Atherosclerosis of native coronary artery of native heart without angina pectoris    3. Essential hypertension    4. Stage 3a chronic kidney disease    5. Back pain, unspecified back location, unspecified back pain laterality, unspecified chronicity    6. Insomnia, unspecified type    7. Abnormal finding of blood chemistry, unspecified    8. Need for immunization against influenza    9. Benign prostatic hyperplasia with lower urinary tract symptoms, symptom details unspecified       274}    Assessment & Plan  1. Benign Prostatic Hyperplasia (BPH).  His condition requires improvement. Previous attempts with Flomax and other medications have not yielded significant results. A trial of Cialis 5 mg daily will be  initiated to help decrease nocturnal urination. He will continue taking solifenacin 10 mg daily. A follow-up with Urology is recommended.    2. Insomnia.  His insomnia is poorly controlled, likely due to multifactorial causes, particularly his prostate symptoms. He will continue taking Seroquel and zolpidem 5 mg as needed. Efforts will be made to address his prostate symptoms to improve sleep quality. He reports no side effects from his current medications.    3. Hypertension.  His hypertension is stable. Current medications will be continued, and his condition will be monitored.    4. Chronic Kidney Disease (CKD).  His CKD is stable. A consultation with Nephrology will be arranged to help manage his condition and urinary symptoms. Current medications will be continued.    5. Back Pain.  He experiences lower back pain and has been taking Tylenol, which has been effective. A lidocaine patch will be considered if needed, pending insurance coverage.    6. Health Maintenance.  He will receive a flu shot today.      Prev-rec colon screening       This note was generated with the assistance of ambient listening technology. Verbal consent was obtained by the patient and accompanying visitor(s) for the recording of patient appointment to facilitate this note. I attest to having reviewed and edited the generated note for accuracy, though some syntax or spelling errors may persist. Please contact the author of this note for any clarification.      1. Atherosclerosis of native coronary artery of native heart without angina pectoris  - atorvastatin (LIPITOR) 20 MG tablet; Take 1 tablet (20 mg total) by mouth once daily.  Dispense: 90 tablet; Refill: 3    2. Healthcare maintenance    3. Essential hypertension  - losartan (COZAAR) 25 MG tablet; Take 1 tablet (25 mg total) by mouth once daily.  Dispense: 90 tablet; Refill: 3    4. Stage 3a chronic kidney disease    5. Back pain, unspecified back location, unspecified back pain  laterality, unspecified chronicity    6. Insomnia, unspecified type    7. Abnormal finding of blood chemistry, unspecified  - CBC Auto Differential; Future  - Hemoglobin A1C; Future  - Lipid Panel; Future  - RENAL FUNCTION PANEL; Future    8. Need for immunization against influenza  - influenza (adjuvanted) (Fluad) 45 mcg/0.5 mL IM vaccine (> or = 64 yo) 0.5 mL    9. Benign prostatic hyperplasia with lower urinary tract symptoms, symptom details unspecified  - tadalafiL (CIALIS) 5 MG tablet; Take 1 tablet (5 mg total) by mouth once daily.  Dispense: 30 tablet; Refill: 11      Sp Lilly MD   274}    If you are due for any health screening(s) below please notify me so we can arrange them to be ordered and scheduled. Most healthy patients at your age complete them, but you are free to accept or refuse.     If you can't do it, I'll definitely understand. If you can, I'd certainly appreciate it!   All of your core healthy metrics are met.

## 2024-09-20 ENCOUNTER — LAB VISIT (OUTPATIENT)
Dept: LAB | Facility: HOSPITAL | Age: 79
End: 2024-09-20
Attending: STUDENT IN AN ORGANIZED HEALTH CARE EDUCATION/TRAINING PROGRAM
Payer: MEDICARE

## 2024-09-20 ENCOUNTER — OFFICE VISIT (OUTPATIENT)
Dept: FAMILY MEDICINE | Facility: CLINIC | Age: 79
End: 2024-09-20
Payer: MEDICARE

## 2024-09-20 VITALS
SYSTOLIC BLOOD PRESSURE: 136 MMHG | HEIGHT: 68 IN | OXYGEN SATURATION: 95 % | WEIGHT: 152.13 LBS | DIASTOLIC BLOOD PRESSURE: 74 MMHG | TEMPERATURE: 98 F | RESPIRATION RATE: 16 BRPM | BODY MASS INDEX: 23.05 KG/M2 | HEART RATE: 84 BPM

## 2024-09-20 DIAGNOSIS — M54.9 BACK PAIN, UNSPECIFIED BACK LOCATION, UNSPECIFIED BACK PAIN LATERALITY, UNSPECIFIED CHRONICITY: ICD-10-CM

## 2024-09-20 DIAGNOSIS — Z23 NEED FOR IMMUNIZATION AGAINST INFLUENZA: ICD-10-CM

## 2024-09-20 DIAGNOSIS — Z00.00 HEALTHCARE MAINTENANCE: Primary | ICD-10-CM

## 2024-09-20 DIAGNOSIS — N40.1 BENIGN PROSTATIC HYPERPLASIA WITH LOWER URINARY TRACT SYMPTOMS, SYMPTOM DETAILS UNSPECIFIED: ICD-10-CM

## 2024-09-20 DIAGNOSIS — G47.00 INSOMNIA, UNSPECIFIED TYPE: ICD-10-CM

## 2024-09-20 DIAGNOSIS — I10 ESSENTIAL HYPERTENSION: ICD-10-CM

## 2024-09-20 DIAGNOSIS — R79.9 ABNORMAL FINDING OF BLOOD CHEMISTRY, UNSPECIFIED: ICD-10-CM

## 2024-09-20 DIAGNOSIS — I25.10 ATHEROSCLEROSIS OF NATIVE CORONARY ARTERY OF NATIVE HEART WITHOUT ANGINA PECTORIS: ICD-10-CM

## 2024-09-20 DIAGNOSIS — N18.31 STAGE 3A CHRONIC KIDNEY DISEASE: ICD-10-CM

## 2024-09-20 LAB
ALBUMIN SERPL BCP-MCNC: 3.2 G/DL (ref 3.5–5.2)
ALBUMIN SERPL BCP-MCNC: 3.2 G/DL (ref 3.5–5.2)
ALP SERPL-CCNC: 65 U/L (ref 55–135)
ALT SERPL W/O P-5'-P-CCNC: 13 U/L (ref 10–44)
ANION GAP SERPL CALC-SCNC: 9 MMOL/L (ref 8–16)
ANION GAP SERPL CALC-SCNC: 9 MMOL/L (ref 8–16)
AST SERPL-CCNC: 16 U/L (ref 10–40)
BASOPHILS # BLD AUTO: 0.03 K/UL (ref 0–0.2)
BASOPHILS NFR BLD: 0.4 % (ref 0–1.9)
BILIRUB SERPL-MCNC: 0.4 MG/DL (ref 0.1–1)
BUN SERPL-MCNC: 22 MG/DL (ref 8–23)
BUN SERPL-MCNC: 22 MG/DL (ref 8–23)
CALCIUM SERPL-MCNC: 9.1 MG/DL (ref 8.7–10.5)
CALCIUM SERPL-MCNC: 9.1 MG/DL (ref 8.7–10.5)
CHLORIDE SERPL-SCNC: 111 MMOL/L (ref 95–110)
CHLORIDE SERPL-SCNC: 111 MMOL/L (ref 95–110)
CHOLEST SERPL-MCNC: 166 MG/DL (ref 120–199)
CHOLEST/HDLC SERPL: 4 {RATIO} (ref 2–5)
CO2 SERPL-SCNC: 24 MMOL/L (ref 23–29)
CO2 SERPL-SCNC: 24 MMOL/L (ref 23–29)
CREAT SERPL-MCNC: 1.5 MG/DL (ref 0.5–1.4)
CREAT SERPL-MCNC: 1.5 MG/DL (ref 0.5–1.4)
DIFFERENTIAL METHOD BLD: ABNORMAL
EOSINOPHIL # BLD AUTO: 0.1 K/UL (ref 0–0.5)
EOSINOPHIL NFR BLD: 1.8 % (ref 0–8)
ERYTHROCYTE [DISTWIDTH] IN BLOOD BY AUTOMATED COUNT: 14.9 % (ref 11.5–14.5)
EST. GFR  (NO RACE VARIABLE): 47.7 ML/MIN/1.73 M^2
EST. GFR  (NO RACE VARIABLE): 47.7 ML/MIN/1.73 M^2
ESTIMATED AVG GLUCOSE: 117 MG/DL (ref 68–131)
GLUCOSE SERPL-MCNC: 88 MG/DL (ref 70–110)
GLUCOSE SERPL-MCNC: 88 MG/DL (ref 70–110)
HBA1C MFR BLD: 5.7 % (ref 4–5.6)
HCT VFR BLD AUTO: 46.9 % (ref 40–54)
HDLC SERPL-MCNC: 41 MG/DL (ref 40–75)
HDLC SERPL: 24.7 % (ref 20–50)
HGB BLD-MCNC: 13.9 G/DL (ref 14–18)
IMM GRANULOCYTES # BLD AUTO: 0.06 K/UL (ref 0–0.04)
IMM GRANULOCYTES NFR BLD AUTO: 0.8 % (ref 0–0.5)
LDLC SERPL CALC-MCNC: 102.2 MG/DL (ref 63–159)
LYMPHOCYTES # BLD AUTO: 1.4 K/UL (ref 1–4.8)
LYMPHOCYTES NFR BLD: 18.9 % (ref 18–48)
MCH RBC QN AUTO: 27.3 PG (ref 27–31)
MCHC RBC AUTO-ENTMCNC: 29.6 G/DL (ref 32–36)
MCV RBC AUTO: 92 FL (ref 82–98)
MONOCYTES # BLD AUTO: 0.8 K/UL (ref 0.3–1)
MONOCYTES NFR BLD: 11.2 % (ref 4–15)
NEUTROPHILS # BLD AUTO: 4.8 K/UL (ref 1.8–7.7)
NEUTROPHILS NFR BLD: 66.9 % (ref 38–73)
NONHDLC SERPL-MCNC: 125 MG/DL
NRBC BLD-RTO: 0 /100 WBC
PHOSPHATE SERPL-MCNC: 3.5 MG/DL (ref 2.7–4.5)
PLATELET # BLD AUTO: 328 K/UL (ref 150–450)
PMV BLD AUTO: 12.7 FL (ref 9.2–12.9)
POTASSIUM SERPL-SCNC: 4.5 MMOL/L (ref 3.5–5.1)
POTASSIUM SERPL-SCNC: 4.5 MMOL/L (ref 3.5–5.1)
PROT SERPL-MCNC: 7.4 G/DL (ref 6–8.4)
RBC # BLD AUTO: 5.1 M/UL (ref 4.6–6.2)
SODIUM SERPL-SCNC: 144 MMOL/L (ref 136–145)
SODIUM SERPL-SCNC: 144 MMOL/L (ref 136–145)
TRIGL SERPL-MCNC: 114 MG/DL (ref 30–150)
WBC # BLD AUTO: 7.21 K/UL (ref 3.9–12.7)

## 2024-09-20 PROCEDURE — 36415 COLL VENOUS BLD VENIPUNCTURE: CPT | Mod: PO | Performed by: STUDENT IN AN ORGANIZED HEALTH CARE EDUCATION/TRAINING PROGRAM

## 2024-09-20 PROCEDURE — 80053 COMPREHEN METABOLIC PANEL: CPT | Performed by: STUDENT IN AN ORGANIZED HEALTH CARE EDUCATION/TRAINING PROGRAM

## 2024-09-20 PROCEDURE — 83036 HEMOGLOBIN GLYCOSYLATED A1C: CPT | Performed by: STUDENT IN AN ORGANIZED HEALTH CARE EDUCATION/TRAINING PROGRAM

## 2024-09-20 PROCEDURE — 80061 LIPID PANEL: CPT | Performed by: STUDENT IN AN ORGANIZED HEALTH CARE EDUCATION/TRAINING PROGRAM

## 2024-09-20 PROCEDURE — 84100 ASSAY OF PHOSPHORUS: CPT | Performed by: STUDENT IN AN ORGANIZED HEALTH CARE EDUCATION/TRAINING PROGRAM

## 2024-09-20 PROCEDURE — 99999 PR PBB SHADOW E&M-EST. PATIENT-LVL V: CPT | Mod: PBBFAC,,, | Performed by: STUDENT IN AN ORGANIZED HEALTH CARE EDUCATION/TRAINING PROGRAM

## 2024-09-20 PROCEDURE — 85025 COMPLETE CBC W/AUTO DIFF WBC: CPT | Performed by: STUDENT IN AN ORGANIZED HEALTH CARE EDUCATION/TRAINING PROGRAM

## 2024-09-20 RX ORDER — TADALAFIL 5 MG/1
5 TABLET ORAL DAILY
Qty: 30 TABLET | Refills: 11 | Status: SHIPPED | OUTPATIENT
Start: 2024-09-20 | End: 2025-09-20

## 2024-09-20 RX ORDER — LOSARTAN POTASSIUM 25 MG/1
25 TABLET ORAL DAILY
Qty: 90 TABLET | Refills: 3 | Status: SHIPPED | OUTPATIENT
Start: 2024-09-20 | End: 2025-09-15

## 2024-09-20 RX ORDER — ESCITALOPRAM OXALATE 10 MG/1
10 TABLET ORAL DAILY
Qty: 90 TABLET | Refills: 3 | Status: CANCELLED | OUTPATIENT
Start: 2024-09-20 | End: 2025-09-15

## 2024-09-20 RX ORDER — PANTOPRAZOLE SODIUM 40 MG/1
40 TABLET, DELAYED RELEASE ORAL DAILY
Qty: 90 TABLET | Refills: 3 | Status: CANCELLED | OUTPATIENT
Start: 2024-09-20 | End: 2025-09-15

## 2024-09-20 RX ORDER — LIDOCAINE 50 MG/G
1 PATCH TOPICAL DAILY
Qty: 30 PATCH | Refills: 2 | Status: SHIPPED | OUTPATIENT
Start: 2024-09-20 | End: 2024-09-20

## 2024-09-20 RX ORDER — ATORVASTATIN CALCIUM 20 MG/1
20 TABLET, FILM COATED ORAL DAILY
Qty: 90 TABLET | Refills: 3 | Status: SHIPPED | OUTPATIENT
Start: 2024-09-20 | End: 2025-09-20

## 2024-09-20 NOTE — PROGRESS NOTES
Identified pts name and , HD flu vaccine administered IM, pt tolerated well. Aseptic technique maintained. Pain scale 0 out of 10 on pain scale. Pt instructed to wait in clinic for 15 minutes after injection was given.

## 2024-10-09 ENCOUNTER — HOSPITAL ENCOUNTER (EMERGENCY)
Facility: HOSPITAL | Age: 79
Discharge: HOME | End: 2024-10-09
Attending: EMERGENCY MEDICINE
Payer: MEDICARE

## 2024-10-09 VITALS
OXYGEN SATURATION: 96 % | DIASTOLIC BLOOD PRESSURE: 82 MMHG | SYSTOLIC BLOOD PRESSURE: 165 MMHG | RESPIRATION RATE: 19 BRPM | HEIGHT: 68 IN | HEART RATE: 89 BPM | WEIGHT: 152 LBS | BODY MASS INDEX: 23.04 KG/M2 | TEMPERATURE: 99 F

## 2024-10-09 DIAGNOSIS — M17.11 OSTEOARTHRITIS OF RIGHT KNEE, UNSPECIFIED OSTEOARTHRITIS TYPE: ICD-10-CM

## 2024-10-09 DIAGNOSIS — M54.2 NECK PAIN ON RIGHT SIDE: ICD-10-CM

## 2024-10-09 DIAGNOSIS — M47.22 OSTEOARTHRITIS OF SPINE WITH RADICULOPATHY, CERVICAL REGION: Primary | ICD-10-CM

## 2024-10-09 DIAGNOSIS — M25.561 RIGHT KNEE PAIN: ICD-10-CM

## 2024-10-09 DIAGNOSIS — M47.816 OSTEOARTHRITIS OF LUMBAR SPINE, UNSPECIFIED SPINAL OSTEOARTHRITIS COMPLICATION STATUS: ICD-10-CM

## 2024-10-09 PROCEDURE — 63600175 PHARM REV CODE 636 W HCPCS: Performed by: PHYSICIAN ASSISTANT

## 2024-10-09 PROCEDURE — 99284 EMERGENCY DEPT VISIT MOD MDM: CPT | Mod: 25

## 2024-10-09 PROCEDURE — 96372 THER/PROPH/DIAG INJ SC/IM: CPT | Performed by: PHYSICIAN ASSISTANT

## 2024-10-09 PROCEDURE — 25000003 PHARM REV CODE 250: Performed by: PHYSICIAN ASSISTANT

## 2024-10-09 RX ORDER — ACETAMINOPHEN 500 MG
1000 TABLET ORAL
Status: COMPLETED | OUTPATIENT
Start: 2024-10-09 | End: 2024-10-09

## 2024-10-09 RX ORDER — DEXAMETHASONE SODIUM PHOSPHATE 4 MG/ML
8 INJECTION, SOLUTION INTRA-ARTICULAR; INTRALESIONAL; INTRAMUSCULAR; INTRAVENOUS; SOFT TISSUE
Status: COMPLETED | OUTPATIENT
Start: 2024-10-09 | End: 2024-10-09

## 2024-10-09 RX ORDER — DICLOFENAC SODIUM 50 MG/1
50 TABLET, DELAYED RELEASE ORAL 2 TIMES DAILY PRN
Qty: 20 TABLET | Refills: 0 | Status: SHIPPED | OUTPATIENT
Start: 2024-10-09

## 2024-10-09 RX ORDER — LIDOCAINE 50 MG/G
1 PATCH TOPICAL ONCE
Status: DISCONTINUED | OUTPATIENT
Start: 2024-10-09 | End: 2024-10-09 | Stop reason: HOSPADM

## 2024-10-09 RX ORDER — HYDROCODONE BITARTRATE AND ACETAMINOPHEN 5; 325 MG/1; MG/1
1 TABLET ORAL EVERY 6 HOURS PRN
Qty: 12 TABLET | Refills: 0 | Status: SHIPPED | OUTPATIENT
Start: 2024-10-09 | End: 2024-10-12

## 2024-10-09 RX ORDER — LIDOCAINE 50 MG/G
1 PATCH TOPICAL DAILY
Qty: 30 PATCH | Refills: 0 | Status: SHIPPED | OUTPATIENT
Start: 2024-10-09

## 2024-10-09 RX ADMIN — LIDOCAINE 5% 1 PATCH: 700 PATCH TOPICAL at 01:10

## 2024-10-09 RX ADMIN — ACETAMINOPHEN 1000 MG: 500 TABLET ORAL at 01:10

## 2024-10-09 RX ADMIN — DEXAMETHASONE SODIUM PHOSPHATE 8 MG: 4 INJECTION INTRA-ARTICULAR; INTRALESIONAL; INTRAMUSCULAR; INTRAVENOUS; SOFT TISSUE at 01:10

## 2024-10-09 NOTE — ED NOTES
Patient identifiers for Rajendra Castle checked and correct.  Pt presenting in the er for back pain x1 week, pt reports no trauma to the area.   LOC:  Rajendra Castle is awake, alert, and aware of environment with an appropriate affect. He is oriented x 3 and speaking appropriately.  APPEARANCE:  He is resting comfortably and in no acute distress. He is clean and well groomed, patient's clothing is properly fastened.  SKIN:  The skin is warm and dry. He has normal skin turgor and moist mucus membranes. Skin is intact; no bruising or breakdown noted.  MUSCULOSKELETAL:  He is moving all extremities well, no obvious deformities noted. Pulses intact.   RESPIRATORY:  Airway is open and patent. Respirations are spontaneous and non-labored with normal effort and rate.  CARDIAC:  He has a normal rate and rhythm. No peripheral edema noted. Capillary refill < 3 seconds.  ABDOMEN:  No distention noted.  Soft and non-tender upon palpation.  NEUROLOGICAL:  PERRL. Facial expression is symmetrical. Hand grasps are equal bilaterally. Normal sensation in all extremities when touched with finger.  Allergies reported:  Review of patient's allergies indicates:  No Known Allergies

## 2024-10-10 ENCOUNTER — PATIENT OUTREACH (OUTPATIENT)
Dept: EMERGENCY MEDICINE | Facility: HOSPITAL | Age: 79
End: 2024-10-10

## 2024-10-10 NOTE — ED PROVIDER NOTES
Encounter Date: 10/9/2024       History     Chief Complaint   Patient presents with    Back Pain     Back pain, lower and right knee pain  states its chronic but getting worse      Rajendra Castle is a 79 y.o. male presenting for evaluation of pain in his neck, lower back and right knee persistent for the last several days.  No injury, trauma or fall.  No fever, no chills.  At times, he does notice some numbness in his right hand, but not currently.  No loss of bowel or bladder control.  He has taken over-the-counter medication with little improvement.  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      The history is provided by the patient.     Review of patient's allergies indicates:  No Known Allergies  Past Medical History:   Diagnosis Date    Anticoagulant long-term use     Arthritis     Coronary artery disease     Dr. Lamb    DDD (degenerative disc disease), cervical     Degenerative disc disease     Heart murmur     History of radiation therapy 2020    Hypertension     Nontoxic multinodular goiter 09/20/2016    Prostate CA     RA (rheumatoid arthritis)     Sleep apnea     No CPAP    Vitamin D insufficiency 09/30/2016     Past Surgical History:   Procedure Laterality Date    CARPAL TUNNEL RELEASE Right 05/28/2021    Procedure: RELEASE, CARPAL TUNNEL;  Surgeon: Jose Ryan II, MD;  Location: [facility] OR;  Service: Orthopedics;  Laterality: Right;    COLONOSCOPY  prior to 2016    normal findings per patient report    CORONARY ANGIOPLASTY WITH STENT PLACEMENT  02/2022    CYSTOSCOPY N/A 12/18/2018    Procedure: CYSTOSCOPY;  Surgeon: Garrett Pina MD;  Location: [facility] OR;  Service: Urology;   Laterality: N/A;    CYSTOSCOPY N/A 07/12/2022    Procedure: CYSTOSCOPY;  Surgeon: Garrett Pina MD;  Location: [facility] OR;  Service: Urology;  Laterality: N/A;    ESOPHAGOGASTRODUODENOSCOPY N/A 09/29/2020    Dr. Espinosa; empiric dilation; erythematous mucosa in antrum; gastric mucosal atrophy; hematin in entire stomach; biopsy: mid & distal esophagus WNL, stomach- WNL, negative for h pylori    EXCISION OF LESION OF PENIS N/A 2/23/2023    Procedure: EXCISION, LESION, PENIS;  Surgeon: Timo Myers MD;  Location: [facility] OR;  Service: Urology;  Laterality: N/A;    INSERTION OF TUNNELED CENTRAL VENOUS CATHETER (CVC) WITH SUBCUTANEOUS PORT Left 5/18/2023    Procedure: [id number];  Surgeon: Atul Faria MD;  Location: [facility];  Service: General;  Laterality: Left;  left subclavian    PARATHYROIDECTOMY Right 10/27/2020    Procedure: PARATHYROIDECTOMY;  Surgeon: Latonia Clayton MD;  Location: [address];  Service: ENT;  Laterality: Right;    RELEASE OF ULNAR NERVE AT CUBITAL TUNNEL Right 05/28/2021    Procedure: RELEASE, ULNAR TUNNEL;  Surgeon: Jose Ryan II, MD;  Location: [facility] OR;  Service: Orthopedics;  Laterality: Right;    TRANSRECTAL BIOPSY OF PROSTATE WITH ULTRASOUND GUIDANCE N/A 12/18/2018    Procedure: BIOPSY, PROSTATE, RECTAL APPROACH, WITH US GUIDANCE;  Surgeon: Garrett Pina MD;  Location: [facility] OR;  Service: Urology;  Laterality: N/A;    TRANSRECTAL ULTRASOUND EXAMINATION N/A 07/12/2022    Procedure: ULTRASOUND, RECTAL APPROACH;  Surgeon: Garrett Pina MD;  Location: [facility] OR;  Service: Urology;  Laterality: N/A;     Family History   Problem Relation Name Age of Onset    Heart disease Mother      Stroke Father      Drug abuse Sister      No Known Problems Daughter      No Known Problems Daughter      No Known Problems Son      Diabetes Maternal Uncle      Colon cancer Neg Hx      Crohn's disease Neg Hx      Esophageal cancer Neg Hx      Stomach cancer Neg Hx       Ulcerative colitis Neg Hx       Social History     Tobacco Use    Smoking status: Former     Current packs/day: 0.00     Average packs/day: 0.3 packs/day for 10.0 years (2.5 ttl pk-yrs)     Types: Cigarettes     Start date: 1991     Quit date: 2001     Years since quittin.1     Passive exposure: Past    Smokeless tobacco: Never   Substance Use Topics    Alcohol use: Not Currently     Comment: seldom    Drug use: Not Currently     Frequency: 8.0 times per week     Types: Marijuana     Review of Systems   Constitutional:  Negative for chills and fever.   Respiratory:  Negative for cough, chest tightness, shortness of breath and wheezing.    Cardiovascular:  Negative for chest pain and palpitations.   Gastrointestinal:  Negative for abdominal pain, diarrhea, nausea and vomiting.   Genitourinary:  Negative for difficulty urinating.   Musculoskeletal:  Positive for arthralgias, back pain and myalgias. Negative for joint swelling, neck pain and neck stiffness.   Skin:  Negative for color change, pallor, rash and wound.   Neurological:  Negative for weakness and numbness.   Hematological:  Does not bruise/bleed easily.       Physical Exam     Initial Vitals [10/09/24 1243]   BP Pulse Resp Temp SpO2   (!) 165/82 89 19 98.7 °F (37.1 °C) 96 %      MAP       --         Physical Exam    Nursing note and vitals reviewed.  Constitutional: He appears well-developed and well-nourished. He is not diaphoretic. No distress.   HENT:   Head: Normocephalic and atraumatic.   Neck: Neck supple.   Normal range of motion.  Cardiovascular:  Normal rate, regular rhythm, normal heart sounds and intact distal pulses.     Exam reveals no gallop and no friction rub.       No murmur heard.  Pulmonary/Chest: Breath sounds normal. No respiratory distress. He has no wheezes. He has no rhonchi. He has no rales.   Musculoskeletal:         General: Tenderness present. No edema. Normal range of motion.      Cervical back: Normal range of  motion and neck supple.      Comments: Tenderness to palpation noted to midline cervical and lumbar spinous processes extending into bilateral cervical paraspinal muscles.  Mild tenderness to palpation noted to anterior right knee, without joint effusion, erythema or warmth.  No decreased range of motion, decreased strength or loss of sensation to bilateral upper or lower extremities.  Palpable 2+ radial and pedal pulses.     Neurological: He is alert and oriented to person, place, and time. He has normal strength. No sensory deficit.   Skin: Skin is warm and dry. No rash and no abscess noted. No erythema.   Psychiatric: He has a normal mood and affect.         ED Course   Procedures  Labs Reviewed - No data to display       Imaging Results              X-Ray Cervical Spine AP And Lateral (Final result)  Result time 10/09/24 14:26:33      Final result by Estrada Andrade MD (10/09/24 14:26:33)                   Impression:      No acute radiographic findings.      Electronically signed by: Estrada Andrade  Date:    10/09/2024  Time:    14:26               Narrative:    EXAMINATION:  XR CERVICAL SPINE AP LATERAL    CLINICAL HISTORY:  Cervicalgia    TECHNIQUE:  AP, lateral and open mouth views of the cervical spine were performed.    COMPARISON:  Radiographs 08/06/2012.    FINDINGS:  There is some bony demineralization.  Presumed degenerative fusion C5-C6 which has progressed from 2012.  Severe disc height loss C3-C4, C4-C5, and C6-C7.  The C7-T1 disc level is not well assessed on the lateral view.  No apparent displaced fractures or bony destructive changes.  Cervical kyphosis and grade 1 anterolisthesis of C3 on C4 and retrolisthesis of C4 on C5.  Moderate degenerative changes of the lateral C1-C2 articulation is also noted.  No abnormal prevertebral soft tissue thickening.  Surgical clips project over the right neck.                                       X-Ray Knee 3 View Right (Final result)  Result time 10/09/24 14:31:11       Final result by Ghanshyam Curiel Jr., MD (10/09/24 14:31:11)                   Impression:      Chondrocalcinosis of the menisci, narrowing of the medial and lateral intercondylar joint space consistent with osteoarthritis.      Electronically signed by: Ghanshyam Curiel MD  Date:    10/09/2024  Time:    14:31               Narrative:    EXAMINATION:  XR KNEE 3 VIEW RIGHT    CLINICAL HISTORY:  Pain in right knee    TECHNIQUE:  AP, lateral, and Merchant views of the right knee were performed.    COMPARISON:  None    FINDINGS:  A fracture of the femur, patella, tibia or fibula is not seen.  There is mild narrowing of the medial and lateral intercondylar joint space and calcification of the menisci identified.  No joint effusion is seen.                                       X-Ray Lumbar Spine Ap And Lateral (Final result)  Result time 10/09/24 14:24:28      Final result by Estrada Andrade MD (10/09/24 14:24:28)                   Impression:      No acute radiographic findings.      Electronically signed by: Estrada Andrade  Date:    10/09/2024  Time:    14:24               Narrative:    EXAMINATION:  XR LUMBAR SPINE AP AND LATERAL    CLINICAL HISTORY:  lumbar pain;    TECHNIQUE:  AP, lateral and spot images were performed of the lumbar spine.    COMPARISON:  None    FINDINGS:  Mild broad dextrocurvature.  Vertebral body heights are preserved.  No fractures or bony destructive changes.  Endplate centered sclerosis and osteophytes in the setting of severe L5-S1 and moderate L4-L5 disc height loss.  Lower lumbar predominant facet arthrosis.  Sagittal alignment is within normal limits.                                       Medications   acetaminophen tablet 1,000 mg (1,000 mg Oral Given 10/9/24 1331)   dexAMETHasone injection 8 mg (8 mg Intramuscular Given 10/9/24 1331)     Medical Decision Making  Differential diagnosis:  Osteoarthritis  Septic joint  Compression fracture  Cauda equina  Epidural abscess    Pt emergently  evaluated here in the ED.    X-ray imaging of cervical spine and lumbar spine show no evidence for acute bony abnormalities, fractures or dislocations; however, there is evidence for degenerative changes, which is likely contributing to his symptoms.  In addition, the right knee shows no evidence for acute bony abnormality; however, there is narrowing of the medial and lateral joint space with calcification of the meniscus.  Patient is made aware of the findings.  He is given medications for symptomatic improvement here in the emergency department.  Low suspicion for cauda equina or epidural abscess and no indication for advanced imaging.  He will be discharged home with a referral to Physical Medicine and Rehabilitation for further evaluation and management.  He voices understanding and is agreeable with the plan.  He is given specific return precautions.    Amount and/or Complexity of Data Reviewed  Radiology: ordered. Decision-making details documented in ED Course.    Risk  OTC drugs.  Prescription drug management.              Attending Attestation:     Physician Attestation Statement for NP/PA:   I personally made/approved the management plan and take responsibility for the patient management.    Other NP/PA Attestation Additions:    History of Present Illness: 79-year-old male presented for pain to the knee, neck, and back.    Medical Decision Making: Initial differential diagnosis included but not limited to degenerative disc disease, strain, and arthritis.  I am in agreement with the physician assistant's  assessment, treatment, and plan of care.                                    Clinical Impression:  Final diagnoses:  [M25.561] Right knee pain  [M54.2] Neck pain on right side  [M47.22] Osteoarthritis of spine with radiculopathy, cervical region (Primary)  [M47.816] Osteoarthritis of lumbar spine, unspecified spinal osteoarthritis complication status  [M17.11] Osteoarthritis of right knee, unspecified  osteoarthritis type          ED Disposition Condition    Discharge Stable          ED Prescriptions       Medication Sig Dispense Start Date End Date Auth. Provider    HYDROcodone-acetaminophen (NORCO) 5-325 mg per tablet Take 1 tablet by mouth every 6 (six) hours as needed for Pain. 12 tablet 10/9/2024 10/12/2024 Cassie Monet PA-C    diclofenac (VOLTAREN) 50 MG EC tablet Take 1 tablet (50 mg total) by mouth 2 (two) times daily as needed (pain). 20 tablet 10/9/2024 -- Cassie Monet PA-C    LIDOcaine (LIDODERM) 5 % Place 1 patch onto the skin once daily. Remove & Discard patch within 12 hours or as directed by MD 30 patch 10/9/2024 -- Cassie Monet PA-C          Follow-up Information       Follow up With Specialties Details Why Contact Info Additional Information    [facility] Emergency Medicine  As needed, If symptoms worsen [address] [place] [address] [address] [place] [address] 1st floor    Garrett Foy MD Physical Medicine and Rehabilitation  for re-evaluation of neck, back and joint pain [address] [place] [address] [address], [address]  [facility] [id number]  [phone]                Cassie Monet PA-C  10/09/24 2121       Brian Preciado MD  10/09/24 2126

## 2024-10-17 DIAGNOSIS — G47.00 INSOMNIA, UNSPECIFIED TYPE: ICD-10-CM

## 2024-10-17 RX ORDER — ZOLPIDEM TARTRATE 10 MG/1
10 TABLET ORAL NIGHTLY PRN
Qty: 90 TABLET | Refills: 0 | Status: SHIPPED | OUTPATIENT
Start: 2024-10-17

## 2024-10-17 NOTE — TELEPHONE ENCOUNTER
No care due was identified.  [facility] Embedded Care Due Messages. Reference number: [id number].   10/17/2024 9:55:21 AM CDT

## 2024-10-17 NOTE — TELEPHONE ENCOUNTER
----- Message from Barbara sent at 10/17/2024  9:58 AM CDT -----  Type:  RX Refill Request    Who Called:  Patient  Refill or New Rx:  New Rx  RX Name and Strength:  zolpidem (AMBIEN) 10 mg Tab  How is the patient currently taking it? (ex. 1XDay):  AS Directed  Is this a 30 day or 90 day RX:  90  Preferred Pharmacy with phone number:    [id number] - [place] - [address] AT [address] & [facility]  [address]  [place] [address]  Phone: [phone] Fax: [phone]  Local or Mail Order:  Local  Ordering Provider:  Dr Maxx Rojas Call Back Number:  [phone]  Additional Information:  Thank You

## 2024-10-18 ENCOUNTER — TELEPHONE (OUTPATIENT)
Dept: FAMILY MEDICINE | Facility: CLINIC | Age: 79
End: 2024-10-18
Payer: MEDICARE

## 2024-10-18 NOTE — TELEPHONE ENCOUNTER
Spoke to pt who states he is not sure why he is taking Seroquel and Ambien. Pt states he is having trouble sleeping and Seroquel has plenty side effects. Scheduled appt to discuss meds

## 2024-10-18 NOTE — TELEPHONE ENCOUNTER
----- Message from Herb sent at 10/18/2024  4:24 PM CDT -----  Regarding: med question  Contact: jayro at [phone]  Type: Needs Medical Advice    Who Called:  jayro    Symptoms (please be specific):  insomnia    How long has patient had these symptoms:  ongoing    Pharmacy name and phone #:    "[facility] #[id number] - [place], [place] - [address] [facility]  [address]  [place] [address]  Phone: [phone] Fax: [phone]    Best Call Back Number: [phone]    Additional Information: pt states is currently taking QUEtiapine (SEROQUEL) 100 MG Tab. Pt read that one of the side effects of SEROQUEL is insomnia. Wants to know why taking med and and discuss alt meds like zolpidem (AMBIEN) 10 mg Tab

## 2024-10-21 ENCOUNTER — OFFICE VISIT (OUTPATIENT)
Dept: FAMILY MEDICINE | Facility: CLINIC | Age: 79
End: 2024-10-21
Payer: MEDICARE

## 2024-10-21 VITALS
HEART RATE: 90 BPM | BODY MASS INDEX: 23.05 KG/M2 | DIASTOLIC BLOOD PRESSURE: 68 MMHG | SYSTOLIC BLOOD PRESSURE: 136 MMHG | HEIGHT: 68 IN | OXYGEN SATURATION: 95 % | WEIGHT: 152.13 LBS | TEMPERATURE: 98 F | RESPIRATION RATE: 17 BRPM

## 2024-10-21 DIAGNOSIS — M54.9 BACK PAIN, UNSPECIFIED BACK LOCATION, UNSPECIFIED BACK PAIN LATERALITY, UNSPECIFIED CHRONICITY: ICD-10-CM

## 2024-10-21 DIAGNOSIS — I10 ESSENTIAL HYPERTENSION: Primary | ICD-10-CM

## 2024-10-21 DIAGNOSIS — N18.31 STAGE 3A CHRONIC KIDNEY DISEASE: ICD-10-CM

## 2024-10-21 DIAGNOSIS — M25.569 KNEE PAIN, UNSPECIFIED CHRONICITY, UNSPECIFIED LATERALITY: ICD-10-CM

## 2024-10-21 DIAGNOSIS — R35.0 URINARY FREQUENCY: ICD-10-CM

## 2024-10-21 DIAGNOSIS — G47.00 INSOMNIA, UNSPECIFIED TYPE: ICD-10-CM

## 2024-10-21 DIAGNOSIS — R35.1 NOCTURIA: ICD-10-CM

## 2024-10-21 PROCEDURE — 99214 OFFICE O/P EST MOD 30 MIN: CPT | Mod: S$GLB,,, | Performed by: PHYSICIAN ASSISTANT

## 2024-10-21 PROCEDURE — 1125F AMNT PAIN NOTED PAIN PRSNT: CPT | Mod: CPTII,S$GLB,, | Performed by: PHYSICIAN ASSISTANT

## 2024-10-21 PROCEDURE — 3078F DIAST BP <80 MM HG: CPT | Mod: CPTII,S$GLB,, | Performed by: PHYSICIAN ASSISTANT

## 2024-10-21 PROCEDURE — 99999 PR PBB SHADOW E&M-EST. PATIENT-LVL V: CPT | Mod: PBBFAC,,, | Performed by: PHYSICIAN ASSISTANT

## 2024-10-21 PROCEDURE — 1101F PT FALLS ASSESS-DOCD LE1/YR: CPT | Mod: CPTII,S$GLB,, | Performed by: PHYSICIAN ASSISTANT

## 2024-10-21 PROCEDURE — 1159F MED LIST DOCD IN RCRD: CPT | Mod: CPTII,S$GLB,, | Performed by: PHYSICIAN ASSISTANT

## 2024-10-21 PROCEDURE — 1160F RVW MEDS BY RX/DR IN RCRD: CPT | Mod: CPTII,S$GLB,, | Performed by: PHYSICIAN ASSISTANT

## 2024-10-21 PROCEDURE — 3288F FALL RISK ASSESSMENT DOCD: CPT | Mod: CPTII,S$GLB,, | Performed by: PHYSICIAN ASSISTANT

## 2024-10-21 PROCEDURE — 3075F SYST BP GE 130 - 139MM HG: CPT | Mod: CPTII,S$GLB,, | Performed by: PHYSICIAN ASSISTANT

## 2024-10-21 RX ORDER — GABAPENTIN 100 MG/1
100 CAPSULE ORAL NIGHTLY
Qty: 30 CAPSULE | Refills: 0 | Status: SHIPPED | OUTPATIENT
Start: 2024-10-21 | End: 2025-10-21

## 2024-10-21 RX ORDER — TRAZODONE HYDROCHLORIDE 50 MG/1
50 TABLET ORAL NIGHTLY
Qty: 30 TABLET | Refills: 11 | Status: SHIPPED | OUTPATIENT
Start: 2024-10-21 | End: 2025-10-21

## 2024-10-21 RX ORDER — TAMSULOSIN HYDROCHLORIDE 0.4 MG/1
0.4 CAPSULE ORAL NIGHTLY
Qty: 30 CAPSULE | Refills: 0 | Status: SHIPPED | OUTPATIENT
Start: 2024-10-21 | End: 2024-11-20

## 2024-10-21 RX ORDER — SOLIFENACIN SUCCINATE 10 MG/1
10 TABLET, FILM COATED ORAL DAILY
Qty: 30 TABLET | Refills: 0 | Status: SHIPPED | OUTPATIENT
Start: 2024-10-21 | End: 2025-10-21

## 2024-10-21 RX ORDER — LANOLIN ALCOHOL/MO/W.PET/CERES
400 CREAM (GRAM) TOPICAL DAILY
Qty: 90 TABLET | Refills: 3 | Status: SHIPPED | OUTPATIENT
Start: 2024-10-21

## 2024-10-21 NOTE — PROGRESS NOTES
Subjective:       Patient ID: Rajendra Castle is a 79 y.o. male.    Chief Complaint: Fatigue and Hospital Follow Up    Mr. Castle is a 79 year old male with chronic insomnia. The patient also has chronic urinary difficulty related to his prostate. He is not sleeping more that 2 hours at a time. The patient is currently prescribed insomnia and seroquel. He reports that trazodone has worked well for him in the past. The patient reports not having a full night's sleep in over 6 months. The patient was also recently in the hospital for chronic back and knee pain that has become very bothersome.       Review of patient's allergies indicates:  No Known Allergies      Current Outpatient Medications:     amLODIPine (NORVASC) 10 MG tablet, Take 1 tablet (10 mg total) by mouth once daily., Disp: 90 tablet, Rfl: 3    atorvastatin (LIPITOR) 20 MG tablet, Take 1 tablet (20 mg total) by mouth once daily., Disp: 90 tablet, Rfl: 3    b complex vitamins capsule, Take 1 capsule by mouth once daily., Disp: , Rfl:     betamethasone valerate 0.1% (VALISONE) 0.1 % Crea, Apply topically 2 (two) times daily. (Patient taking differently: Apply 1 application  topically 2 (two) times daily.), Disp: 45 g, Rfl: 0    cyproheptadine (PERIACTIN) 4 mg tablet, Take 1 tablet (4 mg total) by mouth 4 (four) times daily., Disp: 120 tablet, Rfl: 0    diclofenac (VOLTAREN) 50 MG EC tablet, Take 1 tablet (50 mg total) by mouth 2 (two) times daily as needed (pain)., Disp: 20 tablet, Rfl: 0    divalproex (DEPAKOTE) 250 MG EC tablet, Take 250 mg by mouth 3 (three) times daily., Disp: , Rfl:     LIDOcaine (LIDODERM) 5 %, Place 1 patch onto the skin once daily. Remove & Discard patch within 12 hours or as directed by MD, Disp: 30 patch, Rfl: 0    losartan (COZAAR) 25 MG tablet, Take 1 tablet (25 mg total) by mouth once daily., Disp: 90 tablet, Rfl: 3    pantoprazole (PROTONIX) 40 MG tablet, Take 1 tablet (40 mg total) by mouth once daily., Disp: 90 tablet, Rfl: 0     phenazopyridine (PYRIDIUM) 200 MG tablet, Take 1 tablet (200 mg total) by mouth 3 (three) times daily as needed for Pain., Disp: 30 tablet, Rfl: 0    tadalafiL (CIALIS) 5 MG tablet, Take 1 tablet (5 mg total) by mouth once daily., Disp: 30 tablet, Rfl: 11    gabapentin (NEURONTIN) 100 MG capsule, Take 1 capsule (100 mg total) by mouth every evening., Disp: 30 capsule, Rfl: 0    magnesium oxide (MAG-OX) 400 mg (241.3 mg magnesium) tablet, Take 1 tablet (400 mg total) by mouth once daily., Disp: 90 tablet, Rfl: 3    solifenacin (VESICARE) 10 MG tablet, Take 1 tablet (10 mg total) by mouth once daily., Disp: 30 tablet, Rfl: 0    tamsulosin (FLOMAX) 0.4 mg Cap, Take 1 capsule (0.4 mg total) by mouth nightly., Disp: 30 capsule, Rfl: 0    traZODone (DESYREL) 50 MG tablet, Take 1 tablet (50 mg total) by mouth every evening., Disp: 30 tablet, Rfl: 11  No current facility-administered medications for this visit.    Facility-Administered Medications Ordered in Other Visits:     albuterol nebulizer solution 2.5 mg, 2.5 mg, Nebulization, Q15 Min PRN, Pastor Saleh MD    albuterol-ipratropium 2.5 mg-0.5 mg/3 mL nebulizer solution 3 mL, 3 mL, Nebulization, PRN, Pastor Saleh MD    denosumab (PROLIA) injection 60 mg, 60 mg, Subcutaneous, 1 time in Clinic/HOD, Jean Carlos Hoffman MD    diphenhydrAMINE injection 25 mg, 25 mg, Intravenous, Q6H PRN, Pastor Saleh MD    HYDROmorphone injection 0.5 mg, 0.5 mg, Intravenous, Q5 Min PRN, Pastor Saleh MD, 0.5 mg at 02/23/23 1312    lactated ringers infusion, , Intravenous, Continuous, Volpi-Abadie, Jacqueline, MD, Stopped at 02/23/23 1400    ondansetron injection 4 mg, 4 mg, Intravenous, Q15 Min PRN, Pastor Saleh MD    sodium chloride 0.9% flush 10 mL, 10 mL, Intravenous, PRN, Ayush Guzman MD, 10 mL at 06/01/23 1535    sodium chloride 0.9% flush 10 mL, 10 mL, Intravenous, PRN, Ayush Guzman MD, 10 mL at 06/05/23 1315    sodium chloride 0.9% flush 3 mL, 3 mL,  Intravenous, PRN, Pastor Saleh MD    Lab Results   Component Value Date    WBC 7.21 09/20/2024    HGB 13.9 (L) 09/20/2024    HCT 46.9 09/20/2024     09/20/2024    CHOL 166 09/20/2024    TRIG 114 09/20/2024    HDL 41 09/20/2024    ALT 13 09/20/2024    AST 16 09/20/2024     09/20/2024     09/20/2024    K 4.5 09/20/2024    K 4.5 09/20/2024     (H) 09/20/2024     (H) 09/20/2024    CREATININE 1.5 (H) 09/20/2024    CREATININE 1.5 (H) 09/20/2024    BUN 22 09/20/2024    BUN 22 09/20/2024    CO2 24 09/20/2024    CO2 24 09/20/2024    TSH 0.662 04/12/2024    PSA 0.14 05/27/2024    INR 1.0 04/12/2024    HGBA1C 5.7 (H) 09/20/2024       Review of Systems   Constitutional:  Negative for activity change, appetite change and fever.   HENT:  Negative for postnasal drip, rhinorrhea and sinus pressure.    Eyes:  Negative for visual disturbance.   Respiratory:  Negative for cough and shortness of breath.    Cardiovascular:  Negative for chest pain.   Gastrointestinal:  Negative for abdominal distention and abdominal pain.   Genitourinary:  Positive for frequency. Negative for difficulty urinating and dysuria.        Nocturia   Musculoskeletal:  Positive for arthralgias and back pain. Negative for myalgias.   Neurological:  Negative for headaches.   Hematological:  Negative for adenopathy.   Psychiatric/Behavioral:  Positive for sleep disturbance. The patient is not nervous/anxious.        Objective:      Physical Exam  Constitutional:       Comments: Disheveled appearance.    HENT:      Head: Normocephalic and atraumatic.   Eyes:      Conjunctiva/sclera: Conjunctivae normal.   Cardiovascular:      Rate and Rhythm: Normal rate and regular rhythm.   Pulmonary:      Effort: Pulmonary effort is normal. No respiratory distress.      Breath sounds: Normal breath sounds. No wheezing.   Abdominal:      General: There is no distension.      Palpations: There is no mass.      Tenderness: There is no abdominal  tenderness.   Musculoskeletal:      Right lower leg: No edema.      Left lower leg: No edema.   Lymphadenopathy:      Cervical: No cervical adenopathy.   Skin:     Findings: No erythema.   Neurological:      Mental Status: He is alert and oriented to person, place, and time.   Psychiatric:         Behavior: Behavior normal.         Assessment:       1. Essential hypertension    2. Stage 3a chronic kidney disease    3. Back pain, unspecified back location, unspecified back pain laterality, unspecified chronicity    4. Knee pain, unspecified chronicity, unspecified laterality    5. Insomnia, unspecified type    6. Urinary frequency    7. Nocturia        Plan:       Rajendra was seen today for fatigue and hospital follow up.    Diagnoses and all orders for this visit:    Essential hypertension  Controlled  Low salt diet  Continue current medication  Stage 3a chronic kidney disease  stable  Back pain, unspecified back location, unspecified back pain laterality, unspecified chronicity  -     Ambulatory referral/consult to [facility]; Future  -     gabapentin (NEURONTIN) 100 MG capsule; Take 1 capsule (100 mg total) by mouth every evening.    Knee pain, unspecified chronicity, unspecified laterality  -     Ambulatory referral/consult to Orthopedics; Future    Insomnia, unspecified type  -     Ambulatory referral/consult to Sleep Disorders; Future  -     gabapentin (NEURONTIN) 100 MG capsule; Take 1 capsule (100 mg total) by mouth every evening.   traZODone (DESYREL) 50 MG tablet; Take 1 tablet (50 mg total) by mouth every evening.  -     magnesium oxide (MAG-OX) 400 mg (241.3 mg magnesium) tablet; Take 1 tablet (400 mg total) by mouth once daily.  Urinary frequency  -     solifenacin (VESICARE) 10 MG tablet; Take 1 tablet (10 mg total) by mouth once daily.  -     tamsulosin (FLOMAX) 0.4 mg Cap; Take 1 capsule (0.4 mg total) by mouth nightly.    Nocturia  -     solifenacin (VESICARE) 10 MG tablet; Take 1 tablet (10  mg total) by mouth once daily.  -     tamsulosin (FLOMAX) 0.4 mg Cap; Take 1 capsule (0.4 mg total) by mouth nightly.    Other orders  -          Follow up in 2 weeks. Bring all medications to review

## 2024-10-24 ENCOUNTER — HOSPITAL ENCOUNTER (INPATIENT)
Facility: HOSPITAL | Age: 79
LOS: 2 days | Discharge: HOME | DRG: 313 | End: 2024-10-26
Attending: STUDENT IN AN ORGANIZED HEALTH CARE EDUCATION/TRAINING PROGRAM | Admitting: FAMILY MEDICINE
Payer: MEDICARE

## 2024-10-24 DIAGNOSIS — I27.82 CHRONIC PULMONARY EMBOLISM, UNSPECIFIED PULMONARY EMBOLISM TYPE, UNSPECIFIED WHETHER ACUTE COR PULMONALE PRESENT: ICD-10-CM

## 2024-10-24 DIAGNOSIS — R06.09 DYSPNEA ON EXERTION: Chronic | ICD-10-CM

## 2024-10-24 DIAGNOSIS — R07.9 CHEST PAIN: ICD-10-CM

## 2024-10-24 DIAGNOSIS — R07.9 CHEST PAIN, UNSPECIFIED TYPE: Primary | ICD-10-CM

## 2024-10-24 DIAGNOSIS — I20.89 OTHER FORMS OF ANGINA PECTORIS: ICD-10-CM

## 2024-10-24 PROBLEM — N18.4 CKD (CHRONIC KIDNEY DISEASE), STAGE IV: Status: ACTIVE | Noted: 2024-10-24

## 2024-10-24 PROBLEM — I16.0 HYPERTENSIVE URGENCY: Status: ACTIVE | Noted: 2024-10-24

## 2024-10-24 PROBLEM — E87.5 HYPERKALEMIA: Status: ACTIVE | Noted: 2024-10-24

## 2024-10-24 PROBLEM — E87.20 METABOLIC ACIDOSIS: Status: ACTIVE | Noted: 2024-10-24

## 2024-10-24 LAB
ALBUMIN SERPL BCP-MCNC: 3.1 G/DL (ref 3.5–5.2)
ALLENS TEST: ABNORMAL
ALP SERPL-CCNC: 61 U/L (ref 40–150)
ALT SERPL W/O P-5'-P-CCNC: 15 U/L (ref 10–44)
ANION GAP SERPL CALC-SCNC: 10 MMOL/L (ref 8–16)
ANION GAP SERPL CALC-SCNC: 14 MMOL/L (ref 8–16)
APTT PPP: 26.6 SEC (ref 21–32)
AST SERPL-CCNC: 16 U/L (ref 10–40)
BASOPHILS # BLD AUTO: 0.03 K/UL (ref 0–0.2)
BASOPHILS NFR BLD: 0.4 % (ref 0–1.9)
BILIRUB SERPL-MCNC: 0.4 MG/DL (ref 0.1–1)
BILIRUB UR QL STRIP: NEGATIVE
BNP SERPL-MCNC: 311 PG/ML (ref 0–99)
BUN SERPL-MCNC: 27 MG/DL (ref 8–23)
BUN SERPL-MCNC: 37 MG/DL (ref 8–23)
CALCIUM SERPL-MCNC: 8.9 MG/DL (ref 8.7–10.5)
CALCIUM SERPL-MCNC: 9.2 MG/DL (ref 8.7–10.5)
CHLORIDE SERPL-SCNC: 112 MMOL/L (ref 95–110)
CHLORIDE SERPL-SCNC: 113 MMOL/L (ref 95–110)
CK SERPL-CCNC: 93 U/L (ref 20–200)
CLARITY UR: CLEAR
CO2 SERPL-SCNC: 14 MMOL/L (ref 23–29)
CO2 SERPL-SCNC: 20 MMOL/L (ref 23–29)
COLOR UR: YELLOW
CREAT SERPL-MCNC: 1.4 MG/DL (ref 0.5–1.4)
CREAT SERPL-MCNC: 1.6 MG/DL (ref 0.5–1.4)
D DIMER PPP IA.FEU-MCNC: 1.28 MG/L FEU
DELSYS: ABNORMAL
DIFFERENTIAL METHOD BLD: ABNORMAL
EOSINOPHIL # BLD AUTO: 0.2 K/UL (ref 0–0.5)
EOSINOPHIL NFR BLD: 2.5 % (ref 0–8)
ERYTHROCYTE [DISTWIDTH] IN BLOOD BY AUTOMATED COUNT: 14.6 % (ref 11.5–14.5)
EST. GFR  (NO RACE VARIABLE): 44 ML/MIN/1.73 M^2
EST. GFR  (NO RACE VARIABLE): 52 ML/MIN/1.73 M^2
GLUCOSE SERPL-MCNC: 127 MG/DL (ref 70–110)
GLUCOSE SERPL-MCNC: 78 MG/DL (ref 70–110)
GLUCOSE UR QL STRIP: NEGATIVE
HCO3 UR-SCNC: 18 MMOL/L (ref 24–28)
HCT VFR BLD AUTO: 39.7 % (ref 40–54)
HCV AB SERPL QL IA: NEGATIVE
HGB BLD-MCNC: 12.4 G/DL (ref 14–18)
HGB UR QL STRIP: NEGATIVE
HIV 1+2 AB+HIV1 P24 AG SERPL QL IA: NEGATIVE
IMM GRANULOCYTES # BLD AUTO: 0.04 K/UL (ref 0–0.04)
IMM GRANULOCYTES NFR BLD AUTO: 0.6 % (ref 0–0.5)
INFLUENZA A, MOLECULAR: NEGATIVE
INFLUENZA B, MOLECULAR: NEGATIVE
INR PPP: 1.1 (ref 0.8–1.2)
KETONES UR QL STRIP: ABNORMAL
LEUKOCYTE ESTERASE UR QL STRIP: NEGATIVE
LYMPHOCYTES # BLD AUTO: 0.8 K/UL (ref 1–4.8)
LYMPHOCYTES NFR BLD: 11.7 % (ref 18–48)
MCH RBC QN AUTO: 27.7 PG (ref 27–31)
MCHC RBC AUTO-ENTMCNC: 31.2 G/DL (ref 32–36)
MCV RBC AUTO: 89 FL (ref 82–98)
MODE: ABNORMAL
MONOCYTES # BLD AUTO: 0.7 K/UL (ref 0.3–1)
MONOCYTES NFR BLD: 10.5 % (ref 4–15)
NEUTROPHILS # BLD AUTO: 5 K/UL (ref 1.8–7.7)
NEUTROPHILS NFR BLD: 74.3 % (ref 38–73)
NITRITE UR QL STRIP: NEGATIVE
NRBC BLD-RTO: 0 /100 WBC
PCO2 BLDA: 28 MMHG (ref 35–45)
PH SMN: 7.42 [PH] (ref 7.35–7.45)
PH UR STRIP: 6 [PH] (ref 5–8)
PLATELET # BLD AUTO: 337 K/UL (ref 150–450)
PMV BLD AUTO: 11.6 FL (ref 9.2–12.9)
PO2 BLDA: 63 MMHG (ref 80–100)
POC BE: -7 MMOL/L
POC SATURATED O2: 93 % (ref 95–100)
POC TCO2: 19 MMOL/L (ref 23–27)
POTASSIUM SERPL-SCNC: 4.2 MMOL/L (ref 3.5–5.1)
POTASSIUM SERPL-SCNC: 5.2 MMOL/L (ref 3.5–5.1)
PROT SERPL-MCNC: 7.3 G/DL (ref 6–8.4)
PROT UR QL STRIP: ABNORMAL
PROTHROMBIN TIME: 11.4 SEC (ref 9–12.5)
RBC # BLD AUTO: 4.48 M/UL (ref 4.6–6.2)
SAMPLE: ABNORMAL
SARS-COV-2 RDRP RESP QL NAA+PROBE: NEGATIVE
SITE: ABNORMAL
SODIUM SERPL-SCNC: 140 MMOL/L (ref 136–145)
SODIUM SERPL-SCNC: 143 MMOL/L (ref 136–145)
SP GR UR STRIP: >1.03 (ref 1–1.03)
SP02: 96
SPECIMEN SOURCE: NORMAL
TROPONIN I SERPL DL<=0.01 NG/ML-MCNC: 0.04 NG/ML (ref 0–0.03)
TROPONIN I SERPL DL<=0.01 NG/ML-MCNC: 0.06 NG/ML (ref 0–0.03)
TROPONIN I SERPL DL<=0.01 NG/ML-MCNC: 0.06 NG/ML (ref 0–0.03)
TROPONIN I SERPL HS-MCNC: 32.9 PG/ML (ref 0–14.9)
URN SPEC COLLECT METH UR: ABNORMAL
UROBILINOGEN UR STRIP-ACNC: NEGATIVE EU/DL
WBC # BLD AUTO: 6.69 K/UL (ref 3.9–12.7)

## 2024-10-24 PROCEDURE — 87502 INFLUENZA DNA AMP PROBE: CPT | Performed by: STUDENT IN AN ORGANIZED HEALTH CARE EDUCATION/TRAINING PROGRAM

## 2024-10-24 PROCEDURE — 99900035 HC TECH TIME PER 15 MIN (STAT)

## 2024-10-24 PROCEDURE — 63600175 PHARM REV CODE 636 W HCPCS: Performed by: STUDENT IN AN ORGANIZED HEALTH CARE EDUCATION/TRAINING PROGRAM

## 2024-10-24 PROCEDURE — 94799 UNLISTED PULMONARY SVC/PX: CPT

## 2024-10-24 PROCEDURE — 83880 ASSAY OF NATRIURETIC PEPTIDE: CPT | Performed by: STUDENT IN AN ORGANIZED HEALTH CARE EDUCATION/TRAINING PROGRAM

## 2024-10-24 PROCEDURE — 25000003 PHARM REV CODE 250: Performed by: HOSPITALIST

## 2024-10-24 PROCEDURE — 25500020 PHARM REV CODE 255

## 2024-10-24 PROCEDURE — 87389 HIV-1 AG W/HIV-1&-2 AB AG IA: CPT | Performed by: EMERGENCY MEDICINE

## 2024-10-24 PROCEDURE — 80048 BASIC METABOLIC PNL TOTAL CA: CPT | Performed by: EMERGENCY MEDICINE

## 2024-10-24 PROCEDURE — 12000002 HC ACUTE/MED SURGE SEMI-PRIVATE ROOM

## 2024-10-24 PROCEDURE — 85379 FIBRIN DEGRADATION QUANT: CPT | Performed by: STUDENT IN AN ORGANIZED HEALTH CARE EDUCATION/TRAINING PROGRAM

## 2024-10-24 PROCEDURE — 85730 THROMBOPLASTIN TIME PARTIAL: CPT | Performed by: STUDENT IN AN ORGANIZED HEALTH CARE EDUCATION/TRAINING PROGRAM

## 2024-10-24 PROCEDURE — 84484 ASSAY OF TROPONIN QUANT: CPT | Mod: 91 | Performed by: HOSPITALIST

## 2024-10-24 PROCEDURE — 84484 ASSAY OF TROPONIN QUANT: CPT | Mod: 91 | Performed by: STUDENT IN AN ORGANIZED HEALTH CARE EDUCATION/TRAINING PROGRAM

## 2024-10-24 PROCEDURE — 36415 COLL VENOUS BLD VENIPUNCTURE: CPT | Performed by: EMERGENCY MEDICINE

## 2024-10-24 PROCEDURE — 82803 BLOOD GASES ANY COMBINATION: CPT

## 2024-10-24 PROCEDURE — 80053 COMPREHEN METABOLIC PANEL: CPT | Performed by: STUDENT IN AN ORGANIZED HEALTH CARE EDUCATION/TRAINING PROGRAM

## 2024-10-24 PROCEDURE — 93010 ELECTROCARDIOGRAM REPORT: CPT | Mod: ,,, | Performed by: GENERAL PRACTICE

## 2024-10-24 PROCEDURE — 36600 WITHDRAWAL OF ARTERIAL BLOOD: CPT

## 2024-10-24 PROCEDURE — 99900031 HC PATIENT EDUCATION (STAT)

## 2024-10-24 PROCEDURE — 85025 COMPLETE CBC W/AUTO DIFF WBC: CPT | Performed by: STUDENT IN AN ORGANIZED HEALTH CARE EDUCATION/TRAINING PROGRAM

## 2024-10-24 PROCEDURE — 96360 HYDRATION IV INFUSION INIT: CPT

## 2024-10-24 PROCEDURE — 82550 ASSAY OF CK (CPK): CPT | Performed by: STUDENT IN AN ORGANIZED HEALTH CARE EDUCATION/TRAINING PROGRAM

## 2024-10-24 PROCEDURE — 94760 N-INVAS EAR/PLS OXIMETRY 1: CPT

## 2024-10-24 PROCEDURE — 94761 N-INVAS EAR/PLS OXIMETRY MLT: CPT | Mod: XB

## 2024-10-24 PROCEDURE — 36415 COLL VENOUS BLD VENIPUNCTURE: CPT | Performed by: STUDENT IN AN ORGANIZED HEALTH CARE EDUCATION/TRAINING PROGRAM

## 2024-10-24 PROCEDURE — 84484 ASSAY OF TROPONIN QUANT: CPT | Mod: 91 | Performed by: EMERGENCY MEDICINE

## 2024-10-24 PROCEDURE — 99285 EMERGENCY DEPT VISIT HI MDM: CPT | Mod: 25

## 2024-10-24 PROCEDURE — 36415 COLL VENOUS BLD VENIPUNCTURE: CPT | Performed by: HOSPITALIST

## 2024-10-24 PROCEDURE — 81003 URINALYSIS AUTO W/O SCOPE: CPT | Performed by: STUDENT IN AN ORGANIZED HEALTH CARE EDUCATION/TRAINING PROGRAM

## 2024-10-24 PROCEDURE — 93005 ELECTROCARDIOGRAM TRACING: CPT

## 2024-10-24 PROCEDURE — 85610 PROTHROMBIN TIME: CPT | Performed by: STUDENT IN AN ORGANIZED HEALTH CARE EDUCATION/TRAINING PROGRAM

## 2024-10-24 PROCEDURE — 86803 HEPATITIS C AB TEST: CPT | Performed by: EMERGENCY MEDICINE

## 2024-10-24 PROCEDURE — 87635 SARS-COV-2 COVID-19 AMP PRB: CPT | Performed by: STUDENT IN AN ORGANIZED HEALTH CARE EDUCATION/TRAINING PROGRAM

## 2024-10-24 RX ORDER — TALC
9 POWDER (GRAM) TOPICAL NIGHTLY PRN
Status: DISCONTINUED | OUTPATIENT
Start: 2024-10-24 | End: 2024-10-26 | Stop reason: HOSPADM

## 2024-10-24 RX ORDER — AMOXICILLIN 250 MG
1 CAPSULE ORAL 2 TIMES DAILY
Status: DISCONTINUED | OUTPATIENT
Start: 2024-10-24 | End: 2024-10-26 | Stop reason: HOSPADM

## 2024-10-24 RX ORDER — PANTOPRAZOLE SODIUM 40 MG/1
40 TABLET, DELAYED RELEASE ORAL DAILY
Status: DISCONTINUED | OUTPATIENT
Start: 2024-10-25 | End: 2024-10-26 | Stop reason: HOSPADM

## 2024-10-24 RX ORDER — PHENAZOPYRIDINE HYDROCHLORIDE 100 MG/1
200 TABLET, FILM COATED ORAL 3 TIMES DAILY PRN
Status: DISCONTINUED | OUTPATIENT
Start: 2024-10-24 | End: 2024-10-26 | Stop reason: HOSPADM

## 2024-10-24 RX ORDER — IBUPROFEN 200 MG
24 TABLET ORAL
Status: DISCONTINUED | OUTPATIENT
Start: 2024-10-24 | End: 2024-10-26 | Stop reason: HOSPADM

## 2024-10-24 RX ORDER — METOPROLOL TARTRATE 25 MG/1
25 TABLET, FILM COATED ORAL 2 TIMES DAILY
Status: DISCONTINUED | OUTPATIENT
Start: 2024-10-24 | End: 2024-10-26 | Stop reason: HOSPADM

## 2024-10-24 RX ORDER — ATORVASTATIN CALCIUM 20 MG/1
20 TABLET, FILM COATED ORAL NIGHTLY
Status: DISCONTINUED | OUTPATIENT
Start: 2024-10-24 | End: 2024-10-26 | Stop reason: HOSPADM

## 2024-10-24 RX ORDER — ACETAMINOPHEN 500 MG
1000 TABLET ORAL EVERY 6 HOURS PRN
Status: DISCONTINUED | OUTPATIENT
Start: 2024-10-24 | End: 2024-10-26 | Stop reason: HOSPADM

## 2024-10-24 RX ORDER — ONDANSETRON HYDROCHLORIDE 2 MG/ML
4 INJECTION, SOLUTION INTRAVENOUS EVERY 8 HOURS PRN
Status: DISCONTINUED | OUTPATIENT
Start: 2024-10-24 | End: 2024-10-26 | Stop reason: HOSPADM

## 2024-10-24 RX ORDER — ASPIRIN 325 MG
325 TABLET ORAL
Status: DISCONTINUED | OUTPATIENT
Start: 2024-10-24 | End: 2024-10-24

## 2024-10-24 RX ORDER — TAMSULOSIN HYDROCHLORIDE 0.4 MG/1
0.4 CAPSULE ORAL NIGHTLY
Status: DISCONTINUED | OUTPATIENT
Start: 2024-10-24 | End: 2024-10-26 | Stop reason: HOSPADM

## 2024-10-24 RX ORDER — IBUPROFEN 200 MG
16 TABLET ORAL
Status: DISCONTINUED | OUTPATIENT
Start: 2024-10-24 | End: 2024-10-26 | Stop reason: HOSPADM

## 2024-10-24 RX ORDER — NALOXONE HCL 0.4 MG/ML
0.02 VIAL (ML) INJECTION
Status: DISCONTINUED | OUTPATIENT
Start: 2024-10-24 | End: 2024-10-26 | Stop reason: HOSPADM

## 2024-10-24 RX ORDER — AMLODIPINE BESYLATE 5 MG/1
10 TABLET ORAL DAILY
Status: DISCONTINUED | OUTPATIENT
Start: 2024-10-25 | End: 2024-10-26 | Stop reason: HOSPADM

## 2024-10-24 RX ORDER — OXYBUTYNIN CHLORIDE 5 MG/1
5 TABLET, EXTENDED RELEASE ORAL DAILY
Status: DISCONTINUED | OUTPATIENT
Start: 2024-10-25 | End: 2024-10-26 | Stop reason: HOSPADM

## 2024-10-24 RX ORDER — LOSARTAN POTASSIUM 25 MG/1
25 TABLET ORAL DAILY
Status: DISCONTINUED | OUTPATIENT
Start: 2024-10-25 | End: 2024-10-24

## 2024-10-24 RX ORDER — NAPROXEN SODIUM 220 MG/1
81 TABLET, FILM COATED ORAL DAILY
Status: DISCONTINUED | OUTPATIENT
Start: 2024-10-25 | End: 2024-10-26 | Stop reason: HOSPADM

## 2024-10-24 RX ORDER — LANOLIN ALCOHOL/MO/W.PET/CERES
800 CREAM (GRAM) TOPICAL
Status: DISCONTINUED | OUTPATIENT
Start: 2024-10-24 | End: 2024-10-26 | Stop reason: HOSPADM

## 2024-10-24 RX ORDER — GLUCAGON 1 MG
1 KIT INJECTION
Status: DISCONTINUED | OUTPATIENT
Start: 2024-10-24 | End: 2024-10-26 | Stop reason: HOSPADM

## 2024-10-24 RX ORDER — TRAZODONE HYDROCHLORIDE 50 MG/1
50 TABLET ORAL NIGHTLY
Status: DISCONTINUED | OUTPATIENT
Start: 2024-10-24 | End: 2024-10-26 | Stop reason: HOSPADM

## 2024-10-24 RX ORDER — GABAPENTIN 100 MG/1
100 CAPSULE ORAL NIGHTLY
Status: DISCONTINUED | OUTPATIENT
Start: 2024-10-24 | End: 2024-10-26 | Stop reason: HOSPADM

## 2024-10-24 RX ORDER — SODIUM CHLORIDE 0.9 % (FLUSH) 0.9 %
10 SYRINGE (ML) INJECTION EVERY 12 HOURS PRN
Status: DISCONTINUED | OUTPATIENT
Start: 2024-10-24 | End: 2024-10-26 | Stop reason: HOSPADM

## 2024-10-24 RX ORDER — SODIUM BICARBONATE 1 MEQ/ML
50 SYRINGE (ML) INTRAVENOUS ONCE
Status: DISCONTINUED | OUTPATIENT
Start: 2024-10-24 | End: 2024-10-26 | Stop reason: HOSPADM

## 2024-10-24 RX ORDER — HYDRALAZINE HYDROCHLORIDE 25 MG/1
50 TABLET, FILM COATED ORAL EVERY 8 HOURS
Status: DISCONTINUED | OUTPATIENT
Start: 2024-10-24 | End: 2024-10-26

## 2024-10-24 RX ORDER — HEPARIN SODIUM,PORCINE/D5W 25000/250
0-40 INTRAVENOUS SOLUTION INTRAVENOUS CONTINUOUS
Status: DISCONTINUED | OUTPATIENT
Start: 2024-10-24 | End: 2024-10-24

## 2024-10-24 RX ORDER — DIVALPROEX SODIUM 250 MG/1
250 TABLET, DELAYED RELEASE ORAL 3 TIMES DAILY
Status: DISCONTINUED | OUTPATIENT
Start: 2024-10-24 | End: 2024-10-25

## 2024-10-24 RX ORDER — SODIUM CHLORIDE 450 MG/100ML
INJECTION, SOLUTION INTRAVENOUS CONTINUOUS
Status: DISCONTINUED | OUTPATIENT
Start: 2024-10-24 | End: 2024-10-26 | Stop reason: HOSPADM

## 2024-10-24 RX ADMIN — METOPROLOL TARTRATE 25 MG: 25 TABLET, FILM COATED ORAL at 08:10

## 2024-10-24 RX ADMIN — HYDRALAZINE HYDROCHLORIDE 50 MG: 25 TABLET ORAL at 06:10

## 2024-10-24 RX ADMIN — SODIUM ZIRCONIUM CYCLOSILICATE 10 G: 10 POWDER, FOR SUSPENSION ORAL at 08:10

## 2024-10-24 RX ADMIN — DIVALPROEX SODIUM 250 MG: 250 TABLET, DELAYED RELEASE ORAL at 08:10

## 2024-10-24 RX ADMIN — SENNOSIDES AND DOCUSATE SODIUM 1 TABLET: 8.6; 5 TABLET ORAL at 08:10

## 2024-10-24 RX ADMIN — TRAZODONE HYDROCHLORIDE 50 MG: 50 TABLET ORAL at 08:10

## 2024-10-24 RX ADMIN — Medication 9 MG: at 08:10

## 2024-10-24 RX ADMIN — GABAPENTIN 100 MG: 100 CAPSULE ORAL at 08:10

## 2024-10-24 RX ADMIN — ATORVASTATIN CALCIUM 20 MG: 20 TABLET, FILM COATED ORAL at 08:10

## 2024-10-24 RX ADMIN — IOHEXOL 75 ML: 350 INJECTION, SOLUTION INTRAVENOUS at 05:10

## 2024-10-24 RX ADMIN — SODIUM CHLORIDE, POTASSIUM CHLORIDE, SODIUM LACTATE AND CALCIUM CHLORIDE 500 ML: 600; 310; 30; 20 INJECTION, SOLUTION INTRAVENOUS at 06:10

## 2024-10-24 RX ADMIN — SODIUM CHLORIDE: 0.45 INJECTION, SOLUTION INTRAVENOUS at 06:10

## 2024-10-24 RX ADMIN — TAMSULOSIN HYDROCHLORIDE 0.4 MG: 0.4 CAPSULE ORAL at 08:10

## 2024-10-25 PROBLEM — N18.30 CKD (CHRONIC KIDNEY DISEASE) STAGE 3, GFR 30-59 ML/MIN: Status: ACTIVE | Noted: 2024-10-24

## 2024-10-25 LAB
ANION GAP SERPL CALC-SCNC: 6 MMOL/L (ref 8–16)
BASOPHILS # BLD AUTO: 0.01 K/UL (ref 0–0.2)
BASOPHILS NFR BLD: 0.2 % (ref 0–1.9)
BILIRUB UR QL STRIP: NEGATIVE
BUN SERPL-MCNC: 29 MG/DL (ref 8–23)
CALCIUM SERPL-MCNC: 8.3 MG/DL (ref 8.7–10.5)
CHLORIDE SERPL-SCNC: 113 MMOL/L (ref 95–110)
CHOLEST SERPL-MCNC: 140 MG/DL (ref 120–199)
CHOLEST/HDLC SERPL: 4.4 {RATIO} (ref 2–5)
CLARITY UR: CLEAR
CO2 SERPL-SCNC: 21 MMOL/L (ref 23–29)
COLOR UR: YELLOW
CREAT SERPL-MCNC: 1.6 MG/DL (ref 0.5–1.4)
DIFFERENTIAL METHOD BLD: ABNORMAL
EOSINOPHIL # BLD AUTO: 0.2 K/UL (ref 0–0.5)
EOSINOPHIL NFR BLD: 3.7 % (ref 0–8)
ERYTHROCYTE [DISTWIDTH] IN BLOOD BY AUTOMATED COUNT: 15 % (ref 11.5–14.5)
EST. GFR  (NO RACE VARIABLE): 44.1 ML/MIN/1.73 M^2
ESTIMATED AVG GLUCOSE: 114 MG/DL (ref 68–131)
GLUCOSE SERPL-MCNC: 102 MG/DL (ref 70–110)
GLUCOSE UR QL STRIP: NEGATIVE
HBA1C MFR BLD: 5.6 % (ref 4.5–6.2)
HCT VFR BLD AUTO: 37.8 % (ref 40–54)
HDLC SERPL-MCNC: 32 MG/DL (ref 40–75)
HDLC SERPL: 22.9 % (ref 20–50)
HGB BLD-MCNC: 11.8 G/DL (ref 14–18)
HGB UR QL STRIP: NEGATIVE
IMM GRANULOCYTES # BLD AUTO: 0.04 K/UL (ref 0–0.04)
IMM GRANULOCYTES NFR BLD AUTO: 0.6 % (ref 0–0.5)
KETONES UR QL STRIP: NEGATIVE
LDLC SERPL CALC-MCNC: 90.2 MG/DL (ref 63–159)
LEUKOCYTE ESTERASE UR QL STRIP: ABNORMAL
LYMPHOCYTES # BLD AUTO: 0.9 K/UL (ref 1–4.8)
LYMPHOCYTES NFR BLD: 13 % (ref 18–48)
MAGNESIUM SERPL-MCNC: 2.1 MG/DL (ref 1.6–2.6)
MCH RBC QN AUTO: 28.1 PG (ref 27–31)
MCHC RBC AUTO-ENTMCNC: 31.2 G/DL (ref 32–36)
MCV RBC AUTO: 90 FL (ref 82–98)
MICROSCOPIC COMMENT: ABNORMAL
MONOCYTES # BLD AUTO: 0.9 K/UL (ref 0.3–1)
MONOCYTES NFR BLD: 14.2 % (ref 4–15)
NEUTROPHILS # BLD AUTO: 4.5 K/UL (ref 1.8–7.7)
NEUTROPHILS NFR BLD: 68.3 % (ref 38–73)
NITRITE UR QL STRIP: NEGATIVE
NONHDLC SERPL-MCNC: 108 MG/DL
NRBC BLD-RTO: 0 /100 WBC
PH UR STRIP: 6 [PH] (ref 5–8)
PHOSPHATE SERPL-MCNC: 2.6 MG/DL (ref 2.7–4.5)
PLATELET # BLD AUTO: 311 K/UL (ref 150–450)
PMV BLD AUTO: 11.5 FL (ref 9.2–12.9)
POTASSIUM SERPL-SCNC: 4.9 MMOL/L (ref 3.5–5.1)
PROT UR QL STRIP: NEGATIVE
RBC # BLD AUTO: 4.2 M/UL (ref 4.6–6.2)
RBC #/AREA URNS HPF: 2 /HPF (ref 0–4)
SODIUM SERPL-SCNC: 140 MMOL/L (ref 136–145)
SP GR UR STRIP: 1.02 (ref 1–1.03)
SQUAMOUS #/AREA URNS HPF: 1 /HPF
TRIGL SERPL-MCNC: 89 MG/DL (ref 30–150)
TROPONIN I SERPL HS-MCNC: 29 PG/ML (ref 0–14.9)
TROPONIN I SERPL HS-MCNC: 33.5 PG/ML (ref 0–14.9)
URN SPEC COLLECT METH UR: ABNORMAL
UROBILINOGEN UR STRIP-ACNC: NEGATIVE EU/DL
WBC # BLD AUTO: 6.56 K/UL (ref 3.9–12.7)
WBC #/AREA URNS HPF: 4 /HPF (ref 0–5)
YEAST URNS QL MICRO: ABNORMAL

## 2024-10-25 PROCEDURE — 99900031 HC PATIENT EDUCATION (STAT)

## 2024-10-25 PROCEDURE — 25000003 PHARM REV CODE 250: Performed by: HOSPITALIST

## 2024-10-25 PROCEDURE — 84484 ASSAY OF TROPONIN QUANT: CPT | Performed by: NURSE PRACTITIONER

## 2024-10-25 PROCEDURE — 25000003 PHARM REV CODE 250: Performed by: NURSE PRACTITIONER

## 2024-10-25 PROCEDURE — 80061 LIPID PANEL: CPT | Performed by: HOSPITALIST

## 2024-10-25 PROCEDURE — 81001 URINALYSIS AUTO W/SCOPE: CPT | Performed by: HOSPITALIST

## 2024-10-25 PROCEDURE — 36415 COLL VENOUS BLD VENIPUNCTURE: CPT | Performed by: NURSE PRACTITIONER

## 2024-10-25 PROCEDURE — 80048 BASIC METABOLIC PNL TOTAL CA: CPT | Performed by: HOSPITALIST

## 2024-10-25 PROCEDURE — 12000002 HC ACUTE/MED SURGE SEMI-PRIVATE ROOM

## 2024-10-25 PROCEDURE — 84484 ASSAY OF TROPONIN QUANT: CPT | Mod: 91 | Performed by: HOSPITALIST

## 2024-10-25 PROCEDURE — 93005 ELECTROCARDIOGRAM TRACING: CPT | Performed by: INTERNAL MEDICINE

## 2024-10-25 PROCEDURE — 99223 1ST HOSP IP/OBS HIGH 75: CPT | Mod: ,,, | Performed by: STUDENT IN AN ORGANIZED HEALTH CARE EDUCATION/TRAINING PROGRAM

## 2024-10-25 PROCEDURE — 83735 ASSAY OF MAGNESIUM: CPT | Performed by: HOSPITALIST

## 2024-10-25 PROCEDURE — 84100 ASSAY OF PHOSPHORUS: CPT | Performed by: HOSPITALIST

## 2024-10-25 PROCEDURE — 85025 COMPLETE CBC W/AUTO DIFF WBC: CPT | Performed by: HOSPITALIST

## 2024-10-25 PROCEDURE — 83036 HEMOGLOBIN GLYCOSYLATED A1C: CPT | Performed by: HOSPITALIST

## 2024-10-25 PROCEDURE — 94761 N-INVAS EAR/PLS OXIMETRY MLT: CPT

## 2024-10-25 PROCEDURE — 93010 ELECTROCARDIOGRAM REPORT: CPT | Mod: ,,, | Performed by: INTERNAL MEDICINE

## 2024-10-25 RX ORDER — ENOXAPARIN SODIUM 100 MG/ML
1 INJECTION SUBCUTANEOUS EVERY 12 HOURS
Status: DISCONTINUED | OUTPATIENT
Start: 2024-10-25 | End: 2024-10-26 | Stop reason: HOSPADM

## 2024-10-25 RX ORDER — ISOSORBIDE MONONITRATE 30 MG/1
30 TABLET, EXTENDED RELEASE ORAL DAILY
Status: DISCONTINUED | OUTPATIENT
Start: 2024-10-25 | End: 2024-10-26 | Stop reason: HOSPADM

## 2024-10-25 RX ADMIN — METOPROLOL TARTRATE 25 MG: 25 TABLET, FILM COATED ORAL at 09:10

## 2024-10-25 RX ADMIN — ISOSORBIDE MONONITRATE 30 MG: 30 TABLET, EXTENDED RELEASE ORAL at 02:10

## 2024-10-25 RX ADMIN — PANTOPRAZOLE SODIUM 40 MG: 40 TABLET, DELAYED RELEASE ORAL at 06:10

## 2024-10-25 RX ADMIN — SENNOSIDES AND DOCUSATE SODIUM 1 TABLET: 8.6; 5 TABLET ORAL at 09:10

## 2024-10-25 RX ADMIN — ASPIRIN 81 MG 81 MG: 81 TABLET ORAL at 09:10

## 2024-10-25 RX ADMIN — GABAPENTIN 100 MG: 100 CAPSULE ORAL at 09:10

## 2024-10-25 RX ADMIN — HYDRALAZINE HYDROCHLORIDE 50 MG: 25 TABLET ORAL at 06:10

## 2024-10-25 RX ADMIN — SODIUM CHLORIDE: 0.45 INJECTION, SOLUTION INTRAVENOUS at 06:10

## 2024-10-25 RX ADMIN — HYDRALAZINE HYDROCHLORIDE 50 MG: 25 TABLET ORAL at 02:10

## 2024-10-25 RX ADMIN — TAMSULOSIN HYDROCHLORIDE 0.4 MG: 0.4 CAPSULE ORAL at 09:10

## 2024-10-25 RX ADMIN — AMLODIPINE BESYLATE 10 MG: 5 TABLET ORAL at 09:10

## 2024-10-25 RX ADMIN — OXYBUTYNIN CHLORIDE 5 MG: 5 TABLET, EXTENDED RELEASE ORAL at 09:10

## 2024-10-25 RX ADMIN — TRAZODONE HYDROCHLORIDE 50 MG: 50 TABLET ORAL at 09:10

## 2024-10-26 ENCOUNTER — CLINICAL SUPPORT (OUTPATIENT)
Dept: CARDIOLOGY | Facility: HOSPITAL | Age: 79
DRG: 313 | End: 2024-10-26
Attending: STUDENT IN AN ORGANIZED HEALTH CARE EDUCATION/TRAINING PROGRAM
Payer: MEDICARE

## 2024-10-26 VITALS
TEMPERATURE: 98 F | SYSTOLIC BLOOD PRESSURE: 177 MMHG | BODY MASS INDEX: 22.25 KG/M2 | HEART RATE: 84 BPM | OXYGEN SATURATION: 94 % | DIASTOLIC BLOOD PRESSURE: 67 MMHG | RESPIRATION RATE: 18 BRPM | WEIGHT: 146.81 LBS | HEIGHT: 68 IN

## 2024-10-26 PROBLEM — E87.20 METABOLIC ACIDOSIS: Status: RESOLVED | Noted: 2024-10-24 | Resolved: 2024-10-26

## 2024-10-26 PROBLEM — I16.0 HYPERTENSIVE URGENCY: Status: RESOLVED | Noted: 2024-10-24 | Resolved: 2024-10-26

## 2024-10-26 PROBLEM — I27.82 CHRONIC PULMONARY EMBOLISM: Status: ACTIVE | Noted: 2024-10-26

## 2024-10-26 PROBLEM — E87.5 HYPERKALEMIA: Status: RESOLVED | Noted: 2024-10-24 | Resolved: 2024-10-26

## 2024-10-26 LAB
BASOPHILS # BLD AUTO: 0.01 K/UL (ref 0–0.2)
BASOPHILS NFR BLD: 0.2 % (ref 0–1.9)
CV PHARM DOSE: 0.4 MG
CV STRESS BASE HR: 80 BPM
DIASTOLIC BLOOD PRESSURE: 72 MMHG
DIFFERENTIAL METHOD BLD: ABNORMAL
EOSINOPHIL # BLD AUTO: 0.2 K/UL (ref 0–0.5)
EOSINOPHIL NFR BLD: 2.3 % (ref 0–8)
ERYTHROCYTE [DISTWIDTH] IN BLOOD BY AUTOMATED COUNT: 14.9 % (ref 11.5–14.5)
HCT VFR BLD AUTO: 35.3 % (ref 40–54)
HGB BLD-MCNC: 10.8 G/DL (ref 14–18)
IMM GRANULOCYTES # BLD AUTO: 0.04 K/UL (ref 0–0.04)
IMM GRANULOCYTES NFR BLD AUTO: 0.6 % (ref 0–0.5)
LYMPHOCYTES # BLD AUTO: 0.9 K/UL (ref 1–4.8)
LYMPHOCYTES NFR BLD: 13.6 % (ref 18–48)
MCH RBC QN AUTO: 26.9 PG (ref 27–31)
MCHC RBC AUTO-ENTMCNC: 30.6 G/DL (ref 32–36)
MCV RBC AUTO: 88 FL (ref 82–98)
MONOCYTES # BLD AUTO: 0.8 K/UL (ref 0.3–1)
MONOCYTES NFR BLD: 12.7 % (ref 4–15)
NEUTROPHILS # BLD AUTO: 4.5 K/UL (ref 1.8–7.7)
NEUTROPHILS NFR BLD: 70.6 % (ref 38–73)
NRBC BLD-RTO: 0 /100 WBC
OHS CV CPX 1 MINUTE RECOVERY HEART RATE: 84 BPM
OHS CV CPX 85 PERCENT MAX PREDICTED HEART RATE MALE: 122
OHS CV CPX MAX PREDICTED HEART RATE: 143
OHS CV CPX PATIENT IS FEMALE: 0
OHS CV CPX PATIENT IS MALE: 1
OHS CV CPX PEAK DIASTOLIC BLOOD PRESSURE: 75 MMHG
OHS CV CPX PEAK HEAR RATE: 88 BPM
OHS CV CPX PEAK RATE PRESSURE PRODUCT: NORMAL
OHS CV CPX PEAK SYSTOLIC BLOOD PRESSURE: 161 MMHG
OHS CV CPX PERCENT MAX PREDICTED HEART RATE ACHIEVED: 62
OHS CV CPX RATE PRESSURE PRODUCT PRESENTING: NORMAL
PLATELET # BLD AUTO: 294 K/UL (ref 150–450)
PMV BLD AUTO: 11.4 FL (ref 9.2–12.9)
RBC # BLD AUTO: 4.01 M/UL (ref 4.6–6.2)
SYSTOLIC BLOOD PRESSURE: 156 MMHG
WBC # BLD AUTO: 6.39 K/UL (ref 3.9–12.7)

## 2024-10-26 PROCEDURE — 25000003 PHARM REV CODE 250: Performed by: NURSE PRACTITIONER

## 2024-10-26 PROCEDURE — 93017 CV STRESS TEST TRACING ONLY: CPT

## 2024-10-26 PROCEDURE — 93016 CV STRESS TEST SUPVJ ONLY: CPT | Mod: ,,, | Performed by: STUDENT IN AN ORGANIZED HEALTH CARE EDUCATION/TRAINING PROGRAM

## 2024-10-26 PROCEDURE — 85025 COMPLETE CBC W/AUTO DIFF WBC: CPT | Performed by: HOSPITALIST

## 2024-10-26 PROCEDURE — 36415 COLL VENOUS BLD VENIPUNCTURE: CPT | Performed by: HOSPITALIST

## 2024-10-26 PROCEDURE — 93018 CV STRESS TEST I&R ONLY: CPT | Mod: ,,, | Performed by: STUDENT IN AN ORGANIZED HEALTH CARE EDUCATION/TRAINING PROGRAM

## 2024-10-26 PROCEDURE — 63600175 PHARM REV CODE 636 W HCPCS: Performed by: HOSPITALIST

## 2024-10-26 PROCEDURE — 99499 UNLISTED E&M SERVICE: CPT | Mod: ,,, | Performed by: STUDENT IN AN ORGANIZED HEALTH CARE EDUCATION/TRAINING PROGRAM

## 2024-10-26 PROCEDURE — 25000003 PHARM REV CODE 250: Performed by: HOSPITALIST

## 2024-10-26 RX ORDER — REGADENOSON 0.08 MG/ML
0.4 INJECTION, SOLUTION INTRAVENOUS
Status: COMPLETED | OUTPATIENT
Start: 2024-10-26 | End: 2024-10-26

## 2024-10-26 RX ORDER — HYDRALAZINE HYDROCHLORIDE 25 MG/1
75 TABLET, FILM COATED ORAL EVERY 8 HOURS
Status: DISCONTINUED | OUTPATIENT
Start: 2024-10-26 | End: 2024-10-26 | Stop reason: HOSPADM

## 2024-10-26 RX ORDER — ISOSORBIDE MONONITRATE 30 MG/1
30 TABLET, EXTENDED RELEASE ORAL DAILY
Qty: 90 TABLET | Refills: 0 | Status: SHIPPED | OUTPATIENT
Start: 2024-10-27 | End: 2025-01-25

## 2024-10-26 RX ORDER — HYDRALAZINE HYDROCHLORIDE 25 MG/1
75 TABLET, FILM COATED ORAL EVERY 8 HOURS
Qty: 810 TABLET | Refills: 0 | Status: SHIPPED | OUTPATIENT
Start: 2024-10-26 | End: 2025-01-24

## 2024-10-26 RX ORDER — METOPROLOL TARTRATE 25 MG/1
25 TABLET, FILM COATED ORAL 2 TIMES DAILY
Qty: 180 TABLET | Refills: 0 | Status: SHIPPED | OUTPATIENT
Start: 2024-10-26 | End: 2025-01-24

## 2024-10-26 RX ADMIN — HYDRALAZINE HYDROCHLORIDE 75 MG: 25 TABLET ORAL at 02:10

## 2024-10-26 RX ADMIN — REGADENOSON 0.4 MG: 0.08 INJECTION, SOLUTION INTRAVENOUS at 09:10

## 2024-10-26 RX ADMIN — ASPIRIN 81 MG 81 MG: 81 TABLET ORAL at 10:10

## 2024-10-26 RX ADMIN — SENNOSIDES AND DOCUSATE SODIUM 1 TABLET: 8.6; 5 TABLET ORAL at 10:10

## 2024-10-26 RX ADMIN — OXYBUTYNIN CHLORIDE 5 MG: 5 TABLET, EXTENDED RELEASE ORAL at 10:10

## 2024-10-26 RX ADMIN — AMLODIPINE BESYLATE 10 MG: 5 TABLET ORAL at 10:10

## 2024-10-26 RX ADMIN — ISOSORBIDE MONONITRATE 30 MG: 30 TABLET, EXTENDED RELEASE ORAL at 10:10

## 2024-10-26 RX ADMIN — METOPROLOL TARTRATE 25 MG: 25 TABLET, FILM COATED ORAL at 10:10

## 2024-10-29 ENCOUNTER — PATIENT OUTREACH (OUTPATIENT)
Dept: ADMINISTRATIVE | Facility: CLINIC | Age: 79
End: 2024-10-29
Payer: MEDICARE

## 2024-10-30 LAB
OHS QRS DURATION: 88 MS
OHS QTC CALCULATION: 500 MS

## 2024-11-02 LAB
OHS QRS DURATION: 84 MS
OHS QTC CALCULATION: 503 MS

## 2024-11-04 ENCOUNTER — OFFICE VISIT (OUTPATIENT)
Dept: FAMILY MEDICINE | Facility: CLINIC | Age: 79
End: 2024-11-04
Payer: MEDICARE

## 2024-11-04 ENCOUNTER — LAB VISIT (OUTPATIENT)
Dept: LAB | Facility: HOSPITAL | Age: 79
End: 2024-11-04
Attending: PHYSICIAN ASSISTANT
Payer: MEDICARE

## 2024-11-04 VITALS
HEIGHT: 68 IN | OXYGEN SATURATION: 95 % | BODY MASS INDEX: 22.52 KG/M2 | DIASTOLIC BLOOD PRESSURE: 70 MMHG | SYSTOLIC BLOOD PRESSURE: 136 MMHG | HEART RATE: 90 BPM | WEIGHT: 148.56 LBS | RESPIRATION RATE: 18 BRPM | TEMPERATURE: 98 F

## 2024-11-04 DIAGNOSIS — D64.9 ANEMIA, UNSPECIFIED TYPE: ICD-10-CM

## 2024-11-04 DIAGNOSIS — E83.39 HYPOPHOSPHATEMIA: ICD-10-CM

## 2024-11-04 DIAGNOSIS — D53.9 NUTRITIONAL ANEMIA, UNSPECIFIED: ICD-10-CM

## 2024-11-04 DIAGNOSIS — M05.79 RHEUMATOID ARTHRITIS INVOLVING MULTIPLE SITES WITH POSITIVE RHEUMATOID FACTOR: ICD-10-CM

## 2024-11-04 DIAGNOSIS — Z09 HOSPITAL DISCHARGE FOLLOW-UP: Primary | ICD-10-CM

## 2024-11-04 DIAGNOSIS — R07.9 CHEST PAIN, UNSPECIFIED TYPE: ICD-10-CM

## 2024-11-04 DIAGNOSIS — G47.00 INSOMNIA, UNSPECIFIED TYPE: ICD-10-CM

## 2024-11-04 DIAGNOSIS — R35.1 NOCTURIA: ICD-10-CM

## 2024-11-04 LAB
ALBUMIN SERPL BCP-MCNC: 3.3 G/DL (ref 3.5–5.2)
ALP SERPL-CCNC: 67 U/L (ref 40–150)
ALT SERPL W/O P-5'-P-CCNC: 8 U/L (ref 10–44)
ANION GAP SERPL CALC-SCNC: 12 MMOL/L (ref 8–16)
AST SERPL-CCNC: 15 U/L (ref 10–40)
BASOPHILS # BLD AUTO: 0.03 K/UL (ref 0–0.2)
BASOPHILS NFR BLD: 0.5 % (ref 0–1.9)
BILIRUB SERPL-MCNC: 0.4 MG/DL (ref 0.1–1)
BUN SERPL-MCNC: 18 MG/DL (ref 8–23)
CALCIUM SERPL-MCNC: 9.6 MG/DL (ref 8.7–10.5)
CHLORIDE SERPL-SCNC: 112 MMOL/L (ref 95–110)
CO2 SERPL-SCNC: 17 MMOL/L (ref 23–29)
CREAT SERPL-MCNC: 1.5 MG/DL (ref 0.5–1.4)
DIFFERENTIAL METHOD BLD: ABNORMAL
EOSINOPHIL # BLD AUTO: 0.1 K/UL (ref 0–0.5)
EOSINOPHIL NFR BLD: 2.3 % (ref 0–8)
ERYTHROCYTE [DISTWIDTH] IN BLOOD BY AUTOMATED COUNT: 15.1 % (ref 11.5–14.5)
EST. GFR  (NO RACE VARIABLE): 47.7 ML/MIN/1.73 M^2
FERRITIN SERPL-MCNC: 245 NG/ML (ref 20–300)
FOLATE SERPL-MCNC: 13.7 NG/ML (ref 4–24)
GLUCOSE SERPL-MCNC: 104 MG/DL (ref 70–110)
HCT VFR BLD AUTO: 45.6 % (ref 40–54)
HGB BLD-MCNC: 13.8 G/DL (ref 14–18)
IMM GRANULOCYTES # BLD AUTO: 0.04 K/UL (ref 0–0.04)
IMM GRANULOCYTES NFR BLD AUTO: 0.7 % (ref 0–0.5)
IRON SERPL-MCNC: 45 UG/DL (ref 45–160)
LYMPHOCYTES # BLD AUTO: 1.1 K/UL (ref 1–4.8)
LYMPHOCYTES NFR BLD: 18.4 % (ref 18–48)
MCH RBC QN AUTO: 27.9 PG (ref 27–31)
MCHC RBC AUTO-ENTMCNC: 30.3 G/DL (ref 32–36)
MCV RBC AUTO: 92 FL (ref 82–98)
MONOCYTES # BLD AUTO: 0.7 K/UL (ref 0.3–1)
MONOCYTES NFR BLD: 12.4 % (ref 4–15)
NEUTROPHILS # BLD AUTO: 3.9 K/UL (ref 1.8–7.7)
NEUTROPHILS NFR BLD: 65.7 % (ref 38–73)
NRBC BLD-RTO: 0 /100 WBC
PHOSPHATE SERPL-MCNC: 3.4 MG/DL (ref 2.7–4.5)
PLATELET # BLD AUTO: 455 K/UL (ref 150–450)
PMV BLD AUTO: 11.4 FL (ref 9.2–12.9)
POTASSIUM SERPL-SCNC: 4.6 MMOL/L (ref 3.5–5.1)
PROT SERPL-MCNC: 8 G/DL (ref 6–8.4)
RBC # BLD AUTO: 4.94 M/UL (ref 4.6–6.2)
SATURATED IRON: 17 % (ref 20–50)
SODIUM SERPL-SCNC: 141 MMOL/L (ref 136–145)
TOTAL IRON BINDING CAPACITY: 268 UG/DL (ref 250–450)
TRANSFERRIN SERPL-MCNC: 181 MG/DL (ref 200–375)
VIT B12 SERPL-MCNC: 698 PG/ML (ref 210–950)
WBC # BLD AUTO: 5.98 K/UL (ref 3.9–12.7)

## 2024-11-04 PROCEDURE — 36415 COLL VENOUS BLD VENIPUNCTURE: CPT | Mod: PO | Performed by: PHYSICIAN ASSISTANT

## 2024-11-04 PROCEDURE — 80053 COMPREHEN METABOLIC PANEL: CPT | Performed by: PHYSICIAN ASSISTANT

## 2024-11-04 PROCEDURE — 3288F FALL RISK ASSESSMENT DOCD: CPT | Mod: CPTII,S$GLB,, | Performed by: PHYSICIAN ASSISTANT

## 2024-11-04 PROCEDURE — 1160F RVW MEDS BY RX/DR IN RCRD: CPT | Mod: CPTII,S$GLB,, | Performed by: PHYSICIAN ASSISTANT

## 2024-11-04 PROCEDURE — 1111F DSCHRG MED/CURRENT MED MERGE: CPT | Mod: CPTII,S$GLB,, | Performed by: PHYSICIAN ASSISTANT

## 2024-11-04 PROCEDURE — 85025 COMPLETE CBC W/AUTO DIFF WBC: CPT | Performed by: PHYSICIAN ASSISTANT

## 2024-11-04 PROCEDURE — 82607 VITAMIN B-12: CPT | Performed by: PHYSICIAN ASSISTANT

## 2024-11-04 PROCEDURE — 84100 ASSAY OF PHOSPHORUS: CPT | Performed by: PHYSICIAN ASSISTANT

## 2024-11-04 PROCEDURE — 1159F MED LIST DOCD IN RCRD: CPT | Mod: CPTII,S$GLB,, | Performed by: PHYSICIAN ASSISTANT

## 2024-11-04 PROCEDURE — 82746 ASSAY OF FOLIC ACID SERUM: CPT | Performed by: PHYSICIAN ASSISTANT

## 2024-11-04 PROCEDURE — 3078F DIAST BP <80 MM HG: CPT | Mod: CPTII,S$GLB,, | Performed by: PHYSICIAN ASSISTANT

## 2024-11-04 PROCEDURE — 1125F AMNT PAIN NOTED PAIN PRSNT: CPT | Mod: CPTII,S$GLB,, | Performed by: PHYSICIAN ASSISTANT

## 2024-11-04 PROCEDURE — 99999 PR PBB SHADOW E&M-EST. PATIENT-LVL V: CPT | Mod: PBBFAC,,, | Performed by: PHYSICIAN ASSISTANT

## 2024-11-04 PROCEDURE — 1101F PT FALLS ASSESS-DOCD LE1/YR: CPT | Mod: CPTII,S$GLB,, | Performed by: PHYSICIAN ASSISTANT

## 2024-11-04 PROCEDURE — 82728 ASSAY OF FERRITIN: CPT | Performed by: PHYSICIAN ASSISTANT

## 2024-11-04 PROCEDURE — 83540 ASSAY OF IRON: CPT | Performed by: PHYSICIAN ASSISTANT

## 2024-11-04 PROCEDURE — 99214 OFFICE O/P EST MOD 30 MIN: CPT | Mod: S$GLB,,, | Performed by: PHYSICIAN ASSISTANT

## 2024-11-04 PROCEDURE — 3075F SYST BP GE 130 - 139MM HG: CPT | Mod: CPTII,S$GLB,, | Performed by: PHYSICIAN ASSISTANT

## 2024-11-04 RX ORDER — QUETIAPINE FUMARATE 200 MG/1
200 TABLET, FILM COATED ORAL DAILY
COMMUNITY

## 2024-11-04 RX ORDER — PREDNISONE 2.5 MG/1
2.5 TABLET ORAL 2 TIMES DAILY
Qty: 60 TABLET | Refills: 1 | Status: SHIPPED | OUTPATIENT
Start: 2024-11-04

## 2024-11-04 NOTE — PROGRESS NOTES
Subjective:       Patient ID: Rajendra Castle is a 79 y.o. male.    Chief Complaint: Follow-up (2 week f/u )    Mr. Castle is a 79 year old male with RA, nocturia and chronic insomnia. The patient was scheduled for appt today to follow up on insomnia. He reports that the trazodone is not working so he resumed seroquel. He also admits to chronic joint pain for which he was previously prescribed prednisone. The patient reports this worked very well for him. He was previously managed by rheumatology but has been lost to follow up due to dr kingsley. The patient has also not followed up with urology. The patient was recently hospitalized due to chest pain. He had cardiac work up and no acute findings were identified. He does not currently have scheduled follow up with cardiology.    See discharge summary below      formerly [facility] Medicine  Discharge Summary        Patient Name: Rajendra Castle  MRN: [id number]  CHARLES: [id number]  Patient Class: IP- Inpatient  Admission Date: 10/24/2024  Hospital Length of Stay: 2 days  Discharge Date and Time: 10/26/2024  4:38 PM  Attending Physician: No att. providers found   Discharging Provider: Lobito Andrea MD  Primary Care Provider: Sp Lilly MD     Primary Care Team: Networked reference to record PCT      HPI:   This is a 79-year-old  male with a history of CKD stage 3 with baseline creatinine around 1.5, hypertension, HTN, anxiety, depression, GERD, prediabetes,  PUD, and history of prostate and urethral cancers presenting here for sudden onset substernal chest tightness started around 12:00 a.m. last night, it has been progressively worsening until 3:00 a.m. that prompted him come to the ER at [facility], described as substernal chest tightness, no radiation, associated with shortness of breaths, no nausea vomiting.  No diaphoresis.  Patient also has progressive exertional fatigue for a while.  No fever or chills.  In  the ER, patient was afebrile, hemodynamically stable, EKG shows normal sinus rhythm, LVH with repolarization abnormalities, first-degree AV block; lab work significant for D-dimer 1.23, CTA of the chest negative for acute pulmonary embolism but showed emphysema changes.  BMP also showed bicarb 14, improved to 20 with 1 L fluid bolus.  Chest x-ray clear.  Troponin minimally elevated.  ER doctor spoke with on-call cardiologist Dr. Frederick, the patient will be transferred to [facility] for further management.  Patient was never has a cardiac catheterization.   Mother has CABG at age of 50s.      Most recent stress test 8/2023 :  1. No evidence of pharmacologically induced reversible ischemia or infarct.  2. Normal left ventricular wall motion.  3. Calculated ejection fraction of 78%.        * No surgery found *       Hospital Course:   He came in with chest pain.  Cardiology consulted.  Patient went for stress testing, which was negative for reversible ischemia.  Had no further episodes of chest pain.  Was discharged home.     Physical Exam  Vitals reviewed.   Constitutional:       General: He is not in acute distress.     Appearance: He is not diaphoretic.   HENT:      Mouth/Throat:      Mouth: Mucous membranes are moist.   Eyes:      General: No scleral icterus.        Right eye: No discharge.         Left eye: No discharge.   Neck:      Vascular: No JVD.   Cardiovascular:      Rate and Rhythm: Normal rate and regular rhythm.       Goals of Care Treatment Preferences:  Code Status: Full Code        SDOH Screening:  The patient was screened for utility difficulties, food insecurity, transport difficulties, housing insecurity, and interpersonal safety and there were no concerns identified this admission.     Consults:   Consults (From admission, onward)           Status Ordering Provider        Inpatient consult to Cardiology  Once       Provider:  Sp Phillips MD   Completed JEEVAN ONTIVEROS  Assessment & Plan notes have been filed under this hospital service since the last note was generated.  Service: Hospital Medicine     Final Active Diagnoses:    Diagnosis Date Noted POA  · PRINCIPAL PROBLEM:  Precordial pain [R07.2] 08/18/2016 Yes  · Chronic pulmonary embolism [I27.82] 10/26/2024 Yes  · CKD (chronic kidney disease) stage 3, GFR 30-59 ml/min [N18.30] 10/24/2024 Yes  · Essential hypertension [I10] 08/18/2016 Yes     Problems Resolved During this Admission:    Diagnosis Date Noted Date Resolved POA  · Hypertensive urgency [I16.0] 10/24/2024 10/26/2024 Yes  · Metabolic acidosis [E87.20] 10/24/2024 10/26/2024 Yes  · Hyperkalemia [E87.5] 10/24/2024 10/26/2024 Yes        Discharged Condition: good     Disposition: Home or Self Care     Follow Up:      Follow-up Information            Sp Lilly MD Follow up on 11/4/2024.   Specialty: Family Medicine  Why: with HENNY Harrison for follow up at 9am  Contact information:  [address]  [place] [id number] [phone]                        Edwin Frederick MD Follow up in 1 month(s).   Specialty: Cardiology  Contact information:  [address]  [address]  [place] [address]  [phone]                                  Patient Instructions:         Review of patient's allergies indicates:  No Known Allergies      Current Outpatient Medications:     amLODIPine (NORVASC) 10 MG tablet, Take 1 tablet (10 mg total) by mouth once daily., Disp: 90 tablet, Rfl: 3    apixaban (ELIQUIS) 5 mg Tab, Take 1 tablet (5 mg total) by mouth 2 (two) times daily., Disp: 60 tablet, Rfl: 0    atorvastatin (LIPITOR) 20 MG tablet, Take 1 tablet (20 mg total) by mouth once daily., Disp: 90 tablet, Rfl: 3    b complex vitamins capsule, Take 1 capsule by mouth once daily., Disp: , Rfl:     betamethasone valerate 0.1% (VALISONE) 0.1 % Crea, Apply topically 2 (two) times daily., Disp: 45 g, Rfl: 0    cyproheptadine (PERIACTIN) 4 mg tablet, Take 1 tablet (4 mg total) by  mouth 4 (four) times daily., Disp: 120 tablet, Rfl: 0    diclofenac (VOLTAREN) 50 MG EC tablet, Take 1 tablet (50 mg total) by mouth 2 (two) times daily as needed (pain)., Disp: 20 tablet, Rfl: 0    gabapentin (NEURONTIN) 100 MG capsule, Take 1 capsule (100 mg total) by mouth every evening., Disp: 30 capsule, Rfl: 0    hydrALAZINE (APRESOLINE) 25 MG tablet, Take 3 tablets (75 mg total) by mouth every 8 (eight) hours., Disp: 810 tablet, Rfl: 0    isosorbide mononitrate (IMDUR) 30 MG 24 hr tablet, Take 1 tablet (30 mg total) by mouth once daily., Disp: 90 tablet, Rfl: 0    LIDOcaine (LIDODERM) 5 %, Place 1 patch onto the skin once daily. Remove & Discard patch within 12 hours or as directed by MD, Disp: 30 patch, Rfl: 0    magnesium oxide (MAG-OX) 400 mg (241.3 mg magnesium) tablet, Take 1 tablet (400 mg total) by mouth once daily., Disp: 90 tablet, Rfl: 3    metoprolol tartrate (LOPRESSOR) 25 MG tablet, Take 1 tablet (25 mg total) by mouth 2 (two) times daily., Disp: 180 tablet, Rfl: 0    pantoprazole (PROTONIX) 40 MG tablet, Take 1 tablet (40 mg total) by mouth once daily., Disp: 90 tablet, Rfl: 0    phenazopyridine (PYRIDIUM) 200 MG tablet, Take 1 tablet (200 mg total) by mouth 3 (three) times daily as needed for Pain., Disp: 30 tablet, Rfl: 0    solifenacin (VESICARE) 10 MG tablet, Take 1 tablet (10 mg total) by mouth once daily., Disp: 30 tablet, Rfl: 0    tadalafiL (CIALIS) 5 MG tablet, Take 1 tablet (5 mg total) by mouth once daily., Disp: 30 tablet, Rfl: 11    tamsulosin (FLOMAX) 0.4 mg Cap, Take 1 capsule (0.4 mg total) by mouth nightly., Disp: 30 capsule, Rfl: 0    traZODone (DESYREL) 50 MG tablet, Take 1 tablet (50 mg total) by mouth every evening., Disp: 30 tablet, Rfl: 11  No current facility-administered medications for this visit.    Facility-Administered Medications Ordered in Other Visits:     albuterol nebulizer solution 2.5 mg, 2.5 mg, Nebulization, Q15 Min HECTORN, Pastor Saleh MD     albuterol-ipratropium 2.5 mg-0.5 mg/3 mL nebulizer solution 3 mL, 3 mL, Nebulization, PRN, Pastor Saleh MD    denosumab (PROLIA) injection 60 mg, 60 mg, Subcutaneous, 1 time in Clinic/HOD, Jean Carlos Hoffman MD    diphenhydrAMINE injection 25 mg, 25 mg, Intravenous, Q6H PRN, Pastor Saleh MD    HYDROmorphone injection 0.5 mg, 0.5 mg, Intravenous, Q5 Min PRN, Pastor Saleh MD, 0.5 mg at 02/23/23 1312    ondansetron injection 4 mg, 4 mg, Intravenous, Q15 Min PRN, Pastor Saleh MD    sodium chloride 0.9% flush 10 mL, 10 mL, Intravenous, PRN, Ayush Guzman MD, 10 mL at 06/01/23 1535    sodium chloride 0.9% flush 10 mL, 10 mL, Intravenous, PRN, Ayush Guzman MD, 10 mL at 06/05/23 1315    sodium chloride 0.9% flush 3 mL, 3 mL, Intravenous, PRN, Pastor Saleh MD    Lab Results   Component Value Date    WBC 6.39 10/26/2024    HGB 10.8 (L) 10/26/2024    HCT 35.3 (L) 10/26/2024     10/26/2024    CHOL 140 10/25/2024    TRIG 89 10/25/2024    HDL 32 (L) 10/25/2024    ALT 15 10/24/2024    AST 16 10/24/2024     10/25/2024    K 4.9 10/25/2024     (H) 10/25/2024    CREATININE 1.6 (H) 10/25/2024    BUN 29 (H) 10/25/2024    CO2 21 (L) 10/25/2024    TSH 0.662 04/12/2024    PSA 0.14 05/27/2024    INR 1.1 10/24/2024    HGBA1C 5.6 10/25/2024       Review of Systems   Constitutional:  Negative for activity change, appetite change and fever.   HENT:  Negative for postnasal drip, rhinorrhea and sinus pressure.    Eyes:  Negative for visual disturbance.   Respiratory:  Negative for cough and shortness of breath.    Cardiovascular:  Negative for chest pain.   Gastrointestinal:  Negative for abdominal distention and abdominal pain.   Genitourinary:  Positive for frequency. Negative for difficulty urinating and dysuria.        Nocturia   Musculoskeletal:  Positive for arthralgias and back pain. Negative for myalgias.   Neurological:  Negative for headaches.   Hematological:  Negative for adenopathy.    Psychiatric/Behavioral:  Positive for sleep disturbance. The patient is not nervous/anxious.        Objective:      Physical Exam  Constitutional:       Appearance: Normal appearance.   HENT:      Head: Normocephalic and atraumatic.   Eyes:      Conjunctiva/sclera: Conjunctivae normal.   Cardiovascular:      Rate and Rhythm: Normal rate and regular rhythm.   Pulmonary:      Effort: Pulmonary effort is normal. No respiratory distress.      Breath sounds: Normal breath sounds. No wheezing.   Abdominal:      General: There is no distension.      Palpations: There is no mass.      Tenderness: There is no abdominal tenderness.   Musculoskeletal:      Right lower leg: No edema.      Left lower leg: No edema.   Lymphadenopathy:      Cervical: No cervical adenopathy.   Skin:     Findings: No erythema.   Neurological:      Mental Status: He is alert and oriented to person, place, and time.   Psychiatric:         Behavior: Behavior normal.         Assessment:       1. Hospital discharge follow-up    2. Rheumatoid arthritis involving multiple sites with positive rheumatoid factor    3. Insomnia, unspecified type    4. Nocturia    5. Anemia, unspecified type    6. Hypophosphatemia    7. Nutritional anemia, unspecified    8. Chest pain, unspecified type        Plan:       Rajendra was seen today for follow-up.    Diagnoses and all orders for this visit:    Hospital discharge follow-up  -     Ambulatory referral/consult to Cardiology; Future    Rheumatoid arthritis involving multiple sites with positive rheumatoid factor  -     predniSONE (DELTASONE) 2.5 MG tablet; Take 1 tablet (2.5 mg total) by mouth 2 (two) times daily.    Insomnia, unspecified type  Patient has stopped trazodone and resumed seroquel  Nocturia  -     Ambulatory referral/consult to Urology; Future    Anemia, unspecified type  -     CBC Auto Differential; Future  -     Iron and TIBC; Future  -     Ferritin; Future  -     Vitamin B12; Future  -     Folate;  Future  Refuses colonoscopy  Hypophosphatemia  -     PHOSPHORUS; Future  -     Comprehensive Metabolic Panel; Future    Nutritional anemia, unspecified  -     Vitamin B12; Future  -     Folate; Future    Chest pain, unspecified type  Resolved  Follow up cardiology

## 2024-11-18 DIAGNOSIS — G47.00 INSOMNIA, UNSPECIFIED TYPE: ICD-10-CM

## 2024-11-18 DIAGNOSIS — M54.9 BACK PAIN, UNSPECIFIED BACK LOCATION, UNSPECIFIED BACK PAIN LATERALITY, UNSPECIFIED CHRONICITY: ICD-10-CM

## 2024-11-18 RX ORDER — GABAPENTIN 100 MG/1
100 CAPSULE ORAL
Qty: 30 CAPSULE | Refills: 0 | Status: SHIPPED | OUTPATIENT
Start: 2024-11-18

## 2024-11-24 DIAGNOSIS — R35.0 URINARY FREQUENCY: ICD-10-CM

## 2024-11-24 DIAGNOSIS — R35.1 NOCTURIA: ICD-10-CM

## 2024-11-25 ENCOUNTER — OFFICE VISIT (OUTPATIENT)
Dept: FAMILY MEDICINE | Facility: CLINIC | Age: 79
End: 2024-11-25
Payer: MEDICARE

## 2024-11-25 VITALS
OXYGEN SATURATION: 96 % | SYSTOLIC BLOOD PRESSURE: 164 MMHG | BODY MASS INDEX: 23.72 KG/M2 | WEIGHT: 156.5 LBS | TEMPERATURE: 99 F | DIASTOLIC BLOOD PRESSURE: 70 MMHG | HEIGHT: 68 IN | RESPIRATION RATE: 16 BRPM | HEART RATE: 64 BPM

## 2024-11-25 DIAGNOSIS — M05.79 RHEUMATOID ARTHRITIS INVOLVING MULTIPLE SITES WITH POSITIVE RHEUMATOID FACTOR: ICD-10-CM

## 2024-11-25 DIAGNOSIS — I10 ESSENTIAL HYPERTENSION: ICD-10-CM

## 2024-11-25 DIAGNOSIS — Z12.11 COLON CANCER SCREENING: ICD-10-CM

## 2024-11-25 DIAGNOSIS — F41.1 GAD (GENERALIZED ANXIETY DISORDER): ICD-10-CM

## 2024-11-25 DIAGNOSIS — G47.00 INSOMNIA, UNSPECIFIED TYPE: ICD-10-CM

## 2024-11-25 DIAGNOSIS — R35.1 NOCTURIA: ICD-10-CM

## 2024-11-25 DIAGNOSIS — Z00.00 HEALTHCARE MAINTENANCE: Primary | ICD-10-CM

## 2024-11-25 PROCEDURE — 1101F PT FALLS ASSESS-DOCD LE1/YR: CPT | Mod: CPTII,S$GLB,, | Performed by: STUDENT IN AN ORGANIZED HEALTH CARE EDUCATION/TRAINING PROGRAM

## 2024-11-25 PROCEDURE — G2211 COMPLEX E/M VISIT ADD ON: HCPCS | Mod: S$GLB,,, | Performed by: STUDENT IN AN ORGANIZED HEALTH CARE EDUCATION/TRAINING PROGRAM

## 2024-11-25 PROCEDURE — 1111F DSCHRG MED/CURRENT MED MERGE: CPT | Mod: CPTII,S$GLB,, | Performed by: STUDENT IN AN ORGANIZED HEALTH CARE EDUCATION/TRAINING PROGRAM

## 2024-11-25 PROCEDURE — 99999 PR PBB SHADOW E&M-EST. PATIENT-LVL V: CPT | Mod: PBBFAC,,, | Performed by: STUDENT IN AN ORGANIZED HEALTH CARE EDUCATION/TRAINING PROGRAM

## 2024-11-25 PROCEDURE — 1159F MED LIST DOCD IN RCRD: CPT | Mod: CPTII,S$GLB,, | Performed by: STUDENT IN AN ORGANIZED HEALTH CARE EDUCATION/TRAINING PROGRAM

## 2024-11-25 PROCEDURE — 99214 OFFICE O/P EST MOD 30 MIN: CPT | Mod: S$GLB,,, | Performed by: STUDENT IN AN ORGANIZED HEALTH CARE EDUCATION/TRAINING PROGRAM

## 2024-11-25 PROCEDURE — 3077F SYST BP >= 140 MM HG: CPT | Mod: CPTII,S$GLB,, | Performed by: STUDENT IN AN ORGANIZED HEALTH CARE EDUCATION/TRAINING PROGRAM

## 2024-11-25 PROCEDURE — 3288F FALL RISK ASSESSMENT DOCD: CPT | Mod: CPTII,S$GLB,, | Performed by: STUDENT IN AN ORGANIZED HEALTH CARE EDUCATION/TRAINING PROGRAM

## 2024-11-25 PROCEDURE — 3078F DIAST BP <80 MM HG: CPT | Mod: CPTII,S$GLB,, | Performed by: STUDENT IN AN ORGANIZED HEALTH CARE EDUCATION/TRAINING PROGRAM

## 2024-11-25 PROCEDURE — 1126F AMNT PAIN NOTED NONE PRSNT: CPT | Mod: CPTII,S$GLB,, | Performed by: STUDENT IN AN ORGANIZED HEALTH CARE EDUCATION/TRAINING PROGRAM

## 2024-11-25 RX ORDER — ESZOPICLONE 2 MG/1
2 TABLET, FILM COATED ORAL NIGHTLY
Qty: 5 TABLET | Refills: 0 | Status: SHIPPED | OUTPATIENT
Start: 2024-11-25 | End: 2024-11-30

## 2024-11-25 RX ORDER — BUSPIRONE HYDROCHLORIDE 10 MG/1
10 TABLET ORAL 3 TIMES DAILY
Qty: 270 TABLET | Refills: 3 | Status: SHIPPED | OUTPATIENT
Start: 2024-11-25 | End: 2025-11-25

## 2024-11-25 RX ORDER — TAMSULOSIN HYDROCHLORIDE 0.4 MG/1
CAPSULE ORAL
Qty: 90 CAPSULE | Refills: 1 | Status: SHIPPED | OUTPATIENT
Start: 2024-11-25

## 2024-11-25 NOTE — PROGRESS NOTES
[facility] Note    Subjective:    The HPI and pertinent ROS is included in the Diagnostic Impression Remarks section at the end of the note. Please see below for further details. Chief complaint is at end of note.     Rajendra is a pleasant intelligent patient who is here for evaluation.     Modified Medications    No medications on file       Data reviewed 274}  Previous medical records reviewed and summarized in plan section at end of note.      If you are due for any health screening(s) below please notify me so we can arrange them to be ordered and scheduled. Most healthy patients at your age complete them, but you are free to accept or refuse. If you can't do it, I'll definitely understand. If you can, I'd certainly appreciate it!     All of your core healthy metrics are met.      The following portions of the patient's history were reviewed and updated as appropriate: allergies, current medications, past family history, past medical history, past social history, past surgical history and problem list.    He  has a past medical history of Anticoagulant long-term use, Arthritis, Coronary artery disease, DDD (degenerative disc disease), cervical, Degenerative disc disease, Heart murmur, History of radiation therapy (2020), Hypertension, Nontoxic multinodular goiter (09/20/2016), Prostate CA, RA (rheumatoid arthritis), Sleep apnea, and Vitamin D insufficiency (09/30/2016).  He  has a past surgical history that includes Transrectal biopsy of prostate with ultrasound guidance (N/A, 12/18/2018); Cystoscopy (N/A, 12/18/2018); Esophagogastroduodenoscopy (N/A, 09/29/2020); Parathyroidectomy (Right, 10/27/2020); Colonoscopy (prior to 2016); Carpal tunnel release (Right, 05/28/2021); Release of ulnar nerve at cubital tunnel (Right, 05/28/2021); Transrectal ultrasound examination (N/A, 07/12/2022); Cystoscopy (N/A, 07/12/2022); Coronary angioplasty with stent (02/2022); Excision of lesion of penis (N/A,  "2/23/2023); and Insertion of tunneled central venous catheter (CVC) with subcutaneous port (Left, 5/18/2023).    He  reports that he quit smoking about 23 years ago. His smoking use included cigarettes. He started smoking about 33 years ago. He has a 2.5 pack-year smoking history. He has been exposed to tobacco smoke. He has never used smokeless tobacco. He reports that he does not currently use alcohol. He reports that he does not currently use drugs after having used the following drugs: Marijuana. Frequency: 8.00 times per week.  He family history includes Diabetes in his maternal uncle; Drug abuse in his sister; Heart disease in his mother; No Known Problems in his daughter, daughter, and son; Stroke in his father.    Review of patient's allergies indicates:  No Known Allergies    Tobacco Use: Medium Risk (11/25/2024)    Patient History     Smoking Tobacco Use: Former     Smokeless Tobacco Use: Never     Passive Exposure: Past     Physical Examination  Physical Exam       General appearance: alert, cooperative, no distress  Neck: no thyromegaly, no neck stiffness  Lungs: clear to auscultation, no wheezes, rales or rhonchi, symmetric air entry  Heart: normal rate, regular rhythm, normal S1, S2, no murmurs, rubs, clicks or gallops  Abdomen: soft, nontender, nondistended, no rigidity, rebound, or guarding.   Back: no point tenderness over spine  Extremities: peripheral pulses normal, no unilateral leg swelling or calf tenderness   Neurological:alert, oriented, normal speech, no new focal findings or movement disorder noted from baseline      BP Readings from Last 3 Encounters:   11/25/24 (!) 164/70   11/04/24 136/70   10/26/24 (!) 177/67     Wt Readings from Last 3 Encounters:   11/25/24 71 kg (156 lb 8.4 oz)   11/04/24 67.4 kg (148 lb 9.4 oz)   10/24/24 66.6 kg (146 lb 13.2 oz)     BP (!) 164/70 (BP Location: Right arm, Patient Position: Sitting)   Pulse 64   Temp 98.6 °F (37 °C) (Oral)   Resp 16   Ht 5' 8" " (1.727 m)   Wt 71 kg (156 lb 8.4 oz)   SpO2 96%   BMI 23.80 kg/m²    274}  Laboratory: I have reviewed old labs below:    274}    Lab Results   Component Value Date    WBC 5.98 11/04/2024    HGB 13.8 (L) 11/04/2024    HCT 45.6 11/04/2024    MCV 92 11/04/2024     (H) 11/04/2024     11/04/2024    K 4.6 11/04/2024     (H) 11/04/2024    CALCIUM 9.6 11/04/2024    PHOS 3.4 11/04/2024    CO2 17 (L) 11/04/2024     11/04/2024    BUN 18 11/04/2024    CREATININE 1.5 (H) 11/04/2024    EGFRNORACEVR 47.7 (A) 11/04/2024    ANIONGAP 12 11/04/2024    PROT 8.0 11/04/2024    ALBUMIN 3.3 (L) 11/04/2024    BILITOT 0.4 11/04/2024    ALKPHOS 67 11/04/2024    ALT 8 (L) 11/04/2024    AST 15 11/04/2024    INR 1.1 10/24/2024    CHOL 140 10/25/2024    TRIG 89 10/25/2024    HDL 32 (L) 10/25/2024    LDLCALC 90.2 10/25/2024    TSH 0.662 04/12/2024    PSA 0.14 05/27/2024    HGBA1C 5.6 10/25/2024      Lab reviewed by me: Particular labs of significance that I will monitor, workup, or treat to improve are mentioned below in diagnostic impression remarks.    Results         Imaging/EKG: I have reviewed the pertinent results and my findings are noted in remarks.  274}    CC:   Chief Complaint   Patient presents with    Insomnia    Follow-up        274}    History of Present Illness  The patient is a 79-year-old male here for a follow-up visit.    He reports feeling well overall but continues to struggle with sleep. His routine involves taking two tablets of quetiapine before bed, which initially allows him to sleep for about 2.5 hours. However, he often wakes up to urinate and finds it difficult to return to sleep. He has been supplementing his prescribed dose of quetiapine with an additional tablet, which provides him with another 1.5 hours of sleep. He has previously tried Ambien, which was effective, but he was advised against combining it with quetiapine. He has also taken buspirone three times a day, which he found  relaxing, but he is currently out of this medication.    He has been taking medication for his prostate but believes it has exacerbated his symptoms. He has tried various medications, including Flomax, Cialis, oxybutynin, Vesicare, solifenacin, and mirabegron, but none have provided relief. He has considered the UroLift procedure, but it was not recommended. He is not interested in surgical removal of the prostate.    His blood pressure was elevated this morning, but he did not take his medication. He reports that his blood pressure is usually well-controlled when he adheres to his medication regimen.       Assessment/Plan  Rajendra Castle is a 79 y.o. male who presents to clinic with:  1. Healthcare maintenance    2. Insomnia, unspecified type    3. Essential hypertension    4. Rheumatoid arthritis involving multiple sites with positive rheumatoid factor    5. Nocturia    6. DEVANTE (generalized anxiety disorder)       274}    Assessment & Plan  1. Insomnia.  He has difficulty sleeping and has been taking Seroquel (quetiapine) 200 mg at bedtime. He sometimes takes an additional tablet when he wakes up during the night, which helps him sleep for another 2 hours. A trial of Lunesta (eszopiclone) 2 mg will be given instead of Seroquel to see if it improves his sleep. He is advised not to take both medications together due to the risk of over-sedation and side effects. If Lunesta does not work, he can return to using Seroquel. The risks and benefits of this change were discussed.    2. Benign Prostatic Hyperplasia (BPH).  He experiences frequent urination at night, which disrupts his sleep. He has tried multiple medications, including Flomax, Cialis, oxybutynin, Vesicare (solifenacin), and Myrbetriq (mirabegron), without improvement. He currently finds a supplement more effective than prescribed medications. He does not wish to undergo prostate surgery or UroLift. Follow-up with urology is recommended.    3. Generalized  Anxiety Disorder (DEVANTE).  He reports improvement in his symptoms with BuSpar (buspirone). A prescription for buspirone 10 mg three times a day will be sent to his pharmacy.    4. Hypertension.  His blood pressure was elevated today, but he did not take his blood pressure medications. He is advised to continue taking his blood pressure medications and monitor his blood pressure at home.       HLD stable continue current meds monitor blood work rec mediterranean diet       This note was generated with the assistance of ambient listening technology. Verbal consent was obtained by the patient and accompanying visitor(s) for the recording of patient appointment to facilitate this note. I attest to having reviewed and edited the generated note for accuracy, though some syntax or spelling errors may persist. Please contact the author of this note for any clarification.      1. Healthcare maintenance    2. Insomnia, unspecified type  - eszopiclone (LUNESTA) 2 MG Tab; Take 1 tablet (2 mg total) by mouth every evening. for 5 days  Dispense: 5 tablet; Refill: 0    3. Essential hypertension    4. Rheumatoid arthritis involving multiple sites with positive rheumatoid factor    5. Nocturia    6. DEVANTE (generalized anxiety disorder)  - busPIRone (BUSPAR) 10 MG tablet; Take 1 tablet (10 mg total) by mouth 3 (three) times daily.  Dispense: 270 tablet; Refill: 3      Sp Lilly MD   274}    If you are due for any health screening(s) below please notify me so we can arrange them to be ordered and scheduled. Most healthy patients at your age complete them, but you are free to accept or refuse.     If you can't do it, I'll definitely understand. If you can, I'd certainly appreciate it!   All of your core healthy metrics are met.

## 2024-12-16 DIAGNOSIS — M54.9 BACK PAIN, UNSPECIFIED BACK LOCATION, UNSPECIFIED BACK PAIN LATERALITY, UNSPECIFIED CHRONICITY: ICD-10-CM

## 2024-12-16 DIAGNOSIS — G47.00 INSOMNIA, UNSPECIFIED TYPE: ICD-10-CM

## 2024-12-16 RX ORDER — GABAPENTIN 100 MG/1
100 CAPSULE ORAL
Qty: 30 CAPSULE | Refills: 0 | Status: SHIPPED | OUTPATIENT
Start: 2024-12-16

## 2025-01-02 ENCOUNTER — OFFICE VISIT (OUTPATIENT)
Dept: CARDIOLOGY | Facility: CLINIC | Age: 80
End: 2025-01-02
Payer: MEDICARE

## 2025-01-02 VITALS
HEIGHT: 68 IN | DIASTOLIC BLOOD PRESSURE: 70 MMHG | WEIGHT: 155.13 LBS | OXYGEN SATURATION: 97 % | HEART RATE: 70 BPM | BODY MASS INDEX: 23.51 KG/M2 | SYSTOLIC BLOOD PRESSURE: 150 MMHG

## 2025-01-02 DIAGNOSIS — I27.82 OTHER CHRONIC PULMONARY EMBOLISM, UNSPECIFIED WHETHER ACUTE COR PULMONALE PRESENT: ICD-10-CM

## 2025-01-02 DIAGNOSIS — R35.1 NOCTURIA: ICD-10-CM

## 2025-01-02 DIAGNOSIS — I70.0 AORTIC ATHEROSCLEROSIS: ICD-10-CM

## 2025-01-02 DIAGNOSIS — F41.9 ANXIETY AND DEPRESSION: Primary | ICD-10-CM

## 2025-01-02 DIAGNOSIS — F32.A ANXIETY AND DEPRESSION: Primary | ICD-10-CM

## 2025-01-02 DIAGNOSIS — I10 ESSENTIAL HYPERTENSION: ICD-10-CM

## 2025-01-02 DIAGNOSIS — R07.9 EXERTIONAL CHEST PAIN: ICD-10-CM

## 2025-01-02 PROCEDURE — 99999 PR PBB SHADOW E&M-EST. PATIENT-LVL III: CPT | Mod: PBBFAC,,,

## 2025-01-02 RX ORDER — HYDRALAZINE HYDROCHLORIDE 50 MG/1
50 TABLET, FILM COATED ORAL EVERY 12 HOURS
Qty: 180 TABLET | Refills: 3 | Status: SHIPPED | OUTPATIENT
Start: 2025-01-02 | End: 2025-12-28

## 2025-01-02 NOTE — ASSESSMENT & PLAN NOTE
Encouraged risk factor modifications with Lipitor 20 mg daily.  Encouraged heart healthy low-fat low-cholesterol diet and exercise as tolerated.

## 2025-01-02 NOTE — PROGRESS NOTES
Subjective:    Patient ID:  Rajendra Castle is a 79 y.o. male who presents for follow-up.   Chief Complaint   Patient presents with    Hospital Follow Up     Went in for chest pain       HPI:  Mr. Castle is a 79-year-old male with past medical history of CAD, hypertension, prostate cancer, sleep apnea and rheumatoid arthritis who comes in today for a hospital follow-up.  It appears he was seen back in October for chest pain and a nuclear stress test was conducted at that time which was negative for any reversible ischemia.  He denies any chest pain or shortness of breath at this time.  He does admit to having trouble sleeping every single night due to waking up frequently having to urinate.  He was referred to Urology and has an appointment coming up on January 7th.  We then whenever his medications in detail in which he is not taking any of his blood pressure medications - blood pressure 150/70 today in clinic.  He is however taking some old losartan 25 mg that he has.  Denies any blood in the urine or stool.      Review of patient's allergies indicates:  No Known Allergies    Past Medical History:   Diagnosis Date    Anticoagulant long-term use     Arthritis     Coronary artery disease     Dr. Lamb    DDD (degenerative disc disease), cervical     Degenerative disc disease     Heart murmur     History of radiation therapy 2020    Hypertension     Nontoxic multinodular goiter 09/20/2016    Prostate CA     RA (rheumatoid arthritis)     Sleep apnea     No CPAP    Vitamin D insufficiency 09/30/2016     Past Surgical History:   Procedure Laterality Date    CARPAL TUNNEL RELEASE Right 05/28/2021    Procedure: RELEASE, CARPAL TUNNEL;  Surgeon: Jose Ryan II, MD;  Location: [facility];  Service: Orthopedics;  Laterality: Right;    COLONOSCOPY  prior to 2016    normal findings per patient report    CORONARY ANGIOPLASTY WITH STENT PLACEMENT  02/2022    CYSTOSCOPY N/A 12/18/2018    Procedure: CYSTOSCOPY;  Surgeon: Garrett  SUMAN Pina MD;  Location: [facility] OR;  Service: Urology;  Laterality: N/A;    CYSTOSCOPY N/A 2022    Procedure: CYSTOSCOPY;  Surgeon: Garrett Pina MD;  Location: [facility] OR;  Service: Urology;  Laterality: N/A;    ESOPHAGOGASTRODUODENOSCOPY N/A 2020    Dr. Espinosa; empiric dilation; erythematous mucosa in antrum; gastric mucosal atrophy; hematin in entire stomach; biopsy: mid & distal esophagus WNL, stomach- WNL, negative for h pylori    EXCISION OF LESION OF PENIS N/A 2023    Procedure: EXCISION, LESION, PENIS;  Surgeon: Timo Myers MD;  Location: [facility] OR;  Service: Urology;  Laterality: N/A;    INSERTION OF TUNNELED CENTRAL VENOUS CATHETER (CVC) WITH SUBCUTANEOUS PORT Left 2023    Procedure: [id number];  Surgeon: Atul Faria MD;  Location: [facility];  Service: General;  Laterality: Left;  left subclavian    PARATHYROIDECTOMY Right 10/27/2020    Procedure: PARATHYROIDECTOMY;  Surgeon: Latonia Clayton MD;  Location: [address];  Service: ENT;  Laterality: Right;    RELEASE OF ULNAR NERVE AT CUBITAL TUNNEL Right 2021    Procedure: RELEASE, ULNAR TUNNEL;  Surgeon: Jose Ryan II, MD;  Location: [facility];  Service: Orthopedics;  Laterality: Right;    TRANSRECTAL BIOPSY OF PROSTATE WITH ULTRASOUND GUIDANCE N/A 2018    Procedure: BIOPSY, PROSTATE, RECTAL APPROACH, WITH US GUIDANCE;  Surgeon: Garrett Pina MD;  Location: [facility];  Service: Urology;  Laterality: N/A;    TRANSRECTAL ULTRASOUND EXAMINATION N/A 2022    Procedure: ULTRASOUND, RECTAL APPROACH;  Surgeon: Garrett Pina MD;  Location: [facility];  Service: Urology;  Laterality: N/A;     Social History     Tobacco Use    Smoking status: Former     Current packs/day: 0.00     Average packs/day: 0.3 packs/day for 10.0 years (2.5 ttl pk-yrs)     Types: Cigarettes     Start date: 1991     Quit date: 2001     Years since quittin.4     Passive exposure: Past    Smokeless tobacco:  Never   Substance Use Topics    Alcohol use: Not Currently     Comment: seldom    Drug use: Not Currently     Frequency: 8.0 times per week     Types: Marijuana     Family History   Problem Relation Name Age of Onset    Heart disease Mother      Stroke Father      Drug abuse Sister      No Known Problems Daughter      No Known Problems Daughter      No Known Problems Son      Diabetes Maternal Uncle      Colon cancer Neg Hx      Crohn's disease Neg Hx      Esophageal cancer Neg Hx      Stomach cancer Neg Hx      Ulcerative colitis Neg Hx          Review of Systems:   Constitution: Negative for diaphoresis and fever. + trouble sleeping  HEENT: Negative for nosebleeds.    Cardiovascular: Negative for chest pain       No dyspnea on exertion       No leg swelling        No palpitations  Respiratory: Negative for shortness of breath and wheezing.    Hematologic/Lymphatic: Negative for bleeding problem. Does not bruise/bleed easily.   Skin: Negative for color change and rash.   Musculoskeletal: Negative for falls and myalgias.   Gastrointestinal: Negative for hematemesis and hematochezia.   Genitourinary: Negative for hematuria. + nocturia  Neurological: Negative for dizziness and light-headedness.   Psychiatric/Behavioral: Negative for altered mental status and memory loss.          Objective:        Vitals:    01/02/25 1450   BP: (!) 150/70   Pulse: 70       Lab Results   Component Value Date    WBC 5.98 11/04/2024    HGB 13.8 (L) 11/04/2024    HCT 45.6 11/04/2024     (H) 11/04/2024    CHOL 140 10/25/2024    TRIG 89 10/25/2024    HDL 32 (L) 10/25/2024    ALT 8 (L) 11/04/2024    AST 15 11/04/2024     11/04/2024    K 4.6 11/04/2024     (H) 11/04/2024    CREATININE 1.5 (H) 11/04/2024    BUN 18 11/04/2024    CO2 17 (L) 11/04/2024    TSH 0.662 04/12/2024    PSA 0.14 05/27/2024    INR 1.1 10/24/2024    HGBA1C 5.6 10/25/2024        ECHOCARDIOGRAM RESULTS  Results for orders placed during the hospital  encounter of 10/22/23    Echo    Interpretation Summary    Left Ventricle: The left ventricle is normal in size. Mildly increased wall thickness. There is mild concentric hypertrophy. Normal wall motion. Septal motion is consistent with bundle branch block. There is normal systolic function with a visually estimated ejection fraction of 60 - 65%. There is normal diastolic function.    Left Atrium: Left atrium is mildly dilated.    Right Ventricle: Normal right ventricular cavity size. Wall thickness is normal. Right ventricle wall motion  is normal. Systolic function is normal.    Aortic Valve: There is mild aortic regurgitation.    IVC/SVC: Normal venous pressure at 3 mmHg.        CURRENT/PREVIOUS VISIT EKG  Results for orders placed or performed during the hospital encounter of 10/24/24   EKG 12-lead    Collection Time: 10/25/24  7:57 AM   Result Value Ref Range    QRS Duration 84 ms    OHS QTC Calculation 503 ms    Narrative    Test Reason : R07.9,    Vent. Rate : 069 BPM     Atrial Rate : 000 BPM     P-R Int : 000 ms          QRS Dur : 084 ms      QT Int : 470 ms       P-R-T Axes : 000 036 204 degrees     QTc Int : 503 ms    ( Sokolow-Perkins )  ST and    Prolonged QT  Abnormal ECG  When compared with ECG of    Atrial fibrillation has replaced Sinus rhythm  T wave inversion more evident in Inferior leads  Confirmed by Jone Puga MD ([id number]) on 11/2/2024 4:50:47 PM    Referred By: [email or web address]   SELF           Confirmed By:Jone Puga MD     No valid procedures specified.   Results for orders placed during the hospital encounter of 10/24/24    Nuclear Stress Test    Interpretation Summary    The ECG portion of the study non diagnostic due to resting ST-T abnormalities.    During stress, occasional PVCs are noted.    The patient reported no chest pain during the stress test.    The nuclear portion of this study will be reported separately.      Physical Exam:  CONSTITUTIONAL: No fever, no chills  HEENT:  Normocephalic, atraumatic,pupils reactive to light                 NECK:  No JVD no carotid bruit  CVS: S1S2+, RRR, + murmurs,   LUNGS: Clear  ABDOMEN: Soft, NT, BS+  EXTREMITIES: No cyanosis, edema  : No jackson catheter  NEURO: AAO X 3  PSY: Normal affect      Medication List with Changes/Refills   Current Medications    ATORVASTATIN (LIPITOR) 20 MG TABLET    Take 1 tablet (20 mg total) by mouth once daily.    B COMPLEX VITAMINS CAPSULE    Take 1 capsule by mouth once daily.    BETAMETHASONE VALERATE 0.1% (VALISONE) 0.1 % CREA    Apply topically 2 (two) times daily.    BUSPIRONE (BUSPAR) 10 MG TABLET    Take 1 tablet (10 mg total) by mouth 3 (three) times daily.    CYPROHEPTADINE (PERIACTIN) 4 MG TABLET    Take 1 tablet (4 mg total) by mouth 4 (four) times daily.    DICLOFENAC (VOLTAREN) 50 MG EC TABLET    Take 1 tablet (50 mg total) by mouth 2 (two) times daily as needed (pain).    GABAPENTIN (NEURONTIN) 100 MG CAPSULE    TAKE 1 CAPSULE(100 MG) BY MOUTH EVERY EVENING    LIDOCAINE (LIDODERM) 5 %    Place 1 patch onto the skin once daily. Remove & Discard patch within 12 hours or as directed by MD    MAGNESIUM OXIDE (MAG-OX) 400 MG (241.3 MG MAGNESIUM) TABLET    Take 1 tablet (400 mg total) by mouth once daily.    METOPROLOL TARTRATE (LOPRESSOR) 25 MG TABLET    Take 1 tablet (25 mg total) by mouth 2 (two) times daily.    PHENAZOPYRIDINE (PYRIDIUM) 200 MG TABLET    Take 1 tablet (200 mg total) by mouth 3 (three) times daily as needed for Pain.    PREDNISONE (DELTASONE) 2.5 MG TABLET    Take 1 tablet (2.5 mg total) by mouth 2 (two) times daily.    QUETIAPINE (SEROQUEL) 200 MG TAB    Take 200 mg by mouth once daily.    SOLIFENACIN (VESICARE) 10 MG TABLET    Take 1 tablet (10 mg total) by mouth once daily.    TADALAFIL (CIALIS) 5 MG TABLET    Take 1 tablet (5 mg total) by mouth once daily.    TAMSULOSIN (FLOMAX) 0.4 MG CAP    TAKE 1 CAPSULE(0.4 MG) BY MOUTH EVERY NIGHT   Changed and/or Refilled Medications    Modified  Medication Previous Medication    HYDRALAZINE (APRESOLINE) 50 MG TABLET hydrALAZINE (APRESOLINE) 25 MG tablet       Take 1 tablet (50 mg total) by mouth every 12 (twelve) hours.    Take 3 tablets (75 mg total) by mouth every 8 (eight) hours.   Discontinued Medications    AMLODIPINE (NORVASC) 10 MG TABLET    Take 1 tablet (10 mg total) by mouth once daily.    ISOSORBIDE MONONITRATE (IMDUR) 30 MG 24 HR TABLET    Take 1 tablet (30 mg total) by mouth once daily.    PANTOPRAZOLE (PROTONIX) 40 MG TABLET    Take 1 tablet (40 mg total) by mouth once daily.             Assessment:       1. Anxiety and depression    2. Other chronic pulmonary embolism, unspecified whether acute cor pulmonale present    3. Essential hypertension    4. Exertional chest pain    5. Nocturia    6. Aortic atherosclerosis         Plan:     Problem List Items Addressed This Visit          Psychiatric    Anxiety and depression - Primary    Current Assessment & Plan     Currently on BuSpar 10 mg t.i.d. continue the same.  Managed by primary.            Cardiac/Vascular    Essential hypertension    Current Assessment & Plan     Blood pressure high today in clinic at 150/70.  Patient not taking Lopressor, not taking amlodipine or hydralazine, however does state he was taking losartan 25 mg (an old prescription that he had) occasionally?    Due to creatinine 1.5, advised against taking the losartan.  We will try hydralazine 50 mg q.12 - gave patient blood pressure log to keep at home and patient will bring back to next visit.  Encouraged low-sodium diet.    We will hold off on amlodipine and Lopressor at this time as he was not taking it any ways.         Exertional chest pain    Current Assessment & Plan     Denies any chest pain at this time.  Recent negative nuclear stress test.         Aortic atherosclerosis    Current Assessment & Plan     Encouraged risk factor modifications with Lipitor 20 mg daily.  Encouraged heart healthy low-fat low-cholesterol  diet and exercise as tolerated.            Renal/    Nocturia    Current Assessment & Plan     Patient not currently taking Flomax stating that does not work for him.    He does take over-the-counter Pro-terol and states that medication works for him.    Advised patient to follow up with Urology on January 7th for further evaluation and recommendations.            Hematology    Chronic pulmonary embolism       Follow up in about 4 weeks (around 1/30/2025). To discuss BP.

## 2025-01-02 NOTE — ASSESSMENT & PLAN NOTE
Patient not currently taking Flomax stating that does not work for him.    He does take over-the-counter Pro-terol and states that medication works for him.    Advised patient to follow up with Urology on January 7th for further evaluation and recommendations.

## 2025-01-02 NOTE — ASSESSMENT & PLAN NOTE
Blood pressure high today in clinic at 150/70.  Patient not taking Lopressor, not taking amlodipine or hydralazine, however does state he was taking losartan 25 mg (an old prescription that he had) occasionally?    Due to creatinine 1.5, advised against taking the losartan.  We will try hydralazine 50 mg q.12 - gave patient blood pressure log to keep at home and patient will bring back to next visit.  Encouraged low-sodium diet.    We will hold off on amlodipine and Lopressor at this time as he was not taking it any ways.

## 2025-01-07 ENCOUNTER — TELEPHONE (OUTPATIENT)
Dept: UROLOGY | Facility: CLINIC | Age: 80
End: 2025-01-07
Payer: MEDICARE

## 2025-01-13 DIAGNOSIS — G47.00 INSOMNIA, UNSPECIFIED TYPE: ICD-10-CM

## 2025-01-13 DIAGNOSIS — M54.9 BACK PAIN, UNSPECIFIED BACK LOCATION, UNSPECIFIED BACK PAIN LATERALITY, UNSPECIFIED CHRONICITY: ICD-10-CM

## 2025-01-13 RX ORDER — GABAPENTIN 100 MG/1
100 CAPSULE ORAL
Qty: 30 CAPSULE | Refills: 0 | Status: SHIPPED | OUTPATIENT
Start: 2025-01-13

## 2025-02-14 DIAGNOSIS — M05.79 RHEUMATOID ARTHRITIS INVOLVING MULTIPLE SITES WITH POSITIVE RHEUMATOID FACTOR: ICD-10-CM

## 2025-02-14 RX ORDER — PREDNISONE 2.5 MG/1
2.5 TABLET ORAL 2 TIMES DAILY
Qty: 60 TABLET | Refills: 1 | Status: SHIPPED | OUTPATIENT
Start: 2025-02-14

## 2025-02-14 NOTE — TELEPHONE ENCOUNTER
----- Message from Jose sent at 2/14/2025  2:03 PM CST -----  Contact: Self  Type: Needs Medical Advice    Who Called:  Patient    Pharmacy name and phone #:    [id number] - [place] - [address] [facility]  [address]  [place] [address]  Phone: [phone] Fax: [phone]    Best Call Back Number: [phone]    Additional Information: Patient is requesting a call back regarding the status of his prednisone 2.5mg

## 2025-02-14 NOTE — TELEPHONE ENCOUNTER
No care due was identified.  [facility] Embedded Care Due Messages. Reference number: [id number].   2/14/2025 3:38:17 PM CST

## 2025-02-14 NOTE — TELEPHONE ENCOUNTER
----- Message from Trove sent at 2/14/2025  9:30 AM CST -----  Type:  Needs Medical Advice    Who Called: the patient  Symptoms (please be specific):  How long has patient had these symptoms:    Pharmacy name and phone #:    [id number] - [place] - [address] [facility]  [address]  [facility] [address]  Phone: [phone] Fax: [phone]    Would the patient rather a call back or a response via [facility]? call back/  Best Call Back Number: [phone]   Additional Information: Pt states he would like to speak to staff in reference to rx predniSONE (DELTASONE) 2.5 MG tablet  Thanks

## 2025-02-14 NOTE — TELEPHONE ENCOUNTER
Lov 11-25-24 Nov 2/25/25    Spoke to pt who states he takes prednisone 2.5mg twice daily every day for RA. Pt states he is completely out and needs a refill.

## 2025-02-20 NOTE — ASSESSMENT & PLAN NOTE
EKG prn Chest Pain  Troponin x 3-initial troponin WNL  CBC, CMP, Mg, Phos daily  Fasting Lipid panel in am  TSH level  CXR-no acute abnormality  IV fluids-held due to hypertension  DELMY score-4  Consult Cardiology - follow recommendations  Supplemental O2 via nasal cannula to keep O2 Sats >92%  Continue beta blocker  Nitroglycerin paste  continue ASA      Statement Selected

## 2025-02-26 NOTE — PROVATION PATIENT INSTRUCTIONS
:  Assessment & Plan  Encounter for routine child health examination without abnormal findings  Patient presents today for 's license history and physical, patient has no new concerns today.  She has been feeling well since last physical.  She is in 10th grade at [employer] and is getting very good grades and staying active with volleyball.  She tries to monitor her screen time.  She is eating a healthy diet and exercising regularly.  Her physical examination today is unremarkable, vital signs are stable.  She has no major cardiac or neurologic symptoms.  Paperwork was filled out for 's license physical and returned to her and her mother today.  Follow-up in 1 year for next physical, sooner with any acute concerns.    She will be due for booster of Menactra after her 18th birthday as well as meningococcal B if her parents would like to pursue this.  They are deferring HPV vaccination at this time.  They can call to schedule for nurse visit for Menactra or meningococcal B.       Encounter for 's license history and physical  Patient has had CMP completed within the past year, sugars were within normal limits.  Unable to give urine sample today in clinic.       SOB (shortness of breath)  Refill given of albuterol inhaler to use as needed, typically for volleyball practice.  Orders:  •  albuterol (Ventolin HFA) 90 mcg/act inhaler; Inhale 2 puffs every 6 (six) hours as needed for wheezing    Hashimoto's thyroiditis  Continues to follow with endocrinology for Hashimoto's.       Body mass index (BMI) of 95th percentile for age to less than 120% of 95th percentile for age in pediatric patient         Exercise counseling         Nutritional counseling         Encounter for routine child health examination without abnormal findings         Encounter for 's license history and physical         SOB (shortness of breath)         Hashimoto's thyroiditis         Body mass index (BMI) of 95th  Discharge Summary/Instructions after an Endoscopic Procedure  Patient Name: Rajendra Castle  Patient MRN: [id number]  Patient YOB: 1945 Tuesday, September 29, 2020  Gunner Espinosa MD  RESTRICTIONS:  During your procedure today, you received medications for sedation.  These   medications may affect your judgment, balance and coordination.  Therefore,   for 24 hours, you have the following restrictions:   - DO NOT drive a car, operate machinery, make legal/financial decisions,   sign important papers or drink alcohol.    ACTIVITY:  Today: no heavy lifting, straining or running due to procedural   sedation/anesthesia.  The following day: return to full activity including work.  DIET:  Eat and drink normally unless instructed otherwise.     TREATMENT FOR COMMON SIDE EFFECTS:  - Mild abdominal pain, nausea, belching, bloating or excessive gas:  rest,   eat lightly and use a heating pad.  - Sore Throat: treat with throat lozenges and/or gargle with warm salt   water.  - Because air was used during the procedure, expelling large amounts of air   from your rectum or belching is normal.  - If a bowel prep was taken, you may not have a bowel movement for 1-3 days.    This is normal.  SYMPTOMS TO WATCH FOR AND REPORT TO YOUR PHYSICIAN:  1. Abdominal pain or bloating, other than gas cramps.  2. Chest pain.  3. Back pain.  4. Signs of infection such as: chills or fever occurring within 24 hours   after the procedure.  5. Rectal bleeding, which would show as bright red, maroon, or black stools.   (A tablespoon of blood from the rectum is not serious, especially if   hemorrhoids are present.)  6. Vomiting.  7. Weakness or dizziness.  GO DIRECTLY TO THE NEAREST EMERGENCY ROOM IF YOU HAVE ANY OF THE FOLLOWING:      Difficulty breathing              Chills and/or fever over 101 F   Persistent vomiting and/or vomiting blood   Severe abdominal pain   Severe chest pain   Black, tarry stools   Bleeding- more than one  percentile for age to less than 120% of 95th percentile for age in pediatric patient         Exercise counseling         Nutritional counseling             Well adolescent.  Plan    1. Anticipatory guidance discussed.  Specific topics reviewed: bicycle helmets, importance of regular dental care, importance of regular exercise, importance of varied diet, limit TV, media violence, minimize junk food, puberty, and safe storage of any firearms in the home.    Nutrition and Exercise Counseling:     The patient's Body mass index is 32.99 kg/m². This is 97 %ile (Z= 1.94) based on CDC (Girls, 2-20 Years) BMI-for-age based on BMI available on 2/26/2025.    Nutrition counseling provided:  Reviewed long term health goals and risks of obesity. Educational material provided to patient/parent regarding nutrition. Avoid juice/sugary drinks. Anticipatory guidance for nutrition given and counseled on healthy eating habits. 5 servings of fruits/vegetables.    Exercise counseling provided:  Anticipatory guidance and counseling on exercise and physical activity given. Educational material provided to patient/family on physical activity. Reduce screen time to less than 2 hours per day. 1 hour of aerobic exercise daily.    Depression Screening and Follow-up Plan:     Depression screening was negative with PHQ-A score of 0. Patient does not have thoughts of ending their life in the past month. Patient has not attempted suicide in their lifetime.        2. Development: appropriate for age    3. Immunizations today: per orders.  Immunizations are up to date.        4. Follow-up visit in 1 year for next well child visit, or sooner as needed.    History of Present Illness     History was provided by the mother.  Elaine Valdes is a 17 y.o. female who is here for this well-child visit.    Current Issues:  Current concerns include drivers license physical, no other concerns.  Needs refill of albuterol..    regular periods, no issues    Well  tablespoon   Any other symptom or condition that you feel may need urgent attention  Your doctor recommends these additional instructions:  If any biopsies were taken, your doctors clinic will contact you in 1 to 2   weeks with any results.  - Advance diet as tolerated.   - Continue present medications.   - Await pathology results.   - Return patient to hospital lam for ongoing care.   - Consider barium esophagram as next step in evaluation of dysphagia, which   can be performed as outpatient  - Needs colonoscopy scheduled as an outpatient for screening purposes  For questions, problems or results please call your physician - Gunner Espinosa MD at Work:  [phone].  [facility], EMERGENCY ROOM PHONE NUMBER: [phone]  IF A COMPLICATION OR EMERGENCY SITUATION ARISES AND YOU ARE UNABLE TO REACH   YOUR PHYSICIAN - GO DIRECTLY TO THE EMERGENCY ROOM.  Gunner Espinosa MD  9/29/2020 11:38:40 AM  This report has been verified and signed electronically.  [employer]ATION   "Child Assessment:  Interval problems do not include caregiver depression.   Nutrition  Types of intake include cereals, cow's milk and fruits.   Dental  The patient has a dental home. The patient brushes teeth regularly. The patient does not floss regularly. Last dental exam was less than 6 months ago.   Elimination  Elimination problems do not include constipation, diarrhea or urinary symptoms.   Behavioral  Behavioral issues do not include hitting, lying frequently or misbehaving with peers.   Sleep  Average sleep duration is 8 hours. The patient does not snore. There are no sleep problems.   Safety  There is no smoking in the home. Home has working smoke alarms? yes. Home has working carbon monoxide alarms? yes. There is a gun in home (locked up).   School  Current grade level is 10th. There are no signs of learning disabilities. Child is doing well in school.   Social  The child spends 4 hours in front of a screen (tv or computer) per day.       Medical History Reviewed by provider this encounter:     .    Objective   /74 (BP Location: Left arm, Patient Position: Sitting, Cuff Size: Standard)   Pulse 70   Temp 98.2 °F (36.8 °C) (Temporal)   Ht 5' 6.77\" (1.696 m)   Wt 94.9 kg (209 lb 3.2 oz)   SpO2 99%   BMI 32.99 kg/m²      Growth parameters are noted and are appropriate for age.    Wt Readings from Last 1 Encounters:   02/26/25 94.9 kg (209 lb 3.2 oz) (99%, Z= 2.19)*     * Growth percentiles are based on CDC (Girls, 2-20 Years) data.     Ht Readings from Last 1 Encounters:   02/26/25 5' 6.77\" (1.696 m) (86%, Z= 1.09)*     * Growth percentiles are based on CDC (Girls, 2-20 Years) data.      Body mass index is 32.99 kg/m².    Vision Screening    Right eye Left eye Both eyes   Without correction 20/13 20/13 20/13   With correction          Physical Exam  Vitals and nursing note reviewed.   Constitutional:       General: She is not in acute distress.     Appearance: Normal appearance. She is normal " weight. She is not ill-appearing.   HENT:      Head: Normocephalic and atraumatic.      Right Ear: Tympanic membrane normal.      Left Ear: Tympanic membrane normal.      Nose: Nose normal. No congestion.      Mouth/Throat:      Mouth: Mucous membranes are moist.      Pharynx: Oropharynx is clear. No oropharyngeal exudate or posterior oropharyngeal erythema.   Eyes:      Extraocular Movements: Extraocular movements intact.      Pupils: Pupils are equal, round, and reactive to light.   Cardiovascular:      Rate and Rhythm: Normal rate and regular rhythm.      Heart sounds: No murmur heard.  Pulmonary:      Effort: Pulmonary effort is normal. No respiratory distress.      Breath sounds: No stridor. No wheezing, rhonchi or rales.   Abdominal:      General: Bowel sounds are normal.      Palpations: Abdomen is soft.      Tenderness: There is no abdominal tenderness.   Musculoskeletal:         General: Normal range of motion.      Cervical back: Normal range of motion.      Right lower leg: No edema.      Left lower leg: No edema.   Lymphadenopathy:      Cervical: No cervical adenopathy.   Skin:     General: Skin is warm and dry.      Capillary Refill: Capillary refill takes less than 2 seconds.   Neurological:      General: No focal deficit present.      Mental Status: She is alert and oriented to person, place, and time.   Psychiatric:         Mood and Affect: Mood normal.         Behavior: Behavior normal.         Judgment: Judgment normal.         Review of Systems   Constitutional:  Negative for chills, fatigue, fever and unexpected weight change.   Respiratory:  Negative for snoring, cough, chest tightness and shortness of breath.    Cardiovascular:  Negative for chest pain, palpitations and leg swelling.   Gastrointestinal:  Negative for abdominal pain, blood in stool, constipation, diarrhea and vomiting.   Genitourinary:  Negative for dysuria, hematuria and menstrual problem.   Musculoskeletal:  Negative for  arthralgias, back pain and myalgias.   Neurological:  Negative for dizziness, seizures, syncope, light-headedness and headaches.   Hematological:  Does not bruise/bleed easily.   Psychiatric/Behavioral:  Negative for behavioral problems, confusion and sleep disturbance.    All other systems reviewed and are negative.

## 2025-03-01 NOTE — TELEPHONE ENCOUNTER
Patient Seen in: [facility]      History     Chief Complaint   Patient presents with    Abdominal Pain     Stated Complaint: -abdominal pain after eating    Subjective:   HPI      Patient is a 60-year-old female who states she was recently diagnosed with fluid.  Patient also has had back pain for which she is taking diclofenac as well as prednisone.  Patient states she feels nauseous and has epigastric pain when she eats.  Patient does complain of some diffuse abdominal pain as well.  Patient does have some loose stool as well.  Patient denies definite fever.  Patient was in the emergency department on Tuesday did have a chest x-ray as well as blood work unremarkable at that time.  Patient does feel fatigued.  Remainder of review of systems negative.    Objective:     History reviewed. No pertinent past medical history.           Past Surgical History:   Procedure Laterality Date    Hysterectomy  2008    Fibroids, benign; Open exploratory laparotomy                Social History     Socioeconomic History    Marital status:    Tobacco Use    Smoking status: Never    Smokeless tobacco: Never   Vaping Use    Vaping status: Never Used   Substance and Sexual Activity    Alcohol use: Yes    Drug use: Never    Sexual activity: Yes     Birth control/protection: Hysterectomy   Other Topics Concern    Caffeine Concern No    Exercise Yes     Social Drivers of Health     Food Insecurity: No Food Insecurity (2/27/2025)    Received from [facility]    Hunger Vital Sign     Worried About Running Out of Food in the Last Year: Never true     Ran Out of Food in the Last Year: Never true   Transportation Needs: No Transportation Needs (2/27/2025)    Received from [facility]    PRAPARE - Transportation     Lack of Transportation (Medical): No     Lack of Transportation (Non-Medical): No   Housing Stability: Low Risk  (2/27/2025)    Received from [facility]  ----- Message from Laina Richter sent at 6/6/2022  9:18 AM CDT -----  Regarding: advice  Contact: :  Patient/[phone] (home)  Type: Needs Medical Advice  Who Called:  Patient/[phone] (home)       Additional Information: Patient went to [facility]. They tested him and could not find anything. One of the doctor's mentioned he might have long covid. His main symptom is being fatigued all the time.     Went to [facility] twice and they could not find anything either. They said it could be long covid. He researched paxlovid and they said that it could help the symptoms of fatigue. Patient wants to know if the doctor knows anything about this drug and could you prescribe it for patient. Please call to advise.      [facility]    Housing Stability Vital Sign     Unable to Pay for Housing in the Last Year: No     Number of Times Moved in the Last Year: 0     Homeless in the Last Year: No          Social history non-smoker, no alcohol, no drugs    Review of Systems    Positive for stated complaint: -abdominal pain after eating  Other systems are as noted in HPI.  Constitutional and vital signs reviewed.      All other systems reviewed and negative except as noted above.    Physical Exam     ED Triage Vitals [03/01/25 0935]   /83   Pulse 89   Resp 16   Temp 98 °F (36.7 °C)   Temp src Oral   SpO2 99 %   O2 Device None (Room air)       Current Vitals:   Vital Signs  BP: 143/83  Pulse: 89  Resp: 16  Temp: 98 °F (36.7 °C)  Temp src: Oral    Oxygen Therapy  SpO2: 99 %  O2 Device: None (Room air)        Physical Exam   GENERAL: Patient resting  on the cart in no acute distress.  HEENT: Extraocular muscles intact, pupils equal round reactive to light.  Mouth normal, neck supple, no meningismus.  LUNGS: Lungs clear to auscultation bilaterally.  CARDIOVASCULAR: + S1-S2, regular rate and rhythm, no murmurs.  BACK: No CVA tenderness, no midline bony tenderness.  ABDOMEN: + Bowel sounds, soft, mild diffuse tenderness, nondistended.  No rebound, no guarding, no hepatosplenomegaly.  EXTREMITIES: Full range of motion, no tenderness, good capillary refill.  SKIN: No rash, good turgor.  NEURO: Patient answers questions appropriately.  No focal deficits appreciated.        ED Course     Labs Reviewed   [facility] POCT URINALYSIS DIPSTICK - Abnormal; Notable for the following components:       Result Value    Blood, Urine Trace-Intact (*)     Leukocyte esterase urine Trace (*)     All other components within normal limits   POCT ISTAT CHEM8 CARTRIDGE - Abnormal; Notable for the following components:    ISTAT Glucose 108 (*)     All other components within normal limits   CBC W AUTO DIFF   POCT CBC   URINE CULTURE, ROUTINE            CT abdomen  pelvis1. Mild wall thickening involving loops of small bowel may represent mild enteritis   Independent reviewed by myself, no free air       MDM      Patient given IV fluids.  Patient stable during her stay in the immediate care.  Patient able to tolerate p.o. liquids.  Patient urinalysis patient for UTI.  Patient covered with antibiotics pending urine culture.  Patient advised to stop diclofenac.  Patient is already stopped prednisone.  Patient may have gastritis.  I did consider gastritis, diverticulitis, gastroenteritis, diarrhea.  Patient take Protonix as discussed, MiraLAX as needed.  Tylenol if needed.  Return if new or worse symptoms.  Follow-up with otherwise primary physician        Medical Decision Making      Disposition and Plan     Clinical Impression:  1. Gastritis without bleeding, unspecified chronicity, unspecified gastritis type    2. Acute cystitis without hematuria         Disposition:  Discharge  3/1/2025 11:15 am    Follow-up:  Tony Wright MD  [address] [place] [id number]  [phone]    In 2 days            Medications Prescribed:  Current Discharge Medication List        START taking these medications    Details   !! pantoprazole 40 MG Oral Tab EC Take 1 tablet (40 mg total) by mouth daily.  Qty: 30 tablet, Refills: 0      !! cephALEXin 500 MG Oral Cap Take 1 capsule (500 mg total) by mouth 2 (two) times daily for 5 days.  Qty: 10 capsule, Refills: 0       !! - Potential duplicate medications found. Please discuss with provider.              Supplementary Documentation:

## 2025-03-13 ENCOUNTER — PATIENT MESSAGE (OUTPATIENT)
Dept: FAMILY MEDICINE | Facility: CLINIC | Age: 80
End: 2025-03-13
Payer: MEDICARE

## 2025-03-19 ENCOUNTER — PATIENT MESSAGE (OUTPATIENT)
Dept: ADMINISTRATIVE | Facility: HOSPITAL | Age: 80
End: 2025-03-19
Payer: MEDICARE

## 2025-03-24 DIAGNOSIS — Z00.00 ENCOUNTER FOR MEDICARE ANNUAL WELLNESS EXAM: ICD-10-CM

## 2025-04-04 ENCOUNTER — NURSE TRIAGE (OUTPATIENT)
Dept: ADMINISTRATIVE | Facility: CLINIC | Age: 80
End: 2025-04-04
Payer: MEDICARE

## 2025-04-04 ENCOUNTER — HOSPITAL ENCOUNTER (EMERGENCY)
Facility: HOSPITAL | Age: 80
Discharge: HOME | End: 2025-04-04
Attending: EMERGENCY MEDICINE
Payer: MEDICARE

## 2025-04-04 ENCOUNTER — TELEPHONE (OUTPATIENT)
Dept: FAMILY MEDICINE | Facility: CLINIC | Age: 80
End: 2025-04-04
Payer: MEDICARE

## 2025-04-04 VITALS
SYSTOLIC BLOOD PRESSURE: 205 MMHG | HEART RATE: 67 BPM | WEIGHT: 155 LBS | OXYGEN SATURATION: 100 % | BODY MASS INDEX: 23.49 KG/M2 | HEIGHT: 68 IN | RESPIRATION RATE: 17 BRPM | TEMPERATURE: 99 F | DIASTOLIC BLOOD PRESSURE: 89 MMHG

## 2025-04-04 DIAGNOSIS — S22.41XA CLOSED FRACTURE OF MULTIPLE RIBS OF RIGHT SIDE, INITIAL ENCOUNTER: Primary | ICD-10-CM

## 2025-04-04 DIAGNOSIS — F32.A ANXIETY AND DEPRESSION: Primary | ICD-10-CM

## 2025-04-04 DIAGNOSIS — J90 PLEURAL EFFUSION: ICD-10-CM

## 2025-04-04 DIAGNOSIS — I31.39 PERICARDIAL EFFUSION: ICD-10-CM

## 2025-04-04 DIAGNOSIS — F32.A ANXIETY AND DEPRESSION: ICD-10-CM

## 2025-04-04 DIAGNOSIS — G47.00 INSOMNIA, UNSPECIFIED TYPE: ICD-10-CM

## 2025-04-04 DIAGNOSIS — R07.81 RIB PAIN: ICD-10-CM

## 2025-04-04 DIAGNOSIS — F41.9 ANXIETY AND DEPRESSION: Primary | ICD-10-CM

## 2025-04-04 DIAGNOSIS — J98.11 ATELECTASIS: ICD-10-CM

## 2025-04-04 DIAGNOSIS — G47.00 INSOMNIA, UNSPECIFIED TYPE: Primary | ICD-10-CM

## 2025-04-04 DIAGNOSIS — F41.9 ANXIETY AND DEPRESSION: ICD-10-CM

## 2025-04-04 PROCEDURE — 94799 UNLISTED PULMONARY SVC/PX: CPT

## 2025-04-04 PROCEDURE — 99284 EMERGENCY DEPT VISIT MOD MDM: CPT | Mod: 25

## 2025-04-04 PROCEDURE — 25000003 PHARM REV CODE 250: Performed by: NURSE PRACTITIONER

## 2025-04-04 RX ORDER — HYDRALAZINE HYDROCHLORIDE 25 MG/1
50 TABLET, FILM COATED ORAL
Status: COMPLETED | OUTPATIENT
Start: 2025-04-04 | End: 2025-04-04

## 2025-04-04 RX ORDER — LIDOCAINE 50 MG/G
1 PATCH TOPICAL DAILY
Qty: 10 PATCH | Refills: 0 | Status: SHIPPED | OUTPATIENT
Start: 2025-04-04 | End: 2025-04-14

## 2025-04-04 RX ORDER — QUETIAPINE FUMARATE 200 MG/1
200 TABLET, FILM COATED ORAL DAILY
Qty: 90 TABLET | Refills: 3 | Status: SHIPPED | OUTPATIENT
Start: 2025-04-04 | End: 2025-04-04 | Stop reason: SDUPTHER

## 2025-04-04 RX ORDER — QUETIAPINE FUMARATE 100 MG/1
200 TABLET, FILM COATED ORAL
Qty: 60 TABLET | Refills: 11 | OUTPATIENT
Start: 2025-04-04

## 2025-04-04 RX ORDER — QUETIAPINE FUMARATE 200 MG/1
200 TABLET, FILM COATED ORAL DAILY
Qty: 90 TABLET | Refills: 3 | Status: SHIPPED | OUTPATIENT
Start: 2025-04-04 | End: 2025-04-04 | Stop reason: ALTCHOICE

## 2025-04-04 RX ORDER — LIDOCAINE 50 MG/G
1 PATCH TOPICAL
Status: DISCONTINUED | OUTPATIENT
Start: 2025-04-04 | End: 2025-04-04 | Stop reason: HOSPADM

## 2025-04-04 RX ORDER — HYDRALAZINE HYDROCHLORIDE 100 MG/1
100 TABLET, FILM COATED ORAL 2 TIMES DAILY
Qty: 60 TABLET | Refills: 11 | Status: SHIPPED | OUTPATIENT
Start: 2025-04-04 | End: 2026-04-04

## 2025-04-04 RX ORDER — HYDROCODONE BITARTRATE AND ACETAMINOPHEN 5; 325 MG/1; MG/1
1 TABLET ORAL EVERY 8 HOURS PRN
Qty: 9 TABLET | Refills: 0 | Status: SHIPPED | OUTPATIENT
Start: 2025-04-04 | End: 2025-04-07

## 2025-04-04 RX ORDER — ZOLPIDEM TARTRATE 10 MG/1
5 TABLET ORAL NIGHTLY PRN
COMMUNITY
End: 2025-04-04 | Stop reason: SDUPTHER

## 2025-04-04 RX ADMIN — HYDRALAZINE HYDROCHLORIDE 50 MG: 25 TABLET ORAL at 08:04

## 2025-04-04 RX ADMIN — HYDRALAZINE HYDROCHLORIDE 50 MG: 25 TABLET ORAL at 06:04

## 2025-04-04 RX ADMIN — LIDOCAINE 5% 1 PATCH: 700 PATCH TOPICAL at 04:04

## 2025-04-04 NOTE — TELEPHONE ENCOUNTER
----- Message from Lavinia sent at 4/4/2025 11:38 AM CDT -----  Name of Who is Calling:JONATHAN BHAKTA [[id number]]  What is the request in detail:Pt requesting a call back today from the doctor if possible.  Can the clinic reply by [facility]:Call  What Number to Call Back if not in [facility]:Telephone Information:[facility]          [phone]

## 2025-04-04 NOTE — TELEPHONE ENCOUNTER
No care due was identified.  [facility] Embedded Care Due Messages. Reference number: [id number].   4/04/2025 11:33:14 AM CDT

## 2025-04-04 NOTE — TELEPHONE ENCOUNTER
Patient states he need his refill for Seroquel. I can see request, refused. He states Dr. Lilly refills this RX. Advised I would send message to provider asking for explanation. Verb understanding.   Reason for Disposition   Prescription refill request for NON-ESSENTIAL medicine (i.e., no harm to patient if med not taken) and triager unable to refill per department policy    Protocols used: Medication Refill and Renewal Call-A-[place]

## 2025-04-04 NOTE — FIRST PROVIDER EVALUATION
Emergency Department TeleTriage Encounter Note      CHIEF COMPLAINT    Chief Complaint   Patient presents with    rib fracture pain      Rt. Side  / fall in March       VITAL SIGNS   Initial Vitals [04/04/25 1418]   BP Pulse Resp Temp SpO2   (!) 217/94 71 20 98.6 °F (37 °C) 96 %      MAP       --            ALLERGIES    Review of patient's allergies indicates:  No Known Allergies    PROVIDER TRIAGE NOTE  Reports broken ribs that are painful. Had a trip and fall March 24 and was seen at [facility]. States he was not given pain medication and is having increased pain. Also states he has been unable to sleep for several months. Out of seroquel.     Limited physical exam via telehealth: The patient is awake, alert, answering questions appropriately and is not in respiratory distress.  As the Teletriage provider, I performed an initial assessment and ordered appropriate labs and imaging studies, if any, to facilitate the patient's care once placed in the ED. Once a room is available, care and a full evaluation will be completed by an alternate ED provider.  Any additional orders and the final disposition will be determined by that provider.  All imaging and labs will not be followed-up by the Teletriage Team, including myself.          ORDERS  Labs Reviewed - No data to display    ED Orders (720h ago, onward)      Start Ordered     Status Ordering Provider    04/04/25 1425 04/04/25 1424  X-Ray Ribs 2 View Right  1 time imaging         Ordered KYLAH FRANK    04/04/25 1425 04/04/25 1424  X-Ray Chest PA And Lateral  1 time imaging         Ordered KYLAH FRANK              Virtual Visit Note: The provider triage portion of this emergency department evaluation and documentation was performed via [employer], a HIPAA-compliant telemedicine application, in concert with a tele-presenter in the room. A face to face patient evaluation with one of my colleagues will occur once the patient is placed in an emergency  department room.      DISCLAIMER: This note was prepared with [employer] voice recognition transcription software. Garbled syntax, mangled pronouns, and other bizarre constructions may be attributed to that software system.

## 2025-04-04 NOTE — TELEPHONE ENCOUNTER
No care due was identified.  [facility] Embedded Care Due Messages. Reference number: [id number].   4/04/2025 1:49:05 PM CDT

## 2025-04-04 NOTE — CARE UPDATE
04/04/25 1704   Patient Assessment/Suction   Level of Consciousness (AVPU) alert   Respiratory Effort Unlabored   Expansion/Accessory Muscles/Retractions no use of accessory muscles;no retractions;expansion symmetric   Rhythm/Pattern, Respiratory unlabored;pattern regular;depth regular;no shortness of breath reported   PRE-TX-O2   Device (Oxygen Therapy) room air   Incentive Spirometer   $ Incentive Spirometer Charges done with encouragement   Incentive Spirometer Predicted Level (mL) 1840   Administration (IS) instruction provided, initial;mouthpiece utilized   Number of Repetitions (IS) 6   Level Incentive Spirometer (mL) 2750   Patient Tolerance (IS) no adverse signs/symptoms present

## 2025-04-04 NOTE — TELEPHONE ENCOUNTER
----- Message from Cierra sent at 4/4/2025 12:51 PM CDT -----  Type:  Patient Returning CallWho Called:  ptWho Left Message for Patient:  hhDoes the patient know what this is regarding?:  yesBest Call Back Number:  [phone] (home) Additional Information:  please advise

## 2025-04-04 NOTE — TELEPHONE ENCOUNTER
No care due was identified.  [facility] Embedded Care Due Messages. Reference number: [id number].   4/04/2025 4:11:03 PM CDT

## 2025-04-05 ENCOUNTER — PATIENT OUTREACH (OUTPATIENT)
Facility: OTHER | Age: 80
End: 2025-04-05
Payer: MEDICARE

## 2025-04-05 NOTE — ED PROVIDER NOTES
Encounter Date: 4/4/2025       History     Chief Complaint   Patient presents with    rib fracture pain      Rt. Side  / fall in March     Patient is a 79 y.o. male who presents to the ED 04/04/2025 with a chief complaint of right rib pain.  Patient states on the 24th of last month he slipped on some water on the ground and he hurt his right ribs.  A chair hit him in the ribs.  He did not hit his head or lose consciousness and he did go to the doctor the next day who told him he had broken ribs.  He saw them at [facility] where he had imaging. He still has pain.  He denies any fever or abdominal pain.  He denies any headache.  He is not short of breath.  He reports pain with deep breathing and movement.  He is not taking his blood pressure medicine in 2 weeks.             Review of patient's allergies indicates:  No Known Allergies  Past Medical History:   Diagnosis Date    Anticoagulant long-term use     Arthritis     Coronary artery disease     Dr. Labm    DDD (degenerative disc disease), cervical     Degenerative disc disease     Heart murmur     History of radiation therapy 2020    Hypertension     Nontoxic multinodular goiter 09/20/2016    Prostate CA     RA (rheumatoid arthritis)     Sleep apnea     No CPAP    Vitamin D insufficiency 09/30/2016     Past Surgical History:   Procedure Laterality Date    CARPAL TUNNEL RELEASE Right 05/28/2021    Procedure: RELEASE, CARPAL TUNNEL;  Surgeon: Jose Ryan II, MD;  Location: [facility] OR;  Service: Orthopedics;  Laterality: Right;    COLONOSCOPY  prior to 2016    normal findings per patient report    CORONARY ANGIOPLASTY WITH STENT PLACEMENT  02/2022    CYSTOSCOPY N/A 12/18/2018    Procedure: CYSTOSCOPY;  Surgeon: Garrett Pina MD;  Location: [facility] OR;  Service: Urology;  Laterality: N/A;    CYSTOSCOPY N/A 07/12/2022    Procedure: CYSTOSCOPY;  Surgeon: Garrett Pina MD;  Location: [facility] OR;  Service: Urology;  Laterality: N/A;     ESOPHAGOGASTRODUODENOSCOPY N/A 09/29/2020    Dr. Espinosa; empiric dilation; erythematous mucosa in antrum; gastric mucosal atrophy; hematin in entire stomach; biopsy: mid & distal esophagus WNL, stomach- WNL, negative for h pylori    EXCISION OF LESION OF PENIS N/A 2/23/2023    Procedure: EXCISION, LESION, PENIS;  Surgeon: Timo Myers MD;  Location: [facility] OR;  Service: Urology;  Laterality: N/A;    INSERTION OF TUNNELED CENTRAL VENOUS CATHETER (CVC) WITH SUBCUTANEOUS PORT Left 5/18/2023    Procedure: [id number];  Surgeon: Atul Faria MD;  Location: [facility];  Service: General;  Laterality: Left;  left subclavian    PARATHYROIDECTOMY Right 10/27/2020    Procedure: PARATHYROIDECTOMY;  Surgeon: Latonia Clayton MD;  Location: [facility] OR [address] [place] [address];  Service: ENT;  Laterality: Right;    RELEASE OF ULNAR NERVE AT CUBITAL TUNNEL Right 05/28/2021    Procedure: RELEASE, ULNAR TUNNEL;  Surgeon: Jose Ryan II, MD;  Location: [facility] OR;  Service: Orthopedics;  Laterality: Right;    TRANSRECTAL BIOPSY OF PROSTATE WITH ULTRASOUND GUIDANCE N/A 12/18/2018    Procedure: BIOPSY, PROSTATE, RECTAL APPROACH, WITH US GUIDANCE;  Surgeon: Garrett Pina MD;  Location: [facility] OR;  Service: Urology;  Laterality: N/A;    TRANSRECTAL ULTRASOUND EXAMINATION N/A 07/12/2022    Procedure: ULTRASOUND, RECTAL APPROACH;  Surgeon: Garrett Pina MD;  Location: [facility];  Service: Urology;  Laterality: N/A;     Family History   Problem Relation Name Age of Onset    Heart disease Mother      Stroke Father      Drug abuse Sister      No Known Problems Daughter      No Known Problems Daughter      No Known Problems Son      Diabetes Maternal Uncle      Colon cancer Neg Hx      Crohn's disease Neg Hx      Esophageal cancer Neg Hx      Stomach cancer Neg Hx      Ulcerative colitis Neg Hx       Social History[1]  Review of Systems   Constitutional:  Negative for chills and fever.   HENT:  Negative for sore throat.    Respiratory:   Negative for cough, chest tightness and shortness of breath.    Cardiovascular:  Negative for chest pain and leg swelling.        Right rib pain   Gastrointestinal:  Negative for abdominal pain.   Genitourinary:  Negative for dysuria.   Musculoskeletal:  Negative for arthralgias and myalgias.   Skin:  Negative for rash and wound.   Neurological:  Negative for syncope.   Hematological:  Does not bruise/bleed easily.       Physical Exam     Initial Vitals [04/04/25 1418]   BP Pulse Resp Temp SpO2   (!) 217/94 71 20 98.6 °F (37 °C) 96 %      MAP       --         Physical Exam    Nursing note and vitals reviewed.  Constitutional: He appears well-developed and well-nourished.   HENT:   Head: Normocephalic and atraumatic.   Eyes: Conjunctivae are normal. Pupils are equal, round, and reactive to light. Right eye exhibits no discharge. Left eye exhibits no discharge.   Neck: Neck supple.   Normal range of motion.  Cardiovascular:  Normal rate, regular rhythm, normal heart sounds and intact distal pulses.           Pulmonary/Chest: Breath sounds normal. Chest wall is not dull to percussion. He exhibits tenderness. He exhibits no mass, no bony tenderness, no laceration, no crepitus, no edema, no deformity, no swelling and no retraction.     Right lower anterior ribs with tenderness with no crepitus or palpable bony abnormality.   Abdominal: Abdomen is soft. Bowel sounds are normal.   Musculoskeletal:         General: Normal range of motion.      Cervical back: Normal range of motion and neck supple.     Neurological: He is alert and oriented to person, place, and time. He has normal strength. No sensory deficit.   Skin: Skin is warm and dry.   Psychiatric: He has a normal mood and affect.         ED Course   Procedures  Labs Reviewed - No data to display       Imaging Results              CT Chest Without Contrast (Final result)  Result time 04/04/25 16:34:43      Final result by Ghanshyam Curiel Jr., MD (04/04/25  16:34:43)                   Impression:      Acute fracture of the anterior right 9th rib.  The anterior ends of the 10th 11th and 12th ribs are not included on this CT of the chest.  Small right pleural effusion with atelectasis at the right lung base.  Coronary artery calcification noted.  Small pericardial effusion noted.      Electronically signed by: Ghanshyam Curiel MD  Date:    04/04/2025  Time:    16:34               Narrative:    EXAMINATION:  CT CHEST WITHOUT CONTRAST    CLINICAL HISTORY:  Chest trauma, blunt;    TECHNIQUE:  Low dose axial images, sagittal and coronal reformations were obtained from the thoracic inlet to the lung bases. Contrast was not administered.    COMPARISON:  Rib x-rays of April 4, 2025    FINDINGS:  There is acute fracture of the anterior right 9th rib.  The anterior ends of the 10th 11th and 12th ribs are not included on this CT of the chest.  No pneumothorax is seen.  A small right pleural effusion is noted with atelectasis at the right lung base.  An intrapulmonary mass is not seen.    Heart and great vessels are of normal size and contour.  Enlargement or aneurysm is not seen.  There is coronary artery calcification.  A mediastinal hemorrhage is not demonstrated.  There is a small pericardial effusion.    The visualized portions of the liver included on this study appear intact.  There are multiple granulomas seen in the spleen.                                       X-Ray Chest PA And Lateral (Final result)  Result time 04/04/25 14:58:26      Final result by Ghanshyam Curiel Jr., MD (04/04/25 14:58:26)                   Impression:      No acute abnormality.      Electronically signed by: Ghanshyam Curiel MD  Date:    04/04/2025  Time:    14:58               Narrative:    EXAMINATION:  XR CHEST PA AND LATERAL    CLINICAL HISTORY:  Pleurodynia    TECHNIQUE:  PA and lateral views of the chest were performed.    COMPARISON:  Chest x-ray of October 24, 2024    FINDINGS:  The  lungs are clear, with normal appearance of pulmonary vasculature and no pleural effusion or pneumothorax.    The cardiac silhouette is normal in size. The hilar and mediastinal contours are unremarkable.    Bones are intact.                                       X-Ray Ribs 2 View Right (Final result)  Result time 04/04/25 15:00:58      Final result by Ghanshyam Curiel Jr., MD (04/04/25 15:00:58)                   Impression:      Acute fractures of the right 8th 9th and 10th ribs.  The 9th rib fracture is displaced.      Electronically signed by: Ghanshyam Curiel MD  Date:    04/04/2025  Time:    15:00               Narrative:    EXAMINATION:  XR RIBS 2 VIEW RIGHT    CLINICAL HISTORY:  rib pain;    TECHNIQUE:  Two views of the right ribs were performed.    COMPARISON:  Chest x-ray of  April 4, 2025    FINDINGS:  There are acute fractures of the anterior right 8th 9th and 10th ribs.  The 9th rib is displaced.  This overlies the liver.  An underlying pleural or pulmonary abnormality is not seen.                                       Medications   hydrALAZINE tablet 50 mg (50 mg Oral Given 4/4/25 1829)   hydrALAZINE tablet 50 mg (50 mg Oral Given 4/4/25 2039)     Medical Decision Making  Amount and/or Complexity of Data Reviewed  Radiology: ordered. Decision-making details documented in ED Course.    Risk  Prescription drug management.         APC / Resident Notes:   Patient is a 79 y.o. male who presents to the ED 04/04/2025 who Underwent emergent evaluation for rib fracture.  X-ray with evident 9th 10th and 11th rib fractures.  CT concurrent with small pleural empirically radial effusions which are discussed with patient.  Pain is controlled in the ED. evidence of pneumothorax or other emergent process.  No evidence of pneumonia.  He is given incentive spirometer.  He is hypertensive and admits to missing his hypertension medications.  He is asymptomatic with this and his medications were restarted and his  hydralazine is increased to 100 mg b.i.d. and recommended him to follow up with his cardiologist for this.  Again he is asymptomatic and I do not think any hypertensive crisis or emergency or further workup indicated at this time. Based on my clinical evaluation, I do not appreciate any immediate, emergent, or life threatening condition or etiology that warrants additional workup today and feel that the patient can be discharged with close follow up care. Case discussed with Dr. Preciado who is agreeable to plan of care. Follow up and return precautions discussed; patient verbalized understanding and is agreeable to plan of care. Patient discharged home in stable condition.         Attending Attestation:     Physician Attestation Statement for NP/PA:       Other NP/PA Attestation Additions:    History of Present Illness: 79-year-old male presented for rib pain status post fall 10 days ago.    Medical Decision Making: Initial differential diagnosis included but not limited to fracture, contusion, and pneumothorax.  The patient's x-rays and CT scan was significant for multiple rib fractures.  The patient's blood pressure was also noted to be elevated.  He was treated here with p.o. medications.  I am in agreement with the nurse practitioner's assessment, treatment, and plan of care.             ED Course as of 04/05/25 1439   Fri Apr 04, 2025   1637 X-Ray Ribs 2 View Right [JK]      ED Course User Index  [JK] Kristine Jones NP               Medical Decision Making:   Differential Diagnosis:     Fracture   Contusion  Pneumonia  Pleurisy             Clinical Impression:  Final diagnoses:  [R07.81] Rib pain  [S22.41XA] Closed fracture of multiple ribs of right side, initial encounter (Primary)  [J98.11] Atelectasis  [J90] Pleural effusion  [I31.39] Pericardial effusion          ED Disposition Condition    Discharge Stable          ED Prescriptions       Medication Sig Dispense Start Date End Date Auth. Provider     LIDOcaine (LIDODERM) 5 % Place 1 patch onto the skin once daily. Remove & Discard patch within 12 hours or as directed by MD for 10 days 10 patch 2025 Kristine Jones NP    HYDROcodone-acetaminophen (NORCO) 5-325 mg per tablet Take 1 tablet by mouth every 8 (eight) hours as needed for Pain. 9 tablet 2025 Kristine Jones NP    hydrALAZINE (APRESOLINE) 100 MG tablet Take 1 tablet (100 mg total) by mouth 2 (two) times daily. 60 tablet 2025 Kristine Jones NP          Follow-up Information       Follow up With Specialties Details Why Contact Info Additional Information    Sp Lilly MD Family Medicine In 2 days  [address] [facility]  [place] [address]  [phone]       [facility] Emergency Medicine  As needed, If symptoms worsen [address] [place] [address] [address] [place] [address] 1st floor             Kristine Jones NP  [phone]         [1]   Social History  Tobacco Use    Smoking status: Former     Current packs/day: 0.00     Average packs/day: 0.3 packs/day for 10.0 years (2.5 ttl pk-yrs)     Types: Cigarettes     Start date: 1991     Quit date: 2001     Years since quittin.6     Passive exposure: Past    Smokeless tobacco: Never   Substance Use Topics    Alcohol use: Not Currently     Comment: seldom    Drug use: Not Currently     Frequency: 8.0 times per week     Types: Marijuana        Brian Preciado MD  [phone]

## 2025-04-06 NOTE — PROGRESS NOTES
Patient was seen in the ED on 4/4/25. Phoned patient on 2 separate occasions to assist with Post ED Discharge Navigation. Patient was unavailable. Message left on voice mail. Encounter closed.  Radha Ortiz

## 2025-04-07 RX ORDER — ZOLPIDEM TARTRATE 10 MG/1
10 TABLET ORAL NIGHTLY PRN
Qty: 30 TABLET | Refills: 3 | Status: SHIPPED | OUTPATIENT
Start: 2025-04-07

## 2025-04-14 ENCOUNTER — HOSPITAL ENCOUNTER (INPATIENT)
Facility: HOSPITAL | Age: 80
LOS: 11 days | Discharge: HOME HEALTH | DRG: 305 | End: 2025-04-26
Attending: EMERGENCY MEDICINE | Admitting: INTERNAL MEDICINE
Payer: MEDICARE

## 2025-04-14 DIAGNOSIS — R07.9 CHEST PAIN: ICD-10-CM

## 2025-04-14 DIAGNOSIS — R79.89 ELEVATED TROPONIN: ICD-10-CM

## 2025-04-14 DIAGNOSIS — H70.93 MASTOIDITIS OF BOTH SIDES: ICD-10-CM

## 2025-04-14 DIAGNOSIS — R06.00 ACUTE DYSPNEA: ICD-10-CM

## 2025-04-14 DIAGNOSIS — R90.89 ABNORMAL FINDING ON MRI OF BRAIN: ICD-10-CM

## 2025-04-14 DIAGNOSIS — F32.2 MAJOR DEPRESSIVE DISORDER, SINGLE EPISODE, SEVERE WITHOUT PSYCHOTIC FEATURES: ICD-10-CM

## 2025-04-14 DIAGNOSIS — R51.9 NONINTRACTABLE HEADACHE, UNSPECIFIED CHRONICITY PATTERN, UNSPECIFIED HEADACHE TYPE: ICD-10-CM

## 2025-04-14 DIAGNOSIS — I49.9 ARRHYTHMIA: ICD-10-CM

## 2025-04-14 DIAGNOSIS — R20.2 PARESTHESIA: ICD-10-CM

## 2025-04-14 DIAGNOSIS — M19.90 ARTHRITIS: ICD-10-CM

## 2025-04-14 DIAGNOSIS — R42 DIZZINESS: ICD-10-CM

## 2025-04-14 DIAGNOSIS — I16.1 HYPERTENSIVE EMERGENCY: Primary | ICD-10-CM

## 2025-04-14 LAB
ABSOLUTE EOSINOPHIL (SMH): 0.13 K/UL
ABSOLUTE MONOCYTE (SMH): 0.58 K/UL (ref 0.3–1)
ABSOLUTE NEUTROPHIL COUNT (SMH): 4.5 K/UL (ref 1.8–7.7)
ALBUMIN SERPL-MCNC: 3.2 G/DL (ref 3.5–5.2)
ALP SERPL-CCNC: 67 UNIT/L (ref 40–150)
ALT SERPL-CCNC: 10 UNIT/L (ref 10–44)
ANION GAP (SMH): 9 MMOL/L (ref 8–16)
AST SERPL-CCNC: 21 UNIT/L (ref 11–45)
BASOPHILS # BLD AUTO: 0.01 K/UL
BASOPHILS NFR BLD AUTO: 0.2 %
BILIRUB SERPL-MCNC: 0.4 MG/DL (ref 0.1–1)
BILIRUB UR QL STRIP.AUTO: NEGATIVE
BUN SERPL-MCNC: 10 MG/DL (ref 8–23)
CALCIUM SERPL-MCNC: 8.3 MG/DL (ref 8.7–10.5)
CHLORIDE SERPL-SCNC: 112 MMOL/L (ref 95–110)
CLARITY UR: CLEAR
CO2 SERPL-SCNC: 17 MMOL/L (ref 23–29)
COLOR UR AUTO: YELLOW
CREAT SERPL-MCNC: 1.3 MG/DL (ref 0.5–1.4)
ERYTHROCYTE [DISTWIDTH] IN BLOOD BY AUTOMATED COUNT: 16.9 % (ref 11.5–14.5)
GFR SERPLBLD CREATININE-BSD FMLA CKD-EPI: 57 ML/MIN/1.73/M2
GLUCOSE SERPL-MCNC: 96 MG/DL (ref 70–110)
GLUCOSE UR QL STRIP: NEGATIVE
HCT VFR BLD AUTO: 37 % (ref 40–54)
HGB BLD-MCNC: 11.8 GM/DL (ref 14–18)
HGB UR QL STRIP: NEGATIVE
IMM GRANULOCYTES # BLD AUTO: 0.02 K/UL (ref 0–0.04)
IMM GRANULOCYTES NFR BLD AUTO: 0.3 % (ref 0–0.5)
INFLUENZA A MOLECULAR (OHS): NEGATIVE
INFLUENZA B MOLECULAR (OHS): NEGATIVE
KETONES UR QL STRIP: ABNORMAL
LEUKOCYTE ESTERASE UR QL STRIP: NEGATIVE
LYMPHOCYTES # BLD AUTO: 0.51 K/UL (ref 1–4.8)
MAGNESIUM SERPL-MCNC: 1.9 MG/DL (ref 1.6–2.6)
MCH RBC QN AUTO: 27.5 PG (ref 27–31)
MCHC RBC AUTO-ENTMCNC: 31.9 G/DL (ref 32–36)
MCV RBC AUTO: 86 FL (ref 82–98)
NITRITE UR QL STRIP: NEGATIVE
NT-PROBNP SERPL-MCNC: 5217 PG/ML
NUCLEATED RBC (/100WBC) (SMH): 0 /100 WBC
PH UR STRIP: 6 [PH]
PLATELET # BLD AUTO: 279 K/UL (ref 150–450)
PMV BLD AUTO: 12 FL (ref 9.2–12.9)
POTASSIUM SERPL-SCNC: 3.1 MMOL/L (ref 3.5–5.1)
PROT SERPL-MCNC: 6.7 GM/DL (ref 6–8.4)
PROT UR QL STRIP: NEGATIVE
RBC # BLD AUTO: 4.29 M/UL (ref 4.6–6.2)
RELATIVE EOSINOPHIL (SMH): 2.3 % (ref 0–8)
RELATIVE LYMPHOCYTE (SMH): 8.9 % (ref 18–48)
RELATIVE MONOCYTE (SMH): 10.1 % (ref 4–15)
RELATIVE NEUTROPHIL (SMH): 78.2 % (ref 38–73)
SARS-COV-2 RDRP RESP QL NAA+PROBE: NEGATIVE
SODIUM SERPL-SCNC: 138 MMOL/L (ref 136–145)
SP GR UR STRIP: 1.01
TROPONIN I SERPL HS-MCNC: 22 NG/L
TSH SERPL-ACNC: 0.93 UIU/ML (ref 0.4–4)
UROBILINOGEN UR STRIP-ACNC: NEGATIVE EU/DL
WBC # BLD AUTO: 5.75 K/UL (ref 3.9–12.7)

## 2025-04-14 PROCEDURE — 36415 COLL VENOUS BLD VENIPUNCTURE: CPT | Performed by: EMERGENCY MEDICINE

## 2025-04-14 PROCEDURE — 25000003 PHARM REV CODE 250: Performed by: EMERGENCY MEDICINE

## 2025-04-14 PROCEDURE — 84484 ASSAY OF TROPONIN QUANT: CPT | Performed by: EMERGENCY MEDICINE

## 2025-04-14 PROCEDURE — 83735 ASSAY OF MAGNESIUM: CPT | Performed by: EMERGENCY MEDICINE

## 2025-04-14 PROCEDURE — U0002 COVID-19 LAB TEST NON-CDC: HCPCS | Performed by: EMERGENCY MEDICINE

## 2025-04-14 PROCEDURE — 80053 COMPREHEN METABOLIC PANEL: CPT | Performed by: EMERGENCY MEDICINE

## 2025-04-14 PROCEDURE — 93005 ELECTROCARDIOGRAM TRACING: CPT

## 2025-04-14 PROCEDURE — 81003 URINALYSIS AUTO W/O SCOPE: CPT | Performed by: EMERGENCY MEDICINE

## 2025-04-14 PROCEDURE — 93010 ELECTROCARDIOGRAM REPORT: CPT | Mod: ,,, | Performed by: GENERAL PRACTICE

## 2025-04-14 PROCEDURE — 87502 INFLUENZA DNA AMP PROBE: CPT | Performed by: EMERGENCY MEDICINE

## 2025-04-14 PROCEDURE — 83880 ASSAY OF NATRIURETIC PEPTIDE: CPT | Performed by: EMERGENCY MEDICINE

## 2025-04-14 PROCEDURE — 63600175 PHARM REV CODE 636 W HCPCS: Performed by: EMERGENCY MEDICINE

## 2025-04-14 PROCEDURE — 85025 COMPLETE CBC W/AUTO DIFF WBC: CPT | Performed by: EMERGENCY MEDICINE

## 2025-04-14 PROCEDURE — 84443 ASSAY THYROID STIM HORMONE: CPT | Performed by: EMERGENCY MEDICINE

## 2025-04-14 RX ORDER — CALCIUM GLUCONATE 20 MG/ML
1 INJECTION, SOLUTION INTRAVENOUS
Status: COMPLETED | OUTPATIENT
Start: 2025-04-14 | End: 2025-04-15

## 2025-04-14 RX ORDER — HYDRALAZINE HYDROCHLORIDE 20 MG/ML
10 INJECTION INTRAMUSCULAR; INTRAVENOUS
Status: COMPLETED | OUTPATIENT
Start: 2025-04-14 | End: 2025-04-14

## 2025-04-14 RX ORDER — POTASSIUM CHLORIDE 20 MEQ/1
40 TABLET, EXTENDED RELEASE ORAL
Status: COMPLETED | OUTPATIENT
Start: 2025-04-14 | End: 2025-04-14

## 2025-04-14 RX ADMIN — POTASSIUM CHLORIDE 40 MEQ: 1500 TABLET, EXTENDED RELEASE ORAL at 11:04

## 2025-04-14 RX ADMIN — CALCIUM GLUCONATE 1 G: 20 INJECTION, SOLUTION INTRAVENOUS at 11:04

## 2025-04-14 RX ADMIN — HYDRALAZINE HYDROCHLORIDE 10 MG: 20 INJECTION INTRAMUSCULAR; INTRAVENOUS at 11:04

## 2025-04-15 ENCOUNTER — CLINICAL SUPPORT (OUTPATIENT)
Dept: CARDIOLOGY | Facility: HOSPITAL | Age: 80
End: 2025-04-15
Attending: INTERNAL MEDICINE
Payer: MEDICARE

## 2025-04-15 PROBLEM — H53.8 BLURRED VISION: Status: ACTIVE | Noted: 2025-04-15

## 2025-04-15 PROBLEM — R26.9 GAIT DISTURBANCE: Status: ACTIVE | Noted: 2025-04-15

## 2025-04-15 PROBLEM — I63.9 STROKE: Status: ACTIVE | Noted: 2025-04-15

## 2025-04-15 PROBLEM — E87.6 HYPOKALEMIA: Status: ACTIVE | Noted: 2025-04-15

## 2025-04-15 PROBLEM — I16.1 HYPERTENSIVE EMERGENCY: Status: ACTIVE | Noted: 2025-04-15

## 2025-04-15 PROBLEM — R90.89 ABNORMAL FINDING ON MRI OF BRAIN: Status: ACTIVE | Noted: 2025-04-15

## 2025-04-15 PROBLEM — D64.9 ANEMIA: Status: ACTIVE | Noted: 2025-04-15

## 2025-04-15 LAB
AORTIC ROOT ANNULUS: 3.2 CM
AORTIC VALVE CUSP SEPERATION: 1.9 CM
APICAL FOUR CHAMBER EJECTION FRACTION: 63 %
APICAL TWO CHAMBER EJECTION FRACTION: 67 %
AV INDEX (PROSTH): 1.15
AV MEAN GRADIENT: 7 MMHG
AV PEAK GRADIENT: 13 MMHG
AV REGURGITATION PRESSURE HALF TIME: 508 MS
AV VALVE AREA BY VELOCITY RATIO: 3.3 CM²
AV VALVE AREA: 3.6 CM²
AV VELOCITY RATIO: 1.06
BSA FOR ECHO PROCEDURE: 1.73 M2
CHOLEST SERPL-MCNC: 124 MG/DL (ref 120–199)
CHOLEST/HDLC SERPL: 2.5 {RATIO} (ref 2–5)
CV ECHO LV RWT: 0.65 CM
DOP CALC AO PEAK VEL: 1.8 M/S
DOP CALC AO VTI: 28.7 CM
DOP CALC LVOT AREA: 3.1 CM2
DOP CALC LVOT DIAMETER: 2 CM
DOP CALC LVOT PEAK VEL: 1.9 M/S
DOP CALC LVOT STROKE VOLUME: 103.3 CM3
DOP CALC MV VTI: 36.8 CM
DOP CALCLVOT PEAK VEL VTI: 32.9 CM
E WAVE DECELERATION TIME: 113 MSEC
E/A RATIO: 0.94
E/E' RATIO: 9 M/S
EAG (SMH): 108 MG/DL (ref 68–131)
ECHO LV POSTERIOR WALL: 1.3 CM (ref 0.6–1.1)
FRACTIONAL SHORTENING: 32.5 % (ref 28–44)
HBA1C MFR BLD: 5.4 % (ref 4.5–6.2)
HDLC SERPL-MCNC: 50 MG/DL (ref 40–75)
HDLC SERPL: 40.3 % (ref 20–50)
INTERVENTRICULAR SEPTUM: 1.4 CM (ref 0.6–1.1)
IVC DIAMETER: 0.94 CM
LDLC SERPL CALC-MCNC: 61 MG/DL (ref 63–159)
LEFT ATRIUM AREA SYSTOLIC (APICAL 2 CHAMBER): 20.8 CM2
LEFT ATRIUM AREA SYSTOLIC (APICAL 4 CHAMBER): 23.4 CM2
LEFT ATRIUM SIZE: 4.5 CM
LEFT ATRIUM VOLUME INDEX MOD: 38 ML/M2
LEFT ATRIUM VOLUME MOD: 67 ML
LEFT INTERNAL DIMENSION IN SYSTOLE: 2.7 CM (ref 2.1–4)
LEFT VENTRICLE DIASTOLIC VOLUME INDEX: 38.86 ML/M2
LEFT VENTRICLE DIASTOLIC VOLUME: 68 ML
LEFT VENTRICLE END DIASTOLIC VOLUME APICAL 2 CHAMBER: 52.9 ML
LEFT VENTRICLE END DIASTOLIC VOLUME APICAL 4 CHAMBER: 68.3 ML
LEFT VENTRICLE END SYSTOLIC VOLUME APICAL 2 CHAMBER: 62.4 ML
LEFT VENTRICLE END SYSTOLIC VOLUME APICAL 4 CHAMBER: 64 ML
LEFT VENTRICLE MASS INDEX: 112.9 G/M2
LEFT VENTRICLE SYSTOLIC VOLUME INDEX: 15.4 ML/M2
LEFT VENTRICLE SYSTOLIC VOLUME: 27 ML
LEFT VENTRICULAR INTERNAL DIMENSION IN DIASTOLE: 4 CM (ref 3.5–6)
LEFT VENTRICULAR MASS: 197.6 G
LV LATERAL E/E' RATIO: 9.8 M/S
LV SEPTAL E/E' RATIO: 8.2 M/S
LVED V (TEICH): 67.9 ML
LVES V (TEICH): 26.8 ML
LVOT MG: 7 MMHG
LVOT MV: 1.17 CM/S
MV MEAN GRADIENT: 2 MMHG
MV PEAK A VEL: 1.04 M/S
MV PEAK E VEL: 0.98 M/S
MV PEAK GRADIENT: 6 MMHG
MV STENOSIS PRESSURE HALF TIME: 34 MS
MV VALVE AREA BY CONTINUITY EQUATION: 2.81 CM2
MV VALVE AREA P 1/2 METHOD: 6.47 CM2
NONHDLC SERPL-MCNC: 74 MG/DL
OHS LV EJECTION FRACTION SIMPSONS BIPLANE MOD: 65 %
OHS QRS DURATION: 88 MS
OHS QTC CALCULATION: 489 MS
PISA AR MAX VEL: 3.55 M/S
PISA MRMAX VEL: 4.8 M/S
PISA TR MAX VEL: 3.1 M/S
PV MV: 0.84 M/S
PV PEAK GRADIENT: 6 MMHG
PV PEAK VELOCITY: 1.24 M/S
RIGHT ATRIUM VOLUME AREA LENGTH APICAL 4 CHAMBER: 38.3 ML
RV TISSUE DOPPLER FREE WALL SYSTOLIC VELOCITY 1 (APICAL 4 CHAMBER VIEW): 16.2 CM/S
TDI LATERAL: 0.1 M/S
TDI SEPTAL: 0.12 M/S
TDI: 0.11 M/S
TR MAX PG: 39 MMHG
TRICUSPID ANNULAR PLANE SYSTOLIC EXCURSION: 1.78 CM
TRIGL SERPL-MCNC: 65 MG/DL (ref 30–150)
TROPONIN HIGH SENSITIVE (SMH): 46.5 PG/ML
TROPONIN HIGH SENSITIVE (SMH): 48.5 PG/ML
TROPONIN HIGH SENSITIVE (SMH): 72.7 PG/ML
TROPONIN I SERPL HS-MCNC: 36 NG/L
Z-SCORE OF LEFT VENTRICULAR DIMENSION IN END DIASTOLE: -1.91
Z-SCORE OF LEFT VENTRICULAR DIMENSION IN END SYSTOLE: -0.82

## 2025-04-15 PROCEDURE — 84484 ASSAY OF TROPONIN QUANT: CPT | Performed by: INTERNAL MEDICINE

## 2025-04-15 PROCEDURE — 93010 ELECTROCARDIOGRAM REPORT: CPT | Mod: ,,, | Performed by: GENERAL PRACTICE

## 2025-04-15 PROCEDURE — 94761 N-INVAS EAR/PLS OXIMETRY MLT: CPT

## 2025-04-15 PROCEDURE — 97116 GAIT TRAINING THERAPY: CPT

## 2025-04-15 PROCEDURE — 63600175 PHARM REV CODE 636 W HCPCS: Performed by: INTERNAL MEDICINE

## 2025-04-15 PROCEDURE — 93306 TTE W/DOPPLER COMPLETE: CPT

## 2025-04-15 PROCEDURE — 93306 TTE W/DOPPLER COMPLETE: CPT | Mod: 26,,, | Performed by: GENERAL PRACTICE

## 2025-04-15 PROCEDURE — 99900031 HC PATIENT EDUCATION (STAT)

## 2025-04-15 PROCEDURE — 25000003 PHARM REV CODE 250: Performed by: NURSE PRACTITIONER

## 2025-04-15 PROCEDURE — 97167 OT EVAL HIGH COMPLEX 60 MIN: CPT

## 2025-04-15 PROCEDURE — 63600175 PHARM REV CODE 636 W HCPCS: Performed by: EMERGENCY MEDICINE

## 2025-04-15 PROCEDURE — 97535 SELF CARE MNGMENT TRAINING: CPT

## 2025-04-15 PROCEDURE — 97162 PT EVAL MOD COMPLEX 30 MIN: CPT

## 2025-04-15 PROCEDURE — 25500020 PHARM REV CODE 255: Performed by: HOSPITALIST

## 2025-04-15 PROCEDURE — 25000003 PHARM REV CODE 250: Performed by: INTERNAL MEDICINE

## 2025-04-15 PROCEDURE — 20000000 HC ICU ROOM

## 2025-04-15 PROCEDURE — 84484 ASSAY OF TROPONIN QUANT: CPT | Performed by: NURSE PRACTITIONER

## 2025-04-15 PROCEDURE — 93005 ELECTROCARDIOGRAM TRACING: CPT | Performed by: GENERAL PRACTICE

## 2025-04-15 PROCEDURE — 80061 LIPID PANEL: CPT | Performed by: INTERNAL MEDICINE

## 2025-04-15 PROCEDURE — 84484 ASSAY OF TROPONIN QUANT: CPT | Performed by: EMERGENCY MEDICINE

## 2025-04-15 PROCEDURE — 36415 COLL VENOUS BLD VENIPUNCTURE: CPT | Performed by: EMERGENCY MEDICINE

## 2025-04-15 PROCEDURE — 92610 EVALUATE SWALLOWING FUNCTION: CPT

## 2025-04-15 PROCEDURE — 36415 COLL VENOUS BLD VENIPUNCTURE: CPT | Performed by: INTERNAL MEDICINE

## 2025-04-15 PROCEDURE — 99223 1ST HOSP IP/OBS HIGH 75: CPT | Mod: 25,,, | Performed by: GENERAL PRACTICE

## 2025-04-15 PROCEDURE — 36415 COLL VENOUS BLD VENIPUNCTURE: CPT | Performed by: NURSE PRACTITIONER

## 2025-04-15 PROCEDURE — 25000003 PHARM REV CODE 250: Performed by: EMERGENCY MEDICINE

## 2025-04-15 PROCEDURE — G0427 INPT/ED TELECONSULT70: HCPCS | Mod: 95,,, | Performed by: NURSE PRACTITIONER

## 2025-04-15 PROCEDURE — 25000003 PHARM REV CODE 250: Performed by: HOSPITALIST

## 2025-04-15 PROCEDURE — 83036 HEMOGLOBIN GLYCOSYLATED A1C: CPT | Performed by: INTERNAL MEDICINE

## 2025-04-15 RX ORDER — IBUPROFEN 200 MG
16 TABLET ORAL
Status: DISCONTINUED | OUTPATIENT
Start: 2025-04-15 | End: 2025-04-26 | Stop reason: HOSPADM

## 2025-04-15 RX ORDER — ASPIRIN 325 MG
325 TABLET ORAL
Status: COMPLETED | OUTPATIENT
Start: 2025-04-15 | End: 2025-04-15

## 2025-04-15 RX ORDER — TALC
6 POWDER (GRAM) TOPICAL NIGHTLY PRN
Status: DISCONTINUED | OUTPATIENT
Start: 2025-04-15 | End: 2025-04-15 | Stop reason: SDUPTHER

## 2025-04-15 RX ORDER — ACETAMINOPHEN 325 MG/1
650 TABLET ORAL EVERY 4 HOURS PRN
Status: DISCONTINUED | OUTPATIENT
Start: 2025-04-15 | End: 2025-04-15

## 2025-04-15 RX ORDER — TALC
6 POWDER (GRAM) TOPICAL NIGHTLY PRN
Status: DISCONTINUED | OUTPATIENT
Start: 2025-04-15 | End: 2025-04-26 | Stop reason: HOSPADM

## 2025-04-15 RX ORDER — ZOLPIDEM TARTRATE 5 MG/1
5 TABLET ORAL NIGHTLY PRN
Status: DISCONTINUED | OUTPATIENT
Start: 2025-04-15 | End: 2025-04-26 | Stop reason: HOSPADM

## 2025-04-15 RX ORDER — LOSARTAN POTASSIUM 50 MG/1
50 TABLET ORAL DAILY
Status: DISCONTINUED | OUTPATIENT
Start: 2025-04-15 | End: 2025-04-17

## 2025-04-15 RX ORDER — LORAZEPAM 2 MG/ML
0.5 INJECTION INTRAMUSCULAR
Status: COMPLETED | OUTPATIENT
Start: 2025-04-15 | End: 2025-04-15

## 2025-04-15 RX ORDER — ACETAMINOPHEN 325 MG/1
650 TABLET ORAL EVERY 8 HOURS PRN
Status: DISCONTINUED | OUTPATIENT
Start: 2025-04-15 | End: 2025-04-26 | Stop reason: HOSPADM

## 2025-04-15 RX ORDER — HYDRALAZINE HYDROCHLORIDE 20 MG/ML
10 INJECTION INTRAMUSCULAR; INTRAVENOUS
Status: COMPLETED | OUTPATIENT
Start: 2025-04-15 | End: 2025-04-15

## 2025-04-15 RX ORDER — BISACODYL 10 MG/1
10 SUPPOSITORY RECTAL DAILY PRN
Status: DISCONTINUED | OUTPATIENT
Start: 2025-04-15 | End: 2025-04-26 | Stop reason: HOSPADM

## 2025-04-15 RX ORDER — CEFTRIAXONE 1 G/1
1 INJECTION, POWDER, FOR SOLUTION INTRAMUSCULAR; INTRAVENOUS
Status: DISCONTINUED | OUTPATIENT
Start: 2025-04-15 | End: 2025-04-18

## 2025-04-15 RX ORDER — GABAPENTIN 100 MG/1
100 CAPSULE ORAL NIGHTLY
Status: DISCONTINUED | OUTPATIENT
Start: 2025-04-15 | End: 2025-04-16

## 2025-04-15 RX ORDER — ONDANSETRON HYDROCHLORIDE 2 MG/ML
4 INJECTION, SOLUTION INTRAVENOUS EVERY 6 HOURS PRN
Status: DISCONTINUED | OUTPATIENT
Start: 2025-04-15 | End: 2025-04-26 | Stop reason: HOSPADM

## 2025-04-15 RX ORDER — NICARDIPINE HYDROCHLORIDE 0.2 MG/ML
0-15 INJECTION INTRAVENOUS CONTINUOUS
Status: DISCONTINUED | OUTPATIENT
Start: 2025-04-15 | End: 2025-04-16

## 2025-04-15 RX ORDER — IBUPROFEN 200 MG
24 TABLET ORAL
Status: DISCONTINUED | OUTPATIENT
Start: 2025-04-15 | End: 2025-04-26 | Stop reason: HOSPADM

## 2025-04-15 RX ORDER — SODIUM,POTASSIUM PHOSPHATES 280-250MG
2 POWDER IN PACKET (EA) ORAL
Status: DISCONTINUED | OUTPATIENT
Start: 2025-04-15 | End: 2025-04-15

## 2025-04-15 RX ORDER — GLUCAGON 1 MG
1 KIT INJECTION
Status: DISCONTINUED | OUTPATIENT
Start: 2025-04-15 | End: 2025-04-26 | Stop reason: HOSPADM

## 2025-04-15 RX ORDER — NALOXONE HCL 0.4 MG/ML
0.02 VIAL (ML) INJECTION
Status: DISCONTINUED | OUTPATIENT
Start: 2025-04-15 | End: 2025-04-26 | Stop reason: HOSPADM

## 2025-04-15 RX ORDER — AMLODIPINE BESYLATE 2.5 MG/1
2.5 TABLET ORAL DAILY
Status: DISCONTINUED | OUTPATIENT
Start: 2025-04-15 | End: 2025-04-15

## 2025-04-15 RX ORDER — ACETAMINOPHEN 500 MG
1000 TABLET ORAL
Status: COMPLETED | OUTPATIENT
Start: 2025-04-15 | End: 2025-04-15

## 2025-04-15 RX ORDER — LANOLIN ALCOHOL/MO/W.PET/CERES
800 CREAM (GRAM) TOPICAL
Status: DISCONTINUED | OUTPATIENT
Start: 2025-04-15 | End: 2025-04-15

## 2025-04-15 RX ORDER — SODIUM CHLORIDE 0.9 % (FLUSH) 0.9 %
10 SYRINGE (ML) INJECTION
Status: DISCONTINUED | OUTPATIENT
Start: 2025-04-15 | End: 2025-04-26 | Stop reason: HOSPADM

## 2025-04-15 RX ORDER — HYDROCODONE BITARTRATE AND ACETAMINOPHEN 5; 325 MG/1; MG/1
1 TABLET ORAL EVERY 6 HOURS PRN
Refills: 0 | Status: DISCONTINUED | OUTPATIENT
Start: 2025-04-15 | End: 2025-04-15

## 2025-04-15 RX ORDER — ALUMINUM HYDROXIDE, MAGNESIUM HYDROXIDE, AND SIMETHICONE 1200; 120; 1200 MG/30ML; MG/30ML; MG/30ML
30 SUSPENSION ORAL 4 TIMES DAILY PRN
Status: DISCONTINUED | OUTPATIENT
Start: 2025-04-15 | End: 2025-04-15

## 2025-04-15 RX ORDER — HYDRALAZINE HYDROCHLORIDE 20 MG/ML
10 INJECTION INTRAMUSCULAR; INTRAVENOUS ONCE
Status: DISCONTINUED | OUTPATIENT
Start: 2025-04-16 | End: 2025-04-15

## 2025-04-15 RX ORDER — NICARDIPINE HYDROCHLORIDE 0.2 MG/ML
2.5 INJECTION INTRAVENOUS CONTINUOUS
Status: DISCONTINUED | OUTPATIENT
Start: 2025-04-15 | End: 2025-04-15

## 2025-04-15 RX ORDER — AMOXICILLIN 250 MG
1 CAPSULE ORAL DAILY PRN
Status: DISCONTINUED | OUTPATIENT
Start: 2025-04-15 | End: 2025-04-26 | Stop reason: HOSPADM

## 2025-04-15 RX ORDER — LABETALOL HYDROCHLORIDE 5 MG/ML
10 INJECTION, SOLUTION INTRAVENOUS
Status: COMPLETED | OUTPATIENT
Start: 2025-04-15 | End: 2025-04-15

## 2025-04-15 RX ORDER — HYDRALAZINE HYDROCHLORIDE 25 MG/1
100 TABLET, FILM COATED ORAL 2 TIMES DAILY
Status: DISCONTINUED | OUTPATIENT
Start: 2025-04-15 | End: 2025-04-16

## 2025-04-15 RX ORDER — MUPIROCIN 20 MG/G
OINTMENT TOPICAL 2 TIMES DAILY
Status: COMPLETED | OUTPATIENT
Start: 2025-04-15 | End: 2025-04-19

## 2025-04-15 RX ORDER — BUSPIRONE HYDROCHLORIDE 5 MG/1
10 TABLET ORAL 3 TIMES DAILY
Status: DISCONTINUED | OUTPATIENT
Start: 2025-04-15 | End: 2025-04-17

## 2025-04-15 RX ORDER — METOPROLOL TARTRATE 25 MG/1
25 TABLET, FILM COATED ORAL 2 TIMES DAILY
Status: DISCONTINUED | OUTPATIENT
Start: 2025-04-15 | End: 2025-04-17

## 2025-04-15 RX ORDER — ATORVASTATIN CALCIUM 20 MG/1
20 TABLET, FILM COATED ORAL DAILY
Status: DISCONTINUED | OUTPATIENT
Start: 2025-04-15 | End: 2025-04-19

## 2025-04-15 RX ORDER — TAMSULOSIN HYDROCHLORIDE 0.4 MG/1
0.4 CAPSULE ORAL DAILY
Status: DISCONTINUED | OUTPATIENT
Start: 2025-04-15 | End: 2025-04-26 | Stop reason: HOSPADM

## 2025-04-15 RX ADMIN — BUSPIRONE HYDROCHLORIDE 10 MG: 5 TABLET ORAL at 03:04

## 2025-04-15 RX ADMIN — HYDRALAZINE HYDROCHLORIDE 10 MG: 20 INJECTION INTRAMUSCULAR; INTRAVENOUS at 12:04

## 2025-04-15 RX ADMIN — GABAPENTIN 100 MG: 100 CAPSULE ORAL at 08:04

## 2025-04-15 RX ADMIN — CEFTRIAXONE 1 G: 1 INJECTION, POWDER, FOR SOLUTION INTRAMUSCULAR; INTRAVENOUS at 08:04

## 2025-04-15 RX ADMIN — LORAZEPAM 0.5 MG: 2 INJECTION INTRAMUSCULAR; INTRAVENOUS at 12:04

## 2025-04-15 RX ADMIN — PIPERACILLIN SODIUM AND TAZOBACTAM SODIUM 3.38 G: 3; .375 INJECTION, POWDER, LYOPHILIZED, FOR SOLUTION INTRAVENOUS at 03:04

## 2025-04-15 RX ADMIN — AMLODIPINE BESYLATE 2.5 MG: 2.5 TABLET ORAL at 04:04

## 2025-04-15 RX ADMIN — LABETALOL HYDROCHLORIDE 10 MG: 5 INJECTION INTRAVENOUS at 02:04

## 2025-04-15 RX ADMIN — NICARDIPINE HYDROCHLORIDE 2.5 MG/HR: 0.2 INJECTION, SOLUTION INTRAVENOUS at 03:04

## 2025-04-15 RX ADMIN — NICARDIPINE HYDROCHLORIDE 2.5 MG/HR: 0.2 INJECTION, SOLUTION INTRAVENOUS at 05:04

## 2025-04-15 RX ADMIN — NICARDIPINE HYDROCHLORIDE 2.5 MG/HR: 0.2 INJECTION, SOLUTION INTRAVENOUS at 07:04

## 2025-04-15 RX ADMIN — TAMSULOSIN HYDROCHLORIDE 0.4 MG: 0.4 CAPSULE ORAL at 08:04

## 2025-04-15 RX ADMIN — BUSPIRONE HYDROCHLORIDE 10 MG: 5 TABLET ORAL at 08:04

## 2025-04-15 RX ADMIN — ATORVASTATIN CALCIUM 20 MG: 20 TABLET, FILM COATED ORAL at 08:04

## 2025-04-15 RX ADMIN — METOPROLOL TARTRATE 25 MG: 25 TABLET, FILM COATED ORAL at 08:04

## 2025-04-15 RX ADMIN — ZOLPIDEM TARTRATE 5 MG: 5 TABLET, FILM COATED ORAL at 10:04

## 2025-04-15 RX ADMIN — IOHEXOL 100 ML: 350 INJECTION, SOLUTION INTRAVENOUS at 02:04

## 2025-04-15 RX ADMIN — HYDRALAZINE HYDROCHLORIDE 100 MG: 25 TABLET ORAL at 08:04

## 2025-04-15 RX ADMIN — ACETAMINOPHEN 1000 MG: 500 TABLET ORAL at 02:04

## 2025-04-15 RX ADMIN — ASPIRIN 325 MG: 325 TABLET ORAL at 03:04

## 2025-04-15 RX ADMIN — LOSARTAN POTASSIUM 50 MG: 50 TABLET, FILM COATED ORAL at 04:04

## 2025-04-15 RX ADMIN — MUPIROCIN 1 G: 20 OINTMENT TOPICAL at 08:04

## 2025-04-15 NOTE — PLAN OF CARE
SW met with pt at bedside and discussed d/c plans. Pt shared that he lives alone and is interested in help since he fell a few weeks ago.   SW discussed HH and SNF. Pt to consider; seems more interested in HH with personal care attendant.   We also discussed assisted living and [facility] placement. Pt not agreeable with this due to cost.  SW provided and explained the Medicaid Long term waiver info and a list of private pay sitter services.    After meeting: SW noticed that IPR is being recommended. SW to present recommendation tomorrow.

## 2025-04-15 NOTE — CARE UPDATE
04/15/25 0531   Patient Assessment/Suction   Level of Consciousness (AVPU) alert   Respiratory Effort Unlabored   Expansion/Accessory Muscles/Retractions expansion symmetric   All Lung Fields Breath Sounds coarse   Rhythm/Pattern, Respiratory depth regular;pattern regular   Cough Frequency infrequent   Cough Type good;nonproductive   PRE-TX-O2   Device (Oxygen Therapy) room air   SpO2 96 %   Pulse Oximetry Type Continuous   $ Pulse Oximetry - Multiple Charge Pulse Oximetry - Multiple   Education   $ Education Oxygen;15 min

## 2025-04-15 NOTE — PT/OT/SLP EVAL
Physical Therapy Evaluation    Patient Name:  Rajendra Castle   MRN:  [id number]    Recommendations:     Discharge Recommendations: High Intensity Therapy   Discharge Equipment Recommendations: walker, rolling   Barriers to discharge: Decreased caregiver support    Assessment:     Rajendra Castle is a 79 y.o. male admitted with a medical diagnosis of Hypertensive emergency.  He presents with the following impairments/functional limitations: weakness, impaired endurance, impaired functional mobility, gait instability, impaired balance, impaired cardiopulmonary response to activity.    Pt presented seated at EOB having just returned  from testing.  He complained of feeling cold. Pt  t/f'ed to stand with CGA and ambulated in the saavedra 50 ft x2 with CGA w/o AD.  Pt was a bit unsteady and would benefit from use of  RW going forward with PT.    Rehab Prognosis: Good; patient would benefit from acute skilled PT services to address these deficits and reach maximum level of function.    Recent Surgery: * No surgery found *      Plan:     During this hospitalization, patient to be seen 6 x/week to address the identified rehab impairments via gait training, therapeutic activities, therapeutic exercises and progress toward the following goals:    Plan of Care Expires:  05/15/25    Subjective     Chief Complaint: feeling cold/ numbness and tingling to hands and feet  Patient/Family Comments/goals: Return home alone   Pain/Comfort:  Pain Rating Post-Intervention 1: 0/10    Patients cultural, spiritual, Cheondoism conflicts given the current situation:      Living Environment:  Pt lives at home alone in a [facility] w/o [facility]  Prior to admission, patients level of function was independent . Uses SPC for long distance gait. (+) .  Equipment used at home: cane, straight.  DME owned (not currently used): none.      Objective:     Communicated with nurse prior to session.  Patient found sitting edge of bed with telemetry, blood pressure cuff   upon PT entry to room.    General Precautions: Standard, fall  Orthopedic Precautions:    Braces:    Respiratory Status: Room air    Exams:  Cognitive Exam:  Patient is oriented to Person, Place, Time, and Situation  RLE ROM: WFL  RLE Strength: WFL  LLE ROM: WFL  LLE Strength: WFL    Functional Mobility:  Transfers:     Sit to Stand:  contact guard assistance with hand-held assist  Gait: 50 ft x2 CGA no AD  Balance: Fair standing balance       AM-PAC 6 CLICK MOBILITY  Total Score:        Treatment & Education:  Pt was educated on safety, use of call light, PT POC/DC recoomendation    Patient left supine with all lines intact, call button in reach, and bed alarm on.    GOALS:   Multidisciplinary Problems       Physical Therapy Goals          Problem: Physical Therapy    Goal Priority Disciplines Outcome Interventions   Physical Therapy Goal     PT, PT/OT     Description: Goals to be met by: 5/15/2025     Patient will increase functional independence with mobility by performin. Supine to sit with Modified [place]  2. Sit to stand transfer with Modified [place]  3. Gait  x 300  feet with Modified [place] using Rolling Walker.                          DME Justifications:   Rajendra's mobility limitation cannot be sufficiently resolved by the use of a cane. His functional mobility deficit can be sufficiently resolved with the use of a Rolling Walker. Patient's mobility limitation significantly impairs their ability to participate in one of more activities of daily living.  The use of a RW will significantly improve the patient's ability to participate in MRADLS and the patient will use it on regular basis in the home.    History:     Past Medical History:   Diagnosis Date    Anticoagulant long-term use     Arthritis     Coronary artery disease     Dr. Lamb    DDD (degenerative disc disease), cervical     Degenerative disc disease     Heart murmur     History of radiation therapy      Hypertension     Nontoxic multinodular goiter 09/20/2016    Prostate CA     RA (rheumatoid arthritis)     Sleep apnea     No CPAP    Vitamin D insufficiency 09/30/2016       Past Surgical History:   Procedure Laterality Date    CARPAL TUNNEL RELEASE Right 05/28/2021    Procedure: RELEASE, CARPAL TUNNEL;  Surgeon: Jose Ryan II, MD;  Location: [facility];  Service: Orthopedics;  Laterality: Right;    COLONOSCOPY  prior to 2016    normal findings per patient report    CORONARY ANGIOPLASTY WITH STENT PLACEMENT  02/2022    CYSTOSCOPY N/A 12/18/2018    Procedure: CYSTOSCOPY;  Surgeon: Garrett Pina MD;  Location: [facility] OR;  Service: Urology;  Laterality: N/A;    CYSTOSCOPY N/A 07/12/2022    Procedure: CYSTOSCOPY;  Surgeon: Garrett Pina MD;  Location: [facility] OR;  Service: Urology;  Laterality: N/A;    ESOPHAGOGASTRODUODENOSCOPY N/A 09/29/2020    Dr. Espinosa; empiric dilation; erythematous mucosa in antrum; gastric mucosal atrophy; hematin in entire stomach; biopsy: mid & distal esophagus WNL, stomach- WNL, negative for h pylori    EXCISION OF LESION OF PENIS N/A 2/23/2023    Procedure: EXCISION, LESION, PENIS;  Surgeon: Timo Myers MD;  Location: [facility] OR;  Service: Urology;  Laterality: N/A;    INSERTION OF TUNNELED CENTRAL VENOUS CATHETER (CVC) WITH SUBCUTANEOUS PORT Left 5/18/2023    Procedure: [id number];  Surgeon: Atul Faria MD;  Location[facility];  Service: General;  Laterality: Left;  left subclavian    PARATHYROIDECTOMY Right 10/27/2020    Procedure: PARATHYROIDECTOMY;  Surgeon: Latonia Clayton MD;  Location: [address];  Service: ENT;  Laterality: Right;    RELEASE OF ULNAR NERVE AT CUBITAL TUNNEL Right 05/28/2021    Procedure: RELEASE, ULNAR TUNNEL;  Surgeon: Jose Ryan II, MD;  Location: [facility];  Service: Orthopedics;  Laterality: Right;    TRANSRECTAL BIOPSY OF PROSTATE WITH ULTRASOUND GUIDANCE N/A 12/18/2018    Procedure: BIOPSY, PROSTATE, RECTAL APPROACH, WITH  US GUIDANCE;  Surgeon: Garrett Pina MD;  Location: [facility];  Service: Urology;  Laterality: N/A;    TRANSRECTAL ULTRASOUND EXAMINATION N/A 07/12/2022    Procedure: ULTRASOUND, RECTAL APPROACH;  Surgeon: Garrett Pina MD;  Location: [facility];  Service: Urology;  Laterality: N/A;       Time Tracking:     PT Received On: 04/15/25  PT Start Time: 1123     PT Stop Time: 1145  PT Total Time (min): 22 min     Billable Minutes: Evaluation 11 minutes  and Gait Training 11 minutes      04/15/2025

## 2025-04-15 NOTE — PT/OT/SLP EVAL
"Speech Language Pathology Evaluation  Bedside Swallow    Patient Name:  Rajendra Castle   MRN:  [id number]  Admitting Diagnosis: Hypertensive emergency    Recommendations:                 General Recommendations:  Cognitive-linguistic evaluation  Diet recommendations:  Regular, Thin   Aspiration Precautions: Standard aspiration precautions   General Precautions: Standard,    Communication strategies:  none    Assessment:     Rajendra Castle is a 79 y.o. male with admitting diagnosis of Hypertensive emergency.  Clinical swallowing evaluation completed. All po trials consumed with functional oral phase and no overt s/s airway compromise. REC Regular (IDDSI 7) textures with thin liquids. Diet recs communicated with patient's nurse, Jeanine, @ 1030. Will follow up for completion of cognitive communication evaluation. Unable to complete today 2° staff arriving for ultrasound.    History:   PER HPI: Patient is a 79-year-old  male who only reports he has "prostate issues" and hypertension; he takes his medications but does not check his blood pressure at home  -He is a vague historian, but review of the records indicate that he has multiple medical issues including hypertension, prediabetes, CKD-3, GERD, hyperparathyroidism (status post parathyroidectomy), rheumatoid lung, chronic pulmonary embolism, anxiety/depression, and ureteral cancer  -Around 3:00 p.m. in the afternoon yesterday, the patient had numbness/tingling in his bilateral hands/feet and developed a headache (with pressure behind his eyes) as well as dizziness  -He endorsed chest tightness to the ER (but not to me) but does state that he felt weak all over, particularly in his legs, and he was staggering  -In the ER, his blood pressure was as high as the 210s-> now 160s after Cardene infusion and 2 doses of IV Hydralazine/a dose of Labetalol  -He had no leukocytosis with a hemoglobin of 11.8 with normal platelets; COVID/influenza testing were " negative  -Creatinine was 1.3 (below baseline) with a potassium of 3.1; Mag was 1.9  -He did not have a UTI; TSH was normal  -BNP was 5217 and troponin went from 22-> 36-> now 72.7 (at the time of this editing)  -Lipid panel showed an LDL of 61  -EKG showed, per my interpretation, sinus rhythm 71 beats per minute with a QTC of 489 with biphasic T-waves in several leads (this has been seen previously)  -Chest x-ray was unrevealing    Past Medical History:   Diagnosis Date    Anticoagulant long-term use     Arthritis     Coronary artery disease     Dr. Lamb    DDD (degenerative disc disease), cervical     Degenerative disc disease     Heart murmur     History of radiation therapy 2020    Hypertension     Nontoxic multinodular goiter 09/20/2016    Prostate CA     RA (rheumatoid arthritis)     Sleep apnea     No CPAP    Vitamin D insufficiency 09/30/2016       Past Surgical History:   Procedure Laterality Date    CARPAL TUNNEL RELEASE Right 05/28/2021    Procedure: RELEASE, CARPAL TUNNEL;  Surgeon: Jose Ryan II, MD;  Location: [facility];  Service: Orthopedics;  Laterality: Right;    COLONOSCOPY  prior to 2016    normal findings per patient report    CORONARY ANGIOPLASTY WITH STENT PLACEMENT  02/2022    CYSTOSCOPY N/A 12/18/2018    Procedure: CYSTOSCOPY;  Surgeon: Garrett Pina MD;  Location: [facility] OR;  Service: Urology;  Laterality: N/A;    CYSTOSCOPY N/A 07/12/2022    Procedure: CYSTOSCOPY;  Surgeon: Garrett Pina MD;  Location: [facility] OR;  Service: Urology;  Laterality: N/A;    ESOPHAGOGASTRODUODENOSCOPY N/A 09/29/2020    Dr. Espinosa; empiric dilation; erythematous mucosa in antrum; gastric mucosal atrophy; hematin in entire stomach; biopsy: mid & distal esophagus WNL, stomach- WNL, negative for h pylori    EXCISION OF LESION OF PENIS N/A 2/23/2023    Procedure: EXCISION, LESION, PENIS;  Surgeon: Timo Myers MD;  Location: [facility] OR;  Service: Urology;  Laterality: N/A;    INSERTION OF TUNNELED  CENTRAL VENOUS CATHETER (CVC) WITH SUBCUTANEOUS PORT Left 5/18/2023    Procedure: [id number];  Surgeon: Atul Faria MD;  Location: [facility];  Service: General;  Laterality: Left;  left subclavian    PARATHYROIDECTOMY Right 10/27/2020    Procedure: PARATHYROIDECTOMY;  Surgeon: Latonia Clayton MD;  Location: [facility];  Service: ENT;  Laterality: Right;    RELEASE OF ULNAR NERVE AT CUBITAL TUNNEL Right 05/28/2021    Procedure: RELEASE, ULNAR TUNNEL;  Surgeon: Jose Ryan II, MD;  Location: [facility];  Service: Orthopedics;  Laterality: Right;    TRANSRECTAL BIOPSY OF PROSTATE WITH ULTRASOUND GUIDANCE N/A 12/18/2018    Procedure: BIOPSY, PROSTATE, RECTAL APPROACH, WITH US GUIDANCE;  Surgeon: Garrett Pina MD;  Location: [facility] OR;  Service: Urology;  Laterality: N/A;    TRANSRECTAL ULTRASOUND EXAMINATION N/A 07/12/2022    Procedure: ULTRASOUND, RECTAL APPROACH;  Surgeon: Garrett Pina MD;  Location: [facility];  Service: Urology;  Laterality: N/A;       Social History: Patient lives alone.    Prior Intubation HX:  NA    Modified Barium Swallow: NA    Imaging:  Imaging Results              X-Ray Chest PA And Lateral (Final result)  Result time 04/15/25 03:11:51      Final result by Gricelda Reyes MD (04/15/25 03:11:51)                   Impression:      No significant acute abnormality involving the lungs.    Mild costophrenic angle blunting on the left raising question of trace amount of pleural fluid.      Electronically signed by: Gricelda Reyes  Date:    04/15/2025  Time:    03:11               Narrative:    EXAMINATION:  XR CHEST PA AND LATERAL    CLINICAL HISTORY:  Dyspnea, unspecified    TECHNIQUE:  PA and lateral views of the chest were performed.    COMPARISON:  04/04/2025, 10/24/2024 chest x-rays    FINDINGS:  Cardiac silhouette is stable and not significantly enlarged.  Band of scarring noted in the right mid lung otherwise the lungs appear clear.  There is mild  costophrenic angle blunting posteriorly and laterally on the left suggesting a trace amount of pleural fluid or thickening.  No effusion is seen on the right and there is no pneumothorax..  Degenerative changes of the spine and shoulders noted.                                        CT Head Without Contrast (Final result)  Result time 04/15/25 01:28:12      Final result by Gricelda Reyes MD (04/15/25 01:28:12)                   Impression:      No evidence of acute intracranial abnormality or fracture.    Microvascular chronic ischemic changes and senescent atrophic changes.    Otomastoiditis on the left and mastoiditis on the right with possible small amount of fluid also in the middle ear on the right.  With prior exam demonstrating mastoiditis on the right only.    This report was flagged in Epic as abnormal.      Electronically signed by: Gricelda Reyes  Date:    04/15/2025  Time:    01:28               Narrative:    EXAMINATION:  CT HEAD WITHOUT CONTRAST    CLINICAL HISTORY:  Headache, sudden, severe;    TECHNIQUE:  Low dose axial images were obtained through the head.  Coronal and sagittal reformations were also performed. Contrast was not administered.    COMPARISON:  CT brain 10/24/2024    FINDINGS:  There is no evidence of acute intra or extra-axial hemorrhage or hematoma.  The gray-white matter junction differentiation is intact.  Mild prominence of the ventricles, cisterns and sulci are noted consistent with patient's age and senescent atrophic changes.  Patchy mild low density in the periventricular deep white matter is again noted and is nonspecific but commonly related to microvascular chronic ischemic changes.  There is no mass effect/midline shift.  Posterior fossa structures and pituitary gland are unremarkable as imaged allowing for skull base artifact.    Bony calvarium is intact and the visualized paranasal sinuses essentially appear clear.  There is note of multiple opacified right mastoid  air cells and a few with air-fluid levels are also noted in the left mastoid air cells compatible with mastoiditis.  There is also note of density in the middle ear on the left and possibly a small amount on the right.                                       Prior diet: Regular/thin.    Occupation/hobbies/homemaking: Pt/family report PLOF as ambulatory with cane and ADLS, independent with IADLS. Pt does drive. Level of education is Masters degree. Retired .     Subjective     No c/o    Pain/Comfort:  Pain Rating 1: 0/10    Respiratory Status: Room air    Objective:   Pt seen in ICU for clinical swallow evaluation. He is AAOx4, pleasant and cooperative. Denies dysphagia. Speech is clear,fluent and appropriate. He denies any changes in speech, language or cognition.     Oral Musculature Evaluation  Oral Musculature: WFL  Dentition: present and adequate (few missing)  Secretion Management: adequate  Mucosal Quality: good  Mandibular Strength and Mobility: WFL  Oral Labial Strength and Mobility: WFL  Lingual Strength and Mobility: WFL (mild deviation to L upon protrusion)  Velar Elevation: WFL  Buccal Strength and Mobility: WFL  Volitional Cough: adequate  Volitional Swallow: able to palpate laryngeal rise  Voice Prior to PO Intake: clear    Bedside Swallow Eval:   Consistencies Assessed:  Thin liquids --via cup and straw  Puree --applesauce  Mixed consistencies --diced peaches  Solids --bobbi cracker      Oral Phase:   WFL    Pharyngeal Phase:   no overt clinical signs/symptoms of aspiration  no overt clinical signs/symptoms of pharyngeal dysphagia    Compensatory Strategies  None    Treatment: Pt/family educated re: results/recs of evaluation, SLP role and POC. Receptive to information provided.     Goals:   Multidisciplinary Problems       SLP Goals          Problem: SLP    Goal Priority Disciplines Outcome   SLP Goal     SLP    Description: 1. Pt will participate in cognitive communication evaluation.                         Plan:     Patient to be seen:      Plan of Care expires:     Plan of Care reviewed with:  patient   SLP Follow-Up:  Yes       Discharge recommendations:   (TBD)   Barriers to Discharge:  None    Time Tracking:     SLP Treatment Date:   04/15/25  Speech Start Time:  1018  Speech Stop Time:  1027     Speech Total Time (min):  9 min    Billable Minutes: Eval Swallow and Oral Function 9 and Total Time 9    04/15/2025

## 2025-04-15 NOTE — SUBJECTIVE & OBJECTIVE
Past Medical History:   Diagnosis Date    Anticoagulant long-term use     Arthritis     Coronary artery disease     Dr. Lamb    DDD (degenerative disc disease), cervical     Degenerative disc disease     Heart murmur     History of radiation therapy 2020    Hypertension     Nontoxic multinodular goiter 09/20/2016    Prostate CA     RA (rheumatoid arthritis)     Sleep apnea     No CPAP    Vitamin D insufficiency 09/30/2016       Past Surgical History:   Procedure Laterality Date    CARPAL TUNNEL RELEASE Right 05/28/2021    Procedure: RELEASE, CARPAL TUNNEL;  Surgeon: Jose Ryan II, MD;  Location: [facility] OR;  Service: Orthopedics;  Laterality: Right;    COLONOSCOPY  prior to 2016    normal findings per patient report    CORONARY ANGIOPLASTY WITH STENT PLACEMENT  02/2022    CYSTOSCOPY N/A 12/18/2018    Procedure: CYSTOSCOPY;  Surgeon: Garrett Pina MD;  Location: [facility] OR;  Service: Urology;  Laterality: N/A;    CYSTOSCOPY N/A 07/12/2022    Procedure: CYSTOSCOPY;  Surgeon: Garrett Pina MD;  Location: [facility] OR;  Service: Urology;  Laterality: N/A;    ESOPHAGOGASTRODUODENOSCOPY N/A 09/29/2020    Dr. Espinosa; empiric dilation; erythematous mucosa in antrum; gastric mucosal atrophy; hematin in entire stomach; biopsy: mid & distal esophagus WNL, stomach- WNL, negative for h pylori    EXCISION OF LESION OF PENIS N/A 2/23/2023    Procedure: EXCISION, LESION, PENIS;  Surgeon: Timo Myers MD;  Location: [facility];  Service: Urology;  Laterality: N/A;    INSERTION OF TUNNELED CENTRAL VENOUS CATHETER (CVC) WITH SUBCUTANEOUS PORT Left 5/18/2023    Procedure: [id number];  Surgeon: Atul Faria MD;  Location: [facility];  Service: General;  Laterality: Left;  left subclavian    PARATHYROIDECTOMY Right 10/27/2020    Procedure: PARATHYROIDECTOMY;  Surgeon: Latonia Clayton MD;  Location: [address];  Service: ENT;  Laterality: Right;    RELEASE OF ULNAR NERVE AT CUBITAL TUNNEL Right 05/28/2021     Procedure: RELEASE, ULNAR TUNNEL;  Surgeon: Jose Ryan II, MD;  Location: [facility] OR;  Service: Orthopedics;  Laterality: Right;    TRANSRECTAL BIOPSY OF PROSTATE WITH ULTRASOUND GUIDANCE N/A 2018    Procedure: BIOPSY, PROSTATE, RECTAL APPROACH, WITH US GUIDANCE;  Surgeon: Garrett Pina MD;  Location: [facility] OR;  Service: Urology;  Laterality: N/A;    TRANSRECTAL ULTRASOUND EXAMINATION N/A 2022    Procedure: ULTRASOUND, RECTAL APPROACH;  Surgeon: Garrett Pina MD;  Location: [facility] OR;  Service: Urology;  Laterality: N/A;       Review of patient's allergies indicates:  No Known Allergies    Current Facility-Administered Medications on File Prior to Encounter   Medication    albuterol nebulizer solution 2.5 mg    albuterol-ipratropium 2.5 mg-0.5 mg/3 mL nebulizer solution 3 mL    denosumab (PROLIA) injection 60 mg    diphenhydrAMINE injection 25 mg    HYDROmorphone injection 0.5 mg    ondansetron injection 4 mg    sodium chloride 0.9% flush 10 mL    sodium chloride 0.9% flush 10 mL    sodium chloride 0.9% flush 3 mL     Current Outpatient Medications on File Prior to Encounter   Medication Sig    atorvastatin (LIPITOR) 20 MG tablet Take 1 tablet (20 mg total) by mouth once daily.    b complex vitamins capsule Take 1 capsule by mouth once daily.    betamethasone valerate 0.1% (VALISONE) 0.1 % Crea Apply topically 2 (two) times daily.    busPIRone (BUSPAR) 10 MG tablet Take 1 tablet (10 mg total) by mouth 3 (three) times daily.    cyproheptadine (PERIACTIN) 4 mg tablet Take 1 tablet (4 mg total) by mouth 4 (four) times daily.    diclofenac (VOLTAREN) 50 MG EC tablet Take 1 tablet (50 mg total) by mouth 2 (two) times daily as needed (pain).    gabapentin (NEURONTIN) 100 MG capsule TAKE 1 CAPSULE(100 MG) BY MOUTH EVERY EVENING    hydrALAZINE (APRESOLINE) 100 MG tablet Take 1 tablet (100 mg total) by mouth 2 (two) times daily.    [] LIDOcaine (LIDODERM) 5 % Place 1 patch onto the skin once  daily. Remove & Discard patch within 12 hours or as directed by MD for 10 days    magnesium oxide (MAG-OX) 400 mg (241.3 mg magnesium) tablet Take 1 tablet (400 mg total) by mouth once daily.    metoprolol tartrate (LOPRESSOR) 25 MG tablet Take 1 tablet (25 mg total) by mouth 2 (two) times daily.    phenazopyridine (PYRIDIUM) 200 MG tablet Take 1 tablet (200 mg total) by mouth 3 (three) times daily as needed for Pain.    predniSONE (DELTASONE) 2.5 MG tablet Take 1 tablet (2.5 mg total) by mouth 2 (two) times daily.    solifenacin (VESICARE) 10 MG tablet Take 1 tablet (10 mg total) by mouth once daily.    tadalafiL (CIALIS) 5 MG tablet Take 1 tablet (5 mg total) by mouth once daily.    tamsulosin (FLOMAX) 0.4 mg Cap TAKE 1 CAPSULE(0.4 MG) BY MOUTH EVERY NIGHT    zolpidem (AMBIEN) 10 mg Tab Take 1 tablet (10 mg total) by mouth nightly as needed (insomnia).    [DISCONTINUED] cholecalciferol, vitamin D3, (VITAMIN D3) 100 mcg (4,000 unit) Cap Take 400 Units by mouth once daily.    [DISCONTINUED] cloNIDine (CATAPRES) 0.1 MG tablet Take 1 tablet (0.1 mg total) by mouth 2 (two) times daily as needed (only if blood pressure top number is over 200). (Patient not taking: Reported on 2022)    [DISCONTINUED] meclizine (ANTIVERT) 12.5 mg tablet Take 1 tablet (12.5 mg total) by mouth 2 (two) times daily as needed for Dizziness.    [DISCONTINUED] sildenafiL (VIAGRA) 100 MG tablet Take 1 tablet (100 mg total) by mouth daily as needed for Erectile Dysfunction.     Family History       Problem Relation (Age of Onset)    Diabetes Maternal Uncle    Drug abuse Sister    Heart disease Mother    No Known Problems Daughter, Daughter, Son    Stroke Father          Tobacco Use    Smoking status: Former     Current packs/day: 0.00     Average packs/day: 0.3 packs/day for 10.0 years (2.5 ttl pk-yrs)     Types: Cigarettes     Start date: 1991     Quit date: 2001     Years since quittin.7     Passive exposure: Past    Smokeless  tobacco: Never   Substance and Sexual Activity    Alcohol use: Not Currently     Comment: seldom    Drug use: Not Currently     Frequency: 8.0 times per week     Types: Marijuana    Sexual activity: Yes     Partners: Female     Review of Systems   Constitutional:  Positive for fatigue. Negative for chills, fever and unexpected weight change.   HENT:  Positive for sinus pressure. Negative for rhinorrhea and sore throat.    Eyes:  Positive for visual disturbance.   Respiratory:  Positive for shortness of breath. Negative for cough.    Cardiovascular:  Positive for chest pain (Endorse chest pain to the ER but told me his chest felt full). Negative for leg swelling.   Gastrointestinal:  Negative for blood in stool, constipation, diarrhea, nausea and vomiting.   Genitourinary:  Positive for dysuria (mild, with increased urination).   Musculoskeletal:  Positive for myalgias.   Skin:  Negative for rash.   Neurological:  Positive for numbness (Bilateral fingers/toes) and headaches.        + gait disturbance   Hematological:  Negative for adenopathy.   Psychiatric/Behavioral:  Positive for sleep disturbance.      Objective:     Vital Signs (Most Recent):  Temp: 98.3 °F (36.8 °C) (04/15/25 0514)  Pulse: 66 (04/15/25 0707)  Resp: (!) 28 (04/15/25 0707)  BP: (!) 168/74 (04/15/25 0514)  SpO2: (!) 93 % (04/15/25 0707) Vital Signs (24h Range):  Temp:  [97.8 °F (36.6 °C)-98.3 °F (36.8 °C)] 98.3 °F (36.8 °C)  Pulse:  [60-95] 66  Resp:  [18-28] 28  SpO2:  [93 %-98 %] 93 %  BP: (135-211)/(62-91) 168/74     Weight: 62.6 kg (138 lb 0.1 oz)  Body mass index is 20.98 kg/m².     Physical Exam  Constitutional:       General: He is not in acute distress.     Appearance: Normal appearance. He is not ill-appearing, toxic-appearing or diaphoretic.   HENT:      Head: Normocephalic and atraumatic.      Mouth/Throat:      Mouth: Mucous membranes are moist.   Eyes:      General: No scleral icterus.     Extraocular Movements: Extraocular movements  intact.      Pupils: Pupils are equal, round, and reactive to light.   Neck:      Comments: No retractions  Cardiovascular:      Rate and Rhythm: Rhythm irregular.      Heart sounds: Normal heart sounds. No murmur heard.     No friction rub. No gallop.      Comments: Sinus arrhythmia on tele  Pulmonary:      Effort: Pulmonary effort is normal. No respiratory distress.      Breath sounds: Normal breath sounds.   Abdominal:      General: Bowel sounds are normal. There is no distension.      Palpations: Abdomen is soft. There is no mass.      Tenderness: There is no abdominal tenderness.   Musculoskeletal:      Right lower leg: No edema.      Left lower leg: No edema.   Skin:     General: Skin is warm and dry.   Neurological:      General: No focal deficit present.      Mental Status: He is alert.      Cranial Nerves: No cranial nerve deficit (Olfactory nerves not assessed).      Motor: No weakness (Normal strength in all 4 extremities).      Deep Tendon Reflexes: Reflexes normal (Normal symmetric reflexes bilaterally in the biceps/patellar reflexes).   Psychiatric:         Behavior: Behavior normal.               Significant studies: Reviewed.

## 2025-04-15 NOTE — RESPIRATORY THERAPY
04/15/25 0707   Patient Assessment/Suction   Level of Consciousness (AVPU) alert   Respiratory Effort Unlabored   PRE-TX-O2   Device (Oxygen Therapy) room air   SpO2 (!) 93 %   Pulse Oximetry Type Intermittent   $ Pulse Oximetry - Multiple Charge Pulse Oximetry - Multiple   Pulse 66   Resp (!) 28

## 2025-04-15 NOTE — ASSESSMENT & PLAN NOTE
Anemia is likely due to chronic disease due to Chronic Kidney Disease. Most recent hemoglobin and hematocrit are listed below.  Recent Labs     04/14/25  2145   HGB 11.8*   HCT 37.0*     Plan  - Monitor serial CBC: Daily  - Transfuse PRBC if patient becomes hemodynamically unstable, symptomatic or H/H drops below 7/21.  - Patient has not received any PRBC transfusions to date  - Patient's anemia is currently stable

## 2025-04-15 NOTE — ED NOTES
Pt here with multiple complaints. Pt states he is having right sided rib pain from recent fractures, congestion, sore throat, SOB, tingling in hands and feet, fatigue, insomnia, headache, and humming in ears.

## 2025-04-15 NOTE — SUBJECTIVE & OBJECTIVE
HPI:  79 y.o. male with HTN, prediabetes, sleep apnea, CAD (stents), CKD3, RA, prostate CA, DDD, chronic insomnia presented with multiple complaints.  Initially started with numbness and tingling to bilateral hands and feet.  Then developed HA with pressure behind his eyes, dizziness and chest tightness.  Also reported nasal congestion and mild sore throat, as well as SOB. Patient was evaluated in the ED with recommendations for admission.  Primary admission diagnosis included hypertensive emergency, elevated troponin, blurred vision, gait disturbance.  Patient also noted to have mastoiditis and was started on Rocephin.  MRI completed today showing tiny focus of restricted diffusion in the deep right frontal subependymal white matter, potentially acute infarct. [facility] consulted for this finding.      Patient described numbness/tingling to both hands and feet.  The symptoms were equally on both side with neither side more noticeable than the other.  Also reported bilateral leg weakness with wobbly gait. Denied associated vision changes, double vision, facial weakness, swallowing difficulty, weakness to arms.  Did report blurry vision in both eyes.  Denies prior history of stroke.  Admits to poor compliance with medication and CPAP.     Images personally reviewed and interpreted:  Study: CTA Head & Neck and MRI Brain  Study Interpretation: MRI brain: Tiny punctate diffusion restriction along right lateral ventricle. Multiple microhemorrhages primarily left hemisphere lobar.  No deep microhemorrhages. No hemorrhage. CTA head and neck: No high-grade stenosis or occlusion.    Carotid US  1. No hemodynamically significant carotid stenosis.  2. Antegrade flow in both vertebral arteries.    Additional studies reviewed:   Study: Cardiac_Neuro: 2D echo  Study Interpretation: EF 60-65%; no wall motion abnormality noted; no thrombus/vegetation; left atrium moderately dilated. Complete obliteration of the apical  cavity.       Laboratory studies reviewed:  BMP:   Lab Results   Component Value Date    GLUCOSE 96 04/14/2025     04/14/2025     04/25/2019    K 3.1 (L) 04/14/2025     (H) 11/04/2024     04/25/2019    CO2 17 (L) 04/14/2025    BUN 10 04/14/2025    CREATININE 1.3 04/14/2025    CREATININE 1.38 04/25/2019    CALCIUM 8.3 (L) 04/14/2025     CBC:   Lab Results   Component Value Date    WBC 5.75 04/14/2025    RBC 4.29 (L) 04/14/2025    HGB 11.8 (L) 04/14/2025    HCT 37.0 (L) 04/14/2025     04/14/2025    MCV 86 04/14/2025    MCH 27.5 04/14/2025    MCHC 31.9 (L) 04/14/2025     Lipid Panel:   Lab Results   Component Value Date    CHOL 124 04/15/2025    LDLCALC 90.2 10/25/2024    HDL 50 04/15/2025    TRIG 65 04/15/2025     Coagulation:   Lab Results   Component Value Date    INR 1.1 10/24/2024    APTT 26.6 10/24/2024     Hgb A1C:   Lab Results   Component Value Date    HGBA1C 5.4 04/15/2025     TSH:   Lab Results   Component Value Date    TSH 0.931 04/14/2025       Documentation personally reviewed:  Notes: Notes: ED notes and H&P     Assessment and plan:  79 y.o. male with HTN, prediabetes, sleep apnea, CAD (stents), CKD3, RA, prostate CA, DDD found to have punctate diffusion restriction right deep subependymal white matter.  CTA with no high-grade stenosis or occlusion,  MRI findings do not correlate clinically. No focal symptoms reported or noted on exam. Imaging reviewed with Dr. Francois.  Incidental and likely artifactual.  Patient does have multiple lobar microhemorrhages and multiple risk factors for stroke. May benefit from follow in [facility].    Recommendations  - Hypertension: Target systolic BP <130-140 mmHg. Follow up with PCP for surveillance and chronic management  -Sleep apnea - encourage CPAP use as previously recommended  -CAD/Elevated Troponin - Cardiology following  -No further inpatient stroke workup needed and patient can dispo with therapy recommendations once medically  ready  -Please contact us with any further questions or concerns or if patient has any acute neurological changes (new symptoms, worsening deficits).      Post charge discharge plan:  Clinic follow up: [facility]    Visit Type: in person or virtual  Timeframe: 4 weeks    Collaborating Physician, Dr. Francois, was available during today's encounter. Any change to plan along with cosign to appear in the EMR.

## 2025-04-15 NOTE — ASSESSMENT & PLAN NOTE
Patient with hypertensive emergency, evidenced by uncontrolled hypertension with escalating troponins and concern for stroke-like symptoms; for now:  Given concern for possible stroke-like symptoms, will allow permissive hypertension (goal SBP around 180s-190s; continue Cardene drip) until stroke is definitively ruled out.

## 2025-04-15 NOTE — PROGRESS NOTES
"[facility] Medicine  Progress Note    Patient Name: Rajendra Castle  MRN: [id number]  Patient Class: IP- Inpatient   Admission Date: 4/14/2025  Length of Stay: 0 days  Attending Physician: Morgan Cai MD  Primary Care Provider: Sp Lilly MD        Subjective     Principal Problem:Hypertensive emergency        HPI:  Patient is a 79-year-old  male who only reports he has "prostate issues" and hypertension; he takes his medications but does not check his blood pressure at home  He is a vague historian, but review of the records indicate that he has multiple medical issues including hypertension, prediabetes, CKD -3, GERD, hyperparathyroidism (status post parathyroidectomy), rheumatoid lung, chronic pulmonary embolism, anxiety/depression, and ureteral cancer  Around 3:00 p.m. in the afternoon, the patient had numbness/tingling in his bilateral hands/feet and developed a headache (with pressure behind his eyes) as well as dizziness; he endorsed chest tightness to the ER but not to me but does state that he felt weak all over particularly in his legs and was staggering  In the ER, his blood pressure goes highest of the to 10s-> now 160s after Cardene infusion and 2 doses of IV Hydralazine/a dose of Labetalol  He had no leukocytosis with a hemoglobin of 11.8 normal platelets; COVID/influenza testing were negative  Creatinine 1.3 (below baseline) with a potassium of 3.1; Mag was 1.9  He did not have a UTI; TSH was normal  BNP 5217 and troponin went from 22-> 36-> 72.7 (at the time of this editing)  Lipid panel showed an LDL of 61  EKG showed, per my interpretation, sinus rhythm 71 beats per minute with a QTC of 489 with biphasic T-waves in several leads and this has been seen previously  Chest x-ray was unrevealing; CT head without contrast showed, per radiologist Dr. Reyes:    "Impression:     No evidence of acute intracranial abnormality or fracture.     Microvascular " "chronic ischemic changes and senescent atrophic changes.     Otomastoiditis on the left and mastoiditis on the right with possible small amount of fluid also in the middle ear on the right.  With prior exam demonstrating mastoiditis on the right only."    The patient received aspirin around 3:00 a.m., Tylenol, calcium gluconate, oral potassium I Ativan, in the aforementioned antihypertensives and was transferred to the ICU for further diagnosis, treatment, and care    Overview/Hospital Course:  No notes on file    Interval History:  Patient seen and examined.  No acute events overnight.  Patient remains stable, neurologic symptoms appear to be improving.  Patient's blood pressure remains markedly elevated on and off of Cardene IV drip.  MRI shows potential evidence of acute CVA.    Review of Systems  Objective:     Vital Signs (Most Recent):  Temp: 98.1 °F (36.7 °C) (04/15/25 1505)  Pulse: 69 (04/15/25 1600)  Resp: (!) 21 (04/15/25 1545)  BP: (!) 194/79 (04/15/25 1600)  SpO2: 95 % (04/15/25 1545) Vital Signs (24h Range):  Temp:  [97.8 °F (36.6 °C)-98.3 °F (36.8 °C)] 98.1 °F (36.7 °C)  Pulse:  [60-95] 69  Resp:  [18-54] 21  SpO2:  [93 %-98 %] 95 %  BP: (135-211)/(62-94) 194/79     Weight: 62.6 kg (138 lb 0.1 oz)  Body mass index is 20.98 kg/m².    Intake/Output Summary (Last 24 hours) at 4/15/2025 1618  Last data filed at 4/15/2025 1611  Gross per 24 hour   Intake 150 ml   Output 850 ml   Net -700 ml         Physical Exam  Vitals and nursing note reviewed.   Eyes:      Pupils: Pupils are equal, round, and reactive to light.   Neck:      Vascular: No JVD.   Cardiovascular:      Rate and Rhythm: Normal rate and regular rhythm.      Heart sounds: Normal heart sounds.   Pulmonary:      Effort: Pulmonary effort is normal.      Breath sounds: Normal breath sounds.   Abdominal:      General: Bowel sounds are normal. There is no distension.      Palpations: Abdomen is soft.      Tenderness: There is no abdominal tenderness. "   Musculoskeletal:         General: No deformity.   Lymphadenopathy:      Cervical: No cervical adenopathy.   Skin:     Coloration: Skin is not pale.      Findings: No rash.               Significant Labs: All pertinent labs within the past 24 hours have been reviewed.    Significant Imaging: I have reviewed all pertinent imaging results/findings within the past 24 hours.      Assessment & Plan  Hypertensive emergency  Patient with hypertensive emergency, evidenced by uncontrolled hypertension with escalating troponins and concern for stroke-like symptoms; for now:  Given concern for possible stroke-like symptoms, will allow permissive hypertension (goal SBP around 180s-190s; continue Cardene drip) until stroke is definitively ruled out.   Stroke  Noted on MRI    Antithrombotics for secondary stroke prevention: Antiplatelets: Aspirin: 81 mg daily  Clopidogrel: 75 mg daily    Statins for secondary stroke prevention and hyperlipidemia, if present:   Statins: Atorvastatin- 40 mg daily    Aggressive risk factor modification: HTN, Smoking     Rehab efforts: The patient has been evaluated by a stroke team provider and the therapy needs have been fully considered based off the presenting complaints and exam findings. The following therapy evaluations are needed: PT evaluate and treat, OT evaluate and treat    Diagnostics ordered/pending: CTA Head to assess vasculature , CTA Neck/Arch to assess vasculature    VTE prophylaxis: Enoxaparin 40 mg SQ every 24 hours    BP parameters: Infarct: No intervention, SBP <220      Elevated troponin  Noted. Troponin stable, no S/Sx of ACS,    Recent Labs   Lab 04/15/25  0100   TROPONINIHS 36*       Prediabetes    As above  CKD (chronic kidney disease) stage 3, GFR 30-59 ml/min  Creatinine below baseline  Minimize nephrotoxic medications  Follow up a.m. BMP  Blurred vision  As above    Gait disturbance   As above  Anemia  Anemia is likely due to chronic disease due to Chronic Kidney  Disease. Most recent hemoglobin and hematocrit are listed below.  Recent Labs     04/14/25 2145   HGB 11.8*   HCT 37.0*     Plan  - Monitor serial CBC: Daily  - Transfuse PRBC if patient becomes hemodynamically unstable, symptomatic or H/H drops below 7/21.  - Patient has not received any PRBC transfusions to date  - Patient's anemia is currently stable  Hypokalemia  Patient's most recent potassium results are listed below.   Recent Labs     04/14/25 2145   K 3.1*     Plan  - Replete potassium per protocol  - Monitor potassium Daily  - Patient's hypokalemia is stable  VTE Risk Mitigation (From admission, onward)           Ordered     IP VTE HIGH RISK PATIENT  Once         04/15/25 0608     Place sequential compression device  Until discontinued         04/15/25 0608     Place sequential compression device  Until discontinued         04/15/25 0604                    Discharge Planning   DEVONTE: 4/17/2025     Code Status: Full Code   Medical Readiness for Discharge Date:                Critical care time spent on the evaluation and treatment of severe organ dysfunction, review of pertinent labs and imaging studies, discussions with consulting providers and discussions with patient/family: 37 minutes.    Please place Justification for DME        Morgan Cai MD  Department of Hospital Medicine   [facility]

## 2025-04-15 NOTE — HPI
"Patient is a 79-year-old  male who only reports he has "prostate issues" and hypertension; he takes his medications but does not check his blood pressure at home  He is a vague historian, but review of the records indicate that he has multiple medical issues including hypertension, prediabetes, CKD -3, GERD, hyperparathyroidism (status post parathyroidectomy), rheumatoid lung, chronic pulmonary embolism, anxiety/depression, and ureteral cancer  Around 3:00 p.m. in the afternoon, the patient had numbness/tingling in his bilateral hands/feet and developed a headache (with pressure behind his eyes) as well as dizziness; he endorsed chest tightness to the ER but not to me but does state that he felt weak all over particularly in his legs and was staggering  In the ER, his blood pressure goes highest of the to 10s-> now 160s after Cardene infusion and 2 doses of IV Hydralazine/a dose of Labetalol  He had no leukocytosis with a hemoglobin of 11.8 normal platelets; COVID/influenza testing were negative  Creatinine 1.3 (below baseline) with a potassium of 3.1; Mag was 1.9  He did not have a UTI; TSH was normal  BNP 5217 and troponin went from 22-> 36-> 72.7 (at the time of this editing)  Lipid panel showed an LDL of 61  EKG showed, per my interpretation, sinus rhythm 71 beats per minute with a QTC of 489 with biphasic T-waves in several leads and this has been seen previously  Chest x-ray was unrevealing; CT head without contrast showed, per radiologist Dr. Reyes:    "Impression:     No evidence of acute intracranial abnormality or fracture.     Microvascular chronic ischemic changes and senescent atrophic changes.     Otomastoiditis on the left and mastoiditis on the right with possible small amount of fluid also in the middle ear on the right.  With prior exam demonstrating mastoiditis on the right only."    The patient received aspirin around 3:00 a.m., Tylenol, calcium gluconate, oral potassium I " Ativan, in the aforementioned antihypertensives and was transferred to the ICU for further diagnosis, treatment, and care

## 2025-04-15 NOTE — CONSULTS
[facility]  Department of Cardiology  Consult Note      PATIENT NAME: Rajendra Castle  MRN: [id number]  TODAY'S DATE: 04/15/2025  ADMIT DATE: 4/14/2025                          CONSULT REQUESTED BY: Radha Kincaid MD    SUBJECTIVE     PRINCIPAL PROBLEM: Hypertensive emergency      REASON FOR CONSULT:  Elevated troponin    HPI:  Pt is 80 yo male with PMH: CAD with stent, HTN, Prostate CA, recent rib fractureswho presented to ER last evening with c/o bilateral tingling/numbness in feet and hands, dizziness, weakness, headache and pressure behind his eyes. Pt states BP as high as 200s when he arrived. Admits to noncompliance with BP medications. He was worried he may be having a stroke when he came to hospital  Pt denies chest pain or dyspnea or fluid retention;     He was placed on Cardene drip and BP 170s/78       Review of patient's allergies indicates:  No Known Allergies    Past Medical History:   Diagnosis Date    Anticoagulant long-term use     Arthritis     Coronary artery disease     Dr. Lamb    DDD (degenerative disc disease), cervical     Degenerative disc disease     Heart murmur     History of radiation therapy 2020    Hypertension     Nontoxic multinodular goiter 09/20/2016    Prostate CA     RA (rheumatoid arthritis)     Sleep apnea     No CPAP    Vitamin D insufficiency 09/30/2016     Past Surgical History:   Procedure Laterality Date    CARPAL TUNNEL RELEASE Right 05/28/2021    Procedure: RELEASE, CARPAL TUNNEL;  Surgeon: Jose Ryan II, MD;  Location: [facility] OR;  Service: Orthopedics;  Laterality: Right;    COLONOSCOPY  prior to 2016    normal findings per patient report    CORONARY ANGIOPLASTY WITH STENT PLACEMENT  02/2022    CYSTOSCOPY N/A 12/18/2018    Procedure: CYSTOSCOPY;  Surgeon: Garrett Pina MD;  Location: [facility] OR;  Service: Urology;  Laterality: N/A;    CYSTOSCOPY N/A 07/12/2022    Procedure: CYSTOSCOPY;  Surgeon: Garrett Pina MD;  Location: [facility] OR;  Service:  Urology;  Laterality: N/A;    ESOPHAGOGASTRODUODENOSCOPY N/A 09/29/2020    Dr. Espinosa; empiric dilation; erythematous mucosa in antrum; gastric mucosal atrophy; hematin in entire stomach; biopsy: mid & distal esophagus WNL, stomach- WNL, negative for h pylori    EXCISION OF LESION OF PENIS N/A 2/23/2023    Procedure: EXCISION, LESION, PENIS;  Surgeon: Timo Myers MD;  Location: [facility] OR;  Service: Urology;  Laterality: N/A;    INSERTION OF TUNNELED CENTRAL VENOUS CATHETER (CVC) WITH SUBCUTANEOUS PORT Left 5/18/2023    Procedure: [id number];  Surgeon: Atul Faria MD;  Location: [facility];  Service: General;  Laterality: Left;  left subclavian    PARATHYROIDECTOMY Right 10/27/2020    Procedure: PARATHYROIDECTOMY;  Surgeon: Latonia Clayton MD;  Location: [address];  Service: ENT;  Laterality: Right;    RELEASE OF ULNAR NERVE AT CUBITAL TUNNEL Right 05/28/2021    Procedure: RELEASE, ULNAR TUNNEL;  Surgeon: Jose Ryan II, MD;  Location: [facility] OR;  Service: Orthopedics;  Laterality: Right;    TRANSRECTAL BIOPSY OF PROSTATE WITH ULTRASOUND GUIDANCE N/A 12/18/2018    Procedure: BIOPSY, PROSTATE, RECTAL APPROACH, WITH US GUIDANCE;  Surgeon: Garrett Pina MD;  Location: [facility] OR;  Service: Urology;  Laterality: N/A;    TRANSRECTAL ULTRASOUND EXAMINATION N/A 07/12/2022    Procedure: ULTRASOUND, RECTAL APPROACH;  Surgeon: Garrett Pina MD;  Location: [facility];  Service: Urology;  Laterality: N/A;     Social History[1]     REVIEW OF SYSTEMS  Negative except as mentioned in HPI    OBJECTIVE     VITAL SIGNS (Most Recent)  Temp: 98.3 °F (36.8 °C) (04/15/25 0514)  Pulse: 66 (04/15/25 0707)  Resp: (!) 28 (04/15/25 0707)  BP: (!) 168/74 (04/15/25 0514)  SpO2: (!) 93 % (04/15/25 0707)    VENTILATION STATUS  Resp: (!) 28 (04/15/25 0707)  SpO2: (!) 93 % (04/15/25 0707)           I & O (Last 24H):  Intake/Output Summary (Last 24 hours) at 4/15/2025 0857  Last data filed at 4/15/2025 0605  Gross  per 24 hour   Intake 150 ml   Output 200 ml   Net -50 ml       WEIGHTS  Wt Readings from Last 3 Encounters:   04/15/25 0514 62.6 kg (138 lb 0.1 oz)   04/14/25 2036 68.5 kg (151 lb 0.2 oz)   04/14/25 2026 65.8 kg (145 lb)   04/04/25 1418 70.3 kg (155 lb)   01/02/25 1450 70.4 kg (155 lb 1.5 oz)       PHYSICAL EXAM    GENERAL:elderly male resting in bed in no apparent distress.  HEENT: Normocephalic.    NECK: No JVD.   CARDIAC: IRRegular rate and rhythm. +murmur; hypertensive  CHEST ANATOMY: normal.   LUNGS: Clear to auscultation. No wheezing or rhonchi..   ABDOMEN: Soft .  Normal bowel sounds.    EXTREMITIES: No edema  CENTRAL NERVOUS SYSTEM: AAO x 3  SKIN: No rash     HOME MEDICATIONS:Medications Ordered Prior to Encounter[2]    SCHEDULED MEDS:   atorvastatin  20 mg Oral Daily    busPIRone  10 mg Oral TID    cefTRIAXone (Rocephin) IV (PEDS and ADULTS)  1 g Intravenous Q24H    gabapentin  100 mg Oral QHS    mupirocin   Nasal BID    tamsulosin  0.4 mg Oral Daily       CONTINUOUS INFUSIONS:   nicardipine  0-15 mg/hr Intravenous Continuous 12.5 mL/hr at 04/15/25 0513 2.5 mg/hr at 04/15/25 0513       PRN MEDS:  Current Facility-Administered Medications:     acetaminophen, 650 mg, Oral, Q8H PRN    acetaminophen, 650 mg, Oral, Q4H PRN    bisacodyL, 10 mg, Rectal, Daily PRN    dextrose 50%, 12.5 g, Intravenous, PRN    dextrose 50%, 25 g, Intravenous, PRN    glucagon (human recombinant), 1 mg, Intramuscular, PRN    glucose, 16 g, Oral, PRN    glucose, 24 g, Oral, PRN    HYDROcodone-acetaminophen, 1 tablet, Oral, Q6H PRN    melatonin, 6 mg, Oral, Nightly PRN    naloxone, 0.02 mg, Intravenous, PRN    ondansetron, 4 mg, Intravenous, Q6H PRN    ondansetron, 4 mg, Intravenous, Q6H PRN    senna-docusate, 1 tablet, Oral, Daily PRN    sodium chloride 0.9%, 500 mL, Intravenous, PRN    sodium chloride 0.9%, 10 mL, Intravenous, PRN    LABS AND DIAGNOSTICS     CBC LAST 3 DAYS  Recent Labs   Lab 04/14/25  2145   WBC 5.75   RBC 4.29*   HGB  "11.8*   HCT 37.0*   MCV 86   MCH 27.5   MCHC 31.9*   RDW 16.9*      MPV 12.0   NRBC 0       COAGULATION LAST 3 DAYS  No results for input(s): "LABPT", "INR", "APTT" in the last 168 hours.    CHEMISTRY LAST 3 DAYS  Recent Labs   Lab 04/14/25 2145      K 3.1*   CO2 17*   BUN 10   CREATININE 1.3   CALCIUM 8.3*   MG 1.9   ALBUMIN 3.2*   ALKPHOS 67   ALT 10   AST 21   BILITOT 0.4       CARDIAC PROFILE LAST 3 DAYS  Recent Labs   Lab 04/14/25 2145   BNP 5,217*       ENDOCRINE LAST 3 DAYS  Recent Labs   Lab 04/14/25 2145   TSH 0.931       LAST ARTERIAL BLOOD GAS  ABG  No results for input(s): "PH", "PO2", "PCO2", "HCO3", "BE" in the last 168 hours.    LAST 7 DAYS MICROBIOLOGY   Microbiology Results (last 7 days)       Procedure Component Value Units Date/Time    Influenza A & B by Molecular [[id number]]  (Normal) Collected: 04/14/25 2053    Order Status: Completed Specimen: Nasal Swab Updated: 04/14/25 2115     INFLUENZA A MOLECULAR Negative     INFLUENZA B MOLECULAR  Negative            MOST RECENT IMAGING  X-Ray Chest PA And Lateral  Narrative: EXAMINATION:  XR CHEST PA AND LATERAL    CLINICAL HISTORY:  Dyspnea, unspecified    TECHNIQUE:  PA and lateral views of the chest were performed.    COMPARISON:  04/04/2025, 10/24/2024 chest x-rays    FINDINGS:  Cardiac silhouette is stable and not significantly enlarged.  Band of scarring noted in the right mid lung otherwise the lungs appear clear.  There is mild costophrenic angle blunting posteriorly and laterally on the left suggesting a trace amount of pleural fluid or thickening.  No effusion is seen on the right and there is no pneumothorax..  Degenerative changes of the spine and shoulders noted.  Impression: No significant acute abnormality involving the lungs.    Mild costophrenic angle blunting on the left raising question of trace amount of pleural fluid.    Electronically signed by: Gricelda Reyes  Date:    04/15/2025  Time:    03:11  CT Head Without " Contrast  Narrative: EXAMINATION:  CT HEAD WITHOUT CONTRAST    CLINICAL HISTORY:  Headache, sudden, severe;    TECHNIQUE:  Low dose axial images were obtained through the head.  Coronal and sagittal reformations were also performed. Contrast was not administered.    COMPARISON:  CT brain 10/24/2024    FINDINGS:  There is no evidence of acute intra or extra-axial hemorrhage or hematoma.  The gray-white matter junction differentiation is intact.  Mild prominence of the ventricles, cisterns and sulci are noted consistent with patient's age and senescent atrophic changes.  Patchy mild low density in the periventricular deep white matter is again noted and is nonspecific but commonly related to microvascular chronic ischemic changes.  There is no mass effect/midline shift.  Posterior fossa structures and pituitary gland are unremarkable as imaged allowing for skull base artifact.    Bony calvarium is intact and the visualized paranasal sinuses essentially appear clear.  There is note of multiple opacified right mastoid air cells and a few with air-fluid levels are also noted in the left mastoid air cells compatible with mastoiditis.  There is also note of density in the middle ear on the left and possibly a small amount on the right.  Impression: No evidence of acute intracranial abnormality or fracture.    Microvascular chronic ischemic changes and senescent atrophic changes.    Otomastoiditis on the left and mastoiditis on the right with possible small amount of fluid also in the middle ear on the right.  With prior exam demonstrating mastoiditis on the right only.    This report was flagged in Epic as abnormal.    Electronically signed by: Gricelda Reyes  Date:    04/15/2025  Time:    01:28      ECHOCARDIOGRAM RESULTS (last 5)  Results for orders placed during the hospital encounter of 10/22/23    Echo    Interpretation Summary    Left Ventricle: The left ventricle is normal in size. Mildly increased wall thickness.  There is mild concentric hypertrophy. Normal wall motion. Septal motion is consistent with bundle branch block. There is normal systolic function with a visually estimated ejection fraction of 60 - 65%. There is normal diastolic function.    Left Atrium: Left atrium is mildly dilated.    Right Ventricle: Normal right ventricular cavity size. Wall thickness is normal. Right ventricle wall motion  is normal. Systolic function is normal.    Aortic Valve: There is mild aortic regurgitation.    IVC/SVC: Normal venous pressure at 3 mmHg.      Results for orders placed during the hospital encounter of 01/20/23    Echo    Interpretation Summary  · The left ventricle is normal in size with mild concentric hypertrophy and normal systolic function.  · The estimated ejection fraction is 65%.  · Normal left ventricular diastolic function.  · Normal right ventricular size with normal right ventricular systolic function.  · Moderate mitral regurgitation.  · Mild-to-moderate aortic regurgitation.  · Small circumferential pericardial effusion. Effusion is fluid. No Temponade.      Results for orders placed during the hospital encounter of 06/20/22    Echo    Interpretation Summary  · The left ventricle is normal in size with mild concentric hypertrophy and normal systolic function.  · The estimated ejection fraction is 65%.  · Normal left ventricular diastolic function.  · Normal right ventricular size with normal right ventricular systolic function.  · The estimated PA systolic pressure is 27 mmHg.  · Normal central venous pressure (3 mmHg).      Results for orders placed during the hospital encounter of 05/22/22    STRESS TEST REPORT      Results for orders placed during the hospital encounter of 03/07/22    Echo    Interpretation Summary  · The left ventricle is normal in size with moderate concentric hypertrophy and normal systolic function.  · The estimated PA systolic pressure is 28 mmHg.  · Indeterminate left ventricular  diastolic function.  · Normal right ventricular size with mildly reduced right ventricular systolic function.  · Normal central venous pressure (3 mmHg).  · The estimated ejection fraction is 70%.  · Moderate left atrial enlargement.  · Mild mitral regurgitation.  · Mild tricuspid regurgitation.  · Mild-to-moderate aortic regurgitation.  · Trivial circumferential pericardial effusion. Effusion is fibrinous.  · Mild right atrial enlargement.      CURRENT/PREVIOUS VISIT EKG  Results for orders placed or performed during the hospital encounter of 10/24/24   EKG 12-lead    Collection Time: 10/25/24  7:57 AM   Result Value Ref Range    QRS Duration 84 ms    [facility] QTC Calculation 503 ms    Narrative    Test Reason : R07.9,    Vent. Rate : 069 BPM     Atrial Rate : 000 BPM     P-R Int : 000 ms          QRS Dur : 084 ms      QT Int : 470 ms       P-R-T Axes : 000 036 204 degrees     QTc Int : 503 ms    ( Sokolow-Perkins )  ST and    Prolonged QT  Abnormal ECG  When compared with ECG of    Atrial fibrillation has replaced Sinus rhythm  T wave inversion more evident in Inferior leads  Confirmed by Jone Puga MD [id number]) on 11/2/2024 4:50:47 PM    Referred By: [facility]AO   SELF           Confirmed By:Jone Puga MD           ASSESSMENT/PLAN:     Active Hospital Problems    Diagnosis    *Hypertensive emergency    Blurred vision    Gait disturbance    CKD (chronic kidney disease) stage 3, GFR 30-59 ml/min    Prediabetes    Elevated troponin       ASSESSMENT & PLAN:   Elevated troponin  Paroxysmal atrial fibrillation  Hypertensive emergency  CKD  Hypokalemia  Noncompliance    RECOMMENDATIONS:  Repeat troponin this afternoon and every 4 hours until down trend  proBNP > 5000, chest x-ray shows no pulmonary edema  2D echocardiogram showed normal EF, mild/mod MR/TR, sm pericardial effusion  Patient had nuclear stress test October '24 that was negative for reversible ischemia  Troponin elevation likely 2/2 uncontrolled hypertension;  pt denied anginal symptoms  Start Cozaar 50 mg daily and amlodipine 2.5 mg daily, give 1st doses now  Wean off Nicardipine as tolerated once PO anti-hypertensive meds given  Cardiology will continue to follow    Vianca Godoy NP  [facility]  Department of Cardiology  Date of Service: 04/15/2025        I have personally interviewed and examined the patient, I have reviewed the Nurse Practitioner's history and physical, assessment, and plan. I agree with the findings and plan.    ELEVATED TROPONIN SECONDARY TO HYPERTENSIVE URGENCY  PATIENT NONCOMPLIANT WITH HIS MEDICATIONS    ECHO REVEALS MARKED LEFT VENTRICULAR HYPERTROPHY WITH HYPERTROPHIC MYOPATHY AND OBLITERATION OF LV APEX    WILL START HIM BACK ON THE LOSARTAN AND AMLODIPINE  WILL ADD SOMETHING TO SLOW THE HEART RATE TOMORROW EITHER BETA-BLOCKER OR CHANGE TO CARDIZEM    HE HAS MULTIPLE STRESS TESTS IN THE PAST SECONDARY TO HIS ABNORMAL HIS EKG WHICH ARE NEGATIVE FOR ISCHEMIA    HIS ABNORMAL EKG IS SECONDARY TO THE HYPERTROPHIC MYOPATHY    CONTINUE BLOOD PRESSURE CONTROL THE PATIENT DOES NOT APPEAR TO BE COMPLIANT AT HOME AND SUGGEST CASE MANAGEMENT FOR SOCIAL SERVICE EVALUATION  Dr. Alan M.D.  [facility]  Department of Cardiology  Date of Service: 04/15/2025  8:57 AM         [1]   Social History  Tobacco Use    Smoking status: Former     Current packs/day: 0.00     Average packs/day: 0.3 packs/day for 10.0 years (2.5 ttl pk-yrs)     Types: Cigarettes     Start date: 1991     Quit date: 2001     Years since quittin.7     Passive exposure: Past    Smokeless tobacco: Never   Substance Use Topics    Alcohol use: Not Currently     Comment: seldom    Drug use: Not Currently     Frequency: 8.0 times per week     Types: Marijuana   [2]   Current Facility-Administered Medications on File Prior to Encounter   Medication Dose Route Frequency Provider Last Rate Last Admin    albuterol nebulizer solution 2.5 mg  2.5 mg  Nebulization Q15 Min PRN Pastor Saleh MD        albuterol-ipratropium 2.5 mg-0.5 mg/3 mL nebulizer solution 3 mL  3 mL Nebulization PRN Pastor Saleh MD        denosumab (PROLIA) injection 60 mg  60 mg Subcutaneous 1 time in Clinic/HOD Jean Carlos Hoffman MD        diphenhydrAMINE injection 25 mg  25 mg Intravenous Q6H PRN Pastor Saleh MD        HYDROmorphone injection 0.5 mg  0.5 mg Intravenous Q5 Min PRN Pastor Saleh MD   0.5 mg at 23 1312    ondansetron injection 4 mg  4 mg Intravenous Q15 Min PRN Pastor Saleh MD        sodium chloride 0.9% flush 10 mL  10 mL Intravenous PRN Ayush Guzman MD   10 mL at 23 1535    sodium chloride 0.9% flush 10 mL  10 mL Intravenous PRN Ayush Guzman MD   10 mL at 23 1315    sodium chloride 0.9% flush 3 mL  3 mL Intravenous PRN Pastor Saleh MD         Current Outpatient Medications on File Prior to Encounter   Medication Sig Dispense Refill    atorvastatin (LIPITOR) 20 MG tablet Take 1 tablet (20 mg total) by mouth once daily. 90 tablet 3    b complex vitamins capsule Take 1 capsule by mouth once daily.      betamethasone valerate 0.1% (VALISONE) 0.1 % Crea Apply topically 2 (two) times daily. 45 g 0    busPIRone (BUSPAR) 10 MG tablet Take 1 tablet (10 mg total) by mouth 3 (three) times daily. 270 tablet 3    cyproheptadine (PERIACTIN) 4 mg tablet Take 1 tablet (4 mg total) by mouth 4 (four) times daily. 120 tablet 0    diclofenac (VOLTAREN) 50 MG EC tablet Take 1 tablet (50 mg total) by mouth 2 (two) times daily as needed (pain). 20 tablet 0    gabapentin (NEURONTIN) 100 MG capsule TAKE 1 CAPSULE(100 MG) BY MOUTH EVERY EVENING 30 capsule 0    hydrALAZINE (APRESOLINE) 100 MG tablet Take 1 tablet (100 mg total) by mouth 2 (two) times daily. 60 tablet 11    [] LIDOcaine (LIDODERM) 5 % Place 1 patch onto the skin once daily. Remove & Discard patch within 12 hours or as directed by MD for 10 days 10 patch 0    magnesium oxide  (MAG-OX) 400 mg (241.3 mg magnesium) tablet Take 1 tablet (400 mg total) by mouth once daily. 90 tablet 3    metoprolol tartrate (LOPRESSOR) 25 MG tablet Take 1 tablet (25 mg total) by mouth 2 (two) times daily. 180 tablet 0    phenazopyridine (PYRIDIUM) 200 MG tablet Take 1 tablet (200 mg total) by mouth 3 (three) times daily as needed for Pain. 30 tablet 0    predniSONE (DELTASONE) 2.5 MG tablet Take 1 tablet (2.5 mg total) by mouth 2 (two) times daily. 60 tablet 1    solifenacin (VESICARE) 10 MG tablet Take 1 tablet (10 mg total) by mouth once daily. 30 tablet 0    tadalafiL (CIALIS) 5 MG tablet Take 1 tablet (5 mg total) by mouth once daily. 30 tablet 11    tamsulosin (FLOMAX) 0.4 mg Cap TAKE 1 CAPSULE(0.4 MG) BY MOUTH EVERY NIGHT 90 capsule 1    zolpidem (AMBIEN) 10 mg Tab Take 1 tablet (10 mg total) by mouth nightly as needed (insomnia). 30 tablet 3    [DISCONTINUED] cholecalciferol, vitamin D3, (VITAMIN D3) 100 mcg (4,000 unit) Cap Take 400 Units by mouth once daily.      [DISCONTINUED] cloNIDine (CATAPRES) 0.1 MG tablet Take 1 tablet (0.1 mg total) by mouth 2 (two) times daily as needed (only if blood pressure top number is over 200). (Patient not taking: Reported on 6/7/2022) 30 tablet 1    [DISCONTINUED] meclizine (ANTIVERT) 12.5 mg tablet Take 1 tablet (12.5 mg total) by mouth 2 (two) times daily as needed for Dizziness. 30 tablet 0    [DISCONTINUED] sildenafiL (VIAGRA) 100 MG tablet Take 1 tablet (100 mg total) by mouth daily as needed for Erectile Dysfunction. 10 tablet 2

## 2025-04-15 NOTE — SUBJECTIVE & OBJECTIVE
Interval History:  Patient seen and examined.  No acute events overnight.  Patient remains stable, neurologic symptoms appear to be improving.  Patient's blood pressure remains markedly elevated on and off of Cardene IV drip.  MRI shows potential evidence of acute CVA.    Review of Systems  Objective:     Vital Signs (Most Recent):  Temp: 98.1 °F (36.7 °C) (04/15/25 1505)  Pulse: 69 (04/15/25 1600)  Resp: (!) 21 (04/15/25 1545)  BP: (!) 194/79 (04/15/25 1600)  SpO2: 95 % (04/15/25 1545) Vital Signs (24h Range):  Temp:  [97.8 °F (36.6 °C)-98.3 °F (36.8 °C)] 98.1 °F (36.7 °C)  Pulse:  [60-95] 69  Resp:  [18-54] 21  SpO2:  [93 %-98 %] 95 %  BP: (135-211)/(62-94) 194/79     Weight: 62.6 kg (138 lb 0.1 oz)  Body mass index is 20.98 kg/m².    Intake/Output Summary (Last 24 hours) at 4/15/2025 1618  Last data filed at 4/15/2025 1611  Gross per 24 hour   Intake 150 ml   Output 850 ml   Net -700 ml         Physical Exam  Vitals and nursing note reviewed.   Eyes:      Pupils: Pupils are equal, round, and reactive to light.   Neck:      Vascular: No JVD.   Cardiovascular:      Rate and Rhythm: Normal rate and regular rhythm.      Heart sounds: Normal heart sounds.   Pulmonary:      Effort: Pulmonary effort is normal.      Breath sounds: Normal breath sounds.   Abdominal:      General: Bowel sounds are normal. There is no distension.      Palpations: Abdomen is soft.      Tenderness: There is no abdominal tenderness.   Musculoskeletal:         General: No deformity.   Lymphadenopathy:      Cervical: No cervical adenopathy.   Skin:     Coloration: Skin is not pale.      Findings: No rash.               Significant Labs: All pertinent labs within the past 24 hours have been reviewed.    Significant Imaging: I have reviewed all pertinent imaging results/findings within the past 24 hours.

## 2025-04-15 NOTE — PT/OT/SLP EVAL
Occupational Therapy   Evaluation    Name: Rajendra Castle  MRN: [id number]  Admitting Diagnosis: Hypertensive emergency  Recent Surgery: * No surgery found *      Recommendations:     Discharge Recommendations: High Intensity Therapy  Discharge Equipment Recommendations:  walker, rolling  Barriers to discharge:  Decreased caregiver support    Assessment:     Rajendra Castle is a 79 y.o. male with a medical diagnosis of Hypertensive emergency.   Performance deficits affecting function: weakness, impaired endurance, impaired self care skills, impaired functional mobility, gait instability, impaired balance, impaired cardiopulmonary response to activity.      Pt completed functional mobility and ADLs with contact guard assistance today; he reported feeling a little unsteady when standing/walking.      Rehab Prognosis: Good; patient would benefit from acute skilled OT services to address these deficits and reach maximum level of function.       Plan:     Patient to be seen 5 x/week to address the above listed problems via self-care/home management, therapeutic activities, therapeutic exercises  Plan of Care Expires: 05/15/25  Plan of Care Reviewed with: patient    Subjective     Chief Complaint: unsteady standing/walking  Patient/Family Comments/goals: return to OF    Occupational Profile:  Living Environment: Lives alone in a [facility] with 0STE  Previous level of function: Mod I with straight cane; drives; completes IADLs  Equipment Used at Home: cane, straight  Assistance upon Discharge: limited    Pain/Comfort:  Pain Rating 1: 0/10  Pain Rating Post-Intervention 1: 0/10    Objective:     Communicated with: nurse prior to session.  Patient found supine with telemetry, pulse ox (continuous), peripheral IV, blood pressure cuff upon OT entry to room.    General Precautions: Standard, fall  Orthopedic Precautions: N/A  Braces: N/A  Respiratory Status: Room air    Occupational Performance:    Bed Mobility:    Patient completed Supine  to Sit with contact guard assistance  Patient completed Sit to Supine with contact guard assistance    Functional Mobility/Transfers:  Patient completed Sit <> Stand Transfer with contact guard assistance  with  rolling walker   Functional Mobility: CGA with RW to/from bathroom     Activities of Daily Living:  Lower Body Dressing: contact guard assistance seated EOB to don socks  Toileting: contact guard assistance standing at toilet to urinate    Cognitive/Visual Perceptual:  Cognitive/Psychosocial Skills:     -       Oriented to: Person, Place, Time, and Situation   -       Follows Commands/attention:Follows multistep  commands  -       Communication: clear/fluent  Visual/Perceptual:      -Intact      Physical Exam:  Sensation:    -       Intact; pt denied numbness/tingling   Upper Extremity Range of Motion:     -       Right Upper Extremity: WFL  -       Left Upper Extremity: WFL  Upper Extremity Strength:    -       Right Upper Extremity: WFL  -       Left Upper Extremity: WFL   Strength:    -       Right Upper Extremity: WFL   -       Left Upper Extremity: WFL  Fine Motor Coordination:    -       Intact  Gross motor coordination:   WFL    AMPAC 6 Click ADL:  AMPAC Total Score: 21    Treatment & Education:  Therapist provided facilitation and instruction of proper body mechanics and fall prevention strategies during tasks listed above.   Instructed patient to sit in bedside chair as tolerated daily to increase OOB/activity tolerance.   Instructed patient to use call light to have nursing staff assist with needs/transfers.   Discussed OT POC and answered all questions within OT scope of practice.      Patient left HOB elevated with all lines intact, call button in reach, and bed alarm on    GOALS:   Multidisciplinary Problems       Occupational Therapy Goals          Problem: Occupational Therapy    Goal Priority Disciplines Outcome Interventions   Occupational Therapy Goal     OT, PT/OT     Description:  Goals to be met by: 5/15/25     Patient will increase functional independence with ADLs by performing:    UE Dressing with Modified [place].  LE Dressing with Modified [place].  Grooming while standing at sink with Modified [place].  Toileting from toilet with Modified [place] for hygiene and clothing management.   Toilet transfer to toilet with Modified [place].                           History:     Past Medical History:   Diagnosis Date    Anticoagulant long-term use     Arthritis     Coronary artery disease     Dr. Lamb    DDD (degenerative disc disease), cervical     Degenerative disc disease     Heart murmur     History of radiation therapy 2020    Hypertension     Nontoxic multinodular goiter 09/20/2016    Prostate CA     RA (rheumatoid arthritis)     Sleep apnea     No CPAP    Vitamin D insufficiency 09/30/2016         Past Surgical History:   Procedure Laterality Date    CARPAL TUNNEL RELEASE Right 05/28/2021    Procedure: RELEASE, CARPAL TUNNEL;  Surgeon: Jose Ryan II, MD;  [facility]: [facility];  Service: Orthopedics;  Laterality: Right;    COLONOSCOPY  prior to 2016    normal findings per patient report    CORONARY ANGIOPLASTY WITH STENT PLACEMENT  02/2022    CYSTOSCOPY N/A 12/18/2018    Procedure: CYSTOSCOPY;  Surgeon: Garrett Pnia MD;  Location: [facility] OR;  Service: Urology;  Laterality: N/A;    CYSTOSCOPY N/A 07/12/2022    Procedure: CYSTOSCOPY;  Surgeon: Garrett Pina MD;  Location: [facility] OR;  Service: Urology;  Laterality: N/A;    ESOPHAGOGASTRODUODENOSCOPY N/A 09/29/2020    Dr. Espinosa; empiric dilation; erythematous mucosa in antrum; gastric mucosal atrophy; hematin in entire stomach; biopsy: mid & distal esophagus WNL, stomach- WNL, negative for h pylori    EXCISION OF LESION OF PENIS N/A 2/23/2023    Procedure: EXCISION, LESION, PENIS;  Surgeon: Timo Myers MD;  Location: [facility] OR;  Service: Urology;  Laterality: N/A;    INSERTION OF TUNNELED CENTRAL  VENOUS CATHETER (CVC) WITH SUBCUTANEOUS PORT Left 5/18/2023    Procedure: [id number];  Surgeon: Atul Faria MD;  Location: [facility];  Service: General;  Laterality: Left;  left subclavian    PARATHYROIDECTOMY Right 10/27/2020    Procedure: PARATHYROIDECTOMY;  Surgeon: Latonia Clayton MD;  [facility];  Service: ENT;  Laterality: Right;    RELEASE OF ULNAR NERVE AT CUBITAL TUNNEL Right 05/28/2021    Procedure: RELEASE, ULNAR TUNNEL;  Surgeon: Jose Ryan II, MD;  Location: [facility];  Service: Orthopedics;  Laterality: Right;    TRANSRECTAL BIOPSY OF PROSTATE WITH ULTRASOUND GUIDANCE N/A 12/18/2018    Procedure: BIOPSY, PROSTATE, RECTAL APPROACH, WITH US GUIDANCE;  Surgeon: Garrett Pina MD;  Location: [facility];  Service: Urology;  Laterality: N/A;    TRANSRECTAL ULTRASOUND EXAMINATION N/A 07/12/2022    Procedure: ULTRASOUND, RECTAL APPROACH;  Surgeon: Garrett Pina MD;  Location: [facility];  Service: Urology;  Laterality: N/A;       Time Tracking:     OT Date of Treatment: 04/15/25  OT Start Time: 1125  OT Stop Time: 1150  OT Total Time (min): 25 min    Billable Minutes:Evaluation 10  Self Care/Home Management 15    4/15/2025

## 2025-04-15 NOTE — ASSESSMENT & PLAN NOTE
Noted. Troponin stable, no S/Sx of ACS,    Recent Labs   Lab 04/15/25  0100   TROPONINIHS 36*

## 2025-04-15 NOTE — ASSESSMENT & PLAN NOTE
Patient's most recent potassium results are listed below.   Recent Labs     04/14/25  2145   K 3.1*     Plan  - Replete potassium per protocol  - Monitor potassium Daily  - Patient's hypokalemia is stable

## 2025-04-15 NOTE — PLAN OF CARE
[facility]  Initial Discharge Assessment     Pt lives alone and has been independent in functioning. No HH/Dialysis. DME is a cane and a sc. Reports that he has recently fallen and expressed that he does not always take his medications as ordered but has no issues in obtaining them. Expressed interest in finding out options for assistance at discharge. CM will continue to follow for discharge needs.    Primary Care Provider: Sp Lilly MD    Admission Diagnosis: Hypertensive emergency [I16.1]    Admission Date: 4/14/2025  Expected Discharge Date: 4/17/2025    Transition of Care Barriers: None    Payor: [employer] [facility] / Plan: [employer] CHOICES / Product Type: Medicare Advantage /     Extended Emergency Contact Information  Primary Emergency Contact: Jane Castle  Mobile Phone: [phone]  Relation: Grandchild  Preferred language: English   needed? No  Secondary Emergency Contact: Brenton Kulkarni  Mobile Phone: [phone]  Relation: Relative  Preferred language: English   needed? No    Discharge Plan A: Rehab  Discharge Plan B: Home Health      [id number] - [place], [place] - [address] [facility]  [address]  [facility] [address]  Phone: [phone] Fax: [phone]    [facility] - [place] - [address]  [address]  [facility] [address]  Phone: [phone] Fax: [phone]    [facility]  [address]  [facility] [address]  Phone: [phone] Fax: [phone]    [id number] - [place], [place] - [address]  [address]  [facility] [address]  Phone: [phone] Fax: [phone]      Initial Assessment (most recent)       Adult Discharge Assessment - 04/15/25 1640          Discharge Assessment    Assessment Type Discharge Planning Assessment     Confirmed/corrected address, phone number and  insurance Yes     Confirmed Demographics Correct on Facesheet     Source of Information patient     When was your last doctors appointment? --   2-3 weeks    Communicated DEVONTE with patient/caregiver Yes     Reason For Admission Hypertensive emergency     People in Home alone     Do you expect to return to your current living situation? Yes     Do you have help at home or someone to help you manage your care at home? Yes     Who are your caregiver(s) and their phone number(s)? Shelbi Farris can assist as needed     Prior to hospitilization cognitive status: Unable to Assess     Current cognitive status: Alert/Oriented     Walking or Climbing Stairs Difficulty yes     Walking or Climbing Stairs ambulation difficulty, requires equipment     Mobility Management cane     Dressing/Bathing Difficulty yes     Dressing/Bathing bathing difficulty, requires equipment     Dressing/Bathing Management SC     Home Accessibility wheelchair accessible     Home Layout Able to live on 1st floor     Equipment Currently Used at Home cane, straight;shower chair     Readmission within 30 days? No     Patient currently being followed by outpatient case management? No     Do you currently have service(s) that help you manage your care at home? No     Do you take prescription medications? Yes     Do you have prescription coverage? Yes     Coverage [employer]     Do you have any problems affording any of your prescribed medications? No     Is the patient taking medications as prescribed? no     If no, which medications is patient not taking? States that he forgets all of them at times     Who is going to help you get home at discharge? Shelbi     How do you get to doctors appointments? car, drives self;family or friend will provide     Are you on dialysis? No     Do you take coumadin? No     Discharge Plan A Rehab     Discharge Plan B Home Health     DME Needed Upon Discharge  walker, rolling     Discharge Plan discussed with: Patient      Transition of Care Barriers None

## 2025-04-15 NOTE — ASSESSMENT & PLAN NOTE
Noted on MRI    Antithrombotics for secondary stroke prevention: Antiplatelets: Aspirin: 81 mg daily  Clopidogrel: 75 mg daily    Statins for secondary stroke prevention and hyperlipidemia, if present:   Statins: Atorvastatin- 40 mg daily    Aggressive risk factor modification: HTN, Smoking     Rehab efforts: The patient has been evaluated by a stroke team provider and the therapy needs have been fully considered based off the presenting complaints and exam findings. The following therapy evaluations are needed: PT evaluate and treat, OT evaluate and treat    Diagnostics ordered/pending: CTA Head to assess vasculature , CTA Neck/Arch to assess vasculature    VTE prophylaxis: Enoxaparin 40 mg SQ every 24 hours    BP parameters: Infarct: No intervention, SBP <220

## 2025-04-15 NOTE — ED PROVIDER NOTES
"Encounter Date: 4/14/2025       History     Chief Complaint   Patient presents with    Tingling     Pt with complaints of tingling in both hands - Chest pain - SOB- underarm pain - ear "humming" - pressure in the nose and body tingling that started 3 hours ago.      This is a 79-year-old male presenting with complaint of numbness and tingling in his bilateral hands and feet.  He says this began around 3:00 p.m. this afternoon.  He developed a bad headache, had pressure behind his eyes, felt dizzy, and also has had chest tightness.  He complains of nasal congestion and mild sore throat.  He also feels short of breath.  He also complains feeling weak all over and having no energy.  He says he is now sleeping because of urinary frequency that has keeping him up.  He is seeing his PCP and urologist for this.  He does report some right lateral chest pain that is secondary to rib fractures he sustained a few weeks ago in a fall.  BP is noted to be very elevated.  He says that he last took his oral hydralazine around 1:00 p.m. today.    The history is provided by the patient.     Review of patient's allergies indicates:  No Known Allergies  Past Medical History:   Diagnosis Date    Anticoagulant long-term use     Arthritis     Coronary artery disease     Dr. Lamb    DDD (degenerative disc disease), cervical     Degenerative disc disease     Heart murmur     History of radiation therapy 2020    Hypertension     Nontoxic multinodular goiter 09/20/2016    Prostate CA     RA (rheumatoid arthritis)     Sleep apnea     No CPAP    Vitamin D insufficiency 09/30/2016     Past Surgical History:   Procedure Laterality Date    CARPAL TUNNEL RELEASE Right 05/28/2021    Procedure: RELEASE, CARPAL TUNNEL;  Surgeon: Jose Ryan II, MD;  Location: [facility];  Service: Orthopedics;  Laterality: Right;    COLONOSCOPY  prior to 2016    normal findings per patient report    CORONARY ANGIOPLASTY WITH STENT PLACEMENT  02/2022    CYSTOSCOPY " N/A 12/18/2018    Procedure: CYSTOSCOPY;  Surgeon: Garrett Pina MD;  Location: [facility];  Service: Urology;  Laterality: N/A;    CYSTOSCOPY N/A 07/12/2022    Procedure: CYSTOSCOPY;  Surgeon: Garrett Pina MD;  Location: [facility];  Service: Urology;  Laterality: N/A;    ESOPHAGOGASTRODUODENOSCOPY N/A 09/29/2020    Dr. Espinosa; empiric dilation; erythematous mucosa in antrum; gastric mucosal atrophy; hematin in entire stomach; biopsy: mid & distal esophagus WNL, stomach- WNL, negative for h pylori    EXCISION OF LESION OF PENIS N/A 2/23/2023    Procedure: EXCISION, LESION, PENIS;  Surgeon: Timo Myers MD;  Location: [facility];  Service: Urology;  Laterality: N/A;    INSERTION OF TUNNELED CENTRAL VENOUS CATHETER (CVC) WITH SUBCUTANEOUS PORT Left 5/18/2023    Procedure: ZYESFOEKL-ZCMC-F-CATH;  Surgeon: Atul Faria MD;  Location: [facility];  Service: General;  Laterality: Left;  left subclavian    PARATHYROIDECTOMY Right 10/27/2020    Procedure: PARATHYROIDECTOMY;  Surgeon: Latonia Clayton MD;  Location: [address];  Service: ENT;  Laterality: Right;    RELEASE OF ULNAR NERVE AT CUBITAL TUNNEL Right 05/28/2021    Procedure: RELEASE, ULNAR TUNNEL;  Surgeon: Jose Ryan II, MD;  Location: [facility];  Service: Orthopedics;  Laterality: Right;    TRANSRECTAL BIOPSY OF PROSTATE WITH ULTRASOUND GUIDANCE N/A 12/18/2018    Procedure: BIOPSY, PROSTATE, RECTAL APPROACH, WITH US GUIDANCE;  Surgeon: Garrett Pina MD;  Location: [facility];  Service: Urology;  Laterality: N/A;    TRANSRECTAL ULTRASOUND EXAMINATION N/A 07/12/2022    Procedure: ULTRASOUND, RECTAL APPROACH;  Surgeon: Garrett Pina MD;  Location: [facility];  Service: Urology;  Laterality: N/A;     Family History   Problem Relation Name Age of Onset    Heart disease Mother      Stroke Father      Drug abuse Sister      No Known Problems Daughter      No Known Problems Daughter      No Known Problems Son      Diabetes Maternal Uncle       Colon cancer Neg Hx      Crohn's disease Neg Hx      Esophageal cancer Neg Hx      Stomach cancer Neg Hx      Ulcerative colitis Neg Hx       Social History[1]  Review of Systems   Constitutional:  Positive for fatigue.   HENT:  Positive for congestion.    Eyes:  Positive for visual disturbance.   Respiratory:  Positive for shortness of breath.    Cardiovascular:  Positive for chest pain.   Gastrointestinal:  Negative for abdominal pain, diarrhea, nausea and vomiting.   Genitourinary:  Positive for frequency.   Musculoskeletal:  Positive for myalgias.   Neurological:  Positive for dizziness, weakness, numbness and headaches.   All other systems reviewed and are negative.      Physical Exam     Initial Vitals [04/14/25 2026]   BP Pulse Resp Temp SpO2   (!) 177/80 95 (!) 24 97.8 °F (36.6 °C) (!) 93 %      MAP       --         Physical Exam    Nursing note and vitals reviewed.  Constitutional: He appears well-developed and well-nourished. He is not diaphoretic. No distress.   HENT:   Head: Normocephalic and atraumatic.   Bilateral TM effusions   Eyes: Conjunctivae are normal.   Neck: Neck supple.   Normal range of motion.  Cardiovascular:  Normal rate.           Pulmonary/Chest: Breath sounds normal. No respiratory distress.   Abdominal: Abdomen is soft. He exhibits no distension. There is no abdominal tenderness.   Musculoskeletal:         General: No edema.      Cervical back: Normal range of motion and neck supple.     Neurological: He is alert and oriented to person, place, and time. He has normal strength. No cranial nerve deficit.   Skin: Skin is warm and dry. No rash noted. No erythema.   Psychiatric: He has a normal mood and affect.         ED Course   Critical Care    Date/Time: 4/14/2025 8:22 PM    Performed by: Karlo Salas MD  Authorized by: Radha Kincaid MD  Direct patient critical care time: 20 minutes  Additional history critical care time: 5 minutes  Ordering / reviewing critical care time: 5  minutes  Documentation critical care time: 5 minutes  Consulting other physicians critical care time: 5 minutes  Total critical care time (exclusive of procedural time) : 40 minutes  Critical care was necessary to treat or prevent imminent or life-threatening deterioration of the following conditions: hypertensive emergency.  Critical care was time spent personally by me on the following activities: development of treatment plan with patient or surrogate, discussions with consultants, interpretation of cardiac output measurements, evaluation of patient's response to treatment, examination of patient, obtaining history from patient or surrogate, ordering and performing treatments and interventions, ordering and review of laboratory studies, ordering and review of radiographic studies, pulse oximetry, re-evaluation of patient's condition and review of old charts.        Labs Reviewed   COMPREHENSIVE METABOLIC PANEL - Abnormal       Result Value    Sodium 138      Potassium 3.1 (*)     Chloride 112 (*)     CO2 17 (*)     Glucose 96      BUN 10      Creatinine 1.3      Calcium 8.3 (*)     Protein Total 6.7      Albumin 3.2 (*)     Bilirubin Total 0.4      ALP 67      AST 21      ALT 10      Anion Gap 9      eGFR 57 (*)    NT-PRO NATRIURETIC PEPTIDE - Abnormal    NT-proBNP 5,217 (*)    URINALYSIS, REFLEX TO URINE CULTURE - Abnormal    Color, UA Yellow      Appearance, UA Clear      Spec Grav UA 1.010      pH, UA 6.0      Protein, UA Negative      Glucose, UA Negative      Ketones, UA 1+ (*)     Blood, UA Negative      Bilirubin, UA Negative      Urobilinogen, UA Negative      Nitrites, UA Negative      Leukocyte Esterase, UA Negative     CBC WITH DIFFERENTIAL - Abnormal    WBC 5.75      RBC 4.29 (*)     Hgb 11.8 (*)     Hct 37.0 (*)     MCV 86      MCH 27.5      MCHC 31.9 (*)     RDW 16.9 (*)     Platelet Count 279      MPV 12.0      Nucleated RBC 0      Neut % 78.2 (*)     Lymph % 8.9 (*)     Mono % 10.1      Eos % 2.3       Basophil % 0.2      Imm Grans % 0.3      Neut # 4.5      Lymph # 0.51 (*)     Mono # 0.58      Eos # 0.13      Baso # 0.01      Imm Grans # 0.02     TROPONIN I HIGH SENSITIVITY - Abnormal    Troponin High Sensitive 36 (*)    INFLUENZA A & B BY MOLECULAR - Normal    INFLUENZA A MOLECULAR Negative      INFLUENZA B MOLECULAR  Negative     TROPONIN I HIGH SENSITIVITY - Normal    Troponin High Sensitive 22     MAGNESIUM - Normal    Magnesium 1.9     TSH - Normal    TSH 0.931     SARS-COV-2 RNA AMPLIFICATION, QUAL - Normal    SARS COV-2 Molecular Negative     CBC W/ AUTO DIFFERENTIAL    Narrative:     The following orders were created for panel order CBC auto differential.  Procedure                               Abnormality         Status                     ---------                               -----------         ------                     CBC with Differential[[id number]]       Abnormal            Final result                 Please view results for these tests on the individual orders.     EKG Readings: (Independently Interpreted)   Sinus rhythm.  Seventy-one beats/minute.  Normal axis.  No ST elevation.  Biphasic lateral T-waves which were present on previous EKG.       Imaging Results              X-Ray Chest PA And Lateral (Final result)  Result time 04/15/25 03:11:51      Final result by Gricelda Reyes MD (04/15/25 03:11:51)                   Impression:      No significant acute abnormality involving the lungs.    Mild costophrenic angle blunting on the left raising question of trace amount of pleural fluid.      Electronically signed by: Gricelda Reyes  Date:    04/15/2025  Time:    03:11               Narrative:    EXAMINATION:  XR CHEST PA AND LATERAL    CLINICAL HISTORY:  Dyspnea, unspecified    TECHNIQUE:  PA and lateral views of the chest were performed.    COMPARISON:  04/04/2025, 10/24/2024 chest x-rays    FINDINGS:  Cardiac silhouette is stable and not significantly enlarged.  Band of scarring  noted in the right mid lung otherwise the lungs appear clear.  There is mild costophrenic angle blunting posteriorly and laterally on the left suggesting a trace amount of pleural fluid or thickening.  No effusion is seen on the right and there is no pneumothorax..  Degenerative changes of the spine and shoulders noted.                                        CT Head Without Contrast (Final result)  Result time 04/15/25 01:28:12      Final result by Gricelda Reyes MD (04/15/25 01:28:12)                   Impression:      No evidence of acute intracranial abnormality or fracture.    Microvascular chronic ischemic changes and senescent atrophic changes.    Otomastoiditis on the left and mastoiditis on the right with possible small amount of fluid also in the middle ear on the right.  With prior exam demonstrating mastoiditis on the right only.    This report was flagged in Epic as abnormal.      Electronically signed by: Gricelda Reyes  Date:    04/15/2025  Time:    01:28               Narrative:    EXAMINATION:  CT HEAD WITHOUT CONTRAST    CLINICAL HISTORY:  Headache, sudden, severe;    TECHNIQUE:  Low dose axial images were obtained through the head.  Coronal and sagittal reformations were also performed. Contrast was not administered.    COMPARISON:  CT brain 10/24/2024    FINDINGS:  There is no evidence of acute intra or extra-axial hemorrhage or hematoma.  The gray-white matter junction differentiation is intact.  Mild prominence of the ventricles, cisterns and sulci are noted consistent with patient's age and senescent atrophic changes.  Patchy mild low density in the periventricular deep white matter is again noted and is nonspecific but commonly related to microvascular chronic ischemic changes.  There is no mass effect/midline shift.  Posterior fossa structures and pituitary gland are unremarkable as imaged allowing for skull base artifact.    Bony calvarium is intact and the visualized paranasal sinuses  essentially appear clear.  There is note of multiple opacified right mastoid air cells and a few with air-fluid levels are also noted in the left mastoid air cells compatible with mastoiditis.  There is also note of density in the middle ear on the left and possibly a small amount on the right.                                       Medications   niCARdipine 40 mg/200 mL (0.2 mg/mL) infusion (2.5 mg/hr Intravenous New Bag 4/15/25 0513)   potassium chloride SA CR tablet 40 mEq (40 mEq Oral Given 4/14/25 2309)   calcium gluconate 1 g in NS IVPB (premixed) (0 g Intravenous Stopped 4/15/25 0007)   hydrALAZINE injection 10 mg (10 mg Intravenous Given 4/14/25 2325)   LORazepam injection 0.5 mg (0.5 mg Intravenous Given 4/15/25 0025)   hydrALAZINE injection 10 mg (10 mg Intravenous Given 4/15/25 0025)   labetaloL injection 10 mg (10 mg Intravenous Given 4/15/25 0210)   acetaminophen tablet 1,000 mg (1,000 mg Oral Given 4/15/25 0245)   piperacillin-tazobactam (ZOSYN) 3.375 g in D5W 100 mL IVPB (MB+) (0 g Intravenous Stopped 4/15/25 0351)   aspirin tablet 325 mg (325 mg Oral Given 4/15/25 0310)     Medical Decision Making  Patient has no focal neurologic deficits to suggest CVA.  He is EKG is abnormal but shows no acute change.  No leukocytosis.   CO2 17 which appears chronic for the patient.  Normal renal function.  Initial troponin is normal at 22.  BNP is markedly elevated at 5000.  CXR appears clear.  Patient is very elevated.  He received 2 doses of hydralazine as well as a dose of labetalol but is persistently elevated.  Cardene drip was started.  CT head has returned showing no intracranial abnormality, does show bilateral mastoiditis.  I have ordered IV Zosyn.  Repeat troponin has increased, now 36.  BP has improved on the Cardene drip.  He will require admission.  We do not have Cardiology available at this facility.  He will be transferred to [facility].  Dr. Kincaid has accepted the patient transfer.    Amount  and/or Complexity of Data Reviewed  Labs: ordered.  Radiology: ordered.    Risk  OTC drugs.  Prescription drug management.  Decision regarding hospitalization.                                      Clinical Impression:  Final diagnoses:  [R06.00] Acute dyspnea  [I16.1] Hypertensive emergency (Primary)  [R51.9] Nonintractable headache, unspecified chronicity pattern, unspecified headache type  [R42] Dizziness  [R20.2] Paresthesia  [R79.89] Elevated troponin  [H70.93] Mastoiditis of both sides          ED Disposition Condition    Admit Stable                    [1]   Social History  Tobacco Use    Smoking status: Former     Current packs/day: 0.00     Average packs/day: 0.3 packs/day for 10.0 years (2.5 ttl pk-yrs)     Types: Cigarettes     Start date: 1991     Quit date: 2001     Years since quittin.7     Passive exposure: Past    Smokeless tobacco: Never   Substance Use Topics    Alcohol use: Not Currently     Comment: seldom    Drug use: Not Currently     Frequency: 8.0 times per week     Types: Marijuana        Karlo Salas MD  04/15/25 0601

## 2025-04-15 NOTE — PLAN OF CARE
[id number] pt arrived to the facility. Writer assumed care of the pt. Assessment done per flowsheet. Cardene gtt continued at 2.5. MD notified of arrival. No new orders noted at this time    Problem: Adult Inpatient Plan of Care  Goal: Plan of Care Review  Outcome: Progressing  Goal: Patient-Specific Goal (Individualized)  Outcome: Progressing  Goal: Absence of Hospital-Acquired Illness or Injury  Outcome: Progressing  Goal: Optimal Comfort and Wellbeing  Outcome: Progressing  Goal: Readiness for Transition of Care  Outcome: Progressing

## 2025-04-15 NOTE — H&P
"  [facility] Medicine  History & Physical    Patient Name: Rajendra Castle  MRN: [id number]  Patient Class: IP- Inpatient  Admission Date: 4/14/2025  Attending Physician: Radha Kincaid MD  Primary Care Provider: Sp Lilly MD    Patient seen at 5:46 a.m. on 04/15/2025.  History is obtained from the patient/ER physician/records  Subjective:     Principal Problem:Hypertensive emergency    Chief Complaint:   Chief Complaint   Patient presents with    Tingling     Pt with complaints of tingling in both hands - Chest pain - SOB- underarm pain - ear "humming" - pressure in the nose and body tingling that started 3 hours ago.        HPI: Patient is a 79-year-old  male who only reports he has "prostate issues" and hypertension; he takes his medications but does not check his blood pressure at home  -He is a vague historian, but review of the records indicate that he has multiple medical issues including hypertension, prediabetes, CKD-3, GERD, hyperparathyroidism (status post parathyroidectomy), rheumatoid lung, chronic pulmonary embolism, anxiety/depression, and ureteral cancer  -Around 3:00 p.m. in the afternoon yesterday, the patient had numbness/tingling in his bilateral hands/feet and developed a headache (with pressure behind his eyes) as well as dizziness  -He endorsed chest tightness to the ER (but not to me) but does state that he felt weak all over, particularly in his legs, and he was staggering  -In the ER, his blood pressure was as high as the 210s-> now 160s after Cardene infusion and 2 doses of IV Hydralazine/a dose of Labetalol  -He had no leukocytosis with a hemoglobin of 11.8 with normal platelets; COVID/influenza testing were negative  -Creatinine was 1.3 (below baseline) with a potassium of 3.1; Mag was 1.9  -He did not have a UTI; TSH was normal  -BNP was 5217 and troponin went from 22-> 36-> now 72.7 (at the time of this editing)  -Lipid panel showed an LDL " "of 61  -EKG showed, per my interpretation, sinus rhythm 71 beats per minute with a QTC of 489 with biphasic T-waves in several leads (this has been seen previously)  -Chest x-ray was unrevealing; CT head without contrast showed, per radiologist Dr. Reyes:    "Impression:     No evidence of acute intracranial abnormality or fracture.     Microvascular chronic ischemic changes and senescent atrophic changes.     Otomastoiditis on the left and mastoiditis on the right with possible small amount of fluid also in the middle ear on the right.  With prior exam demonstrating mastoiditis on the right only."    The patient received aspirin around 3:00 a.m., Tylenol, calcium gluconate, oral potassium, Ativan, the aforementioned antihypertensives, and IV Zosyn (for the concern of mastoiditits) and was transferred to the ICU for further diagnosis, treatment, and care    Past Medical History:   Diagnosis Date    Anticoagulant long-term use     Arthritis     Coronary artery disease     Dr. Lamb    DDD (degenerative disc disease), cervical     Degenerative disc disease     Heart murmur     History of radiation therapy 2020    Hypertension     Nontoxic multinodular goiter 09/20/2016    Prostate CA     RA (rheumatoid arthritis)     Sleep apnea     No CPAP    Vitamin D insufficiency 09/30/2016       Past Surgical History:   Procedure Laterality Date    CARPAL TUNNEL RELEASE Right 05/28/2021    Procedure: RELEASE, CARPAL TUNNEL;  Surgeon: Jose Ryan II, MD;  Location: [facility] OR;  Service: Orthopedics;  Laterality: Right;    COLONOSCOPY  prior to 2016    normal findings per patient report    CORONARY ANGIOPLASTY WITH STENT PLACEMENT  02/2022    CYSTOSCOPY N/A 12/18/2018    Procedure: CYSTOSCOPY;  Surgeon: Garrett Pina MD;  [facility];  Service: Urology;  Laterality: N/A;    CYSTOSCOPY N/A 07/12/2022    Procedure: CYSTOSCOPY;  Surgeon: Garrett Pina MD;  [facility];  Service: Urology;  Laterality: N/A; "    ESOPHAGOGASTRODUODENOSCOPY N/A 09/29/2020    Dr. Espinosa; empiric dilation; erythematous mucosa in antrum; gastric mucosal atrophy; hematin in entire stomach; biopsy: mid & distal esophagus WNL, stomach- WNL, negative for h pylori    EXCISION OF LESION OF PENIS N/A 2/23/2023    Procedure: EXCISION, LESION, PENIS;  Surgeon: Timo Myers MD;  Location: [facility] OR;  Service: Urology;  Laterality: N/A;    INSERTION OF TUNNELED CENTRAL VENOUS CATHETER (CVC) WITH SUBCUTANEOUS PORT Left 5/18/2023    Procedure: [id number];  Surgeon: Atul Faria MD;  Location: [facility];  Service: General;  Laterality: Left;  left subclavian    PARATHYROIDECTOMY Right 10/27/2020    Procedure: PARATHYROIDECTOMY;  Surgeon: Latoina Clayton MD;  Location: [facility] OR [address] [place] [address];  Service: ENT;  Laterality: Right;    RELEASE OF ULNAR NERVE AT CUBITAL TUNNEL Right 05/28/2021    Procedure: RELEASE, ULNAR TUNNEL;  Surgeon: Jose Ryan II, MD;  Location: [facility] OR;  Service: Orthopedics;  Laterality: Right;    TRANSRECTAL BIOPSY OF PROSTATE WITH ULTRASOUND GUIDANCE N/A 12/18/2018    Procedure: BIOPSY, PROSTATE, RECTAL APPROACH, WITH US GUIDANCE;  Surgeon: Garrett Pina MD;  Location: [facility] OR;  Service: Urology;  Laterality: N/A;    TRANSRECTAL ULTRASOUND EXAMINATION N/A 07/12/2022    Procedure: ULTRASOUND, RECTAL APPROACH;  Surgeon: Garrett Pina MD;  Location: [facility] OR;  Service: Urology;  Laterality: N/A;       Review of patient's allergies indicates:  No Known Allergies    Current Facility-Administered Medications on File Prior to Encounter   Medication    albuterol nebulizer solution 2.5 mg    albuterol-ipratropium 2.5 mg-0.5 mg/3 mL nebulizer solution 3 mL    denosumab (PROLIA) injection 60 mg    diphenhydrAMINE injection 25 mg    HYDROmorphone injection 0.5 mg    ondansetron injection 4 mg    sodium chloride 0.9% flush 10 mL    sodium chloride 0.9% flush 10 mL    sodium chloride 0.9% flush 3 mL     Current Outpatient  Medications on File Prior to Encounter   Medication Sig    atorvastatin (LIPITOR) 20 MG tablet Take 1 tablet (20 mg total) by mouth once daily.    b complex vitamins capsule Take 1 capsule by mouth once daily.    betamethasone valerate 0.1% (VALISONE) 0.1 % Crea Apply topically 2 (two) times daily.    busPIRone (BUSPAR) 10 MG tablet Take 1 tablet (10 mg total) by mouth 3 (three) times daily.    cyproheptadine (PERIACTIN) 4 mg tablet Take 1 tablet (4 mg total) by mouth 4 (four) times daily.    diclofenac (VOLTAREN) 50 MG EC tablet Take 1 tablet (50 mg total) by mouth 2 (two) times daily as needed (pain).    gabapentin (NEURONTIN) 100 MG capsule TAKE 1 CAPSULE(100 MG) BY MOUTH EVERY EVENING    hydrALAZINE (APRESOLINE) 100 MG tablet Take 1 tablet (100 mg total) by mouth 2 (two) times daily.    [] LIDOcaine (LIDODERM) 5 % Place 1 patch onto the skin once daily. Remove & Discard patch within 12 hours or as directed by MD for 10 days    magnesium oxide (MAG-OX) 400 mg (241.3 mg magnesium) tablet Take 1 tablet (400 mg total) by mouth once daily.    metoprolol tartrate (LOPRESSOR) 25 MG tablet Take 1 tablet (25 mg total) by mouth 2 (two) times daily.    phenazopyridine (PYRIDIUM) 200 MG tablet Take 1 tablet (200 mg total) by mouth 3 (three) times daily as needed for Pain.    predniSONE (DELTASONE) 2.5 MG tablet Take 1 tablet (2.5 mg total) by mouth 2 (two) times daily.    solifenacin (VESICARE) 10 MG tablet Take 1 tablet (10 mg total) by mouth once daily.    tadalafiL (CIALIS) 5 MG tablet Take 1 tablet (5 mg total) by mouth once daily.    tamsulosin (FLOMAX) 0.4 mg Cap TAKE 1 CAPSULE(0.4 MG) BY MOUTH EVERY NIGHT    zolpidem (AMBIEN) 10 mg Tab Take 1 tablet (10 mg total) by mouth nightly as needed (insomnia).    [DISCONTINUED] cholecalciferol, vitamin D3, (VITAMIN D3) 100 mcg (4,000 unit) Cap Take 400 Units by mouth once daily.    [DISCONTINUED] cloNIDine (CATAPRES) 0.1 MG tablet Take 1 tablet (0.1 mg total) by mouth  2 (two) times daily as needed (only if blood pressure top number is over 200). (Patient not taking: Reported on 2022)    [DISCONTINUED] meclizine (ANTIVERT) 12.5 mg tablet Take 1 tablet (12.5 mg total) by mouth 2 (two) times daily as needed for Dizziness.    [DISCONTINUED] sildenafiL (VIAGRA) 100 MG tablet Take 1 tablet (100 mg total) by mouth daily as needed for Erectile Dysfunction.     Family History       Problem Relation (Age of Onset)    Diabetes Maternal Uncle    Drug abuse Sister    Heart disease Mother    No Known Problems Daughter, Daughter, Son    Stroke Father          Tobacco Use    Smoking status: Former     Current packs/day: 0.00     Average packs/day: 0.3 packs/day for 10.0 years (2.5 ttl pk-yrs)     Types: Cigarettes     Start date: 1991     Quit date: 2001     Years since quittin.7     Passive exposure: Past    Smokeless tobacco: Never   Substance and Sexual Activity    Alcohol use: Not Currently     Comment: seldom    Drug use: Not Currently     Frequency: 8.0 times per week     Types: Marijuana                Review of Systems   Constitutional:  Positive for fatigue. Negative for chills, fever and unexpected weight change.   HENT:  Positive for sinus pressure. Negative for rhinorrhea and sore throat.    Eyes:  Positive for visual disturbance.   Respiratory:  Positive for shortness of breath. Negative for cough.    Cardiovascular:  Positive for chest pain (Endorsed chest pain to the ER but only told me his chest felt full). Negative for leg swelling.   Gastrointestinal:  Negative for blood in stool, constipation, diarrhea, nausea and vomiting.   Genitourinary:  Positive for dysuria (mild, with increased urination).   Musculoskeletal:  Positive for myalgias.   Skin:  Negative for rash.   Neurological:  Positive for numbness (Bilateral fingers/toes) and headaches.        + gait disturbance   Hematological:  Negative for adenopathy.   Psychiatric/Behavioral:  Positive for sleep  disturbance.      Objective:     Vital Signs (Most Recent):  Temp: 98.3 °F (36.8 °C) (04/15/25 0514)  Pulse: 66 (04/15/25 0707)  Resp: (!) 28 (04/15/25 0707)  BP: (!) 168/74 (04/15/25 0514)  SpO2: (!) 93 % (04/15/25 0707) Vital Signs (24h Range):  Temp:  [97.8 °F (36.6 °C)-98.3 °F (36.8 °C)] 98.3 °F (36.8 °C)  Pulse:  [60-95] 66  Resp:  [18-28] 28  SpO2:  [93 %-98 %] 93 %  BP: (135-211)/(62-91) 168/74     Weight: 62.6 kg (138 lb 0.1 oz)  Body mass index is 20.98 kg/m².     Physical Exam  Constitutional:       General: He is not in acute distress.     Appearance: Normal appearance. He is not ill-appearing, toxic-appearing or diaphoretic.   HENT:      Head: Normocephalic and atraumatic.      Mouth/Throat:      Mouth: Mucous membranes are moist.   Eyes:      General: No scleral icterus.     Extraocular Movements: Extraocular movements intact.      Pupils: Pupils are equal, round, and reactive to light.   Neck:      Comments: No retractions  Cardiovascular:      Rate and Rhythm: Rhythm irregular.      Heart sounds: Normal heart sounds. No murmur heard.     No friction rub. No gallop.      Comments: Sinus arrhythmia on tele  Pulmonary:      Effort: Pulmonary effort is normal. No respiratory distress.      Breath sounds: Normal breath sounds.   Abdominal:      General: Bowel sounds are normal. There is no distension.      Palpations: Abdomen is soft. There is no mass.      Tenderness: There is no abdominal tenderness.   Musculoskeletal:      Right lower leg: No edema.      Left lower leg: No edema.   Skin:     General: Skin is warm and dry.   Neurological:      General: No focal deficit present.      Mental Status: He is alert.      Cranial Nerves: No cranial nerve deficit (Olfactory nerves not assessed).      Motor: No weakness (Normal strength in all 4 extremities).      Deep Tendon Reflexes: Reflexes normal (Normal symmetric reflexes bilaterally in the biceps/patellar reflexes).   Psychiatric:         Behavior:  Behavior normal.     Significant studies: Reviewed.  Assessment/Plan:     Assessment & Plan    Hypertensive emergency/Elevated troponin/Blurred vision/Gait disturbance; Prediabetes    -Patient with hypertensive emergency, evidenced by uncontrolled hypertension with escalating troponins and concern for stroke-like symptoms; for now:  -Given concern for possible stroke-like symptoms, will allow permissive hypertension (goal SBP around 180s-190s; continue Cardene drip) until stroke is definitively ruled out:  -NIH  -Neuro checks  -Bedside swallow  -MRI brain  -Carotid duplex  -Lipids noted-> home statin resumed  -Check hemoglobin A1c  -Troponins escalating-> status post aspirin  -Hold on formal anticoagulation until stroke is effectively rule out (to minimize the risk of hemorrhagic conversion)  -Echo  -Cardiology consultation  -Telemetry monitoring   -PT/OT/speech therapy to see  -Even if stroke is eventually determined, he never would have been a thrombolytic candidate, as he presented outside the time window for administration    Mastoiditis    -Rocephin 1 gm IV qd x 3 days ([email or web address])    CKD (chronic kidney disease) stage 3, GFR 30-59 ml/min    -Creatinine below baseline  -Minimize nephrotoxic medications  -Follow up a.mAnna Kincaid MD  Department of Hospital Medicine  [facility]

## 2025-04-15 NOTE — TELEMEDICINE CONSULT
"[facility]  Tele-Vascular Neurology   Consult Note      Consult Information  Inpatient Consult to Neurology Services (General Neurology)  Consult performed by: Nicole Mckinley NP  Consult ordered by: Morgan Cai MD          Consulting Provider:    Current Providers  No providers found    Patient Location:  [facility] ICU B 2ND FLOOR IP Unit    Spoke hospital nurse at bedside with patient assisting consultant.  Patient information was obtained from patient.       Vascular Neurology Documentation       NIH Scale:  1a. Level of Consciousness: 0-->Alert, keenly responsive  1b. LOC Questions: 0-->Answers both questions correctly  1c. LOC Commands: 0-->Performs both tasks correctly  2. Best Gaze: 0-->Normal  3. Visual: 0-->No visual loss  4. Facial Palsy: 0-->Normal symmetrical movements  5a. Motor Arm, Left: 0-->No drift, limb holds 90 (or 45) degrees for full 10 secs  5b. Motor Arm, Right: 0-->No drift, limb holds 90 (or 45) degrees for full 10 secs  6a. Motor Leg, Left: 0-->No drift, leg holds 30 degree position for full 5 secs  6b. Motor Leg, Right: 0-->No drift, leg holds 30 degree position for full 5 secs  7. Limb Ataxia: 0-->Absent  8. Sensory: 0-->Normal, no sensory loss  9. Best Language: 0-->No aphasia, normal  10. Dysarthria: 0-->Normal  11. Extinction and Inattention (formerly Neglect): 0-->No abnormality  Total (NIH Stroke Scale): 0      Modified Josue: Score: 0  Criders Coma Scale:     ABCD2 Score:    [id number] Score:    HAS -BLED Score:    ICH Score:    Hunt & Phipps Classification:      Blood pressure (!) 197/105, pulse 64, temperature 98.1 °F (36.7 °C), temperature source Axillary, resp. rate 18, height 5' 8" (1.727 m), weight 62.6 kg (138 lb 0.1 oz), SpO2 95%.    VAN Stroke Assessment: Negative    Medical Decision Making  HPI:  79 y.o. male with HTN, prediabetes, sleep apnea, CAD (stents), CKD3, RA, prostate CA, DDD, chronic insomnia presented with multiple complaints.  Initially started with " numbness and tingling to bilateral hands and feet.  Then developed HA with pressure behind his eyes, dizziness and chest tightness.  Also reported nasal congestion and mild sore throat, as well as SOB. Patient was evaluated in the ED with recommendations for admission.  Primary admission diagnosis included hypertensive emergency, elevated troponin, blurred vision, gait disturbance.  Patient also noted to have mastoiditis and was started on Rocephin.  MRI completed today showing tiny focus of restricted diffusion in the deep right frontal subependymal white matter, potentially acute infarct. TeleVascular Neurology consulted for this finding.      Patient described numbness/tingling to both hands and feet.  The symptoms were equally on both side with neither side more noticeable than the other.  Also reported bilateral leg weakness with wobbly gait. Denied associated vision changes, double vision, facial weakness, swallowing difficulty, weakness to arms.  Did report blurry vision in both eyes.  Denies prior history of stroke.  Admits to poor compliance with medication and CPAP.     Images personally reviewed and interpreted:  Study: CTA Head & Neck and MRI Brain  Study Interpretation: MRI brain: Tiny punctate diffusion restriction along right lateral ventricle. Multiple microhemorrhages primarily left hemisphere lobar.  No deep microhemorrhages. No hemorrhage. CTA head and neck: No high-grade stenosis or occlusion.    Carotid US  1. No hemodynamically significant carotid stenosis.  2. Antegrade flow in both vertebral arteries.    Additional studies reviewed:   Study: Cardiac_Neuro: 2D echo  Study Interpretation: EF 60-65%; no wall motion abnormality noted; no thrombus/vegetation; left atrium moderately dilated. Complete obliteration of the apical cavity.       Laboratory studies reviewed:  BMP:   Lab Results   Component Value Date    GLUCOSE 96 04/14/2025     04/14/2025     04/25/2019    K 3.1 (L)  04/14/2025     (H) 11/04/2024     04/25/2019    CO2 17 (L) 04/14/2025    BUN 10 04/14/2025    CREATININE 1.3 04/14/2025    CREATININE 1.38 04/25/2019    CALCIUM 8.3 (L) 04/14/2025     CBC:   Lab Results   Component Value Date    WBC 5.75 04/14/2025    RBC 4.29 (L) 04/14/2025    HGB 11.8 (L) 04/14/2025    HCT 37.0 (L) 04/14/2025     04/14/2025    MCV 86 04/14/2025    MCH 27.5 04/14/2025    MCHC 31.9 (L) 04/14/2025     Lipid Panel:   Lab Results   Component Value Date    CHOL 124 04/15/2025    LDLCALC 90.2 10/25/2024    HDL 50 04/15/2025    TRIG 65 04/15/2025     Coagulation:   Lab Results   Component Value Date    INR 1.1 10/24/2024    APTT 26.6 10/24/2024     Hgb A1C:   Lab Results   Component Value Date    HGBA1C 5.4 04/15/2025     TSH:   Lab Results   Component Value Date    TSH 0.931 04/14/2025       Documentation personally reviewed:  Notes: Notes: ED notes and H&P     Assessment and plan:  79 y.o. male with HTN, prediabetes, sleep apnea, CAD (stents), CKD3, RA, prostate CA, DDD found to have punctate diffusion restriction right deep subependymal white matter.  CTA with no high-grade stenosis or occlusion,  MRI findings do not correlate clinically. No focal symptoms reported or noted on exam. Imaging reviewed with Dr. Francois.  Incidental and likely artifactual.  Patient does have multiple lobar microhemorrhages and multiple risk factors for stroke. May benefit from follow in [facility] clinic.    Recommendations  - Hypertension: Target systolic BP <130-140 mmHg. Follow up with PCP for surveillance and chronic management  -Sleep apnea - encourage CPAP use as previously recommended  -CAD/Elevated Troponin - Cardiology following  -No further inpatient stroke workup needed and patient can dispo with therapy recommendations once medically ready  -Please contact us with any further questions or concerns or if patient has any acute neurological changes (new symptoms, worsening deficits).      Post charge  discharge plan:  Clinic follow up: Vascular Neurology [facility]    Visit Type: in person or virtual  Timeframe: 4 weeks    Collaborating Physician, Dr. Francois, was available during today's encounter. Any change to plan along with cosign to appear in the EMR.      Additional Physical Exam, History, & ROS  ROS  Physical Exam    Neurological Exam  LOC: alert  Attention Span: Good   Language: No aphasia  Articulation: No dysarthria  Orientation: Person, Place, Time   Visual Fields: Full  EOM (CN III, IV, VI): Full/intact  Facial Sensation (CN V): Normal  Facial Movement (CN VII): Symmetric facial expression    Motor: Arm left    Full antigravity; Full power noted with nurse assistance  Leg left    Full antigravity; Full power noted with nurse assistance  Arm right    Full antigravity; Full power noted with nurse assistance  Leg right   Full antigravity; Full power noted with nurse assistance with exception of mild decreased plantar flexion   Cerebellum: No evidence of appendicular or axial ataxia  Sensation: Intact to light touch        Past Medical History:   Diagnosis Date    Anticoagulant long-term use     Arthritis     Coronary artery disease     Dr. Lamb    DDD (degenerative disc disease), cervical     Degenerative disc disease     Heart murmur     History of radiation therapy 2020    Hypertension     Nontoxic multinodular goiter 09/20/2016    Prostate CA     RA (rheumatoid arthritis)     Sleep apnea     No CPAP    Vitamin D insufficiency 09/30/2016     Past Surgical History:   Procedure Laterality Date    CARPAL TUNNEL RELEASE Right 05/28/2021    Procedure: RELEASE, CARPAL TUNNEL;  Surgeon: Jose Ryan II, MD;  Location: [facility] OR;  Service: Orthopedics;  Laterality: Right;    COLONOSCOPY  prior to 2016    normal findings per patient report    CORONARY ANGIOPLASTY WITH STENT PLACEMENT  02/2022    CYSTOSCOPY N/A 12/18/2018    Procedure: CYSTOSCOPY;  Surgeon: Garrett Pina MD;  [facility];   Service: Urology;  Laterality: N/A;    CYSTOSCOPY N/A 07/12/2022    Procedure: CYSTOSCOPY;  Surgeon: Garrett Pina MD;  Location: [facility] OR;  Service: Urology;  Laterality: N/A;    ESOPHAGOGASTRODUODENOSCOPY N/A 09/29/2020    Dr. Espinosa; empiric dilation; erythematous mucosa in antrum; gastric mucosal atrophy; hematin in entire stomach; biopsy: mid & distal esophagus WNL, stomach- WNL, negative for h pylori    EXCISION OF LESION OF PENIS N/A 2/23/2023    Procedure: EXCISION, LESION, PENIS;  Surgeon: Timo Myers MD;  Location: [facility] OR;  Service: Urology;  Laterality: N/A;    INSERTION OF TUNNELED CENTRAL VENOUS CATHETER (CVC) WITH SUBCUTANEOUS PORT Left 5/18/2023    Procedure: [id number];  Surgeon: Atul Faria MD;  Location: [facility];  Service: General;  Laterality: Left;  left subclavian    PARATHYROIDECTOMY Right 10/27/2020    Procedure: PARATHYROIDECTOMY;  Surgeon: Latonia Clayton MD;  Location: [address];  Service: ENT;  Laterality: Right;    RELEASE OF ULNAR NERVE AT CUBITAL TUNNEL Right 05/28/2021    Procedure: RELEASE, ULNAR TUNNEL;  Surgeon: Jose Ryan II, MD;  Location: [facility] OR;  Service: Orthopedics;  Laterality: Right;    TRANSRECTAL BIOPSY OF PROSTATE WITH ULTRASOUND GUIDANCE N/A 12/18/2018    Procedure: BIOPSY, PROSTATE, RECTAL APPROACH, WITH US GUIDANCE;  Surgeon: Garrett Pina MD;  Location: [facility] OR;  Service: Urology;  Laterality: N/A;    TRANSRECTAL ULTRASOUND EXAMINATION N/A 07/12/2022    Procedure: ULTRASOUND, RECTAL APPROACH;  Surgeon: Garrett Pina MD;  Location: [facility] OR;  Service: Urology;  Laterality: N/A;     Family History   Problem Relation Name Age of Onset    Heart disease Mother      Stroke Father      Drug abuse Sister      No Known Problems Daughter      No Known Problems Daughter      No Known Problems Son      Diabetes Maternal Uncle      Colon cancer Neg Hx      Crohn's disease Neg Hx      Esophageal cancer Neg Hx      Stomach cancer  Neg Hx      Ulcerative colitis Neg Hx         Diagnoses  Problem Noted   Abnormal Finding On Mri of Brain 4/15/2025       Nicole Mckinley NP    Neurology consultation requested by spoke provider. Audiovisual encounter with the patient performed using a secure connection.  Results and impressions from the visit are documented on this note and were communicated to the consulting provider/team via direct communication. The note has been shared for addition to the patients electronic medical record.

## 2025-04-16 PROBLEM — R90.89 ABNORMAL FINDING ON MRI OF BRAIN: Status: RESOLVED | Noted: 2025-04-15 | Resolved: 2025-04-16

## 2025-04-16 PROBLEM — R26.9 GAIT DISTURBANCE: Status: RESOLVED | Noted: 2025-04-15 | Resolved: 2025-04-16

## 2025-04-16 PROBLEM — I63.9 STROKE: Status: RESOLVED | Noted: 2025-04-15 | Resolved: 2025-04-16

## 2025-04-16 PROBLEM — H53.8 BLURRED VISION: Status: RESOLVED | Noted: 2025-04-15 | Resolved: 2025-04-16

## 2025-04-16 LAB
ABSOLUTE EOSINOPHIL (SMH): 0.13 K/UL
ABSOLUTE MONOCYTE (SMH): 0.58 K/UL (ref 0.3–1)
ABSOLUTE NEUTROPHIL COUNT (SMH): 4.5 K/UL (ref 1.8–7.7)
ALBUMIN SERPL-MCNC: 3.7 G/DL (ref 3.5–5.2)
ALP SERPL-CCNC: 63 UNIT/L (ref 55–135)
ALT SERPL-CCNC: 9 UNIT/L (ref 10–44)
ANION GAP (SMH): 8 MMOL/L (ref 8–16)
APTT PPP: 29 SECONDS (ref 21–32)
AST SERPL-CCNC: 11 UNIT/L (ref 10–40)
BASOPHILS # BLD AUTO: 0.02 K/UL
BASOPHILS NFR BLD AUTO: 0.3 %
BILIRUB SERPL-MCNC: 0.4 MG/DL (ref 0.1–1)
BUN SERPL-MCNC: 12 MG/DL (ref 8–23)
CALCIUM SERPL-MCNC: 9.5 MG/DL (ref 8.7–10.5)
CHLORIDE SERPL-SCNC: 106 MMOL/L (ref 95–110)
CO2 SERPL-SCNC: 24 MMOL/L (ref 23–29)
CREAT SERPL-MCNC: 1.3 MG/DL (ref 0.5–1.4)
ERYTHROCYTE [DISTWIDTH] IN BLOOD BY AUTOMATED COUNT: 17.5 % (ref 11.5–14.5)
GFR SERPLBLD CREATININE-BSD FMLA CKD-EPI: 57 ML/MIN/1.73/M2
GLUCOSE SERPL-MCNC: 108 MG/DL (ref 70–110)
HCT VFR BLD AUTO: 40.5 % (ref 40–54)
HGB BLD-MCNC: 12.6 GM/DL (ref 14–18)
IMM GRANULOCYTES # BLD AUTO: 0.02 K/UL (ref 0–0.04)
IMM GRANULOCYTES NFR BLD AUTO: 0.3 % (ref 0–0.5)
INR PPP: 1.1 (ref 0.8–1.2)
LYMPHOCYTES # BLD AUTO: 0.65 K/UL (ref 1–4.8)
MAGNESIUM SERPL-MCNC: 2 MG/DL (ref 1.6–2.6)
MCH RBC QN AUTO: 27.3 PG (ref 27–31)
MCHC RBC AUTO-ENTMCNC: 31.1 G/DL (ref 32–36)
MCV RBC AUTO: 88 FL (ref 82–98)
NUCLEATED RBC (/100WBC) (SMH): 0 /100 WBC
OHS QRS DURATION: 102 MS
OHS QTC CALCULATION: 517 MS
PHOSPHATE SERPL-MCNC: 3.4 MG/DL (ref 2.7–4.5)
PLATELET # BLD AUTO: 316 K/UL (ref 150–450)
PMV BLD AUTO: 12.4 FL (ref 9.2–12.9)
POTASSIUM SERPL-SCNC: 3.8 MMOL/L (ref 3.5–5.1)
PROT SERPL-MCNC: 7.3 GM/DL (ref 6–8.4)
PROTHROMBIN TIME: 12.6 SECONDS (ref 9–12.5)
RBC # BLD AUTO: 4.62 M/UL (ref 4.6–6.2)
RELATIVE EOSINOPHIL (SMH): 2.2 % (ref 0–8)
RELATIVE LYMPHOCYTE (SMH): 11 % (ref 18–48)
RELATIVE MONOCYTE (SMH): 9.8 % (ref 4–15)
RELATIVE NEUTROPHIL (SMH): 76.4 % (ref 38–73)
SODIUM SERPL-SCNC: 138 MMOL/L (ref 136–145)
WBC # BLD AUTO: 5.92 K/UL (ref 3.9–12.7)

## 2025-04-16 PROCEDURE — 25000003 PHARM REV CODE 250: Performed by: NURSE PRACTITIONER

## 2025-04-16 PROCEDURE — 94761 N-INVAS EAR/PLS OXIMETRY MLT: CPT

## 2025-04-16 PROCEDURE — 83735 ASSAY OF MAGNESIUM: CPT | Performed by: INTERNAL MEDICINE

## 2025-04-16 PROCEDURE — 85730 THROMBOPLASTIN TIME PARTIAL: CPT | Performed by: INTERNAL MEDICINE

## 2025-04-16 PROCEDURE — 99233 SBSQ HOSP IP/OBS HIGH 50: CPT | Mod: ,,, | Performed by: GENERAL PRACTICE

## 2025-04-16 PROCEDURE — 85610 PROTHROMBIN TIME: CPT | Performed by: INTERNAL MEDICINE

## 2025-04-16 PROCEDURE — 85025 COMPLETE CBC W/AUTO DIFF WBC: CPT | Performed by: INTERNAL MEDICINE

## 2025-04-16 PROCEDURE — 97116 GAIT TRAINING THERAPY: CPT

## 2025-04-16 PROCEDURE — 25000003 PHARM REV CODE 250: Performed by: HOSPITALIST

## 2025-04-16 PROCEDURE — 36415 COLL VENOUS BLD VENIPUNCTURE: CPT | Performed by: INTERNAL MEDICINE

## 2025-04-16 PROCEDURE — 25000003 PHARM REV CODE 250: Performed by: INTERNAL MEDICINE

## 2025-04-16 PROCEDURE — 12000002 HC ACUTE/MED SURGE SEMI-PRIVATE ROOM

## 2025-04-16 PROCEDURE — 99900031 HC PATIENT EDUCATION (STAT)

## 2025-04-16 PROCEDURE — 84100 ASSAY OF PHOSPHORUS: CPT | Performed by: INTERNAL MEDICINE

## 2025-04-16 PROCEDURE — 80053 COMPREHEN METABOLIC PANEL: CPT | Performed by: INTERNAL MEDICINE

## 2025-04-16 PROCEDURE — 92507 TX SP LANG VOICE COMM INDIV: CPT

## 2025-04-16 PROCEDURE — 92523 SPEECH SOUND LANG COMPREHEN: CPT

## 2025-04-16 PROCEDURE — 63600175 PHARM REV CODE 636 W HCPCS: Performed by: INTERNAL MEDICINE

## 2025-04-16 RX ORDER — DULOXETIN HYDROCHLORIDE 20 MG/1
20 CAPSULE, DELAYED RELEASE ORAL 2 TIMES DAILY
Status: DISCONTINUED | OUTPATIENT
Start: 2025-04-16 | End: 2025-04-26 | Stop reason: HOSPADM

## 2025-04-16 RX ORDER — AMLODIPINE BESYLATE 2.5 MG/1
2.5 TABLET ORAL DAILY
Status: DISCONTINUED | OUTPATIENT
Start: 2025-04-17 | End: 2025-04-16

## 2025-04-16 RX ORDER — AMLODIPINE BESYLATE 5 MG/1
5 TABLET ORAL DAILY
Status: DISCONTINUED | OUTPATIENT
Start: 2025-04-17 | End: 2025-04-17

## 2025-04-16 RX ORDER — HYDRALAZINE HYDROCHLORIDE 20 MG/ML
10 INJECTION INTRAMUSCULAR; INTRAVENOUS EVERY 4 HOURS PRN
Status: DISCONTINUED | OUTPATIENT
Start: 2025-04-16 | End: 2025-04-26 | Stop reason: HOSPADM

## 2025-04-16 RX ORDER — AMLODIPINE BESYLATE 2.5 MG/1
2.5 TABLET ORAL ONCE
Status: COMPLETED | OUTPATIENT
Start: 2025-04-16 | End: 2025-04-16

## 2025-04-16 RX ADMIN — METOPROLOL TARTRATE 25 MG: 25 TABLET, FILM COATED ORAL at 08:04

## 2025-04-16 RX ADMIN — BUSPIRONE HYDROCHLORIDE 10 MG: 5 TABLET ORAL at 08:04

## 2025-04-16 RX ADMIN — LOSARTAN POTASSIUM 50 MG: 50 TABLET, FILM COATED ORAL at 08:04

## 2025-04-16 RX ADMIN — ZOLPIDEM TARTRATE 5 MG: 5 TABLET, FILM COATED ORAL at 11:04

## 2025-04-16 RX ADMIN — AMLODIPINE BESYLATE 2.5 MG: 2.5 TABLET ORAL at 10:04

## 2025-04-16 RX ADMIN — DULOXETINE HYDROCHLORIDE 20 MG: 20 CAPSULE, DELAYED RELEASE ORAL at 11:04

## 2025-04-16 RX ADMIN — TAMSULOSIN HYDROCHLORIDE 0.4 MG: 0.4 CAPSULE ORAL at 08:04

## 2025-04-16 RX ADMIN — BUSPIRONE HYDROCHLORIDE 10 MG: 5 TABLET ORAL at 04:04

## 2025-04-16 RX ADMIN — ATORVASTATIN CALCIUM 20 MG: 20 TABLET, FILM COATED ORAL at 08:04

## 2025-04-16 RX ADMIN — CEFTRIAXONE 1 G: 1 INJECTION, POWDER, FOR SOLUTION INTRAMUSCULAR; INTRAVENOUS at 08:04

## 2025-04-16 RX ADMIN — DULOXETINE HYDROCHLORIDE 20 MG: 20 CAPSULE, DELAYED RELEASE ORAL at 08:04

## 2025-04-16 RX ADMIN — MUPIROCIN 1 G: 20 OINTMENT TOPICAL at 08:04

## 2025-04-16 RX ADMIN — HYDRALAZINE HYDROCHLORIDE 100 MG: 25 TABLET ORAL at 08:04

## 2025-04-16 RX ADMIN — HYDRALAZINE HYDROCHLORIDE 10 MG: 20 INJECTION, SOLUTION INTRAMUSCULAR; INTRAVENOUS at 11:04

## 2025-04-16 NOTE — HOSPITAL COURSE
Patient was admitted to the hospital with worsening neurologic deficits and a markedly elevated blood pressure.  Initial etiology was thought to be acute stroke, and patient was seen by tele stroke, underwent MRI which showed potential acute lesion.  However, this was not corroborated with the patient's symptoms.  Repeat CT angiogram showed no evidence of any acute lesions, therefore stroke neurologist stated this is likely not an acute stroke.  Patient was continued for hypertensive emergency.  Home blood pressure medication was restarted, and blood pressure improved over the course of his hospitalization.  Patient was also found to have an acute urinary tract infection and treatment was initiated for such.  Patient was also reported to be depressed and anxious, and SSRI was started at his request.  Blood pressure slowly returned back to baseline of 150s to 170s systolic.Losartan, metoprolol and Norvasc are new medications for BP.  Echocardiogram was done which showed evidence of concentric remodeling but otherwise stable.Patient with noted sundowning to indicate likely vascular dementia. Started on aricept. He was started on Cymbalta.     PT, OT worked with therapy and initially recommended SNF. However patient was denied any insurance. Patient was able to walk 200 ft. Patient has a nephew in town. Per CM agreeable to go home with home health. Patient was seen and examined today. He is clinically euvolemic, mental status at baseline. BP controlled. Appropriate for discharge today.

## 2025-04-16 NOTE — PROGRESS NOTES
[facility]  Department of Cardiology  Progress Note      PATIENT NAME: Rajendra Castle  MRN: [id number]  TODAY'S DATE: 04/16/2025  ADMIT DATE: 4/14/2025                          CONSULT REQUESTED BY: Morgan Cai MD    SUBJECTIVE     PRINCIPAL PROBLEM: Hypertensive emergency      REASON FOR CONSULT:  Elevated troponin    INTERVAL HISTORY:  4/16/25:  BP improved; no chest pain; no further tingling/numbness in extremities    HPI:  Pt is 78 yo male with PMH: CAD with stent, HTN, Prostate CA, recent rib fractureswho presented to ER last evening with c/o bilateral tingling/numbness in feet and hands, dizziness, weakness, headache and pressure behind his eyes. Pt states BP as high as 200s when he arrived. Admits to noncompliance with BP medications. He was worried he may be having a stroke when he came to hospital  Pt denies chest pain or dyspnea or fluid retention;     He was placed on Cardene drip and BP 170s/78       Review of patient's allergies indicates:  No Known Allergies    Past Medical History:   Diagnosis Date    Anticoagulant long-term use     Arthritis     Coronary artery disease     Dr. Lamb    DDD (degenerative disc disease), cervical     Degenerative disc disease     Heart murmur     History of radiation therapy 2020    Hypertension     Nontoxic multinodular goiter 09/20/2016    Prostate CA     RA (rheumatoid arthritis)     Sleep apnea     No CPAP    Vitamin D insufficiency 09/30/2016     Past Surgical History:   Procedure Laterality Date    CARPAL TUNNEL RELEASE Right 05/28/2021    Procedure: RELEASE, CARPAL TUNNEL;  Surgeon: Jose Ryan II, MD;  Location: [facility] OR;  Service: Orthopedics;  Laterality: Right;    COLONOSCOPY  prior to 2016    normal findings per patient report    CORONARY ANGIOPLASTY WITH STENT PLACEMENT  02/2022    CYSTOSCOPY N/A 12/18/2018    Procedure: CYSTOSCOPY;  Surgeon: Garrett Pina MD;  Location: [facility] OR;  Service: Urology;  Laterality: N/A;    CYSTOSCOPY  N/A 07/12/2022    Procedure: CYSTOSCOPY;  Surgeon: Garrett Pina MD;  Location: [facility] OR;  Service: Urology;  Laterality: N/A;    ESOPHAGOGASTRODUODENOSCOPY N/A 09/29/2020    Dr. Espinosa; empiric dilation; erythematous mucosa in antrum; gastric mucosal atrophy; hematin in entire stomach; biopsy: mid & distal esophagus WNL, stomach- WNL, negative for h pylori    EXCISION OF LESION OF PENIS N/A 2/23/2023    Procedure: EXCISION, LESION, PENIS;  Surgeon: Timo Myers MD;  Location: [facility] OR;  Service: Urology;  Laterality: N/A;    INSERTION OF TUNNELED CENTRAL VENOUS CATHETER (CVC) WITH SUBCUTANEOUS PORT Left 5/18/2023    Procedure: [id number];  Surgeon: Atul Faria MD;  Location: [facility];  Service: General;  Laterality: Left;  left subclavian    PARATHYROIDECTOMY Right 10/27/2020    Procedure: PARATHYROIDECTOMY;  Surgeon: Latonia Clayton MD;  Location: [address];  Service: ENT;  Laterality: Right;    RELEASE OF ULNAR NERVE AT CUBITAL TUNNEL Right 05/28/2021    Procedure: RELEASE, ULNAR TUNNEL;  Surgeon: Jose Ryan II, MD;  Location: [facility] OR;  Service: Orthopedics;  Laterality: Right;    TRANSRECTAL BIOPSY OF PROSTATE WITH ULTRASOUND GUIDANCE N/A 12/18/2018    Procedure: BIOPSY, PROSTATE, RECTAL APPROACH, WITH US GUIDANCE;  Surgeon: Garrett Pina MD;  Location: [facility] OR;  Service: Urology;  Laterality: N/A;    TRANSRECTAL ULTRASOUND EXAMINATION N/A 07/12/2022    Procedure: ULTRASOUND, RECTAL APPROACH;  Surgeon: Garrett Pina MD;  Location: [facility] OR;  Service: Urology;  Laterality: N/A;     Social History[1]     REVIEW OF SYSTEMS  Negative except as mentioned in HPI    OBJECTIVE     VITAL SIGNS (Most Recent)  Temp: 98.1 °F (36.7 °C) (04/16/25 0309)  Pulse: 75 (04/16/25 0811)  Resp: 18 (04/16/25 0309)  BP: (!) 197/72 (04/16/25 0811)  SpO2: 97 % (04/16/25 0753)    VENTILATION STATUS  Resp: 18 (04/16/25 0307)  SpO2: 97 % (04/16/25 5953)           I & O (Last  24H):  Intake/Output Summary (Last 24 hours) at 4/16/2025 0832  Last data filed at 4/16/2025 0530  Gross per 24 hour   Intake --   Output 1800 ml   Net -1800 ml       WEIGHTS  Wt Readings from Last 3 Encounters:   04/16/25 0400 62.6 kg (138 lb 0.1 oz)   04/15/25 0514 62.6 kg (138 lb 0.1 oz)   04/14/25 2036 68.5 kg (151 lb 0.2 oz)   04/14/25 2026 65.8 kg (145 lb)   04/04/25 1418 70.3 kg (155 lb)   01/02/25 1450 70.4 kg (155 lb 1.5 oz)       PHYSICAL EXAM    GENERAL:elderly male resting in bed in no apparent distress.  HEENT: Normocephalic.    NECK: No JVD.   CARDIAC: IRRegular rate and rhythm. +murmur;   CHEST ANATOMY: normal.   LUNGS: Clear to auscultation. No wheezing or rhonchi..   ABDOMEN: Soft .  Normal bowel sounds.    EXTREMITIES: No edema  CENTRAL NERVOUS SYSTEM: AAO x 3  SKIN: No rash     HOME MEDICATIONS:Medications Ordered Prior to Encounter[2]    SCHEDULED MEDS:   atorvastatin  20 mg Oral Daily    busPIRone  10 mg Oral TID    cefTRIAXone (Rocephin) IV (PEDS and ADULTS)  1 g Intravenous Q24H    gabapentin  100 mg Oral QHS    hydrALAZINE  100 mg Oral BID    losartan  50 mg Oral Daily    metoprolol tartrate  25 mg Oral BID    mupirocin   Nasal BID    tamsulosin  0.4 mg Oral Daily       CONTINUOUS INFUSIONS:   nicardipine  0-15 mg/hr Intravenous Continuous 12.5 mL/hr at 04/15/25 1908 2.5 mg/hr at 04/15/25 1908       PRN MEDS:  Current Facility-Administered Medications:     acetaminophen, 650 mg, Oral, Q8H PRN    bisacodyL, 10 mg, Rectal, Daily PRN    dextrose 50%, 12.5 g, Intravenous, PRN    dextrose 50%, 25 g, Intravenous, PRN    glucagon (human recombinant), 1 mg, Intramuscular, PRN    glucose, 16 g, Oral, PRN    glucose, 24 g, Oral, PRN    melatonin, 6 mg, Oral, Nightly PRN    naloxone, 0.02 mg, Intravenous, PRN    ondansetron, 4 mg, Intravenous, Q6H PRN    ondansetron, 4 mg, Intravenous, Q6H PRN    senna-docusate, 1 tablet, Oral, Daily PRN    sodium chloride 0.9%, 500 mL, Intravenous, PRN    sodium  "chloride 0.9%, 10 mL, Intravenous, PRN    zolpidem, 5 mg, Oral, Nightly PRN    LABS AND DIAGNOSTICS     CBC LAST 3 DAYS  Recent Labs   Lab 04/14/25 2145 04/16/25 0322   WBC 5.75 5.92   RBC 4.29* 4.62   HGB 11.8* 12.6*   HCT 37.0* 40.5   MCV 86 88   MCH 27.5 27.3   MCHC 31.9* 31.1*   RDW 16.9* 17.5*    316   MPV 12.0 12.4   NRBC 0 0       COAGULATION LAST 3 DAYS  Recent Labs   Lab 04/16/25 0322   INR 1.1       CHEMISTRY LAST 3 DAYS  Recent Labs   Lab 04/14/25 2145 04/16/25 0322    138   K 3.1* 3.8   CO2 17* 24   BUN 10 12   CREATININE 1.3 1.3   CALCIUM 8.3* 9.5   MG 1.9 2.0   ALBUMIN 3.2* 3.7   ALKPHOS 67 63   ALT 10 9*   AST 21 11   BILITOT 0.4 0.4       CARDIAC PROFILE LAST 3 DAYS  Recent Labs   Lab 04/14/25 2145   BNP 5,217*       ENDOCRINE LAST 3 DAYS  Recent Labs   Lab 04/14/25 2145   TSH 0.931       LAST ARTERIAL BLOOD GAS  ABG  No results for input(s): "PH", "PO2", "PCO2", "HCO3", "BE" in the last 168 hours.    LAST 7 DAYS MICROBIOLOGY   Microbiology Results (last 7 days)       Procedure Component Value Units Date/Time    Influenza A & B by Molecular [[id number]]  (Normal) Collected: 04/14/25 2053    Order Status: Completed Specimen: Nasal Swab Updated: 04/14/25 2115     INFLUENZA A MOLECULAR Negative     INFLUENZA B MOLECULAR  Negative            MOST RECENT IMAGING  Echo Saline Bubble? No    Left Ventricle: The left ventricle is normal in size. Moderately   increased wall thickness. There is concentric hypertrophy. There is normal   systolic function with a visually estimated ejection fraction of 60 - 65%.   There is normal diastolic function. E/A ratio is 0.94. LV apical cavity   obstruction at rest. Complete obliteration of the apical cavity.    Right Ventricle: The right ventricle is normal in size. Systolic   function is reduced.    Left Atrium: Moderately dilated    Aortic Valve: The aortic valve is a trileaflet valve. There is moderate   aortic valve sclerosis. Mildly calcified " noncoronary cusp. There is mild   aortic regurgitation.    Mitral Valve: There is mild to moderate regurgitation.    Tricuspid Valve: There is mild to moderate regurgitation. There is mild   pulmonary hypertension. The estimated PA systolic pressure is at least 39   mmHg.    Pericardium: There is a small circumferential effusion. Pericardial   effusion contains fibrinous materials.  CTA Head and Neck (xpd)  Narrative: CLINICAL HISTORY:  ([id number] y/o  (1945) M    Stroke/TIA, determine embolic source;    TECHNIQUE:  (A#[id number], exam time 4/15/2025 14:40)    CTA HEAD AND NECK (XPD) [id number]    CT angiography of the head and neck was performed using thin axial slices respectively and reviewed in multiple planes. Two and three dimensional multiplanar reformatted images were generated and submitted to PACS.    POST PROCESSING:    (Vitrea) 3D advanced post-processing was performed by the interpreting physician using an independent workstation utilizing a combination of MIP and MPR techniques to better evaluate anatomy and disease process. 3D rendering was performed with physician participation and supervision.    CONTRAST:    100  mL Omni 350 was injected intravenously.    CMS MANDATED QUALITY DATA - CT RADIATION - 436    All CT scans at this facility utilize dose modulation, iterative reconstruction, and/or weight based dosing when appropriate to reduce radiation dose to as low as reasonably achievable.    CMS MANDATED QUALITY DATA - CAROTID - 195    All measurements and percent stenosis described below were determined using NASCET criteria or criteria similar to NASCET, as defined by the Society of Radiologists in Ultrasound Consensus Conference, Radiology, 2003    COMPARISON:  None available.    FINDINGS:  CTA of the [place]: Calcified plaques are seen in the intracranial internal carotid arteries bilaterally.  There is patency of the intracranial circulation including the bilateral internal carotid  arteries, the carotid terminus, the A1 and M1 segments, the bilateral intracranial vertebral arteries, the basilar artery and its distal branches. No aneurysm is identified within the limits of the technique. Conventional angiography is more sensitive for the detection of small aneurysms.    CTA of the Neck:    There is a three-vessel aortic arch. CTA of the neck demonstrates that the origins of the great vessels are widely patent. No aneurysm is seen.    RIGHT:    Common carotid artery origin: Patent.    Cervical segment: Patent.    External carotid origin characteristics: Patent.    Carotid bifurcation: Patent, with a small mural calcified plaque.    Internal carotid origin characteristics: No focal stenosis.    Vertebral artery: within normal limits.    LEFT    Common carotid artery origin: Patent.    Cervical segment: Patent.    External carotid origin characteristics: Patent.    Carotid bifurcation: There is a small calcified mural plaque in the left carotid bulb extending into the proximal left internal carotid artery causing 30% stenosis.    Internal carotid origin characteristics: No focal stenosis.    Vertebral artery: within normal limits.    Additional:    The visualized heart is enlarged.  The pulmonary artery is enlarged (a nonspecific finding which may suggest pulmonary vascular congestion or pulmonary arterial hypertension.  Scattered coronary artery calcifications appear to be present with calcified plaque at the aortic arch.  There is at least a small pericardial effusion and trace pleural effusions with basilar dependent atelectasis and emphysema present.  Degenerative disc height loss is seen throughout the cervical spine with partial fusion at C5-C6 and severe disc height loss at C3-C4, C4-C5, C5-C6, C6-C7.  Impression: No clinically significant stenosis, large vessel occlusion, or aneurysm is identified as outlined above.    Electronically signed by: Claudy  Adeline  Date:    04/15/2025  Time:    14:47  US Carotid Bilateral  Narrative: CMS MANDATED QUALITY DATA - CAROTID - 195    All measurements and percent stenosis described below were determined using NASCET criteria or criteria similar to NASCET, as defined by the Society of Radiologists in Ultrasound Consensus Conference, Radiology, 2003    EXAMINATION:  US CAROTID BILATERAL    CLINICAL HISTORY:  ?CVA;.    TECHNIQUE:  Grayscale, color and spectral Doppler analysis was performed.    COMPARISON:  2023    FINDINGS:  Mild atherosclerotic plaque formation is noted in both carotid systems, with no evidence of hemodynamically significant stenosis.    Peak systolic velocity in the right ICA is 56 cm/sec, with ICA/CCA ratio of 0.9.    Peak systolic velocity in the  left ICA is 95 cm/sec, with ICA/CCA ratio of 1.4.    Antegrade flow is noted in both vertebral arteries.  Impression: 1. No hemodynamically significant carotid stenosis.  2. Antegrade flow in both vertebral arteries.    Electronically signed by: Christopher Fuchs  Date:    04/15/2025  Time:    11:01  MRI Brain Without Contrast  Narrative: EXAMINATION:  MRI BRAIN WITHOUT CONTRAST    CLINICAL HISTORY:  ?CVA; hypertension    TECHNIQUE:  Routine MRI brain without contrast.    COMPARISON:  Multiple prior exams.    FINDINGS:  A punctate DWI hyperintensity in the deep right frontal subependymal white matter, image 40 series 3, has low signal on the ADC map.  There is otherwise no abnormal focal intra-axial diffusion restriction to suggest acute infarct.    Extensive FLAIR and T2 hyperintense gliotic foci involve the white matter and nelly, with confluence about the lateral ventricles.  There is no intra-axial mass, mass effect, or abnormal extra-axial fluid, with note of diffuse T2 hyperintense prominent perivascular spaces.  There is generalized prominence of the cortical sulci and ventricles.    There are multiple intra-axial cortical GRE hypointensities in the left  cerebral and left cerebellar hemispheres.  The midline structures and craniocervical junction are normal.  There are T2 hyperintense bilateral mastoid air cell effusions, with left maxillary sinus mucous retention cysts.  Impression: 1. Tiny focus of restricted diffusion in the deep right frontal subependymal white matter, potentially acute infarct.  Otherwise negative for acute infarct.  2. Extensive chronic microvascular ischemic changes throughout the white matter and nelly, with generalized cerebral atrophy.  3. Multifocal GRE hypointensities in the left cerebral and cerebellar hemispheres, suggesting nonspecific hemosiderin deposition, as can be seen with chronic hypertensive microhemorrhages.    Electronically signed by: Raul Dickerson  Date:    04/15/2025  Time:    10:00  X-Ray Chest PA And Lateral  Narrative: EXAMINATION:  XR CHEST PA AND LATERAL    CLINICAL HISTORY:  Dyspnea, unspecified    TECHNIQUE:  PA and lateral views of the chest were performed.    COMPARISON:  04/04/2025, 10/24/2024 chest x-rays    FINDINGS:  Cardiac silhouette is stable and not significantly enlarged.  Band of scarring noted in the right mid lung otherwise the lungs appear clear.  There is mild costophrenic angle blunting posteriorly and laterally on the left suggesting a trace amount of pleural fluid or thickening.  No effusion is seen on the right and there is no pneumothorax..  Degenerative changes of the spine and shoulders noted.  Impression: No significant acute abnormality involving the lungs.    Mild costophrenic angle blunting on the left raising question of trace amount of pleural fluid.    Electronically signed by: Gricelda Reyes  Date:    04/15/2025  Time:    03:11  CT Head Without Contrast  Narrative: EXAMINATION:  CT HEAD WITHOUT CONTRAST    CLINICAL HISTORY:  Headache, sudden, severe;    TECHNIQUE:  Low dose axial images were obtained through the head.  Coronal and sagittal reformations were also performed. Contrast was  not administered.    COMPARISON:  CT brain 10/24/2024    FINDINGS:  There is no evidence of acute intra or extra-axial hemorrhage or hematoma.  The gray-white matter junction differentiation is intact.  Mild prominence of the ventricles, cisterns and sulci are noted consistent with patient's age and senescent atrophic changes.  Patchy mild low density in the periventricular deep white matter is again noted and is nonspecific but commonly related to microvascular chronic ischemic changes.  There is no mass effect/midline shift.  Posterior fossa structures and pituitary gland are unremarkable as imaged allowing for skull base artifact.    Bony calvarium is intact and the visualized paranasal sinuses essentially appear clear.  There is note of multiple opacified right mastoid air cells and a few with air-fluid levels are also noted in the left mastoid air cells compatible with mastoiditis.  There is also note of density in the middle ear on the left and possibly a small amount on the right.  Impression: No evidence of acute intracranial abnormality or fracture.    Microvascular chronic ischemic changes and senescent atrophic changes.    Otomastoiditis on the left and mastoiditis on the right with possible small amount of fluid also in the middle ear on the right.  With prior exam demonstrating mastoiditis on the right only.    This report was flagged in Epic as abnormal.    Electronically signed by: Gricelda Reyes  Date:    04/15/2025  Time:    01:28      ECHOCARDIOGRAM RESULTS (last 5)  Results for orders placed during the hospital encounter of 10/22/23    Echo    Interpretation Summary    Left Ventricle: The left ventricle is normal in size. Mildly increased wall thickness. There is mild concentric hypertrophy. Normal wall motion. Septal motion is consistent with bundle branch block. There is normal systolic function with a visually estimated ejection fraction of 60 - 65%. There is normal diastolic function.    Left  Atrium: Left atrium is mildly dilated.    Right Ventricle: Normal right ventricular cavity size. Wall thickness is normal. Right ventricle wall motion  is normal. Systolic function is normal.    Aortic Valve: There is mild aortic regurgitation.    IVC/SVC: Normal venous pressure at 3 mmHg.      Results for orders placed during the hospital encounter of 01/20/23    Echo    Interpretation Summary  · The left ventricle is normal in size with mild concentric hypertrophy and normal systolic function.  · The estimated ejection fraction is 65%.  · Normal left ventricular diastolic function.  · Normal right ventricular size with normal right ventricular systolic function.  · Moderate mitral regurgitation.  · Mild-to-moderate aortic regurgitation.  · Small circumferential pericardial effusion. Effusion is fluid. No Temponade.      Results for orders placed during the hospital encounter of 06/20/22    Echo    Interpretation Summary  · The left ventricle is normal in size with mild concentric hypertrophy and normal systolic function.  · The estimated ejection fraction is 65%.  · Normal left ventricular diastolic function.  · Normal right ventricular size with normal right ventricular systolic function.  · The estimated PA systolic pressure is 27 mmHg.  · Normal central venous pressure (3 mmHg).      Results for orders placed during the hospital encounter of 05/22/22    STRESS TEST REPORT      Results for orders placed during the hospital encounter of 03/07/22    Echo    Interpretation Summary  · The left ventricle is normal in size with moderate concentric hypertrophy and normal systolic function.  · The estimated PA systolic pressure is 28 mmHg.  · Indeterminate left ventricular diastolic function.  · Normal right ventricular size with mildly reduced right ventricular systolic function.  · Normal central venous pressure (3 mmHg).  · The estimated ejection fraction is 70%.  · Moderate left atrial enlargement.  · Mild mitral  regurgitation.  · Mild tricuspid regurgitation.  · Mild-to-moderate aortic regurgitation.  · Trivial circumferential pericardial effusion. Effusion is fibrinous.  · Mild right atrial enlargement.      CURRENT/PREVIOUS VISIT EKG  Results for orders placed or performed during the hospital encounter of 04/14/25   EKG 12-lead    Collection Time: 04/15/25  9:31 PM   Result Value Ref Range    QRS Duration 102 ms    [facility] QTC Calculation 517 ms    Narrative    Test Reason : I49.9,    Vent. Rate :  67 BPM     Atrial Rate :  94 BPM     P-R Int :    ms          QRS Dur : 102 ms      QT Int : 490 ms       P-R-T Axes :  55   2 165 degrees    QTcB Int : 517 ms    Sinus rhythm with 2nd degree A-V block (Mobitz I)  ST and T wave abnormality, consider inferior ischemia  ST and T wave abnormality, consider anterolateral ischemia  Prolonged QT  Abnormal ECG  When compared with ECG of 14-Apr-2025 20:52,  Premature atrial complexes are no longer Present  Sinus rhythm is now with 2nd degree A-V block (Mobitz I)  Inverted T waves have replaced nonspecific T wave abnormality in Inferior  leads    Referred By: AAAREFERRAL SELF           Confirmed By:            ASSESSMENT/PLAN:     Active Hospital Problems    Diagnosis    *Hypertensive emergency    Blurred vision    Gait disturbance    Anemia    Hypokalemia    Stroke    Abnormal finding on MRI of brain    CKD (chronic kidney disease) stage 3, GFR 30-59 ml/min    Prediabetes    Elevated troponin       ASSESSMENT & PLAN:   Elevated troponin  Paroxysmal atrial fibrillation  Hypertensive emergency  CKD  Hypokalemia  Noncompliance    RECOMMENDATIONS:  Repeat troponin trended down (peak 72)  proBNP > 5000, chest x-ray shows no pulmonary edema  2D echocardiogram showed normal EF, mild/mod MR/TR, sm pericardial effusion  Patient had nuclear stress test October '24 that was negative for reversible ischemia  Troponin elevation likely 2/2 uncontrolled hypertension; pt denies anginal symptoms  BP trend  improved on Cozaar 50 mg daily and amlodipine 2.5 mg daily  Discontinue Hydralazine 100 mg bid per Dr Phillips  Unclear why amlodipine was discontinued  Lopressor 25 mg BID started per hospitalist  Cardiology will sign off as hospital medicine is managing antihypertensives    Vianca Godoy NP  [facility]  Department of Cardiology  Date of Service: 2025        I have personally interviewed and examined the patient, I have reviewed the Nurse Practitioner's history and physical, assessment, and plan. I agree with the findings and plan.    ELEVATED TROPONIN SECONDARY TO HYPERTENSIVE URGENCY  PATIENT NONCOMPLIANT WITH HIS MEDICATIONS    ECHO REVEALS MARKED LEFT VENTRICULAR HYPERTROPHY WITH HYPERTROPHIC MYOPATHY AND OBLITERATION OF LV APEX    WILL START HIM BACK ON THE LOSARTAN AND AMLODIPINE  WILL ADD SOMETHING TO SLOW THE HEART RATE TOMORROW EITHER BETA-BLOCKER OR CHANGE TO CARDIZEM    HE HAS MULTIPLE STRESS TESTS IN THE PAST SECONDARY TO HIS ABNORMAL HIS EKG WHICH ARE NEGATIVE FOR ISCHEMIA    HIS ABNORMAL EKG IS SECONDARY TO THE HYPERTROPHIC MYOPATHY    CONTINUE BLOOD PRESSURE CONTROL THE PATIENT DOES NOT APPEAR TO BE COMPLIANT AT HOME AND SUGGEST CASE MANAGEMENT FOR SOCIAL SERVICE EVALUATION    25  BLOOD PRESSURE BETTER CONTROLLED TODAY  STILL ABOVE GOAL  PATIENT IS MORE ALERT    CONTINUE LOSARTAN, METOPROLOL 25 B.I.D. AND ADD AMLODIPINE 2.5  Dr. Aaln M.D.  [facility]  Department of Cardiology  Date of Service: 2025  8:57 AM         [1]   Social History  Tobacco Use    Smoking status: Former     Current packs/day: 0.00     Average packs/day: 0.3 packs/day for 10.0 years (2.5 ttl pk-yrs)     Types: Cigarettes     Start date: 1991     Quit date: 2001     Years since quittin.7     Passive exposure: Past    Smokeless tobacco: Never   Substance Use Topics    Alcohol use: Not Currently     Comment: seldom    Drug use: Not Currently     Frequency: 8.0 times per  week     Types: Marijuana   [2]   Current Facility-Administered Medications on File Prior to Encounter   Medication Dose Route Frequency Provider Last Rate Last Admin    albuterol nebulizer solution 2.5 mg  2.5 mg Nebulization Q15 Min PRN Pastor Saleh MD        albuterol-ipratropium 2.5 mg-0.5 mg/3 mL nebulizer solution 3 mL  3 mL Nebulization PRN Pastor Saleh MD        denosumab (PROLIA) injection 60 mg  60 mg Subcutaneous 1 time in Clinic/HOD Jean Carlos Hoffman MD        diphenhydrAMINE injection 25 mg  25 mg Intravenous Q6H PRN Pastor Saleh MD        HYDROmorphone injection 0.5 mg  0.5 mg Intravenous Q5 Min PRN Pastor Saleh MD   0.5 mg at 02/23/23 1312    ondansetron injection 4 mg  4 mg Intravenous Q15 Min PRN Pastor Saleh MD        sodium chloride 0.9% flush 10 mL  10 mL Intravenous PRN Ayush Guzman MD   10 mL at 06/01/23 1535    sodium chloride 0.9% flush 10 mL  10 mL Intravenous PRN Ayush Guzman MD   10 mL at 06/05/23 1315    sodium chloride 0.9% flush 3 mL  3 mL Intravenous PRN Pastor Saleh MD         Current Outpatient Medications on File Prior to Encounter   Medication Sig Dispense Refill    atorvastatin (LIPITOR) 20 MG tablet Take 1 tablet (20 mg total) by mouth once daily. 90 tablet 3    b complex vitamins capsule Take 1 capsule by mouth once daily.      betamethasone valerate 0.1% (VALISONE) 0.1 % Crea Apply topically 2 (two) times daily. 45 g 0    busPIRone (BUSPAR) 10 MG tablet Take 1 tablet (10 mg total) by mouth 3 (three) times daily. 270 tablet 3    cyproheptadine (PERIACTIN) 4 mg tablet Take 1 tablet (4 mg total) by mouth 4 (four) times daily. 120 tablet 0    diclofenac (VOLTAREN) 50 MG EC tablet Take 1 tablet (50 mg total) by mouth 2 (two) times daily as needed (pain). 20 tablet 0    gabapentin (NEURONTIN) 100 MG capsule TAKE 1 CAPSULE(100 MG) BY MOUTH EVERY EVENING 30 capsule 0    hydrALAZINE (APRESOLINE) 100 MG tablet Take 1 tablet (100 mg total) by mouth  2 (two) times daily. 60 tablet 11    magnesium oxide (MAG-OX) 400 mg (241.3 mg magnesium) tablet Take 1 tablet (400 mg total) by mouth once daily. 90 tablet 3    metoprolol tartrate (LOPRESSOR) 25 MG tablet Take 1 tablet (25 mg total) by mouth 2 (two) times daily. 180 tablet 0    phenazopyridine (PYRIDIUM) 200 MG tablet Take 1 tablet (200 mg total) by mouth 3 (three) times daily as needed for Pain. 30 tablet 0    predniSONE (DELTASONE) 2.5 MG tablet Take 1 tablet (2.5 mg total) by mouth 2 (two) times daily. 60 tablet 1    solifenacin (VESICARE) 10 MG tablet Take 1 tablet (10 mg total) by mouth once daily. 30 tablet 0    tadalafiL (CIALIS) 5 MG tablet Take 1 tablet (5 mg total) by mouth once daily. 30 tablet 11    tamsulosin (FLOMAX) 0.4 mg Cap TAKE 1 CAPSULE(0.4 MG) BY MOUTH EVERY NIGHT 90 capsule 1    zolpidem (AMBIEN) 10 mg Tab Take 1 tablet (10 mg total) by mouth nightly as needed (insomnia). 30 tablet 3    [DISCONTINUED] cholecalciferol, vitamin D3, (VITAMIN D3) 100 mcg (4,000 unit) Cap Take 400 Units by mouth once daily.      [DISCONTINUED] cloNIDine (CATAPRES) 0.1 MG tablet Take 1 tablet (0.1 mg total) by mouth 2 (two) times daily as needed (only if blood pressure top number is over 200). (Patient not taking: Reported on 6/7/2022) 30 tablet 1    [DISCONTINUED] meclizine (ANTIVERT) 12.5 mg tablet Take 1 tablet (12.5 mg total) by mouth 2 (two) times daily as needed for Dizziness. 30 tablet 0    [DISCONTINUED] sildenafiL (VIAGRA) 100 MG tablet Take 1 tablet (100 mg total) by mouth daily as needed for Erectile Dysfunction. 10 tablet 2

## 2025-04-16 NOTE — PLAN OF CARE
The patient understands the education and plan of care. He was very honest and explained to us he has been having a lot of anxiety about going back home. Dr. Cai did a fantastic job talking to him about his home situation and how he was feeling about leaving the hospital.   Problem: Adult Inpatient Plan of Care  Goal: Plan of Care Review  Outcome: Progressing  Goal: Patient-Specific Goal (Individualized)  Outcome: Progressing  Goal: Absence of Hospital-Acquired Illness or Injury  Outcome: Progressing  Goal: Optimal Comfort and Wellbeing  Outcome: Progressing  Goal: Readiness for Transition of Care  Outcome: Progressing     Problem: Stroke, Ischemic (Includes Transient Ischemic Attack)  Goal: Optimal Coping  Outcome: Progressing  Goal: Effective Bowel Elimination  Outcome: Progressing  Goal: Optimal Cerebral Tissue Perfusion  Outcome: Progressing  Goal: Optimal Cognitive Function  Outcome: Progressing  Goal: Improved Communication Skills  Outcome: Progressing  Goal: Optimal Functional Ability  Outcome: Progressing  Goal: Optimal Nutrition Intake  Outcome: Progressing  Goal: Effective Oxygenation and Ventilation  Outcome: Progressing  Goal: Improved Sensorimotor Function  Outcome: Progressing  Goal: Safe and Effective Swallow  Outcome: Progressing  Goal: Effective Urinary Elimination  Outcome: Progressing     Problem: Fall Injury Risk  Goal: Absence of Fall and Fall-Related Injury  Outcome: Progressing     Problem: Infection  Goal: Absence of Infection Signs and Symptoms  Outcome: Progressing      right upper arm

## 2025-04-16 NOTE — RESPIRATORY THERAPY
04/15/25 2050   Patient Assessment/Suction   Level of Consciousness (AVPU) alert   PRE-TX-O2   Device (Oxygen Therapy) room air   SpO2 97 %   Pulse Oximetry Type Intermittent   $ Pulse Oximetry - Multiple Charge Pulse Oximetry - Multiple   Pulse 74   Education   $ Education 15 min

## 2025-04-16 NOTE — PT/OT/SLP PROGRESS
Physical Therapy Treatment    Patient Name:  Rajendra Castle   MRN:  [id number]    Recommendations:     Discharge Recommendations: Low Intensity Therapy  Discharge Equipment Recommendations: walker, rolling  Barriers to discharge: None    Assessment:     Rajendra Castle is a 79 y.o. male admitted with a medical diagnosis of Hypertensive emergency.  He presents with the following impairments/functional limitations: weakness, impaired endurance, impaired balance .    Rehab Prognosis: Good; patient would benefit from acute skilled PT services to address these deficits and reach maximum level of function.    Recent Surgery: * No surgery found *      Plan:     During this hospitalization, patient to be seen 6 x/week to address the identified rehab impairments via gait training, therapeutic activities, therapeutic exercises and progress toward the following goals:    Plan of Care Expires:  05/15/25    Subjective     Chief Complaint: Concern of his elevated BP   Patient/Family Comments/goals: Return home alone  Pain/Comfort:  Pain Rating 1: 0/10      Objective:     Communicated with nurse prior to session.  Patient found supine with telemetry, pulse ox (continuous), blood pressure cuff upon PT entry to room.     General Precautions: Standard, fall  Orthopedic Precautions:    Braces:    Respiratory Status: Room air     Functional Mobility:  Bed Mobility:     Supine to Sit: stand by assistance  Sit to Supine: stand by assistance  Transfers:     Sit to Stand:  stand by assistance with rolling walker  Gait: 100 ft x2 RW and CGA/SBA      AM-PAC 6 CLICK MOBILITY          Treatment & Education:  Gait  training as indicated above    Patient left supine with all lines intact, call button in reach, and bed alarm on..    GOALS:   Multidisciplinary Problems       Physical Therapy Goals          Problem: Physical Therapy    Goal Priority Disciplines Outcome Interventions   Physical Therapy Goal     PT, PT/OT     Description: Goals to be met by:  5/15/2025     Patient will increase functional independence with mobility by performin. Supine to sit with Modified [place]  2. Sit to stand transfer with Modified [place]  3. Gait  x 300  feet with Modified [place] using Rolling Walker.                          DME Justifications:   Rajendra's mobility limitation cannot be sufficiently resolved by the use of a cane. His functional mobility deficit can be sufficiently resolved with the use of a Rolling Walker. Patient's mobility limitation significantly impairs their ability to participate in one of more activities of daily living.  The use of a RW will significantly improve the patient's ability to participate in MRADLS and the patient will use it on regular basis in the home.    Time Tracking:     PT Received On: 25  PT Start Time: 09     PT Stop Time: 1007  PT Total Time (min): 15 min     Billable Minutes: Gait Training 15 minutes    Treatment Type: Treatment  PT/PTA: PT     Number of PTA visits since last PT visit: 0     2025

## 2025-04-16 NOTE — PLAN OF CARE
04/16/25 0753   Patient Assessment/Suction   Level of Consciousness (AVPU) alert   Respiratory Effort Normal;Unlabored   Expansion/Accessory Muscles/Retractions no retractions;no use of accessory muscles   PRE-TX-O2   Device (Oxygen Therapy) room air   SpO2 97 %   Pulse Oximetry Type Continuous   $ Pulse Oximetry - Multiple Charge Pulse Oximetry - Multiple   Pulse 80   Education   $ Education 15 min

## 2025-04-16 NOTE — PT/OT/SLP EVAL
"Speech Language Pathology Evaluation  Cognitive Communication    Patient Name:  Rajendra Castle   MRN:  [id number]  Admitting Diagnosis: Hypertensive emergency    Recommendations:     Recommendations:                General Recommendations:  Cognitive-linguistic therapy  Diet recommendations:  Regular, Thin   Aspiration Precautions: Standard aspiration precautions   General Precautions: Standard, fall, vision impaired  Communication strategies:  none    Assessment:     Rajendra Castle is a 79 y.o. male with an admitting diagnosis of Hypertensive emergency. Pt seen for cognitive communication evaluation. He presents with mild cognitive linguistic impairment c/b deficits in the area of STM and attention. Initiate skilled ST to address deficits.      History:   PER HPI: Patient is a 79-year-old  male who only reports he has "prostate issues" and hypertension; he takes his medications but does not check his blood pressure at home  -He is a vague historian, but review of the records indicate that he has multiple medical issues including hypertension, prediabetes, CKD-3, GERD, hyperparathyroidism (status post parathyroidectomy), rheumatoid lung, chronic pulmonary embolism, anxiety/depression, and ureteral cancer  -Around 3:00 p.m. in the afternoon yesterday, the patient had numbness/tingling in his bilateral hands/feet and developed a headache (with pressure behind his eyes) as well as dizziness  -He endorsed chest tightness to the ER (but not to me) but does state that he felt weak all over, particularly in his legs, and he was staggering  -In the ER, his blood pressure was as high as the 210s-> now 160s after Cardene infusion and 2 doses of IV Hydralazine/a dose of Labetalol  -He had no leukocytosis with a hemoglobin of 11.8 with normal platelets; COVID/influenza testing were negative  -Creatinine was 1.3 (below baseline) with a potassium of 3.1; Mag was 1.9  -He did not have a UTI; TSH was normal  -BNP was 5217 " and troponin went from 22-> 36-> now 72.7 (at the time of this editing)  -Lipid panel showed an LDL of 61  -EKG showed, per my interpretation, sinus rhythm 71 beats per minute with a QTC of 489 with biphasic T-waves in several leads (this has been seen previously)  -Chest x-ray was unrevealing    Past Medical History:   Diagnosis Date    Anticoagulant long-term use     Arthritis     Coronary artery disease     Dr. Lamb    DDD (degenerative disc disease), cervical     Degenerative disc disease     Heart murmur     History of radiation therapy 2020    Hypertension     Nontoxic multinodular goiter 09/20/2016    Prostate CA     RA (rheumatoid arthritis)     Sleep apnea     No CPAP    Vitamin D insufficiency 09/30/2016       Past Surgical History:   Procedure Laterality Date    CARPAL TUNNEL RELEASE Right 05/28/2021    Procedure: RELEASE, CARPAL TUNNEL;  Surgeon: Jose Ryan II, MD;  Location: [facility];  Service: Orthopedics;  Laterality: Right;    COLONOSCOPY  prior to 2016    normal findings per patient report    CORONARY ANGIOPLASTY WITH STENT PLACEMENT  02/2022    CYSTOSCOPY N/A 12/18/2018    Procedure: CYSTOSCOPY;  Surgeon: Garrett Pina MD;  Location: [facility] OR;  Service: Urology;  Laterality: N/A;    CYSTOSCOPY N/A 07/12/2022    Procedure: CYSTOSCOPY;  Surgeon: Garrett Pina MD;  Location: [facility] OR;  Service: Urology;  Laterality: N/A;    ESOPHAGOGASTRODUODENOSCOPY N/A 09/29/2020    Dr. Espinosa; empiric dilation; erythematous mucosa in antrum; gastric mucosal atrophy; hematin in entire stomach; biopsy: mid & distal esophagus WNL, stomach- WNL, negative for h pylori    EXCISION OF LESION OF PENIS N/A 2/23/2023    Procedure: EXCISION, LESION, PENIS;  Surgeon: Timo Myers MD;  Location: [facility] OR;  Service: Urology;  Laterality: N/A;    INSERTION OF TUNNELED CENTRAL VENOUS CATHETER (CVC) WITH SUBCUTANEOUS PORT Left 5/18/2023    Procedure: [id number];  Surgeon: Atul Faria MD;  Location:  [facility];  Service: General;  Laterality: Left;  left subclavian    PARATHYROIDECTOMY Right 10/27/2020    Procedure: PARATHYROIDECTOMY;  Surgeon: Latonia Clayton MD;  [facility];  Service: ENT;  Laterality: Right;    RELEASE OF ULNAR NERVE AT CUBITAL TUNNEL Right 05/28/2021    Procedure: RELEASE, ULNAR TUNNEL;  Surgeon: Jose Ryan II, MD;  Location: [facility] OR;  Service: Orthopedics;  Laterality: Right;    TRANSRECTAL BIOPSY OF PROSTATE WITH ULTRASOUND GUIDANCE N/A 12/18/2018    Procedure: BIOPSY, PROSTATE, RECTAL APPROACH, WITH US GUIDANCE;  Surgeon: Garrett Pina MD;  Location: [facility] OR;  Service: Urology;  Laterality: N/A;    TRANSRECTAL ULTRASOUND EXAMINATION N/A 07/12/2022    Procedure: ULTRASOUND, RECTAL APPROACH;  Surgeon: Garrett Pina MD;  Location: [facility] OR;  Service: Urology;  Laterality: N/A;       Social History: Patient lives alone.     Prior Intubation HX:  NA     Modified Barium Swallow: NA    Imaging:  Imaging Results              X-Ray Chest PA And Lateral (Final result)  Result time 04/15/25 03:11:51      Final result by Gricelda Reyes MD (04/15/25 03:11:51)                   Impression:      No significant acute abnormality involving the lungs.    Mild costophrenic angle blunting on the left raising question of trace amount of pleural fluid.      Electronically signed by: Gricelda Reyes  Date:    04/15/2025  Time:    03:11               Narrative:    EXAMINATION:  XR CHEST PA AND LATERAL    CLINICAL HISTORY:  Dyspnea, unspecified    TECHNIQUE:  PA and lateral views of the chest were performed.    COMPARISON:  04/04/2025, 10/24/2024 chest x-rays    FINDINGS:  Cardiac silhouette is stable and not significantly enlarged.  Band of scarring noted in the right mid lung otherwise the lungs appear clear.  There is mild costophrenic angle blunting posteriorly and laterally on the left suggesting a trace amount of pleural fluid or thickening.  No effusion is seen on the  right and there is no pneumothorax..  Degenerative changes of the spine and shoulders noted.                                        CT Head Without Contrast (Final result)  Result time 04/15/25 01:28:12      Final result by Gricelda Reyes MD (04/15/25 01:28:12)                   Impression:      No evidence of acute intracranial abnormality or fracture.    Microvascular chronic ischemic changes and senescent atrophic changes.    Otomastoiditis on the left and mastoiditis on the right with possible small amount of fluid also in the middle ear on the right.  With prior exam demonstrating mastoiditis on the right only.    This report was flagged in Epic as abnormal.      Electronically signed by: Gricelda Reyes  Date:    04/15/2025  Time:    01:28               Narrative:    EXAMINATION:  CT HEAD WITHOUT CONTRAST    CLINICAL HISTORY:  Headache, sudden, severe;    TECHNIQUE:  Low dose axial images were obtained through the head.  Coronal and sagittal reformations were also performed. Contrast was not administered.    COMPARISON:  CT brain 10/24/2024    FINDINGS:  There is no evidence of acute intra or extra-axial hemorrhage or hematoma.  The gray-white matter junction differentiation is intact.  Mild prominence of the ventricles, cisterns and sulci are noted consistent with patient's age and senescent atrophic changes.  Patchy mild low density in the periventricular deep white matter is again noted and is nonspecific but commonly related to microvascular chronic ischemic changes.  There is no mass effect/midline shift.  Posterior fossa structures and pituitary gland are unremarkable as imaged allowing for skull base artifact.    Bony calvarium is intact and the visualized paranasal sinuses essentially appear clear.  There is note of multiple opacified right mastoid air cells and a few with air-fluid levels are also noted in the left mastoid air cells compatible with mastoiditis.  There is also note of density in the  middle ear on the left and possibly a small amount on the right.                                       Prior diet: Regular/thin.     Occupation/hobbies/homemaking: Pt/family report PLOF as ambulatory with cane and ADLS, independent with IADLS. Pt does drive. Level of education is Masters degree. Retired .       Subjective     States he may discharge tomorrow.     Pain/Comfort:  Pain Rating 1: 0/10    Respiratory Status: Room air    Objective:   Pt seen for cognitive communication evaluation. He is AAOx4, very pleasant and cooperative. Reports speech, language and cognition at baseline. No family at baseline.     Cognitive Status:    MoCA (Version 8.1) administered with pt achieving a score of 21/30 suggesting mild cognitive-linguistic impairment. Pt earned 3 of 5 points on visuospatial/executive functions, 3 of 3 points on naming, 5 of 6 points for attention, 3 of 3 points for language, 2 of 2 points for abstraction, 0 of 5 points for delayed recall and 5 of 6 points for orientation.      Behavioral Observations: Pleasant, alert and appropriate    Memory   Immediate: Intact as demonstrated by Pt's ability to repeat 5/5 numerical items & 5/5 unrelated words    Working: Intact as demonstrated by Pt's ability to apply verbally provided content to answer functional math/time calculations and complete mental manipulations with 100% acc.; required extra time   Short-term: Impaired; Pt able to recall 0/5 unrelated items ind'ly. Pt demonstrates functional recall by providing detailed information such as recent medical history, Rx, discussion with MDs and POC.    Long-term: Intact; Pt provided detailed biographical and medical history     Orientation: orientedx4    Attention: Pt engaged t/o session without redirection or cueing warranted. Pt able to demonstrate adequate alternating attention within MOCA tasks including symbol trails and serial subtractions. Difficulty with focused attention task.      Problem  Solving: Intact; Pt provided appropriate responses to judgement/safety situations and completed time and money word problems with 100% acc. Compare/contrast: 100% acc     Pragmatics: WNL    Receptive Language:   Comprehension: WFL  Follows complex commands   Answers complex Y/N questions       Expressive Language:  Verbal:    Conversational speech: Fluent and appropriate at the conversational level without evidence of word retrieval, semantic or syntactic error     Pragmatics: WNL      Motor Speech:  No evidence of Apraxia of Speech or Dysarthria  100% intelligible     Voice: WNL    Visual-Spatial: WNL  Attending to R and L planes w/out evidence of inattention, neglect or preferential gaze      Treatment: Pt/family educated re: results/recs of evaluation, SLP role and POC. Receptive to information provided.     Goals:   Multidisciplinary Problems       SLP Goals          Problem: SLP    Goal Priority Disciplines Outcome   SLP Goal     SLP    Description: 1. Pt will participate in cognitive communication evaluation. --MET  2. Pt will recall 5/5 items from functional list following 5 minute filled delay with SPV                       Plan:   Patient to be seen:      Plan of Care expires:     Plan of Care reviewed with:  patient   SLP Follow-Up:  Yes       Discharge recommendations:  Therapy Intensity Recommendations at Discharge: Low Intensity Therapy   Barriers to Discharge:  None    Time Tracking:     SLP Treatment Date:   04/16/25  Speech Start Time:  1100  Speech Stop Time:  1123     Speech Total Time (min):  23 min    Billable Minutes: Eval 15 , Speech Therapy Individual 8, and Total Time 23    04/16/2025

## 2025-04-16 NOTE — ASSESSMENT & PLAN NOTE
Noted. Troponin stable, no S/Sx of ACS,    Recent Labs   Lab 04/15/25  0100   TROPONINIHS 36*     Likely demand ischemia in face of markedly elevated blood pressure.  Remained stable.  Echocardiogram shows no evidence of focal wall motion abnormalities.

## 2025-04-16 NOTE — ASSESSMENT & PLAN NOTE
Patient's most recent potassium results are listed below.   Recent Labs     04/14/25  2145 04/16/25  0322   K 3.1* 3.8     Plan  - Replete potassium per protocol  - Monitor potassium Daily  - Patient's hypokalemia is stable

## 2025-04-16 NOTE — PLAN OF CARE
2140 pt monitor noted afib with some p waves noted. EKG done to confirm afib. EKG noted to be SR with 2nd degree block mobitz I.    Problem: Adult Inpatient Plan of Care  Goal: Plan of Care Review  Outcome: Progressing  Goal: Patient-Specific Goal (Individualized)  Outcome: Progressing  Goal: Absence of Hospital-Acquired Illness or Injury  Outcome: Progressing  Goal: Optimal Comfort and Wellbeing  Outcome: Progressing  Goal: Readiness for Transition of Care  Outcome: Progressing     Problem: Stroke, Ischemic (Includes Transient Ischemic Attack)  Goal: Optimal Coping  Outcome: Progressing  Goal: Effective Bowel Elimination  Outcome: Progressing  Goal: Optimal Cerebral Tissue Perfusion  Outcome: Progressing  Goal: Optimal Cognitive Function  Outcome: Progressing  Goal: Improved Communication Skills  Outcome: Progressing  Goal: Optimal Functional Ability  Outcome: Progressing  Goal: Optimal Nutrition Intake  Outcome: Progressing  Goal: Effective Oxygenation and Ventilation  Outcome: Progressing  Goal: Improved Sensorimotor Function  Outcome: Progressing  Goal: Safe and Effective Swallow  Outcome: Progressing  Goal: Effective Urinary Elimination  Outcome: Progressing     Problem: Fall Injury Risk  Goal: Absence of Fall and Fall-Related Injury  Outcome: Progressing     Problem: Infection  Goal: Absence of Infection Signs and Symptoms  Outcome: Progressing

## 2025-04-16 NOTE — SUBJECTIVE & OBJECTIVE
Interval History:  Patient seen and examined.  No acute events overnight.  Neurologic status back to baseline.  Patient is started on p.o. antihypertensive, and blood pressure remains stable in the 1 50s to 180s systolic.  Plan of care discussed with the patient in detail-patient is anxious and depressed self reported.    Review of Systems  Objective:     Vital Signs (Most Recent):  Temp: 98.2 °F (36.8 °C) (04/16/25 1101)  Pulse: 70 (04/16/25 1101)  Resp: 20 (04/16/25 1101)  BP: (!) 148/70 (04/16/25 1101)  SpO2: 97 % (04/16/25 1101) Vital Signs (24h Range):  Temp:  [98 °F (36.7 °C)-98.4 °F (36.9 °C)] 98.2 °F (36.8 °C)  Pulse:  [61-81] 70  Resp:  [18-28] 20  SpO2:  [94 %-98 %] 97 %  BP: (116-214)/() 148/70     Weight: 62.6 kg (138 lb 0.1 oz)  Body mass index is 20.98 kg/m².    Intake/Output Summary (Last 24 hours) at 4/16/2025 1354  Last data filed at 4/16/2025 0933  Gross per 24 hour   Intake 536.27 ml   Output 1700 ml   Net -1163.73 ml         Physical Exam  Vitals and nursing note reviewed.   Eyes:      Pupils: Pupils are equal, round, and reactive to light.   Neck:      Vascular: No JVD.   Cardiovascular:      Rate and Rhythm: Normal rate and regular rhythm.      Heart sounds: Normal heart sounds.   Pulmonary:      Effort: Pulmonary effort is normal.      Breath sounds: Normal breath sounds.   Abdominal:      General: Bowel sounds are normal. There is no distension.      Palpations: Abdomen is soft.      Tenderness: There is no abdominal tenderness.   Musculoskeletal:         General: No deformity.   Lymphadenopathy:      Cervical: No cervical adenopathy.   Skin:     Coloration: Skin is not pale.      Findings: No rash.               Significant Labs: All pertinent labs within the past 24 hours have been reviewed.    Significant Imaging: I have reviewed all pertinent imaging results/findings within the past 24 hours.

## 2025-04-16 NOTE — PLAN OF CARE
JUAN met with pt at bedside to discuss discharge plan and resources at home.  Treatment team entered the room. MD shared that pt is doing well physically and can be discharged home with HH.      Pt agreeable to HH but is concerned that he will need help at home. Pt expressed anxiety. This may be caused by recent fall. MD also addressed those concerns.  SW to provide pt with a list of HH agencies.    SW also revisited the resources shared yesterday. Pt expressed understanding.  In addition to previous resources shared, JUAN will provide COAST information.    JUAN attempted to call pt's nephew, Brenton, to discuss above. No answer. Left message    [id number]: Pt upset that he was unable to complete the Personal Care Attendant application. 6 month wait list for individuals without Medicaid

## 2025-04-16 NOTE — ASSESSMENT & PLAN NOTE
Anemia is likely due to chronic disease due to Chronic Kidney Disease. Most recent hemoglobin and hematocrit are listed below.  Recent Labs     04/14/25  2145 04/16/25  0322   HGB 11.8* 12.6*   HCT 37.0* 40.5     Plan  - Monitor serial CBC: Daily  - Transfuse PRBC if patient becomes hemodynamically unstable, symptomatic or H/H drops below 7/21.  - Patient has not received any PRBC transfusions to date  - Patient's anemia is currently stable

## 2025-04-16 NOTE — ASSESSMENT & PLAN NOTE
Patient has recurrent depression which is moderate and is currently uncontrolled. Will Increase anti-depressant medications. We will not consult psychiatry at this time. Patient does not display psychosis at this time. Continue to monitor closely and adjust plan of care as needed.  Start Cymbalta.

## 2025-04-16 NOTE — PROGRESS NOTES
"[facility] Medicine  Progress Note    Patient Name: Rajendra Castle  MRN: [id number]  Patient Class: IP- Inpatient   Admission Date: 4/14/2025  Length of Stay: 1 days  Attending Physician: Morgan Cai MD  Primary Care Provider: Sp Lilly MD        Subjective     Principal Problem:Hypertensive emergency        HPI:  Patient is a 79-year-old  male who only reports he has "prostate issues" and hypertension; he takes his medications but does not check his blood pressure at home  He is a vague historian, but review of the records indicate that he has multiple medical issues including hypertension, prediabetes, CKD -3, GERD, hyperparathyroidism (status post parathyroidectomy), rheumatoid lung, chronic pulmonary embolism, anxiety/depression, and ureteral cancer  Around 3:00 p.m. in the afternoon, the patient had numbness/tingling in his bilateral hands/feet and developed a headache (with pressure behind his eyes) as well as dizziness; he endorsed chest tightness to the ER but not to me but does state that he felt weak all over particularly in his legs and was staggering  In the ER, his blood pressure goes highest of the to 10s-> now 160s after Cardene infusion and 2 doses of IV Hydralazine/a dose of Labetalol  He had no leukocytosis with a hemoglobin of 11.8 normal platelets; COVID/influenza testing were negative  Creatinine 1.3 (below baseline) with a potassium of 3.1; Mag was 1.9  He did not have a UTI; TSH was normal  BNP 5217 and troponin went from 22-> 36-> 72.7 (at the time of this editing)  Lipid panel showed an LDL of 61  EKG showed, per my interpretation, sinus rhythm 71 beats per minute with a QTC of 489 with biphasic T-waves in several leads and this has been seen previously  Chest x-ray was unrevealing; CT head without contrast showed, per radiologist Dr. Reyes:    "Impression:     No evidence of acute intracranial abnormality or fracture.     Microvascular " "chronic ischemic changes and senescent atrophic changes.     Otomastoiditis on the left and mastoiditis on the right with possible small amount of fluid also in the middle ear on the right.  With prior exam demonstrating mastoiditis on the right only."    The patient received aspirin around 3:00 a.m., Tylenol, calcium gluconate, oral potassium I Ativan, in the aforementioned antihypertensives and was transferred to the ICU for further diagnosis, treatment, and care    Overview/Hospital Course:  Patient was admitted to the hospital with worsening neurologic deficits and a markedly elevated blood pressure.  Initial etiology was thought to be acute stroke, and patient was seen by tele stroke, underwent MRI which showed potential acute lesion.  However, this was not corroborated with the patient's symptoms.  Repeat CT angiogram showed no evidence of any acute lesions, therefore stroke neurologist stated this is likely not an acute stroke.  Patient was continued for hypertensive emergency.  Home blood pressure medication was restarted, and blood pressure improved over the course of his hospitalization.  Patient was also found to have an acute urinary tract infection and treatment was initiated for such.  Patient was also reported to be depressed and anxious, and SSRI was started at his request.  Blood pressure slowly returned back to baseline of 150s to 170s systolic.  Echocardiogram was done which showed evidence of concentric remodeling but otherwise stable.    Interval History:  Patient seen and examined.  No acute events overnight.  Neurologic status back to baseline.  Patient is started on p.o. antihypertensive, and blood pressure remains stable in the 1 50s to 180s systolic.  Plan of care discussed with the patient in detail-patient is anxious and depressed self reported.    Review of Systems  Objective:     Vital Signs (Most Recent):  Temp: 98.2 °F (36.8 °C) (04/16/25 1101)  Pulse: 70 (04/16/25 1101)  Resp: 20 " (04/16/25 1101)  BP: (!) 148/70 (04/16/25 1101)  SpO2: 97 % (04/16/25 1101) Vital Signs (24h Range):  Temp:  [98 °F (36.7 °C)-98.4 °F (36.9 °C)] 98.2 °F (36.8 °C)  Pulse:  [61-81] 70  Resp:  [18-28] 20  SpO2:  [94 %-98 %] 97 %  BP: (116-214)/() 148/70     Weight: 62.6 kg (138 lb 0.1 oz)  Body mass index is 20.98 kg/m².    Intake/Output Summary (Last 24 hours) at 4/16/2025 1354  Last data filed at 4/16/2025 0933  Gross per 24 hour   Intake 536.27 ml   Output 1700 ml   Net -1163.73 ml         Physical Exam  Vitals and nursing note reviewed.   Eyes:      Pupils: Pupils are equal, round, and reactive to light.   Neck:      Vascular: No JVD.   Cardiovascular:      Rate and Rhythm: Normal rate and regular rhythm.      Heart sounds: Normal heart sounds.   Pulmonary:      Effort: Pulmonary effort is normal.      Breath sounds: Normal breath sounds.   Abdominal:      General: Bowel sounds are normal. There is no distension.      Palpations: Abdomen is soft.      Tenderness: There is no abdominal tenderness.   Musculoskeletal:         General: No deformity.   Lymphadenopathy:      Cervical: No cervical adenopathy.   Skin:     Coloration: Skin is not pale.      Findings: No rash.               Significant Labs: All pertinent labs within the past 24 hours have been reviewed.    Significant Imaging: I have reviewed all pertinent imaging results/findings within the past 24 hours.      Assessment & Plan  Hypertensive emergency  Patient has a current diagnosis of hypertensive urgency/emergency diagnosis: hypertensive emergency with end organ damage evidenced by hypertensive encephalopathy which is controlled.  Latest blood pressure and vitals reviewed-   Temp:  [98 °F (36.7 °C)-98.4 °F (36.9 °C)]   Pulse:  [61-81]   Resp:  [18-22]   BP: (116-214)/()   SpO2:  [94 %-98 %] .   Patient currently off IV antihypertensives.   Home meds for hypertension were reviewed and noted below.   Hypertension Medications               hydrALAZINE (APRESOLINE) 100 MG tablet Take 1 tablet (100 mg total) by mouth 2 (two) times daily.    metoprolol tartrate (LOPRESSOR) 25 MG tablet Take 1 tablet (25 mg total) by mouth 2 (two) times daily.            Medication adjustment for hospital antihypertensives is as follows- added back home medications, and titrating off Cardene drip.    Will goal for controlled BP reduction as noted be medications noted above and monitor and mitigate end organ damage as indicated.       Stroke (Resolved: 4/16/2025)  Noted on MRI    Antithrombotics for secondary stroke prevention: Antiplatelets: Aspirin: 81 mg daily  Clopidogrel: 75 mg daily    Statins for secondary stroke prevention and hyperlipidemia, if present:   Statins: Atorvastatin- 40 mg daily    Aggressive risk factor modification: HTN, Smoking     Rehab efforts: The patient has been evaluated by a stroke team provider and the therapy needs have been fully considered based off the presenting complaints and exam findings. The following therapy evaluations are needed: PT evaluate and treat, OT evaluate and treat    Diagnostics ordered/pending: CTA Head to assess vasculature , CTA Neck/Arch to assess vasculature    VTE prophylaxis: Enoxaparin 40 mg SQ every 24 hours    BP parameters: Infarct: No intervention, SBP <220      Elevated troponin  Noted. Troponin stable, no S/Sx of ACS,    Recent Labs   Lab 04/15/25  0100   TROPONINIHS 36*     Likely demand ischemia in face of markedly elevated blood pressure.  Remained stable.  Echocardiogram shows no evidence of focal wall motion abnormalities.  Prediabetes (Resolved: 4/16/2025)  As above  CKD (chronic kidney disease) stage 3, GFR 30-59 ml/min  Creatine stable for now. BMP reviewed- noted Estimated Creatinine Clearance: 42.1 mL/min (based on SCr of 1.3 mg/dL). according to latest data. Based on current GFR, CKD stage is stage 3 - GFR 30-59.  Monitor UOP and serial BMP and adjust therapy as needed. Renally dose meds. Avoid  nephrotoxic medications and procedures.      Blurred vision (Resolved: 4/16/2025)  As above    Anemia  Anemia is likely due to chronic disease due to Chronic Kidney Disease. Most recent hemoglobin and hematocrit are listed below.  Recent Labs     04/14/25 2145 04/16/25 0322   HGB 11.8* 12.6*   HCT 37.0* 40.5     Plan  - Monitor serial CBC: Daily  - Transfuse PRBC if patient becomes hemodynamically unstable, symptomatic or H/H drops below 7/21.  - Patient has not received any PRBC transfusions to date  - Patient's anemia is currently stable  Hypokalemia  Patient's most recent potassium results are listed below.   Recent Labs     04/14/25 2145 04/16/25 0322   K 3.1* 3.8     Plan  - Replete potassium per protocol  - Monitor potassium Daily  - Patient's hypokalemia is stable  Major depressive disorder, single episode, severe without psychotic features  Patient has recurrent depression which is moderate and is currently uncontrolled. Will Increase anti-depressant medications. We will not consult psychiatry at this time. Patient does not display psychosis at this time. Continue to monitor closely and adjust plan of care as needed.  Start Cymbalta.      Abnormal finding on MRI of brain (Resolved: 4/16/2025)      VTE Risk Mitigation (From admission, onward)           Ordered     IP VTE HIGH RISK PATIENT  Once         04/15/25 0608     Place sequential compression device  Until discontinued         04/15/25 0608     Place sequential compression device  Until discontinued         04/15/25 0604                    Discharge Planning   DEVONTE: 4/17/2025     Code Status: Full Code   Medical Readiness for Discharge Date:   Discharge Plan A: Home Health   Discharge Delays: None known at this time        Critical care time spent on the evaluation and treatment of severe organ dysfunction, review of pertinent labs and imaging studies, discussions with consulting providers and discussions with patient/family: 37 minutes.    Please  place Justification for DME        Morgan Cai MD  Department of Hospital Medicine   [facility]

## 2025-04-16 NOTE — ASSESSMENT & PLAN NOTE
Patient has a current diagnosis of hypertensive urgency/emergency diagnosis: hypertensive emergency with end organ damage evidenced by hypertensive encephalopathy which is controlled.  Latest blood pressure and vitals reviewed-   Temp:  [98 °F (36.7 °C)-98.4 °F (36.9 °C)]   Pulse:  [61-81]   Resp:  [18-22]   BP: (116-214)/()   SpO2:  [94 %-98 %] .   Patient currently off IV antihypertensives.   Home meds for hypertension were reviewed and noted below.   Hypertension Medications              hydrALAZINE (APRESOLINE) 100 MG tablet Take 1 tablet (100 mg total) by mouth 2 (two) times daily.    metoprolol tartrate (LOPRESSOR) 25 MG tablet Take 1 tablet (25 mg total) by mouth 2 (two) times daily.            Medication adjustment for hospital antihypertensives is as follows- added back home medications, and titrating off Cardene drip.    Will goal for controlled BP reduction as noted be medications noted above and monitor and mitigate end organ damage as indicated.

## 2025-04-16 NOTE — PLAN OF CARE
320P: JUAN spoke to pt's nephew and shared the resources shared with pt.   Nephew asked SW to email information to [email or web address]  SW shared  rec, Medicaid link, Personal Care Attendant info, COAST, and sitter list.    Nephew also shared that he is willing to help his uncle in any way needed, if he will accept it.

## 2025-04-16 NOTE — CONSULTS
[facility]  Adult Nutrition  Education Short Note      Nutrition Education    Previous education: no    Diet at home: Regular    Written information provided and explained on [employer]  Nutrition Therapy  diet. Pt states he lives alone and does the cooking but his neighbor brings him food also. States he does not use salt on his foods. Encourage pt to limit processed foods. He does not any issues chewing or swallowing. No weight loss and good appetite.     Discussed with: patient    Educational Need? yes    Barriers: none identified    Interventions: Fat modified diet, Cholesterol modified diet, and Sodium modified diet    Patient and/or family comprehend instructions: yes    Outcome: Verbalizes understanding     Thanks for the consult!    Catherine Bah RD 04/16/2025 2:18 PM

## 2025-04-16 NOTE — PLAN OF CARE
Met with pt at bedside to review discharge recommendation of HH and he is agreeable to plan, but is not ready to make a decision.    Patient provided with a list of facilities in-network with patient's payor plan. Providers that are owned, operated, or affiliated with [facility] are included on the list.      04/16/25 1336   Post-Acute Status   Post-Acute Authorization Home Health   Patient choice form signed by patient/caregiver List with quality metrics by geographic area provided   Discharge Delays None known at this time   Discharge Plan   Discharge Plan A Home Health   Discharge Plan B Home Health

## 2025-04-16 NOTE — PT/OT/SLP PROGRESS
Occupational Therapy      Patient Name:  Rajendra Castle   MRN:  [id number]    Patient not seen today secondary to Patient unwilling to participate.     4/16/2025

## 2025-04-16 NOTE — ASSESSMENT & PLAN NOTE
Creatine stable for now. BMP reviewed- noted Estimated Creatinine Clearance: 42.1 mL/min (based on SCr of 1.3 mg/dL). according to latest data. Based on current GFR, CKD stage is stage 3 - GFR 30-59.  Monitor UOP and serial BMP and adjust therapy as needed. Renally dose meds. Avoid nephrotoxic medications and procedures.

## 2025-04-16 NOTE — PLAN OF CARE
Met with pt at bedside to review discharge recommendation of HH and he is agreeable to plan.    Patient provided with a list of facilities in-network with patient's payor plan. Providers that are owned, operated, or affiliated with [facility] are included on the list.     Patient instructed to identify preference.  No preference. Pt agreeable to referral being sent to the highest rated. Per Medicare.gov, that is [facility]. Referral sent via Epic    If an additional preferred facility not listed above is identified, additional referral to be sent. If above facilities unable to accept, will send additional referrals to in-network providers.       04/16/25 1633   Post-Acute Status   Post-Acute Authorization Home Health   Home Health Status Referrals Sent   Coverage Payor: [employer] [facility] [place] [facility] [employer] CHOICES -   Patient choice form signed by patient/caregiver List with quality metrics by geographic area provided   Discharge Delays None known at this time   Discharge Plan   Discharge Plan A Home Health   Discharge Plan B Home Health

## 2025-04-17 PROBLEM — F01.50 VASCULAR DEMENTIA: Status: ACTIVE | Noted: 2025-04-17

## 2025-04-17 PROBLEM — N48.9 PENILE LESION: Status: RESOLVED | Noted: 2023-02-22 | Resolved: 2025-04-17

## 2025-04-17 PROBLEM — I70.0 AORTIC ATHEROSCLEROSIS: Status: RESOLVED | Noted: 2024-06-24 | Resolved: 2025-04-17

## 2025-04-17 PROBLEM — R53.83 FATIGUE: Status: RESOLVED | Noted: 2023-10-22 | Resolved: 2025-04-17

## 2025-04-17 PROBLEM — I20.0 UNSTABLE ANGINA: Status: RESOLVED | Noted: 2022-06-20 | Resolved: 2025-04-17

## 2025-04-17 PROBLEM — Z71.89 ACP (ADVANCE CARE PLANNING): Status: RESOLVED | Noted: 2021-12-03 | Resolved: 2025-04-17

## 2025-04-17 PROBLEM — R07.9 EXERTIONAL CHEST PAIN: Status: RESOLVED | Noted: 2020-09-28 | Resolved: 2025-04-17

## 2025-04-17 PROBLEM — Z79.899 DRUG-INDUCED IMMUNODEFICIENCY: Status: RESOLVED | Noted: 2023-02-15 | Resolved: 2025-04-17

## 2025-04-17 PROBLEM — R35.1 NOCTURIA: Status: RESOLVED | Noted: 2025-01-02 | Resolved: 2025-04-17

## 2025-04-17 PROBLEM — D84.821 DRUG-INDUCED IMMUNODEFICIENCY: Status: RESOLVED | Noted: 2023-02-15 | Resolved: 2025-04-17

## 2025-04-17 PROBLEM — R00.0 TACHYCARDIA: Status: RESOLVED | Noted: 2020-10-13 | Resolved: 2025-04-17

## 2025-04-17 LAB
ABSOLUTE EOSINOPHIL (SMH): 0.19 K/UL
ABSOLUTE MONOCYTE (SMH): 0.66 K/UL (ref 0.3–1)
ABSOLUTE NEUTROPHIL COUNT (SMH): 4.6 K/UL (ref 1.8–7.7)
ALBUMIN SERPL-MCNC: 3.6 G/DL (ref 3.5–5.2)
ALP SERPL-CCNC: 61 UNIT/L (ref 55–135)
ALT SERPL-CCNC: 5 UNIT/L (ref 10–44)
ANION GAP (SMH): 10 MMOL/L (ref 8–16)
AST SERPL-CCNC: 12 UNIT/L (ref 10–40)
BASOPHILS # BLD AUTO: 0.02 K/UL
BASOPHILS NFR BLD AUTO: 0.3 %
BILIRUB SERPL-MCNC: 0.5 MG/DL (ref 0.1–1)
BUN SERPL-MCNC: 16 MG/DL (ref 8–23)
CALCIUM SERPL-MCNC: 9 MG/DL (ref 8.7–10.5)
CHLORIDE SERPL-SCNC: 109 MMOL/L (ref 95–110)
CO2 SERPL-SCNC: 19 MMOL/L (ref 23–29)
CREAT SERPL-MCNC: 1.4 MG/DL (ref 0.5–1.4)
ERYTHROCYTE [DISTWIDTH] IN BLOOD BY AUTOMATED COUNT: 17.2 % (ref 11.5–14.5)
GFR SERPLBLD CREATININE-BSD FMLA CKD-EPI: 52 ML/MIN/1.73/M2
GLUCOSE SERPL-MCNC: 103 MG/DL (ref 70–110)
HCT VFR BLD AUTO: 38.7 % (ref 40–54)
HGB BLD-MCNC: 11.9 GM/DL (ref 14–18)
IMM GRANULOCYTES # BLD AUTO: 0.04 K/UL (ref 0–0.04)
IMM GRANULOCYTES NFR BLD AUTO: 0.6 % (ref 0–0.5)
LYMPHOCYTES # BLD AUTO: 0.75 K/UL (ref 1–4.8)
MAGNESIUM SERPL-MCNC: 1.8 MG/DL (ref 1.6–2.6)
MCH RBC QN AUTO: 27.3 PG (ref 27–31)
MCHC RBC AUTO-ENTMCNC: 30.7 G/DL (ref 32–36)
MCV RBC AUTO: 89 FL (ref 82–98)
NUCLEATED RBC (/100WBC) (SMH): 0 /100 WBC
PLATELET # BLD AUTO: 262 K/UL (ref 150–450)
PMV BLD AUTO: 12.3 FL (ref 9.2–12.9)
POTASSIUM SERPL-SCNC: 3.9 MMOL/L (ref 3.5–5.1)
PROT SERPL-MCNC: 7 GM/DL (ref 6–8.4)
RBC # BLD AUTO: 4.36 M/UL (ref 4.6–6.2)
RELATIVE EOSINOPHIL (SMH): 3 % (ref 0–8)
RELATIVE LYMPHOCYTE (SMH): 11.9 % (ref 18–48)
RELATIVE MONOCYTE (SMH): 10.5 % (ref 4–15)
RELATIVE NEUTROPHIL (SMH): 73.7 % (ref 38–73)
SODIUM SERPL-SCNC: 138 MMOL/L (ref 136–145)
WBC # BLD AUTO: 6.29 K/UL (ref 3.9–12.7)

## 2025-04-17 PROCEDURE — 25000003 PHARM REV CODE 250: Performed by: HOSPITALIST

## 2025-04-17 PROCEDURE — 83735 ASSAY OF MAGNESIUM: CPT | Performed by: HOSPITALIST

## 2025-04-17 PROCEDURE — 25000003 PHARM REV CODE 250: Performed by: NURSE PRACTITIONER

## 2025-04-17 PROCEDURE — 80053 COMPREHEN METABOLIC PANEL: CPT | Performed by: HOSPITALIST

## 2025-04-17 PROCEDURE — 25000003 PHARM REV CODE 250: Performed by: INTERNAL MEDICINE

## 2025-04-17 PROCEDURE — 63600175 PHARM REV CODE 636 W HCPCS: Performed by: INTERNAL MEDICINE

## 2025-04-17 PROCEDURE — 63600175 PHARM REV CODE 636 W HCPCS: Performed by: HOSPITALIST

## 2025-04-17 PROCEDURE — 94761 N-INVAS EAR/PLS OXIMETRY MLT: CPT

## 2025-04-17 PROCEDURE — 12000002 HC ACUTE/MED SURGE SEMI-PRIVATE ROOM

## 2025-04-17 PROCEDURE — 99900031 HC PATIENT EDUCATION (STAT)

## 2025-04-17 PROCEDURE — 85025 COMPLETE CBC W/AUTO DIFF WBC: CPT | Performed by: HOSPITALIST

## 2025-04-17 PROCEDURE — 99233 SBSQ HOSP IP/OBS HIGH 50: CPT | Mod: ,,, | Performed by: GENERAL PRACTICE

## 2025-04-17 PROCEDURE — 36415 COLL VENOUS BLD VENIPUNCTURE: CPT | Performed by: HOSPITALIST

## 2025-04-17 RX ORDER — DONEPEZIL HYDROCHLORIDE 5 MG/1
5 TABLET, FILM COATED ORAL NIGHTLY
Status: DISCONTINUED | OUTPATIENT
Start: 2025-04-17 | End: 2025-04-26 | Stop reason: HOSPADM

## 2025-04-17 RX ORDER — METOPROLOL TARTRATE 25 MG/1
25 TABLET, FILM COATED ORAL 2 TIMES DAILY
Status: DISCONTINUED | OUTPATIENT
Start: 2025-04-17 | End: 2025-04-17

## 2025-04-17 RX ORDER — NICARDIPINE HYDROCHLORIDE 0.2 MG/ML
0-15 INJECTION INTRAVENOUS CONTINUOUS
Status: DISCONTINUED | OUTPATIENT
Start: 2025-04-17 | End: 2025-04-17

## 2025-04-17 RX ORDER — LOSARTAN POTASSIUM 50 MG/1
100 TABLET ORAL DAILY
Status: DISCONTINUED | OUTPATIENT
Start: 2025-04-18 | End: 2025-04-26 | Stop reason: HOSPADM

## 2025-04-17 RX ORDER — METOPROLOL TARTRATE 50 MG/1
50 TABLET ORAL 2 TIMES DAILY
Status: DISCONTINUED | OUTPATIENT
Start: 2025-04-17 | End: 2025-04-17

## 2025-04-17 RX ORDER — BUSPIRONE HYDROCHLORIDE 5 MG/1
15 TABLET ORAL 3 TIMES DAILY
Status: DISCONTINUED | OUTPATIENT
Start: 2025-04-17 | End: 2025-04-26 | Stop reason: HOSPADM

## 2025-04-17 RX ORDER — HYDROCHLOROTHIAZIDE 12.5 MG/1
12.5 TABLET ORAL DAILY
Status: DISCONTINUED | OUTPATIENT
Start: 2025-04-17 | End: 2025-04-26 | Stop reason: HOSPADM

## 2025-04-17 RX ORDER — AMLODIPINE BESYLATE 2.5 MG/1
2.5 TABLET ORAL DAILY
Status: DISCONTINUED | OUTPATIENT
Start: 2025-04-17 | End: 2025-04-20

## 2025-04-17 RX ADMIN — METOPROLOL TARTRATE 25 MG: 25 TABLET, FILM COATED ORAL at 08:04

## 2025-04-17 RX ADMIN — MUPIROCIN 1 G: 20 OINTMENT TOPICAL at 09:04

## 2025-04-17 RX ADMIN — HYDRALAZINE HYDROCHLORIDE 10 MG: 20 INJECTION, SOLUTION INTRAMUSCULAR; INTRAVENOUS at 09:04

## 2025-04-17 RX ADMIN — BUSPIRONE HYDROCHLORIDE 10 MG: 5 TABLET ORAL at 08:04

## 2025-04-17 RX ADMIN — AMLODIPINE BESYLATE 5 MG: 5 TABLET ORAL at 08:04

## 2025-04-17 RX ADMIN — MUPIROCIN 1 G: 20 OINTMENT TOPICAL at 08:04

## 2025-04-17 RX ADMIN — DONEPEZIL HYDROCHLORIDE 5 MG: 5 TABLET, FILM COATED ORAL at 09:04

## 2025-04-17 RX ADMIN — BUSPIRONE HYDROCHLORIDE 15 MG: 5 TABLET ORAL at 09:04

## 2025-04-17 RX ADMIN — NICARDIPINE HYDROCHLORIDE 2.5 MG/HR: 0.2 INJECTION, SOLUTION INTRAVENOUS at 12:04

## 2025-04-17 RX ADMIN — LOSARTAN POTASSIUM 50 MG: 50 TABLET, FILM COATED ORAL at 08:04

## 2025-04-17 RX ADMIN — AMLODIPINE BESYLATE 2.5 MG: 2.5 TABLET ORAL at 01:04

## 2025-04-17 RX ADMIN — BUSPIRONE HYDROCHLORIDE 15 MG: 5 TABLET ORAL at 01:04

## 2025-04-17 RX ADMIN — DULOXETINE HYDROCHLORIDE 20 MG: 20 CAPSULE, DELAYED RELEASE ORAL at 08:04

## 2025-04-17 RX ADMIN — HYDROCHLOROTHIAZIDE 12.5 MG: 12.5 TABLET ORAL at 11:04

## 2025-04-17 RX ADMIN — DULOXETINE HYDROCHLORIDE 20 MG: 20 CAPSULE, DELAYED RELEASE ORAL at 09:04

## 2025-04-17 RX ADMIN — TAMSULOSIN HYDROCHLORIDE 0.4 MG: 0.4 CAPSULE ORAL at 08:04

## 2025-04-17 RX ADMIN — ATORVASTATIN CALCIUM 20 MG: 20 TABLET, FILM COATED ORAL at 08:04

## 2025-04-17 RX ADMIN — CEFTRIAXONE 1 G: 1 INJECTION, POWDER, FOR SOLUTION INTRAMUSCULAR; INTRAVENOUS at 08:04

## 2025-04-17 NOTE — PLAN OF CARE
[facility].    Need HH order and SOC date   04/17/25 0844   Post-Acute Status   Post-Acute Authorization Home Health   Home Health Status Set-up Complete/Auth obtained   Coverage Payor: [employer] [facility] [employer] [facility]   Patient choice form signed by patient/caregiver List with quality metrics by geographic area provided   Discharge Delays None known at this time   Discharge Plan   Discharge Plan A Home Health   Discharge Plan B Home Health

## 2025-04-17 NOTE — PLAN OF CARE
04/17/25 0734   Patient Assessment/Suction   Level of Consciousness (AVPU) alert   Respiratory Effort Normal;Unlabored   Expansion/Accessory Muscles/Retractions no retractions;no use of accessory muscles   PRE-TX-O2   Device (Oxygen Therapy) room air   SpO2 97 %   Pulse Oximetry Type Continuous   $ Pulse Oximetry - Multiple Charge Pulse Oximetry - Multiple   Pulse 63   Resp (!) 42   BP (!) 194/77   Education   $ Education 15 min

## 2025-04-17 NOTE — ASSESSMENT & PLAN NOTE
Anemia is likely due to chronic disease due to Chronic Kidney Disease. Most recent hemoglobin and hematocrit are listed below.  Recent Labs     04/14/25  2145 04/16/25  0322 04/17/25  0417   HGB 11.8* 12.6* 11.9*   HCT 37.0* 40.5 38.7*     Plan  - Monitor serial CBC: Daily  - Transfuse PRBC if patient becomes hemodynamically unstable, symptomatic or H/H drops below 7/21.  - Patient has not received any PRBC transfusions to date  - Patient's anemia is currently stable

## 2025-04-17 NOTE — PT/OT/SLP PROGRESS
Occupational Therapy      Patient Name:  Rajendra Castle   MRN:  [id number]    Patient not seen today secondary to Patient fatigue.     4/17/2025

## 2025-04-17 NOTE — ASSESSMENT & PLAN NOTE
Patient's most recent potassium results are listed below.   Recent Labs     04/14/25  2145 04/16/25  0322 04/17/25  0417   K 3.1* 3.8 3.9     Plan  - Replete potassium per protocol  - Monitor potassium Daily  - Patient's hypokalemia is stable

## 2025-04-17 NOTE — ASSESSMENT & PLAN NOTE
Patient with dementia with likely etiology of vascular dementia. Dementia is moderate. The patient does have signs of behavioral disturbance. Started on aricept. Continue non-pharmacologic interventions to prevent delirium (No VS between 11PM-5AM, activity during day, opening blinds, providing glasses/hearing aids, and up in chair during daytime). Will avoid narcotics and benzos unless absolutely necessary. PRN anti-psychotics are not prescribed to avoid self harm behaviors.

## 2025-04-17 NOTE — PT/OT/SLP PROGRESS
Physical Therapy      Patient Name:  Rajendra Castle   MRN:  [id number]    Patient not seen today secondary to Patient unwilling to participate. Will follow-up ..

## 2025-04-17 NOTE — PLAN OF CARE
pt bp 190s-210s. Md notified. Norvasc restarted and prn hydralazine ordered.     0025 bp still elevated after norvasc and hydralazine given. Md notified. Transfer to cardio a held. Cardene gtt restarted per Sanjiv Davis. Bp goal 150s-170s.    0615 patient is a little off this morning and confused. He normally call when he want some help and he is getting out of bed saying he is going home. very redirectable but strange not his normal. He knows his name and , but doesn't know where he is. Pulled out an iv and iV had to be replaced. Md notified. Stat ct of head ordered.    Problem: Adult Inpatient Plan of Care  Goal: Plan of Care Review  Outcome: Progressing  Goal: Patient-Specific Goal (Individualized)  Outcome: Progressing  Goal: Absence of Hospital-Acquired Illness or Injury  Outcome: Progressing  Goal: Optimal Comfort and Wellbeing  Outcome: Progressing  Goal: Readiness for Transition of Care  Outcome: Progressing     Problem: Stroke, Ischemic (Includes Transient Ischemic Attack)  Goal: Optimal Coping  Outcome: Progressing  Goal: Effective Bowel Elimination  Outcome: Progressing  Goal: Optimal Cerebral Tissue Perfusion  Outcome: Progressing  Goal: Optimal Cognitive Function  Outcome: Progressing  Goal: Improved Communication Skills  Outcome: Progressing  Goal: Optimal Functional Ability  Outcome: Progressing  Goal: Optimal Nutrition Intake  Outcome: Progressing  Goal: Effective Oxygenation and Ventilation  Outcome: Progressing  Goal: Improved Sensorimotor Function  Outcome: Progressing  Goal: Safe and Effective Swallow  Outcome: Progressing  Goal: Effective Urinary Elimination  Outcome: Progressing     Problem: Fall Injury Risk  Goal: Absence of Fall and Fall-Related Injury  Outcome: Progressing     Problem: Infection  Goal: Absence of Infection Signs and Symptoms  Outcome: Progressing

## 2025-04-17 NOTE — PLAN OF CARE
JUAN received notice from MD that he would like to set up a family meeting with pt's nephew to discuss how pt is doing medically and also discharge plan collaboratively.    1109A: JUAN called pt's nephew. No answer. JUAN left a message     226P: JUAN called pt's nephew. He shared that he is not available until tomorrow after 6P. He advised that we call his mom.    240P: JUAN called pt's niece Collette. No answer. Left a message

## 2025-04-17 NOTE — PROGRESS NOTES
"[facility] Medicine  Progress Note    Patient Name: Rajendra Castle  MRN: [id number]  Patient Class: IP- Inpatient   Admission Date: 4/14/2025  Length of Stay: 2 days  Attending Physician: Morgan Cai MD  Primary Care Provider: Sp Lilly MD        Subjective     Principal Problem:Hypertensive emergency        HPI:  Patient is a 79-year-old  male who only reports he has "prostate issues" and hypertension; he takes his medications but does not check his blood pressure at home  He is a vague historian, but review of the records indicate that he has multiple medical issues including hypertension, prediabetes, CKD -3, GERD, hyperparathyroidism (status post parathyroidectomy), rheumatoid lung, chronic pulmonary embolism, anxiety/depression, and ureteral cancer  Around 3:00 p.m. in the afternoon, the patient had numbness/tingling in his bilateral hands/feet and developed a headache (with pressure behind his eyes) as well as dizziness; he endorsed chest tightness to the ER but not to me but does state that he felt weak all over particularly in his legs and was staggering  In the ER, his blood pressure goes highest of the to 10s-> now 160s after Cardene infusion and 2 doses of IV Hydralazine/a dose of Labetalol  He had no leukocytosis with a hemoglobin of 11.8 normal platelets; COVID/influenza testing were negative  Creatinine 1.3 (below baseline) with a potassium of 3.1; Mag was 1.9  He did not have a UTI; TSH was normal  BNP 5217 and troponin went from 22-> 36-> 72.7 (at the time of this editing)  Lipid panel showed an LDL of 61  EKG showed, per my interpretation, sinus rhythm 71 beats per minute with a QTC of 489 with biphasic T-waves in several leads and this has been seen previously  Chest x-ray was unrevealing; CT head without contrast showed, per radiologist Dr. Reyes:    "Impression:     No evidence of acute intracranial abnormality or fracture.     Microvascular " "chronic ischemic changes and senescent atrophic changes.     Otomastoiditis on the left and mastoiditis on the right with possible small amount of fluid also in the middle ear on the right.  With prior exam demonstrating mastoiditis on the right only."    The patient received aspirin around 3:00 a.m., Tylenol, calcium gluconate, oral potassium I Ativan, in the aforementioned antihypertensives and was transferred to the ICU for further diagnosis, treatment, and care    Overview/Hospital Course:  Patient was admitted to the hospital with worsening neurologic deficits and a markedly elevated blood pressure.  Initial etiology was thought to be acute stroke, and patient was seen by tele stroke, underwent MRI which showed potential acute lesion.  However, this was not corroborated with the patient's symptoms.  Repeat CT angiogram showed no evidence of any acute lesions, therefore stroke neurologist stated this is likely not an acute stroke.  Patient was continued for hypertensive emergency.  Home blood pressure medication was restarted, and blood pressure improved over the course of his hospitalization.  Patient was also found to have an acute urinary tract infection and treatment was initiated for such.  Patient was also reported to be depressed and anxious, and SSRI was started at his request.  Blood pressure slowly returned back to baseline of 150s to 170s systolic.  Echocardiogram was done which showed evidence of concentric remodeling but otherwise stable.Patient with noted sundowning to indicate likely vascular dementia. STarted on aricept.    Interval History:  Patient seen and examined.  No acute events overnight.  Neurologic status back to baseline. Overnight, likely sundowning. Patient with likely undiagnosed vascular dementia.     Review of Systems  Objective:     Vital Signs (Most Recent):  Temp: 98.2 °F (36.8 °C) (04/17/25 0701)  Pulse: 63 (04/17/25 0734)  Resp: (!) 42 (04/17/25 0734)  BP: (!) 163/79 " (04/17/25 1139)  SpO2: 97 % (04/17/25 0734) Vital Signs (24h Range):  Temp:  [98.1 °F (36.7 °C)-98.2 °F (36.8 °C)] 98.2 °F (36.8 °C)  Pulse:  [59-83] 63  Resp:  [18-42] 42  SpO2:  [97 %-100 %] 97 %  BP: (145-210)/(65-95) 163/79     Weight: 62.6 kg (138 lb 0.1 oz)  Body mass index is 20.98 kg/m².    Intake/Output Summary (Last 24 hours) at 4/17/2025 1437  Last data filed at 4/17/2025 1120  Gross per 24 hour   Intake 313.43 ml   Output 900 ml   Net -586.57 ml         Physical Exam  Vitals and nursing note reviewed.   Eyes:      Pupils: Pupils are equal, round, and reactive to light.   Neck:      Vascular: No JVD.   Cardiovascular:      Rate and Rhythm: Normal rate and regular rhythm.      Heart sounds: Normal heart sounds.   Pulmonary:      Effort: Pulmonary effort is normal.      Breath sounds: Normal breath sounds.   Abdominal:      General: Bowel sounds are normal. There is no distension.      Palpations: Abdomen is soft.      Tenderness: There is no abdominal tenderness.   Musculoskeletal:         General: No deformity.   Lymphadenopathy:      Cervical: No cervical adenopathy.   Skin:     Coloration: Skin is not pale.      Findings: No rash.               Significant Labs: All pertinent labs within the past 24 hours have been reviewed.    Significant Imaging: I have reviewed all pertinent imaging results/findings within the past 24 hours.      Assessment & Plan  Hypertensive emergency  Patient has a current diagnosis of hypertensive urgency/emergency diagnosis: hypertensive emergency with end organ damage evidenced by hypertensive encephalopathy which is controlled.  Latest blood pressure and vitals reviewed-   Temp:  [98.1 °F (36.7 °C)-98.2 °F (36.8 °C)]   Pulse:  [59-83]   Resp:  [18-42]   BP: (145-210)/(65-95)   SpO2:  [97 %-100 %] .   Patient currently off IV antihypertensives.   Home meds for hypertension were reviewed and noted below.   Hypertension Medications              hydrALAZINE (APRESOLINE) 100 MG  tablet Take 1 tablet (100 mg total) by mouth 2 (two) times daily.    metoprolol tartrate (LOPRESSOR) 25 MG tablet Take 1 tablet (25 mg total) by mouth 2 (two) times daily.            Medication adjustment for hospital antihypertensives is as follows- added back home medications, and titrating off Cardene drip.    Will goal for controlled BP reduction as noted be medications noted above and monitor and mitigate end organ damage as indicated.       Elevated troponin  Noted. Troponin stable, no S/Sx of ACS,    Recent Labs   Lab 04/15/25  0100   TROPONINIHS 36*     Likely demand ischemia in face of markedly elevated blood pressure.  Remained stable.  Echocardiogram shows no evidence of focal wall motion abnormalities.  CKD (chronic kidney disease) stage 3, GFR 30-59 ml/min  Creatine stable for now. BMP reviewed- noted Estimated Creatinine Clearance: 39.1 mL/min (based on SCr of 1.4 mg/dL). according to latest data. Based on current GFR, CKD stage is stage 3 - GFR 30-59.  Monitor UOP and serial BMP and adjust therapy as needed. Renally dose meds. Avoid nephrotoxic medications and procedures.      Anemia  Anemia is likely due to chronic disease due to Chronic Kidney Disease. Most recent hemoglobin and hematocrit are listed below.  Recent Labs     04/14/25 2145 04/16/25 0322 04/17/25 0417   HGB 11.8* 12.6* 11.9*   HCT 37.0* 40.5 38.7*     Plan  - Monitor serial CBC: Daily  - Transfuse PRBC if patient becomes hemodynamically unstable, symptomatic or H/H drops below 7/21.  - Patient has not received any PRBC transfusions to date  - Patient's anemia is currently stable  Hypokalemia  Patient's most recent potassium results are listed below.   Recent Labs     04/14/25 2145 04/16/25 0322 04/17/25 0417   K 3.1* 3.8 3.9     Plan  - Replete potassium per protocol  - Monitor potassium Daily  - Patient's hypokalemia is stable  Major depressive disorder, single episode, severe without psychotic features  Anxiety and  depression  Patient has recurrent depression which is moderate and is currently uncontrolled. Will Increase anti-depressant medications. We will not consult psychiatry at this time. Patient does not display psychosis at this time. Continue to monitor closely and adjust plan of care as needed.  Start Cymbalta.      S/P parathyroidectomy  noted    Atherosclerosis of native coronary artery of native heart without angina pectoris  Patient with known CAD, which is controlled Will continue Statin and monitor for S/Sx of angina/ACS. Continue to monitor on telemetry.   Ric ADAMS  Noted- avoid beta blockers    Vascular dementia  Patient with dementia with likely etiology of vascular dementia. Dementia is moderate. The patient does have signs of behavioral disturbance. Started on aricept. Continue non-pharmacologic interventions to prevent delirium (No VS between 11PM-5AM, activity during day, opening blinds, providing glasses/hearing aids, and up in chair during daytime). Will avoid narcotics and benzos unless absolutely necessary. PRN anti-psychotics are not prescribed to avoid self harm behaviors.  VTE Risk Mitigation (From admission, onward)           Ordered     IP VTE HIGH RISK PATIENT  Once         04/15/25 0608     Place sequential compression device  Until discontinued         04/15/25 0608                    Discharge Planning   DEVONTE: 4/18/2025     Code Status: Full Code   Medical Readiness for Discharge Date:   Discharge Plan A: Home Health   Discharge Delays: None known at this time        Critical care time spent on the evaluation and treatment of severe organ dysfunction, review of pertinent labs and imaging studies, discussions with consulting providers and discussions with patient/family: 37 minutes.    Please place Justification for DME        Morgan Cai MD  Department of Hospital Medicine   [facility]

## 2025-04-17 NOTE — ASSESSMENT & PLAN NOTE
Patient with known CAD, which is controlled Will continue Statin and monitor for S/Sx of angina/ACS. Continue to monitor on telemetry.

## 2025-04-17 NOTE — PROGRESS NOTES
[facility]  Department of Cardiology  Progress Note      PATIENT NAME: Rajendra Castle  MRN: [id number]  TODAY'S DATE: 04/17/2025  ADMIT DATE: 4/14/2025                          CONSULT REQUESTED BY: Morgan Cai MD    SUBJECTIVE     PRINCIPAL PROBLEM: Hypertensive emergency      REASON FOR CONSULT:  Elevated troponin    INTERVAL HISTORY:  4/17/25  Cardene resumed in past one hour  Showing signs of vascular dementia  Has periods of Mobitz I on tele    4/16/25:  BP improved; no chest pain; no further tingling/numbness in extremities    HPI:  Pt is 78 yo male with PMH: CAD with stent, HTN, Prostate CA, recent rib fractureswho presented to ER last evening with c/o bilateral tingling/numbness in feet and hands, dizziness, weakness, headache and pressure behind his eyes. Pt states BP as high as 200s when he arrived. Admits to noncompliance with BP medications. He was worried he may be having a stroke when he came to hospital  Pt denies chest pain or dyspnea or fluid retention;     He was placed on Cardene drip and BP 170s/78       Review of patient's allergies indicates:  No Known Allergies    Past Medical History:   Diagnosis Date    Anticoagulant long-term use     Arthritis     Coronary artery disease     Dr. Lamb    DDD (degenerative disc disease), cervical     Degenerative disc disease     Heart murmur     History of radiation therapy 2020    Hypertension     Nontoxic multinodular goiter 09/20/2016    Prostate CA     RA (rheumatoid arthritis)     Sleep apnea     No CPAP    Vitamin D insufficiency 09/30/2016     Past Surgical History:   Procedure Laterality Date    CARPAL TUNNEL RELEASE Right 05/28/2021    Procedure: RELEASE, CARPAL TUNNEL;  Surgeon: Jose Ryan II, MD;  Location: [facility];  Service: Orthopedics;  Laterality: Right;    COLONOSCOPY  prior to 2016    normal findings per patient report    CORONARY ANGIOPLASTY WITH STENT PLACEMENT  02/2022    CYSTOSCOPY N/A 12/18/2018    Procedure:  CYSTOSCOPY;  Surgeon: Garrett Pina MD;  Location: On license of [facility];  Service: Urology;  Laterality: N/A;    CYSTOSCOPY N/A 07/12/2022    Procedure: CYSTOSCOPY;  Surgeon: Garrett Pina MD;  Location: [facility] OR;  Service: Urology;  Laterality: N/A;    ESOPHAGOGASTRODUODENOSCOPY N/A 09/29/2020    Dr. Espinosa; empiric dilation; erythematous mucosa in antrum; gastric mucosal atrophy; hematin in entire stomach; biopsy: mid & distal esophagus WNL, stomach- WNL, negative for h pylori    EXCISION OF LESION OF PENIS N/A 2/23/2023    Procedure: EXCISION, LESION, PENIS;  Surgeon: Timo Myers MD;  Location: [facility];  Service: Urology;  Laterality: N/A;    INSERTION OF TUNNELED CENTRAL VENOUS CATHETER (CVC) WITH SUBCUTANEOUS PORT Left 5/18/2023    Procedure: LAUITQIOZ-CTPV-D-CATH;  Surgeon: Atul Faria MD;  Location: [facility];  Service: General;  Laterality: Left;  left subclavian    PARATHYROIDECTOMY Right 10/27/2020    Procedure: PARATHYROIDECTOMY;  Surgeon: Latonia Clayton MD;  Location: [address];  Service: ENT;  Laterality: Right;    RELEASE OF ULNAR NERVE AT CUBITAL TUNNEL Right 05/28/2021    Procedure: RELEASE, ULNAR TUNNEL;  Surgeon: Jose Ryan II, MD;  Location: [facility];  Service: Orthopedics;  Laterality: Right;    TRANSRECTAL BIOPSY OF PROSTATE WITH ULTRASOUND GUIDANCE N/A 12/18/2018    Procedure: BIOPSY, PROSTATE, RECTAL APPROACH, WITH US GUIDANCE;  Surgeon: Garrett Pina MD;  Location: [facility] OR;  Service: Urology;  Laterality: N/A;    TRANSRECTAL ULTRASOUND EXAMINATION N/A 07/12/2022    Procedure: ULTRASOUND, RECTAL APPROACH;  Surgeon: Garrett Pina MD;  Location: On license of [facility];  Service: Urology;  Laterality: N/A;     Social History[1]     REVIEW OF SYSTEMS  Negative except as mentioned in HPI    OBJECTIVE     VITAL SIGNS (Most Recent)  Temp: 98.2 °F (36.8 °C) (04/17/25 0701)  Pulse: 63 (04/17/25 0734)  Resp: (!) 42 (04/17/25 0734)  BP: (!) 163/79 (04/17/25 1139)  SpO2: 97 % (04/17/25  0734)    VENTILATION STATUS  Resp: (!) 42 (04/17/25 0734)  SpO2: 97 % (04/17/25 0734)           I & O (Last 24H):  Intake/Output Summary (Last 24 hours) at 4/17/2025 1608  Last data filed at 4/17/2025 1120  Gross per 24 hour   Intake 193.43 ml   Output 800 ml   Net -606.57 ml       WEIGHTS  Wt Readings from Last 3 Encounters:   04/16/25 0400 62.6 kg (138 lb 0.1 oz)   04/15/25 0514 62.6 kg (138 lb 0.1 oz)   04/14/25 2036 68.5 kg (151 lb 0.2 oz)   04/14/25 2026 65.8 kg (145 lb)   04/04/25 1418 70.3 kg (155 lb)   01/02/25 1450 70.4 kg (155 lb 1.5 oz)       PHYSICAL EXAM    GENERAL:elderly male resting in bed in no apparent distress.  HEENT: Normocephalic.    NECK: No JVD.   CARDIAC: IRRegular rate and rhythm. +murmur;   CHEST ANATOMY: normal.   LUNGS: Clear to auscultation. No wheezing or rhonchi..   ABDOMEN: Soft .  Normal bowel sounds.    EXTREMITIES: No edema  CENTRAL NERVOUS SYSTEM: mildly agitated  SKIN: No rash     HOME MEDICATIONS:Medications Ordered Prior to Encounter[2]    SCHEDULED MEDS:   amLODIPine  2.5 mg Oral Daily    atorvastatin  20 mg Oral Daily    busPIRone  15 mg Oral TID    cefTRIAXone (Rocephin) IV (PEDS and ADULTS)  1 g Intravenous Q24H    donepeziL  5 mg Oral QHS    DULoxetine  20 mg Oral BID    hydroCHLOROthiazide  12.5 mg Oral Daily    [START ON 4/18/2025] losartan  100 mg Oral Daily    metoprolol succinate  12.5 mg Oral Daily    mupirocin   Nasal BID    tamsulosin  0.4 mg Oral Daily       CONTINUOUS INFUSIONS:   nicardipine  0-15 mg/hr Intravenous Continuous   Paused at 04/17/25 1115       PRN MEDS:  Current Facility-Administered Medications:     acetaminophen, 650 mg, Oral, Q8H PRN    bisacodyL, 10 mg, Rectal, Daily PRN    dextrose 50%, 12.5 g, Intravenous, PRN    dextrose 50%, 25 g, Intravenous, PRN    glucagon (human recombinant), 1 mg, Intramuscular, PRN    glucose, 16 g, Oral, PRN    glucose, 24 g, Oral, PRN    hydrALAZINE, 10 mg, Intravenous, Q4H PRN    melatonin, 6 mg, Oral, Nightly  "PRN    naloxone, 0.02 mg, Intravenous, PRN    ondansetron, 4 mg, Intravenous, Q6H PRN    ondansetron, 4 mg, Intravenous, Q6H PRN    senna-docusate, 1 tablet, Oral, Daily PRN    sodium chloride 0.9%, 500 mL, Intravenous, PRN    sodium chloride 0.9%, 10 mL, Intravenous, PRN    zolpidem, 5 mg, Oral, Nightly PRN    LABS AND DIAGNOSTICS     CBC LAST 3 DAYS  Recent Labs   Lab 04/14/25 2145 04/16/25 0322 04/17/25 0417   WBC 5.75 5.92 6.29   RBC 4.29* 4.62 4.36*   HGB 11.8* 12.6* 11.9*   HCT 37.0* 40.5 38.7*   MCV 86 88 89   MCH 27.5 27.3 27.3   MCHC 31.9* 31.1* 30.7*   RDW 16.9* 17.5* 17.2*    316 262   MPV 12.0 12.4 12.3   NRBC 0 0 0       COAGULATION LAST 3 DAYS  Recent Labs   Lab 04/16/25 0322   INR 1.1       CHEMISTRY LAST 3 DAYS  Recent Labs   Lab 04/14/25 2145 04/16/25 0322 04/17/25 0417    138 138   K 3.1* 3.8 3.9   CO2 17* 24 19*   BUN 10 12 16   CREATININE 1.3 1.3 1.4   CALCIUM 8.3* 9.5 9.0   MG 1.9 2.0 1.8   ALBUMIN 3.2* 3.7 3.6   ALKPHOS 67 63 61   ALT 10 9* 5*   AST 21 11 12   BILITOT 0.4 0.4 0.5       CARDIAC PROFILE LAST 3 DAYS  Recent Labs   Lab 04/14/25 2145   BNP 5,217*       ENDOCRINE LAST 3 DAYS  Recent Labs   Lab 04/14/25 2145   TSH 0.931       LAST ARTERIAL BLOOD GAS  ABG  No results for input(s): "PH", "PO2", "PCO2", "HCO3", "BE" in the last 168 hours.    LAST 7 DAYS MICROBIOLOGY   Microbiology Results (last 7 days)       Procedure Component Value Units Date/Time    Influenza A & B by Molecular [[id number]]  (Normal) Collected: 04/14/25 2053    Order Status: Completed Specimen: Nasal Swab Updated: 04/14/25 2115     INFLUENZA A MOLECULAR Negative     INFLUENZA B MOLECULAR  Negative            MOST RECENT IMAGING  CT Head Without Contrast  Narrative: EXAMINATION:  CT HEAD WITHOUT CONTRAST    CLINICAL HISTORY:  AMS;    TECHNIQUE:  Low dose axial images were obtained through the head.  Coronal and sagittal reformations were also performed. Contrast was not " administered.    COMPARISON:  CTA head and neck performed 04/15/2025.    FINDINGS:  Blood: No acute intracranial hemorrhage.    Parenchyma: No definite loss of gray-white differentiation to suggest acute or subacute transcortical infarct.  Small focal hypodensity corresponds to area restricted diffusion/possible recent ischemia in the right basal ganglia/periventricular white matter seen on MRI performed 04/15/2025.  Elsewhere, generalized pattern age-related parenchymal volume loss.  Nonspecific areas of white matter hypoattenuation may reflect sequela of chronic small vessel ischemic disease.    Ventricles/Extra-axial spaces: No abnormal extra-axial fluid collection. Basal cisterns are patent.    Vessels: Mild atherosclerotic calcification.    Orbits: Unremarkable.    Scalp: Unremarkable.    Skull: There are no depressed skull fractures or destructive bone lesions.    Sinuses and mastoids: Scattered polypoid mucosal thickening and possible retention cysts.    Other findings: None  Impression: No evidence of acute intracranial hemorrhage, mass effect, midline shift.    Small focal hypodensity corresponds to area restricted diffusion/possible recent ischemia in the right basal ganglia/periventricular white matter seen on MRI performed 04/15/2025.    Electronically signed by: Sp Sanchez  Date:    04/17/2025  Time:    07:23      ECHOCARDIOGRAM RESULTS (last 5)  Results for orders placed during the hospital encounter of 10/22/23    Echo    Interpretation Summary    Left Ventricle: The left ventricle is normal in size. Mildly increased wall thickness. There is mild concentric hypertrophy. Normal wall motion. Septal motion is consistent with bundle branch block. There is normal systolic function with a visually estimated ejection fraction of 60 - 65%. There is normal diastolic function.    Left Atrium: Left atrium is mildly dilated.    Right Ventricle: Normal right ventricular cavity size. Wall thickness is normal.  Right ventricle wall motion  is normal. Systolic function is normal.    Aortic Valve: There is mild aortic regurgitation.    IVC/SVC: Normal venous pressure at 3 mmHg.      Results for orders placed during the hospital encounter of 01/20/23    Echo    Interpretation Summary  · The left ventricle is normal in size with mild concentric hypertrophy and normal systolic function.  · The estimated ejection fraction is 65%.  · Normal left ventricular diastolic function.  · Normal right ventricular size with normal right ventricular systolic function.  · Moderate mitral regurgitation.  · Mild-to-moderate aortic regurgitation.  · Small circumferential pericardial effusion. Effusion is fluid. No Temponade.      Results for orders placed during the hospital encounter of 06/20/22    Echo    Interpretation Summary  · The left ventricle is normal in size with mild concentric hypertrophy and normal systolic function.  · The estimated ejection fraction is 65%.  · Normal left ventricular diastolic function.  · Normal right ventricular size with normal right ventricular systolic function.  · The estimated PA systolic pressure is 27 mmHg.  · Normal central venous pressure (3 mmHg).      Results for orders placed during the hospital encounter of 05/22/22    STRESS TEST REPORT      Results for orders placed during the hospital encounter of 03/07/22    Echo    Interpretation Summary  · The left ventricle is normal in size with moderate concentric hypertrophy and normal systolic function.  · The estimated PA systolic pressure is 28 mmHg.  · Indeterminate left ventricular diastolic function.  · Normal right ventricular size with mildly reduced right ventricular systolic function.  · Normal central venous pressure (3 mmHg).  · The estimated ejection fraction is 70%.  · Moderate left atrial enlargement.  · Mild mitral regurgitation.  · Mild tricuspid regurgitation.  · Mild-to-moderate aortic regurgitation.  · Trivial circumferential  pericardial effusion. Effusion is fibrinous.  · Mild right atrial enlargement.      CURRENT/PREVIOUS VISIT EKG  Results for orders placed or performed during the hospital encounter of 04/14/25   EKG 12-lead    Collection Time: 04/15/25  9:31 PM   Result Value Ref Range    QRS Duration 102 ms    [facility] QTC Calculation 517 ms    Narrative    Test Reason : I49.9,    Vent. Rate :  67 BPM     Atrial Rate :  94 BPM     P-R Int :    ms          QRS Dur : 102 ms      QT Int : 490 ms       P-R-T Axes :  55   2 165 degrees    QTcB Int : 517 ms    Sinus rhythm with 2nd degree A-V block (Mobitz I)  ST and T wave abnormality, consider inferior ischemia  ST and T wave abnormality, consider anterolateral ischemia  Prolonged QT  Abnormal ECG  When compared with ECG of 14-Apr-2025 20:52,  Premature atrial complexes are no longer Present  Sinus rhythm is now with 2nd degree A-V block (Mobitz I)  Inverted T waves have replaced nonspecific T wave abnormality in Inferior  leads  Confirmed by Sp Phillips ([id number]) on 4/16/2025 10:52:45 PM    Referred By: AAAREFERRAL SELF           Confirmed By: Sp Phillips           ASSESSMENT/PLAN:     Active Hospital Problems    Diagnosis    *Hypertensive emergency    Vascular dementia    Anemia    Hypokalemia    CKD (chronic kidney disease) stage 3, GFR 30-59 ml/min    Anxiety and depression    Major depressive disorder, single episode, severe without psychotic features    Mobitz I    Atherosclerosis of native coronary artery of native heart without angina pectoris    S/P parathyroidectomy    Elevated troponin       ASSESSMENT & PLAN:   Elevated troponin  Paroxysmal atrial fibrillation  Hypertensive emergency  CKD  Hypokalemia  Noncompliance    RECOMMENDATIONS:  Repeat troponin trended down (peak 72)  proBNP > 5000, chest x-ray shows no pulmonary edema  2D echocardiogram showed normal EF, mild/mod MR/TR, sm pericardial effusion  Patient had nuclear stress test October '24 that was negative for  reversible ischemia  Troponin elevation likely 2/2 uncontrolled hypertension; pt denies anginal symptoms  Continue Cozaar 100 mg daily   Resume amlodipine  Recommend assistance with placement outside of hospital as pt has history of noncompliance with antihypertensives      Vianca Godoy NP  [facility]  Department of Cardiology  Date of Service: 04/17/2025        I have personally interviewed and examined the patient, I have reviewed the Nurse Practitioner's history and physical, assessment, and plan. I agree with the findings and plan.    ELEVATED TROPONIN SECONDARY TO HYPERTENSIVE URGENCY  PATIENT NONCOMPLIANT WITH HIS MEDICATIONS    ECHO REVEALS MARKED LEFT VENTRICULAR HYPERTROPHY WITH HYPERTROPHIC MYOPATHY AND OBLITERATION OF LV APEX    WILL START HIM BACK ON THE LOSARTAN AND AMLODIPINE  WILL ADD SOMETHING TO SLOW THE HEART RATE TOMORROW EITHER BETA-BLOCKER OR CHANGE TO CARDIZEM    HE HAS MULTIPLE STRESS TESTS IN THE PAST SECONDARY TO HIS ABNORMAL HIS EKG WHICH ARE NEGATIVE FOR ISCHEMIA    HIS ABNORMAL EKG IS SECONDARY TO THE HYPERTROPHIC MYOPATHY    CONTINUE BLOOD PRESSURE CONTROL THE PATIENT DOES NOT APPEAR TO BE COMPLIANT AT HOME AND SUGGEST CASE MANAGEMENT FOR SOCIAL SERVICE EVALUATION    4/16/25  BLOOD PRESSURE BETTER CONTROLLED TODAY  STILL ABOVE GOAL  PATIENT IS MORE ALERT    CONTINUE LOSARTAN, METOPROLOL 25 B.I.D. AND ADD AMLODIPINE 2.5    4/1725  Patient is started back on nicardipine drip today secondary to elevated blood pressure  Medications discussed with Dr. Cai  Discussed noncompliance probably secondary to vascular dementia with Dr. Cai  Patient may be benefitted by placement  Dr. Alan M.D.  [facility]  Department of Cardiology  Date of Service: 04/17/2025  8:57 AM         [1]   Social History  Tobacco Use    Smoking status: Former     Current packs/day: 0.00     Average packs/day: 0.3 packs/day for 10.0 years (2.5 ttl pk-yrs)     Types: Cigarettes     Start  date: 1991     Quit date: 2001     Years since quittin.7     Passive exposure: Past    Smokeless tobacco: Never   Substance Use Topics    Alcohol use: Not Currently     Comment: seldom    Drug use: Not Currently     Frequency: 8.0 times per week     Types: Marijuana   [2]   Current Facility-Administered Medications on File Prior to Encounter   Medication Dose Route Frequency Provider Last Rate Last Admin    albuterol nebulizer solution 2.5 mg  2.5 mg Nebulization Q15 Min PRN Pastor Saleh MD        albuterol-ipratropium 2.5 mg-0.5 mg/3 mL nebulizer solution 3 mL  3 mL Nebulization PRN Pastor Saleh MD        denosumab (PROLIA) injection 60 mg  60 mg Subcutaneous 1 time in Clinic/HOD Jean Carlos Hoffman MD        diphenhydrAMINE injection 25 mg  25 mg Intravenous Q6H PRN Pastor Saleh MD        HYDROmorphone injection 0.5 mg  0.5 mg Intravenous Q5 Min PRN Pastor Saleh MD   0.5 mg at 23 1312    ondansetron injection 4 mg  4 mg Intravenous Q15 Min PRN Pastor Saleh MD        sodium chloride 0.9% flush 10 mL  10 mL Intravenous PRN Ayush Guzman MD   10 mL at 23 1535    sodium chloride 0.9% flush 10 mL  10 mL Intravenous PRN Aysuh Guzman MD   10 mL at 23 1315    sodium chloride 0.9% flush 3 mL  3 mL Intravenous PRN Pastor Saleh MD         Current Outpatient Medications on File Prior to Encounter   Medication Sig Dispense Refill    atorvastatin (LIPITOR) 20 MG tablet Take 1 tablet (20 mg total) by mouth once daily. 90 tablet 3    b complex vitamins capsule Take 1 capsule by mouth once daily.      betamethasone valerate 0.1% (VALISONE) 0.1 % Crea Apply topically 2 (two) times daily. 45 g 0    busPIRone (BUSPAR) 10 MG tablet Take 1 tablet (10 mg total) by mouth 3 (three) times daily. 270 tablet 3    cyproheptadine (PERIACTIN) 4 mg tablet Take 1 tablet (4 mg total) by mouth 4 (four) times daily. 120 tablet 0    diclofenac (VOLTAREN) 50 MG EC tablet Take 1 tablet  (50 mg total) by mouth 2 (two) times daily as needed (pain). 20 tablet 0    gabapentin (NEURONTIN) 100 MG capsule TAKE 1 CAPSULE(100 MG) BY MOUTH EVERY EVENING 30 capsule 0    hydrALAZINE (APRESOLINE) 100 MG tablet Take 1 tablet (100 mg total) by mouth 2 (two) times daily. 60 tablet 11    magnesium oxide (MAG-OX) 400 mg (241.3 mg magnesium) tablet Take 1 tablet (400 mg total) by mouth once daily. 90 tablet 3    metoprolol tartrate (LOPRESSOR) 25 MG tablet Take 1 tablet (25 mg total) by mouth 2 (two) times daily. 180 tablet 0    phenazopyridine (PYRIDIUM) 200 MG tablet Take 1 tablet (200 mg total) by mouth 3 (three) times daily as needed for Pain. 30 tablet 0    predniSONE (DELTASONE) 2.5 MG tablet Take 1 tablet (2.5 mg total) by mouth 2 (two) times daily. 60 tablet 1    solifenacin (VESICARE) 10 MG tablet Take 1 tablet (10 mg total) by mouth once daily. 30 tablet 0    tadalafiL (CIALIS) 5 MG tablet Take 1 tablet (5 mg total) by mouth once daily. 30 tablet 11    tamsulosin (FLOMAX) 0.4 mg Cap TAKE 1 CAPSULE(0.4 MG) BY MOUTH EVERY NIGHT 90 capsule 1    zolpidem (AMBIEN) 10 mg Tab Take 1 tablet (10 mg total) by mouth nightly as needed (insomnia). 30 tablet 3    [DISCONTINUED] cholecalciferol, vitamin D3, (VITAMIN D3) 100 mcg (4,000 unit) Cap Take 400 Units by mouth once daily.      [DISCONTINUED] cloNIDine (CATAPRES) 0.1 MG tablet Take 1 tablet (0.1 mg total) by mouth 2 (two) times daily as needed (only if blood pressure top number is over 200). (Patient not taking: Reported on 6/7/2022) 30 tablet 1    [DISCONTINUED] meclizine (ANTIVERT) 12.5 mg tablet Take 1 tablet (12.5 mg total) by mouth 2 (two) times daily as needed for Dizziness. 30 tablet 0    [DISCONTINUED] sildenafiL (VIAGRA) 100 MG tablet Take 1 tablet (100 mg total) by mouth daily as needed for Erectile Dysfunction. 10 tablet 2

## 2025-04-17 NOTE — ASSESSMENT & PLAN NOTE
Creatine stable for now. BMP reviewed- noted Estimated Creatinine Clearance: 39.1 mL/min (based on SCr of 1.4 mg/dL). according to latest data. Based on current GFR, CKD stage is stage 3 - GFR 30-59.  Monitor UOP and serial BMP and adjust therapy as needed. Renally dose meds. Avoid nephrotoxic medications and procedures.

## 2025-04-17 NOTE — SUBJECTIVE & OBJECTIVE
Interval History:  Patient seen and examined.  No acute events overnight.  Neurologic status back to baseline. Overnight, likely sundowning. Patient with likely undiagnosed vascular dementia.     Review of Systems  Objective:     Vital Signs (Most Recent):  Temp: 98.2 °F (36.8 °C) (04/17/25 0701)  Pulse: 63 (04/17/25 0734)  Resp: (!) 42 (04/17/25 0734)  BP: (!) 163/79 (04/17/25 1139)  SpO2: 97 % (04/17/25 0734) Vital Signs (24h Range):  Temp:  [98.1 °F (36.7 °C)-98.2 °F (36.8 °C)] 98.2 °F (36.8 °C)  Pulse:  [59-83] 63  Resp:  [18-42] 42  SpO2:  [97 %-100 %] 97 %  BP: (145-210)/(65-95) 163/79     Weight: 62.6 kg (138 lb 0.1 oz)  Body mass index is 20.98 kg/m².    Intake/Output Summary (Last 24 hours) at 4/17/2025 1437  Last data filed at 4/17/2025 1120  Gross per 24 hour   Intake 313.43 ml   Output 900 ml   Net -586.57 ml         Physical Exam  Vitals and nursing note reviewed.   Eyes:      Pupils: Pupils are equal, round, and reactive to light.   Neck:      Vascular: No JVD.   Cardiovascular:      Rate and Rhythm: Normal rate and regular rhythm.      Heart sounds: Normal heart sounds.   Pulmonary:      Effort: Pulmonary effort is normal.      Breath sounds: Normal breath sounds.   Abdominal:      General: Bowel sounds are normal. There is no distension.      Palpations: Abdomen is soft.      Tenderness: There is no abdominal tenderness.   Musculoskeletal:         General: No deformity.   Lymphadenopathy:      Cervical: No cervical adenopathy.   Skin:     Coloration: Skin is not pale.      Findings: No rash.               Significant Labs: All pertinent labs within the past 24 hours have been reviewed.    Significant Imaging: I have reviewed all pertinent imaging results/findings within the past 24 hours.

## 2025-04-17 NOTE — ASSESSMENT & PLAN NOTE
Patient has a current diagnosis of hypertensive urgency/emergency diagnosis: hypertensive emergency with end organ damage evidenced by hypertensive encephalopathy which is controlled.  Latest blood pressure and vitals reviewed-   Temp:  [98.1 °F (36.7 °C)-98.2 °F (36.8 °C)]   Pulse:  [59-83]   Resp:  [18-42]   BP: (145-210)/(65-95)   SpO2:  [97 %-100 %] .   Patient currently off IV antihypertensives.   Home meds for hypertension were reviewed and noted below.   Hypertension Medications              hydrALAZINE (APRESOLINE) 100 MG tablet Take 1 tablet (100 mg total) by mouth 2 (two) times daily.    metoprolol tartrate (LOPRESSOR) 25 MG tablet Take 1 tablet (25 mg total) by mouth 2 (two) times daily.            Medication adjustment for hospital antihypertensives is as follows- added back home medications, and titrating off Cardene drip.    Will goal for controlled BP reduction as noted be medications noted above and monitor and mitigate end organ damage as indicated.

## 2025-04-18 PROBLEM — E87.6 HYPOKALEMIA: Status: RESOLVED | Noted: 2025-04-15 | Resolved: 2025-04-18

## 2025-04-18 LAB
ABSOLUTE EOSINOPHIL (SMH): 0.15 K/UL
ABSOLUTE MONOCYTE (SMH): 0.73 K/UL (ref 0.3–1)
ABSOLUTE NEUTROPHIL COUNT (SMH): 4.4 K/UL (ref 1.8–7.7)
BASOPHILS # BLD AUTO: 0.01 K/UL
BASOPHILS NFR BLD AUTO: 0.2 %
ERYTHROCYTE [DISTWIDTH] IN BLOOD BY AUTOMATED COUNT: 17.2 % (ref 11.5–14.5)
HCT VFR BLD AUTO: 38.6 % (ref 40–54)
HGB BLD-MCNC: 12.3 GM/DL (ref 14–18)
IMM GRANULOCYTES # BLD AUTO: 0.03 K/UL (ref 0–0.04)
IMM GRANULOCYTES NFR BLD AUTO: 0.5 % (ref 0–0.5)
LYMPHOCYTES # BLD AUTO: 0.72 K/UL (ref 1–4.8)
MAGNESIUM SERPL-MCNC: 1.9 MG/DL (ref 1.6–2.6)
MCH RBC QN AUTO: 27.2 PG (ref 27–31)
MCHC RBC AUTO-ENTMCNC: 31.9 G/DL (ref 32–36)
MCV RBC AUTO: 85 FL (ref 82–98)
NUCLEATED RBC (/100WBC) (SMH): 0 /100 WBC
PLATELET # BLD AUTO: 329 K/UL (ref 150–450)
PMV BLD AUTO: 12.2 FL (ref 9.2–12.9)
RBC # BLD AUTO: 4.53 M/UL (ref 4.6–6.2)
RELATIVE EOSINOPHIL (SMH): 2.5 % (ref 0–8)
RELATIVE LYMPHOCYTE (SMH): 11.9 % (ref 18–48)
RELATIVE MONOCYTE (SMH): 12 % (ref 4–15)
RELATIVE NEUTROPHIL (SMH): 72.9 % (ref 38–73)
WBC # BLD AUTO: 6.07 K/UL (ref 3.9–12.7)

## 2025-04-18 PROCEDURE — 25000003 PHARM REV CODE 250

## 2025-04-18 PROCEDURE — 63600175 PHARM REV CODE 636 W HCPCS: Performed by: HOSPITALIST

## 2025-04-18 PROCEDURE — 99233 SBSQ HOSP IP/OBS HIGH 50: CPT | Mod: ,,, | Performed by: GENERAL PRACTICE

## 2025-04-18 PROCEDURE — 25000003 PHARM REV CODE 250: Performed by: NURSE PRACTITIONER

## 2025-04-18 PROCEDURE — 36415 COLL VENOUS BLD VENIPUNCTURE: CPT | Performed by: HOSPITALIST

## 2025-04-18 PROCEDURE — 12000002 HC ACUTE/MED SURGE SEMI-PRIVATE ROOM

## 2025-04-18 PROCEDURE — 63600175 PHARM REV CODE 636 W HCPCS: Performed by: INTERNAL MEDICINE

## 2025-04-18 PROCEDURE — 25000003 PHARM REV CODE 250: Performed by: HOSPITALIST

## 2025-04-18 PROCEDURE — 85025 COMPLETE CBC W/AUTO DIFF WBC: CPT | Performed by: HOSPITALIST

## 2025-04-18 PROCEDURE — 83735 ASSAY OF MAGNESIUM: CPT | Performed by: HOSPITALIST

## 2025-04-18 RX ORDER — AMOXICILLIN AND CLAVULANATE POTASSIUM 875; 125 MG/1; MG/1
1 TABLET, FILM COATED ORAL EVERY 12 HOURS
Status: DISCONTINUED | OUTPATIENT
Start: 2025-04-18 | End: 2025-04-26 | Stop reason: HOSPADM

## 2025-04-18 RX ADMIN — DULOXETINE HYDROCHLORIDE 20 MG: 20 CAPSULE, DELAYED RELEASE ORAL at 10:04

## 2025-04-18 RX ADMIN — ZOLPIDEM TARTRATE 5 MG: 5 TABLET, FILM COATED ORAL at 01:04

## 2025-04-18 RX ADMIN — AMOXICILLIN AND CLAVULANATE POTASSIUM 1 TABLET: 875; 125 TABLET, FILM COATED ORAL at 02:04

## 2025-04-18 RX ADMIN — ZOLPIDEM TARTRATE 5 MG: 5 TABLET, FILM COATED ORAL at 11:04

## 2025-04-18 RX ADMIN — CEFTRIAXONE 1 G: 1 INJECTION, POWDER, FOR SOLUTION INTRAMUSCULAR; INTRAVENOUS at 09:04

## 2025-04-18 RX ADMIN — LOSARTAN POTASSIUM 100 MG: 50 TABLET, FILM COATED ORAL at 10:04

## 2025-04-18 RX ADMIN — BUSPIRONE HYDROCHLORIDE 15 MG: 5 TABLET ORAL at 09:04

## 2025-04-18 RX ADMIN — BUSPIRONE HYDROCHLORIDE 15 MG: 5 TABLET ORAL at 02:04

## 2025-04-18 RX ADMIN — AMOXICILLIN AND CLAVULANATE POTASSIUM 1 TABLET: 875; 125 TABLET, FILM COATED ORAL at 09:04

## 2025-04-18 RX ADMIN — AMLODIPINE BESYLATE 2.5 MG: 2.5 TABLET ORAL at 10:04

## 2025-04-18 RX ADMIN — HYDROCHLOROTHIAZIDE 12.5 MG: 12.5 TABLET ORAL at 10:04

## 2025-04-18 RX ADMIN — HYDRALAZINE HYDROCHLORIDE 10 MG: 20 INJECTION, SOLUTION INTRAMUSCULAR; INTRAVENOUS at 01:04

## 2025-04-18 RX ADMIN — BUSPIRONE HYDROCHLORIDE 15 MG: 5 TABLET ORAL at 10:04

## 2025-04-18 RX ADMIN — DULOXETINE HYDROCHLORIDE 20 MG: 20 CAPSULE, DELAYED RELEASE ORAL at 09:04

## 2025-04-18 RX ADMIN — METOPROLOL SUCCINATE 12.5 MG: 25 TABLET, EXTENDED RELEASE ORAL at 10:04

## 2025-04-18 RX ADMIN — MUPIROCIN 1 G: 20 OINTMENT TOPICAL at 10:04

## 2025-04-18 RX ADMIN — ATORVASTATIN CALCIUM 20 MG: 20 TABLET, FILM COATED ORAL at 10:04

## 2025-04-18 RX ADMIN — DONEPEZIL HYDROCHLORIDE 5 MG: 5 TABLET, FILM COATED ORAL at 09:04

## 2025-04-18 RX ADMIN — MUPIROCIN 1 G: 20 OINTMENT TOPICAL at 09:04

## 2025-04-18 RX ADMIN — TAMSULOSIN HYDROCHLORIDE 0.4 MG: 0.4 CAPSULE ORAL at 10:04

## 2025-04-18 NOTE — ASSESSMENT & PLAN NOTE
Patient's most recent potassium results are listed below.   Recent Labs     04/16/25  0322 04/17/25  0417   K 3.8 3.9     Plan  - Replete potassium per protocol  - Monitor potassium Daily  - Patient's hypokalemia is stable

## 2025-04-18 NOTE — ASSESSMENT & PLAN NOTE
Anemia is likely due to chronic disease due to Chronic Kidney Disease. Most recent hemoglobin and hematocrit are listed below.  Recent Labs     04/16/25  0322 04/17/25  0417 04/18/25  0444   HGB 12.6* 11.9* 12.3*   HCT 40.5 38.7* 38.6*     Plan  - Monitor serial CBC: Daily  - Transfuse PRBC if patient becomes hemodynamically unstable, symptomatic or H/H drops below 7/21.  - Patient has not received any PRBC transfusions to date  - Patient's anemia is currently stable   Cantharidin Counseling:  I discussed with the patient the risks of Cantharidin including but not limited to pain, redness, burning, itching, and blistering. Albendazole Counseling:  I discussed with the patient the risks of albendazole including but not limited to cytopenia, kidney damage, nausea/vomiting and severe allergy.  The patient understands that this medication is being used in an off-label manner. Dapsone Counseling: I discussed with the patient the risks of dapsone including but not limited to hemolytic anemia, agranulocytosis, rashes, methemoglobinemia, kidney failure, peripheral neuropathy, headaches, GI upset, and liver toxicity.  Patients who start dapsone require monitoring including baseline LFTs and weekly CBCs for the first month, then every month thereafter.  The patient verbalized understanding of the proper use and possible adverse effects of dapsone.  All of the patient's questions and concerns were addressed. Tetracycline Pregnancy And Lactation Text: This medication is Pregnancy Category D and not consider safe during pregnancy. It is also excreted in breast milk. Hydroquinone Counseling:  Patient advised that medication may result in skin irritation, lightening (hypopigmentation), dryness, and burning.  In the event of skin irritation, the patient was advised to reduce the amount of the drug applied or use it less frequently.  Rarely, spots that are treated with hydroquinone can become darker (pseudoochronosis).  Should this occur, patient instructed to stop medication and call the office. The patient verbalized understanding of the proper use and possible adverse effects of hydroquinone.  All of the patient's questions and concerns were addressed. Methotrexate Pregnancy And Lactation Text: This medication is Pregnancy Category X and is known to cause fetal harm. This medication is excreted in breast milk. Cimzia Pregnancy And Lactation Text: This medication crosses the placenta but can be considered safe in certain situations. Cimzia may be excreted in breast milk. Detail Level: Simple Xolair Pregnancy And Lactation Text: This medication is Pregnancy Category B and is considered safe during pregnancy. This medication is excreted in breast milk. Cephalexin Pregnancy And Lactation Text: This medication is Pregnancy Category B and considered safe during pregnancy.  It is also excreted in breast milk but can be used safely for shorter doses. Terbinafine Counseling: Patient counseling regarding adverse effects of terbinafine including but not limited to headache, diarrhea, rash, upset stomach, liver function test abnormalities, itching, taste/smell disturbance, nausea, abdominal pain, and flatulence.  There is a rare possibility of liver failure that can occur when taking terbinafine.  The patient understands that a baseline LFT and kidney function test may be required. The patient verbalized understanding of the proper use and possible adverse effects of terbinafine.  All of the patient's questions and concerns were addressed. Wartpeel Pregnancy And Lactation Text: This medication is Pregnancy Category X and contraindicated in pregnancy and in women who may become pregnant. It is unknown if this medication is excreted in breast milk. Aklief counseling:  Patient advised to apply a pea-sized amount only at bedtime and wait 30 minutes after washing their face before applying.  If too drying, patient may add a non-comedogenic moisturizer.  The most commonly reported side effects including irritation, redness, scaling, dryness, stinging, burning, itching, and increased risk of sunburn.  The patient verbalized understanding of the proper use and possible adverse effects of retinoids.  All of the patient's questions and concerns were addressed. Minocycline Counseling: Patient advised regarding possible photosensitivity and discoloration of the teeth, skin, lips, tongue and gums.  Patient instructed to avoid sunlight, if possible.  When exposed to sunlight, patients should wear protective clothing, sunglasses, and sunscreen.  The patient was instructed to call the office immediately if the following severe adverse effects occur:  hearing changes, easy bruising/bleeding, severe headache, or vision changes.  The patient verbalized understanding of the proper use and possible adverse effects of minocycline.  All of the patient's questions and concerns were addressed. Tranexamic Acid Counseling:  Patient advised of the small risk of bleeding problems with tranexamic acid. They were also instructed to call if they developed any nausea, vomiting or diarrhea. All of the patient's questions and concerns were addressed. Dutasteride Female Counseling: Dutasteride Counseling:  I discussed with the patient the risks of use of dutasteride including but not limited to decreased libido and sexual dysfunction. Explained the teratogenic nature of the medication and stressed the importance of not getting pregnant during treatment. All of the patient's questions and concerns were addressed. Rinvoq Counseling: I discussed with the patient the risks of Rinvoq therapy including but not limited to upper respiratory tract infections, shingles, cold sores, bronchitis, nausea, cough, fever, acne, and headache. Live vaccines should be avoided.  This medication has been linked to serious infections; higher rate of mortality; malignancy and lymphoproliferative disorders; major adverse cardiovascular events; thrombosis; thrombocytopenia, anemia, and neutropenia; lipid elevations; liver enzyme elevations; and gastrointestinal perforations. Skyrizi Counseling: I discussed with the patient the risks of risankizumab-rzaa including but not limited to immunosuppression, and serious infections.  The patient understands that monitoring is required including a PPD at baseline and must alert us or the primary physician if symptoms of infection or other concerning signs are noted. Cantharidin Pregnancy And Lactation Text: This medication has not been proven safe during pregnancy. It is unknown if this medication is excreted in breast milk. Otezla Pregnancy And Lactation Text: This medication is Pregnancy Category C and it isn't known if it is safe during pregnancy. It is unknown if it is excreted in breast milk. Topical Clindamycin Counseling: Patient counseled that this medication may cause skin irritation or allergic reactions.  In the event of skin irritation, the patient was advised to reduce the amount of the drug applied or use it less frequently.   The patient verbalized understanding of the proper use and possible adverse effects of clindamycin.  All of the patient's questions and concerns were addressed. Acitretin Counseling:  I discussed with the patient the risks of acitretin including but not limited to hair loss, dry lips/skin/eyes, liver damage, hyperlipidemia, depression/suicidal ideation, photosensitivity.  Serious rare side effects can include but are not limited to pancreatitis, pseudotumor cerebri, bony changes, clot formation/stroke/heart attack.  Patient understands that alcohol is contraindicated since it can result in liver toxicity and significantly prolong the elimination of the drug by many years. Hydroquinone Pregnancy And Lactation Text: This medication has not been assigned a Pregnancy Risk Category but animal studies failed to show danger with the topical medication. It is unknown if the medication is excreted in breast milk. Opzelura Counseling:  I discussed with the patient the risks of Opzelura including but not limited to nasopharngitis, bronchitis, ear infection, eosinophila, hives, diarrhea, folliculitis, tonsillitis, and rhinorrhea.  Taken orally, this medication has been linked to serious infections; higher rate of mortality; malignancy and lymphoproliferative disorders; major adverse cardiovascular events; thrombosis; thrombocytopenia, anemia, and neutropenia; and lipid elevations. Opioid Counseling: I discussed with the patient the potential side effects of opioids including but not limited to addiction, altered mental status, and depression. I stressed avoiding alcohol, benzodiazepines, muscle relaxants and sleep aids unless specifically okayed by a physician. The patient verbalized understanding of the proper use and possible adverse effects of opioids. All of the patient's questions and concerns were addressed. They were instructed to flush the remaining pills down the toilet if they did not need them for pain. Azathioprine Counseling:  I discussed with the patient the risks of azathioprine including but not limited to myelosuppression, immunosuppression, hepatotoxicity, lymphoma, and infections.  The patient understands that monitoring is required including baseline LFTs, Creatinine, possible TPMP genotyping and weekly CBCs for the first month and then every 2 weeks thereafter.  The patient verbalized understanding of the proper use and possible adverse effects of azathioprine.  All of the patient's questions and concerns were addressed. Ilumya Pregnancy And Lactation Text: The risk during pregnancy and breastfeeding is uncertain with this medication. Niacinamide Counseling: I recommended taking niacin or niacinamide, also know as vitamin B3, twice daily. Recent evidence suggests that taking vitamin B3 (500 mg twice daily) can reduce the risk of actinic keratoses and non-melanoma skin cancers. Side effects of vitamin B3 include flushing and headache. Rhofade Pregnancy And Lactation Text: This medication has not been assigned a Pregnancy Risk Category. It is unknown if the medication is excreted in breast milk. Aklief Pregnancy And Lactation Text: It is unknown if this medication is safe to use during pregnancy.  It is unknown if this medication is excreted in breast milk.  Breastfeeding women should use the topical cream on the smallest area of the skin for the shortest time needed while breastfeeding.  Do not apply to nipple and areola. Tranexamic Acid Pregnancy And Lactation Text: It is unknown if this medication is safe during pregnancy or breast feeding. Terbinafine Pregnancy And Lactation Text: This medication is Pregnancy Category B and is considered safe during pregnancy. It is also excreted in breast milk and breast feeding isn't recommended. Clindamycin Counseling: I counseled the patient regarding use of clindamycin as an antibiotic for prophylactic and/or therapeutic purposes. Clindamycin is active against numerous classes of bacteria, including skin bacteria. Side effects may include nausea, diarrhea, gastrointestinal upset, rash, hives, yeast infections, and in rare cases, colitis. Dapsone Pregnancy And Lactation Text: This medication is Pregnancy Category C and is not considered safe during pregnancy or breast feeding. Albendazole Pregnancy And Lactation Text: This medication is Pregnancy Category C and it isn't known if it is safe during pregnancy. It is also excreted in breast milk. 5-Fu Counseling: 5-Fluorouracil Counseling:  I discussed with the patient the risks of 5-fluorouracil including but not limited to erythema, scaling, itching, weeping, crusting, and pain. Include Pregnancy/Lactation Warning?: No Prednisone Counseling:  I discussed with the patient the risks of prolonged use of prednisone including but not limited to weight gain, insomnia, osteoporosis, mood changes, diabetes, susceptibility to infection, glaucoma and high blood pressure.  In cases where prednisone use is prolonged, patients should be monitored with blood pressure checks, serum glucose levels and an eye exam.  Additionally, the patient may need to be placed on GI prophylaxis, PCP prophylaxis, and calcium and vitamin D supplementation and/or a bisphosphonate.  The patient verbalized understanding of the proper use and the possible adverse effects of prednisone.  All of the patient's questions and concerns were addressed. Acitretin Pregnancy And Lactation Text: This medication is Pregnancy Category X and should not be given to women who are pregnant or may become pregnant in the future. This medication is excreted in breast milk. Fluconazole Counseling:  Patient counseled regarding adverse effects of fluconazole including but not limited to headache, diarrhea, nausea, upset stomach, liver function test abnormalities, taste disturbance, and stomach pain.  There is a rare possibility of liver failure that can occur when taking fluconazole.  The patient understands that monitoring of LFTs and kidney function test may be required, especially at baseline. The patient verbalized understanding of the proper use and possible adverse effects of fluconazole.  All of the patient's questions and concerns were addressed. Cosentyx Counseling:  I discussed with the patient the risks of Cosentyx including but not limited to worsening of Crohn's disease, immunosuppression, allergic reactions and infections.  The patient understands that monitoring is required including a PPD at baseline and must alert us or the primary physician if symptoms of infection or other concerning signs are noted. Winlevi Counseling:  I discussed with the patient the risks of topical clascoterone including but not limited to erythema, scaling, itching, and stinging. Patient voiced their understanding. Dutasteride Pregnancy And Lactation Text: This medication is absolutely contraindicated in women, especially during pregnancy and breast feeding. Feminization of male fetuses is possible if taking while pregnant. Imiquimod Counseling:  I discussed with the patient the risks of imiquimod including but not limited to erythema, scaling, itching, weeping, crusting, and pain.  Patient understands that the inflammatory response to imiquimod is variable from person to person and was educated regarded proper titration schedule.  If flu-like symptoms develop, patient knows to discontinue the medication and contact us. Topical Clindamycin Pregnancy And Lactation Text: This medication is Pregnancy Category B and is considered safe during pregnancy. It is unknown if it is excreted in breast milk. Rinvoq Pregnancy And Lactation Text: Based on animal studies, Rinvoq may cause embryo-fetal harm when administered to pregnant women.  The medication should not be used in pregnancy.  Breastfeeding is not recommended during treatment and for 6 days after the last dose. Niacinamide Pregnancy And Lactation Text: These medications are considered safe during pregnancy. Solaraze Counseling:  I discussed with the patient the risks of Solaraze including but not limited to erythema, scaling, itching, weeping, crusting, and pain. Oxybutynin Counseling:  I discussed with the patient the risks of oxybutynin including but not limited to skin rash, drowsiness, dry mouth, difficulty urinating, and blurred vision. Opioid Pregnancy And Lactation Text: These medications can lead to premature delivery and should be avoided during pregnancy. These medications are also present in breast milk in small amounts. Azathioprine Pregnancy And Lactation Text: This medication is Pregnancy Category D and isn't considered safe during pregnancy. It is unknown if this medication is excreted in breast milk. Erivedge Counseling- I discussed with the patient the risks of Erivedge including but not limited to nausea, vomiting, diarrhea, constipation, weight loss, changes in the sense of taste, decreased appetite, muscle spasms, and hair loss.  The patient verbalized understanding of the proper use and possible adverse effects of Erivedge.  All of the patient's questions and concerns were addressed. Quinolones Counseling:  I discussed with the patient the risks of fluoroquinolones including but not limited to GI upset, allergic reaction, drug rash, diarrhea, dizziness, photosensitivity, yeast infections, liver function test abnormalities, tendonitis/tendon rupture. Ivermectin Counseling:  Patient instructed to take medication on an empty stomach with a full glass of water.  Patient informed of potential adverse effects including but not limited to nausea, diarrhea, dizziness, itching, and swelling of the extremities or lymph nodes.  The patient verbalized understanding of the proper use and possible adverse effects of ivermectin.  All of the patient's questions and concerns were addressed. Gabapentin Counseling: I discussed with the patient the risks of gabapentin including but not limited to dizziness, somnolence, fatigue and ataxia. Infliximab Counseling:  I discussed with the patient the risks of infliximab including but not limited to myelosuppression, immunosuppression, autoimmune hepatitis, demyelinating diseases, lymphoma, and serious infections.  The patient understands that monitoring is required including a PPD at baseline and must alert us or the primary physician if symptoms of infection or other concerning signs are noted. Opzelura Pregnancy And Lactation Text: There is insufficient data to evaluate drug-associated risk for major birth defects, miscarriage, or other adverse maternal or fetal outcomes.  There is a pregnancy registry that monitors pregnancy outcomes in pregnant persons exposed to the medication during pregnancy.  It is unknown if this medication is excreted in breast milk.  Do not breastfeed during treatment and for about 4 weeks after the last dose. Finasteride Male Counseling: Finasteride Counseling:  I discussed with the patient the risks of use of finasteride including but not limited to decreased libido, decreased ejaculate volume, gynecomastia, and depression. Women should not handle medication.  All of the patient's questions and concerns were addressed. Winlevi Pregnancy And Lactation Text: This medication is considered safe during pregnancy and breastfeeding. Valtrex Counseling: I discussed with the patient the risks of valacyclovir including but not limited to kidney damage, nausea, vomiting and severe allergy.  The patient understands that if the infection seems to be worsening or is not improving, they are to call. Bexarotene Counseling:  I discussed with the patient the risks of bexarotene including but not limited to hair loss, dry lips/skin/eyes, liver abnormalities, hyperlipidemia, pancreatitis, depression/suicidal ideation, photosensitivity, drug rash/allergic reactions, hypothyroidism, anemia, leukopenia, infection, cataracts, and teratogenicity.  Patient understands that they will need regular blood tests to check lipid profile, liver function tests, white blood cell count, thyroid function tests and pregnancy test if applicable. Clindamycin Pregnancy And Lactation Text: This medication can be used in pregnancy if certain situations. Clindamycin is also present in breast milk. Cosentyx Pregnancy And Lactation Text: This medication is Pregnancy Category B and is considered safe during pregnancy. It is unknown if this medication is excreted in breast milk. Azelaic Acid Counseling: Patient counseled that medicine may cause skin irritation and to avoid applying near the eyes.  In the event of skin irritation, the patient was advised to reduce the amount of the drug applied or use it less frequently.   The patient verbalized understanding of the proper use and possible adverse effects of azelaic acid.  All of the patient's questions and concerns were addressed. Cellcept Counseling:  I discussed with the patient the risks of mycophenolate mofetil including but not limited to infection/immunosuppression, GI upset, hypokalemia, hypercholesterolemia, bone marrow suppression, lymphoproliferative disorders, malignancy, GI ulceration/bleed/perforation, colitis, interstitial lung disease, kidney failure, progressive multifocal leukoencephalopathy, and birth defects.  The patient understands that monitoring is required including a baseline creatinine and regular CBC testing. In addition, patient must alert us immediately if symptoms of infection or other concerning signs are noted. Topical Ketoconazole Counseling: Patient counseled that this medication may cause skin irritation or allergic reactions.  In the event of skin irritation, the patient was advised to reduce the amount of the drug applied or use it less frequently.   The patient verbalized understanding of the proper use and possible adverse effects of ketoconazole.  All of the patient's questions and concerns were addressed. Cimetidine Counseling:  I discussed with the patient the risks of Cimetidine including but not limited to gynecomastia, headache, diarrhea, nausea, drowsiness, arrhythmias, pancreatitis, skin rashes, psychosis, bone marrow suppression and kidney toxicity. Stelara Counseling:  I discussed with the patient the risks of ustekinumab including but not limited to immunosuppression, malignancy, posterior leukoencephalopathy syndrome, and serious infections.  The patient understands that monitoring is required including a PPD at baseline and must alert us or the primary physician if symptoms of infection or other concerning signs are noted. Fluconazole Pregnancy And Lactation Text: This medication is Pregnancy Category C and it isn't know if it is safe during pregnancy. It is also excreted in breast milk. Prednisone Pregnancy And Lactation Text: This medication is Pregnancy Category C and it isn't know if it is safe during pregnancy. This medication is excreted in breast milk. Gabapentin Pregnancy And Lactation Text: This medication is Pregnancy Category C and isn't considered safe during pregnancy. It is excreted in breast milk. Imiquimod Pregnancy And Lactation Text: This medication is Pregnancy Category C. It is unknown if this medication is excreted in breast milk. Erivedge Pregnancy And Lactation Text: This medication is Pregnancy Category X and is absolutely contraindicated during pregnancy. It is unknown if it is excreted in breast milk. Solaraze Pregnancy And Lactation Text: This medication is Pregnancy Category B and is considered safe. There is some data to suggest avoiding during the third trimester. It is unknown if this medication is excreted in breast milk. Sotyktu Counseling:  I discussed the most common side effects of Sotyktu including: common cold, sore throat, sinus infections, cold sores, canker sores, folliculitis, and acne.? I also discussed more serious side effects of Sotyktu including but not limited to: serious allergic reactions; increased risk for infections such as TB; cancers such as lymphomas; rhabdomyolysis and elevated CPK; and elevated triglycerides and liver enzymes.? Picato Counseling:  I discussed with the patient the risks of Picato including but not limited to erythema, scaling, itching, weeping, crusting, and pain. Nsaids Counseling: NSAID Counseling: I discussed with the patient that NSAIDs should be taken with food. Prolonged use of NSAIDs can result in the development of stomach ulcers.  Patient advised to stop taking NSAIDs if abdominal pain occurs.  The patient verbalized understanding of the proper use and possible adverse effects of NSAIDs.  All of the patient's questions and concerns were addressed. Drysol Counseling:  I discussed with the patient the risks of drysol/aluminum chloride including but not limited to skin rash, itching, irritation, burning. VTAMA Counseling: I discussed with the patient that VTAMA is not for use in the eyes, mouth or mouth. They should call the office if they develop any signs of allergic reactions to VTAMA. The patient verbalized understanding of the proper use and possible adverse effects of VTAMA.  All of the patient's questions and concerns were addressed. Azelaic Acid Pregnancy And Lactation Text: This medication is considered safe during pregnancy and breast feeding. Arava Counseling:  Patient counseled regarding adverse effects of Arava including but not limited to nausea, vomiting, abnormalities in liver function tests. Patients may develop mouth sores, rash, diarrhea, and abnormalities in blood counts. The patient understands that monitoring is required including LFTs and blood counts.  There is a rare possibility of scarring of the liver and lung problems that can occur when taking methotrexate. Persistent nausea, loss of appetite, pale stools, dark urine, cough, and shortness of breath should be reported immediately. Patient advised to discontinue Arava treatment and consult with a physician prior to attempting conception. The patient will have to undergo a treatment to eliminate Arava from the body prior to conception. Topical Metronidazole Pregnancy And Lactation Text: This medication is Pregnancy Category B and considered safe during pregnancy.  It is also considered safe to use while breastfeeding. Griseofulvin Counseling:  I discussed with the patient the risks of griseofulvin including but not limited to photosensitivity, cytopenia, liver damage, nausea/vomiting and severe allergy.  The patient understands that this medication is best absorbed when taken with a fatty meal (e.g., ice cream or french fries). Propranolol Counseling:  I discussed with the patient the risks of propranolol including but not limited to low heart rate, low blood pressure, low blood sugar, restlessness and increased cold sensitivity. They should call the office if they experience any of these side effects. Dupixent Counseling: I discussed with the patient the risks of dupilumab including but not limited to eye infection and irritation, cold sores, injection site reactions, worsening of asthma, allergic reactions and increased risk of parasitic infection.  Live vaccines should be avoided while taking dupilumab. Dupilumab will also interact with certain medications such as warfarin and cyclosporine. The patient understands that monitoring is required and they must alert us or the primary physician if symptoms of infection or other concerning signs are noted. Valtrex Pregnancy And Lactation Text: this medication is Pregnancy Category B and is considered safe during pregnancy. This medication is not directly found in breast milk but it's metabolite acyclovir is present. Doxycycline Counseling:  Patient counseled regarding possible photosensitivity and increased risk for sunburn.  Patient instructed to avoid sunlight, if possible.  When exposed to sunlight, patients should wear protective clothing, sunglasses, and sunscreen.  The patient was instructed to call the office immediately if the following severe adverse effects occur:  hearing changes, easy bruising/bleeding, severe headache, or vision changes.  The patient verbalized understanding of the proper use and possible adverse effects of doxycycline.  All of the patient's questions and concerns were addressed. Finasteride Female Counseling: Finasteride Counseling:  I discussed with the patient the risks of use of finasteride including but not limited to decreased libido and sexual dysfunction. Explained the teratogenic nature of the medication and stressed the importance of not getting pregnant during treatment. All of the patient's questions and concerns were addressed. Bexarotene Pregnancy And Lactation Text: This medication is Pregnancy Category X and should not be given to women who are pregnant or may become pregnant. This medication should not be used if you are breast feeding. Soolantra Counseling: I discussed with the patients the risks of topial Soolantra. This is a medicine which decreases the number of mites and inflammation in the skin. You experience burning, stinging, eye irritation or allergic reactions.  Please call our office if you develop any problems from using this medication. Rituxan Counseling:  I discussed with the patient the risks of Rituxan infusions. Side effects can include infusion reactions, severe drug rashes including mucocutaneous reactions, reactivation of latent hepatitis and other infections and rarely progressive multifocal leukoencephalopathy.  All of the patient's questions and concerns were addressed. Nsaids Pregnancy And Lactation Text: These medications are considered safe up to 30 weeks gestation. It is excreted in breast milk. Klisyri Counseling:  I discussed with the patient the risks of Klisyri including but not limited to erythema, scaling, itching, weeping, crusting, and pain. Glycopyrrolate Counseling:  I discussed with the patient the risks of glycopyrrolate including but not limited to skin rash, drowsiness, dry mouth, difficulty urinating, and blurred vision. Cibinqo Counseling: I discussed with the patient the risks of Cibinqo therapy including but not limited to common cold, nausea, headache, cold sores, increased blood CPK levels, dizziness, UTIs, fatigue, acne, and vomitting. Live vaccines should be avoided.  This medication has been linked to serious infections; higher rate of mortality; malignancy and lymphoproliferative disorders; major adverse cardiovascular events; thrombosis; thrombocytopenia and lymphopenia; lipid elevations; and retinal detachment. Libtayo Counseling- I discussed with the patient the risks of Libtayo including but not limited to nausea, vomiting, diarrhea, and bone or muscle pain.  The patient verbalized understanding of the proper use and possible adverse effects of Libtayo.  All of the patient's questions and concerns were addressed. Dupixent Pregnancy And Lactation Text: This medication likely crosses the placenta but the risk for the fetus is uncertain. This medication is excreted in breast milk. Benzoyl Peroxide Counseling: Patient counseled that medicine may cause skin irritation and bleach clothing.  In the event of skin irritation, the patient was advised to reduce the amount of the drug applied or use it less frequently.   The patient verbalized understanding of the proper use and possible adverse effects of benzoyl peroxide.  All of the patient's questions and concerns were addressed. Topical Steroids Counseling: I discussed with the patient that prolonged use of topical steroids can result in the increased appearance of superficial blood vessels (telangiectasias), lightening (hypopigmentation) and thinning of the skin (atrophy).  Patient understands to avoid using high potency steroids in skin folds, the groin or the face.  The patient verbalized understanding of the proper use and possible adverse effects of topical steroids.  All of the patient's questions and concerns were addressed. Doxycycline Pregnancy And Lactation Text: This medication is Pregnancy Category D and not consider safe during pregnancy. It is also excreted in breast milk but is considered safe for shorter treatment courses. Rifampin Counseling: I discussed with the patient the risks of rifampin including but not limited to liver damage, kidney damage, red-orange body fluids, nausea/vomiting and severe allergy. Vtama Pregnancy And Lactation Text: It is unknown if this medication can cause problems during pregnancy and breastfeeding. Finasteride Pregnancy And Lactation Text: This medication is absolutely contraindicated during pregnancy. It is unknown if it is excreted in breast milk. Taltz Counseling: I discussed with the patient the risks of ixekizumab including but not limited to immunosuppression, serious infections, worsening of inflammatory bowel disease and drug reactions.  The patient understands that monitoring is required including a PPD at baseline and must alert us or the primary physician if symptoms of infection or other concerning signs are noted. Adbry Counseling: I discussed with the patient the risks of tralokinumab including but not limited to eye infection and irritation, cold sores, injection site reactions, worsening of asthma, allergic reactions and increased risk of parasitic infection.  Live vaccines should be avoided while taking tralokinumab. The patient understands that monitoring is required and they must alert us or the primary physician if symptoms of infection or other concerning signs are noted. Azithromycin Counseling:  I discussed with the patient the risks of azithromycin including but not limited to GI upset, allergic reaction, drug rash, diarrhea, and yeast infections. Propranolol Pregnancy And Lactation Text: This medication is Pregnancy Category C and it isn't known if it is safe during pregnancy. It is excreted in breast milk. Griseofulvin Pregnancy And Lactation Text: This medication is Pregnancy Category X and is known to cause serious birth defects. It is unknown if this medication is excreted in breast milk but breast feeding should be avoided. Isotretinoin Counseling: Patient should get monthly blood tests, not donate blood, not drive at night if vision affected, not share medication, and not undergo elective surgery for 6 months after tx completed. Side effects reviewed, pt to contact office should one occur. Cyclophosphamide Counseling:  I discussed with the patient the risks of cyclophosphamide including but not limited to hair loss, hormonal abnormalities, decreased fertility, abdominal pain, diarrhea, nausea and vomiting, bone marrow suppression and infection. The patient understands that monitoring is required while taking this medication. Libtayo Pregnancy And Lactation Text: This medication is contraindicated in pregnancy and when breast feeding. Klisyri Pregnancy And Lactation Text: It is unknown if this medication can harm a developing fetus or if it is excreted in breast milk. Humira Counseling:  I discussed with the patient the risks of adalimumab including but not limited to myelosuppression, immunosuppression, autoimmune hepatitis, demyelinating diseases, lymphoma, and serious infections.  The patient understands that monitoring is required including a PPD at baseline and must alert us or the primary physician if symptoms of infection or other concerning signs are noted. Protopic Counseling: Patient may experience a mild burning sensation during topical application. Protopic is not approved in children less than 2 years of age. There have been case reports of hematologic and skin malignancies in patients using topical calcineurin inhibitors although causality is questionable. Rituxan Pregnancy And Lactation Text: This medication is Pregnancy Category C and it isn't know if it is safe during pregnancy. It is unknown if this medication is excreted in breast milk but similar antibodies are known to be excreted. Topical Metronidazole Counseling: Metronidazole is a topical antibiotic medication. You may experience burning, stinging, redness, or allergic reactions.  Please call our office if you develop any problems from using this medication. Doxepin Counseling:  Patient advised that the medication is sedating and not to drive a car after taking this medication. Patient informed of potential adverse effects including but not limited to dry mouth, urinary retention, and blurry vision.  The patient verbalized understanding of the proper use and possible adverse effects of doxepin.  All of the patient's questions and concerns were addressed. Olanzapine Counseling- I discussed with the patient the common side effects of olanzapine including but are not limited to: lack of energy, dry mouth, increased appetite, sleepiness, tremor, constipation, dizziness, changes in behavior, or restlessness.  Explained that teenagers are more likely to experience headaches, abdominal pain, pain in the arms or legs, tiredness, and sleepiness.  Serious side effects include but are not limited: increased risk of death in elderly patients who are confused, have memory loss, or dementia-related psychosis; hyperglycemia; increased cholesterol and triglycerides; and weight gain. Cibinqo Pregnancy And Lactation Text: It is unknown if this medication will adversely affect pregnancy or breast feeding.  You should not take this medication if you are currently pregnant or planning a pregnancy or while breastfeeding. Soolantra Pregnancy And Lactation Text: This medication is Pregnancy Category C. This medication is considered safe during breast feeding. Birth Control Pills Counseling: Birth Control Pill Counseling: I discussed with the patient the potential side effects of OCPs including but not limited to increased risk of stroke, heart attack, thrombophlebitis, deep venous thrombosis, hepatic adenomas, breast changes, GI upset, headaches, and depression.  The patient verbalized understanding of the proper use and possible adverse effects of OCPs. All of the patient's questions and concerns were addressed. Zoryve Counseling:  I discussed with the patient that Zoryve is not for use in the eyes, mouth or vagina. The most commonly reported side effects include diarrhea, headache, insomnia, application site pain, upper respiratory tract infections, and urinary tract infections.  All of the patient's questions and concerns were addressed. Benzoyl Peroxide Pregnancy And Lactation Text: This medication is Pregnancy Category C. It is unknown if benzoyl peroxide is excreted in breast milk. Clofazimine Counseling:  I discussed with the patient the risks of clofazimine including but not limited to skin and eye pigmentation, liver damage, nausea/vomiting, gastrointestinal bleeding and allergy. Rifampin Pregnancy And Lactation Text: This medication is Pregnancy Category C and it isn't know if it is safe during pregnancy. It is also excreted in breast milk and should not be used if you are breast feeding. Glycopyrrolate Pregnancy And Lactation Text: This medication is Pregnancy Category B and is considered safe during pregnancy. It is unknown if it is excreted breast milk. Elidel Counseling: Patient may experience a mild burning sensation during topical application. Elidel is not approved in children less than 2 years of age. There have been case reports of hematologic and skin malignancies in patients using topical calcineurin inhibitors although causality is questionable. Adbry Pregnancy And Lactation Text: It is unknown if this medication will adversely affect pregnancy or breast feeding. Azithromycin Pregnancy And Lactation Text: This medication is considered safe during pregnancy and is also secreted in breast milk. Topical Steroids Applications Pregnancy And Lactation Text: Most topical steroids are considered safe to use during pregnancy and lactation.  Any topical steroid applied to the breast or nipple should be washed off before breastfeeding. Itraconazole Counseling:  I discussed with the patient the risks of itraconazole including but not limited to liver damage, nausea/vomiting, neuropathy, and severe allergy.  The patient understands that this medication is best absorbed when taken with acidic beverages such as non-diet cola or ginger ale.  The patient understands that monitoring is required including baseline LFTs and repeat LFTs at intervals.  The patient understands that they are to contact us or the primary physician if concerning signs are noted. Isotretinoin Pregnancy And Lactation Text: This medication is Pregnancy Category X and is considered extremely dangerous during pregnancy. It is unknown if it is excreted in breast milk. Erythromycin Counseling:  I discussed with the patient the risks of erythromycin including but not limited to GI upset, allergic reaction, drug rash, diarrhea, increase in liver enzymes, and yeast infections. Enbrel Counseling:  I discussed with the patient the risks of etanercept including but not limited to myelosuppression, immunosuppression, autoimmune hepatitis, demyelinating diseases, lymphoma, and infections.  The patient understands that monitoring is required including a PPD at baseline and must alert us or the primary physician if symptoms of infection or other concerning signs are noted. Olanzapine Pregnancy And Lactation Text: This medication is pregnancy category C.   There are no adequate and well controlled trials with olanzapine in pregnant females.  Olanzapine should be used during pregnancy only if the potential benefit justifies the potential risk to the fetus.   In a study in lactating healthy women, olanzapine was excreted in breast milk.  It is recommended that women taking olanzapine should not breast feed. Doxepin Pregnancy And Lactation Text: This medication is Pregnancy Category C and it isn't known if it is safe during pregnancy. It is also excreted in breast milk and breast feeding isn't recommended. SSKI Counseling:  I discussed with the patient the risks of SSKI including but not limited to thyroid abnormalities, metallic taste, GI upset, fever, headache, acne, arthralgias, paraesthesias, lymphadenopathy, easy bleeding, arrhythmias, and allergic reaction. Cyclophosphamide Pregnancy And Lactation Text: This medication is Pregnancy Category D and it isn't considered safe during pregnancy. This medication is excreted in breast milk. Odomzo Counseling- I discussed with the patient the risks of Odomzo including but not limited to nausea, vomiting, diarrhea, constipation, weight loss, changes in the sense of taste, decreased appetite, muscle spasms, and hair loss.  The patient verbalized understanding of the proper use and possible adverse effects of Odomzo.  All of the patient's questions and concerns were addressed. Sarecycline Counseling: Patient advised regarding possible photosensitivity and discoloration of the teeth, skin, lips, tongue and gums.  Patient instructed to avoid sunlight, if possible.  When exposed to sunlight, patients should wear protective clothing, sunglasses, and sunscreen.  The patient was instructed to call the office immediately if the following severe adverse effects occur:  hearing changes, easy bruising/bleeding, severe headache, or vision changes.  The patient verbalized understanding of the proper use and possible adverse effects of sarecycline.  All of the patient's questions and concerns were addressed. Minoxidil Counseling: Minoxidil is a topical medication which can increase blood flow where it is applied. It is uncertain how this medication increases hair growth. Side effects are uncommon and include stinging and allergic reactions. Sotyktu Pregnancy And Lactation Text: There is insufficient data to evaluate whether or not Sotyktu is safe to use during pregnancy.? ?It is not known if Sotyktu passes into breast milk and whether or not it is safe to use when breastfeeding.?? Litfulo Counseling: I discussed with the patient the risks of Litfulo therapy including but not limited to upper respiratory tract infections, shingles, cold sores, and nausea. Live vaccines should be avoided.  This medication has been linked to serious infections; higher rate of mortality; malignancy and lymphoproliferative disorders; major adverse cardiovascular events; thrombosis; gastrointestinal perforations; neutropenia; lymphopenia; anemia; liver enzyme elevations; and lipid elevations. Hydroxychloroquine Counseling:  I discussed with the patient that a baseline ophthalmologic exam is needed at the start of therapy and every year thereafter while on therapy. A CBC may also be warranted for monitoring.  The side effects of this medication were discussed with the patient, including but not limited to agranulocytosis, aplastic anemia, seizures, rashes, retinopathy, and liver toxicity. Patient instructed to call the office should any adverse effect occur.  The patient verbalized understanding of the proper use and possible adverse effects of Plaquenil.  All the patient's questions and concerns were addressed. Siliq Counseling:  I discussed with the patient the risks of Siliq including but not limited to new or worsening depression, suicidal thoughts and behavior, immunosuppression, malignancy, posterior leukoencephalopathy syndrome, and serious infections.  The patient understands that monitoring is required including a PPD at baseline and must alert us or the primary physician if symptoms of infection or other concerning signs are noted. There is also a special program designed to monitor depression which is required with Siliq. Topical Retinoid counseling:  Patient advised to apply a pea-sized amount only at bedtime and wait 30 minutes after washing their face before applying.  If too drying, patient may add a non-comedogenic moisturizer. The patient verbalized understanding of the proper use and possible adverse effects of retinoids.  All of the patient's questions and concerns were addressed. Protopic Pregnancy And Lactation Text: This medication is Pregnancy Category C. It is unknown if this medication is excreted in breast milk when applied topically. High Dose Vitamin A Counseling: Side effects reviewed, pt to contact office should one occur. Birth Control Pills Pregnancy And Lactation Text: This medication should be avoided if pregnant and for the first 30 days post-partum. Bactrim Counseling:  I discussed with the patient the risks of sulfa antibiotics including but not limited to GI upset, allergic reaction, drug rash, diarrhea, dizziness, photosensitivity, and yeast infections.  Rarely, more serious reactions can occur including but not limited to aplastic anemia, agranulocytosis, methemoglobinemia, blood dyscrasias, liver or kidney failure, lung infiltrates or desquamative/blistering drug rashes. Erythromycin Pregnancy And Lactation Text: This medication is Pregnancy Category B and is considered safe during pregnancy. It is also excreted in breast milk. Carac Counseling:  I discussed with the patient the risks of Carac including but not limited to erythema, scaling, itching, weeping, crusting, and pain. Hydroxyzine Counseling: Patient advised that the medication is sedating and not to drive a car after taking this medication.  Patient informed of potential adverse effects including but not limited to dry mouth, urinary retention, and blurry vision.  The patient verbalized understanding of the proper use and possible adverse effects of hydroxyzine.  All of the patient's questions and concerns were addressed. Cyclosporine Counseling:  I discussed with the patient the risks of cyclosporine including but not limited to hypertension, gingival hyperplasia,myelosuppression, immunosuppression, liver damage, kidney damage, neurotoxicity, lymphoma, and serious infections. The patient understands that monitoring is required including baseline blood pressure, CBC, CMP, lipid panel and uric acid, and then 1-2 times monthly CMP and blood pressure. Tremfya Counseling: I discussed with the patient the risks of guselkumab including but not limited to immunosuppression, serious infections, and drug reactions.  The patient understands that monitoring is required including a PPD at baseline and must alert us or the primary physician if symptoms of infection or other concerning signs are noted. Bimzelx Counseling:  I discussed with the patient the risks of Bimzelx including but not limited to depression, immunosuppression, allergic reactions and infections.  The patient understands that monitoring is required including a PPD at baseline and must alert us or the primary physician if symptoms of infection or other concerning signs are noted. Sski Pregnancy And Lactation Text: This medication is Pregnancy Category D and isn't considered safe during pregnancy. It is excreted in breast milk. Topical Sulfur Applications Counseling: Topical Sulfur Counseling: Patient counseled that this medication may cause skin irritation or allergic reactions.  In the event of skin irritation, the patient was advised to reduce the amount of the drug applied or use it less frequently.   The patient verbalized understanding of the proper use and possible adverse effects of topical sulfur application.  All of the patient's questions and concerns were addressed. Eucrisa Counseling: Patient may experience a mild burning sensation during topical application. Eucrisa is not approved in children less than 2 years of age. Hydroxychloroquine Pregnancy And Lactation Text: This medication has been shown to cause fetal harm but it isn't assigned a Pregnancy Risk Category. There are small amounts excreted in breast milk. Litfulo Pregnancy And Lactation Text: Based on animal studies, Lifulo may cause embryo-fetal harm when administered to pregnant women.  The medication should not be used in pregnancy.  Breastfeeding is not recommended during treatment. Oral Minoxidil Counseling- I discussed with the patient the risks of oral minoxidil including but not limited to shortness of breath, swelling of the feet or ankles, dizziness, lightheadedness, unwanted hair growth and allergic reaction.  The patient verbalized understanding of the proper use and possible adverse effects of oral minoxidil.  All of the patient's questions and concerns were addressed. Qbrexza Counseling:  I discussed with the patient the risks of Qbrexza including but not limited to headache, mydriasis, blurred vision, dry eyes, nasal dryness, dry mouth, dry throat, dry skin, urinary hesitation, and constipation.  Local skin reactions including erythema, burning, stinging, and itching can also occur. Zyclara Counseling:  I discussed with the patient the risks of imiquimod including but not limited to erythema, scaling, itching, weeping, crusting, and pain.  Patient understands that the inflammatory response to imiquimod is variable from person to person and was educated regarded proper titration schedule.  If flu-like symptoms develop, patient knows to discontinue the medication and contact us. Metronidazole Counseling:  I discussed with the patient the risks of metronidazole including but not limited to seizures, nausea/vomiting, a metallic taste in the mouth, nausea/vomiting and severe allergy. Hyrimoz Counseling:  I discussed with the patient the risks of adalimumab including but not limited to myelosuppression, immunosuppression, autoimmune hepatitis, demyelinating diseases, lymphoma, and serious infections.  The patient understands that monitoring is required including a PPD at baseline and must alert us or the primary physician if symptoms of infection or other concerning signs are noted. Xeljanz Counseling: I discussed with the patient the risks of Xeljanz therapy including increased risk of infection, liver issues, headache, diarrhea, or cold symptoms. Live vaccines should be avoided. They were instructed to call if they have any problems. Colchicine Counseling:  Patient counseled regarding adverse effects including but not limited to stomach upset (nausea, vomiting, stomach pain, or diarrhea).  Patient instructed to limit alcohol consumption while taking this medication.  Colchicine may reduce blood counts especially with prolonged use.  The patient understands that monitoring of kidney function and blood counts may be required, especially at baseline. The patient verbalized understanding of the proper use and possible adverse effects of colchicine.  All of the patient's questions and concerns were addressed. Topical Sulfur Applications Pregnancy And Lactation Text: This medication is Pregnancy Category C and has an unknown safety profile during pregnancy. It is unknown if this topical medication is excreted in breast milk. Thalidomide Counseling: I discussed with the patient the risks of thalidomide including but not limited to birth defects, anxiety, weakness, chest pain, dizziness, cough and severe allergy. Bactrim Pregnancy And Lactation Text: This medication is Pregnancy Category D and is known to cause fetal risk.  It is also excreted in breast milk. Spironolactone Counseling: Patient advised regarding risks of diarrhea, abdominal pain, hyperkalemia, birth defects (for female patients), liver toxicity and renal toxicity. The patient may need blood work to monitor liver and kidney function and potassium levels while on therapy. The patient verbalized understanding of the proper use and possible adverse effects of spironolactone.  All of the patient's questions and concerns were addressed. Ketoconazole Counseling:   Patient counseled regarding improving absorption with orange juice.  Adverse effects include but are not limited to breast enlargement, headache, diarrhea, nausea, upset stomach, liver function test abnormalities, taste disturbance, and stomach pain.  There is a rare possibility of liver failure that can occur when taking ketoconazole. The patient understands that monitoring of LFTs may be required, especially at baseline. The patient verbalized understanding of the proper use and possible adverse effects of ketoconazole.  All of the patient's questions and concerns were addressed. High Dose Vitamin A Pregnancy And Lactation Text: High dose vitamin A therapy is contraindicated during pregnancy and breast feeding. Hydroxyzine Pregnancy And Lactation Text: This medication is not safe during pregnancy and should not be taken. It is also excreted in breast milk and breast feeding isn't recommended. Tazorac Counseling:  Patient advised that medication is irritating and drying.  Patient may need to apply sparingly and wash off after an hour before eventually leaving it on overnight.  The patient verbalized understanding of the proper use and possible adverse effects of tazorac.  All of the patient's questions and concerns were addressed. Qbrexza Pregnancy And Lactation Text: There is no available data on Qbrexza use in pregnant women.  There is no available data on Qbrexza use in lactation. Simponi Counseling:  I discussed with the patient the risks of golimumab including but not limited to myelosuppression, immunosuppression, autoimmune hepatitis, demyelinating diseases, lymphoma, and serious infections.  The patient understands that monitoring is required including a PPD at baseline and must alert us or the primary physician if symptoms of infection or other concerning signs are noted. Oral Minoxidil Pregnancy And Lactation Text: This medication should only be used when clearly needed if you are pregnant, attempting to become pregnant or breast feeding. Bimzelx Pregnancy And Lactation Text: This medication crosses the placenta and the safety is uncertain during pregnancy. It is unknown if this medication is present in breast milk. Calcipotriene Counseling:  I discussed with the patient the risks of calcipotriene including but not limited to erythema, scaling, itching, and irritation. Mirvaso Counseling: Mirvaso is a topical medication which can decrease superficial blood flow where applied. Side effects are uncommon and include stinging, redness and allergic reactions. Xeljuan joséz Pregnancy And Lactation Text: This medication is Pregnancy Category D and is not considered safe during pregnancy.  The risk during breast feeding is also uncertain. Olumiant Counseling: I discussed with the patient the risks of Olumiant therapy including but not limited to upper respiratory tract infections, shingles, cold sores, and nausea. Live vaccines should be avoided.  This medication has been linked to serious infections; higher rate of mortality; malignancy and lymphoproliferative disorders; major adverse cardiovascular events; thrombosis; gastrointestinal perforations; neutropenia; lymphopenia; anemia; liver enzyme elevations; and lipid elevations. Low Dose Naltrexone Counseling- I discussed with the patient the potential risks and side effects of low dose naltrexone including but not limited to: more vivid dreams, headaches, nausea, vomiting, abdominal pain, fatigue, dizziness, and anxiety. Dutasteride Male Counseling: Dustasteride Counseling:  I discussed with the patient the risks of use of dutasteride including but not limited to decreased libido, decreased ejaculate volume, and gynecomastia. Women who can become pregnant should not handle medication.  All of the patient's questions and concerns were addressed. Metronidazole Pregnancy And Lactation Text: This medication is Pregnancy Category B and considered safe during pregnancy.  It is also excreted in breast milk. Wartpeel Counseling:  I discussed with the patient the risks of Wartpeel including but not limited to erythema, scaling, itching, weeping, crusting, and pain. Tetracycline Counseling: Patient counseled regarding possible photosensitivity and increased risk for sunburn.  Patient instructed to avoid sunlight, if possible.  When exposed to sunlight, patients should wear protective clothing, sunglasses, and sunscreen.  The patient was instructed to call the office immediately if the following severe adverse effects occur:  hearing changes, easy bruising/bleeding, severe headache, or vision changes.  The patient verbalized understanding of the proper use and possible adverse effects of tetracycline.  All of the patient's questions and concerns were addressed. Patient understands to avoid pregnancy while on therapy due to potential birth defects. Spironolactone Pregnancy And Lactation Text: This medication can cause feminization of the male fetus and should be avoided during pregnancy. The active metabolite is also found in breast milk. Otezla Counseling: The side effects of Otezla were discussed with the patient, including but not limited to worsening or new depression, weight loss, diarrhea, nausea, upper respiratory tract infection, and headache. Patient instructed to call the office should any adverse effect occur.  The patient verbalized understanding of the proper use and possible adverse effects of Otezla.  All the patient's questions and concerns were addressed. Rhofade Counseling: Rhofade is a topical medication which can decrease superficial blood flow where applied. Side effects are uncommon and include stinging, redness and allergic reactions. Xolair Counseling:  Patient informed of potential adverse effects including but not limited to fever, muscle aches, rash and allergic reactions.  The patient verbalized understanding of the proper use and possible adverse effects of Xolair.  All of the patient's questions and concerns were addressed. Cimzia Counseling:  I discussed with the patient the risks of Cimzia including but not limited to immunosuppression, allergic reactions and infections.  The patient understands that monitoring is required including a PPD at baseline and must alert us or the primary physician if symptoms of infection or other concerning signs are noted. Cephalexin Counseling: I counseled the patient regarding use of cephalexin as an antibiotic for prophylactic and/or therapeutic purposes. Cephalexin (commonly prescribed under brand name Keflex) is a cephalosporin antibiotic which is active against numerous classes of bacteria, including most skin bacteria. Side effects may include nausea, diarrhea, gastrointestinal upset, rash, hives, yeast infections, and in rare cases, hepatitis, kidney disease, seizures, fever, confusion, neurologic symptoms, and others. Patients with severe allergies to penicillin medications are cautioned that there is about a 10% incidence of cross-reactivity with cephalosporins. When possible, patients with penicillin allergies should use alternatives to cephalosporins for antibiotic therapy. Methotrexate Counseling:  Patient counseled regarding adverse effects of methotrexate including but not limited to nausea, vomiting, abnormalities in liver function tests. Patients may develop mouth sores, rash, diarrhea, and abnormalities in blood counts. The patient understands that monitoring is required including LFT's and blood counts.  There is a rare possibility of scarring of the liver and lung problems that can occur when taking methotrexate. Persistent nausea, loss of appetite, pale stools, dark urine, cough, and shortness of breath should be reported immediately. Patient advised to discontinue methotrexate treatment at least three months before attempting to become pregnant.  I discussed the need for folate supplements while taking methotrexate.  These supplements can decrease side effects during methotrexate treatment. The patient verbalized understanding of the proper use and possible adverse effects of methotrexate.  All of the patient's questions and concerns were addressed. Ketoconazole Pregnancy And Lactation Text: This medication is Pregnancy Category C and it isn't know if it is safe during pregnancy. It is also excreted in breast milk and breast feeding isn't recommended. Ilumya Counseling: I discussed with the patient the risks of tildrakizumab including but not limited to immunosuppression, malignancy, posterior leukoencephalopathy syndrome, and serious infections.  The patient understands that monitoring is required including a PPD at baseline and must alert us or the primary physician if symptoms of infection or other concerning signs are noted. Tazorac Pregnancy And Lactation Text: This medication is not safe during pregnancy. It is unknown if this medication is excreted in breast milk. Olumiant Pregnancy And Lactation Text: Based on animal studies, Olumiant may cause embryo-fetal harm when administered to pregnant women.  The medication should not be used in pregnancy.  Breastfeeding is not recommended during treatment. Calcipotriene Pregnancy And Lactation Text: The use of this medication during pregnancy or lactation is not recommended as there is insufficient data. Low Dose Naltrexone Pregnancy And Lactation Text: Naltrexone is pregnancy category C.  There have been no adequate and well-controlled studies in pregnant women.  It should be used in pregnancy only if the potential benefit justifies the potential risk to the fetus.   Limited data indicates that naltrexone is minimally excreted into breastmilk. Semaglutide Pregnancy And Lactation Text: The fetal risk of this medication is unknown and taking while pregnant is not recommended. It is unknown if this medication is present in breast milk. Wegovy Counseling: I reviewed the possible side effects including: thyroid tumors, kidney disease, gallbladder disease, abdominal pain, constipation, diarrhea, nausea, vomiting and pancreatitis. Do not take this medication if you have a history or family history of multiple endocrine neoplasia syndrome type 2. Side effects reviewed, pt to contact office should one occur. Ebglyss Counseling: I discussed with the patient the risks of lebrikizumab including but not limited to eye inflammation and irritation, cold sores, injection site reactions, allergic reactions and increased risk of parasitic infection. The patient understands that monitoring is required and they must alert us or the primary physician if symptoms of infection or other concerning signs are noted. Semaglutide Counseling: I reviewed the possible side effects including: thyroid tumors, kidney disease, gallbladder disease, abdominal pain, constipation, diarrhea, nausea, vomiting and pancreatitis. Do not take this medication if you have a history or family history of multiple endocrine neoplasia syndrome type 2. Side effects reviewed, pt to contact office should one occur. Ebglyss Pregnancy And Lactation Text: This medication likely crosses the placenta but the risk for the fetus is uncertain. It is unknown if this medication is excreted in breast milk. Simlandi Counseling:  I discussed with the patient the risks of adalimumab including but not limited to myelosuppression, immunosuppression, autoimmune hepatitis, demyelinating diseases, lymphoma, and serious infections.  The patient understands that monitoring is required including a PPD at baseline and must alert us or the primary physician if symptoms of infection or other concerning signs are noted. Nemluvio Counseling: I discussed with the patient the risks of nemolizumab including but not limited to headache, gastrointestinal complaints, nasopharyngitis, musculoskeletal complaints, injection site reactions, and allergic reactions. The patient understands that monitoring is required and they must alert us or the primary physician if any side effects are noted. Ozempic Counseling: I reviewed the possible side effects including: thyroid tumors, kidney disease, gallbladder disease, abdominal pain, constipation, diarrhea, nausea, vomiting and pancreatitis. Do not take this medication if you have a history or family history of multiple endocrine neoplasia syndrome type 2. Side effects reviewed, pt to contact office should one occur. Nemluvio Pregnancy And Lactation Text: It is not known if Nemluvio causes fetal harm or is present in breast milk. Please proceed with caution if patients who are pregnant or breastfeeding. Saxenda Counseling: I reviewed the possible side effects including: thyroid tumors, kidney disease, gallbladder disease, abdominal pain, constipation, diarrhea, nausea, vomiting and pancreatitis. Do not take this medication if you have a history or family history of multiple endocrine neoplasia syndrome type 2. Side effects reviewed, pt to contact office should one occur. Spevigo Counseling: I discussed with the patient the risks of Spevigo including but not limited to fatigue, nasuea, vomiting, headache, pruritus, urinary tract infection, an infusion related reactions.  The patient understands that monitoring is required including screening for tuberculosis at baseline and yearly screening thereafter while continuing Spevigo therapy. They should contact us if symptoms of infection or other concerning signs are noted. Zepbound Counseling: I reviewed the possible side effects including: thyroid tumors, kidney disease, gallbladder disease, abdominal pain, constipation, diarrhea, nausea, vomiting and pancreatitis. Do not take this medication if you have a history or family history of multiple endocrine neoplasia syndrome type 2. Side effects reviewed, pt to contact office should one occur. Spevigo Pregnancy And Lactation Text: The risk during pregnancy and breastfeeding is uncertain with this medication. This medication does cross the placenta. It is unknown if this medication is found in breast milk.

## 2025-04-18 NOTE — SUBJECTIVE & OBJECTIVE
Interval History:  Patient seen and examined this morning on rounds.  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 He is otherwise stable     Review of Systems   All other systems reviewed and are negative.    Objective:     Vital Signs (Most Recent):  Temp: 98.7 °F (37.1 °C) (04/18/25 1101)  Pulse: 76 (04/18/25 1101)  Resp: 18 (04/18/25 1101)  BP: (!) 148/63 (04/18/25 1101)  SpO2: (!) 94 % (04/18/25 1101) Vital Signs (24h Range):  Temp:  [97.8 °F (36.6 °C)-98.7 °F (37.1 °C)] 98.7 °F (37.1 °C)  Pulse:  [60-78] 76  Resp:  [16-22] 18  SpO2:  [94 %-97 %] 94 %  BP: (126-160)/(57-71) 148/63     Weight: 62.6 kg (138 lb 0.1 oz)  Body mass index is 20.98 kg/m².    Intake/Output Summary (Last 24 hours) at 4/18/2025 1613  Last data filed at 4/18/2025 0926  Gross per 24 hour   Intake 440 ml   Output 300 ml   Net 140 ml         Physical Exam  Vitals and nursing note reviewed.   Eyes:      Pupils: Pupils are equal, round, and reactive to light.   Neck:      Vascular: No JVD.   Cardiovascular:      Rate and Rhythm: Normal rate and regular rhythm.      Heart sounds: Normal heart sounds.   Pulmonary:      Effort: Pulmonary effort is normal.      Breath sounds: Normal breath sounds.   Abdominal:      General: Bowel sounds are normal. There is no distension.      Palpations: Abdomen is soft.      Tenderness: There is no abdominal tenderness.   Musculoskeletal:         General: No deformity.   Lymphadenopathy:      Cervical: No cervical adenopathy.   Skin:     Coloration: Skin is not pale.      Findings: No rash.               Significant Labs: All pertinent labs within the past 24 hours have been reviewed.  BMP:   Recent Labs   Lab 04/17/25 0417 04/18/25  0444     --    K 3.9  --    CO2 19*  --    BUN 16  --    CREATININE 1.4  --    CALCIUM 9.0  --    MG 1.8 1.9     CBC:   Recent Labs   Lab 04/17/25 0417 04/18/25  0444   WBC 6.29 6.07   HGB 11.9* 12.3*   HCT 38.7* 38.6*    329        Significant Imaging: I have reviewed all pertinent imaging results/findings within the past 24 hours.

## 2025-04-18 NOTE — PT/OT/SLP PROGRESS
"Physical Therapy      Patient Name:  Rajendra Castle   MRN:  [id number]    Patient not seen today secondary to Patient unwilling to participate, Patient fatigue (pt declined x multiple attempts stating "i just dont feel good today."). Will follow-up 4/19/25.    "

## 2025-04-18 NOTE — ASSESSMENT & PLAN NOTE
Patient has a current diagnosis of hypertensive urgency/emergency diagnosis: hypertensive emergency with end organ damage evidenced by hypertensive encephalopathy which is controlled.  Latest blood pressure and vitals reviewed-   Temp:  [97.8 °F (36.6 °C)-98.7 °F (37.1 °C)]   Pulse:  [60-78]   Resp:  [16-22]   BP: (126-160)/(57-71)   SpO2:  [94 %-97 %] .   Patient currently off IV antihypertensives.   Home meds for hypertension were reviewed and noted below.   Hypertension Medications              hydrALAZINE (APRESOLINE) 100 MG tablet Take 1 tablet (100 mg total) by mouth 2 (two) times daily.    metoprolol tartrate (LOPRESSOR) 25 MG tablet Take 1 tablet (25 mg total) by mouth 2 (two) times daily.          Patient controlled on oral meds

## 2025-04-18 NOTE — RESPIRATORY THERAPY
04/17/25 2006   Patient Assessment/Suction   Level of Consciousness (AVPU) alert   Respiratory Effort Unlabored;Normal   Expansion/Accessory Muscles/Retractions no use of accessory muscles;no retractions   Rhythm/Pattern, Respiratory unlabored;no shortness of breath reported   PRE-TX-O2   Device (Oxygen Therapy) room air   SpO2 97 %   Pulse Oximetry Type Continuous   $ Pulse Oximetry - Multiple Charge Pulse Oximetry - Multiple   Pulse 62   Resp (!) 22   Education   $ Education 15 min;Other (see comment)  (O2 SAT CHECK)

## 2025-04-18 NOTE — PROGRESS NOTES
"[facility] Medicine  Progress Note    Patient Name: Rajendra Castle  MRN: [id number]  Patient Class: IP- Inpatient   Admission Date: 4/14/2025  Length of Stay: 3 days  Attending Physician: Ivett Cesar MD  Primary Care Provider: Sp Lilyl MD        Subjective     Principal Problem:Hypertensive emergency        HPI:  Patient is a 79-year-old  male who only reports he has "prostate issues" and hypertension; he takes his medications but does not check his blood pressure at home  He is a vague historian, but review of the records indicate that he has multiple medical issues including hypertension, prediabetes, CKD -3, GERD, hyperparathyroidism (status post parathyroidectomy), rheumatoid lung, chronic pulmonary embolism, anxiety/depression, and ureteral cancer  Around 3:00 p.m. in the afternoon, the patient had numbness/tingling in his bilateral hands/feet and developed a headache (with pressure behind his eyes) as well as dizziness; he endorsed chest tightness to the ER but not to me but does state that he felt weak all over particularly in his legs and was staggering  In the ER, his blood pressure goes highest of the to 10s-> now 160s after Cardene infusion and 2 doses of IV Hydralazine/a dose of Labetalol  He had no leukocytosis with a hemoglobin of 11.8 normal platelets; COVID/influenza testing were negative  Creatinine 1.3 (below baseline) with a potassium of 3.1; Mag was 1.9  He did not have a UTI; TSH was normal  BNP 5217 and troponin went from 22-> 36-> 72.7 (at the time of this editing)  Lipid panel showed an LDL of 61  EKG showed, per my interpretation, sinus rhythm 71 beats per minute with a QTC of 489 with biphasic T-waves in several leads and this has been seen previously  Chest x-ray was unrevealing; CT head without contrast showed, per radiologist Dr. Reyes:    "Impression:     No evidence of acute intracranial abnormality or fracture.   " "  Microvascular chronic ischemic changes and senescent atrophic changes.     Otomastoiditis on the left and mastoiditis on the right with possible small amount of fluid also in the middle ear on the right.  With prior exam demonstrating mastoiditis on the right only."    The patient received aspirin around 3:00 a.m., Tylenol, calcium gluconate, oral potassium I Ativan, in the aforementioned antihypertensives and was transferred to the ICU for further diagnosis, treatment, and care    Overview/Hospital Course:  Patient was admitted to the hospital with worsening neurologic deficits and a markedly elevated blood pressure.  Initial etiology was thought to be acute stroke, and patient was seen by tele stroke, underwent MRI which showed potential acute lesion.  However, this was not corroborated with the patient's symptoms.  Repeat CT angiogram showed no evidence of any acute lesions, therefore stroke neurologist stated this is likely not an acute stroke.  Patient was continued for hypertensive emergency.  Home blood pressure medication was restarted, and blood pressure improved over the course of his hospitalization.  Patient was also found to have an acute urinary tract infection and treatment was initiated for such.  Patient was also reported to be depressed and anxious, and SSRI was started at his request.  Blood pressure slowly returned back to baseline of 150s to 170s systolic.  Echocardiogram was done which showed evidence of concentric remodeling but otherwise stable.Patient with noted sundowning to indicate likely vascular dementia. STarted on aricept.    Interval History:  Patient seen and examined this morning on rounds.  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 He is otherwise stable     Review of Systems   All other systems reviewed and are negative.    Objective:     Vital Signs (Most Recent):  Temp: 98.7 °F (37.1 °C) (04/18/25 1101)  Pulse: 76 (04/18/25 " 1101)  Resp: 18 (04/18/25 1101)  BP: (!) 148/63 (04/18/25 1101)  SpO2: (!) 94 % (04/18/25 1101) Vital Signs (24h Range):  Temp:  [97.8 °F (36.6 °C)-98.7 °F (37.1 °C)] 98.7 °F (37.1 °C)  Pulse:  [60-78] 76  Resp:  [16-22] 18  SpO2:  [94 %-97 %] 94 %  BP: (126-160)/(57-71) 148/63     Weight: 62.6 kg (138 lb 0.1 oz)  Body mass index is 20.98 kg/m².    Intake/Output Summary (Last 24 hours) at 4/18/2025 1613  Last data filed at 4/18/2025 0926  Gross per 24 hour   Intake 440 ml   Output 300 ml   Net 140 ml         Physical Exam  Vitals and nursing note reviewed.   Eyes:      Pupils: Pupils are equal, round, and reactive to light.   Neck:      Vascular: No JVD.   Cardiovascular:      Rate and Rhythm: Normal rate and regular rhythm.      Heart sounds: Normal heart sounds.   Pulmonary:      Effort: Pulmonary effort is normal.      Breath sounds: Normal breath sounds.   Abdominal:      General: Bowel sounds are normal. There is no distension.      Palpations: Abdomen is soft.      Tenderness: There is no abdominal tenderness.   Musculoskeletal:         General: No deformity.   Lymphadenopathy:      Cervical: No cervical adenopathy.   Skin:     Coloration: Skin is not pale.      Findings: No rash.               Significant Labs: All pertinent labs within the past 24 hours have been reviewed.  BMP:   Recent Labs   Lab 04/17/25 0417 04/18/25  0444     --    K 3.9  --    CO2 19*  --    BUN 16  --    CREATININE 1.4  --    CALCIUM 9.0  --    MG 1.8 1.9     CBC:   Recent Labs   Lab 04/17/25 0417 04/18/25  0444   WBC 6.29 6.07   HGB 11.9* 12.3*   HCT 38.7* 38.6*    329       Significant Imaging: I have reviewed all pertinent imaging results/findings within the past 24 hours.      Assessment & Plan  Hypertensive emergency  Patient has a current diagnosis of hypertensive urgency/emergency diagnosis: hypertensive emergency with end organ damage evidenced by hypertensive encephalopathy which is controlled.  Latest blood  pressure and vitals reviewed-   Temp:  [97.8 °F (36.6 °C)-98.7 °F (37.1 °C)]   Pulse:  [60-78]   Resp:  [16-22]   BP: (126-160)/(57-71)   SpO2:  [94 %-97 %] .   Patient currently off IV antihypertensives.   Home meds for hypertension were reviewed and noted below.   Hypertension Medications              hydrALAZINE (APRESOLINE) 100 MG tablet Take 1 tablet (100 mg total) by mouth 2 (two) times daily.    metoprolol tartrate (LOPRESSOR) 25 MG tablet Take 1 tablet (25 mg total) by mouth 2 (two) times daily.          Patient controlled on oral meds       CKD (chronic kidney disease) stage 3, GFR 30-59 ml/min  Creatine stable for now. BMP reviewed- noted Estimated Creatinine Clearance: 39.1 mL/min (based on SCr of 1.4 mg/dL). according to latest data. Based on current GFR, CKD stage is stage 3 - GFR 30-59.  Monitor UOP and serial BMP and adjust therapy as needed. Renally dose meds. Avoid nephrotoxic medications and procedures.      Anemia  Anemia is likely due to chronic disease due to Chronic Kidney Disease. Most recent hemoglobin and hematocrit are listed below.  Recent Labs     04/16/25 0322 04/17/25 0417 04/18/25  0444   HGB 12.6* 11.9* 12.3*   HCT 40.5 38.7* 38.6*     Plan  - Monitor serial CBC: Daily  - Transfuse PRBC if patient becomes hemodynamically unstable, symptomatic or H/H drops below 7/21.  - Patient has not received any PRBC transfusions to date  - Patient's anemia is currently stable  Hypokalemia (Resolved: 4/18/2025)  Patient's most recent potassium results are listed below.   Recent Labs     04/16/25  0322 04/17/25 0417   K 3.8 3.9     Plan  - Replete potassium per protocol  - Monitor potassium Daily  - Patient's hypokalemia is stable  Major depressive disorder, single episode, severe without psychotic features  Anxiety and depression  Patient has recurrent depression which is moderate and is currently uncontrolled. Will Increase anti-depressant medications. We will not consult psychiatry at this  time. Patient does not display psychosis at this time. Continue to monitor closely and adjust plan of care as needed.  Start Cymbalta.      S/P parathyroidectomy  noted    Atherosclerosis of native coronary artery of native heart without angina pectoris  Patient with known CAD, which is controlled Will continue Statin and monitor for S/Sx of angina/ACS. Continue to monitor on telemetry.   Ric ADAMS  Noted- avoid beta blockers    Vascular dementia  Patient with dementia with likely etiology of vascular dementia. Dementia is moderate. The patient does have signs of behavioral disturbance. Started on aricept. Continue non-pharmacologic interventions to prevent delirium (No VS between 11PM-5AM, activity during day, opening blinds, providing glasses/hearing aids, and up in chair during daytime). Will avoid narcotics and benzos unless absolutely necessary. PRN anti-psychotics are not prescribed to avoid self harm behaviors.  VTE Risk Mitigation (From admission, onward)           Ordered     IP VTE HIGH RISK PATIENT  Once         04/15/25 0608     Place sequential compression device  Until discontinued         04/15/25 0608                    Discharge Planning   DEVONTE: 4/18/2025     Code Status: Full Code   Medical Readiness for Discharge Date:   Discharge Plan A: Home Health   Discharge Delays: None known at this time            Please place Justification for DME        Ivett Cesar MD  Department of Hospital Medicine   [facility]

## 2025-04-18 NOTE — PROGRESS NOTES
[facility]  Department of Cardiology  Progress Note    PATIENT NAME: Rajendra Castle  MRN: [id number]  TODAY'S DATE: 04/18/2025  ADMIT DATE: 4/14/2025    SUBJECTIVE     PRINCIPLE PROBLEM: Hypertensive emergency    INTERVAL HISTORY:    4/18/2025  PATIENT RESTING COMFORTABLY IN BED WITHOUT COMPLAINTS  BLOOD PRESSURE ADEQUATELY CONTROLLED  Review of patient's allergies indicates:  No Known Allergies    REVIEW OF SYSTEMS  CARDIOVASCULAR: No recent chest pain, palpitations, arm, neck, or jaw pain  RESPIRATORY: No recent fever, cough chills, SOB or congestion  : No blood in the urine  GI: No Nausea, vomiting, constipation, diarrhea, blood, or reflux.  MUSCULOSKELETAL: No myalgias  NEURO: No lightheadedness or dizziness  EYES: No Double vision, blurry, vision or headache     OBJECTIVE     VITAL SIGNS (Most Recent)  Temp: 98.7 °F (37.1 °C) (04/18/25 1101)  Pulse: 76 (04/18/25 1101)  Resp: 18 (04/18/25 1101)  BP: (!) 148/63 (04/18/25 1101)  SpO2: (!) 94 % (04/18/25 1101)    VENTILATION STATUS  Resp: 18 (04/18/25 1101)  SpO2: (!) 94 % (04/18/25 1101)           I & O (Last 24H):  Intake/Output Summary (Last 24 hours) at 4/18/2025 1111  Last data filed at 4/18/2025 0926  Gross per 24 hour   Intake 560 ml   Output 550 ml   Net 10 ml       WEIGHTS  Wt Readings from Last 3 Encounters:   04/16/25 0400 62.6 kg (138 lb 0.1 oz)   04/15/25 0514 62.6 kg (138 lb 0.1 oz)   04/14/25 2036 68.5 kg (151 lb 0.2 oz)   04/14/25 2026 65.8 kg (145 lb)   04/04/25 1418 70.3 kg (155 lb)   01/02/25 1450 70.4 kg (155 lb 1.5 oz)       PHYSICAL EXAM  CONSTITUTIONAL: Well built, well nourished in no apparent distress  NECK: no carotid bruit, no JVD  LUNGS: CTA  CHEST WALL: no tenderness  HEART: regular rate and rhythm, S1, S2 normal, no murmur, click, rub or gallop   ABDOMEN: soft, non-tender; bowel sounds normal; no masses,  no organomegaly  EXTREMITIES: Extremities normal, no edema  NEURO: AAO X 3    SCHEDULED MEDS:   amLODIPine  2.5 mg Oral  Daily    atorvastatin  20 mg Oral Daily    busPIRone  15 mg Oral TID    cefTRIAXone (Rocephin) IV (PEDS and ADULTS)  1 g Intravenous Q24H    donepeziL  5 mg Oral QHS    DULoxetine  20 mg Oral BID    hydroCHLOROthiazide  12.5 mg Oral Daily    losartan  100 mg Oral Daily    metoprolol succinate  12.5 mg Oral Daily    mupirocin   Nasal BID    tamsulosin  0.4 mg Oral Daily       CONTINUOUS INFUSIONS:    PRN MEDS:  Current Facility-Administered Medications:     acetaminophen, 650 mg, Oral, Q8H PRN    bisacodyL, 10 mg, Rectal, Daily PRN    dextrose 50%, 12.5 g, Intravenous, PRN    dextrose 50%, 25 g, Intravenous, PRN    glucagon (human recombinant), 1 mg, Intramuscular, PRN    glucose, 16 g, Oral, PRN    glucose, 24 g, Oral, PRN    hydrALAZINE, 10 mg, Intravenous, Q4H PRN    melatonin, 6 mg, Oral, Nightly PRN    naloxone, 0.02 mg, Intravenous, PRN    ondansetron, 4 mg, Intravenous, Q6H PRN    ondansetron, 4 mg, Intravenous, Q6H PRN    senna-docusate, 1 tablet, Oral, Daily PRN    sodium chloride 0.9%, 500 mL, Intravenous, PRN    sodium chloride 0.9%, 10 mL, Intravenous, PRN    zolpidem, 5 mg, Oral, Nightly PRN    LABS AND DIAGNOSTICS     CBC LAST 3 DAYS  Recent Labs   Lab 04/16/25 0322 04/17/25 0417 04/18/25  0444   WBC 5.92 6.29 6.07   RBC 4.62 4.36* 4.53*   HGB 12.6* 11.9* 12.3*   HCT 40.5 38.7* 38.6*   MCV 88 89 85   MCH 27.3 27.3 27.2   MCHC 31.1* 30.7* 31.9*   RDW 17.5* 17.2* 17.2*    262 329   MPV 12.4 12.3 12.2   NRBC 0 0 0       COAGULATION LAST 3 DAYS  Recent Labs   Lab 04/16/25  0322   INR 1.1       CHEMISTRY LAST 3 DAYS  Recent Labs   Lab 04/14/25  2145 04/16/25  0322 04/17/25  0417 04/18/25  0444    138 138  --    K 3.1* 3.8 3.9  --    CO2 17* 24 19*  --    BUN 10 12 16  --    CREATININE 1.3 1.3 1.4  --    CALCIUM 8.3* 9.5 9.0  --    MG 1.9 2.0 1.8 1.9   ALBUMIN 3.2* 3.7 3.6  --    ALKPHOS 67 63 61  --    ALT 10 9* 5*  --    AST 21 11 12  --    BILITOT 0.4 0.4 0.5  --        CARDIAC PROFILE LAST  "3 DAYS  Recent Labs   Lab 04/14/25 2145   BNP 5,217*       ENDOCRINE LAST 3 DAYS  Recent Labs   Lab 04/14/25 2145   TSH 0.931       LAST ARTERIAL BLOOD GAS  ABG  No results for input(s): "PH", "PO2", "PCO2", "HCO3", "BE" in the last 168 hours.    LAST 7 DAYS MICROBIOLOGY   Microbiology Results (last 7 days)       Procedure Component Value Units Date/Time    Influenza A & B by Molecular [[id number]]  (Normal) Collected: 04/14/25 2053    Order Status: Completed Specimen: Nasal Swab Updated: 04/14/25 2115     INFLUENZA A MOLECULAR Negative     INFLUENZA B MOLECULAR  Negative            MOST RECENT IMAGING  CT Head Without Contrast  Narrative: EXAMINATION:  CT HEAD WITHOUT CONTRAST    CLINICAL HISTORY:  AMS;    TECHNIQUE:  Low dose axial images were obtained through the head.  Coronal and sagittal reformations were also performed. Contrast was not administered.    COMPARISON:  CTA head and neck performed 04/15/2025.    FINDINGS:  Blood: No acute intracranial hemorrhage.    Parenchyma: No definite loss of gray-white differentiation to suggest acute or subacute transcortical infarct.  Small focal hypodensity corresponds to area restricted diffusion/possible recent ischemia in the right basal ganglia/periventricular white matter seen on MRI performed 04/15/2025.  Elsewhere, generalized pattern age-related parenchymal volume loss.  Nonspecific areas of white matter hypoattenuation may reflect sequela of chronic small vessel ischemic disease.    Ventricles/Extra-axial spaces: No abnormal extra-axial fluid collection. Basal cisterns are patent.    Vessels: Mild atherosclerotic calcification.    Orbits: Unremarkable.    Scalp: Unremarkable.    Skull: There are no depressed skull fractures or destructive bone lesions.    Sinuses and mastoids: Scattered polypoid mucosal thickening and possible retention cysts.    Other findings: None  Impression: No evidence of acute intracranial hemorrhage, mass effect, midline " shift.    Small focal hypodensity corresponds to area restricted diffusion/possible recent ischemia in the right basal ganglia/periventricular white matter seen on MRI performed 04/15/2025.    Electronically signed by: Sp Sanchez  Date:    04/17/2025  Time:    07:23      [facility]  Results for orders placed during the hospital encounter of 04/14/25    Echo Saline Bubble? No    Interpretation Summary    Left Ventricle: The left ventricle is normal in size. Moderately increased wall thickness. There is concentric hypertrophy. There is normal systolic function with a visually estimated ejection fraction of 60 - 65%. There is normal diastolic function. E/A ratio is 0.94. LV apical cavity obstruction at rest. Complete obliteration of the apical cavity.    Right Ventricle: The right ventricle is normal in size. Systolic function is reduced.    Left Atrium: Moderately dilated    Aortic Valve: The aortic valve is a trileaflet valve. There is moderate aortic valve sclerosis. Mildly calcified noncoronary cusp. There is mild aortic regurgitation.    Mitral Valve: There is mild to moderate regurgitation.    Tricuspid Valve: There is mild to moderate regurgitation. There is mild pulmonary hypertension. The estimated PA systolic pressure is at least 39 mmHg.    Pericardium: There is a small circumferential effusion. Pericardial effusion contains fibrinous materials.      CURRENT/PREVIOUS VISIT EKG  Results for orders placed or performed during the hospital encounter of 04/14/25   EKG 12-lead    Collection Time: 04/15/25  9:31 PM   Result Value Ref Range    QRS Duration 102 ms    [facility] QTC Calculation 517 ms    Narrative    Test Reason : I49.9,    Vent. Rate :  67 BPM     Atrial Rate :  94 BPM     P-R Int :    ms          QRS Dur : 102 ms      QT Int : 490 ms       P-R-T Axes :  55   2 165 degrees    QTcB Int : 517 ms    Sinus rhythm with 2nd degree A-V block (Mobitz I)  ST and T wave abnormality, consider inferior ischemia  ST  and T wave abnormality, consider anterolateral ischemia  Prolonged QT  Abnormal ECG  When compared with ECG of 14-Apr-2025 20:52,  Premature atrial complexes are no longer Present  Sinus rhythm is now with 2nd degree A-V block (Mobitz I)  Inverted T waves have replaced nonspecific T wave abnormality in Inferior  leads  Confirmed by Sp Phillips ([id number]) on 4/16/2025 10:52:45 PM    Referred By: AAAREFERRAL SELF           Confirmed By: Sp Phillips       ASSESSMENT/PLAN:     Active Hospital Problems    Diagnosis    *Hypertensive emergency    Vascular dementia    Anemia    CKD (chronic kidney disease) stage 3, GFR 30-59 ml/min    Anxiety and depression    Major depressive disorder, single episode, severe without psychotic features    Mobitz I    Atherosclerosis of native coronary artery of native heart without angina pectoris    S/P parathyroidectomy    Elevated troponin       ASSESSMENT & PLAN:     ELEVATED TROPONIN SECONDARY TO HYPERTENSIVE URGENCY  PATIENT NONCOMPLIANT WITH HIS MEDICATIONS     ECHO REVEALS MARKED LEFT VENTRICULAR HYPERTROPHY WITH HYPERTROPHIC MYOPATHY AND OBLITERATION OF LV APEX     WILL START HIM BACK ON THE LOSARTAN AND AMLODIPINE  WILL ADD SOMETHING TO SLOW THE HEART RATE TOMORROW EITHER BETA-BLOCKER OR CHANGE TO CARDIZEM     HE HAS MULTIPLE STRESS TESTS IN THE PAST SECONDARY TO HIS ABNORMAL HIS EKG WHICH ARE NEGATIVE FOR ISCHEMIA     HIS ABNORMAL EKG IS SECONDARY TO THE HYPERTROPHIC MYOPATHY     CONTINUE BLOOD PRESSURE CONTROL THE PATIENT DOES NOT APPEAR TO BE COMPLIANT AT HOME AND SUGGEST CASE MANAGEMENT FOR SOCIAL SERVICE EVALUATION     4/16/25  BLOOD PRESSURE BETTER CONTROLLED TODAY  STILL ABOVE GOAL  PATIENT IS MORE ALERT     CONTINUE LOSARTAN, METOPROLOL 25 B.I.D. AND ADD AMLODIPINE 2.5     4/1725  Patient is started back on nicardipine drip today secondary to elevated blood pressure  Medications discussed with Dr. Cai  Discussed noncompliance probably secondary to vascular dementia with  Dr. Cai  Patient may be benefitted by placement      RECOMMENDATIONS:  BP CONTROLLED    AVAILABLE PRN        Sp Phillips MD  [facility]  Department of Cardiology  Date of Service: 04/18/2025  11:11 AM

## 2025-04-19 LAB
ABSOLUTE EOSINOPHIL (SMH): 0.18 K/UL
ABSOLUTE MONOCYTE (SMH): 0.96 K/UL (ref 0.3–1)
ABSOLUTE NEUTROPHIL COUNT (SMH): 4.4 K/UL (ref 1.8–7.7)
BASOPHILS # BLD AUTO: 0.02 K/UL
BASOPHILS NFR BLD AUTO: 0.3 %
ERYTHROCYTE [DISTWIDTH] IN BLOOD BY AUTOMATED COUNT: 16.9 % (ref 11.5–14.5)
HCT VFR BLD AUTO: 38.5 % (ref 40–54)
HGB BLD-MCNC: 12 GM/DL (ref 14–18)
IMM GRANULOCYTES # BLD AUTO: 0.05 K/UL (ref 0–0.04)
IMM GRANULOCYTES NFR BLD AUTO: 0.8 % (ref 0–0.5)
LYMPHOCYTES # BLD AUTO: 0.81 K/UL (ref 1–4.8)
MAGNESIUM SERPL-MCNC: 1.9 MG/DL (ref 1.6–2.6)
MCH RBC QN AUTO: 27.2 PG (ref 27–31)
MCHC RBC AUTO-ENTMCNC: 31.2 G/DL (ref 32–36)
MCV RBC AUTO: 87 FL (ref 82–98)
NUCLEATED RBC (/100WBC) (SMH): 0 /100 WBC
PLATELET # BLD AUTO: 313 K/UL (ref 150–450)
PMV BLD AUTO: 12.1 FL (ref 9.2–12.9)
RBC # BLD AUTO: 4.41 M/UL (ref 4.6–6.2)
RELATIVE EOSINOPHIL (SMH): 2.8 % (ref 0–8)
RELATIVE LYMPHOCYTE (SMH): 12.5 % (ref 18–48)
RELATIVE MONOCYTE (SMH): 14.9 % (ref 4–15)
RELATIVE NEUTROPHIL (SMH): 68.7 % (ref 38–73)
WBC # BLD AUTO: 6.46 K/UL (ref 3.9–12.7)

## 2025-04-19 PROCEDURE — 85025 COMPLETE CBC W/AUTO DIFF WBC: CPT | Performed by: HOSPITALIST

## 2025-04-19 PROCEDURE — 25000003 PHARM REV CODE 250: Performed by: HOSPITALIST

## 2025-04-19 PROCEDURE — 83735 ASSAY OF MAGNESIUM: CPT | Performed by: HOSPITALIST

## 2025-04-19 PROCEDURE — 63600175 PHARM REV CODE 636 W HCPCS: Performed by: INTERNAL MEDICINE

## 2025-04-19 PROCEDURE — 25000003 PHARM REV CODE 250

## 2025-04-19 PROCEDURE — 12000002 HC ACUTE/MED SURGE SEMI-PRIVATE ROOM

## 2025-04-19 PROCEDURE — 25000003 PHARM REV CODE 250: Performed by: NURSE PRACTITIONER

## 2025-04-19 PROCEDURE — 97116 GAIT TRAINING THERAPY: CPT

## 2025-04-19 PROCEDURE — 36415 COLL VENOUS BLD VENIPUNCTURE: CPT | Performed by: HOSPITALIST

## 2025-04-19 RX ORDER — ATORVASTATIN CALCIUM 20 MG/1
20 TABLET, FILM COATED ORAL NIGHTLY
Status: DISCONTINUED | OUTPATIENT
Start: 2025-04-20 | End: 2025-04-26 | Stop reason: HOSPADM

## 2025-04-19 RX ADMIN — MUPIROCIN 1 G: 20 OINTMENT TOPICAL at 08:04

## 2025-04-19 RX ADMIN — LOSARTAN POTASSIUM 100 MG: 50 TABLET, FILM COATED ORAL at 08:04

## 2025-04-19 RX ADMIN — AMLODIPINE BESYLATE 2.5 MG: 2.5 TABLET ORAL at 08:04

## 2025-04-19 RX ADMIN — METOPROLOL SUCCINATE 12.5 MG: 25 TABLET, EXTENDED RELEASE ORAL at 08:04

## 2025-04-19 RX ADMIN — AMOXICILLIN AND CLAVULANATE POTASSIUM 1 TABLET: 875; 125 TABLET, FILM COATED ORAL at 08:04

## 2025-04-19 RX ADMIN — BUSPIRONE HYDROCHLORIDE 15 MG: 5 TABLET ORAL at 08:04

## 2025-04-19 RX ADMIN — DULOXETINE HYDROCHLORIDE 20 MG: 20 CAPSULE, DELAYED RELEASE ORAL at 08:04

## 2025-04-19 RX ADMIN — HYDROCHLOROTHIAZIDE 12.5 MG: 12.5 TABLET ORAL at 08:04

## 2025-04-19 RX ADMIN — HYDRALAZINE HYDROCHLORIDE 10 MG: 20 INJECTION, SOLUTION INTRAMUSCULAR; INTRAVENOUS at 03:04

## 2025-04-19 RX ADMIN — AMOXICILLIN AND CLAVULANATE POTASSIUM 1 TABLET: 875; 125 TABLET, FILM COATED ORAL at 09:04

## 2025-04-19 RX ADMIN — BUSPIRONE HYDROCHLORIDE 15 MG: 5 TABLET ORAL at 04:04

## 2025-04-19 RX ADMIN — DULOXETINE HYDROCHLORIDE 20 MG: 20 CAPSULE, DELAYED RELEASE ORAL at 09:04

## 2025-04-19 RX ADMIN — DONEPEZIL HYDROCHLORIDE 5 MG: 5 TABLET, FILM COATED ORAL at 09:04

## 2025-04-19 RX ADMIN — BUSPIRONE HYDROCHLORIDE 15 MG: 5 TABLET ORAL at 09:04

## 2025-04-19 RX ADMIN — MUPIROCIN 1 G: 20 OINTMENT TOPICAL at 09:04

## 2025-04-19 RX ADMIN — TAMSULOSIN HYDROCHLORIDE 0.4 MG: 0.4 CAPSULE ORAL at 08:04

## 2025-04-19 RX ADMIN — ATORVASTATIN CALCIUM 20 MG: 20 TABLET, FILM COATED ORAL at 08:04

## 2025-04-19 NOTE — PROGRESS NOTES
"[facility]  Progress Note    Patient Name: Rajendra Castle  MRN: [id number]  Patient Class: IP- Inpatient   Admission Date: 4/14/2025  Length of Stay: 4 days  Attending Physician: Ivett Cesar MD  Primary Care Provider: Sp Lilly MD        Subjective     Principal Problem:Hypertensive emergency        HPI:  Patient is a 79-year-old  male who only reports he has "prostate issues" and hypertension; he takes his medications but does not check his blood pressure at home  He is a vague historian, but review of the records indicate that he has multiple medical issues including hypertension, prediabetes, CKD -3, GERD, hyperparathyroidism (status post parathyroidectomy), rheumatoid lung, chronic pulmonary embolism, anxiety/depression, and ureteral cancer  Around 3:00 p.m. in the afternoon, the patient had numbness/tingling in his bilateral hands/feet and developed a headache (with pressure behind his eyes) as well as dizziness; he endorsed chest tightness to the ER but not to me but does state that he felt weak all over particularly in his legs and was staggering  In the ER, his blood pressure goes highest of the to 10s-> now 160s after Cardene infusion and 2 doses of IV Hydralazine/a dose of Labetalol  He had no leukocytosis with a hemoglobin of 11.8 normal platelets; COVID/influenza testing were negative  Creatinine 1.3 (below baseline) with a potassium of 3.1; Mag was 1.9  He did not have a UTI; TSH was normal  BNP 5217 and troponin went from 22-> 36-> 72.7 (at the time of this editing)  Lipid panel showed an LDL of 61  EKG showed, per my interpretation, sinus rhythm 71 beats per minute with a QTC of 489 with biphasic T-waves in several leads and this has been seen previously  Chest x-ray was unrevealing; CT head without contrast showed, per radiologist Dr. Reyes:    "Impression:     No evidence of acute intracranial abnormality or fracture.   " "  Microvascular chronic ischemic changes and senescent atrophic changes.     Otomastoiditis on the left and mastoiditis on the right with possible small amount of fluid also in the middle ear on the right.  With prior exam demonstrating mastoiditis on the right only."    The patient received aspirin around 3:00 a.m., Tylenol, calcium gluconate, oral potassium I Ativan, in the aforementioned antihypertensives and was transferred to the ICU for further diagnosis, treatment, and care    Overview/Hospital Course:  Patient was admitted to the hospital with worsening neurologic deficits and a markedly elevated blood pressure.  Initial etiology was thought to be acute stroke, and patient was seen by tele stroke, underwent MRI which showed potential acute lesion.  However, this was not corroborated with the patient's symptoms.  Repeat CT angiogram showed no evidence of any acute lesions, therefore stroke neurologist stated this is likely not an acute stroke.  Patient was continued for hypertensive emergency.  Home blood pressure medication was restarted, and blood pressure improved over the course of his hospitalization.  Patient was also found to have an acute urinary tract infection and treatment was initiated for such.  Patient was also reported to be depressed and anxious, and SSRI was started at his request.  Blood pressure slowly returned back to baseline of 150s to 170s systolic.  Echocardiogram was done which showed evidence of concentric remodeling but otherwise stable.Patient with noted sundowning to indicate likely vascular dementia. STarted on aricept.    Interval History:  Patient with no subjective complaints at this time.  He remains hemodynamically stable.  Still pending placement.    Review of Systems   All other systems reviewed and are negative.    Objective:     Vital Signs (Most Recent):  Temp: 99 °F (37.2 °C) (04/19/25 1146)  Pulse: 76 (04/19/25 1146)  Resp: 19 (04/19/25 1146)  BP: (!) 160/76 (04/19/25 " 1146)  SpO2: (!) 92 % (04/19/25 1146) Vital Signs (24h Range):  Temp:  [98.3 °F (36.8 °C)-99.5 °F (37.5 °C)] 99 °F (37.2 °C)  Pulse:  [60-80] 76  Resp:  [15-19] 19  SpO2:  [92 %-98 %] 92 %  BP: (158-178)/(53-76) 160/76     Weight: 62.6 kg (138 lb 0.1 oz)  Body mass index is 20.98 kg/m².    Intake/Output Summary (Last 24 hours) at 4/19/2025 1537  Last data filed at 4/19/2025 1433  Gross per 24 hour   Intake 240 ml   Output 600 ml   Net -360 ml         Physical Exam  Vitals and nursing note reviewed.   Eyes:      Pupils: Pupils are equal, round, and reactive to light.   Neck:      Vascular: No JVD.   Cardiovascular:      Rate and Rhythm: Normal rate and regular rhythm.      Heart sounds: Normal heart sounds.   Pulmonary:      Effort: Pulmonary effort is normal.      Breath sounds: Normal breath sounds.   Abdominal:      General: Bowel sounds are normal. There is no distension.      Palpations: Abdomen is soft.      Tenderness: There is no abdominal tenderness.   Musculoskeletal:         General: No deformity.   Lymphadenopathy:      Cervical: No cervical adenopathy.   Skin:     Coloration: Skin is not pale.      Findings: No rash.               Significant Labs: All pertinent labs within the past 24 hours have been reviewed.  BMP:   Recent Labs   Lab 04/19/25  0600   MG 1.9     CBC:   Recent Labs   Lab 04/18/25  0444 04/19/25  0600   WBC 6.07 6.46   HGB 12.3* 12.0*   HCT 38.6* 38.5*    313       Significant Imaging: I have reviewed all pertinent imaging results/findings within the past 24 hours.      Assessment & Plan  Hypertensive emergency  Patient has a current diagnosis of hypertensive urgency/emergency diagnosis: hypertensive emergency with end organ damage evidenced by hypertensive encephalopathy which is controlled.  Latest blood pressure and vitals reviewed-   Temp:  [98.3 °F (36.8 °C)-99.5 °F (37.5 °C)]   Pulse:  [60-80]   Resp:  [15-19]   BP: (158-178)/(53-76)   SpO2:  [92 %-98 %] .   Patient  currently off IV antihypertensives.   Home meds for hypertension were reviewed and noted below.   Hypertension Medications      BB, CCB, HCTZ and losartan    Patient controlled on oral meds       CKD (chronic kidney disease) stage 3, GFR 30-59 ml/min  Creatine stable for now. BMP reviewed- noted Estimated Creatinine Clearance: 39.1 mL/min (based on SCr of 1.4 mg/dL). according to latest data. Based on current GFR, CKD stage is stage 3 - GFR 30-59.  Monitor UOP and serial BMP and adjust therapy as needed. Renally dose meds. Avoid nephrotoxic medications and procedures.      Anemia  Anemia is likely due to chronic disease due to Chronic Kidney Disease. Most recent hemoglobin and hematocrit are listed below.  Recent Labs     04/17/25  0417 04/18/25  0444 04/19/25  0600   HGB 11.9* 12.3* 12.0*   HCT 38.7* 38.6* 38.5*     Plan  - Monitor serial CBC: Daily  - Transfuse PRBC if patient becomes hemodynamically unstable, symptomatic or H/H drops below 7/21.  - Patient has not received any PRBC transfusions to date  - Patient's anemia is currently stable  Major depressive disorder, single episode, severe without psychotic features  Anxiety and depression  Patient has recurrent depression which is moderate and is currently uncontrolled. Will Increase anti-depressant medications. We will not consult psychiatry at this time. Patient does not display psychosis at this time. Continue to monitor closely and adjust plan of care as needed.  Start Cymbalta.      S/P parathyroidectomy  noted    Atherosclerosis of native coronary artery of native heart without angina pectoris  Patient with known CAD, which is controlled Will continue Statin and monitor for S/Sx of angina/ACS. Continue to monitor on telemetry.   Ric ADAMS  Noted- avoid beta blockers    Vascular dementia  Patient with dementia with likely etiology of vascular dementia. Dementia is moderate. The patient does have signs of behavioral disturbance. Started on aricept. Continue  non-pharmacologic interventions to prevent delirium (No VS between 11PM-5AM, activity during day, opening blinds, providing glasses/hearing aids, and up in chair during daytime). Will avoid narcotics and benzos unless absolutely necessary. PRN anti-psychotics are not prescribed to avoid self harm behaviors.  VTE Risk Mitigation (From admission, onward)           Ordered     IP VTE HIGH RISK PATIENT  Once         04/15/25 0608     Place sequential compression device  Until discontinued         04/15/25 0608                    Discharge Planning   DEVONTE: 4/18/2025     Code Status: Full Code   Medical Readiness for Discharge Date:   Discharge Plan A: Home Health   Discharge Delays: None known at this time            Please place Justification for DME        Ivett Cesar MD  Department of Hospital Medicine   [facility]

## 2025-04-19 NOTE — PT/OT/SLP PROGRESS
Physical Therapy Treatment    Patient Name:  Rajendra Castle   MRN:  [id number]    Recommendations:     Discharge Recommendations: Low Intensity Therapy  Discharge Equipment Recommendations: walker, rolling  Barriers to discharge: None    Assessment:     Rajendra Castle is a 79 y.o. male admitted with a medical diagnosis of Hypertensive emergency.  He presents with the following impairments/functional limitations: weakness, impaired endurance, impaired balance.    Pt found supine in bed at start of session. Pt agreeable to session at this time. Supine<>sit bed mobility SBA. Sit<>stand transfers SBA with RW. Verbal cues used to help with proper sequencing and impulsivity. Pt ambulated 200' with RW with CGA to Min A. Pt with increased impulsivity with transfers and walking. Min A used with ambulation to decrease pt walking into objects.    Rehab Prognosis: Fair; patient would benefit from acute skilled PT services to address these deficits and reach maximum level of function.    Recent Surgery: * No surgery found *      Plan:     During this hospitalization, patient to be seen 6 x/week to address the identified rehab impairments via gait training, therapeutic activities, therapeutic exercises and progress toward the following goals:    Plan of Care Expires:  05/15/25    Subjective     Chief Complaint: none verbalized  Patient/Family Comments/goals: none verbalized  Pain/Comfort:  Pain Rating 1: 0/10      Objective:     Communicated with RNDonna, prior to session.  Patient found supine with telemetry, bed alarm, peripheral IV upon PT entry to room.     General Precautions: Standard, fall, vision impaired  Orthopedic Precautions:    Braces:    Respiratory Status: Room air     Functional Mobility:  Bed Mobility:     Supine to Sit: stand by assistance  Transfers:     Sit to Stand:  stand by assistance with rolling walker and verbal cues  Gait: 200' with RW, CGA to Min A, and verbal cues      AM-PAC 6 CLICK MOBILITY  Turning  over in bed (including adjusting bedclothes, sheets and blankets)?: 4  Sitting down on and standing up from a chair with arms (e.g., wheelchair, bedside commode, etc.): 3  Moving from lying on back to sitting on the side of the bed?: 4  Moving to and from a bed to a chair (including a wheelchair)?: 3  Need to walk in hospital room?: 3  Climbing 3-5 steps with a railing?: 3  Basic Mobility Total Score: 20       Treatment & Education:  Spoke with pt about using call button to notify RN.    Patient left up in chair with all lines intact, call button in reach, chair alarm on, and RN notified..    GOALS:   Multidisciplinary Problems       Physical Therapy Goals          Problem: Physical Therapy    Goal Priority Disciplines Outcome Interventions   Physical Therapy Goal     PT, PT/OT     Description: Goals to be met by: 5/15/2025     Patient will increase functional independence with mobility by performin. Supine to sit with Modified Haakon  2. Sit to stand transfer with Modified Haakon  3. Gait  x 300  feet with Modified Haakon using Rolling Walker.                          DME Justifications:  No DME recommended requiring DME justifications    Time Tracking:     PT Received On: 25  PT Start Time: 1126     PT Stop Time: 1141  PT Total Time (min): 15 min     Billable Minutes: Gait Training 15    Treatment Type: Treatment  PT/PTA: PT     Number of PTA visits since last PT visit: 0     2025

## 2025-04-19 NOTE — ASSESSMENT & PLAN NOTE
Anemia is likely due to chronic disease due to Chronic Kidney Disease. Most recent hemoglobin and hematocrit are listed below.  Recent Labs     04/17/25  0417 04/18/25  0444 04/19/25  0600   HGB 11.9* 12.3* 12.0*   HCT 38.7* 38.6* 38.5*     Plan  - Monitor serial CBC: Daily  - Transfuse PRBC if patient becomes hemodynamically unstable, symptomatic or H/H drops below 7/21.  - Patient has not received any PRBC transfusions to date  - Patient's anemia is currently stable

## 2025-04-19 NOTE — ASSESSMENT & PLAN NOTE
Patient has a current diagnosis of hypertensive urgency/emergency diagnosis: hypertensive emergency with end organ damage evidenced by hypertensive encephalopathy which is controlled.  Latest blood pressure and vitals reviewed-   Temp:  [98.3 °F (36.8 °C)-99.5 °F (37.5 °C)]   Pulse:  [60-80]   Resp:  [15-19]   BP: (158-178)/(53-76)   SpO2:  [92 %-98 %] .   Patient currently off IV antihypertensives.   Home meds for hypertension were reviewed and noted below.   Hypertension Medications      BB, CCB, HCTZ and losartan    Patient controlled on oral meds

## 2025-04-19 NOTE — SUBJECTIVE & OBJECTIVE
Interval History:  Patient with no subjective complaints at this time.  He remains hemodynamically stable.  Still pending placement.    Review of Systems   All other systems reviewed and are negative.    Objective:     Vital Signs (Most Recent):  Temp: 99 °F (37.2 °C) (04/19/25 1146)  Pulse: 76 (04/19/25 1146)  Resp: 19 (04/19/25 1146)  BP: (!) 160/76 (04/19/25 1146)  SpO2: (!) 92 % (04/19/25 1146) Vital Signs (24h Range):  Temp:  [98.3 °F (36.8 °C)-99.5 °F (37.5 °C)] 99 °F (37.2 °C)  Pulse:  [60-80] 76  Resp:  [15-19] 19  SpO2:  [92 %-98 %] 92 %  BP: (158-178)/(53-76) 160/76     Weight: 62.6 kg (138 lb 0.1 oz)  Body mass index is 20.98 kg/m².    Intake/Output Summary (Last 24 hours) at 4/19/2025 1537  Last data filed at 4/19/2025 1433  Gross per 24 hour   Intake 240 ml   Output 600 ml   Net -360 ml         Physical Exam  Vitals and nursing note reviewed.   Eyes:      Pupils: Pupils are equal, round, and reactive to light.   Neck:      Vascular: No JVD.   Cardiovascular:      Rate and Rhythm: Normal rate and regular rhythm.      Heart sounds: Normal heart sounds.   Pulmonary:      Effort: Pulmonary effort is normal.      Breath sounds: Normal breath sounds.   Abdominal:      General: Bowel sounds are normal. There is no distension.      Palpations: Abdomen is soft.      Tenderness: There is no abdominal tenderness.   Musculoskeletal:         General: No deformity.   Lymphadenopathy:      Cervical: No cervical adenopathy.   Skin:     Coloration: Skin is not pale.      Findings: No rash.               Significant Labs: All pertinent labs within the past 24 hours have been reviewed.  BMP:   Recent Labs   Lab 04/19/25  0600   MG 1.9     CBC:   Recent Labs   Lab 04/18/25  0444 04/19/25  0600   WBC 6.07 6.46   HGB 12.3* 12.0*   HCT 38.6* 38.5*    313       Significant Imaging: I have reviewed all pertinent imaging results/findings within the past 24 hours.

## 2025-04-19 NOTE — PLAN OF CARE
Problem: Fall Injury Risk  Goal: Absence of Fall and Fall-Related Injury  Outcome: Progressing     Problem: Infection  Goal: Absence of Infection Signs and Symptoms  Outcome: Progressing     Problem: Skin Injury Risk Increased  Goal: Skin Health and Integrity  Outcome: Progressing

## 2025-04-20 LAB
ABSOLUTE EOSINOPHIL (SMH): 0.28 K/UL
ABSOLUTE MONOCYTE (SMH): 1.05 K/UL (ref 0.3–1)
ABSOLUTE NEUTROPHIL COUNT (SMH): 4.2 K/UL (ref 1.8–7.7)
ANION GAP (SMH): 14 MMOL/L (ref 8–16)
BASOPHILS # BLD AUTO: 0.02 K/UL
BASOPHILS NFR BLD AUTO: 0.3 %
BUN SERPL-MCNC: 16 MG/DL (ref 8–23)
CALCIUM SERPL-MCNC: 8.8 MG/DL (ref 8.7–10.5)
CHLORIDE SERPL-SCNC: 105 MMOL/L (ref 95–110)
CO2 SERPL-SCNC: 16 MMOL/L (ref 23–29)
CREAT SERPL-MCNC: 1.3 MG/DL (ref 0.5–1.4)
ERYTHROCYTE [DISTWIDTH] IN BLOOD BY AUTOMATED COUNT: 16.6 % (ref 11.5–14.5)
GFR SERPLBLD CREATININE-BSD FMLA CKD-EPI: 57 ML/MIN/1.73/M2
GLUCOSE SERPL-MCNC: 94 MG/DL (ref 70–110)
HCT VFR BLD AUTO: 37.9 % (ref 40–54)
HGB BLD-MCNC: 11.6 GM/DL (ref 14–18)
IMM GRANULOCYTES # BLD AUTO: 0.03 K/UL (ref 0–0.04)
IMM GRANULOCYTES NFR BLD AUTO: 0.4 % (ref 0–0.5)
LYMPHOCYTES # BLD AUTO: 1.2 K/UL (ref 1–4.8)
MAGNESIUM SERPL-MCNC: 1.9 MG/DL (ref 1.6–2.6)
MCH RBC QN AUTO: 26.7 PG (ref 27–31)
MCHC RBC AUTO-ENTMCNC: 30.6 G/DL (ref 32–36)
MCV RBC AUTO: 87 FL (ref 82–98)
NUCLEATED RBC (/100WBC) (SMH): 0 /100 WBC
PLATELET # BLD AUTO: 307 K/UL (ref 150–450)
PMV BLD AUTO: 12.1 FL (ref 9.2–12.9)
POTASSIUM SERPL-SCNC: 4.1 MMOL/L (ref 3.5–5.1)
RBC # BLD AUTO: 4.35 M/UL (ref 4.6–6.2)
RELATIVE EOSINOPHIL (SMH): 4.1 % (ref 0–8)
RELATIVE LYMPHOCYTE (SMH): 17.7 % (ref 18–48)
RELATIVE MONOCYTE (SMH): 15.5 % (ref 4–15)
RELATIVE NEUTROPHIL (SMH): 62 % (ref 38–73)
SODIUM SERPL-SCNC: 135 MMOL/L (ref 136–145)
WBC # BLD AUTO: 6.79 K/UL (ref 3.9–12.7)

## 2025-04-20 PROCEDURE — 25000003 PHARM REV CODE 250: Performed by: HOSPITALIST

## 2025-04-20 PROCEDURE — 85025 COMPLETE CBC W/AUTO DIFF WBC: CPT | Performed by: HOSPITALIST

## 2025-04-20 PROCEDURE — 83735 ASSAY OF MAGNESIUM: CPT | Performed by: HOSPITALIST

## 2025-04-20 PROCEDURE — 25000003 PHARM REV CODE 250: Performed by: NURSE PRACTITIONER

## 2025-04-20 PROCEDURE — 63600175 PHARM REV CODE 636 W HCPCS: Performed by: INTERNAL MEDICINE

## 2025-04-20 PROCEDURE — 12000002 HC ACUTE/MED SURGE SEMI-PRIVATE ROOM

## 2025-04-20 PROCEDURE — 36415 COLL VENOUS BLD VENIPUNCTURE: CPT | Performed by: HOSPITALIST

## 2025-04-20 PROCEDURE — 25000003 PHARM REV CODE 250

## 2025-04-20 PROCEDURE — 80048 BASIC METABOLIC PNL TOTAL CA: CPT

## 2025-04-20 RX ORDER — AMLODIPINE BESYLATE 5 MG/1
5 TABLET ORAL DAILY
Status: DISCONTINUED | OUTPATIENT
Start: 2025-04-21 | End: 2025-04-26 | Stop reason: HOSPADM

## 2025-04-20 RX ADMIN — Medication 6 MG: at 08:04

## 2025-04-20 RX ADMIN — AMOXICILLIN AND CLAVULANATE POTASSIUM 1 TABLET: 875; 125 TABLET, FILM COATED ORAL at 08:04

## 2025-04-20 RX ADMIN — HYDROCHLOROTHIAZIDE 12.5 MG: 12.5 TABLET ORAL at 09:04

## 2025-04-20 RX ADMIN — DULOXETINE HYDROCHLORIDE 20 MG: 20 CAPSULE, DELAYED RELEASE ORAL at 08:04

## 2025-04-20 RX ADMIN — AMOXICILLIN AND CLAVULANATE POTASSIUM 1 TABLET: 875; 125 TABLET, FILM COATED ORAL at 09:04

## 2025-04-20 RX ADMIN — BUSPIRONE HYDROCHLORIDE 15 MG: 5 TABLET ORAL at 08:04

## 2025-04-20 RX ADMIN — LOSARTAN POTASSIUM 100 MG: 50 TABLET, FILM COATED ORAL at 09:04

## 2025-04-20 RX ADMIN — AMLODIPINE BESYLATE 2.5 MG: 2.5 TABLET ORAL at 09:04

## 2025-04-20 RX ADMIN — HYDRALAZINE HYDROCHLORIDE 10 MG: 20 INJECTION, SOLUTION INTRAMUSCULAR; INTRAVENOUS at 05:04

## 2025-04-20 RX ADMIN — BUSPIRONE HYDROCHLORIDE 15 MG: 5 TABLET ORAL at 04:04

## 2025-04-20 RX ADMIN — BUSPIRONE HYDROCHLORIDE 15 MG: 5 TABLET ORAL at 09:04

## 2025-04-20 RX ADMIN — ATORVASTATIN CALCIUM 20 MG: 20 TABLET, FILM COATED ORAL at 08:04

## 2025-04-20 RX ADMIN — DONEPEZIL HYDROCHLORIDE 5 MG: 5 TABLET, FILM COATED ORAL at 08:04

## 2025-04-20 RX ADMIN — ZOLPIDEM TARTRATE 5 MG: 5 TABLET, FILM COATED ORAL at 11:04

## 2025-04-20 RX ADMIN — TAMSULOSIN HYDROCHLORIDE 0.4 MG: 0.4 CAPSULE ORAL at 09:04

## 2025-04-20 RX ADMIN — DULOXETINE HYDROCHLORIDE 20 MG: 20 CAPSULE, DELAYED RELEASE ORAL at 09:04

## 2025-04-20 NOTE — ASSESSMENT & PLAN NOTE
Patient has a current diagnosis of hypertensive urgency/emergency diagnosis: hypertensive emergency with end organ damage evidenced by hypertensive encephalopathy which is controlled.  Latest blood pressure and vitals reviewed-   Temp:  [98.3 °F (36.8 °C)-99 °F (37.2 °C)]   Pulse:  [57-80]   Resp:  [17-19]   BP: (109-176)/(61-75)   SpO2:  [94 %-97 %] .   Patient currently off IV antihypertensives.   Home meds for hypertension were reviewed and noted below.   Hypertension Medications      BB, CCB, HCTZ and losartan    Patient controlled on oral meds

## 2025-04-20 NOTE — PROGRESS NOTES
"[facility]  Progress Note    Patient Name: Rajendra Castle  MRN: [id number]  Patient Class: IP- Inpatient   Admission Date: 4/14/2025  Length of Stay: 5 days  Attending Physician: Ivett Cesar MD  Primary Care Provider: Sp Lilly MD        Subjective     Principal Problem:Hypertensive emergency        HPI:  Patient is a 79-year-old  male who only reports he has "prostate issues" and hypertension; he takes his medications but does not check his blood pressure at home  He is a vague historian, but review of the records indicate that he has multiple medical issues including hypertension, prediabetes, CKD -3, GERD, hyperparathyroidism (status post parathyroidectomy), rheumatoid lung, chronic pulmonary embolism, anxiety/depression, and ureteral cancer  Around 3:00 p.m. in the afternoon, the patient had numbness/tingling in his bilateral hands/feet and developed a headache (with pressure behind his eyes) as well as dizziness; he endorsed chest tightness to the ER but not to me but does state that he felt weak all over particularly in his legs and was staggering  In the ER, his blood pressure goes highest of the to 10s-> now 160s after Cardene infusion and 2 doses of IV Hydralazine/a dose of Labetalol  He had no leukocytosis with a hemoglobin of 11.8 normal platelets; COVID/influenza testing were negative  Creatinine 1.3 (below baseline) with a potassium of 3.1; Mag was 1.9  He did not have a UTI; TSH was normal  BNP 5217 and troponin went from 22-> 36-> 72.7 (at the time of this editing)  Lipid panel showed an LDL of 61  EKG showed, per my interpretation, sinus rhythm 71 beats per minute with a QTC of 489 with biphasic T-waves in several leads and this has been seen previously  Chest x-ray was unrevealing; CT head without contrast showed, per radiologist Dr. Reyes:    "Impression:     No evidence of acute intracranial abnormality or fracture.   " "  Microvascular chronic ischemic changes and senescent atrophic changes.     Otomastoiditis on the left and mastoiditis on the right with possible small amount of fluid also in the middle ear on the right.  With prior exam demonstrating mastoiditis on the right only."    The patient received aspirin around 3:00 a.m., Tylenol, calcium gluconate, oral potassium I Ativan, in the aforementioned antihypertensives and was transferred to the ICU for further diagnosis, treatment, and care    Overview/Hospital Course:  Patient was admitted to the hospital with worsening neurologic deficits and a markedly elevated blood pressure.  Initial etiology was thought to be acute stroke, and patient was seen by tele stroke, underwent MRI which showed potential acute lesion.  However, this was not corroborated with the patient's symptoms.  Repeat CT angiogram showed no evidence of any acute lesions, therefore stroke neurologist stated this is likely not an acute stroke.  Patient was continued for hypertensive emergency.  Home blood pressure medication was restarted, and blood pressure improved over the course of his hospitalization.  Patient was also found to have an acute urinary tract infection and treatment was initiated for such.  Patient was also reported to be depressed and anxious, and SSRI was started at his request.  Blood pressure slowly returned back to baseline of 150s to 170s systolic.  Echocardiogram was done which showed evidence of concentric remodeling but otherwise stable.Patient with noted sundowning to indicate likely vascular dementia. STarted on aricept.    Interval History:  Patient seen and examined this morning.  Resting comfortably in no acute distress.  Still pending placement.    Review of Systems   All other systems reviewed and are negative.    Objective:     Vital Signs (Most Recent):  Temp: 99 °F (37.2 °C) (04/20/25 1545)  Pulse: 69 (04/20/25 1545)  Resp: 18 (04/20/25 1545)  BP: (!) 175/70 (04/20/25 " 1545)  SpO2: 96 % (04/20/25 1545) Vital Signs (24h Range):  Temp:  [98.3 °F (36.8 °C)-99 °F (37.2 °C)] 99 °F (37.2 °C)  Pulse:  [57-80] 69  Resp:  [17-19] 18  SpO2:  [94 %-97 %] 96 %  BP: (109-176)/(61-75) 175/70     Weight: 62.6 kg (138 lb 0.1 oz)  Body mass index is 20.98 kg/m².    Intake/Output Summary (Last 24 hours) at 4/20/2025 1550  Last data filed at 4/20/2025 1221  Gross per 24 hour   Intake 600 ml   Output 760 ml   Net -160 ml         Physical Exam  Vitals and nursing note reviewed.   Eyes:      Pupils: Pupils are equal, round, and reactive to light.   Neck:      Vascular: No JVD.   Cardiovascular:      Rate and Rhythm: Normal rate and regular rhythm.      Heart sounds: Normal heart sounds.   Pulmonary:      Effort: Pulmonary effort is normal.      Breath sounds: Normal breath sounds.   Abdominal:      General: Bowel sounds are normal. There is no distension.      Palpations: Abdomen is soft.      Tenderness: There is no abdominal tenderness.   Musculoskeletal:         General: No deformity.   Lymphadenopathy:      Cervical: No cervical adenopathy.   Skin:     Coloration: Skin is not pale.      Findings: No rash.               Significant Labs: All pertinent labs within the past 24 hours have been reviewed.  BMP:   Recent Labs   Lab 04/20/25  0512 04/20/25  0516   *  --    K 4.1  --    CO2 16*  --    BUN 16  --    CREATININE 1.3  --    CALCIUM 8.8  --    MG  --  1.9     CBC:   Recent Labs   Lab 04/19/25  0600 04/20/25  0516   WBC 6.46 6.79   HGB 12.0* 11.6*   HCT 38.5* 37.9*    307       Significant Imaging: I have reviewed all pertinent imaging results/findings within the past 24 hours.      Assessment & Plan  Hypertensive emergency  Patient has a current diagnosis of hypertensive urgency/emergency diagnosis: hypertensive emergency with end organ damage evidenced by hypertensive encephalopathy which is controlled.  Latest blood pressure and vitals reviewed-   Temp:  [98.3 °F (36.8 °C)-99 °F  (37.2 °C)]   Pulse:  [57-80]   Resp:  [17-19]   BP: (109-176)/(61-75)   SpO2:  [94 %-97 %] .   Patient currently off IV antihypertensives.   Home meds for hypertension were reviewed and noted below.   Hypertension Medications      BB, CCB, HCTZ and losartan    Patient controlled on oral meds       CKD (chronic kidney disease) stage 3, GFR 30-59 ml/min  Creatine stable for now. BMP reviewed- noted Estimated Creatinine Clearance: 42.1 mL/min (based on SCr of 1.3 mg/dL). according to latest data. Based on current GFR, CKD stage is stage 3 - GFR 30-59.  Monitor UOP and serial BMP and adjust therapy as needed. Renally dose meds. Avoid nephrotoxic medications and procedures.      Anemia  Anemia is likely due to chronic disease due to Chronic Kidney Disease. Most recent hemoglobin and hematocrit are listed below.  Recent Labs     04/18/25  0444 04/19/25  0600 04/20/25  0516   HGB 12.3* 12.0* 11.6*   HCT 38.6* 38.5* 37.9*     Plan  - Monitor serial CBC: Daily  - Transfuse PRBC if patient becomes hemodynamically unstable, symptomatic or H/H drops below 7/21.  - Patient has not received any PRBC transfusions to date  - Patient's anemia is currently stable  Major depressive disorder, single episode, severe without psychotic features  Anxiety and depression  Patient has recurrent depression which is moderate and is currently uncontrolled. Will Increase anti-depressant medications. We will not consult psychiatry at this time. Patient does not display psychosis at this time. Continue to monitor closely and adjust plan of care as needed.  Start Cymbalta.      S/P parathyroidectomy  noted    Atherosclerosis of native coronary artery of native heart without angina pectoris  Patient with known CAD, which is controlled Will continue Statin and monitor for S/Sx of angina/ACS. Continue to monitor on telemetry.   Ric ADAMS  Noted- avoid beta blockers    Vascular dementia  Patient with dementia with likely etiology of vascular dementia.  Dementia is moderate. The patient does have signs of behavioral disturbance. Started on aricept. Continue non-pharmacologic interventions to prevent delirium (No VS between 11PM-5AM, activity during day, opening blinds, providing glasses/hearing aids, and up in chair during daytime). Will avoid narcotics and benzos unless absolutely necessary. PRN anti-psychotics are not prescribed to avoid self harm behaviors.  VTE Risk Mitigation (From admission, onward)           Ordered     IP VTE HIGH RISK PATIENT  Once         04/15/25 0608     Place sequential compression device  Until discontinued         04/15/25 0608                    Discharge Planning   DEVONTE: 4/21/2025     Code Status: Full Code   Medical Readiness for Discharge Date:   Discharge Plan A: Home Health   Discharge Delays: None known at this time            Please place Justification for DME        Ivett Cesar MD  Department of Hospital Medicine   [facility]

## 2025-04-20 NOTE — SUBJECTIVE & OBJECTIVE
Interval History:  Patient seen and examined this morning.  Resting comfortably in no acute distress.  Still pending placement.    Review of Systems   All other systems reviewed and are negative.    Objective:     Vital Signs (Most Recent):  Temp: 99 °F (37.2 °C) (04/20/25 1545)  Pulse: 69 (04/20/25 1545)  Resp: 18 (04/20/25 1545)  BP: (!) 175/70 (04/20/25 1545)  SpO2: 96 % (04/20/25 1545) Vital Signs (24h Range):  Temp:  [98.3 °F (36.8 °C)-99 °F (37.2 °C)] 99 °F (37.2 °C)  Pulse:  [57-80] 69  Resp:  [17-19] 18  SpO2:  [94 %-97 %] 96 %  BP: (109-176)/(61-75) 175/70     Weight: 62.6 kg (138 lb 0.1 oz)  Body mass index is 20.98 kg/m².    Intake/Output Summary (Last 24 hours) at 4/20/2025 1550  Last data filed at 4/20/2025 1221  Gross per 24 hour   Intake 600 ml   Output 760 ml   Net -160 ml         Physical Exam  Vitals and nursing note reviewed.   Eyes:      Pupils: Pupils are equal, round, and reactive to light.   Neck:      Vascular: No JVD.   Cardiovascular:      Rate and Rhythm: Normal rate and regular rhythm.      Heart sounds: Normal heart sounds.   Pulmonary:      Effort: Pulmonary effort is normal.      Breath sounds: Normal breath sounds.   Abdominal:      General: Bowel sounds are normal. There is no distension.      Palpations: Abdomen is soft.      Tenderness: There is no abdominal tenderness.   Musculoskeletal:         General: No deformity.   Lymphadenopathy:      Cervical: No cervical adenopathy.   Skin:     Coloration: Skin is not pale.      Findings: No rash.               Significant Labs: All pertinent labs within the past 24 hours have been reviewed.  BMP:   Recent Labs   Lab 04/20/25  0512 04/20/25  0516   *  --    K 4.1  --    CO2 16*  --    BUN 16  --    CREATININE 1.3  --    CALCIUM 8.8  --    MG  --  1.9     CBC:   Recent Labs   Lab 04/19/25  0600 04/20/25  0516   WBC 6.46 6.79   HGB 12.0* 11.6*   HCT 38.5* 37.9*    307       Significant Imaging: I have reviewed all pertinent  imaging results/findings within the past 24 hours.

## 2025-04-20 NOTE — ASSESSMENT & PLAN NOTE
Anemia is likely due to chronic disease due to Chronic Kidney Disease. Most recent hemoglobin and hematocrit are listed below.  Recent Labs     04/18/25  0444 04/19/25  0600 04/20/25  0516   HGB 12.3* 12.0* 11.6*   HCT 38.6* 38.5* 37.9*     Plan  - Monitor serial CBC: Daily  - Transfuse PRBC if patient becomes hemodynamically unstable, symptomatic or H/H drops below 7/21.  - Patient has not received any PRBC transfusions to date  - Patient's anemia is currently stable

## 2025-04-21 LAB
ANION GAP (SMH): 8 MMOL/L (ref 8–16)
BUN SERPL-MCNC: 22 MG/DL (ref 8–23)
CALCIUM SERPL-MCNC: 8.7 MG/DL (ref 8.7–10.5)
CHLORIDE SERPL-SCNC: 106 MMOL/L (ref 95–110)
CO2 SERPL-SCNC: 22 MMOL/L (ref 23–29)
CREAT SERPL-MCNC: 1.5 MG/DL (ref 0.5–1.4)
GFR SERPLBLD CREATININE-BSD FMLA CKD-EPI: 48 ML/MIN/1.73/M2
GLUCOSE SERPL-MCNC: 102 MG/DL (ref 70–110)
POTASSIUM SERPL-SCNC: 4.2 MMOL/L (ref 3.5–5.1)
SODIUM SERPL-SCNC: 136 MMOL/L (ref 136–145)

## 2025-04-21 PROCEDURE — 63600175 PHARM REV CODE 636 W HCPCS

## 2025-04-21 PROCEDURE — 36415 COLL VENOUS BLD VENIPUNCTURE: CPT

## 2025-04-21 PROCEDURE — 25000003 PHARM REV CODE 250

## 2025-04-21 PROCEDURE — 25000003 PHARM REV CODE 250: Performed by: HOSPITALIST

## 2025-04-21 PROCEDURE — 12000002 HC ACUTE/MED SURGE SEMI-PRIVATE ROOM

## 2025-04-21 PROCEDURE — 82310 ASSAY OF CALCIUM: CPT

## 2025-04-21 PROCEDURE — 97530 THERAPEUTIC ACTIVITIES: CPT | Mod: CQ

## 2025-04-21 RX ORDER — DULOXETIN HYDROCHLORIDE 20 MG/1
20 CAPSULE, DELAYED RELEASE ORAL 2 TIMES DAILY
Qty: 90 CAPSULE | Refills: 0 | Status: SHIPPED | OUTPATIENT
Start: 2025-04-21 | End: 2026-04-21

## 2025-04-21 RX ORDER — AMLODIPINE BESYLATE 5 MG/1
5 TABLET ORAL DAILY
Qty: 90 TABLET | Refills: 0 | Status: SHIPPED | OUTPATIENT
Start: 2025-04-22 | End: 2026-04-22

## 2025-04-21 RX ORDER — AMOXICILLIN AND CLAVULANATE POTASSIUM 875; 125 MG/1; MG/1
1 TABLET, FILM COATED ORAL EVERY 12 HOURS
Qty: 12 TABLET | Refills: 0 | Status: SHIPPED | OUTPATIENT
Start: 2025-04-21

## 2025-04-21 RX ORDER — METOPROLOL SUCCINATE 25 MG/1
12.5 TABLET, EXTENDED RELEASE ORAL DAILY
Qty: 45 TABLET | Refills: 0 | Status: SHIPPED | OUTPATIENT
Start: 2025-04-22 | End: 2026-04-22

## 2025-04-21 RX ORDER — LOSARTAN POTASSIUM 100 MG/1
100 TABLET ORAL DAILY
Qty: 90 TABLET | Refills: 0 | Status: SHIPPED | OUTPATIENT
Start: 2025-04-22 | End: 2026-04-22

## 2025-04-21 RX ADMIN — TAMSULOSIN HYDROCHLORIDE 0.4 MG: 0.4 CAPSULE ORAL at 09:04

## 2025-04-21 RX ADMIN — LOSARTAN POTASSIUM 100 MG: 50 TABLET, FILM COATED ORAL at 09:04

## 2025-04-21 RX ADMIN — DONEPEZIL HYDROCHLORIDE 5 MG: 5 TABLET, FILM COATED ORAL at 08:04

## 2025-04-21 RX ADMIN — ATORVASTATIN CALCIUM 20 MG: 20 TABLET, FILM COATED ORAL at 08:04

## 2025-04-21 RX ADMIN — SODIUM CHLORIDE, POTASSIUM CHLORIDE, SODIUM LACTATE AND CALCIUM CHLORIDE 500 ML: 600; 310; 30; 20 INJECTION, SOLUTION INTRAVENOUS at 09:04

## 2025-04-21 RX ADMIN — BUSPIRONE HYDROCHLORIDE 15 MG: 5 TABLET ORAL at 09:04

## 2025-04-21 RX ADMIN — DULOXETINE HYDROCHLORIDE 20 MG: 20 CAPSULE, DELAYED RELEASE ORAL at 08:04

## 2025-04-21 RX ADMIN — AMLODIPINE BESYLATE 5 MG: 5 TABLET ORAL at 09:04

## 2025-04-21 RX ADMIN — AMOXICILLIN AND CLAVULANATE POTASSIUM 1 TABLET: 875; 125 TABLET, FILM COATED ORAL at 08:04

## 2025-04-21 RX ADMIN — BUSPIRONE HYDROCHLORIDE 15 MG: 5 TABLET ORAL at 08:04

## 2025-04-21 RX ADMIN — AMOXICILLIN AND CLAVULANATE POTASSIUM 1 TABLET: 875; 125 TABLET, FILM COATED ORAL at 09:04

## 2025-04-21 RX ADMIN — BUSPIRONE HYDROCHLORIDE 15 MG: 5 TABLET ORAL at 03:04

## 2025-04-21 RX ADMIN — HYDROCHLOROTHIAZIDE 12.5 MG: 12.5 TABLET ORAL at 09:04

## 2025-04-21 RX ADMIN — DULOXETINE HYDROCHLORIDE 20 MG: 20 CAPSULE, DELAYED RELEASE ORAL at 09:04

## 2025-04-21 RX ADMIN — METOPROLOL SUCCINATE 12.5 MG: 25 TABLET, EXTENDED RELEASE ORAL at 09:04

## 2025-04-21 NOTE — PROGRESS NOTES
"[facility] Medicine  Progress Note    Patient Name: Rajendra Castle  MRN: [id number]  Patient Class: IP- Inpatient   Admission Date: 4/14/2025  Length of Stay: 6 days  Attending Physician: Ivett Cesar MD  Primary Care Provider: pS Lilly MD        Subjective     Principal Problem:Hypertensive emergency        HPI:  Patient is a 79-year-old  male who only reports he has "prostate issues" and hypertension; he takes his medications but does not check his blood pressure at home  He is a vague historian, but review of the records indicate that he has multiple medical issues including hypertension, prediabetes, CKD -3, GERD, hyperparathyroidism (status post parathyroidectomy), rheumatoid lung, chronic pulmonary embolism, anxiety/depression, and ureteral cancer  Around 3:00 p.m. in the afternoon, the patient had numbness/tingling in his bilateral hands/feet and developed a headache (with pressure behind his eyes) as well as dizziness; he endorsed chest tightness to the ER but not to me but does state that he felt weak all over particularly in his legs and was staggering  In the ER, his blood pressure goes highest of the to 10s-> now 160s after Cardene infusion and 2 doses of IV Hydralazine/a dose of Labetalol  He had no leukocytosis with a hemoglobin of 11.8 normal platelets; COVID/influenza testing were negative  Creatinine 1.3 (below baseline) with a potassium of 3.1; Mag was 1.9  He did not have a UTI; TSH was normal  BNP 5217 and troponin went from 22-> 36-> 72.7 (at the time of this editing)  Lipid panel showed an LDL of 61  EKG showed, per my interpretation, sinus rhythm 71 beats per minute with a QTC of 489 with biphasic T-waves in several leads and this has been seen previously  Chest x-ray was unrevealing; CT head without contrast showed, per radiologist Dr. Reyes:    "Impression:     No evidence of acute intracranial abnormality or fracture.   " "  Microvascular chronic ischemic changes and senescent atrophic changes.     Otomastoiditis on the left and mastoiditis on the right with possible small amount of fluid also in the middle ear on the right.  With prior exam demonstrating mastoiditis on the right only."    The patient received aspirin around 3:00 a.m., Tylenol, calcium gluconate, oral potassium I Ativan, in the aforementioned antihypertensives and was transferred to the ICU for further diagnosis, treatment, and care    Overview/Hospital Course:  Patient was admitted to the hospital with worsening neurologic deficits and a markedly elevated blood pressure.  Initial etiology was thought to be acute stroke, and patient was seen by tele stroke, underwent MRI which showed potential acute lesion.  However, this was not corroborated with the patient's symptoms.  Repeat CT angiogram showed no evidence of any acute lesions, therefore stroke neurologist stated this is likely not an acute stroke.  Patient was continued for hypertensive emergency.  Home blood pressure medication was restarted, and blood pressure improved over the course of his hospitalization.  Patient was also found to have an acute urinary tract infection and treatment was initiated for such.  Patient was also reported to be depressed and anxious, and SSRI was started at his request.  Blood pressure slowly returned back to baseline of 150s to 170s systolic.  Echocardiogram was done which showed evidence of concentric remodeling but otherwise stable.Patient with noted sundowning to indicate likely vascular dementia. STarted on aricept.    Interval History:  Patient is working well with physical therapy, however, he has concerns cognitively about being able to care for himself.  Patient with good attention span during this interview, however, his cognitive competence does wax and wane.    Review of Systems   All other systems reviewed and are negative.    Objective:     Vital Signs (Most " Recent):  Temp: 98.1 °F (36.7 °C) (04/21/25 1650)  Pulse: (!) 58 (04/21/25 1650)  Resp: 18 (04/21/25 1650)  BP: 138/60 (04/21/25 1650)  SpO2: (!) 93 % (04/21/25 1650) Vital Signs (24h Range):  Temp:  [97.9 °F (36.6 °C)-98.3 °F (36.8 °C)] 98.1 °F (36.7 °C)  Pulse:  [58-79] 58  Resp:  [18] 18  SpO2:  [92 %-96 %] 93 %  BP: (113-161)/(52-71) 138/60     Weight: 62.6 kg (138 lb 0.1 oz)  Body mass index is 20.98 kg/m².    Intake/Output Summary (Last 24 hours) at 4/21/2025 1651  Last data filed at 4/21/2025 1148  Gross per 24 hour   Intake 300 ml   Output 1210 ml   Net -910 ml         Physical Exam  Vitals and nursing note reviewed.   Eyes:      Pupils: Pupils are equal, round, and reactive to light.   Neck:      Vascular: No JVD.   Cardiovascular:      Rate and Rhythm: Normal rate and regular rhythm.      Heart sounds: Normal heart sounds.   Pulmonary:      Effort: Pulmonary effort is normal.      Breath sounds: Normal breath sounds.   Abdominal:      General: Bowel sounds are normal. There is no distension.      Palpations: Abdomen is soft.      Tenderness: There is no abdominal tenderness.   Musculoskeletal:         General: No deformity.   Lymphadenopathy:      Cervical: No cervical adenopathy.   Skin:     Coloration: Skin is not pale.      Findings: No rash.               Significant Labs: All pertinent labs within the past 24 hours have been reviewed.  BMP:   Recent Labs   Lab 04/20/25  0516 04/21/25  0526   NA  --  136   K  --  4.2   CO2  --  22*   BUN  --  22   CREATININE  --  1.5*   CALCIUM  --  8.7   MG 1.9  --      CBC:   Recent Labs   Lab 04/20/25 0516   WBC 6.79   HGB 11.6*   HCT 37.9*          Significant Imaging: I have reviewed all pertinent imaging results/findings within the past 24 hours.      Assessment & Plan  Hypertensive emergency  Patient has a current diagnosis of hypertensive urgency/emergency diagnosis: hypertensive emergency with end organ damage evidenced by hypertensive encephalopathy  which is controlled.  Latest blood pressure and vitals reviewed-   Temp:  [97.9 °F (36.6 °C)-98.3 °F (36.8 °C)]   Pulse:  [58-79]   Resp:  [18]   BP: (113-161)/(52-71)   SpO2:  [92 %-96 %] .   Patient currently off IV antihypertensives.   Home meds for hypertension were reviewed and noted below.   Hypertension Medications      BB, CCB, HCTZ and losartan    Patient controlled on oral meds       CKD (chronic kidney disease) stage 3, GFR 30-59 ml/min  Creatine stable for now. BMP reviewed- noted Estimated Creatinine Clearance: 36.5 mL/min (A) (based on SCr of 1.5 mg/dL (H)). according to latest data. Based on current GFR, CKD stage is stage 3 - GFR 30-59.  Monitor UOP and serial BMP and adjust therapy as needed. Renally dose meds. Avoid nephrotoxic medications and procedures.      Anemia  Anemia is likely due to chronic disease due to Chronic Kidney Disease. Most recent hemoglobin and hematocrit are listed below.  Recent Labs     04/19/25  0600 04/20/25  0516   HGB 12.0* 11.6*   HCT 38.5* 37.9*     Plan  - Monitor serial CBC: Daily  - Transfuse PRBC if patient becomes hemodynamically unstable, symptomatic or H/H drops below 7/21.  - Patient has not received any PRBC transfusions to date  - Patient's anemia is currently stable  Major depressive disorder, single episode, severe without psychotic features  Anxiety and depression  Patient has recurrent depression which is moderate and is currently uncontrolled. Will Increase anti-depressant medications. We will not consult psychiatry at this time. Patient does not display psychosis at this time. Continue to monitor closely and adjust plan of care as needed.  Continue Cymbalta.      Patient has recurrent depression which is moderate and is currently uncontrolled. Will Increase anti-depressant medications. We will not consult psychiatry at this time. Patient does not display psychosis at this time. Continue to monitor closely and adjust plan of care as needed.  Continue  Cymbalta.      S/P parathyroidectomy  noted    Atherosclerosis of native coronary artery of native heart without angina pectoris  Patient with known CAD, which is controlled Will continue Statin and monitor for S/Sx of angina/ACS. Continue to monitor on telemetry.   Ric ADAMS  Noted- avoid beta blockers    Vascular dementia  Patient with dementia with likely etiology of vascular dementia. Dementia is moderate. The patient does have signs of behavioral disturbance. Started on aricept. Continue non-pharmacologic interventions to prevent delirium (No VS between 11PM-5AM, activity during day, opening blinds, providing glasses/hearing aids, and up in chair during daytime). Will avoid narcotics and benzos unless absolutely necessary. PRN anti-psychotics are not prescribed to avoid self harm behaviors.  VTE Risk Mitigation (From admission, onward)           Ordered     IP VTE HIGH RISK PATIENT  Once         04/15/25 0608     Place sequential compression device  Until discontinued         04/15/25 0608                    Discharge Planning   DEVONTE:      Code Status: Full Code   Medical Readiness for Discharge Date: 4/21/2025  Discharge Plan A: Skilled Nursing Facility   Discharge Delays: Orders Needed            Please place Justification for DME        Ivett Cesar MD  Department of Hospital Medicine   [facility]

## 2025-04-21 NOTE — PLAN OF CARE
04/21/25 1518   Discharge Reassessment   Assessment Type Discharge Planning Reassessment   Did the patient's condition or plan change since previous assessment? Yes   Discharge Plan discussed with: Patient   Communicated DEVONTE with patient/caregiver Yes   Discharge Plan A Skilled Nursing Facility   Discharge Plan B Skilled Nursing Facility   Transition of Care Barriers Underinsured   Why the patient remains in the hospital Placement issues

## 2025-04-21 NOTE — PT/OT/SLP PROGRESS
"Physical Therapy Treatment    Patient Name:  Rajendra Castle   MRN:  [id number]    Recommendations:     Discharge Recommendations: Moderate Intensity Therapy (change of rec after consult with evaluating PT)  Discharge Equipment Recommendations: walker, rolling  Barriers to discharge:  fall risk, lives alone, decreased endurance from baseline    Assessment:     Rajendra Castle is a 79 y.o. male admitted with a medical diagnosis of Hypertensive emergency.  He presents with the following impairments/functional limitations: weakness, impaired endurance, impaired functional mobility, decreased safety awareness, impaired cardiopulmonary response to activity.    Pt displays impulsivity and decreased endurance, which increase risk of falls. Ambulated to bathroom with rushed gait. Required cues to prevent abandonment of RW during mobility in tight spaces. Improved RW management in [place]. Remained up in chair with all needs at hand and endorsed subjective "weakness" after activity. Good participation.    Rehab Prognosis: Good and Fair; patient would benefit from acute skilled PT services to address these deficits and reach maximum level of function.    Recent Surgery: * No surgery found *      Plan:     During this hospitalization, patient to be seen 6 x/week to address the identified rehab impairments via gait training, therapeutic activities, therapeutic exercises and progress toward the following goals:    Plan of Care Expires:  05/15/25    Subjective     Chief Complaint: I'm weak  Patient/Family Comments/goals: I don't know how I'm going to make food for myself at home. I'm too weak.  Pain/Comfort:  Pain Rating 1: 0/10      Objective:     Communicated with nurse prior to session.  Patient found HOB elevated with telemetry, peripheral IV, bed alarm upon PT entry to room.     General Precautions: Standard, vision impaired, fall  Orthopedic Precautions:    Braces:    Respiratory Status: Room air     Functional Mobility:  Bed Mobility: "     Supine to Sit: stand by assistance  Transfers:     Sit to Stand:  contact guard assistance with no AD and rolling walker  Toilet Transfer: stand by assistance with  rolling walker and grab bars  using  Step Transfer  Gait: 200' RW, varying CG-Charlene for environmental awareness and avoidance of obstacles; VC/TC for RW management on turns       AM-PAC 6 CLICK MOBILITY          Treatment & Education:    -Pt education: benefits of participation with therapy, OOB to chair during the day and for all meals as tolerated, staff assist for mobility, fall prevention, call light, review of discharge recommendation  -toilet transfer  -Gait training with RW    Patient left up in chair with all lines intact, call button in reach, chair alarm on, and nurse notified..    GOALS:   Multidisciplinary Problems       Physical Therapy Goals          Problem: Physical Therapy    Goal Priority Disciplines Outcome Interventions   Physical Therapy Goal     PT, PT/OT     Description: Goals to be met by: 5/15/2025     Patient will increase functional independence with mobility by performin. Supine to sit with Modified [place]  2. Sit to stand transfer with Modified [place]  3. Gait  x 300  feet with Modified [place] using Rolling Walker.                          Time Tracking:     PT Received On: 25  PT Start Time: 1419     PT Stop Time: 1434  PT Total Time (min): 15 min     Billable Minutes: Therapeutic Activity 15    Treatment Type: Treatment  PT/PTA: PTA     Number of PTA visits since last PT visit: 2025

## 2025-04-21 NOTE — PLAN OF CARE
Patient accepted by [facility].  JUAN sent secure message to [place] requesting home health orders.       04/21/25 1011   Post-Acute Status   Post-Acute Authorization Home Health   Home Health Status Pending post-acute provider review/more information requested   Discharge Delays Orders Needed

## 2025-04-21 NOTE — PLAN OF CARE
Per Jesse with [facility], rolling walker delivered to bedside.       04/21/25 1408   Post-Acute Status   Post-Acute Authorization HME   HME Status Set-up Complete/Auth obtained

## 2025-04-21 NOTE — ASSESSMENT & PLAN NOTE
Anemia is likely due to chronic disease due to Chronic Kidney Disease. Most recent hemoglobin and hematocrit are listed below.  Recent Labs     04/19/25  0600 04/20/25  0516   HGB 12.0* 11.6*   HCT 38.5* 37.9*     Plan  - Monitor serial CBC: Daily  - Transfuse PRBC if patient becomes hemodynamically unstable, symptomatic or H/H drops below 7/21.  - Patient has not received any PRBC transfusions to date  - Patient's anemia is currently stable

## 2025-04-21 NOTE — ASSESSMENT & PLAN NOTE
Patient has a current diagnosis of hypertensive urgency/emergency diagnosis: hypertensive emergency with end organ damage evidenced by hypertensive encephalopathy which is controlled.  Latest blood pressure and vitals reviewed-   Temp:  [97.9 °F (36.6 °C)-98.3 °F (36.8 °C)]   Pulse:  [58-79]   Resp:  [18]   BP: (113-161)/(52-71)   SpO2:  [92 %-96 %] .   Patient currently off IV antihypertensives.   Home meds for hypertension were reviewed and noted below.   Hypertension Medications      BB, CCB, HCTZ and losartan    Patient controlled on oral meds

## 2025-04-21 NOTE — PLAN OF CARE
New order received for rolling walker and sent to [facility] for review.       04/21/25 0949   Post-Acute Status   Post-Acute Authorization E   E Status Referrals Sent

## 2025-04-21 NOTE — ASSESSMENT & PLAN NOTE
Patient has recurrent depression which is moderate and is currently uncontrolled. Will Increase anti-depressant medications. We will not consult psychiatry at this time. Patient does not display psychosis at this time. Continue to monitor closely and adjust plan of care as needed.  Continue Cymbalta.      Patient has recurrent depression which is moderate and is currently uncontrolled. Will Increase anti-depressant medications. We will not consult psychiatry at this time. Patient does not display psychosis at this time. Continue to monitor closely and adjust plan of care as needed.  Continue Cymbalta.

## 2025-04-21 NOTE — DISCHARGE INSTRUCTIONS
Our goal at [facility] is to always give you outstanding care and exceptional service. You may receive a survey from [employer] by mail, text or e-mail in the next 24-48 hours asking about the care you received with us. The survey should only take 5-10 minutes to complete and is very important to us.     Your feedback provides us with a way to recognize our staff who work tirelessly to provide the best care! Also, your responses help us learn how to improve when your experience was below our aspiration of excellence. We are always looking for ways to improve your stay. We WILL use your feedback to continue making improvements to help us provide the highest quality care. We keep your personal information and feedback confidential. We appreciate your time completing this survey and can't wait to hear from you!!!    We look forward to your continued care with us! Thanks so much for choosing [facility] for your healthcare needs!

## 2025-04-21 NOTE — SUBJECTIVE & OBJECTIVE
Interval History:  Patient is working well with physical therapy, however, he has concerns cognitively about being able to care for himself.  Patient with good attention span during this interview, however, his cognitive competence does wax and wane.    Review of Systems   All other systems reviewed and are negative.    Objective:     Vital Signs (Most Recent):  Temp: 98.1 °F (36.7 °C) (04/21/25 1650)  Pulse: (!) 58 (04/21/25 1650)  Resp: 18 (04/21/25 1650)  BP: 138/60 (04/21/25 1650)  SpO2: (!) 93 % (04/21/25 1650) Vital Signs (24h Range):  Temp:  [97.9 °F (36.6 °C)-98.3 °F (36.8 °C)] 98.1 °F (36.7 °C)  Pulse:  [58-79] 58  Resp:  [18] 18  SpO2:  [92 %-96 %] 93 %  BP: (113-161)/(52-71) 138/60     Weight: 62.6 kg (138 lb 0.1 oz)  Body mass index is 20.98 kg/m².    Intake/Output Summary (Last 24 hours) at 4/21/2025 1651  Last data filed at 4/21/2025 1148  Gross per 24 hour   Intake 300 ml   Output 1210 ml   Net -910 ml         Physical Exam  Vitals and nursing note reviewed.   Eyes:      Pupils: Pupils are equal, round, and reactive to light.   Neck:      Vascular: No JVD.   Cardiovascular:      Rate and Rhythm: Normal rate and regular rhythm.      Heart sounds: Normal heart sounds.   Pulmonary:      Effort: Pulmonary effort is normal.      Breath sounds: Normal breath sounds.   Abdominal:      General: Bowel sounds are normal. There is no distension.      Palpations: Abdomen is soft.      Tenderness: There is no abdominal tenderness.   Musculoskeletal:         General: No deformity.   Lymphadenopathy:      Cervical: No cervical adenopathy.   Skin:     Coloration: Skin is not pale.      Findings: No rash.               Significant Labs: All pertinent labs within the past 24 hours have been reviewed.  BMP:   Recent Labs   Lab 04/20/25  0516 04/21/25  0526   NA  --  136   K  --  4.2   CO2  --  22*   BUN  --  22   CREATININE  --  1.5*   CALCIUM  --  8.7   MG 1.9  --      CBC:   Recent Labs   Lab 04/20/25  0516   WBC 6.79    HGB 11.6*   HCT 37.9*          Significant Imaging: I have reviewed all pertinent imaging results/findings within the past 24 hours.

## 2025-04-21 NOTE — PT/OT/SLP PROGRESS
Occupational Therapy      Patient Name:  Rajendra Castle   MRN:  [id number]    Patient not seen today secondary to  (Occupational Therpist Unavailable). Will follow-up tomorrow.    4/21/2025

## 2025-04-21 NOTE — ASSESSMENT & PLAN NOTE
Creatine stable for now. BMP reviewed- noted Estimated Creatinine Clearance: 36.5 mL/min (A) (based on SCr of 1.5 mg/dL (H)). according to latest data. Based on current GFR, CKD stage is stage 3 - GFR 30-59.  Monitor UOP and serial BMP and adjust therapy as needed. Renally dose meds. Avoid nephrotoxic medications and procedures.

## 2025-04-22 LAB
ANION GAP (SMH): 10 MMOL/L (ref 8–16)
BUN SERPL-MCNC: 25 MG/DL (ref 8–23)
CALCIUM SERPL-MCNC: 8.8 MG/DL (ref 8.7–10.5)
CHLORIDE SERPL-SCNC: 109 MMOL/L (ref 95–110)
CO2 SERPL-SCNC: 21 MMOL/L (ref 23–29)
CREAT SERPL-MCNC: 1.6 MG/DL (ref 0.5–1.4)
GFR SERPLBLD CREATININE-BSD FMLA CKD-EPI: 44 ML/MIN/1.73/M2
GLUCOSE SERPL-MCNC: 90 MG/DL (ref 70–110)
POTASSIUM SERPL-SCNC: 4.8 MMOL/L (ref 3.5–5.1)
SODIUM SERPL-SCNC: 140 MMOL/L (ref 136–145)

## 2025-04-22 PROCEDURE — 97535 SELF CARE MNGMENT TRAINING: CPT

## 2025-04-22 PROCEDURE — 25000003 PHARM REV CODE 250

## 2025-04-22 PROCEDURE — 36415 COLL VENOUS BLD VENIPUNCTURE: CPT

## 2025-04-22 PROCEDURE — 12000002 HC ACUTE/MED SURGE SEMI-PRIVATE ROOM

## 2025-04-22 PROCEDURE — 92507 TX SP LANG VOICE COMM INDIV: CPT

## 2025-04-22 PROCEDURE — 82310 ASSAY OF CALCIUM: CPT

## 2025-04-22 PROCEDURE — 63600175 PHARM REV CODE 636 W HCPCS

## 2025-04-22 PROCEDURE — 92523 SPEECH SOUND LANG COMPREHEN: CPT

## 2025-04-22 PROCEDURE — 25000003 PHARM REV CODE 250: Performed by: HOSPITALIST

## 2025-04-22 RX ORDER — SODIUM CHLORIDE, SODIUM LACTATE, POTASSIUM CHLORIDE, CALCIUM CHLORIDE 600; 310; 30; 20 MG/100ML; MG/100ML; MG/100ML; MG/100ML
INJECTION, SOLUTION INTRAVENOUS ONCE
Status: COMPLETED | OUTPATIENT
Start: 2025-04-22 | End: 2025-04-22

## 2025-04-22 RX ORDER — SODIUM CHLORIDE, SODIUM LACTATE, POTASSIUM CHLORIDE, CALCIUM CHLORIDE 600; 310; 30; 20 MG/100ML; MG/100ML; MG/100ML; MG/100ML
INJECTION, SOLUTION INTRAVENOUS CONTINUOUS
Status: DISCONTINUED | OUTPATIENT
Start: 2025-04-22 | End: 2025-04-22

## 2025-04-22 RX ADMIN — AMOXICILLIN AND CLAVULANATE POTASSIUM 1 TABLET: 875; 125 TABLET, FILM COATED ORAL at 09:04

## 2025-04-22 RX ADMIN — AMOXICILLIN AND CLAVULANATE POTASSIUM 1 TABLET: 875; 125 TABLET, FILM COATED ORAL at 08:04

## 2025-04-22 RX ADMIN — HYDROCHLOROTHIAZIDE 12.5 MG: 12.5 TABLET ORAL at 08:04

## 2025-04-22 RX ADMIN — DULOXETINE HYDROCHLORIDE 20 MG: 20 CAPSULE, DELAYED RELEASE ORAL at 09:04

## 2025-04-22 RX ADMIN — ATORVASTATIN CALCIUM 20 MG: 20 TABLET, FILM COATED ORAL at 09:04

## 2025-04-22 RX ADMIN — BUSPIRONE HYDROCHLORIDE 15 MG: 5 TABLET ORAL at 08:04

## 2025-04-22 RX ADMIN — AMLODIPINE BESYLATE 5 MG: 5 TABLET ORAL at 08:04

## 2025-04-22 RX ADMIN — TAMSULOSIN HYDROCHLORIDE 0.4 MG: 0.4 CAPSULE ORAL at 08:04

## 2025-04-22 RX ADMIN — DULOXETINE HYDROCHLORIDE 20 MG: 20 CAPSULE, DELAYED RELEASE ORAL at 08:04

## 2025-04-22 RX ADMIN — BUSPIRONE HYDROCHLORIDE 15 MG: 5 TABLET ORAL at 09:04

## 2025-04-22 RX ADMIN — LOSARTAN POTASSIUM 100 MG: 50 TABLET, FILM COATED ORAL at 08:04

## 2025-04-22 RX ADMIN — DONEPEZIL HYDROCHLORIDE 5 MG: 5 TABLET, FILM COATED ORAL at 09:04

## 2025-04-22 RX ADMIN — SODIUM CHLORIDE, POTASSIUM CHLORIDE, SODIUM LACTATE AND CALCIUM CHLORIDE: 600; 310; 30; 20 INJECTION, SOLUTION INTRAVENOUS at 11:04

## 2025-04-22 RX ADMIN — METOPROLOL SUCCINATE 12.5 MG: 25 TABLET, EXTENDED RELEASE ORAL at 08:04

## 2025-04-22 NOTE — ASSESSMENT & PLAN NOTE
At this time, I am currently recommending Skilled Nursing Facility Placement for patient. Based on current progress, I anticipate they will require 30 days or less in a Skilled Nursing Facility

## 2025-04-22 NOTE — PLAN OF CARE
New SNF placement; SW Met with Pt  to review discharge recommendation of moderate intense therapy  and is agreeable to plan    Patient/family provided list of facilities in-network with patient's payor plan. Providers that are owned, operated, or affiliated with [facility] are included on the list.     Notified that referral sent to below listed facilities from in-network list based on proximity to home/family support:   [facility]   [facility]. [facility]   3. [facility]   4. [facility]  5. (can send more than 5)    Patient/family instructed to identify preference.    Preferred Facility: (if more than 1, listed in order of descending preference)  Pt has no preference just prefers to stay local referral sent via epic pending a acceptance; PHN auth sent via epic. SW will continue to follow     Patient has declined to select a preferred provider and elects placement with the first accepting in network provider that is available to provide services as ordered by the physician       If an additional preferred facility not listed above is identified, additional referral to be sent. If above facilities unable to accept, will send additional referrals to in-network providers.     04/22/25 0951   Post-Acute Status   Post-Acute Authorization Placement   Post-Acute Placement Status Referrals Sent   Coverage Payor: [employer] [facility] CHOICES -   Discharge Delays None known at this time   Discharge Plan   Discharge Plan A Skilled Nursing Facility   Discharge Plan B Home Health

## 2025-04-22 NOTE — ASSESSMENT & PLAN NOTE
Anemia is likely due to chronic disease due to Chronic Kidney Disease. Most recent hemoglobin and hematocrit are listed below.  Recent Labs     04/20/25  0516   HGB 11.6*   HCT 37.9*     Plan  - Monitor serial CBC: Daily  - Transfuse PRBC if patient becomes hemodynamically unstable, symptomatic or H/H drops below 7/21.  - Patient has not received any PRBC transfusions to date  - Patient's anemia is currently stable

## 2025-04-22 NOTE — ASSESSMENT & PLAN NOTE
Creatine stable for now. BMP reviewed- noted Estimated Creatinine Clearance: 34.2 mL/min (A) (based on SCr of 1.6 mg/dL (H)). according to latest data. Based on current GFR, CKD stage is stage 3 - GFR 30-59.  Monitor UOP and serial BMP and adjust therapy as needed. Renally dose meds. Avoid nephrotoxic medications and procedures.

## 2025-04-22 NOTE — ASSESSMENT & PLAN NOTE
Patient has a current diagnosis of hypertensive urgency/emergency diagnosis: hypertensive emergency with end organ damage evidenced by hypertensive encephalopathy which is controlled.  Latest blood pressure and vitals reviewed-   Temp:  [98.1 °F (36.7 °C)-98.9 °F (37.2 °C)]   Pulse:  [58-66]   Resp:  [16-19]   BP: (118-156)/(60-69)   SpO2:  [92 %-97 %] .   Patient currently off IV antihypertensives.   Home meds for hypertension were reviewed and noted below.   Hypertension Medications      BB, CCB, HCTZ and losartan    Patient controlled on oral meds

## 2025-04-22 NOTE — PROGRESS NOTES
"[facility] Medicine  Progress Note    Patient Name: Rajendra Castle  MRN: [id number]  Patient Class: IP- Inpatient   Admission Date: 4/14/2025  Length of Stay: 7 days  Attending Physician: Ivett Cesar MD  Primary Care Provider: Sp Lilly MD        Subjective     Principal Problem:Hypertensive emergency        HPI:  Patient is a 79-year-old  male who only reports he has "prostate issues" and hypertension; he takes his medications but does not check his blood pressure at home  He is a vague historian, but review of the records indicate that he has multiple medical issues including hypertension, prediabetes, CKD -3, GERD, hyperparathyroidism (status post parathyroidectomy), rheumatoid lung, chronic pulmonary embolism, anxiety/depression, and ureteral cancer  Around 3:00 p.m. in the afternoon, the patient had numbness/tingling in his bilateral hands/feet and developed a headache (with pressure behind his eyes) as well as dizziness; he endorsed chest tightness to the ER but not to me but does state that he felt weak all over particularly in his legs and was staggering  In the ER, his blood pressure goes highest of the to 10s-> now 160s after Cardene infusion and 2 doses of IV Hydralazine/a dose of Labetalol  He had no leukocytosis with a hemoglobin of 11.8 normal platelets; COVID/influenza testing were negative  Creatinine 1.3 (below baseline) with a potassium of 3.1; Mag was 1.9  He did not have a UTI; TSH was normal  BNP 5217 and troponin went from 22-> 36-> 72.7 (at the time of this editing)  Lipid panel showed an LDL of 61  EKG showed, per my interpretation, sinus rhythm 71 beats per minute with a QTC of 489 with biphasic T-waves in several leads and this has been seen previously  Chest x-ray was unrevealing; CT head without contrast showed, per radiologist Dr. Reyes:    "Impression:     No evidence of acute intracranial abnormality or fracture.   " "  Microvascular chronic ischemic changes and senescent atrophic changes.     Otomastoiditis on the left and mastoiditis on the right with possible small amount of fluid also in the middle ear on the right.  With prior exam demonstrating mastoiditis on the right only."    The patient received aspirin around 3:00 a.m., Tylenol, calcium gluconate, oral potassium I Ativan, in the aforementioned antihypertensives and was transferred to the ICU for further diagnosis, treatment, and care    Overview/Hospital Course:  Patient was admitted to the hospital with worsening neurologic deficits and a markedly elevated blood pressure.  Initial etiology was thought to be acute stroke, and patient was seen by tele stroke, underwent MRI which showed potential acute lesion.  However, this was not corroborated with the patient's symptoms.  Repeat CT angiogram showed no evidence of any acute lesions, therefore stroke neurologist stated this is likely not an acute stroke.  Patient was continued for hypertensive emergency.  Home blood pressure medication was restarted, and blood pressure improved over the course of his hospitalization.  Patient was also found to have an acute urinary tract infection and treatment was initiated for such.  Patient was also reported to be depressed and anxious, and SSRI was started at his request.  Blood pressure slowly returned back to baseline of 150s to 170s systolic.  Echocardiogram was done which showed evidence of concentric remodeling but otherwise stable.Patient with noted sundowning to indicate likely vascular dementia. STarted on aricept.    Interval History:  Patient seen and examined this morning.  Resting comfortably in no acute distress.  Pending placement.    Review of Systems   All other systems reviewed and are negative.    Objective:     Vital Signs (Most Recent):  Temp: 98.3 °F (36.8 °C) (04/22/25 1105)  Pulse: 61 (04/22/25 1105)  Resp: 16 (04/22/25 1354)  BP: 118/60 (04/22/25 1130)  SpO2: " "96 % (04/22/25 1105) Vital Signs (24h Range):  Temp:  [98.1 °F (36.7 °C)-98.9 °F (37.2 °C)] 98.3 °F (36.8 °C)  Pulse:  [58-66] 61  Resp:  [16-19] 16  SpO2:  [92 %-97 %] 96 %  BP: (118-156)/(60-69) 118/60     Weight: 62.6 kg (138 lb 0.1 oz)  Body mass index is 20.98 kg/m².    Intake/Output Summary (Last 24 hours) at 4/22/2025 1458  Last data filed at 4/22/2025 1430  Gross per 24 hour   Intake 840 ml   Output 1440 ml   Net -600 ml         Physical Exam  Vitals and nursing note reviewed.   Eyes:      Pupils: Pupils are equal, round, and reactive to light.   Neck:      Vascular: No JVD.   Cardiovascular:      Rate and Rhythm: Normal rate and regular rhythm.      Heart sounds: Normal heart sounds.   Pulmonary:      Effort: Pulmonary effort is normal.      Breath sounds: Normal breath sounds.   Abdominal:      General: Bowel sounds are normal. There is no distension.      Palpations: Abdomen is soft.      Tenderness: There is no abdominal tenderness.   Musculoskeletal:         General: No deformity.   Lymphadenopathy:      Cervical: No cervical adenopathy.   Skin:     Coloration: Skin is not pale.      Findings: No rash.               Significant Labs: All pertinent labs within the past 24 hours have been reviewed.  BMP:   Recent Labs   Lab 04/22/25  0610      K 4.8   CO2 21*   BUN 25*   CREATININE 1.6*   CALCIUM 8.8     CBC: No results for input(s): "WBC", "HGB", "HCT", "PLT" in the last 48 hours.    Significant Imaging: I have reviewed all pertinent imaging results/findings within the past 24 hours.      Assessment & Plan  Hypertensive emergency  Patient has a current diagnosis of hypertensive urgency/emergency diagnosis: hypertensive emergency with end organ damage evidenced by hypertensive encephalopathy which is controlled.  Latest blood pressure and vitals reviewed-   Temp:  [98.1 °F (36.7 °C)-98.9 °F (37.2 °C)]   Pulse:  [58-66]   Resp:  [16-19]   BP: (118-156)/(60-69)   SpO2:  [92 %-97 %] .   Patient " currently off IV antihypertensives.   Home meds for hypertension were reviewed and noted below.   Hypertension Medications      BB, CCB, HCTZ and losartan    Patient controlled on oral meds       CKD (chronic kidney disease) stage 3, GFR 30-59 ml/min  Creatine stable for now. BMP reviewed- noted Estimated Creatinine Clearance: 34.2 mL/min (A) (based on SCr of 1.6 mg/dL (H)). according to latest data. Based on current GFR, CKD stage is stage 3 - GFR 30-59.  Monitor UOP and serial BMP and adjust therapy as needed. Renally dose meds. Avoid nephrotoxic medications and procedures.      Anemia  Anemia is likely due to chronic disease due to Chronic Kidney Disease. Most recent hemoglobin and hematocrit are listed below.  Recent Labs     04/20/25  0516   HGB 11.6*   HCT 37.9*     Plan  - Monitor serial CBC: Daily  - Transfuse PRBC if patient becomes hemodynamically unstable, symptomatic or H/H drops below 7/21.  - Patient has not received any PRBC transfusions to date  - Patient's anemia is currently stable  Major depressive disorder, single episode, severe without psychotic features  Anxiety and depression  Patient has recurrent depression which is moderate and is currently uncontrolled. Will Increase anti-depressant medications. We will not consult psychiatry at this time. Patient does not display psychosis at this time. Continue to monitor closely and adjust plan of care as needed.  Continue Cymbalta.      Patient has recurrent depression which is moderate and is currently uncontrolled. Will Increase anti-depressant medications. We will not consult psychiatry at this time. Patient does not display psychosis at this time. Continue to monitor closely and adjust plan of care as needed.  Continue Cymbalta.      S/P parathyroidectomy  noted    Atherosclerosis of native coronary artery of native heart without angina pectoris  Patient with known CAD, which is controlled Will continue Statin and monitor for S/Sx of angina/ACS.  Continue to monitor on telemetry.   Ric ADAMS  Noted- avoid beta blockers    Vascular dementia  Patient with dementia with likely etiology of vascular dementia. Dementia is moderate. The patient does have signs of behavioral disturbance. Started on aricept. Continue non-pharmacologic interventions to prevent delirium (No VS between 11PM-5AM, activity during day, opening blinds, providing glasses/hearing aids, and up in chair during daytime). Will avoid narcotics and benzos unless absolutely necessary. PRN anti-psychotics are not prescribed to avoid self harm behaviors.  Gait disturbance  At this time, I am currently recommending Skilled Nursing Facility Placement for patient. Based on current progress, I anticipate they will require 30 days or less in a Skilled Nursing Facility   VTE Risk Mitigation (From admission, onward)           Ordered     IP VTE HIGH RISK PATIENT  Once         04/15/25 0608     Place sequential compression device  Until discontinued         04/15/25 0608                    Discharge Planning   DEVONTE:      Code Status: Full Code   Medical Readiness for Discharge Date: 4/21/2025  Discharge Plan A: Skilled Nursing Facility   Discharge Delays: None known at this time            Please place Justification for DME        Ivett Cesar MD  Department of Hospital Medicine   Critical access hospital

## 2025-04-22 NOTE — PT/OT/SLP EVAL
"Speech Language Pathology Evaluation  Cognitive Communication    Patient Name:  Rajendra Castle   MRN:  [id number]  Admitting Diagnosis: Hypertensive emergency    Recommendations:     Recommendations:                General Recommendations:  Cognitive-linguistic therapy  Diet recommendations:  Regular, Thin   Aspiration Precautions: Standard aspiration precautions   General Precautions: Standard, fall, vision impaired  Communication strategies:  none    Assessment:   Rajendra Castle is a 79 y.o. male with an admitting diagnosis of Hypertensive emergency. Pt seen for cognitive communication re evaluation. SLP orders discharged by provider on 4/16/25 and new orders received today. He continues to present with presents with mild cognitive linguistic impairment c/b deficits in the area of STM and attention. Slight decline in MoCA score today, compared to 4/16/25 but judged to be 2° fatigue/drowsiness. Initiate skilled ST to address deficits.       History:   PER HPI:  Patient is a 79-year-old  male who only reports he has "prostate issues" and hypertension; he takes his medications but does not check his blood pressure at home  He is a vague historian, but review of the records indicate that he has multiple medical issues including hypertension, prediabetes, CKD -3, GERD, hyperparathyroidism (status post parathyroidectomy), rheumatoid lung, chronic pulmonary embolism, anxiety/depression, and ureteral cancer  Around 3:00 p.m. in the afternoon, the patient had numbness/tingling in his bilateral hands/feet and developed a headache (with pressure behind his eyes) as well as dizziness; he endorsed chest tightness to the ER but not to me but does state that he felt weak all over particularly in his legs and was staggering  In the ER, his blood pressure goes highest of the to 10s-> now 160s after Cardene infusion and 2 doses of IV Hydralazine/a dose of Labetalol  He had no leukocytosis with a hemoglobin of 11.8 normal " "platelets; COVID/influenza testing were negative  Creatinine 1.3 (below baseline) with a potassium of 3.1; Mag was 1.9  He did not have a UTI; TSH was normal  BNP 5217 and troponin went from 22-> 36-> 72.7 (at the time of this editing)  Lipid panel showed an LDL of 61  EKG showed, per my interpretation, sinus rhythm 71 beats per minute with a QTC of 489 with biphasic T-waves in several leads and this has been seen previously  Chest x-ray was unrevealing; CT head without contrast showed, per radiologist Dr. Reyes:     "Impression:     No evidence of acute intracranial abnormality or fracture.     Microvascular chronic ischemic changes and senescent atrophic changes.     Otomastoiditis on the left and mastoiditis on the right with possible small amount of fluid also in the middle ear on the right.  With prior exam demonstrating mastoiditis on the right only."     The patient received aspirin around 3:00 a.m., Tylenol, calcium gluconate, oral potassium I Ativan, in the aforementioned antihypertensives and was transferred to the ICU for further diagnosis, treatment, and care     PER Overview/Hospital Course:  Patient was admitted to the hospital with worsening neurologic deficits and a markedly elevated blood pressure.  Initial etiology was thought to be acute stroke, and patient was seen by tele stroke, underwent MRI which showed potential acute lesion.  However, this was not corroborated with the patient's symptoms.  Repeat CT angiogram showed no evidence of any acute lesions, therefore stroke neurologist stated this is likely not an acute stroke.  Patient was continued for hypertensive emergency.  Home blood pressure medication was restarted, and blood pressure improved over the course of his hospitalization.  Patient was also found to have an acute urinary tract infection and treatment was initiated for such.  Patient was also reported to be depressed and anxious, and SSRI was started at his request.  Blood " pressure slowly returned back to baseline of 150s to 170s systolic.  Echocardiogram was done which showed evidence of concentric remodeling but otherwise stable.Patient with noted sundowning to indicate likely vascular dementia. STarted on aricept.     PER Interval History:  Patient is working well with physical therapy, however, he has concerns cognitively about being able to care for himself.  Patient with good attention span during this interview, however, his cognitive competence does wax and wane    Past Medical History:   Diagnosis Date    Anticoagulant long-term use     Arthritis     Coronary artery disease     Dr. Lamb    DDD (degenerative disc disease), cervical     Degenerative disc disease     Heart murmur     History of radiation therapy 2020    Hypertension     Nontoxic multinodular goiter 09/20/2016    Prostate CA     RA (rheumatoid arthritis)     Sleep apnea     No CPAP    Vitamin D insufficiency 09/30/2016       Past Surgical History:   Procedure Laterality Date    CARPAL TUNNEL RELEASE Right 05/28/2021    Procedure: RELEASE, CARPAL TUNNEL;  Surgeon: Jose Ryan II, MD;  [facility]: [facility];  Service: Orthopedics;  Laterality: Right;    COLONOSCOPY  prior to 2016    normal findings per patient report    CORONARY ANGIOPLASTY WITH STENT PLACEMENT  02/2022    CYSTOSCOPY N/A 12/18/2018    Procedure: CYSTOSCOPY;  Surgeon: Garrett Pina MD;  Location: [facility] OR;  Service: Urology;  Laterality: N/A;    CYSTOSCOPY N/A 07/12/2022    Procedure: CYSTOSCOPY;  Surgeon: Garrett Pina MD;  Location: [facility] OR;  Service: Urology;  Laterality: N/A;    ESOPHAGOGASTRODUODENOSCOPY N/A 09/29/2020    Dr. Espinosa; empiric dilation; erythematous mucosa in antrum; gastric mucosal atrophy; hematin in entire stomach; biopsy: mid & distal esophagus WNL, stomach- WNL, negative for h pylori    EXCISION OF LESION OF PENIS N/A 2/23/2023    Procedure: EXCISION, LESION, PENIS;  Surgeon: Timo Myers MD;  Location: [facility]  OR;  Service: Urology;  Laterality: N/A;    INSERTION OF TUNNELED CENTRAL VENOUS CATHETER (CVC) WITH SUBCUTANEOUS PORT Left 5/18/2023    Procedure: [id number];  Surgeon: Atul Faria MD;  Location: [facility];  Service: General;  Laterality: Left;  left subclavian    PARATHYROIDECTOMY Right 10/27/2020    Procedure: PARATHYROIDECTOMY;  Surgeon: Latonia Clayton MD;  [facility];  Service: ENT;  Laterality: Right;    RELEASE OF ULNAR NERVE AT CUBITAL TUNNEL Right 05/28/2021    Procedure: RELEASE, ULNAR TUNNEL;  Surgeon: Jose Ryan II, MD;  Location: [facility] OR;  Service: Orthopedics;  Laterality: Right;    TRANSRECTAL BIOPSY OF PROSTATE WITH ULTRASOUND GUIDANCE N/A 12/18/2018    Procedure: BIOPSY, PROSTATE, RECTAL APPROACH, WITH US GUIDANCE;  Surgeon: Garrett Pina MD;  Location: [facility] beds at the [facility] OR;  Service: Urology;  Laterality: N/A;    TRANSRECTAL ULTRASOUND EXAMINATION N/A 07/12/2022    Procedure: ULTRASOUND, RECTAL APPROACH;  Surgeon: Garrett Pina MD;  Location: [facility] beds at the [facility] OR;  Service: Urology;  Laterality: N/A;       Social History: Patient lives with alone.    Prior Intubation HX:  NA    Modified Barium Swallow: NA    Imaging:  Imaging Results              X-Ray Chest PA And Lateral (Final result)  Result time 04/15/25 03:11:51      Final result by Gricelda Reyes MD (04/15/25 03:11:51)                   Impression:      No significant acute abnormality involving the lungs.    Mild costophrenic angle blunting on the left raising question of trace amount of pleural fluid.      Electronically signed by: Gricelda Reyes  Date:    04/15/2025  Time:    03:11               Narrative:    EXAMINATION:  XR CHEST PA AND LATERAL    CLINICAL HISTORY:  Dyspnea, unspecified    TECHNIQUE:  PA and lateral views of the chest were performed.    COMPARISON:  04/04/2025, 10/24/2024 chest x-rays    FINDINGS:  Cardiac silhouette is stable and not significantly enlarged.  Band of scarring noted in the  right mid lung otherwise the lungs appear clear.  There is mild costophrenic angle blunting posteriorly and laterally on the left suggesting a trace amount of pleural fluid or thickening.  No effusion is seen on the right and there is no pneumothorax..  Degenerative changes of the spine and shoulders noted.                                        CT Head Without Contrast (Final result)  Result time 04/15/25 01:28:12      Final result by Gricelda Reyes MD (04/15/25 01:28:12)                   Impression:      No evidence of acute intracranial abnormality or fracture.    Microvascular chronic ischemic changes and senescent atrophic changes.    Otomastoiditis on the left and mastoiditis on the right with possible small amount of fluid also in the middle ear on the right.  With prior exam demonstrating mastoiditis on the right only.    This report was flagged in Epic as abnormal.      Electronically signed by: Gricelda Reyes  Date:    04/15/2025  Time:    01:28               Narrative:    EXAMINATION:  CT HEAD WITHOUT CONTRAST    CLINICAL HISTORY:  Headache, sudden, severe;    TECHNIQUE:  Low dose axial images were obtained through the head.  Coronal and sagittal reformations were also performed. Contrast was not administered.    COMPARISON:  CT brain 10/24/2024    FINDINGS:  There is no evidence of acute intra or extra-axial hemorrhage or hematoma.  The gray-white matter junction differentiation is intact.  Mild prominence of the ventricles, cisterns and sulci are noted consistent with patient's age and senescent atrophic changes.  Patchy mild low density in the periventricular deep white matter is again noted and is nonspecific but commonly related to microvascular chronic ischemic changes.  There is no mass effect/midline shift.  Posterior fossa structures and pituitary gland are unremarkable as imaged allowing for skull base artifact.    Bony calvarium is intact and the visualized paranasal sinuses essentially  "appear clear.  There is note of multiple opacified right mastoid air cells and a few with air-fluid levels are also noted in the left mastoid air cells compatible with mastoiditis.  There is also note of density in the middle ear on the left and possibly a small amount on the right.                                       Prior diet: Regular/thin.     Occupation/hobbies/homemaking: Pt/family report PLOF as ambulatory with cane and ADLS, independent with IADLS. Pt does drive. Level of education is Masters degree. Retired       Subjective     "I haven't slept good in months." Attributes poor sleep to urinary frequency    Pain/Comfort:  Pain Rating 1: 0/10    Respiratory Status: Room air    Objective:   Pt seen for cognitive communication re evaluation. SLP orders discharged by provider on 4/16/25 and new orders received today. Chart indicates waxing/waning mental status with concern for vascular dementia. Aricept ordered.     Pt appeared tired but cooperative. Keeps eyes closed for most of evaluation but participatory. He continues to report poor sleep 2° urinary frequency. Flat affect and somewhat withdrawn. Reported depression per chart review. He endorses increased confusion a "few mornings ago."     Cognitive Status:     MoCA (Version 8.2) administered with pt achieving a score of 18/30 suggesting mild cognitive-linguistic impairment. Pt earned 3 of 5 points on visuospatial/executive functions, 3 of 3 points on naming, 4 of 6 points for attention, 2 of 3 points for language, 1 of 2 points for abstraction, 0 of 5 points for delayed recall and 5 of 6 points for orientation.  MoCA score 4/16/25; 21/30     Behavioral Observations: Cooperative and appropriate.     Memory              Immediate: Intact as demonstrated by Pt's ability to repeat 5/5 numerical items & 5/5 unrelated words               Short-term: Impaired; Pt able to recall 0/5 unrelated items ind'ly.      Orientation: orientedx4   "   Attention:  Pt able to demonstrated adequate alternating attention within MOCA tasks including symbol trails. Unable to perform serial subtractions. Performed well on focused attention task.  Overall, pt with impaired attention 2° fatigue/drowsiness.       Pragmatics: WNL     Receptive Language:   Comprehension: WFL  Follows complex commands   Answers complex Y/N questions         Expressive Language:  Verbal:    Conversational speech: Fluent and appropriate at the conversational level without evidence of word retrieval, semantic or syntactic error                  Motor Speech:  No evidence of Apraxia of Speech or Dysarthria  100% intelligible      Voice: WNL     Visual-Spatial: WNL  Attending to R and L planes w/out evidence of inattention, neglect or preferential gaze       Treatment: Pt/family educated re: results/recs of evaluation, SLP role and POC. Receptive to information provided. Pt left with windows open and lights on.       Goals:   Multidisciplinary Problems       SLP Goals          Problem: SLP    Goal Priority Disciplines Outcome   SLP Goal     SLP    Description: 1. Pt will participate in cognitive communication evaluation. --MET  2. Pt will recall 5/5 items from functional list following 5 minute filled delay with SPV  3. Complex attention tasks with SPV                       Plan:   Patient to be seen:      Plan of Care expires:     Plan of Care reviewed with:  patient   SLP Follow-Up:  Yes       Discharge recommendations:  Therapy Intensity Recommendations at Discharge: Low Intensity Therapy   Barriers to Discharge:  Level of Skilled Assistance Needed , and Safety Awareness .    Time Tracking:     SLP Treatment Date:   04/16/25  Speech Start Time:  1100  Speech Stop Time:  1123     Speech Total Time (min):  23 min    Billable Minutes: Eval 15 , Speech Therapy Individual 8, and Total Time 23    04/22/2025

## 2025-04-22 NOTE — PT/OT/SLP PROGRESS
Occupational Therapy   Treatment    Name: Rajendra Castle  MRN: [id number]  Admitting Diagnosis:  Hypertensive emergency       Recommendations:     Discharge Recommendations: Moderate Intensity Therapy  Discharge Equipment Recommendations:  walker, rolling  Barriers to discharge:  Decreased caregiver support    Assessment:     Rajendra Castle is a 79 y.o. male with a medical diagnosis of Hypertensive emergency.  He presents with general weakness. Patient participated in bed mobility, unsupported sitting EOB, LB dressing sitting EOB and bed/chair transfer using rolling walker. Performance deficits affecting function are weakness, impaired endurance, impaired self care skills, impaired functional mobility, gait instability, impaired balance, decreased safety awareness, decreased lower extremity function, decreased upper extremity function.     Rehab Prognosis:  Fair; patient would benefit from acute skilled OT services to address these deficits and reach maximum level of function.       Plan:     Patient to be seen 5 x/week to address the above listed problems via self-care/home management, therapeutic activities, therapeutic exercises  Plan of Care Expires: 05/15/25  Plan of Care Reviewed with: patient    Subjective     Chief Complaint: General weakness  Patient/Family Comments/goals: Improved functional mobility and ADL independence.   Pain/Comfort:  Pain Rating 1: 0/10  Pain Rating Post-Intervention 1: 0/10    Objective:     Communicated with: nruse prior to session.  Patient found HOB elevated with telemetry, peripheral IV upon OT entry to room.    General Precautions: Standard, fall, vision impaired    Orthopedic Precautions:N/A  Braces: N/A  Respiratory Status: Room air     Occupational Performance:     Bed Mobility:    Patient completed Scooting/Bridging with contact guard assistance  Patient completed Supine to Sit with contact guard assistance  Patient completed Sit to Supine with contact guard assistance   Performed  unsupported sitting EOB with contact guard assistance.     Functional Mobility/Transfers:  Patient completed Bed <> Chair Transfer using Step Transfer technique with contact guard assistance with rolling walker    Activities of Daily Living:  Lower Body Dressing: contact guard assistance to don/doff socks sitting EOB      [facility] 6 Click ADL:      Treatment & Education:  Patient tolerated sitting in recliner for 5 minutes before feeling dizzy and light headed. RN notified.    Patient left HOB elevated with all lines intact, call button in reach, and bed alarm on    GOALS:   Multidisciplinary Problems       Occupational Therapy Goals          Problem: Occupational Therapy    Goal Priority Disciplines Outcome Interventions   Occupational Therapy Goal     OT, PT/OT Progressing    Description: Goals to be met by: 5/15/25     Patient will increase functional independence with ADLs by performing:    UE Dressing with Modified [place].  LE Dressing with Modified [place].  Grooming while standing at sink with Modified [place].  Toileting from toilet with Modified [place] for hygiene and clothing management.   Toilet transfer to toilet with Modified [place].                         Time Tracking:     OT Date of Treatment: 04/22/25  OT Start Time: 1111  OT Stop Time: 1130  OT Total Time (min): 19 min    Billable Minutes:Self Care/Home Management 19    OT/MARIA TERESA: OT          4/22/2025

## 2025-04-22 NOTE — SUBJECTIVE & OBJECTIVE
"Interval History:  Patient seen and examined this morning.  Resting comfortably in no acute distress.  Pending placement.    Review of Systems   All other systems reviewed and are negative.    Objective:     Vital Signs (Most Recent):  Temp: 98.3 °F (36.8 °C) (04/22/25 1105)  Pulse: 61 (04/22/25 1105)  Resp: 16 (04/22/25 1354)  BP: 118/60 (04/22/25 1130)  SpO2: 96 % (04/22/25 1105) Vital Signs (24h Range):  Temp:  [98.1 °F (36.7 °C)-98.9 °F (37.2 °C)] 98.3 °F (36.8 °C)  Pulse:  [58-66] 61  Resp:  [16-19] 16  SpO2:  [92 %-97 %] 96 %  BP: (118-156)/(60-69) 118/60     Weight: 62.6 kg (138 lb 0.1 oz)  Body mass index is 20.98 kg/m².    Intake/Output Summary (Last 24 hours) at 4/22/2025 1458  Last data filed at 4/22/2025 1430  Gross per 24 hour   Intake 840 ml   Output 1440 ml   Net -600 ml         Physical Exam  Vitals and nursing note reviewed.   Eyes:      Pupils: Pupils are equal, round, and reactive to light.   Neck:      Vascular: No JVD.   Cardiovascular:      Rate and Rhythm: Normal rate and regular rhythm.      Heart sounds: Normal heart sounds.   Pulmonary:      Effort: Pulmonary effort is normal.      Breath sounds: Normal breath sounds.   Abdominal:      General: Bowel sounds are normal. There is no distension.      Palpations: Abdomen is soft.      Tenderness: There is no abdominal tenderness.   Musculoskeletal:         General: No deformity.   Lymphadenopathy:      Cervical: No cervical adenopathy.   Skin:     Coloration: Skin is not pale.      Findings: No rash.               Significant Labs: All pertinent labs within the past 24 hours have been reviewed.  BMP:   Recent Labs   Lab 04/22/25  0610      K 4.8   CO2 21*   BUN 25*   CREATININE 1.6*   CALCIUM 8.8     CBC: No results for input(s): "WBC", "HGB", "HCT", "PLT" in the last 48 hours.    Significant Imaging: I have reviewed all pertinent imaging results/findings within the past 24 hours.  "

## 2025-04-22 NOTE — PLAN OF CARE
Pt accepted at [facility] pending PHN approval for SNF; Pt to complete his own paperwork. SW will continue to follow    240pm: Andrea Wilkins with tai Pt approved in error Pt has no accepting SNF as of yet    04/22/25 1326   Post-Acute Status   Post-Acute Authorization Placement   Post-Acute Placement Status Pending payor review/awaiting authorization (if required)   Discharge Delays None known at this time   Discharge Plan   Discharge Plan A Skilled Nursing Facility   Discharge Plan B Home Health

## 2025-04-22 NOTE — PT/OT/SLP PROGRESS
"Physical Therapy      Patient Name:  Rajendra Castle   MRN:  [id number]    Patient not seen today secondary to Politely refused x 2 attempts. "Don't feel good; more tired than I was yesterday." Nurse made aware. Will follow-up 4/23/25.    "

## 2025-04-23 LAB
ANION GAP (SMH): 7 MMOL/L (ref 8–16)
BUN SERPL-MCNC: 19 MG/DL (ref 8–23)
CALCIUM SERPL-MCNC: 8.6 MG/DL (ref 8.7–10.5)
CHLORIDE SERPL-SCNC: 110 MMOL/L (ref 95–110)
CO2 SERPL-SCNC: 23 MMOL/L (ref 23–29)
CREAT SERPL-MCNC: 1.5 MG/DL (ref 0.5–1.4)
GFR SERPLBLD CREATININE-BSD FMLA CKD-EPI: 48 ML/MIN/1.73/M2
GLUCOSE SERPL-MCNC: 85 MG/DL (ref 70–110)
POTASSIUM SERPL-SCNC: 4.4 MMOL/L (ref 3.5–5.1)
SODIUM SERPL-SCNC: 140 MMOL/L (ref 136–145)

## 2025-04-23 PROCEDURE — 25000003 PHARM REV CODE 250

## 2025-04-23 PROCEDURE — 80048 BASIC METABOLIC PNL TOTAL CA: CPT

## 2025-04-23 PROCEDURE — 25000003 PHARM REV CODE 250: Performed by: HOSPITALIST

## 2025-04-23 PROCEDURE — 97116 GAIT TRAINING THERAPY: CPT | Mod: CQ

## 2025-04-23 PROCEDURE — 30200315 PPD INTRADERMAL TEST REV CODE 302

## 2025-04-23 PROCEDURE — 12000002 HC ACUTE/MED SURGE SEMI-PRIVATE ROOM

## 2025-04-23 PROCEDURE — 97535 SELF CARE MNGMENT TRAINING: CPT

## 2025-04-23 PROCEDURE — 86580 TB INTRADERMAL TEST: CPT

## 2025-04-23 PROCEDURE — 36415 COLL VENOUS BLD VENIPUNCTURE: CPT

## 2025-04-23 RX ADMIN — ZOLPIDEM TARTRATE 5 MG: 5 TABLET, FILM COATED ORAL at 09:04

## 2025-04-23 RX ADMIN — METOPROLOL SUCCINATE 12.5 MG: 25 TABLET, EXTENDED RELEASE ORAL at 09:04

## 2025-04-23 RX ADMIN — HYDROCHLOROTHIAZIDE 12.5 MG: 12.5 TABLET ORAL at 09:04

## 2025-04-23 RX ADMIN — AMOXICILLIN AND CLAVULANATE POTASSIUM 1 TABLET: 875; 125 TABLET, FILM COATED ORAL at 09:04

## 2025-04-23 RX ADMIN — DULOXETINE HYDROCHLORIDE 20 MG: 20 CAPSULE, DELAYED RELEASE ORAL at 09:04

## 2025-04-23 RX ADMIN — TUBERCULIN PURIFIED PROTEIN DERIVATIVE 5 UNITS: 5 INJECTION INTRADERMAL at 04:04

## 2025-04-23 RX ADMIN — ATORVASTATIN CALCIUM 20 MG: 20 TABLET, FILM COATED ORAL at 09:04

## 2025-04-23 RX ADMIN — LOSARTAN POTASSIUM 100 MG: 50 TABLET, FILM COATED ORAL at 09:04

## 2025-04-23 RX ADMIN — BUSPIRONE HYDROCHLORIDE 15 MG: 5 TABLET ORAL at 09:04

## 2025-04-23 RX ADMIN — AMLODIPINE BESYLATE 5 MG: 5 TABLET ORAL at 09:04

## 2025-04-23 RX ADMIN — TAMSULOSIN HYDROCHLORIDE 0.4 MG: 0.4 CAPSULE ORAL at 09:04

## 2025-04-23 RX ADMIN — DONEPEZIL HYDROCHLORIDE 5 MG: 5 TABLET, FILM COATED ORAL at 09:04

## 2025-04-23 RX ADMIN — BUSPIRONE HYDROCHLORIDE 15 MG: 5 TABLET ORAL at 02:04

## 2025-04-23 NOTE — ASSESSMENT & PLAN NOTE
Patient has recurrent depression which is moderate and is currently uncontrolled. Will Increase anti-depressant medications. We will not consult psychiatry at this time. Patient does not display psychosis at this time. Continue to monitor closely and adjust plan of care as needed.  Continue Cymbalta.

## 2025-04-23 NOTE — PT/OT/SLP PROGRESS
Occupational Therapy   Treatment    Name: Rajendra Castle  MRN: [id number]  Admitting Diagnosis:  Hypertensive emergency       Recommendations:     Discharge Recommendations: Moderate Intensity Therapy  Discharge Equipment Recommendations:  walker, rolling  Barriers to discharge:  Decreased caregiver support    Assessment:     Rajendra Castle is a 79 y.o. male with a medical diagnosis of Hypertensive emergency.  He presents with improving medical acuity and functional mobility. Patient participated in bed mobility, toilet hygiene/transfer, grooming standing at sink and ambulation using rolling walker. Performance deficits affecting function are weakness, impaired endurance, impaired self care skills, impaired functional mobility, gait instability, impaired balance, decreased safety awareness, decreased lower extremity function, decreased upper extremity function.     Rehab Prognosis:  Good; patient would benefit from acute skilled OT services to address these deficits and reach maximum level of function.       Plan:     Patient to be seen 5 x/week to address the above listed problems via self-care/home management, therapeutic activities, therapeutic exercises  Plan of Care Expires: 05/15/25  Plan of Care Reviewed with: patient    Subjective     Chief Complaint: General weakness  Patient/Family Comments/goals: Improved functional mobility and ADL independence.   Pain/Comfort:  Pain Rating 1: 0/10  Pain Rating Post-Intervention 1: 0/10    Objective:     Communicated with: nurse prior to session.  Patient found HOB elevated with telemetry, peripheral IV upon OT entry to room.    General Precautions: Standard, fall, vision impaired    Orthopedic Precautions:N/A  Braces: N/A  Respiratory Status: Room air     Occupational Performance:     Bed Mobility:    Patient completed Scooting/Bridging with contact guard assistance  Patient completed Supine to Sit with contact guard assistance  Patient completed Sit to Supine with contact  guard assistance   Performed unsupported sitting EOB with contact guard assistance.     Functional Mobility/Transfers:  Patient completed Sit <> Stand Transfer with contact guard assistance  with  rolling walker   Patient completed Toilet Transfer Step Transfer technique with contact guard assistance with  rolling walker  Functional Mobility: ambulated 20 feet in the hospital room and bathroom with contact guard assistance using rolling walker.     Activities of Daily Living:  Grooming: contact guard assistance to wash hands standing at sink.  Toileting: contact guard assistance to perform toilet hygiene from commode.       [facility] 6 Click ADL:      Treatment & Education:  Patient reports increased dizziness during standing activity.    Patient left HOB elevated with all lines intact, call button in reach, and bed alarm on    GOALS:   Multidisciplinary Problems       Occupational Therapy Goals          Problem: Occupational Therapy    Goal Priority Disciplines Outcome Interventions   Occupational Therapy Goal     OT, PT/OT Progressing    Description: Goals to be met by: 5/15/25     Patient will increase functional independence with ADLs by performing:    UE Dressing with Modified [place].  LE Dressing with Modified [place].  Grooming while standing at sink with Modified [place].  Toileting from toilet with Modified [place] for hygiene and clothing management.   Toilet transfer to toilet with Modified [place].                         Time Tracking:     OT Date of Treatment: 04/23/25  OT Start Time: 1013  OT Stop Time: 1025  OT Total Time (min): 12 min    Billable Minutes:Self Care/Home Management 12    OT/MARIA TERESA: OT          4/23/2025

## 2025-04-23 NOTE — PLAN OF CARE
Problem: Physical Therapy  Goal: Physical Therapy Goal  Description: Goals to be met by: 5/15/2025     Patient will increase functional independence with mobility by performin. Supine to sit with Modified [place]  2. Sit to stand transfer with Modified [place]  3. Gait  x 300  feet with Modified [place] using Rolling Walker.     Outcome: Progressing   Ambulate with rw and assistance for safety.

## 2025-04-23 NOTE — ASSESSMENT & PLAN NOTE
Patient has a current diagnosis of hypertensive urgency/emergency diagnosis: hypertensive emergency with end organ damage evidenced by hypertensive encephalopathy which is controlled.  Latest blood pressure and vitals reviewed-   Temp:  [98 °F (36.7 °C)-99.1 °F (37.3 °C)]   Pulse:  [48-72]   Resp:  [15-18]   BP: (131-144)/(50-68)   SpO2:  [94 %-97 %] .   Patient currently off IV antihypertensives.   Home meds for hypertension were reviewed and noted below.   Hypertension Medications      BB, CCB, HCTZ and losartan    Patient controlled on oral meds

## 2025-04-23 NOTE — SUBJECTIVE & OBJECTIVE
"Interval History:  Patient seen and examined this morning.  Resting comfortably in no acute distress.  He does endorse profound fatigue.    Review of Systems   Constitutional:  Positive for fatigue.   Respiratory:  Negative for shortness of breath.    Cardiovascular:  Negative for chest pain.   Psychiatric/Behavioral:  Negative for agitation and confusion.      Objective:     Vital Signs (Most Recent):  Temp: 99.1 °F (37.3 °C) (04/23/25 1144)  Pulse: (!) 48 (04/23/25 1144)  Resp: 15 (04/23/25 1144)  BP: 131/65 (04/23/25 1144)  SpO2: 95 % (04/23/25 1144) Vital Signs (24h Range):  Temp:  [98 °F (36.7 °C)-99.1 °F (37.3 °C)] 99.1 °F (37.3 °C)  Pulse:  [48-72] 48  Resp:  [15-18] 15  SpO2:  [94 %-97 %] 95 %  BP: (131-144)/(50-68) 131/65     Weight: 62.6 kg (138 lb 0.1 oz)  Body mass index is 20.98 kg/m².    Intake/Output Summary (Last 24 hours) at 4/23/2025 1350  Last data filed at 4/23/2025 0952  Gross per 24 hour   Intake 1101.16 ml   Output 850 ml   Net 251.16 ml         Physical Exam  Vitals and nursing note reviewed.   Eyes:      Pupils: Pupils are equal, round, and reactive to light.   Neck:      Vascular: No JVD.   Cardiovascular:      Rate and Rhythm: Normal rate and regular rhythm.      Heart sounds: Normal heart sounds.   Pulmonary:      Effort: Pulmonary effort is normal.      Breath sounds: Normal breath sounds.   Abdominal:      General: Bowel sounds are normal. There is no distension.      Palpations: Abdomen is soft.      Tenderness: There is no abdominal tenderness.   Musculoskeletal:         General: No deformity.   Lymphadenopathy:      Cervical: No cervical adenopathy.   Skin:     Coloration: Skin is not pale.      Findings: No rash.               Significant Labs: All pertinent labs within the past 24 hours have been reviewed.  BMP:   Recent Labs   Lab 04/23/25  0454      K 4.4   CO2 23   BUN 19   CREATININE 1.5*   CALCIUM 8.6*     CBC: No results for input(s): "WBC", "HGB", "HCT", "PLT" in the " last 48 hours.    Significant Imaging: I have reviewed all pertinent imaging results/findings within the past 24 hours.

## 2025-04-23 NOTE — PLAN OF CARE
SW met with Pt at bedside explained that referrals would be sent to surrounding area Pt is agreeable and he stated he need rehab before going home alone. Pt accepted at [facility] pending auth from [facility].    2:42pm- Per kala with [facility] medical director would like to send SNF request for medical review. MD agreeable and email sent back to Kala with provider information. JUAN will continue to follow   04/23/25 1106   Post-Acute Status   Post-Acute Authorization Placement   Post-Acute Placement Status Pending payor review/awaiting authorization (if required)   Discharge Plan   Discharge Plan A Skilled Nursing Facility   Discharge Plan B Home Health

## 2025-04-23 NOTE — PROGRESS NOTES
[facility]  Adult Nutrition   Progress Note (Initial Assessment)     SUMMARY     Recommendations  Recommendation/Intervention:   1. Continue Heart Healty diet as tolerated.   2. REcommend Boost + with meals to assist with meeting EEN / EPN.   3.  to obtain daily menu choices.    Nutrition Goal Status: new    Nutrition Goals:  PO intake will meet >75% of estimated needs by RD follow up. and Oral supplement consumption of >75% by RD follow up.    Nutrition Interventions: Fat modified diet, Cholesterol modified diet, Fiber modified diet, and Sodium modified diet    Nutrition Diagnosis PES Statement: Underweight related to inadequate energy intake as evidenced by unplanned weight loss > 7.5% in 6 months with reported food insecurity..      Nutrition Diagnosis Status:   New    Dietitian Rounds Brief  RD screen for LOS. Pt noted to have BMI < 23, with age > 65 years. Patient reports his usual body weight is 148 lbs. Patient has lost 10 lbs, 14% in 6 months per Epic. Patient agreed to try Boost + 2 times / day. Last BM 4/23/25. No visible signs of malnutrition. RD to follow for intake, weights, and stratus change.    Nutrition Related Social Determinants of Health: SDOH: Food insecurity.  Food Insecurity: Food Insecurity Present (4/16/2025)    Hunger Vital Sign     Worried About Running Out of Food in the Last Year: Sometimes true     Ran Out of Food in the Last Year: Sometimes true       Malnutrition Assessment     Skin (Micronutrient): thinned       Weight Loss (Malnutrition): greater than 7.5% in 3 months             Diet order:   Current Diet Order: Heart Healty diet          Evaluation of Received Nutrient/Fluid Intake  Energy Calories Required: meeting needs  Protein Required: not meeting needs  Fluid Required: meeting needs  Tolerance: tolerating     % Intake of Estimated Energy Needs: 75 - 100 %  % Meal Intake: 75 - 100 %      Intake/Output Summary (Last 24 hours) at 4/23/2025 1618  Last data  "filed at 4/23/2025 1200  Gross per 24 hour   Intake 1101.16 ml   Output 750 ml   Net 351.16 ml        Anthropometrics  Height: 5' 8" (172.7 cm)  Height (inches): 68 in  Height Method: Stated  Weight: 62.6 kg (138 lb 0.1 oz)  Weight (lb): 138.01 lb  Weight Method: Bed Scale  Ideal Body Weight (IBW), Male: 154 lb  % Ideal Body Weight, Male (lb): 89.62 %  BMI (Calculated): 21  BMI Grade: less than 23 (older than 65 years) - underweight       Estimated/Assessed Needs  Weight Used For Calorie Calculations: 62.6 kg (138 lb 0.1 oz)  Energy Calorie Requirements (kcal): 2157 kcal/day (34 kcal/kg for weight gain).  Energy Need Method: [place]-St Jeor (MSJ x 1.25 plus 500 kcal/day.)  Protein Requirements: 50-75 gm/day (0.8-1.2 gm/kg)  Weight Used For Protein Calculations: 62.6 kg (138 lb 0.1 oz)     Estimated Fluid Requirement Method: RDA Method  RDA Method (mL): 2157       Reason for Assessment  Reason For Assessment: length of stay, other (see comments) (BMI < 23, age > 65 years.)  Diagnosis: cardiac disease  General Information Comments: Patient with history of hypertension, prediabetes, CKD-3, GERD, hyperparathyroidism (status post parathyroidectomy), rheumatoid lung, chronic pulmonary embolism, anxiety/depression, and ureteral cancer admitted for hypertensive emergency and placed on stroke pathway. Pt was educated on 4/16/25. Patient was transferred out of ICU on 4/18/25.    Final diagnoses:  [R06.00] Acute dyspnea  [I16.1] Hypertensive emergency (Primary)  [R51.9] Nonintractable headache, unspecified chronicity pattern, unspecified headache type  [R42] Dizziness  [R20.2] Paresthesia  [R79.89] Elevated troponin  [H70.93] Mastoiditis of both sides     Past Medical History:   Diagnosis Date    Anticoagulant long-term use     Arthritis     Coronary artery disease     Dr. Lamb    DDD (degenerative disc disease), cervical     Degenerative disc disease     Heart murmur     History of radiation therapy 2020    Hypertension  "    Nontoxic multinodular goiter 09/20/2016    Prostate CA     RA (rheumatoid arthritis)     Sleep apnea     No CPAP    Vitamin D insufficiency 09/30/2016        Nutrition/Diet History  Nutrition Intake History: Patient reportis good appetite and intake.  Food Preferences: Obtained by RD.  Spiritual, Cultural Beliefs, Synagogue Practices, Values that Affect Care: no  Food Allergies: NKFA  Factors Affecting Nutritional Intake: None identified at this time    Nutrition Risk Screen        MST Score: 0  Have you recently lost weight without trying?: No  Weight loss score: 0  Have you been eating poorly because of a decreased appetite?: No  Appetite score: 0       Weight History:  Wt Readings from Last 10 Encounters:   04/16/25 62.6 kg (138 lb 0.1 oz)   04/04/25 70.3 kg (155 lb)   01/02/25 70.4 kg (155 lb 1.5 oz)   11/25/24 71 kg (156 lb 8.4 oz)   11/04/24 67.4 kg (148 lb 9.4 oz)   10/24/24 66.6 kg (146 lb 13.2 oz)   10/21/24 69 kg (152 lb 1.9 oz)   10/09/24 68.9 kg (152 lb)   09/20/24 69 kg (152 lb 1.9 oz)   06/24/24 69.6 kg (153 lb 7 oz)        Lab/Procedures/Meds: Pertinent Labs/Meds Reviewed    Medications:Pertinent Medications Reviewed  Scheduled Meds:   amLODIPine  5 mg Oral Daily    amoxicillin-clavulanate 875-125mg  1 tablet Oral Q12H    atorvastatin  20 mg Oral QHS    busPIRone  15 mg Oral TID    donepeziL  5 mg Oral QHS    DULoxetine  20 mg Oral BID    hydroCHLOROthiazide  12.5 mg Oral Daily    losartan  100 mg Oral Daily    metoprolol succinate  12.5 mg Oral Daily    tamsulosin  0.4 mg Oral Daily    tuberculin  5 Units Intradermal Once     Continuous Infusions:  PRN Meds:.  Current Facility-Administered Medications:     acetaminophen, 650 mg, Oral, Q8H PRN    bisacodyL, 10 mg, Rectal, Daily PRN    dextrose 50%, 12.5 g, Intravenous, PRN    dextrose 50%, 25 g, Intravenous, PRN    glucagon (human recombinant), 1 mg, Intramuscular, PRN    glucose, 16 g, Oral, PRN    glucose, 24 g, Oral, PRN    hydrALAZINE, 10 mg,  "Intravenous, Q4H PRN    melatonin, 6 mg, Oral, Nightly PRN    naloxone, 0.02 mg, Intravenous, PRN    ondansetron, 4 mg, Intravenous, Q6H PRN    ondansetron, 4 mg, Intravenous, Q6H PRN    senna-docusate, 1 tablet, Oral, Daily PRN    sodium chloride 0.9%, 500 mL, Intravenous, PRN    sodium chloride 0.9%, 10 mL, Intravenous, PRN    zolpidem, 5 mg, Oral, Nightly PRN    Labs: Pertinent Labs Reviewed  Clinical Chemistry:  Recent Labs   Lab 04/17/25  0417 04/18/25  0444 04/20/25  0516 04/21/25  0526 04/22/25  0610 04/23/25  0454      < >  --  136 140 140   K 3.9   < >  --  4.2 4.8 4.4   CO2 19*   < >  --  22* 21* 23   BUN 16   < >  --  22 25* 19   CREATININE 1.4   < >  --  1.5* 1.6* 1.5*   CALCIUM 9.0   < >  --  8.7 8.8 8.6*   ALBUMIN 3.6  --   --   --   --   --    BILITOT 0.5  --   --   --   --   --    ALKPHOS 61  --   --   --   --   --    AST 12  --   --   --   --   --    ALT 5*  --   --   --   --   --    GLUCOSE 103   < >  --  102 90 85   MG 1.8   < > 1.9  --   --   --     < > = values in this interval not displayed.     CBC:   Recent Labs   Lab 04/20/25  0516   WBC 6.79   RBC 4.35*   HGB 11.6*   HCT 37.9*      MCV 87   MCH 26.7*   MCHC 30.6*     Lipid Panel:  No results for input(s): "CHOL", "HDL", "LDLCALC", "TRIG", "CHOLHDL" in the last 168 hours.  Cardiac Profile:  No results for input(s): "BNP", "CPK", "CPKMB", "TROPONINI", "CKTOTAL" in the last 168 hours.  Inflammatory Labs:  No results for input(s): "CRP" in the last 168 hours.  Diabetes:  No results for input(s): "HGBA1C", "POCTGLUCOSE" in the last 168 hours.  Thyroid & Parathyroid:  No results for input(s): "TSH", "FREET4", "T7AEJUP", "K9TXRAP", "THYROIDAB" in the last 168 hours.    Monitor and Evaluation  Monitor and Evaluation: Food and beverage intake     Discharge Planning  Nutrition Discharge Planning: Therapeutic diet (comments), Oral supplement regimen (comments)  Therapeutic diet (comments): Heart Healty diet    Nutrition Risk  Level of " Risk/Frequency of Follow-up:  (1 x / week)     Nutrition Follow-Up  RD Follow-up?: Yes

## 2025-04-23 NOTE — PROGRESS NOTES
"[facility]  Progress Note    Patient Name: Rajendra Castle  MRN: [id number]  Patient Class: IP- Inpatient   Admission Date: 4/14/2025  Length of Stay: 8 days  Attending Physician: Ivtet Cesar MD  Primary Care Provider: Sp Lilly MD        Subjective     Principal Problem:Hypertensive emergency        HPI:  Patient is a 79-year-old  male who only reports he has "prostate issues" and hypertension; he takes his medications but does not check his blood pressure at home  He is a vague historian, but review of the records indicate that he has multiple medical issues including hypertension, prediabetes, CKD -3, GERD, hyperparathyroidism (status post parathyroidectomy), rheumatoid lung, chronic pulmonary embolism, anxiety/depression, and ureteral cancer  Around 3:00 p.m. in the afternoon, the patient had numbness/tingling in his bilateral hands/feet and developed a headache (with pressure behind his eyes) as well as dizziness; he endorsed chest tightness to the ER but not to me but does state that he felt weak all over particularly in his legs and was staggering  In the ER, his blood pressure goes highest of the to 10s-> now 160s after Cardene infusion and 2 doses of IV Hydralazine/a dose of Labetalol  He had no leukocytosis with a hemoglobin of 11.8 normal platelets; COVID/influenza testing were negative  Creatinine 1.3 (below baseline) with a potassium of 3.1; Mag was 1.9  He did not have a UTI; TSH was normal  BNP 5217 and troponin went from 22-> 36-> 72.7 (at the time of this editing)  Lipid panel showed an LDL of 61  EKG showed, per my interpretation, sinus rhythm 71 beats per minute with a QTC of 489 with biphasic T-waves in several leads and this has been seen previously  Chest x-ray was unrevealing; CT head without contrast showed, per radiologist Dr. Reyes:    "Impression:     No evidence of acute intracranial abnormality or fracture.   " "  Microvascular chronic ischemic changes and senescent atrophic changes.     Otomastoiditis on the left and mastoiditis on the right with possible small amount of fluid also in the middle ear on the right.  With prior exam demonstrating mastoiditis on the right only."    The patient received aspirin around 3:00 a.m., Tylenol, calcium gluconate, oral potassium I Ativan, in the aforementioned antihypertensives and was transferred to the ICU for further diagnosis, treatment, and care    Overview/Hospital Course:  Patient was admitted to the hospital with worsening neurologic deficits and a markedly elevated blood pressure.  Initial etiology was thought to be acute stroke, and patient was seen by tele stroke, underwent MRI which showed potential acute lesion.  However, this was not corroborated with the patient's symptoms.  Repeat CT angiogram showed no evidence of any acute lesions, therefore stroke neurologist stated this is likely not an acute stroke.  Patient was continued for hypertensive emergency.  Home blood pressure medication was restarted, and blood pressure improved over the course of his hospitalization.  Patient was also found to have an acute urinary tract infection and treatment was initiated for such.  Patient was also reported to be depressed and anxious, and SSRI was started at his request.  Blood pressure slowly returned back to baseline of 150s to 170s systolic.  Echocardiogram was done which showed evidence of concentric remodeling but otherwise stable.Patient with noted sundowning to indicate likely vascular dementia. STarted on aricept.    Interval History:  Patient seen and examined this morning.  Resting comfortably in no acute distress.  He does endorse profound fatigue.    Review of Systems   Constitutional:  Positive for fatigue.   Respiratory:  Negative for shortness of breath.    Cardiovascular:  Negative for chest pain.   Psychiatric/Behavioral:  Negative for agitation and confusion.  " "    Objective:     Vital Signs (Most Recent):  Temp: 99.1 °F (37.3 °C) (04/23/25 1144)  Pulse: (!) 48 (04/23/25 1144)  Resp: 15 (04/23/25 1144)  BP: 131/65 (04/23/25 1144)  SpO2: 95 % (04/23/25 1144) Vital Signs (24h Range):  Temp:  [98 °F (36.7 °C)-99.1 °F (37.3 °C)] 99.1 °F (37.3 °C)  Pulse:  [48-72] 48  Resp:  [15-18] 15  SpO2:  [94 %-97 %] 95 %  BP: (131-144)/(50-68) 131/65     Weight: 62.6 kg (138 lb 0.1 oz)  Body mass index is 20.98 kg/m².    Intake/Output Summary (Last 24 hours) at 4/23/2025 1350  Last data filed at 4/23/2025 0952  Gross per 24 hour   Intake 1101.16 ml   Output 850 ml   Net 251.16 ml         Physical Exam  Vitals and nursing note reviewed.   Eyes:      Pupils: Pupils are equal, round, and reactive to light.   Neck:      Vascular: No JVD.   Cardiovascular:      Rate and Rhythm: Normal rate and regular rhythm.      Heart sounds: Normal heart sounds.   Pulmonary:      Effort: Pulmonary effort is normal.      Breath sounds: Normal breath sounds.   Abdominal:      General: Bowel sounds are normal. There is no distension.      Palpations: Abdomen is soft.      Tenderness: There is no abdominal tenderness.   Musculoskeletal:         General: No deformity.   Lymphadenopathy:      Cervical: No cervical adenopathy.   Skin:     Coloration: Skin is not pale.      Findings: No rash.               Significant Labs: All pertinent labs within the past 24 hours have been reviewed.  BMP:   Recent Labs   Lab 04/23/25  0454      K 4.4   CO2 23   BUN 19   CREATININE 1.5*   CALCIUM 8.6*     CBC: No results for input(s): "WBC", "HGB", "HCT", "PLT" in the last 48 hours.    Significant Imaging: I have reviewed all pertinent imaging results/findings within the past 24 hours.      Assessment & Plan  Hypertensive emergency  Patient has a current diagnosis of hypertensive urgency/emergency diagnosis: hypertensive emergency with end organ damage evidenced by hypertensive encephalopathy which is controlled.  Latest " "blood pressure and vitals reviewed-   Temp:  [98 °F (36.7 °C)-99.1 °F (37.3 °C)]   Pulse:  [48-72]   Resp:  [15-18]   BP: (131-144)/(50-68)   SpO2:  [94 %-97 %] .   Patient currently off IV antihypertensives.   Home meds for hypertension were reviewed and noted below.   Hypertension Medications      BB, CCB, HCTZ and losartan    Patient controlled on oral meds       CKD (chronic kidney disease) stage 3, GFR 30-59 ml/min  Creatine stable for now. BMP reviewed- noted Estimated Creatinine Clearance: 36.5 mL/min (A) (based on SCr of 1.5 mg/dL (H)). according to latest data. Based on current GFR, CKD stage is stage 3 - GFR 30-59.  Monitor UOP and serial BMP and adjust therapy as needed. Renally dose meds. Avoid nephrotoxic medications and procedures.      Anemia  Anemia is likely due to chronic disease due to Chronic Kidney Disease. Most recent hemoglobin and hematocrit are listed below.  No results for input(s): "HGB", "HCT" in the last 72 hours.    Plan  - Monitor serial CBC: Daily  - Transfuse PRBC if patient becomes hemodynamically unstable, symptomatic or H/H drops below 7/21.  - Patient has not received any PRBC transfusions to date  - Patient's anemia is currently stable  Major depressive disorder, single episode, severe without psychotic features  Patient has recurrent depression which is moderate and is currently uncontrolled. Will Increase anti-depressant medications. We will not consult psychiatry at this time. Patient does not display psychosis at this time. Continue to monitor closely and adjust plan of care as needed.  Continue Cymbalta.          S/P parathyroidectomy  noted    Atherosclerosis of native coronary artery of native heart without angina pectoris  Patient with known CAD, which is controlled Will continue Statin and monitor for S/Sx of angina/ACS. Continue to monitor on telemetry.   Ric ADAMS  Noted- avoid beta blockers    Vascular dementia  Patient with dementia with likely etiology of vascular " dementia. Dementia is moderate. The patient does have signs of behavioral disturbance. Started on aricept. Continue non-pharmacologic interventions to prevent delirium (No VS between 11PM-5AM, activity during day, opening blinds, providing glasses/hearing aids, and up in chair during daytime). Will avoid narcotics and benzos unless absolutely necessary. PRN anti-psychotics are not prescribed to avoid self harm behaviors.  Gait disturbance  At this time, I am currently recommending Skilled Nursing Facility Placement for patient. Based on current progress, I anticipate they will require 30 days or less in a Skilled Nursing Facility   VTE Risk Mitigation (From admission, onward)           Ordered     IP VTE HIGH RISK PATIENT  Once         04/15/25 0608     Place sequential compression device  Until discontinued         04/15/25 0608                    Discharge Planning   DEVONTE:      Code Status: Full Code   Medical Readiness for Discharge Date: 4/21/2025  Discharge Plan A: Skilled Nursing Facility   Discharge Delays: None known at this time            Please place Justification for DME        Ivett Cesar MD  Department of Hospital Medicine   [facility]

## 2025-04-23 NOTE — PLAN OF CARE
Problem: Oral Intake Inadequate  Goal: Improved Oral Intake  Intervention: Promote and Optimize Oral Intake  Flowsheets (Taken 4/23/2025 1619)  Nutrition Interventions:   supplemental drinks provided   food preferences provided   diet liberalized     Problem: Skin Injury Risk Increased  Goal: Skin Health and Integrity  Intervention: Promote and Optimize Oral Intake  Flowsheets (Taken 4/23/2025 1619)  Nutrition Interventions:   supplemental drinks provided   food preferences provided   diet liberalized

## 2025-04-23 NOTE — ASSESSMENT & PLAN NOTE
Patient: Fourteen EDZinnia    Procedure Summary     Date: 07/24/23 Room / Location: [facility] ROOM 26 / SURGERY SAME DAY [facility]    Anesthesia Start: 1217 Anesthesia Stop: 1258    Procedures:       EGD, WITH PEG TUBE INSERTION (Esophagus)      GASTROSCOPY (Esophagus) Diagnosis: (PEG placement)    Surgeons: Brenda Garcia M.D. Responsible Provider: Angel South M.D.    Anesthesia Type: MAC ASA Status: 4          Final Anesthesia Type: MAC  Last vitals  BP   Blood Pressure : (!) 139/94    Temp   36.5 °C (97.7 °F)    Pulse   91   Resp   16    SpO2   94 %      Anesthesia Post Evaluation    Patient location during evaluation: PACU  Patient participation: complete - patient participated  Level of consciousness: awake and alert  Pain score: 0    Airway patency: patent  Anesthetic complications: no  Cardiovascular status: hemodynamically stable  Respiratory status: acceptable  Hydration status: euvolemic    PONV: none          No notable events documented.     Nurse Pain Score: 0 (NPRS)         Patient with dementia with likely etiology of vascular dementia. Dementia is moderate. The patient does have signs of behavioral disturbance. Started on aricept. Continue non-pharmacologic interventions to prevent delirium (No VS between 11PM-5AM, activity during day, opening blinds, providing glasses/hearing aids, and up in chair during daytime). Will avoid narcotics and benzos unless absolutely necessary. PRN anti-psychotics are not prescribed to avoid self harm behaviors.

## 2025-04-24 PROBLEM — H70.93 MASTOIDITIS OF BOTH SIDES: Status: ACTIVE | Noted: 2025-04-24

## 2025-04-24 PROCEDURE — 25000003 PHARM REV CODE 250: Performed by: HOSPITALIST

## 2025-04-24 PROCEDURE — 97116 GAIT TRAINING THERAPY: CPT | Mod: CQ

## 2025-04-24 PROCEDURE — 12000002 HC ACUTE/MED SURGE SEMI-PRIVATE ROOM

## 2025-04-24 PROCEDURE — 97535 SELF CARE MNGMENT TRAINING: CPT | Mod: CO

## 2025-04-24 PROCEDURE — 97110 THERAPEUTIC EXERCISES: CPT | Mod: CO

## 2025-04-24 PROCEDURE — 25000003 PHARM REV CODE 250

## 2025-04-24 PROCEDURE — 36415 COLL VENOUS BLD VENIPUNCTURE: CPT

## 2025-04-24 RX ORDER — ENOXAPARIN SODIUM 100 MG/ML
40 INJECTION SUBCUTANEOUS EVERY 24 HOURS
Status: DISCONTINUED | OUTPATIENT
Start: 2025-04-24 | End: 2025-04-26 | Stop reason: HOSPADM

## 2025-04-24 RX ADMIN — ATORVASTATIN CALCIUM 20 MG: 20 TABLET, FILM COATED ORAL at 08:04

## 2025-04-24 RX ADMIN — DONEPEZIL HYDROCHLORIDE 5 MG: 5 TABLET, FILM COATED ORAL at 08:04

## 2025-04-24 RX ADMIN — METOPROLOL SUCCINATE 12.5 MG: 25 TABLET, EXTENDED RELEASE ORAL at 08:04

## 2025-04-24 RX ADMIN — BUSPIRONE HYDROCHLORIDE 15 MG: 5 TABLET ORAL at 08:04

## 2025-04-24 RX ADMIN — DULOXETINE HYDROCHLORIDE 20 MG: 20 CAPSULE, DELAYED RELEASE ORAL at 08:04

## 2025-04-24 RX ADMIN — ZOLPIDEM TARTRATE 5 MG: 5 TABLET, FILM COATED ORAL at 08:04

## 2025-04-24 RX ADMIN — TAMSULOSIN HYDROCHLORIDE 0.4 MG: 0.4 CAPSULE ORAL at 08:04

## 2025-04-24 RX ADMIN — LOSARTAN POTASSIUM 100 MG: 50 TABLET, FILM COATED ORAL at 08:04

## 2025-04-24 RX ADMIN — AMLODIPINE BESYLATE 5 MG: 5 TABLET ORAL at 08:04

## 2025-04-24 RX ADMIN — AMOXICILLIN AND CLAVULANATE POTASSIUM 1 TABLET: 875; 125 TABLET, FILM COATED ORAL at 08:04

## 2025-04-24 RX ADMIN — BUSPIRONE HYDROCHLORIDE 15 MG: 5 TABLET ORAL at 03:04

## 2025-04-24 RX ADMIN — HYDROCHLOROTHIAZIDE 12.5 MG: 12.5 TABLET ORAL at 08:04

## 2025-04-24 NOTE — PROGRESS NOTES
"[facility] Medicine  Progress Note    Patient Name: Rajendra Castle  MRN: [id number]  Patient Class: IP- Inpatient   Admission Date: 4/14/2025  Length of Stay: 9 days  Attending Physician: Ivett Cesar MD  Primary Care Provider: Sp Lilly MD        Subjective     Principal Problem:Hypertensive emergency        HPI:  Patient is a 79-year-old  male who only reports he has "prostate issues" and hypertension; he takes his medications but does not check his blood pressure at home  He is a vague historian, but review of the records indicate that he has multiple medical issues including hypertension, prediabetes, CKD -3, GERD, hyperparathyroidism (status post parathyroidectomy), rheumatoid lung, chronic pulmonary embolism, anxiety/depression, and ureteral cancer  Around 3:00 p.m. in the afternoon, the patient had numbness/tingling in his bilateral hands/feet and developed a headache (with pressure behind his eyes) as well as dizziness; he endorsed chest tightness to the ER but not to me but does state that he felt weak all over particularly in his legs and was staggering  In the ER, his blood pressure goes highest of the to 10s-> now 160s after Cardene infusion and 2 doses of IV Hydralazine/a dose of Labetalol  He had no leukocytosis with a hemoglobin of 11.8 normal platelets; COVID/influenza testing were negative  Creatinine 1.3 (below baseline) with a potassium of 3.1; Mag was 1.9  He did not have a UTI; TSH was normal  BNP 5217 and troponin went from 22-> 36-> 72.7 (at the time of this editing)  Lipid panel showed an LDL of 61  EKG showed, per my interpretation, sinus rhythm 71 beats per minute with a QTC of 489 with biphasic T-waves in several leads and this has been seen previously  Chest x-ray was unrevealing; CT head without contrast showed, per radiologist Dr. Reyes:    "Impression:     No evidence of acute intracranial abnormality or fracture.   " "  Microvascular chronic ischemic changes and senescent atrophic changes.     Otomastoiditis on the left and mastoiditis on the right with possible small amount of fluid also in the middle ear on the right.  With prior exam demonstrating mastoiditis on the right only."    The patient received aspirin around 3:00 a.m., Tylenol, calcium gluconate, oral potassium I Ativan, in the aforementioned antihypertensives and was transferred to the ICU for further diagnosis, treatment, and care    Overview/Hospital Course:  Patient was admitted to the hospital with worsening neurologic deficits and a markedly elevated blood pressure.  Initial etiology was thought to be acute stroke, and patient was seen by tele stroke, underwent MRI which showed potential acute lesion.  However, this was not corroborated with the patient's symptoms.  Repeat CT angiogram showed no evidence of any acute lesions, therefore stroke neurologist stated this is likely not an acute stroke.  Patient was continued for hypertensive emergency.  Home blood pressure medication was restarted, and blood pressure improved over the course of his hospitalization.  Patient was also found to have an acute urinary tract infection and treatment was initiated for such.  Patient was also reported to be depressed and anxious, and SSRI was started at his request.  Blood pressure slowly returned back to baseline of 150s to 170s systolic.  Echocardiogram was done which showed evidence of concentric remodeling but otherwise stable.Patient with noted sundowning to indicate likely vascular dementia. STarted on aricept.    Interval History:  Patient with ongoing fatigue.  He is in no acute distress.  He is afebrile and hemodynamically stable.    Review of Systems   All other systems reviewed and are negative.    Objective:     Vital Signs (Most Recent):  Temp: 98.1 °F (36.7 °C) (04/24/25 0733)  Pulse: (!) 56 (04/24/25 1117)  Resp: 18 (04/24/25 0733)  BP: (!) 114/55 (04/24/25 " "1117)  SpO2: 100 % (04/24/25 1117) Vital Signs (24h Range):  Temp:  [97.9 °F (36.6 °C)-99 °F (37.2 °C)] 98.1 °F (36.7 °C)  Pulse:  [47-71] 56  Resp:  [17-18] 18  SpO2:  [91 %-100 %] 100 %  BP: (114-157)/(50-75) 114/55     Weight: 62.6 kg (138 lb 0.1 oz)  Body mass index is 20.98 kg/m².    Intake/Output Summary (Last 24 hours) at 4/24/2025 1315  Last data filed at 4/24/2025 1312  Gross per 24 hour   Intake 1440 ml   Output 1120 ml   Net 320 ml         Physical Exam  Vitals and nursing note reviewed.   Eyes:      Pupils: Pupils are equal, round, and reactive to light.   Neck:      Vascular: No JVD.   Cardiovascular:      Rate and Rhythm: Normal rate and regular rhythm.      Heart sounds: Normal heart sounds.   Pulmonary:      Effort: Pulmonary effort is normal.      Breath sounds: Normal breath sounds.   Abdominal:      General: Bowel sounds are normal. There is no distension.      Palpations: Abdomen is soft.      Tenderness: There is no abdominal tenderness.   Musculoskeletal:         General: No deformity.   Lymphadenopathy:      Cervical: No cervical adenopathy.   Skin:     Coloration: Skin is not pale.      Findings: No rash.               Significant Labs: All pertinent labs within the past 24 hours have been reviewed.  BMP:   Recent Labs   Lab 04/23/25  0454      K 4.4   CO2 23   BUN 19   CREATININE 1.5*   CALCIUM 8.6*     CBC: No results for input(s): "WBC", "HGB", "HCT", "PLT" in the last 48 hours.    Significant Imaging: I have reviewed all pertinent imaging results/findings within the past 24 hours.      Assessment & Plan  Hypertensive emergency  Patient has a current diagnosis of hypertensive urgency/emergency diagnosis: hypertensive emergency with end organ damage evidenced by hypertensive encephalopathy which is controlled.  Latest blood pressure and vitals reviewed-   Temp:  [97.9 °F (36.6 °C)-99 °F (37.2 °C)]   Pulse:  [47-71]   Resp:  [17-18]   BP: (114-157)/(50-75)   SpO2:  [91 %-100 %] . " "  Patient currently off IV antihypertensives.   Home meds for hypertension were reviewed and noted below.   Hypertension Medications      BB, CCB, HCTZ and losartan    Patient controlled on oral meds       CKD (chronic kidney disease) stage 3, GFR 30-59 ml/min  Creatine stable for now. BMP reviewed- noted Estimated Creatinine Clearance: 36.5 mL/min (A) (based on SCr of 1.5 mg/dL (H)). according to latest data. Based on current GFR, CKD stage is stage 3 - GFR 30-59.  Monitor UOP and serial BMP and adjust therapy as needed. Renally dose meds. Avoid nephrotoxic medications and procedures.      Anemia  Anemia is likely due to chronic disease due to Chronic Kidney Disease. Most recent hemoglobin and hematocrit are listed below.  No results for input(s): "HGB", "HCT" in the last 72 hours.    Plan  - Monitor serial CBC: Daily  - Transfuse PRBC if patient becomes hemodynamically unstable, symptomatic or H/H drops below 7/21.  - Patient has not received any PRBC transfusions to date  - Patient's anemia is currently stable  Major depressive disorder, single episode, severe without psychotic features  Patient has recurrent depression which is moderate and is currently uncontrolled. Will Increase anti-depressant medications. We will not consult psychiatry at this time. Patient does not display psychosis at this time. Continue to monitor closely and adjust plan of care as needed.  Continue Cymbalta.          S/P parathyroidectomy  noted    Atherosclerosis of native coronary artery of native heart without angina pectoris  Patient with known CAD, which is controlled Will continue Statin and monitor for S/Sx of angina/ACS. Continue to monitor on telemetry.   Ric ADAMS  Noted- avoid beta blockers    Vascular dementia  Patient with dementia with likely etiology of vascular dementia. Dementia is moderate. The patient does have signs of behavioral disturbance. Started on aricept. Continue non-pharmacologic interventions to prevent " delirium (No VS between 11PM-5AM, activity during day, opening blinds, providing glasses/hearing aids, and up in chair during daytime). Will avoid narcotics and benzos unless absolutely necessary. PRN anti-psychotics are not prescribed to avoid self harm behaviors.  Gait disturbance  At this time, I am currently recommending Skilled Nursing Facility Placement for patient. Based on current progress, I anticipate they will require 30 days or less in a Skilled Nursing Facility   Mastoiditis of both sides  Seen on initial CT imaging  Augmentin times 10 days, currently on day 7    VTE Risk Mitigation (From admission, onward)           Ordered     enoxaparin injection 40 mg  Every 24 hours         04/24/25 1122     IP VTE HIGH RISK PATIENT  Once         04/15/25 0608     Place sequential compression device  Until discontinued         04/15/25 0608                    Discharge Planning   DEVONTE:      Code Status: Full Code   Medical Readiness for Discharge Date: 4/21/2025  Discharge Plan A: Skilled Nursing Facility   Discharge Delays: None known at this time            Please place Justification for DME        Ivett Cesar MD  Department of Hospital Medicine   [facility]

## 2025-04-24 NOTE — PHYSICIAN QUERY
Please clarify/provide the diagnosis associated with the clinical findings.  moderate dementia, likely etiology of vascular dementia without signs of behavioral disturbance

## 2025-04-24 NOTE — PLAN OF CARE
Attempted to call Kathi at [facility] to check status of auth after peer to peer, no answer at this time, left  message requesting call back.       04/24/25 1542   Post-Acute Status   Post-Acute Authorization Placement   Post-Acute Placement Status Pending payor medical review/second level review

## 2025-04-24 NOTE — PHYSICIAN QUERY
Due to conflicting documentation and clinical picture, please clarify the diagnosis or diagnoses associated with the below clinical findings.  bilateral mastoiditis

## 2025-04-24 NOTE — PT/OT/SLP PROGRESS
Speech Language Pathology      Rajendra Castle  MRN: [id number]    Patient not seen today secondary to Patient fatigue, Patient unwilling to participate. Will follow-up 4/25/25.

## 2025-04-24 NOTE — PT/OT/SLP PROGRESS
Physical Therapy Treatment    Patient Name:  Rajendra Castle   MRN:  [id number]    Recommendations:     Discharge Recommendations: Moderate Intensity Therapy  Discharge Equipment Recommendations: walker, rolling  Barriers to discharge:  decreased safety awareness, balance deficits, increased assist with mobility    Assessment:     Rajendra Castle is a 79 y.o. male admitted with a medical diagnosis of Hypertensive emergency.  He presents with the following impairments/functional limitations: weakness, impaired endurance, impaired functional mobility, gait instability, decreased lower extremity function, decreased safety awareness.    Pt agreeable to visit. Pt request to use urinal first and therapist stepped out until pt was finished.    Pt performed supine to sit transfer with stand by assist. Pt performed sit to stand transfer with RW and contact guard assist. Pt ambulated 140' x 2 with RW and contact guard assist. Pt required verbal cuing for obstacle avoidance and RW management. Pt noted to make wide turns with RW and veer to the right.    Pt returned to bed with stand by assist.    Rehab Prognosis: Fair; patient would benefit from acute skilled PT services to address these deficits and reach maximum level of function.    Recent Surgery: * No surgery found *      Plan:     During this hospitalization, patient to be seen 6 x/week to address the identified rehab impairments via gait training, therapeutic activities, therapeutic exercises and progress toward the following goals:    Plan of Care Expires:  05/15/25    Subjective     Chief Complaint: fatigue  Patient/Family Comments/goals: to get better  Pain/Comfort:  Pain Rating 1: 0/10      Objective:     Communicated with nurse prior to session.  Patient found HOB elevated with bed alarm, telemetry upon PT entry to room.     General Precautions: Standard, fall, vision impaired  Orthopedic Precautions: N/A  Braces: N/A  Respiratory Status: Room air     Functional  Mobility:  Bed Mobility:     Supine to Sit: stand by assistance  Sit to Supine: stand by assistance  Transfers:     Sit to Stand:  contact guard assistance with rolling walker  Gait: 140' x 2 with RW and CGA, VC for RW management and obstacle avoidance      AM-PAC 6 CLICK MOBILITY          Treatment & Education:  Pt educated on importance of time OOB, importance of intermittent mobility, safe techniques for transfers/ambulation, discharge recommendations/options, and use of call light for assistance and fall prevention.      Patient left HOB elevated with all lines intact, call button in reach, and bed alarm on..    GOALS:   Multidisciplinary Problems       Physical Therapy Goals          Problem: Physical Therapy    Goal Priority Disciplines Outcome Interventions   Physical Therapy Goal     PT, PT/OT Progressing    Description: Goals to be met by: 5/15/2025     Patient will increase functional independence with mobility by performin. Supine to sit with Modified [place]  2. Sit to stand transfer with Modified [place]  3. Gait  x 300  feet with Modified [place] using Rolling Walker.                            Time Tracking:     PT Received On: 25  PT Start Time: 1330     PT Stop Time: 1340  PT Total Time (min): 10 min     Billable Minutes: Gait Training 10    Treatment Type: Treatment  PT/PTA: PTA     Number of PTA visits since last PT visit: 3     2025

## 2025-04-24 NOTE — PLAN OF CARE
Problem: Physical Therapy  Goal: Physical Therapy Goal  Description: Goals to be met by: 5/15/2025     Patient will increase functional independence with mobility by performin. Supine to sit with Modified [place]  2. Sit to stand transfer with Modified [place]  3. Gait  x 300  feet with Modified [place] using Rolling Walker.     Outcome: Progressing

## 2025-04-24 NOTE — SUBJECTIVE & OBJECTIVE
"Interval History:  Patient with ongoing fatigue.  He is in no acute distress.  He is afebrile and hemodynamically stable.    Review of Systems   All other systems reviewed and are negative.    Objective:     Vital Signs (Most Recent):  Temp: 98.1 °F (36.7 °C) (04/24/25 0733)  Pulse: (!) 56 (04/24/25 1117)  Resp: 18 (04/24/25 0733)  BP: (!) 114/55 (04/24/25 1117)  SpO2: 100 % (04/24/25 1117) Vital Signs (24h Range):  Temp:  [97.9 °F (36.6 °C)-99 °F (37.2 °C)] 98.1 °F (36.7 °C)  Pulse:  [47-71] 56  Resp:  [17-18] 18  SpO2:  [91 %-100 %] 100 %  BP: (114-157)/(50-75) 114/55     Weight: 62.6 kg (138 lb 0.1 oz)  Body mass index is 20.98 kg/m².    Intake/Output Summary (Last 24 hours) at 4/24/2025 1315  Last data filed at 4/24/2025 1312  Gross per 24 hour   Intake 1440 ml   Output 1120 ml   Net 320 ml         Physical Exam  Vitals and nursing note reviewed.   Eyes:      Pupils: Pupils are equal, round, and reactive to light.   Neck:      Vascular: No JVD.   Cardiovascular:      Rate and Rhythm: Normal rate and regular rhythm.      Heart sounds: Normal heart sounds.   Pulmonary:      Effort: Pulmonary effort is normal.      Breath sounds: Normal breath sounds.   Abdominal:      General: Bowel sounds are normal. There is no distension.      Palpations: Abdomen is soft.      Tenderness: There is no abdominal tenderness.   Musculoskeletal:         General: No deformity.   Lymphadenopathy:      Cervical: No cervical adenopathy.   Skin:     Coloration: Skin is not pale.      Findings: No rash.               Significant Labs: All pertinent labs within the past 24 hours have been reviewed.  BMP:   Recent Labs   Lab 04/23/25  0454      K 4.4   CO2 23   BUN 19   CREATININE 1.5*   CALCIUM 8.6*     CBC: No results for input(s): "WBC", "HGB", "HCT", "PLT" in the last 48 hours.    Significant Imaging: I have reviewed all pertinent imaging results/findings within the past 24 hours.  "

## 2025-04-24 NOTE — ASSESSMENT & PLAN NOTE
Patient has a current diagnosis of hypertensive urgency/emergency diagnosis: hypertensive emergency with end organ damage evidenced by hypertensive encephalopathy which is controlled.  Latest blood pressure and vitals reviewed-   Temp:  [97.9 °F (36.6 °C)-99 °F (37.2 °C)]   Pulse:  [47-71]   Resp:  [17-18]   BP: (114-157)/(50-75)   SpO2:  [91 %-100 %] .   Patient currently off IV antihypertensives.   Home meds for hypertension were reviewed and noted below.   Hypertension Medications      BB, CCB, HCTZ and losartan    Patient controlled on oral meds

## 2025-04-25 PROCEDURE — 25000003 PHARM REV CODE 250: Performed by: HOSPITALIST

## 2025-04-25 PROCEDURE — 12000002 HC ACUTE/MED SURGE SEMI-PRIVATE ROOM

## 2025-04-25 PROCEDURE — 97116 GAIT TRAINING THERAPY: CPT | Mod: CQ

## 2025-04-25 PROCEDURE — 25000003 PHARM REV CODE 250

## 2025-04-25 RX ORDER — DONEPEZIL HYDROCHLORIDE 5 MG/1
5 TABLET, FILM COATED ORAL NIGHTLY
Qty: 30 TABLET | Refills: 0 | Status: SHIPPED | OUTPATIENT
Start: 2025-04-25 | End: 2025-05-25

## 2025-04-25 RX ADMIN — DONEPEZIL HYDROCHLORIDE 5 MG: 5 TABLET, FILM COATED ORAL at 08:04

## 2025-04-25 RX ADMIN — DULOXETINE HYDROCHLORIDE 20 MG: 20 CAPSULE, DELAYED RELEASE ORAL at 08:04

## 2025-04-25 RX ADMIN — AMOXICILLIN AND CLAVULANATE POTASSIUM 1 TABLET: 875; 125 TABLET, FILM COATED ORAL at 09:04

## 2025-04-25 RX ADMIN — BUSPIRONE HYDROCHLORIDE 15 MG: 5 TABLET ORAL at 08:04

## 2025-04-25 RX ADMIN — AMLODIPINE BESYLATE 5 MG: 5 TABLET ORAL at 08:04

## 2025-04-25 RX ADMIN — TAMSULOSIN HYDROCHLORIDE 0.4 MG: 0.4 CAPSULE ORAL at 08:04

## 2025-04-25 RX ADMIN — LOSARTAN POTASSIUM 100 MG: 50 TABLET, FILM COATED ORAL at 08:04

## 2025-04-25 RX ADMIN — AMOXICILLIN AND CLAVULANATE POTASSIUM 1 TABLET: 875; 125 TABLET, FILM COATED ORAL at 08:04

## 2025-04-25 RX ADMIN — ATORVASTATIN CALCIUM 20 MG: 20 TABLET, FILM COATED ORAL at 08:04

## 2025-04-25 RX ADMIN — HYDROCHLOROTHIAZIDE 12.5 MG: 12.5 TABLET ORAL at 08:04

## 2025-04-25 NOTE — PT/OT/SLP PROGRESS
Speech Language Pathology      Rajendra Castle  MRN: [id number]    Patient not seen today secondary to Other (Comment) (prepping for d'c). Will follow-up if d'c falls through.

## 2025-04-25 NOTE — DISCHARGE SUMMARY
"[facility] Medicine  Discharge Summary      Patient Name: Rajendra Castle  MRN: [id number]  CHARLES: [id number]  Patient Class: IP- Inpatient  Admission Date: 4/14/2025  Hospital Length of Stay: 10 days  Discharge Date and Time: 04/25/2025   Attending Physician: Dilcia Marie MD   Discharging Provider: Dilcia Marie MD  Primary Care Provider: Sp Lilly MD    Primary Care Team: Networked reference to record PCT     HPI:   Patient is a 79-year-old  male who only reports he has "prostate issues" and hypertension; he takes his medications but does not check his blood pressure at home  He is a vague historian, but review of the records indicate that he has multiple medical issues including hypertension, prediabetes, CKD -3, GERD, hyperparathyroidism (status post parathyroidectomy), rheumatoid lung, chronic pulmonary embolism, anxiety/depression, and ureteral cancer  Around 3:00 p.m. in the afternoon, the patient had numbness/tingling in his bilateral hands/feet and developed a headache (with pressure behind his eyes) as well as dizziness; he endorsed chest tightness to the ER but not to me but does state that he felt weak all over particularly in his legs and was staggering  In the ER, his blood pressure goes highest of the to 10s-> now 160s after Cardene infusion and 2 doses of IV Hydralazine/a dose of Labetalol  He had no leukocytosis with a hemoglobin of 11.8 normal platelets; COVID/influenza testing were negative  Creatinine 1.3 (below baseline) with a potassium of 3.1; Mag was 1.9  He did not have a UTI; TSH was normal  BNP 5217 and troponin went from 22-> 36-> 72.7 (at the time of this editing)  Lipid panel showed an LDL of 61  EKG showed, per my interpretation, sinus rhythm 71 beats per minute with a QTC of 489 with biphasic T-waves in several leads and this has been seen previously  Chest x-ray was unrevealing; CT head without contrast showed, per " "radiologist Dr. Reyes:    "Impression:     No evidence of acute intracranial abnormality or fracture.     Microvascular chronic ischemic changes and senescent atrophic changes.     Otomastoiditis on the left and mastoiditis on the right with possible small amount of fluid also in the middle ear on the right.  With prior exam demonstrating mastoiditis on the right only."    The patient received aspirin around 3:00 a.m., Tylenol, calcium gluconate, oral potassium I Ativan, in the aforementioned antihypertensives and was transferred to the ICU for further diagnosis, treatment, and care    * No surgery found *      Hospital Course:   Patient was admitted to the hospital with worsening neurologic deficits and a markedly elevated blood pressure.  Initial etiology was thought to be acute stroke, and patient was seen by tele stroke, underwent MRI which showed potential acute lesion.  However, this was not corroborated with the patient's symptoms.  Repeat CT angiogram showed no evidence of any acute lesions, therefore stroke neurologist stated this is likely not an acute stroke.  Patient was continued for hypertensive emergency.  Home blood pressure medication was restarted, and blood pressure improved over the course of his hospitalization.  Patient was also found to have an acute urinary tract infection and treatment was initiated for such.  Patient was also reported to be depressed and anxious, and SSRI was started at his request.  Blood pressure slowly returned back to baseline of 150s to 170s systolic.Losartan, metoprolol and Norvasc are new medications for BP.  Echocardiogram was done which showed evidence of concentric remodeling but otherwise stable.Patient with noted sundowning to indicate likely vascular dementia. Started on aricept. He was started on Cymbalta.     PT, OT worked with therapy and initially recommended SNF. However patient was denied any insurance. Patient was able to walk 200 ft. Patient has a " [facility] in town. Per CM agreeable to go home with home health. Patient was seen and examined today. He is clinically euvolemic, mental status at baseline. BP controlled. Appropriate for discharge today.      Goals of Care Treatment Preferences:  Code Status: Full Code      SDOH Screening:  The patient was screened for food insecurity, housing instability, transportation needs, utility difficulties, and interpersonal safety. The social determinant(s) of health identified as a concern this admission are:  Food insecurity    Will discuss with case management and/or community health workers.    Social Drivers of Health with Concerns     Food Insecurity: Food Insecurity Present (4/16/2025)   Housing Stability: Unknown (4/16/2025)        Consults:   Consults (From admission, onward)          Status Ordering Provider     Inpatient consult to   Once        Provider:  (Not yet assigned)    Acknowledged KAREN SONG     Inpatient Consult to Neurology Services (General Neurology)  Once        Provider:  Nicole Mckinley NP    Completed JANAK FISH     Inpatient consult to Cardiology  Once        Provider:  Edwin Frederick MD    Completed BLANCHE SOLIZ     Inpatient consult to Registered Dietitian/Nutritionist  Once        Provider:  (Not yet assigned)    Completed BLANCHE SOLIZ     IP consult to case management/social work  Once        Provider:  (Not yet assigned)    Completed JANAK FISH            Assessment & Plan  Hypertensive emergency  Patient has a current diagnosis of hypertensive urgency/emergency diagnosis: hypertensive emergency with end organ damage evidenced by hypertensive encephalopathy which is controlled.  Latest blood pressure and vitals reviewed-     Home meds for hypertension were reviewed and noted below.   Hypertension Medications      BB, CCB, HCTZ and losartan    Patient controlled on oral meds       CKD (chronic kidney disease) stage 3, GFR 30-59 ml/min  Creatine stable  for now. BMP reviewed- noted Estimated Creatinine Clearance: 36.5 mL/min (A) (based on SCr of 1.5 mg/dL (H)). according to latest data. Based on current GFR, CKD stage is stage 3 - GFR 30-59.  Monitor UOP and serial BMP and adjust therapy as needed. Renally dose meds. Avoid nephrotoxic medications and procedures.      Anemia  Anemia is likely due to chronic disease due to Chronic Kidney Disease.   Patient's anemia is currently stable  Major depressive disorder, single episode, severe without psychotic features  Patient has recurrent depression which is moderate and is currently uncontrolled. Will Increase anti-depressant medications. We will not consult psychiatry at this time. Patient does not display psychosis at this time. Continue to monitor closely and adjust plan of care as needed.  Continue Cymbalta.          S/P parathyroidectomy  noted    Atherosclerosis of native coronary artery of native heart without angina pectoris  Patient with known CAD, which is controlled Will continue Statin and monitor for S/Sx of angina/ACS. Continue to monitor on telemetry.   Ric ADAMS  Noted- avoid beta blockers    Vascular dementia  Patient with dementia with likely etiology of vascular dementia. Dementia is moderate. The patient does have signs of behavioral disturbance. Started on aricept.  PRN anti-psychotics are not prescribed to avoid self harm behaviors.    Gait disturbance  Cont home health PT, OT  Mastoiditis of both sides  Seen on initial CT imaging  Augmentin times 10 days, currently on day 7    Final Active Diagnoses:    Diagnosis Date Noted POA    PRINCIPAL PROBLEM:  Hypertensive emergency [I16.1] 04/15/2025 Yes    Mastoiditis of both sides [H70.93] 04/24/2025 Yes    Vascular dementia [F01.50] 04/17/2025 Yes    Gait disturbance [R26.9] 04/15/2025 Yes    Anemia [D64.9] 04/15/2025 Yes    CKD (chronic kidney disease) stage 3, GFR 30-59 ml/min [N18.30] 10/24/2024 Yes    Major depressive disorder, single episode, severe  "without psychotic features [F32.2] 02/06/2023 Yes    Mobitz I [I44.1] 01/21/2023 Yes    Atherosclerosis of native coronary artery of native heart without angina pectoris [I25.10] 04/25/2022 Yes    S/P parathyroidectomy [Z98.890, Z90.89] 04/12/2021 Not Applicable      Problems Resolved During this Admission:    Diagnosis Date Noted Date Resolved POA    Blurred vision [H53.8] 04/15/2025 04/16/2025 Yes    Hypokalemia [E87.6] 04/15/2025 04/18/2025 Yes    Stroke [I63.9] 04/15/2025 04/16/2025 Yes    Abnormal finding on MRI of brain [R90.89] 04/15/2025 04/16/2025 Yes    Prediabetes [R73.03] 09/22/2016 04/16/2025 Yes    Elevated troponin [R79.89] 08/18/2016 04/18/2025 Yes    Anemia, iron deficiency [D50.9] 10/03/2013 04/17/2025 Yes       Discharged Condition: good    Disposition[place] [facility] [place]    Follow Up:   Follow-up Information       Sp Lilly MD. Schedule an appointment as soon as possible for a visit in 1 week(s).    Specialty: Family Medicine  Contact information:  [address] [facility] [address]  [phone]               [facility] Follow up.    Why: DME- rolling walker  Contact information:  [address] [facility] [id number] [phone]                         Patient Instructions:      WALKER FOR HOME USE     Order Specific Question Answer Comments   Type of Walker: Adult (5'4"-6'6")    With wheels? Yes    Height: 5' 8" (1.727 m)    Weight: 62.6 kg (138 lb 0.1 oz)    Length of need (1-99 months): 99    Does patient have medical equipment at home? caneveronica    Please check all that apply: Patient is unable to safely ambulate without equipment.      Ambulatory referral/consult to Home Health   Standing Status: Future   Referral Priority: Routine Referral Type: Home Health   Referral Reason: Specialty Services Required   Requested Specialty: Home Health Services   Number of Visits Requested: 1       Significant Diagnostic Studies: Labs: CMP No results for " "input(s): "NA", "K", "CL", "CO2", "GLU", "BUN", "CREATININE", "CALCIUM", "PROT", "ALBUMIN", "BILITOT", "ALKPHOS", "AST", "ALT", "ANIONGAP", "ESTGFRAFRICA", "EGFRNONAA" in the last 48 hours. and CBC No results for input(s): "WBC", "HGB", "HCT", "PLT" in the last 48 hours.    Pending Diagnostic Studies:       Procedure Component Value Units Date/Time    Basic metabolic panel [[id number]]     Order Status: Sent Lab Status: No result     Specimen: Blood     Echo [[id number]]     Order Status: Sent Lab Status: No result            Medications:  Reconciled Home Medications:      Medication List        START taking these medications      amLODIPine 5 MG tablet  Commonly known as: NORVASC  Take 1 tablet (5 mg total) by mouth once daily.     amoxicillin-clavulanate 875-125mg 875-125 mg per tablet  Commonly known as: AUGMENTIN  Take 1 tablet by mouth every 12 (twelve) hours.     donepeziL 5 MG tablet  Commonly known as: ARICEPT  Take 1 tablet (5 mg total) by mouth every evening.     DULoxetine 20 MG capsule  Commonly known as: CYMBALTA  Take 1 capsule (20 mg total) by mouth 2 (two) times daily.     losartan 100 MG tablet  Commonly known as: COZAAR  Take 1 tablet (100 mg total) by mouth once daily.     metoprolol succinate 25 MG 24 hr tablet  Commonly known as: TOPROL-XL  Take 0.5 tablets (12.5 mg total) by mouth once daily.            CONTINUE taking these medications      atorvastatin 20 MG tablet  Commonly known as: LIPITOR  Take 1 tablet (20 mg total) by mouth once daily.     b complex vitamins capsule  Take 1 capsule by mouth once daily.     busPIRone 10 MG tablet  Commonly known as: BUSPAR  Take 1 tablet (10 mg total) by mouth 3 (three) times daily.     tamsulosin 0.4 mg Cap  Commonly known as: FLOMAX  TAKE 1 CAPSULE(0.4 MG) BY MOUTH EVERY NIGHT     zolpidem 10 mg Tab  Commonly known as: AMBIEN  Take 1 tablet (10 mg total) by mouth nightly as needed (insomnia).            STOP taking these medications      betamethasone " valerate 0.1% 0.1 % Crea  Commonly known as: VALISONE     cyproheptadine 4 mg tablet  Commonly known as: PERIACTIN     diclofenac 50 MG EC tablet  Commonly known as: VOLTAREN     gabapentin 100 MG capsule  Commonly known as: NEURONTIN     hydrALAZINE 100 MG tablet  Commonly known as: APRESOLINE     LIDOcaine 5 %  Commonly known as: LIDODERM     magnesium oxide 400 mg (241.3 mg magnesium) tablet  Commonly known as: MAG-OX     metoprolol tartrate 25 MG tablet  Commonly known as: LOPRESSOR     phenazopyridine 200 MG tablet  Commonly known as: PYRIDIUM     predniSONE 2.5 MG tablet  Commonly known as: DELTASONE     solifenacin 10 MG tablet  Commonly known as: VESICARE     tadalafiL 5 MG tablet  Commonly known as: CIALIS              Indwelling Lines/Drains at time of discharge:   Lines/Drains/Airways       None                   Time spent on the discharge of patient: 40 minutes         Dilcia Marie MD  Department of Hospital Medicine  [facility]

## 2025-04-25 NOTE — ASSESSMENT & PLAN NOTE
Patient with dementia with likely etiology of vascular dementia. Dementia is moderate. The patient does have signs of behavioral disturbance. Started on aricept.  PRN anti-psychotics are not prescribed to avoid self harm behaviors.

## 2025-04-25 NOTE — PLAN OF CARE
04/25/25 1706   Medicare Message   Important Message from Medicare regarding Discharge Appeal Rights Given to patient/caregiver;Explained to patient/caregiver

## 2025-04-25 NOTE — PT/OT/SLP PROGRESS
Occupational Therapy      Patient Name:  Rajendra Castle   MRN:  [id number]    Patient not seen today secondary to  (patient d/c home with home health.).    4/25/2025

## 2025-04-25 NOTE — PT/OT/SLP PROGRESS
Physical Therapy Treatment    Patient Name:  Rajendra Castle   MRN:  [id number]    Recommendations:     Discharge Recommendations: Moderate Intensity Therapy  Discharge Equipment Recommendations: walker, rolling  Barriers to discharge:  decreased safety awareness, balance deficits, increased assist with mobility, impulsive    Assessment:     Rajendra Castle is a 79 y.o. male admitted with a medical diagnosis of Hypertensive emergency.  He presents with the following impairments/functional limitations: weakness, impaired endurance, impaired functional mobility, gait instability, decreased lower extremity function, decreased safety awareness.    Pt required encouragement to participate. Pt reports feeling weak. Pt encouraged to get out of bed. Pt agreeable to sit up in chair but declined ambulation in hallway.    While therapist was setting up chair, pt stood on his own requiring, stand by to contact guard assist, verbal cuing to wait for assist. Pt ambulated 20' with no AD and contact guard to min assist as pt was unsteady.    Min to contact guard assist for chair transfer.    Rehab Prognosis: Fair; patient would benefit from acute skilled PT services to address these deficits and reach maximum level of function.    Recent Surgery: * No surgery found *      Plan:     During this hospitalization, patient to be seen 6 x/week to address the identified rehab impairments via gait training, therapeutic activities, therapeutic exercises and progress toward the following goals:    Plan of Care Expires:  05/15/25    Subjective     Chief Complaint: weak  Patient/Family Comments/goals: to get better  Pain/Comfort:  Pain Rating 1: 0/10      Objective:     Communicated with nurse prior to session.  Patient found HOB elevated with bed alarm, telemetry upon PT entry to room.     General Precautions: Standard, fall, vision impaired  Orthopedic Precautions: N/A  Braces: N/A  Respiratory Status: Room air     Functional Mobility:  Bed Mobility:      Supine to Sit: stand by assistance  Transfers:     Sit to Stand:  stand by assistance and contact guard assistance with no AD  Gait: x 20' with no Ad and CGA-Charlene, unsteady and impulsive      AM-PAC 6 CLICK MOBILITY          Treatment & Education:  Pt educated on importance of time OOB, importance of intermittent mobility, safe techniques for transfers/ambulation, discharge recommendations/options, and use of call light for assistance and fall prevention.      Patient left up in chair with all lines intact, call button in reach, chair alarm on, and nurse notified..    GOALS:   Multidisciplinary Problems       Physical Therapy Goals          Problem: Physical Therapy    Goal Priority Disciplines Outcome Interventions   Physical Therapy Goal     PT, PT/OT Progressing    Description: Goals to be met by: 5/15/2025     Patient will increase functional independence with mobility by performin. Supine to sit with Modified [place]  2. Sit to stand transfer with Modified [place]  3. Gait  x 300  feet with Modified [place] using Rolling Walker.                          DME Justifications:   Rajendra's mobility limitation cannot be sufficiently resolved by the use of a cane. His functional mobility deficit can be sufficiently resolved with the use of a Rolling Walker. Patient's mobility limitation significantly impairs their ability to participate in one of more activities of daily living.  The use of a RW will significantly improve the patient's ability to participate in MRADLS and the patient will use it on regular basis in the home.    Time Tracking:     PT Received On: 25  PT Start Time: 1046     PT Stop Time: 1055  PT Total Time (min): 9 min     Billable Minutes: Gait Training 9    Treatment Type: Treatment  PT/PTA: PTA     Number of PTA visits since last PT visit: 4     2025

## 2025-04-25 NOTE — PLAN OF CARE
04/25/25 1355   Final Note   Assessment Type Final Discharge Note   Anticipated Discharge Disposition Home-Health   Post-Acute Status   Post-Acute Authorization Home Health   HME Status Set-up Complete/Auth obtained   Discharge Delays None known at this time     Patient cleared for discharge from case management standpoint.    Chart and discharge orders reviewed.  Patient discharged home with no further case management needs.

## 2025-04-25 NOTE — PLAN OF CARE
PHN denied SNF; Pt to go home with  JUAN called Pt niece and nephew to informed that Pt will dc home no answer left vm for both.     JUAN called member services with [phone] for post discharge meal. Per [facility] staff JUAN would have to call back once Pt leaves hospital.    04/25/25 4693   Post-Acute Status   Post-Acute Authorization Placement   Post-Acute Placement Status Discharge Plan Changed   Discharge Plan   Discharge Plan A Home Health   Discharge Plan B Home Health

## 2025-04-25 NOTE — ASSESSMENT & PLAN NOTE
Anemia is likely due to chronic disease due to Chronic Kidney Disease.   Patient's anemia is currently stable

## 2025-04-25 NOTE — ASSESSMENT & PLAN NOTE
"Daily Note     Today's date: 2025  Patient name: Jessica Ricci  : 1946  MRN: [id number]  Referring provider: Antonino Hill*  Dx:   Encounter Diagnosis     ICD-10-CM    1. Left hip pain  M25.552       2. Trochanteric bursitis of left hip  M70.62           Start Time: 722  Stop Time: 0815  Total time in clinic (min): 53 minutes    Subjective: Patient reports some soreness and stiffness in her hips and knees today, Not sure if it is due to the exercises but she is sore.      Objective: See treatment diary below      Assessment: Tolerated treatment well. Progressed patient though balance and there ex as tolerated. No pain reported with planned interventions today. Continue to provide education on sets and reps and making sure she is working on eccentric control and taking he time. Patient demonstrated fatigue post treatment, exhibited good technique with therapeutic exercises, and would benefit from continued PT      Plan: Continue per plan of care.      Precautions: Arthritis, Asthma, Back pain, Osteoporosis, prior sx  Access Code: [email or web address]   Manuals IE 1/10  1/14                   Lumbar mobs                      STM                       Hamstring stretch    3x30\"                   ITB Stretch  3x30\"           Piriformis stretch  3x30\"           Hip PROM HB HB                                   Neuro Re-Ed                       Tandem stance   3x20\" ea    FOAM                   TA bridge                       Bridge 3x10    BTB  3x10    BTB                   SL Clam 3x10     BTB  2x10    BTB                   Mod curl up                       Bird dogs                                               Ther Ex                       Bike    5' Lv 1                   Leg press  3x10    #95           Lat side stepping    3x10ft    PTB                   SL Abd 3x10  3x10                   Prone press up                       Hamstring stretch 3x30\"  3x30\"                    ITB Stretch 3x30\"   3x30\"    " Cont home health PT, OT                   STS    3x10     YMB                                           Ther Activity                                                                       Gait Training                                                                       Modalities

## 2025-04-25 NOTE — PLAN OF CARE
Per Kathi at [facility], auth for SNF was denied due to not medically necessary. SW to notify family. Discharge plan will now be home with home health.       04/25/25 2855   Post-Acute Status   Post-Acute Authorization Placement   Post-Acute Placement Status Discharge Plan Changed   Discharge Plan   Discharge Plan A Home Health   Discharge Plan B Home Health

## 2025-04-25 NOTE — ASSESSMENT & PLAN NOTE
Patient has a current diagnosis of hypertensive urgency/emergency diagnosis: hypertensive emergency with end organ damage evidenced by hypertensive encephalopathy which is controlled.  Latest blood pressure and vitals reviewed-     Home meds for hypertension were reviewed and noted below.   Hypertension Medications      BB, CCB, HCTZ and losartan    Patient controlled on oral meds

## 2025-04-25 NOTE — PLAN OF CARE
Problem: Occupational Therapy  Goal: Occupational Therapy Goal  Description: Goals to be met by: 5/15/25     Patient will increase functional independence with ADLs by performing:    UE Dressing with Modified [place].  LE Dressing with Modified [place].  Grooming while standing at sink with Modified [place].- with CGA and RW x 5 mins and onset of dizziness.  Toileting from toilet with Modified [place] for hygiene and clothing management.   Toilet transfer to toilet with Modified [place].    Outcome: Progressing

## 2025-04-25 NOTE — PLAN OF CARE
[facility] [employer].  SW sent updated patient information to [facility] via EP[facility] requesting updated start of care date.       04/25/25 1116   Post-Acute Status   [facility] Complete/Auth obtained

## 2025-04-25 NOTE — NURSING
Patient refused AM lab work. Education given on the important of lab work. Pt refuses. MD notified.

## 2025-04-25 NOTE — NURSING
"Patient has asked to appeal discharge. Patient states that "I cannot walk" and "I need to go to a hospice home to die". Patient refuses IV removal. Patient has walked to restroom with standby supervision with assistance with walker. Appeal to be completed at this time. Care continued.   "

## 2025-04-26 VITALS
BODY MASS INDEX: 20.92 KG/M2 | OXYGEN SATURATION: 98 % | RESPIRATION RATE: 18 BRPM | HEART RATE: 49 BPM | SYSTOLIC BLOOD PRESSURE: 144 MMHG | WEIGHT: 138 LBS | HEIGHT: 68 IN | TEMPERATURE: 98 F | DIASTOLIC BLOOD PRESSURE: 75 MMHG

## 2025-04-26 LAB
ANION GAP (SMH): 9 MMOL/L (ref 8–16)
BUN SERPL-MCNC: 23 MG/DL (ref 8–23)
CALCIUM SERPL-MCNC: 8.7 MG/DL (ref 8.7–10.5)
CHLORIDE SERPL-SCNC: 106 MMOL/L (ref 95–110)
CO2 SERPL-SCNC: 23 MMOL/L (ref 23–29)
CREAT SERPL-MCNC: 1.3 MG/DL (ref 0.5–1.4)
GFR SERPLBLD CREATININE-BSD FMLA CKD-EPI: 57 ML/MIN/1.73/M2
GLUCOSE SERPL-MCNC: 107 MG/DL (ref 70–110)
POTASSIUM SERPL-SCNC: 4.3 MMOL/L (ref 3.5–5.1)
SODIUM SERPL-SCNC: 138 MMOL/L (ref 136–145)

## 2025-04-26 PROCEDURE — 82435 ASSAY OF BLOOD CHLORIDE: CPT

## 2025-04-26 PROCEDURE — 25000003 PHARM REV CODE 250: Performed by: HOSPITALIST

## 2025-04-26 PROCEDURE — 36415 COLL VENOUS BLD VENIPUNCTURE: CPT

## 2025-04-26 PROCEDURE — 25000003 PHARM REV CODE 250

## 2025-04-26 RX ADMIN — AMLODIPINE BESYLATE 5 MG: 5 TABLET ORAL at 08:04

## 2025-04-26 RX ADMIN — HYDROCHLOROTHIAZIDE 12.5 MG: 12.5 TABLET ORAL at 08:04

## 2025-04-26 RX ADMIN — DULOXETINE HYDROCHLORIDE 20 MG: 20 CAPSULE, DELAYED RELEASE ORAL at 08:04

## 2025-04-26 RX ADMIN — METOPROLOL SUCCINATE 12.5 MG: 25 TABLET, EXTENDED RELEASE ORAL at 08:04

## 2025-04-26 RX ADMIN — AMOXICILLIN AND CLAVULANATE POTASSIUM 1 TABLET: 875; 125 TABLET, FILM COATED ORAL at 08:04

## 2025-04-26 RX ADMIN — TAMSULOSIN HYDROCHLORIDE 0.4 MG: 0.4 CAPSULE ORAL at 08:04

## 2025-04-26 RX ADMIN — BUSPIRONE HYDROCHLORIDE 15 MG: 5 TABLET ORAL at 08:04

## 2025-04-26 RX ADMIN — LOSARTAN POTASSIUM 100 MG: 50 TABLET, FILM COATED ORAL at 08:04

## 2025-04-26 NOTE — PLAN OF CARE
04/26/25 1539   Final Note   Assessment Type Final Discharge Note   Anticipated Discharge Disposition Home-Health   What phone number can be called within the next 1-3 days to see how you are doing after discharge? [phone]   Hospital Resources/Appts/Education Provided Appointments scheduled and added to AVS;Post-Acute resouces added to AVS   Post-Acute Status   Post-Acute Authorization Home Health   HME Status Set-up Complete/Auth obtained   Home Health Status Set-up Complete/Auth obtained   Discharge Delays None known at this time       ** Patient cleared for DC from CM standpoint**    Patient discharging with [facility](accepted). Will notify [facility] of patients discharge. Also, will call member services with [phone] for post discharge meal. Rolling walker was deliver to bedside on yesterday. Patient's insurance denied SNF placement due to medically necessary. Per MD, due to insurance denial, will DC home with home health. Patient educated that if he wants to appeal,he will have to call the number on the back of his insurance card; patient verbalized understanding. Ambulatory referral for In home NP ordered; a provider will be out to see patient at home. Also, patient is to follow up with his PCP on 5/16 at 9:00 a. Spoke with patients nice Colletta, notified her of patients discharge. Patient will be transported home via hospital provided taxi. No further DC needs at this time.

## 2025-04-26 NOTE — PROGRESS NOTES
04/26/25 1100 04/26/25 1103 04/26/25 1105   Vital Signs   BP (!) 111/56 (!) 126/56 (!) 116/57   Patient Position Lying Sitting Standing     Lying, sitting, and standing vitals, findings reported to Dr. Marie.

## 2025-04-26 NOTE — NURSING
AVS virtually reviewed with patient in its entirety with emphasis on diet, medications, follow-up appointments and reasons to return to the ED. Education complete and patient voiced understanding. All questions answered. Discharge teaching complete.

## 2025-04-26 NOTE — PLAN OF CARE
Problem: Adult Inpatient Plan of Care  Goal: Plan of Care Review  Outcome: Adequate for Care Transition  Goal: Patient-Specific Goal (Individualized)  Outcome: Adequate for Care Transition  Goal: Absence of Hospital-Acquired Illness or Injury  Outcome: Adequate for Care Transition  Goal: Optimal Comfort and Wellbeing  Outcome: Adequate for Care Transition  Goal: Readiness for Transition of Care  Outcome: Adequate for Care Transition     Problem: Stroke, Ischemic (Includes Transient Ischemic Attack)  Goal: Optimal Coping  Outcome: Adequate for Care Transition  Goal: Effective Bowel Elimination  Outcome: Adequate for Care Transition  Goal: Optimal Cerebral Tissue Perfusion  Outcome: Adequate for Care Transition  Goal: Optimal Cognitive Function  Outcome: Adequate for Care Transition  Goal: Improved Communication Skills  Outcome: Adequate for Care Transition  Goal: Optimal Functional Ability  Outcome: Adequate for Care Transition  Goal: Optimal Nutrition Intake  Outcome: Adequate for Care Transition  Goal: Effective Oxygenation and Ventilation  Outcome: Adequate for Care Transition  Goal: Improved Sensorimotor Function  Outcome: Adequate for Care Transition  Goal: Safe and Effective Swallow  Outcome: Adequate for Care Transition  Goal: Effective Urinary Elimination  Outcome: Adequate for Care Transition     Problem: Fall Injury Risk  Goal: Absence of Fall and Fall-Related Injury  Outcome: Adequate for Care Transition     Problem: Infection  Goal: Absence of Infection Signs and Symptoms  Outcome: Adequate for Care Transition     Problem: Skin Injury Risk Increased  Goal: Skin Health and Integrity  Outcome: Adequate for Care Transition     Problem: Oral Intake Inadequate  Goal: Improved Oral Intake  Outcome: Adequate for Care Transition

## 2025-04-26 NOTE — PLAN OF CARE
Patient has been discharged. Awaiting decision for appeal. No medial issues. Patient complained of dizziness with walking. Orthostatic BP checked and negative. Patient remained medically cleared for discharge.

## 2025-04-26 NOTE — NURSING
Discharge instructions reviewed with pt via virtual nurse. IV removed. Pt discharged home via taxi.

## 2025-04-28 ENCOUNTER — PATIENT OUTREACH (OUTPATIENT)
Dept: ADMINISTRATIVE | Facility: CLINIC | Age: 80
End: 2025-04-28
Payer: MEDICARE

## 2025-04-28 NOTE — PROGRESS NOTES
Pt is not applicable for TCC call  Active chemo     [de-identified] : Ms. Young was diagnosed with HER2 positive right breast cancer at age 56 in August 2017.\par \par She initially presented with right nipple yellowish discharge. Subsequent mammogram and ultrasound on 7/27/17 showed new spiculated mass in inner central posterior right breast with sonographic confirmation of suspicious mass at 3-4:00 region, and an indeterminate hypoechoic nodule at 1:00 right breast.\par \par On 8/3/17 she had right breast core biopsy of 3-4:00 mass --> pathology showed invasive poorly differentiated ductal carcinoma, Enzo grade 8/9, 0.9 cm--> ER-, [place]-, HER2 IHC 3+ (positive)\par Right breast 1:00 --> fibrocystic change, proliferative type with nodular sclerosing adenosis\par \par 8/21/17 MRI breast - Biopsy clip and 3.8 cm area of irregular non mass enhancement in the right breast at the 3-4:00 location posteriorly, corresponding to recently\par diagnosed invasive carcinoma. Additional irregular areas of enhancement in the right breast at the 2-3:00 location and 8-9:00 location, suspicious for additional disease. Representative MR guided core biopsy of enhancement at the 8-9:00 location can be performed to evaluate for multicentric disease, if it would change\par management. 8 mm enhancing nodule demonstrating washout kinetics in the upper slightly inner mid left breast. MR guided core biopsy is advised.\par Clip artifact in the right breast at the 1:00 location marking site of biopsy-proven radial scar. No associated suspicious enhancement. Recommend\par appropriate surgical management. 2.1 cm oval T2 bright lesion in the liver, probable cyst or hemangioma.\par Recommend dedicated liver ultrasound for further evaluation.\par \par 8/28/17 MRI Core biopsy right breast lower outer quadrant --> DCIS, solid pattern, high grade atypia, focal necrosis. ER/NY pending\par Core biopsy left breast central inner --> mild proliferative fibrocystic change\par \par Treatment Summary\par 9/21/17 port placement\par 9/20/17 - 1/3/18 TCHP x 6 cycles (9/30/17 - 1/03/18\par 5/03/18 - 11/26/18 Trastuzumab (Herceptin) X every 3 weeks, to complete 52 weeks.\par She had breast reconstruction on 8/14/18 with nipple reconstruction.\par S/p port removal on 6/19/19.\par \par 3/14/18 - She underwent right skin sparing mastectomy with SNL biopsy and left risk reduction mastectomy with BUNNY reconstruction.\par 1- Left breast, mastectomy- Fibrocystic changes\par 2- Right sentinel lymph node, excision- Three negative lymph nodes (0/3)\par 3- Right sentinel lymph node 2, excision- One negative lymph node (0/1)\par 4- Right breast, mastectomy- No residual invasive carcinoma (s/p neoadjuvant treatment)- No residual DCIS (s/p neoadjuvant treatment)\par  Pathologic staging: ypT0, ypN0\par \par . \par \par Disease: right breast cancer \par Pathology: HER2 positive \par TNM stage: T2 \par \par \par Interval History: Patient returns for follow up. \par She had a R breast US on 10/20/20 for palpable R breast lump which showed 3.5 cm mass at 4:00, 9 cmfn. \par 10/26/20 US guided biopsy R breast 4:00 --Breast, right, 4 o'clock, 9 cm FN, ultrasound guided core biopsy\par - Invasive poorly differentiated ductal carcinoma withfocal necrosis and foci with micropapillary features\par - Enzo score 8/9 (3 + 3+ 2 )\par - Invasive tumor measures at least 1.5 cm\par -ER 0%, [place] 0%, HER2 3+\par \par On 11/6/20 she underwent right mastectomy flap excision - IDC, poorly differentiated with necrosis, involvement of dermal skin and retraction artifact, 3.8 x 3.1 x 3.1\par -ER 0%, [place] 0%, HER2 3+.\par \par Patient completed breast radiation 2/25/21\par Kadcyla last treatment 6/14/21 [de-identified] : Returns for follow up.  \par Reports recent bronchitis. Admits cough but has improved. Denies coughing at night \par No leg swelling \par No difficulty breathing \par Continued nonraised "red prickly rash" of bilateral arms, chest, face; denies pruritus. \par Inquiring about COVID19 booster and flu vaccine. \par \par 10/04/2021 ECHO: LV EF 70%\par  \par 08/31/2021 CT Chest and CT Abdomen pelvis\par No gross metastatic disease.\par Interval development of what appears to be mild fibrotic changes in the right lung anteriorly which is presumably post radiation. If there has been no interval treatment, infection may be included in the differential diagnosis, although considered less likely.\par Mild splenomegaly which appears have mildly increased. 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

## 2025-04-30 ENCOUNTER — TELEPHONE (OUTPATIENT)
Dept: HOME HEALTH SERVICES | Facility: CLINIC | Age: 80
End: 2025-04-30
Payer: MEDICARE

## 2025-04-30 NOTE — TELEPHONE ENCOUNTER
Contacted pt to schedule an appointment regarding a referral placed for a tcc home visit with a nurse practitioner.    Patient has been scheduled

## 2025-05-01 ENCOUNTER — PATIENT OUTREACH (OUTPATIENT)
Dept: ADMINISTRATIVE | Facility: OTHER | Age: 80
End: 2025-05-01
Payer: MEDICARE

## 2025-05-01 NOTE — PROGRESS NOTES
CHW - Outreach Attempt    Community Health Worker left a voicemail message for 1st attempt to contact patient regarding: sdoh  Community Health Worker to attempt to contact patient on: [phone]

## 2025-05-09 ENCOUNTER — PATIENT OUTREACH (OUTPATIENT)
Dept: ADMINISTRATIVE | Facility: OTHER | Age: 80
End: 2025-05-09
Payer: MEDICARE

## 2025-05-09 NOTE — PROGRESS NOTES
CHW - Outreach Attempt    Community Health Worker left a voicemail message for 2nd attempt to contact patient regarding: sdoh  Community Health Worker to attempt to contact patient on:[phone]

## 2025-05-12 ENCOUNTER — PATIENT OUTREACH (OUTPATIENT)
Dept: ADMINISTRATIVE | Facility: OTHER | Age: 80
End: 2025-05-12
Payer: MEDICARE

## 2025-05-12 ENCOUNTER — NURSE TRIAGE (OUTPATIENT)
Dept: ADMINISTRATIVE | Facility: CLINIC | Age: 80
End: 2025-05-12
Payer: MEDICARE

## 2025-05-12 ENCOUNTER — TELEPHONE (OUTPATIENT)
Dept: FAMILY MEDICINE | Facility: CLINIC | Age: 80
End: 2025-05-12
Payer: MEDICARE

## 2025-05-12 ENCOUNTER — HOSPITAL ENCOUNTER (EMERGENCY)
Facility: HOSPITAL | Age: 80
Discharge: HOME | End: 2025-05-12
Attending: EMERGENCY MEDICINE
Payer: MEDICARE

## 2025-05-12 VITALS
RESPIRATION RATE: 20 BRPM | HEART RATE: 64 BPM | HEIGHT: 68 IN | TEMPERATURE: 98 F | DIASTOLIC BLOOD PRESSURE: 88 MMHG | WEIGHT: 135.25 LBS | BODY MASS INDEX: 20.5 KG/M2 | OXYGEN SATURATION: 95 % | SYSTOLIC BLOOD PRESSURE: 197 MMHG

## 2025-05-12 DIAGNOSIS — R07.9 CHEST PAIN: ICD-10-CM

## 2025-05-12 DIAGNOSIS — R20.2 PARESTHESIAS: Primary | ICD-10-CM

## 2025-05-12 DIAGNOSIS — J34.89 SINUS PRESSURE: ICD-10-CM

## 2025-05-12 LAB
ABSOLUTE EOSINOPHIL (SMH): 0.22 K/UL
ABSOLUTE MONOCYTE (SMH): 0.98 K/UL (ref 0.3–1)
ABSOLUTE NEUTROPHIL COUNT (SMH): 4.5 K/UL (ref 1.8–7.7)
ALBUMIN SERPL-MCNC: 3.2 G/DL (ref 3.5–5.2)
ALP SERPL-CCNC: 75 UNIT/L (ref 40–150)
ALT SERPL-CCNC: 12 UNIT/L (ref 10–44)
ANION GAP (SMH): 9 MMOL/L (ref 8–16)
AST SERPL-CCNC: 14 UNIT/L (ref 11–45)
BASOPHILS # BLD AUTO: 0.02 K/UL
BASOPHILS NFR BLD AUTO: 0.3 %
BILIRUB SERPL-MCNC: 0.2 MG/DL (ref 0.1–1)
BILIRUB UR QL STRIP.AUTO: NEGATIVE
BUN SERPL-MCNC: 16 MG/DL (ref 8–23)
CALCIUM SERPL-MCNC: 8.5 MG/DL (ref 8.7–10.5)
CHLORIDE SERPL-SCNC: 109 MMOL/L (ref 95–110)
CLARITY UR: CLEAR
CO2 SERPL-SCNC: 23 MMOL/L (ref 23–29)
COLOR UR AUTO: YELLOW
CREAT SERPL-MCNC: 1.3 MG/DL (ref 0.5–1.4)
ERYTHROCYTE [DISTWIDTH] IN BLOOD BY AUTOMATED COUNT: 15.9 % (ref 11.5–14.5)
GFR SERPLBLD CREATININE-BSD FMLA CKD-EPI: 57 ML/MIN/1.73/M2
GLUCOSE SERPL-MCNC: 71 MG/DL (ref 70–110)
GLUCOSE UR QL STRIP: NEGATIVE
HCT VFR BLD AUTO: 37.5 % (ref 40–54)
HCV AB SERPL QL IA: NORMAL
HGB BLD-MCNC: 11.7 GM/DL (ref 14–18)
HGB UR QL STRIP: NEGATIVE
HIV 1+2 AB+HIV1 P24 AG SERPL QL IA: NORMAL
HOLD SPECIMEN: NORMAL
IMM GRANULOCYTES # BLD AUTO: 0.02 K/UL (ref 0–0.04)
IMM GRANULOCYTES NFR BLD AUTO: 0.3 % (ref 0–0.5)
KETONES UR QL STRIP: NEGATIVE
LEUKOCYTE ESTERASE UR QL STRIP: NEGATIVE
LYMPHOCYTES # BLD AUTO: 0.8 K/UL (ref 1–4.8)
MAGNESIUM SERPL-MCNC: 2 MG/DL (ref 1.6–2.6)
MCH RBC QN AUTO: 27.2 PG (ref 27–31)
MCHC RBC AUTO-ENTMCNC: 31.2 G/DL (ref 32–36)
MCV RBC AUTO: 87 FL (ref 82–98)
NITRITE UR QL STRIP: NEGATIVE
NT-PROBNP SERPL-MCNC: 5250 PG/ML
NUCLEATED RBC (/100WBC) (SMH): 0 /100 WBC
OHS QRS DURATION: 82 MS
OHS QTC CALCULATION: 442 MS
PH UR STRIP: 7 [PH]
PHOSPHATE SERPL-MCNC: 3.6 MG/DL (ref 2.7–4.5)
PLATELET # BLD AUTO: 351 K/UL (ref 150–450)
PMV BLD AUTO: 11.4 FL (ref 9.2–12.9)
POTASSIUM SERPL-SCNC: 4.1 MMOL/L (ref 3.5–5.1)
PROT SERPL-MCNC: 7.2 GM/DL (ref 6–8.4)
PROT UR QL STRIP: NEGATIVE
RBC # BLD AUTO: 4.3 M/UL (ref 4.6–6.2)
RELATIVE EOSINOPHIL (SMH): 3.4 % (ref 0–8)
RELATIVE LYMPHOCYTE (SMH): 12.2 % (ref 18–48)
RELATIVE MONOCYTE (SMH): 14.9 % (ref 4–15)
RELATIVE NEUTROPHIL (SMH): 68.9 % (ref 38–73)
SODIUM SERPL-SCNC: 141 MMOL/L (ref 136–145)
SP GR UR STRIP: 1.01
TROPONIN I SERPL HS-MCNC: 10 NG/L
TROPONIN I SERPL HS-MCNC: 10 NG/L
TSH SERPL-ACNC: 0.94 UIU/ML (ref 0.4–4)
UROBILINOGEN UR STRIP-ACNC: NEGATIVE EU/DL
WBC # BLD AUTO: 6.56 K/UL (ref 3.9–12.7)

## 2025-05-12 PROCEDURE — 87389 HIV-1 AG W/HIV-1&-2 AB AG IA: CPT | Performed by: EMERGENCY MEDICINE

## 2025-05-12 PROCEDURE — 86803 HEPATITIS C AB TEST: CPT | Performed by: EMERGENCY MEDICINE

## 2025-05-12 PROCEDURE — 93010 ELECTROCARDIOGRAM REPORT: CPT | Mod: ,,, | Performed by: GENERAL PRACTICE

## 2025-05-12 PROCEDURE — 83880 ASSAY OF NATRIURETIC PEPTIDE: CPT | Performed by: EMERGENCY MEDICINE

## 2025-05-12 PROCEDURE — 93005 ELECTROCARDIOGRAM TRACING: CPT

## 2025-05-12 PROCEDURE — 36415 COLL VENOUS BLD VENIPUNCTURE: CPT | Performed by: EMERGENCY MEDICINE

## 2025-05-12 PROCEDURE — 99285 EMERGENCY DEPT VISIT HI MDM: CPT | Mod: 25

## 2025-05-12 PROCEDURE — 94760 N-INVAS EAR/PLS OXIMETRY 1: CPT

## 2025-05-12 PROCEDURE — 85025 COMPLETE CBC W/AUTO DIFF WBC: CPT | Performed by: EMERGENCY MEDICINE

## 2025-05-12 PROCEDURE — 84443 ASSAY THYROID STIM HORMONE: CPT | Performed by: EMERGENCY MEDICINE

## 2025-05-12 PROCEDURE — 81003 URINALYSIS AUTO W/O SCOPE: CPT | Performed by: EMERGENCY MEDICINE

## 2025-05-12 PROCEDURE — 83735 ASSAY OF MAGNESIUM: CPT | Performed by: EMERGENCY MEDICINE

## 2025-05-12 PROCEDURE — 84484 ASSAY OF TROPONIN QUANT: CPT | Performed by: EMERGENCY MEDICINE

## 2025-05-12 PROCEDURE — 84100 ASSAY OF PHOSPHORUS: CPT | Performed by: EMERGENCY MEDICINE

## 2025-05-12 PROCEDURE — 80053 COMPREHEN METABOLIC PANEL: CPT | Performed by: EMERGENCY MEDICINE

## 2025-05-12 NOTE — PROGRESS NOTES
Questionnaire with patient/caregiver via telephone today.  Patient denied any SDOH needs at this time.   Patient stated he is not feeling well so I transferred to [facility] on Call line [phone]

## 2025-05-12 NOTE — TELEPHONE ENCOUNTER
----- Message from RAHUL JAIN sent at 5/12/2025 11:42 AM CDT -----  Good morning,This patient has an appt on Friday but is requesting a referral for Home Health to monitor blood pressure. Did transfer patient to [facility] on call nursing line today regarding s/e of weakness, loss of appetite, dizzy, not able to check BP at home. He also stated he needs Quetiapine refilled, please.Best Regards,Evans SANTANA, [facility] WorkerOutpatient Case ManagementOffice: [phone]

## 2025-05-12 NOTE — CARE UPDATE
05/12/25 1446   Patient Assessment/Suction   Level of Consciousness (AVPU) alert   Respiratory Effort Unlabored   Expansion/Accessory Muscles/Retractions no use of accessory muscles;expansion symmetric;no retractions   Rhythm/Pattern, Respiratory unlabored   PRE-TX-O2   Device (Oxygen Therapy) room air   SpO2 97 %   Pulse Oximetry Type Continuous   $ Pulse Oximetry - Single Charge Pulse Oximetry - Single   Pulse (!) 55   Resp 16

## 2025-05-12 NOTE — TELEPHONE ENCOUNTER
Spoke to pt who states he has not gone back to ED he is waiting on his ride. Pt is currently home alone and refuses ambulance transport. Pt states his friend is close and should arrive in the next couple minutes. Pt states he was released about 1 week ago from the hospital after getting BP under control. He states he went in for BP being over 200. Pt states he went home and still felt weak.     Pt complains of full body weakness, numbness and tingling in  bilateral feet, numbness and tingling in right hand fingers. Chest pain on and off, no appetite, no sleep, no fever, no nausea, no vomiting, or diarrhea and no SOB. Pt complains of dizziness and bilateral knee weakness stating he is having trouble ambulating. Pt again advised to call 911 or I can call for him. Pt refused. Offered transport with [employer] and pt refused stating he had someone on the way to get him now. Pt advised to call clinic once discharged from ED/ hospital to schedule appt. Pt states this has been going on for 2 days now. Please advise

## 2025-05-12 NOTE — ED PROVIDER NOTES
Encounter Date: 5/12/2025       History     Chief Complaint   Patient presents with    Chest Pain     Started a few days ago with SOB      HPI patient is a 79-year-old man with a history of CAD with stents, enlarged prostate, prostate cancer, hypertension, CKD stage 3 nontoxic multinodular goiter who presents emergency department complaining of similar symptoms to when he was recently admitted to the hospital down in [place] for hypertensive emergency.  Patient states that when he was discharged home he continued to feel generalized weakness for a couple of days afterwards but then it improved and returned yesterday.  He states that he feels generally weak, he has been experiencing intermittent numbness in the distal aspects of bottom of his toes bilaterally and pads of the fingers bilaterally.  He reports feeling lightheadedness.  He has intermittent episodes of chest pain that lasts a few sec and then subside, last was early this morning/late last night.  Reports some associated shortness of breath.  States he has been taking his Ambien and quetiapine and can normally fall asleep and wake up 1 time to urinate but after he has to wake up the 2nd and 3rd time he can not fall back asleep.  He continues to feel pressure behind his eyes    Discharge summary from 4/25/25:  Hospital Course:   Patient was admitted to the hospital with worsening neurologic deficits and a markedly elevated blood pressure.  Initial etiology was thought to be acute stroke, and patient was seen by tele stroke, underwent MRI which showed potential acute lesion.  However, this was not corroborated with the patient's symptoms.  Repeat CT angiogram showed no evidence of any acute lesions, therefore stroke neurologist stated this is likely not an acute stroke.  Patient was continued for hypertensive emergency.  Home blood pressure medication was restarted, and blood pressure improved over the course of his hospitalization.  Patient was also found to  have an acute urinary tract infection and treatment was initiated for such.  Patient was also reported to be depressed and anxious, and SSRI was started at his request.  Blood pressure slowly returned back to baseline of 150s to 170s systolic.Losartan, metoprolol and Norvasc are new medications for BP.  Echocardiogram was done which showed evidence of concentric remodeling but otherwise stable.Patient with noted sundowning to indicate likely vascular dementia. Started on aricept. He was started on Cymbalta.      PT, OT worked with therapy and initially recommended SNF. However patient was denied any insurance. Patient was able to walk 200 ft. Patient has a nephew in town. Per CM agreeable to go home with home health. Patient was seen and examined today. He is clinically euvolemic, mental status at baseline. BP controlled. Appropriate for discharge today.   Review of patient's allergies indicates:  No Known Allergies  Past Medical History:   Diagnosis Date    Anticoagulant long-term use     Arthritis     Coronary artery disease     Dr. Lamb    DDD (degenerative disc disease), cervical     Degenerative disc disease     Heart murmur     History of radiation therapy 2020    Hypertension     Nontoxic multinodular goiter 09/20/2016    Prostate CA     RA (rheumatoid arthritis)     Sleep apnea     No CPAP    Vitamin D insufficiency 09/30/2016     Past Surgical History:   Procedure Laterality Date    CARPAL TUNNEL RELEASE Right 05/28/2021    Procedure: RELEASE, CARPAL TUNNEL;  Surgeon: Jose Ryan II, MD;  Location: [facility] OR;  Service: Orthopedics;  Laterality: Right;    COLONOSCOPY  prior to 2016    normal findings per patient report    CORONARY ANGIOPLASTY WITH STENT PLACEMENT  02/2022    CYSTOSCOPY N/A 12/18/2018    Procedure: CYSTOSCOPY;  Surgeon: Garrett Pina MD;  Location[facility] OR;  Service: Urology;  Laterality: N/A;    CYSTOSCOPY N/A 07/12/2022    Procedure: CYSTOSCOPY;  Surgeon: Garrett Pina MD;   [facility];  Service: Urology;  Laterality: N/A;    ESOPHAGOGASTRODUODENOSCOPY N/A 09/29/2020    Dr. Espinosa; empiric dilation; erythematous mucosa in antrum; gastric mucosal atrophy; hematin in entire stomach; biopsy: mid & distal esophagus WNL, stomach- WNL, negative for h pylori    EXCISION OF LESION OF PENIS N/A 2/23/2023    Procedure: EXCISION, LESION, PENIS;  Surgeon: Timo Myers MD;  [email or web address] OR;  Service: Urology;  Laterality: N/A;    INSERTION OF TUNNELED CENTRAL VENOUS CATHETER (CVC) WITH SUBCUTANEOUS PORT Left 5/18/2023    Procedure: [id number];  Surgeon: Atul Faria MD;  Location: [facility];  Service: General;  Laterality: Left;  left subclavian    PARATHYROIDECTOMY Right 10/27/2020    Procedure: PARATHYROIDECTOMY;  Surgeon: Latonia Clayton MD;  Location: [address];  Service: ENT;  Laterality: Right;    RELEASE OF ULNAR NERVE AT CUBITAL TUNNEL Right 05/28/2021    Procedure: RELEASE, ULNAR TUNNEL;  Surgeon: Jose Ryan II, MD;  Location: [facility] OR;  Service: Orthopedics;  Laterality: Right;    TRANSRECTAL BIOPSY OF PROSTATE WITH ULTRASOUND GUIDANCE N/A 12/18/2018    Procedure: BIOPSY, PROSTATE, RECTAL APPROACH, WITH US GUIDANCE;  Surgeon: Garrett Pina MD;  Location: [facility];  Service: Urology;  Laterality: N/A;    TRANSRECTAL ULTRASOUND EXAMINATION N/A 07/12/2022    Procedure: ULTRASOUND, RECTAL APPROACH;  Surgeon: Garrett Pina MD;  Location: [facility];  Service: Urology;  Laterality: N/A;     Family History   Problem Relation Name Age of Onset    Heart disease Mother      Stroke Father      Drug abuse Sister      No Known Problems Daughter      No Known Problems Daughter      No Known Problems Son      Diabetes Maternal Uncle      Colon cancer Neg Hx      Crohn's disease Neg Hx      Esophageal cancer Neg Hx      Stomach cancer Neg Hx      Ulcerative colitis Neg Hx       Social History[1]  Review of Systems   Constitutional:  Positive for appetite  change.   Respiratory:  Positive for shortness of breath.    Cardiovascular:  Positive for chest pain.   Genitourinary:  Positive for frequency.   Neurological:  Positive for weakness, light-headedness and numbness.       Physical Exam     Initial Vitals   BP Pulse Resp Temp SpO2   05/12/25 1338 05/12/25 1336 05/12/25 1336 05/12/25 1336 05/12/25 1336   (!) 185/81 (!) 50 15 98.2 °F (36.8 °C) 97 %      MAP       --                Physical Exam    Constitutional: He appears well-developed and well-nourished. No distress.   HENT:   Head: Normocephalic and atraumatic. Mouth/Throat: Oropharynx is clear and moist.   Eyes: EOM are normal. Pupils are equal, round, and reactive to light. No scleral icterus.   Neck: Neck supple.   Normal range of motion.  Cardiovascular:  Normal rate and regular rhythm.           Pulmonary/Chest: Breath sounds normal. No stridor. No respiratory distress. He has no wheezes. He has no rales.   Abdominal: Abdomen is soft. He exhibits no distension. There is abdominal tenderness in the suprapubic area.   Musculoskeletal:         General: No tenderness or edema. Normal range of motion.      Cervical back: Normal range of motion and neck supple.     Neurological: He is alert. He has normal strength. A sensory deficit (pads of fingers and toes b/l) is present.   Skin: Skin is warm and dry. Capillary refill takes less than 2 seconds.         ED Course   Procedures  Labs Reviewed   COMPREHENSIVE METABOLIC PANEL - Abnormal       Result Value    Sodium 141      Potassium 4.1      Chloride 109      CO2 23      Glucose 71      BUN 16      Creatinine 1.3      Calcium 8.5 (*)     Protein Total 7.2      Albumin 3.2 (*)     Bilirubin Total 0.2      ALP 75      AST 14      ALT 12      Anion Gap 9      eGFR 57 (*)    CBC WITH DIFFERENTIAL - Abnormal    WBC 6.56      RBC 4.30 (*)     Hgb 11.7 (*)     Hct 37.5 (*)     MCV 87      MCH 27.2      MCHC 31.2 (*)     RDW 15.9 (*)     Platelet Count 351      MPV 11.4       Nucleated RBC 0      Neut % 68.9      Lymph % 12.2 (*)     Mono % 14.9      Eos % 3.4      Basophil % 0.3      Imm Grans % 0.3      Neut # 4.5      Lymph # 0.80 (*)     Mono # 0.98      Eos # 0.22      Baso # 0.02      Imm Grans # 0.02     MAGNESIUM - Normal    Magnesium 2.0     TROPONIN I HIGH SENSITIVITY - Normal    Troponin High Sensitive 10     PHOSPHORUS - Normal    Phosphorus Level 3.6     URINALYSIS, REFLEX TO URINE CULTURE - Normal    Color, UA Yellow      Appearance, UA Clear      Spec Grav UA 1.010      pH, UA 7.0      Protein, UA Negative      Glucose, UA Negative      Ketones, UA Negative      Blood, UA Negative      Bilirubin, UA Negative      Urobilinogen, UA Negative      Nitrites, UA Negative      Leukocyte Esterase, UA Negative     TROPONIN I HIGH SENSITIVITY - Normal    Troponin High Sensitive 10     HEP C VIRUS HOLD SPECIMEN    Extra Tube Hold for add-ons.     CBC W/ AUTO DIFFERENTIAL    Narrative:     The following orders were created for panel order CBC auto differential.  Procedure                               Abnormality         Status                     ---------                               -----------         ------                     CBC with Differential[[id number]]       Abnormal            Final result                 Please view results for these tests on the individual orders.   HEPATITIS C ANTIBODY   HIV 1 / 2 ANTIBODY   NT-PRO NATRIURETIC PEPTIDE   TSH     EKG Readings: (Independently Interpreted)   Initial Reading: No STEMI.   Sinus rhythm with second-degree AV block Mobitz 1 with two-to-one AV conduction.  Inferior and anterolateral ST and T-wave abnormalities that are not significantly changed compared to previous EKG on April 15, 2025.       Imaging Results              CT Head Without Contrast (Final result)  Result time 05/12/25 16:13:26      Final result by Estrada Andrade MD (05/12/25 16:13:26)                   Impression:      1. No acute intracranial CT  findings.      Electronically signed by: Estrada Andrade  Date:    05/12/2025  Time:    16:13               Narrative:    EXAMINATION:  CT HEAD WITHOUT CONTRAST    CLINICAL HISTORY:  Dizziness, persistent/recurrent, cardiac or vascular cause suspected;    TECHNIQUE:  Low dose axial CT images obtained throughout the head without intravenous contrast. Sagittal and coronal reconstructions were performed.    COMPARISON:  Head CT 04/17/2025.  Brain MRI 04/15/2025.    FINDINGS:  Brain: There is no evidence of a mass, edema, midline shift, or intracranial hemorrhage. No extra-axial fluid collection.  Grossly stable findings of scattered chronic small vessel ischemic changes.  No CT evidence of an acute major vascular territorial infarct.    Ventricles: The ventricles, sulci, and cisterns are within normal limits.    Skull: The osseous structures are unremarkable in appearance.    Extracranial soft tissues: Limited imaging is within normal limits.    Other: Chronic bilateral mastoid effusions, nonspecific.  No significant paranasal sinus opacification as visualized.                                       X-Ray Chest AP Portable (Final result)  Result time 05/12/25 16:10:35      Final result by Ras Llanos MD (05/12/25 16:10:35)                   Narrative:    EXAMINATION:  XR CHEST AP PORTABLE    CLINICAL HISTORY:  Chest Pain;    TECHNIQUE:  Single frontal view of the chest was performed.    COMPARISON:  04/14/2025    FINDINGS:  No new airspace disease.  Normal size heart.  Aortic arch atherosclerosis.  No pleural effusion or pneumothorax.  Surgical clips project over the lower neck just to the right of midline.      Electronically signed by: Ras Llanos  Date:    05/12/2025  Time:    16:10                                     Medications - No data to display  Medical Decision Making  79-year-old man with multiple complaints for which she was recently evaluated for that include bilateral distal extremity paresthesias,  shortness of breath, atypical chest pain, pressure behind the eyes, insomnia.  On examination he is well-appearing and in no distress.  He has no hypoxia or tachypnea.  Lung sounds are clear.  EKG unchanged from prior.  Troponin x2 are negative at 10.  CT head performed showing no acute abnormalities.  Chest x-ray interpreted by myself shows no evidence pneumothorax, pneumonia, pleural effusion or any acute abnormalities.  Hemoglobin 11.7.  Patient is afebrile with normal white blood cell count of 6.56, doubt infectious etiology.  No grave electrolyte phosphorus 3.6, sodium 141 with potassium 4.1.  Renal function normal with creatinine 1.3.  Magnesium 2.  Sure what the cause of the patient's symptoms are.  Advised against doubling up on his Ambien.  I believe patient is appropriate for outpatient follow up with his primary care physician.    Amount and/or Complexity of Data Reviewed  Labs: ordered.  Radiology: ordered.                                      Clinical Impression:  Final diagnoses:  [R07.9] Chest pain  [R20.2] Paresthesias (Primary)  [J34.89] Sinus pressure          ED Disposition Condition    Discharge Stable          ED Prescriptions    None       Follow-up Information       Follow up With Specialties Details Why Contact Info Additional Information    Sp Lilly MD Family Medicine Schedule an appointment as soon as possible for a visit   [address] [address] [phone]       Replaced by [facility]  As needed, If symptoms worsen [address] [place] [address] [address] [place] [address] 1st floor                 [1]   Social History  Tobacco Use    Smoking status: Former     Current packs/day: 0.00     Average packs/day: 0.3 packs/day for 10.0 years (2.5 ttl pk-yrs)     Types: Cigarettes     Start date: 1991     Quit date: 2001     Years since quittin.7     Passive exposure: Past    Smokeless tobacco: Never   Substance Use Topics    Alcohol use:  Not Currently     Comment: seldom    Drug use: Not Currently     Frequency: 8.0 times per week     Types: Marijuana        Matheus Tee MD  05/12/25 [id number]

## 2025-05-12 NOTE — TELEPHONE ENCOUNTER
Numbness in both feet across his toes. Feels weak all over. Weakness in the knees. Haven't slept in months. Decreased appetite. Pt stated he needs someone to come check his blood pressure. He said he was suppose to have a home visit but no one has come or called. Informed pt that it looks like he may have had a visit on 5/5/2025 pt stated he did not. No chest pain, no difficulty breathing. No fever. Last night he took 2 ambiens. Spinning or tilting sensation present now. Care advice recommends pt go to ER or office with Md approval. Pt stated he doesn't feel safe driving. Pt stated he will try to see if someone can bring him to the ER but it wont be for another 4 hours. Pt instructed to call 911 EMS if he can't get transportation. Pt verbalized understanding.   Reason for Disposition   Spinning or tilting sensation (vertigo) present now and one or more stroke risk factors (i.e., hypertension, diabetes mellitus, prior stroke/TIA, heart attack, age over 60) (Exception: Prior physician evaluation for this AND no different/worse than usual.)    Additional Information   Negative: SEVERE difficulty breathing (e.g., struggling for each breath, speaks in single words)   Negative: Shock suspected (e.g., cold/pale/clammy skin, too weak to stand, low BP, rapid pulse)   Negative: Difficult to awaken or acting confused (e.g., disoriented, slurred speech)   Negative: Fainted, and still feels dizzy afterwards   Negative: Overdose (accidental or intentional) of medications   Negative: New neurologic deficit that is present now: * Weakness of the face, arm, or leg on one side of the body * Numbness of the face, arm, or leg on one side of the body * Loss of speech or garbled speech   Negative: Heart beating < 50 beats per minute OR > 140 beats per minute   Negative: Sounds like a life-threatening emergency to the triager   Negative: SEVERE dizziness (e.g., unable to stand, requires support to walk, feels like passing out now)    Negative: SEVERE headache or neck pain    Protocols used: Dizziness-A-OH

## 2025-05-16 ENCOUNTER — OFFICE VISIT (OUTPATIENT)
Dept: FAMILY MEDICINE | Facility: CLINIC | Age: 80
End: 2025-05-16
Payer: MEDICARE

## 2025-05-16 VITALS
DIASTOLIC BLOOD PRESSURE: 62 MMHG | BODY MASS INDEX: 21.39 KG/M2 | OXYGEN SATURATION: 97 % | RESPIRATION RATE: 16 BRPM | TEMPERATURE: 99 F | HEART RATE: 60 BPM | WEIGHT: 141.13 LBS | SYSTOLIC BLOOD PRESSURE: 170 MMHG | HEIGHT: 68 IN

## 2025-05-16 DIAGNOSIS — C68.0 URETHRAL CANCER: ICD-10-CM

## 2025-05-16 DIAGNOSIS — M06.9 RHEUMATOID ARTHRITIS INVOLVING MULTIPLE SITES, UNSPECIFIED WHETHER RHEUMATOID FACTOR PRESENT: ICD-10-CM

## 2025-05-16 DIAGNOSIS — N18.31 STAGE 3A CHRONIC KIDNEY DISEASE: ICD-10-CM

## 2025-05-16 DIAGNOSIS — G47.00 INSOMNIA, UNSPECIFIED TYPE: ICD-10-CM

## 2025-05-16 DIAGNOSIS — D64.9 ANEMIA, UNSPECIFIED TYPE: ICD-10-CM

## 2025-05-16 DIAGNOSIS — J43.8 OTHER EMPHYSEMA: ICD-10-CM

## 2025-05-16 DIAGNOSIS — I10 ESSENTIAL HYPERTENSION: ICD-10-CM

## 2025-05-16 DIAGNOSIS — F32.2 MAJOR DEPRESSIVE DISORDER, SINGLE EPISODE, SEVERE WITHOUT PSYCHOTIC FEATURES: ICD-10-CM

## 2025-05-16 DIAGNOSIS — Z00.00 HEALTHCARE MAINTENANCE: Primary | ICD-10-CM

## 2025-05-16 PROCEDURE — 99999 PR PBB SHADOW E&M-EST. PATIENT-LVL IV: CPT | Mod: PBBFAC,,, | Performed by: STUDENT IN AN ORGANIZED HEALTH CARE EDUCATION/TRAINING PROGRAM

## 2025-05-16 RX ORDER — AMLODIPINE AND VALSARTAN 10; 320 MG/1; MG/1
1 TABLET ORAL DAILY
Qty: 90 TABLET | Refills: 1 | Status: SHIPPED | OUTPATIENT
Start: 2025-05-16 | End: 2026-05-16

## 2025-05-16 RX ORDER — QUETIAPINE FUMARATE 200 MG/1
200 TABLET, FILM COATED ORAL NIGHTLY
COMMUNITY
End: 2025-05-16 | Stop reason: SDUPTHER

## 2025-05-16 RX ORDER — ATORVASTATIN CALCIUM 20 MG/1
20 TABLET, FILM COATED ORAL DAILY
Start: 2025-05-16

## 2025-05-16 RX ORDER — QUETIAPINE FUMARATE 200 MG/1
200 TABLET, FILM COATED ORAL NIGHTLY
Qty: 90 TABLET | Refills: 3 | Status: SHIPPED | OUTPATIENT
Start: 2025-05-16 | End: 2026-05-11

## 2025-05-16 NOTE — PROGRESS NOTES
[facility] Note    Subjective:    The HPI and pertinent ROS is included in the Diagnostic Impression Remarks section at the end of the note. Please see below for further details. Chief complaint is at end of note.     Rajendra is a pleasant intelligent patient who is here for evaluation.     Modified Medications    Modified Medication Previous Medication    QUETIAPINE (SEROQUEL) 200 MG TAB QUEtiapine (SEROQUEL) 200 MG Tab       Take 1 tablet (200 mg total) by mouth every evening.    Take 200 mg by mouth every evening.       Data reviewed 274}  Previous medical records reviewed and summarized in plan section at end of note.      If you are due for any health screening(s) below please notify me so we can arrange them to be ordered and scheduled. Most healthy patients at your age complete them, but you are free to accept or refuse. If you can't do it, I'll definitely understand. If you can, I'd certainly appreciate it!     Tests to Keep You Healthy    Colon Cancer Screening: ORDERED  Last Blood Pressure <= 139/89 (5/16/2025): NO      The following portions of the patient's history were reviewed and updated as appropriate: allergies, current medications, past family history, past medical history, past social history, past surgical history and problem list.    He  has a past medical history of Anticoagulant long-term use, Arthritis, Coronary artery disease, DDD (degenerative disc disease), cervical, Degenerative disc disease, Heart murmur, History of radiation therapy (2020), Hypertension, Nontoxic multinodular goiter (09/20/2016), Prostate CA, RA (rheumatoid arthritis), Sleep apnea, and Vitamin D insufficiency (09/30/2016).  He  has a past surgical history that includes Transrectal biopsy of prostate with ultrasound guidance (N/A, 12/18/2018); Cystoscopy (N/A, 12/18/2018); Esophagogastroduodenoscopy (N/A, 09/29/2020); Parathyroidectomy (Right, 10/27/2020); Colonoscopy (prior to 2016); Carpal tunnel  release (Right, 05/28/2021); Release of ulnar nerve at cubital tunnel (Right, 05/28/2021); Transrectal ultrasound examination (N/A, 07/12/2022); Cystoscopy (N/A, 07/12/2022); Coronary angioplasty with stent (02/2022); Excision of lesion of penis (N/A, 2/23/2023); and Insertion of tunneled central venous catheter (CVC) with subcutaneous port (Left, 5/18/2023).    He  reports that he quit smoking about 23 years ago. His smoking use included cigarettes. He started smoking about 33 years ago. He has a 2.5 pack-year smoking history. He has been exposed to tobacco smoke. He has never used smokeless tobacco. He reports that he does not currently use alcohol. He reports that he does not currently use drugs after having used the following drugs: Marijuana. Frequency: 8.00 times per week.  He family history includes Diabetes in his maternal uncle; Drug abuse in his sister; Heart disease in his mother; No Known Problems in his daughter, daughter, and son; Stroke in his father.    Review of patient's allergies indicates:  No Known Allergies    Tobacco Use: Medium Risk (5/16/2025)    Patient History     Smoking Tobacco Use: Former     Smokeless Tobacco Use: Never     Passive Exposure: Past     Physical Examination  Physical Exam    General: No acute distress. Well-developed. Well-nourished.  Eyes: EOMI. Sclerae anicteric.  HENT: Normocephalic. Atraumatic. Nares patent. Moist oral mucosa.  Cardiovascular: Regular rate. Regular rhythm. No murmurs. No rubs. No gallops. Normal S1, S2.  Respiratory: Normal respiratory effort. Clear to auscultation bilaterally. No rales. No rhonchi. No wheezing.  Musculoskeletal: No  obvious deformity.  Extremities: No lower extremity edema.  Neurological: Alert & oriented x3. No slurred speech. Normal gait.  Psychiatric: Normal mood. Normal affect. Good insight. Good judgment.  Skin: Warm. Dry. No rash.          General appearance: alert, cooperative, no distress  Neck: no thyromegaly, no neck  "stiffness  Lungs: clear to auscultation, no wheezes, rales or rhonchi, symmetric air entry  Heart: normal rate, regular rhythm, normal S1, S2, no murmurs, rubs, clicks or gallops  Abdomen: soft, nontender, nondistended, no rigidity, rebound, or guarding.   Back: no point tenderness over spine  Extremities: peripheral pulses normal, no unilateral leg swelling or calf tenderness   Neurological:alert, oriented, normal speech, no new focal findings or movement disorder noted from baseline      BP Readings from Last 3 Encounters:   05/16/25 (!) 170/62   05/12/25 (!) 197/88   04/26/25 (!) 144/75     Wt Readings from Last 3 Encounters:   05/16/25 64 kg (141 lb 1.5 oz)   05/12/25 61.3 kg (135 lb 3.7 oz)   04/16/25 62.6 kg (138 lb 0.1 oz)     BP (!) 170/62 (BP Location: Right arm, Patient Position: Sitting)   Pulse 60   Temp 98.9 °F (37.2 °C) (Oral)   Resp 16   Ht 5' 8" (1.727 m)   Wt 64 kg (141 lb 1.5 oz)   SpO2 97%   BMI 21.45 kg/m²    274}  Laboratory: I have reviewed old labs below:    274}    Lab Results   Component Value Date    WBC 6.56 05/12/2025    HGB 11.7 (L) 05/12/2025    HCT 37.5 (L) 05/12/2025    MCV 87 05/12/2025     05/12/2025     05/12/2025    K 4.1 05/12/2025     05/12/2025    CALCIUM 8.5 (L) 05/12/2025    PHOS 3.6 05/12/2025    CO2 23 05/12/2025    BUN 16 05/12/2025    CREATININE 1.3 05/12/2025    EGFRNORACEVR 57 (L) 05/12/2025    LABPROT 11.4 10/24/2024    ALBUMIN 3.2 (L) 05/12/2025    BILITOT 0.2 05/12/2025    ALKPHOS 75 05/12/2025    ALT 12 05/12/2025    AST 14 05/12/2025    INR 1.1 04/16/2025    CHOL 124 04/15/2025    TRIG 65 04/15/2025    HDL 50 04/15/2025    LDLCALC 61.0 (L) 04/15/2025    TSH 0.940 05/12/2025    PSA 0.14 05/27/2024    HGBA1C 5.4 04/15/2025      Lab reviewed by me: Particular labs of significance that I will monitor, workup, or treat to improve are mentioned below in diagnostic impression remarks.      Imaging/EKG: I have reviewed the pertinent results and my " findings are noted in remarks.  274}    CC:   Chief Complaint   Patient presents with    Follow-up    Insomnia    Hypertension        274}    History of Present Illness    CHIEF COMPLAINT:  Patient presents today for health maintenance and medication review    MEDICATIONS:  He did not take his blood pressure medications today. He reports having excess medications including antibiotics and hydralazine of different doses. He currently takes losartan, amlodipine, and metoprolol for blood pressure management. He alternates between Ambien and Seroquel for sleep, reporting no side effects and requests Seroquel refill. He declined neurology-prescribed medication due to side effect concerns. He is not taking Aricept, duloxetine (Cymbalta), or hydralazine.    CURRENT SYMPTOMS:  His anxiety remains stable and his back pain has improved. He denies memory loss.      ROS:  Musculoskeletal: +back pain  Psychiatric: -anxiety          Assessment/Plan  Rajendra Castle is a 79 y.o. male who presents to clinic with:  1. Healthcare maintenance    2. Essential hypertension    3. Rheumatoid arthritis involving multiple sites, unspecified whether rheumatoid factor present    4. Major depressive disorder, single episode, severe without psychotic features    5. Stage 3a chronic kidney disease    6. Urethral cancer    7. Other emphysema    8. Insomnia, unspecified type    9. 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    CHRONIC KIDNEY DISEASE, STAGE 3A:  - Continued losartan (ARB inhibitor) for CKD management.  - Condition remains stable.  - Will ensure ongoing medication monitoring.    MAJOR DEPRESSIVE DISORDER, SINGLE EPISODE, SEVERE WITHOUT PSYCHOTIC FEATURES:  - Monitored major depression and refilled Seroquel (quetiapine).    ESSENTIAL HYPERTENSION:  - Continued amlodipine and metoprolol for management.  - Discontinued hydralazine due to history of low potassium and fatigue.  - Considering combination blood pressure medication to address high pill burden.  - Will monitor labs.    INSOMNIA, UNSPECIFIED TYPE:  - Monitored insomnia and considered medication adjustments.    ANEMIA, UNSPECIFIED TYPE:  - Monitored anemia as part of chronic disease management.  - Ordered labs for follow-up.    RHEUMATOID ARTHRITIS INVOLVING MULTIPLE SITES WITH POSITIVE RHEUMATOID FACTOR:  - Emphasized importance of medication adherence.  - Referred patient to [facility] to establish care with a rheumatologist.  - Recommend patient continue taking prescribed medication for rheumatoid arthritis.    HYPERTENSIVE HEART AND CHRONIC KIDNEY DISEASE WITH HEART FAILURE AND STAGE 1 THROUGH STAGE 4 CHRONIC KIDNEY DISEASE, OR UNSPECIFIED CHRONIC KIDNEY DISEASE:  - Continued losartan (ARB inhibitor), amlodipine, and metoprolol for management of both conditions.  - Held hydralazine to reduce pill burden.  - Will monitor labs due to history of low  potassium and fatigue.         DEVANTE continue duloxetine     emphysema-stable continue current inhalers no recent flares  The patient's chronic kidney disease was monitored, evaluated, addressed and/or treated. Recommend to avoid NSAIDs and renally dose medications. ACE/ARB inhibitor if indicated and optimize blood pressure and A1c to goal.   Major depression disorder-stable continue current meds and will monitor no SI/plan.    Altogether 43 minutes of total time spent on the encounter, which includes face to face time and non-face to face time preparing to see the patient (eg, review of tests), Obtaining and/or reviewing separately obtained history, Documenting clinical information in the electronic or other health record, Independently interpreting results (not separately reported) and communicating results to the patient/family/caregiver, or Care coordination (not separately reported). I also counseled him on common and the most usual side effect of prescribed medications. Risk and benefits of the medication were also discussed. I reviewed previous notes to better coordinate patient's care. He test results and lifestyle changes were also discussed. All questions were answered, and patient voiced understanding.     This note was generated with the assistance of ambient listening technology. Verbal consent was obtained by the patient and accompanying visitor(s) for the recording of patient appointment to facilitate this note. I attest to having reviewed and edited the generated note for accuracy, though some syntax or spelling errors may persist. Please contact the author of this note for any clarification.       1. Healthcare maintenance    2. Essential hypertension  - amlodipine-valsartan (EXFORGE)  mg per tablet; Take 1 tablet by mouth once daily.  Dispense: 90 tablet; Refill: 1    3. Rheumatoid arthritis involving multiple sites, unspecified whether rheumatoid factor present    4. Major depressive disorder,  single episode, severe without psychotic features    5. Stage 3a chronic kidney disease    6. Urethral cancer    7. Other emphysema    8. Insomnia, unspecified type  - QUEtiapine (SEROQUEL) 200 MG Tab; Take 1 tablet (200 mg total) by mouth every evening.  Dispense: 90 tablet; Refill: 3    9. Anemia, unspecified type      Sp Lilly MD   274}    If you are due for any health screening(s) below please notify me so we can arrange them to be ordered and scheduled. Most healthy patients at your age complete them, but you are free to accept or refuse.     If you can't do it, I'll definitely understand. If you can, I'd certainly appreciate it!   Tests to Keep You Healthy    Colon Cancer Screening: ORDERED  Last Blood Pressure <= 139/89 (5/16/2025): NO

## 2025-06-25 ENCOUNTER — TELEPHONE (OUTPATIENT)
Dept: FAMILY MEDICINE | Facility: CLINIC | Age: 80
End: 2025-06-25
Payer: MEDICARE

## 2025-06-25 NOTE — TELEPHONE ENCOUNTER
Copied from CRM #[id number]. Topic: Appointments - Appointment Rescheduling  >> Jun 25, 2025  2:14 PM Pratima wrote:  Type:  Reschedule Apoointment Request    Name of Caller:Patient at [phone]    Additional Information: patient missed his appt today at 1 pm and would like to reschedule appt for as soon as possible, please call patient and advise. Thank you.

## 2025-07-10 DIAGNOSIS — G47.00 INSOMNIA, UNSPECIFIED TYPE: ICD-10-CM

## 2025-07-10 RX ORDER — QUETIAPINE FUMARATE 200 MG/1
200 TABLET, FILM COATED ORAL NIGHTLY
Qty: 90 TABLET | Refills: 3 | Status: SHIPPED | OUTPATIENT
Start: 2025-07-10 | End: 2026-07-05

## 2025-07-10 NOTE — TELEPHONE ENCOUNTER
Copied from CRM #[id number]. Topic: Medications - Medication Refill  >> Jul 10, 2025  2:17 PM Michelle wrote:  Type:  RX Refill Request    Who Called: pt    Refill or New Rx: refill    RX Name and Strength: QUEtiapine (SEROQUEL) 200 MG Tab    How is the patient currently taking it? (ex. 1XDay):as directed    Is this a 30 day or 90 day RX: 30    Preferred Pharmacy with phone number:     [id number] - [place], [place] - [address] [facility]  [address]  [facility] [address]  Phone: [phone] Fax: [phone]    Local or Mail Order:local    Ordering Provider: Maxx    Would the patient rather a call back or a response via [facility]?  Call back    Best Call Back Number: [phone]    Additional Information:  pt sts is almost out as using more often    Please refill script    Thanks

## 2025-07-10 NOTE — TELEPHONE ENCOUNTER
No care due was identified.  [facility] Embedded Care Due Messages. Reference number: [id number].   7/10/2025 3:10:06 PM CDT

## (undated) DEVICE — DRAPE STERI-DRAPE 1000 17X11IN

## (undated) DEVICE — STRAP OR TABLE 5IN X 72IN

## (undated) DEVICE — SEE L#120831

## (undated) DEVICE — DRAPE MINOR PROCEDURE

## (undated) DEVICE — APPLICATOR CHLORAPREP ORN 26ML

## (undated) DEVICE — RETRACTOR LONE STAR 14.1X14.1

## (undated) DEVICE — TUBE EMG NIM 8.0MM TRIVANTAGE

## (undated) DEVICE — SUT 2-0 12-18IN SILK

## (undated) DEVICE — GLOVE SURG ULTRA TOUCH 8.5

## (undated) DEVICE — STAPLER SKIN PROXIMATE WIDE

## (undated) DEVICE — ELECTRODE REM PLYHSV RETURN 9

## (undated) DEVICE — ELECTRODE BLADE INSULATED 1 IN

## (undated) DEVICE — CONTAINER SPECIMEN STRL 4OZ

## (undated) DEVICE — JELLY LUBRICATING STERILE 5 GR

## (undated) DEVICE — PAD CAST SPECIALIST STRL 3

## (undated) DEVICE — WATER STERILE INJ 500ML BAG

## (undated) DEVICE — SEE MEDLINE ITEM 157173

## (undated) DEVICE — GLOVE SURG ULTRA TOUCH 7

## (undated) DEVICE — DRESSING XEROFORM FOIL PK 1X8

## (undated) DEVICE — SEE MEDLINE ITEM 152622

## (undated) DEVICE — TOURNIQUET SB QC DP 18X4IN

## (undated) DEVICE — GUIDE BIOPSY BIPLANAR 18G

## (undated) DEVICE — SOL 9P NACL IRR PIC IL

## (undated) DEVICE — SHEET EENT SPLIT

## (undated) DEVICE — SPONGE SUPER KERLIX 6X6.75IN

## (undated) DEVICE — SUT 3-0 12-18IN SILK

## (undated) DEVICE — DRAIN CHANNEL ROUND 10FR

## (undated) DEVICE — LUBRICANT SURGILUBE 2 OZ

## (undated) DEVICE — SCRUB 10% POVIDONE IODINE 4OZ

## (undated) DEVICE — SUT SILK 3-0 BLK PS-2 18IN

## (undated) DEVICE — SEE MEDLINE ITEM 157116

## (undated) DEVICE — SPONGE GAUZE 16PLY 4X4

## (undated) DEVICE — SEE MEDLINE ITEM 157128

## (undated) DEVICE — SEE MEDLINE ITEM 152651

## (undated) DEVICE — UNDERGLOVES BIOGEL PI SIZE 8.5

## (undated) DEVICE — GAUZE SPONGE PEANUT STRL

## (undated) DEVICE — SET CYSTO IRRIGATION UNIV SPIK

## (undated) DEVICE — SEE MEDLINE ITEM 157194

## (undated) DEVICE — Device

## (undated) DEVICE — NDL HYPO REG 25G X 1 1/2

## (undated) DEVICE — SUT ETHILON 3-0 PS2 18 BLK

## (undated) DEVICE — SUT LIGACLIP SMALL XTRA

## (undated) DEVICE — CLIP MED TICALL

## (undated) DEVICE — SYR 10CC LUER LOCK

## (undated) DEVICE — SPONGE DERMACEA GAUZE 4X4

## (undated) DEVICE — DRAPE STERI INSTRUMENT 1018

## (undated) DEVICE — COVER TRANSDUCER LATEX N/STERI

## (undated) DEVICE — NDL SAFETY 25G X 1.5 ECLIPSE

## (undated) DEVICE — SUT CTD VICRYL 3-0 CR/SH

## (undated) DEVICE — CORD BIPOLAR 12 FOOT

## (undated) DEVICE — FIBRILLAR ABS HEMOSTAT 4X4

## (undated) DEVICE — DRAPE PLASTIC U 60X72

## (undated) DEVICE — PROBE PRASS SLIM

## (undated) DEVICE — TRAY MINOR GEN SURG

## (undated) DEVICE — COVER SURG LIGHT HANDLE

## (undated) DEVICE — ALCOHOL 70% ISOP RUBBING 4OZ

## (undated) DEVICE — UNDERGLOVES BIOGEL PI SZ 7 LF

## (undated) DEVICE — SUT COATED VICRYL 4/0 27IN

## (undated) DEVICE — SEE MEDLINE ITEM 157117

## (undated) DEVICE — SEE MEDLINE ITEM 157181

## (undated) DEVICE — GAUZE FLUFF XXLG 36X36 2 PLY

## (undated) DEVICE — SHEET DRAPE MEDIUM

## (undated) DEVICE — HOOK LONE STAR BLUNT 12MM

## (undated) DEVICE — PROBE SIMULATOR KRAFF

## (undated) DEVICE — SEE MEDLINE ITEM 157131

## (undated) DEVICE — SEE MEDLINE ITEM 157118

## (undated) DEVICE — GLOVE SURG ULTRA TOUCH 8

## (undated) DEVICE — ADHESIVE DERMABOND ADVANCED

## (undated) DEVICE — PACK BASIC

## (undated) DEVICE — SUT 2/0 18IN COATED VICRYL

## (undated) DEVICE — PACK SET UP 190 OMC-NS

## (undated) DEVICE — SLING ORTHOPEDIC MEDIUM

## (undated) DEVICE — BANDAGE ESMARK LATEX FREE 4INX

## (undated) DEVICE — SLEEVE SCD EXPRESS KNEE MEDIUM

## (undated) DEVICE — BANDAGE ELASTIC 3X5 VELCRO ST

## (undated) DEVICE — GOWN SURGICAL X-LARGE